# Patient Record
Sex: FEMALE | Race: BLACK OR AFRICAN AMERICAN | Employment: OTHER | RURAL
[De-identification: names, ages, dates, MRNs, and addresses within clinical notes are randomized per-mention and may not be internally consistent; named-entity substitution may affect disease eponyms.]

---

## 2017-01-23 ENCOUNTER — TELEPHONE (OUTPATIENT)
Dept: FAMILY MEDICINE CLINIC | Age: 37
End: 2017-01-23

## 2017-01-23 NOTE — TELEPHONE ENCOUNTER
----- Message from Javier Beltre sent at 1/23/2017 11:10 AM EST -----  Regarding: Dr. Weir Heads Telephone  Patient would like to see when she had her HPV shot.  Contact is [phone]

## 2017-02-02 ENCOUNTER — TELEPHONE (OUTPATIENT)
Dept: FAMILY MEDICINE CLINIC | Age: 37
End: 2017-02-02

## 2017-02-02 ENCOUNTER — HOSPITAL ENCOUNTER (OUTPATIENT)
Dept: LAB | Age: 37
Discharge: HOME | End: 2017-02-02
Payer: COMMERCIAL

## 2017-02-02 ENCOUNTER — OFFICE VISIT (OUTPATIENT)
Dept: FAMILY MEDICINE CLINIC | Age: 37
End: 2017-02-02

## 2017-02-02 VITALS
TEMPERATURE: 97.5 F | HEART RATE: 82 BPM | SYSTOLIC BLOOD PRESSURE: 113 MMHG | OXYGEN SATURATION: 94 % | WEIGHT: 164 LBS | HEIGHT: 64 IN | BODY MASS INDEX: 28 KG/M2 | RESPIRATION RATE: 16 BRPM | DIASTOLIC BLOOD PRESSURE: 75 MMHG

## 2017-02-02 DIAGNOSIS — Z11.3 SCREEN FOR STD (SEXUALLY TRANSMITTED DISEASE): ICD-10-CM

## 2017-02-02 DIAGNOSIS — Z01.419 WELL WOMAN EXAM: ICD-10-CM

## 2017-02-02 DIAGNOSIS — Z30.018 ENCOUNTER FOR INITIAL PRESCRIPTION OF OTHER CONTRACEPTIVES: ICD-10-CM

## 2017-02-02 DIAGNOSIS — Z11.3 SCREEN FOR STD (SEXUALLY TRANSMITTED DISEASE): Primary | ICD-10-CM

## 2017-02-02 PROCEDURE — 87624 HPV HI-RISK TYP POOLED RSLT: CPT | Performed by: FAMILY MEDICINE

## 2017-02-02 PROCEDURE — 88175 CYTOPATH C/V AUTO FLUID REDO: CPT | Performed by: FAMILY MEDICINE

## 2017-02-02 RX ORDER — GABAPENTIN 300 MG/1
300 CAPSULE ORAL 2 TIMES DAILY
COMMUNITY
End: 2017-05-03 | Stop reason: SDUPTHER

## 2017-02-02 RX ORDER — LORAZEPAM 2 MG/1
2 TABLET ORAL 2 TIMES DAILY
COMMUNITY
Start: 2016-12-18 | End: 2017-08-23 | Stop reason: SDUPTHER

## 2017-02-02 RX ORDER — CITALOPRAM 20 MG/1
1 TABLET, FILM COATED ORAL DAILY
COMMUNITY
Start: 2017-01-15 | End: 2017-12-08

## 2017-02-02 NOTE — PROGRESS NOTES
Reviewed record in preparation for visit and have obtained necessary documentation. Patient did not bring medications to visit for review. Information provided on Advanced Directive, Living Will. Body mass index is 28.15 kg/(m^2).    Health Maintenance Due   Topic Date Due    INFLUENZA AGE 9 TO ADULT  08/01/2016

## 2017-02-02 NOTE — PATIENT INSTRUCTIONS
Use  Prior to intercourse, approximately one-quarter teaspoon of spermicide is placed in the cap and an additional one-quarter teaspoon is placed along the rim [5,21]. The cap is then turned over and an additional one-quarter teaspoon of spermicide is placed in the groove between the dome and the brim. The insertion of the cap is then similar to insertion of a diaphragm. The cervical cap is inserted with the dome/strap side down and with the long brim edge entering the vagina first. (See 'Correct use' above.)  The cap can be inserted up to six hours prior to sex. The cap must be left in the vagina for at least six hours after the last episode of intercourse, and the [place] package insert states is can be left in place for up to 48 hours, whereas the  package insert states it can be left in place for up to 72 hours ( approval) [17]. Prior to repeat episodes of intercourse, the woman confirms correct cap position but does not remove the cap. She then inserts an additional dose of spermicide into the vagina. The cervical cap is not recommended for use during menstruation     Maintenance    With simple maintenance, diaphragms typically last about two years [5]. Maintenance includes:  ?Wash with mild soap and warm water. ? Allow to air dry. ?Do not use powders or other products on diaphragm. ?Do not use with oil-based products such as petroleum jelly or cold cream as these can damage the diaphragm. If additional lubricant is needed, water-based lubricants are used. Diaphragms should be checked routinely for damage such as a holes, cracks, weak spots, or wrinkles. To check for damage, the diaphragm can either be held up to the light or filled with water to assess for holes or leaks.   Refitting and replacing -- Diaphragm fit is rechecked after a birth, miscarriage, or  or if the patient gains or loses 4.5 kg or more (10 lbs), has pelvic surgery, has frequent UTIs, or experiences discomfort during sex. Manufacturers of silicone diaphragms recommend replacement every two years. Cervical Cap for Birth Control: Care Instructions  Your Care Instructions    The cervical cap is used to prevent pregnancy. It is made of silicone. A cap is called a barrier method because it keeps the sperm and eggs apart. The cap fits inside your vagina and covers the cervix, which is the opening of the uterus. You use the cap each time you have intercourse. The cap will last for 1 year. You doctor will fit you for the cap and give you a prescription for it. Your doctor may ask you to come back to make sure you know how to insert the cap and use it correctly. Follow-up care is a key part of your treatment and safety. Be sure to make and go to all appointments, and call your doctor if you are having problems. It's also a good idea to know your test results and keep a list of the medicines you take. How can you care for yourself at home? How do you use the cervical cap? · Insert the cap each time you have intercourse. Read the instructions for how to insert the cap. It must be done correctly to protect against pregnancy. In general:  ¨ Apply spermicide (contraceptive jelly) to the cap according to the directions. ¨ Insert the cap all the way into your vagina. Make sure it is covering your cervix. ¨ Remove the cap by pulling it out with the removal strap. · You can insert the cap up to 6 hours before having intercourse. Leave the cap in place for at least 6 hours after intercourse. Do not leave it in for more than 48 hours. · If you have sex more than one time when the cap is in place, put a small amount of spermicide on the tip of your finger. Insert your finger into your vagina and check the position of the cap. Don't remove the cap. · Avoid using any petroleum-based vaginal creams, oils, or ointments, which can damage the silicone.  But water-based personal lubricants, such as Astroglide and K-Y Corin, are safe to use. What if you think the cap is not placed correctly or not protecting you from pregnancy? · Always read the instructions. · Call your doctor and use backup birth control, such as a condom, or don't have intercourse until you know the cap is working. · If you had intercourse, you can use emergency contraception, such as the morning-after pill (Plan B). You can use emergency contraception for up to 5 days after having had sex, but it works best if you take it right away. What else do you need to know? · Spermicide used with the cap may cause an allergic reaction. It can cause itching or sores in the vagina or on the penis. · Don't use the cap during your period. Use another method, such as a condom, or don't have intercourse. · Check the cap often. Get a new cap if you see holes, tears, or discoloration. · Wash the cap with warm water and hand soap after removing it. Make sure it's dry before you store it in its container. Do not use talcum or baby powder on the cap. These products may break down the silicone or irritate the vagina. · You should be refitted for the cap if you have a baby. · The cap doesn't protect against sexually transmitted diseases (STDs), such as herpes or HIV/AIDS. If you're not sure whether your sex partner might have an STD, use a condom to protect against disease. Using a condom with the cap also protects better against pregnancy. When should you call for help? Watch closely for changes in your health, and be sure to contact your doctor if:  · You have any problems with your birth control method. · You think you may have been exposed to or have a sexually transmitted disease. · You have vaginal discharge that smells bad. · You have signs of a urinary infection. For example:  ¨ You have blood or pus in your urine. ¨ You have pain in your back just below your rib cage. This is called flank pain. ¨ You have a fever, chills, or body aches.   ¨ It hurts to urinate. ¨ You have groin or belly pain. Where can you learn more? Go to http://daniel-carlton.info/. Enter Q340 in the search box to learn more about \"Cervical Cap for Birth Control: Care Instructions. \"  Current as of: May 30, 2016  Content Version: 11.1  © 3249-2757 [employer]. Care instructions adapted under license by [employer] (which disclaims liability or warranty for this information). If you have questions about a medical condition or this instruction, always ask your healthcare professional. [address] any warranty or liability for your use of this information.

## 2017-02-02 NOTE — PROGRESS NOTES
CC: [facility]    HPI: Pt is a 31 y.o. female who presents for [facility]. She is interested in getting a cervical cap. She had a condom break during intercourse 2 weeks ago and would like to be tested for STD's. Last pap: 5/2015, normal with negative HPV  History of abnormal paps?: Yes - LSIL in 3/2012 with no HPV tested. Normal colpo in 4/2012. Normal pap with no HPV in 1/2014. Abnormal vaginal bleeding or discharge?: No  Desire to be tested for STDs today?: Yes  LMP: 1/10/17  Last mammogram: Normal, in 4/2013 - done for lump in left breast, found to be lipoma on US  Self breast checks?: Occasionally  New lumps or bumps?: No  Family history of ovarian, uterine or breast cancer?: Mom in 42's and maternal aunt in 52's, cousin on mother' side in her 42's. Pt denies any physical, emotional or verbal abuse. No      Past Medical History   Diagnosis Date    Anemia NEC     Depression     GERD (gastroesophageal reflux disease)     Hypertension     Musculoskeletal disorder     SOB (shortness of breath)     Stool color black        Family History   Problem Relation Age of Onset    Hypertension Father     Elevated Lipids Father     Arthritis-rheumatoid Mother      ? Lupus vs RA    Lung Disease Mother     Heart Disease Mother     Cancer Mother      breast in 39y    COPD Mother     Hypertension Mother     Stroke Mother      3-4 strokes    Diabetes Maternal Grandmother        Social History   Substance Use Topics    Smoking status: Former Smoker     Packs/day: 0.50     Years: 8.00     Types: Cigarettes     Quit date: 12/9/2011    Smokeless tobacco: Never Used    Alcohol use 0.0 oz/week     0 Standard drinks or equivalent per week      Comment: occassionally         PE:  Visit Vitals    /75 (BP 1 Location: Right arm, BP Patient Position: Sitting)    Pulse 82    Temp 97.5 °F (36.4 °C) (Oral)    Resp 16    Ht 5' 4\" (1.626 m)    Wt 164 lb (74.4 kg)    LMP 01/10/2017    SpO2 94%    BMI 28.15 kg/m2     Gen: Pt sitting in chair, in NAD  Head: Normocephalic, atraumatic  Eyes: Sclera anicteric, EOM grossly intact, PERRL  Throat: MMM, normal lips, tongue, teeth and gums  Neck: Supple, no LAD, no thyromegaly or carotid bruits  CVS: Normal S1, S2, no m/r/g  Resp: CTAB, no wheezes or rales  Breasts: Symmetric, no lesions or nipple discharge. 3x1.5cm firm, painless mass at 6 o'clock in left breast, correlating to known lipoma. Abd: Soft, non-tender, non-distended  : Normal external female genitalia. Vaginal mucosa pink, moist. Small amount clear discharge. Cervix without visible lesions. No abnormalities palpated on bimanual exam.   Extrem: Atraumatic, no cyanosis or edema  Pulses: 2+   Skin: Warm, dry  Neuro: Alert, oriented, appropriate     A/P: Pt is a 31 y.o. female who presents for [facility]. - Pap with HPV. Pt advised that based on prior results she does not need a pap until 5/2020 but states she would really like to have it done today and her insurance will cover it.   - GC/Chlam by urine (per her insurance this is the test they will pay for under preventative services)  - Pt to RTC in 2 weeks for HIV and RPR, as this will give her a full 4 weeks from possible exposure. - Rx for cervical cap - based on pregnancy history she will be size 26mm  - RTC in 1 year for [facility] or sooner prn. Pt advised she can make an appt after picking up her cervical cap to come into clinic for instructions on correct placement and usage. Discussed diagnoses in detail with patient. Medication risks/benefits/side effects discussed with patient. All of the patient's questions were addressed. The patient understands and agrees with our plan of care. The patient knows to call back if they are unsure of or forget any changes we discussed today or if the symptoms change.   The patient received an After-Visit Summary which contains VS, orders, medication list and allergy list. This can be used as a \"mini-medical record\" should they have to seek medical care while out of town. Current Outpatient Prescriptions on File Prior to Visit   Medication Sig Dispense Refill    omeprazole (PRILOSEC) 40 mg capsule Take 1 Cap by mouth daily. Indications: GASTROESOPHAGEAL REFLUX 90 Cap 3    hydrocortisone-pramoxine (PROCTOFOAM HC) rectal foam Insert 1 Applicator into rectum two (2) times a day. 1 Can 0    LORazepam (ATIVAN) 1 mg tablet Take 1 Tab by mouth every twelve (12) hours. Max Daily Amount: 2 mg. Indications: ANXIETY (Patient taking differently: Take 2 mg by mouth every twelve (12) hours. Indications: ANXIETY) 30 Tab 0    clonazePAM (KLONOPIN) 0.5 mg tablet Take 1 mg by mouth nightly as needed.  diclofenac (VOLTAREN) 1 % gel Apply 4 g to affected area four (4) times daily. 100 g 0    sertraline (ZOLOFT) 50 mg tablet Take 100 mg by mouth daily.  traZODone (DESYREL) 50 mg tablet Take 1 Tab by mouth nightly. 30 Tab 6    hydroxychloroquine (PLAQUENIL) 200 mg tablet Take 200 mg by mouth two (2) times a day.  montelukast (SINGULAIR) 10 mg tablet Take 1 Tab by mouth daily for 90 days. 30 Tab 2    ketoconazole (NIZORAL) 2 % shampoo        No current facility-administered medications on file prior to visit.

## 2017-02-02 NOTE — TELEPHONE ENCOUNTER
----- Message from Evangelina Martinez sent at 2/2/2017  2:08 PM EST -----  Regarding: Dr. Johana Dowd with [employer] is requesting that the doctor sends a pre service appeal for the diaphragm Femcap because it's a drug included under Healthcare Reform, but it's showing as a benefit exclusion under her insurance plan.  [phone]

## 2017-02-02 NOTE — MR AVS SNAPSHOT
Visit Information Date & Time Provider Department Dept. Phone Encounter #  
 2/2/2017  8:40 AM Latanya Stevens MD [address] [place] [address] [phone] [id number] Follow-up Instructions Return in about 1 year (around 2/2/2018) for [facility]. Upcoming Health Maintenance Date Due INFLUENZA AGE 9 TO ADULT 8/1/2016 PAP AKA CERVICAL CYTOLOGY 5/4/2018 DTaP/Tdap/Td series (2 - Td) 1/25/2024 COLONOSCOPY 5/15/2025 Allergies as of 2/2/2017  Review Complete On: 2/2/2017 By: Manish Castillo LPN Severity Noted Reaction Type Reactions Sulfa (Sulfonamide Antibiotics)  07/07/2010    Hives Vicodin [Hydrocodone-acetaminophen]  01/03/2011    Nausea and Vomiting Current Immunizations  Reviewed on 11/18/2010 Name Date Tdap 1/25/2014  7:31 PM  
  
 Not reviewed this visit You Were Diagnosed With   
  
 Codes Comments Screen for STD (sexually transmitted disease)    -  Primary ICD-10-CM: Z11.3 ICD-9-CM: V74.5 Well woman exam     ICD-10-CM: N69.864 ICD-9-CM: V72.31 Encounter for initial prescription of other contraceptives     ICD-10-CM: D38.251 
ICD-9-CM: V25.02 Vitals BP Pulse Temp Resp Height(growth percentile) Weight(growth percentile) 113/75 (BP 1 Location: Right arm, BP Patient Position: Sitting) 82 97.5 °F (36.4 °C) (Oral) 16 5' 4\" (1.626 m) 164 lb (74.4 kg) LMP SpO2 BMI OB Status Smoking Status 01/10/2017 94% 28.15 kg/m2 Having regular periods Former Smoker Vitals History BMI and BSA Data Body Mass Index Body Surface Area  
 28.15 kg/m 2 1.83 m 2 Preferred Pharmacy Pharmacy Name Phone [facility] [address] [facility] Brian Carmen Your Updated Medication List  
  
   
This list is accurate as of: 2/2/17 10:54 AM.  Always use your most recent med list.  
  
  
  
  
 Cervical Caps 26 mm Samaria Ohm Commonly known as:  [facility]  
 Insert 1 Cap into vagina as needed. citalopram 20 mg tablet Commonly known as:  Audra Bongo Take 1 Tab by mouth daily. clonazePAM 0.5 mg tablet Commonly known as:  [place] Take 1 mg by mouth nightly as needed. diclofenac 1 % Gel Commonly known as:  VOLTAREN Apply 4 g to affected area four (4) times daily. gabapentin 300 mg capsule Commonly known as:  NEURONTIN Take 300 mg by mouth two (2) times a day. hydrocortisone-pramoxine rectal foam  
Commonly known as:  PROCTOFOAM HC Insert 1 Applicator into rectum two (2) times a day. * LORazepam 1 mg tablet Commonly known as:  ATIVAN Take 1 Tab by mouth every twelve (12) hours. Max Daily Amount: 2 mg. Indications: ANXIETY * LORazepam 2 mg tablet Commonly known as:  ATIVAN Take 2 mg by mouth two (2) times a day. omeprazole 40 mg capsule Commonly known as:  PRILOSEC Take 1 Cap by mouth daily. Indications: GASTROESOPHAGEAL REFLUX  
  
 traZODone 50 mg tablet Commonly known as:  Vancleve Askew Take 1 Tab by mouth nightly. ZOLOFT 50 mg tablet Generic drug:  sertraline Take 100 mg by mouth daily. * Notice: This list has 2 medication(s) that are the same as other medications prescribed for you. Read the directions carefully, and ask your doctor or other care provider to review them with you. Prescriptions Sent to Pharmacy Refills Cervical Caps (FEMCAP) 26 mm kole 0 Sig: Insert 1 Cap into vagina as needed. Class: Normal  
 [facility]acy: [facility] [address] [place] [address] Ph #: [phone] Route: Vaginal  
  
We Performed the Following C TRACHOMATIS AMPLIFICATION [id number] CPT(R)]   
 N GONORRHOEA AMPLIFICATION T881243 CPT(R)] PAP IG, APTIMA HPV AND RFX 16[birth year],45 ([id number]) [[id number] Custom] Follow-up Instructions Return in about 1 year (around 2/2/2018) for [facility]. To-Do List   
 02/02/2017 Lab:  HIV 1/2 AG/AB, 4TH GENERATION,W RFLX CONFIRM   
  
 02/02/2017 Lab:  RPR Patient Instructions Use 
Prior to intercourse, approximately one-quarter teaspoon of spermicide is placed in the cap and an additional one-quarter teaspoon is placed along the rim [5,21]. The cap is then turned over and an additional one-quarter teaspoon of spermicide is placed in the groove between the dome and the brim. The insertion of the cap is then similar to insertion of a diaphragm. The cervical cap is inserted with the dome/strap side down and with the long brim edge entering the vagina first. (See 'Correct use' above.) The cap can be inserted up to six hours prior to sex. The cap must be left in the vagina for at least six hours after the last episode of intercourse, and the [place] package insert states is can be left in place for up to 48 hours, whereas the  package insert states it can be left in place for up to 72 hours ( approval) [17]. Prior to repeat episodes of intercourse, the woman confirms correct cap position but does not remove the cap. She then inserts an additional dose of spermicide into the vagina. The cervical cap is not recommended for use during menstruation Maintenance With simple maintenance, diaphragms typically last about two years [5]. Maintenance includes: ?Wash with mild soap and warm water. ? Allow to air dry. ?Do not use powders or other products on diaphragm. ?Do not use with oil-based products such as petroleum jelly or cold cream as these can damage the diaphragm. If additional lubricant is needed, water-based lubricants are used. Diaphragms should be checked routinely for damage such as a holes, cracks, weak spots, or wrinkles. To check for damage, the diaphragm can either be held up to the light or filled with water to assess for holes or leaks.  
Refitting and replacing  Diaphragm fit is rechecked after a birth, miscarriage, or  or if the patient gains or loses 4.5 kg or more (10 lbs), has pelvic surgery, has frequent UTIs, or experiences discomfort during sex. Manufacturers of silicone diaphragms recommend replacement every two years. Cervical Cap for Birth Control: Care Instructions Your Care Instructions The cervical cap is used to prevent pregnancy. It is made of silicone. A cap is called a barrier method because it keeps the sperm and eggs apart. The cap fits inside your vagina and covers the cervix, which is the opening of the uterus. You use the cap each time you have intercourse. The cap will last for 1 year. You doctor will fit you for the cap and give you a prescription for it. Your doctor may ask you to come back to make sure you know how to insert the cap and use it correctly. Follow-up care is a key part of your treatment and safety. Be sure to make and go to all appointments, and call your doctor if you are having problems. It's also a good idea to know your test results and keep a list of the medicines you take. How can you care for yourself at home? How do you use the cervical cap? · Insert the cap each time you have intercourse. Read the instructions for how to insert the cap. It must be done correctly to protect against pregnancy. In general: 
¨ Apply spermicide (contraceptive jelly) to the cap according to the directions. ¨ Insert the cap all the way into your vagina. Make sure it is covering your cervix. ¨ Remove the cap by pulling it out with the removal strap. · You can insert the cap up to 6 hours before having intercourse. Leave the cap in place for at least 6 hours after intercourse. Do not leave it in for more than 48 hours. · If you have sex more than one time when the cap is in place, put a small amount of spermicide on the tip of your finger. Insert your finger into your vagina and check the position of the cap. Don't remove the cap. · Avoid using any petroleum-based vaginal creams, oils, or ointments, which can damage the silicone. But water-based personal lubricants, such as Astroglide and K-Y Jelly, are safe to use. What if you think the cap is not placed correctly or not protecting you from pregnancy? · Always read the instructions. · Call your doctor and use backup birth control, such as a condom, or don't have intercourse until you know the cap is working. · If you had intercourse, you can use emergency contraception, such as the morning-after pill (Plan B). You can use emergency contraception for up to 5 days after having had sex, but it works best if you take it right away. What else do you need to know? · Spermicide used with the cap may cause an allergic reaction. It can cause itching or sores in the vagina or on the penis. · Don't use the cap during your period. Use another method, such as a condom, or don't have intercourse. · Check the cap often. Get a new cap if you see holes, tears, or discoloration. · Wash the cap with warm water and hand soap after removing it. Make sure it's dry before you store it in its container. Do not use talcum or baby powder on the cap. These products may break down the silicone or irritate the vagina. · You should be refitted for the cap if you have a baby. · The cap doesn't protect against sexually transmitted diseases (STDs), such as herpes or HIV/AIDS. If you're not sure whether your sex partner might have an STD, use a condom to protect against disease. Using a condom with the cap also protects better against pregnancy. When should you call for help? Watch closely for changes in your health, and be sure to contact your doctor if: 
· You have any problems with your birth control method. · You think you may have been exposed to or have a sexually transmitted disease. · You have vaginal discharge that smells bad. · You have signs of a urinary infection. For example: ¨ You have blood or pus in your urine. ¨ You have pain in your back just below your rib cage. This is called flank pain. ¨ You have a fever, chills, or body aches. ¨ It hurts to urinate. ¨ You have groin or belly pain. Where can you learn more? Go to http://daniel-carlton.info/. Enter S341 in the search box to learn more about \"Cervical Cap for Birth Control: Care Instructions. \" Current as of: May 30, 2016 Content Version: 11.1 © 9799-6369 [employer]. Care instructions adapted under license by "[employer] (which disclaims liability or warranty for this information). If you have questions about a medical condition or this instruction, always ask your healthcare professional. [address] any warranty or liability for your use of this information. Introducing [facility]! Dear Rodrigo Magdaleno: Thank you for requesting a [employer] account. Our records indicate that you already have an active [employer] account. You can access your account anytime at [email or web address] News [email or web address] Did you know that you can access your hospital and ER discharge instructions at any time in [employer]? You can also review all of your test results from your hospital stay or ER visit. Additional Information If you have questions, please visit the Frequently Asked Questions section of the [employer] website at [email or web address] Remember, [employer] is NOT to be used for urgent needs. For medical emergencies, dial 911. Now available from your iPhone and Android! Please provide this summary of care documentation to your next provider. Your primary care clinician is listed as [address]. If you have any questions after today's visit, please call [phone].

## 2017-02-03 ENCOUNTER — TELEPHONE (OUTPATIENT)
Dept: FAMILY MEDICINE CLINIC | Age: 37
End: 2017-02-03

## 2017-02-03 NOTE — TELEPHONE ENCOUNTER
Returned call, however the number listed here is for the pt's mail order pharmacy, not her insurance. The pharmacist I spoke with reports that they don't have any cervical caps or diaphragms on formulary and are not able to tell if any of them are covered by her insurance. Called pt to inform of this. She will call her insurance company today to figure out if there is a different diaphragm or cervical cap they cover and if not, to have them send up the pre service appeal form so I can fill that out. All questions answered and pt feels comfortable with the plan of care.

## 2017-02-03 NOTE — TELEPHONE ENCOUNTER
----- Message from Smiley Fernandez sent at 2/3/2017  3:12 PM EST -----  Regarding: Santillan/anil Glynn with [facility] is requesting a preauthorization for the medication Femcap. She stated the doctor would have to appeal it. [facility] phone number is [phone] ref .

## 2017-02-07 LAB
C TRACH RRNA SPEC QL NAA+PROBE: NEGATIVE
N GONORRHOEA RRNA SPEC QL NAA+PROBE: NEGATIVE

## 2017-02-07 NOTE — TELEPHONE ENCOUNTER
Spent 2 hours on the phone with patient's insurance and pharmacy. It appears that the cervical cap or diaphragm will be covered 100% but only if she gets it through a provider's office. Her insurance will not authorize her to get this device through a pharmacy. She will not be able to get the device placed through this clinic as it has no indication other than prevention of contraception. Called multiple offices in the area, and KELLIW is able to do this in office. Discussed with pt, she is not interested in travelling that far to have this done.

## 2017-02-09 DIAGNOSIS — M19.90 ARTHRITIS: ICD-10-CM

## 2017-02-09 RX ORDER — DICLOFENAC SODIUM 10 MG/G
4 GEL TOPICAL 4 TIMES DAILY
Qty: 100 G | Refills: 4 | Status: SHIPPED | OUTPATIENT
Start: 2017-02-09 | End: 2018-05-10 | Stop reason: SDUPTHER

## 2017-02-09 NOTE — TELEPHONE ENCOUNTER
----- Message from Cynthia Rhodes sent at 2/9/2017  8:37 AM EST -----  Regarding: Dr Yasmin Garner  Pt needs a refill on her muscle relaxer, pt didn't know the name call into [facility] [phone], if you have any question call pt at [phone].

## 2017-02-10 RX ORDER — CYCLOBENZAPRINE HCL 10 MG
10 TABLET ORAL
Qty: 30 TAB | Refills: 3 | OUTPATIENT
Start: 2017-02-10

## 2017-02-10 NOTE — TELEPHONE ENCOUNTER
----- Message from Esme Omalley sent at 2/10/2017  8:53 AM EST -----  Regarding: Nolia Arch / Refill  The patient is requesting a call back to confirm the status of the Rx request for Cyclobenzaprine. (i)[phone]

## 2017-02-10 NOTE — TELEPHONE ENCOUNTER
----- Message from Karma Connell sent at 2/9/2017  4:04 PM EST -----  Regarding: Dr Santillan/rx refill  Pt (p) [phone], pt said the wrong rx was called into her pharmacy she had requested the Cyclobenzaprine, the generic for flexeril, the Voltaren gel was called in instead and she already has that one already. For [facility]  [phone]. She would like the medication  by the weekend  hopefully today she really needs it.

## 2017-02-10 NOTE — TELEPHONE ENCOUNTER
Notified Ms Baptiste Shown that medication refill is denied for appointment required, Ms Baptiste Shown acknowledged verbally

## 2017-02-10 NOTE — TELEPHONE ENCOUNTER
Rx request for Flexeril. I have never written this medication for this patient and not sure why she is taking it. It was not on her medication list at the last visit and chart review shows it was last filled in 2015 by a provider who is no longer in this office. This is not a chronic medication. If she is having a new problem that she thinks requires this medication, she will need an appt to be seen. Thanks!

## 2017-05-03 ENCOUNTER — OFFICE VISIT (OUTPATIENT)
Dept: FAMILY MEDICINE CLINIC | Age: 37
End: 2017-05-03

## 2017-05-03 VITALS
RESPIRATION RATE: 16 BRPM | OXYGEN SATURATION: 97 % | HEART RATE: 90 BPM | WEIGHT: 157 LBS | TEMPERATURE: 97.2 F | HEIGHT: 64 IN | DIASTOLIC BLOOD PRESSURE: 82 MMHG | SYSTOLIC BLOOD PRESSURE: 109 MMHG | BODY MASS INDEX: 26.8 KG/M2

## 2017-05-03 DIAGNOSIS — F31.0 BIPOLAR AFFECTIVE DISORDER, CURRENT EPISODE HYPOMANIC (HCC): ICD-10-CM

## 2017-05-03 DIAGNOSIS — L21.9 SEBORRHEA: ICD-10-CM

## 2017-05-03 DIAGNOSIS — F32.A DEPRESSION, UNSPECIFIED DEPRESSION TYPE: Chronic | ICD-10-CM

## 2017-05-03 DIAGNOSIS — I10 ESSENTIAL HYPERTENSION: Chronic | ICD-10-CM

## 2017-05-03 DIAGNOSIS — K21.00 GASTROESOPHAGEAL REFLUX DISEASE WITH ESOPHAGITIS: Chronic | ICD-10-CM

## 2017-05-03 DIAGNOSIS — K64.4 EXTERNAL HEMORRHOIDS: ICD-10-CM

## 2017-05-03 DIAGNOSIS — M79.7 FIBROMYALGIA: Primary | ICD-10-CM

## 2017-05-03 DIAGNOSIS — Z20.2 EXPOSURE TO STD: ICD-10-CM

## 2017-05-03 DIAGNOSIS — B00.9 HSV-2 INFECTION: ICD-10-CM

## 2017-05-03 DIAGNOSIS — F42.2 MIXED OBSESSIONAL THOUGHTS AND ACTS: ICD-10-CM

## 2017-05-03 DIAGNOSIS — E78.00 PURE HYPERCHOLESTEROLEMIA: ICD-10-CM

## 2017-05-03 RX ORDER — TRIAMCINOLONE ACETONIDE 1 MG/G
OINTMENT TOPICAL 2 TIMES DAILY
COMMUNITY
End: 2017-05-03 | Stop reason: SDUPTHER

## 2017-05-03 RX ORDER — TRIAMCINOLONE ACETONIDE 1 MG/G
OINTMENT TOPICAL 2 TIMES DAILY
Qty: 85 G | Refills: 11 | Status: SHIPPED | OUTPATIENT
Start: 2017-05-03 | End: 2019-06-10 | Stop reason: SDUPTHER

## 2017-05-03 RX ORDER — KETOCONAZOLE 20 MG/ML
SHAMPOO TOPICAL
Qty: 1 BOTTLE | Refills: 11 | Status: SHIPPED | OUTPATIENT
Start: 2017-05-03 | End: 2019-05-07 | Stop reason: SDUPTHER

## 2017-05-03 RX ORDER — GABAPENTIN 300 MG/1
300 CAPSULE ORAL 3 TIMES DAILY
Qty: 90 CAP | Refills: 4 | Status: SHIPPED | OUTPATIENT
Start: 2017-05-03 | End: 2017-08-23 | Stop reason: SDUPTHER

## 2017-05-03 RX ORDER — HYDROCORTISONE 25 MG/G
CREAM TOPICAL 4 TIMES DAILY
Qty: 30 G | Refills: 11 | Status: SHIPPED | OUTPATIENT
Start: 2017-05-03 | End: 2018-07-25 | Stop reason: SDUPTHER

## 2017-05-03 RX ORDER — KETOCONAZOLE 20 MG/ML
SHAMPOO TOPICAL DAILY PRN
COMMUNITY
End: 2017-05-03

## 2017-05-03 RX ORDER — ACYCLOVIR 400 MG/1
400 TABLET ORAL 2 TIMES DAILY
Qty: 60 TAB | Refills: 5 | Status: SHIPPED | OUTPATIENT
Start: 2017-05-03 | End: 2017-05-13

## 2017-05-03 NOTE — PROGRESS NOTES
Reviewed record in preparation for visit and have obtained necessary documentation. Patient did not bring medications to visit for review. Information provided on Advanced Directive, Living Will. Body mass index is 26.95 kg/(m^2). There are no preventive care reminders to display for this patient.

## 2017-05-03 NOTE — MR AVS SNAPSHOT
Visit Information Date & Time Provider Department Dept. Phone Encounter #  
 5/3/2017 10:50 AM Jessica Marshall MD  [place] [id number] Follow-up Instructions Return in about 6 months (around 11/3/2017), or if symptoms worsen or fail to improve. Upcoming Health Maintenance Date Due INFLUENZA AGE 9 TO ADULT 8/1/2017 PAP AKA CERVICAL CYTOLOGY 2/2/2020 DTaP/Tdap/Td series (2 - Td) 1/25/2024 COLONOSCOPY 5/15/2025 Allergies as of 5/3/2017  Review Complete On: 5/3/2017 By: Padmini Hopper LPN Severity Noted Reaction Type Reactions Sulfa (Sulfonamide Antibiotics)  07/07/2010    Hives Vicodin [Hydrocodone-acetaminophen]  01/03/2011    Nausea and Vomiting Current Immunizations  Reviewed on 11/18/2010 Name Date Tdap 1/25/2014  7:31 PM  
  
 Not reviewed this visit You Were Diagnosed With   
  
 Codes Comments Fibromyalgia    -  Primary ICD-10-CM: M79.7 ICD-9-CM: 729.1 Essential hypertension     ICD-10-CM: I10 
ICD-9-CM: 401.9 Gastroesophageal reflux disease with esophagitis     ICD-10-CM: K21.0 ICD-9-CM: 530.11 Depression, unspecified depression type     ICD-10-CM: F32.9 ICD-9-CM: 849 Mixed obsessional thoughts and acts     ICD-10-CM: F42.2 ICD-9-CM: 300.3 Bipolar affective disorder, current episode [facility]omanic ([facility]     ICD-10-CM: F31.0 ICD-9-CM: 296.40 Pure hypercholesterolemia     ICD-10-CM: E78.00 ICD-9-CM: 272.0 Exposure to STD     ICD-10-CM: Z20.2 ICD-9-CM: V01.6 Vitals BP Pulse Temp Resp Height(growth percentile) Weight(growth percentile) 109/82 (BP 1 Location: Left arm, BP Patient Position: Sitting) 90 97.2 °F (36.2 °C) (Oral) 16 5' 4\" (1.626 m) 157 lb (71.2 kg) SpO2 BMI OB Status Smoking Status 97% 26.95 kg/m2 Having regular periods Former Smoker Vitals History BMI and BSA Data Body Mass Index Body Surface Area 26.95 kg/m 2 1.79 m 2 Preferred Pharmacy Pharmacy Name [facility] [address] [facility] Brian Carmen Your Updated Medication List  
  
   
This list is accurate as of: 5/3/17 11:35 AM.  Always use your most recent med list.  
  
  
  
  
 acyclovir 400 mg tablet Commonly known as:  ZOVIRAX Take 1 Tab by mouth two (2) times a day for 10 days. To prevent infection Cervical Caps 26 mm Mena Arielle Commonly known as:  [facility] ANASTASIA [facility] Insert 1 Cap into vagina as needed. citalopram 20 mg tablet Commonly known as:  Metta Sensing Take 1 Tab by mouth daily. clonazePAM 0.5 mg tablet Commonly known as:  Cheryn Brooms Take 1 mg by mouth nightly as needed. diclofenac 1 % Gel Commonly known as:  VOLTAREN Apply 4 g to affected area four (4) times daily. gabapentin 300 mg capsule Commonly known as:  NEURONTIN Take 1 Cap by mouth three (3) times daily. Indications: fibromyalgia  
  
 hydrocortisone 2.5 % rectal cream  
Commonly known as:  ANUSOL-HC Insert  into rectum four (4) times daily. As needed  
  
 hydrocortisone-pramoxine rectal foam  
Commonly known as:  PROCTOFOAM HC Insert 1 Applicator into rectum two (2) times a day.  
  
 ketoconazole 2 % shampoo Commonly known as:  NIZORAL  
shampoo twice weekly * LORazepam 1 mg tablet Commonly known as:  ATIVAN Take 1 Tab by mouth every twelve (12) hours. Max Daily Amount: 2 mg. Indications: ANXIETY * LORazepam 2 mg tablet Commonly known as:  ATIVAN Take 2 mg by mouth two (2) times a day. omeprazole 40 mg capsule Commonly known as:  PRILOSEC Take 1 Cap by mouth daily. Indications: GASTROESOPHAGEAL REFLUX  
  
 traZODone 50 mg tablet Commonly known as:  Arneta Messenger Take 1 Tab by mouth nightly. triamcinolone acetonide 0.1 % ointment Commonly known as:  KENALOG Apply  to affected area two (2) times a day. use thin layer ZOLOFT 50 mg tablet Generic drug:  sertraline Take 100 mg by mouth daily. * Notice: This list has 2 medication(s) that are the same as other medications prescribed for you. Read the directions carefully, and ask your doctor or other care provider to review them with you. Prescriptions Printed Refills  
 ketoconazole (NIZORAL) 2 % shampoo 11 Sig: shampoo twice weekly Class: Print  
 triamcinolone acetonide (KENALOG) 0.1 % ointment 11 Sig: Apply  to affected area two (2) times a day. use thin layer Class: Print Route: Topical  
 hydrocortisone (ANUSOL-HC) 2.5 % rectal cream 11 Sig: Insert  into rectum four (4) times daily. As needed Class: Print Route: Rectal  
  
Prescriptions Sent to Pharmacy Refills  
 gabapentin (NEURONTIN) 300 mg capsule 4 Sig: Take 1 Cap by mouth three (3) times daily. Indications: fibromyalgia Class: Normal  
 [facility] Ph #: [phone] Route: Oral  
 acyclovir (ZOVIRAX) 400 mg tablet 5 Sig: Take 1 Tab by mouth two (2) times a day for 10 days. To prevent infection Class: Normal  
 Pharmacy: [address] Ph #: [phone] Route: Oral  
  
We Performed the Following HIV 1/2 AG/AB, 4TH GENERATION,W RFLX CONFIRM [[id number] Custom] LIPID PANEL [69888 CPT(R)] METABOLIC PANEL, COMPREHENSIVE [33298 CPT(R)] Follow-up Instructions Return in about 6 months (around 11/3/2017), or if symptoms worsen or fail to improve. Patient Instructions A Healthy Lifestyle: Care Instructions Your Care Instructions A healthy lifestyle can help you feel good, stay at a healthy weight, and have plenty of energy for both work and play. A healthy lifestyle is something you can share with your whole family.  
A healthy lifestyle also can lower your risk for serious health problems, such as high blood pressure, heart disease, and diabetes. You can follow a few steps listed below to improve your health and the health of your family. Follow-up care is a key part of your treatment and safety. Be sure to make and go to all appointments, and call your doctor if you are having problems. Its also a good idea to know your test results and keep a list of the medicines you take. How can you care for yourself at home? · Do not eat too much sugar, fat, or fast foods. You can still have dessert and treats now and then. The goal is moderation. · Start small to improve your eating habits. Pay attention to portion sizes, drink less juice and soda pop, and eat more fruits and vegetables. ¨ Eat a healthy amount of food. A 3-ounce serving of meat, for example, is about the size of a deck of cards. Fill the rest of your plate with vegetables and whole grains. ¨ Limit the amount of soda and sports drinks you have every day. Drink more water when you are thirsty. ¨ Eat at least 5 servings of fruits and vegetables every day. It may seem like a lot, but it is not hard to reach this goal. A serving or helping is 1 piece of fruit, 1 cup of vegetables, or 2 cups of leafy, raw vegetables. Have an apple or some carrot sticks as an afternoon snack instead of a candy bar. Try to have fruits and/or vegetables at every meal. 
· Make exercise part of your daily routine. You may want to start with simple activities, such as walking, bicycling, or slow swimming. Try to be active 30 to 60 minutes every day. You do not need to do all 30 to 60 minutes all at once. For example, you can exercise 3 times a day for 10 or 20 minutes. Moderate exercise is safe for most people, but it is always a good idea to talk to your doctor before starting an exercise program. 
· Keep moving. Ferdie Gold the lawn, work in the garden, or [employer]. Take the stairs instead of the elevator at work. · If you smoke, quit. People who smoke have an increased risk for heart attack, stroke, cancer, and other lung illnesses. Quitting is hard, but there are ways to boost your chance of quitting tobacco for good. ¨ Use nicotine gum, patches, or lozenges. ¨ Ask your doctor about stop-smoking programs and medicines. ¨ Keep trying. In addition to reducing your risk of diseases in the future, you will notice some benefits soon after you stop using tobacco. If you have shortness of breath or asthma symptoms, they will likely get better within a few weeks after you quit. · Limit how much alcohol you drink. Moderate amounts of alcohol (up to 2 drinks a day for men, 1 drink a day for women) are okay. But drinking too much can lead to liver problems, high blood pressure, and other health problems. Family health If you have a family, there are many things you can do together to improve your health. · Eat meals together as a family as often as possible. · Eat healthy foods. This includes fruits, vegetables, lean meats and dairy, and whole grains. · Include your family in your fitness plan. Most people think of activities such as jogging or tennis as the way to fitness, but there are many ways you and your family can be more active. Anything that makes you breathe hard and gets your heart pumping is exercise. Here are some tips: 
¨ Walk to do errands or to take your child to school or the bus. ¨ Go for a family bike ride after dinner instead of watching TV. Where can you learn more? Go to http://daniel-carlton.info/. Enter R653 in the search box to learn more about \"A Healthy Lifestyle: Care Instructions. \" Current as of: July 26, 2016 Content Version: 11.2 © 9300-7251 [employer]. Care instructions adapted under license by [employer] (which disclaims liability or warranty for this information).  If you have questions about a medical condition or this instruction, always ask your healthcare professional. [address] any warranty or liability for your use of this information. Fibromyalgia: Care Instructions Your Care Instructions Fibromyalgia is a painful condition that is not completely understood by medical experts. The cause of fibromyalgia is not known. It can make you feel tired and ache all over. It causes tender spots at specific points of the body that hurt only when you press on them. You may have trouble sleeping, as well as other symptoms. These problems can upset your work and home life. Symptoms tend to come and go, although they may never go away completely. Fibromyalgia does not harm your muscles, joints, or organs. Follow-up care is a key part of your treatment and safety. Be sure to make and go to all appointments, and call your doctor if you are having problems. It's also a good idea to know your test results and keep a list of the medicines you take. How can you care for yourself at home? · Exercise often. Walk, swim, or bike to help with pain and sleep problems and to make you feel better. · Try to get a good night's sleep. Go to bed and get up at the same time each day, whether you feel rested or not. Make sure you have a good mattress and pillow. · Reduce stress. Avoid things that cause you stress, if you can. If not, work at making them less stressful. Learn to use biofeedback, guided imagery, meditation, or other methods to relax. · Make healthy changes. Eat a balanced diet, quit smoking, and limit alcohol and caffeine. · Use a heating pad set on low or take warm baths or showers for pain. Using cold packs for up to 20 minutes at a time can also relieve pain. Put a thin cloth between the cold pack and your skin. A gentle massage might help too. · Be safe with medicines. Take your medicines exactly as prescribed. Call your doctor if you think you are having a problem with your medicine.  Your doctor may talk to you about taking antidepressant medicines. These medicines may improve sleep, relieve pain, and in some cases treat depression. · Learn about fibromyalgia. This makes coping easier. Then, take an active role in your treatment. · Think about joining a support group with others who have fibromyalgia to learn more and get support. When should you call for help? Watch closely for changes in your health, and be sure to contact your doctor if: 
· You feel sad, helpless, or hopeless; lose interest in things you used to enjoy; or have other symptoms of depression. · Your fibromyalgia symptoms get worse. Where can you learn more? Go to http://daniel-carlton.info/. Enter V003 in the search box to learn more about \"Fibromyalgia: Care Instructions. \" Current as of: October 14, 2016 Content Version: 11.2 © 5220-7672 [employer]. Care instructions adapted under license by [employer] (which disclaims liability or warranty for this information). If you have questions about a medical condition or this instruction, always ask your healthcare professional. [place] [address] any warranty or liability for your use of this information. Introducing [facility]! Dear Jaleesa Coleman: Thank you for requesting a [employer] account. Our records indicate that you already have an active [employer] account. You can access your account anytime at [email or web address] Did you know that you can access your hospital and ER discharge instructions at any time in [employer]? You can also review all of your test results from your hospital stay or ER visit. Additional Information If you have questions, please visit the Frequently Asked Questions section of the [employer] website at [email or web address] Remember, [employer] is NOT to be used for urgent needs. For medical emergencies, dial 911. Now available from your iPhone and Android! Please provide this summary of care documentation to your next provider. Your primary care clinician is listed as [address]. If you have any questions after today's visit, please call [phone].

## 2017-05-04 LAB
ALBUMIN SERPL-MCNC: 4.1 G/DL (ref 3.5–5.5)
ALBUMIN/GLOB SERPL: 1.5 {RATIO} (ref 1.2–2.2)
ALP SERPL-CCNC: 66 IU/L (ref 39–117)
ALT SERPL-CCNC: 18 IU/L (ref 0–32)
AST SERPL-CCNC: 15 IU/L (ref 0–40)
BILIRUB SERPL-MCNC: 0.4 MG/DL (ref 0–1.2)
BUN SERPL-MCNC: 11 MG/DL (ref 6–20)
BUN/CREAT SERPL: 14 (ref 9–23)
CALCIUM SERPL-MCNC: 9.2 MG/DL (ref 8.7–10.2)
CHLORIDE SERPL-SCNC: 103 MMOL/L (ref 96–106)
CHOLEST SERPL-MCNC: 244 MG/DL (ref 100–199)
CO2 SERPL-SCNC: 22 MMOL/L (ref 18–29)
CREAT SERPL-MCNC: 0.78 MG/DL (ref 0.57–1)
GLOBULIN SER CALC-MCNC: 2.7 G/DL (ref 1.5–4.5)
GLUCOSE SERPL-MCNC: 79 MG/DL (ref 65–99)
HDLC SERPL-MCNC: 67 MG/DL
HIV 1+2 AB+HIV1 P24 AG SERPL QL IA: NON REACTIVE
LDLC SERPL CALC-MCNC: 167 MG/DL (ref 0–99)
POTASSIUM SERPL-SCNC: 4.5 MMOL/L (ref 3.5–5.2)
PROT SERPL-MCNC: 6.8 G/DL (ref 6–8.5)
SODIUM SERPL-SCNC: 141 MMOL/L (ref 134–144)
TRIGL SERPL-MCNC: 52 MG/DL (ref 0–149)
VLDLC SERPL CALC-MCNC: 10 MG/DL (ref 5–40)

## 2017-05-04 NOTE — PROGRESS NOTES
Progress Note    Patient: Rachel Suárez MRN: [id number]  SSN: [id number]    YOB: 1980  Age: 31 y.o. Sex: female        Chief Complaint   Patient presents with    Medication Evaluation         Subjective:   Multiple complex medical problems necessitating extensive decision making, prolonged chart review and prolonged OV. Encounter Diagnoses   Name Primary?  Fibromyalgia:  Her primary diagnosis from the rheumatologist is fibromyalgia. For this she was given gabapentin but it is not helped. Will increase the dose. Yes    Essential hypertension:Well-controlled. BP Readings from Last 3 Encounters:   05/03/17 109/82   02/02/17 113/75   12/05/16 110/73     The patient reports:  taking medications as instructed, no medication side effects noted, no TIA's, no chest pain on exertion, no dyspnea on exertion, no swelling of ankles. Lab Results   Component Value Date/Time    Sodium 140 12/05/2016 10:13 AM    Potassium 4.5 12/05/2016 10:13 AM    Chloride 104 12/05/2016 10:13 AM    CO2 22 12/05/2016 10:13 AM    Anion gap 9 08/19/2010 10:36 AM    Glucose 93 12/05/2016 10:13 AM    BUN 12 12/05/2016 10:13 AM    Creatinine 0.71 12/05/2016 10:13 AM    BUN/Creatinine ratio 17 12/05/2016 10:13 AM    GFR est  12/05/2016 10:13 AM    GFR est non- 12/05/2016 10:13 AM    Calcium 9.6 12/05/2016 10:13 AM    Bilirubin, total 0.3 12/05/2016 10:13 AM    AST (SGOT) 16 12/05/2016 10:13 AM    Alk. phosphatase 50 12/05/2016 10:13 AM    Protein, total 7.0 12/05/2016 10:13 AM    Albumin 4.3 12/05/2016 10:13 AM    Globulin 3.6 08/19/2010 10:36 AM    A-G Ratio 1.6 12/05/2016 10:13 AM    ALT (SGPT) 14 12/05/2016 10:13 AM     Our goal is to normalize the blood pressure to decrease the risks of strokes and heart attacks. The patient is in agreement with the plan.          Gastroesophageal reflux disease with esophagitis:  Current control of Symptoms:good  Hiatal Hernia:no  Current Medications:PRN medications  The patient has no history melena or bright red blood in the stools. The patient avoids high dose aspirin and NSAID therapy. The patient is aware of diet changes needed, elevating the head of the bed and appropriate use of antacids.  Depression, unspecified depression type:See below. She is on complex regimen medicines for bipolar depression with OCD tendencies.  Mixed obsessional thoughts and actsThe sentence start about 10 years ago and she is under the care of a psychiatrist.         Bipolar affective disorder, current episode hypomanic [facility]): See current medications.  Pure hypercholesterolemia:  Cardiovascular risks for her are: LDL goal is under 100  hypertension  hyperlipidemia. Currently she takes No medication  Lab Results   Component Value Date/Time    Cholesterol, total 237 12/05/2016 10:13 AM    HDL Cholesterol 57 12/05/2016 10:13 AM    LDL, calculated 171 12/05/2016 10:13 AM    Triglyceride 44 12/05/2016 10:13 AM     Lab Results   Component Value Date/Time    ALT (SGPT) 14 12/05/2016 10:13 AM    AST (SGOT) 16 12/05/2016 10:13 AM    Alk. phosphatase 50 12/05/2016 10:13 AM    Bilirubin, total 0.3 12/05/2016 10:13 AM      Myalgias: No   Fatigue: No   Other side effects: no  Wt Readings from Last 3 Encounters:   05/03/17 157 lb (71.2 kg)   02/02/17 164 lb (74.4 kg)   12/05/16 165 lb (74.8 kg)     The patient is aware of our goal to reduce or eliminate the long term problems (such as strokes and heart attacks) related to poorly controlled hyperlipidemia.  Exposure to STD: The contact occurred in January so her HIV test should be reliable. Nonetheless I told her to recheck it in six months. 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  Seborrhea: She needs 2 prescriptions filled for severe Seborrhea.          External hemorrhoids: She needs a standing order for her Anusol cream.              Current and past medical information:    Current Medications after this visit[de-identified]     Current Outpatient Prescriptions   Medication Sig    gabapentin (NEURONTIN) 300 mg capsule Take 1 Cap by mouth three (3) times daily. Indications: fibromyalgia    acyclovir (ZOVIRAX) 400 mg tablet Take 1 Tab by mouth two (2) times a day for 10 days. To prevent infection    ketoconazole (NIZORAL) 2 % shampoo shampoo twice weekly    triamcinolone acetonide (KENALOG) 0.1 % ointment Apply  to affected area two (2) times a day. use thin layer    hydrocortisone (ANUSOL-HC) 2.5 % rectal cream Insert  into rectum four (4) times daily. As needed    diclofenac (VOLTAREN) 1 % gel Apply 4 g to affected area four (4) times daily.  citalopram (CELEXA) 20 mg tablet Take 1 Tab by mouth daily.  LORazepam (ATIVAN) 2 mg tablet Take 2 mg by mouth two (2) times a day.  hydrocortisone-pramoxine (PROCTOFOAM HC) rectal foam Insert 1 Applicator into rectum two (2) times a day.  clonazePAM (KLONOPIN) 0.5 mg tablet Take 1 mg by mouth nightly as needed.  sertraline (ZOLOFT) 50 mg tablet Take 100 mg by mouth daily.  traZODone (DESYREL) 50 mg tablet Take 1 Tab by mouth nightly.  Cervical Caps (FEMCAP) 26 mm kole Insert 1 Cap into vagina as needed.  omeprazole (PRILOSEC) 40 mg capsule Take 1 Cap by mouth daily. Indications: GASTROESOPHAGEAL REFLUX    LORazepam (ATIVAN) 1 mg tablet Take 1 Tab by mouth every twelve (12) hours. Max Daily Amount: 2 mg. Indications: ANXIETY (Patient taking differently: Take 2 mg by mouth every twelve (12) hours. Indications: ANXIETY)     No current facility-administered medications for this visit.         Patient Active Problem List    Diagnosis Date Noted    Inflammatory arthritis 08/10/2016    Vitamin D deficiency 03/21/2016    Thrombosed external hemorrhoid 02/05/2016    IFG (impaired fasting glucose) 09/10/2015    Hyperlipidemia 09/10/2015    Bipolar affective disorder, current episode hypomanic ([facility] [place] [address] 05/15/2015    Hoarseness 03/25/2013    OCD (obsessive compulsive disorder) 02/27/2012    Vaginal yeast infection 11/09/2011    HSV (herpes simplex virus) anogenital infection 09/12/2011    Costochondritis 05/17/2011    Anxiety 09/13/2010    Itch of skin 05/26/2010    Depression     GERD (gastroesophageal reflux disease)     Hypertension     Anemia NEC        Past Medical History:   Diagnosis Date    Anemia NEC     Depression     GERD (gastroesophageal reflux disease)     Hypertension     Musculoskeletal disorder     SOB (shortness of breath)     Stool color black        Allergies   Allergen Reactions    Sulfa (Sulfonamide Antibiotics) Hives    Vicodin [Hydrocodone-Acetaminophen] Nausea and Vomiting       History reviewed. No pertinent surgical history. Social History     Social History    Marital status: SINGLE     Spouse name: N/A    Number of children: N/A    Years of education: N/A     Social History Main Topics    Smoking status: Former Smoker     Packs/day: 0.50     Years: 8.00     Types: Cigarettes     Quit date: 12/9/2011    Smokeless tobacco: Never Used    Alcohol use 0.0 oz/week     0 Standard drinks or equivalent per week      Comment: occassionally    Drug use: No    Sexual activity: Yes     Partners: Male     Birth control/ protection: None     Other Topics Concern    None     Social History Narrative       Review of Systems     Constitutional: Negative. Negative for fever, chills, weight loss. Positive chronic fatigue and poor sleep. HENT: Her ears are sometimes stopped up. Eyes: Negative. Negative for blurred vision, double vision, photophobia, pain, discharge and redness. Respiratory: Negative. Negative for cough, hemoptysis, sputum production, shortness of breath, wheezing and stridor. Cardiovascular: Negative. Negative for chest pain, palpitations. .   Gastrointestinal: Negative.   Negative for heartburn, nausea, vomiting, abdominal pain, diarrhea, constipation, blood in stool and melena. Genitourinary: Negative. Negative for dysuria, urgency, frequency, hematuria and flank pain. Musculoskeletal: Chronic myalgias. She's also beginning to have symptoms of carpal tunnel syndrome because she type so much at work. Skin: Seborrhea   Neurological: Negative. Endo/Heme/Allergies: Negative. Negative for environmental allergies and polydipsia. Does not bruise/bleed easily. Psychiatric/Behavioral:Positive for bipolar depression and OCD. Objective:     Vitals:    05/03/17 1045   BP: 109/82   Pulse: 90   Resp: 16   Temp: 97.2 °F (36.2 °C)   TempSrc: Oral   SpO2: 97%   Weight: 157 lb (71.2 kg)   Height: 5' 4\" (1.626 m)      Body mass index is 26.95 kg/(m^2). Physical Exam   Nursing note reviewed. Constitutional: She is oriented to person, place, and time. She appears well-developed and well-nourished. No distress. HENT:   Head: Normocephalic and atraumatic. Eyes: Conjunctivae are normal. Pupils are equal, round. No scleral icterus. Neck: Normal range of motion. Neck supple. No JVD present. No tracheal deviation present. No thyromegaly present. No carotid bruit. Cardiovascular: Normal rate, regular rhythm and normal heart sounds. Exam reveals no gallop and no friction rub. No murmur heard. Pulmonary/Chest: Effort normal and breath sounds normal. Has no wheezes. Has no rales. Abdominal:No bloating  Musculoskeletal: Exhibits no edema. Neurological: The patient is alert and oriented to person, place, and time. Skin: Skin is warm and dry. No rash noted. Not diaphoretic. Psychiatric:  She is very anxious. Assessment and orders:       ICD-10-CM ICD-9-CM    1. Fibromyalgia-Increase gabapentin to 300 mg three times daily. We'll go even higher if this does not work. M79.7 729.1 gabapentin (NEURONTIN) 300 mg capsule   2.  Essential hypertension-Controlled I10 401.9 LIPID PANEL METABOLIC PANEL, COMPREHENSIVE   3. Gastroesophageal reflux disease with esophagitis-Controlled   K21.0 530.11    4. Depression, unspecified depression type-Complex problems. She is gainfully employed. F32.9 311    5. Mixed obsessional thoughts and acts-She is on to SSRI medication   F42.2 300.3    6. Bipolar affective disorder, current episode hypomanic (HCC)-She is on Klonopin   F31.0 296.40    7. Pure hypercholesterolemia  -Recheck labs E78.00 272.0 LIPID PANEL      METABOLIC PANEL, COMPREHENSIVE   8. Exposure to STD Z20.2 V01.6 HIV 1/2 AG/AB, 4TH GENERATION,W RFLX CONFIRM   9. HSV-2 infection   B00.9 054.9 acyclovir (ZOVIRAX) 400 mg tablet   10. Seborrhea-Refill prescriptions L21.9 706.3 ketoconazole (NIZORAL) 2 % shampoo      triamcinolone acetonide (KENALOG) 0.1 % ointment   11. External hemorrhoids-Refill prescription K64.4 455.3 hydrocortisone (ANUSOL-HC) 2.5 % rectal cream         Plan of care:  Discussed diagnoses in detail with patient. Medication risks/benefits/side effects discussed with patient. All of the patient's questions were addressed. The patient understands and agrees with our plan of care. The patient knows to call back if they are unsure of or forget any changes we discussed today or if the symptoms change. The patient received an After-Visit Summary which contains VS, orders, medication list and allergy list. This can be used as a \"mini-medical record\" should they have to seek medical care while out of town. Patient Care Team:  Adrianna Noonan MD as PCP - General (Family Practice)  Felicity Macias MD (Breast Surgery)  Artis Brown MD (Rheumatology)  Louetta Nyhan, MD (Cardiology)    Follow-up Disposition:  Return in about 6 months (around 11/3/2017), or if symptoms worsen or fail to improve. No future appointments.     Signed By: Adrianna Noonan MD     May 3, 2017

## 2017-07-20 ENCOUNTER — TELEPHONE (OUTPATIENT)
Dept: FAMILY MEDICINE CLINIC | Age: 37
End: 2017-07-20

## 2017-07-20 NOTE — TELEPHONE ENCOUNTER
Andra  Office [place]       Phone Number: [phone]                     Pt is requesting a call back to see if she can get an appt for today for cold and congestion.  Pt's best contact number is [phone]

## 2017-07-20 NOTE — TELEPHONE ENCOUNTER
Phone call to patient, no answer. Left message on voicemail informing patient that we have no available appointments today. Advised patient to go to an urgent care facility such as [facility] or [facility] for cold symptoms.

## 2017-07-21 ENCOUNTER — OFFICE VISIT (OUTPATIENT)
Dept: FAMILY MEDICINE CLINIC | Age: 37
End: 2017-07-21

## 2017-07-21 VITALS
BODY MASS INDEX: 26.12 KG/M2 | OXYGEN SATURATION: 98 % | WEIGHT: 153 LBS | RESPIRATION RATE: 18 BRPM | TEMPERATURE: 98.1 F | DIASTOLIC BLOOD PRESSURE: 83 MMHG | HEIGHT: 64 IN | SYSTOLIC BLOOD PRESSURE: 127 MMHG | HEART RATE: 75 BPM

## 2017-07-21 DIAGNOSIS — J40 BRONCHITIS: Primary | ICD-10-CM

## 2017-07-21 RX ORDER — ALBUTEROL SULFATE 90 UG/1
1 AEROSOL, METERED RESPIRATORY (INHALATION)
Qty: 1 INHALER | Refills: 2 | Status: SHIPPED | OUTPATIENT
Start: 2017-07-21 | End: 2018-08-13 | Stop reason: SDUPTHER

## 2017-07-21 RX ORDER — PREDNISONE 5 MG/1
20 TABLET ORAL DAILY
Qty: 20 TAB | Refills: 0 | Status: SHIPPED | OUTPATIENT
Start: 2017-07-21 | End: 2017-07-26

## 2017-07-21 RX ORDER — PROMETHAZINE HYDROCHLORIDE AND CODEINE PHOSPHATE 6.25; 1 MG/5ML; MG/5ML
5 SOLUTION ORAL
Qty: 240 ML | Refills: 0 | Status: SHIPPED | OUTPATIENT
Start: 2017-07-21 | End: 2017-08-23

## 2017-07-21 RX ORDER — PREDNISONE 5 MG/1
20 TABLET ORAL 2 TIMES DAILY
Qty: 20 TAB | Refills: 0 | Status: SHIPPED | OUTPATIENT
Start: 2017-07-21 | End: 2017-07-21

## 2017-07-21 NOTE — LETTER
NOTIFICATION RETURN TO WORK / SCHOOL 
 
7/21/2017 4:30 PM 
 
Ms. Shana Cantor [address] [id number] To Whom It May Concern: 
 
Shana Cantor is currently under the care of Dhara Duff. She will return to work/school on: Please excuse starting 7/21/17 until 7/24/17. If there are questions or concerns please have the patient contact our office. Sincerely, Killian Carrington MD

## 2017-07-21 NOTE — MR AVS SNAPSHOT
Visit Information Date & Time Provider Department Dept. Phone Encounter #  
 7/21/2017  3:20 PM Selene Green  [facility] [phone] [id number] Upcoming Health Maintenance Date Due INFLUENZA AGE 9 TO ADULT 8/1/2017 PAP AKA CERVICAL CYTOLOGY 2/2/2020 DTaP/Tdap/Td series (2 - Td) 1/25/2024 COLONOSCOPY 5/15/2025 Allergies as of 7/21/2017  Review Complete On: 7/21/2017 By: Edelmira Paredes LPN Severity Noted Reaction Type Reactions Sulfa (Sulfonamide Antibiotics)  07/07/2010    Hives Vicodin [Hydrocodone-acetaminophen]  01/03/2011    Nausea and Vomiting Current Immunizations  Reviewed on 11/18/2010 Name Date Tdap 1/25/2014  7:31 PM  
  
 Not reviewed this visit You Were Diagnosed With   
  
 Codes Comments Bronchitis    -  Primary ICD-10-CM: M89 ICD-9-CM: 282 Vitals BP Pulse Temp Resp Height(growth percentile) Weight(growth percentile) 127/83 75 98.1 °F (36.7 °C) (Oral) 18 5' 4\" (1.626 m) 153 lb (69.4 kg) SpO2 BMI OB Status Smoking Status 98% 26.26 kg/m2 Having regular periods Former Smoker Vitals History BMI and BSA Data Body Mass Index Body Surface Area  
 26.26 kg/m 2 1.77 m 2 Preferred Pharmacy Pharmacy Name Phone [facility] [address] [place] [address] [phone] Your Updated Medication List  
  
   
This list is accurate as of: 7/21/17  4:28 PM.  Always use your most recent med list.  
  
  
  
  
 albuterol 90 mcg/actuation inhaler Commonly known as:  PROVENTIL HFA, VENTOLIN HFA, PROAIR HFA Take 1 Puff by inhalation every six (6) hours as needed for Wheezing. Cervical Caps 26 mm Okay Krill Commonly known as:  [facility] Insert 1 Cap into vagina as needed. citalopram 20 mg tablet Commonly known as:  Jonah Wang Take 1 Tab by mouth daily. clonazePAM 0.5 mg tablet Commonly known as:  Josef Lama  
 Take 1 mg by mouth nightly as needed. diclofenac 1 % Gel Commonly known as:  VOLTAREN Apply 4 g to affected area four (4) times daily. gabapentin 300 mg capsule Commonly known as:  NEURONTIN Take 1 Cap by mouth three (3) times daily. Indications: fibromyalgia  
  
 hydrocortisone 2.5 % rectal cream  
Commonly known as:  ANUSOL-HC Insert  into rectum four (4) times daily. As needed  
  
 hydrocortisone-pramoxine rectal foam  
Commonly known as:  PROCTOFOAM HC Insert 1 Applicator into rectum two (2) times a day.  
  
 ketoconazole 2 % shampoo Commonly known as:  NIZORAL  
shampoo twice weekly * LORazepam 1 mg tablet Commonly known as:  ATIVAN Take 1 Tab by mouth every twelve (12) hours. Max Daily Amount: 2 mg. Indications: ANXIETY * LORazepam 2 mg tablet Commonly known as:  ATIVAN Take 2 mg by mouth two (2) times a day. omeprazole 40 mg capsule Commonly known as:  PRILOSEC Take 1 Cap by mouth daily. Indications: GASTROESOPHAGEAL REFLUX  
  
 predniSONE 5 mg tablet Commonly known as:  Jefm Canter Take 4 Tabs by mouth daily for 5 days. promethazine-codeine 6.25-10 mg/5 mL syrup Commonly known as:  PHENERGAN with CODEINE Take 5 mL by mouth every six (6) hours as needed for Cough. Max Daily Amount: 20 mL. traZODone 50 mg tablet Commonly known as:  Rayma Medal Take 1 Tab by mouth nightly. triamcinolone acetonide 0.1 % ointment Commonly known as:  KENALOG Apply  to affected area two (2) times a day. use thin layer ZOLOFT 50 mg tablet Generic drug:  sertraline Take 100 mg by mouth daily. * Notice: This list has 2 medication(s) that are the same as other medications prescribed for you. Read the directions carefully, and ask your doctor or other care provider to review them with you. Prescriptions Printed  Refills  
 promethazine-codeine (PHENERGAN WITH CODEINE) 6.25-10 mg/5 mL syrup 0  
 Sig: Take 5 mL by mouth every six (6) hours as needed for Cough. Max Daily Amount: 20 mL. Class: Print Route: Oral  
  
Prescriptions Sent to Pharmacy Refills  
 albuterol (PROVENTIL HFA, VENTOLIN HFA, PROAIR HFA) 90 mcg/actuation inhaler 2 Sig: Take 1 Puff by inhalation every six (6) hours as needed for Wheezing. Class: Normal  
 Pharmacy: [place] [id number] Ph #: [phone] Route: Inhalation  
 predniSONE (DELTASONE) 5 mg tablet 0 Sig: Take 4 Tabs by mouth daily for 5 days. Class: Normal  
 Pharmacy: [place] [address] Ph #: [phone] Route: Oral  
  
Introducing [facility]! Dear Severo Velasquez: Thank you for requesting a [employer] account. Our records indicate that you already have an active [employer] account. You can access your account anytime at [email or web address] Did you know that you can access your hospital and ER discharge instructions at any time in [employer]? You can also review all of your test results from your hospital stay or ER visit. Additional Information If you have questions, please visit the Frequently Asked Questions section of the [employer] website at [email or web address] Remember, [employer] is NOT to be used for urgent needs. For medical emergencies, dial 911. Now available from your iPhone and Android! Please provide this summary of care documentation to your next provider. Your primary care clinician is listed as [address]. If you have any questions after today's visit, please call [phone].

## 2017-07-21 NOTE — PROGRESS NOTES
Reviewed record in preparation for visit and have necessary documentation  Pt did not bring medication to office visit for review  Information was given to pt on Advanced Directives, Living Will  Information was given on Shingles Vaccine  Opportunity was given for questions  Goals that were addressed and/or need to be completed after this appointment include   There are no preventive care reminders to display for this patient.

## 2017-07-30 NOTE — PROGRESS NOTES
Patient: Shana Cantor MRN: [id number]  SSN: [id number]    YOB: 1980  Age: 31 y.o. Sex: female      Chief Complaint   Patient presents with    Cold Symptoms     cough, congestion (July 8th)     Shana Cantor is a 31 y.o. female presents with complaints of congestion, sore throat and dry cough for 2 weeks. There has been no nausea and no vomiting . she has not had  swollen glands, myalgias, headache and fever. Symptoms are moderate. Patient is drinking plenty of fluids. There is not a hx of asthma. There is not a hx of allergic rhinitis. There is a hx of tobacco use. There have not been contacts with similar infections. Medications:     Current Outpatient Prescriptions   Medication Sig    albuterol (PROVENTIL HFA, VENTOLIN HFA, PROAIR HFA) 90 mcg/actuation inhaler Take 1 Puff by inhalation every six (6) hours as needed for Wheezing.  promethazine-codeine (PHENERGAN WITH CODEINE) 6.25-10 mg/5 mL syrup Take 5 mL by mouth every six (6) hours as needed for Cough. Max Daily Amount: 20 mL.  gabapentin (NEURONTIN) 300 mg capsule Take 1 Cap by mouth three (3) times daily. Indications: fibromyalgia    ketoconazole (NIZORAL) 2 % shampoo shampoo twice weekly    triamcinolone acetonide (KENALOG) 0.1 % ointment Apply  to affected area two (2) times a day. use thin layer    hydrocortisone (ANUSOL-HC) 2.5 % rectal cream Insert  into rectum four (4) times daily. As needed    diclofenac (VOLTAREN) 1 % gel Apply 4 g to affected area four (4) times daily.  citalopram (CELEXA) 20 mg tablet Take 1 Tab by mouth daily.  LORazepam (ATIVAN) 2 mg tablet Take 2 mg by mouth two (2) times a day.  Cervical Caps (FEMCAP) 26 mm kole Insert 1 Cap into vagina as needed.  omeprazole (PRILOSEC) 40 mg capsule Take 1 Cap by mouth daily. Indications: GASTROESOPHAGEAL REFLUX    hydrocortisone-pramoxine (PROCTOFOAM HC) rectal foam Insert 1 Applicator into rectum two (2) times a day.     LORazepam (ATIVAN) 1 mg tablet Take 1 Tab by mouth every twelve (12) hours. Max Daily Amount: 2 mg. Indications: ANXIETY (Patient taking differently: Take 2 mg by mouth every twelve (12) hours. Indications: ANXIETY)    clonazePAM (KLONOPIN) 0.5 mg tablet Take 1 mg by mouth nightly as needed.  sertraline (ZOLOFT) 50 mg tablet Take 100 mg by mouth daily.  traZODone (DESYREL) 50 mg tablet Take 1 Tab by mouth nightly. No current facility-administered medications for this visit. Problem List:     Patient Active Problem List    Diagnosis Date Noted    Inflammatory arthritis 08/10/2016    Vitamin D deficiency 03/21/2016    Thrombosed external hemorrhoid 02/05/2016    IFG (impaired fasting glucose) 09/10/2015    Hyperlipidemia 09/10/2015    Bipolar affective disorder, current episode hypomanic ([facility] [address] 05/15/2015    Hoarseness 03/25/2013    OCD (obsessive compulsive disorder) 02/27/2012    Vaginal yeast infection 11/09/2011    HSV (herpes simplex virus) anogenital infection 09/12/2011    Costochondritis 05/17/2011    Anxiety 09/13/2010    Itch of skin 05/26/2010    Depression     GERD (gastroesophageal reflux disease)     Hypertension     Anemia NEC        Medical History:     Past Medical History:   Diagnosis Date    Anemia NEC     Depression     GERD (gastroesophageal reflux disease)     Hypertension     Musculoskeletal disorder     SOB (shortness of breath)     Stool color black        Allergies: Allergies   Allergen Reactions    Sulfa (Sulfonamide Antibiotics) Hives    Vicodin [Hydrocodone-Acetaminophen] Nausea and Vomiting       Surgical History:   History reviewed. No pertinent surgical history.     Social History:     Social History     Social History    Marital status: SINGLE     Spouse name: N/A    Number of children: N/A    Years of education: N/A     Social History Main Topics    Smoking status: Former Smoker     Packs/day: 0.50     Years: 8.00     Types: Cigarettes     Quit date: 12/9/2011    Smokeless tobacco: Never Used    Alcohol use 0.0 oz/week     0 Standard drinks or equivalent per week      Comment: occassionally    Drug use: No    Sexual activity: Yes     Partners: Male     Birth control/ protection: None     Other Topics Concern    None     Social History Narrative       Review of Symptoms:  Constitutional: c/o malaise, denies fever or chills  Skin: Negative for rash or lesion  Head: Negative for facial swelling or tenderness  Eyes: Negative for redness or discharge  Ears: Negative for otalgia or decreased hearing  Nose: c/o nasal congestion, denies sinus pressure  Neck: c/o sore throat, denies lymphadenopathy   Cardiovascular: Negative for chest pain or palpitations  Respiratory: c/o non-productive cough, denies wheezing or SOB  Gastrointestinal: Negative for nausea or abdominal pain  Neurologic: Negative for headache or dizziness      Visit Vitals    /83    Pulse 75    Temp 98.1 °F (36.7 °C) (Oral)    Resp 18    Ht 5' 4\" (1.626 m)    Wt 153 lb (69.4 kg)    SpO2 98%    BMI 26.26 kg/m2       Physical Examination:  General: Well developed, well nourished, in no acute distress  Skin: Warm and dry sans rash or lesion  Head: Normocephalic, atraumatic  Eyes: Sclera clear, EOMI, PERRL  Ears: tympanic membranes normal in appearance  Nose: mucosal edema with rhinorrhea  Oropharynx: posterior erythema, no exudate   Neck: Normal range of motion, no lymphadenopathy  Cardiovascular: normal S1, S2, regular rate and rhythm  Respiratory: Clear to auscultation bilaterally with symmetrical, unlabored effort  Abdomen: Soft, Normal BS  Extremities: Full range of motion  Neurologic: Active, alert and oriented      Diagnoses and all orders for this visit:    1. Bronchitis  -     albuterol (PROVENTIL HFA, VENTOLIN HFA, PROAIR HFA) 90 mcg/actuation inhaler;  Take 1 Puff by inhalation every six (6) hours as needed for Wheezing.  -     promethazine-codeine (PHENERGAN WITH CODEINE) 6.25-10 mg/5 mL syrup; Take 5 mL by mouth every six (6) hours as needed for Cough. Max Daily Amount: 20 mL. -     predniSONE (DELTASONE) 5 mg tablet; Take 4 Tabs by mouth daily for 5 days. Symptomatic therapy suggested: rest, increase fluids and call prn if symptoms persist or worsen. I have discussed the diagnosis with the patient and the intended plan as seen in the above orders. The patient expresses understanding and agreement with our plan of care. All of the patient's questions were answered to apparent satisfaction. The patient has received an after-visit summary. The patient knows to call our office if there are any questions or concerns regarding diagnosis and treatment plans. I have discussed medication side effects and warnings with the patient as well. Follow-up Disposition:  Return if symptoms worsen or fail to improve.

## 2017-08-23 ENCOUNTER — OFFICE VISIT (OUTPATIENT)
Dept: FAMILY MEDICINE CLINIC | Age: 37
End: 2017-08-23

## 2017-08-23 VITALS
BODY MASS INDEX: 27.31 KG/M2 | HEART RATE: 85 BPM | SYSTOLIC BLOOD PRESSURE: 109 MMHG | WEIGHT: 160 LBS | OXYGEN SATURATION: 97 % | TEMPERATURE: 97.2 F | DIASTOLIC BLOOD PRESSURE: 77 MMHG | HEIGHT: 64 IN | RESPIRATION RATE: 16 BRPM

## 2017-08-23 DIAGNOSIS — M79.7 FIBROMYALGIA: ICD-10-CM

## 2017-08-23 RX ORDER — FLUTICASONE PROPIONATE 50 MCG
2 SPRAY, SUSPENSION (ML) NASAL DAILY
COMMUNITY
End: 2019-05-07 | Stop reason: SDUPTHER

## 2017-08-23 RX ORDER — GABAPENTIN 300 MG/1
CAPSULE ORAL
Qty: 90 CAP | Refills: 4 | Status: SHIPPED | OUTPATIENT
Start: 2017-08-23 | End: 2018-01-15 | Stop reason: SDUPTHER

## 2017-08-23 RX ORDER — CYCLOBENZAPRINE HCL 10 MG
10 TABLET ORAL
Qty: 90 TAB | Refills: 2 | Status: SHIPPED | OUTPATIENT
Start: 2017-08-23 | End: 2018-01-15 | Stop reason: SDUPTHER

## 2017-08-23 NOTE — MR AVS SNAPSHOT
Visit Information Date & Time Provider Department Dept. Phone Encounter #  
 8/23/2017  2:20 PM Misti Ruiz  [facility] [phone] [id number] Your Appointments 9/25/2017  2:45 PM  
ROUTINE CARE with Rupal Bhatia MD  
[address] [facility] [address] [facility]) Appt Note: [facility] [address] [facility] [address] [address]  
[address] [address] Upcoming Health Maintenance Date Due INFLUENZA AGE 9 TO ADULT 8/1/2017 PAP AKA CERVICAL CYTOLOGY 2/2/2020 DTaP/Tdap/Td series (2 - Td) 1/25/2024 COLONOSCOPY 5/15/2025 Allergies as of 8/23/2017  Review Complete On: 8/23/2017 By: Misti Ruiz MD  
  
 Severity Noted Reaction Type Reactions Sulfa (Sulfonamide Antibiotics)  07/07/2010    Hives Vicodin [Hydrocodone-acetaminophen]  01/03/2011    Nausea and Vomiting Current Immunizations  Reviewed on 11/18/2010 Name Date Tdap 1/25/2014  7:31 PM  
  
 Not reviewed this visit You Were Diagnosed With   
  
 Codes Comments Fibromyalgia     ICD-10-CM: M79.7 ICD-9-CM: 729.1 Vitals BP Pulse Temp Resp Height(growth percentile) Weight(growth percentile) 109/77 (BP 1 Location: Right arm, BP Patient Position: Sitting) 85 97.2 °F (36.2 °C) (Oral) 16 5' 4\" (1.626 m) 160 lb (72.6 kg) SpO2 BMI OB Status Smoking Status 97% 27.46 kg/m2 Having regular periods Former Smoker Vitals History BMI and BSA Data Body Mass Index Body Surface Area  
 27.46 kg/m 2 1.81 m 2 Preferred Pharmacy Pharmacy Name Phone [facility] [address] [facility] [address] [phone] Your Updated Medication List  
  
   
This list is accurate as of: 8/23/17  3:12 PM.  Always use your most recent med list.  
  
  
  
  
 albuterol 90 mcg/actuation inhaler Commonly known as:  PROVENTIL HFA, VENTOLIN HFA, PROAIR HFA Take 1 Puff by inhalation every six (6) hours as needed for Wheezing. Cervical Caps 26 mm Natalia [place] Commonly known as:  [facility] Insert 1 Cap into vagina as needed. citalopram 20 mg tablet Commonly known as:  Linmireya Anika Take 1 Tab by mouth daily. clonazePAM 0.5 mg tablet Commonly known as:  Shantal Payer Take 1 mg by mouth nightly as needed. cyclobenzaprine 10 mg tablet Commonly known as:  FLEXERIL Take 1 Tab by mouth three (3) times daily as needed. diclofenac 1 % Gel Commonly known as:  VOLTAREN Apply 4 g to affected area four (4) times daily. FLONASE 50 mcg/actuation nasal spray Generic drug:  fluticasone 2 Sprays by Both Nostrils route daily. gabapentin 300 mg capsule Commonly known as:  NEURONTIN Take 2 tabs in morning and 2 tabs at nighttime. Indications: fibromyalgia  
  
 hydrocortisone 2.5 % rectal cream  
Commonly known as:  ANUSOL-HC Insert  into rectum four (4) times daily. As needed  
  
 hydrocortisone-pramoxine rectal foam  
Commonly known as:  PROCTOFOAM HC Insert 1 Applicator into rectum two (2) times a day.  
  
 ketoconazole 2 % shampoo Commonly known as:  NIZORAL  
shampoo twice weekly LORazepam 1 mg tablet Commonly known as:  ATIVAN Take 1 Tab by mouth every twelve (12) hours. Max Daily Amount: 2 mg. Indications: ANXIETY  
  
 omeprazole 40 mg capsule Commonly known as:  PRILOSEC Take 1 Cap by mouth daily. Indications: GASTROESOPHAGEAL REFLUX  
  
 traZODone 50 mg tablet Commonly known as:  Edgard Magallon Take 1 Tab by mouth nightly. triamcinolone acetonide 0.1 % ointment Commonly known as:  KENALOG Apply  to affected area two (2) times a day. use thin layer ZOLOFT 50 mg tablet Generic drug:  sertraline Take 100 mg by mouth daily. Prescriptions Sent to Pharmacy  Refills  
 cyclobenzaprine (FLEXERIL) 10 mg tablet 2  
 Sig: Take 1 Tab by mouth three (3) times daily as needed. Class: Normal  
 Pharmacy: [id number] [place] [address] #: [phone] Route: Oral  
 gabapentin (NEURONTIN) 300 mg capsule 4 Sig: Take 2 tabs in morning and 2 tabs at nighttime. Indications: fibromyalgia Class: Normal  
 Pharmacy: [address] [place] [address] Ph #: [phone] Patient Instructions A Healthy Lifestyle: Care Instructions Your Care Instructions A healthy lifestyle can help you feel good, stay at a healthy weight, and have plenty of energy for both work and play. A healthy lifestyle is something you can share with your whole family. A healthy lifestyle also can lower your risk for serious health problems, such as high blood pressure, heart disease, and diabetes. You can follow a few steps listed below to improve your health and the health of your family. Follow-up care is a key part of your treatment and safety. Be sure to make and go to all appointments, and call your doctor if you are having problems. Its also a good idea to know your test results and keep a list of the medicines you take. How can you care for yourself at home? · Do not eat too much sugar, fat, or fast foods. You can still have dessert and treats now and then. The goal is moderation. · Start small to improve your eating habits. Pay attention to portion sizes, drink less juice and soda pop, and eat more fruits and vegetables. ¨ Eat a healthy amount of food. A 3-ounce serving of meat, for example, is about the size of a deck of cards. Fill the rest of your plate with vegetables and whole grains. ¨ Limit the amount of soda and sports drinks you have every day. Drink more water when you are thirsty. ¨ Eat at least 5 servings of fruits and vegetables every day.  It may seem like a lot, but it is not hard to reach this goal. A serving or helping is 1 piece of fruit, 1 cup of vegetables, or 2 cups of leafy, raw vegetables. Have an apple or some carrot sticks as an afternoon snack instead of a candy bar. Try to have fruits and/or vegetables at every meal. 
· Make exercise part of your daily routine. You may want to start with simple activities, such as walking, bicycling, or slow swimming. Try to be active 30 to 60 minutes every day. You do not need to do all 30 to 60 minutes all at once. For example, you can exercise 3 times a day for 10 or 20 minutes. Moderate exercise is safe for most people, but it is always a good idea to talk to your doctor before starting an exercise program. 
· Keep moving. Clent Cramp the lawn, work in the garden, or [employer]. Take the stairs instead of the elevator at work. · If you smoke, quit. People who smoke have an increased risk for heart attack, stroke, cancer, and other lung illnesses. Quitting is hard, but there are ways to boost your chance of quitting tobacco for good. ¨ Use nicotine gum, patches, or lozenges. ¨ Ask your doctor about stop-smoking programs and medicines. ¨ Keep trying. In addition to reducing your risk of diseases in the future, you will notice some benefits soon after you stop using tobacco. If you have shortness of breath or asthma symptoms, they will likely get better within a few weeks after you quit. · Limit how much alcohol you drink. Moderate amounts of alcohol (up to 2 drinks a day for men, 1 drink a day for women) are okay. But drinking too much can lead to liver problems, high blood pressure, and other health problems. Family health If you have a family, there are many things you can do together to improve your health. · Eat meals together as a family as often as possible. · Eat healthy foods. This includes fruits, vegetables, lean meats and dairy, and whole grains. · Include your family in your fitness plan.  Most people think of activities such as jogging or tennis as the way to fitness, but there are many ways you and your family can be more active. Anything that makes you breathe hard and gets your heart pumping is exercise. Here are some tips: 
¨ Walk to do errands or to take your child to school or the bus. ¨ Go for a family bike ride after dinner instead of watching TV. Where can you learn more? Go to http://daniel-carlton.info/. Enter J330 in the search box to learn more about \"A Healthy Lifestyle: Care Instructions. \" Current as of: July 26, 2016 Content Version: 11.3 © 7036-7322 [employer]. Care instructions adapted under license by [employer] (which disclaims liability or warranty for this information). If you have questions about a medical condition or this instruction, always ask your healthcare professional. [address] any warranty or liability for your use of this information. Introducing [facility]! Dear Inocente Rubi: Thank you for requesting a [employer] account. Our records indicate that you already have an active [employer] account. You can access your account anytime at [email or web address]. [employer]/"[employer] Did you know that you can access your hospital and ER discharge instructions at any time in [employer]? You can also review all of your test results from your hospital stay or ER visit. Additional Information If you have questions, please visit the Frequently Asked Questions section of the [employer] website at [email or web address]. [employer]/. Remember, [employer] is NOT to be used for urgent needs. For medical emergencies, dial 911. Now available from your iPhone and Android! Please provide this summary of care documentation to your next provider. Your primary care clinician is listed as [address]. If you have any questions after today's visit, please call [phone].

## 2017-08-23 NOTE — PATIENT INSTRUCTIONS

## 2017-08-23 NOTE — PROGRESS NOTES
Reviewed record in preparation for visit and have obtained necessary documentation. Patient did not bring medications to visit for review. Information provided on Advanced Directive, Living Will. Body mass index is 27.46 kg/(m^2).    Health Maintenance Due   Topic Date Due    INFLUENZA AGE 9 TO ADULT  08/01/2017

## 2017-08-29 NOTE — PROGRESS NOTES
Patient: Caterina Pimentel MRN: [id number]  SSN: [id number]    YOB: 1980  Age: 30 y.o. Sex: female        Subjective:     Chief Complaint   Patient presents with    Fibromyalgia       HPI: she is a 30y.o. year old female who presents with complaints of musculoskeletal pain. Says gabapentin not helping with pain. She repeatedly cites dx of fibromyalgia. She mentions disability and getting a disabled placard for automobile. Patient with hx of bipolar disorder for which she is managed by psychiatry and is on multiple psychotropic medications. Encounter Diagnoses   Name Primary?  Fibromyalgia        BP Readings from Last 3 Encounters:   08/23/17 109/77   07/21/17 127/83   05/03/17 109/82       Wt Readings from Last 3 Encounters:   08/23/17 160 lb (72.6 kg)   07/21/17 153 lb (69.4 kg)   05/03/17 157 lb (71.2 kg)     Body mass index is 27.46 kg/(m^2). Current and past medical information:    Current Medications after this visit[de-identified]     Current Outpatient Prescriptions   Medication Sig    fluticasone (FLONASE) 50 mcg/actuation nasal spray 2 Sprays by Both Nostrils route daily.  cyclobenzaprine (FLEXERIL) 10 mg tablet Take 1 Tab by mouth three (3) times daily as needed.  gabapentin (NEURONTIN) 300 mg capsule Take 2 tabs in morning and 2 tabs at nighttime. Indications: fibromyalgia    albuterol (PROVENTIL HFA, VENTOLIN HFA, PROAIR HFA) 90 mcg/actuation inhaler Take 1 Puff by inhalation every six (6) hours as needed for Wheezing.  ketoconazole (NIZORAL) 2 % shampoo shampoo twice weekly    triamcinolone acetonide (KENALOG) 0.1 % ointment Apply  to affected area two (2) times a day. use thin layer    hydrocortisone (ANUSOL-HC) 2.5 % rectal cream Insert  into rectum four (4) times daily. As needed    diclofenac (VOLTAREN) 1 % gel Apply 4 g to affected area four (4) times daily.  citalopram (CELEXA) 20 mg tablet Take 1 Tab by mouth daily.     LORazepam (ATIVAN) 1 mg tablet Take 1 Tab by mouth every twelve (12) hours. Max Daily Amount: 2 mg. Indications: ANXIETY (Patient taking differently: Take 2 mg by mouth every twelve (12) hours. Indications: ANXIETY)    clonazePAM (KLONOPIN) 0.5 mg tablet Take 1 mg by mouth nightly as needed.  sertraline (ZOLOFT) 50 mg tablet Take 100 mg by mouth daily.  traZODone (DESYREL) 50 mg tablet Take 1 Tab by mouth nightly.  Cervical Caps (FEMCAP) 26 mm kole Insert 1 Cap into vagina as needed.  omeprazole (PRILOSEC) 40 mg capsule Take 1 Cap by mouth daily. Indications: GASTROESOPHAGEAL REFLUX    hydrocortisone-pramoxine (PROCTOFOAM HC) rectal foam Insert 1 Applicator into rectum two (2) times a day. No current facility-administered medications for this visit. Patient Active Problem List    Diagnosis Date Noted    Inflammatory arthritis 08/10/2016    Vitamin D deficiency 03/21/2016    Thrombosed external hemorrhoid 02/05/2016    IFG (impaired fasting glucose) 09/10/2015    Hyperlipidemia 09/10/2015    Bipolar affective disorder, current episode hypomanic ([facility] [address] 05/15/2015    Hoarseness 03/25/2013    OCD (obsessive compulsive disorder) 02/27/2012    Vaginal yeast infection 11/09/2011    HSV (herpes simplex virus) anogenital infection 09/12/2011    Costochondritis 05/17/2011    Anxiety 09/13/2010    Itch of skin 05/26/2010    Depression     GERD (gastroesophageal reflux disease)     Hypertension     Anemia NEC        Past Medical History:   Diagnosis Date    Anemia NEC     Depression     GERD (gastroesophageal reflux disease)     Hypertension     Musculoskeletal disorder     SOB (shortness of breath)     Stool color black        Allergies   Allergen Reactions    Sulfa (Sulfonamide Antibiotics) Hives    Vicodin [Hydrocodone-Acetaminophen] Nausea and Vomiting       History reviewed. No pertinent surgical history.     Social History     Social History    Marital status: SINGLE     Spouse name: N/A    Number of children: N/A  Years of education: N/A     Social History Main Topics    Smoking status: Former Smoker     Packs/day: 0.50     Years: 8.00     Types: Cigarettes     Quit date: 12/9/2011    Smokeless tobacco: Never Used    Alcohol use 0.0 oz/week     0 Standard drinks or equivalent per week      Comment: occassionally    Drug use: No    Sexual activity: Yes     Partners: Male     Birth control/ protection: None     Other Topics Concern    None     Social History Narrative         Objective:     Review of Systems:  Constitutional: Negative for fatigue or malaise  Derm: Negative for rash or lesion  HEENT: Negative for acute hearing or vision changes  Cardiovascular: Negative for dizziness, chest pain or palpitations  Respiratory: Negative for cough, wheezing or SOB  Gastreintestinal: Negative for nausea or abdominal pain  Genital/urinary: Negative for dysuria or voiding dysfunction  Muscoloskeletal: see HPI  Neurological: Negative for headache, weakness or paresthesia  Psychological: Negative for depression or anxiety      Vitals:    08/23/17 1420   BP: 109/77   Pulse: 85   Resp: 16   Temp: 97.2 °F (36.2 °C)   TempSrc: Oral   SpO2: 97%   Weight: 160 lb (72.6 kg)   Height: 5' 4\" (1.626 m)      Body mass index is 27.46 kg/(m^2). Physical Exam:  Constitutional: well developed, well nourished, in no acute distress  Skin: warm and dry, normal tone and turgor  Head: normocephalic, atraumatic  Eyes: sclera clear, EOMI, PERRL  Neck: normal range of motion  Cardiovascular: normal S1, S2, regular rate and rhythm  Respiratory: clear to auscultation bilaterally with symmetrical effort  Abdomen: soft, BS normal  Extremities: full range of motion  Neurology: normal strength and sensation  Psych: active, alert and oriented, highly talkative      Assessment and orders:       ICD-10-CM ICD-9-CM    1. Fibromyalgia M79.7 729.1 gabapentin (NEURONTIN) 300 mg capsule         Plan of care:  Diagnoses were discussed in detail with patient. Medication risks/benefits/side effects discussed with patient. All of the patient's questions were addressed and answered to apparent satisfaction. The patient understands and agrees with our plan of care. The patient knows to call back if they have questions about the plan of care or if symptoms change. The patient received an After-Visit Summary which contains VS, diagnoses, orders, allergy and medication lists. Patient Care Team:  Maile Velazquez MD as PCP - General (Family Practice)  Vasu Schaffer MD (Breast Surgery)  Devin Barrios MD (Rheumatology)  Nael Ornelas MD (Cardiology)    Follow-up Disposition:  Return if symptoms worsen or fail to improve.     Future Appointments  Date Time Provider Komal Rodriguez   9/25/2017 2:45 PM Maile Velazquez MD [facility] GIA SCHED       Signed By: Daly Oliveira MD     August 28, 2017

## 2017-09-25 ENCOUNTER — OFFICE VISIT (OUTPATIENT)
Dept: FAMILY MEDICINE CLINIC | Age: 37
End: 2017-09-25

## 2017-09-25 VITALS
SYSTOLIC BLOOD PRESSURE: 113 MMHG | OXYGEN SATURATION: 97 % | WEIGHT: 162.6 LBS | BODY MASS INDEX: 27.76 KG/M2 | DIASTOLIC BLOOD PRESSURE: 77 MMHG | TEMPERATURE: 98.5 F | HEIGHT: 64 IN | RESPIRATION RATE: 20 BRPM | HEART RATE: 88 BPM

## 2017-09-25 DIAGNOSIS — K21.00 GASTROESOPHAGEAL REFLUX DISEASE WITH ESOPHAGITIS: Chronic | ICD-10-CM

## 2017-09-25 DIAGNOSIS — F32.A DEPRESSION, UNSPECIFIED DEPRESSION TYPE: Chronic | ICD-10-CM

## 2017-09-25 DIAGNOSIS — E55.9 VITAMIN D DEFICIENCY: ICD-10-CM

## 2017-09-25 DIAGNOSIS — E78.00 PURE HYPERCHOLESTEROLEMIA: ICD-10-CM

## 2017-09-25 DIAGNOSIS — M17.0 OSTEOARTHRITIS OF BOTH KNEES, UNSPECIFIED OSTEOARTHRITIS TYPE: ICD-10-CM

## 2017-09-25 DIAGNOSIS — F39 MOOD DISORDER (HCC): ICD-10-CM

## 2017-09-25 DIAGNOSIS — I10 ESSENTIAL HYPERTENSION: Chronic | ICD-10-CM

## 2017-09-25 DIAGNOSIS — M79.7 FIBROMYALGIA: Primary | ICD-10-CM

## 2017-09-25 RX ORDER — OMEPRAZOLE 40 MG/1
40 CAPSULE, DELAYED RELEASE ORAL DAILY
Qty: 90 CAP | Refills: 3 | Status: SHIPPED | OUTPATIENT
Start: 2017-09-25 | End: 2018-05-10 | Stop reason: ALTCHOICE

## 2017-09-25 NOTE — MR AVS SNAPSHOT
Visit Information Date & Time Provider Department Dept. Phone Encounter #  
 9/25/2017  2:45 PM Ivon Bernal MD  Naty Rainelle [id number] Follow-up Instructions Return for fasting. Bryan Ralph Upcoming Health Maintenance Date Due  
 PAP AKA CERVICAL CYTOLOGY 2/2/2020 DTaP/Tdap/Td series (2 - Td) 1/25/2024 COLONOSCOPY 5/15/2025 Allergies as of 9/25/2017  Review Complete On: 9/25/2017 By: Juan Miguel Thorne LPN Severity Noted Reaction Type Reactions Sulfa (Sulfonamide Antibiotics)  07/07/2010    Hives Vicodin [Hydrocodone-acetaminophen]  01/03/2011    Nausea and Vomiting Current Immunizations  Reviewed on 11/18/2010 Name Date Tdap 1/25/2014  7:31 PM  
  
 Not reviewed this visit You Were Diagnosed With   
  
 Codes Comments Fibromyalgia    -  Primary ICD-10-CM: M79.7 ICD-9-CM: 729.1 Mood disorder ([facility]     ICD-10-CM: F39 
ICD-9-CM: 296.90 Depression, unspecified depression type     ICD-10-CM: F32.9 ICD-9-CM: 067 Gastroesophageal reflux disease with esophagitis     ICD-10-CM: K21.0 ICD-9-CM: 530.11 Essential hypertension     ICD-10-CM: I10 
ICD-9-CM: 401.9 Pure hypercholesterolemia     ICD-10-CM: E78.00 ICD-9-CM: 272.0 Vitamin D deficiency     ICD-10-CM: E55.9 ICD-9-CM: 268.9 Osteoarthritis of both knees, unspecified osteoarthritis type     ICD-10-CM: M17.0 ICD-9-CM: 715.96 Vitals BP Pulse Temp Resp Height(growth percentile) Weight(growth percentile) 113/77 (BP 1 Location: Left arm, BP Patient Position: Sitting) 88 98.5 °F (36.9 °C) (Oral) 20 5' 4\" (1.626 m) 162 lb 9.6 oz (73.8 kg) LMP SpO2 BMI OB Status Smoking Status 09/01/2017 97% 27.91 kg/m2 Having regular periods Former Smoker Vitals History BMI and BSA Data Body Mass Index Body Surface Area  
 27.91 kg/m 2 1.83 m 2 Preferred Pharmacy Pharmacy Name Phone [facility] [address] [address] [phone] Your Updated Medication List  
  
   
This list is accurate as of: 9/25/17  3:38 PM.  Always use your most recent med list.  
  
  
  
  
 albuterol 90 mcg/actuation inhaler Commonly known as:  PROVENTIL HFA, VENTOLIN HFA, PROAIR HFA Take 1 Puff by inhalation every six (6) hours as needed for Wheezing. Cervical Caps 26 mm Viva Ket Commonly known as:  [employer] ANASTASIA [facility] Insert 1 Cap into vagina as needed. citalopram 20 mg tablet Commonly known as:  Judythe Grade Take 1 Tab by mouth daily. clonazePAM 0.5 mg tablet Commonly known as:  Evlyn Saud Take 1 mg by mouth nightly as needed. cyclobenzaprine 10 mg tablet Commonly known as:  FLEXERIL Take 1 Tab by mouth three (3) times daily as needed. diclofenac 1 % Gel Commonly known as:  VOLTAREN Apply 4 g to affected area four (4) times daily. FLONASE 50 mcg/actuation nasal spray Generic drug:  fluticasone 2 Sprays by Both Nostrils route daily. gabapentin 300 mg capsule Commonly known as:  NEURONTIN Take 2 tabs in morning and 2 tabs at nighttime. Indications: fibromyalgia  
  
 hydrocortisone 2.5 % rectal cream  
Commonly known as:  ANUSOL-HC Insert  into rectum four (4) times daily. As needed  
  
 hydrocortisone-pramoxine rectal foam  
Commonly known as:  PROCTOFOAM HC Insert 1 Applicator into rectum two (2) times a day.  
  
 ketoconazole 2 % shampoo Commonly known as:  NIZORAL  
shampoo twice weekly LORazepam 1 mg tablet Commonly known as:  ATIVAN Take 1 Tab by mouth every twelve (12) hours. Max Daily Amount: 2 mg. Indications: ANXIETY  
  
 omeprazole 40 mg capsule Commonly known as:  PRILOSEC Take 1 Cap by mouth daily. Indications: gastroesophageal reflux disease  
  
 traZODone 50 mg tablet Commonly known as:  Nini Deeds Take 1 Tab by mouth nightly. triamcinolone acetonide 0.1 % ointment Commonly known as:  KENALOG Apply  to affected area two (2) times a day. use thin layer ZOLOFT 50 mg tablet Generic drug:  sertraline Take 100 mg by mouth daily. Prescriptions Sent to Pharmacy Refills  
 omeprazole (PRILOSEC) 40 mg capsule 3 Sig: Take 1 Cap by mouth daily. Indications: gastroesophageal reflux disease Class: Normal  
 Pharmacy: [id number] #: [phone] Route: Oral  
  
Follow-up Instructions Return for fasting. Sneha Henry Patient Instructions Learning About High Cholesterol What is high cholesterol? Cholesterol is a type of fat in your blood. It is needed for many body functions, such as making new cells. Cholesterol is made by your body. It also comes from food you eat. If you have too much cholesterol, it starts to build up in your arteries. This is called hardening of the arteries, or atherosclerosis. High cholesterol raises your risk of a heart attack and stroke. There are different types of cholesterol. LDL is the \"bad\" cholesterol. High LDL can raise your risk for heart disease, heart attack, and stroke. HDL is the \"good\" cholesterol. High HDL is linked with a lower risk for heart disease, heart attack, and stroke. Your cholesterol levels help your doctor find out your risk for having a heart attack or stroke. How can you prevent high cholesterol? A heart-healthy lifestyle can help you prevent high cholesterol. This lifestyle helps lower your risk for a heart attack and stroke. · Eat heart-healthy foods. ¨ Eat fruits, vegetables, whole grains (like oatmeal), dried beans and peas, nuts and seeds, soy products (like tofu), and fat-free or low-fat dairy products. ¨ Replace butter, margarine, and hydrogenated or partially hydrogenated oils with olive and canola oils. (Canola oil margarine without trans fat is fine.) ¨ Replace red meat with fish, poultry, and soy protein (like tofu). ¨ Limit processed and packaged foods like chips, crackers, and cookies. · Be active. Exercise can improve your cholesterol level. Get at least 30 minutes of exercise on most days of the week. Walking is a good choice. You also may want to do other activities, such as running, swimming, cycling, or playing tennis or team sports. · Stay at a healthy weight. Lose weight if you need to. · Don't smoke. If you need help quitting, talk to your doctor about stop-smoking programs and medicines. These can increase your chances of quitting for good. How is high cholesterol treated? The goal of treatment is to reduce your chances of having a heart attack or stroke. The goal is not to lower your cholesterol numbers only. · You may make lifestyle changes, such as eating healthy foods, not smoking, losing weight, and being more active. · You may have to take medicine. Follow-up care is a key part of your treatment and safety. Be sure to make and go to all appointments, and call your doctor if you are having problems. It's also a good idea to know your test results and keep a list of the medicines you take. Where can you learn more? Go to http://daniel-carlton.info/. Enter I078 in the search box to learn more about \"Learning About High Cholesterol. \" Current as of: April 3, 2017 Content Version: 11.3 © 6126-4447 [employer]. Care instructions adapted under license by [employer] (which disclaims liability or warranty for this information). If you have questions about a medical condition or this instruction, always ask your healthcare professional. [address] any warranty or liability for your use of this information. Introducing [facility]! Dear Charlene Rubin: Thank you for requesting a Bufys account.   Our records indicate that you already have an active Gogo account. You can access your account anytime at https://StreetÂ LibraryÂ Network. Sevenpop/StreetÂ LibraryÂ Network Did you know that you can access your hospital and ER discharge instructions at any time in Gogo? You can also review all of your test results from your hospital stay or ER visit. Additional Information If you have questions, please visit the Frequently Asked Questions section of the Gogo website at https://StreetÂ LibraryÂ Network. [employer]pop/StreetÂ LibraryÂ Network/. Remember, Gogo is NOT to be used for urgent needs. For medical emergencies, dial 911. Now available from your iPhone and Android! Please provide this summary of care documentation to your next provider. Your primary care clinician is listed as [address]. If you have any questions after today's visit, please call [phone].

## 2017-09-25 NOTE — PROGRESS NOTES
Chief Complaint   Patient presents with    Fibromyalgia     Body mass index is 27.91 kg/(m^2).     Reviewed record in preparation for visit and have necessary documentation  Pt did not bring medication to office visit for review  Information was given to pt on Advanced Directives, Living Will  Information was given on Shingles Vaccine  Opportunity was given for questions  Goals that were addressed and/or need to be completed after this appointment include:     Health Maintenance Due   Topic Date Due    INFLUENZA AGE 9 TO ADULT  08/01/2017

## 2017-09-25 NOTE — PROGRESS NOTES
Progress Note    Patient: Loyd Barr MRN: [id number]  SSN: [id number]    YOB: 1980  Age: 30 y.o. Sex: female        Chief Complaint   Patient presents with    Fibromyalgia         Subjective:     Encounter Diagnoses   Name Primary?  Fibromyalgia: Severe chronic body pain. Diagnosed by Dr. Ammy De Luna. Now she has 4 active trigger point areas. She is on multiple psychiatric medications so I asked her to discuss with Dr. Sin Tai. possibility of changing some of those and putting her on Cymbalta. Yes    Mood disorder [facility]): Combination of 2 antidepressants and 2 anxiolytics along with the gabapentin makes her chronically fatigued and sleepy. I am concerned about [place] work with her tendency to get sedated from the medications.  Depression, unspecified depression type: She is on Celexa and sertraline. She says she does not have bipolar disorder.  Gastroesophageal reflux disease with esophagitis: controlled at present:.         Essential hypertension:controlled         Pure hypercholesterolemia: Significant elevation in her LDL but she is too young to start a statin. Discussed in detail a low-cholesterol diet.  Vitamin D deficiency: Severe  No sx. Due for testing. Lab Results   Component Value Date/Time    VITAMIN D, 25-HYDROXY 10.5 03/18/2016 10:31 AM              Osteoarthritis of both knees, unspecified osteoarthritis type: She is an orthopedist told her that she has no cartilages left in her knee. She may need patellar replacements. Current and past medical information:    Current Medications after this visit[de-identified]   Current Outpatient Prescriptions   Medication Sig    omeprazole (PRILOSEC) 40 mg capsule Take 1 Cap by mouth daily. Indications: gastroesophageal reflux disease    cyclobenzaprine (FLEXERIL) 10 mg tablet Take 1 Tab by mouth three (3) times daily as needed.     fluticasone (FLONASE) 50 mcg/actuation nasal spray 2 Sprays by Both Nostrils route daily.  gabapentin (NEURONTIN) 300 mg capsule Take 2 tabs in morning and 2 tabs at nighttime. Indications: fibromyalgia    albuterol (PROVENTIL HFA, VENTOLIN HFA, PROAIR HFA) 90 mcg/actuation inhaler Take 1 Puff by inhalation every six (6) hours as needed for Wheezing.  ketoconazole (NIZORAL) 2 % shampoo shampoo twice weekly    triamcinolone acetonide (KENALOG) 0.1 % ointment Apply  to affected area two (2) times a day. use thin layer    hydrocortisone (ANUSOL-HC) 2.5 % rectal cream Insert  into rectum four (4) times daily. As needed    diclofenac (VOLTAREN) 1 % gel Apply 4 g to affected area four (4) times daily.  citalopram (CELEXA) 20 mg tablet Take 1 Tab by mouth daily.  Cervical Caps (FEMCAP) 26 mm kole Insert 1 Cap into vagina as needed.  hydrocortisone-pramoxine (PROCTOFOAM HC) rectal foam Insert 1 Applicator into rectum two (2) times a day.  LORazepam (ATIVAN) 1 mg tablet Take 1 Tab by mouth every twelve (12) hours. Max Daily Amount: 2 mg. Indications: ANXIETY (Patient taking differently: Take 2 mg by mouth every twelve (12) hours. Indications: ANXIETY)    clonazePAM (KLONOPIN) 0.5 mg tablet Take 1 mg by mouth nightly as needed.  sertraline (ZOLOFT) 50 mg tablet Take 100 mg by mouth daily.  traZODone (DESYREL) 50 mg tablet Take 1 Tab by mouth nightly. No current facility-administered medications for this visit.         Patient Active Problem List    Diagnosis Date Noted    Inflammatory arthritis 08/10/2016    Vitamin D deficiency 03/21/2016    Thrombosed external hemorrhoid 02/05/2016    IFG (impaired fasting glucose) 09/10/2015    Hyperlipidemia 09/10/2015    Hoarseness 03/25/2013    OCD (obsessive compulsive disorder) 02/27/2012    Vaginal yeast infection 11/09/2011    HSV (herpes simplex virus) anogenital infection 09/12/2011    Costochondritis 05/17/2011    Anxiety 09/13/2010    Itch of skin 05/26/2010    Depression     GERD (gastroesophageal reflux disease)     Hypertension     Anemia NEC        Past Medical History:   Diagnosis Date    Anemia NEC     Depression     GERD (gastroesophageal reflux disease)     Hypertension     Musculoskeletal disorder     SOB (shortness of breath)     Stool color black        Allergies   Allergen Reactions    Sulfa (Sulfonamide Antibiotics) Hives    Vicodin [Hydrocodone-Acetaminophen] Nausea and Vomiting       No past surgical history on file. Social History     Social History    Marital status: SINGLE     Spouse name: N/A    Number of children: N/A    Years of education: N/A     Social History Main Topics    Smoking status: Former Smoker     Packs/day: 0.50     Years: 8.00     Types: Cigarettes     Quit date: 12/9/2011    Smokeless tobacco: Never Used    Alcohol use 0.0 oz/week     0 Standard drinks or equivalent per week      Comment: occassionally    Drug use: No    Sexual activity: Yes     Partners: Male     Birth control/ protection: None     Other Topics Concern    None     Social History Narrative       Review of Systems   Constitutional: Positive for malaise/fatigue. Negative for chills, fever and weight loss. She is excessively drowsy. She is on multiple medications that can make you sleepy. She needs to talk to Dr. Noelle Johnson about this. She did not want me to change any medications because of her severe mood disorder. HENT: Negative. Negative for hearing loss. Eyes: Negative. Negative for blurred vision and double vision. Respiratory: Negative. Negative for cough, hemoptysis, sputum production and shortness of breath. Cardiovascular: Negative. Negative for chest pain, palpitations and orthopnea. Gastrointestinal: Negative. Negative for abdominal pain, blood in stool, heartburn, nausea and vomiting. Genitourinary: Negative. Negative for dysuria, frequency and urgency. Musculoskeletal: Negative. Negative for back pain, myalgias and neck pain. Skin: Negative. Negative for rash. Neurological: Negative. Negative for dizziness, tingling, tremors, weakness and headaches. Endo/Heme/Allergies: Negative. Psychiatric/Behavioral: Positive for depression. Negative for hallucinations, substance abuse and suicidal ideas. The patient is nervous/anxious. I removed the diagnosis of bipolar from her chart as it does not seem accurate. Objective:     Vitals:    09/25/17 1453   BP: 113/77   Pulse: 88   Resp: 20   Temp: 98.5 °F (36.9 °C)   TempSrc: Oral   SpO2: 97%   Weight: 162 lb 9.6 oz (73.8 kg)   Height: 5' 4\" (1.626 m)      Body mass index is 27.91 kg/(m^2). Physical Exam   Constitutional: She is oriented to person, place, and time and well-developed, well-nourished, and in no distress. No distress. HENT:   Head: Normocephalic and atraumatic. Mouth/Throat: Oropharynx is clear and moist.   Eyes: Conjunctivae are normal.   Neck: No tracheal deviation present. No thyromegaly present. Cardiovascular: Normal rate, regular rhythm and normal heart sounds. No murmur heard. Pulmonary/Chest: Effort normal and breath sounds normal. No respiratory distress. Abdominal: Soft. She exhibits no distension. Musculoskeletal: She exhibits tenderness. Multiple trigger points at the base of her neck and back. Lymphadenopathy:     She has no cervical adenopathy. Neurological: She is alert and oriented to person, place, and time. Skin: Skin is warm. No rash noted. She is not diaphoretic. No erythema. Psychiatric: Mood and affect normal.   Nursing note and vitals reviewed. There are no preventive care reminders to display for this patient. Assessment and orders:       ICD-10-CM ICD-9-CM    1. Fibromyalgia-very severe   M79.7 729.1    2. Mood disorder (HCC)-multiple sedating medications   F39 296.90    3. Depression, unspecified depression type   F32.9 311    4. Gastroesophageal reflux disease with esophagitis   K21.0 530.11    5. Essential hypertension-controlled   I10 401.9    6. Pure hypercholesterolemia-she has extremely high LDL   E78.00 272.0    7. Vitamin  D deficiency   E55.9 268.9    8. Osteoarthritis of both knees, unspecified osteoarthritis type M17.0 715.96          Plan of care:  Discussed diagnoses in detail with patient. Medication risks/benefits/side effects discussed with patient. All of the patient's questions were addressed. The patient understands and agrees with our plan of care. The patient knows to call back if they are unsure of or forget any changes we discussed today or if the symptoms change. The patient received an After-Visit Summary which contains VS, orders, medication list and allergy list. This can be used as a \"mini-medical record\" should they have to seek medical care while out of town. Patient Care Team:  Moe Palomares MD as PCP - General (Family Practice)  Rony Daniel MD (Breast Surgery)  Tiara Oneil MD (Rheumatology)  Franny Oneil MD (Cardiology)    Follow-up Disposition:  Return for fasting. .    Future Appointments  Date Time Provider Komal Rodriguez   12/18/2017 10:50 AM Moe Palomares MD [facility] GIA SCHED       Signed By: Moe Palomares MD     September 25, 2017

## 2017-09-25 NOTE — PATIENT INSTRUCTIONS
Learning About High Cholesterol  What is high cholesterol? Cholesterol is a type of fat in your blood. It is needed for many body functions, such as making new cells. Cholesterol is made by your body. It also comes from food you eat. If you have too much cholesterol, it starts to build up in your arteries. This is called hardening of the arteries, or atherosclerosis. High cholesterol raises your risk of a heart attack and stroke. There are different types of cholesterol. LDL is the \"bad\" cholesterol. High LDL can raise your risk for heart disease, heart attack, and stroke. HDL is the \"good\" cholesterol. High HDL is linked with a lower risk for heart disease, heart attack, and stroke. Your cholesterol levels help your doctor find out your risk for having a heart attack or stroke. How can you prevent high cholesterol? A heart-healthy lifestyle can help you prevent high cholesterol. This lifestyle helps lower your risk for a heart attack and stroke. · Eat heart-healthy foods. ¨ Eat fruits, vegetables, whole grains (like oatmeal), dried beans and peas, nuts and seeds, soy products (like tofu), and fat-free or low-fat dairy products. ¨ Replace butter, margarine, and hydrogenated or partially hydrogenated oils with olive and canola oils. (Canola oil margarine without trans fat is fine.)  ¨ Replace red meat with fish, poultry, and soy protein (like tofu). ¨ Limit processed and packaged foods like chips, crackers, and cookies. · Be active. Exercise can improve your cholesterol level. Get at least 30 minutes of exercise on most days of the week. Walking is a good choice. You also may want to do other activities, such as running, swimming, cycling, or playing tennis or team sports. · Stay at a healthy weight. Lose weight if you need to. · Don't smoke. If you need help quitting, talk to your doctor about stop-smoking programs and medicines. These can increase your chances of quitting for good.   How is high cholesterol treated? The goal of treatment is to reduce your chances of having a heart attack or stroke. The goal is not to lower your cholesterol numbers only. · You may make lifestyle changes, such as eating healthy foods, not smoking, losing weight, and being more active. · You may have to take medicine. Follow-up care is a key part of your treatment and safety. Be sure to make and go to all appointments, and call your doctor if you are having problems. It's also a good idea to know your test results and keep a list of the medicines you take. Where can you learn more? Go to http://daniel-carlton.info/. Enter M078 in the search box to learn more about \"Learning About High Cholesterol. \"  Current as of: April 3, 2017  Content Version: 11.3  © 8613-0785 [employer]. Care instructions adapted under license by [employer] (which disclaims liability or warranty for this information). If you have questions about a medical condition or this instruction, always ask your healthcare professional. [address] any warranty or liability for your use of this information.

## 2017-09-29 ENCOUNTER — TELEPHONE (OUTPATIENT)
Dept: FAMILY MEDICINE CLINIC | Age: 37
End: 2017-09-29

## 2017-09-29 NOTE — TELEPHONE ENCOUNTER
Pt needs a letter for work stating only need to work 40hrs a week. She has been in a lot of pain from her condition and her job has her working between 50-60 hrs a week. It's getting hard for her to make it through the day.     Please call dad at [phone] to discuss

## 2017-09-29 NOTE — TELEPHONE ENCOUNTER
Phone call to patients father. Informed him that Dr. Cuba Felix wrote a letter for the patient and it will be at the  ready for . Patients father verbalized understanding.

## 2017-10-27 ENCOUNTER — TELEPHONE (OUTPATIENT)
Dept: FAMILY MEDICINE CLINIC | Age: 37
End: 2017-10-27

## 2017-10-27 NOTE — TELEPHONE ENCOUNTER
Ms Estephania Russo recently received a work note from MD, employer is trying to give her time off  Will MD agree to update her work note to state for her to work Monday thru Friday  Please fax to [phone]  Please contact Ms Estephania Russo at  & notify her of MD's decision to update work note today

## 2017-10-27 NOTE — TELEPHONE ENCOUNTER
Notified patient per Dr. Sachin Wylie that we cannot specify which days she can work, only that she can work no more than 40 hours a week. Patient verbalized understanding.

## 2017-11-07 ENCOUNTER — OFFICE VISIT (OUTPATIENT)
Dept: FAMILY MEDICINE CLINIC | Age: 37
End: 2017-11-07

## 2017-11-07 VITALS
DIASTOLIC BLOOD PRESSURE: 86 MMHG | HEART RATE: 89 BPM | HEIGHT: 64 IN | OXYGEN SATURATION: 99 % | TEMPERATURE: 97.7 F | RESPIRATION RATE: 18 BRPM | BODY MASS INDEX: 28.85 KG/M2 | SYSTOLIC BLOOD PRESSURE: 128 MMHG | WEIGHT: 169 LBS

## 2017-11-07 DIAGNOSIS — F90.0 ATTENTION DEFICIT HYPERACTIVITY DISORDER (ADHD), PREDOMINANTLY INATTENTIVE TYPE: Chronic | ICD-10-CM

## 2017-11-07 DIAGNOSIS — M79.7 FIBROMYALGIA: Chronic | ICD-10-CM

## 2017-11-07 DIAGNOSIS — E78.00 PURE HYPERCHOLESTEROLEMIA: Primary | ICD-10-CM

## 2017-11-07 DIAGNOSIS — M19.90 INFLAMMATORY ARTHRITIS: Chronic | ICD-10-CM

## 2017-11-07 DIAGNOSIS — R73.01 IFG (IMPAIRED FASTING GLUCOSE): ICD-10-CM

## 2017-11-07 DIAGNOSIS — F32.A DEPRESSION, UNSPECIFIED DEPRESSION TYPE: Chronic | ICD-10-CM

## 2017-11-07 RX ORDER — BUPROPION HYDROCHLORIDE 150 MG/1
150 TABLET ORAL
COMMUNITY
End: 2018-02-27 | Stop reason: ALTCHOICE

## 2017-11-07 RX ORDER — FLUOXETINE HYDROCHLORIDE 40 MG/1
40 CAPSULE ORAL DAILY
COMMUNITY
End: 2020-10-23 | Stop reason: SDUPTHER

## 2017-11-07 NOTE — MR AVS SNAPSHOT
Visit Information Date & Time Provider Department Dept. Phone Encounter #  
 11/7/2017 11:30 AM Aneesh Gordon  [facility] [phone] [id number] Your Appointments 12/18/2017 10:50 AM  
ROUTINE CARE with Aneesh Gordon MD  
[facility]) Appt Note: 3 mnth fu fasting [address] [address]  
[place] [address] [place] [address] Upcoming Health Maintenance Date Due  
 PAP AKA CERVICAL CYTOLOGY 2/2/2020 DTaP/Tdap/Td series (2 - Td) 1/25/2024 COLONOSCOPY 5/15/2025 Allergies as of 11/7/2017  Review Complete On: 11/7/2017 By: Shaun Rodney Severity Noted Reaction Type Reactions Sulfa (Sulfonamide Antibiotics)  07/07/2010    Hives Vicodin [Hydrocodone-acetaminophen]  01/03/2011    Nausea and Vomiting Current Immunizations  Reviewed on 11/18/2010 Name Date Tdap 1/25/2014  7:31 PM  
  
 Not reviewed this visit You Were Diagnosed With   
  
 Codes Comments Pure hypercholesterolemia    -  Primary ICD-10-CM: E78.00 ICD-9-CM: 272.0 Depression, unspecified depression type     ICD-10-CM: F32.9 ICD-9-CM: 010 Attention deficit hyperactivity disorder (ADHD), predominantly inattentive type     ICD-10-CM: F90.0 ICD-9-CM: 314.00 Fibromyalgia     ICD-10-CM: M79.7 ICD-9-CM: 729.1 Inflammatory arthritis     ICD-10-CM: M19.90 ICD-9-CM: 714.9 IFG (impaired fasting glucose)     ICD-10-CM: R73.01 
ICD-9-CM: 790.21 Vitals BP Pulse Temp Resp Height(growth percentile) Weight(growth percentile) 128/86 (BP 1 Location: Right arm, BP Patient Position: Sitting) 89 97.7 °F (36.5 °C) (Oral) 18 5' 4\" (1.626 m) 169 lb (76.7 kg) SpO2 BMI OB Status Smoking Status 99% 29.01 kg/m2 Having regular periods Former Smoker Vitals History BMI and BSA Data Body Mass Index Body Surface Area  
 29.01 kg/m 2 1.86 m 2 Preferred Pharmacy Pharmacy Name Phone [facility] [address] [address] [phone] Your Updated Medication List  
  
   
This list is accurate as of: 11/7/17 11:59 AM.  Always use your most recent med list.  
  
  
  
  
 albuterol 90 mcg/actuation inhaler Commonly known as:  PROVENTIL HFA, VENTOLIN HFA, PROAIR HFA Take 1 Puff by inhalation every six (6) hours as needed for Wheezing. Cervical Caps 26 mm Beau Sleet Commonly known as:  Engagio Kidbox [facility] Insert 1 Cap into vagina as needed. citalopram 20 mg tablet Commonly known as:  Odalys Cabello Take 1 Tab by mouth daily. clonazePAM 0.5 mg tablet Commonly known as:  Santos García Take 1 mg by mouth nightly as needed. cyclobenzaprine 10 mg tablet Commonly known as:  FLEXERIL Take 1 Tab by mouth three (3) times daily as needed. diclofenac 1 % Gel Commonly known as:  VOLTAREN Apply 4 g to affected area four (4) times daily. FLONASE 50 mcg/actuation nasal spray Generic drug:  fluticasone 2 Sprays by Both Nostrils route daily. FLUoxetine 40 mg capsule Commonly known as:  PROzac Take  by mouth daily. gabapentin 300 mg capsule Commonly known as:  NEURONTIN Take 2 tabs in morning and 2 tabs at nighttime. Indications: fibromyalgia  
  
 hydrocortisone 2.5 % rectal cream  
Commonly known as:  ANUSOL-HC Insert  into rectum four (4) times daily. As needed  
  
 hydrocortisone-pramoxine rectal foam  
Commonly known as:  PROCTOFOAM HC Insert 1 Applicator into rectum two (2) times a day.  
  
 ketoconazole 2 % shampoo Commonly known as:  NIZORAL  
shampoo twice weekly LORazepam 1 mg tablet Commonly known as:  ATIVAN Take 1 Tab by mouth every twelve (12) hours. Max Daily Amount: 2 mg. Indications: ANXIETY  
  
 omeprazole 40 mg capsule Commonly known as:  PRILOSEC  
 Take 1 Cap by mouth daily. Indications: gastroesophageal reflux disease  
  
 traZODone 50 mg tablet Commonly known as:  Sara Au Take 1 Tab by mouth nightly. triamcinolone acetonide 0.1 % ointment Commonly known as:  KENALOG Apply  to affected area two (2) times a day. use thin layer WELLBUTRIN  mg tablet Generic drug:  buPROPion XL Take 150 mg by mouth every morning. ZOLOFT 50 mg tablet Generic drug:  sertraline Take 100 mg by mouth daily. We Performed the Following CRP, HIGH SENSITIVITY [53364 CPT(R)] LIPID PANEL [11368 CPT(R)] METABOLIC PANEL, COMPREHENSIVE [56350 CPT(R)] Introducing [facility]! Dear Zev Pozo: Thank you for requesting a [employer] account. Our records indicate that you already have an active [employer] account. You can access your account anytime at [email or web address] [facility]/[employer] Did you know that you can access your hospital and ER discharge instructions at any time in [employer]? You can also review all of your test results from your hospital stay or ER visit. Additional Information If you have questions, please visit the Frequently Asked Questions section of the [employer] website at [email or web address] [facility]/[employer] Remember, [employer] is NOT to be used for urgent needs. For medical emergencies, dial 911. Now available from your iPhone and Android! Please provide this summary of care documentation to your next provider. Your primary care clinician is listed as [address]. If you have any questions after today's visit, please call [phone].

## 2017-11-07 NOTE — LETTER
11/7/2017 12:02 PM 
 
Ms. Ha Kidney [address] [address] To whom it may concern:  
 
I agree with Dr. Luis Ennis assessment that she can only work from 8:30AM to 5:30 PM Monday through Friday because of her medical problems and medications. This is a chronic condition and will not improve so there is no \"end date\".   
 
 
 
 
Sincerely, 
 
 
Hillary De Luna MD

## 2017-11-07 NOTE — PROGRESS NOTES
Progress Note    Patient: Shawna Field MRN: [id number]  SSN: [id number]    YOB: 1980  Age: 30 y.o. Sex: female        No chief complaint on file. Subjective:     Encounter Diagnoses   Name Primary?  Pure hypercholesterolemia: Unfortunately she has severe fibromyalgia and will probably not be able to take a statin  Cardiovascular risks for her are: LDL goal is under 100  hyperlipidemia. Currently she takes no statin  Lab Results   Component Value Date/Time    Cholesterol, total 244 05/03/2017 02:31 PM    HDL Cholesterol 67 05/03/2017 02:31 PM    LDL, calculated 167 05/03/2017 02:31 PM    Triglyceride 52 05/03/2017 02:31 PM     Lab Results   Component Value Date/Time    ALT (SGPT) 18 05/03/2017 02:31 PM    AST (SGOT) 15 05/03/2017 02:31 PM    Alk. phosphatase 66 05/03/2017 02:31 PM    Bilirubin, total 0.4 05/03/2017 02:31 PM      Myalgias: No   Fatigue: No   Other side effects: no  Wt Readings from Last 3 Encounters:   11/07/17 169 lb (76.7 kg)   09/25/17 162 lb 9.6 oz (73.8 kg)   08/23/17 160 lb (72.6 kg)     The patient is aware of our goal to reduce or eliminate the long term problems (such as strokes and heart attacks) related to poorly controlled hyperlipidemia. Yes    Depression, unspecified depression type: She has multiple psychiatric diagnoses and sees Dr. Flavia Sevilla. OCD, ADHD and depression are mentioned in her chart. She is on multiple medications that caused her to be drowsy. Dr. Flavia Sevilla has limited her work to 40 hours a week 8:30 to 5:30 PM.  These hours must be Monday to Friday to give her the weekend to recuperate. I have written a letter in support of this and also told her employer that as this is a chronic condition and has no end date. She may end up seeking disability because it is just so hard for her to function with her chronic pain and drowsiness.  Attention deficit hyperactivity disorder (ADHD), predominantly inattentive type:  This is a new diagnosis. She cannot afford the Adderall that was prescribed. She will discuss this with Dr. Jennifer Castillo.  Fibromyalgia: Severe with multiple tender spots simultaneously.  Inflammatory arthritis: joints ache and pop as the day goes on.  IFG (impaired fasting glucose): Recheck blood sugar. Current and past medical information:    Current Medications after this visit[de-identified]   Current Outpatient Prescriptions   Medication Sig    buPROPion XL (WELLBUTRIN XL) 150 mg tablet Take 150 mg by mouth every morning.  FLUoxetine (PROZAC) 40 mg capsule Take  by mouth daily.  omeprazole (PRILOSEC) 40 mg capsule Take 1 Cap by mouth daily. Indications: gastroesophageal reflux disease    fluticasone (FLONASE) 50 mcg/actuation nasal spray 2 Sprays by Both Nostrils route daily.  cyclobenzaprine (FLEXERIL) 10 mg tablet Take 1 Tab by mouth three (3) times daily as needed.  gabapentin (NEURONTIN) 300 mg capsule Take 2 tabs in morning and 2 tabs at nighttime. Indications: fibromyalgia    albuterol (PROVENTIL HFA, VENTOLIN HFA, PROAIR HFA) 90 mcg/actuation inhaler Take 1 Puff by inhalation every six (6) hours as needed for Wheezing.  ketoconazole (NIZORAL) 2 % shampoo shampoo twice weekly    diclofenac (VOLTAREN) 1 % gel Apply 4 g to affected area four (4) times daily.  citalopram (CELEXA) 20 mg tablet Take 1 Tab by mouth daily.  Cervical Caps (FEMCAP) 26 mm kole Insert 1 Cap into vagina as needed.  LORazepam (ATIVAN) 1 mg tablet Take 1 Tab by mouth every twelve (12) hours. Max Daily Amount: 2 mg. Indications: ANXIETY (Patient taking differently: Take 2 mg by mouth every twelve (12) hours. Indications: ANXIETY)    clonazePAM (KLONOPIN) 0.5 mg tablet Take 1 mg by mouth nightly as needed.  traZODone (DESYREL) 50 mg tablet Take 1 Tab by mouth nightly.  triamcinolone acetonide (KENALOG) 0.1 % ointment Apply  to affected area two (2) times a day.  use thin layer    hydrocortisone (ANUSOL-HC) 2.5 % rectal cream Insert  into rectum four (4) times daily. As needed    hydrocortisone-pramoxine (PROCTOFOAM HC) rectal foam Insert 1 Applicator into rectum two (2) times a day.  sertraline (ZOLOFT) 50 mg tablet Take 100 mg by mouth daily. No current facility-administered medications for this visit. Patient Active Problem List    Diagnosis Date Noted    Inflammatory arthritis 08/10/2016    Vitamin D deficiency 03/21/2016    Thrombosed external hemorrhoid 02/05/2016    IFG (impaired fasting glucose) 09/10/2015    Hyperlipidemia 09/10/2015    Hoarseness 03/25/2013    OCD (obsessive compulsive disorder) 02/27/2012    Vaginal yeast infection 11/09/2011    HSV (herpes simplex virus) anogenital infection 09/12/2011    Costochondritis 05/17/2011    Anxiety 09/13/2010    Itch of skin 05/26/2010    Depression     GERD (gastroesophageal reflux disease)     Hypertension     Anemia NEC        Past Medical History:   Diagnosis Date    Anemia NEC     Depression     GERD (gastroesophageal reflux disease)     Hypertension     Musculoskeletal disorder     SOB (shortness of breath)     Stool color black        Allergies   Allergen Reactions    Sulfa (Sulfonamide Antibiotics) Hives    Vicodin [Hydrocodone-Acetaminophen] Nausea and Vomiting       History reviewed. No pertinent surgical history.     Social History     Social History    Marital status: SINGLE     Spouse name: N/A    Number of children: N/A    Years of education: N/A     Social History Main Topics    Smoking status: Former Smoker     Packs/day: 0.50     Years: 8.00     Types: Cigarettes     Quit date: 12/9/2011    Smokeless tobacco: Never Used    Alcohol use 0.0 oz/week     0 Standard drinks or equivalent per week      Comment: occassionally    Drug use: No    Sexual activity: Yes     Partners: Male     Birth control/ protection: None     Other Topics Concern    None     Social History Narrative       Review of Systems   Constitutional: Positive for malaise/fatigue. Negative for chills, fever and weight loss. HENT: Negative. Negative for hearing loss. Eyes: Negative. Negative for blurred vision and double vision. Respiratory: Negative. Negative for cough, hemoptysis, sputum production and shortness of breath. Cardiovascular: Negative. Negative for chest pain, palpitations and orthopnea. Gastrointestinal: Negative. Negative for abdominal pain, blood in stool, heartburn, nausea and vomiting. Genitourinary: Negative. Negative for dysuria, frequency and urgency. Musculoskeletal: Positive for back pain, joint pain and myalgias. Negative for falls and neck pain. Skin: Negative. Negative for rash. Neurological: Positive for weakness. Negative for dizziness, tingling, tremors and headaches. Endo/Heme/Allergies: Negative. Psychiatric/Behavioral: Positive for depression. Excessive sleepiness        Objective:     Vitals:    11/07/17 1130   BP: 128/86   Pulse: 89   Resp: 18   Temp: 97.7 °F (36.5 °C)   TempSrc: Oral   SpO2: 99%   Weight: 169 lb (76.7 kg)   Height: 5' 4\" (1.626 m)      Body mass index is 29.01 kg/(m^2). Physical Exam   Constitutional: She is oriented to person, place, and time and well-developed, well-nourished, and in no distress. No distress. HENT:   Head: Normocephalic and atraumatic. Mouth/Throat: Oropharynx is clear and moist.   Eyes: Conjunctivae are normal.   Cardiovascular: Normal rate and regular rhythm. No murmur heard. Pulmonary/Chest: Effort normal.   Abdominal: Soft. Neurological: She is alert and oriented to person, place, and time. No cranial nerve deficit. Skin: Skin is warm. She is not diaphoretic. Psychiatric: Mood and affect normal.   Nursing note and vitals reviewed. There are no preventive care reminders to display for this patient. Assessment and orders:       ICD-10-CM ICD-9-CM    1.  Pure hypercholesterolemia E78.00 272.0 LIPID PANEL      METABOLIC PANEL, COMPREHENSIVE      CRP, HIGH SENSITIVITY   2. Depression, unspecified depression type   F32.9 311  see note   3. Attention deficit hyperactivity disorder (ADHD), predominantly inattentive type   F90.0 314.00  see note   4. Fibromyalgia   M79.7 729.1  severe   5. Inflammatory arthritis   M19.90 714.9 CRP. 6. IFG (impaired fasting glucose) R73.01 790.21  recheck         Plan of care:  Discussed diagnoses in detail with patient. Medication risks/benefits/side effects discussed with patient. All of the patient's questions were addressed. The patient understands and agrees with our plan of care. The patient knows to call back if they are unsure of or forget any changes we discussed today or if the symptoms change. The patient received an After-Visit Summary which contains VS, orders, medication list and allergy list. This can be used as a \"mini-medical record\" should they have to seek medical care while out of town.     Patient Care Team:  Jasmin Mitchell MD as PCP - General (Family Practice)  Kalie Hernandez MD (Breast Surgery)  Echo Alexandre MD (Rheumatology)  Nam Corona MD (Cardiology)    Follow-up Disposition: Not on File    Future Appointments  Date Time Provider Komal Silvestrei   12/18/2017 10:50 AM Jasmin Mitchell MD [facility] [address]       Signed By: Jasmin Mitchell MD     November 7, 2017

## 2017-11-08 ENCOUNTER — TELEPHONE (OUTPATIENT)
Dept: FAMILY MEDICINE CLINIC | Age: 37
End: 2017-11-08

## 2017-11-08 LAB
ALBUMIN SERPL-MCNC: 4.1 G/DL (ref 3.5–5.5)
ALBUMIN/GLOB SERPL: 1.5 {RATIO} (ref 1.2–2.2)
ALP SERPL-CCNC: 69 IU/L (ref 39–117)
ALT SERPL-CCNC: 106 IU/L (ref 0–32)
AST SERPL-CCNC: 43 IU/L (ref 0–40)
BILIRUB SERPL-MCNC: 0.4 MG/DL (ref 0–1.2)
BUN SERPL-MCNC: 10 MG/DL (ref 6–20)
BUN/CREAT SERPL: 14 (ref 9–23)
CALCIUM SERPL-MCNC: 9.3 MG/DL (ref 8.7–10.2)
CHLORIDE SERPL-SCNC: 101 MMOL/L (ref 96–106)
CHOLEST SERPL-MCNC: 260 MG/DL (ref 100–199)
CO2 SERPL-SCNC: 25 MMOL/L (ref 18–29)
CREAT SERPL-MCNC: 0.73 MG/DL (ref 0.57–1)
CRP SERPL HS-MCNC: 1.16 MG/L (ref 0–3)
GFR SERPLBLD CREATININE-BSD FMLA CKD-EPI: 107 ML/MIN/1.73
GFR SERPLBLD CREATININE-BSD FMLA CKD-EPI: 123 ML/MIN/1.73
GLOBULIN SER CALC-MCNC: 2.7 G/DL (ref 1.5–4.5)
GLUCOSE SERPL-MCNC: 78 MG/DL (ref 65–99)
HDLC SERPL-MCNC: 91 MG/DL
LDLC SERPL CALC-MCNC: 160 MG/DL (ref 0–99)
POTASSIUM SERPL-SCNC: 4.3 MMOL/L (ref 3.5–5.2)
PROT SERPL-MCNC: 6.8 G/DL (ref 6–8.5)
SODIUM SERPL-SCNC: 141 MMOL/L (ref 134–144)
TRIGL SERPL-MCNC: 43 MG/DL (ref 0–149)
VLDLC SERPL CALC-MCNC: 9 MG/DL (ref 5–40)

## 2017-11-08 NOTE — TELEPHONE ENCOUNTER
----- Message from Jose Sandhu MD sent at 11/8/2017  8:15 AM EST -----  Her liver functions are elevated. This may be due to 1 of her medications but we will need to have her get an ultrasound of her liver to check and out. Find out where she wants to have the ultrasound done. LDL-bad cholesterol is still very high she needs to work on her diet to control this without her medication. Make sure she is not drinking any alcohol.

## 2017-11-08 NOTE — TELEPHONE ENCOUNTER
Returned phone call to patient. Patient expressed concern about dying from elevated liver enzymes. Educated patient on avoiding Tylenol, fried & greasy foods and increasing fiber, fruits and vegetables. Advised patient that we would contact her with the information regarding her ultrasound appointment. Patient verbalized understanding.

## 2017-11-08 NOTE — TELEPHONE ENCOUNTER
Spoke with patient and informed her per Dr. Haroon Dowd:     Her liver functions are elevated.  This may be due to 1 of her medications but we will need to have her get an ultrasound of her liver to check and out.  Find out where she wants to have the ultrasound done.  LDL-bad cholesterol is still very high she needs to work on her diet to control this without her medication.  Make sure she is not drinking any alcohol. Patient verbalized understanding and would like to go to [facility] for [facility]. She states that she does not drink alcohol.

## 2017-11-08 NOTE — PROGRESS NOTES
Her liver functions are elevated. This may be due to 1 of her medications but we will need to have her get an ultrasound of her liver to check and out. Find out where she wants to have the ultrasound done. LDL-bad cholesterol is still very high she needs to work on her diet to control this without her medication. Make sure she is not drinking any alcohol.

## 2017-11-09 DIAGNOSIS — R79.89 ELEVATED LFTS: Primary | ICD-10-CM

## 2017-11-18 ENCOUNTER — HOSPITAL ENCOUNTER (OUTPATIENT)
Dept: ULTRASOUND IMAGING | Age: 37
Discharge: HOME | End: 2017-11-18
Attending: FAMILY MEDICINE
Payer: COMMERCIAL

## 2017-11-18 DIAGNOSIS — R79.89 ELEVATED LFTS: ICD-10-CM

## 2017-11-18 PROCEDURE — 76705 ECHO EXAM OF ABDOMEN: CPT

## 2017-11-20 ENCOUNTER — TELEPHONE (OUTPATIENT)
Dept: FAMILY MEDICINE CLINIC | Age: 37
End: 2017-11-20

## 2017-11-20 NOTE — TELEPHONE ENCOUNTER
Informed patient:    Nivia Favre has mild evidence of fatty liver but liver test is considered normal.\"      Patient verbalized understanding and asked what she needed to do. Advised patient to avoid alcohol and tylenol, focus on losing weight, and watching her cholesterol. Patient verbalized understanding and asked when she should have repeat her liver test. Advised patient per Dr. Debbie Scales that we would repeat her labs in 3 months.

## 2017-11-20 NOTE — TELEPHONE ENCOUNTER
----- Message from Jose Sandhu MD sent at 11/19/2017 10:26 PM EST -----  She has mild evidence of fatty liver but liver test is considered normal.

## 2017-11-21 ENCOUNTER — TELEPHONE (OUTPATIENT)
Dept: FAMILY MEDICINE CLINIC | Age: 37
End: 2017-11-21

## 2017-11-21 NOTE — TELEPHONE ENCOUNTER
Returned phone call to patient. Offered patient an appointment for this afternoon to discuss any concerns she may have with the doctor. Patient declined appointment. Patient requested more information on which foods she should avoid. Educated patient on foods high in cholesterol to avoid. Patient verbalized understanding.

## 2017-12-01 ENCOUNTER — TELEPHONE (OUTPATIENT)
Dept: FAMILY MEDICINE CLINIC | Age: 37
End: 2017-12-01

## 2017-12-01 DIAGNOSIS — Z12.31 ENCOUNTER FOR SCREENING MAMMOGRAM FOR HIGH-RISK PATIENT: Primary | ICD-10-CM

## 2017-12-01 NOTE — TELEPHONE ENCOUNTER
Returned phone call to patient, no answer.      Does she have a preference on where she would like to have her mammogram at?

## 2017-12-01 NOTE — TELEPHONE ENCOUNTER
Patient called stating she previously discussed getting a mammogram done once she turned 37 but I do not see any orders in her chart. She would like a call back to discuss this.  Please advise [phone] or [phone]

## 2017-12-08 ENCOUNTER — OFFICE VISIT (OUTPATIENT)
Dept: FAMILY MEDICINE CLINIC | Age: 37
End: 2017-12-08

## 2017-12-08 VITALS
TEMPERATURE: 97.4 F | DIASTOLIC BLOOD PRESSURE: 93 MMHG | OXYGEN SATURATION: 98 % | RESPIRATION RATE: 16 BRPM | SYSTOLIC BLOOD PRESSURE: 123 MMHG | BODY MASS INDEX: 29.47 KG/M2 | HEIGHT: 64 IN | HEART RATE: 92 BPM | WEIGHT: 172.6 LBS

## 2017-12-08 DIAGNOSIS — F42.2 MIXED OBSESSIONAL THOUGHTS AND ACTS: Chronic | ICD-10-CM

## 2017-12-08 DIAGNOSIS — M19.90 INFLAMMATORY ARTHRITIS: Chronic | ICD-10-CM

## 2017-12-08 DIAGNOSIS — F32.A DEPRESSION, UNSPECIFIED DEPRESSION TYPE: Chronic | ICD-10-CM

## 2017-12-08 DIAGNOSIS — R79.89 ELEVATED LFTS: Chronic | ICD-10-CM

## 2017-12-08 DIAGNOSIS — K21.00 GASTROESOPHAGEAL REFLUX DISEASE WITH ESOPHAGITIS: Chronic | ICD-10-CM

## 2017-12-08 DIAGNOSIS — G89.4 CHRONIC PAIN SYNDROME: Chronic | ICD-10-CM

## 2017-12-08 DIAGNOSIS — I10 ESSENTIAL HYPERTENSION: Primary | Chronic | ICD-10-CM

## 2017-12-08 DIAGNOSIS — F90.0 ATTENTION DEFICIT HYPERACTIVITY DISORDER (ADHD), PREDOMINANTLY INATTENTIVE TYPE: Chronic | ICD-10-CM

## 2017-12-08 DIAGNOSIS — M79.7 FIBROMYALGIA: Chronic | ICD-10-CM

## 2017-12-08 RX ORDER — METHYLPHENIDATE HYDROCHLORIDE 10 MG/1
20 TABLET ORAL 2 TIMES DAILY
COMMUNITY
End: 2018-05-10

## 2017-12-08 NOTE — MR AVS SNAPSHOT
Visit Information Date & Time Provider Department Dept. Phone Encounter #  
 12/8/2017  2:25 PM Bakari Toro, [place] [phone] [id number] Follow-up Instructions Return in about 1 month (around 1/8/2018) for 40 min. Upcoming Health Maintenance Date Due  
 PAP AKA CERVICAL CYTOLOGY 2/2/2020 DTaP/Tdap/Td series (2 - Td) 1/25/2024 COLONOSCOPY 5/15/2025 Allergies as of 12/8/2017  Review Complete On: 11/7/2017 By: Bakari Toro MD  
  
 Severity Noted Reaction Type Reactions Sulfa (Sulfonamide Antibiotics)  07/07/2010    Hives Vicodin [Hydrocodone-acetaminophen]  01/03/2011    Nausea and Vomiting Current Immunizations  Reviewed on 11/18/2010 Name Date Tdap 1/25/2014  7:31 PM  
  
 Not reviewed this visit You Were Diagnosed With   
  
 Codes Comments Essential hypertension    -  Primary ICD-10-CM: I10 
ICD-9-CM: 401.9 Gastroesophageal reflux disease with esophagitis     ICD-10-CM: K21.0 ICD-9-CM: 530.11 Depression, unspecified depression type     ICD-10-CM: F32.9 ICD-9-CM: 458 Attention deficit hyperactivity disorder (ADHD), predominantly inattentive type     ICD-10-CM: F90.0 ICD-9-CM: 314.00 Mixed obsessional thoughts and acts     ICD-10-CM: F42.2 ICD-9-CM: 300.3 Inflammatory arthritis     ICD-10-CM: M19.90 ICD-9-CM: 714.9 Chronic pain syndrome     ICD-10-CM: G89.4 ICD-9-CM: 338.4 Fibromyalgia     ICD-10-CM: M79.7 ICD-9-CM: 729.1 Elevated LFTs     ICD-10-CM: R79.89 ICD-9-CM: 790.6 Vitals BP Pulse Temp Resp Height(growth percentile) Weight(growth percentile) (!) 123/93 (BP 1 Location: Left arm, BP Patient Position: Sitting) 92 97.4 °F (36.3 °C) (Oral) 16 5' 4\" (1.626 m) 172 lb 9.6 oz (78.3 kg) LMP SpO2 BMI OB Status Smoking Status 11/30/2017 98% 29.63 kg/m2 Having regular periods Former Smoker BMI and BSA Data Body Mass Index Body Surface Area  
 29.63 kg/m 2 1.88 m 2 Preferred Pharmacy Pharmacy Name Phone [facility] [address] [address] [phone] Your Updated Medication List  
  
   
This list is accurate as of: 12/8/17  2:55 PM.  Always use your most recent med list.  
  
  
  
  
 albuterol 90 mcg/actuation inhaler Commonly known as:  PROVENTIL HFA, VENTOLIN HFA, PROAIR HFA Take 1 Puff by inhalation every six (6) hours as needed for Wheezing. clonazePAM 0.5 mg tablet Commonly known as:  Linda Slay Take 1 mg by mouth nightly as needed. cyclobenzaprine 10 mg tablet Commonly known as:  FLEXERIL Take 1 Tab by mouth three (3) times daily as needed. diclofenac 1 % Gel Commonly known as:  VOLTAREN Apply 4 g to affected area four (4) times daily. FLONASE 50 mcg/actuation nasal spray Generic drug:  fluticasone 2 Sprays by Both Nostrils route daily. FLUoxetine 40 mg capsule Commonly known as:  PROzac Take  by mouth daily. gabapentin 300 mg capsule Commonly known as:  NEURONTIN Take 2 tabs in morning and 2 tabs at nighttime. Indications: fibromyalgia  
  
 hydrocortisone 2.5 % rectal cream  
Commonly known as:  ANUSOL-HC Insert  into rectum four (4) times daily. As needed  
  
 hydrocortisone-pramoxine rectal foam  
Commonly known as:  PROCTOFOAM HC Insert 1 Applicator into rectum two (2) times a day.  
  
 ketoconazole 2 % shampoo Commonly known as:  NIZORAL  
shampoo twice weekly LORazepam 1 mg tablet Commonly known as:  ATIVAN Take 1 Tab by mouth every twelve (12) hours. Max Daily Amount: 2 mg. Indications: ANXIETY  
  
 methylphenidate HCl 10 mg tablet Commonly known as:  RITALIN Take  by mouth. omeprazole 40 mg capsule Commonly known as:  PRILOSEC Take 1 Cap by mouth daily. Indications: gastroesophageal reflux disease  
  
 traZODone 50 mg tablet Commonly known as:  Kolby Handy Take 1 Tab by mouth nightly. triamcinolone acetonide 0.1 % ointment Commonly known as:  KENALOG Apply  to affected area two (2) times a day. use thin layer WELLBUTRIN  mg tablet Generic drug:  buPROPion XL Take 150 mg by mouth every morning. We Performed the Following HEPATIC FUNCTION PANEL [98209 CPT(R)] Follow-up Instructions Return in about 1 month (around 1/8/2018) for 40 min. Patient Instructions Fibromyalgia: Care Instructions Your Care Instructions Fibromyalgia is a painful condition that is not completely understood by medical experts. The cause of fibromyalgia is not known. It can make you feel tired and ache all over. It causes tender spots at specific points of the body that hurt only when you press on them. You may have trouble sleeping, as well as other symptoms. These problems can upset your work and home life. Symptoms tend to come and go, although they may never go away completely. Fibromyalgia does not harm your muscles, joints, or organs. Follow-up care is a key part of your treatment and safety. Be sure to make and go to all appointments, and call your doctor if you are having problems. It's also a good idea to know your test results and keep a list of the medicines you take. How can you care for yourself at home? · Exercise often. Walk, swim, or bike to help with pain and sleep problems and to make you feel better. · Try to get a good night's sleep. Go to bed and get up at the same time each day, whether you feel rested or not. Make sure you have a good mattress and pillow. · Reduce stress. Avoid things that cause you stress, if you can. If not, work at making them less stressful. Learn to use biofeedback, guided imagery, meditation, or other methods to relax. · Make healthy changes. Eat a balanced diet, quit smoking, and limit alcohol and caffeine. · Use a heating pad set on low or take warm baths or showers for pain. Using cold packs for up to 20 minutes at a time can also relieve pain. Put a thin cloth between the cold pack and your skin. A gentle massage might help too. · Be safe with medicines. Take your medicines exactly as prescribed. Call your doctor if you think you are having a problem with your medicine. Your doctor may talk to you about taking antidepressant medicines. These medicines may improve sleep, relieve pain, and in some cases treat depression. · Learn about fibromyalgia. This makes coping easier. Then, take an active role in your treatment. · Think about joining a support group with others who have fibromyalgia to learn more and get support. When should you call for help? Watch closely for changes in your health, and be sure to contact your doctor if: 
? · You feel sad, helpless, or hopeless; lose interest in things you used to enjoy; or have other symptoms of depression. ? · Your fibromyalgia symptoms get worse. Where can you learn more? Go to http://daniel-carlton.info/. Enter V003 in the search box to learn more about \"Fibromyalgia: Care Instructions. \" Current as of: October 14, 2016 Content Version: 11.4 © 7687-2714 [employer]. Care instructions adapted under license by [employer] (which disclaims liability or warranty for this information). If you have questions about a medical condition or this instruction, always ask your healthcare professional. [place] [address] any warranty or liability for your use of this information. Fibromyalgia: Care Instructions Your Care Instructions Fibromyalgia is a painful condition that is not completely understood by medical experts. The cause of fibromyalgia is not known. It can make you feel tired and ache all over.  It causes tender spots at specific points of the body that hurt only when you press on them. You may have trouble sleeping, as well as other symptoms. These problems can upset your work and home life. Symptoms tend to come and go, although they may never go away completely. Fibromyalgia does not harm your muscles, joints, or organs. Follow-up care is a key part of your treatment and safety. Be sure to make and go to all appointments, and call your doctor if you are having problems. It's also a good idea to know your test results and keep a list of the medicines you take. How can you care for yourself at home? · Exercise often. Walk, swim, or bike to help with pain and sleep problems and to make you feel better. · Try to get a good night's sleep. Go to bed and get up at the same time each day, whether you feel rested or not. Make sure you have a good mattress and pillow. · Reduce stress. Avoid things that cause you stress, if you can. If not, work at making them less stressful. Learn to use biofeedback, guided imagery, meditation, or other methods to relax. · Make healthy changes. Eat a balanced diet, quit smoking, and limit alcohol and caffeine. · Use a heating pad set on low or take warm baths or showers for pain. Using cold packs for up to 20 minutes at a time can also relieve pain. Put a thin cloth between the cold pack and your skin. A gentle massage might help too. · Be safe with medicines. Take your medicines exactly as prescribed. Call your doctor if you think you are having a problem with your medicine. Your doctor may talk to you about taking antidepressant medicines. These medicines may improve sleep, relieve pain, and in some cases treat depression. · Learn about fibromyalgia. This makes coping easier. Then, take an active role in your treatment. · Think about joining a support group with others who have fibromyalgia to learn more and get support. When should you call for help? Watch closely for changes in your health, and be sure to contact your doctor if: 
? · You feel sad, helpless, or hopeless; lose interest in things you used to enjoy; or have other symptoms of depression. ? · Your fibromyalgia symptoms get worse. Where can you learn more? Go to http://daniel-carlton.info/. Enter V003 in the search box to learn more about \"Fibromyalgia: Care Instructions. \" Current as of: October 14, 2016 Content Version: 11.4 © 7932-0100 Hall. Care instructions adapted under license by [employer] (which disclaims liability or warranty for this information). If you have questions about a medical condition or this instruction, always ask your healthcare professional. [address] any warranty or liability for your use of this information. Introducing [facility]! Dear German Grant: Thank you for requesting a [facility] account. Our records indicate that you already have an active [facility] account. You can access your account anytime at [email or web address] Did you know that you can access your hospital and ER discharge instructions at any time in [facility]? You can also review all of your test results from your hospital stay or ER visit. Additional Information If you have questions, please visit the Frequently Asked Questions section of the [facility] website at [email or web address] Remember, [facility] is NOT to be used for urgent needs. For medical emergencies, dial 911. Now available from your iPhone and Android! Please provide this summary of care documentation to your next provider. Your primary care clinician is listed as [address]. If you have any questions after today's visit, please call [phone].

## 2017-12-08 NOTE — PATIENT INSTRUCTIONS
Fibromyalgia: Care Instructions  Your Care Instructions    Fibromyalgia is a painful condition that is not completely understood by medical experts. The cause of fibromyalgia is not known. It can make you feel tired and ache all over. It causes tender spots at specific points of the body that hurt only when you press on them. You may have trouble sleeping, as well as other symptoms. These problems can upset your work and home life. Symptoms tend to come and go, although they may never go away completely. Fibromyalgia does not harm your muscles, joints, or organs. Follow-up care is a key part of your treatment and safety. Be sure to make and go to all appointments, and call your doctor if you are having problems. It's also a good idea to know your test results and keep a list of the medicines you take. How can you care for yourself at home? · Exercise often. Walk, swim, or bike to help with pain and sleep problems and to make you feel better. · Try to get a good night's sleep. Go to bed and get up at the same time each day, whether you feel rested or not. Make sure you have a good mattress and pillow. · Reduce stress. Avoid things that cause you stress, if you can. If not, work at making them less stressful. Learn to use biofeedback, guided imagery, meditation, or other methods to relax. · Make healthy changes. Eat a balanced diet, quit smoking, and limit alcohol and caffeine. · Use a heating pad set on low or take warm baths or showers for pain. Using cold packs for up to 20 minutes at a time can also relieve pain. Put a thin cloth between the cold pack and your skin. A gentle massage might help too. · Be safe with medicines. Take your medicines exactly as prescribed. Call your doctor if you think you are having a problem with your medicine. Your doctor may talk to you about taking antidepressant medicines. These medicines may improve sleep, relieve pain, and in some cases treat depression.   · Learn about fibromyalgia. This makes coping easier. Then, take an active role in your treatment. · Think about joining a support group with others who have fibromyalgia to learn more and get support. When should you call for help? Watch closely for changes in your health, and be sure to contact your doctor if:  ? · You feel sad, helpless, or hopeless; lose interest in things you used to enjoy; or have other symptoms of depression. ? · Your fibromyalgia symptoms get worse. Where can you learn more? Go to http://daniel-carlton.info/. Enter V003 in the search box to learn more about \"Fibromyalgia: Care Instructions. \"  Current as of: October 14, 2016  Content Version: 11.4  © 7501-1950 [employer]. Care instructions adapted under license by [employer] (which disclaims liability or warranty for this information). If you have questions about a medical condition or this instruction, always ask your healthcare professional. [address] any warranty or liability for your use of this information. Fibromyalgia: Care Instructions  Your Care Instructions    Fibromyalgia is a painful condition that is not completely understood by medical experts. The cause of fibromyalgia is not known. It can make you feel tired and ache all over. It causes tender spots at specific points of the body that hurt only when you press on them. You may have trouble sleeping, as well as other symptoms. These problems can upset your work and home life. Symptoms tend to come and go, although they may never go away completely. Fibromyalgia does not harm your muscles, joints, or organs. Follow-up care is a key part of your treatment and safety. Be sure to make and go to all appointments, and call your doctor if you are having problems. It's also a good idea to know your test results and keep a list of the medicines you take. How can you care for yourself at home? · Exercise often. Walk, swim, or bike to help with pain and sleep problems and to make you feel better. · Try to get a good night's sleep. Go to bed and get up at the same time each day, whether you feel rested or not. Make sure you have a good mattress and pillow. · Reduce stress. Avoid things that cause you stress, if you can. If not, work at making them less stressful. Learn to use biofeedback, guided imagery, meditation, or other methods to relax. · Make healthy changes. Eat a balanced diet, quit smoking, and limit alcohol and caffeine. · Use a heating pad set on low or take warm baths or showers for pain. Using cold packs for up to 20 minutes at a time can also relieve pain. Put a thin cloth between the cold pack and your skin. A gentle massage might help too. · Be safe with medicines. Take your medicines exactly as prescribed. Call your doctor if you think you are having a problem with your medicine. Your doctor may talk to you about taking antidepressant medicines. These medicines may improve sleep, relieve pain, and in some cases treat depression. · Learn about fibromyalgia. This makes coping easier. Then, take an active role in your treatment. · Think about joining a support group with others who have fibromyalgia to learn more and get support. When should you call for help? Watch closely for changes in your health, and be sure to contact your doctor if:  ? · You feel sad, helpless, or hopeless; lose interest in things you used to enjoy; or have other symptoms of depression. ? · Your fibromyalgia symptoms get worse. Where can you learn more? Go to http://daniel-carlton.info/. Enter V003 in the search box to learn more about \"Fibromyalgia: Care Instructions. \"  Current as of: October 14, 2016  Content Version: 11.4  © 7798-5096 [employer]. Care instructions adapted under license by "[employer] (which disclaims liability or warranty for this information).  If you have questions about a medical condition or this instruction, always ask your healthcare professional. [place] [address] any warranty or liability for your use of this information.

## 2017-12-09 LAB
ALBUMIN SERPL-MCNC: 4.3 G/DL (ref 3.5–5.5)
ALP SERPL-CCNC: 61 IU/L (ref 39–117)
ALT SERPL-CCNC: 16 IU/L (ref 0–32)
AST SERPL-CCNC: 14 IU/L (ref 0–40)
BILIRUB DIRECT SERPL-MCNC: 0.12 MG/DL (ref 0–0.4)
BILIRUB SERPL-MCNC: 0.5 MG/DL (ref 0–1.2)
PROT SERPL-MCNC: 6.7 G/DL (ref 6–8.5)

## 2017-12-10 NOTE — PROGRESS NOTES
Progress Note    Patient: Leland Paredes MRN: [id number]  SSN: [id number]    YOB: 1980  Age: 30 y.o. Sex: female        Chief Complaint   Patient presents with    Fibromyalgia    Headache    Other     Disability         Subjective:     Encounter Diagnoses   Name Primary?  Essential hypertension:Slightly elevated today. BP Readings from Last 3 Encounters:   12/08/17 (!) 123/93   11/07/17 128/86   09/25/17 113/77     The patient reports:  taking medications as instructed, no medication side effects noted, no TIA's, no chest pain on exertion, no dyspnea on exertion, no swelling of ankles. Lab Results   Component Value Date/Time    Sodium 141 11/07/2017 02:52 PM    Potassium 4.3 11/07/2017 02:52 PM    Chloride 101 11/07/2017 02:52 PM    CO2 25 11/07/2017 02:52 PM    Anion gap 9 08/19/2010 10:36 AM    Glucose 78 11/07/2017 02:52 PM    BUN 10 11/07/2017 02:52 PM    Creatinine 0.73 11/07/2017 02:52 PM    BUN/Creatinine ratio 14 11/07/2017 02:52 PM    GFR est  11/07/2017 02:52 PM    GFR est non- 11/07/2017 02:52 PM    Calcium 9.3 11/07/2017 02:52 PM    Bilirubin, total 0.5 12/08/2017 04:08 PM    AST (SGOT) 14 12/08/2017 04:08 PM    Alk. phosphatase 61 12/08/2017 04:08 PM    Protein, total 6.7 12/08/2017 04:08 PM    Albumin 4.3 12/08/2017 04:08 PM    Globulin 3.6 08/19/2010 10:36 AM    A-G Ratio 1.5 11/07/2017 02:52 PM    ALT (SGPT) 16 12/08/2017 04:08 PM     Our goal is to normalize the blood pressure to decrease the risks of strokes and heart attacks. The patient is in agreement with the plan. Yes    Gastroesophageal reflux disease with esophagitis:   Current control of Symptoms:good  Hiatal Hernia:no  Current Medications:omeprazole  The patient has no history melena or bright red blood in the stools.   The patient avoids high dose aspirin but has used NSAID therapy in the past.  The patient is aware of diet changes needed, elevating the head of the bed and appropriate use of antacids.  Depression, unspecified depression type: she has a severe broad spectrum of mental health issues. Dr. Carie Thorpe is managing her medications. She has lost her job due to the Severely restricted hours she was able to work.  Attention deficit hyperactivity disorder (ADHD), predominantly inattentive type: She says this is getting worse weekly. She has started on Ritalin for this problem.  Mixed obsessional thoughts and acts: She is now on high dose Prozac for this component of her mental illness.  Inflammatory arthritis: The rheumatologist felt she had fibromyalgia only. She did not think that the patient had inflammatory arthritis.  Chronic pain syndrome: Due to severe fibromyalgia         Fibromyalgia:Most of the documented triggerpoints are painful for her.  Elevated LFTs: This is resolved by testing. She may have had some mild steatosis on her ultrasound. Current and past medical information:    Current Medications after this visit[de-identified]     Current Outpatient Prescriptions   Medication Sig    methylphenidate HCl (RITALIN) 10 mg tablet Take  by mouth.  buPROPion XL (WELLBUTRIN XL) 150 mg tablet Take 150 mg by mouth every morning.  FLUoxetine (PROZAC) 40 mg capsule Take  by mouth daily.  omeprazole (PRILOSEC) 40 mg capsule Take 1 Cap by mouth daily. Indications: gastroesophageal reflux disease    fluticasone (FLONASE) 50 mcg/actuation nasal spray 2 Sprays by Both Nostrils route daily.  cyclobenzaprine (FLEXERIL) 10 mg tablet Take 1 Tab by mouth three (3) times daily as needed.  gabapentin (NEURONTIN) 300 mg capsule Take 2 tabs in morning and 2 tabs at nighttime. Indications: fibromyalgia    albuterol (PROVENTIL HFA, VENTOLIN HFA, PROAIR HFA) 90 mcg/actuation inhaler Take 1 Puff by inhalation every six (6) hours as needed for Wheezing.     ketoconazole (NIZORAL) 2 % shampoo shampoo twice weekly    triamcinolone acetonide (KENALOG) 0.1 % ointment Apply  to affected area two (2) times a day. use thin layer    hydrocortisone (ANUSOL-HC) 2.5 % rectal cream Insert  into rectum four (4) times daily. As needed    diclofenac (VOLTAREN) 1 % gel Apply 4 g to affected area four (4) times daily.  hydrocortisone-pramoxine (PROCTOFOAM HC) rectal foam Insert 1 Applicator into rectum two (2) times a day.  LORazepam (ATIVAN) 1 mg tablet Take 1 Tab by mouth every twelve (12) hours. Max Daily Amount: 2 mg. Indications: ANXIETY (Patient taking differently: Take 2 mg by mouth every twelve (12) hours. Indications: ANXIETY)    clonazePAM (KLONOPIN) 0.5 mg tablet Take 1 mg by mouth nightly as needed.  traZODone (DESYREL) 50 mg tablet Take 1 Tab by mouth nightly. No current facility-administered medications for this visit. Patient Active Problem List    Diagnosis Date Noted    Inflammatory arthritis 08/10/2016    Vitamin D deficiency 03/21/2016    Thrombosed external hemorrhoid 02/05/2016    IFG (impaired fasting glucose) 09/10/2015    Hyperlipidemia 09/10/2015    Hoarseness 03/25/2013    OCD (obsessive compulsive disorder) 02/27/2012    Vaginal yeast infection 11/09/2011    HSV (herpes simplex virus) anogenital infection 09/12/2011    Costochondritis 05/17/2011    Anxiety 09/13/2010    Itch of skin 05/26/2010    Depression     GERD (gastroesophageal reflux disease)     Hypertension     Anemia NEC        Past Medical History:   Diagnosis Date    Anemia NEC     Depression     GERD (gastroesophageal reflux disease)     Hypertension     Musculoskeletal disorder     SOB (shortness of breath)     Stool color black        Allergies   Allergen Reactions    Sulfa (Sulfonamide Antibiotics) Hives    Vicodin [Hydrocodone-Acetaminophen] Nausea and Vomiting       No past surgical history on file.     Social History     Social History    Marital status: SINGLE     Spouse name: N/A    Number of children: N/A    Years of education: N/A Social History Main Topics    Smoking status: Former Smoker     Packs/day: 0.50     Years: 8.00     Types: Cigarettes     Quit date: 12/9/2011    Smokeless tobacco: Never Used    Alcohol use 0.0 oz/week     0 Standard drinks or equivalent per week      Comment: occassionally    Drug use: No    Sexual activity: Yes     Partners: Male     Birth control/ protection: None     Other Topics Concern    Not on file     Social History Narrative       Review of Systems   Constitutional: Negative. Negative for chills, fever, malaise/fatigue and weight loss. HENT: Negative. Negative for hearing loss. Eyes: Negative. Negative for blurred vision and double vision. Respiratory: Negative. Negative for cough, hemoptysis, sputum production and shortness of breath. Cardiovascular: Negative. Negative for chest pain, palpitations and orthopnea. Gastrointestinal: Negative. Negative for abdominal pain, blood in stool, heartburn, nausea and vomiting. Genitourinary: Negative. Negative for dysuria, frequency and urgency. Musculoskeletal: Positive for back pain, joint pain, myalgias and neck pain. Negative for falls. Skin: Negative. Negative for rash. Neurological: Negative. Negative for dizziness, tingling, tremors, weakness and headaches. Endo/Heme/Allergies: Negative. Psychiatric/Behavioral: Positive for depression. Negative for substance abuse and suicidal ideas. The patient is nervous/anxious. Objective:     Vitals:    12/08/17 1430   BP: (!) 123/93   Pulse: 92   Resp: 16   Temp: 97.4 °F (36.3 °C)   TempSrc: Oral   SpO2: 98%   Weight: 172 lb 9.6 oz (78.3 kg)   Height: 5' 4\" (1.626 m)      Body mass index is 29.63 kg/(m^2). Physical Exam   Constitutional: She is oriented to person, place, and time and well-developed, well-nourished, and in no distress. No distress. HENT:   Head: Normocephalic and atraumatic.    Mouth/Throat: Oropharynx is clear and moist.   Eyes: Conjunctivae are normal.   Neck: No tracheal deviation present. No thyromegaly present. Cardiovascular: Normal rate, regular rhythm and normal heart sounds. No murmur heard. Pulmonary/Chest: Effort normal and breath sounds normal. No respiratory distress. Abdominal: Soft. She exhibits no distension. Musculoskeletal: She exhibits no tenderness. Virtually all fibromyalgia points are tender   Lymphadenopathy:     She has no cervical adenopathy. Neurological: She is alert and oriented to person, place, and time. Skin: Skin is warm. No rash noted. She is not diaphoretic. No erythema. Psychiatric: Mood and affect normal.   Nursing note and vitals reviewed. There are no preventive care reminders to display for this patient. Assessment and orders:       ICD-10-CM ICD-9-CM    1. Essential hypertension-Continue same medication   I10 401.9    2. Gastroesophageal reflux disease with esophagitis-Continue omeprazole   K21.0 530.11    3. Depression, unspecified depression type. When viewed in totality her mental illness, medication side effects and chronic pain are disabling and she is not able to do even the simplest of jobs. In my opinion she is totally disabled. F32.9 311 Continue follow-up with Dr. Maggy Crabtree for complex psychiatric problems   4. Attention deficit hyperactivity disorder (ADHD), predominantly inattentive type   F90.0 314.00    5. Mixed obsessional thoughts and acts F42.2 300.3    6. Inflammatory arthritis  -False-positive   M19.90 714.9    7. Chronic pain syndrome   G89.4 338. 4 She will need to discuss Cymbalta with Dr. Maggy Crabtree. Everything else she is trod has failed. 8. Fibromyalgia   M79.7 729.1    9. Elevated LFTs-Resolved my testing on today's visit R79.89 790.6 HEPATIC FUNCTION PANEL         Plan of care:  Discussed diagnoses in detail with patient. Medication risks/benefits/side effects discussed with patient. All of the patient's questions were addressed.  The patient understands and agrees with our plan of care. The patient knows to call back if they are unsure of or forget any changes we discussed today or if the symptoms change. The patient received an After-Visit Summary which contains VS, orders, medication list and allergy list. This can be used as a \"mini-medical record\" should they have to seek medical care while out of town. Patient Care Team:  Aneesh Gordon MD as PCP - General (Family Practice)  Gómez Smith MD (Breast Surgery)  Brandyn Carvalho MD (Rheumatology)  Maged Herrera MD (Cardiology)    Follow-up Disposition:  Return in about 1 month (around 1/8/2018) for 40 min. No future appointments.     Signed By: Aneesh Gordon MD     December 9, 2017

## 2017-12-12 ENCOUNTER — TELEPHONE (OUTPATIENT)
Dept: FAMILY MEDICINE CLINIC | Age: 37
End: 2017-12-12

## 2017-12-12 NOTE — TELEPHONE ENCOUNTER
Returned phone call to patient, no answer. Unfortunately we cannot fix anything on [employer] on our end. This is the number to the help desk that she will have to call.  #[phone]

## 2017-12-12 NOTE — TELEPHONE ENCOUNTER
----- Message from Dominique Castro sent at 12/12/2017 12:39 PM EST -----  Regarding: Dr. Grecia Thacker  Patient would like a call back at [phone]. Her mychart is not updating and she would like to find out how she can fix it because it has her disability information on there.

## 2017-12-12 NOTE — TELEPHONE ENCOUNTER
Patient called back. Gave her the number to the [address] [place] [address]. She will call them for further assistance.

## 2017-12-13 NOTE — TELEPHONE ENCOUNTER
Returned phone call to patient to find out specific question and to find clarification on any issues.

## 2018-01-15 ENCOUNTER — OFFICE VISIT (OUTPATIENT)
Dept: FAMILY MEDICINE CLINIC | Age: 38
End: 2018-01-15

## 2018-01-15 VITALS
HEIGHT: 64 IN | WEIGHT: 181 LBS | RESPIRATION RATE: 18 BRPM | SYSTOLIC BLOOD PRESSURE: 136 MMHG | OXYGEN SATURATION: 99 % | DIASTOLIC BLOOD PRESSURE: 92 MMHG | HEART RATE: 108 BPM | BODY MASS INDEX: 30.9 KG/M2 | TEMPERATURE: 98.7 F

## 2018-01-15 DIAGNOSIS — I10 ESSENTIAL HYPERTENSION: Chronic | ICD-10-CM

## 2018-01-15 DIAGNOSIS — M79.7 FIBROMYALGIA: ICD-10-CM

## 2018-01-15 DIAGNOSIS — R35.0 URINARY FREQUENCY: Primary | ICD-10-CM

## 2018-01-15 PROBLEM — F33.9 RECURRENT DEPRESSION (HCC): Status: ACTIVE | Noted: 2018-01-15

## 2018-01-15 LAB
BILIRUB UR QL STRIP: NEGATIVE
GLUCOSE UR-MCNC: NEGATIVE MG/DL
HCG URINE, QL. (POC): NEGATIVE
KETONES P FAST UR STRIP-MCNC: NEGATIVE MG/DL
PH UR STRIP: 7 [PH] (ref 4.6–8)
PROT UR QL STRIP: NORMAL
SP GR UR STRIP: 1.02 (ref 1–1.03)
UA UROBILINOGEN AMB POC: NORMAL (ref 0.2–1)
URINALYSIS CLARITY POC: CLEAR
URINALYSIS COLOR POC: YELLOW
URINE BLOOD POC: NEGATIVE
URINE LEUKOCYTES POC: NEGATIVE
URINE NITRITES POC: NEGATIVE
VALID INTERNAL CONTROL?: YES

## 2018-01-15 RX ORDER — CYCLOBENZAPRINE HCL 10 MG
10 TABLET ORAL
Qty: 90 TAB | Refills: 2 | Status: SHIPPED | OUTPATIENT
Start: 2018-01-15 | End: 2018-09-01 | Stop reason: SDUPTHER

## 2018-01-15 RX ORDER — GABAPENTIN 300 MG/1
CAPSULE ORAL
Qty: 90 CAP | Refills: 4 | Status: SHIPPED | OUTPATIENT
Start: 2018-01-15 | End: 2018-05-29 | Stop reason: SDUPTHER

## 2018-01-15 NOTE — MR AVS SNAPSHOT
[address] [address] 
[phone] Patient: Jaleesa Barnett MRN: [id number] :1980 Visit Information Date & Time Provider Department Dept. Phone Encounter #  
 1/15/2018  3:40 PM Trent Sheets MD  N[facility] Cortes [id number] Follow-up Instructions Return in about 4 weeks (around 2018) for HTN with Dr. Staci Fajardo. Levorn Morrison Upcoming Health Maintenance Date Due  
 PAP AKA CERVICAL CYTOLOGY 2020 DTaP/Tdap/Td series (2 - Td) 2024 COLONOSCOPY 5/15/2025 Allergies as of 1/15/2018  Review Complete On: 1/15/2018 By: Trent Sheets MD  
  
 Severity Noted Reaction Type Reactions Sulfa (Sulfonamide Antibiotics)  2010    Hives Vicodin [Hydrocodone-acetaminophen]  2011    Nausea and Vomiting Current Immunizations  Reviewed on 2010 Name Date Tdap 2014  7:31 PM  
  
 Not reviewed this visit You Were Diagnosed With   
  
 Codes Comments Urinary frequency    -  Primary ICD-10-CM: R35.0 ICD-9-CM: 788.41 Essential hypertension     ICD-10-CM: I10 
ICD-9-CM: 401.9 Vitals BP Pulse Temp Resp Height(growth percentile) Weight(growth percentile) (!) 136/92 (BP 1 Location: Right arm, BP Patient Position: Sitting) (!) 108 98.7 °F (37.1 °C) (Oral) 18 5' 4\" (1.626 m) 181 lb (82.1 kg) SpO2 BMI OB Status Smoking Status 99% 31.07 kg/m2 Having regular periods Former Smoker Vitals History BMI and BSA Data Body Mass Index Body Surface Area 31.07 kg/m 2 1.93 m 2 Preferred Pharmacy Pharmacy Name Phone [facility] [address] [place] [address] 79 [phone] Your Updated Medication List  
  
   
This list is accurate as of: 1/15/18  4:02 PM.  Always use your most recent med list.  
  
  
  
  
 albuterol 90 mcg/actuation inhaler Commonly known as:  PROVENTIL HFA, VENTOLIN HFA, PROAIR HFA Take 1 Puff by inhalation every six (6) hours as needed for Wheezing. clonazePAM 0.5 mg tablet Commonly known as:  Earnie Lites Take 1 mg by mouth nightly as needed. cyclobenzaprine 10 mg tablet Commonly known as:  FLEXERIL Take 1 Tab by mouth three (3) times daily as needed. diclofenac 1 % Gel Commonly known as:  VOLTAREN Apply 4 g to affected area four (4) times daily. FLONASE 50 mcg/actuation nasal spray Generic drug:  fluticasone 2 Sprays by Both Nostrils route daily. FLUoxetine 40 mg capsule Commonly known as:  PROzac Take  by mouth daily. gabapentin 300 mg capsule Commonly known as:  NEURONTIN Take 2 tabs in morning and 2 tabs at nighttime. Indications: fibromyalgia  
  
 hydrocortisone 2.5 % rectal cream  
Commonly known as:  ANUSOL-HC Insert  into rectum four (4) times daily. As needed  
  
 ketoconazole 2 % shampoo Commonly known as:  NIZORAL  
shampoo twice weekly LORazepam 1 mg tablet Commonly known as:  ATIVAN Take 1 Tab by mouth every twelve (12) hours. Max Daily Amount: 2 mg. Indications: ANXIETY  
  
 methylphenidate HCl 10 mg tablet Commonly known as:  RITALIN Take  by mouth. omeprazole 40 mg capsule Commonly known as:  PRILOSEC Take 1 Cap by mouth daily. Indications: gastroesophageal reflux disease  
  
 traZODone 50 mg tablet Commonly known as:  Yvon Bowl Take 1 Tab by mouth nightly. triamcinolone acetonide 0.1 % ointment Commonly known as:  KENALOG Apply  to affected area two (2) times a day. use thin layer WELLBUTRIN  mg tablet Generic drug:  buPROPion XL Take 150 mg by mouth every morning. We Performed the Following AMB POC URINALYSIS DIP STICK AUTO W/O MICRO [54127 CPT(R)] AMB POC URINE PREGNANCY TEST, VISUAL COLOR COMPARISON [06796 CPT(R)] HEMOGLOBIN A1C WITH EAG [17199 CPT(R)] METABOLIC PANEL, BASIC [99896 CPT(R)] TSH 3RD GENERATION [56264 CPT(R)] Follow-up Instructions Return in about 4 weeks (around 2/12/2018) for HTN with Dr. Jessica Waggoner. .  
  
  
Patient Instructions No fluids 1 hours prior to bed. Schedule bathroom breaks at least every 3 hours · Avoid eating spicy foods or high-acid foods, such as tomatoes and oranges, if these foods seem to make your pain worse. Also, limit caffeine and alcohol. · If a certain food seems to cause pain in your bladder, stop eating it to see if the pain goes away. · Do not smoke. Smoking can irritate the bladder and cause bladder cancer. If you need help quitting, talk to your doctor about stop-smoking programs and medicines. These can increase your chances of quitting for good. · Try bladder training. Set certain times to go to the bathroom and slowly increase the time between visits. This may help lengthen the time your bladder can hold urine. · Wash your pubic area with a mild soap. Avoid deodorant soaps or soaps with heavy perfumes. · Wear loose-fitting clothing that does not put pressure on your bladder. Frequent Urination: Care Instructions Your Care Instructions An urge to urinate frequently but usually passing only small amounts of urine is a common symptom of urinary problems, such as urinary tract infections. The bladder may become inflamed. This can cause the urge to urinate. You may try to urinate more often than usual to try to soothe that urge. Frequent urination also may be caused by sexually transmitted infections (STIs) or kidney stones. Or it may happen when something irritates the tube that carries urine from the bladder to the outside of the body (urethra). It may also be a sign of diabetes. The cause may be hard to find. You may need tests. Follow-up care is a key part of your treatment and safety.  Be sure to make and go to all appointments, and call your doctor if you are having problems. It's also a good idea to know your test results and keep a list of the medicines you take. How can you care for yourself at home? · Drink extra water for the next day or two. This will help make the urine less concentrated. (If you have kidney, heart, or liver disease and have to limit fluids, talk with your doctor before you increase the amount of fluids you drink.) · Avoid drinks that are carbonated or have caffeine. They can irritate the bladder. For women: · Urinate right after you have sex. · After you go to the bathroom, wipe from front to back. · Avoid douches, bubble baths, and feminine hygiene sprays. And avoid other feminine hygiene products that have deodorants. When should you call for help? Call your doctor now or seek immediate medical care if: 
? · You have new symptoms, such as fever, nausea, or vomiting. ? · You have new or worse symptoms of a urinary problem. For example: ¨ You have blood or pus in your urine. ¨ You have chills or body aches. ¨ It hurts to urinate. ¨ You have groin or belly pain. ¨ You have pain in your back just below your rib cage (the flank area). ? Watch closely for changes in your health, and be sure to contact your doctor if you feel thirstier than usual. 
Where can you learn more? Go to http://daniel-carlton.info/. Enter 021 [id number] in the search box to learn more about \"Frequent Urination: Care Instructions. \" Current as of: May 12, 2017 Content Version: 11.4 © 2993-1911 [employer]. Care instructions adapted under license by [employer] (which disclaims liability or warranty for this information). If you have questions about a medical condition or this instruction, always ask your healthcare professional. [address] any warranty or liability for your use of this information. Introducing [facility]! Dear Bhanu Stark: Thank you for requesting a [employer] account. Our records indicate that you already have an active [employer] account. You can access your account anytime at [email or web address] Did you know that you can access your hospital and ER discharge instructions at any time in [employer]? You can also review all of your test results from your hospital stay or ER visit. Additional Information If you have questions, please visit the Frequently Asked Questions section of the [employer] website at [email or web address] Remember, [employer] is NOT to be used for urgent needs. For medical emergencies, dial 911. Now available from your iPhone and Android! Please provide this summary of care documentation to your next provider. Your primary care clinician is listed as [address]. If you have any questions after today's visit, please call [phone].

## 2018-01-15 NOTE — PATIENT INSTRUCTIONS
No fluids 1 hours prior to bed. Schedule bathroom breaks at least every 3 hours       · Avoid eating spicy foods or high-acid foods, such as tomatoes and oranges, if these foods seem to make your pain worse. Also, limit caffeine and alcohol. · If a certain food seems to cause pain in your bladder, stop eating it to see if the pain goes away. · Do not smoke. Smoking can irritate the bladder and cause bladder cancer. If you need help quitting, talk to your doctor about stop-smoking programs and medicines. These can increase your chances of quitting for good. · Try bladder training. Set certain times to go to the bathroom and slowly increase the time between visits. This may help lengthen the time your bladder can hold urine. · Wash your pubic area with a mild soap. Avoid deodorant soaps or soaps with heavy perfumes. · Wear loose-fitting clothing that does not put pressure on your bladder. Frequent Urination: Care Instructions  Your Care Instructions  An urge to urinate frequently but usually passing only small amounts of urine is a common symptom of urinary problems, such as urinary tract infections. The bladder may become inflamed. This can cause the urge to urinate. You may try to urinate more often than usual to try to soothe that urge. Frequent urination also may be caused by sexually transmitted infections (STIs) or kidney stones. Or it may happen when something irritates the tube that carries urine from the bladder to the outside of the body (urethra). It may also be a sign of diabetes. The cause may be hard to find. You may need tests. Follow-up care is a key part of your treatment and safety. Be sure to make and go to all appointments, and call your doctor if you are having problems. It's also a good idea to know your test results and keep a list of the medicines you take. How can you care for yourself at home? · Drink extra water for the next day or two.  This will help make the urine less concentrated. (If you have kidney, heart, or liver disease and have to limit fluids, talk with your doctor before you increase the amount of fluids you drink.)  · Avoid drinks that are carbonated or have caffeine. They can irritate the bladder. For women:  · Urinate right after you have sex. · After you go to the bathroom, wipe from front to back. · Avoid douches, bubble baths, and feminine hygiene sprays. And avoid other feminine hygiene products that have deodorants. When should you call for help? Call your doctor now or seek immediate medical care if:  ? · You have new symptoms, such as fever, nausea, or vomiting. ? · You have new or worse symptoms of a urinary problem. For example:  ¨ You have blood or pus in your urine. ¨ You have chills or body aches. ¨ It hurts to urinate. ¨ You have groin or belly pain. ¨ You have pain in your back just below your rib cage (the flank area). ? Watch closely for changes in your health, and be sure to contact your doctor if you feel thirstier than usual.  Where can you learn more? Go to http://daniel-carlton.info/. Enter 214 [id number] in the search box to learn more about \"Frequent Urination: Care Instructions. \"  Current as of: May 12, 2017  Content Version: 11.4  © 8189-7482 [employer]. Care instructions adapted under license by [employer] (which disclaims liability or warranty for this information). If you have questions about a medical condition or this instruction, always ask your healthcare professional. [address] [address] any warranty or liability for your use of this information.

## 2018-01-15 NOTE — PROGRESS NOTES
Chris Lkue  30 y.o. female  1980  [address]  [id number]     [id number]: Progress Note       Encounter Date: 1/15/2018    Chief Complaint   Patient presents with    Bladder Infection     urinary frequency      History of Present Illness   Chris Luke is a 30 y.o. female who presents to clinic today for:    Urinary frequency  Patient presents with cc of urinary frequency x 2 months. Associated symptoms include urgency and nocturnal incontinence. She denies dysuria. Endorses strong odor in urine. Patient reports stopping caffeine, tea or soda. She has been increasing she intake of cranberry juice and water. Denies vaginal discharge, fever, chills, abdominal pain. Reports regular BM that are occasionally difficult to pass. - Patient reports normal monthly cycle lasting 5 days. Patient is currently sexually active and is using condoms. Hypertension: Uncontrolled   BP Readings from Last 3 Encounters:   01/15/18 (!) 136/92   12/08/17 (!) 123/93   11/07/17 128/86     The patient reports:  no TIA's, no chest pain on exertion, no dyspnea on exertion, no swelling of ankles, SHE IS NOT on MEDICATION at this time. Health Maintenance  There are no preventive care reminders to display for this patient. Review of Systems   Review of Systems   Constitutional: Negative for chills and fever. Gastrointestinal: Negative for abdominal pain, constipation, diarrhea, nausea and vomiting. Genitourinary: Positive for frequency and urgency. Negative for dysuria, flank pain and hematuria. Skin: Negative for itching and rash. Neurological: Negative for dizziness and headaches. Vitals/Objective:     Vitals:    01/15/18 1540 01/15/18 1559   BP: (!) 142/92 (!) 136/92   Pulse: (!) 108    Resp: 18    Temp: 98.7 °F (37.1 °C)    TempSrc: Oral    SpO2: 99%    Weight: 181 lb (82.1 kg)    Height: 5' 4\" (1.626 m)      Body mass index is 31.07 kg/(m^2).     Physical Exam Constitutional: She is oriented to person, place, and time. She appears well-developed and well-nourished. HENT:   Head: Normocephalic and atraumatic. Cardiovascular: Normal rate and regular rhythm. Pulmonary/Chest: Effort normal and breath sounds normal.   Abdominal: Soft. She exhibits no distension. There is no tenderness. There is no rebound and no guarding. Neurological: She is alert and oriented to person, place, and time. Recent Results (from the past 24 hour(s))   AMB POC URINALYSIS DIP STICK AUTO W/O MICRO    Collection Time: 01/15/18  3:50 PM   Result Value Ref Range    Color (UA POC) Yellow     Clarity (UA POC) Clear     Glucose (UA POC) Negative Negative    Bilirubin (UA POC) Negative Negative    Ketones (UA POC) Negative Negative    Specific gravity (UA POC) 1.025 1.001 - 1.035    Blood (UA POC) Negative Negative    pH (UA POC) 7 4.6 - 8.0    Protein (UA POC) Trace Negative    Urobilinogen (UA POC) 0.2 mg/dL 0.2 - 1    Nitrites (UA POC) Negative Negative    Leukocyte esterase (UA POC) Negative Negative   AMB POC URINE PREGNANCY TEST, VISUAL COLOR COMPARISON    Collection Time: 01/15/18  3:52 PM   Result Value Ref Range    VALID INTERNAL CONTROL POC Yes     HCG urine, Ql. (POC) Negative Negative     Assessment and Plan:     Encounter Diagnoses     ICD-10-CM ICD-9-CM   1. Urinary frequency R35.0 788.41   2. Essential hypertension I10 401.9       1. Urinary frequency  Will check patient for elevated sugar. Advised her to watch fluid intake and dietary food that may cause irritation that can cause frequency. Patient advised to also schedule bathroom breaks/bladder training.   - AMB POC URINALYSIS DIP STICK AUTO W/O MICRO  - AMB POC URINE PREGNANCY TEST, VISUAL COLOR COMPARISON  - METABOLIC PANEL, BASIC  - HEMOGLOBIN A1C WITH EAG    2. Essential hypertension  Patient attributes elevation to stress and states that she has not needed medication in years. Will follow up with PCP for recheck.    - METABOLIC PANEL, BASIC  - TSH 3RD GENERATION      I have discussed the diagnosis with the patient and the intended plan as seen in the above orders. she has expressed understanding. The patient has received an after-visit summary and questions were answered concerning future plans. I have discussed medication side effects and warnings with the patient as well. Electronically Signed: Ree Walker MD     History/Allergies   Patients past medical, surgical and family histories were reviewed and updated. Past Medical History:   Diagnosis Date    Anemia NEC     Depression     GERD (gastroesophageal reflux disease)     Hypertension     Musculoskeletal disorder     SOB (shortness of breath)     Stool color black     No past surgical history on file. Family History   Problem Relation Age of Onset    Hypertension Father     Elevated Lipids Father     Arthritis-rheumatoid Mother      ? Lupus vs RA    Lung Disease Mother     Heart Disease Mother     Cancer Mother      breast in 39y    COPD Mother     Hypertension Mother     Stroke Mother      3-4 strokes    Diabetes Maternal Grandmother      Social History     Social History    Marital status: SINGLE     Spouse name: N/A    Number of children: N/A    Years of education: N/A     Occupational History    Not on file.      Social History Main Topics    Smoking status: Former Smoker     Packs/day: 0.50     Years: 8.00     Types: Cigarettes     Quit date: 12/9/2011    Smokeless tobacco: Never Used    Alcohol use 0.0 oz/week     0 Standard drinks or equivalent per week      Comment: occassionally    Drug use: No    Sexual activity: Yes     Partners: Male     Birth control/ protection: None     Other Topics Concern    Not on file     Social History Narrative         Allergies   Allergen Reactions    Sulfa (Sulfonamide Antibiotics) Hives    Vicodin [Hydrocodone-Acetaminophen] Nausea and Vomiting       Disposition     Follow-up Disposition:  Return in about 4 weeks (around 2/12/2018) for HTN with Dr. Khai Shetty. .    No future appointments. Current Medications after this visit     Current Outpatient Prescriptions   Medication Sig    methylphenidate HCl (RITALIN) 10 mg tablet Take  by mouth.  buPROPion XL (WELLBUTRIN XL) 150 mg tablet Take 150 mg by mouth every morning.  FLUoxetine (PROZAC) 40 mg capsule Take  by mouth daily.  omeprazole (PRILOSEC) 40 mg capsule Take 1 Cap by mouth daily. Indications: gastroesophageal reflux disease    fluticasone (FLONASE) 50 mcg/actuation nasal spray 2 Sprays by Both Nostrils route daily.  cyclobenzaprine (FLEXERIL) 10 mg tablet Take 1 Tab by mouth three (3) times daily as needed.  gabapentin (NEURONTIN) 300 mg capsule Take 2 tabs in morning and 2 tabs at nighttime. Indications: fibromyalgia    albuterol (PROVENTIL HFA, VENTOLIN HFA, PROAIR HFA) 90 mcg/actuation inhaler Take 1 Puff by inhalation every six (6) hours as needed for Wheezing.  ketoconazole (NIZORAL) 2 % shampoo shampoo twice weekly    triamcinolone acetonide (KENALOG) 0.1 % ointment Apply  to affected area two (2) times a day. use thin layer    hydrocortisone (ANUSOL-HC) 2.5 % rectal cream Insert  into rectum four (4) times daily. As needed    diclofenac (VOLTAREN) 1 % gel Apply 4 g to affected area four (4) times daily.  LORazepam (ATIVAN) 1 mg tablet Take 1 Tab by mouth every twelve (12) hours. Max Daily Amount: 2 mg. Indications: ANXIETY (Patient taking differently: Take 2 mg by mouth every twelve (12) hours. Indications: ANXIETY)    clonazePAM (KLONOPIN) 0.5 mg tablet Take 1 mg by mouth nightly as needed.  traZODone (DESYREL) 50 mg tablet Take 1 Tab by mouth nightly. No current facility-administered medications for this visit.       Medications Discontinued During This Encounter   Medication Reason    hydrocortisone-pramoxine (PROCTOFOAM HC) rectal foam Duplicate Order

## 2018-02-01 ENCOUNTER — OFFICE VISIT (OUTPATIENT)
Dept: FAMILY MEDICINE CLINIC | Age: 38
End: 2018-02-01

## 2018-02-01 VITALS
OXYGEN SATURATION: 97 % | HEIGHT: 64 IN | WEIGHT: 183 LBS | RESPIRATION RATE: 16 BRPM | TEMPERATURE: 99.7 F | BODY MASS INDEX: 31.24 KG/M2 | DIASTOLIC BLOOD PRESSURE: 93 MMHG | SYSTOLIC BLOOD PRESSURE: 134 MMHG | HEART RATE: 110 BPM

## 2018-02-01 DIAGNOSIS — J02.9 SORE THROAT: ICD-10-CM

## 2018-02-01 DIAGNOSIS — J06.9 VIRAL UPPER RESPIRATORY TRACT INFECTION: Primary | ICD-10-CM

## 2018-02-01 LAB
S PYO AG THROAT QL: NEGATIVE
VALID INTERNAL CONTROL?: YES

## 2018-02-01 RX ORDER — LIDOCAINE HYDROCHLORIDE 20 MG/ML
15 SOLUTION OROPHARYNGEAL AS NEEDED
Qty: 1 BOTTLE | Refills: 0 | Status: SHIPPED | OUTPATIENT
Start: 2018-02-01 | End: 2018-02-27 | Stop reason: ALTCHOICE

## 2018-02-01 RX ORDER — FLUOXETINE HYDROCHLORIDE 20 MG/1
CAPSULE ORAL DAILY
COMMUNITY
End: 2018-05-10

## 2018-02-01 RX ORDER — OSELTAMIVIR PHOSPHATE 75 MG/1
75 CAPSULE ORAL 2 TIMES DAILY
Qty: 10 CAP | Refills: 0 | Status: SHIPPED | OUTPATIENT
Start: 2018-02-01 | End: 2018-02-06

## 2018-02-01 NOTE — PATIENT INSTRUCTIONS
Elevated Blood Pressure: Care Instructions  Your Care Instructions    Blood pressure is a measure of how hard the blood pushes against the walls of your arteries. It's normal for blood pressure to go up and down throughout the day. But if it stays up over time, you have high blood pressure. Two numbers tell you your blood pressure. The first number is the systolic pressure. It shows how hard the blood pushes when your heart is pumping. The second number is the diastolic pressure. It shows how hard the blood pushes between heartbeats, when your heart is relaxed and filling with blood. An ideal blood pressure in adults is less than 120/80 (say \"120 over 80\"). High blood pressure is 140/90 or higher. You have high blood pressure if your top number is 140 or higher or your bottom number is 90 or higher, or both. The main test for high blood pressure is simple, fast, and painless. To diagnose high blood pressure, your doctor will test your blood pressure at different times. After testing your blood pressure, your doctor may ask you to test it again when you are home. If you are diagnosed with high blood pressure, you can work with your doctor to make a long-term plan to manage it. Follow-up care is a key part of your treatment and safety. Be sure to make and go to all appointments, and call your doctor if you are having problems. It's also a good idea to know your test results and keep a list of the medicines you take. How can you care for yourself at home? · Do not smoke. Smoking increases your risk for heart attack and stroke. If you need help quitting, talk to your doctor about stop-smoking programs and medicines. These can increase your chances of quitting for good. · Stay at a healthy weight. · Try to limit how much sodium you eat to less than 2,300 milligrams (mg) a day. Your doctor may ask you to try to eat less than 1,500 mg a day. · Be physically active.  Get at least 30 minutes of exercise on most days of the week. Walking is a good choice. You also may want to do other activities, such as running, swimming, cycling, or playing tennis or team sports. · Avoid or limit alcohol. Talk to your doctor about whether you can drink any alcohol. · Eat plenty of fruits, vegetables, and low-fat dairy products. Eat less saturated and total fats. · Learn how to check your blood pressure at home. When should you call for help? Call your doctor now or seek immediate medical care if:  ? · Your blood pressure is much higher than normal (such as 180/110 or higher). ? · You think high blood pressure is causing symptoms such as:  ¨ Severe headache. ¨ Blurry vision. ? Watch closely for changes in your health, and be sure to contact your doctor if:  ? · You do not get better as expected. Where can you learn more? Go to http://danile-carlton.info/. Enter B755 in the search box to learn more about \"Elevated Blood Pressure: Care Instructions. \"  Current as of: September 21, 2016  Content Version: 11.4  © 0216-7048 Healthwise, Incorporated. Care instructions adapted under license by [employer] (which disclaims liability or warranty for this information). If you have questions about a medical condition or this instruction, always ask your healthcare professional. [address] any warranty or liability for your use of this information.

## 2018-02-01 NOTE — MR AVS SNAPSHOT
[address] [address] 
[phone] Patient: Shelby Mckeon MRN: [id number] :1980 Visit Information Date & Time Provider Department Dept. Phone Encounter #  
 2018 11:10 AM Brad Luna  [facility] [phone] [id number] Follow-up Instructions Return in about 2 weeks (around 2/15/2018) for BP check. Upcoming Health Maintenance Date Due  
 PAP AKA CERVICAL CYTOLOGY 2020 DTaP/Tdap/Td series (2 - Td) 2024 COLONOSCOPY 5/15/2025 Allergies as of 2018  Review Complete On: 2018 By: Juliann Galarza LPN Severity Noted Reaction Type Reactions Sulfa (Sulfonamide Antibiotics)  2010    Hives Vicodin [Hydrocodone-acetaminophen]  2011    Nausea and Vomiting Current Immunizations  Reviewed on 2010 Name Date Tdap 2014  7:31 PM  
  
 Not reviewed this visit You Were Diagnosed With   
  
 Codes Comments Viral upper respiratory tract infection    -  Primary ICD-10-CM: J06.9, B97.89 ICD-9-CM: 465.9 Sore throat     ICD-10-CM: J02.9 ICD-9-CM: 253 Vitals BP Pulse Temp Resp Height(growth percentile) Weight(growth percentile) (!) 134/93 (BP 1 Location: Left arm, BP Patient Position: Sitting) (!) 110 99.7 °F (37.6 °C) (Oral) 16 5' 4\" (1.626 m) 183 lb (83 kg) SpO2 BMI OB Status Smoking Status 97% 31.41 kg/m2 Having regular periods Former Smoker Vitals History BMI and BSA Data Body Mass Index Body Surface Area  
 31.41 kg/m 2 1.94 m 2 Preferred Pharmacy Pharmacy Name Phone [address] [address] [phone] Your Updated Medication List  
  
   
This list is accurate as of: 18 12:13 PM.  Always use your most recent med list.  
  
  
  
  
 albuterol 90 mcg/actuation inhaler Commonly known as:  PROVENTIL HFA, VENTOLIN HFA, PROAIR HFA Take 1 Puff by inhalation every six (6) hours as needed for Wheezing. clonazePAM 0.5 mg tablet Commonly known as:  Earnie Lites Take 1 mg by mouth nightly as needed. cyclobenzaprine 10 mg tablet Commonly known as:  FLEXERIL Take 1 Tab by mouth three (3) times daily as needed. diclofenac 1 % Gel Commonly known as:  VOLTAREN Apply 4 g to affected area four (4) times daily. FLONASE 50 mcg/actuation nasal spray Generic drug:  fluticasone 2 Sprays by Both Nostrils route daily. * FLUoxetine 40 mg capsule Commonly known as:  PROzac Take  by mouth daily. * PROzac 20 mg capsule Generic drug:  FLUoxetine Take  by mouth daily. gabapentin 300 mg capsule Commonly known as:  NEURONTIN Take 2 tabs in morning and 2 tabs at nighttime. Indications: fibromyalgia  
  
 hydrocortisone 2.5 % rectal cream  
Commonly known as:  ANUSOL-HC Insert  into rectum four (4) times daily. As needed  
  
 ketoconazole 2 % shampoo Commonly known as:  NIZORAL  
shampoo twice weekly  
  
 lidocaine 2 % solution Commonly known as:  XYLOCAINE Take 15 mL by mouth as needed for Pain. LORazepam 1 mg tablet Commonly known as:  ATIVAN Take 1 Tab by mouth every twelve (12) hours. Max Daily Amount: 2 mg. Indications: ANXIETY  
  
 methylphenidate HCl 10 mg tablet Commonly known as:  RITALIN Take  by mouth. omeprazole 40 mg capsule Commonly known as:  PRILOSEC Take 1 Cap by mouth daily. Indications: gastroesophageal reflux disease  
  
 oseltamivir 75 mg capsule Commonly known as:  TAMIFLU Take 1 Cap by mouth two (2) times a day for 5 days. traZODone 50 mg tablet Commonly known as:  Yvon Bowl Take 1 Tab by mouth nightly. triamcinolone acetonide 0.1 % ointment Commonly known as:  KENALOG Apply  to affected area two (2) times a day. use thin layer WELLBUTRIN  mg tablet Generic drug:  buPROPion XL Take 150 mg by mouth every morning. * Notice: This list has 2 medication(s) that are the same as other medications prescribed for you. Read the directions carefully, and ask your doctor or other care provider to review them with you. Prescriptions Sent to Pharmacy Refills  
 lidocaine (XYLOCAINE) 2 % solution 0 Sig: Take 15 mL by mouth as needed for Pain. Class: Normal  
 Pharmacy: [facility] DR MICHEAL HERNÁNDEZ [address] [place] [address] #: [phone] Route: Oral  
 oseltamivir (TAMIFLU) 75 mg capsule 0 Sig: Take 1 Cap by mouth two (2) times a day for 5 days. Class: Normal  
 Pharmacy: [facility] DR MICHEAL SANTIAGO[address] #: [phone] Route: Oral  
  
Follow-up Instructions Return in about 2 weeks (around 2/15/2018) for BP check. Patient Instructions Elevated Blood Pressure: Care Instructions Your Care Instructions Blood pressure is a measure of how hard the blood pushes against the walls of your arteries. It's normal for blood pressure to go up and down throughout the day. But if it stays up over time, you have high blood pressure. Two numbers tell you your blood pressure. The first number is the systolic pressure. It shows how hard the blood pushes when your heart is pumping. The second number is the diastolic pressure. It shows how hard the blood pushes between heartbeats, when your heart is relaxed and filling with blood. An ideal blood pressure in adults is less than 120/80 (say \"120 over 80\"). High blood pressure is 140/90 or higher. You have high blood pressure if your top number is 140 or higher or your bottom number is 90 or higher, or both. The main test for high blood pressure is simple, fast, and painless. To diagnose high blood pressure, your doctor will test your blood pressure at different times.  After testing your blood pressure, your doctor may ask you to test it again when you are home. If you are diagnosed with high blood pressure, you can work with your doctor to make a long-term plan to manage it. Follow-up care is a key part of your treatment and safety. Be sure to make and go to all appointments, and call your doctor if you are having problems. It's also a good idea to know your test results and keep a list of the medicines you take. How can you care for yourself at home? · Do not smoke. Smoking increases your risk for heart attack and stroke. If you need help quitting, talk to your doctor about stop-smoking programs and medicines. These can increase your chances of quitting for good. · Stay at a healthy weight. · Try to limit how much sodium you eat to less than 2,300 milligrams (mg) a day. Your doctor may ask you to try to eat less than 1,500 mg a day. · Be physically active. Get at least 30 minutes of exercise on most days of the week. Walking is a good choice. You also may want to do other activities, such as running, swimming, cycling, or playing tennis or team sports. · Avoid or limit alcohol. Talk to your doctor about whether you can drink any alcohol. · Eat plenty of fruits, vegetables, and low-fat dairy products. Eat less saturated and total fats. · Learn how to check your blood pressure at home. When should you call for help? Call your doctor now or seek immediate medical care if: 
? · Your blood pressure is much higher than normal (such as 180/110 or higher). ? · You think high blood pressure is causing symptoms such as: ¨ Severe headache. ¨ Blurry vision. ? Watch closely for changes in your health, and be sure to contact your doctor if: 
? · You do not get better as expected. Where can you learn more? Go to http://daniel-carlton.info/. Enter X207 in the search box to learn more about \"Elevated Blood Pressure: Care Instructions. \" Current as of: September 21, 2016 Content Version: 11.4 © 5813-6051 Healthwise, Incorporated. Care instructions adapted under license by [employer] (which disclaims liability or warranty for this information). If you have questions about a medical condition or this instruction, always ask your healthcare professional. [address] any warranty or liability for your use of this information. Introducing [facility]! Dear Betty aBcon: Thank you for requesting a [employer] account. Our records indicate that you already have an active [employer] account. You can access your account anytime at [email or web address] Did you know that you can access your hospital and ER discharge instructions at any time in [employer]? You can also review all of your test results from your hospital stay or ER visit. Additional Information If you have questions, please visit the Frequently Asked Questions section of the [employer] website at [email or web address] Remember, [employer] is NOT to be used for urgent needs. For medical emergencies, dial 911. Now available from your iPhone and Android! Please provide this summary of care documentation to your next provider. Your primary care clinician is listed as [address]. If you have any questions after today's visit, please call [phone].

## 2018-02-01 NOTE — PROGRESS NOTES
Reviewed record in preparation for visit and have obtained necessary documentation. Patient did not bring medications to visit for review. Information provided on Advanced Directive, Living Will. Body mass index is 31.41 kg/(m^2). There are no preventive care reminders to display for this patient. 1. Have you been to the ER, urgent care clinic since your last visit? Hospitalized since your last visit? no    2. Have you seen or consulted any other health care providers outside of the [address] [place] [address] since your last visit? Include any pap smears or colon screening.  no

## 2018-02-01 NOTE — PROGRESS NOTES
CC: Flu-like symptoms    HPI: Pt is a 30 y.o. female who presents for flu-like symptoms. Started yesterday with sore throat and cough, last night she also had chills and feels weak/sore all over and fatigued. She has tried Mucinex and phenergan with codeine which are not helping. She did not have a flu shot this year and is not aware of any sick contacts but was recently in the [facility] waiting room with her dad and is supposed to transport him to have surgery in the hospital on Monday. She does not have a diagnosis of asthma but has used an albuterol in the past when she had bronchitis and it was helpful. Of note, there is a nationwide shortage of flu tests and they are not available in clinic today. Past Medical History:   Diagnosis Date    Anemia NEC     Depression     GERD (gastroesophageal reflux disease)     Hypertension     Musculoskeletal disorder     SOB (shortness of breath)     Stool color black        Family History   Problem Relation Age of Onset    Hypertension Father     Elevated Lipids Father     Arthritis-rheumatoid Mother      ? Lupus vs RA    Lung Disease Mother     Heart Disease Mother     Cancer Mother      breast in 39y    COPD Mother     Hypertension Mother     Stroke Mother      3-4 strokes    Diabetes Maternal Grandmother        Social History   Substance Use Topics    Smoking status: Former Smoker     Packs/day: 0.50     Years: 8.00     Types: Cigarettes     Quit date: 12/9/2011    Smokeless tobacco: Never Used    Alcohol use 0.0 oz/week     0 Standard drinks or equivalent per week      Comment: occassionally       ROS:  Positive only when bolded  Constitutional: HA, fevers, chills  Ears, nose, mouth, throat, and face: Rhinorrea, congestion, sore throat, ear pain  Respiratory: SOB, wheezing, cough    PE:  Visit Vitals    BP (!) 134/93 (BP 1 Location: Left arm, BP Patient Position: Sitting)    Pulse (!) 110    Temp 99.7 °F (37.6 °C) (Oral)    Resp 16  Ht 5' 4\" (1.626 m)    Wt 183 lb (83 kg)    SpO2 97%    BMI 31.41 kg/m2     Gen: Pt sitting in chair, in NAD  Head: Normocephalic, atraumatic  Eyes: Sclera anicteric, EOM grossly intact, PERRL  Ears: TM's pearly with good light reflex b/l  Nose: Normal nasal mucosa  Throat: MMM, normal lips, tongue, teeth and gums. Mild pharyngeal erythema. No tonsillar hypertrophy or exudates. Neck: Supple, no LAD  CVS: Normal S1, S2, no m/r/g  Resp: CTAB, no wheezes or rales. Decreased air movement throughout but speaking easily in full sentences, with no respiratory distress. Extrem: Atraumatic, no cyanosis  Skin: Warm, dry  Neuro: Alert, oriented, appropriate    Results for orders placed or performed in visit on 02/01/18   AMB POC RAPID STREP A   Result Value Ref Range    VALID INTERNAL CONTROL POC Yes     Group A Strep Ag Negative Negative       A/P: Pt is a 30 y.o. female who presents for flu-like symptoms. Temp 99.7, tachycardic, and symptoms c/w cases of flu we have seen this year. Concern that pt is going to be in the hospital with her father next week. Discussed options, and side effects of tamiflu. Cost may be prohibitive. - Will send in rx for tamiflu  - Continue supportive treatment  - Lidocaine swish and swallow for sore throat  - RTC 1 week after feeling better for BP check - discussed with pt she has had multiple elevated BP readings in the recent past and most likely needs to get back on anti-hypertensive         Discussed diagnoses in detail with patient. Medication risks/benefits/side effects discussed with patient. All of the patient's questions were addressed. The patient understands and agrees with our plan of care. The patient knows to call back if they are unsure of or forget any changes we discussed today or if the symptoms change.   The patient received an After-Visit Summary which contains VS, orders, medication list and allergy list. This can be used as a \"mini-medical record\" should they have to seek medical care while out of town. Current Outpatient Prescriptions on File Prior to Visit   Medication Sig Dispense Refill    gabapentin (NEURONTIN) 300 mg capsule Take 2 tabs in morning and 2 tabs at nighttime. Indications: fibromyalgia 90 Cap 4    cyclobenzaprine (FLEXERIL) 10 mg tablet Take 1 Tab by mouth three (3) times daily as needed. 90 Tab 2    methylphenidate HCl (RITALIN) 10 mg tablet Take  by mouth.  FLUoxetine (PROZAC) 40 mg capsule Take  by mouth daily.  omeprazole (PRILOSEC) 40 mg capsule Take 1 Cap by mouth daily. Indications: gastroesophageal reflux disease 90 Cap 3    fluticasone (FLONASE) 50 mcg/actuation nasal spray 2 Sprays by Both Nostrils route daily.  albuterol (PROVENTIL HFA, VENTOLIN HFA, PROAIR HFA) 90 mcg/actuation inhaler Take 1 Puff by inhalation every six (6) hours as needed for Wheezing. 1 Inhaler 2    ketoconazole (NIZORAL) 2 % shampoo shampoo twice weekly 1 Bottle 11    triamcinolone acetonide (KENALOG) 0.1 % ointment Apply  to affected area two (2) times a day. use thin layer 85 g 11    hydrocortisone (ANUSOL-HC) 2.5 % rectal cream Insert  into rectum four (4) times daily. As needed 30 g 11    diclofenac (VOLTAREN) 1 % gel Apply 4 g to affected area four (4) times daily. 100 g 4    LORazepam (ATIVAN) 1 mg tablet Take 1 Tab by mouth every twelve (12) hours. Max Daily Amount: 2 mg. Indications: ANXIETY (Patient taking differently: Take 2 mg by mouth every twelve (12) hours. Indications: ANXIETY) 30 Tab 0    clonazePAM (KLONOPIN) 0.5 mg tablet Take 1 mg by mouth nightly as needed.  traZODone (DESYREL) 50 mg tablet Take 1 Tab by mouth nightly. 30 Tab 6    buPROPion XL (WELLBUTRIN XL) 150 mg tablet Take 150 mg by mouth every morning. No current facility-administered medications on file prior to visit.

## 2018-02-02 ENCOUNTER — TELEPHONE (OUTPATIENT)
Dept: FAMILY MEDICINE CLINIC | Age: 38
End: 2018-02-02

## 2018-02-02 NOTE — TELEPHONE ENCOUNTER
[address] [facility] and corrected prescription. Pharmacy tech will call pt once it is ready for pick-up.

## 2018-02-02 NOTE — TELEPHONE ENCOUNTER
Pharmacy is having trouble filling Lidocaine (Ms Micky Ho says written incorrectly), please contact Ms Micky [facility] today for really need this med  She can be reached at [phone]

## 2018-02-23 ENCOUNTER — OFFICE VISIT (OUTPATIENT)
Dept: FAMILY MEDICINE CLINIC | Age: 38
End: 2018-02-23

## 2018-02-23 VITALS
RESPIRATION RATE: 20 BRPM | BODY MASS INDEX: 31.34 KG/M2 | HEART RATE: 110 BPM | DIASTOLIC BLOOD PRESSURE: 98 MMHG | SYSTOLIC BLOOD PRESSURE: 125 MMHG | OXYGEN SATURATION: 96 % | TEMPERATURE: 97 F | HEIGHT: 64 IN | WEIGHT: 183.6 LBS

## 2018-02-23 DIAGNOSIS — Z30.09 CONTRACEPTIVE USE EDUCATION: ICD-10-CM

## 2018-02-23 DIAGNOSIS — R06.2 WHEEZING: Primary | ICD-10-CM

## 2018-02-23 RX ORDER — ALBUTEROL SULFATE 90 UG/1
1 AEROSOL, METERED RESPIRATORY (INHALATION)
Qty: 1 INHALER | Refills: 2 | Status: CANCELLED | OUTPATIENT
Start: 2018-02-23

## 2018-02-23 NOTE — MR AVS SNAPSHOT
[address] [address] 
[phone] Patient: Betty Bates MRN: [id number] :1980 Visit Information Date & Time Provider Department Dept. Phone Encounter #  
 2018  2:45 PM Elyssa Alejandro MD  [facility] [id number] Upcoming Health Maintenance Date Due  
 PAP AKA CERVICAL CYTOLOGY 2020 DTaP/Tdap/Td series (2 - Td) 2024 COLONOSCOPY 5/15/2025 Allergies as of 2018  Review Complete On: 2018 By: Mitzy Clarke LPN Severity Noted Reaction Type Reactions Sulfa (Sulfonamide Antibiotics)  2010    Hives Vicodin [Hydrocodone-acetaminophen]  2011    Nausea and Vomiting Current Immunizations  Reviewed on 2010 Name Date Tdap 2014  7:31 PM  
  
 Not reviewed this visit You Were Diagnosed With   
  
 Codes Comments Wheezing    -  Primary ICD-10-CM: R06.2 ICD-9-CM: 786.07 Contraceptive use education     ICD-10-CM: Z30.09 
ICD-9-CM: V25.09 Vitals BP Pulse Temp Resp Height(growth percentile) Weight(growth percentile) (!) 125/98 (BP 1 Location: Left arm, BP Patient Position: Sitting) (!) 110 97 °F (36.1 °C) (Oral) 20 5' 4\" (1.626 m) 183 lb 9.6 oz (83.3 kg) LMP SpO2 BMI OB Status Smoking Status 02/15/2018 96% 31.51 kg/m2 Having regular periods Former Smoker Vitals History BMI and BSA Data Body Mass Index Body Surface Area  
 31.51 kg/m 2 1.94 m 2 Preferred Pharmacy Pharmacy Name Phone [address] [phone] Your Updated Medication List  
  
   
This list is accurate as of 18  3:16 PM.  Always use your most recent med list.  
  
  
  
  
 albuterol 90 mcg/actuation inhaler Commonly known as:  PROVENTIL HFA, VENTOLIN HFA, PROAIR HFA  
 Take 1 Puff by inhalation every six (6) hours as needed for Wheezing. clonazePAM 0.5 mg tablet Commonly known as:  Thresea Gavel Take 1 mg by mouth nightly as needed. cyclobenzaprine 10 mg tablet Commonly known as:  FLEXERIL Take 1 Tab by mouth three (3) times daily as needed. diclofenac 1 % Gel Commonly known as:  VOLTAREN Apply 4 g to affected area four (4) times daily. FLONASE 50 mcg/actuation nasal spray Generic drug:  fluticasone 2 Sprays by Both Nostrils route daily. * FLUoxetine 40 mg capsule Commonly known as:  PROzac Take  by mouth daily. * PROzac 20 mg capsule Generic drug:  FLUoxetine Take  by mouth daily. gabapentin 300 mg capsule Commonly known as:  NEURONTIN Take 2 tabs in morning and 2 tabs at nighttime. Indications: fibromyalgia  
  
 hydrocortisone 2.5 % rectal cream  
Commonly known as:  ANUSOL-HC Insert  into rectum four (4) times daily. As needed  
  
 ketoconazole 2 % shampoo Commonly known as:  NIZORAL  
shampoo twice weekly  
  
 lidocaine 2 % solution Commonly known as:  XYLOCAINE Take 15 mL by mouth as needed for Pain. LORazepam 1 mg tablet Commonly known as:  ATIVAN Take 1 Tab by mouth every twelve (12) hours. Max Daily Amount: 2 mg. Indications: ANXIETY  
  
 methylphenidate HCl 10 mg tablet Commonly known as:  RITALIN Take  by mouth. omeprazole 40 mg capsule Commonly known as:  PRILOSEC Take 1 Cap by mouth daily. Indications: gastroesophageal reflux disease  
  
 traZODone 50 mg tablet Commonly known as:  Vallarie Della Take 1 Tab by mouth nightly. triamcinolone acetonide 0.1 % ointment Commonly known as:  KENALOG Apply  to affected area two (2) times a day. use thin layer WELLBUTRIN  mg tablet Generic drug:  buPROPion XL Take 150 mg by mouth every morning. * Notice:   This list has 2 medication(s) that are the same as other medications prescribed for you. Read the directions carefully, and ask your doctor or other care provider to review them with you. Patient Instructions Follow up with female physician for PAP. Introducing [facility]! Dear Jenifer Dailey: Thank you for requesting a [facility] account. Our records indicate that you already have an active [facility] account. You can access your account anytime at [email or web address] Did you know that you can access your hospital and ER discharge instructions at any time in [facility]? You can also review all of your test results from your hospital stay or ER visit. Additional Information If you have questions, please visit the Frequently Asked Questions section of the [facility] website at [email or web address] Remember, [facility] is NOT to be used for urgent needs. For medical emergencies, dial 911. Now available from your iPhone and Android! Please provide this summary of care documentation to your next provider. Your primary care clinician is listed as [address]. If you have any questions after today's visit, please call [phone].

## 2018-02-23 NOTE — PROGRESS NOTES
Chief Complaint   Patient presents with    Medication Evaluation     Discuss Birth Control     . Body mass index is 31.51 kg/(m^2). 1. Have you been to the ER, urgent care clinic since your last visit? Hospitalized since your last visit? No    2. Have you seen or consulted any other health care providers outside of the [address] [place] [address] since your last visit? Include any pap smears or colon screening. No    Reviewed record in preparation for visit and have necessary documentation  Pt did not bring medication to office visit for review  Information was given to pt on Advanced Directives, Living Will  Information was given on Shingles Vaccine  Opportunity was given for questions  Goals that were addressed and/or need to be completed after this appointment include: There are no preventive care reminders to display for this patient.

## 2018-02-23 NOTE — PROGRESS NOTES
Progress Note    Patient: Boby Tapia MRN: [id number]  SSN: [id number]    YOB: 1980  Age: 30 y.o. Sex: female        Chief Complaint   Patient presents with    Medication Evaluation     Discuss Birth Control         Subjective:     Encounter Diagnoses   Name Primary?  Wheezing: She is asking about a refill on her albuterol. She has one at home and wants to locate it before we send in a new prescription. She will has mild intermittent asthma which acts up mainly when she has an infection. Yes    Contraceptive use education: She tells me that she had unprotected intercourse yesterday  With exception of vaginal gel. As I understood the gel is anywhere from 30-80% effective. She is also asking about a morning after pill. I told her because of my belief system and working in a [facility] I really cannot advise her on the morning after pill. She could discuss her concerns with the pharmacist about directions and whether or not the cheaper brand is just as effective. She eventually wants to get on oral contraceptive pills. For that she will need a knee exam and she wants to be fully tested for STDs. She will reschedule that exam with 1 of our female providers. Current and past medical information:    Current Medications after this visit[de-identified]   Current Outpatient Prescriptions   Medication Sig    FLUoxetine (PROZAC) 20 mg capsule Take  by mouth daily.  gabapentin (NEURONTIN) 300 mg capsule Take 2 tabs in morning and 2 tabs at nighttime. Indications: fibromyalgia    cyclobenzaprine (FLEXERIL) 10 mg tablet Take 1 Tab by mouth three (3) times daily as needed.  methylphenidate HCl (RITALIN) 10 mg tablet Take  by mouth.  FLUoxetine (PROZAC) 40 mg capsule Take  by mouth daily.  omeprazole (PRILOSEC) 40 mg capsule Take 1 Cap by mouth daily.  Indications: gastroesophageal reflux disease    fluticasone (FLONASE) 50 mcg/actuation nasal spray 2 Sprays by Both Nostrils route daily.  albuterol (PROVENTIL HFA, VENTOLIN HFA, PROAIR HFA) 90 mcg/actuation inhaler Take 1 Puff by inhalation every six (6) hours as needed for Wheezing.  ketoconazole (NIZORAL) 2 % shampoo shampoo twice weekly    triamcinolone acetonide (KENALOG) 0.1 % ointment Apply  to affected area two (2) times a day. use thin layer    hydrocortisone (ANUSOL-HC) 2.5 % rectal cream Insert  into rectum four (4) times daily. As needed    diclofenac (VOLTAREN) 1 % gel Apply 4 g to affected area four (4) times daily.  LORazepam (ATIVAN) 1 mg tablet Take 1 Tab by mouth every twelve (12) hours. Max Daily Amount: 2 mg. Indications: ANXIETY (Patient taking differently: Take 2 mg by mouth every twelve (12) hours. Indications: ANXIETY)    clonazePAM (KLONOPIN) 0.5 mg tablet Take 1 mg by mouth nightly as needed.  traZODone (DESYREL) 50 mg tablet Take 1 Tab by mouth nightly.  lidocaine (XYLOCAINE) 2 % solution Take 15 mL by mouth as needed for Pain.  buPROPion XL (WELLBUTRIN XL) 150 mg tablet Take 150 mg by mouth every morning. No current facility-administered medications for this visit.         Patient Active Problem List    Diagnosis Date Noted    Recurrent depression ([facility] [address] [address] 01/15/2018    Inflammatory arthritis 08/10/2016    Vitamin D deficiency 03/21/2016    Thrombosed external hemorrhoid 02/05/2016    IFG (impaired fasting glucose) 09/10/2015    Hyperlipidemia 09/10/2015    Hoarseness 03/25/2013    OCD (obsessive compulsive disorder) 02/27/2012    Vaginal yeast infection 11/09/2011    HSV (herpes simplex virus) anogenital infection 09/12/2011    Costochondritis 05/17/2011    Anxiety 09/13/2010    Itch of skin 05/26/2010    Depression     GERD (gastroesophageal reflux disease)     Hypertension     Anemia NEC        Past Medical History:   Diagnosis Date    Anemia NEC     Depression     GERD (gastroesophageal reflux disease)     Hypertension     Musculoskeletal disorder     SOB (shortness of breath)     Stool color black        Allergies   Allergen Reactions    Sulfa (Sulfonamide Antibiotics) Hives    Vicodin [Hydrocodone-Acetaminophen] Nausea and Vomiting       No past surgical history on file. Social History     Social History    Marital status: SINGLE     Spouse name: N/A    Number of children: N/A    Years of education: N/A     Social History Main Topics    Smoking status: Former Smoker     Packs/day: 0.50     Years: 8.00     Types: Cigarettes     Quit date: 12/9/2011    Smokeless tobacco: Never Used    Alcohol use 0.0 oz/week     0 Standard drinks or equivalent per week      Comment: occassionally    Drug use: No    Sexual activity: Yes     Partners: Male     Birth control/ protection: None     Other Topics Concern    Not on file     Social History Narrative       Review of Systems   Constitutional: Negative. HENT: Negative. Eyes: Negative. Respiratory: Positive for wheezing. Cardiovascular: Negative. Gastrointestinal: Negative. Genitourinary: Negative. See HPI regarding contraceptive questions. Musculoskeletal: Negative. Skin: Negative. Endo/Heme/Allergies: Negative. Psychiatric/Behavioral: Negative. Objective:     Vitals:    02/23/18 1427   BP: (!) 125/98   Pulse: (!) 110   Resp: 20   Temp: 97 °F (36.1 °C)   TempSrc: Oral   SpO2: 96%   Weight: 183 lb 9.6 oz (83.3 kg)   Height: 5' 4\" (1.626 m)      Body mass index is 31.51 kg/(m^2). Physical Exam   Constitutional: She is well-developed, well-nourished, and in no distress. HENT:   Head: Normocephalic. Eyes: Conjunctivae are normal. No scleral icterus. Cardiovascular: Normal rate. Pulmonary/Chest: Effort normal.   Abdominal:   She has a tender point on her right lateral upper quadrant. This is made worse with motion and palpation.   It is further lateral than the usual gallbladder pain so it probably is fibromyalgia but I told her that if he gets worse or stays she should have a gallbladder ultrasound. Skin: No rash noted. Psychiatric: Mood and affect normal.         There are no preventive care reminders to display for this patient. Assessment and orders:     Encounter Diagnoses     ICD-10-CM ICD-9-CM   1. Wheezing R06.2 786.07   2. Contraceptive use education Z30.09 V25.09     Diagnoses and all orders for this visit:    1. Wheezing-call if she cannot find her existing inhaler and we will call in a new one. 2. Contraceptive use education-reschedule exam with a female provider. See discussion above. Plan of care:  Discussed diagnoses in detail with patient. Medication risks/benefits/side effects discussed with patient. All of the patient's questions were addressed. The patient understands and agrees with our plan of care. The patient knows to call back if they are unsure of or forget any changes we discussed today or if the symptoms change. The patient received an After-Visit Summary which contains VS, orders, medication list and allergy list. This can be used as a \"mini-medical record\" should they have to seek medical care while out of town.     Patient Care Team:  Loretta Carlin MD as PCP - General (Family Practice)  Prince Martines MD (Breast Surgery)  Leatha Bruce MD (Rheumatology)  Eavn Horta MD (Cardiology)    Follow-up Disposition: Not on File    Future Appointments  Date Time Provider Komal Rodriguez   2/27/2018 8:00 AM Valentino Le, NP [facility]       Signed By: Loretta Carlin MD     February 23, 2018

## 2018-02-27 ENCOUNTER — OFFICE VISIT (OUTPATIENT)
Dept: FAMILY MEDICINE CLINIC | Age: 38
End: 2018-02-27

## 2018-02-27 VITALS
HEIGHT: 64 IN | RESPIRATION RATE: 16 BRPM | SYSTOLIC BLOOD PRESSURE: 128 MMHG | WEIGHT: 189 LBS | OXYGEN SATURATION: 98 % | BODY MASS INDEX: 32.27 KG/M2 | DIASTOLIC BLOOD PRESSURE: 85 MMHG | HEART RATE: 92 BPM | TEMPERATURE: 97 F

## 2018-02-27 DIAGNOSIS — Z00.00 HEALTH CARE MAINTENANCE: ICD-10-CM

## 2018-02-27 DIAGNOSIS — A64 STI (SEXUALLY TRANSMITTED INFECTION): ICD-10-CM

## 2018-02-27 DIAGNOSIS — N94.6 DYSMENORRHEA: Primary | ICD-10-CM

## 2018-02-27 LAB
HCG URINE, QL. (POC): NEGATIVE
VALID INTERNAL CONTROL?: YES

## 2018-02-27 RX ORDER — NORGESTIMATE AND ETHINYL ESTRADIOL 0.25-0.035
1 KIT ORAL DAILY
Qty: 1 PACKAGE | Refills: 12 | Status: SHIPPED | OUTPATIENT
Start: 2018-02-27 | End: 2019-03-26 | Stop reason: SDUPTHER

## 2018-02-27 NOTE — MR AVS SNAPSHOT
[address] [address] 
[phone] Patient: Caryle Marines MRN: [id number] :1980 Visit Information Date & Time Provider Department Dept. Phone Encounter #  
 2018  8:00 AM Jacki Reza  [facility] [phone] [id number] Follow-up Instructions Return in about 3 months (around 2018), or if symptoms worsen or fail to improve. Upcoming Health Maintenance Date Due  
 PAP AKA CERVICAL CYTOLOGY 2020 DTaP/Tdap/Td series (2 - Td) 2024 COLONOSCOPY 5/15/2025 Allergies as of 2018  Review Complete On: 2018 By: Jacki Reza NP Severity Noted Reaction Type Reactions Sulfa (Sulfonamide Antibiotics)  2010    Hives Vicodin [Hydrocodone-acetaminophen]  2011    Nausea and Vomiting Current Immunizations  Reviewed on 2010 Name Date Tdap 2014  7:31 PM  
  
 Not reviewed this visit You Were Diagnosed With   
  
 Codes Comments Dysmenorrhea    -  Primary ICD-10-CM: N94.6 ICD-9-CM: 625.3 STI (sexually transmitted infection)     ICD-10-CM: A64 
ICD-9-CM: 099.9 Vitals BP Pulse Temp Resp Height(growth percentile) Weight(growth percentile) 128/85 (BP 1 Location: Right arm, BP Patient Position: Sitting) 92 97 °F (36.1 °C) (Oral) 16 5' 4\" (1.626 m) 189 lb (85.7 kg) LMP SpO2 BMI OB Status Smoking Status 02/15/2018 98% 32.44 kg/m2 Having regular periods Former Smoker Vitals History BMI and BSA Data Body Mass Index Body Surface Area  
 32.44 kg/m 2 1.97 m 2 Preferred Pharmacy Pharmacy Name Phone [address] [address] [phone] Your Updated Medication List  
  
   
This list is accurate as of 18  8:52 AM.  Always use your most recent med list.  
  
  
  
  
 albuterol 90 mcg/actuation inhaler Commonly known as:  PROVENTIL HFA, VENTOLIN HFA, PROAIR HFA Take 1 Puff by inhalation every six (6) hours as needed for Wheezing. clonazePAM 0.5 mg tablet Commonly known as:  Merilee Blind Take 1 mg by mouth nightly as needed. cyclobenzaprine 10 mg tablet Commonly known as:  FLEXERIL Take 1 Tab by mouth three (3) times daily as needed. diclofenac 1 % Gel Commonly known as:  VOLTAREN Apply 4 g to affected area four (4) times daily. FLONASE 50 mcg/actuation nasal spray Generic drug:  fluticasone 2 Sprays by Both Nostrils route daily. * FLUoxetine 40 mg capsule Commonly known as:  PROzac Take  by mouth daily. * PROzac 20 mg capsule Generic drug:  FLUoxetine Take  by mouth daily. gabapentin 300 mg capsule Commonly known as:  NEURONTIN Take 2 tabs in morning and 2 tabs at nighttime. Indications: fibromyalgia  
  
 hydrocortisone 2.5 % rectal cream  
Commonly known as:  ANUSOL-HC Insert  into rectum four (4) times daily. As needed  
  
 ketoconazole 2 % shampoo Commonly known as:  NIZORAL  
shampoo twice weekly LORazepam 1 mg tablet Commonly known as:  ATIVAN Take 1 Tab by mouth every twelve (12) hours. Max Daily Amount: 2 mg. Indications: ANXIETY  
  
 methylphenidate HCl 10 mg tablet Commonly known as:  RITALIN Take  by mouth. norgestimate-ethinyl estradiol 0.25-35 mg-mcg Tab Commonly known as:  Cloteal Shingles Take 1 Tab by mouth daily. Indications: DYSMENORRHEA  
  
 omeprazole 40 mg capsule Commonly known as:  PRILOSEC Take 1 Cap by mouth daily. Indications: gastroesophageal reflux disease  
  
 traZODone 50 mg tablet Commonly known as:  Marigor Melchor Take 1 Tab by mouth nightly. triamcinolone acetonide 0.1 % ointment Commonly known as:  KENALOG Apply  to affected area two (2) times a day. use thin layer * Notice: This list has 2 medication(s) that are the same as other medications prescribed for you. Read the directions carefully, and ask your doctor or other care provider to review them with you. Prescriptions Sent to Pharmacy Refills  
 norgestimate-ethinyl estradiol (ORTHO-CYCLEN, SPRINTEC) 0.25-35 mg-mcg tab 12 Sig: Take 1 Tab by mouth daily. Indications: DYSMENORRHEA Class: Normal  
 Pharmacy: [address] #: [phone] Route: Oral  
  
We Performed the Following Chyrl Raddle / GC-AMPLIFIED [BZM0132 Custom] HIV 1/2 AG/AB, 4TH GENERATION,W RFLX CONFIRM [id number] CPT(R)]   
 N GONORRHOEA AMPLIFICATION [id number] CPT(R)] T PALLIDUM AB [[id number] Custom] Follow-up Instructions Return in about 3 months (around 5/27/2018), or if symptoms worsen or fail to improve. Patient Instructions A Healthy Lifestyle: Care Instructions Your Care Instructions A healthy lifestyle can help you feel good, stay at a healthy weight, and have plenty of energy for both work and play. A healthy lifestyle is something you can share with your whole family. A healthy lifestyle also can lower your risk for serious health problems, such as high blood pressure, heart disease, and diabetes. You can follow a few steps listed below to improve your health and the health of your family. Follow-up care is a key part of your treatment and safety. Be sure to make and go to all appointments, and call your doctor if you are having problems. It's also a good idea to know your test results and keep a list of the medicines you take. How can you care for yourself at home? · Do not eat too much sugar, fat, or fast foods. You can still have dessert and treats now and then. The goal is moderation. · Start small to improve your eating habits. Pay attention to portion sizes, drink less juice and soda pop, and eat more fruits and vegetables. ¨ Eat a healthy amount of food. A 3-ounce serving of meat, for example, is about the size of a deck of cards. Fill the rest of your plate with vegetables and whole grains. ¨ Limit the amount of soda and sports drinks you have every day. Drink more water when you are thirsty. ¨ Eat at least 5 servings of fruits and vegetables every day. It may seem like a lot, but it is not hard to reach this goal. A serving or helping is 1 piece of fruit, 1 cup of vegetables, or 2 cups of leafy, raw vegetables. Have an apple or some carrot sticks as an afternoon snack instead of a candy bar. Try to have fruits and/or vegetables at every meal. 
· Make exercise part of your daily routine. You may want to start with simple activities, such as walking, bicycling, or slow swimming. Try to be active 30 to 60 minutes every day. You do not need to do all 30 to 60 minutes all at once. For example, you can exercise 3 times a day for 10 or 20 minutes. Moderate exercise is safe for most people, but it is always a good idea to talk to your doctor before starting an exercise program. 
· Keep moving. Aleaessa Bernheim the lawn, work in the garden, or [employer]. Take the stairs instead of the elevator at work. · If you smoke, quit. People who smoke have an increased risk for heart attack, stroke, cancer, and other lung illnesses. Quitting is hard, but there are ways to boost your chance of quitting tobacco for good. ¨ Use nicotine gum, patches, or lozenges. ¨ Ask your doctor about stop-smoking programs and medicines. ¨ Keep trying. In addition to reducing your risk of diseases in the future, you will notice some benefits soon after you stop using tobacco. If you have shortness of breath or asthma symptoms, they will likely get better within a few weeks after you quit. · Limit how much alcohol you drink. Moderate amounts of alcohol (up to 2 drinks a day for men, 1 drink a day for women) are okay.  But drinking too much can lead to liver problems, high blood pressure, and other health problems. Family health If you have a family, there are many things you can do together to improve your health. · Eat meals together as a family as often as possible. · Eat healthy foods. This includes fruits, vegetables, lean meats and dairy, and whole grains. · Include your family in your fitness plan. Most people think of activities such as jogging or tennis as the way to fitness, but there are many ways you and your family can be more active. Anything that makes you breathe hard and gets your heart pumping is exercise. Here are some tips: 
¨ Walk to do errands or to take your child to school or the bus. ¨ Go for a family bike ride after dinner instead of watching TV. Where can you learn more? Go to http://danielmechatronic systemtechnikcarlton.info/. Enter Z408 in the search box to learn more about \"A Healthy Lifestyle: Care Instructions. \" Current as of: May 12, 2017 Content Version: 11.4 © 8875-3141 Adyen. Care instructions adapted under license by SumZero (which disclaims liability or warranty for this information). If you have questions about a medical condition or this instruction, always ask your healthcare professional. [address] any warranty or liability for your use of this information. Painful Menstrual Cramps: Care Instructions Your Care Instructions Painful menstrual cramps are very common. Many women go to the doctor because of bad cramps when they get their period. You may have cramps in your back, thighs, and belly. You may also have diarrhea, constipation, or nausea. Some women also get dizzy. Pain medicine and home treatment can help you feel better. Follow-up care is a key part of your treatment and safety.  Be sure to make and go to all appointments, and call your doctor if you are having problems. It's also a good idea to know your test results and keep a list of the medicines you take. How can you care for yourself at home? · Take anti-inflammatory medicines for pain. Ibuprofen (Advil, Motrin) and naproxen (Aleve) usually work better than aspirin. ¨ Be safe with medicines. Talk to your doctor or pharmacist before you take any of these medicines. They may not be safe if you take other medicines or have other health problems. ¨ Start taking the recommended dose of pain medicine as soon as you start to feel pain. Or you can start on the day before your period. Keep taking the medicine for as many days as you have cramps. ¨ If anti-inflammatory medicines don't help, try acetaminophen (Tylenol). ¨ Do not take two or more pain medicines at the same time unless the doctor told you to. Many pain medicines have acetaminophen, which is Tylenol. Too much acetaminophen (Tylenol) can be harmful. ¨ Read and follow all instructions on the label. · Put a heating pad set on low or a hot water bottle on your belly. Or take a warm bath. Heat improves blood flow and may help with pain. · Lie down and put a pillow under your knees. Or lie on your side and bring your knees up to your chest. This will help with any back pressure. · Get at least 30 minutes of exercise on most days of the week. This improves blood flow and may decrease pain. Walking is a good choice. You also may want to do other activities, such as running, swimming, cycling, or playing tennis or team sports. When should you call for help? Call your doctor now or seek immediate medical care if: 
? · You have new or worse belly or pelvic pain. ? · You have severe vaginal bleeding. ? Watch closely for changes in your health, and be sure to contact your doctor if: 
? · You have unusual vaginal bleeding. ? · You do not get better as expected. Where can you learn more? Go to http://daniel-carlton.info/. Enter [id number] in the search box to learn more about \"Painful Menstrual Cramps: Care Instructions. \" Current as of: October 13, 2016 Content Version: 11.4 © 7947-0897 SegmentFault. Care instructions adapted under license by [employer]ader Sam (which disclaims liability or warranty for this information). If you have questions about a medical condition or this instruction, always ask your healthcare professional. [address] [address] any warranty or liability for your use of this information. Introducing [facility]! Dear Otilia Fox: Thank you for requesting a Sulmaq account. Our records indicate that you already have an active Sulmaq account. You can access your account anytime at [email or web address] Did you know that you can access your hospital and ER discharge instructions at any time in Sulmaq? You can also review all of your test results from your hospital stay or ER visit. Additional Information If you have questions, please visit the Frequently Asked Questions section of the Sulmaq website at [email or web address] Remember, Sulmaq is NOT to be used for urgent needs. For medical emergencies, dial 911. Now available from your iPhone and Android! Please provide this summary of care documentation to your next provider. Your primary care clinician is listed as [address]. If you have any questions after today's visit, please call [phone].

## 2018-02-27 NOTE — PROGRESS NOTES
Reviewed record in preparation for visit and have obtained necessary documentation. Patient did not bring medications to visit for review. Information provided on Advanced Directive, Living Will. Body mass index is 32.44 kg/(m^2). There are no preventive care reminders to display for this patient. 1. Have you been to the ER, urgent care clinic since your last visit? Hospitalized since your last visit? no    2. Have you seen or consulted any other health care providers outside of the [address] [place] [address] since your last visit? Include any pap smears or colon screening.  no

## 2018-02-27 NOTE — PROGRESS NOTES
Conrado Romero  30 y.o. female  1980  [address]  [id number]     [id number]: Progress Note       Encounter Date: 2/27/2018    Chief Complaint   Patient presents with    Well Woman     no pap     History of Present Illness   Conrado Romero is a 30 y.o. female who presents to clinic today for:  Prescription for BCP due to dysmenorrhea. LMP 2/16/18-2/20/18. She uses tampons with no complaints. She is not  and she does not have any children. She does not currently smoke cigarettes. Hx of HSV (~2008). No reported outbreaks. Of note she had sexual intercourse 2/23/18, she used a contraceptive gel however the condom broke. She states the male partner has not reported any STI however she would like to be tested. She denies-urinary symptoms, vaginal issues (odor, discharge, lesions), h/a, dizziness, SOB, chest pain, LE edema. Discussed abdominal pain in relation to fibromyalgia. Discussed ED precautions for imaging. Health Maintenance    Patient discussed referral for mammogram. Hx of lump left breast 4/2013. She states she was told to start getting yearly mammograms at 30years old. There are no preventive care reminders to display for this patient. Review of Systems   Review of Systems   Constitutional: Negative. HENT: Negative. Eyes: Negative. Respiratory: Negative. Cardiovascular: Negative. Gastrointestinal: Positive for abdominal pain. Genitourinary: Negative. Musculoskeletal: Negative. Skin: Negative. Neurological: Negative. Endo/Heme/Allergies: Negative. Psychiatric/Behavioral: Negative. Vitals/Objective:     Vitals:    02/27/18 0808   BP: 128/85   Pulse: 92   Resp: 16   Temp: 97 °F (36.1 °C)   TempSrc: Oral   SpO2: 98%   Weight: 189 lb (85.7 kg)   Height: 5' 4\" (1.626 m)     Body mass index is 32.44 kg/(m^2). Physical Exam   Constitutional: She is oriented to person, place, and time.  Vital signs are normal. She is cooperative. HENT:   Head: Normocephalic. Nose: Nose normal.   Mouth/Throat: Uvula is midline, oropharynx is clear and moist and mucous membranes are normal.   Eyes: Conjunctivae and lids are normal. Pupils are equal, round, and reactive to light. Neck: Normal range of motion and phonation normal.   Cardiovascular: Normal rate, regular rhythm and normal pulses. Pulses:       Carotid pulses are 2+ on the right side, and 2+ on the left side. Pulmonary/Chest: Effort normal and breath sounds normal.   Musculoskeletal: Normal range of motion. Neurological: She is alert and oriented to person, place, and time. Skin: Skin is warm, dry and intact. Psychiatric: She has a normal mood and affect. Her speech is normal and behavior is normal. Judgment and thought content normal. Cognition and memory are normal.       Recent Results (from the past 24 hour(s))   AMB POC URINE PREGNANCY TEST, VISUAL COLOR COMPARISON    Collection Time: 02/27/18  8:35 AM   Result Value Ref Range    VALID INTERNAL CONTROL POC Yes     HCG urine, Ql. (POC) Negative Negative     Assessment and Plan:   1. Dysmenorrhea    Sprintec ordered. Discussed start date, advised to not smoke, discussed BP monitoring and having protected sex the first 7 days of officially starting contraceptive and using protection in general with all encounters. - AMB POC URINE PREGNANCY TEST, VISUAL COLOR COMPARISON    2. STI (sexually transmitted infection)    - N. GONORRHOEA AMPLIFIED, [address] / GC-AMPLIFIED  - HIV 1/2 AG/AB, 4TH GENERATION,W RFLX CONFIRM  - T PALLIDUM AB    Will consider repeat HIV and syphilis in 6 months. 3. Health care maintenance    - [facility] MAMMO BI SCREENING INCL CAD; Future    I have discussed the diagnosis with the patient and the intended plan as seen in the above orders. she has expressed understanding.   The patient has received an after-visit summary and questions were answered concerning future plans. I have discussed medication side effects and warnings with the patient as well. Electronically Signed: Karol Green NP     History/Allergies   Patients past medical, surgical and family histories were reviewed and updated. Past Medical History:   Diagnosis Date    Anemia NEC     Depression     GERD (gastroesophageal reflux disease)     Hypertension     Musculoskeletal disorder     SOB (shortness of breath)     Stool color black     History reviewed. No pertinent surgical history. Family History   Problem Relation Age of Onset    Hypertension Father     Elevated Lipids Father     Arthritis-rheumatoid Mother      ? Lupus vs RA    Lung Disease Mother     Heart Disease Mother     Cancer Mother      breast in 39y    COPD Mother     Hypertension Mother     Stroke Mother      3-4 strokes    Diabetes Maternal Grandmother      Social History     Social History    Marital status: SINGLE     Spouse name: N/A    Number of children: N/A    Years of education: N/A     Occupational History    Not on file. Social History Main Topics    Smoking status: Former Smoker     Packs/day: 0.50     Years: 8.00     Types: Cigarettes     Quit date: 12/9/2011    Smokeless tobacco: Never Used    Alcohol use 0.0 oz/week     0 Standard drinks or equivalent per week      Comment: occassionally    Drug use: No    Sexual activity: Yes     Partners: Male     Birth control/ protection: None     Other Topics Concern    Not on file     Social History Narrative         Allergies   Allergen Reactions    Sulfa (Sulfonamide Antibiotics) Hives    Vicodin [Hydrocodone-Acetaminophen] Nausea and Vomiting       Disposition     Follow-up Disposition:  Return in about 3 months (around 5/27/2018), or if symptoms worsen or fail to improve. No future appointments.          Current Medications after this visit     Current Outpatient Prescriptions   Medication Sig    norgestimate-ethinyl estradiol (Reji Karen) 0.25-35 mg-mcg tab Take 1 Tab by mouth daily. Indications: DYSMENORRHEA    FLUoxetine (PROZAC) 20 mg capsule Take  by mouth daily.  gabapentin (NEURONTIN) 300 mg capsule Take 2 tabs in morning and 2 tabs at nighttime. Indications: fibromyalgia    cyclobenzaprine (FLEXERIL) 10 mg tablet Take 1 Tab by mouth three (3) times daily as needed.  methylphenidate HCl (RITALIN) 10 mg tablet Take  by mouth.  FLUoxetine (PROZAC) 40 mg capsule Take  by mouth daily.  omeprazole (PRILOSEC) 40 mg capsule Take 1 Cap by mouth daily. Indications: gastroesophageal reflux disease    fluticasone (FLONASE) 50 mcg/actuation nasal spray 2 Sprays by Both Nostrils route daily.  albuterol (PROVENTIL HFA, VENTOLIN HFA, PROAIR HFA) 90 mcg/actuation inhaler Take 1 Puff by inhalation every six (6) hours as needed for Wheezing.  ketoconazole (NIZORAL) 2 % shampoo shampoo twice weekly    triamcinolone acetonide (KENALOG) 0.1 % ointment Apply  to affected area two (2) times a day. use thin layer    hydrocortisone (ANUSOL-HC) 2.5 % rectal cream Insert  into rectum four (4) times daily. As needed    diclofenac (VOLTAREN) 1 % gel Apply 4 g to affected area four (4) times daily.  LORazepam (ATIVAN) 1 mg tablet Take 1 Tab by mouth every twelve (12) hours. Max Daily Amount: 2 mg. Indications: ANXIETY (Patient taking differently: Take 2 mg by mouth every twelve (12) hours. Indications: ANXIETY)    clonazePAM (KLONOPIN) 0.5 mg tablet Take 1 mg by mouth nightly as needed.  traZODone (DESYREL) 50 mg tablet Take 1 Tab by mouth nightly. No current facility-administered medications for this visit.       Medications Discontinued During This Encounter   Medication Reason    lidocaine (XYLOCAINE) 2 % solution Therapy Completed    buPROPion XL (WELLBUTRIN XL) 150 mg tablet Discontinued by Another Clinician

## 2018-02-27 NOTE — PATIENT INSTRUCTIONS
A Healthy Lifestyle: Care Instructions  Your Care Instructions    A healthy lifestyle can help you feel good, stay at a healthy weight, and have plenty of energy for both work and play. A healthy lifestyle is something you can share with your whole family. A healthy lifestyle also can lower your risk for serious health problems, such as high blood pressure, heart disease, and diabetes. You can follow a few steps listed below to improve your health and the health of your family. Follow-up care is a key part of your treatment and safety. Be sure to make and go to all appointments, and call your doctor if you are having problems. It's also a good idea to know your test results and keep a list of the medicines you take. How can you care for yourself at home? · Do not eat too much sugar, fat, or fast foods. You can still have dessert and treats now and then. The goal is moderation. · Start small to improve your eating habits. Pay attention to portion sizes, drink less juice and soda pop, and eat more fruits and vegetables. ¨ Eat a healthy amount of food. A 3-ounce serving of meat, for example, is about the size of a deck of cards. Fill the rest of your plate with vegetables and whole grains. ¨ Limit the amount of soda and sports drinks you have every day. Drink more water when you are thirsty. ¨ Eat at least 5 servings of fruits and vegetables every day. It may seem like a lot, but it is not hard to reach this goal. A serving or helping is 1 piece of fruit, 1 cup of vegetables, or 2 cups of leafy, raw vegetables. Have an apple or some carrot sticks as an afternoon snack instead of a candy bar. Try to have fruits and/or vegetables at every meal.  · Make exercise part of your daily routine. You may want to start with simple activities, such as walking, bicycling, or slow swimming. Try to be active 30 to 60 minutes every day. You do not need to do all 30 to 60 minutes all at once.  For example, you can exercise 3 times a day for 10 or 20 minutes. Moderate exercise is safe for most people, but it is always a good idea to talk to your doctor before starting an exercise program.  · Keep moving. Radha Silence the lawn, work in the garden, or Rescale. Take the stairs instead of the elevator at work. · If you smoke, quit. People who smoke have an increased risk for heart attack, stroke, cancer, and other lung illnesses. Quitting is hard, but there are ways to boost your chance of quitting tobacco for good. ¨ Use nicotine gum, patches, or lozenges. ¨ Ask your doctor about stop-smoking programs and medicines. ¨ Keep trying. In addition to reducing your risk of diseases in the future, you will notice some benefits soon after you stop using tobacco. If you have shortness of breath or asthma symptoms, they will likely get better within a few weeks after you quit. · Limit how much alcohol you drink. Moderate amounts of alcohol (up to 2 drinks a day for men, 1 drink a day for women) are okay. But drinking too much can lead to liver problems, high blood pressure, and other health problems. Family health  If you have a family, there are many things you can do together to improve your health. · Eat meals together as a family as often as possible. · Eat healthy foods. This includes fruits, vegetables, lean meats and dairy, and whole grains. · Include your family in your fitness plan. Most people think of activities such as jogging or tennis as the way to fitness, but there are many ways you and your family can be more active. Anything that makes you breathe hard and gets your heart pumping is exercise. Here are some tips:  ¨ Walk to do errands or to take your child to school or the bus. ¨ Go for a family bike ride after dinner instead of watching TV. Where can you learn more? Go to http://daniel-carlton.info/. Enter P179 in the search box to learn more about \"A Healthy Lifestyle: Care Instructions. \"  Current as of: May 12, 2017  Content Version: 11.4  © 0418-1310 [employer]. Care instructions adapted under license by [employer] (which disclaims liability or warranty for this information). If you have questions about a medical condition or this instruction, always ask your healthcare professional. [address] any warranty or liability for your use of this information. Painful Menstrual Cramps: Care Instructions  Your Care Instructions    Painful menstrual cramps are very common. Many women go to the doctor because of bad cramps when they get their period. You may have cramps in your back, thighs, and belly. You may also have diarrhea, constipation, or nausea. Some women also get dizzy. Pain medicine and home treatment can help you feel better. Follow-up care is a key part of your treatment and safety. Be sure to make and go to all appointments, and call your doctor if you are having problems. It's also a good idea to know your test results and keep a list of the medicines you take. How can you care for yourself at home? · Take anti-inflammatory medicines for pain. Ibuprofen (Advil, Motrin) and naproxen (Aleve) usually work better than aspirin. ¨ Be safe with medicines. Talk to your doctor or pharmacist before you take any of these medicines. They may not be safe if you take other medicines or have other health problems. ¨ Start taking the recommended dose of pain medicine as soon as you start to feel pain. Or you can start on the day before your period. Keep taking the medicine for as many days as you have cramps. ¨ If anti-inflammatory medicines don't help, try acetaminophen (Tylenol). ¨ Do not take two or more pain medicines at the same time unless the doctor told you to. Many pain medicines have acetaminophen, which is Tylenol. Too much acetaminophen (Tylenol) can be harmful. ¨ Read and follow all instructions on the label.   · Put a heating pad set on low or a hot water bottle on your belly. Or take a warm bath. Heat improves blood flow and may help with pain. · Lie down and put a pillow under your knees. Or lie on your side and bring your knees up to your chest. This will help with any back pressure. · Get at least 30 minutes of exercise on most days of the week. This improves blood flow and may decrease pain. Walking is a good choice. You also may want to do other activities, such as running, swimming, cycling, or playing tennis or team sports. When should you call for help? Call your doctor now or seek immediate medical care if:  ? · You have new or worse belly or pelvic pain. ? · You have severe vaginal bleeding. ? Watch closely for changes in your health, and be sure to contact your doctor if:  ? · You have unusual vaginal bleeding. ? · You do not get better as expected. Where can you learn more? Go to http://daniel-carlton.info/. Enter [id number] in the search box to learn more about \"Painful Menstrual Cramps: Care Instructions. \"  Current as of: October 13, 2016  Content Version: 11.4  © 7301-7610 [employer]. Care instructions adapted under license by [employer] (which disclaims liability or warranty for this information). If you have questions about a medical condition or this instruction, always ask your healthcare professional. [address] any warranty or liability for your use of this information.

## 2018-02-28 LAB
BUN SERPL-MCNC: 11 MG/DL (ref 6–20)
BUN/CREAT SERPL: 12 (ref 9–23)
CALCIUM SERPL-MCNC: 9.2 MG/DL (ref 8.7–10.2)
CHLORIDE SERPL-SCNC: 102 MMOL/L (ref 96–106)
CO2 SERPL-SCNC: 25 MMOL/L (ref 18–29)
CREAT SERPL-MCNC: 0.9 MG/DL (ref 0.57–1)
EST. AVERAGE GLUCOSE BLD GHB EST-MCNC: 100 MG/DL
GFR SERPLBLD CREATININE-BSD FMLA CKD-EPI: 83 ML/MIN/1.73
GFR SERPLBLD CREATININE-BSD FMLA CKD-EPI: 96 ML/MIN/1.73
GLUCOSE SERPL-MCNC: 94 MG/DL (ref 65–99)
HBA1C MFR BLD: 5.1 % (ref 4.8–5.6)
POTASSIUM SERPL-SCNC: 4.6 MMOL/L (ref 3.5–5.2)
SODIUM SERPL-SCNC: 140 MMOL/L (ref 134–144)
TSH SERPL DL<=0.005 MIU/L-ACNC: 1.62 UIU/ML (ref 0.45–4.5)

## 2018-03-01 LAB
C TRACH RRNA CVX QL NAA+PROBE: NORMAL
HIV 1+2 AB+HIV1 P24 AG SERPL QL IA: NON REACTIVE
N GONORRHOEA RRNA SPEC QL NAA+PROBE: NEGATIVE
T PALLIDUM AB SER QL IA: NEGATIVE

## 2018-03-03 LAB
C TRACH RRNA SPEC QL NAA+PROBE: NEGATIVE
N GONORRHOEA RRNA SPEC QL NAA+PROBE: NEGATIVE
SPECIMEN STATUS REPORT, ROLRST: NORMAL
T VAGINALIS RRNA SPEC QL NAA+PROBE: NEGATIVE

## 2018-03-05 ENCOUNTER — TELEPHONE (OUTPATIENT)
Dept: FAMILY MEDICINE CLINIC | Age: 38
End: 2018-03-05

## 2018-03-05 NOTE — TELEPHONE ENCOUNTER
Spoke with patient and identifiers verified. Informed patient about her results. Patient declined the need for a official letter. Patient aware and in agreement with plan.

## 2018-03-07 ENCOUNTER — HOSPITAL ENCOUNTER (OUTPATIENT)
Dept: MAMMOGRAPHY | Age: 38
Discharge: HOME | End: 2018-03-07
Attending: NURSE PRACTITIONER
Payer: SUBSIDIZED

## 2018-03-07 ENCOUNTER — TELEPHONE (OUTPATIENT)
Dept: FAMILY MEDICINE CLINIC | Age: 38
End: 2018-03-07

## 2018-03-07 DIAGNOSIS — Z00.00 HEALTH CARE MAINTENANCE: ICD-10-CM

## 2018-03-07 PROCEDURE — 77067 SCR MAMMO BI INCL CAD: CPT

## 2018-03-07 NOTE — TELEPHONE ENCOUNTER
Spoke with patient and identifiers verified:    IMPRESSION  IMPRESSION:  BI-RADS 1: Negative. No mammographic evidence of malignancy.     RECOMMENDATIONS:  Next screening mammogram is recommended in one year.      The patient will be notified of these results.     Patient aware in agreement with the above.

## 2018-03-14 ENCOUNTER — TELEPHONE (OUTPATIENT)
Dept: FAMILY MEDICINE CLINIC | Age: 38
End: 2018-03-14

## 2018-03-14 NOTE — TELEPHONE ENCOUNTER
Pt said Dr. Jabier Yeung does not need to see her to write up a Permanent Handicap Form for her. Please call her when it is ready and she will be in and pick it up from the .  Thanks

## 2018-03-20 ENCOUNTER — OFFICE VISIT (OUTPATIENT)
Dept: FAMILY MEDICINE CLINIC | Age: 38
End: 2018-03-20

## 2018-03-20 VITALS
OXYGEN SATURATION: 97 % | WEIGHT: 192 LBS | BODY MASS INDEX: 32.78 KG/M2 | RESPIRATION RATE: 16 BRPM | HEART RATE: 89 BPM | DIASTOLIC BLOOD PRESSURE: 83 MMHG | HEIGHT: 64 IN | TEMPERATURE: 97.9 F | SYSTOLIC BLOOD PRESSURE: 121 MMHG

## 2018-03-20 DIAGNOSIS — Z11.3 SCREENING EXAMINATION FOR STD (SEXUALLY TRANSMITTED DISEASE): ICD-10-CM

## 2018-03-20 DIAGNOSIS — R35.0 URINARY FREQUENCY: Primary | ICD-10-CM

## 2018-03-20 DIAGNOSIS — N30.00 ACUTE CYSTITIS WITHOUT HEMATURIA: ICD-10-CM

## 2018-03-20 LAB
BILIRUB UR QL STRIP: NEGATIVE
GLUCOSE UR-MCNC: NEGATIVE MG/DL
KETONES P FAST UR STRIP-MCNC: NEGATIVE MG/DL
PH UR STRIP: 5 [PH] (ref 4.6–8)
PROT UR QL STRIP: NEGATIVE
SP GR UR STRIP: 1.02 (ref 1–1.03)
UA UROBILINOGEN AMB POC: NORMAL (ref 0.2–1)
URINALYSIS CLARITY POC: NORMAL
URINALYSIS COLOR POC: YELLOW
URINE BLOOD POC: NEGATIVE
URINE LEUKOCYTES POC: NORMAL
URINE NITRITES POC: POSITIVE

## 2018-03-20 RX ORDER — NITROFURANTOIN 25; 75 MG/1; MG/1
100 CAPSULE ORAL 2 TIMES DAILY
Qty: 10 CAP | Refills: 0 | Status: SHIPPED | OUTPATIENT
Start: 2018-03-20 | End: 2018-03-25

## 2018-03-20 NOTE — PROGRESS NOTES
Reviewed record in preparation for visit and have obtained necessary documentation. Patient did not bring medications to visit for review. Information provided on Advanced Directive, Living Will. Body mass index is 32.96 kg/(m^2). There are no preventive care reminders to display for this patient. 1. Have you been to the ER, urgent care clinic since your last visit? Hospitalized since your last visit? no    2. Have you seen or consulted any other health care providers outside of the [address] [place] [address] since your last visit? Include any pap smears or colon screening.  no

## 2018-03-20 NOTE — MR AVS SNAPSHOT
[address] [address] 
[phone] Patient: Jaiden Marinelli MRN: [id number] :1980 Visit Information Date & Time Provider Department Dept. Phone Encounter #  
 3/20/2018 10:00 AM Bonnie Mills  [facility] [phone] [id number] Follow-up Instructions Return if symptoms worsen or fail to improve. Upcoming Health Maintenance Date Due  
 PAP AKA CERVICAL CYTOLOGY 2020 DTaP/Tdap/Td series (2 - Td) 2024 COLONOSCOPY 5/15/2025 Allergies as of 3/20/2018  Review Complete On: 3/20/2018 By: Bonnie Mills NP Severity Noted Reaction Type Reactions Sulfa (Sulfonamide Antibiotics)  2010    Hives Vicodin [Hydrocodone-acetaminophen]  2011    Nausea and Vomiting Current Immunizations  Reviewed on 2010 Name Date Tdap 2014  7:31 PM  
  
 Not reviewed this visit You Were Diagnosed With   
  
 Codes Comments Urinary frequency    -  Primary ICD-10-CM: R35.0 ICD-9-CM: 788.41 Acute cystitis without hematuria     ICD-10-CM: N30.00 ICD-9-CM: 595.0 Screening examination for STD (sexually transmitted disease)     ICD-10-CM: Z11.3 ICD-9-CM: V74.5 Vitals BP Pulse Temp Resp Height(growth percentile) Weight(growth percentile) 121/83 (BP 1 Location: Left arm, BP Patient Position: Sitting) 89 97.9 °F (36.6 °C) (Oral) 16 5' 4\" (1.626 m) 192 lb (87.1 kg) LMP SpO2 BMI OB Status Smoking Status 2018 97% 32.96 kg/m2 Having regular periods Former Smoker Vitals History BMI and BSA Data Body Mass Index Body Surface Area  
 32.96 kg/m 2 1.98 m 2 Preferred Pharmacy Pharmacy Name Phone [address] [facility] 79 [phone] Your Updated Medication List  
  
   
 This list is accurate as of 3/20/18 10:45 AM.  Always use your most recent med list.  
  
  
  
  
 albuterol 90 mcg/actuation inhaler Commonly known as:  PROVENTIL HFA, VENTOLIN HFA, PROAIR HFA Take 1 Puff by inhalation every six (6) hours as needed for Wheezing. clonazePAM 0.5 mg tablet Commonly known as:  Kerry Bimler Take 1 mg by mouth nightly as needed. cyclobenzaprine 10 mg tablet Commonly known as:  FLEXERIL Take 1 Tab by mouth three (3) times daily as needed. diclofenac 1 % Gel Commonly known as:  VOLTAREN Apply 4 g to affected area four (4) times daily. FLONASE 50 mcg/actuation nasal spray Generic drug:  fluticasone 2 Sprays by Both Nostrils route daily. * FLUoxetine 40 mg capsule Commonly known as:  PROzac Take  by mouth daily. * PROzac 20 mg capsule Generic drug:  FLUoxetine Take  by mouth daily. gabapentin 300 mg capsule Commonly known as:  NEURONTIN Take 2 tabs in morning and 2 tabs at nighttime. Indications: fibromyalgia  
  
 hydrocortisone 2.5 % rectal cream  
Commonly known as:  ANUSOL-HC Insert  into rectum four (4) times daily. As needed  
  
 ketoconazole 2 % shampoo Commonly known as:  NIZORAL  
shampoo twice weekly LORazepam 1 mg tablet Commonly known as:  ATIVAN Take 1 Tab by mouth every twelve (12) hours. Max Daily Amount: 2 mg. Indications: ANXIETY  
  
 methylphenidate HCl 10 mg tablet Commonly known as:  RITALIN Take 20 mg by mouth two (2) times a day. norgestimate-ethinyl estradiol 0.25-35 mg-mcg Tab Commonly known as:  Carren Calender Take 1 Tab by mouth daily. Indications: DYSMENORRHEA  
  
 omeprazole 40 mg capsule Commonly known as:  PRILOSEC Take 1 Cap by mouth daily. Indications: gastroesophageal reflux disease  
  
 traZODone 50 mg tablet Commonly known as:  Madelyn Roland Take 1 Tab by mouth nightly. triamcinolone acetonide 0.1 % ointment Commonly known as:  KENALOG Apply  to affected area two (2) times a day. use thin layer * Notice: This list has 2 medication(s) that are the same as other medications prescribed for you. Read the directions carefully, and ask your doctor or other care provider to review them with you. We Performed the Following AMB POC URINALYSIS DIP STICK AUTO W/O MICRO [36861 CPT(R)] CT/NG/T.VAGINALIS AMPLIFICATION [id number] CPT(R)] CULTURE, URINE [id number] CPT(R)] Follow-up Instructions Return if symptoms worsen or fail to improve. Patient Instructions A Healthy Lifestyle: Care Instructions Your Care Instructions A healthy lifestyle can help you feel good, stay at a healthy weight, and have plenty of energy for both work and play. A healthy lifestyle is something you can share with your whole family. A healthy lifestyle also can lower your risk for serious health problems, such as high blood pressure, heart disease, and diabetes. You can follow a few steps listed below to improve your health and the health of your family. Follow-up care is a key part of your treatment and safety. Be sure to make and go to all appointments, and call your doctor if you are having problems. It's also a good idea to know your test results and keep a list of the medicines you take. How can you care for yourself at home? · Do not eat too much sugar, fat, or fast foods. You can still have dessert and treats now and then. The goal is moderation. · Start small to improve your eating habits. Pay attention to portion sizes, drink less juice and soda pop, and eat more fruits and vegetables. ¨ Eat a healthy amount of food. A 3-ounce serving of meat, for example, is about the size of a deck of cards. Fill the rest of your plate with vegetables and whole grains. ¨ Limit the amount of soda and sports drinks you have every day. Drink more water when you are thirsty. ¨ Eat at least 5 servings of fruits and vegetables every day. It may seem like a lot, but it is not hard to reach this goal. A serving or helping is 1 piece of fruit, 1 cup of vegetables, or 2 cups of leafy, raw vegetables. Have an apple or some carrot sticks as an afternoon snack instead of a candy bar. Try to have fruits and/or vegetables at every meal. 
· Make exercise part of your daily routine. You may want to start with simple activities, such as walking, bicycling, or slow swimming. Try to be active 30 to 60 minutes every day. You do not need to do all 30 to 60 minutes all at once. For example, you can exercise 3 times a day for 10 or 20 minutes. Moderate exercise is safe for most people, but it is always a good idea to talk to your doctor before starting an exercise program. 
· Keep moving. Harlen Skeens the lawn, work in the garden, or [employer]. Take the stairs instead of the elevator at work. · If you smoke, quit. People who smoke have an increased risk for heart attack, stroke, cancer, and other lung illnesses. Quitting is hard, but there are ways to boost your chance of quitting tobacco for good. ¨ Use nicotine gum, patches, or lozenges. ¨ Ask your doctor about stop-smoking programs and medicines. ¨ Keep trying. In addition to reducing your risk of diseases in the future, you will notice some benefits soon after you stop using tobacco. If you have shortness of breath or asthma symptoms, they will likely get better within a few weeks after you quit. · Limit how much alcohol you drink. Moderate amounts of alcohol (up to 2 drinks a day for men, 1 drink a day for women) are okay. But drinking too much can lead to liver problems, high blood pressure, and other health problems. Family health If you have a family, there are many things you can do together to improve your health. · Eat meals together as a family as often as possible. · Eat healthy foods.  This includes fruits, vegetables, lean meats and dairy, and whole grains. · Include your family in your fitness plan. Most people think of activities such as jogging or tennis as the way to fitness, but there are many ways you and your family can be more active. Anything that makes you breathe hard and gets your heart pumping is exercise. Here are some tips: 
¨ Walk to do errands or to take your child to school or the bus. ¨ Go for a family bike ride after dinner instead of watching TV. Where can you learn more? Go to http://daniel-carlton.info/. Enter B091 in the search box to learn more about \"A Healthy Lifestyle: Care Instructions. \" Current as of: May 12, 2017 Content Version: 11.4 © 0875-0004 [employer]. Care instructions adapted under license by [employer]epme.[employer] (which disclaims liability or warranty for this information). If you have questions about a medical condition or this instruction, always ask your healthcare professional. [address] [address] any warranty or liability for your use of this information. Urinary Tract Infection in Women: Care Instructions Your Care Instructions A urinary tract infection, or UTI, is a general term for an infection anywhere between the kidneys and the urethra (where urine comes out). Most UTIs are bladder infections. They often cause pain or burning when you urinate. UTIs are caused by bacteria and can be cured with antibiotics. Be sure to complete your treatment so that the infection goes away. Follow-up care is a key part of your treatment and safety. Be sure to make and go to all appointments, and call your doctor if you are having problems. It's also a good idea to know your test results and keep a list of the medicines you take. How can you care for yourself at home? · Take your antibiotics as directed. Do not stop taking them just because you feel better. You need to take the full course of antibiotics. · Drink extra water and other fluids for the next day or two. This may help wash out the bacteria that are causing the infection. (If you have kidney, heart, or liver disease and have to limit fluids, talk with your doctor before you increase your fluid intake.) · Avoid drinks that are carbonated or have caffeine. They can irritate the bladder. · Urinate often. Try to empty your bladder each time. · To relieve pain, take a hot bath or lay a heating pad set on low over your lower belly or genital area. Never go to sleep with a heating pad in place. To prevent UTIs · Drink plenty of water each day. This helps you urinate often, which clears bacteria from your system. (If you have kidney, heart, or liver disease and have to limit fluids, talk with your doctor before you increase your fluid intake.) · Urinate when you need to. · Urinate right after you have sex. · Change sanitary pads often. · Avoid douches, bubble baths, feminine hygiene sprays, and other feminine hygiene products that have deodorants. · After going to the bathroom, wipe from front to back. When should you call for help? Call your doctor now or seek immediate medical care if: 
? · Symptoms such as fever, chills, nausea, or vomiting get worse or appear for the first time. ? · You have new pain in your back just below your rib cage. This is called flank pain. ? · There is new blood or pus in your urine. ? · You have any problems with your antibiotic medicine. ? Watch closely for changes in your health, and be sure to contact your doctor if: 
? · You are not getting better after taking an antibiotic for 2 days. ? · Your symptoms go away but then come back. Where can you learn more? Go to http://daniel-carlton.info/. Enter W536 in the search box to learn more about \"Urinary Tract Infection in Women: Care Instructions. \" Current as of: May 12, 2017 Content Version: 11.4 © 0398-1143 [employer]. Care instructions adapted under license by [employer] (which disclaims liability or warranty for this information). If you have questions about a medical condition or this instruction, always ask your healthcare professional. [address] any warranty or liability for your use of this information. Introducing [facility]! Dear Dewayne Waddell: Thank you for requesting a [employer] account. Our records indicate that you already have an active [employer] account. You can access your account anytime at [email or web address] Did you know that you can access your hospital and ER discharge instructions at any time in [employer]? You can also review all of your test results from your hospital stay or ER visit. Additional Information If you have questions, please visit the Frequently Asked Questions section of the [employer] website at [email or web address] Remember, [employer] is NOT to be used for urgent needs. For medical emergencies, dial 911. Now available from your iPhone and Android! Please provide this summary of care documentation to your next provider. Your primary care clinician is listed as [address]. If you have any questions after today's visit, please call [phone].

## 2018-03-20 NOTE — PROGRESS NOTES
John Pringle  30 y.o. female  1980  [address]  [id number]     [facility]: Progress Note       Encounter Date: 3/20/2018    Chief Complaint   Patient presents with    Urinary Frequency     History of Present Illness   John Pringle is a 30 y.o. female who presents to clinic today for:  >1 week of urine frequency, urgency, odor, & concentrated in color. She denies urinary dysuria, hematuria, fever, back pain & dyspareunia. OTC AZO pills with little relief. She states she still feels pressure and bladder fullness after voiding. She would like a test for cure for G/C and Trich (last intercourse 3 weeks ago). Complains of a vaginal d/c that has since gone away. LMP ~02/15/18-compliant with Sprintec. Of note she did take the morning after pill after sexual intercourse the beginning of March. 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 The outbreak happens at the same area and she did not take any medications and the sensation/pain subsided. Denies other lesions, bumps or rash. Health Maintenance    There are no preventive care reminders to display for this patient. Review of Systems   Review of Systems   Constitutional: Negative. HENT: Negative. Eyes: Negative. Respiratory: Negative. Cardiovascular: Negative. Gastrointestinal: Negative. Genitourinary: Positive for frequency and urgency. Musculoskeletal: Positive for back pain. Skin: Negative. Neurological: Negative. Endo/Heme/Allergies: Negative. Psychiatric/Behavioral: Negative. Vitals/Objective:     Vitals:    03/20/18 1009   BP: 121/83   Pulse: 89   Resp: 16   Temp: 97.9 °F (36.6 °C)   TempSrc: Oral   SpO2: 97%   Weight: 192 lb (87.1 kg)   Height: 5' 4\" (1.626 m)     Body mass index is 32.96 kg/(m^2). Physical Exam   Constitutional: She is oriented to person, place, and time. She appears well-developed and well-nourished.    Abdominal: There is no CVA tenderness. Genitourinary: Vagina normal.       Pelvic exam was performed with patient supine. There is no tenderness or lesion on the right labia. There is no tenderness or lesion on the left labia. Neurological: She is alert and oriented to person, place, and time. Skin: Skin is warm, dry and intact. Psychiatric: She has a normal mood and affect. Her speech is normal and behavior is normal. Judgment and thought content normal. Cognition and memory are normal.       Recent Results (from the past 24 hour(s))   AMB POC URINALYSIS DIP STICK AUTO W/O MICRO    Collection Time: 03/20/18 10:20 AM   Result Value Ref Range    Color (UA POC) Yellow     Clarity (UA POC) Slightly Cloudy     Glucose (UA POC) Negative Negative    Bilirubin (UA POC) Negative Negative    Ketones (UA POC) Negative Negative    Specific gravity (UA POC) 1.025 1.001 - 1.035    Blood (UA POC) Negative Negative    pH (UA POC) 5.0 4.6 - 8.0    Protein (UA POC) Negative Negative    Urobilinogen (UA POC) 0.2 mg/dL 0.2 - 1    Nitrites (UA POC) Positive Negative    Leukocyte esterase (UA POC) 2+ Negative     Assessment and Plan:   1. Urinary frequency  - AMB POC URINALYSIS DIP STICK AUTO W/O MICRO  - CULTURE, URINE  - nitrofurantoin, macrocrystal-monohydrate, (MACROBID) 100 mg capsule; Take 1 Cap by mouth two (2) times a day for 5 days. Dispense: 10 Cap; Refill: 0    2. Acute cystitis without hematuria    - CULTURE, URINE  - nitrofurantoin, macrocrystal-monohydrate, (MACROBID) 100 mg capsule; Take 1 Cap by mouth two (2) times a day for 5 days. Dispense: 10 Cap; Refill: 0    3. Screening examination for STD (sexually transmitted disease)    - CT/NG/T.VAGINALIS AMPLIFICATION    I have discussed the diagnosis with the patient and the intended plan as seen in the above orders. she has expressed understanding. The patient has received an after-visit summary and questions were answered concerning future plans.   I have discussed medication side effects and warnings with the patient as well. Electronically Signed: Susen Burkitt, NP     History/Allergies   Patients past medical, surgical and family histories were reviewed and updated. Past Medical History:   Diagnosis Date    Anemia NEC     Depression     GERD (gastroesophageal reflux disease)     Hypertension     Musculoskeletal disorder     SOB (shortness of breath)     Stool color black     History reviewed. No pertinent surgical history. Family History   Problem Relation Age of Onset    Hypertension Father     Elevated Lipids Father     Arthritis-rheumatoid Mother      ? Lupus vs RA    Lung Disease Mother     Heart Disease Mother     Cancer Mother      breast in 39y    COPD Mother     Hypertension Mother     Stroke Mother      3-4 strokes    Breast Cancer Mother     Diabetes Maternal Grandmother      Social History     Social History    Marital status: SINGLE     Spouse name: N/A    Number of children: N/A    Years of education: N/A     Occupational History    Not on file. Social History Main Topics    Smoking status: Former Smoker     Packs/day: 0.50     Years: 8.00     Types: Cigarettes     Quit date: 12/9/2011    Smokeless tobacco: Never Used    Alcohol use 0.0 oz/week     0 Standard drinks or equivalent per week      Comment: occassionally    Drug use: No    Sexual activity: Yes     Partners: Male     Birth control/ protection: None     Other Topics Concern    Not on file     Social History Narrative         Allergies   Allergen Reactions    Sulfa (Sulfonamide Antibiotics) Hives    Vicodin [Hydrocodone-Acetaminophen] Nausea and Vomiting       Disposition     Follow-up Disposition:  Return if symptoms worsen or fail to improve. No future appointments.          Current Medications after this visit     Current Outpatient Prescriptions   Medication Sig    nitrofurantoin, macrocrystal-monohydrate, (MACROBID) 100 mg capsule Take 1 Cap by mouth two (2) times a day for 5 days.  norgestimate-ethinyl estradiol (ORTHO-CYCLEN, SPRINTEC) 0.25-35 mg-mcg tab Take 1 Tab by mouth daily. Indications: DYSMENORRHEA    FLUoxetine (PROZAC) 20 mg capsule Take  by mouth daily.  gabapentin (NEURONTIN) 300 mg capsule Take 2 tabs in morning and 2 tabs at nighttime. Indications: fibromyalgia    cyclobenzaprine (FLEXERIL) 10 mg tablet Take 1 Tab by mouth three (3) times daily as needed.  methylphenidate HCl (RITALIN) 10 mg tablet Take 20 mg by mouth two (2) times a day.  FLUoxetine (PROZAC) 40 mg capsule Take  by mouth daily.  omeprazole (PRILOSEC) 40 mg capsule Take 1 Cap by mouth daily. Indications: gastroesophageal reflux disease    fluticasone (FLONASE) 50 mcg/actuation nasal spray 2 Sprays by Both Nostrils route daily.  albuterol (PROVENTIL HFA, VENTOLIN HFA, PROAIR HFA) 90 mcg/actuation inhaler Take 1 Puff by inhalation every six (6) hours as needed for Wheezing.  ketoconazole (NIZORAL) 2 % shampoo shampoo twice weekly    triamcinolone acetonide (KENALOG) 0.1 % ointment Apply  to affected area two (2) times a day. use thin layer    hydrocortisone (ANUSOL-HC) 2.5 % rectal cream Insert  into rectum four (4) times daily. As needed    diclofenac (VOLTAREN) 1 % gel Apply 4 g to affected area four (4) times daily.  LORazepam (ATIVAN) 1 mg tablet Take 1 Tab by mouth every twelve (12) hours. Max Daily Amount: 2 mg. Indications: ANXIETY (Patient taking differently: Take 2 mg by mouth every twelve (12) hours. Indications: ANXIETY)    clonazePAM (KLONOPIN) 0.5 mg tablet Take 1 mg by mouth nightly as needed.  traZODone (DESYREL) 50 mg tablet Take 1 Tab by mouth nightly. No current facility-administered medications for this visit. There are no discontinued medications.

## 2018-03-20 NOTE — PATIENT INSTRUCTIONS
A Healthy Lifestyle: Care Instructions  Your Care Instructions    A healthy lifestyle can help you feel good, stay at a healthy weight, and have plenty of energy for both work and play. A healthy lifestyle is something you can share with your whole family. A healthy lifestyle also can lower your risk for serious health problems, such as high blood pressure, heart disease, and diabetes. You can follow a few steps listed below to improve your health and the health of your family. Follow-up care is a key part of your treatment and safety. Be sure to make and go to all appointments, and call your doctor if you are having problems. It's also a good idea to know your test results and keep a list of the medicines you take. How can you care for yourself at home? · Do not eat too much sugar, fat, or fast foods. You can still have dessert and treats now and then. The goal is moderation. · Start small to improve your eating habits. Pay attention to portion sizes, drink less juice and soda pop, and eat more fruits and vegetables. ¨ Eat a healthy amount of food. A 3-ounce serving of meat, for example, is about the size of a deck of cards. Fill the rest of your plate with vegetables and whole grains. ¨ Limit the amount of soda and sports drinks you have every day. Drink more water when you are thirsty. ¨ Eat at least 5 servings of fruits and vegetables every day. It may seem like a lot, but it is not hard to reach this goal. A serving or helping is 1 piece of fruit, 1 cup of vegetables, or 2 cups of leafy, raw vegetables. Have an apple or some carrot sticks as an afternoon snack instead of a candy bar. Try to have fruits and/or vegetables at every meal.  · Make exercise part of your daily routine. You may want to start with simple activities, such as walking, bicycling, or slow swimming. Try to be active 30 to 60 minutes every day. You do not need to do all 30 to 60 minutes all at once.  For example, you can exercise 3 times a day for 10 or 20 minutes. Moderate exercise is safe for most people, but it is always a good idea to talk to your doctor before starting an exercise program.  · Keep moving. Adi Orellana the lawn, work in the garden, or [place]. Take the stairs instead of the elevator at work. · If you smoke, quit. People who smoke have an increased risk for heart attack, stroke, cancer, and other lung illnesses. Quitting is hard, but there are ways to boost your chance of quitting tobacco for good. ¨ Use nicotine gum, patches, or lozenges. ¨ Ask your doctor about stop-smoking programs and medicines. ¨ Keep trying. In addition to reducing your risk of diseases in the future, you will notice some benefits soon after you stop using tobacco. If you have shortness of breath or asthma symptoms, they will likely get better within a few weeks after you quit. · Limit how much alcohol you drink. Moderate amounts of alcohol (up to 2 drinks a day for men, 1 drink a day for women) are okay. But drinking too much can lead to liver problems, high blood pressure, and other health problems. Family health  If you have a family, there are many things you can do together to improve your health. · Eat meals together as a family as often as possible. · Eat healthy foods. This includes fruits, vegetables, lean meats and dairy, and whole grains. · Include your family in your fitness plan. Most people think of activities such as jogging or tennis as the way to fitness, but there are many ways you and your family can be more active. Anything that makes you breathe hard and gets your heart pumping is exercise. Here are some tips:  ¨ Walk to do errands or to take your child to school or the bus. ¨ Go for a family bike ride after dinner instead of watching TV. Where can you learn more? Go to http://daniel-carlton.info/. Enter K781 in the search box to learn more about \"A Healthy Lifestyle: Care Instructions. \"  Current as of: May 12, 2017  Content Version: 11.4  © 1458-6717 [employer]. Care instructions adapted under license by [employer] (which disclaims liability or warranty for this information). If you have questions about a medical condition or this instruction, always ask your healthcare professional. [address] any warranty or liability for your use of this information. Urinary Tract Infection in Women: Care Instructions  Your Care Instructions    A urinary tract infection, or UTI, is a general term for an infection anywhere between the kidneys and the urethra (where urine comes out). Most UTIs are bladder infections. They often cause pain or burning when you urinate. UTIs are caused by bacteria and can be cured with antibiotics. Be sure to complete your treatment so that the infection goes away. Follow-up care is a key part of your treatment and safety. Be sure to make and go to all appointments, and call your doctor if you are having problems. It's also a good idea to know your test results and keep a list of the medicines you take. How can you care for yourself at home? · Take your antibiotics as directed. Do not stop taking them just because you feel better. You need to take the full course of antibiotics. · Drink extra water and other fluids for the next day or two. This may help wash out the bacteria that are causing the infection. (If you have kidney, heart, or liver disease and have to limit fluids, talk with your doctor before you increase your fluid intake.)  · Avoid drinks that are carbonated or have caffeine. They can irritate the bladder. · Urinate often. Try to empty your bladder each time. · To relieve pain, take a hot bath or lay a heating pad set on low over your lower belly or genital area. Never go to sleep with a heating pad in place. To prevent UTIs  · Drink plenty of water each day.  This helps you urinate often, which clears bacteria from your system. (If you have kidney, heart, or liver disease and have to limit fluids, talk with your doctor before you increase your fluid intake.)  · Urinate when you need to. · Urinate right after you have sex. · Change sanitary pads often. · Avoid douches, bubble baths, feminine hygiene sprays, and other feminine hygiene products that have deodorants. · After going to the bathroom, wipe from front to back. When should you call for help? Call your doctor now or seek immediate medical care if:  ? · Symptoms such as fever, chills, nausea, or vomiting get worse or appear for the first time. ? · You have new pain in your back just below your rib cage. This is called flank pain. ? · There is new blood or pus in your urine. ? · You have any problems with your antibiotic medicine. ? Watch closely for changes in your health, and be sure to contact your doctor if:  ? · You are not getting better after taking an antibiotic for 2 days. ? · Your symptoms go away but then come back. Where can you learn more? Go to http://daniel-carlton.info/. Enter R039 in the search box to learn more about \"Urinary Tract Infection in Women: Care Instructions. \"  Current as of: May 12, 2017  Content Version: 11.4  © 9856-8664 [employer]. Care instructions adapted under license by [employer] (which disclaims liability or warranty for this information). If you have questions about a medical condition or this instruction, always ask your healthcare professional. [place] [address] any warranty or liability for your use of this information.

## 2018-03-22 ENCOUNTER — TELEPHONE (OUTPATIENT)
Dept: FAMILY MEDICINE CLINIC | Age: 38
End: 2018-03-22

## 2018-03-23 ENCOUNTER — TELEPHONE (OUTPATIENT)
Dept: FAMILY MEDICINE CLINIC | Age: 38
End: 2018-03-23

## 2018-03-23 LAB
BACTERIA UR CULT: ABNORMAL
C TRACH RRNA SPEC QL NAA+PROBE: NEGATIVE
N GONORRHOEA RRNA SPEC QL NAA+PROBE: NEGATIVE
T VAGINALIS RRNA SPEC QL NAA+PROBE: NEGATIVE

## 2018-03-23 NOTE — TELEPHONE ENCOUNTER
Per the urine culture and sensitivity, she is on the correct antibiotic.       Nitrofurantoin S ug/mL Susceptible

## 2018-04-04 ENCOUNTER — OFFICE VISIT (OUTPATIENT)
Dept: FAMILY MEDICINE CLINIC | Age: 38
End: 2018-04-04

## 2018-04-04 VITALS
HEART RATE: 97 BPM | SYSTOLIC BLOOD PRESSURE: 120 MMHG | BODY MASS INDEX: 32.54 KG/M2 | WEIGHT: 190.6 LBS | OXYGEN SATURATION: 98 % | DIASTOLIC BLOOD PRESSURE: 90 MMHG | HEIGHT: 64 IN | TEMPERATURE: 97 F | RESPIRATION RATE: 20 BRPM

## 2018-04-04 DIAGNOSIS — N39.46 MIXED STRESS AND URGE URINARY INCONTINENCE: ICD-10-CM

## 2018-04-04 DIAGNOSIS — R35.0 URINARY FREQUENCY: Primary | ICD-10-CM

## 2018-04-04 LAB
BILIRUB UR QL STRIP: NEGATIVE
GLUCOSE UR-MCNC: NEGATIVE MG/DL
HBA1C MFR BLD HPLC: 5 %
KETONES P FAST UR STRIP-MCNC: NEGATIVE MG/DL
PH UR STRIP: 7 [PH] (ref 4.6–8)
PROT UR QL STRIP: NEGATIVE
SP GR UR STRIP: 1.02 (ref 1–1.03)
UA UROBILINOGEN AMB POC: NORMAL (ref 0.2–1)
URINALYSIS CLARITY POC: CLEAR
URINALYSIS COLOR POC: YELLOW
URINE BLOOD POC: NEGATIVE
URINE LEUKOCYTES POC: NEGATIVE
URINE NITRITES POC: NEGATIVE

## 2018-04-04 RX ORDER — CETIRIZINE HCL 10 MG
TABLET ORAL
COMMUNITY
End: 2019-09-05 | Stop reason: ALTCHOICE

## 2018-04-04 NOTE — PROGRESS NOTES
Kimber Perez  30 y.o. female  1980  [address] [id number]  [id number]     [id number]: Progress Note       Encounter Date: 4/4/2018    Chief Complaint   Patient presents with    Urinary Frequency    Flank Pain     History of Present Illness   Kimber Perez is a 30 y.o. female who presents to clinic today for:  Patient reports increased appetite, thirst and sweating. Increased weight gain over the last 3 months, however poor eating habits (fast foods and pasta) and also increased sugary foods. She states she has bladder spasms, urgency and frequency however she feels like her bladder is emptying post void. She reports in February having 2 episodes of enuresis. She reports leakage during laughing and coughing. Reports wearing depend briefs in the past.    She denies dysuria, odor, hematuria, post void dribble, cloudy urine. Health Maintenance    There are no preventive care reminders to display for this patient. Review of Systems   Review of Systems   Constitutional: Negative. HENT: Negative. Eyes: Negative. Cardiovascular: Negative. Gastrointestinal: Negative. Genitourinary: Positive for frequency and urgency. Musculoskeletal: Positive for back pain. Skin: Negative. Neurological: Negative. Endo/Heme/Allergies: Negative. Psychiatric/Behavioral: Negative. Vitals/Objective:     Vitals:    04/04/18 1456   BP: 120/90   Pulse: 97   Resp: 20   Temp: 97 °F (36.1 °C)   TempSrc: Oral   SpO2: 98%   Weight: 190 lb 9.6 oz (86.5 kg)   Height: 5' 4\" (1.626 m)     Body mass index is 32.72 kg/(m^2). Physical Exam   Constitutional: She is oriented to person, place, and time. She appears well-developed and well-nourished. She is active and cooperative. Cardiovascular: Normal rate, regular rhythm, normal heart sounds and normal pulses. Pulmonary/Chest: Effort normal and breath sounds normal.   Abdominal: There is no CVA tenderness. Musculoskeletal: Normal range of motion. Neurological: She is alert and oriented to person, place, and time. Skin: Skin is warm, dry and intact. Psychiatric: She has a normal mood and affect. Her speech is normal and behavior is normal. Judgment and thought content normal. Cognition and memory are normal.       Recent Results (from the past 24 hour(s))   AMB POC URINALYSIS DIP STICK AUTO W/O MICRO    Collection Time: 04/04/18  3:05 PM   Result Value Ref Range    Color (UA POC) Yellow     Clarity (UA POC) Clear     Glucose (UA POC) Negative Negative    Bilirubin (UA POC) Negative Negative    Ketones (UA POC) Negative Negative    Specific gravity (UA POC) 1.020 1.001 - 1.035    Blood (UA POC) Negative Negative    pH (UA POC) 7.0 4.6 - 8.0    Protein (UA POC) Negative Negative    Urobilinogen (UA POC) 1 mg/dL 0.2 - 1    Nitrites (UA POC) Negative Negative    Leukocyte esterase (UA POC) Negative Negative   AMB POC HEMOGLOBIN A1C    Collection Time: 04/04/18  3:15 PM   Result Value Ref Range    Hemoglobin A1c (POC) 5.0 %     Assessment and Plan:   1. Urinary frequency    - AMB POC URINALYSIS DIP STICK AUTO W/O MICRO  - CULTURE, URINE  - AMB POC HEMOGLOBIN A1C  - COLLECTION CAPILLARY BLOOD SPECIMEN    2. Mixed stress and urge urinary incontinence    A1C WNL. Awaiting urine culture. Discussed ED precautions. Advised patient to:  AVOID:alcohol, sodas and other caffeine beverages, citrus juices. Weight loss will help with stress incontinence. Regular voiding. Pelvic floor exercises (Kegel exercises). Minimize fluids after dinner. Will discuss pharmacologic treatment with PCP. I have discussed the diagnosis with the patient and the intended plan as seen in the above orders. she has expressed understanding. The patient has received an after-visit summary and questions were answered concerning future plans. I have discussed medication side effects and warnings with the patient as well.     Electronically Signed: Kirill Avalos NP     History/Allergies   Patients past medical, surgical and family histories were reviewed and updated. Past Medical History:   Diagnosis Date    Anemia NEC     Depression     GERD (gastroesophageal reflux disease)     Hypertension     Musculoskeletal disorder     SOB (shortness of breath)     Stool color black     History reviewed. No pertinent surgical history. Family History   Problem Relation Age of Onset    Hypertension Father     Elevated Lipids Father     Arthritis-rheumatoid Mother      ? Lupus vs RA    Lung Disease Mother     Heart Disease Mother     Cancer Mother      breast in 39y    COPD Mother     Hypertension Mother     Stroke Mother      3-4 strokes    Breast Cancer Mother     Diabetes Maternal Grandmother      Social History     Social History    Marital status: SINGLE     Spouse name: N/A    Number of children: N/A    Years of education: N/A     Occupational History    Not on file. Social History Main Topics    Smoking status: Former Smoker     Packs/day: 0.50     Years: 8.00     Types: Cigarettes     Quit date: 12/9/2011    Smokeless tobacco: Never Used    Alcohol use 0.0 oz/week     0 Standard drinks or equivalent per week      Comment: occassionally    Drug use: No    Sexual activity: Yes     Partners: Male     Birth control/ protection: None     Other Topics Concern    Not on file     Social History Narrative         Allergies   Allergen Reactions    Sulfa (Sulfonamide Antibiotics) Hives    Vicodin [Hydrocodone-Acetaminophen] Nausea and Vomiting       Disposition     Follow-up Disposition:  Return if symptoms worsen or fail to improve. No future appointments. Current Medications after this visit     Current Outpatient Prescriptions   Medication Sig    cetirizine (ZYRTEC) 10 mg tablet Take  by mouth.  norgestimate-ethinyl estradiol (ORTHO-CYCLEN, SPRINTEC) 0.25-35 mg-mcg tab Take 1 Tab by mouth daily.  Indications: DYSMENORRHEA    FLUoxetine (PROZAC) 20 mg capsule Take  by mouth daily.  gabapentin (NEURONTIN) 300 mg capsule Take 2 tabs in morning and 2 tabs at nighttime. Indications: fibromyalgia    cyclobenzaprine (FLEXERIL) 10 mg tablet Take 1 Tab by mouth three (3) times daily as needed.  methylphenidate HCl (RITALIN) 10 mg tablet Take 20 mg by mouth two (2) times a day.  FLUoxetine (PROZAC) 40 mg capsule Take  by mouth daily.  omeprazole (PRILOSEC) 40 mg capsule Take 1 Cap by mouth daily. Indications: gastroesophageal reflux disease    fluticasone (FLONASE) 50 mcg/actuation nasal spray 2 Sprays by Both Nostrils route daily.  albuterol (PROVENTIL HFA, VENTOLIN HFA, PROAIR HFA) 90 mcg/actuation inhaler Take 1 Puff by inhalation every six (6) hours as needed for Wheezing.  ketoconazole (NIZORAL) 2 % shampoo shampoo twice weekly    triamcinolone acetonide (KENALOG) 0.1 % ointment Apply  to affected area two (2) times a day. use thin layer    hydrocortisone (ANUSOL-HC) 2.5 % rectal cream Insert  into rectum four (4) times daily. As needed    diclofenac (VOLTAREN) 1 % gel Apply 4 g to affected area four (4) times daily.  LORazepam (ATIVAN) 1 mg tablet Take 1 Tab by mouth every twelve (12) hours. Max Daily Amount: 2 mg. Indications: ANXIETY (Patient taking differently: Take 2 mg by mouth every twelve (12) hours. Indications: ANXIETY)    clonazePAM (KLONOPIN) 0.5 mg tablet Take 1 mg by mouth nightly as needed.  traZODone (DESYREL) 50 mg tablet Take 1 Tab by mouth nightly. No current facility-administered medications for this visit. There are no discontinued medications.

## 2018-04-04 NOTE — PROGRESS NOTES
There are no preventive care reminders to display for this patient. Body mass index is 32.72 kg/(m^2). 1. Have you been to the ER, urgent care clinic since your last visit? Hospitalized since your last visit? No    2. Have you seen or consulted any other health care providers outside of the [facility] since your last visit? Include any pap smears or colon screening.  No  Reviewed record in preparation for visit and have necessary documentation  Pt did not bring medication to office visit for review  Information was given to pt on Advanced Directives, Living Will  Information was given on Shingles Vaccine  opportunity was given for questions  Goals that were addressed and/or need to be completed during or after this appointment include

## 2018-04-04 NOTE — MR AVS SNAPSHOT
[address] [address] 
[phone] Patient: Dawit Tinoco MRN: [id number] :[id number] Visit Information Date & Time Provider Department Dept. Phone Encounter #  
 2018  2:40 PM Martha Tilley  [facility] [phone] [id number] Follow-up Instructions Return if symptoms worsen or fail to improve. Upcoming Health Maintenance Date Due  
 PAP AKA CERVICAL CYTOLOGY 2020 DTaP/Tdap/Td series (2 - Td) 2024 COLONOSCOPY 5/15/2025 Allergies as of 2018  Review Complete On: 2018 By: Adithya Luque Severity Noted Reaction Type Reactions Sulfa (Sulfonamide Antibiotics)  2010    Hives Vicodin [Hydrocodone-acetaminophen]  2011    Nausea and Vomiting Current Immunizations  Reviewed on 2010 Name Date Tdap 2014  7:31 PM  
  
 Not reviewed this visit You Were Diagnosed With   
  
 Codes Comments Urinary frequency    -  Primary ICD-10-CM: R35.0 ICD-9-CM: 788.41 Mixed stress and urge urinary incontinence     ICD-10-CM: N39.46 
ICD-9-CM: 788.33 Vitals BP Pulse Temp Resp Height(growth percentile) Weight(growth percentile) 120/90 97 97 °F (36.1 °C) (Oral) 20 5' 4\" (1.626 m) 190 lb 9.6 oz (86.5 kg) LMP SpO2 BMI OB Status Smoking Status 2018 98% 32.72 kg/m2 Having regular periods Former Smoker Vitals History BMI and BSA Data Body Mass Index Body Surface Area 32.72 kg/m 2 1.98 m 2 Preferred Pharmacy Pharmacy Name Phone [address] [phone] Your Updated Medication List  
  
   
This list is accurate as of 18  3:35 PM.  Always use your most recent med list.  
  
  
  
  
 albuterol 90 mcg/actuation inhaler Commonly known as:  PROVENTIL HFA, VENTOLIN HFA, PROAIR HFA  
 Take 1 Puff by inhalation every six (6) hours as needed for Wheezing. clonazePAM 0.5 mg tablet Commonly known as:  Swathi De Luna Take 1 mg by mouth nightly as needed. cyclobenzaprine 10 mg tablet Commonly known as:  FLEXERIL Take 1 Tab by mouth three (3) times daily as needed. diclofenac 1 % Gel Commonly known as:  VOLTAREN Apply 4 g to affected area four (4) times daily. FLONASE 50 mcg/actuation nasal spray Generic drug:  fluticasone 2 Sprays by Both Nostrils route daily. * FLUoxetine 40 mg capsule Commonly known as:  PROzac Take  by mouth daily. * PROzac 20 mg capsule Generic drug:  FLUoxetine Take  by mouth daily. gabapentin 300 mg capsule Commonly known as:  NEURONTIN Take 2 tabs in morning and 2 tabs at nighttime. Indications: fibromyalgia  
  
 hydrocortisone 2.5 % rectal cream  
Commonly known as:  ANUSOL-HC Insert  into rectum four (4) times daily. As needed  
  
 ketoconazole 2 % shampoo Commonly known as:  NIZORAL  
shampoo twice weekly LORazepam 1 mg tablet Commonly known as:  ATIVAN Take 1 Tab by mouth every twelve (12) hours. Max Daily Amount: 2 mg. Indications: ANXIETY  
  
 methylphenidate HCl 10 mg tablet Commonly known as:  RITALIN Take 20 mg by mouth two (2) times a day. norgestimate-ethinyl estradiol 0.25-35 mg-mcg Tab Commonly known as:  Ivory Blase Take 1 Tab by mouth daily. Indications: DYSMENORRHEA  
  
 omeprazole 40 mg capsule Commonly known as:  PRILOSEC Take 1 Cap by mouth daily. Indications: gastroesophageal reflux disease  
  
 traZODone 50 mg tablet Commonly known as:  Junito Plascenciaon Take 1 Tab by mouth nightly. triamcinolone acetonide 0.1 % ointment Commonly known as:  KENALOG Apply  to affected area two (2) times a day. use thin layer ZyrTEC 10 mg tablet Generic drug:  cetirizine Take  by mouth. * Notice: This list has 2 medication(s) that are the same as other medications prescribed for you. Read the directions carefully, and ask your doctor or other care provider to review them with you. We Performed the Following AMB POC HEMOGLOBIN A1C [42672 CPT(R)] AMB POC URINALYSIS DIP STICK AUTO W/O MICRO [38124 CPT(R)] COLLECTION CAPILLARY BLOOD SPECIMEN [69492 CPT(R)] CULTURE, URINE [id number] CPT(R)] Follow-up Instructions Return if symptoms worsen or fail to improve. Patient Instructions Bladder Training: Care Instructions Your Care Instructions Bladder training is used to treat urge incontinence and stress incontinence. Urge incontinence means that the need to urinate comes on so fast that you can't get to a toilet in time. Stress incontinence means that you leak urine because of pressure on your bladder. For example, it may happen when you laugh, cough, or lift something heavy. Bladder training can increase how long you can wait before you have to urinate. It can also help your bladder hold more urine. And it can give you better control over the urge to urinate. It is important to remember that bladder training takes a few weeks to a few months to make a difference. You may not see results right away, but don't give up. Follow-up care is a key part of your treatment and safety. Be sure to make and go to all appointments, and call your doctor if you are having problems. It's also a good idea to know your test results and keep a list of the medicines you take. How can you care for yourself at home? Work with your doctor to come up with a bladder training program that is right for you. You may use one or more of the following methods. Delayed urination · In the beginning, try to keep from urinating for 5 minutes after you first feel the need to go. · While you wait, take deep, slow breaths to relax.  Kegel exercises can also help you delay the need to go to the bathroom. · After some practice, when you can easily wait 5 minutes to urinate, try to wait 10 minutes before you urinate. · Slowly increase the waiting period until you are able to control when you have to urinate. Scheduled urination · Empty your bladder when you first wake up in the morning. · Schedule times throughout the day when you will urinate. · Start by going to the bathroom every hour, even if you don't need to go. · Slowly increase the time between trips to the bathroom. · When you have found a schedule that works well for you, keep doing it. · If you wake up during the night and have to urinate, do it. Apply your schedule to waking hours only. Kegel exercises These tighten and strengthen pelvic muscles, which can help you control the flow of urine. To do Kegel exercises: 
· Squeeze the same muscles you would use to stop your urine. Your belly and thighs should not move. · Hold the squeeze for 3 seconds, and then relax for 3 seconds. · Start with 3 seconds. Then add 1 second each week until you are able to squeeze for 10 seconds. · Repeat the exercise 10 to 15 times a session. Do three or more sessions a day. When should you call for help? Watch closely for changes in your health, and be sure to contact your doctor if: 
? · Your incontinence is getting worse. ? · You do not get better as expected. Where can you learn more? Go to http://daniel-carlton.info/. Enter J164 in the search box to learn more about \"Bladder Training: Care Instructions. \" Current as of: May 12, 2017 Content Version: 11.4 © 2709-1769 [employer]. Care instructions adapted under license by [employer] (which disclaims liability or warranty for this information).  If you have questions about a medical condition or this instruction, always ask your healthcare professional. [employer] disclaims any warranty or liability for your use of this information. AVOID:alcohol, sodas and other caffeine beverages, citrus juices. Weight loss will help with stress incontinence. Regular voiding. Pelvic floor exercises (Kegel exercises). Minimize fluids after dinner. Introducing [facility]! Dear Buddy Verma: Thank you for requesting a [facility] [employer] account. Our records indicate that you already have an active [employer] [employer] account. You can access your account anytime at [email or web address] Did you know that you can access your hospital and ER discharge instructions at any time in [facility] [employer]? You can also review all of your test results from your hospital stay or ER visit. Additional Information If you have questions, please visit the Frequently Asked Questions section of the [employer] website at [email or web address] Remember, [facility] [employer] is NOT to be used for urgent needs. For medical emergencies, dial 911. Now available from your iPhone and Android! Please provide this summary of care documentation to your next provider. Your primary care clinician is listed as [address]. If you have any questions after today's visit, please call [phone].

## 2018-04-04 NOTE — PATIENT INSTRUCTIONS
Bladder Training: Care Instructions  Your Care Instructions    Bladder training is used to treat urge incontinence and stress incontinence. Urge incontinence means that the need to urinate comes on so fast that you can't get to a toilet in time. Stress incontinence means that you leak urine because of pressure on your bladder. For example, it may happen when you laugh, cough, or lift something heavy. Bladder training can increase how long you can wait before you have to urinate. It can also help your bladder hold more urine. And it can give you better control over the urge to urinate. It is important to remember that bladder training takes a few weeks to a few months to make a difference. You may not see results right away, but don't give up. Follow-up care is a key part of your treatment and safety. Be sure to make and go to all appointments, and call your doctor if you are having problems. It's also a good idea to know your test results and keep a list of the medicines you take. How can you care for yourself at home? Work with your doctor to come up with a bladder training program that is right for you. You may use one or more of the following methods. Delayed urination  · In the beginning, try to keep from urinating for 5 minutes after you first feel the need to go. · While you wait, take deep, slow breaths to relax. Kegel exercises can also help you delay the need to go to the bathroom. · After some practice, when you can easily wait 5 minutes to urinate, try to wait 10 minutes before you urinate. · Slowly increase the waiting period until you are able to control when you have to urinate. Scheduled urination  · Empty your bladder when you first wake up in the morning. · Schedule times throughout the day when you will urinate. · Start by going to the bathroom every hour, even if you don't need to go. · Slowly increase the time between trips to the bathroom.   · When you have found a schedule that works well for you, keep doing it. · If you wake up during the night and have to urinate, do it. Apply your schedule to waking hours only. Kegel exercises  These tighten and strengthen pelvic muscles, which can help you control the flow of urine. To do Kegel exercises:  · Squeeze the same muscles you would use to stop your urine. Your belly and thighs should not move. · Hold the squeeze for 3 seconds, and then relax for 3 seconds. · Start with 3 seconds. Then add 1 second each week until you are able to squeeze for 10 seconds. · Repeat the exercise 10 to 15 times a session. Do three or more sessions a day. When should you call for help? Watch closely for changes in your health, and be sure to contact your doctor if:  ? · Your incontinence is getting worse. ? · You do not get better as expected. Where can you learn more? Go to http://daniel-carlton.info/. Enter I969 in the search box to learn more about \"Bladder Training: Care Instructions. \"  Current as of: May 12, 2017  Content Version: 11.4  © 7001-4761 [employer]. Care instructions adapted under license by [employer] (which disclaims liability or warranty for this information). If you have questions about a medical condition or this instruction, always ask your healthcare professional. [address] any warranty or liability for your use of this information. AVOID:alcohol, sodas and other caffeine beverages, citrus juices. Weight loss will help with stress incontinence. Regular voiding. Pelvic floor exercises (Kegel exercises). Minimize fluids after dinner.

## 2018-04-04 NOTE — Clinical Note
Dr. Munoz Single we discuss a plan of care for [place]? Urology vs trying Detrol. Unsure if the Anticholinergic Agent will make other symptoms worse. A1C WNL. Awaiting urine culture. Discussed ED precautions. Advised patient to: AVOID:alcohol, sodas and other caffeine beverages, citrus juices. Weight loss will help with stress incontinence. Regular voiding. Pelvic floor exercises (Kegel exercises). Minimize fluids after dinner.

## 2018-04-06 LAB — BACTERIA UR CULT: NORMAL

## 2018-04-10 ENCOUNTER — TELEPHONE (OUTPATIENT)
Dept: FAMILY MEDICINE CLINIC | Age: 38
End: 2018-04-10

## 2018-04-10 DIAGNOSIS — N39.46 MIXED STRESS AND URGE URINARY INCONTINENCE: ICD-10-CM

## 2018-04-10 DIAGNOSIS — R32 ENURESIS: Primary | ICD-10-CM

## 2018-04-10 NOTE — TELEPHONE ENCOUNTER
Spoke with patient and identifiers verified. Spoke with Dr. Stefany Romero and discussed episodes of enuresis and mixed stress/urge incontinence. Referral to Urology. Patient aware and verbalized agreement with the above plan.

## 2018-05-10 ENCOUNTER — OFFICE VISIT (OUTPATIENT)
Dept: FAMILY MEDICINE CLINIC | Age: 38
End: 2018-05-10

## 2018-05-10 ENCOUNTER — TELEPHONE (OUTPATIENT)
Dept: FAMILY MEDICINE CLINIC | Age: 38
End: 2018-05-10

## 2018-05-10 VITALS
DIASTOLIC BLOOD PRESSURE: 75 MMHG | SYSTOLIC BLOOD PRESSURE: 111 MMHG | OXYGEN SATURATION: 96 % | TEMPERATURE: 98.5 F | HEART RATE: 87 BPM | WEIGHT: 198.6 LBS | RESPIRATION RATE: 18 BRPM | BODY MASS INDEX: 33.9 KG/M2 | HEIGHT: 64 IN

## 2018-05-10 DIAGNOSIS — M25.561 POSTERIOR KNEE PAIN, RIGHT: Primary | ICD-10-CM

## 2018-05-10 DIAGNOSIS — R09.89 GLOBUS SENSATION: ICD-10-CM

## 2018-05-10 RX ORDER — DICLOFENAC SODIUM 10 MG/G
4 GEL TOPICAL 4 TIMES DAILY
Qty: 100 G | Refills: 4 | Status: SHIPPED | OUTPATIENT
Start: 2018-05-10 | End: 2018-09-24 | Stop reason: SDUPTHER

## 2018-05-10 RX ORDER — PANTOPRAZOLE SODIUM 40 MG/1
40 TABLET, DELAYED RELEASE ORAL DAILY
Qty: 30 TAB | Refills: 11 | Status: SHIPPED | OUTPATIENT
Start: 2018-05-10 | End: 2018-09-24 | Stop reason: SDUPTHER

## 2018-05-10 RX ORDER — GABAPENTIN 300 MG/1
CAPSULE ORAL
Qty: 90 CAP | Refills: 4 | Status: CANCELLED | OUTPATIENT
Start: 2018-05-10

## 2018-05-10 RX ORDER — DEXTROAMPHETAMINE SACCHARATE, AMPHETAMINE ASPARTATE, DEXTROAMPHETAMINE SULFATE AND AMPHETAMINE SULFATE 5; 5; 5; 5 MG/1; MG/1; MG/1; MG/1
20 TABLET ORAL
COMMUNITY
End: 2020-12-21

## 2018-05-10 RX ORDER — ALBUTEROL SULFATE 90 UG/1
1 AEROSOL, METERED RESPIRATORY (INHALATION)
Qty: 1 INHALER | Refills: 2 | Status: CANCELLED | OUTPATIENT
Start: 2018-05-10

## 2018-05-10 NOTE — PROGRESS NOTES
Subjective  CC: Johnathon Alejandro is an 30 y.o. female presents for right leg pain    Feels like she may have pulled a muscle on the back on her right leg. It starts behind the knee and moves down the leg. It started around Monday and is associated with numbness of her right foot. Denies any recent trauma to the leg. Currently unemployed with no need for prolonged time on her knee. Has difficulty completely extended at the knee. Patient also complains of persistent feeling of something being stuck in her throat. She had been seen by ENT and there was presence of esophageal inflammation and patient was placed on steroid dose pack. She has not seen improvement even with continued use of PPI and steroids. Per ENT note, they would refer to GI for endoscopy is symptoms persisted despite these interventions. Allergies - reviewed: Allergies   Allergen Reactions    Sulfa (Sulfonamide Antibiotics) Hives    Vicodin [Hydrocodone-Acetaminophen] Nausea and Vomiting         Medications - reviewed:   Current Outpatient Prescriptions   Medication Sig    dextroamphetamine-amphetamine (ADDERALL) 20 mg tablet Take 20 mg by mouth.  diclofenac (VOLTAREN) 1 % gel Apply 4 g to affected area four (4) times daily.  pantoprazole (PROTONIX) 40 mg tablet Take 1 Tab by mouth daily.  cetirizine (ZYRTEC) 10 mg tablet Take  by mouth.  norgestimate-ethinyl estradiol (ORTHO-CYCLEN, SPRINTEC) 0.25-35 mg-mcg tab Take 1 Tab by mouth daily. Indications: DYSMENORRHEA    gabapentin (NEURONTIN) 300 mg capsule Take 2 tabs in morning and 2 tabs at nighttime. Indications: fibromyalgia    cyclobenzaprine (FLEXERIL) 10 mg tablet Take 1 Tab by mouth three (3) times daily as needed.  FLUoxetine (PROZAC) 40 mg capsule Take 40 mg by mouth daily.  albuterol (PROVENTIL HFA, VENTOLIN HFA, PROAIR HFA) 90 mcg/actuation inhaler Take 1 Puff by inhalation every six (6) hours as needed for Wheezing.     ketoconazole (NIZORAL) 2 % shampoo shampoo twice weekly    triamcinolone acetonide (KENALOG) 0.1 % ointment Apply  to affected area two (2) times a day. use thin layer    hydrocortisone (ANUSOL-HC) 2.5 % rectal cream Insert  into rectum four (4) times daily. As needed    LORazepam (ATIVAN) 1 mg tablet Take 1 Tab by mouth every twelve (12) hours. Max Daily Amount: 2 mg. Indications: ANXIETY (Patient taking differently: Take 2 mg by mouth every twelve (12) hours. Indications: ANXIETY)    clonazePAM (KLONOPIN) 0.5 mg tablet Take 1 mg by mouth nightly as needed.  traZODone (DESYREL) 50 mg tablet Take 1 Tab by mouth nightly.  fluticasone (FLONASE) 50 mcg/actuation nasal spray 2 Sprays by Both Nostrils route daily. No current facility-administered medications for this visit. Past Medical History - reviewed:  Past Medical History:   Diagnosis Date    Anemia NEC     Depression     GERD (gastroesophageal reflux disease)     Hypertension     Musculoskeletal disorder     SOB (shortness of breath)     Stool color black          Immunizations - reviewed:   Immunization History   Administered Date(s) Administered    Tdap 01/25/2014         ROS  Review of Systems : A complete review of systems was performed and is negative except for those mentioned in the HPI. Physical Exam  Visit Vitals    /75 (BP 1 Location: Left arm, BP Patient Position: Sitting)    Pulse 87    Temp 98.5 °F (36.9 °C) (Oral)    Resp 18    Ht 5' 4\" (1.626 m)    Wt 198 lb 9.6 oz (90.1 kg)    SpO2 96%    BMI 34.09 kg/m2       General appearance - Alert, NAD. Head: Atraumatic. Normocephalic. No lymphadenopathy  Eyes: EOMI. Sclera white. Respiratory - LCTAB. No wheeze/rale/rhonchi  Heart - Normal rate, regular rhythm. No m/r/r  Neurological - No focal deficits. Speech normal.   Musculoskeletal - TTP of lateral aspect of   Extremities - No LE edema. Distal pulses intact. Sensation intact in LE bilaterally. Strength 5/5 bilaterally.  No calf tenderness. Skin - normal coloration and normal turgor. No cyanosis, no rash. Assessment/Plan  1. Posterior knee pain, right - Patient likely has some form of tendonitis as patient is tender to palpation. However, with patient being on OCP she is at an increased risk for VTE so will send for further evaluation.  - DUPLEX LOWER EXT VENOUS RIGHT; Future  - diclofenac (VOLTAREN) 1 % gel; Apply 4 g to affected area four (4) times daily. Dispense: 100 g; Refill: 4  - Lidocaine patches and heat     2. Globus sensation - Patient with globus despite unremarkable ENT evaluation. Will refer to GI and transition to Pantoprazole. - [facility]  - pantoprazole (PROTONIX) 40 mg tablet; Take 1 Tab by mouth daily. Dispense: 30 Tab; Refill: 11        Follow-up Disposition:  Return if symptoms worsen or fail to improve. I have discussed the aforementioned diagnoses and plan with the patient in detail. I have provided information in person and/or in AVS. All questions answered prior to discharge.     Chrystal Monzon MD  Family Medicine Resident  PGY 1

## 2018-05-10 NOTE — MR AVS SNAPSHOT
[address] [address] 
[phone] Patient: Elida Stevenson MRN: [id number] :1980 Visit Information Date & Time Provider Department Dept. Phone Encounter #  
 5/10/2018  8:00 AM Carter Griffin MD  [facility] [id number] Upcoming Health Maintenance Date Due Influenza Age 5 to Adult 2018 PAP AKA CERVICAL CYTOLOGY 2020 DTaP/Tdap/Td series (2 - Td) 2024 COLONOSCOPY 5/15/2025 Allergies as of 5/10/2018  Review Complete On: 5/10/2018 By: Paulie Pratt Severity Noted Reaction Type Reactions Sulfa (Sulfonamide Antibiotics)  2010    Hives Vicodin [Hydrocodone-acetaminophen]  2011    Nausea and Vomiting Current Immunizations  Reviewed on 2010 Name Date Tdap 2014  7:31 PM  
  
 Not reviewed this visit You Were Diagnosed With   
  
 Codes Comments Posterior knee pain, right    -  Primary ICD-10-CM: M25.561 ICD-9-CM: 719.46 Vitals BP Pulse Temp Resp Height(growth percentile) Weight(growth percentile) 111/75 (BP 1 Location: Left arm, BP Patient Position: Sitting) 87 98.5 °F (36.9 °C) (Oral) 18 5' 4\" (1.626 m) 198 lb 9.6 oz (90.1 kg) SpO2 BMI OB Status Smoking Status 96% 34.09 kg/m2 Having regular periods Former Smoker Vitals History BMI and BSA Data Body Mass Index Body Surface Area 34.09 kg/m 2 2.02 m 2 Preferred Pharmacy Pharmacy Name Phone [address] [phone] Your Updated Medication List  
  
   
This list is accurate as of 5/10/18  8:57 AM.  Always use your most recent med list.  
  
  
  
  
 albuterol 90 mcg/actuation inhaler Commonly known as:  PROVENTIL HFA, VENTOLIN HFA, PROAIR HFA Take 1 Puff by inhalation every six (6) hours as needed for Wheezing. clonazePAM 0.5 mg tablet Commonly known as:  Lucendia Demark Take 1 mg by mouth nightly as needed. cyclobenzaprine 10 mg tablet Commonly known as:  FLEXERIL Take 1 Tab by mouth three (3) times daily as needed. dextroamphetamine-amphetamine 20 mg tablet Commonly known as:  ADDERALL Take 20 mg by mouth. diclofenac 1 % Gel Commonly known as:  VOLTAREN Apply 4 g to affected area four (4) times daily. FLONASE 50 mcg/actuation nasal spray Generic drug:  fluticasone 2 Sprays by Both Nostrils route daily. * FLUoxetine 40 mg capsule Commonly known as:  PROzac Take 40 mg by mouth daily. * PROzac 20 mg capsule Generic drug:  FLUoxetine Take  by mouth daily. gabapentin 300 mg capsule Commonly known as:  NEURONTIN Take 2 tabs in morning and 2 tabs at nighttime. Indications: fibromyalgia  
  
 hydrocortisone 2.5 % rectal cream  
Commonly known as:  ANUSOL-HC Insert  into rectum four (4) times daily. As needed  
  
 ketoconazole 2 % shampoo Commonly known as:  NIZORAL  
shampoo twice weekly LORazepam 1 mg tablet Commonly known as:  ATIVAN Take 1 Tab by mouth every twelve (12) hours. Max Daily Amount: 2 mg. Indications: ANXIETY  
  
 methylphenidate HCl 10 mg tablet Commonly known as:  RITALIN Take 20 mg by mouth two (2) times a day. norgestimate-ethinyl estradiol 0.25-35 mg-mcg Tab Commonly known as:  Guerry Raisin Take 1 Tab by mouth daily. Indications: DYSMENORRHEA  
  
 omeprazole 40 mg capsule Commonly known as:  PRILOSEC Take 1 Cap by mouth daily. Indications: gastroesophageal reflux disease  
  
 traZODone 50 mg tablet Commonly known as:  Rico Harrier Take 1 Tab by mouth nightly. triamcinolone acetonide 0.1 % ointment Commonly known as:  KENALOG Apply  to affected area two (2) times a day. use thin layer ZyrTEC 10 mg tablet Generic drug:  cetirizine Take  by mouth. * Notice: This list has 2 medication(s) that are the same as other medications prescribed for you. Read the directions carefully, and ask your doctor or other care provider to review them with you. Prescriptions Sent to Pharmacy Refills  
 diclofenac (VOLTAREN) 1 % gel 4 Sig: Apply 4 g to affected area four (4) times daily. Class: Normal  
 Pharmacy: [facility] DR MICHEAL HERNÁNDEZ [address] [place] [address] #: [phone] Route: Topical  
  
To-Do List   
 05/10/2018 Imaging:  DUPLEX LOWER EXT VENOUS RIGHT Patient Instructions You may purchase Solonpas lidocaine patches at your local pharmacy to help with pain if the Volteran gel is not covered by your insurance. Also, apply a heating pad to the back of your knee to help calm any inflammation that is occurring. Do this for 30 minutes on and 30 minutes off as often as needed. Stretching: Exercises Your Care Instructions Here are some examples of exercises for stretching. Start each exercise slowly. Ease off the exercise if you start to have pain. Your doctor or physical therapist will tell you when you can start these exercises and which ones will work best for you. How to do the exercises Latissimus stretch 1. Stand with your back straight and your feet shoulder-width apart. You can do this stretch sitting down if you are not steady on your feet. 2. Hold your arms above your head, and hold one hand with the other. 3. Pull upward while leaning straight over toward your right side. Keep your lower body straight. You should feel the stretch along your left side. 4. Hold 15 to 30 seconds, and then switch sides. 5. Repeat 2 to 4 times for each side. Triceps stretch 1. Stand with your back straight and your feet shoulder-width apart. You can do this stretch sitting down if you are not steady on your feet. 2. Bring your left elbow straight up while bending your arm. 3. Grab your left elbow with your right hand, and pull your left elbow toward your head with light pressure. If you are more flexible, you may pull your arm slightly behind your head. You will feel the stretch along the back of your arm. 4. Hold 15 to 30 seconds, and then switch elbows. 5. Repeat 2 to 4 times for each arm. Calf stretch 1. Place your hands on a wall for balance. You can also do this with your hands on the back of a chair, a countertop, or a tree. 2. Step back with your left leg. Keep the leg straight, and press your left heel into the floor. 3. Press your hips forward, bending your right leg slightly. You will feel the stretch in your left calf. 4. Hold the stretch 15 to 30 seconds. 5. Repeat 2 to 4 times for each leg. Quadriceps stretch 1. Lie on your side with one hand supporting your head. 2. Bend your upper leg back and grab your ankle with your other hand. 3. Stretch your leg back by pulling your foot toward your buttocks. You will feel the stretch in the front of your thigh. If this causes stress on your knees, do not do this stretch. 4. Hold the stretch 15 to 30 seconds. 5. Repeat 2 to 4 times for each leg. Groin stretch 1. Sit on the floor and put the soles of your feet together. Do not slump your back. 2. Grab your ankles and gently pull your legs toward you. 3. Press your knees toward the floor. You will feel the stretch in your inner thighs. 4. Hold 15 to 30 seconds. 5. Repeat 2 to 4 times. Hamstring stretch in doorway 1. Lie on the floor near a doorway, with your buttocks close to the wall. 2. Let the leg you are not stretching extend through the doorway. 3. Put the leg you want to stretch up on the wall, and straighten your knee to feel a gentle stretch at the back of your leg. 4. Hold the stretch for at least 15 to 30 seconds. Repeat 2 to 4 times. Follow-up care is a key part of your treatment and safety.  Be sure to make and go to all appointments, and call your doctor if you are having problems. It's also a good idea to know your test results and keep a list of the medicines you take. Where can you learn more? Go to http://daniel-carlton.info/. Enter [phone] in the search box to learn more about \"Stretching: Exercises. \" Current as of: March 13, 2017 Content Version: 11.4 © 0216-1668 [employer]. Care instructions adapted under license by [employer] (which disclaims liability or warranty for this information). If you have questions about a medical condition or this instruction, always ask your healthcare professional. [address] any warranty or liability for your use of this information. Introducing [facility]! Dear Eve King: Thank you for requesting a [employer] account. Our records indicate that you already have an active [employer] account. You can access your account anytime at [email or web address] Did you know that you can access your hospital and ER discharge instructions at any time in [employer]? You can also review all of your test results from your hospital stay or ER visit. Additional Information If you have questions, please visit the Frequently Asked Questions section of the [employer] website at [email or web address] Remember, [employer] is NOT to be used for urgent needs. For medical emergencies, dial 911. Now available from your iPhone and Android! Please provide this summary of care documentation to your next provider. Your primary care clinician is listed as [address]. If you have any questions after today's visit, please call [phone].

## 2018-05-10 NOTE — TELEPHONE ENCOUNTER
Phone call to patient, no answer. Left a detailed message informing patient that per Dr. Bass Fail: \"I am changing her Prilosec to Protonix. The Protonix is stronger and should work better on treating acid. She should stop taking the Prilosec once she gets her new prescription of Protonix. \"

## 2018-05-10 NOTE — PROGRESS NOTES
I discussed the findings, assessment and plan in detail with the resident and agree with the resident's findings and plan as documented in the resident's note. Johnson Romero M.D.

## 2018-05-10 NOTE — PATIENT INSTRUCTIONS
You may purchase Solonpas lidocaine patches at your local pharmacy to help with pain if the Volteran gel is not covered by your insurance. Also, apply a heating pad to the back of your knee to help calm any inflammation that is occurring. Do this for 30 minutes on and 30 minutes off as often as needed. Stretching: Exercises  Your Care Instructions  Here are some examples of exercises for stretching. Start each exercise slowly. Ease off the exercise if you start to have pain. Your doctor or physical therapist will tell you when you can start these exercises and which ones will work best for you. How to do the exercises  Latissimus stretch    1. Stand with your back straight and your feet shoulder-width apart. You can do this stretch sitting down if you are not steady on your feet. 2. Hold your arms above your head, and hold one hand with the other. 3. Pull upward while leaning straight over toward your right side. Keep your lower body straight. You should feel the stretch along your left side. 4. Hold 15 to 30 seconds, and then switch sides. 5. Repeat 2 to 4 times for each side. Triceps stretch    1. Stand with your back straight and your feet shoulder-width apart. You can do this stretch sitting down if you are not steady on your feet. 2. Bring your left elbow straight up while bending your arm. 3. Grab your left elbow with your right hand, and pull your left elbow toward your head with light pressure. If you are more flexible, you may pull your arm slightly behind your head. You will feel the stretch along the back of your arm. 4. Hold 15 to 30 seconds, and then switch elbows. 5. Repeat 2 to 4 times for each arm. Calf stretch    1. Place your hands on a wall for balance. You can also do this with your hands on the back of a chair, a countertop, or a tree. 2. Step back with your left leg. Keep the leg straight, and press your left heel into the floor.   3. Press your hips forward, bending your right leg slightly. You will feel the stretch in your left calf. 4. Hold the stretch 15 to 30 seconds. 5. Repeat 2 to 4 times for each leg. Quadriceps stretch    1. Lie on your side with one hand supporting your head. 2. Bend your upper leg back and grab your ankle with your other hand. 3. Stretch your leg back by pulling your foot toward your buttocks. You will feel the stretch in the front of your thigh. If this causes stress on your knees, do not do this stretch. 4. Hold the stretch 15 to 30 seconds. 5. Repeat 2 to 4 times for each leg. Groin stretch    1. Sit on the floor and put the soles of your feet together. Do not slump your back. 2. Grab your ankles and gently pull your legs toward you. 3. Press your knees toward the floor. You will feel the stretch in your inner thighs. 4. Hold 15 to 30 seconds. 5. Repeat 2 to 4 times. Hamstring stretch in doorway    1. Lie on the floor near a doorway, with your buttocks close to the wall. 2. Let the leg you are not stretching extend through the doorway. 3. Put the leg you want to stretch up on the wall, and straighten your knee to feel a gentle stretch at the back of your leg. 4. Hold the stretch for at least 15 to 30 seconds. Repeat 2 to 4 times. Follow-up care is a key part of your treatment and safety. Be sure to make and go to all appointments, and call your doctor if you are having problems. It's also a good idea to know your test results and keep a list of the medicines you take. Where can you learn more? Go to http://daniel-carlton.info/. Enter [phone] in the search box to learn more about \"Stretching: Exercises. \"  Current as of: March 13, 2017  Content Version: 11.4  © 7169-0665 [employer]. Care instructions adapted under license by [employer] (which disclaims liability or warranty for this information).  If you have questions about a medical condition or this instruction, always ask your healthcare professional. [address] any warranty or liability for your use of this information.

## 2018-05-24 ENCOUNTER — HOSPITAL ENCOUNTER (OUTPATIENT)
Dept: VASCULAR SURGERY | Age: 38
Discharge: HOME | End: 2018-05-24
Attending: STUDENT IN AN ORGANIZED HEALTH CARE EDUCATION/TRAINING PROGRAM
Payer: SUBSIDIZED

## 2018-05-24 DIAGNOSIS — M25.561 POSTERIOR KNEE PAIN, RIGHT: ICD-10-CM

## 2018-05-24 PROCEDURE — 93971 EXTREMITY STUDY: CPT

## 2018-05-24 NOTE — PROCEDURES
[address]  *** FINAL REPORT ***    Name: Sera Llamas  MRN: [id number]    Outpatient  : 27 Aug 1980  HIS Order #: [id number]  [address] [facility] Visit #: [id number]  Date: 24 May 2018    TYPE OF TEST: Peripheral Venous Testing    REASON FOR TEST  Pain in limb    Right Leg:-  Deep venous thrombosis:           No  Superficial venous thrombosis:    No  Deep venous insufficiency:        No  Superficial venous insufficiency: No      INTERPRETATION/FINDINGS  PROCEDURE:  RIGHT LOWER EXTREMITY  VENOUS DUPLEX. Evaluation of lower  extremity veins with ultrasound (B-mode imaging, pulsed Doppler, color   Doppler). Includes the common femoral, deep femoral, femoral,  popliteal, posterior tibial, peroneal, and great saphenous veins. Other veins, for example the gastrocnemius and soleal veins, may also  be visualized. FINDINGS: Nevin Dakota scale and color flow duplex images of the veins in the  right lower extremity demonstrate normal compressibility, spontaneous  and augmented flow profiles, and absence of filling defects throughout   the deep and superficial veins in the right lower extremity. CONCLUSION:  Right lower extremity venous duplex negative for deep  venous thrombosis or thrombophlebitis. Left common femoral vein is  thrombus free. ADDITIONAL COMMENTS    I have personally reviewed the data relevant to the interpretation of  this  study.     TECHNOLOGIST: Yeison Figueroa  Signed: 2018 04:12 PM    PHYSICIAN: Terry Hussein MD  Signed: 2018 07:30 AM

## 2018-05-29 DIAGNOSIS — M79.7 FIBROMYALGIA: ICD-10-CM

## 2018-05-29 RX ORDER — GABAPENTIN 300 MG/1
CAPSULE ORAL
Qty: 90 CAP | Refills: 4 | Status: SHIPPED | OUTPATIENT
Start: 2018-05-29 | End: 2018-09-24 | Stop reason: SDUPTHER

## 2018-05-30 ENCOUNTER — TELEPHONE (OUTPATIENT)
Dept: FAMILY MEDICINE CLINIC | Age: 38
End: 2018-05-30

## 2018-05-30 NOTE — TELEPHONE ENCOUNTER
Spoke to pt and advised per Rector Head NP:     GI likely suggested Tylenol vs NSAIDs due to her reflux issues etc. Tylenol should be ok-just make sure she is aware of proper dosing.  Extra strength: 1000 mg every 6 hours; maximum daily dose: 3000 mg daily        Verbalized understanding

## 2018-05-30 NOTE — TELEPHONE ENCOUNTER
----- Message from Maria R Ratliff sent at 5/30/2018 11:36 AM EDT -----  Regarding: Dr. Thelma Luna  Patient was advised by her GI doctor that she can not take Aleeve anymore and suggested Tylenol. Would like a call back to see if that's ok.  Contact is [phone]

## 2018-06-05 NOTE — PROGRESS NOTES
[address]  Endoscopy Preprocedure Instructions      1. On the day of your surgery, please report to registration located on the 2nd floor of the  [facility]. yes    2. You must have a responsible adult to drive you to the hospital, stay at the hospital during your procedure and drive you home. If they leave your procedure will not be started (no exceptions). yes    3. Do not have anything to eat or drink (including water, gum, mints, coffee, and juice) after midnight. This does not apply to the medications you were instructed to take by your physician. yesIf you are currently taking Plavi, Coumadin, Aspirin, or other blood-thinning agents, contact your physician for special instructions. yes,    4. If you are having a procedure that requires bowel prep: We recommend that you have only clear liquids the day before your procedure and begin your bowel prep by 5:00 pm.  You may continue to drink clear liquids until midnight. If for any reason you are not able to complete your prep please notify your physician immediately. yes    5. Have a list of all current medications, including vitamins, herbal supplements and any other over the counter medications. yes    6. If you wear glasses, contacts, dentures and/or hearing aids, they may be removed prior to procedure, please bring a case to store them in. yes    7. You should understand that if you do not follow these instructions your procedure may be cancelled. If your physical condition changes (I.e. fever, cold or flu) please contact your doctor as soon as possible. 8. It is important that you be on time.   If for any reason you are unable to keep your appointment please call )- the day of or your physicians office prior to your scheduled procedure

## 2018-06-06 NOTE — H&P
[address]  [address], [address] [facility]  [phone]                     History and Physical     NAME: Landon Mariano   :  1980   MRN:  [id number]     HPI:     30year old female who presents with a complaint of Heartburn. The patient presents consultation at the request of Dr. Denver Harlem). The heartburn is characterized as burning, tightness and pressure. The onset of the heartburn has been chronic and has been occurring in an intermittent pattern for 2 months. The course has been recurrent. The heartburn is described as moderate. The patient has been using omeprazole (Prilosec) and pantoprazole (Protonix). Current medication use: considered effective by patient. Failed treatments include: lifestyle modifications of GERD and antacids. The heartburn is described as being located in the retrosternal area, upper epigastrium and epigastrium. The heartburn are characterized as unchanged. The heartburn does not radiate. The symptoms are aggravated by large meals, aspirin, citrus fruits, alcohol, tomato-based products and spicy foods. The symptoms are relieved by antacids (protonix helps but not resolve stx 100%). The symptoms have been associated with dysphagia and indigestion, while the symptoms have not been associated with abdominal pain, early satiety, excessive alcohol use, excessive smoking, flatulence, hematemesis, nausea, vomiting, asthma, melena, pulmonary symptoms, voice changes, waterbrash, Snell's esophagus or weight loss. Previous diagnostic tests have not included endoscopy or Barium swallow. Note for \"Heartburn\": Pt peterson any use of anticoagulants.  She takes ALeve on a daily basis for the fibromyalgia          Past Surgical History:   Procedure Laterality Date    HX GYN            Past Medical History:   Diagnosis Date    Anemia NEC     Arthritis     Chronic pain     Depression     anxiety, depression, OCD    GERD (gastroesophageal reflux disease)     Hypertension     Musculoskeletal disorder     SOB (shortness of breath)     Stool color black      Social History   Substance Use Topics    Smoking status: Current Every Day Smoker     Packs/day: 0.25     Years: 8.00     Types: Cigarettes     Last attempt to quit: 12/9/2011    Smokeless tobacco: Never Used    Alcohol use 0.0 oz/week     0 Standard drinks or equivalent per week      Comment: occassionally     Allergies   Allergen Reactions    Sulfa (Sulfonamide Antibiotics) Hives    Vicodin [Hydrocodone-Acetaminophen] Nausea and Vomiting     Family History   Problem Relation Age of Onset    Hypertension Father     Elevated Lipids Father     Arthritis-rheumatoid Mother      ? Lupus vs RA    Lung Disease Mother     Heart Disease Mother     Cancer Mother      breast in 39y    COPD Mother     Hypertension Mother     Stroke Mother      3-4 strokes    Breast Cancer Mother     Diabetes Maternal Grandmother      No current facility-administered medications for this encounter. Current Outpatient Prescriptions   Medication Sig    gabapentin (NEURONTIN) 300 mg capsule Take 2 tabs in morning and 2 tabs at nighttime. Indications: fibromyalgia    dextroamphetamine-amphetamine (ADDERALL) 20 mg tablet Take 20 mg by mouth.  diclofenac (VOLTAREN) 1 % gel Apply 4 g to affected area four (4) times daily.  pantoprazole (PROTONIX) 40 mg tablet Take 1 Tab by mouth daily.  cetirizine (ZYRTEC) 10 mg tablet Take  by mouth.  norgestimate-ethinyl estradiol (ORTHO-CYCLEN, SPRINTEC) 0.25-35 mg-mcg tab Take 1 Tab by mouth daily. Indications: DYSMENORRHEA    cyclobenzaprine (FLEXERIL) 10 mg tablet Take 1 Tab by mouth three (3) times daily as needed.  FLUoxetine (PROZAC) 40 mg capsule Take 40 mg by mouth daily.  fluticasone (FLONASE) 50 mcg/actuation nasal spray 2 Sprays by Both Nostrils route daily.     albuterol (PROVENTIL HFA, VENTOLIN HFA, PROAIR HFA) 90 mcg/actuation inhaler Take 1 Puff by inhalation every six (6) hours as needed for Wheezing.  ketoconazole (NIZORAL) 2 % shampoo shampoo twice weekly    triamcinolone acetonide (KENALOG) 0.1 % ointment Apply  to affected area two (2) times a day. use thin layer    hydrocortisone (ANUSOL-HC) 2.5 % rectal cream Insert  into rectum four (4) times daily. As needed    LORazepam (ATIVAN) 1 mg tablet Take 1 Tab by mouth every twelve (12) hours. Max Daily Amount: 2 mg. Indications: ANXIETY (Patient taking differently: Take 2 mg by mouth every twelve (12) hours. Indications: ANXIETY)    clonazePAM (KLONOPIN) 0.5 mg tablet Take 1 mg by mouth nightly as needed.  traZODone (DESYREL) 50 mg tablet Take 1 Tab by mouth nightly. PHYSICAL EXAM:  General: WD, WN. Alert, cooperative, no acute distress    HEENT: NC, Atraumatic. PERRLA, EOMI. Anicteric sclerae. Lungs:  CTA Bilaterally. No Wheezing/Rhonchi/Rales. Heart:  Regular  rhythm,  No murmur, No Rubs, No Gallops  Abdomen: Soft, Non distended, Non tender.  +Bowel sounds, no HSM  Extremities: No c/c/e  Neurologic:  CN 2-12 gi, Alert and oriented X 3. No acute neurological distress   Psych:   Good insight. Not anxious nor agitated. Assessment:   I have reviewed with the patient +/- family alternatives,benefits and risks for the procedure, as well as potential complications(with emphasis on, but not limited to, bleeding, perforation, cardiovascular/cerebrovascular/pulmonary events, reactions to the medications, infection, risk of missing a lesions/a cancer, and the imponderables including death), alternative options, and patient/family voices understanding.       Plan:   · Endoscopic procedure  · Conscious sedation or MAC

## 2018-06-07 ENCOUNTER — ANESTHESIA (OUTPATIENT)
Dept: ENDOSCOPY | Age: 38
End: 2018-06-07
Payer: SUBSIDIZED

## 2018-06-07 ENCOUNTER — HOSPITAL ENCOUNTER (OUTPATIENT)
Age: 38
Setting detail: OUTPATIENT SURGERY
Discharge: HOME | End: 2018-06-07
Attending: INTERNAL MEDICINE | Admitting: INTERNAL MEDICINE
Payer: SUBSIDIZED

## 2018-06-07 ENCOUNTER — ANESTHESIA EVENT (OUTPATIENT)
Dept: ENDOSCOPY | Age: 38
End: 2018-06-07
Payer: SUBSIDIZED

## 2018-06-07 VITALS
WEIGHT: 202 LBS | RESPIRATION RATE: 15 BRPM | TEMPERATURE: 98.7 F | SYSTOLIC BLOOD PRESSURE: 136 MMHG | BODY MASS INDEX: 34.49 KG/M2 | DIASTOLIC BLOOD PRESSURE: 90 MMHG | HEIGHT: 64 IN | HEART RATE: 93 BPM | OXYGEN SATURATION: 99 %

## 2018-06-07 LAB
H PYLORI FROM TISSUE: NEGATIVE
HCG UR QL: NEGATIVE
KIT LOT NO., HCLOLOT: NORMAL
NEGATIVE CONTROL: NEGATIVE
POSITIVE CONTROL: POSITIVE

## 2018-06-07 PROCEDURE — 76060000031 HC ANESTHESIA FIRST 0.5 HR: Performed by: INTERNAL MEDICINE

## 2018-06-07 PROCEDURE — 87077 CULTURE AEROBIC IDENTIFY: CPT | Performed by: INTERNAL MEDICINE

## 2018-06-07 PROCEDURE — 74011250636 HC RX REV CODE- 250/636: Performed by: INTERNAL MEDICINE

## 2018-06-07 PROCEDURE — 74011250636 HC RX REV CODE- 250/636

## 2018-06-07 PROCEDURE — 74011000250 HC RX REV CODE- 250

## 2018-06-07 PROCEDURE — 81025 URINE PREGNANCY TEST: CPT

## 2018-06-07 PROCEDURE — 76040000019: Performed by: INTERNAL MEDICINE

## 2018-06-07 PROCEDURE — 77030009426 HC FCPS BIOP ENDOSC BSC -B: Performed by: INTERNAL MEDICINE

## 2018-06-07 RX ORDER — DEXTROMETHORPHAN/PSEUDOEPHED 2.5-7.5/.8
1.2 DROPS ORAL
Status: DISCONTINUED | OUTPATIENT
Start: 2018-06-07 | End: 2018-06-07 | Stop reason: HOSPADM

## 2018-06-07 RX ORDER — SODIUM CHLORIDE 9 MG/ML
50 INJECTION, SOLUTION INTRAVENOUS CONTINUOUS
Status: DISCONTINUED | OUTPATIENT
Start: 2018-06-07 | End: 2018-06-07 | Stop reason: HOSPADM

## 2018-06-07 RX ORDER — FLUMAZENIL 0.1 MG/ML
0.2 INJECTION INTRAVENOUS
Status: DISCONTINUED | OUTPATIENT
Start: 2018-06-07 | End: 2018-06-07 | Stop reason: HOSPADM

## 2018-06-07 RX ORDER — EPINEPHRINE 0.1 MG/ML
1 INJECTION INTRACARDIAC; INTRAVENOUS
Status: DISCONTINUED | OUTPATIENT
Start: 2018-06-07 | End: 2018-06-07 | Stop reason: HOSPADM

## 2018-06-07 RX ORDER — MIDAZOLAM HYDROCHLORIDE 1 MG/ML
.25-5 INJECTION, SOLUTION INTRAMUSCULAR; INTRAVENOUS
Status: DISCONTINUED | OUTPATIENT
Start: 2018-06-07 | End: 2018-06-07 | Stop reason: HOSPADM

## 2018-06-07 RX ORDER — PROPOFOL 10 MG/ML
INJECTION, EMULSION INTRAVENOUS AS NEEDED
Status: DISCONTINUED | OUTPATIENT
Start: 2018-06-07 | End: 2018-06-07 | Stop reason: HOSPADM

## 2018-06-07 RX ORDER — GLYCOPYRROLATE 0.2 MG/ML
INJECTION INTRAMUSCULAR; INTRAVENOUS AS NEEDED
Status: DISCONTINUED | OUTPATIENT
Start: 2018-06-07 | End: 2018-06-07 | Stop reason: HOSPADM

## 2018-06-07 RX ORDER — NALOXONE HYDROCHLORIDE 0.4 MG/ML
0.4 INJECTION, SOLUTION INTRAMUSCULAR; INTRAVENOUS; SUBCUTANEOUS
Status: DISCONTINUED | OUTPATIENT
Start: 2018-06-07 | End: 2018-06-07 | Stop reason: HOSPADM

## 2018-06-07 RX ORDER — ATROPINE SULFATE 0.1 MG/ML
0.5 INJECTION INTRAVENOUS
Status: DISCONTINUED | OUTPATIENT
Start: 2018-06-07 | End: 2018-06-07 | Stop reason: HOSPADM

## 2018-06-07 RX ADMIN — PROPOFOL 100 MG: 10 INJECTION, EMULSION INTRAVENOUS at 07:57

## 2018-06-07 RX ADMIN — PROPOFOL 100 MG: 10 INJECTION, EMULSION INTRAVENOUS at 07:53

## 2018-06-07 RX ADMIN — GLYCOPYRROLATE 0.1 MG: 0.2 INJECTION INTRAMUSCULAR; INTRAVENOUS at 07:53

## 2018-06-07 RX ADMIN — SODIUM CHLORIDE 50 ML/HR: 9 INJECTION, SOLUTION INTRAVENOUS at 07:00

## 2018-06-07 RX ADMIN — PROPOFOL 50 MG: 10 INJECTION, EMULSION INTRAVENOUS at 07:55

## 2018-06-07 NOTE — IP AVS SNAPSHOT
[address] 
[phone] Patient: Risa Frey MRN: [id number] :1980 About your hospitalization You were admitted on:  2018 You last received care in the:  [facility] ENDOSCOPY You were discharged on:  2018 Why you were hospitalized Your primary diagnosis was:  Not on File Follow-up Information None Your Scheduled Appointments 2018  2:45 PM EDT ROUTINE CARE with Dane Arambula MD  
[address] [address]  
[phone] Discharge Orders None A check brady indicates which time of day the medication should be taken. My Medications ASK your doctor about these medications Instructions Each Dose to Equal  
 Morning Noon Evening Bedtime  
 albuterol 90 mcg/actuation inhaler Commonly known as:  PROVENTIL HFA, VENTOLIN HFA, PROAIR HFA Your last dose was: Your next dose is: Take 1 Puff by inhalation every six (6) hours as needed for Wheezing. 1 Puff  
    
   
   
   
  
 clonazePAM 0.5 mg tablet Commonly known as:  Lucendia Demark Your last dose was: Your next dose is: Take 1 mg by mouth nightly as needed. 1 mg  
    
   
   
   
  
 cyclobenzaprine 10 mg tablet Commonly known as:  FLEXERIL Your last dose was: Your next dose is: Take 1 Tab by mouth three (3) times daily as needed. 10 mg  
    
   
   
   
  
 dextroamphetamine-amphetamine 20 mg tablet Commonly known as:  ADDERALL Your last dose was: Your next dose is: Take 20 mg by mouth. 20 mg  
    
   
   
   
  
 diclofenac 1 % Gel Commonly known as:  VOLTAREN Your last dose was: Your next dose is: Apply 4 g to affected area four (4) times daily. 4 g FLONASE 50 mcg/actuation nasal spray Generic drug:  fluticasone Your last dose was: Your next dose is: 2 Sprays by Both Nostrils route daily. 2 Spray FLUoxetine 40 mg capsule Commonly known as:  PROzac Your last dose was: Your next dose is: Take 40 mg by mouth daily. 40 mg  
    
   
   
   
  
 gabapentin 300 mg capsule Commonly known as:  NEURONTIN Your last dose was: Your next dose is: Take 2 tabs in morning and 2 tabs at nighttime. Indications: fibromyalgia  
     
   
   
   
  
 hydrocortisone 2.5 % rectal cream  
Commonly known as:  ANUSOL-HC Your last dose was: Your next dose is: Insert  into rectum four (4) times daily. As needed  
     
   
   
   
  
 ketoconazole 2 % shampoo Commonly known as:  NIZORAL Your last dose was: Your next dose is:    
   
   
 shampoo twice weekly LORazepam 1 mg tablet Commonly known as:  ATIVAN Your last dose was: Your next dose is: Take 1 Tab by mouth every twelve (12) hours. Max Daily Amount: 2 mg. Indications: ANXIETY 1 mg  
    
   
   
   
  
 norgestimate-ethinyl estradiol 0.25-35 mg-mcg Tab Commonly known as:  Venus Beath Your last dose was: Your next dose is: Take 1 Tab by mouth daily. Indications: DYSMENORRHEA  
 1 Tab  
    
   
   
   
  
 pantoprazole 40 mg tablet Commonly known as:  PROTONIX Your last dose was: Your next dose is: Take 1 Tab by mouth daily. 40 mg  
    
   
   
   
  
 traZODone 50 mg tablet Commonly known as:  Pharr Ort Your last dose was: Your next dose is: Take 1 Tab by mouth nightly. 50 mg  
    
   
   
   
  
 triamcinolone acetonide 0.1 % ointment Commonly known as:  KENALOG Your last dose was: Your next dose is:    
   
   
 Apply  to affected area two (2) times a day. use thin layer ZyrTEC 10 mg tablet Generic drug:  cetirizine Your last dose was: Your next dose is: Take  by mouth. Discharge Instructions [address] [facility] Christiano Gould M.D. 
[phone] COLON and EGD DISCHARGE INSTRUCTIONS 
 
2018 Pavelstephaniafranklin [place] :  1980 Arya Medical Record Number:  [id number] Discomfort: 
Sore throat- throat lozenges or warm salt water gargle Redness at IV site- apply warm compress to area; if redness or soreness persist- contact your physician There may be a slight amount of blood passed from the rectum Gaseous discomfort- walking, belching will help relieve any discomfort You may not operate a vehicle for 12 hours You may not engage in an occupation involving machinery or appliances for rest of today You may not drink alcoholic beverages for at least 12 hours Avoid making any critical decisions for at least 24 hour DIET: 
 Low fat and residual diet. Frequent meals ( small portions)  however -  remember your colon is empty and a heavy meal will produce gas. Avoid these foods:  vegetables, fried / greasy foods, carbonated drinks for today ACTIVITY: 
You may  resume your normal daily activities it is recommended that you spend the remainder of the day resting -  avoid any strenuous activity and driving. CALL M.DJevon ANY SIGN OF: Increasing pain, nausea, vomiting Abdominal distension (swelling) New increased bleeding (oral or rectal) Fever (chills) Pain in chest area Bloody discharge from nose or mouth Shortness of breath Mohan [place] [id number] 
1980 Follow-up Instructions: 
 Call Dr. Hazle Ganser if any questions at [phone]. Results of procedure / biopsy in 7 to 10 days, we will call you with these results. Your endoscopy showed retained food in the stomach suggestive of slow gastric emptying DISCHARGE SUMMARY from Nurse The following personal items collected during your admission are returned to you:  
Dental Appliance: Dental Appliances: None Vision: Visual Aid: Glasses, At bedside Hearing Aid:   
Jewelry:   
Clothing:   
Other Valuables:   
Valuables sent to safe:   
 
PATIENT INSTRUCTIONS: 
 
Take Home Medications: 
same Introducing [facility]! Dear Silvia Gongora: Thank you for requesting a Tabula account. Our records indicate that you already have an active Tabula account. You can access your account anytime at [email or web address] Did you know that you can access your hospital and ER discharge instructions at any time in Tabula? You can also review all of your test results from your hospital stay or ER visit. Additional Information If you have questions, please visit the Frequently Asked Questions section of the Tabula website at [email or web address] Remember, Tabula is NOT to be used for urgent needs. For medical emergencies, dial 911. Now available from your iPhone and Android! Introducing Henry Valle As a [facility] patient, I wanted to make you aware of our electronic visit tool called Henry Valle. [facility] 24/7 allows you to connect within minutes with a medical provider 24 hours a day, seven days a week via a mobile device or tablet or logging into a secure website from your computer. You can access Henry Valle from anywhere in the [place].  
 
A virtual visit might be right for you when you have a simple condition and feel like you just dont want to get out of bed, or cant get away from work for an appointment, when your regular [facility] provider is not available (evenings, weekends or holidays), or when youre out of town and need minor care. Electronic visits cost only $49 and if the Norma FabZat 24/7 provider determines a prescription is needed to treat your condition, one can be electronically transmitted to a nearby pharmacy*. Please take a moment to enroll today if you have not already done so. The enrollment process is free and takes just a few minutes. To enroll, please download the [employer] 24/7 jana to your tablet or phone, or visit [email or web address] to enroll on your computer. And, as an [address] [place] [address] patient with a Zounds account, the results of your visits will be scanned into your electronic medical record and your primary care provider will be able to view the scanned results. We urge you to continue to see your regular Norma Parra provider for your ongoing medical care. And while your primary care provider may not be the one available when you seek a Haiermeaghanfin virtual visit, the peace of mind you get from getting a real diagnosis real time can be priceless. For more information on Quantified Skin, view our Frequently Asked Questions (FAQs) at [email or web address]. Sincerely, 
 
Rajani Evans MD 
Chief Medical Officer [facility] Wanda David *:  certain medications cannot be prescribed via [employer] Providers Seen During Your Hospitalization Provider Specialty Primary office phone Merlin Murdoch, MD Gastroenterology [phone] Your Primary Care Physician (PCP) Primary Care Physician Office Phone Office Fax [facility] [phone] You are allergic to the following Allergen Reactions Sulfa (Sulfonamide Antibiotics) Hives Vicodin (Hydrocodone-Acetaminophen) Nausea and Vomiting Recent Documentation Height Weight Breastfeeding? BMI OB Status Smoking Status 1.626 m 91.6 kg No 34.67 kg/m2 Having regular periods Current Every Day Smoker Emergency Contacts Name Discharge Info Relation Home Work Mobile Dustin Contreras DISCHARGE CAREGIVER [3] Parent [1] [phone] Patient Belongings The following personal items are in your possession at time of discharge: 
  Dental Appliances: None  Visual Aid: Glasses, At bedside Please provide this summary of care documentation to your next provider. Signatures-by signing, you are acknowledging that this After Visit Summary has been reviewed with you and you have received a copy. Patient Signature:  ____________________________________________________________ Date:  ____________________________________________________________  
  
[facility] Provider Signature:  ____________________________________________________________ Date:  ____________________________________________________________

## 2018-06-07 NOTE — ANESTHESIA PREPROCEDURE EVALUATION
Anesthetic History   No history of anesthetic complications            Review of Systems / Medical History  Patient summary reviewed and pertinent labs reviewed    Pulmonary  Within defined limits                 Neuro/Psych         Psychiatric history     Cardiovascular    Hypertension              Exercise tolerance: >4 METS     GI/Hepatic/Renal     GERD           Endo/Other        Obesity and arthritis     Other Findings   Comments: Fibromyalgia           Physical Exam    Airway  Mallampati: II  TM Distance: 4 - 6 cm  Neck ROM: normal range of motion   Mouth opening: Normal     Cardiovascular  Regular rate and rhythm,  S1 and S2 normal,  no murmur, click, rub, or gallop  Rhythm: regular  Rate: normal         Dental  No notable dental hx       Pulmonary  Breath sounds clear to auscultation               Abdominal  GI exam deferred       Other Findings            Anesthetic Plan    ASA: 2  Anesthesia type: MAC          Induction: Intravenous  Anesthetic plan and risks discussed with: Patient

## 2018-06-07 NOTE — PERIOP NOTES
For IV insertion, 2 failed attempts in right hand/wrist by Cas Hernandez RN. Third [facility] by Mary Alicea RN. Pt anxious but understanding of procedure. 7008  Anesthesia staff at patient's bedside administering anesthesia and monitoring patients vital signs throughout procedure. See anesthesia note. 3670  Endoscope was pre-cleaned at bedside immediately following procedure by Spenecr Rings, endo tech. 0801  Patient tolerated procedure without problems. Abdomen soft and patient arousable and voices no complaints Report received from CRNA, see anesthesia note. Patient transported to endoscopy recovery area. Report given to Mc Ralph RN.

## 2018-06-07 NOTE — PROCEDURES
[address]  [address] [facility], [address] [facility]       [phone]                   2018                EGD Operative Report  Johnathon Alejandro  :  1980  Rishabh Alexander Medical Record Number:  [id number]      Indication:  Abdominal pain, epigastric, Dysphagia/odynophagia     : Elizabeth Carreno MD    Referring Provider:  Diona Favre, MD      Anesthesia/Sedation:  MAC anesthesia Propofol    Airway assessment: No airway problems anticipated    Pre-Procedural Exam:      Airway: clear, no airway problems anticipated  Heart: RRR, without gallops or rubs  Lungs: clear bilaterally without wheezes, crackles, or rhonchi  Abdomen: soft, nontender, nondistended, bowel sounds present  Mental Status: awake, alert and oriented to person, place and time       Procedure Details     After infomed consent was obtained for the procedure, with all risks and benefits of procedure explained the patient was taken to the endoscopy suite and placed in the left lateral decubitus position. Following sequential administration of sedation as per above, the endoscope was inserted into the mouth and advanced under direct vision to second portion of the duodenum. A careful inspection was made as the gastroscope was withdrawn, including a retroflexed view of the proximal stomach; findings and interventions are described below. Findings:   Esophagus:normal mucosa without obstructing lesion  Stomach: limited exam due to presence of solid food in body. Duodenum/jejunum: normal    Therapies:  biopsy of stomach for  ALMA    Specimens: as above           Complications:   None; patient tolerated the procedure well. EBL:  None.            Impression:    Limited exam due to retained food in the stomach    Recommendations:    -Continue acid suppression.  -Await ALMA test result and treat for Helicobacter pylori if positive.  -low residual diet with small frequent meals x 6 daily  -Gastric empty study, please call office to schedule  -call for any questions/concerns    Hanh Lara MD

## 2018-06-07 NOTE — ROUTINE PROCESS
[facility]  1980  [id number]    Situation:  Verbal report received from: Hattie Case RN  Procedure: Procedure(s):  ESOPHAGOGASTRODUODENOSCOPY (EGD)  ESOPHAGOGASTRODUODENAL (EGD) BIOPSY    Background:    Preoperative diagnosis: GERD  Postoperative diagnosis: * No post-op diagnosis entered *    :  Dr. Luciana Davila  Assistant(s): Endoscopy Technician-1: Leslie Prater  Endoscopy RN-1: Hattie Case RN    Specimens: * No specimens in log *  H. Pylori  yes    Assessment:  Intra-procedure medications     Anesthesia gave intra-procedure sedation and medications, see anesthesia flow sheet yes    Intravenous fluids: NS@ KVO     Vital signs stable     Abdominal assessment: round and soft     Recommendation:  Discharge patient per MD order  Family or Friend   Permission to share finding with family or friend yes    Endoscopy discharge instructions have been reviewed and given to patient and parent. The patient and parent verbalized understanding and acceptance of instructions.

## 2018-06-07 NOTE — DISCHARGE INSTRUCTIONS
[facility]. Reese Bonilla M.D.  [phone]                 COLON and EGD DISCHARGE INSTRUCTIONS    2018    Sohamej HernandezGarcía  :  1980  Arya Medical Record Number:  [id number]      Discomfort:  Sore throat- throat lozenges or warm salt water gargle  Redness at IV site- apply warm compress to area; if redness or soreness persist- contact your physician  There may be a slight amount of blood passed from the rectum  Gaseous discomfort- walking, belching will help relieve any discomfort  You may not operate a vehicle for 12 hours  You may not engage in an occupation involving machinery or appliances for rest of today  You may not drink alcoholic beverages for at least 12 hours  Avoid making any critical decisions for at least 24 hour  DIET:   Low fat and residual diet. Frequent meals ( small portions)   - however -  remember your colon is empty and a heavy meal will produce gas. Avoid these foods:  vegetables, fried / greasy foods, carbonated drinks for today     ACTIVITY:  You may  resume your normal daily activities it is recommended that you spend the remainder of the day resting -  avoid any strenuous activity and driving. CALL M.D. ANY SIGN OF:   Increasing pain, nausea, vomiting  Abdominal distension (swelling)  New increased bleeding (oral or rectal)  Fever (chills)  Pain in chest area  Bloody discharge from nose or mouth  Shortness of breath        Capri García  [id number]  1980      Follow-up Instructions:   Call Dr. Krystal Clark if any questions at [phone]. Results of procedure / biopsy in 7 to 10 days, we will call you with these results.   Your endoscopy showed retained food in the stomach suggestive of slow gastric emptying      DISCHARGE SUMMARY from Nurse    The following personal items collected during your admission are returned to you:   Dental Appliance: Dental Appliances: None  Vision: Visual Aid: Glasses, At bedside  Hearing Aid:    Jewelry: Clothing:    Other Valuables:    Valuables sent to safe:      PATIENT INSTRUCTIONS:    Take Home Medications:  same

## 2018-06-07 NOTE — ANESTHESIA POSTPROCEDURE EVALUATION
Post-Anesthesia Evaluation and Assessment    Patient: Joseph Og MRN: [id number]  SSN: [id number]    YOB: 1980  Age: 30 y.o. Sex: female       Cardiovascular Function/Vital Signs  Visit Vitals    BP (!) 143/94    Pulse 99    Temp 37.1 °C (98.7 °F)    Resp 13    Ht 5' 4\" (1.626 m)    Wt 91.6 kg (202 lb)    SpO2 100%    Breastfeeding No    BMI 34.67 kg/m2       Patient is status post MAC anesthesia for Procedure(s):  ESOPHAGOGASTRODUODENOSCOPY (EGD)  ESOPHAGOGASTRODUODENAL (EGD) BIOPSY. Nausea/Vomiting: None    Postoperative hydration reviewed and adequate. Pain:  Pain Scale 1: Numeric (0 - 10) (06/07/18 0654)  Pain Intensity 1: 6 (06/07/18 0654)   Managed    Neurological Status: At baseline    Mental Status and Level of Consciousness: Arousable    Pulmonary Status:   O2 Device: Nasal cannula (06/07/18 0806)   Adequate oxygenation and airway patent    Complications related to anesthesia: None    Post-anesthesia assessment completed.  No concerns    Signed By: Jai Allan MD     June 7, 2018

## 2018-06-11 ENCOUNTER — OFFICE VISIT (OUTPATIENT)
Dept: FAMILY MEDICINE CLINIC | Age: 38
End: 2018-06-11

## 2018-06-11 VITALS
HEART RATE: 105 BPM | BODY MASS INDEX: 34.25 KG/M2 | TEMPERATURE: 98.4 F | RESPIRATION RATE: 20 BRPM | SYSTOLIC BLOOD PRESSURE: 127 MMHG | WEIGHT: 200.6 LBS | HEIGHT: 64 IN | DIASTOLIC BLOOD PRESSURE: 83 MMHG | OXYGEN SATURATION: 97 %

## 2018-06-11 DIAGNOSIS — I10 ESSENTIAL HYPERTENSION: Primary | Chronic | ICD-10-CM

## 2018-06-11 DIAGNOSIS — M79.641 PAIN IN BOTH HANDS: ICD-10-CM

## 2018-06-11 DIAGNOSIS — M79.642 PAIN IN BOTH HANDS: ICD-10-CM

## 2018-06-11 DIAGNOSIS — M79.7 FIBROMYALGIA: ICD-10-CM

## 2018-06-11 DIAGNOSIS — R25.1 TREMOR: ICD-10-CM

## 2018-06-11 DIAGNOSIS — K31.84 GASTROPARESIS: ICD-10-CM

## 2018-06-11 DIAGNOSIS — F32.A DEPRESSION, UNSPECIFIED DEPRESSION TYPE: Chronic | ICD-10-CM

## 2018-06-11 DIAGNOSIS — G89.4 CHRONIC PAIN SYNDROME: ICD-10-CM

## 2018-06-11 DIAGNOSIS — F90.0 ATTENTION DEFICIT HYPERACTIVITY DISORDER (ADHD), PREDOMINANTLY INATTENTIVE TYPE: ICD-10-CM

## 2018-06-11 DIAGNOSIS — M94.0 COSTOCHONDRITIS: ICD-10-CM

## 2018-06-11 NOTE — PROGRESS NOTES
1. Have you been to the ER, urgent care clinic since your last visit? Hospitalized since your last visit? Yes When: 6/7/18 Endoscopy- [facility]     2. Have you seen or consulted any other health care providers outside of the [address] [place] [address] since your last visit? Include any pap smears or colon screening.  No  Reviewed record in preparation for visit and have necessary documentation  Pt did not bring medication to office visit for review    Goals that were addressed and/or need to be completed during or after this appointment include     Health Maintenance Due   Topic Date Due    Pneumococcal 19-64 Medium Risk (1 of 1 - PPSV23) 08/27/2001

## 2018-06-11 NOTE — MR AVS SNAPSHOT
[address] [place] [id number] 
[phone] Patient: Mao Mak MRN: [id number] :[id number] Visit Information Date & Time Provider Department Dept. Phone Encounter #  
 2018  2:45 PM Marciano Basilio MD [address] [place] [address] [id number] Follow-up Instructions Return in about 1 month (around 2018). Upcoming Health Maintenance Date Due Pneumococcal 19-64 Medium Risk (1 of 1 - PPSV23) 2001 Influenza Age 5 to Adult 2018 PAP AKA CERVICAL CYTOLOGY 2020 DTaP/Tdap/Td series (2 - Td) 2024 COLONOSCOPY 5/15/2025 Allergies as of 2018  Review Complete On: 2018 By: Sherif Lugo LPN Severity Noted Reaction Type Reactions Sulfa (Sulfonamide Antibiotics)  2010    Hives Vicodin [Hydrocodone-acetaminophen]  2011    Nausea and Vomiting Current Immunizations  Reviewed on 2010 Name Date Tdap 2014  7:31 PM  
  
 Not reviewed this visit You Were Diagnosed With   
  
 Codes Comments Essential hypertension    -  Primary ICD-10-CM: I10 
ICD-9-CM: 401.9 Fibromyalgia     ICD-10-CM: M79.7 ICD-9-CM: 729.1 Gastroparesis     ICD-10-CM: K31.84 ICD-9-CM: 536.3 Costochondritis     ICD-10-CM: M94.0 ICD-9-CM: 733.6 Chronic pain syndrome     ICD-10-CM: G89.4 ICD-9-CM: 338.4 Tremor     ICD-10-CM: R25.1 ICD-9-CM: 781.0 Depression, unspecified depression type     ICD-10-CM: F32.9 ICD-9-CM: 568 Attention deficit hyperactivity disorder (ADHD), predominantly inattentive type     ICD-10-CM: F90.0 ICD-9-CM: 314.00 Pain in both hands     ICD-10-CM: M79.641, D49.780 ICD-9-CM: 729.5 Vitals BP Pulse Temp Resp Height(growth percentile) Weight(growth percentile)  127/83 (BP 1 Location: Right arm, BP Patient Position: Sitting) (!) 105 98.4 °F (36.9 °C) (Oral) 20 5' 4\" (1.626 m) 200 lb 9.6 oz (91 kg) LMP SpO2 BMI OB Status Smoking Status 05/30/2018 97% 34.43 kg/m2 Having regular periods Former Smoker Vitals History BMI and BSA Data Body Mass Index Body Surface Area 34.43 kg/m 2 2.03 m 2 Preferred Pharmacy Pharmacy Name Phone [address] [place] [address] [phone] Your Updated Medication List  
  
   
This list is accurate as of 6/11/18  3:20 PM.  Always use your most recent med list.  
  
  
  
  
 albuterol 90 mcg/actuation inhaler Commonly known as:  PROVENTIL HFA, VENTOLIN HFA, PROAIR HFA Take 1 Puff by inhalation every six (6) hours as needed for Wheezing. clonazePAM 0.5 mg tablet Commonly known as:  Elmer Gibes Take 1 mg by mouth nightly as needed. cyclobenzaprine 10 mg tablet Commonly known as:  FLEXERIL Take 1 Tab by mouth three (3) times daily as needed. dextroamphetamine-amphetamine 20 mg tablet Commonly known as:  ADDERALL Take 20 mg by mouth. diclofenac 1 % Gel Commonly known as:  VOLTAREN Apply 4 g to affected area four (4) times daily. FLONASE 50 mcg/actuation nasal spray Generic drug:  fluticasone 2 Sprays by Both Nostrils route daily. FLUoxetine 40 mg capsule Commonly known as:  PROzac Take 40 mg by mouth daily. gabapentin 300 mg capsule Commonly known as:  NEURONTIN Take 2 tabs in morning and 2 tabs at nighttime. Indications: fibromyalgia  
  
 hydrocortisone 2.5 % rectal cream  
Commonly known as:  ANUSOL-HC Insert  into rectum four (4) times daily. As needed  
  
 ketoconazole 2 % shampoo Commonly known as:  NIZORAL  
shampoo twice weekly LORazepam 1 mg tablet Commonly known as:  ATIVAN Take 1 Tab by mouth every twelve (12) hours. Max Daily Amount: 2 mg. Indications: ANXIETY  
  
 norgestimate-ethinyl estradiol 0.25-35 mg-mcg Tab Commonly known as:  Jovi Blanchard Take 1 Tab by mouth daily. Indications: DYSMENORRHEA  
  
 pantoprazole 40 mg tablet Commonly known as:  PROTONIX Take 1 Tab by mouth daily. traZODone 50 mg tablet Commonly known as:  Brittaneyann Mosese Take 1 Tab by mouth nightly. triamcinolone acetonide 0.1 % ointment Commonly known as:  KENALOG Apply  to affected area two (2) times a day. use thin layer ZyrTEC 10 mg tablet Generic drug:  cetirizine Take  by mouth. We Performed the Following KITTY BY MULTIPLEX FLOW IA, QL [95273 CPT(R)] RHEUMATOID FACTOR, QT [id number] CPT(R)] Follow-up Instructions Return in about 1 month (around 7/11/2018). To-Do List   
 06/11/2018 Lab:  CRP, HIGH SENSITIVITY   
  
 06/11/2018 Imaging:  XR HAND LT AP/LAT   
  
 06/18/2018 Imaging:  XR HAND RT AP/LAT Patient Instructions Learning About Benefits From Quitting Smoking How does quitting smoking make you healthier? If you're thinking about quitting smoking, you may have a few reasons to be smoke-free. Your health may be one of them. · When you quit smoking, you lower your risks for cancer, lung disease, heart attack, stroke, blood vessel disease, and blindness from macular degeneration. · When you're smoke-free, you get sick less often, and you heal faster. You are less likely to get colds, flu, bronchitis, and pneumonia. · As a nonsmoker, you may find that your mood is better and you are less stressed. When and how will you feel healthier? Quitting has real health benefits that start from day 1 of being smoke-free. And the longer you stay smoke-free, the healthier you get and the better you feel. The first hours · After just 20 minutes, your blood pressure and heart rate go down. That means there's less stress on your heart and blood vessels.  
· Within 12 hours, the level of carbon monoxide in your blood drops back to normal. That makes room for more oxygen. With more oxygen in your body, you may notice that you have more energy than when you smoked. After 2 weeks · Your lungs start to work better. · Your risk of heart attack starts to drop. After 1 month · When your lungs are clear, you cough less and breathe deeper, so it's easier to be active. · Your sense of taste and smell return. That means you can enjoy food more than you have since you started smoking. Over the years · After 1 year, your risk of heart disease is half what it would be if you kept smoking. · After 5 years, your risk of stroke starts to shrink. Within a few years after that, it's about the same as if you'd never smoked. · After 10 years, your risk of dying from lung cancer is cut by about half. And your risk for many other types of cancer is lower too. How would quitting help others in your life? When you quit smoking, you improve the health of everyone who now breathes in your smoke. · Their heart, lung, and cancer risks drop, much like yours. · They are sick less. For babies and small children, living smoke-free means they're less likely to have ear infections, pneumonia, and bronchitis. · If you're a woman who is or will be pregnant someday, quitting smoking means a healthier . · Children who are close to you are less likely to become adult smokers. Where can you learn more? Go to http://daniel-carlton.info/. Enter [id number] [id number] in the search box to learn more about \"Learning About Benefits From Quitting Smoking. \" Current as of: 2017 Content Version: 11.4 © 5483-8845 Healthwise, Incorporated. Care instructions adapted under license by [employer] (which disclaims liability or warranty for this information).  If you have questions about a medical condition or this instruction, always ask your healthcare professional. [address]  any warranty or liability for your use of this information. Introducing [facility]! Dear Katlin Mathews: Thank you for requesting a Pumodo account. Our records indicate that you already have an active Pumodo account. You can access your account anytime at [email or web address] Otaku Mode. Fantasy Buzzer/Tokyo Otaku Mode Did you know that you can access your hospital and ER discharge instructions at any time in Pumodo? You can also review all of your test results from your hospital stay or ER visit. Additional Information If you have questions, please visit the Frequently Asked Questions section of the Pumodo website at [email or web address] Remember, Pumodo is NOT to be used for urgent needs. For medical emergencies, dial 911. Now available from your iPhone and Android! Please provide this summary of care documentation to your next provider. Your primary care clinician is listed as [address]. If you have any questions after today's visit, please call [phone].

## 2018-06-11 NOTE — PATIENT INSTRUCTIONS

## 2018-06-11 NOTE — PROGRESS NOTES
Progress Note    Patient: Zuly Mills MRN: [id number]  SSN: [id number]    YOB: 1980  Age: 30 y.o. Sex: female        Chief Complaint   Patient presents with    Medication Refill         Subjective:     Encounter Diagnoses   Name Primary?  Essential hypertension: Controlled  BP Readings from Last 3 Encounters:   06/11/18 127/83   06/07/18 136/90   05/10/18 111/75     The patient reports:  taking medications as instructed, no medication side effects noted, no TIA's, no chest pain on exertion, no dyspnea on exertion. Key CAD CHF Meds     Patient is on no cardiovascular meds. Lab Results   Component Value Date/Time    Sodium 140 02/27/2018 08:59 AM    Potassium 4.6 02/27/2018 08:59 AM    Chloride 102 02/27/2018 08:59 AM    CO2 25 02/27/2018 08:59 AM    Anion gap 9 08/19/2010 10:36 AM    Glucose 94 02/27/2018 08:59 AM    BUN 11 02/27/2018 08:59 AM    Creatinine 0.90 02/27/2018 08:59 AM    BUN/Creatinine ratio 12 02/27/2018 08:59 AM    GFR est AA 96 02/27/2018 08:59 AM    GFR est non-AA 83 02/27/2018 08:59 AM    Calcium 9.2 02/27/2018 08:59 AM    Bilirubin, total 0.5 12/08/2017 04:08 PM    AST (SGOT) 14 12/08/2017 04:08 PM    Alk. phosphatase 61 12/08/2017 04:08 PM    Protein, total 6.7 12/08/2017 04:08 PM    Albumin 4.3 12/08/2017 04:08 PM    Globulin 3.6 08/19/2010 10:36 AM    A-G Ratio 1.5 11/07/2017 02:52 PM    ALT (SGPT) 16 12/08/2017 04:08 PM     Low salt diet? yes  Aerobic exercise? yes  Our goal is to normalize the blood pressure to decrease the risks of strokes and heart attacks. The patient is in agreement with the plan. Yes    Fibromyalgia: She has severe neck pain and trigger points. I think this is the etiology of her chronic headaches. She has also had some blurred vision but that should not be associated with the fibromyalgia.  Gastroparesis: Newly diagnosed. Please see GI note. The etiology of this is not currently known.   She is to continue her acid suppression and follow-up with a gastric emptying study. ALMA test is pending.  Costochondritis: No complaints of costochondritis today.  Chronic pain syndrome: Her chronic pain is getting worse by the day. She now has pain and swelling in her hands and wrist.  Recently she had significant swelling behind her right knee but no clot was found. I suspect this was also due to her fibromyalgia. I explained to her that 1 of the etiologies of fibromyalgia is that it causes hypersensitivity of the peripheral nerves to pain. So far nothing really helped control her pain.  Tremor: She has developed a new left hand tremor-she is left-handed and this presents a problem. She can barely hold a piece of paper while I observed her.  Depression, unspecified depression type: Her depression is severe and managed by psychiatrist.        [facility] Attention deficit hyperactivity disorder (ADHD), predominantly inattentive type: She is currently on Adderall for this. This is treated by her psychiatrist as well.  Pain in both hands: She had a comprehensive rheumatologic workup in 2016. This was negative. We need to repeat a basic screening to include rheumatoid factor and her mother has severe rheumatoid arthritis. CRP. Current and past medical information:    Current Medications after this visit[de-identified]     Current Outpatient Prescriptions   Medication Sig    gabapentin (NEURONTIN) 300 mg capsule Take 2 tabs in morning and 2 tabs at nighttime. Indications: fibromyalgia    dextroamphetamine-amphetamine (ADDERALL) 20 mg tablet Take 20 mg by mouth.  diclofenac (VOLTAREN) 1 % gel Apply 4 g to affected area four (4) times daily.  pantoprazole (PROTONIX) 40 mg tablet Take 1 Tab by mouth daily.  cetirizine (ZYRTEC) 10 mg tablet Take  by mouth.  norgestimate-ethinyl estradiol (ORTHO-CYCLEN, SPRINTEC) 0.25-35 mg-mcg tab Take 1 Tab by mouth daily.  Indications: DYSMENORRHEA    cyclobenzaprine (FLEXERIL) 10 mg tablet Take 1 Tab by mouth three (3) times daily as needed.  FLUoxetine (PROZAC) 40 mg capsule Take 40 mg by mouth daily.  fluticasone (FLONASE) 50 mcg/actuation nasal spray 2 Sprays by Both Nostrils route daily.  albuterol (PROVENTIL HFA, VENTOLIN HFA, PROAIR HFA) 90 mcg/actuation inhaler Take 1 Puff by inhalation every six (6) hours as needed for Wheezing.  ketoconazole (NIZORAL) 2 % shampoo shampoo twice weekly    triamcinolone acetonide (KENALOG) 0.1 % ointment Apply  to affected area two (2) times a day. use thin layer    hydrocortisone (ANUSOL-HC) 2.5 % rectal cream Insert  into rectum four (4) times daily. As needed    LORazepam (ATIVAN) 1 mg tablet Take 1 Tab by mouth every twelve (12) hours. Max Daily Amount: 2 mg. Indications: ANXIETY (Patient taking differently: Take 2 mg by mouth every twelve (12) hours. Indications: ANXIETY)    clonazePAM (KLONOPIN) 0.5 mg tablet Take 1 mg by mouth nightly as needed.  traZODone (DESYREL) 50 mg tablet Take 1 Tab by mouth nightly. No current facility-administered medications for this visit.         Patient Active Problem List    Diagnosis Date Noted    Recurrent depression ([facility] [address] 01/15/2018    Inflammatory arthritis 08/10/2016    Vitamin D deficiency 03/21/2016    Thrombosed external hemorrhoid 02/05/2016    IFG (impaired fasting glucose) 09/10/2015    Hyperlipidemia 09/10/2015    Hoarseness 03/25/2013    OCD (obsessive compulsive disorder) 02/27/2012    Vaginal yeast infection 11/09/2011    HSV (herpes simplex virus) anogenital infection 09/12/2011    Costochondritis 05/17/2011    Anxiety 09/13/2010    Itch of skin 05/26/2010    Depression     GERD (gastroesophageal reflux disease)     Hypertension     Anemia NEC        Past Medical History:   Diagnosis Date    Anemia NEC     Arthritis     Chronic pain     fybromyalsia    Depression     anxiety, depression, OCD    GERD (gastroesophageal reflux disease)     Hypertension     Musculoskeletal disorder     SOB (shortness of breath)     Stool color black        Allergies   Allergen Reactions    Sulfa (Sulfonamide Antibiotics) Hives    Vicodin [Hydrocodone-Acetaminophen] Nausea and Vomiting       Past Surgical History:   Procedure Laterality Date    HX GYN           HX HEENT  2002    wisdom teeth extraction    UPPER GI ENDOSCOPY,DIAGNOSIS  2018            Social History     Social History    Marital status: SINGLE     Spouse name: N/A    Number of children: N/A    Years of education: N/A     Social History Main Topics    Smoking status: Former Smoker     Packs/day: 0.25     Years: 8.00     Types: Cigarettes     Quit date: 2018    Smokeless tobacco: Never Used    Alcohol use 3.0 oz/week     5 Glasses of wine, 0 Standard drinks or equivalent per week      Comment: 1 bottle/week    Drug use: No    Sexual activity: Yes     Partners: Male     Birth control/ protection: None     Other Topics Concern    None     Social History Narrative       Review of Systems   Constitutional: Negative. Negative for chills, fever, malaise/fatigue and weight loss. HENT: Negative. Negative for hearing loss. Eyes: Positive for blurred vision. Negative for double vision. Respiratory: Negative. Negative for cough, hemoptysis, sputum production and shortness of breath. Cardiovascular: Positive for leg swelling. Negative for chest pain, palpitations and orthopnea. Reason history of right posterior knee pain and swelling. Venous Doppler was unremarkable. Gastrointestinal: Negative. Negative for abdominal pain, blood in stool, heartburn, nausea and vomiting. Genitourinary: Negative. Negative for dysuria, frequency and urgency. Musculoskeletal: Positive for back pain, myalgias and neck pain. Skin: Negative. Negative for rash. Neurological: Positive for dizziness and headaches. Negative for tingling, tremors and weakness. Endo/Heme/Allergies: Negative. Psychiatric/Behavioral: Negative. Negative for depression. Objective:     Vitals:    06/11/18 1440   BP: 127/83   Pulse: (!) 105   Resp: 20   Temp: 98.4 °F (36.9 °C)   TempSrc: Oral   SpO2: 97%   Weight: 200 lb 9.6 oz (91 kg)   Height: 5' 4\" (1.626 m)      Body mass index is 34.43 kg/(m^2). Physical Exam   Constitutional: She is oriented to person, place, and time and well-developed, well-nourished, and in no distress. No distress. HENT:   Head: Normocephalic and atraumatic. Mouth/Throat: Oropharynx is clear and moist.   Eyes: Conjunctivae are normal.   Neck: No tracheal deviation present. No thyromegaly present. Cardiovascular: Normal rate, regular rhythm and normal heart sounds. No murmur heard. Pulmonary/Chest: Effort normal and breath sounds normal. No respiratory distress. Abdominal: Soft. She exhibits no distension. Musculoskeletal: She exhibits edema and tenderness. All fibromyalgia points are positive for tenderness. The worst seem to be her neck trigger  Points. Lymphadenopathy:     She has no cervical adenopathy. Neurological: She is alert and oriented to person, place, and time. Skin: Skin is warm. No rash noted. She is not diaphoretic. No erythema. Psychiatric: Mood and affect normal.   Nursing note and vitals reviewed. There are no preventive care reminders to display for this patient. Assessment and orders:     Encounter Diagnoses     ICD-10-CM ICD-9-CM   1. Essential hypertension I10 401.9   2. Fibromyalgia M79.7 729.1   3. Gastroparesis K31.84 536.3   4. Costochondritis M94.0 733.6   5. Chronic pain syndrome G89.4 338.4   6. Tremor R25.1 781.0   7. Depression, unspecified depression type F32.9 311   8. Attention deficit hyperactivity disorder (ADHD), predominantly inattentive type F90.0 314.00   9. Pain in both hands M79.641 729.5    M79.642      Diagnoses and all orders for this visit:    1.  Essential hypertension-controlled    2. Fibromyalgia-severe with associated headaches and all fibromyalgia points positive. 3. Gastroparesis-this is a new problem. She was diagnosed with this on recent upper GI endoscopy. 8 hours after she ate she still had food in her stomach. She is supposed to have a gastric emptying test scheduled. She is to call GI to get this done. 4. Costochondritis-intermittent reason for chest pain. 5. Chronic pain syndrome- in addition to her chronic fibromyalgia pain she has swelling and tenderness in her hands and wrist.  She has been turned down from disability even though she qualifies in my opinion.  -     RHEUMATOID FACTOR, QL    6. Tremor-this is a new symptom and may be due to her Adderall. It primarily affects her left wrist.      7. Depression, unspecified depression type -managed by her psychiatrist      8. Attention deficit hyperactivity disorder (ADHD), predominantly inattentive type-psychiatrist      9. Pain in both hands  -     XR HAND RT AP/LAT; Future  -     XR HAND LT AP/LAT; Future  -     CRP, HIGH SENSITIVITY; Future  -     KITTY BY MULTIPLEX FLOW IA, QL  -     RHEUMATOID FACTOR, QT - [facility] Only  -     RHEUMATOID FACTOR, QL        Plan of care:  Discussed diagnoses in detail with patient. Medication risks/benefits/side effects discussed with patient. All of the patient's questions were addressed. The patient understands and agrees with our plan of care. The patient knows to call back if they are unsure of or forget any changes we discussed today or if the symptoms change. The patient received an After-Visit Summary which contains VS, orders, medication list and allergy list. This can be used as a \"mini-medical record\" should they have to seek medical care while out of town.     Patient Care Team:  Rupal Bhatia MD as PCP - General (Family Practice)  Yesenia García MD (Breast Surgery)  Alexis Saleh MD (Rheumatology)  Maribel Berrios MD (Cardiology)  Sherman Greenfield MD (Otolaryngology)    Follow-up Disposition:  Return in about 1 month (around 7/11/2018). No future appointments.     Signed By: Marciano Basilio MD     June 11, 2018

## 2018-06-12 ENCOUNTER — TELEPHONE (OUTPATIENT)
Dept: FAMILY MEDICINE CLINIC | Age: 38
End: 2018-06-12

## 2018-06-12 ENCOUNTER — DOCUMENTATION ONLY (OUTPATIENT)
Dept: FAMILY MEDICINE CLINIC | Age: 38
End: 2018-06-12

## 2018-06-12 DIAGNOSIS — Z02.9 ADMINISTRATIVE ENCOUNTER: Primary | ICD-10-CM

## 2018-06-12 PROBLEM — M79.7 FIBROMYALGIA: Status: ACTIVE | Noted: 2018-06-12

## 2018-06-12 PROBLEM — F39 MOOD DISORDER (HCC): Status: ACTIVE | Noted: 2018-06-12

## 2018-06-12 PROBLEM — R25.1 TREMOR: Status: ACTIVE | Noted: 2018-06-12

## 2018-06-12 PROBLEM — G89.4 CHRONIC PAIN SYNDROME: Status: ACTIVE | Noted: 2018-06-12

## 2018-06-12 PROBLEM — F41.0 PANIC ATTACK: Status: ACTIVE | Noted: 2018-06-12

## 2018-06-12 LAB
ANA SER QL: NEGATIVE
CRP SERPL HS-MCNC: 10.76 MG/L (ref 0–3)
RHEUMATOID FACT SERPL-ACNC: <10 IU/ML (ref 0–13.9)

## 2018-06-12 NOTE — TELEPHONE ENCOUNTER
Called and spoke to patient. ASked for diagnosis to add to her problem list for Dr. Carolee Nichols.  Pt. Reports:      Mood disorder  Chronic pain syndrome  Fibromyalgia  Anxiety  Panic Attack  Temor  ADHD  OCD  Depression  GERD

## 2018-06-12 NOTE — TELEPHONE ENCOUNTER
Called and spoke to pt. Advised pt her letter was at phone office ready for . Pt states she will  letter tomorrow from office.

## 2018-06-14 ENCOUNTER — TELEPHONE (OUTPATIENT)
Dept: FAMILY MEDICINE CLINIC | Age: 38
End: 2018-06-14

## 2018-06-14 NOTE — TELEPHONE ENCOUNTER
----- Message from Noelle Bolanos MD sent at 6/14/2018  3:37 PM EDT -----  Please see if they can add a Lyme test to this lab work.   Migratory arthritis as diagnosis code

## 2018-06-18 ENCOUNTER — TELEPHONE (OUTPATIENT)
Dept: FAMILY MEDICINE CLINIC | Age: 38
End: 2018-06-18

## 2018-06-18 NOTE — TELEPHONE ENCOUNTER
Called and spoke to patient. Advised per Dr. Bhaskar Wilkes:    Jordan Parker show normal.*    Pt verbalized understanding.

## 2018-06-18 NOTE — TELEPHONE ENCOUNTER
----- Message from Jhon Wolfe MD sent at 6/18/2018  2:50 PM EDT -----  Please call patient.   X-ray show normal.  Thank you,  Dr. Joie Castillo

## 2018-06-19 ENCOUNTER — TELEPHONE (OUTPATIENT)
Dept: FAMILY MEDICINE CLINIC | Age: 38
End: 2018-06-19

## 2018-06-19 ENCOUNTER — HOSPITAL ENCOUNTER (OUTPATIENT)
Dept: NUCLEAR MEDICINE | Age: 38
Discharge: NURSING, REHAB OR LONG-TERM CARE | End: 2018-06-19
Attending: INTERNAL MEDICINE
Payer: SUBSIDIZED

## 2018-06-19 DIAGNOSIS — K31.84 GASTROPARESIS: ICD-10-CM

## 2018-06-19 LAB

## 2018-06-19 PROCEDURE — 78264 GASTRIC EMPTYING IMG STUDY: CPT

## 2018-06-19 NOTE — TELEPHONE ENCOUNTER
Pt called stating that she received letter from Dr. Demetria Kapoor in reference to her seeing a rheumatologist. She states that she is in agreement with that. Pt has the care card and wants to know if she can be referred to a Rheumatologist that accepts the care card or accept payment plans. She is requesting a call back from nurse, she has some questions to ask nurse in regards to xray she had done. Pt will be in surgery between 11 and 2.

## 2018-06-19 NOTE — TELEPHONE ENCOUNTER
Pt called and states she is willing to see another rheumatologist due to her labs but wishes to see someone that accepts the care card. Pt states she called someone this morning but states she was told the doctor can not see patients who have dx of  fibromyalgia. Pt wishes for Dr. Stefany Romero to refer her to another specialist.     Please advise.

## 2018-06-19 NOTE — TELEPHONE ENCOUNTER
Called patient. Unable to reach. Voicemail left.   Need to advise patient per Dr. Erin Moon:    Unfortunately none of the rheumatologists will accept care card

## 2018-06-20 NOTE — PROGRESS NOTES
[address]  Endoscopy Preprocedure Instructions      1. On the day of your surgery, please report to registration located on the 2nd floor of the  [facility]. yes    2. You must have a responsible adult to drive you to the hospital, stay at the hospital during your procedure and drive you home. If they leave your procedure will not be started (no exceptions). yes    3. Do not have anything to eat or drink (including water, gum, mints, coffee, and juice) after midnight. This does not apply to the medications you were instructed to take by your physician. yesIf you are currently taking Plavix, Coumadin, Aspirin, or other blood-thinning agents, contact your physician for special instructions. No,Patient not taking blood thinners. 4. If you are having a procedure that requires bowel prep: We recommend that you have only clear liquids the day before your procedure and begin your bowel prep by 5:00 pm.  You may continue to drink clear liquids until midnight. If for any reason you are not able to complete your prep please notify your physician immediately. yes    5. Have a list of all current medications, including vitamins, herbal supplements and any other over the counter medications. yes    6. If you wear glasses, contacts, dentures and/or hearing aids, they may be removed prior to procedure, please bring a case to store them in. yes    7. You should understand that if you do not follow these instructions your procedure may be cancelled. If your physical condition changes (I.e. fever, cold or flu) please contact your doctor as soon as possible. 8. It is important that you be on time.   If for any reason you are unable to keep your appointment please call )- the day of or your physicians office prior to your scheduled procedure

## 2018-06-21 ENCOUNTER — ANESTHESIA (OUTPATIENT)
Dept: ENDOSCOPY | Age: 38
End: 2018-06-21
Payer: SUBSIDIZED

## 2018-06-21 ENCOUNTER — HOSPITAL ENCOUNTER (OUTPATIENT)
Age: 38
Setting detail: OUTPATIENT SURGERY
Discharge: HOME | End: 2018-06-21
Attending: INTERNAL MEDICINE | Admitting: INTERNAL MEDICINE
Payer: SUBSIDIZED

## 2018-06-21 ENCOUNTER — ANESTHESIA EVENT (OUTPATIENT)
Dept: ENDOSCOPY | Age: 38
End: 2018-06-21
Payer: SUBSIDIZED

## 2018-06-21 VITALS
SYSTOLIC BLOOD PRESSURE: 132 MMHG | RESPIRATION RATE: 16 BRPM | OXYGEN SATURATION: 100 % | HEART RATE: 86 BPM | TEMPERATURE: 98.1 F | DIASTOLIC BLOOD PRESSURE: 91 MMHG | WEIGHT: 200 LBS | BODY MASS INDEX: 34.15 KG/M2 | HEIGHT: 64 IN

## 2018-06-21 LAB — HCG UR QL: NEGATIVE

## 2018-06-21 PROCEDURE — 88305 TISSUE EXAM BY PATHOLOGIST: CPT | Performed by: INTERNAL MEDICINE

## 2018-06-21 PROCEDURE — 77030009426 HC FCPS BIOP ENDOSC BSC -B: Performed by: INTERNAL MEDICINE

## 2018-06-21 PROCEDURE — 76040000019: Performed by: INTERNAL MEDICINE

## 2018-06-21 PROCEDURE — 74011250636 HC RX REV CODE- 250/636

## 2018-06-21 PROCEDURE — 81025 URINE PREGNANCY TEST: CPT

## 2018-06-21 PROCEDURE — 74011000250 HC RX REV CODE- 250

## 2018-06-21 PROCEDURE — 76060000031 HC ANESTHESIA FIRST 0.5 HR: Performed by: INTERNAL MEDICINE

## 2018-06-21 RX ORDER — EPINEPHRINE 0.1 MG/ML
1 INJECTION INTRACARDIAC; INTRAVENOUS
Status: DISCONTINUED | OUTPATIENT
Start: 2018-06-21 | End: 2018-06-21 | Stop reason: HOSPADM

## 2018-06-21 RX ORDER — ATROPINE SULFATE 0.1 MG/ML
0.5 INJECTION INTRAVENOUS
Status: DISCONTINUED | OUTPATIENT
Start: 2018-06-21 | End: 2018-06-21 | Stop reason: HOSPADM

## 2018-06-21 RX ORDER — LIDOCAINE HYDROCHLORIDE 20 MG/ML
INJECTION, SOLUTION EPIDURAL; INFILTRATION; INTRACAUDAL; PERINEURAL AS NEEDED
Status: DISCONTINUED | OUTPATIENT
Start: 2018-06-21 | End: 2018-06-21 | Stop reason: HOSPADM

## 2018-06-21 RX ORDER — NALOXONE HYDROCHLORIDE 0.4 MG/ML
0.4 INJECTION, SOLUTION INTRAMUSCULAR; INTRAVENOUS; SUBCUTANEOUS
Status: DISCONTINUED | OUTPATIENT
Start: 2018-06-21 | End: 2018-06-21 | Stop reason: HOSPADM

## 2018-06-21 RX ORDER — PROPOFOL 10 MG/ML
INJECTION, EMULSION INTRAVENOUS AS NEEDED
Status: DISCONTINUED | OUTPATIENT
Start: 2018-06-21 | End: 2018-06-21 | Stop reason: HOSPADM

## 2018-06-21 RX ORDER — SODIUM CHLORIDE 9 MG/ML
INJECTION, SOLUTION INTRAVENOUS
Status: DISCONTINUED | OUTPATIENT
Start: 2018-06-21 | End: 2018-06-21 | Stop reason: HOSPADM

## 2018-06-21 RX ORDER — SODIUM CHLORIDE 9 MG/ML
50 INJECTION, SOLUTION INTRAVENOUS CONTINUOUS
Status: DISCONTINUED | OUTPATIENT
Start: 2018-06-21 | End: 2018-06-21 | Stop reason: HOSPADM

## 2018-06-21 RX ORDER — DEXTROMETHORPHAN/PSEUDOEPHED 2.5-7.5/.8
1.2 DROPS ORAL
Status: DISCONTINUED | OUTPATIENT
Start: 2018-06-21 | End: 2018-06-21 | Stop reason: HOSPADM

## 2018-06-21 RX ORDER — MIDAZOLAM HYDROCHLORIDE 1 MG/ML
.25-5 INJECTION, SOLUTION INTRAMUSCULAR; INTRAVENOUS
Status: DISCONTINUED | OUTPATIENT
Start: 2018-06-21 | End: 2018-06-21 | Stop reason: HOSPADM

## 2018-06-21 RX ORDER — FLUMAZENIL 0.1 MG/ML
0.2 INJECTION INTRAVENOUS
Status: DISCONTINUED | OUTPATIENT
Start: 2018-06-21 | End: 2018-06-21 | Stop reason: HOSPADM

## 2018-06-21 RX ORDER — GLYCOPYRROLATE 0.2 MG/ML
INJECTION INTRAMUSCULAR; INTRAVENOUS AS NEEDED
Status: DISCONTINUED | OUTPATIENT
Start: 2018-06-21 | End: 2018-06-21 | Stop reason: HOSPADM

## 2018-06-21 RX ADMIN — PROPOFOL 40 MG: 10 INJECTION, EMULSION INTRAVENOUS at 11:30

## 2018-06-21 RX ADMIN — PROPOFOL 140 MG: 10 INJECTION, EMULSION INTRAVENOUS at 11:23

## 2018-06-21 RX ADMIN — PROPOFOL 40 MG: 10 INJECTION, EMULSION INTRAVENOUS at 11:28

## 2018-06-21 RX ADMIN — SODIUM CHLORIDE: 9 INJECTION, SOLUTION INTRAVENOUS at 11:03

## 2018-06-21 RX ADMIN — LIDOCAINE HYDROCHLORIDE 40 MG: 20 INJECTION, SOLUTION EPIDURAL; INFILTRATION; INTRACAUDAL; PERINEURAL at 11:15

## 2018-06-21 RX ADMIN — PROPOFOL 40 MG: 10 INJECTION, EMULSION INTRAVENOUS at 11:25

## 2018-06-21 RX ADMIN — GLYCOPYRROLATE 0.1 MG: 0.2 INJECTION INTRAMUSCULAR; INTRAVENOUS at 11:15

## 2018-06-21 NOTE — PROGRESS NOTES
Melissa Falcon  1980  [id number]    Situation:  Verbal report received from: Yamini Richmond RN  Procedure: Procedure(s):  ESOPHAGOGASTRODUODENOSCOPY (EGD)  ESOPHAGOGASTRODUODENAL (EGD) BIOPSY    Background:    Preoperative diagnosis: REFLUX  Postoperative diagnosis: * No post-op diagnosis entered *    :  Dr. Sy Acevedo  Assistant(s): Endoscopy Technician-1: Cheikh Hussein  Endoscopy RN-1: Chay Damon RN    Specimens:   ID Type Source Tests Collected by Time Destination   1 : Biopsy Preservative Duodenum  Kate Gavin MD 6/21/2018 1129 Pathology   2 : Biopsy Random Esophageal Preservative   Kate Gavin MD 6/21/2018 1129 Pathology     H. Pylori  no    Assessment:  Intra-procedure medications      Intravenous fluids: NS@ KVO     Vital signs stable   yes    Abdominal assessment: round and soft   yes    Recommendation:  Discharge patient per MD order  yes.   Return to floor  outpatient  Family or Friend   dad  Permission to share finding with family or friend yes

## 2018-06-21 NOTE — DISCHARGE INSTRUCTIONS
[address]  [address], [address] [facility]  [phone]               EGD DISCHARGE INSTRUCTIONS    Mao Mak  [id number]  1980    DISCOMFORT:  Sore throat- throat lozenges or warm salt water gargle  redness at IV site- apply warm compress to area; if redness or soreness persist- contact your physician  Gaseous discomfort- walking, belching will help relieve any discomfort  You may not operate a vehicle for 12 hours  You may not engage in an occupation involving machinery or appliances for rest of today  You may not drink alcoholic beverages for at least 12 hours  Avoid making any critical decisions for at least 24 hour  DIET  You may eat and drink now and after you leave. You may resume your regular diet - however -  remember your colon is empty and a heavy meal will produce gas. Avoid these foods:  vegetables, fried / greasy foods, carbonated drinks    ACTIVITY  You may resume your normal daily activities   Spend the remainder of the day resting -  avoid any strenuous activity. CALL M.D. ANY SIGN OF   Increasing pain, nausea, vomiting  Abdominal distension (swelling)  New increased bleeding (oral or rectal)  Fever (chills)  Pain in chest area  Bloody discharge from nose or mouth  Shortness of breath    Follow-up Instructions:   Call Dr. Dai Foster for any questions or problems.               Telephone # [phone]    ENDOSCOPY FINDINGS:   Your endoscopy showed normal endoscopy          Signed By: Shabnam Talavera MD     6/21/2018  11:35 AM

## 2018-06-21 NOTE — PERIOP NOTES
Patient tolerated procedure without problems. Abdomen soft and patient arousable and voices no complaints Report received from CRNA, see anesthesia note. Patient transported to endoscopy recovery area.   Endoscope was pre-cleaned at bedside immediately following procedure by Brie Finley

## 2018-06-21 NOTE — ANESTHESIA PREPROCEDURE EVALUATION
Anesthetic History   No history of anesthetic complications            Review of Systems / Medical History  Patient summary reviewed and pertinent labs reviewed    Pulmonary  Within defined limits                 Neuro/Psych         Psychiatric history     Cardiovascular  Within defined limits                Exercise tolerance: >4 METS     GI/Hepatic/Renal     GERD: poorly controlled           Endo/Other        Obesity and arthritis     Other Findings   Comments: Fibromyalgia           Physical Exam    Airway  Mallampati: II  TM Distance: 4 - 6 cm  Neck ROM: normal range of motion   Mouth opening: Normal     Cardiovascular  Regular rate and rhythm,  S1 and S2 normal,  no murmur, click, rub, or gallop  Rhythm: regular  Rate: normal         Dental  No notable dental hx       Pulmonary  Breath sounds clear to auscultation               Abdominal  GI exam deferred       Other Findings            Anesthetic Plan    ASA: 2  Anesthesia type: MAC          Induction: Intravenous  Anesthetic plan and risks discussed with: Patient

## 2018-06-21 NOTE — PROCEDURES
[address]  [address] [facility], [address] [facility]       [phone]                   2018                EGD Operative Report  Yann Smith  :  1980  Rishabh Alexander Medical Record Number:  [id number]      Indication:  Dyspepsia-acid     : Rajeev Espana MD    Referring Provider:  James Mcguire MD      Anesthesia/Sedation:  MAC anesthesia Propofol    Airway assessment: No airway problems anticipated    Pre-Procedural Exam:      Airway: clear, no airway problems anticipated  Heart: RRR, without gallops or rubs  Lungs: clear bilaterally without wheezes, crackles, or rhonchi  Abdomen: soft, nontender, nondistended, bowel sounds present  Mental Status: awake, alert and oriented to person, place and time       Procedure Details     After infomed consent was obtained for the procedure, with all risks and benefits of procedure explained the patient was taken to the endoscopy suite and placed in the left lateral decubitus position. Following sequential administration of sedation as per above, the endoscope was inserted into the mouth and advanced under direct vision to second portion of the duodenum. A careful inspection was made as the gastroscope was withdrawn, including a retroflexed view of the proximal stomach; findings and interventions are described below. Findings:   Esophagus:normal  Stomach: normal   Duodenum/jejunum: normal    Therapies:  none    Specimens:  esophageal and duodenal bx           Complications:   None; patient tolerated the procedure well. EBL:  None. Impression:    Normal upper endoscopy    Recommendations:    -Await pathology. ,   -Await ALMA test result and treat for Helicobacter pylori if positive. ,   -low residue [employer],   -No NSAIDS  -Call office for pathology results    Rajeev Espana MD

## 2018-06-21 NOTE — H&P
[address] [facility] [address], [address] [facility]  [phone]                     History and Physical     NAME: Lizzy Cavazos   :  1980   MRN:  [id number]     HPI:    30year old female who presents with a complaint of Heartburn. The patient presents consultation at the request of Dr. Spencer Smiley). The heartburn is characterized as burning, tightness and pressure. The onset of the heartburn has been chronic and has been occurring in an intermittent pattern for 2 months. The course has been recurrent. The heartburn is described as moderate. The patient has been using omeprazole (Prilosec) and pantoprazole (Protonix). Current medication use: considered effective by patient. Failed treatments include: lifestyle modifications of GERD and antacids. The heartburn is described as being located in the retrosternal area, upper epigastrium and epigastrium. The heartburn are characterized as unchanged. The heartburn does not radiate. The symptoms are aggravated by large meals, aspirin, citrus fruits, alcohol, tomato-based products and spicy foods. The symptoms are relieved by antacids (protonix helps but not resolve stx 100%). The symptoms have been associated with dysphagia and indigestion, while the symptoms have not been associated with abdominal pain, early satiety, excessive alcohol use, excessive smoking, flatulence, hematemesis, nausea, vomiting, asthma, melena, pulmonary symptoms, voice changes, waterbrash, Snell's esophagus or weight loss. Previous diagnostic tests have not included endoscopy or Barium swallow. Note for \"Heartburn\": Pt peterson any use of anticoagulants.  She takes ALeve on a daily basis for the fibromyalgia         Past Surgical History:   Procedure Laterality Date    HX GYN           HX HEENT  2002    wisdom teeth extraction    UPPER GI ENDOSCOPY,DIAGNOSIS  2018          Past Medical History:   Diagnosis Date    Anemia NEC     Arthritis  Chronic pain     fybromyalsia    Depression     anxiety, depression, OCD    GERD (gastroesophageal reflux disease)     Hypertension     Musculoskeletal disorder     SOB (shortness of breath)     Stool color black      Social History   Substance Use Topics    Smoking status: Former Smoker     Packs/day: 0.25     Years: 8.00     Types: Cigarettes     Quit date: 6/11/2018    Smokeless tobacco: Never Used    Alcohol use 3.0 oz/week     5 Glasses of wine, 0 Standard drinks or equivalent per week      Comment: 1 bottle/week     Allergies   Allergen Reactions    Sulfa (Sulfonamide Antibiotics) Hives    Vicodin [Hydrocodone-Acetaminophen] Nausea and Vomiting     Family History   Problem Relation Age of Onset    Hypertension Father     Elevated Lipids Father     Arthritis-rheumatoid Mother      ? Lupus vs RA    Lung Disease Mother     Heart Disease Mother     Cancer Mother      breast in 39y    COPD Mother     Hypertension Mother     Stroke Mother      3-4 strokes    Breast Cancer Mother     Diabetes Maternal Grandmother      No current facility-administered medications for this encounter. Current Outpatient Prescriptions   Medication Sig    gabapentin (NEURONTIN) 300 mg capsule Take 2 tabs in morning and 2 tabs at nighttime. Indications: fibromyalgia    dextroamphetamine-amphetamine (ADDERALL) 20 mg tablet Take 20 mg by mouth.  diclofenac (VOLTAREN) 1 % gel Apply 4 g to affected area four (4) times daily.  pantoprazole (PROTONIX) 40 mg tablet Take 1 Tab by mouth daily.  cetirizine (ZYRTEC) 10 mg tablet Take  by mouth.  norgestimate-ethinyl estradiol (ORTHO-CYCLEN, SPRINTEC) 0.25-35 mg-mcg tab Take 1 Tab by mouth daily. Indications: DYSMENORRHEA    cyclobenzaprine (FLEXERIL) 10 mg tablet Take 1 Tab by mouth three (3) times daily as needed.  FLUoxetine (PROZAC) 40 mg capsule Take 40 mg by mouth daily.     fluticasone (FLONASE) 50 mcg/actuation nasal spray 2 Sprays by Both Nostrils route daily.  albuterol (PROVENTIL HFA, VENTOLIN HFA, PROAIR HFA) 90 mcg/actuation inhaler Take 1 Puff by inhalation every six (6) hours as needed for Wheezing.  ketoconazole (NIZORAL) 2 % shampoo shampoo twice weekly    triamcinolone acetonide (KENALOG) 0.1 % ointment Apply  to affected area two (2) times a day. use thin layer    hydrocortisone (ANUSOL-HC) 2.5 % rectal cream Insert  into rectum four (4) times daily. As needed    LORazepam (ATIVAN) 1 mg tablet Take 1 Tab by mouth every twelve (12) hours. Max Daily Amount: 2 mg. Indications: ANXIETY (Patient taking differently: Take 2 mg by mouth every twelve (12) hours. Indications: ANXIETY)    clonazePAM (KLONOPIN) 0.5 mg tablet Take 1 mg by mouth nightly as needed.  traZODone (DESYREL) 50 mg tablet Take 1 Tab by mouth nightly. PHYSICAL EXAM:  General: WD, WN. Alert, cooperative, no acute distress    HEENT: NC, Atraumatic. PERRLA, EOMI. Anicteric sclerae. Lungs:  CTA Bilaterally. No Wheezing/Rhonchi/Rales. Heart:  Regular  rhythm,  No murmur, No Rubs, No Gallops  Abdomen: Soft, Non distended, Non tender.  +Bowel sounds, no HSM  Extremities: No c/c/e  Neurologic:  CN 2-12 gi, Alert and oriented X 3. No acute neurological distress   Psych:   Good insight. Not anxious nor agitated. Assessment:   I have reviewed with the patient +/- family alternatives,benefits and risks for the procedure, as well as potential complications(with emphasis on, but not limited to, bleeding, perforation, cardiovascular/cerebrovascular/pulmonary events, reactions to the medications, infection, risk of missing a lesions/a cancer, and the imponderables including death), alternative options, and patient/family voices understanding.       Plan:   · Endoscopic procedure  · Conscious sedation or MAC

## 2018-06-21 NOTE — ANESTHESIA POSTPROCEDURE EVALUATION
Post-Anesthesia Evaluation and Assessment    Patient: Lizzy Cavazos MRN: [id number]  SSN: [id number]    YOB: 1980  Age: 30 y.o. Sex: female       Cardiovascular Function/Vital Signs  Visit Vitals    BP (!) 132/91    Pulse 86    Temp 36.7 °C (98.1 °F)    Resp 16    Ht 5' 4\" (1.626 m)    Wt 90.7 kg (200 lb)    SpO2 100%    BMI 34.33 kg/m2       Patient is status post MAC anesthesia for Procedure(s):  ESOPHAGOGASTRODUODENOSCOPY (EGD)  ESOPHAGOGASTRODUODENAL (EGD) BIOPSY. Nausea/Vomiting: None    Postoperative hydration reviewed and adequate. Pain:  Pain Scale 1: Numeric (0 - 10) (06/21/18 1150)  Pain Intensity 1: 0 (06/21/18 1150)   Managed    Neurological Status: At baseline    Mental Status and Level of Consciousness: Arousable    Pulmonary Status:   O2 Device: Room air (06/21/18 1155)   Adequate oxygenation and airway patent    Complications related to anesthesia: None    Post-anesthesia assessment completed.  No concerns    Signed By: Beth Zepeda MD     June 21, 2018

## 2018-06-21 NOTE — IP AVS SNAPSHOT
[address] 
[phone] Patient: Roberto Has MRN: [id number] :1980 About your hospitalization You were admitted on:  2018 You last received care in the:  [facility] ENDOSCOPY You were discharged on:  2018 Why you were hospitalized Your primary diagnosis was:  Not on File Follow-up Information None Discharge Orders None A check brady indicates which time of day the medication should be taken. My Medications CHANGE how you take these medications Instructions Each Dose to Equal  
 Morning Noon Evening Bedtime LORazepam 1 mg tablet Commonly known as:  ATIVAN What changed:  how much to take Your last dose was: Your next dose is: Take 1 Tab by mouth every twelve (12) hours. Max Daily Amount: 2 mg. Indications: ANXIETY 1 mg CONTINUE taking these medications Instructions Each Dose to Equal  
 Morning Noon Evening Bedtime  
 albuterol 90 mcg/actuation inhaler Commonly known as:  PROVENTIL HFA, VENTOLIN HFA, PROAIR HFA Your last dose was: Your next dose is: Take 1 Puff by inhalation every six (6) hours as needed for Wheezing. 1 Puff  
    
   
   
   
  
 clonazePAM 0.5 mg tablet Commonly known as:  Raydell Kody Your last dose was: Your next dose is: Take 1 mg by mouth nightly as needed. 1 mg  
    
   
   
   
  
 cyclobenzaprine 10 mg tablet Commonly known as:  FLEXERIL Your last dose was: Your next dose is: Take 1 Tab by mouth three (3) times daily as needed. 10 mg  
    
   
   
   
  
 dextroamphetamine-amphetamine 20 mg tablet Commonly known as:  ADDERALL Your last dose was: Your next dose is: Take 20 mg by mouth. 20 mg  
    
   
   
   
  
 diclofenac 1 % Gel Commonly known as:  VOLTAREN Your last dose was: Your next dose is:    
   
   
 Apply 4 g to affected area four (4) times daily. 4 g FLONASE 50 mcg/actuation nasal spray Generic drug:  fluticasone Your last dose was: Your next dose is: 2 Sprays by Both Nostrils route daily. 2 Spray FLUoxetine 40 mg capsule Commonly known as:  PROzac Your last dose was: Your next dose is: Take 40 mg by mouth daily. 40 mg  
    
   
   
   
  
 gabapentin 300 mg capsule Commonly known as:  NEURONTIN Your last dose was: Your next dose is: Take 2 tabs in morning and 2 tabs at nighttime. Indications: fibromyalgia  
     
   
   
   
  
 hydrocortisone 2.5 % rectal cream  
Commonly known as:  ANUSOL-HC Your last dose was: Your next dose is: Insert  into rectum four (4) times daily. As needed  
     
   
   
   
  
 ketoconazole 2 % shampoo Commonly known as:  NIZORAL Your last dose was: Your next dose is:    
   
   
 shampoo twice weekly  
     
   
   
   
  
 norgestimate-ethinyl estradiol 0.25-35 mg-mcg Tab Commonly known as:  Oralee Binning Your last dose was: Your next dose is: Take 1 Tab by mouth daily. Indications: DYSMENORRHEA  
 1 Tab  
    
   
   
   
  
 pantoprazole 40 mg tablet Commonly known as:  PROTONIX Your last dose was: Your next dose is: Take 1 Tab by mouth daily. 40 mg  
    
   
   
   
  
 traZODone 50 mg tablet Commonly known as:  Sadeganna Quiver Your last dose was: Your next dose is: Take 1 Tab by mouth nightly. 50 mg  
    
   
   
   
  
 triamcinolone acetonide 0.1 % ointment Commonly known as:  KENALOG Your last dose was: Your next dose is: Apply  to affected area two (2) times a day. use thin layer ZyrTEC 10 mg tablet Generic drug:  cetirizine Your last dose was: Your next dose is: Take  by mouth. Discharge Instructions [address] [facility] [address] [facility] [place], [address] [facility] 
[phone] EGD DISCHARGE INSTRUCTIONS Yann Smith [id number] 
1980 DISCOMFORT: 
Sore throat- throat lozenges or warm salt water gargle 
redness at IV site- apply warm compress to area; if redness or soreness persist- contact your physician Gaseous discomfort- walking, belching will help relieve any discomfort You may not operate a vehicle for 12 hours You may not engage in an occupation involving machinery or appliances for rest of today You may not drink alcoholic beverages for at least 12 hours Avoid making any critical decisions for at least 24 hour DIET You may eat and drink now and after you leave. You may resume your regular diet  however -  remember your colon is empty and a heavy meal will produce gas. Avoid these foods:  vegetables, fried / greasy foods, carbonated drinks ACTIVITY You may resume your normal daily activities Spend the remainder of the day resting -  avoid any strenuous activity. CALL M.D. ANY SIGN OF Increasing pain, nausea, vomiting Abdominal distension (swelling) New increased bleeding (oral or rectal) Fever (chills) Pain in chest area Bloody discharge from nose or mouth Shortness of breath Follow-up Instructions: 
 Call Dr. Luciana Davila for any questions or problems. Telephone # [phone] ENDOSCOPY FINDINGS: 
 Your endoscopy showed normal endoscopy Signed By: Rajeev Espana MD   
 6/21/2018  11:35 AM 
  
 
 
 
 
 
  
  
  
Introducing hospitals & HEALTH SERVICES! Dear Ziyad Pryor: Thank you for requesting a [employer] account.   Our records indicate that you already have an active [employer] account. You can access your account anytime at [email or web address] Did you know that you can access your hospital and ER discharge instructions at any time in [employer]? You can also review all of your test results from your hospital stay or ER visit. Additional Information If you have questions, please visit the Frequently Asked Questions section of the [employer] website at [email or web address] Remember, [employer] is NOT to be used for urgent needs. For medical emergencies, dial 911. Now available from your iPhone and Android! Introducing Henry Valle As a [facility] patient, I wanted to make you aware of our electronic visit tool called Henry Valle. [facility] allows you to connect within minutes with a medical provider 24 hours a day, seven days a week via a mobile device or tablet or logging into a secure website from your computer. You can access Henry Valle from anywhere in the [place]. A virtual visit might be right for you when you have a simple condition and feel like you just dont want to get out of bed, or cant get away from work for an appointment, when your regular [employer] provider is not available (evenings, weekends or holidays), or when youre out of town and need minor care. Electronic visits cost only $49 and if the [facility] provider determines a prescription is needed to treat your condition, one can be electronically transmitted to a nearby pharmacy*. Please take a moment to enroll today if you have not already done so. The enrollment process is free and takes just a few minutes. To enroll, please download the [facility] jana to your tablet or phone, or visit [email or web address] to enroll on your computer.    
And, as an [address] [place] [address] patient with a [employer] account, the results of your visits will be scanned into your electronic medical record and your primary care provider will be able to view the scanned results. We urge you to continue to see your regular [employer] provider for your ongoing medical care. And while your primary care provider may not be the one available when you seek a [employer] virtual visit, the peace of mind you get from getting a real diagnosis real time can be priceless. For more information on [employer], view our Frequently Asked Questions (FAQs) at [email or web address]. Sincerely, 
 
Tamika Franco MD 
Chief Medical Officer 50Nancy Hernandez *:  certain medications cannot be prescribed via [employer] Providers Seen During Your Hospitalization Provider Specialty Primary office phone Evan Fleischer, MD Gastroenterology [phone] Your Primary Care Physician (PCP) Primary Care Physician Office Phone Office Fax Renay Panda [phone] You are allergic to the following Allergen Reactions Sulfa (Sulfonamide Antibiotics) Hives Vicodin (Hydrocodone-Acetaminophen) Nausea and Vomiting Recent Documentation Height Weight BMI OB Status Smoking Status 1.626 m 90.7 kg 34.33 kg/m2 Having regular periods Former Smoker Emergency Contacts Name Discharge Info Relation Home Work Mobile Dustni Contreras DISCHARGE CAREGIVER [3] Parent [1] [phone] Patient Belongings The following personal items are in your possession at time of discharge: 
  Dental Appliances: None  Visual Aid: None, Glasses Please provide this summary of care documentation to your next provider. Signatures-by signing, you are acknowledging that this After Visit Summary has been reviewed with you and you have received a copy.   
  
 
  
    
    
 Patient Signature: ____________________________________________________________ Date:  ____________________________________________________________  
  
Sissy Lapine Provider Signature:  ____________________________________________________________ Date:  ____________________________________________________________

## 2018-07-25 ENCOUNTER — OFFICE VISIT (OUTPATIENT)
Dept: FAMILY MEDICINE CLINIC | Age: 38
End: 2018-07-25

## 2018-07-25 VITALS
BODY MASS INDEX: 35.61 KG/M2 | HEIGHT: 64 IN | WEIGHT: 208.6 LBS | TEMPERATURE: 98 F | HEART RATE: 93 BPM | OXYGEN SATURATION: 98 % | RESPIRATION RATE: 16 BRPM | DIASTOLIC BLOOD PRESSURE: 90 MMHG | SYSTOLIC BLOOD PRESSURE: 133 MMHG

## 2018-07-25 DIAGNOSIS — K21.00 GASTROESOPHAGEAL REFLUX DISEASE WITH ESOPHAGITIS: Chronic | ICD-10-CM

## 2018-07-25 DIAGNOSIS — Z20.2 EXPOSURE TO SEXUALLY TRANSMITTED DISEASE (STD): ICD-10-CM

## 2018-07-25 DIAGNOSIS — R25.1 TREMOR: ICD-10-CM

## 2018-07-25 DIAGNOSIS — R73.01 IFG (IMPAIRED FASTING GLUCOSE): ICD-10-CM

## 2018-07-25 DIAGNOSIS — F33.9 RECURRENT DEPRESSION (HCC): ICD-10-CM

## 2018-07-25 DIAGNOSIS — E78.00 PURE HYPERCHOLESTEROLEMIA: ICD-10-CM

## 2018-07-25 DIAGNOSIS — E55.9 VITAMIN D DEFICIENCY: ICD-10-CM

## 2018-07-25 DIAGNOSIS — R20.0 NUMBNESS AND TINGLING: ICD-10-CM

## 2018-07-25 DIAGNOSIS — E07.89 PAINFUL THYROID: ICD-10-CM

## 2018-07-25 DIAGNOSIS — I10 ESSENTIAL HYPERTENSION: Primary | Chronic | ICD-10-CM

## 2018-07-25 DIAGNOSIS — K64.4 EXTERNAL HEMORRHOIDS: ICD-10-CM

## 2018-07-25 DIAGNOSIS — F39 MOOD DISORDER (HCC): ICD-10-CM

## 2018-07-25 DIAGNOSIS — M79.7 FIBROMYALGIA: ICD-10-CM

## 2018-07-25 DIAGNOSIS — G89.4 CHRONIC PAIN SYNDROME: ICD-10-CM

## 2018-07-25 DIAGNOSIS — F32.A DEPRESSION, UNSPECIFIED DEPRESSION TYPE: Chronic | ICD-10-CM

## 2018-07-25 DIAGNOSIS — R20.2 NUMBNESS AND TINGLING: ICD-10-CM

## 2018-07-25 PROBLEM — E66.01 SEVERE OBESITY (BMI 35.0-39.9): Status: ACTIVE | Noted: 2018-07-25

## 2018-07-25 RX ORDER — HYDROCORTISONE 25 MG/G
CREAM TOPICAL 4 TIMES DAILY
Qty: 30 G | Refills: 11 | Status: SHIPPED | OUTPATIENT
Start: 2018-07-25 | End: 2019-09-05 | Stop reason: ALTCHOICE

## 2018-07-25 NOTE — PROGRESS NOTES
Chief Complaint   Patient presents with    Labs     Fasting -- Thyroid, A1C, RA factor    Exposure to STD    Tingling     Bilateral Feet    Headache       Body mass index is 35.81 kg/(m^2). 1. Have you been to the ER, urgent care clinic since your last visit? Hospitalized since your last visit? No    2. Have you seen or consulted any other health care providers outside of the [address] [place] [address] since your last visit? Include any pap smears or colon screening. No    Reviewed record in preparation for visit and have necessary documentation  Pt did not bring medication to office visit for review  Information was given to pt on Advanced Directives, Living Will  Opportunity was given for questions  Goals that were addressed and/or need to be completed after this appointment include: There are no preventive care reminders to display for this patient.

## 2018-07-25 NOTE — PATIENT INSTRUCTIONS
Gastroesophageal Reflux Disease (GERD): Care Instructions  Your Care Instructions    Gastroesophageal reflux disease (GERD) is the backward flow of stomach acid into the esophagus. The esophagus is the tube that leads from your throat to your stomach. A one-way valve prevents the stomach acid from moving up into this tube. When you have GERD, this valve does not close tightly enough. If you have mild GERD symptoms including heartburn, you may be able to control the problem with antacids or over-the-counter medicine. Changing your diet, losing weight, and making other lifestyle changes can also help reduce symptoms. Follow-up care is a key part of your treatment and safety. Be sure to make and go to all appointments, and call your doctor if you are having problems. It's also a good idea to know your test results and keep a list of the medicines you take. How can you care for yourself at home? · Take your medicines exactly as prescribed. Call your doctor if you think you are having a problem with your medicine. · Your doctor may recommend over-the-counter medicine. For mild or occasional indigestion, antacids, such as Tums, Gaviscon, Mylanta, or Maalox, may help. Your doctor also may recommend over-the-counter acid reducers, such as Pepcid AC, Tagamet HB, Zantac 75, or Prilosec. Read and follow all instructions on the label. If you use these medicines often, talk with your doctor. · Change your eating habits. ¨ It's best to eat several small meals instead of two or three large meals. ¨ After you eat, wait 2 to 3 hours before you lie down. ¨ Chocolate, mint, and alcohol can make GERD worse. ¨ Spicy foods, foods that have a lot of acid (like tomatoes and oranges), and coffee can make GERD symptoms worse in some people. If your symptoms are worse after you eat a certain food, you may want to stop eating that food to see if your symptoms get better.   · Do not smoke or chew tobacco. Smoking can make GERD worse. If you need help quitting, talk to your doctor about stop-smoking programs and medicines. These can increase your chances of quitting for good. · If you have GERD symptoms at night, raise the head of your bed 6 to 8 inches by putting the frame on blocks or placing a foam wedge under the head of your mattress. (Adding extra pillows does not work.)  · Do not wear tight clothing around your middle. · Lose weight if you need to. Losing just 5 to 10 pounds can help. When should you call for help? Call your doctor now or seek immediate medical care if:    · You have new or different belly pain.     · Your stools are black and tarlike or have streaks of blood.    Watch closely for changes in your health, and be sure to contact your doctor if:    · Your symptoms have not improved after 2 days.     · Food seems to catch in your throat or chest.   Where can you learn more? Go to http://daniel-carlton.info/. Enter O647 in the search box to learn more about \"Gastroesophageal Reflux Disease (GERD): Care Instructions. \"  Current as of: May 12, 2017  Content Version: 11.7  © 1748-7516 [employer]. Care instructions adapted under license by [employer] (which disclaims liability or warranty for this information). If you have questions about a medical condition or this instruction, always ask your healthcare professional. [address] any warranty or liability for your use of this information.

## 2018-07-25 NOTE — PROGRESS NOTES
Progress Note    Patient: Marycarmen Moreno MRN: [id number]  SSN: [id number]    YOB: 1980  Age: 30 y.o. Sex: female        Chief Complaint   Patient presents with    Labs     Fasting -- Thyroid, A1C, RA factor    Exposure to STD    Tingling     Bilateral Feet    Headache     Multiple diverse medical problems necessitating extensive decision making, prolonged chart review and prolonged OV. Subjective:     Encounter Diagnoses   Name Primary?  Essential hypertension:  BP Readings from Last 3 Encounters:   07/25/18 133/90   06/21/18 (!) 132/91   06/11/18 127/83     The patient reports:  taking medications as instructed, no medication side effects noted, no TIA's, no chest pain on exertion, no dyspnea on exertion, no swelling of ankles. Key CAD CHF Meds     Patient is on no cardiovascular meds. Lab Results   Component Value Date/Time    Sodium 140 02/27/2018 08:59 AM    Potassium 4.6 02/27/2018 08:59 AM    Chloride 102 02/27/2018 08:59 AM    CO2 25 02/27/2018 08:59 AM    Anion gap 9 08/19/2010 10:36 AM    Glucose 94 02/27/2018 08:59 AM    BUN 11 02/27/2018 08:59 AM    Creatinine 0.90 02/27/2018 08:59 AM    BUN/Creatinine ratio 12 02/27/2018 08:59 AM    GFR est AA 96 02/27/2018 08:59 AM    GFR est non-AA 83 02/27/2018 08:59 AM    Calcium 9.2 02/27/2018 08:59 AM    Bilirubin, total 0.5 12/08/2017 04:08 PM    AST (SGOT) 14 12/08/2017 04:08 PM    Alk. phosphatase 61 12/08/2017 04:08 PM    Protein, total 6.7 12/08/2017 04:08 PM    Albumin 4.3 12/08/2017 04:08 PM    Globulin 3.6 08/19/2010 10:36 AM    A-G Ratio 1.5 11/07/2017 02:52 PM    ALT (SGPT) 16 12/08/2017 04:08 PM     Low salt diet? yes  Aerobic exercise? No  Our goal is to normalize the blood pressure to decrease the risks of strokes and heart attacks. The patient is in agreement with the plan.        Yes    Gastroesophageal reflux disease with esophagitis:  Current control of Symptoms:good  Hiatal Hernia:no  Current Medications: Protonix  The patient has no history melena or bright red blood in the stools. The patient avoids high dose aspirin and NSAID therapy. The patient is aware of diet changes needed, elevating the head of the bed and appropriate use of antacids.  Fibromyalgia: Chronic diffuse body pain. This is associated with depression and chronic headaches.  Chronic pain syndrome: Due to fibromyalgia.  Pure hypercholesterolemia:  Cardiovascular risks for her are: LDL goal is under 100. Key Antihyperlipidemia Meds     The patient is on no antihyperlipidemia meds. Lab Results   Component Value Date/Time    Cholesterol, total 260 (H) 11/07/2017 02:52 PM    HDL Cholesterol 91 11/07/2017 02:52 PM    LDL, calculated 160 (H) 11/07/2017 02:52 PM    Triglyceride 43 11/07/2017 02:52 PM     Lab Results   Component Value Date/Time    ALT (SGPT) 16 12/08/2017 04:08 PM    AST (SGOT) 14 12/08/2017 04:08 PM    Alk. phosphatase 61 12/08/2017 04:08 PM    Bilirubin, direct 0.12 12/08/2017 04:08 PM    Bilirubin, total 0.5 12/08/2017 04:08 PM      Myalgias: No   Fatigue: No   Other side effects: no  Wt Readings from Last 3 Encounters:   07/25/18 208 lb 9.6 oz (94.6 kg)   06/21/18 200 lb (90.7 kg)   06/11/18 200 lb 9.6 oz (91 kg)     The patient is aware of our goal to reduce or eliminate the long term problems (such as strokes and heart attacks) related to poorly controlled hyperlipidemia.  IFG (impaired fasting glucose): She has gained 21 pounds in 4 months. She says she is hungry all the time and craves sweets. Because of her gastroparesis and weight gain she may very well be developing diabetes.  Depression, unspecified depression type: Complex with mood disorder and OCD. The psychiatrist on a regular basis.  Vitamin D deficiency:  No sx. Due for testing.   Lab Results   Component Value Date/Time    VITAMIN D, 25-HYDROXY 10.5 (L) 03/18/2016 10:31 AM              Mood disorder ([address]): Psychiatry manages this.  Recurrent depression [facility]): psychiatry manages this.  Exposure to sexually transmitted disease (STD): She asks for frequent STD testing.  Numbness and tingling: She has numbness and tingling in her toes. This warrants checking a B12 level.  Painful thyroid: She says she has chronic neck pain over her thyroid gland. With this symptom and her weight gain we need to rule out hypothyroidism again.  Tremor: Early in the right hand. This has gotten worse. She is left-handed. Decreasing her dose of Prozac did not help her tremor. It is felt to be medication related.  External hemorrhoids:  No symptoms today. Current and past medical information:    Current Medications after this visit[de-identified]   Current Outpatient Prescriptions   Medication Sig    gabapentin (NEURONTIN) 300 mg capsule Take 2 tabs in morning and 2 tabs at nighttime. Indications: fibromyalgia    dextroamphetamine-amphetamine (ADDERALL) 20 mg tablet Take 20 mg by mouth.  diclofenac (VOLTAREN) 1 % gel Apply 4 g to affected area four (4) times daily.  pantoprazole (PROTONIX) 40 mg tablet Take 1 Tab by mouth daily.  cetirizine (ZYRTEC) 10 mg tablet Take  by mouth.  norgestimate-ethinyl estradiol (ORTHO-CYCLEN, SPRINTEC) 0.25-35 mg-mcg tab Take 1 Tab by mouth daily. Indications: DYSMENORRHEA    cyclobenzaprine (FLEXERIL) 10 mg tablet Take 1 Tab by mouth three (3) times daily as needed.  FLUoxetine (PROZAC) 40 mg capsule Take 40 mg by mouth daily.  fluticasone (FLONASE) 50 mcg/actuation nasal spray 2 Sprays by Both Nostrils route daily.  albuterol (PROVENTIL HFA, VENTOLIN HFA, PROAIR HFA) 90 mcg/actuation inhaler Take 1 Puff by inhalation every six (6) hours as needed for Wheezing.  ketoconazole (NIZORAL) 2 % shampoo shampoo twice weekly    triamcinolone acetonide (KENALOG) 0.1 % ointment Apply  to affected area two (2) times a day.  use thin layer    hydrocortisone (ANUSOL-HC) 2.5 % rectal cream Insert  into rectum four (4) times daily. As needed    LORazepam (ATIVAN) 1 mg tablet Take 1 Tab by mouth every twelve (12) hours. Max Daily Amount: 2 mg. Indications: ANXIETY (Patient taking differently: Take 2 mg by mouth every twelve (12) hours. Indications: ANXIETY)    clonazePAM (KLONOPIN) 0.5 mg tablet Take 1 mg by mouth nightly as needed.  traZODone (DESYREL) 50 mg tablet Take 1 Tab by mouth nightly. No current facility-administered medications for this visit.         Patient Active Problem List    Diagnosis Date Noted    Severe obesity (BMI 35.0-39.9) ([facility] 2018    Mood disorder ([facility] 2018    Chronic pain syndrome 2018    Fibromyalgia 2018    Panic attack 2018    Tremor 2018    Recurrent depression ([facility] 01/15/2018    Inflammatory arthritis 08/10/2016    Vitamin D deficiency 2016    Thrombosed external hemorrhoid 2016    IFG (impaired fasting glucose) 09/10/2015    Hyperlipidemia 09/10/2015    Hoarseness 2013    OCD (obsessive compulsive disorder) 2012    HSV (herpes simplex virus) anogenital infection 2011    Costochondritis 2011    Anxiety 2010    Itch of skin 2010    Depression     GERD (gastroesophageal reflux disease)     Hypertension     Anemia, unspecified        Past Medical History:   Diagnosis Date    Anemia NEC     Arthritis     Chronic pain     fybromyalsia    Depression     anxiety, depression, OCD    GERD (gastroesophageal reflux disease)     Hypertension     Musculoskeletal disorder     SOB (shortness of breath)     Stool color black        Allergies   Allergen Reactions    Sulfa (Sulfonamide Antibiotics) Hives    Vicodin [Hydrocodone-Acetaminophen] Nausea and Vomiting       Past Surgical History:   Procedure Laterality Date    HX GYN           HX HEENT      wisdom teeth extraction    UPPER GI ENDOSCOPY,BIOPSY  6/21/2018         UPPER GI ENDOSCOPY,DIAGNOSIS  6/7/2018            Social History     Social History    Marital status: SINGLE     Spouse name: N/A    Number of children: N/A    Years of education: N/A     Social History Main Topics    Smoking status: Former Smoker     Packs/day: 0.25     Years: 8.00     Types: Cigarettes     Quit date: 6/11/2018    Smokeless tobacco: Never Used    Alcohol use 0.6 oz/week     0 Standard drinks or equivalent, 1 Glasses of wine per week      Comment: 1 cup of wine/week    Drug use: No    Sexual activity: Yes     Partners: Male     Birth control/ protection: None     Other Topics Concern    None     Social History Narrative       Review of Systems   Constitutional: Negative. Negative for chills, fever, malaise/fatigue and weight loss. Wt Readings from Last 3 Encounters:  07/25/18 : 208 lb 9.6 oz (94.6 kg)  06/21/18 : 200 lb (90.7 kg)  06/11/18 : 200 lb 9.6 oz (91 kg)     HENT: Negative. Negative for hearing loss. Eyes: Negative. Negative for blurred vision and double vision. Respiratory: Negative. Negative for cough, hemoptysis, sputum production and shortness of breath. Cardiovascular: Negative. Negative for chest pain, palpitations and orthopnea. Gastrointestinal: Negative. Negative for abdominal pain, blood in stool, heartburn, nausea and vomiting. Genitourinary: Negative. Negative for dysuria, frequency and urgency. Musculoskeletal: Positive for back pain, joint pain and myalgias. Negative for falls and neck pain. Skin: Negative. Negative for rash. Neurological: Negative. Negative for dizziness, tingling, tremors, weakness and headaches. Endo/Heme/Allergies: Negative. Psychiatric/Behavioral: Negative. Negative for depression.         Objective:     Vitals:    07/25/18 0824 07/25/18 0850   BP: (!) 138/94 133/90   Pulse: (!) 107 93   Resp: 16    Temp: 98 °F (36.7 °C)    TempSrc: Oral    SpO2: 98%    Weight: 208 lb 9.6 oz (94.6 kg)    Height: 5' 4\" (1.626 m)       Body mass index is 35.81 kg/(m^2). Physical Exam   Constitutional: She is oriented to person, place, and time and well-developed, well-nourished, and in no distress. No distress. HENT:   Head: Normocephalic and atraumatic. Mouth/Throat: Oropharynx is clear and moist.   Eyes: Conjunctivae are normal.   Neck: No tracheal deviation present. No thyromegaly present. She has a fullness in her neck. I cannot definitely say that she has thyromegaly so she will need a scan. Cardiovascular: Normal rate, regular rhythm and normal heart sounds. No murmur heard. Pulmonary/Chest: Effort normal and breath sounds normal. No respiratory distress. Abdominal: Soft. She exhibits no distension. Lymphadenopathy:     She has no cervical adenopathy. Neurological: She is alert and oriented to person, place, and time. Skin: Skin is warm. No rash noted. She is not diaphoretic. No erythema. Psychiatric:   Depressed affect. Multiple chronic complaints. Nursing note and vitals reviewed. There are no preventive care reminders to display for this patient. Assessment and orders:     Encounter Diagnoses     ICD-10-CM ICD-9-CM   1. Essential hypertension I10 401.9   2. Gastroesophageal reflux disease with esophagitis K21.0 530.11   3. Fibromyalgia M79.7 729.1   4. Chronic pain syndrome G89.4 338.4   5. Pure hypercholesterolemia E78.00 272.0   6. IFG (impaired fasting glucose) R73.01 790.21   7. Depression, unspecified depression type F32.9 311   8. Vitamin D deficiency E55.9 268.9   9. Mood disorder ([facility] F39 296.90   10. Recurrent depression (HCC) F33.9 296.30   11. Exposure to sexually transmitted disease (STD) Z20.2 V01.6   12. Numbness and tingling R20.0 782.0    R20.2    13. Painful thyroid E07.89 246.8   14. Tremor R25.1 781.0   15. External hemorrhoids K64.4 455.3     Diagnoses and all orders for this visit:    1.  Essential hypertension-borderline control  -     LIPID PANEL  -     METABOLIC PANEL, COMPREHENSIVE  -     CBC WITH AUTOMATED DIFF    2. Gastroesophageal reflux disease with esophagitis-this is also associated with gastroparesis. She is to eat multiple small soluble fiber meals.  -     CBC WITH AUTOMATED DIFF    3. Fibromyalgia-chronic pain    4. Chronic pain syndrome-no improvement    5. Pure hypercholesterolemia  -     LIPID PANEL  -     METABOLIC PANEL, COMPREHENSIVE  -     TSH 3RD GENERATION  -     T4, FREE    6. IFG (impaired fasting glucose)-with weight loss and gastroparesis diabetes needs to be ruled out  -     HEMOGLOBIN A1C WITH EAG  -     TSH 3RD GENERATION  -     T4, FREE    7. Depression, unspecified depression type-chronic--    8. Vitamin D deficiency-recheck  -     VITAMIN D, 25 HYDROXY    9. Mood disorder [facility])  Assessment & Plan: This condition is managed by Specialist.  Key Psychotherapeutic Meds             FLUoxetine (PROZAC) 40 mg capsule  (Taking) Take 40 mg by mouth daily. traZODone (DESYREL) 50 mg tablet  (Taking) Take 1 Tab by mouth nightly. Other [address]             gabapentin (NEURONTIN) 300 mg capsule  (Taking) Take 2 tabs in morning and 2 tabs at nighttime. Indications: fibromyalgia        Lab Results   Component Value Date/Time    Sodium 140 02/27/2018 08:59 AM    Creatinine 0.90 02/27/2018 08:59 AM    TSH 1.620 02/27/2018 08:59 AM    ALT (SGPT) 16 12/08/2017 04:08 PM    AST (SGOT) 14 12/08/2017 04:08 PM         10. Recurrent depression ([facility]  Assessment & Plan: This condition is managed by Specialist.  Key Psychotherapeutic Meds             FLUoxetine (PROZAC) 40 mg capsule  (Taking) Take 40 mg by mouth daily. traZODone (DESYREL) 50 mg tablet  (Taking) Take 1 Tab by mouth nightly. Other [address]             gabapentin (NEURONTIN) 300 mg capsule  (Taking) Take 2 tabs in morning and 2 tabs at nighttime.   Indications: fibromyalgia        Lab Results   Component Value Date/Time    Sodium 140 02/27/2018 08:59 AM    Creatinine 0.90 02/27/2018 08:59 AM    TSH 1.620 02/27/2018 08:59 AM    ALT (SGPT) 16 12/08/2017 04:08 PM    AST (SGOT) 14 12/08/2017 04:08 PM         11. Exposure to sexually transmitted disease (STD)  -     HIV 1/2 AG/AB, 4TH GENERATION,W RFLX CONFIRM  -     CHLAMYDIA/GC PCR    12. Numbness and tingling  -     VITAMIN B12    13. Painful thyroid  -     US THYROID/PARATHYROID/SOFT TISS; Future    14. Tremor-presumed medication induced    15. External hemorrhoids  -     hydrocortisone (ANUSOL-HC) 2.5 % rectal cream; Insert  into rectum four (4) times daily. As needed          Plan of care:  Discussed diagnoses in detail with patient. Medication risks/benefits/side effects discussed with patient. All of the patient's questions were addressed. The patient understands and agrees with our plan of care. The patient knows to call back if they are unsure of or forget any changes we discussed today or if the symptoms change. The patient received an After-Visit Summary which contains VS, orders, medication list and allergy list. This can be used as a \"mini-medical record\" should they have to seek medical care while out of town. Patient Care Team:  Adolph Anguiano MD as PCP - General (Family Practice)  Horace Liang MD (Breast Surgery)  Eduardo Hinkle MD (Rheumatology)  Ruben Jaeger MD (Cardiology)  Joshua Khan MD (Otolaryngology)    Follow-up Disposition:  Return in about 3 months (around 10/25/2018).     Future Appointments  Date Time Provider Komal Rodriguez   10/29/2018 9:00 AM Adolph Anguiano MD [facility]IA SCHED       Signed By: Adolph Anguiano MD     July 25, 2018

## 2018-07-25 NOTE — ASSESSMENT & PLAN NOTE
This condition is managed by Specialist.  Key Psychotherapeutic Meds             FLUoxetine (PROZAC) 40 mg capsule  (Taking) Take 40 mg by mouth daily. traZODone (DESYREL) 50 mg tablet  (Taking) Take 1 Tab by mouth nightly. Other [address]             gabapentin (NEURONTIN) 300 mg capsule  (Taking) Take 2 tabs in morning and 2 tabs at nighttime.   Indications: fibromyalgia        Lab Results   Component Value Date/Time    Sodium 140 02/27/2018 08:59 AM    Creatinine 0.90 02/27/2018 08:59 AM    TSH 1.620 02/27/2018 08:59 AM    ALT (SGPT) 16 12/08/2017 04:08 PM    AST (SGOT) 14 12/08/2017 04:08 PM

## 2018-07-25 NOTE — ASSESSMENT & PLAN NOTE
This condition is managed by Specialist.  Key Psychotherapeutic Meds             FLUoxetine (PROZAC) 40 mg capsule  (Taking) Take 40 mg by mouth daily. traZODone (DESYREL) 50 mg tablet  (Taking) Take 1 Tab by mouth nightly. Other [address]             gabapentin (NEURONTIN) 300 mg capsule  (Taking) Take 2 tabs in morning and 2 tabs at nighttime.   Indications: fibromyalgia        Lab Results   Component Value Date/Time    Sodium 140 02/27/2018 08:59 AM    Creatinine 0.90 02/27/2018 08:59 AM    TSH 1.620 02/27/2018 08:59 AM    ALT (SGPT) 16 12/08/2017 04:08 PM    AST (SGOT) 14 12/08/2017 04:08 PM

## 2018-07-25 NOTE — MR AVS SNAPSHOT
[address] [place] [address] 
[phone] Patient: Arline Jones MRN: [id number] :1980 Visit Information Date & Time Provider Department Dept. Phone Encounter #  
 2018  8:20 AM Adolph Anguiano MD [address] [place] [address] [id number] Follow-up Instructions Return in about 3 months (around 10/25/2018). Upcoming Health Maintenance Date Due Influenza Age 5 to Adult 2018 PAP AKA CERVICAL CYTOLOGY 2020 DTaP/Tdap/Td series (2 - Td) 2024 COLONOSCOPY 5/15/2025 Allergies as of 2018  Review Complete On: 2018 By: Cindi Rebolledo LPN Severity Noted Reaction Type Reactions Sulfa (Sulfonamide Antibiotics)  2010    Hives Vicodin [Hydrocodone-acetaminophen]  2011    Nausea and Vomiting Current Immunizations  Reviewed on 2010 Name Date Tdap 2014  7:31 PM  
  
 Not reviewed this visit You Were Diagnosed With   
  
 Codes Comments Essential hypertension    -  Primary ICD-10-CM: I10 
ICD-9-CM: 401.9 Gastroesophageal reflux disease with esophagitis     ICD-10-CM: K21.0 ICD-9-CM: 530.11 Fibromyalgia     ICD-10-CM: M79.7 ICD-9-CM: 729.1 Chronic pain syndrome     ICD-10-CM: G89.4 ICD-9-CM: 338.4 Pure hypercholesterolemia     ICD-10-CM: E78.00 ICD-9-CM: 272.0 IFG (impaired fasting glucose)     ICD-10-CM: R73.01 
ICD-9-CM: 790.21 Depression, unspecified depression type     ICD-10-CM: F32.9 ICD-9-CM: 092 Vitamin D deficiency     ICD-10-CM: E55.9 ICD-9-CM: 268.9 Mood disorder ([facility]     ICD-10-CM: F39 
ICD-9-CM: 296.90 Recurrent depression ([facility]     ICD-10-CM: F33.9 ICD-9-CM: 296.30 Exposure to sexually transmitted disease (STD)     ICD-10-CM: Z20.2 ICD-9-CM: V01.6 Numbness and tingling     ICD-10-CM: R20.0, R20.2 ICD-9-CM: 782.0 Painful thyroid     ICD-10-CM: E07.89 ICD-9-CM: 246.8 Tremor     ICD-10-CM: R25.1 ICD-9-CM: 198. 0 Vitals BP Pulse Temp Resp Height(growth percentile) Weight(growth percentile) (!) 138/94 (BP 1 Location: Left arm, BP Patient Position: Sitting) (!) 107 98 °F (36.7 °C) (Oral) 16 5' 4\" (1.626 m) 208 lb 9.6 oz (94.6 kg) LMP SpO2 BMI OB Status Smoking Status 07/12/2018 98% 35.81 kg/m2 Having regular periods Former Smoker Vitals History BMI and BSA Data Body Mass Index Body Surface Area  
 35.81 kg/m 2 2.07 m 2 Preferred Pharmacy Pharmacy Name Phone [address] [address] [phone] Your Updated Medication List  
  
   
This list is accurate as of 7/25/18  8:41 AM.  Always use your most recent med list.  
  
  
  
  
 albuterol 90 mcg/actuation inhaler Commonly known as:  PROVENTIL HFA, VENTOLIN HFA, PROAIR HFA Take 1 Puff by inhalation every six (6) hours as needed for Wheezing. clonazePAM 0.5 mg tablet Commonly known as:  Parley Sea Take 1 mg by mouth nightly as needed. cyclobenzaprine 10 mg tablet Commonly known as:  FLEXERIL Take 1 Tab by mouth three (3) times daily as needed. dextroamphetamine-amphetamine 20 mg tablet Commonly known as:  ADDERALL Take 20 mg by mouth. diclofenac 1 % Gel Commonly known as:  VOLTAREN Apply 4 g to affected area four (4) times daily. FLONASE 50 mcg/actuation nasal spray Generic drug:  fluticasone 2 Sprays by Both Nostrils route daily. FLUoxetine 40 mg capsule Commonly known as:  PROzac Take 40 mg by mouth daily. gabapentin 300 mg capsule Commonly known as:  NEURONTIN Take 2 tabs in morning and 2 tabs at nighttime. Indications: fibromyalgia  
  
 hydrocortisone 2.5 % rectal cream  
Commonly known as:  ANUSOL-HC Insert  into rectum four (4) times daily. As needed  
  
 ketoconazole 2 % shampoo Commonly known as:  NIZORAL  
shampoo twice weekly LORazepam 1 mg tablet Commonly known as:  ATIVAN Take 1 Tab by mouth every twelve (12) hours. Max Daily Amount: 2 mg. Indications: ANXIETY  
  
 norgestimate-ethinyl estradiol 0.25-35 mg-mcg Tab Commonly known as:  Arin Sick Take 1 Tab by mouth daily. Indications: DYSMENORRHEA  
  
 pantoprazole 40 mg tablet Commonly known as:  PROTONIX Take 1 Tab by mouth daily. traZODone 50 mg tablet Commonly known as:  Garfield Brenda Take 1 Tab by mouth nightly. triamcinolone acetonide 0.1 % ointment Commonly known as:  KENALOG Apply  to affected area two (2) times a day. use thin layer ZyrTEC 10 mg tablet Generic drug:  cetirizine Take  by mouth. We Performed the Following CBC WITH AUTOMATED DIFF [13316 CPT(R)] CHLAMYDIA/GC PCR [14130 CPT(R)] HEMOGLOBIN A1C WITH EAG [72453 CPT(R)] HIV 1/2 AG/AB, 4TH GENERATION,W RFLX CONFIRM [id number] CPT(R)] LIPID PANEL [32701 CPT(R)] METABOLIC PANEL, COMPREHENSIVE [26972 CPT(R)] T4, FREE [id number] CPT(R)] TSH 3RD GENERATION [00695 CPT(R)] VITAMIN B12 F9171646 CPT(R)] VITAMIN D, 25 HYDROXY [id number] CPT(R)] Follow-up Instructions Return in about 3 months (around 10/25/2018). To-Do List   
 07/25/2018 Imaging:  US THYROID/PARATHYROID/SOFT TISS Patient Instructions Gastroesophageal Reflux Disease (GERD): Care Instructions Your Care Instructions Gastroesophageal reflux disease (GERD) is the backward flow of stomach acid into the esophagus. The esophagus is the tube that leads from your throat to your stomach. A one-way valve prevents the stomach acid from moving up into this tube. When you have GERD, this valve does not close tightly enough. If you have mild GERD symptoms including heartburn, you may be able to control the problem with antacids or over-the-counter medicine.  Changing your diet, losing weight, and making other lifestyle changes can also help reduce symptoms. Follow-up care is a key part of your treatment and safety. Be sure to make and go to all appointments, and call your doctor if you are having problems. It's also a good idea to know your test results and keep a list of the medicines you take. How can you care for yourself at home? · Take your medicines exactly as prescribed. Call your doctor if you think you are having a problem with your medicine. · Your doctor may recommend over-the-counter medicine. For mild or occasional indigestion, antacids, such as Tums, Gaviscon, Mylanta, or Maalox, may help. Your doctor also may recommend over-the-counter acid reducers, such as Pepcid AC, Tagamet HB, Zantac 75, or Prilosec. Read and follow all instructions on the label. If you use these medicines often, talk with your doctor. · Change your eating habits. ¨ It's best to eat several small meals instead of two or three large meals. ¨ After you eat, wait 2 to 3 hours before you lie down. ¨ Chocolate, mint, and alcohol can make GERD worse. ¨ Spicy foods, foods that have a lot of acid (like tomatoes and oranges), and coffee can make GERD symptoms worse in some people. If your symptoms are worse after you eat a certain food, you may want to stop eating that food to see if your symptoms get better. · Do not smoke or chew tobacco. Smoking can make GERD worse. If you need help quitting, talk to your doctor about stop-smoking programs and medicines. These can increase your chances of quitting for good. · If you have GERD symptoms at night, raise the head of your bed 6 to 8 inches by putting the frame on blocks or placing a foam wedge under the head of your mattress. (Adding extra pillows does not work.) · Do not wear tight clothing around your middle. · Lose weight if you need to. Losing just 5 to 10 pounds can help. When should you call for help? Call your doctor now or seek immediate medical care if: 
  · You have new or different belly pain.  
  · Your stools are black and tarlike or have streaks of blood.  
 Watch closely for changes in your health, and be sure to contact your doctor if: 
  · Your symptoms have not improved after 2 days.  
  · Food seems to catch in your throat or chest.  
Where can you learn more? Go to http://daniel-carlton.info/. Enter K321 in the search box to learn more about \"Gastroesophageal Reflux Disease (GERD): Care Instructions. \" Current as of: May 12, 2017 Content Version: 11.7 © 4732-5193 [employer]. Care instructions adapted under license by [employer] (which disclaims liability or warranty for this information). If you have questions about a medical condition or this instruction, always ask your healthcare professional. [address] any warranty or liability for your use of this information. Introducing [facility]! Dear [employer]: Thank you for requesting a [email or web address] account. Our records indicate that you already have an active [email or web address] account. You can access your account anytime at [email or web address] Did you know that you can access your hospital and ER discharge instructions at any time in [email or web address]? You can also review all of your test results from your hospital stay or ER visit. Additional Information If you have questions, please visit the Frequently Asked Questions section of the [email or web address] website at [email or web address] Remember, [email or web address] is NOT to be used for urgent needs. For medical emergencies, dial 911. Now available from your iPhone and Android! Please provide this summary of care documentation to your next provider. Your primary care clinician is listed as [address]. If you have any questions after today's visit, please call [phone].

## 2018-07-26 LAB
25(OH)D3+25(OH)D2 SERPL-MCNC: 27.6 NG/ML (ref 30–100)
ALBUMIN SERPL-MCNC: 4.2 G/DL (ref 3.5–5.5)
ALBUMIN/GLOB SERPL: 1.4 {RATIO} (ref 1.2–2.2)
ALP SERPL-CCNC: 69 IU/L (ref 39–117)
ALT SERPL-CCNC: 26 IU/L (ref 0–32)
AST SERPL-CCNC: 20 IU/L (ref 0–40)
BASOPHILS # BLD AUTO: 0 X10E3/UL (ref 0–0.2)
BASOPHILS NFR BLD AUTO: 0 %
BILIRUB SERPL-MCNC: 0.5 MG/DL (ref 0–1.2)
BUN SERPL-MCNC: 16 MG/DL (ref 6–20)
BUN/CREAT SERPL: 20 (ref 9–23)
CALCIUM SERPL-MCNC: 9.1 MG/DL (ref 8.7–10.2)
CHLORIDE SERPL-SCNC: 102 MMOL/L (ref 96–106)
CHOLEST SERPL-MCNC: 264 MG/DL (ref 100–199)
CO2 SERPL-SCNC: 23 MMOL/L (ref 20–29)
CREAT SERPL-MCNC: 0.82 MG/DL (ref 0.57–1)
EOSINOPHIL # BLD AUTO: 0 X10E3/UL (ref 0–0.4)
EOSINOPHIL NFR BLD AUTO: 0 %
ERYTHROCYTE [DISTWIDTH] IN BLOOD BY AUTOMATED COUNT: 13.3 % (ref 12.3–15.4)
EST. AVERAGE GLUCOSE BLD GHB EST-MCNC: 108 MG/DL
GLOBULIN SER CALC-MCNC: 3.1 G/DL (ref 1.5–4.5)
GLUCOSE SERPL-MCNC: 97 MG/DL (ref 65–99)
HBA1C MFR BLD: 5.4 % (ref 4.8–5.6)
HCT VFR BLD AUTO: 35.3 % (ref 34–46.6)
HDLC SERPL-MCNC: 91 MG/DL
HGB BLD-MCNC: 11.6 G/DL (ref 11.1–15.9)
HIV 1+2 AB+HIV1 P24 AG SERPL QL IA: NON REACTIVE
IMM GRANULOCYTES # BLD: 0 X10E3/UL (ref 0–0.1)
IMM GRANULOCYTES NFR BLD: 0 %
LDLC SERPL CALC-MCNC: 160 MG/DL (ref 0–99)
LYMPHOCYTES # BLD AUTO: 1.8 X10E3/UL (ref 0.7–3.1)
LYMPHOCYTES NFR BLD AUTO: 33 %
MCH RBC QN AUTO: 29.4 PG (ref 26.6–33)
MCHC RBC AUTO-ENTMCNC: 32.9 G/DL (ref 31.5–35.7)
MCV RBC AUTO: 90 FL (ref 79–97)
MONOCYTES # BLD AUTO: 0.3 X10E3/UL (ref 0.1–0.9)
MONOCYTES NFR BLD AUTO: 6 %
NEUTROPHILS # BLD AUTO: 3.4 X10E3/UL (ref 1.4–7)
NEUTROPHILS NFR BLD AUTO: 61 %
PLATELET # BLD AUTO: 355 X10E3/UL (ref 150–379)
POTASSIUM SERPL-SCNC: 4.6 MMOL/L (ref 3.5–5.2)
PROT SERPL-MCNC: 7.3 G/DL (ref 6–8.5)
RBC # BLD AUTO: 3.94 X10E6/UL (ref 3.77–5.28)
SODIUM SERPL-SCNC: 139 MMOL/L (ref 134–144)
T4 FREE SERPL-MCNC: 1.13 NG/DL (ref 0.82–1.77)
TRIGL SERPL-MCNC: 67 MG/DL (ref 0–149)
TSH SERPL DL<=0.005 MIU/L-ACNC: 3.85 UIU/ML (ref 0.45–4.5)
VIT B12 SERPL-MCNC: 374 PG/ML (ref 232–1245)
VLDLC SERPL CALC-MCNC: 13 MG/DL (ref 5–40)
WBC # BLD AUTO: 5.5 X10E3/UL (ref 3.4–10.8)

## 2018-07-27 LAB
C TRACH RRNA SPEC QL NAA+PROBE: NEGATIVE
N GONORRHOEA RRNA SPEC QL NAA+PROBE: NEGATIVE

## 2018-07-30 ENCOUNTER — OFFICE VISIT (OUTPATIENT)
Dept: FAMILY MEDICINE CLINIC | Age: 38
End: 2018-07-30

## 2018-07-30 ENCOUNTER — TELEPHONE (OUTPATIENT)
Dept: FAMILY MEDICINE CLINIC | Age: 38
End: 2018-07-30

## 2018-07-30 VITALS
OXYGEN SATURATION: 99 % | RESPIRATION RATE: 20 BRPM | DIASTOLIC BLOOD PRESSURE: 85 MMHG | HEIGHT: 64 IN | SYSTOLIC BLOOD PRESSURE: 120 MMHG | WEIGHT: 208 LBS | BODY MASS INDEX: 35.51 KG/M2 | TEMPERATURE: 98 F | HEART RATE: 92 BPM

## 2018-07-30 DIAGNOSIS — E78.00 PURE HYPERCHOLESTEROLEMIA: Primary | ICD-10-CM

## 2018-07-30 DIAGNOSIS — E55.9 VITAMIN D DEFICIENCY: ICD-10-CM

## 2018-07-30 DIAGNOSIS — E53.8 B12 DEFICIENCY: ICD-10-CM

## 2018-07-30 NOTE — TELEPHONE ENCOUNTER
Called patient. Advised per Dr. Erica Win that labs had to be discussed in face to face appt. Pt verbalized understanding.   Appt scheduled for today at 10:50am.

## 2018-07-30 NOTE — PROGRESS NOTES
Progress Note    Patient: Clive Ortiz MRN: [id number]  SSN: [id number]    YOB: 1980  Age: 30 y.o. Sex: female        Chief Complaint   Patient presents with    Labs     Discuss Lab Results         Subjective:     Encounter Diagnoses   Name Primary?  Pure hypercholesterolemia: She is extremely high cholesterol. She is not on a low-cholesterol diet. She would not tolerate statins due to her diffuse musculoskeletal pain. Diet is the only treatment option at this point. Cardiovascular risks for her are: LDL goal is under 100. Key Antihyperlipidemia Meds     The patient is on no antihyperlipidemia meds. Lab Results   Component Value Date/Time    Cholesterol, total 264 (H) 07/25/2018 12:40 PM    HDL Cholesterol 91 07/25/2018 12:40 PM    LDL, calculated 160 (H) 07/25/2018 12:40 PM    Triglyceride 67 07/25/2018 12:40 PM     Lab Results   Component Value Date/Time    ALT (SGPT) 26 07/25/2018 12:40 PM    AST (SGOT) 20 07/25/2018 12:40 PM    Alk. phosphatase 69 07/25/2018 12:40 PM    Bilirubin, direct 0.12 12/08/2017 04:08 PM    Bilirubin, total 0.5 07/25/2018 12:40 PM      Myalgias: No   Fatigue: No   Other side effects: no  Wt Readings from Last 3 Encounters:   07/30/18 208 lb (94.3 kg)   07/25/18 208 lb 9.6 oz (94.6 kg)   06/21/18 200 lb (90.7 kg)     The patient is aware of our goal to reduce or eliminate the long term problems (such as strokes and heart attacks) related to poorly controlled hyperlipidemia. Yes    Vitamin D deficiency: This is a new diagnosis. With her musculoskeletal condition she needs to have a normal vitamin D. No sx. Due for testing. Lab Results   Component Value Date/Time    VITAMIN D, 25-HYDROXY 27.6 (L) 07/25/2018 12:40 PM            B12 deficiency: We will normal B12 level for patients with inflammatory disease is 400. She needs replacement therapy. The results of the breasts were labs were discussed while she was here.               Current and past medical information:    Current Medications after this visit[de-identified]   Current Outpatient Prescriptions   Medication Sig    hydrocortisone (ANUSOL-HC) 2.5 % rectal cream Insert  into rectum four (4) times daily. As needed    gabapentin (NEURONTIN) 300 mg capsule Take 2 tabs in morning and 2 tabs at nighttime. Indications: fibromyalgia    dextroamphetamine-amphetamine (ADDERALL) 20 mg tablet Take 20 mg by mouth.  diclofenac (VOLTAREN) 1 % gel Apply 4 g to affected area four (4) times daily.  pantoprazole (PROTONIX) 40 mg tablet Take 1 Tab by mouth daily.  cetirizine (ZYRTEC) 10 mg tablet Take  by mouth.  norgestimate-ethinyl estradiol (ORTHO-CYCLEN, SPRINTEC) 0.25-35 mg-mcg tab Take 1 Tab by mouth daily. Indications: DYSMENORRHEA    cyclobenzaprine (FLEXERIL) 10 mg tablet Take 1 Tab by mouth three (3) times daily as needed.  FLUoxetine (PROZAC) 40 mg capsule Take 40 mg by mouth daily.  fluticasone (FLONASE) 50 mcg/actuation nasal spray 2 Sprays by Both Nostrils route daily.  albuterol (PROVENTIL HFA, VENTOLIN HFA, PROAIR HFA) 90 mcg/actuation inhaler Take 1 Puff by inhalation every six (6) hours as needed for Wheezing.  ketoconazole (NIZORAL) 2 % shampoo shampoo twice weekly    triamcinolone acetonide (KENALOG) 0.1 % ointment Apply  to affected area two (2) times a day. use thin layer    LORazepam (ATIVAN) 1 mg tablet Take 1 Tab by mouth every twelve (12) hours. Max Daily Amount: 2 mg. Indications: ANXIETY (Patient taking differently: Take 2 mg by mouth every twelve (12) hours. Indications: ANXIETY)    clonazePAM (KLONOPIN) 0.5 mg tablet Take 1 mg by mouth nightly as needed.  traZODone (DESYREL) 50 mg tablet Take 1 Tab by mouth nightly. No current facility-administered medications for this visit.         Patient Active Problem List    Diagnosis Date Noted    Severe obesity (BMI 35.0-39.9) ([facility] 07/25/2018    Mood disorder ([facility] 06/12/2018    Chronic pain syndrome 06/12/2018    Fibromyalgia 2018    Panic attack 2018    Tremor 2018    Recurrent depression ([facility] 01/15/2018    Inflammatory arthritis 08/10/2016    Vitamin D deficiency 2016    Thrombosed external hemorrhoid 2016    IFG (impaired fasting glucose) 09/10/2015    Hyperlipidemia 09/10/2015    Hoarseness 2013    OCD (obsessive compulsive disorder) 2012    HSV (herpes simplex virus) anogenital infection 2011    Costochondritis 2011    Anxiety 2010    Itch of skin 2010    Depression     GERD (gastroesophageal reflux disease)     Hypertension     Anemia, unspecified        Past Medical History:   Diagnosis Date    Anemia NEC     Arthritis     Chronic pain     fybromyalsia    Depression     anxiety, depression, OCD    GERD (gastroesophageal reflux disease)     Hypertension     Musculoskeletal disorder     SOB (shortness of breath)     Stool color black        Allergies   Allergen Reactions    Sulfa (Sulfonamide Antibiotics) Hives    Vicodin [Hydrocodone-Acetaminophen] Nausea and Vomiting       Past Surgical History:   Procedure Laterality Date    HX GYN           HX HEENT  2002    wisdom teeth extraction    UPPER GI ENDOSCOPY,BIOPSY  2018         UPPER GI ENDOSCOPY,DIAGNOSIS  2018            Social History     Social History    Marital status: SINGLE     Spouse name: N/A    Number of children: N/A    Years of education: N/A     Social History Main Topics    Smoking status: Former Smoker     Packs/day: 0.25     Years: 8.00     Types: Cigarettes     Quit date: 2018    Smokeless tobacco: Never Used    Alcohol use 0.6 oz/week     0 Standard drinks or equivalent, 1 Glasses of wine per week      Comment: 1 cup of wine/week    Drug use: No    Sexual activity: Yes     Partners: Male     Birth control/ protection: None     Other Topics Concern    None     Social History Narrative       Review of Systems Constitutional: Negative. HENT: Negative. Eyes: Negative. Respiratory: Negative. Cardiovascular: Negative. Gastrointestinal: Negative. Genitourinary: Negative. She says she wants to get HIV tested again in 6 weeks because she has a new partner. Musculoskeletal: Negative. Skin: Negative. Endo/Heme/Allergies: Negative. Psychiatric/Behavioral: Negative. Objective:     Vitals:    07/30/18 1053   BP: 120/85   Pulse: 92   Resp: 20   Temp: 98 °F (36.7 °C)   TempSrc: Oral   SpO2: 99%   Weight: 208 lb (94.3 kg)   Height: 5' 4\" (1.626 m)      Body mass index is 35.7 kg/(m^2). Physical Exam   Constitutional: She is oriented to person, place, and time and well-developed, well-nourished, and in no distress. No distress. HENT:   Head: Normocephalic and atraumatic. Eyes: Conjunctivae are normal.   Cardiovascular: Normal rate. Pulmonary/Chest: Effort normal.   Abdominal: Soft. Neurological: She is alert and oriented to person, place, and time. Skin: Skin is warm. She is not diaphoretic. Psychiatric: Mood and affect normal.   Nursing note and vitals reviewed. All labs were reviewed with her during the visit. Assessment and orders:     Encounter Diagnoses     ICD-10-CM ICD-9-CM   1. Pure hypercholesterolemia E78.00 272.0   2. Vitamin D deficiency E55.9 268.9   3. B12 deficiency E53.8 266.2     Diagnoses and all orders for this visit:    1. Pure hypercholesterolemia-low-cholesterol diet    2. Vitamin D deficiency-start vitamin D 2000 international units daily from over-the-counter    3. B12 deficiency-start B12 1000 MCG daily from over-the-counter      Plan of care:  Discussed diagnoses in detail with patient. Medication risks/benefits/side effects discussed with patient. All of the patient's questions were addressed. The patient understands and agrees with our plan of care.     The patient knows to call back if they are unsure of or forget any changes we discussed today or if the symptoms change. The patient received an After-Visit Summary which contains VS, orders, medication list and allergy list. This can be used as a \"mini-medical record\" should they have to seek medical care while out of town. Patient Care Team:  Ghulam Barnes MD as PCP - General (Family Practice)  Eduardo Cuevas MD (Breast Surgery)  Joaquim Madera MD (Rheumatology)  Florencia Ferrera MD (Cardiology)  Meriel Riedel, MD (Otolaryngology)    Follow-up Disposition:  Return in about 3 months (around 10/30/2018).     Future Appointments  Date Time Provider Komal Rodriguez   8/3/2018 8:00 AM [facility] US [address]   10/29/2018 9:00 AM Ghulam Barnes MD [facility] GIA SCHED       Signed By: Ghulam Barnes MD     July 30, 2018

## 2018-07-30 NOTE — PROGRESS NOTES
She has to come in for HIV results, cannot be given over the phone. Discuss labs at that time.   Thank you,  Dr. Garcia Miss

## 2018-07-30 NOTE — PATIENT INSTRUCTIONS
Take B12  1000 mcg from over the counter daily. Vitamin D 2000 international units from over the counter Daily.

## 2018-07-30 NOTE — LETTER
7/30/2018 10:57 AM 
 
Ms. Jocelyne Colon [address] [address] Dear Jocelyne Colon: 
 
Please find your most recent results below. Resulted Orders LIPID PANEL Result Value Ref Range Cholesterol, total 264 (H) 100 - 199 mg/dL Triglyceride 67 0 - 149 mg/dL HDL Cholesterol 91 >39 mg/dL VLDL, calculated 13 5 - 40 mg/dL LDL, calculated 160 (H) 0 - 99 mg/dL Narrative Performed at:  [address]  [id number] : Rhea Cooper MD, Phone:  [phone] METABOLIC PANEL, COMPREHENSIVE Result Value Ref Range Glucose 97 65 - 99 mg/dL BUN 16 6 - 20 mg/dL Creatinine 0.82 0.57 - 1.00 mg/dL GFR est non-AA 93 >59 mL/min/1.73 GFR est  >59 mL/min/1.73  
 BUN/Creatinine ratio 20 9 - 23 Sodium 139 134 - 144 mmol/L Potassium 4.6 3.5 - 5.2 mmol/L Chloride 102 96 - 106 mmol/L  
 CO2 23 20 - 29 mmol/L Calcium 9.1 8.7 - 10.2 mg/dL Protein, total 7.3 6.0 - 8.5 g/dL Albumin 4.2 3.5 - 5.5 g/dL GLOBULIN, TOTAL 3.1 1.5 - 4.5 g/dL A-G Ratio 1.4 1.2 - 2.2 Bilirubin, total 0.5 0.0 - 1.2 mg/dL Alk. phosphatase 69 39 - 117 IU/L  
 AST (SGOT) 20 0 - 40 IU/L  
 ALT (SGPT) 26 0 - 32 IU/L Narrative Performed at:  [address]  [id number] : Rhea Cooper MD, Phone:  [phone] CBC WITH AUTOMATED DIFF Result Value Ref Range WBC 5.5 3.4 - 10.8 x10E3/uL  
 RBC 3.94 3.77 - 5.28 x10E6/uL HGB 11.6 11.1 - 15.9 g/dL HCT 35.3 34.0 - 46.6 % MCV 90 79 - 97 fL  
 MCH 29.4 26.6 - 33.0 pg  
 MCHC 32.9 31.5 - 35.7 g/dL  
 RDW 13.3 12.3 - 15.4 % PLATELET 199 705 - 950 x10E3/uL NEUTROPHILS 61 Not Estab. % Lymphocytes 33 Not Estab. % MONOCYTES 6 Not Estab. % EOSINOPHILS 0 Not Estab. % BASOPHILS 0 Not Estab. %  
 ABS. NEUTROPHILS 3.4 1.4 - 7.0 x10E3/uL Abs Lymphocytes 1.8 0.7 - 3.1 x10E3/uL ABS. MONOCYTES 0.3 0.1 - 0.9 x10E3/uL  
 ABS. EOSINOPHILS 0.0 0.0 - 0.4 x10E3/uL  
 ABS. BASOPHILS 0.0 0.0 - 0.2 x10E3/uL IMMATURE GRANULOCYTES 0 Not Estab. %  
 ABS. IMM. GRANS. 0.0 0.0 - 0.1 x10E3/uL Narrative Performed at:  [address]  [id number] : Geovanny Franco MD, Phone:  [phone] VITAMIN D, 25 HYDROXY Result Value Ref Range VITAMIN D, 25-HYDROXY 27.6 (L) 30.0 - 100.0 ng/mL Comment:  
   Vitamin D deficiency has been defined by the [address] [place] [address] practice guideline as a 
level of serum 25-OH vitamin D less than 20 ng/mL (1,2). The Endocrine Society went on to further define vitamin D 
insufficiency as a level between 21 and 29 ng/mL (2). 1. IOM ([place] [employer]). 2010. Dietary reference 
   intakes for calcium and D. [facility]: The 
   AFFiRiS. 2. Bowen MF, Vika NC, Candy MCKENZIE, et al. 
   Evaluation, treatment, and prevention of vitamin D 
   deficiency: an Endocrine Society clinical practice 
   guideline. JCEM. 2011 Jul; 96(7):1911-30. Narrative Performed at:  [address]  [id number] : Geovanny Franco MD, Phone:  [phone] HIV 1/2 AG/AB, 4TH GENERATION,W RFLX CONFIRM Result Value Ref Range HIV SCREEN 4TH GENERATION WRFX Non Reactive Non Reactive Narrative Performed at:  [address]  [id number] : Geovanny Franco MD, Phone:  [phone] HEMOGLOBIN A1C WITH EAG Result Value Ref Range Hemoglobin A1c 5.4 4.8 - 5.6 % Comment:  
            Pre-diabetes: 5.7 - 6.4 Diabetes: >6.4 Glycemic control for adults with diabetes: <7.0 Estimated average glucose 108 mg/dL Narrative Performed at:  [address]  [id number] : Bria Verma MD, Phone:  [phone] TSH 3RD GENERATION Result Value Ref Range TSH 3.850 0.450 - 4.500 uIU/mL Narrative Performed at:  [address]  [id number] : Bria Verma MD, Phone:  [phone] T4, FREE Result Value Ref Range T4, Free 1.13 0.82 - 1.77 ng/dL Narrative Performed at:  [address]  [id number] : Bria Verma MD, Phone:  [phone] VITAMIN B12 Result Value Ref Range Vitamin B12 374 232 - 1245 pg/mL Narrative Performed at:  [address]  [id number] : Bria Verma MD, Phone:  [phone] CHLAMYDIA/GC PCR Result Value Ref Range Chlamydia trachomatis, RANULFO Negative Negative Neisseria gonorrhoeae, RANULFO Negative Negative Narrative Performed at:  [address]  [id number] : Bria Verma MD, Phone:  [phone] Please call me if you have any questions: [phone] Sincerely, 
 
 
Dale Ang MD

## 2018-07-30 NOTE — PROGRESS NOTES
Chief Complaint   Patient presents with   [facility]     Discuss Lab Results     Body mass index is 35.7 kg/(m^2). 1. Have you been to the ER, urgent care clinic since your last visit? Hospitalized since your last visit? No    2. Have you seen or consulted any other health care providers outside of the [address] [place] [address] since your last visit? Include any pap smears or colon screening. No    Reviewed record in preparation for visit and have necessary documentation  Pt did not bring medication to office visit for review  Information was given to pt on Advanced Directives, Living Will  Opportunity was given for questions  Goals that were addressed and/or need to be completed after this appointment include: There are no preventive care reminders to display for this patient.

## 2018-07-30 NOTE — MR AVS SNAPSHOT
[address] [facility] 
 
 
 [address] [address] 
[phone] Patient: Moe Gabriel MRN: [id number] :1980 Visit Information Date & Time Provider Department Dept. Phone Encounter #  
 2018 10:50 AM Izola Rubinstein,  [facility] [phone] [id number] Follow-up Instructions Return in about 3 months (around 10/30/2018). Your Appointments 10/29/2018  9:00 AM  
ROUTINE CARE with Izola Rubinstein, MD  
[address] [facility] 9756 [facility]) Appt Note: 3 MONTHS F/U  
 [address] [address]  
[place] [address] [place] [id number] Upcoming Health Maintenance Date Due Influenza Age 5 to Adult 2018 PAP AKA CERVICAL CYTOLOGY 2020 DTaP/Tdap/Td series (2 - Td) 2024 COLONOSCOPY 5/15/2025 Allergies as of 2018  Review Complete On: 2018 By: Eliezer Allen LPN Severity Noted Reaction Type Reactions Sulfa (Sulfonamide Antibiotics)  2010    Hives Vicodin [Hydrocodone-acetaminophen]  2011    Nausea and Vomiting Current Immunizations  Reviewed on 2010 Name Date Tdap 2014  7:31 PM  
  
 Not reviewed this visit You Were Diagnosed With   
  
 Codes Comments Vitamin D deficiency    -  Primary ICD-10-CM: E55.9 ICD-9-CM: 268.9 B12 deficiency     ICD-10-CM: E53.8 ICD-9-CM: 266.2 Vitals BP Pulse Temp Resp Height(growth percentile) Weight(growth percentile) 120/85 (BP 1 Location: Right arm, BP Patient Position: Sitting) 92 98 °F (36.7 °C) (Oral) 20 5' 4\" (1.626 m) 208 lb (94.3 kg) LMP SpO2 BMI OB Status Smoking Status 2018 99% 35.7 kg/m2 Having regular periods Former Smoker Vitals History BMI and BSA Data Body Mass Index Body Surface Area 35.7 kg/m 2 2.06 m 2 Preferred Pharmacy Pharmacy Name Phone [facility] [address] [address] [phone] Your Updated Medication List  
  
   
This list is accurate as of 7/30/18 11:04 AM.  Always use your most recent med list.  
  
  
  
  
 albuterol 90 mcg/actuation inhaler Commonly known as:  PROVENTIL HFA, VENTOLIN HFA, PROAIR HFA Take 1 Puff by inhalation every six (6) hours as needed for Wheezing. clonazePAM 0.5 mg tablet Commonly known as:  Melburn End Take 1 mg by mouth nightly as needed. cyclobenzaprine 10 mg tablet Commonly known as:  FLEXERIL Take 1 Tab by mouth three (3) times daily as needed. dextroamphetamine-amphetamine 20 mg tablet Commonly known as:  ADDERALL Take 20 mg by mouth. diclofenac 1 % Gel Commonly known as:  VOLTAREN Apply 4 g to affected area four (4) times daily. FLONASE 50 mcg/actuation nasal spray Generic drug:  fluticasone 2 Sprays by Both Nostrils route daily. FLUoxetine 40 mg capsule Commonly known as:  PROzac Take 40 mg by mouth daily. gabapentin 300 mg capsule Commonly known as:  NEURONTIN Take 2 tabs in morning and 2 tabs at nighttime. Indications: fibromyalgia  
  
 hydrocortisone 2.5 % rectal cream  
Commonly known as:  ANUSOL-HC Insert  into rectum four (4) times daily. As needed  
  
 ketoconazole 2 % shampoo Commonly known as:  NIZORAL  
shampoo twice weekly LORazepam 1 mg tablet Commonly known as:  ATIVAN Take 1 Tab by mouth every twelve (12) hours. Max Daily Amount: 2 mg. Indications: ANXIETY  
  
 norgestimate-ethinyl estradiol 0.25-35 mg-mcg Tab Commonly known as:  Hattie Solian Take 1 Tab by mouth daily. Indications: DYSMENORRHEA  
  
 pantoprazole 40 mg tablet Commonly known as:  PROTONIX Take 1 Tab by mouth daily. traZODone 50 mg tablet Commonly known as:  Braden Luz Take 1 Tab by mouth nightly. triamcinolone acetonide 0.1 % ointment Commonly known as:  KENALOG Apply  to affected area two (2) times a day. use thin layer ZyrTEC 10 mg tablet Generic drug:  cetirizine Take  by mouth. Follow-up Instructions Return in about 3 months (around 10/30/2018). To-Do List   
 08/03/2018 8:00 AM  
  Appointment with Margy Wiley at [address] ([phone]) GENERAL INSTRUCTIONS  1. Bring outside films ([employer]) pertaining to the affected area with you on the day of appointment. 2. A written order with a valid diagnosis and Physicians signature is required for all scheduled tests. 3. Check in at registration 30 minutes before your appointment time unless you were instructed to do otherwise. Patient Instructions Take B12  1000 mcg from over the counter daily. Vitamin D 2000 international units from over the counter Daily. Introducing [facility]! Dear Jimbo Jean: Thank you for requesting a [employer] account. Our records indicate that you already have an active [employer] account. You can access your account anytime at [email or web address] Did you know that you can access your hospital and ER discharge instructions at any time in [employer]? You can also review all of your test results from your hospital stay or ER visit. Additional Information If you have questions, please visit the Frequently Asked Questions section of the [employer] website at [email or web address] Remember, [employer] is NOT to be used for urgent needs. For medical emergencies, dial 911. Now available from your iPhone and Android! Please provide this summary of care documentation to your next provider. Your primary care clinician is listed as [address]. If you have any questions after today's visit, please call [phone].

## 2018-08-08 ENCOUNTER — HOSPITAL ENCOUNTER (OUTPATIENT)
Dept: ULTRASOUND IMAGING | Age: 38
Discharge: HOME | End: 2018-08-08
Attending: FAMILY MEDICINE
Payer: SUBSIDIZED

## 2018-08-08 DIAGNOSIS — E07.89 PAINFUL THYROID: ICD-10-CM

## 2018-08-08 PROCEDURE — 76536 US EXAM OF HEAD AND NECK: CPT

## 2018-08-13 DIAGNOSIS — J40 BRONCHITIS: ICD-10-CM

## 2018-08-13 RX ORDER — ALBUTEROL SULFATE 90 UG/1
1 AEROSOL, METERED RESPIRATORY (INHALATION)
Qty: 1 INHALER | Refills: 5 | Status: SHIPPED | OUTPATIENT
Start: 2018-08-13 | End: 2019-07-23 | Stop reason: SDUPTHER

## 2018-08-13 NOTE — TELEPHONE ENCOUNTER
From: Audelia Cota  To: Liz Reed MD  Sent: 8/13/2018 9:56 AM EDT  Subject: Medication Renewal Request    Original authorizing provider: MD Michelle Way would like a refill of the following medications:  albuterol (PROVENTIL HFA, VENTOLIN HFA, PROAIR HFA) 90 mcg/actuation inhaler Liz Reed MD]    Preferred pharmacy: [address] [place] [address]    Comment:  I need to get a refill on my inhaler sent to the [facility].  Thank you

## 2018-08-24 ENCOUNTER — OFFICE VISIT (OUTPATIENT)
Dept: FAMILY MEDICINE CLINIC | Age: 38
End: 2018-08-24

## 2018-08-24 VITALS
OXYGEN SATURATION: 98 % | SYSTOLIC BLOOD PRESSURE: 124 MMHG | HEIGHT: 64 IN | HEART RATE: 107 BPM | TEMPERATURE: 97.7 F | DIASTOLIC BLOOD PRESSURE: 74 MMHG | RESPIRATION RATE: 20 BRPM | BODY MASS INDEX: 35.51 KG/M2 | WEIGHT: 208 LBS

## 2018-08-24 DIAGNOSIS — N30.01 ACUTE CYSTITIS WITH HEMATURIA: Primary | ICD-10-CM

## 2018-08-24 DIAGNOSIS — R35.0 URINE FREQUENCY: ICD-10-CM

## 2018-08-24 LAB
BILIRUB UR QL STRIP: NEGATIVE
GLUCOSE UR-MCNC: NEGATIVE MG/DL
HCG URINE, QL. (POC): NEGATIVE
KETONES P FAST UR STRIP-MCNC: NEGATIVE MG/DL
PH UR STRIP: 5.5 [PH] (ref 4.6–8)
PROT UR QL STRIP: NORMAL
SP GR UR STRIP: 1.02 (ref 1–1.03)
UA UROBILINOGEN AMB POC: NORMAL (ref 0.2–1)
URINALYSIS CLARITY POC: NORMAL
URINALYSIS COLOR POC: YELLOW
URINE BLOOD POC: NORMAL
URINE LEUKOCYTES POC: NORMAL
URINE NITRITES POC: POSITIVE
VALID INTERNAL CONTROL?: YES

## 2018-08-24 RX ORDER — NITROFURANTOIN 25; 75 MG/1; MG/1
100 CAPSULE ORAL 2 TIMES DAILY
Qty: 10 CAP | Refills: 0 | Status: SHIPPED | OUTPATIENT
Start: 2018-08-24 | End: 2018-11-29 | Stop reason: ALTCHOICE

## 2018-08-24 NOTE — PROGRESS NOTES
Thuy Benitez  30 y.o. female  1980  [address]  [id number]     [id number]: Progress Note       Encounter Date: 8/24/2018    Chief Complaint   Patient presents with    Urinary Frequency       History provided by patient  History of Present Illness   Thuy Benitez is a 30 y.o. female who presents to clinic today for:    Urinary frequency  Patient present with cc of urinary frequency x 1 week. Associated with pressure prior to urinating. Had felt it was getting better than it seemed to get worse in the past 2 days. Denies flank pain, fever, or hematuria.          right foot pain. patietn reports that she has right foot x several days. Pain is present on the lateral aspect of her foot; intermittently though worse with extend periods of physical activity. Patient denies any falls, or trauma to the foot. Review of Systems   Review of Systems   Constitutional: Negative for chills and fever. Gastrointestinal: Negative for abdominal pain, constipation, diarrhea, nausea and vomiting. Genitourinary: Positive for frequency and urgency. Negative for dysuria, flank pain and hematuria. Musculoskeletal: Positive for joint pain. Negative for back pain, falls and neck pain. Skin: Negative for itching and rash. Neurological: Negative for dizziness, tremors, sensory change, focal weakness and headaches. Vitals/Objective:     Vitals:    08/24/18 1416   BP: 124/74   Pulse: (!) 107   Resp: 20   Temp: 97.7 °F (36.5 °C)   TempSrc: Oral   SpO2: 98%   Weight: 208 lb (94.3 kg)   Height: 5' 4\" (1.626 m)     Body mass index is 35.7 kg/(m^2). Wt Readings from Last 3 Encounters:   08/24/18 208 lb (94.3 kg)   07/30/18 208 lb (94.3 kg)   07/25/18 208 lb 9.6 oz (94.6 kg)       Physical Exam   Constitutional: She appears well-developed and well-nourished. HENT:   Head: Normocephalic and atraumatic. Cardiovascular: Normal rate and regular rhythm.     No murmur heard.  Pulmonary/Chest: Effort normal and breath sounds normal. No respiratory distress. She has no wheezes. Abdominal: Soft. Bowel sounds are normal. She exhibits no distension and no mass. There is no tenderness. There is no guarding. Musculoskeletal: Normal range of motion. She exhibits tenderness. She exhibits no edema. Right foot: There is tenderness. There is normal range of motion, no bony tenderness, no swelling, no crepitus and no deformity. Feet:    Neurological: She is alert. She has normal reflexes. Skin: Skin is warm and dry. No rash noted. No erythema. Recent Results (from the past 24 hour(s))   AMB POC URINALYSIS DIP STICK AUTO W/O MICRO    Collection Time: 08/24/18  2:20 PM   Result Value Ref Range    Color (UA POC) Yellow     Clarity (UA POC) Cloudy     Glucose (UA POC) Negative Negative    Bilirubin (UA POC) Negative Negative    Ketones (UA POC) Negative Negative    Specific gravity (UA POC) 1.025 1.001 - 1.035    Blood (UA POC) 2+ Negative    pH (UA POC) 5.5 4.6 - 8.0    Protein (UA POC) 2+ Negative    Urobilinogen (UA POC) 0.2 mg/dL 0.2 - 1    Nitrites (UA POC) Positive Negative    Leukocyte esterase (UA POC) 1+ Negative   AMB POC URINE PREGNANCY TEST, VISUAL COLOR COMPARISON    Collection Time: 08/24/18  2:20 PM   Result Value Ref Range    VALID INTERNAL CONTROL POC Yes     HCG urine, Ql. (POC) Negative Negative     Assessment and Plan:     Encounter Diagnoses     ICD-10-CM ICD-9-CM   1. Acute cystitis with hematuria N30.01 595.0   2. Urine frequency R35.0 788.41       1. Acute cystitis with hematuria  - nitrofurantoin, macrocrystal-monohydrate, (MACROBID) 100 mg capsule; Take 1 Cap by mouth two (2) times a day. Indications: E. COLI URINARY TRACT INFECTION  Dispense: 10 Cap; Refill: 0  - CULTURE, URINE    2.  Urine frequency  - AMB POC URINALYSIS DIP STICK AUTO W/O MICRO  - AMB POC URINE PREGNANCY TEST, VISUAL COLOR COMPARISON      I have discussed the diagnosis with the patient and the intended plan as seen in the above orders. she has expressed understanding. The patient has received an after-visit summary and questions were answered concerning future plans. I have discussed medication side effects and warnings with the patient as well. Electronically Signed: Merissa Cornell MD     History/Allergies   Patients past medical, surgical and family histories were reviewed and updated. Past Medical History:   Diagnosis Date    Anemia NEC     Arthritis     Chronic pain     fybromyalsia    Depression     anxiety, depression, OCD    GERD (gastroesophageal reflux disease)     Hypertension     Musculoskeletal disorder     SOB (shortness of breath)     Stool color black       Past Surgical History:   Procedure Laterality Date    HX GYN           HX HEENT  2002    wisdom teeth extraction    UPPER GI ENDOSCOPY,BIOPSY  2018         UPPER GI ENDOSCOPY,DIAGNOSIS  2018          Family History   Problem Relation Age of Onset    Hypertension Father     Elevated Lipids Father     Arthritis-rheumatoid Mother      ? Lupus vs RA    Lung Disease Mother     Heart Disease Mother     Cancer Mother      breast in 39y    COPD Mother     Hypertension Mother     Stroke Mother      3-4 strokes    Breast Cancer Mother     Diabetes Maternal Grandmother      Social History     Social History    Marital status: SINGLE     Spouse name: N/A    Number of children: N/A    Years of education: N/A     Occupational History    Not on file.      Social History Main Topics    Smoking status: Former Smoker     Packs/day: 0.25     Years: 8.00     Types: Cigarettes     Quit date: 2018    Smokeless tobacco: Never Used    Alcohol use 0.6 oz/week     0 Standard drinks or equivalent, 1 Glasses of wine per week      Comment: 1 cup of wine/week    Drug use: No    Sexual activity: Yes     Partners: Male     Birth control/ protection: None     Other Topics Concern  Not on file     Social History Narrative         Allergies   Allergen Reactions    Sulfa (Sulfonamide Antibiotics) Hives    Vicodin [Hydrocodone-Acetaminophen] Nausea and Vomiting       Disposition     Follow-up Disposition:  Return if symptoms worsen or fail to improve. Future Appointments  Date Time Provider Komal Rodriguez   10/29/2018 9:00 AM Bakari Toro MD [facility] GIA SCHED            Current Medications after this visit     Current Outpatient Prescriptions   Medication Sig    nitrofurantoin, macrocrystal-monohydrate, (MACROBID) 100 mg capsule Take 1 Cap by mouth two (2) times a day. Indications: E. COLI URINARY TRACT INFECTION    albuterol (PROVENTIL HFA, VENTOLIN HFA, PROAIR HFA) 90 mcg/actuation inhaler Take 1 Puff by inhalation every six (6) hours as needed for Wheezing.  hydrocortisone (ANUSOL-HC) 2.5 % rectal cream Insert  into rectum four (4) times daily. As needed    gabapentin (NEURONTIN) 300 mg capsule Take 2 tabs in morning and 2 tabs at nighttime. Indications: fibromyalgia    dextroamphetamine-amphetamine (ADDERALL) 20 mg tablet Take 20 mg by mouth.  diclofenac (VOLTAREN) 1 % gel Apply 4 g to affected area four (4) times daily.  pantoprazole (PROTONIX) 40 mg tablet Take 1 Tab by mouth daily.  cetirizine (ZYRTEC) 10 mg tablet Take  by mouth.  norgestimate-ethinyl estradiol (ORTHO-CYCLEN, SPRINTEC) 0.25-35 mg-mcg tab Take 1 Tab by mouth daily. Indications: DYSMENORRHEA    cyclobenzaprine (FLEXERIL) 10 mg tablet Take 1 Tab by mouth three (3) times daily as needed.  FLUoxetine (PROZAC) 40 mg capsule Take 40 mg by mouth daily.  fluticasone (FLONASE) 50 mcg/actuation nasal spray 2 Sprays by Both Nostrils route daily.  LORazepam (ATIVAN) 1 mg tablet Take 1 Tab by mouth every twelve (12) hours. Max Daily Amount: 2 mg. Indications: ANXIETY (Patient taking differently: Take 2 mg by mouth every twelve (12) hours.  Indications: ANXIETY)    clonazePAM (KLONOPIN) 0.5 mg tablet Take 1 mg by mouth nightly as needed.  traZODone (DESYREL) 50 mg tablet Take 1 Tab by mouth nightly.  ketoconazole (NIZORAL) 2 % shampoo shampoo twice weekly    triamcinolone acetonide (KENALOG) 0.1 % ointment Apply  to affected area two (2) times a day. use thin layer     No current facility-administered medications for this visit. There are no discontinued medications.

## 2018-08-24 NOTE — MR AVS SNAPSHOT
[facility] 
 
 
 [address] [address] 
[phone] Patient: Maritza Gallardo MRN: [id number] :1980 Visit Information Date & Time Provider Department Dept. Phone Encounter #  
 2018  2:10 PM Steve Reed  [facility] [phone] [id number] Follow-up Instructions Return if symptoms worsen or fail to improve. Your Appointments 10/29/2018  9:00 AM  
ROUTINE CARE with Ruben Valerio MD  
[facility]) Appt Note: 3 MONTHS F/U  
  A [facility] [address] [address]  
[place] [address] [place] [id number] Upcoming Health Maintenance Date Due Influenza Age 5 to Adult 2018 PAP AKA CERVICAL CYTOLOGY 2020 DTaP/Tdap/Td series (2 - Td) 2024 COLONOSCOPY 5/15/2025 Allergies as of 2018  Review Complete On: 2018 By: Oneyda Matta Severity Noted Reaction Type Reactions Sulfa (Sulfonamide Antibiotics)  2010    Hives Vicodin [Hydrocodone-acetaminophen]  2011    Nausea and Vomiting Current Immunizations  Reviewed on 2010 Name Date Tdap 2014  7:31 PM  
  
 Not reviewed this visit You Were Diagnosed With   
  
 Codes Comments Urine frequency    -  Primary ICD-10-CM: R35.0 ICD-9-CM: 788.41 Acute cystitis with hematuria     ICD-10-CM: N30.01 
ICD-9-CM: 595.0 Vitals BP Pulse Temp Resp Height(growth percentile) Weight(growth percentile) 124/74 (BP 1 Location: Left arm, BP Patient Position: Sitting) (!) 107 97.7 °F (36.5 °C) (Oral) 20 5' 4\" (1.626 m) 208 lb (94.3 kg) SpO2 BMI OB Status Smoking Status 98% 35.7 kg/m2 Having regular periods Former Smoker BMI and BSA Data Body Mass Index Body Surface Area 35.7 kg/m 2 2.06 m 2 Preferred Pharmacy Pharmacy Name Phone [facility] [address] [address] [phone] Your Updated Medication List  
  
   
This list is accurate as of 8/24/18  2:35 PM.  Always use your most recent med list.  
  
  
  
  
 albuterol 90 mcg/actuation inhaler Commonly known as:  PROVENTIL HFA, VENTOLIN HFA, PROAIR HFA Take 1 Puff by inhalation every six (6) hours as needed for Wheezing. clonazePAM 0.5 mg tablet Commonly known as:  Sukhi Ashwin Take 1 mg by mouth nightly as needed. cyclobenzaprine 10 mg tablet Commonly known as:  FLEXERIL Take 1 Tab by mouth three (3) times daily as needed. dextroamphetamine-amphetamine 20 mg tablet Commonly known as:  ADDERALL Take 20 mg by mouth. diclofenac 1 % Gel Commonly known as:  VOLTAREN Apply 4 g to affected area four (4) times daily. FLONASE 50 mcg/actuation nasal spray Generic drug:  fluticasone 2 Sprays by Both Nostrils route daily. FLUoxetine 40 mg capsule Commonly known as:  PROzac Take 40 mg by mouth daily. gabapentin 300 mg capsule Commonly known as:  NEURONTIN Take 2 tabs in morning and 2 tabs at nighttime. Indications: fibromyalgia  
  
 hydrocortisone 2.5 % rectal cream  
Commonly known as:  ANUSOL-HC Insert  into rectum four (4) times daily. As needed  
  
 ketoconazole 2 % shampoo Commonly known as:  NIZORAL  
shampoo twice weekly LORazepam 1 mg tablet Commonly known as:  ATIVAN Take 1 Tab by mouth every twelve (12) hours. Max Daily Amount: 2 mg. Indications: ANXIETY  
  
 nitrofurantoin (macrocrystal-monohydrate) 100 mg capsule Commonly known as:  MACROBID Take 1 Cap by mouth two (2) times a day. Indications: E. COLI URINARY TRACT INFECTION  
  
 norgestimate-ethinyl estradiol 0.25-35 mg-mcg Tab Commonly known as:  Mac Pounds Take 1 Tab by mouth daily. Indications: DYSMENORRHEA  
  
 pantoprazole 40 mg tablet Commonly known as:  PROTONIX Take 1 Tab by mouth daily. traZODone 50 mg tablet Commonly known as:  Kyle [place] Take 1 Tab by mouth nightly. triamcinolone acetonide 0.1 % ointment Commonly known as:  KENALOG Apply  to affected area two (2) times a day. use thin layer ZyrTEC 10 mg tablet Generic drug:  cetirizine Take  by mouth. Prescriptions Sent to Pharmacy Refills  
 nitrofurantoin, macrocrystal-monohydrate, (MACROBID) 100 mg capsule 0 Sig: Take 1 Cap by mouth two (2) times a day. Indications: E. COLI URINARY TRACT INFECTION Class: Normal  
 Pharmacy: [facility] [address] [place] [address] #: [phone] Route: Oral  
  
We Performed the Following AMB POC URINALYSIS DIP STICK AUTO W/O MICRO [32286 CPT(R)] AMB POC URINE PREGNANCY TEST, VISUAL COLOR COMPARISON [29525 CPT(R)] CULTURE, URINE [id number] CPT(R)] Follow-up Instructions Return if symptoms worsen or fail to improve. Patient Instructions Urinary Tract Infection in Women: Care Instructions Your Care Instructions A urinary tract infection, or UTI, is a general term for an infection anywhere between the kidneys and the urethra (where urine comes out). Most UTIs are bladder infections. They often cause pain or burning when you urinate. UTIs are caused by bacteria and can be cured with antibiotics. Be sure to complete your treatment so that the infection goes away. Follow-up care is a key part of your treatment and safety. Be sure to make and go to all appointments, and call your doctor if you are having problems. It's also a good idea to know your test results and keep a list of the medicines you take. How can you care for yourself at home? · Take your antibiotics as directed. Do not stop taking them just because you feel better. You need to take the full course of antibiotics. · Drink extra water and other fluids for the next day or two. This may help wash out the bacteria that are causing the infection. (If you have kidney, heart, or liver disease and have to limit fluids, talk with your doctor before you increase your fluid intake.) · Avoid drinks that are carbonated or have caffeine. They can irritate the bladder. · Urinate often. Try to empty your bladder each time. · To relieve pain, take a hot bath or lay a heating pad set on low over your lower belly or genital area. Never go to sleep with a heating pad in place. To prevent UTIs · Drink plenty of water each day. This helps you urinate often, which clears bacteria from your system. (If you have kidney, heart, or liver disease and have to limit fluids, talk with your doctor before you increase your fluid intake.) · Urinate when you need to. · Urinate right after you have sex. · Change sanitary pads often. · Avoid douches, bubble baths, feminine hygiene sprays, and other feminine hygiene products that have deodorants. · After going to the bathroom, wipe from front to back. When should you call for help? Call your doctor now or seek immediate medical care if: 
  · Symptoms such as fever, chills, nausea, or vomiting get worse or appear for the first time.  
  · You have new pain in your back just below your rib cage. This is called flank pain.  
  · There is new blood or pus in your urine.  
  · You have any problems with your antibiotic medicine.  
 Watch closely for changes in your health, and be sure to contact your doctor if: 
  · You are not getting better after taking an antibiotic for 2 days.  
  · Your symptoms go away but then come back. Where can you learn more? Go to http://daniel-carlton.info/. Enter Z137 in the search box to learn more about \"Urinary Tract Infection in Women: Care Instructions. \" Current as of: May 12, 2017 Content Version: 11.7 © 7756-1070 [employer]. Care instructions adapted under license by DriftTo[employer] (which disclaims liability or warranty for this information). If you have questions about a medical condition or this instruction, always ask your healthcare professional. [address] any warranty or liability for your use of this information. Foot Pain: Care Instructions Your Care Instructions Foot injuries that cause pain and swelling are fairly common. Almost all sports or home repair projects can cause a misstep that ends up as foot pain. Normal wear and tear, especially as you get older, also can cause foot pain. Most minor foot injuries will heal on their own, and home treatment is usually all you need to do. If you have a severe injury, you may need tests and treatment. Follow-up care is a key part of your treatment and safety. Be sure to make and go to all appointments, and call your doctor if you are having problems. It's also a good idea to know your test results and keep a list of the medicines you take. How can you care for yourself at home? · Take pain medicines exactly as directed. ¨ If the doctor gave you a prescription medicine for pain, take it as prescribed. ¨ If you are not taking a prescription pain medicine, ask your doctor if you can take an over-the-counter medicine. · Rest and protect your foot. Take a break from any activity that may cause pain. · Put ice or a cold pack on your foot for 10 to 20 minutes at a time. Put a thin cloth between the ice and your skin. · Prop up the sore foot on a pillow when you ice it or anytime you sit or lie down during the next 3 days. Try to keep it above the level of your heart. This will help reduce swelling. · Your doctor may recommend that you wrap your foot with an elastic bandage. Keep your foot wrapped for as long as your doctor advises. · If your doctor recommends crutches, use them as directed. · Wear roomy footwear. · As soon as pain and swelling end, begin gentle exercises of your foot. Your doctor can tell you which exercises will help. When should you call for help? Call 911 anytime you think you may need emergency care. For example, call if: 
  · Your foot turns pale, white, blue, or cold.  
 Call your doctor now or seek immediate medical care if: 
  · You cannot move or stand on your foot.  
  · Your foot looks twisted or out of its normal position.  
  · Your foot is not stable when you step down.  
  · You have signs of infection, such as: 
¨ Increased pain, swelling, warmth, or redness. ¨ Red streaks leading from the sore area. ¨ Pus draining from a place on your foot. ¨ A fever.  
  · Your foot is numb or tingly.  
 Watch closely for changes in your health, and be sure to contact your doctor if: 
  · You do not get better as expected.  
  · You have bruises from an injury that last longer than 2 weeks. Where can you learn more? Go to http://daniel-carlton.info/. Enter C812 in the search box to learn more about \"Foot Pain: Care Instructions. \" Current as of: November 29, 2017 Content Version: 11.7 © 1826-8112 [employer]. Care instructions adapted under license by Streetline (which disclaims liability or warranty for this information). If you have questions about a medical condition or this instruction, always ask your healthcare professional. [place] [address] any warranty or liability for your use of this information. Introducing hospitals & HEALTH SERVICES! Dear Marlon Medina: Thank you for requesting a [employer] account. Our records indicate that you already have an active [employer] account. You can access your account anytime at [email or web address]. [email or web address] Systems Did you know that you can access your hospital and ER discharge instructions at any time in Technimark?   You can also review all of your test results from your hospital stay or ER visit. Additional Information If you have questions, please visit the Frequently Asked Questions section of the FireLayers website at [email or web address] Remember, FireLayers is NOT to be used for urgent needs. For medical emergencies, dial 911. Now available from your iPhone and Android! Please provide this summary of care documentation to your next provider. Your primary care clinician is listed as [address]. If you have any questions after today's visit, please call [phone].

## 2018-08-24 NOTE — PATIENT INSTRUCTIONS
Urinary Tract Infection in Women: Care Instructions  Your Care Instructions    A urinary tract infection, or UTI, is a general term for an infection anywhere between the kidneys and the urethra (where urine comes out). Most UTIs are bladder infections. They often cause pain or burning when you urinate. UTIs are caused by bacteria and can be cured with antibiotics. Be sure to complete your treatment so that the infection goes away. Follow-up care is a key part of your treatment and safety. Be sure to make and go to all appointments, and call your doctor if you are having problems. It's also a good idea to know your test results and keep a list of the medicines you take. How can you care for yourself at home? · Take your antibiotics as directed. Do not stop taking them just because you feel better. You need to take the full course of antibiotics. · Drink extra water and other fluids for the next day or two. This may help wash out the bacteria that are causing the infection. (If you have kidney, heart, or liver disease and have to limit fluids, talk with your doctor before you increase your fluid intake.)  · Avoid drinks that are carbonated or have caffeine. They can irritate the bladder. · Urinate often. Try to empty your bladder each time. · To relieve pain, take a hot bath or lay a heating pad set on low over your lower belly or genital area. Never go to sleep with a heating pad in place. To prevent UTIs  · Drink plenty of water each day. This helps you urinate often, which clears bacteria from your system. (If you have kidney, heart, or liver disease and have to limit fluids, talk with your doctor before you increase your fluid intake.)  · Urinate when you need to. · Urinate right after you have sex. · Change sanitary pads often. · Avoid douches, bubble baths, feminine hygiene sprays, and other feminine hygiene products that have deodorants.   · After going to the bathroom, wipe from front to back.  When should you call for help? Call your doctor now or seek immediate medical care if:    · Symptoms such as fever, chills, nausea, or vomiting get worse or appear for the first time.     · You have new pain in your back just below your rib cage. This is called flank pain.     · There is new blood or pus in your urine.     · You have any problems with your antibiotic medicine.    Watch closely for changes in your health, and be sure to contact your doctor if:    · You are not getting better after taking an antibiotic for 2 days.     · Your symptoms go away but then come back. Where can you learn more? Go to http://daniel-carlton.info/. Enter H882 in the search box to learn more about \"Urinary Tract Infection in Women: Care Instructions. \"  Current as of: May 12, 2017  Content Version: 11.7  © 1316-3214 Navagis. Care instructions adapted under license by [employer] (which disclaims liability or warranty for this information). If you have questions about a medical condition or this instruction, always ask your healthcare professional. [place] [address] any warranty or liability for your use of this information. Foot Pain: Care Instructions  Your Care Instructions  Foot injuries that cause pain and swelling are fairly common. Almost all sports or home repair projects can cause a misstep that ends up as foot pain. Normal wear and tear, especially as you get older, also can cause foot pain. Most minor foot injuries will heal on their own, and home treatment is usually all you need to do. If you have a severe injury, you may need tests and treatment. Follow-up care is a key part of your treatment and safety. Be sure to make and go to all appointments, and call your doctor if you are having problems. It's also a good idea to know your test results and keep a list of the medicines you take. How can you care for yourself at home?   · Take pain medicines exactly as directed. ¨ If the doctor gave you a prescription medicine for pain, take it as prescribed. ¨ If you are not taking a prescription pain medicine, ask your doctor if you can take an over-the-counter medicine. · Rest and protect your foot. Take a break from any activity that may cause pain. · Put ice or a cold pack on your foot for 10 to 20 minutes at a time. Put a thin cloth between the ice and your skin. · Prop up the sore foot on a pillow when you ice it or anytime you sit or lie down during the next 3 days. Try to keep it above the level of your heart. This will help reduce swelling. · Your doctor may recommend that you wrap your foot with an elastic bandage. Keep your foot wrapped for as long as your doctor advises. · If your doctor recommends crutches, use them as directed. · Wear roomy footwear. · As soon as pain and swelling end, begin gentle exercises of your foot. Your doctor can tell you which exercises will help. When should you call for help? Call 911 anytime you think you may need emergency care. For example, call if:    · Your foot turns pale, white, blue, or cold.    Call your doctor now or seek immediate medical care if:    · You cannot move or stand on your foot.     · Your foot looks twisted or out of its normal position.     · Your foot is not stable when you step down.     · You have signs of infection, such as:  ¨ Increased pain, swelling, warmth, or redness. ¨ Red streaks leading from the sore area. ¨ Pus draining from a place on your foot. ¨ A fever.     · Your foot is numb or tingly.    Watch closely for changes in your health, and be sure to contact your doctor if:    · You do not get better as expected.     · You have bruises from an injury that last longer than 2 weeks. Where can you learn more? Go to http://daniel-carlton.info/. Enter L143 in the search box to learn more about \"Foot Pain: Care Instructions. \"  Current as of: November 29, 2017  Content Version: 11.7  © 5927-0281 [employer]. Care instructions adapted under license by O[employer] (which disclaims liability or warranty for this information). If you have questions about a medical condition or this instruction, always ask your healthcare professional. [address] any warranty or liability for your use of this information.

## 2018-08-26 LAB
BACTERIA UR CULT: ABNORMAL
BACTERIA UR CULT: ABNORMAL

## 2018-08-29 ENCOUNTER — TELEPHONE (OUTPATIENT)
Dept: FAMILY MEDICINE CLINIC | Age: 38
End: 2018-08-29

## 2018-08-29 DIAGNOSIS — B37.31 VULVOVAGINAL CANDIDIASIS: Primary | ICD-10-CM

## 2018-08-29 RX ORDER — FLUCONAZOLE 150 MG/1
TABLET ORAL
Qty: 2 TAB | Refills: 0 | Status: SHIPPED | OUTPATIENT
Start: 2018-08-29 | End: 2018-11-29 | Stop reason: ALTCHOICE

## 2018-09-24 ENCOUNTER — OFFICE VISIT (OUTPATIENT)
Dept: FAMILY MEDICINE CLINIC | Age: 38
End: 2018-09-24

## 2018-09-24 VITALS
HEART RATE: 82 BPM | WEIGHT: 214.4 LBS | DIASTOLIC BLOOD PRESSURE: 85 MMHG | BODY MASS INDEX: 36.6 KG/M2 | SYSTOLIC BLOOD PRESSURE: 129 MMHG | TEMPERATURE: 98 F | HEIGHT: 64 IN | OXYGEN SATURATION: 97 % | RESPIRATION RATE: 82 BRPM

## 2018-09-24 DIAGNOSIS — E78.00 PURE HYPERCHOLESTEROLEMIA: Chronic | ICD-10-CM

## 2018-09-24 DIAGNOSIS — R73.01 IFG (IMPAIRED FASTING GLUCOSE): Primary | Chronic | ICD-10-CM

## 2018-09-24 DIAGNOSIS — R73.01 IFG (IMPAIRED FASTING GLUCOSE): Chronic | ICD-10-CM

## 2018-09-24 DIAGNOSIS — E53.8 LOW VITAMIN B12 LEVEL: ICD-10-CM

## 2018-09-24 DIAGNOSIS — I10 ESSENTIAL HYPERTENSION: Chronic | ICD-10-CM

## 2018-09-24 DIAGNOSIS — M25.561 POSTERIOR KNEE PAIN, RIGHT: ICD-10-CM

## 2018-09-24 DIAGNOSIS — J40 BRONCHITIS: ICD-10-CM

## 2018-09-24 DIAGNOSIS — M79.7 FIBROMYALGIA: ICD-10-CM

## 2018-09-24 DIAGNOSIS — E55.9 VITAMIN D DEFICIENCY: Chronic | ICD-10-CM

## 2018-09-24 DIAGNOSIS — R09.89 GLOBUS SENSATION: ICD-10-CM

## 2018-09-24 DIAGNOSIS — K21.00 GASTROESOPHAGEAL REFLUX DISEASE WITH ESOPHAGITIS: Chronic | ICD-10-CM

## 2018-09-24 DIAGNOSIS — D64.9 ANEMIA, UNSPECIFIED TYPE: ICD-10-CM

## 2018-09-24 RX ORDER — DICLOFENAC SODIUM 10 MG/G
4 GEL TOPICAL 4 TIMES DAILY
Qty: 100 G | Refills: 4 | Status: SHIPPED | OUTPATIENT
Start: 2018-09-24 | End: 2019-04-16 | Stop reason: SDUPTHER

## 2018-09-24 RX ORDER — PANTOPRAZOLE SODIUM 40 MG/1
40 TABLET, DELAYED RELEASE ORAL DAILY
Qty: 30 TAB | Refills: 11 | Status: SHIPPED | OUTPATIENT
Start: 2018-09-24 | End: 2018-10-10

## 2018-09-24 RX ORDER — LANOLIN ALCOHOL/MO/W.PET/CERES
1000 CREAM (GRAM) TOPICAL DAILY
COMMUNITY
End: 2019-09-05 | Stop reason: ALTCHOICE

## 2018-09-24 RX ORDER — CYCLOBENZAPRINE HCL 10 MG
TABLET ORAL
Qty: 90 TAB | Refills: 0 | Status: SHIPPED | OUTPATIENT
Start: 2018-09-24 | End: 2018-10-17 | Stop reason: SDUPTHER

## 2018-09-24 RX ORDER — GABAPENTIN 300 MG/1
CAPSULE ORAL
Qty: 90 CAP | Refills: 4 | Status: SHIPPED | OUTPATIENT
Start: 2018-09-24 | End: 2019-04-11 | Stop reason: SDUPTHER

## 2018-09-24 RX ORDER — ASCORBIC ACID 500 MG
500 TABLET ORAL DAILY
COMMUNITY
End: 2019-09-05 | Stop reason: ALTCHOICE

## 2018-09-24 RX ORDER — GABAPENTIN 300 MG/1
CAPSULE ORAL
Qty: 90 CAP | Refills: 4 | Status: CANCELLED | OUTPATIENT
Start: 2018-09-24

## 2018-09-24 NOTE — PROGRESS NOTES
Progress Note    Patient: Artis Roa MRN: [id number]  SSN: [id number]    YOB: 1980  Age: 41 y.o. Sex: female        Chief Complaint   Patient presents with    Hypertension         Subjective:     Encounter Diagnoses   Name Primary?  IFG (impaired fasting glucose):  No sx of fulminant diabetes. No significant weight loss or gain. The patient realizes that without weight loss and exercise they are high risk for overt diabetes. Lab Results   Component Value Date/Time    Hemoglobin A1c 5.4 07/25/2018 12:40 PM     Lab Results   Component Value Date/Time    Glucose 97 07/25/2018 12:40 PM     Wt Readings from Last 3 Encounters:   09/24/18 214 lb 6.4 oz (97.3 kg)   08/24/18 208 lb (94.3 kg)   07/30/18 208 lb (94.3 kg)     Body mass index is 36.8 kg/(m^2). Yes    Fibromyalgia: Chronic pain. Most fibromyalgia points are always active.  Globus sensation: She has acid reflux plus gastroparesis.  Bronchitis: Mild asthma this time of year.  Posterior knee pain, right: chronic Pain         Pure hypercholesterolemia:  Cardiovascular risks for her are: LDL goal is under 100. Key Antihyperlipidemia Meds     The patient is on no antihyperlipidemia meds. Lab Results   Component Value Date/Time    Cholesterol, total 264 (H) 07/25/2018 12:40 PM    HDL Cholesterol 91 07/25/2018 12:40 PM    LDL, calculated 160 (H) 07/25/2018 12:40 PM    Triglyceride 67 07/25/2018 12:40 PM     Lab Results   Component Value Date/Time    ALT (SGPT) 26 07/25/2018 12:40 PM    AST (SGOT) 20 07/25/2018 12:40 PM    Alk.  phosphatase 69 07/25/2018 12:40 PM    Bilirubin, direct 0.12 12/08/2017 04:08 PM    Bilirubin, total 0.5 07/25/2018 12:40 PM      Myalgias: No   Fatigue: No   Other side effects: no  Wt Readings from Last 3 Encounters:   09/24/18 214 lb 6.4 oz (97.3 kg)   08/24/18 208 lb (94.3 kg)   07/30/18 208 lb (94.3 kg)     The patient is aware of our goal to reduce or eliminate the long term problems (such as strokes and heart attacks) related to poorly controlled hyperlipidemia.  Gastroesophageal reflux disease with esophagitis;  Current control of Symptoms:good  Hiatal Hernia:no  Current Medications:omeprazole  The patient has no history melena or bright red blood in the stools. The patient avoids high dose aspirin and NSAID therapy. The patient is aware of diet changes needed, elevating the head of the bed and appropriate use of antacids.  Essential hypertension:  BP Readings from Last 3 Encounters:   09/24/18 129/85   08/24/18 124/74   07/30/18 120/85     The patient reports:  taking medications as instructed, no medication side effects noted, no TIA's, no chest pain on exertion, no dyspnea on exertion, no swelling of ankles. Key CAD CHF Meds     Patient is on no cardiovascular meds. Lab Results   Component Value Date/Time    Sodium 139 07/25/2018 12:40 PM    Potassium 4.6 07/25/2018 12:40 PM    Chloride 102 07/25/2018 12:40 PM    CO2 23 07/25/2018 12:40 PM    Anion gap 9 08/19/2010 10:36 AM    Glucose 97 07/25/2018 12:40 PM    BUN 16 07/25/2018 12:40 PM    Creatinine 0.82 07/25/2018 12:40 PM    BUN/Creatinine ratio 20 07/25/2018 12:40 PM    GFR est  07/25/2018 12:40 PM    GFR est non-AA 93 07/25/2018 12:40 PM    Calcium 9.1 07/25/2018 12:40 PM    Bilirubin, total 0.5 07/25/2018 12:40 PM    AST (SGOT) 20 07/25/2018 12:40 PM    Alk. phosphatase 69 07/25/2018 12:40 PM    Protein, total 7.3 07/25/2018 12:40 PM    Albumin 4.2 07/25/2018 12:40 PM    Globulin 3.6 08/19/2010 10:36 AM    A-G Ratio 1.4 07/25/2018 12:40 PM    ALT (SGPT) 26 07/25/2018 12:40 PM     Low salt diet? yes  Aerobic exercise? no  Our goal is to normalize the blood pressure to decrease the risks of strokes and heart attacks. The patient is in agreement with the plan.  Vitamin D deficiency:    No sx. Due for testing.   Lab Results   Component Value Date/Time    VITAMIN D, 25-HYDROXY 27.6 (L) 07/25/2018 12:40 PM            Low vitamin B12 level:  No symptoms. Lab Results   Component Value Date/Time    Vitamin B12 374 07/25/2018 12:40 PM            Anemia, unspecified type: Needs repeat lipid testing  No sx. Lab Results   Component Value Date/Time    HGB 11.6 07/25/2018 12:40 PM     Lab Results   Component Value Date/Time    Iron 29 (L) 09/23/2009 09:47 AM    TIBC 458 (H) 09/23/2009 09:47 AM    Iron % saturation 6 (L) 09/23/2009 09:47 AM    Ferritin 94 08/19/2010 10:36 AM                   Current and past medical information:    Current Medications after this visit[de-identified]     Current Outpatient Prescriptions   Medication Sig    cyanocobalamin (VITAMIN B-12) 1,000 mcg tablet Take 1,000 mcg by mouth daily.  ascorbic acid, vitamin C, (VITAMIN C) 500 mg tablet Take 500 mg by mouth daily.  gabapentin (NEURONTIN) 300 mg capsule Take 2 tabs in morning and 2 tabs at nighttime. Indications: fibromyalgia    pantoprazole (PROTONIX) 40 mg tablet Take 1 Tab by mouth daily.  cyclobenzaprine (FLEXERIL) 10 mg tablet TAKE ONE TABLET BY MOUTH THREE TIMES DAILY AS NEEDED    diclofenac (VOLTAREN) 1 % gel Apply 4 g to affected area four (4) times daily.  albuterol (PROVENTIL HFA, VENTOLIN HFA, PROAIR HFA) 90 mcg/actuation inhaler Take 1 Puff by inhalation every six (6) hours as needed for Wheezing.  hydrocortisone (ANUSOL-HC) 2.5 % rectal cream Insert  into rectum four (4) times daily. As needed    cetirizine (ZYRTEC) 10 mg tablet Take  by mouth.  norgestimate-ethinyl estradiol (ORTHO-CYCLEN, SPRINTEC) 0.25-35 mg-mcg tab Take 1 Tab by mouth daily. Indications: DYSMENORRHEA    FLUoxetine (PROZAC) 40 mg capsule Take 40 mg by mouth daily.  fluticasone (FLONASE) 50 mcg/actuation nasal spray 2 Sprays by Both Nostrils route daily.  ketoconazole (NIZORAL) 2 % shampoo shampoo twice weekly    triamcinolone acetonide (KENALOG) 0.1 % ointment Apply  to affected area two (2) times a day.  use thin layer    LORazepam (ATIVAN) 1 mg tablet Take 1 Tab by mouth every twelve (12) hours. Max Daily Amount: 2 mg. Indications: ANXIETY (Patient taking differently: Take 2 mg by mouth every twelve (12) hours. Indications: ANXIETY)    clonazePAM (KLONOPIN) 0.5 mg tablet Take 1 mg by mouth nightly as needed.  traZODone (DESYREL) 50 mg tablet Take 1 Tab by mouth nightly.  fluconazole (DIFLUCAN) 150 mg tablet 1 today and repeat in 3 days. FDA advises cautious prescribing of oral fluconazole in pregnancy. Indications: Vulvovaginal Candidiasis    nitrofurantoin, macrocrystal-monohydrate, (MACROBID) 100 mg capsule Take 1 Cap by mouth two (2) times a day. Indications: E. COLI URINARY TRACT INFECTION    dextroamphetamine-amphetamine (ADDERALL) 20 mg tablet Take 20 mg by mouth. No current facility-administered medications for this visit.         Patient Active Problem List    Diagnosis Date Noted    Severe obesity (BMI 35.0-39.9) ([facility] 07/25/2018    Mood disorder ([facility] 06/12/2018    Chronic pain syndrome 06/12/2018    Fibromyalgia 06/12/2018    Panic attack 06/12/2018    Tremor 06/12/2018    Recurrent depression ([facility] 01/15/2018    Inflammatory arthritis 08/10/2016    Vitamin D deficiency 03/21/2016    Thrombosed external hemorrhoid 02/05/2016    IFG (impaired fasting glucose) 09/10/2015    Hyperlipidemia 09/10/2015    Hoarseness 03/25/2013    OCD (obsessive compulsive disorder) 02/27/2012    HSV (herpes simplex virus) anogenital infection 09/12/2011    Costochondritis 05/17/2011    Anxiety 09/13/2010    Itch of skin 05/26/2010    Depression     GERD (gastroesophageal reflux disease)     Hypertension     Anemia, unspecified        Past Medical History:   Diagnosis Date    Anemia NEC     Arthritis     Chronic pain     fybromyalsia    Depression     anxiety, depression, OCD    GERD (gastroesophageal reflux disease)     Hypertension     Musculoskeletal disorder     SOB (shortness of breath)     Stool color black        Allergies   Allergen Reactions    Sulfa (Sulfonamide Antibiotics) Hives    Vicodin [Hydrocodone-Acetaminophen] Nausea and Vomiting       Past Surgical History:   Procedure Laterality Date    HX GYN       1998    HX HEENT  2002    wisdom teeth extraction    UPPER GI ENDOSCOPY,BIOPSY  2018         UPPER GI ENDOSCOPY,DIAGNOSIS  2018            Social History     Social History    Marital status: SINGLE     Spouse name: N/A    Number of children: N/A    Years of education: N/A     Social History Main Topics    Smoking status: Former Smoker     Packs/day: 0.25     Years: 8.00     Types: Cigarettes     Quit date: 2018    Smokeless tobacco: Never Used    Alcohol use 0.6 oz/week     0 Standard drinks or equivalent, 1 Glasses of wine per week      Comment: 1 cup of wine/week    Drug use: No    Sexual activity: Yes     Partners: Male     Birth control/ protection: None     Other Topics Concern    None     Social History Narrative       Review of Systems   Constitutional: Negative. Negative for chills, fever, malaise/fatigue and weight loss. HENT: Negative. Negative for hearing loss. Eyes: Negative. Negative for blurred vision and double vision. Respiratory: Negative. Negative for cough, hemoptysis, sputum production and shortness of breath. Cardiovascular: Negative. Negative for chest pain, palpitations and orthopnea. Gastrointestinal: Negative. Negative for abdominal pain, blood in stool, heartburn, nausea and vomiting. Genitourinary: Negative. Negative for dysuria, frequency and urgency. Musculoskeletal: Positive for back pain, joint pain, myalgias and neck pain. Negative for falls. Skin: Negative. Negative for rash. Neurological: Negative. Negative for dizziness, tingling, tremors, weakness and headaches. Endo/Heme/Allergies: Negative. Psychiatric/Behavioral: Positive for depression. She has trouble concentrating.   She has not been able to afford her Adderall. Objective:     Vitals:    09/24/18 1118   BP: 129/85   Pulse: 82   Resp: (!) 82   Temp: 98 °F (36.7 °C)   TempSrc: Oral   SpO2: 97%   Weight: 214 lb 6.4 oz (97.3 kg)   Height: 5' 4\" (1.626 m)      Body mass index is 36.8 kg/(m^2). Physical Exam   Constitutional: She is oriented to person, place, and time and well-developed, well-nourished, and in no distress. No distress. Wt Readings from Last 3 Encounters:  09/24/18 : 214 lb 6.4 oz (97.3 kg)  08/24/18 : 208 lb (94.3 kg)  07/30/18 : 208 lb (94.3 kg)  I have reviewed/discussed the above normal BMI with the patient. I have recommended the following interventions: dietary management education, guidance, and counseling and monitor weight . Regina KUMART:   Head: Normocephalic and atraumatic. Mouth/Throat: Oropharynx is clear and moist.   Eyes: Conjunctivae are normal.   Neck: No thyromegaly present. Cardiovascular: Normal rate, regular rhythm and normal heart sounds. No murmur heard. Pulmonary/Chest: Effort normal and breath sounds normal. No respiratory distress. Abdominal: Soft. She exhibits no distension. Neurological: She is alert and oriented to person, place, and time. Skin: Skin is warm. No rash noted. She is not diaphoretic. No erythema. Psychiatric: Mood and affect normal.   Nursing note and vitals reviewed. There are no preventive care reminders to display for this patient. Assessment and orders:     Encounter Diagnoses     ICD-10-CM ICD-9-CM   1. IFG (impaired fasting glucose) R73.01 790.21   2. Fibromyalgia M79.7 729.1   3. Globus sensation F45.8 306.4   4. Bronchitis J40 490   5. Posterior knee pain, right M25.561 719.46   6. Pure hypercholesterolemia E78.00 272.0   7. Gastroesophageal reflux disease with esophagitis K21.0 530.11   8. Essential hypertension I10 401.9   9. Vitamin D deficiency E55.9 268.9   10. Low vitamin B12 level E53.8 266.2   11.  Anemia, unspecified type D64.9 285.9       Diagnoses and all orders for this visit:    1. IFG (impaired fasting glucose)-recheck. High risk for diabetes. Gaining weight.  -     METABOLIC PANEL, COMPREHENSIVE; Future  -     HEMOGLOBIN A1C WITH EAG    2. Fibromyalgia-severe and debilitating. Turned down again for disability. She is gotten approved for care at [address] [place] [address].  -     gabapentin (NEURONTIN) 300 mg capsule; Take 2 tabs in morning and 2 tabs at nighttime. Indications: fibromyalgia    3. Globus sensation  -     pantoprazole (PROTONIX) 40 mg tablet; Take 1 Tab by mouth daily. 4. Bronchitis-intermittent allergy and asthma symptoms. 5. Posterior knee pain, right-no improvement  -     diclofenac (VOLTAREN) 1 % gel; Apply 4 g to affected area four (4) times daily. 6. Pure hypercholesterolemia-recheck  -     LIPID PANEL  -     METABOLIC PANEL, COMPREHENSIVE; Future    7. Gastroesophageal reflux disease with esophagitis-she also has gastroparesis and sees Dr. Elayne Hardin. Gastroparesis may go along with her impaired fasting glucose. 8. Essential hypertension-controlled  -     METABOLIC PANEL, COMPREHENSIVE; Future    9. Vitamin D deficiency-recheck  -     VITAMIN D, 25 HYDROXY    10. Low vitamin B12 level  -     VITAMIN B12    11. Anemia, unspecified type  -     CBC W/O DIFF    Other orders  -     cyclobenzaprine (FLEXERIL) 10 mg tablet; TAKE ONE TABLET BY MOUTH THREE TIMES DAILY AS NEEDED        Follow-up Disposition:  Return in about 3 months (around 12/24/2018). Plan of care:  Discussed diagnoses in detail with patient. Medication risks/benefits/side effects discussed with patient. All of the patient's questions were addressed. The patient understands and agrees with our plan of care. The patient knows to call back if they are unsure of or forget any changes we discussed today or if the symptoms change.      The patient received an After-Visit Summary which contains VS, orders, medication list and allergy list. This can be used as a \"mini-medical record\" should they have to seek medical care while out of town. Patient Care Team:  Jose Sandhu MD as PCP - General (Family Practice)  Igor Valentin MD (Breast Surgery)  Bhavin Chávez MD (Rheumatology)  Nina Kauffman MD (Cardiology)  Venita Donaldson MD (Otolaryngology)    Follow-up Disposition:  Return in about 3 months (around 12/24/2018). No future appointments.     Signed By: Jose Sandhu MD     September 24, 2018

## 2018-09-24 NOTE — MR AVS SNAPSHOT
[address] [address] 
[phone] Patient: Ethan  MRN: [id number] :1980 Visit Information Date & Time Provider Department Dept. Phone Encounter #  
 2018 11:10 AM Aroldo Gallardo MD [address] [place] [address] [id number] Follow-up Instructions Return in about 3 months (around 2018). Upcoming Health Maintenance Date Due Influenza Age 5 to Adult 10/4/2022* PAP AKA CERVICAL CYTOLOGY 2020 DTaP/Tdap/Td series (2 - Td) 2024 COLONOSCOPY 5/15/2025 *Topic was postponed. The date shown is not the original due date. Allergies as of 2018  Review Complete On: 2018 By: Lalitha Romero LPN Severity Noted Reaction Type Reactions Sulfa (Sulfonamide Antibiotics)  2010    Hives Vicodin [Hydrocodone-acetaminophen]  2011    Nausea and Vomiting Current Immunizations  Reviewed on 2010 Name Date Tdap 2014  7:31 PM  
  
 Not reviewed this visit You Were Diagnosed With   
  
 Codes Comments IFG (impaired fasting glucose)    -  Primary ICD-10-CM: R73.01 
ICD-9-CM: 790.21 Fibromyalgia     ICD-10-CM: M79.7 ICD-9-CM: 729.1 Globus sensation     ICD-10-CM: F45.8 ICD-9-CM: 306.4 Bronchitis     ICD-10-CM: J40 ICD-9-CM: 981 Posterior knee pain, right     ICD-10-CM: M25.561 ICD-9-CM: 719.46 Pure hypercholesterolemia     ICD-10-CM: E78.00 ICD-9-CM: 272.0 Gastroesophageal reflux disease with esophagitis     ICD-10-CM: K21.0 ICD-9-CM: 530.11 Essential hypertension     ICD-10-CM: I10 
ICD-9-CM: 401.9 Vitamin D deficiency     ICD-10-CM: E55.9 ICD-9-CM: 268.9 Low vitamin B12 level     ICD-10-CM: E53.8 ICD-9-CM: 266.2 Anemia, unspecified type     ICD-10-CM: D64.9 ICD-9-CM: 537. 9 Vitals BP Pulse Temp Resp Height(growth percentile) Weight(growth percentile) 129/85 (BP 1 Location: Left arm, BP Patient Position: Sitting) 82 98 °F (36.7 °C) (Oral) (!) 82 5' 4\" (1.626 m) 214 lb 6.4 oz (97.3 kg) SpO2 BMI OB Status Smoking Status 97% 36.8 kg/m2 Having regular periods Former Smoker Vitals History BMI and BSA Data Body Mass Index Body Surface Area  
 36.8 kg/m 2 2.1 m 2 Preferred Pharmacy Pharmacy Name Phone [address] [place] [address] [phone] Your Updated Medication List  
  
   
This list is accurate as of 9/24/18 11:41 AM.  Always use your most recent med list.  
  
  
  
  
 albuterol 90 mcg/actuation inhaler Commonly known as:  PROVENTIL HFA, VENTOLIN HFA, PROAIR HFA Take 1 Puff by inhalation every six (6) hours as needed for Wheezing. ascorbic acid (vitamin C) 500 mg tablet Commonly known as:  VITAMIN C Take 500 mg by mouth daily. clonazePAM 0.5 mg tablet Commonly known as:  [place]astings Christos Take 1 mg by mouth nightly as needed. cyclobenzaprine 10 mg tablet Commonly known as:  FLEXERIL  
TAKE ONE TABLET BY MOUTH THREE TIMES DAILY AS NEEDED  
  
 dextroamphetamine-amphetamine 20 mg tablet Commonly known as:  ADDERALL Take 20 mg by mouth. diclofenac 1 % Gel Commonly known as:  VOLTAREN Apply 4 g to affected area four (4) times daily. FLONASE 50 mcg/actuation nasal spray Generic drug:  fluticasone 2 Sprays by Both Nostrils route daily. fluconazole 150 mg tablet Commonly known as:  DIFLUCAN  
1 today and repeat in 3 days. FDA advises cautious prescribing of oral fluconazole in pregnancy. Indications: Vulvovaginal Candidiasis FLUoxetine 40 mg capsule Commonly known as:  PROzac Take 40 mg by mouth daily. gabapentin 300 mg capsule Commonly known as:  NEURONTIN Take 2 tabs in morning and 2 tabs at nighttime. Indications: fibromyalgia hydrocortisone 2.5 % rectal cream  
Commonly known as:  ANUSOL-HC Insert  into rectum four (4) times daily. As needed  
  
 ketoconazole 2 % shampoo Commonly known as:  NIZORAL  
shampoo twice weekly LORazepam 1 mg tablet Commonly known as:  ATIVAN Take 1 Tab by mouth every twelve (12) hours. Max Daily Amount: 2 mg. Indications: ANXIETY  
  
 nitrofurantoin (macrocrystal-monohydrate) 100 mg capsule Commonly known as:  MACROBID Take 1 Cap by mouth two (2) times a day. Indications: E. COLI URINARY TRACT INFECTION  
  
 norgestimate-ethinyl estradiol 0.25-35 mg-mcg Tab Commonly known as:  Natividad Canard Take 1 Tab by mouth daily. Indications: DYSMENORRHEA  
  
 pantoprazole 40 mg tablet Commonly known as:  PROTONIX Take 1 Tab by mouth daily. traZODone 50 mg tablet Commonly known as:  Taishamariah Narayanman Take 1 Tab by mouth nightly. triamcinolone acetonide 0.1 % ointment Commonly known as:  KENALOG Apply  to affected area two (2) times a day. use thin layer VITAMIN B-12 1,000 mcg tablet Generic drug:  cyanocobalamin Take 1,000 mcg by mouth daily. ZyrTEC 10 mg tablet Generic drug:  cetirizine Take  by mouth. Prescriptions Sent to Pharmacy Refills  
 gabapentin (NEURONTIN) 300 mg capsule 4 Sig: Take 2 tabs in morning and 2 tabs at nighttime. Indications: fibromyalgia Class: Normal  
 Pharmacy: [facility] [address] [address] Ph #: [phone]  
 pantoprazole (PROTONIX) 40 mg tablet 11 Sig: Take 1 Tab by mouth daily. Class: Normal  
 Pharmacy: [facility] [address] [address] Ph #: [phone] Route: Oral  
 cyclobenzaprine (FLEXERIL) 10 mg tablet 0 Sig: TAKE ONE TABLET BY MOUTH THREE TIMES DAILY AS NEEDED Class: Normal  
 Pharmacy: [facility] [address] HERN[address]NDEZ [phone] [facility] Ph #: [phone]  
 diclofenac (VOLTAREN) 1 % gel 4 Sig: Apply 4 g to affected area four (4) times daily. Class: Normal  
 Pharmacy: [address] #: [phone] Route: Topical  
  
We Performed the Following CBC W/O DIFF [87213 CPT(R)] HEMOGLOBIN A1C WITH EAG [84022 CPT(R)] LIPID PANEL [93315 CPT(R)] VITAMIN B12 U080673 CPT(R)] VITAMIN D, 25 HYDROXY [id number] CPT(R)] Follow-up Instructions Return in about 3 months (around 12/24/2018). To-Do List   
 09/24/2018 Lab:  METABOLIC PANEL, COMPREHENSIVE Patient Instructions Using a Metered-Dose Inhaler: Care Instructions Your Care Instructions A metered-dose inhaler lets you breathe medicine into your lungs quickly. Inhaled medicine works faster than the same medicine in a pill. An inhaler allows you to take less medicine than you would need if you took it as a pill. \"Metered-dose\" means that the inhaler gives a measured amount of medicine each time you use it. A metered-dose inhaler gives medicine in the form of a liquid mist. 
Your doctor may want you to use a spacer with your inhaler. A spacer is a chamber that you attach to the inhaler. The chamber holds the medicine before you inhale it. That way, you can inhale the medicine in as many breaths as you need. Doctors recommend using a spacer with most metered-dose inhalers, especially those with corticosteroid medicines. Follow-up care is a key part of your treatment and safety. Be sure to make and go to all appointments, and call your doctor if you are having problems. It's also a good idea to know your test results and keep a list of the medicines you take. How can you care for yourself at home? To get started using your inhaler · Talk with your health care provider to be sure you are using your inhaler the right way. It might help if you practice using it in front of a mirror. Use the inhaler exactly as prescribed. · Check that you have the correct medicine. If you use more than one inhaler, put a label on each one. This will let you know which one to use at the right time. · Keep track of how much medicine is in the inhaler. Check the label to see how many doses are in the container. If you know how many puffs you can take, you can replace the inhaler before you run out. Ask your health care provider how you can keep track of how much medicine is left. · Use a spacer if you have problems pressing the inhaler and breathing in at the same time. You also may need a spacer if you are using corticosteroid medicines. · If you are using a corticosteroid inhaler, gargle and rinse out your mouth with water after use. Do not swallow the water. Swallowing the water will increase the chance that the medicine will get into your bloodstream. This may make it more likely that you will have side effects. To use a spacer with an inhaler 1. Shake the inhaler. Remove the inhaler cap, and place the mouthpiece of the inhaler into the spacer. Check the inhaler instructions to see if you need to prime your inhaler before you use it. If it needs priming, follow the instructions on how to prime your inhaler. 2. Remove the cap from the spacer. 3. Hold the inhaler upright with the mouthpiece at the bottom. 4. Tilt your head back a little, and breathe out slowly and completely. 5. Place the spacer's mouthpiece in your mouth. 6. Press down on the inhaler to spray one puff of medicine into the spacer, and then start breathing in slowly. Wait to inhale until after you have pressed down on the inhaler. Some spacers have a whistle. If you hear it, you should breathe in more slowly. 7. Hold your breath for 10 seconds. This will let the medicine settle in your lungs. 8. If you need to take a second dose, wait 30 to 60 seconds to allow the inhaler valve to refill. To use an inhaler without a spacer 1. Shake the inhaler as directed. Remove the cap. Check the instructions to see if you need to prime your inhaler before you use it. If it needs priming, follow the instructions on how to prime your inhaler. 2. Hold the inhaler upright with the mouthpiece at the bottom. 3. Tilt your head back a little, and breathe out slowly and completely. 4. Position the inhaler in one of two ways: 
¨ You can place the inhaler in your mouth. This is easier for most people. And it lowers the risk that any of the medicine will get into your eyes. ¨ Or you can place the inhaler 1 to 2 inches in front of your open mouth, without closing your lips over it. Try to open your mouth as wide as you can. Placing the inhaler in front of your open mouth may be better for getting the medicine into your lungs. But some people may find this too hard to do. 5. Start taking slow, even breaths through your mouth. Press down on the inhaler once, then inhale fully. 6. Hold your breath for 10 seconds. This will let the medicine settle in your lungs. 7. If you need to take a second dose, wait 30 to 60 seconds to allow the inhaler valve to refill. Where can you learn more? Go to http://daniel-carlton.info/. Enter K111 in the search box to learn more about \"Using a Metered-Dose Inhaler: Care Instructions. \" Current as of: November 12, 2017 Content Version: 11.7 © 5508-1486 [employer]. Care instructions adapted under license by [employer] (which disclaims liability or warranty for this information). If you have questions about a medical condition or this instruction, always ask your healthcare professional. [address] [address] any warranty or liability for your use of this information. Introducing [facility]! Dear Nallely Thacker: Thank you for requesting a [employer] account. Our records indicate that you already have an active [employer] account.   You can access your account anytime at [email or web address] Did you know that you can access your hospital and ER discharge instructions at any time in web care [employer]? You can also review all of your test results from your hospital stay or ER visit. Additional Information If you have questions, please visit the Frequently Asked Questions section of the web care [employer] website at [email or web address] Remember, web care [employer] is NOT to be used for urgent needs. For medical emergencies, dial 911. Now available from your iPhone and Android! Please provide this summary of care documentation to your next provider. Your primary care clinician is listed as [address]. If you have any questions after today's visit, please call [phone].

## 2018-09-24 NOTE — PROGRESS NOTES
1. Have you been to the ER, urgent care clinic since your last visit? Hospitalized since your last visit? No    2. Have you seen or consulted any other health care providers outside of the [address] [place] [address] since your last visit? Include any pap smears or colon screening.  No  Reviewed record in preparation for visit and have necessary documentation  Pt did not bring medication to office visit for review  Goals that were addressed and/or need to be completed during or after this appointment include     Health Maintenance Due   Topic Date Due    Influenza Age 5 to Adult  08/01/2018

## 2018-09-24 NOTE — PATIENT INSTRUCTIONS
Using a Metered-Dose Inhaler: Care Instructions  Your Care Instructions    A metered-dose inhaler lets you breathe medicine into your lungs quickly. Inhaled medicine works faster than the same medicine in a pill. An inhaler allows you to take less medicine than you would need if you took it as a pill. \"Metered-dose\" means that the inhaler gives a measured amount of medicine each time you use it. A metered-dose inhaler gives medicine in the form of a liquid mist.  Your doctor may want you to use a spacer with your inhaler. A spacer is a chamber that you attach to the inhaler. The chamber holds the medicine before you inhale it. That way, you can inhale the medicine in as many breaths as you need. Doctors recommend using a spacer with most metered-dose inhalers, especially those with corticosteroid medicines. Follow-up care is a key part of your treatment and safety. Be sure to make and go to all appointments, and call your doctor if you are having problems. It's also a good idea to know your test results and keep a list of the medicines you take. How can you care for yourself at home? To get started using your inhaler  · Talk with your health care provider to be sure you are using your inhaler the right way. It might help if you practice using it in front of a mirror. Use the inhaler exactly as prescribed. · Check that you have the correct medicine. If you use more than one inhaler, put a label on each one. This will let you know which one to use at the right time. · Keep track of how much medicine is in the inhaler. Check the label to see how many doses are in the container. If you know how many puffs you can take, you can replace the inhaler before you run out. Ask your health care provider how you can keep track of how much medicine is left. · Use a spacer if you have problems pressing the inhaler and breathing in at the same time.  You also may need a spacer if you are using corticosteroid medicines. · If you are using a corticosteroid inhaler, gargle and rinse out your mouth with water after use. Do not swallow the water. Swallowing the water will increase the chance that the medicine will get into your bloodstream. This may make it more likely that you will have side effects. To use a spacer with an inhaler  1. Shake the inhaler. Remove the inhaler cap, and place the mouthpiece of the inhaler into the spacer. Check the inhaler instructions to see if you need to prime your inhaler before you use it. If it needs priming, follow the instructions on how to prime your inhaler. 2. Remove the cap from the spacer. 3. Hold the inhaler upright with the mouthpiece at the bottom. 4. Tilt your head back a little, and breathe out slowly and completely. 5. Place the spacer's mouthpiece in your mouth. 6. Press down on the inhaler to spray one puff of medicine into the spacer, and then start breathing in slowly. Wait to inhale until after you have pressed down on the inhaler. Some spacers have a whistle. If you hear it, you should breathe in more slowly. 7. Hold your breath for 10 seconds. This will let the medicine settle in your lungs. 8. If you need to take a second dose, wait 30 to 60 seconds to allow the inhaler valve to refill. To use an inhaler without a spacer  1. Shake the inhaler as directed. Remove the cap. Check the instructions to see if you need to prime your inhaler before you use it. If it needs priming, follow the instructions on how to prime your inhaler. 2. Hold the inhaler upright with the mouthpiece at the bottom. 3. Tilt your head back a little, and breathe out slowly and completely. 4. Position the inhaler in one of two ways:  ¨ You can place the inhaler in your mouth. This is easier for most people. And it lowers the risk that any of the medicine will get into your eyes. ¨ Or you can place the inhaler 1 to 2 inches in front of your open mouth, without closing your lips over it. Try to open your mouth as wide as you can. Placing the inhaler in front of your open mouth may be better for getting the medicine into your lungs. But some people may find this too hard to do. 5. Start taking slow, even breaths through your mouth. Press down on the inhaler once, then inhale fully. 6. Hold your breath for 10 seconds. This will let the medicine settle in your lungs. 7. If you need to take a second dose, wait 30 to 60 seconds to allow the inhaler valve to refill. Where can you learn more? Go to http://daniel-carlton.info/. Enter K111 in the search box to learn more about \"Using a Metered-Dose Inhaler: Care Instructions. \"  Current as of: November 12, 2017  Content Version: 11.7  © 8769-8304 [employer]. Care instructions adapted under license by [employer] (which disclaims liability or warranty for this information). If you have questions about a medical condition or this instruction, always ask your healthcare professional. [place] [address] any warranty or liability for your use of this information.

## 2018-10-10 ENCOUNTER — TELEPHONE (OUTPATIENT)
Dept: FAMILY MEDICINE CLINIC | Age: 38
End: 2018-10-10

## 2018-10-10 RX ORDER — PANTOPRAZOLE SODIUM 20 MG/1
TABLET, DELAYED RELEASE ORAL
Qty: 60 TAB | Refills: 5 | Status: SHIPPED | OUTPATIENT
Start: 2018-10-10 | End: 2019-04-11 | Stop reason: SDUPTHER

## 2018-10-10 NOTE — TELEPHONE ENCOUNTER
Pt states that medication Protonix is suppose to be twice a day but directions state to take once a day. Pt is requesting a call back from nurse to discuss this.

## 2018-10-10 NOTE — TELEPHONE ENCOUNTER
Returned phone call to patient and advised her per Dr. Amy Hardy: \"The 40 mg strength should only be taken once a day.  If she feels like twice a day works better will have to decrease it to 20 mg.\"     She verbalized understanding and would like a prescription for Protonix 20mg BID. Refill sent to your box.

## 2018-10-14 LAB
25(OH)D3+25(OH)D2 SERPL-MCNC: 46.4 NG/ML (ref 30–100)
ALBUMIN SERPL-MCNC: 3.9 G/DL (ref 3.5–5.5)
ALBUMIN/GLOB SERPL: 1.3 {RATIO} (ref 1.2–2.2)
ALP SERPL-CCNC: 60 IU/L (ref 39–117)
ALT SERPL-CCNC: 19 IU/L (ref 0–32)
AST SERPL-CCNC: 20 IU/L (ref 0–40)
BILIRUB SERPL-MCNC: <0.2 MG/DL (ref 0–1.2)
BUN SERPL-MCNC: 15 MG/DL (ref 6–20)
BUN/CREAT SERPL: 22 (ref 9–23)
CALCIUM SERPL-MCNC: 9 MG/DL (ref 8.7–10.2)
CHLORIDE SERPL-SCNC: 105 MMOL/L (ref 96–106)
CHOLEST SERPL-MCNC: 233 MG/DL (ref 100–199)
CO2 SERPL-SCNC: 19 MMOL/L (ref 20–29)
CREAT SERPL-MCNC: 0.68 MG/DL (ref 0.57–1)
ERYTHROCYTE [DISTWIDTH] IN BLOOD BY AUTOMATED COUNT: 14.3 % (ref 12.3–15.4)
EST. AVERAGE GLUCOSE BLD GHB EST-MCNC: 111 MG/DL
GLOBULIN SER CALC-MCNC: 3.1 G/DL (ref 1.5–4.5)
GLUCOSE SERPL-MCNC: 94 MG/DL (ref 65–99)
HBA1C MFR BLD: 5.5 % (ref 4.8–5.6)
HCT VFR BLD AUTO: 34 % (ref 34–46.6)
HDLC SERPL-MCNC: 77 MG/DL
HGB BLD-MCNC: 11.2 G/DL (ref 11.1–15.9)
LDLC SERPL CALC-MCNC: 144 MG/DL (ref 0–99)
MCH RBC QN AUTO: 29.3 PG (ref 26.6–33)
MCHC RBC AUTO-ENTMCNC: 32.9 G/DL (ref 31.5–35.7)
MCV RBC AUTO: 89 FL (ref 79–97)
PLATELET # BLD AUTO: 364 X10E3/UL (ref 150–379)
POTASSIUM SERPL-SCNC: 4.2 MMOL/L (ref 3.5–5.2)
PROT SERPL-MCNC: 7 G/DL (ref 6–8.5)
RBC # BLD AUTO: 3.82 X10E6/UL (ref 3.77–5.28)
SODIUM SERPL-SCNC: 138 MMOL/L (ref 134–144)
TRIGL SERPL-MCNC: 61 MG/DL (ref 0–149)
VIT B12 SERPL-MCNC: 638 PG/ML (ref 232–1245)
VLDLC SERPL CALC-MCNC: 12 MG/DL (ref 5–40)
WBC # BLD AUTO: 5.3 X10E3/UL (ref 3.4–10.8)

## 2018-10-17 ENCOUNTER — TELEPHONE (OUTPATIENT)
Dept: FAMILY MEDICINE CLINIC | Age: 38
End: 2018-10-17

## 2018-10-18 RX ORDER — CYCLOBENZAPRINE HCL 10 MG
TABLET ORAL
Qty: 90 TAB | Refills: 0 | Status: SHIPPED | OUTPATIENT
Start: 2018-10-18 | End: 2019-03-01 | Stop reason: DRUGHIGH

## 2018-10-18 NOTE — TELEPHONE ENCOUNTER
Advised patient that Dr. Carmela Bloom would like for her to continue taking her Vitamin D and Vitamin B. Also, he sent in a refill to [address] [place] [address]. Patient verbalized understanding.

## 2018-11-20 ENCOUNTER — TELEPHONE (OUTPATIENT)
Dept: FAMILY MEDICINE CLINIC | Age: 38
End: 2018-11-20

## 2018-11-20 NOTE — TELEPHONE ENCOUNTER
Pt called stating that she needs an updated form on the reasons why she is currently not working. It is the same form that was done in July but just needs to be updated. Pt would like a call back from nurse so that she can make sure all medical conditions are listed on form b/c the form that was done in July was missing one of her conditions. Pt states that she has to turn the form into  by December.

## 2018-11-29 ENCOUNTER — OFFICE VISIT (OUTPATIENT)
Dept: FAMILY MEDICINE CLINIC | Age: 38
End: 2018-11-29

## 2018-11-29 VITALS
HEIGHT: 64 IN | BODY MASS INDEX: 36.37 KG/M2 | TEMPERATURE: 97.9 F | RESPIRATION RATE: 16 BRPM | OXYGEN SATURATION: 96 % | SYSTOLIC BLOOD PRESSURE: 136 MMHG | DIASTOLIC BLOOD PRESSURE: 95 MMHG | WEIGHT: 213 LBS | HEART RATE: 103 BPM

## 2018-11-29 DIAGNOSIS — R76.8 POSITIVE ANA (ANTINUCLEAR ANTIBODY): Chronic | ICD-10-CM

## 2018-11-29 DIAGNOSIS — I10 ESSENTIAL HYPERTENSION: Primary | Chronic | ICD-10-CM

## 2018-11-29 DIAGNOSIS — K21.9 GASTROESOPHAGEAL REFLUX DISEASE WITHOUT ESOPHAGITIS: Chronic | ICD-10-CM

## 2018-11-29 DIAGNOSIS — E66.09 CLASS 2 OBESITY DUE TO EXCESS CALORIES WITHOUT SERIOUS COMORBIDITY WITH BODY MASS INDEX (BMI) OF 35.0 TO 35.9 IN ADULT: ICD-10-CM

## 2018-11-29 DIAGNOSIS — D64.9 ANEMIA, UNSPECIFIED TYPE: Chronic | ICD-10-CM

## 2018-11-29 DIAGNOSIS — R25.1 TREMOR: ICD-10-CM

## 2018-11-29 DIAGNOSIS — R73.01 IFG (IMPAIRED FASTING GLUCOSE): Chronic | ICD-10-CM

## 2018-11-29 DIAGNOSIS — M72.2 PLANTAR FASCIITIS OF LEFT FOOT: Chronic | ICD-10-CM

## 2018-11-29 DIAGNOSIS — E66.01 SEVERE OBESITY WITH BODY MASS INDEX (BMI) OF 35.0 TO 39.9 WITH SERIOUS COMORBIDITY (HCC): ICD-10-CM

## 2018-11-29 DIAGNOSIS — F32.A DEPRESSION, UNSPECIFIED DEPRESSION TYPE: Chronic | ICD-10-CM

## 2018-11-29 PROBLEM — K31.84 GASTROPARESIS: Chronic | Status: ACTIVE | Noted: 2018-11-29

## 2018-11-29 NOTE — PATIENT INSTRUCTIONS
Plantar Fasciitis: Care Instructions  Your Care Instructions    Plantar fasciitis is pain and inflammation of the plantar fascia, the tissue at the bottom of your foot that connects the heel bone to the toes. The plantar fascia also supports the arch. If you strain the plantar fascia, it can develop small tears and cause heel pain when you stand or walk. Plantar fasciitis can be caused by running or other sports. It also may occur in people who are overweight or who have high arches or flat feet. You may get plantar fasciitis if you walk or stand for long periods, or have a tight Achilles tendon or calf muscles. You can improve your foot pain with rest and other care at home. It might take a few weeks to a few months for your foot to heal completely. Follow-up care is a key part of your treatment and safety. Be sure to make and go to all appointments, and call your doctor if you are having problems. It's also a good idea to know your test results and keep a list of the medicines you take. How can you care for yourself at home? · Rest your feet often. Reduce your activity to a level that lets you avoid pain. If possible, do not run or walk on hard surfaces. · Take pain medicines exactly as directed. ? If the doctor gave you a prescription medicine for pain, take it as prescribed. ? If you are not taking a prescription pain medicine, take an over-the-counter anti-inflammatory medicine for pain and swelling, such as ibuprofen (Advil, Motrin) or naproxen (Aleve). Read and follow all instructions on the label. · Use ice massage to help with pain and swelling. You can use an ice cube or an ice cup several times a day. To make an ice cup, fill a paper cup with water and freeze it. Cut off the top of the cup until a half-inch of ice shows. Hold onto the remaining paper to use the cup. Rub the ice in small circles over the area for 5 to 7 minutes.   · Contrast baths, which alternate hot and cold water, can also help reduce swelling. But because heat alone may make pain and swelling worse, end a contrast bath with a soak in cold water. · Wear a night splint if your doctor suggests it. A night splint holds your foot with the toes pointed up and the foot and ankle at a 90-degree angle. This position gives the bottom of your foot a constant, gentle stretch. · Do simple exercises such as calf stretches and towel stretches 2 to 3 times each day, especially when you first get up in the morning. These can help the plantar fascia become more flexible. They also make the muscles that support your arch stronger. Hold these stretches for 15 to 30 seconds per stretch. Repeat 2 to 4 times. ? Stand about 1 foot from a wall. Place the palms of both hands against the wall at chest level. Lean forward against the wall, keeping one leg with the knee straight and heel on the ground while bending the knee of the other leg.  ? Sit down on the floor or a mat with your feet stretched in front of you. Roll up a towel lengthwise, and loop it over the ball of your foot. Holding the towel at both ends, gently pull the towel toward you to stretch your foot. · Wear shoes with good arch support. Athletic shoes or shoes with a well-cushioned sole are good choices. · Try heel cups or shoe inserts (orthotics) to help cushion your heel. You can buy these at many shoe stores. · Put on your shoes as soon as you get out of bed. Going barefoot or wearing slippers may make your pain worse. · Reach and stay at a good weight for your height. This puts less strain on your feet. When should you call for help? Call your doctor now or seek immediate medical care if:    · You have heel pain with fever, redness, or warmth in your heel.     · You cannot put weight on the sore foot.    Watch closely for changes in your health, and be sure to contact your doctor if:    · You have numbness or tingling in your heel.     · Your heel pain lasts more than 2 weeks. Where can you learn more? Go to http://daniel-carlton.info/. Rajeev Bell in the search box to learn more about \"Plantar Fasciitis: Care Instructions. \"  Current as of: November 29, 2017  Content Version: 11.8  © 8785-1313 [employer]. Care instructions adapted under license by [employer] (which disclaims liability or warranty for this information). If you have questions about a medical condition or this instruction, always ask your healthcare professional. [address] any warranty or liability for your use of this information.

## 2018-11-29 NOTE — LETTER
11/29/2018 11:22 AM 
 
Ms. Mei Arnett [address] [address] To whom it may concern:  
Listed below are the current chronic medical problems affecting Ms. [facility] Patient Active Problem List  
Diagnosis Code  Depression F32.9  GERD (gastroesophageal reflux disease) K21.9  Hypertension I10  
 Anemia, unspecified D64.9  
 Itch of skin L29.9  Anxiety F41.9  Costochondritis M94.0  
 HSV (herpes simplex virus) anogenital infection A60.9  OCD (obsessive compulsive disorder) F42.9  
 Hoarseness R49.0  
 IFG (impaired fasting glucose) R73.01  
 Hyperlipidemia E78.5  Thrombosed external hemorrhoid K64.5  Vitamin D deficiency E55.9  Inflammatory arthritis M19.90  
 Recurrent depression ([address] F33.9  Mood disorder (HCC) F39  
 Chronic pain syndrome G89.4  Fibromyalgia M79.7  Panic attack F41.0  Tremor R25.1  Severe obesity (BMI 35.0-39. 9) E66.01  
 Gastroparesis K31.84 Due to the totality and complexity of her medical problems it is my firm belief that she is fully disabled and unable to work anywhere in any capacity. She has filed for disability and is waiting for the decision.  
 
Sincerely, 
 
 
Marimar Dumont MD

## 2018-11-29 NOTE — PROGRESS NOTES
[address]  [phone]           Progress Note    Patient: Gibson Osorio MRN: [id number]  SSN: [id number]    YOB: 1980  Age: 41 y.o. Sex: female        Chief Complaint   Patient presents with    Documentation     Letter for disability          Subjective:     Encounter Diagnoses   Name Primary?  Essential hypertension:  BP Readings from Last 3 Encounters:   11/29/18 (!) 136/95   09/24/18 129/85   08/24/18 124/74     The patient reports:  taking medications as instructed, no medication side effects noted, no TIA's, no chest pain on exertion, no dyspnea on exertion. Key CAD CHF Meds     Patient is on no cardiovascular meds. Lab Results   Component Value Date/Time    Sodium 138 10/12/2018 11:57 AM    Potassium 4.2 10/12/2018 11:57 AM    Chloride 105 10/12/2018 11:57 AM    CO2 19 (L) 10/12/2018 11:57 AM    Anion gap 9 08/19/2010 10:36 AM    Glucose 94 10/12/2018 11:57 AM    BUN 15 10/12/2018 11:57 AM    Creatinine 0.68 10/12/2018 11:57 AM    BUN/Creatinine ratio 22 10/12/2018 11:57 AM    GFR est  10/12/2018 11:57 AM    GFR est non- 10/12/2018 11:57 AM    Calcium 9.0 10/12/2018 11:57 AM    Bilirubin, total <0.2 10/12/2018 11:57 AM    AST (SGOT) 20 10/12/2018 11:57 AM    Alk. phosphatase 60 10/12/2018 11:57 AM    Protein, total 7.0 10/12/2018 11:57 AM    Albumin 3.9 10/12/2018 11:57 AM    Globulin 3.6 08/19/2010 10:36 AM    A-G Ratio 1.3 10/12/2018 11:57 AM    ALT (SGPT) 19 10/12/2018 11:57 AM     Low salt diet? yes  Aerobic exercise? yes  Our goal is to normalize the blood pressure to decrease the risks of strokes and heart attacks. The patient is in agreement with the plan.    Yes    Severe obesity with body mass index (BMI) of 35.0 to 39.9 with serious comorbidity (HCC)     IFG (impaired fasting glucose)     Class 2 obesity due to excess calories without serious comorbidity with body mass index (BMI) of 35.0 to 35.9 in adult     Tremor     Plantar fasciitis of left foot     Positive KITTY (antinuclear antibody)     Gastroesophageal reflux disease without esophagitis     Depression, unspecified depression type     Anemia, unspecified type              Current and past medical information:    Current Medications after this visit[de-identified]     Current Outpatient Medications   Medication Sig    cyclobenzaprine (FLEXERIL) 10 mg tablet TAKE ONE TABLET BY MOUTH THREE TIMES DAILY AS NEEDED    pantoprazole (PROTONIX) 20 mg tablet Take one tablet by mouth two times a day.  cyanocobalamin (VITAMIN B-12) 1,000 mcg tablet Take 1,000 mcg by mouth daily.  ascorbic acid, vitamin C, (VITAMIN C) 500 mg tablet Take 500 mg by mouth daily.  gabapentin (NEURONTIN) 300 mg capsule Take 2 tabs in morning and 2 tabs at nighttime. Indications: fibromyalgia    diclofenac (VOLTAREN) 1 % gel Apply 4 g to affected area four (4) times daily.  albuterol (PROVENTIL HFA, VENTOLIN HFA, PROAIR HFA) 90 mcg/actuation inhaler Take 1 Puff by inhalation every six (6) hours as needed for Wheezing.  hydrocortisone (ANUSOL-HC) 2.5 % rectal cream Insert  into rectum four (4) times daily. As needed    dextroamphetamine-amphetamine (ADDERALL) 20 mg tablet Take 20 mg by mouth.  cetirizine (ZYRTEC) 10 mg tablet Take  by mouth.  norgestimate-ethinyl estradiol (ORTHO-CYCLEN, SPRINTEC) 0.25-35 mg-mcg tab Take 1 Tab by mouth daily. Indications: DYSMENORRHEA    FLUoxetine (PROZAC) 40 mg capsule Take 40 mg by mouth daily.  fluticasone (FLONASE) 50 mcg/actuation nasal spray 2 Sprays by Both Nostrils route daily.  ketoconazole (NIZORAL) 2 % shampoo shampoo twice weekly    triamcinolone acetonide (KENALOG) 0.1 % ointment Apply  to affected area two (2) times a day. use thin layer    LORazepam (ATIVAN) 1 mg tablet Take 1 Tab by mouth every twelve (12) hours. Max Daily Amount: 2 mg.  Indications: ANXIETY (Patient taking differently: Take 2 mg by mouth every twelve (12) hours. Indications: ANXIETY)    clonazePAM (KLONOPIN) 0.5 mg tablet Take 1 mg by mouth nightly as needed.  traZODone (DESYREL) 50 mg tablet Take 1 Tab by mouth nightly. No current facility-administered medications for this visit.         Patient Active Problem List    Diagnosis Date Noted    Gastroparesis 11/29/2018     Priority: 1 - One    Plantar fasciitis of left foot 11/29/2018     Priority: 1 - One    Positive KITTY (antinuclear antibody) 11/29/2018     Priority: 1 - One    Severe obesity ([address] 11/29/2018     Priority: 1 - One    Class 2 obesity due to excess calories without serious comorbidity in adult 07/25/2018     Priority: 1 - One    Mood disorder ([address] 06/12/2018     Priority: 1 - One    Chronic pain syndrome 06/12/2018     Priority: 1 - One    Fibromyalgia 06/12/2018     Priority: 1 - One    Recurrent depression ([facility] [facility] [address] 01/15/2018     Priority: 1 - One    Vitamin D deficiency 03/21/2016     Priority: 1 - One    IFG (impaired fasting glucose) 09/10/2015     Priority: 1 - One    Hyperlipidemia 09/10/2015     Priority: 1 - One    Depression      Priority: 1 - One    GERD (gastroesophageal reflux disease)      Priority: 1 - One    Hypertension      Priority: 1 - One    Anemia, unspecified      Priority: 1 - One    Panic attack 06/12/2018    Tremor 06/12/2018    Inflammatory arthritis 08/10/2016    Thrombosed external hemorrhoid 02/05/2016    Hoarseness 03/25/2013    OCD (obsessive compulsive disorder) 02/27/2012    HSV (herpes simplex virus) anogenital infection 09/12/2011    Costochondritis 05/17/2011    Anxiety 09/13/2010    Itch of skin 05/26/2010       Past Medical History:   Diagnosis Date    Anemia NEC     Arthritis     Chronic pain     fybromyalsia    Depression     anxiety, depression, OCD    GERD (gastroesophageal reflux disease)     Hypertension     Musculoskeletal disorder     SOB (shortness of breath)     Stool color black        Allergies   Allergen Reactions    Sulfa (Sulfonamide Antibiotics) Hives    Vicodin [Hydrocodone-Acetaminophen] Nausea and Vomiting       Past Surgical History:   Procedure Laterality Date    HX GYN       1998    HX HEENT  2002    wisdom teeth extraction    UPPER GI ENDOSCOPY,BIOPSY  2018         UPPER GI ENDOSCOPY,DIAGNOSIS  2018            Social History     Socioeconomic History    Marital status: SINGLE     Spouse name: Not on file    Number of children: Not on file    Years of education: Not on file    Highest education level: Not on file   Tobacco Use    Smoking status: Former Smoker     Packs/day: 0.25     Years: 8.00     Pack years: 2.00     Types: Cigarettes     Last attempt to quit: 2018     Years since quittin.4    Smokeless tobacco: Never Used   Substance and Sexual Activity    Alcohol use: Yes     Alcohol/week: 0.6 oz     Types: 1 Glasses of wine per week     Comment: 1 cup of wine/week    Drug use: No    Sexual activity: Yes     Partners: Male     Birth control/protection: None       Review of Systems   Constitutional: Positive for malaise/fatigue. Negative for chills, fever and weight loss. HENT: Negative. Negative for hearing loss. Eyes: Negative. Negative for blurred vision and double vision. Respiratory: Negative. Negative for cough, hemoptysis, sputum production and shortness of breath. Cardiovascular: Negative. Negative for chest pain, palpitations and orthopnea. Gastrointestinal: Positive for abdominal pain, heartburn and nausea. Negative for blood in stool and vomiting. Genitourinary: Negative. Negative for dysuria, frequency and urgency. Musculoskeletal: Positive for back pain, joint pain, myalgias and neck pain. Negative for falls. Skin: Negative. Negative for rash. Dry mouth and dry eyes. Neurological: Positive for tremors and weakness. Negative for dizziness, tingling and headaches. Endo/Heme/Allergies: Negative. Psychiatric/Behavioral: Negative for depression, hallucinations, memory loss, substance abuse and suicidal ideas. The patient is nervous/anxious. Objective:     Vitals:    11/29/18 1110 11/29/18 1146   BP: (!) 144/101 (!) 136/95   Pulse: (!) 103    Resp: 16    Temp: 97.9 °F (36.6 °C)    TempSrc: Oral    SpO2: 96%    Weight: 213 lb (96.6 kg)    Height: 5' 4\" (1.626 m)       Body mass index is 36.56 kg/m². Physical Exam   Constitutional: She is oriented to person, place, and time and well-developed, well-nourished, and in no distress. No distress. HENT:   Head: Normocephalic and atraumatic. Mouth/Throat: Oropharynx is clear and moist.   Eyes: Conjunctivae are normal.   Neck: No thyromegaly present. Cardiovascular: Normal rate, regular rhythm and normal heart sounds. No murmur heard. Pulmonary/Chest: Effort normal and breath sounds normal. No respiratory distress. Abdominal: Soft. She exhibits no distension. Pain in her stomach has no particular location. It seems to be on both sides or stomach just above her navel. Neurological: She is alert and oriented to person, place, and time. Skin: Skin is warm. No rash noted. She is not diaphoretic. No erythema. Nursing note and vitals reviewed. There are no preventive care reminders to display for this patient. Assessment and orders:     Encounter Diagnoses     ICD-10-CM ICD-9-CM   1. Essential hypertension I10 401.9   2. Severe obesity with body mass index (BMI) of 35.0 to 39.9 with serious comorbidity (HCC) E66.01 278.01   3. IFG (impaired fasting glucose) R73.01 790.21   4. Class 2 obesity due to excess calories without serious comorbidity with body mass index (BMI) of 35.0 to 35.9 in adult E66.09 278.00    Z68.35 V85.35   5. Tremor R25.1 781.0   6. Plantar fasciitis of left foot M72.2 728.71   7. Positive KITTY (antinuclear antibody) R76.8 795.79   8.  Gastroesophageal reflux disease without esophagitis K21.9 530.81   9. Depression, unspecified depression type F32.9 311   10. Anemia, unspecified type D64.9 285.9     Diagnoses and all orders for this visit:    1. Essential hypertension-she needs to record blood pressure readings at home and return them to me. She may very well need an increase in medication however I would prefer that she work on her diet and lose weight to control her blood pressure better. 2. Severe obesity with body mass index (BMI) of 35.0 to 39.9 with serious comorbidity ([address]  I have reviewed/discussed the above normal BMI with the patient. I have recommended the following interventions: dietary management education, guidance, and counseling . 3. IFG (impaired fasting glucose)-weight gain is not likely due to elevated sugars. Lab Results   Component Value Date/Time    Hemoglobin A1c 5.5 10/12/2018 11:57 AM    Hemoglobin A1c (POC) 5.0 2018 03:15 PM       4. Class 2 obesity due to excess calories without serious comorbidity with body mass index (BMI) of 35.0 to 35.9 in adult-see above    5. Tremor-persistent    6. Plantar fasciitis of left foot-severe left foot pain. Discussed arch support and sliding her foot into her shoe when she gets out of bed rather than standing barefooted. 7. Positive KITTY (antinuclear antibody)-this will need further evaluation. She is having symptoms of dry mouth and dry eyes consistent with sicca syndrome. Unfortunately her mother  with scleroderma. She has an appointment to see rheumatology at [facility] for further workup. Lab work here will be deferred. 8. Gastroesophageal reflux disease without esophagitis-severe acid reflux and poor digestion particularly when it is coupled with her gastroparesis. 9. Depression, unspecified depression type-chronic severe and associated with OCD. She is under psychiatry care. 10. Anemia, unspecified type-all of her lab work is being done at [facility] now.       Plan of care:  Discussed diagnoses in detail with patient. Medication risks/benefits/side effects discussed with patient. All of the patient's questions were addressed. The patient understands and agrees with our plan of care. The patient knows to call back if they are unsure of or forget any changes we discussed today or if the symptoms change. The patient received an After-Visit Summary which contains VS, orders, medication list and allergy list. This can be used as a \"mini-medical record\" should they have to seek medical care while out of town. Patient Care Team:  Millicent Tolentino MD as PCP - General (Family Practice)  Son Marin MD (Breast Surgery)  Gay Jones MD (Rheumatology)  Rommel Vega MD (Cardiology)  Angel Frederick MD (Otolaryngology)    Follow-up Disposition:  Return in about 3 months (around 2/28/2019). No future appointments. Signed By: Jaime Guillermo MD     November 29, 2018        This note was created using voice recognition software. Despite editing, there may be syntax errors.

## 2018-12-06 ENCOUNTER — TELEPHONE (OUTPATIENT)
Dept: FAMILY MEDICINE CLINIC | Age: 38
End: 2018-12-06

## 2018-12-06 NOTE — TELEPHONE ENCOUNTER
Returned phone call to patient. Patient requested that we add the diagnosis of ADHD and take off another diagnosis. Corrected letter printed and at the  ready for . Patient verbalized understanding.

## 2018-12-06 NOTE — TELEPHONE ENCOUNTER
Pt is requesting a call back from nurse. She states that it is in regards to the letter Dr. Carmela Bloom wrote. Pt noticed that it needs some corrections made to the letter and would like to speak w/ nurse about what corrections are needed to be made. Pt states that she will be dropping letter off today to the person it was intended for.

## 2018-12-24 DIAGNOSIS — F41.9 ANXIETY: ICD-10-CM

## 2018-12-24 RX ORDER — LORAZEPAM 1 MG/1
1 TABLET ORAL EVERY 12 HOURS
Qty: 30 TAB | Refills: 0 | Status: SHIPPED | OUTPATIENT
Start: 2018-12-24 | End: 2020-03-05

## 2018-12-24 NOTE — TELEPHONE ENCOUNTER
----- Message from Jeff Cunningham sent at 12/24/2018 12:35 PM EST -----  Regarding: Dr. Sravan Resendiz  Pt request refill on Lorazepam 2mg be called into   [address] on file. Pt is completely out of medication.        Best contact: [phone]

## 2019-02-05 ENCOUNTER — OFFICE VISIT (OUTPATIENT)
Dept: FAMILY MEDICINE CLINIC | Age: 39
End: 2019-02-05

## 2019-02-05 VITALS
HEART RATE: 95 BPM | HEIGHT: 64 IN | DIASTOLIC BLOOD PRESSURE: 87 MMHG | SYSTOLIC BLOOD PRESSURE: 119 MMHG | TEMPERATURE: 97.7 F | RESPIRATION RATE: 16 BRPM | WEIGHT: 195 LBS | OXYGEN SATURATION: 98 % | BODY MASS INDEX: 33.29 KG/M2

## 2019-02-05 DIAGNOSIS — F33.9 RECURRENT DEPRESSION (HCC): ICD-10-CM

## 2019-02-05 DIAGNOSIS — I10 ESSENTIAL HYPERTENSION: Chronic | ICD-10-CM

## 2019-02-05 DIAGNOSIS — K31.84 GASTROPARESIS: Chronic | ICD-10-CM

## 2019-02-05 DIAGNOSIS — K21.9 GASTROESOPHAGEAL REFLUX DISEASE WITHOUT ESOPHAGITIS: Chronic | ICD-10-CM

## 2019-02-05 DIAGNOSIS — E66.01 SEVERE OBESITY (HCC): ICD-10-CM

## 2019-02-05 DIAGNOSIS — M79.7 FIBROMYALGIA: Primary | Chronic | ICD-10-CM

## 2019-02-05 DIAGNOSIS — D64.9 ANEMIA, UNSPECIFIED TYPE: Chronic | ICD-10-CM

## 2019-02-05 DIAGNOSIS — R76.8 POSITIVE ANA (ANTINUCLEAR ANTIBODY): Chronic | ICD-10-CM

## 2019-02-05 DIAGNOSIS — G89.4 CHRONIC PAIN SYNDROME: Chronic | ICD-10-CM

## 2019-02-05 DIAGNOSIS — R73.01 IFG (IMPAIRED FASTING GLUCOSE): Chronic | ICD-10-CM

## 2019-02-05 DIAGNOSIS — E66.09 CLASS 2 OBESITY DUE TO EXCESS CALORIES WITHOUT SERIOUS COMORBIDITY WITH BODY MASS INDEX (BMI) OF 35.0 TO 35.9 IN ADULT: ICD-10-CM

## 2019-02-05 DIAGNOSIS — E78.00 PURE HYPERCHOLESTEROLEMIA: Chronic | ICD-10-CM

## 2019-02-05 RX ORDER — CLONAZEPAM 1 MG/1
1 TABLET ORAL DAILY
Status: ON HOLD | COMMUNITY

## 2019-02-05 RX ORDER — HYDROXYCHLOROQUINE SULFATE 200 MG/1
200 TABLET, FILM COATED ORAL DAILY
COMMUNITY
End: 2020-03-05

## 2019-02-05 RX ORDER — CYCLOBENZAPRINE HCL 5 MG
5 TABLET ORAL 2 TIMES DAILY
COMMUNITY
End: 2019-03-01 | Stop reason: SDUPTHER

## 2019-02-05 RX ORDER — CHOLECALCIFEROL (VITAMIN D3) 125 MCG
2000 CAPSULE ORAL
Status: ON HOLD | COMMUNITY

## 2019-02-05 NOTE — PATIENT INSTRUCTIONS
Learning About High Cholesterol  What is high cholesterol? Cholesterol is a type of fat in your blood. It is needed for many body functions, such as making new cells. Cholesterol is made by your body. It also comes from food you eat. If you have too much cholesterol, it starts to build up in your arteries. This is called hardening of the arteries, or atherosclerosis. High cholesterol raises your risk of a heart attack and stroke. There are different types of cholesterol. LDL is the \"bad\" cholesterol. High LDL can raise your risk for heart disease, heart attack, and stroke. HDL is the \"good\" cholesterol. High HDL is linked with a lower risk for heart disease, heart attack, and stroke. Your cholesterol levels help your doctor find out your risk for having a heart attack or stroke. How can you prevent high cholesterol? A heart-healthy lifestyle can help you prevent high cholesterol. This lifestyle helps lower your risk for a heart attack and stroke. · Eat heart-healthy foods. ? Eat fruits, vegetables, whole grains (like oatmeal), dried beans and peas, nuts and seeds, soy products (like tofu), and fat-free or low-fat dairy products. ? Replace butter, margarine, and hydrogenated or partially hydrogenated oils with olive and canola oils. (Canola oil margarine without trans fat is fine.)  ? Replace red meat with fish, poultry, and soy protein (like tofu). ? Limit processed and packaged foods like chips, crackers, and cookies. · Be active. Exercise can improve your cholesterol level. Get at least 30 minutes of exercise on most days of the week. Walking is a good choice. You also may want to do other activities, such as running, swimming, cycling, or playing tennis or team sports. · Stay at a healthy weight. Lose weight if you need to. · Don't smoke. If you need help quitting, talk to your doctor about stop-smoking programs and medicines. These can increase your chances of quitting for good.   How is high cholesterol treated? The goal of treatment is to reduce your chances of having a heart attack or stroke. The goal is not to lower your cholesterol numbers only. · You may make lifestyle changes, such as eating healthy foods, not smoking, losing weight, and being more active. · You may have to take medicine. Follow-up care is a key part of your treatment and safety. Be sure to make and go to all appointments, and call your doctor if you are having problems. It's also a good idea to know your test results and keep a list of the medicines you take. Where can you learn more? Go to http://daniel-carlton.info/. Enter Q954 in the search box to learn more about \"Learning About High Cholesterol. \"  Current as of: July 22, 2018  Content Version: 11.9  © 5375-8046 [employer]. Care instructions adapted under license by [employer] (which disclaims liability or warranty for this information). If you have questions about a medical condition or this instruction, always ask your healthcare professional. [address] any warranty or liability for your use of this information.

## 2019-02-05 NOTE — PROGRESS NOTES
1. Have you been to the ER, urgent care clinic since your last visit? Hospitalized since your last visit? No    2. Have you seen or consulted any other health care providers outside of the [address] [place] [address] since your last visit? Include any pap smears or colon screening. no  Reviewed record in preparation for visit and have necessary documentation      Goals that were addressed and/or need to be completed during or after this appointment include     There are no preventive care reminders to display for this patient.

## 2019-02-05 NOTE — PROGRESS NOTES
[address], [address] [facility]  [phone]           Progress Note    Patient: Hernandez Sharpe MRN: [id number]  SSN: [id number]    YOB: 1980  Age: 41 y.o. Sex: female        Chief Complaint   Patient presents with    Follow-up    Medication Evaluation         Subjective:     Encounter Diagnoses   Name Primary?  Fibromyalgia: Chronic pain. Wants to see another rheumatologist.  She was told by the [facility] rheumatologist that she has a high likelihood of inflammatory arthritis but the type was undetermined at this point. She wants a third opinion. Yes    Essential hypertension:  BP Readings from Last 3 Encounters:   02/05/19 119/87   11/29/18 (!) 136/95   09/24/18 129/85     The patient reports:  taking medications as instructed, no medication side effects noted, no TIA's, no chest pain on exertion, no dyspnea on exertion, no swelling of ankles. Key CAD CHF Meds     Patient is on no cardiovascular meds. Lab Results   Component Value Date/Time    Sodium 140 02/05/2019 03:13 PM    Potassium 4.4 02/05/2019 03:13 PM    Chloride 101 02/05/2019 03:13 PM    CO2 22 02/05/2019 03:13 PM    Anion gap 9 08/19/2010 10:36 AM    Glucose 82 02/05/2019 03:13 PM    BUN 9 02/05/2019 03:13 PM    Creatinine 0.86 02/05/2019 03:13 PM    BUN/Creatinine ratio 10 02/05/2019 03:13 PM    GFR est  02/05/2019 03:13 PM    GFR est non-AA 87 02/05/2019 03:13 PM    Calcium 9.4 02/05/2019 03:13 PM    Bilirubin, total 0.5 02/05/2019 03:13 PM    AST (SGOT) 28 02/05/2019 03:13 PM    Alk. phosphatase 71 02/05/2019 03:13 PM    Protein, total 7.0 02/05/2019 03:13 PM    Albumin 4.2 02/05/2019 03:13 PM    Globulin 3.6 08/19/2010 10:36 AM    A-G Ratio 1.5 02/05/2019 03:13 PM    ALT (SGPT) 39 (H) 02/05/2019 03:13 PM     Low salt diet? yes  Aerobic exercise? yes  Our goal is to normalize the blood pressure to decrease the risks of strokes and heart attacks. The patient is in agreement with the plan.  IFG (impaired fasting glucose):  No sx of fulminant diabetes. No significant weight loss or gain. The patient realizes that without weight loss and exercise they are high risk for overt diabetes. Lab Results   Component Value Date/Time    Hemoglobin A1c 5.5 10/12/2018 11:57 AM     Lab Results   Component Value Date/Time    Glucose 82 02/05/2019 03:13 PM     Wt Readings from Last 3 Encounters:   02/05/19 195 lb (88.5 kg)   11/29/18 213 lb (96.6 kg)   09/24/18 214 lb 6.4 oz (97.3 kg)     Body mass index is 33.47 kg/m².  Positive KITTY (antinuclear antibody): See above. She was placed on hydroxychloroquine. It does not seem to be helping.  Recurrent depression ([facility] [address]): She sees a psychiatrist but has not seen recently. .Stable on current medications. The patient has no suicidal ideation, psychotic features or addictions. Affect is normal.           Severe obesity ([address]): I have reviewed/discussed the above normal BMI with the patient. I have recommended the following interventions: dietary management education, guidance, and counseling . She has lost 18 pounds. She attributes this mainly to the fact that she has no appetite.  Gastroparesis: Mild chronic symptoms.  Gastroesophageal reflux disease without esophagitis:  Current control of Symptoms:good  Hiatal Hernia:no  Current Medications: Protonix  The patient has no history melena or bright red blood in the stools. The patient avoids high dose aspirin and NSAID therapy. The patient is aware of diet changes needed, elevating the head of the bed and appropriate use of antacids.  Pure hypercholesterolemia: LDL is still high despite weight loss. I am not sure that she can tolerate a statin. Encouraged continued weight loss. Cardiovascular risks for her are: LDL goal is under 100  hyperlipidemia.    Key Antihyperlipidemia Meds     The patient is on no antihyperlipidemia meds. Lab Results   Component Value Date/Time    Cholesterol, total 253 (H) 02/05/2019 03:13 PM    HDL Cholesterol 70 02/05/2019 03:13 PM    LDL, calculated 170 (H) 02/05/2019 03:13 PM    Triglyceride 67 02/05/2019 03:13 PM     Lab Results   Component Value Date/Time    ALT (SGPT) 39 (H) 02/05/2019 03:13 PM    AST (SGOT) 28 02/05/2019 03:13 PM    Alk. phosphatase 71 02/05/2019 03:13 PM    Bilirubin, direct 0.12 12/08/2017 04:08 PM    Bilirubin, total 0.5 02/05/2019 03:13 PM      Myalgias: No   Fatigue: No   Other side effects: no  Wt Readings from Last 3 Encounters:   02/05/19 195 lb (88.5 kg)   11/29/18 213 lb (96.6 kg)   09/24/18 214 lb 6.4 oz (97.3 kg)     The patient is aware of our goal to reduce or eliminate the long term problems (such as strokes and heart attacks) related to poorly controlled hyperlipidemia.  Anemia, unspecified type:  No sx. Lab Results   Component Value Date/Time    HGB 11.8 02/05/2019 03:13 PM     Lab Results   Component Value Date/Time    Iron 29 (L) 09/23/2009 09:47 AM    TIBC 458 (H) 09/23/2009 09:47 AM    Iron % saturation 6 (L) 09/23/2009 09:47 AM    Ferritin 94 08/19/2010 10:36 AM              Chronic pain syndrome: Stable. Cannot work.  Class 2 obesity due to excess calories without serious comorbidity with body mass index (BMI) of 35.0 to 35.9 in adult: See above           Current and past medical information:    Current Medications after this visit[de-identified]     Current Outpatient Medications   Medication Sig    clonazePAM (KLONOPIN) 1 mg tablet Take 1 mg by mouth daily. At bedtime    cholecalciferol, vitamin D3, (VITAMIN D3) 2,000 unit tab Take  by mouth.  cyclobenzaprine (FLEXERIL) 5 mg tablet Take 5 mg by mouth two (2) times a day.  hydroxychloroquine (PLAQUENIL) 200 mg tablet Take 200 mg by mouth daily. Two tabs    LORazepam (ATIVAN) 1 mg tablet Take 1 Tab by mouth every twelve (12) hours. Max Daily Amount: 2 mg.     pantoprazole (PROTONIX) 20 mg tablet Take one tablet by mouth two times a day.  cyanocobalamin (VITAMIN B-12) 1,000 mcg tablet Take 1,000 mcg by mouth daily.  gabapentin (NEURONTIN) 300 mg capsule Take 2 tabs in morning and 2 tabs at nighttime. Indications: fibromyalgia    diclofenac (VOLTAREN) 1 % gel Apply 4 g to affected area four (4) times daily.  albuterol (PROVENTIL HFA, VENTOLIN HFA, PROAIR HFA) 90 mcg/actuation inhaler Take 1 Puff by inhalation every six (6) hours as needed for Wheezing.  hydrocortisone (ANUSOL-HC) 2.5 % rectal cream Insert  into rectum four (4) times daily. As needed    dextroamphetamine-amphetamine (ADDERALL) 20 mg tablet Take 20 mg by mouth. 1/2 tablet  Twice daily          norgestimate-ethinyl estradiol (ORTHO-CYCLEN, SPRINTEC) 0.25-35 mg-mcg tab Take 1 Tab by mouth daily. Indications: DYSMENORRHEA    FLUoxetine (PROZAC) 40 mg capsule Take 40 mg by mouth daily.  fluticasone (FLONASE) 50 mcg/actuation nasal spray 2 Sprays by Both Nostrils route daily.  ketoconazole (NIZORAL) 2 % shampoo shampoo twice weekly    triamcinolone acetonide (KENALOG) 0.1 % ointment Apply  to affected area two (2) times a day. use thin layer    traZODone (DESYREL) 50 mg tablet Take 1 Tab by mouth nightly.  cyclobenzaprine (FLEXERIL) 10 mg tablet TAKE ONE TABLET BY MOUTH THREE TIMES DAILY AS NEEDED    ascorbic acid, vitamin C, (VITAMIN C) 500 mg tablet Take 500 mg by mouth daily.  cetirizine (ZYRTEC) 10 mg tablet Take  by mouth.  clonazePAM (KLONOPIN) 0.5 mg tablet Take 1 mg by mouth nightly as needed. No current facility-administered medications for this visit.         Patient Active Problem List    Diagnosis Date Noted    Gastroparesis 11/29/2018     Priority: 1 - One    Plantar fasciitis of left foot 11/29/2018     Priority: 1 - One    Positive KITTY (antinuclear antibody) 11/29/2018     Priority: 1 - One    Severe obesity ([address] 11/29/2018     Priority: 1 - One    Class 2 obesity due to excess calories without serious comorbidity in adult 2018     Priority: 1 - One    Mood disorder ([facility] 2018     Priority: 1 - One    Chronic pain syndrome 2018     Priority: 1 - One    Fibromyalgia 2018     Priority: 1 - One    Recurrent depression ([facility] 01/15/2018     Priority: 1 - One    Vitamin D deficiency 2016     Priority: 1 - One    IFG (impaired fasting glucose) 09/10/2015     Priority: 1 - One    Hyperlipidemia 09/10/2015     Priority: 1 - One    Depression      Priority: 1 - One    GERD (gastroesophageal reflux disease)      Priority: 1 - One    Hypertension      Priority: 1 - One    Anemia, unspecified      Priority: 1 - One    Panic attack 2018    Tremor 2018    Inflammatory arthritis 08/10/2016    Thrombosed external hemorrhoid 2016    Hoarseness 2013    OCD (obsessive compulsive disorder) 2012    HSV (herpes simplex virus) anogenital infection 2011    Costochondritis 2011    Anxiety 2010    Itch of skin 2010       Past Medical History:   Diagnosis Date    Anemia NEC     Arthritis     Chronic pain     fybromyalsia    Depression     anxiety, depression, OCD    GERD (gastroesophageal reflux disease)     Hypertension     Musculoskeletal disorder     SOB (shortness of breath)     Stool color black        Allergies   Allergen Reactions    Sulfa (Sulfonamide Antibiotics) Hives    Vicodin [Hydrocodone-Acetaminophen] Nausea and Vomiting       Past Surgical History:   Procedure Laterality Date    HX GYN           HX HEENT  2002    wisdom teeth extraction    UPPER GI ENDOSCOPY,BIOPSY  2018         UPPER GI ENDOSCOPY,DIAGNOSIS  2018            Social History     Socioeconomic History    Marital status: SINGLE     Spouse name: Not on file    Number of children: Not on file    Years of education: Not on file    Highest education level: Not on file   Tobacco Use    Smoking status: Former Smoker     Packs/day: 0.25     Years: 8.00     Pack years: 2.00     Types: Cigarettes     Last attempt to quit: 2018     Years since quittin.6    Smokeless tobacco: Never Used   Substance and Sexual Activity    Alcohol use: Yes     Alcohol/week: 0.6 oz     Types: 1 Glasses of wine per week     Comment: 1 cup of wine/week    Drug use: No    Sexual activity: Yes     Partners: Male     Birth control/protection: None       Review of Systems   Constitutional: Positive for malaise/fatigue and weight loss. Negative for chills and fever. Intentional weight loss plus decreased appetite. Has lost 18 pounds. HENT: Negative. Negative for hearing loss. Eyes: Negative. Negative for blurred vision and double vision. Respiratory: Negative. Negative for cough, hemoptysis, sputum production and shortness of breath. Cardiovascular: Negative. Negative for chest pain, palpitations and orthopnea. Gastrointestinal: Negative. Negative for abdominal pain, blood in stool, heartburn, nausea and vomiting. Genitourinary: Negative. Negative for dysuria, frequency and urgency. Musculoskeletal: Positive for back pain, joint pain and myalgias. Negative for falls and neck pain. Skin: Negative. Negative for rash. Neurological: Positive for weakness. Negative for dizziness, tingling, tremors and headaches. Endo/Heme/Allergies: Negative. Psychiatric/Behavioral: Positive for depression. Objective:     Vitals:    19 1103   BP: 119/87   Pulse: 95   Resp: 16   Temp: 97.7 °F (36.5 °C)   TempSrc: Oral   SpO2: 98%   Weight: 195 lb (88.5 kg)   Height: 5' 4\" (1.626 m)      Body mass index is 33.47 kg/m². Physical Exam   Constitutional: She is oriented to person, place, and time and well-developed, well-nourished, and in no distress. No distress. HENT:   Head: Normocephalic and atraumatic.    Mouth/Throat: Oropharynx is clear and moist.   Eyes: Conjunctivae are normal.   Neck: No thyromegaly present. Cardiovascular: Normal rate, regular rhythm and normal heart sounds. No murmur heard. Pulmonary/Chest: Effort normal and breath sounds normal. No respiratory distress. Neurological: She is alert and oriented to person, place, and time. Skin: Skin is warm. No rash noted. She is not diaphoretic. No erythema. Psychiatric: Mood and affect normal.   Nursing note and vitals reviewed. There are no preventive care reminders to display for this patient. Assessment and orders:     Encounter Diagnoses     ICD-10-CM ICD-9-CM   1. Fibromyalgia M79.7 729.1   2. Essential hypertension I10 401.9   3. IFG (impaired fasting glucose) R73.01 790.21   4. Positive KITTY (antinuclear antibody) R76.8 795.79   5. Recurrent depression (HCC) F33.9 296.30   6. Severe obesity ([facility] [address] E66.01 278.01   7. Gastroparesis K31.84 536.3   8. Gastroesophageal reflux disease without esophagitis K21.9 530.81   9. Pure hypercholesterolemia E78.00 272.0   10. Anemia, unspecified type D64.9 285.9   11. Chronic pain syndrome G89.4 338.4   12. Class 2 obesity due to excess calories without serious comorbidity with body mass index (BMI) of 35.0 to 35.9 in adult E66.09 278.00    Z68.35 V85.35     Diagnoses and all orders for this visit:    1. Fibromyalgia  -     REFERRAL TO RHEUMATOLOGY    2. Essential hypertension-controlled  -     CBC WITH AUTOMATED DIFF  -     METABOLIC PANEL, COMPREHENSIVE  -     LIPID PANEL  -     T4, FREE    3. IFG (impaired fasting glucose)-controlled    4. Positive KITTY (antinuclear antibody)-continue hydroxychloroquine for minimum 3 months  -     SED RATE (ESR)  -     CBC WITH AUTOMATED DIFF  -     METABOLIC PANEL, COMPREHENSIVE  -     LIPID PANEL  -     REFERRAL TO RHEUMATOLOGY    5. Recurrent depression (HCC)-stable  -     T4, FREE    6. Severe obesity (HCC)-losing weight    7. Gastroparesis-symptoms stable  -     CBC WITH AUTOMATED DIFF  -     T4, FREE    8.  Gastroesophageal reflux disease without esophagitis  -     CBC WITH AUTOMATED DIFF    9. Pure hypercholesterolemia  -     METABOLIC PANEL, COMPREHENSIVE  -     LIPID PANEL    10. Anemia, unspecified type-previous iron deficiency. Hemoglobin is now normal.  -     CBC WITH AUTOMATED DIFF    11. Chronic pain syndrome-no improvement    12. Class 2 obesity due to excess calories without serious comorbidity with body mass index (BMI) of 35.0 to 35.9 in adult-continue weight loss            Plan of care:  Discussed diagnoses in detail with patient. Medication risks/benefits/side effects discussed with patient. All of the patient's questions were addressed. The patient understands and agrees with our plan of care. The patient knows to call back if they are unsure of or forget any changes we discussed today or if the symptoms change. The patient received an After-Visit Summary which contains VS, orders, medication list and allergy list. This can be used as a \"mini-medical record\" should they have to seek medical care while out of town. Patient Care Team:  Dorothea Ventura MD as PCP - General (Family Practice)  Carmella Lr MD (Breast Surgery)  Shaun Moe MD (Rheumatology)  Dean Mann MD (Cardiology)  Luis Wagoner MD (Otolaryngology)    Follow-up Disposition:  Return in about 3 months (around 5/5/2019). Future Appointments   Date Time Provider Komal Rodriguez   5/7/2019 10:10 AM Dorothea Ventura MD [facility] SCHED       Signed By: Iris Fernandez MD     February 5, 2019        This note was created using voice recognition software. Despite editing, there may be syntax errors.

## 2019-02-06 LAB
ALBUMIN SERPL-MCNC: 4.2 G/DL (ref 3.5–5.5)
ALBUMIN/GLOB SERPL: 1.5 {RATIO} (ref 1.2–2.2)
ALP SERPL-CCNC: 71 IU/L (ref 39–117)
ALT SERPL-CCNC: 39 IU/L (ref 0–32)
AST SERPL-CCNC: 28 IU/L (ref 0–40)
BASOPHILS # BLD AUTO: 0 X10E3/UL (ref 0–0.2)
BASOPHILS NFR BLD AUTO: 1 %
BILIRUB SERPL-MCNC: 0.5 MG/DL (ref 0–1.2)
BUN SERPL-MCNC: 9 MG/DL (ref 6–20)
BUN/CREAT SERPL: 10 (ref 9–23)
CALCIUM SERPL-MCNC: 9.4 MG/DL (ref 8.7–10.2)
CHLORIDE SERPL-SCNC: 101 MMOL/L (ref 96–106)
CHOLEST SERPL-MCNC: 253 MG/DL (ref 100–199)
CO2 SERPL-SCNC: 22 MMOL/L (ref 20–29)
CREAT SERPL-MCNC: 0.86 MG/DL (ref 0.57–1)
EOSINOPHIL # BLD AUTO: 0.2 X10E3/UL (ref 0–0.4)
EOSINOPHIL NFR BLD AUTO: 4 %
ERYTHROCYTE [DISTWIDTH] IN BLOOD BY AUTOMATED COUNT: 13.8 % (ref 12.3–15.4)
ERYTHROCYTE [SEDIMENTATION RATE] IN BLOOD BY WESTERGREN METHOD: 26 MM/HR (ref 0–32)
GLOBULIN SER CALC-MCNC: 2.8 G/DL (ref 1.5–4.5)
GLUCOSE SERPL-MCNC: 82 MG/DL (ref 65–99)
HCT VFR BLD AUTO: 36.4 % (ref 34–46.6)
HDLC SERPL-MCNC: 70 MG/DL
HGB BLD-MCNC: 11.8 G/DL (ref 11.1–15.9)
IMM GRANULOCYTES # BLD AUTO: 0 X10E3/UL (ref 0–0.1)
IMM GRANULOCYTES NFR BLD AUTO: 0 %
LDLC SERPL CALC-MCNC: 170 MG/DL (ref 0–99)
LYMPHOCYTES # BLD AUTO: 2 X10E3/UL (ref 0.7–3.1)
LYMPHOCYTES NFR BLD AUTO: 46 %
MCH RBC QN AUTO: 29 PG (ref 26.6–33)
MCHC RBC AUTO-ENTMCNC: 32.4 G/DL (ref 31.5–35.7)
MCV RBC AUTO: 89 FL (ref 79–97)
MONOCYTES # BLD AUTO: 0.2 X10E3/UL (ref 0.1–0.9)
MONOCYTES NFR BLD AUTO: 5 %
NEUTROPHILS # BLD AUTO: 1.9 X10E3/UL (ref 1.4–7)
NEUTROPHILS NFR BLD AUTO: 44 %
PLATELET # BLD AUTO: 342 X10E3/UL (ref 150–379)
POTASSIUM SERPL-SCNC: 4.4 MMOL/L (ref 3.5–5.2)
PROT SERPL-MCNC: 7 G/DL (ref 6–8.5)
RBC # BLD AUTO: 4.07 X10E6/UL (ref 3.77–5.28)
SODIUM SERPL-SCNC: 140 MMOL/L (ref 134–144)
T4 FREE SERPL-MCNC: 1.03 NG/DL (ref 0.82–1.77)
TRIGL SERPL-MCNC: 67 MG/DL (ref 0–149)
VLDLC SERPL CALC-MCNC: 13 MG/DL (ref 5–40)
WBC # BLD AUTO: 4.3 X10E3/UL (ref 3.4–10.8)

## 2019-03-01 ENCOUNTER — TELEPHONE (OUTPATIENT)
Dept: FAMILY MEDICINE CLINIC | Age: 39
End: 2019-03-01

## 2019-03-01 RX ORDER — CYCLOBENZAPRINE HCL 5 MG
5 TABLET ORAL 2 TIMES DAILY
Qty: 30 TAB | Refills: 0 | Status: SHIPPED | OUTPATIENT
Start: 2019-03-01 | End: 2019-03-18 | Stop reason: SDUPTHER

## 2019-03-07 ENCOUNTER — OFFICE VISIT (OUTPATIENT)
Dept: FAMILY MEDICINE CLINIC | Age: 39
End: 2019-03-07

## 2019-03-07 VITALS
HEIGHT: 64 IN | HEART RATE: 88 BPM | SYSTOLIC BLOOD PRESSURE: 136 MMHG | BODY MASS INDEX: 33.22 KG/M2 | OXYGEN SATURATION: 98 % | WEIGHT: 194.6 LBS | DIASTOLIC BLOOD PRESSURE: 87 MMHG | RESPIRATION RATE: 20 BRPM | TEMPERATURE: 97 F

## 2019-03-07 DIAGNOSIS — K64.4 EXTERNAL HEMORRHOID: ICD-10-CM

## 2019-03-07 DIAGNOSIS — Z86.19 HX OF HERPES GENITALIS: Primary | ICD-10-CM

## 2019-03-07 DIAGNOSIS — Z11.3 ROUTINE SCREENING FOR STI (SEXUALLY TRANSMITTED INFECTION): ICD-10-CM

## 2019-03-07 RX ORDER — VALACYCLOVIR HYDROCHLORIDE 500 MG/1
500 TABLET, FILM COATED ORAL DAILY
Qty: 30 TAB | Refills: 0 | Status: SHIPPED | OUTPATIENT
Start: 2019-03-07 | End: 2020-07-06 | Stop reason: SDUPTHER

## 2019-03-07 RX ORDER — LAMOTRIGINE 25 MG/1
TABLET ORAL
COMMUNITY
Start: 2019-02-19 | End: 2020-08-24 | Stop reason: DRUGHIGH

## 2019-03-07 NOTE — PROGRESS NOTES
Chief Complaint   Patient presents with    Personal Problem     suppressive therapy for genital herpes    Hemorrhoids     Visit Vitals  BP (!) 145/92 (BP 1 Location: Right arm, BP Patient Position: Sitting)   Pulse 88   Temp 97 °F (36.1 °C) (Oral)   Resp 20   Ht 5' 4\" (1.626 m)   Wt 194 lb 9.6 oz (88.3 kg)   LMP 02/28/2019 (Approximate)   SpO2 98%   BMI 33.40 kg/m²     1. Have you been to the ER, urgent care clinic since your last visit? Hospitalized since your last visit? No    2. Have you seen or consulted any other health care providers outside of the [address] [place] [address] since your last visit? Include any pap smears or colon screening. No    Reviewed record in preparation for visit and have necessary documentation  Pt did not bring medication to office visit for review  opportunity was given for questions  Goals that were addressed and/or need to be completed during or after this appointment include   There are no preventive care reminders to display for this patient.

## 2019-03-07 NOTE — PATIENT INSTRUCTIONS
Genital Herpes: Care Instructions  Your Care Instructions  Genital herpes is caused by a virus called herpes simplex. There are two types of this virus. Type 2 is the type that usually causes genital herpes. But type 1 can also cause it. Type 1 is the type that causes cold sores. Genital herpes is a sexually transmitted infection (STI). The most common way to get it is through sexual or other contact with someone who has herpes. For example, the virus can spread from a sore in the genital area to the lips. And it can spread from a cold sore on the lips to the genital area. Some people are surprised to find out that they have herpes or that they gave it to someone else. This is because a lot of people who have it don't know that they have it. They may not get sores or they may have sores that they cannot see. But the virus can still be spread by a person who does not have obvious sores or symptoms. There is no cure for herpes, but antiviral medicine can help you feel better and help prevent more outbreaks. This medicine may also lower the chance of spreading the virus. Follow-up care is a key part of your treatment and safety. Be sure to make and go to all appointments, and call your doctor if you are having problems. It's also a good idea to know your test results and keep a list of the medicines you take. How can you care for yourself at home? · Be safe with medicines. If your doctor prescribes medicine, take it exactly as prescribed. Call your doctor if you think you are having a problem with your medicine. You will get more details on the specific medicines your doctor prescribes. · To reduce the pain and itching from herpes sores:  ? Wash the area with water 3 or 4 times a day. ? Keep the sores clean and dry in between baths or showers. You may want to let the sores air dry. This may feel better than a towel. ? Wear cotton underwear. Cotton absorbs moisture well.   ? Take an over-the-counter pain medicine, such as acetaminophen (Tylenol), ibuprofen (Advil, Motrin), or naproxen (Aleve). Read and follow all instructions on the label. Do not take two or more pain medicines at the same time unless the doctor told you to. Many pain medicines have acetaminophen, which is Tylenol. Too much acetaminophen (Tylenol) can be harmful. To prevent the spread of genital herpes  · Latex condoms are a good way to reduce the risk of spreading the virus. But you can still get the virus even if you use a condom. For the best protection, use condoms every time you have sex, from the beginning to the end of sexual contact. Remember that you can infect someone even if you do not have obvious symptoms or sores. · Avoid sexual contact when you have symptoms. Symptoms include sores, tingling, or pain. · Wash your hands if you touch a sore. In some cases, especially if this is your first herpes outbreak, you can spread the virus to other parts of your body or to other people. · Having one sex partner (who does not have STIs and does not have sex with anyone else) is a good way to avoid STIs. · Talk to your sex partner or partners about genital herpes. · Encourage your sex partners to get a blood test to see if they have been infected. When should you call for help? Call your doctor now or seek immediate medical care if:    · You have a new fever.     · There is increasing redness or red streaks around herpes sores.    Watch closely for changes in your health, and be sure to contact your doctor if:    · You have herpes and you think you might be pregnant.     · You have an outbreak of herpes sores, and the sores are not healing.     · You have frequent outbreaks of genital herpes sores.     · You are unable to pass urine or are constipated.     · You want to start antiviral medicine.     · You do not get better as expected. Where can you learn more? Go to http://daniel-carlton.info/.   Enter C657 in the search box to learn more about \"Genital Herpes: Care Instructions. \"  Current as of: September 11, 2018  Content Version: 11.9  © 8231-5400 [employer]. Care instructions adapted under license by [employer] (which disclaims liability or warranty for this information). If you have questions about a medical condition or this instruction, always ask your healthcare professional. [place] [address] any warranty or liability for your use of this information.

## 2019-03-14 ENCOUNTER — OFFICE VISIT (OUTPATIENT)
Dept: FAMILY MEDICINE CLINIC | Age: 39
End: 2019-03-14

## 2019-03-14 VITALS
HEART RATE: 100 BPM | TEMPERATURE: 97.2 F | OXYGEN SATURATION: 98 % | DIASTOLIC BLOOD PRESSURE: 89 MMHG | WEIGHT: 191 LBS | RESPIRATION RATE: 20 BRPM | HEIGHT: 64 IN | SYSTOLIC BLOOD PRESSURE: 128 MMHG | BODY MASS INDEX: 32.61 KG/M2

## 2019-03-14 DIAGNOSIS — K64.4 EXTERNAL HEMORRHOID: Primary | ICD-10-CM

## 2019-03-14 DIAGNOSIS — B37.9 CANDIDA TROPICALIS INFECTION: ICD-10-CM

## 2019-03-14 LAB
A VAGINAE DNA VAG QL NAA+PROBE: ABNORMAL SCORE
BVAB2 DNA VAG QL NAA+PROBE: ABNORMAL SCORE
C ALBICANS DNA VAG QL NAA+PROBE: NEGATIVE
C GLABRATA DNA VAG QL NAA+PROBE: NEGATIVE
C KRUSEI DNA VAG QL NAA+PROBE: NEGATIVE
C LUSITANIAE DNA VAG QL NAA+PROBE: NEGATIVE
C PARAP DNA VAG QL NAA+PROBE: NEGATIVE
C TRACH RRNA SPEC QL NAA+PROBE: NEGATIVE
C TROPICLS DNA VAG QL NAA+PROBE: POSITIVE
MEGA1 DNA VAG QL NAA+PROBE: ABNORMAL SCORE
N GONORRHOEA RRNA SPEC QL NAA+PROBE: NEGATIVE
T VAGINALIS RRNA SPEC QL NAA+PROBE: NEGATIVE

## 2019-03-14 RX ORDER — FLUCONAZOLE 150 MG/1
150 TABLET ORAL DAILY
Qty: 1 TAB | Refills: 0 | Status: SHIPPED | OUTPATIENT
Start: 2019-03-14 | End: 2019-03-15

## 2019-03-14 RX ORDER — PRAMOXINE HYDROCHLORIDE 10 MG/G
AEROSOL, FOAM TOPICAL
Qty: 1 CAN | Refills: 1 | Status: SHIPPED | OUTPATIENT
Start: 2019-03-14 | End: 2020-03-05 | Stop reason: SDUPTHER

## 2019-03-14 NOTE — PROGRESS NOTES
Johnathon Alejandro  41 y.o. female  1980  [address]  [id number]     [id number]: Progress Note       Encounter Date: 3/14/2019    Chief Complaint   Patient presents with    Hemorrhoids     bleeding since sunday     History of Present Illness   Johnathon Alejandro is a 41 y.o. female who presents to clinic today for:    External hemorrhoid- states she has been using the stool softner. ~4 days ago noted blood in the toilet after bowel movement. She states she placed cotton ball on lesion and checked and the cotton ball was \"soaked\". Denies weakness or feeling faint from blood loss. She states about 2 years ago she had an external hemorrhoid that flared and eventually did not give her any more problems. She went to a surgery consultation and states the surgeon was willing to remove the lesion but stated that the hemorrhoid would likely come back. She also states she has not \"heard great things about the surgery\" and refuses to have it removed. Treatments-OTC hydrocortisone with aloe, preparation H pads and wipes. Has not tried the sitz baths. Of note discussed yeast noted on her nuswab and other STI screening was negative. Health Maintenance    Health Maintenance Due   Topic Date Due    Pneumococcal 19-64 Medium Risk (1 of 1 - PPSV23) 08/27/2001     Review of Systems   Review of Systems   Constitutional: Negative. HENT: Negative. Eyes: Negative. Respiratory: Negative. Cardiovascular: Negative. Gastrointestinal: Negative. Genitourinary: Negative. Musculoskeletal: Negative. Skin: Negative. Neurological: Negative. Endo/Heme/Allergies: Negative. Psychiatric/Behavioral: Negative. Vitals/Objective:     Vitals:    03/14/19 1008   BP: 128/89   Pulse: 100   Resp: 20   Temp: 97.2 °F (36.2 °C)   TempSrc: Oral   SpO2: 98%   Weight: 191 lb (86.6 kg)   Height: 5' 4\" (1.626 m)     Body mass index is 32.79 kg/m².     Physical Exam Constitutional: She is oriented to person, place, and time. She appears well-developed and well-nourished. Pulmonary/Chest: Effort normal.   Genitourinary:       Pelvic exam was performed with patient supine. Musculoskeletal: Normal range of motion. Neurological: She is alert and oriented to person, place, and time. Skin: Skin is warm, dry and intact. Psychiatric: She has a normal mood and affect. Her speech is normal and behavior is normal. Judgment and thought content normal. Cognition and memory are normal.       No results found for this or any previous visit (from the past 24 hour(s)). Assessment and Plan:   1. Candida tropicalis infection    - fluconazole (DIFLUCAN) 150 mg tablet; Take 1 Tab by mouth daily for 1 day. FDA advises cautious prescribing of oral fluconazole in pregnancy. Dispense: 1 Tab; Refill: 0    For external hemorrhoid will try proctofoam-discussed with the patient unsure of the cost. Patient to try sitz baths. [facility] in [place] has the sitz bath. Discussed continuing to use supportive therapy. I have discussed the diagnosis with the patient and the intended plan as seen in the above orders. she has expressed understanding. The patient has received an after-visit summary and questions were answered concerning future plans. I have discussed medication side effects and warnings with the patient as well. Electronically Signed: Sai Doan NP     History/Allergies   Patients past medical, surgical and family histories were reviewed and updated.     Past Medical History:   Diagnosis Date    Anemia NEC     Arthritis     Chronic pain     fybromyalsia    Depression     anxiety, depression, OCD    GERD (gastroesophageal reflux disease)     Hypertension     Musculoskeletal disorder     SOB (shortness of breath)     Stool color black       Past Surgical History:   Procedure Laterality Date    HX GYN       0    HX HEENT   wisdom teeth extraction    UPPER GI ENDOSCOPY,BIOPSY  2018         UPPER GI ENDOSCOPY,DIAGNOSIS  2018          Family History   Problem Relation Age of Onset    Hypertension Father     Elevated Lipids Father     Arthritis-rheumatoid Mother         ? Lupus vs RA    Lung Disease Mother     Heart Disease Mother     Cancer Mother         breast in 39y    COPD Mother     Hypertension Mother     Stroke Mother         3-4 strokes    Breast Cancer Mother     Diabetes Maternal Grandmother      Social History     Socioeconomic History    Marital status: SINGLE     Spouse name: Not on file    Number of children: Not on file    Years of education: Not on file    Highest education level: Not on file   Social Needs    Financial resource strain: Not on file    Food insecurity - worry: Not on file    Food insecurity - inability: Not on file   [employer] needs - medical: Not on file   [employer] needs - non-medical: Not on file   Occupational History    Not on file   Tobacco Use    Smoking status: Current Every Day Smoker     Packs/day: 0.25     Years: 8.00     Pack years: 2.00     Types: Cigarettes     Last attempt to quit: 2018     Years since quittin.7    Smokeless tobacco: Never Used   Substance and Sexual Activity    Alcohol use: Yes     Alcohol/week: 0.6 oz     Types: 1 Glasses of wine per week     Comment: 1 cup of wine/week    Drug use: No    Sexual activity: Yes     Partners: Male     Birth control/protection: None   Other Topics Concern    Not on file   Social History Narrative    Not on file         Allergies   Allergen Reactions    Sulfa (Sulfonamide Antibiotics) Hives    Vicodin [Hydrocodone-Acetaminophen] Nausea and Vomiting       Disposition     Follow-up Disposition:  Return if symptoms worsen or fail to improve.     Future Appointments   Date Time Provider Komal Rodriguez   2019 10:10 AM Merna Castrejon MD [facility]            Current Medications after this visit     Current Outpatient Medications   Medication Sig    pramoxine (PROCTOFOAM) 1 % topical foam Apply  to affected area three (3) times daily as needed for Hemorrhoids.  fluconazole (DIFLUCAN) 150 mg tablet Take 1 Tab by mouth daily for 1 day. FDA advises cautious prescribing of oral fluconazole in pregnancy.  lamoTRIgine (LAMICTAL) 25 mg tablet     valACYclovir (VALTREX) 500 mg tablet Take 1 Tab by mouth daily.  cyclobenzaprine (FLEXERIL) 5 mg tablet Take 1 Tab by mouth two (2) times a day.  clonazePAM (KLONOPIN) 1 mg tablet Take 1 mg by mouth daily. At bedtime    cholecalciferol, vitamin D3, (VITAMIN D3) 2,000 unit tab Take  by mouth.  hydroxychloroquine (PLAQUENIL) 200 mg tablet Take 200 mg by mouth daily. Two tabs    LORazepam (ATIVAN) 1 mg tablet Take 1 Tab by mouth every twelve (12) hours. Max Daily Amount: 2 mg.  pantoprazole (PROTONIX) 20 mg tablet Take one tablet by mouth two times a day.  cyanocobalamin (VITAMIN B-12) 1,000 mcg tablet Take 1,000 mcg by mouth daily.  ascorbic acid, vitamin C, (VITAMIN C) 500 mg tablet Take 500 mg by mouth daily.  gabapentin (NEURONTIN) 300 mg capsule Take 2 tabs in morning and 2 tabs at nighttime. Indications: fibromyalgia    diclofenac (VOLTAREN) 1 % gel Apply 4 g to affected area four (4) times daily.  albuterol (PROVENTIL HFA, VENTOLIN HFA, PROAIR HFA) 90 mcg/actuation inhaler Take 1 Puff by inhalation every six (6) hours as needed for Wheezing.  hydrocortisone (ANUSOL-HC) 2.5 % rectal cream Insert  into rectum four (4) times daily. As needed    dextroamphetamine-amphetamine (ADDERALL) 20 mg tablet Take 20 mg by mouth. 1/2 tablet  Twice daily          cetirizine (ZYRTEC) 10 mg tablet Take  by mouth.  norgestimate-ethinyl estradiol (ORTHO-CYCLEN, SPRINTEC) 0.25-35 mg-mcg tab Take 1 Tab by mouth daily. Indications: DYSMENORRHEA    FLUoxetine (PROZAC) 40 mg capsule Take 40 mg by mouth daily.     fluticasone (FLONASE) 50 mcg/actuation nasal spray 2 Sprays by Both Nostrils route daily.  ketoconazole (NIZORAL) 2 % shampoo shampoo twice weekly    triamcinolone acetonide (KENALOG) 0.1 % ointment Apply  to affected area two (2) times a day. use thin layer    clonazePAM (KLONOPIN) 0.5 mg tablet Take 1 mg by mouth nightly as needed.  traZODone (DESYREL) 50 mg tablet Take 1 Tab by mouth nightly. No current facility-administered medications for this visit. There are no discontinued medications.

## 2019-03-14 NOTE — PATIENT INSTRUCTIONS
Learning About Infrared Photocoagulation for Hemorrhoids  What is infrared photocoagulation? Infrared photocoagulation treats hemorrhoids. Hemorrhoids are swollen veins in the rectal area. In most cases, this procedure can be done in the doctor's office. Only one hemorrhoid can be treated at a time. This treatment is only for internal hemorrhoids. How is this procedure done? During the procedure, your doctor uses an intense beam of infrared light. The light creates heat that scars the hemorrhoid. This stops blood flow to the hemorrhoids. The hemorrhoids shrink and fall off 7 to 10 days after the procedure. You may feel heat and some pain during the procedure. The procedure takes about 30 minutes. You will be able to go home right after the procedure. What can you expect after this procedure? After the procedure, you may feel pain and have a feeling of fullness in your lower belly, or you may feel as if you need to have a bowel movement. This usually goes away after several days. You may need pain medicine during this time. You may have a small amount of bleeding from your anus about 7 to 10 days after surgery, when your hemorrhoid falls off. This is normal.  You will probably need to take a few days off from work. You will need to avoid heavy lifting and straining with bowel movements while you recover. Follow-up care is a key part of your treatment and safety. Be sure to make and go to all appointments, and call your doctor if you are having problems. It's also a good idea to know your test results and keep a list of the medicines you take. Where can you learn more? Go to http://daniel-carlton.info/. Enter N334 in the search box to learn more about \"Learning About Infrared Photocoagulation for Hemorrhoids. \"  Current as of: March 27, 2018  Content Version: 11.9  © 1030-1714 [employer].  Care instructions adapted under license by [employer] (which disclaims liability or warranty for this information). If you have questions about a medical condition or this instruction, always ask your healthcare professional. [address] any warranty or liability for your use of this information.

## 2019-03-14 NOTE — PROGRESS NOTES
Chief Complaint   Patient presents with    Hemorrhoids     bleeding since sunday     Visit Vitals  /89 (BP 1 Location: Right arm, BP Patient Position: Sitting)   Pulse 100   Temp 97.2 °F (36.2 °C) (Oral)   Resp 20   Ht 5' 4\" (1.626 m)   Wt 191 lb (86.6 kg)   LMP 02/28/2019 (Approximate)   SpO2 98%   BMI 32.79 kg/m²     1. Have you been to the ER, urgent care clinic since your last visit? Hospitalized since your last visit? No    2. Have you seen or consulted any other health care providers outside of the [address] [place] [address] since your last visit? Include any pap smears or colon screening.  No    Reviewed record in preparation for visit and have necessary documentation  Pt did not bring medication to office visit for review  opportunity was given for questions  Goals that were addressed and/or need to be completed during or after this appointment include   Health Maintenance Due   Topic Date Due    Pneumococcal 19-64 Medium Risk (1 of 1 - PPSV23) 08/27/2001

## 2019-03-18 ENCOUNTER — TELEPHONE (OUTPATIENT)
Dept: FAMILY MEDICINE CLINIC | Age: 39
End: 2019-03-18

## 2019-03-18 RX ORDER — CYCLOBENZAPRINE HCL 5 MG
5 TABLET ORAL 2 TIMES DAILY
Qty: 20 TAB | Refills: 0 | Status: SHIPPED | OUTPATIENT
Start: 2019-03-18 | End: 2019-03-20 | Stop reason: SDUPTHER

## 2019-03-20 ENCOUNTER — TELEPHONE (OUTPATIENT)
Dept: FAMILY MEDICINE CLINIC | Age: 39
End: 2019-03-20

## 2019-03-20 NOTE — TELEPHONE ENCOUNTER
Spoke with patient and advised her per Dr. Demetria Kapoor: \"If she takes his medication on a daily basis is going to lose its effectiveness.  Is much better to use it only for a 2 or 3-week course and then be off it for a week to maintain effectiveness. \"     She says that she has been taking this medication daily for months and says that her Rheumatologist had been filling it and recently changed it from 10 mg to 5 mg BID. Refill set up and sent to your box.

## 2019-03-20 NOTE — TELEPHONE ENCOUNTER
Pt called wanting to know why she was only prescribed 20 tabs for her flexeril when she takes one tab 2x daily.

## 2019-03-21 RX ORDER — CYCLOBENZAPRINE HCL 5 MG
5 TABLET ORAL 2 TIMES DAILY
Qty: 60 TAB | Refills: 2 | Status: SHIPPED | OUTPATIENT
Start: 2019-03-21 | End: 2019-09-05 | Stop reason: SDUPTHER

## 2019-03-26 ENCOUNTER — OFFICE VISIT (OUTPATIENT)
Dept: FAMILY MEDICINE CLINIC | Age: 39
End: 2019-03-26

## 2019-03-26 VITALS
BODY MASS INDEX: 32.3 KG/M2 | OXYGEN SATURATION: 98 % | HEART RATE: 82 BPM | SYSTOLIC BLOOD PRESSURE: 131 MMHG | DIASTOLIC BLOOD PRESSURE: 94 MMHG | TEMPERATURE: 97.2 F | WEIGHT: 189.2 LBS | HEIGHT: 64 IN | RESPIRATION RATE: 20 BRPM

## 2019-03-26 DIAGNOSIS — R10.32 LEFT INGUINAL PAIN: ICD-10-CM

## 2019-03-26 DIAGNOSIS — M79.10 MUSCULAR PAIN: Primary | ICD-10-CM

## 2019-03-26 DIAGNOSIS — M79.7 MUSCLE PAIN, FIBROMYALGIA: ICD-10-CM

## 2019-03-26 RX ORDER — NORGESTIMATE AND ETHINYL ESTRADIOL 0.25-0.035
1 KIT ORAL DAILY
Qty: 1 PACKAGE | Refills: 12 | Status: SHIPPED | OUTPATIENT
Start: 2019-03-26 | End: 2020-03-05 | Stop reason: SDUPTHER

## 2019-03-26 NOTE — PROGRESS NOTES
Chief Complaint   Patient presents with    Medication Refill    Pelvic Pain     Visit Vitals  BP (!) 131/94 (BP 1 Location: Right arm, BP Patient Position: Sitting)   Pulse 82   Temp 97.2 °F (36.2 °C) (Oral)   Resp 20   Ht 5' 4\" (1.626 m)   Wt 189 lb 3.2 oz (85.8 kg)   LMP 02/28/2019 (Approximate)   SpO2 98%   BMI 32.48 kg/m²     1. Have you been to the ER, urgent care clinic since your last visit? Hospitalized since your last visit? No    2. Have you seen or consulted any other health care providers outside of the [address] [place] [address] since your last visit? Include any pap smears or colon screening.  No    Reviewed record in preparation for visit and have necessary documentation  Pt did not bring medication to office visit for review  opportunity was given for questions  Goals that were addressed and/or need to be completed during or after this appointment include   Health Maintenance Due   Topic Date Due    Pneumococcal 0-64 years (1 of 1 - PPSV23) 08/27/1988

## 2019-03-26 NOTE — PATIENT INSTRUCTIONS
Groin Strain: Care Instructions  Your Care Instructions  A groin strain is an injury that happens when you tear or overstretch (pull) a groin muscle. The groin muscles are in the area on either side of the body in the folds where the belly joins the legs. You can strain a groin muscle during exercise, such as running, skating, kicking in soccer, or playing basketball. It can happen when you lift, push, or pull heavy objects. You might pull a groin muscle when you fall. The injury can range from a minor pull to a more serious tear of the muscle. You may feel pain and tenderness that's worse when you squeeze your legs together. You may also have pain when you raise the knee of the injured side. There may be swelling or bruising in the groin area or inner thigh. If you have a bad strain, you may walk with a limp while it heals. Rest and other home care can help the muscle heal. Healing can take up to 3 weeks or more. Your doctor may want to see you again in 2 to 3 weeks. Follow-up care is a key part of your treatment and safety. Be sure to make and go to all appointments, and call your doctor if you are having problems. It's also a good idea to know your test results and keep a list of the medicines you take. How can you care for yourself at home? · Be safe with medicines. Read and follow all instructions on the label. ? If the doctor gave you a prescription medicine for pain, take it as prescribed. ? If you are not taking a prescription pain medicine, ask your doctor if you can take an over-the-counter medicine. · Rest and protect your injured or sore groin area for 1 to 2 weeks. Stop, change, or take a break from any activity that may be causing your pain or soreness. Do not do intense activities while you still have pain. · Put ice or a cold pack on your groin area for 10 to 20 minutes at a time. Try to do this every 1 to 2 hours for the next 3 days (when you are awake) or until the swelling goes down. Put a thin cloth between the ice and your skin. · After 2 or 3 days, if your swelling is gone, apply heat. Put a warm water bottle, a heating pad set on low, or a warm cloth on your groin area. Do not go to sleep with a heating pad on your skin. · If your doctor gave you crutches, make sure you use them as directed. · Wear snug shorts or underwear that support the injured area. When should you call for help? Call your doctor now or seek immediate medical care if:    · You have new or severe pain or swelling in the groin area.     · Your groin or upper thigh is cool or pale or changes color.     · You have tingling, weakness, or numbness in your groin or leg.     · You cannot move your leg.     · You cannot put weight on your leg.    Watch closely for changes in your health, and be sure to contact your doctor if:    · You do not get better as expected. Where can you learn more? Go to http://daniel-carlton.info/. Enter O900 in the search box to learn more about \"Groin Strain: Care Instructions. \"  Current as of: September 20, 2018  Content Version: 11.9  © 6320-4974 [employer]. Care instructions adapted under license by [employer] (which disclaims liability or warranty for this information). If you have questions about a medical condition or this instruction, always ask your healthcare professional. [place] [address] any warranty or liability for your use of this information.

## 2019-04-11 DIAGNOSIS — M79.7 FIBROMYALGIA: ICD-10-CM

## 2019-04-11 RX ORDER — GABAPENTIN 300 MG/1
CAPSULE ORAL
Qty: 90 CAP | Refills: 4 | Status: SHIPPED | OUTPATIENT
Start: 2019-04-11 | End: 2019-07-23

## 2019-04-11 RX ORDER — PANTOPRAZOLE SODIUM 20 MG/1
TABLET, DELAYED RELEASE ORAL
Qty: 60 TAB | Refills: 5 | Status: SHIPPED | OUTPATIENT
Start: 2019-04-11 | End: 2020-03-05 | Stop reason: SDUPTHER

## 2019-04-16 DIAGNOSIS — M25.561 POSTERIOR KNEE PAIN, RIGHT: ICD-10-CM

## 2019-04-16 RX ORDER — DICLOFENAC SODIUM 10 MG/G
4 GEL TOPICAL 4 TIMES DAILY
Qty: 100 G | Refills: 4 | Status: SHIPPED | OUTPATIENT
Start: 2019-04-16 | End: 2020-03-05 | Stop reason: SDUPTHER

## 2019-04-16 NOTE — TELEPHONE ENCOUNTER
[address] Office Pool             Pt would like a refill on the Rx \" voltaren gel 1% \" and sent to the [address] .  Pharmacy Callback: [phone] Pt's callback:  [phone]

## 2019-04-18 ENCOUNTER — TELEPHONE (OUTPATIENT)
Dept: FAMILY MEDICINE CLINIC | Age: 39
End: 2019-04-18

## 2019-04-18 NOTE — TELEPHONE ENCOUNTER
----- Message from Shadi Beckham sent at 4/18/2019 10:47 AM EDT -----  Regarding: KURTIS Acevedo/telephone  Pt would like the nurse to contact her about getting a mammogram this year and if she would need a diagnostic test done for Lump in her L breast. [facility] told her she would need a order.  Callback: [phone]

## 2019-04-18 NOTE — TELEPHONE ENCOUNTER
Spoke with patient and advised her that her medication should be at the pharmacy -- the prior authorization was approved. Informed patient that she would need an appointment to discuss which diagnostic test should be order for the lump in her breast. She verbalized understanding and requested an appointment with a female provider.

## 2019-04-18 NOTE — TELEPHONE ENCOUNTER
----- Message from Shadi Beckham sent at 4/18/2019 10:50 AM EDT -----  Regarding: Dr. Thomas/telephone/ refill   Pt would like a refill on Rx \" Voltran gel 1%\" but the insurance would need a pre Authorization on to why she needs Medicine. Pt stated that it's  for pain on her knee.   [address] [facility] [phone] Pt's callback: [phone]

## 2019-04-22 ENCOUNTER — OFFICE VISIT (OUTPATIENT)
Dept: FAMILY MEDICINE CLINIC | Age: 39
End: 2019-04-22

## 2019-04-22 VITALS
RESPIRATION RATE: 20 BRPM | HEART RATE: 89 BPM | SYSTOLIC BLOOD PRESSURE: 125 MMHG | DIASTOLIC BLOOD PRESSURE: 87 MMHG | TEMPERATURE: 97.1 F | OXYGEN SATURATION: 98 % | WEIGHT: 185.8 LBS | HEIGHT: 64 IN | BODY MASS INDEX: 31.72 KG/M2

## 2019-04-22 DIAGNOSIS — N63.20 LEFT BREAST MASS: Primary | ICD-10-CM

## 2019-04-22 DIAGNOSIS — Z12.39 SCREENING FOR MALIGNANT NEOPLASM OF BREAST: ICD-10-CM

## 2019-04-22 RX ORDER — BACLOFEN 10 MG/1
TABLET ORAL
COMMUNITY
Start: 2019-04-04 | End: 2019-09-05 | Stop reason: ALTCHOICE

## 2019-04-22 NOTE — PATIENT INSTRUCTIONS
Mammogram: About This Test  What is it? A mammogram is an X-ray of the breast that is used to screen for breast cancer. This test can find tumors that are too small for you or your doctor to feel. Cancer is most easily treated and cured when it is found at an early stage. Why is this test done? A mammogram is done to:  · Look for breast cancer in women who don't have symptoms. · Find breast cancer in women who have symptoms. Symptoms of breast cancer may include a lump or thickening in the breast, nipple discharge, or dimpling of the skin on one area of the breast.  · Find an area of suspicious breast tissue to remove for an exam under a microscope (biopsy). How can you prepare for the test?  · Tell your doctor if you:  ? Are or might be pregnant. ? Are breastfeeding. ? Have breast implants. ? Have previously had a breast biopsy. · On the day of the test, don't use any deodorant, perfume, powders, or ointments. What happens before the test?  · You will need to take off any jewelry that might interfere with the X-ray pictures. · You will need to take off your clothes above the waist.  · You will be given a cloth or paper gown to use during the test.  What happens during the test?  · You usually stand during a mammogram.  · One at a time, your breasts will be placed on a flat plate that contains the X-ray film. · Another plate is then pressed firmly against your breast to help flatten out the breast tissue. You may be asked to lift your arm. · For a few seconds while the X-ray picture is being taken, you will need to hold your breath. · At least two pictures are taken of each breast. One is taken from the top and one from the side. What else should you know about the test?  · The X-ray plate will feel cold when you place your breast on it. Having your breasts flattened and squeezed isn't comfortable. But it is necessary to flatten out the breast tissue to get the best pictures.   · Mammograms do not prevent breast cancer or reduce a woman's risk of developing cancer. · Most things that are found during a mammogram are not breast cancer. How long does the test take? · The test will take about 10 to 15 minutes. You may be in the clinic for up to an hour. What happens after the test?  · You will probably be able to go home right away. · You can go back to your usual activities right away. Follow-up care is a key part of your treatment and safety. Be sure to make and go to all appointments, and call your doctor if you are having problems. It's also a good idea to keep a list of the medicines you take. Ask your doctor when you can expect to have your test results. Where can you learn more? Go to http://daniel-carlton.info/. Enter A673 in the search box to learn more about \"Mammogram: About This Test.\"  Current as of: March 27, 2018  Content Version: 11.9  © 5123-9783 [employer]. Care instructions adapted under license by [employer] (which disclaims liability or warranty for this information). If you have questions about a medical condition or this instruction, always ask your healthcare professional. [address] any warranty or liability for your use of this information.

## 2019-04-22 NOTE — PROGRESS NOTES
Chief Complaint   Patient presents with    Breast Problem     needs referral     Visit Vitals  /87 (BP 1 Location: Right arm, BP Patient Position: Sitting)   Pulse 89   Temp 97.1 °F (36.2 °C) (Oral)   Resp 20   Ht 5' 4\" (1.626 m)   Wt 185 lb 12.8 oz (84.3 kg)   LMP 03/18/2019 (Approximate)   SpO2 98%   BMI 31.89 kg/m²     1. Have you been to the ER, urgent care clinic since your last visit? Hospitalized since your last visit? No    2. Have you seen or consulted any other health care providers outside of the [address] [place] [address] since your last visit? Include any pap smears or colon screening.  No    Reviewed record in preparation for visit and have necessary documentation  Pt did not bring medication to office visit for review  opportunity was given for questions  Goals that were addressed and/or need to be completed during or after this appointment include   Health Maintenance Due   Topic Date Due    Pneumococcal 0-64 years (1 of 1 - PPSV23) 08/27/1988

## 2019-05-07 ENCOUNTER — OFFICE VISIT (OUTPATIENT)
Dept: FAMILY MEDICINE CLINIC | Age: 39
End: 2019-05-07

## 2019-05-07 VITALS
BODY MASS INDEX: 31.24 KG/M2 | SYSTOLIC BLOOD PRESSURE: 125 MMHG | RESPIRATION RATE: 16 BRPM | HEART RATE: 88 BPM | OXYGEN SATURATION: 98 % | DIASTOLIC BLOOD PRESSURE: 84 MMHG | HEIGHT: 64 IN | WEIGHT: 183 LBS | TEMPERATURE: 98.5 F

## 2019-05-07 DIAGNOSIS — D64.9 ANEMIA, UNSPECIFIED TYPE: Chronic | ICD-10-CM

## 2019-05-07 DIAGNOSIS — G89.4 CHRONIC PAIN SYNDROME: Chronic | ICD-10-CM

## 2019-05-07 DIAGNOSIS — R76.8 POSITIVE ANA (ANTINUCLEAR ANTIBODY): ICD-10-CM

## 2019-05-07 DIAGNOSIS — E55.9 VITAMIN D DEFICIENCY: Chronic | ICD-10-CM

## 2019-05-07 DIAGNOSIS — I10 ESSENTIAL HYPERTENSION: Primary | Chronic | ICD-10-CM

## 2019-05-07 DIAGNOSIS — K21.9 GASTROESOPHAGEAL REFLUX DISEASE WITHOUT ESOPHAGITIS: Chronic | ICD-10-CM

## 2019-05-07 DIAGNOSIS — E66.01 SEVERE OBESITY (HCC): ICD-10-CM

## 2019-05-07 DIAGNOSIS — R73.01 IFG (IMPAIRED FASTING GLUCOSE): Chronic | ICD-10-CM

## 2019-05-07 DIAGNOSIS — M79.7 FIBROMYALGIA: ICD-10-CM

## 2019-05-07 DIAGNOSIS — R35.0 FREQUENT URINATION: ICD-10-CM

## 2019-05-07 DIAGNOSIS — E78.00 PURE HYPERCHOLESTEROLEMIA: Chronic | ICD-10-CM

## 2019-05-07 DIAGNOSIS — L21.9 SEBORRHEA: ICD-10-CM

## 2019-05-07 DIAGNOSIS — F33.9 RECURRENT DEPRESSION (HCC): ICD-10-CM

## 2019-05-07 LAB
BILIRUB UR QL STRIP: NEGATIVE
GLUCOSE UR-MCNC: NEGATIVE MG/DL
KETONES P FAST UR STRIP-MCNC: NEGATIVE MG/DL
PH UR STRIP: 7 [PH] (ref 4.6–8)
PROT UR QL STRIP: NORMAL
SP GR UR STRIP: 1.03 (ref 1–1.03)
UA UROBILINOGEN AMB POC: NORMAL (ref 0.2–1)
URINALYSIS CLARITY POC: CLEAR
URINALYSIS COLOR POC: YELLOW
URINE BLOOD POC: NEGATIVE
URINE LEUKOCYTES POC: NEGATIVE
URINE NITRITES POC: NEGATIVE

## 2019-05-07 RX ORDER — GABAPENTIN 300 MG/1
CAPSULE ORAL
Qty: 90 CAP | Refills: 4 | Status: CANCELLED | OUTPATIENT
Start: 2019-05-07

## 2019-05-07 RX ORDER — KETOCONAZOLE 20 MG/ML
SHAMPOO TOPICAL
Qty: 1 BOTTLE | Refills: 11 | Status: SHIPPED | OUTPATIENT
Start: 2019-05-07 | End: 2020-11-16 | Stop reason: SDUPTHER

## 2019-05-07 RX ORDER — FLUTICASONE PROPIONATE 50 MCG
2 SPRAY, SUSPENSION (ML) NASAL DAILY
Qty: 1 BOTTLE | Refills: 6 | Status: SHIPPED | OUTPATIENT
Start: 2019-05-07 | End: 2019-10-15

## 2019-05-07 RX ORDER — DULOXETIN HYDROCHLORIDE 30 MG/1
30 CAPSULE, DELAYED RELEASE ORAL DAILY
COMMUNITY
End: 2019-12-10

## 2019-05-07 NOTE — PROGRESS NOTES
[address]  [phone]           Progress Note    Patient: Denisha Torres MRN: [id number]  SSN: [id number]    YOB: 1980  Age: 41 y.o. Sex: female        Chief Complaint   Patient presents with    Labs     Fasting     Documentation    Vesicourethral Fistula         Subjective:     Encounter Diagnoses   Name Primary?  Essential hypertension: Controlled  BP Readings from Last 3 Encounters:   05/07/19 125/84   04/22/19 125/87   03/26/19 (!) 131/94     The patient reports:  taking medications as instructed, no medication side effects noted, no TIA's, no chest pain on exertion, no dyspnea on exertion, no swelling of ankles. Key CAD CHF Meds     Patient is on no cardiovascular meds. Lab Results   Component Value Date/Time    Sodium 140 02/05/2019 03:13 PM    Potassium 4.4 02/05/2019 03:13 PM    Chloride 101 02/05/2019 03:13 PM    CO2 22 02/05/2019 03:13 PM    Anion gap 9 08/19/2010 10:36 AM    Glucose 82 02/05/2019 03:13 PM    BUN 9 02/05/2019 03:13 PM    Creatinine 0.86 02/05/2019 03:13 PM    BUN/Creatinine ratio 10 02/05/2019 03:13 PM    GFR est  02/05/2019 03:13 PM    GFR est non-AA 87 02/05/2019 03:13 PM    Calcium 9.4 02/05/2019 03:13 PM    Bilirubin, total 0.5 02/05/2019 03:13 PM    AST (SGOT) 28 02/05/2019 03:13 PM    Alk. phosphatase 71 02/05/2019 03:13 PM    Protein, total 7.0 02/05/2019 03:13 PM    Albumin 4.2 02/05/2019 03:13 PM    Globulin 3.6 08/19/2010 10:36 AM    A-G Ratio 1.5 02/05/2019 03:13 PM    ALT (SGPT) 39 (H) 02/05/2019 03:13 PM     Low salt diet? yes  Aerobic exercise?no  Our goal is to normalize the blood pressure to decrease the risks of strokes and heart attacks. The patient is in agreement with the plan. Yes    Fibromyalgia: This is severe and getting worse.  Seborrhea: Stable symptoms.  Anemia, unspecified type: Previously low iron related.   We will recheck today. No sx. Lab Results   Component Value Date/Time    HGB 11.8 02/05/2019 03:13 PM     Lab Results   Component Value Date/Time    Iron 29 (L) 09/23/2009 09:47 AM    TIBC 458 (H) 09/23/2009 09:47 AM    Iron % saturation 6 (L) 09/23/2009 09:47 AM    Ferritin 94 08/19/2010 10:36 AM          IFG (impaired fasting glucose):  Elevated blood sugar :No sx of fulminant diabetes. No significant weight loss or gain. The patient realizes that without weight loss and exercise they are high risk for overt diabetes. Lab Results   Component Value Date/Time    Hemoglobin A1c 5.5 10/12/2018 11:57 AM     Lab Results   Component Value Date/Time    Glucose 82 02/05/2019 03:13 PM     Wt Readings from Last 3 Encounters:   05/07/19 183 lb (83 kg)   04/22/19 185 lb 12.8 oz (84.3 kg)   03/26/19 189 lb 3.2 oz (85.8 kg)     Body mass index is 31.41 kg/m².  Pure hypercholesterolemia:  Cardiovascular risks for her are: hyperlipidemia. Key Antihyperlipidemia Meds     The patient is on no antihyperlipidemia meds. Lab Results   Component Value Date/Time    Cholesterol, total 253 (H) 02/05/2019 03:13 PM    HDL Cholesterol 70 02/05/2019 03:13 PM    LDL, calculated 170 (H) 02/05/2019 03:13 PM    Triglyceride 67 02/05/2019 03:13 PM     Lab Results   Component Value Date/Time    ALT (SGPT) 39 (H) 02/05/2019 03:13 PM    AST (SGOT) 28 02/05/2019 03:13 PM    Alk. phosphatase 71 02/05/2019 03:13 PM    Bilirubin, direct 0.12 12/08/2017 04:08 PM    Bilirubin, total 0.5 02/05/2019 03:13 PM      Myalgias: No   Fatigue: No   Other side effects: no  Wt Readings from Last 3 Encounters:   05/07/19 183 lb (83 kg)   04/22/19 185 lb 12.8 oz (84.3 kg)   03/26/19 189 lb 3.2 oz (85.8 kg)     The patient is aware of our goal to reduce or eliminate the long term problems (such as strokes and heart attacks) related to poorly controlled hyperlipidemia.  Vitamin D deficiency:  No sx. Due for testing.   Lab Results   Component Value Date/Time    VITAMIN D, 25-HYDROXY 46.4 10/12/2018 11:57 AM              Chronic pain syndrome: Chronic daily pain 7-8 out of 10. No better. Joint pains are getting worse. She is also having symptoms of sciatica.  Severe obesity ([address]): Wt Readings from Last 3 Encounters:   05/07/19 183 lb (83 kg)   04/22/19 185 lb 12.8 oz (84.3 kg)   03/26/19 189 lb 3.2 oz (85.8 kg)            Recurrent depression ([address]): She has severe depression and also OCD.  Gastroesophageal reflux disease without esophagitis:  Current control of Symptoms:good  Hiatal Hernia:no  Current Medications: Pantoprazole  The patient has no history melena or bright red blood in the stools. The patient avoids high dose aspirin and NSAID therapy. The patient is aware of diet changes needed, elevating the head of the bed and appropriate use of antacids.  Positive KITTY (antinuclear antibody): She says she is still on Plaquenil. It appears she has inflammatory arthritis but it is unclear what type specifically.  Frequent urination: She is also having urinary accidents during her sleep. He also has urge incontinence during the day. Current and past medical information:    Current Medications after this visit[de-identified]     Current Outpatient Medications   Medication Sig    DULoxetine (CYMBALTA) 30 mg capsule Take 30 mg by mouth daily.  ketoconazole (NIZORAL) 2 % shampoo shampoo twice weekly    acetaminophen (TYLENOL 8 HOUR PO) Take  by mouth.  baclofen (LIORESAL) 10 mg tablet     diclofenac (VOLTAREN) 1 % gel Apply 4 g to affected area four (4) times daily. Apply to right knee    gabapentin (NEURONTIN) 300 mg capsule Take 2 tabs in morning and 2 tabs at nighttime. Indications: fibromyalgia    pantoprazole (PROTONIX) 20 mg tablet Take one tablet by mouth two times a day.  norgestimate-ethinyl estradiol (ORTHO-CYCLEN, SPRINTEC) 0.25-35 mg-mcg tab Take 1 Tab by mouth daily.  Indications: Painful Periods    pramoxine (PROCTOFOAM) 1 % topical foam Apply  to affected area three (3) times daily as needed for Hemorrhoids.  lamoTRIgine (LAMICTAL) 25 mg tablet     valACYclovir (VALTREX) 500 mg tablet Take 1 Tab by mouth daily.  clonazePAM (KLONOPIN) 1 mg tablet Take 1 mg by mouth daily. At bedtime    cholecalciferol, vitamin D3, (VITAMIN D3) 2,000 unit tab Take  by mouth.  hydroxychloroquine (PLAQUENIL) 200 mg tablet Take 200 mg by mouth daily. Two tabs    LORazepam (ATIVAN) 1 mg tablet Take 1 Tab by mouth every twelve (12) hours. Max Daily Amount: 2 mg.  albuterol (PROVENTIL HFA, VENTOLIN HFA, PROAIR HFA) 90 mcg/actuation inhaler Take 1 Puff by inhalation every six (6) hours as needed for Wheezing.  dextroamphetamine-amphetamine (ADDERALL) 20 mg tablet Take 20 mg by mouth. 1/2 tablet  Twice daily          cetirizine (ZYRTEC) 10 mg tablet Take  by mouth.  FLUoxetine (PROZAC) 40 mg capsule Take 40 mg by mouth daily.  fluticasone (FLONASE) 50 mcg/actuation nasal spray 2 Sprays by Both Nostrils route daily.  triamcinolone acetonide (KENALOG) 0.1 % ointment Apply  to affected area two (2) times a day. use thin layer    clonazePAM (KLONOPIN) 0.5 mg tablet Take 1 mg by mouth nightly as needed.  traZODone (DESYREL) 50 mg tablet Take 1 Tab by mouth nightly.  cyclobenzaprine (FLEXERIL) 5 mg tablet Take 1 Tab by mouth two (2) times a day.  cyanocobalamin (VITAMIN B-12) 1,000 mcg tablet Take 1,000 mcg by mouth daily.  ascorbic acid, vitamin C, (VITAMIN C) 500 mg tablet Take 500 mg by mouth daily.  hydrocortisone (ANUSOL-HC) 2.5 % rectal cream Insert  into rectum four (4) times daily. As needed     No current facility-administered medications for this visit.         Patient Active Problem List    Diagnosis Date Noted    Gastroparesis 11/29/2018     Priority: 1 - One    Plantar fasciitis of left foot 11/29/2018     Priority: 1 - One    Positive KITTY (antinuclear antibody) 2018     Priority: 1 - One    Severe obesity ([facility] 2018     Priority: 1 - One    Class 2 obesity due to excess calories without serious comorbidity in adult 2018     Priority: 1 - One    Mood disorder ([facility] 2018     Priority: 1 - One    Chronic pain syndrome 2018     Priority: 1 - One    Fibromyalgia 2018     Priority: 1 - One    Recurrent depression ([facility] 01/15/2018     Priority: 1 - One    Vitamin D deficiency 2016     Priority: 1 - One    IFG (impaired fasting glucose) 09/10/2015     Priority: 1 - One    Hyperlipidemia 09/10/2015     Priority: 1 - One    Depression      Priority: 1 - One    GERD (gastroesophageal reflux disease)      Priority: 1 - One    Hypertension      Priority: 1 - One    Anemia, unspecified      Priority: 1 - One    Panic attack 2018    Tremor 2018    Inflammatory arthritis 08/10/2016    Thrombosed external hemorrhoid 2016    Hoarseness 2013    OCD (obsessive compulsive disorder) 2012    HSV (herpes simplex virus) anogenital infection 2011    Costochondritis 2011    Anxiety 2010    Itch of skin 2010       Past Medical History:   Diagnosis Date    Anemia NEC     Arthritis     Chronic pain     fybromyalsia    Depression     anxiety, depression, OCD    GERD (gastroesophageal reflux disease)     Hypertension     Musculoskeletal disorder     SOB (shortness of breath)     Stool color black        Allergies   Allergen Reactions    Sulfa (Sulfonamide Antibiotics) Hives    Vicodin [Hydrocodone-Acetaminophen] Nausea and Vomiting       Past Surgical History:   Procedure Laterality Date    HX GYN           HX HEENT  2002    wisdom teeth extraction    UPPER GI ENDOSCOPY,BIOPSY  2018         UPPER GI ENDOSCOPY,DIAGNOSIS  2018            Social History     Socioeconomic History    Marital status: SINGLE     Spouse name: Not on file    Number of children: Not on file    Years of education: Not on file    Highest education level: Not on file   Tobacco Use    Smoking status: Current Some Day Smoker     Packs/day: 0.25     Years: 8.00     Pack years: 2.00     Types: Cigarettes     Last attempt to quit: 2018     Years since quittin.9    Smokeless tobacco: Never Used   Substance and Sexual Activity    Alcohol use: Yes     Alcohol/week: 0.6 oz     Types: 1 Glasses of wine per week     Comment: 1 cup of wine/week    Drug use: No    Sexual activity: Yes     Partners: Male     Birth control/protection: None       Review of Systems   Constitutional: Negative. Negative for chills, fever, malaise/fatigue and weight loss. HENT: Negative. Negative for hearing loss. Eyes: Negative. Negative for blurred vision and double vision. Respiratory: Negative. Negative for cough, hemoptysis, sputum production and shortness of breath. Cardiovascular: Negative. Negative for chest pain, palpitations and orthopnea. Gastrointestinal: Negative. Negative for abdominal pain, heartburn, nausea and vomiting. Genitourinary: Positive for frequency and urgency. Negative for dysuria. Musculoskeletal: Positive for back pain, joint pain, myalgias and neck pain. Her fibromyalgia is getting worse. Skin: Negative. Negative for rash. Neurological: Negative. Negative for dizziness, tingling, tremors, weakness and headaches. Endo/Heme/Allergies: Negative. Psychiatric/Behavioral: Positive for depression. Negative for substance abuse and suicidal ideas. The patient is nervous/anxious. Objective:     Vitals:    19 1011   BP: 125/84   Pulse: 88   Resp: 16   Temp: 98.5 °F (36.9 °C)   TempSrc: Oral   SpO2: 98%   Weight: 183 lb (83 kg)   Height: 5' 4\" (1.626 m)      Body mass index is 31.41 kg/m². Physical Exam   Constitutional: She is oriented to person, place, and time and well-developed, well-nourished, and in no distress. No distress. HENT:   Head: Normocephalic and atraumatic. Mouth/Throat: Oropharynx is clear and moist.   Eyes: Conjunctivae are normal.   Neck: No thyromegaly present. Cardiovascular: Normal rate, regular rhythm and normal heart sounds. No murmur heard. Pulmonary/Chest: Effort normal and breath sounds normal. No respiratory distress. She has no wheezes. She has no rales. She exhibits no tenderness. Abdominal: Soft. She exhibits no distension. Neurological: She is alert and oriented to person, place, and time. Skin: Skin is warm. No rash noted. She is not diaphoretic. No erythema. Psychiatric: Mood and affect normal.   Nursing note and vitals reviewed. Health Maintenance Due   Topic Date Due    Pneumococcal 0-64 years (1 of 1 - PPSV23) 08/27/1988         Assessment and orders:     Encounter Diagnoses     ICD-10-CM ICD-9-CM   1. Essential hypertension I10 401.9   2. Fibromyalgia M79.7 729.1   3. Seborrhea L21.9 706.3   4. Anemia, unspecified type D64.9 285.9   5. IFG (impaired fasting glucose) R73.01 790.21   6. Pure hypercholesterolemia E78.00 272.0   7. Vitamin D deficiency E55.9 268.9   8. Chronic pain syndrome G89.4 338.4   9. Severe obesity (HCC) E66.01 278.01   10. Recurrent depression (HCC) F33.9 296.30   11. Gastroesophageal reflux disease without esophagitis K21.9 530.81   12. Positive KITTY (antinuclear antibody) R76.8 795.79   13. Frequent urination R35.0 788.41     Diagnoses and all orders for this visit:    1. Essential hypertension-controlled  -     MICROALBUMIN, UR, RAND W/ MICROALB/CREAT RATIO  -     LIPID PANEL  -     METABOLIC PANEL, COMPREHENSIVE  -     CBC WITH AUTOMATED DIFF    2. Fibromyalgia-getting worse. I updated her disability letter. If anything she is getting worse. 12 myalgia is more painful and ever. She is still unable to work in any capacity in my opinion. 3. Seborrhea-treated  -     ketoconazole (NIZORAL) 2 % shampoo; shampoo twice weekly    4.  Anemia, unspecified type-previous iron deficiency  -     CBC WITH AUTOMATED DIFF  -     FERRITIN    5. IFG (impaired fasting glucose)-A1c is normal  -     HEMOGLOBIN A1C WITH EAG  -     MICROALBUMIN, UR, RAND W/ MICROALB/CREAT RATIO  -     LIPID PANEL  -     METABOLIC PANEL, COMPREHENSIVE    6. Pure hypercholesterolemia-retest  -     LIPID PANEL  -     METABOLIC PANEL, COMPREHENSIVE    7. Vitamin D deficiency-treated    8. Chronic pain syndrome-getting worse. Now walking with a cane. 9. Severe obesity (HCC)-she cannot be active enough to lose weight. 10. Recurrent depression (HCC)-severe    11. Gastroesophageal reflux disease without esophagitis-not well controlled    12. Positive KITTY (antinuclear antibody)-she is still on hydroxychloroquine. 13. Frequent urination-no evidence of UTI. This may be related to problems and more medication.  -     AMB POC URINALYSIS DIP STICK AUTO W/O MICRO            Plan of care:  Discussed diagnoses in detail with patient. Medication risks/benefits/side effects discussed with patient. All of the patient's questions were addressed. The patient understands and agrees with our plan of care. The patient knows to call back if they are unsure of or forget any changes we discussed today or if the symptoms change. The patient received an After-Visit Summary which contains VS, orders, medication list and allergy list. This can be used as a \"mini-medical record\" should they have to seek medical care while out of town. Patient Care Team:  Kenn De La Cruz MD as PCP - General (Family Practice)  Tania Saenz MD (Breast Surgery)  Gustavo Meza MD (Cardiology)  Bereket Garcia MD (Otolaryngology)  Bashir Solorzano MD (Internal Medicine)    Follow-up and Dispositions    · Return in about 4 months (around 9/7/2019).          Future Appointments   Date Time Provider [address]   9/10/2019 10:10 AM Kenn De La Cruz MD [address]       Signed By: Clemencia Edmondson Jeffrey Hogue MD     May 7, 2019      ATTENTION:   This medical record was transcribed using an electronic medical records/speech recognition system. Although proofread, it may and can contain electronic, spelling and other errors. Corrections may be executed at a later time. Please feel free to contact me for any clarifications as needed.

## 2019-05-07 NOTE — LETTER
5/7/2019 10:34 AM 
 
Ms. Artis Roa [address] [address] To whom it may concern:  
Listed below are the current chronic medical problems affecting Ms. Yanci Torresing Patient Active Problem List  
Diagnosis Code  Depression F32.9  GERD (gastroesophageal reflux disease) K21.9  Hypertension I10  
 Anemia, unspecified D64.9  
 Itch of skin L29.9  Anxiety F41.9  Costochondritis M94.0  ADHD  OCD (obsessive compulsive disorder) F42.9  
 Hoarseness R49.0  
 IFG (impaired fasting glucose) R73.01  
 Hyperlipidemia E78.5  Vitamin D deficiency E55.9  Inflammatory arthritis M19.90  
 Recurrent depression ([address] F33.9  Mood disorder (HCC) F39  
 Chronic pain syndrome G89.4  Fibromyalgia M79.7  Panic attack F41.0  Tremor R25.1  Severe obesity (BMI 35.0-39. 9) E66.01  
 Gastroparesis K31.84 Due to the totality and complexity of her medical problems it is my firm belief that she is fully disabled and unable to work anywhere in any capacity. She has filed for disability and is waiting for the decision.  
 
 
 
 
 
Sincerely, 
 
 
Lori Thompson MD

## 2019-05-07 NOTE — PATIENT INSTRUCTIONS

## 2019-05-07 NOTE — PROGRESS NOTES
Chief Complaint   Patient presents with    Labs     Fasting     Documentation    Vesicourethral Fistula     Body mass index is 31.41 kg/m². 1. Have you been to the ER, urgent care clinic since your last visit? Hospitalized since your last visit? No    2. Have you seen or consulted any other health care providers outside of the [address] [place] [address] since your last visit? Include any pap smears or colon screening.  Yes    Reviewed record in preparation for visit and have necessary documentation  Pt did not bring medication to office visit for review  Information was given to pt on Advanced Directives, Living Will  Information was given on Shingles Vaccine  Opportunity was given for questions  Goals that were addressed and/or need to be completed after this appointment include:     Health Maintenance Due   Topic Date Due    Pneumococcal 0-64 years (1 of 1 - PPSV23) 08/27/1988

## 2019-05-07 NOTE — LETTER
5/7/2019 11:39 AM 
 
Ms. Jose Gons[address] [address] To whom it may concern:  
Listed below are the current chronic medical problems affecting Ms. Sachin Sheets Patient Active Problem List  
Diagnosis Code  Depression F32.9  GERD (gastroesophageal reflux disease) K21.9  Hypertension I10  
 Anemia, unspecified D64.9  
 Itch of skin L29.9  Anxiety F41.9  Costochondritis M94.0  ADHD  OCD (obsessive compulsive disorder) F42.9  
 Hoarseness R49.0  
 IFG (impaired fasting glucose) R73.01  
 Hyperlipidemia E78.5  Vitamin D deficiency E55.9  Inflammatory arthritis M19.90  
 Recurrent depression ([address] F33.9  Mood disorder (HCC) F39  
 Chronic pain syndrome G89.4  Fibromyalgia M79.7  Panic attack F41.0  Tremor R25.1  Severe obesity (BMI 35.0-39. 9) E66.01  
 Gastroparesis K31.84 Due to the totality and complexity of her medical problems it is my firm belief that she is fully disabled and unable to work anywhere in any capacity. Her Fibromyalgia is getting worse as of 5/7/19. She has filed for disability and is waiting for the decision.  
 
 
 
 
 
 
Sincerely, 
 
 
Leydi Guo MD

## 2019-05-08 LAB
ALBUMIN SERPL-MCNC: 4 G/DL (ref 3.5–5.5)
ALBUMIN/CREAT UR: 5.5 MG/G CREAT (ref 0–30)
ALBUMIN/GLOB SERPL: 1.5 {RATIO} (ref 1.2–2.2)
ALP SERPL-CCNC: 75 IU/L (ref 39–117)
ALT SERPL-CCNC: 29 IU/L (ref 0–32)
AST SERPL-CCNC: 15 IU/L (ref 0–40)
BASOPHILS # BLD AUTO: 0 X10E3/UL (ref 0–0.2)
BASOPHILS NFR BLD AUTO: 1 %
BILIRUB SERPL-MCNC: <0.2 MG/DL (ref 0–1.2)
BUN SERPL-MCNC: 12 MG/DL (ref 6–20)
BUN/CREAT SERPL: 16 (ref 9–23)
CALCIUM SERPL-MCNC: 9.3 MG/DL (ref 8.7–10.2)
CHLORIDE SERPL-SCNC: 106 MMOL/L (ref 96–106)
CHOLEST SERPL-MCNC: 210 MG/DL (ref 100–199)
CO2 SERPL-SCNC: 21 MMOL/L (ref 20–29)
CREAT SERPL-MCNC: 0.75 MG/DL (ref 0.57–1)
CREAT UR-MCNC: 181.2 MG/DL
EOSINOPHIL # BLD AUTO: 0.8 X10E3/UL (ref 0–0.4)
EOSINOPHIL NFR BLD AUTO: 14 %
ERYTHROCYTE [DISTWIDTH] IN BLOOD BY AUTOMATED COUNT: 13.6 % (ref 12.3–15.4)
EST. AVERAGE GLUCOSE BLD GHB EST-MCNC: 94 MG/DL
FERRITIN SERPL-MCNC: 38 NG/ML (ref 15–150)
GLOBULIN SER CALC-MCNC: 2.7 G/DL (ref 1.5–4.5)
GLUCOSE SERPL-MCNC: 88 MG/DL (ref 65–99)
HBA1C MFR BLD: 4.9 % (ref 4.8–5.6)
HCT VFR BLD AUTO: 33.1 % (ref 34–46.6)
HDLC SERPL-MCNC: 77 MG/DL
HGB BLD-MCNC: 11.1 G/DL (ref 11.1–15.9)
IMM GRANULOCYTES # BLD AUTO: 0 X10E3/UL (ref 0–0.1)
IMM GRANULOCYTES NFR BLD AUTO: 0 %
LDLC SERPL CALC-MCNC: 119 MG/DL (ref 0–99)
LYMPHOCYTES # BLD AUTO: 2.1 X10E3/UL (ref 0.7–3.1)
LYMPHOCYTES NFR BLD AUTO: 36 %
MCH RBC QN AUTO: 29.6 PG (ref 26.6–33)
MCHC RBC AUTO-ENTMCNC: 33.5 G/DL (ref 31.5–35.7)
MCV RBC AUTO: 88 FL (ref 79–97)
MICROALBUMIN UR-MCNC: 9.9 UG/ML
MONOCYTES # BLD AUTO: 0.2 X10E3/UL (ref 0.1–0.9)
MONOCYTES NFR BLD AUTO: 4 %
NEUTROPHILS # BLD AUTO: 2.6 X10E3/UL (ref 1.4–7)
NEUTROPHILS NFR BLD AUTO: 45 %
PLATELET # BLD AUTO: 247 X10E3/UL (ref 150–379)
POTASSIUM SERPL-SCNC: 4.7 MMOL/L (ref 3.5–5.2)
PROT SERPL-MCNC: 6.7 G/DL (ref 6–8.5)
RBC # BLD AUTO: 3.75 X10E6/UL (ref 3.77–5.28)
SODIUM SERPL-SCNC: 140 MMOL/L (ref 134–144)
TRIGL SERPL-MCNC: 72 MG/DL (ref 0–149)
VLDLC SERPL CALC-MCNC: 14 MG/DL (ref 5–40)
WBC # BLD AUTO: 5.8 X10E3/UL (ref 3.4–10.8)

## 2019-05-16 ENCOUNTER — TELEPHONE (OUTPATIENT)
Dept: MAMMOGRAPHY | Age: 39
End: 2019-05-16

## 2019-05-16 ENCOUNTER — HOSPITAL ENCOUNTER (OUTPATIENT)
Dept: MAMMOGRAPHY | Age: 39
Discharge: HOME | End: 2019-05-16
Attending: NURSE PRACTITIONER
Payer: MEDICAID

## 2019-05-16 DIAGNOSIS — Z12.39 SCREENING FOR MALIGNANT NEOPLASM OF BREAST: ICD-10-CM

## 2019-05-16 DIAGNOSIS — N63.20 LEFT BREAST MASS: ICD-10-CM

## 2019-05-16 PROCEDURE — 77067 SCR MAMMO BI INCL CAD: CPT

## 2019-05-17 NOTE — TELEPHONE ENCOUNTER
Patient called per NP:  Please inform patient to call   [facility] [phone] to make an appointment for additional imaging. Patient verbalized understanding and appreciative.

## 2019-06-03 ENCOUNTER — TELEPHONE (OUTPATIENT)
Dept: FAMILY MEDICINE CLINIC | Age: 39
End: 2019-06-03

## 2019-06-03 ENCOUNTER — HOSPITAL ENCOUNTER (OUTPATIENT)
Dept: MAMMOGRAPHY | Age: 39
Discharge: HOME | End: 2019-06-03
Attending: NURSE PRACTITIONER
Payer: MEDICAID

## 2019-06-03 DIAGNOSIS — Z12.39 SCREENING BREAST EXAMINATION: ICD-10-CM

## 2019-06-03 PROCEDURE — 77065 DX MAMMO INCL CAD UNI: CPT

## 2019-06-03 NOTE — TELEPHONE ENCOUNTER
Patient called per NP:  Please inform Ms. Mc Martinez per the radiologist follow-up, right-sided diagnostic mammogram in 6 months    Patient verbalized understanding and appreciative.

## 2019-06-10 DIAGNOSIS — L21.9 SEBORRHEA: ICD-10-CM

## 2019-06-10 NOTE — TELEPHONE ENCOUNTER
Patient states she uses this on her scalp in the summer time along with her medicated shampoo for seborrhea dermatitis.

## 2019-06-11 RX ORDER — TRIAMCINOLONE ACETONIDE 1 MG/G
OINTMENT TOPICAL 2 TIMES DAILY
Qty: 85 G | Refills: 11 | Status: SHIPPED | OUTPATIENT
Start: 2019-06-11 | End: 2020-11-16 | Stop reason: SDUPTHER

## 2019-06-11 NOTE — TELEPHONE ENCOUNTER
Pt called to check status of refill. She states that she thought it would be called in to pharmacy yesterday.

## 2019-07-23 ENCOUNTER — OFFICE VISIT (OUTPATIENT)
Dept: FAMILY MEDICINE CLINIC | Age: 39
End: 2019-07-23

## 2019-07-23 VITALS
TEMPERATURE: 98.3 F | RESPIRATION RATE: 16 BRPM | HEIGHT: 64 IN | BODY MASS INDEX: 29.06 KG/M2 | SYSTOLIC BLOOD PRESSURE: 123 MMHG | WEIGHT: 170.2 LBS | DIASTOLIC BLOOD PRESSURE: 85 MMHG | OXYGEN SATURATION: 99 % | HEART RATE: 91 BPM

## 2019-07-23 DIAGNOSIS — M79.7 FIBROMYALGIA: ICD-10-CM

## 2019-07-23 DIAGNOSIS — N39.41 URGE INCONTINENCE: Primary | ICD-10-CM

## 2019-07-23 DIAGNOSIS — J40 BRONCHITIS: ICD-10-CM

## 2019-07-23 DIAGNOSIS — R51.9 SINUS HEADACHE: ICD-10-CM

## 2019-07-23 RX ORDER — METOCLOPRAMIDE 5 MG/1
5 TABLET ORAL
COMMUNITY
End: 2020-12-21

## 2019-07-23 RX ORDER — OXYMETAZOLINE HCL 0.05 %
2 SPRAY, NON-AEROSOL (ML) NASAL 2 TIMES DAILY
Qty: 1 EACH | Refills: 0 | Status: SHIPPED | OUTPATIENT
Start: 2019-07-23 | End: 2019-07-26

## 2019-07-23 RX ORDER — GABAPENTIN 300 MG/1
CAPSULE ORAL
Qty: 90 CAP | Refills: 1 | Status: SHIPPED | OUTPATIENT
Start: 2019-07-23 | End: 2019-10-30 | Stop reason: SDUPTHER

## 2019-07-23 RX ORDER — ALBUTEROL SULFATE 90 UG/1
1 AEROSOL, METERED RESPIRATORY (INHALATION)
Qty: 1 INHALER | Refills: 5 | Status: SHIPPED | OUTPATIENT
Start: 2019-07-23 | End: 2019-11-26 | Stop reason: SDUPTHER

## 2019-07-23 NOTE — PROGRESS NOTES
I saw and evaluated the patient with the resident, performing the key elements of the exam and service. I discussed the findings, assessment and plan with the resident and agree with the resident's findings and plan as documented in the resident's note. Multiple diverse medical problems necessitating extensive decision making, prolonged chart review and prolonged OV. I worry about medication levels and interactions with her complex regimen to address her emotional and physical problems. Will taper gabapentin and ask her to see Psychiatry for medication review in light of her dizziness. Rica Pham M.D.

## 2019-07-23 NOTE — PROGRESS NOTES
Progress Note    Patient: Autumn Agee MRN: [id number]  SSN: [id number]    YOB: 1980  Age: 41 y.o. Sex: female        Chief Complaint   Patient presents with    Referral Request     Urology    Head Pain     Head Pressure     Dizziness         Subjective:     Problems addressed:  Encounter Diagnoses     ICD-10-CM ICD-9-CM   1. Urge incontinence N39.41 788.31   2. Bronchitis J40 490   3. Fibromyalgia M79.7 729.1   4. Sinus headache R51 784.0       42 y/o female with PMX depression, fibromyalgia, positive KITTY, hypertension. She woke up this morning feeling dizzy and off balance, and disoriented, with a headache, felt like room was spinning. She has only been eating yogurt due to esophageal issues- she saw GI this month and we are obtaining the records, they added Reglan. She takes 300 mg 4 times daily and would like a refill. She would also like a urology referral for urinary incontinence. She has been wetting the bed at night about once every two weeks and urinary urgency with incontinence worsening over the last few months. about twice per week. She wears panty liners. She has numbness in her feet. Her right leg feels weak at times. Denies fever, chills, change in vision, dysuria, hematuria, chest pain, cough, shortness of breath. Current and past medical information:    Current Medications after this visit[de-identified]     Current Outpatient Medications   Medication Sig    albuterol (PROVENTIL HFA, VENTOLIN HFA, PROAIR HFA) 90 mcg/actuation inhaler Take 1 Puff by inhalation every six (6) hours as needed for Wheezing.  metoclopramide HCl (REGLAN) 5 mg tablet Take 5 mg by mouth Before breakfast, lunch, and dinner.  gabapentin (NEURONTIN) 300 mg capsule Take 1 tabs in morning and 2 tabs at nighttime. Indications: fibromyalgia    oxymetazoline (AFRIN, OXYMETAZOLINE,) 0.05 % nasal spray 2 Sprays by Both Nostrils route two (2) times a day for 3 days.     triamcinolone acetonide (KENALOG) 0.1 % ointment Apply  to affected area two (2) times a day. use thin layer    DULoxetine (CYMBALTA) 30 mg capsule Take 30 mg by mouth daily.  ketoconazole (NIZORAL) 2 % shampoo shampoo twice weekly    acetaminophen (TYLENOL 8 HOUR PO) Take  by mouth.  fluticasone propionate (FLONASE) 50 mcg/actuation nasal spray 2 Sprays by Both Nostrils route daily.  baclofen (LIORESAL) 10 mg tablet     diclofenac (VOLTAREN) 1 % gel Apply 4 g to affected area four (4) times daily. Apply to right knee    pantoprazole (PROTONIX) 20 mg tablet Take one tablet by mouth two times a day.  norgestimate-ethinyl estradiol (ORTHO-CYCLEN, SPRINTEC) 0.25-35 mg-mcg tab Take 1 Tab by mouth daily. Indications: Painful Periods    lamoTRIgine (LAMICTAL) 25 mg tablet     clonazePAM (KLONOPIN) 1 mg tablet Take 1 mg by mouth daily. At bedtime    cholecalciferol, vitamin D3, (VITAMIN D3) 2,000 unit tab Take  by mouth.  hydroxychloroquine (PLAQUENIL) 200 mg tablet Take 200 mg by mouth daily. Two tabs    LORazepam (ATIVAN) 1 mg tablet Take 1 Tab by mouth every twelve (12) hours. Max Daily Amount: 2 mg.  dextroamphetamine-amphetamine (ADDERALL) 20 mg tablet Take 20 mg by mouth. 1/2 tablet  Twice daily          FLUoxetine (PROZAC) 40 mg capsule Take 40 mg by mouth daily.  clonazePAM (KLONOPIN) 0.5 mg tablet Take 1 mg by mouth nightly as needed.  traZODone (DESYREL) 50 mg tablet Take 1 Tab by mouth nightly.  cyclobenzaprine (FLEXERIL) 5 mg tablet Take 1 Tab by mouth two (2) times a day.  pramoxine (PROCTOFOAM) 1 % topical foam Apply  to affected area three (3) times daily as needed for Hemorrhoids.  valACYclovir (VALTREX) 500 mg tablet Take 1 Tab by mouth daily.  cyanocobalamin (VITAMIN B-12) 1,000 mcg tablet Take 1,000 mcg by mouth daily.  ascorbic acid, vitamin C, (VITAMIN C) 500 mg tablet Take 500 mg by mouth daily.  hydrocortisone (ANUSOL-HC) 2.5 % rectal cream Insert  into rectum four (4) times daily.  As needed    cetirizine (ZYRTEC) 10 mg tablet Take  by mouth. No current facility-administered medications for this visit.         Patient Active Problem List    Diagnosis Date Noted    Gastroparesis 2018    Plantar fasciitis of left foot 2018    Positive KITTY (antinuclear antibody) 2018    Severe obesity ([address] 2018    Class 2 obesity due to excess calories without serious comorbidity in adult 2018    Mood disorder ([address] 2018    Chronic pain syndrome 2018    Fibromyalgia 2018    Panic attack 2018    Tremor 2018    Recurrent depression ([address] 01/15/2018    Inflammatory arthritis 08/10/2016    Vitamin D deficiency 2016    Thrombosed external hemorrhoid 2016    IFG (impaired fasting glucose) 09/10/2015    Hyperlipidemia 09/10/2015    Hoarseness 2013    OCD (obsessive compulsive disorder) 2012    HSV (herpes simplex virus) anogenital infection 2011    Costochondritis 2011    Anxiety 2010    Itch of skin 2010    Depression     GERD (gastroesophageal reflux disease)     Hypertension     Anemia, unspecified        Past Medical History:   Diagnosis Date    Anemia NEC     Arthritis     Chronic pain     fybromyalsia    Depression     anxiety, depression, OCD    GERD (gastroesophageal reflux disease)     Hypertension     Musculoskeletal disorder     SOB (shortness of breath)     Stool color black        Allergies   Allergen Reactions    Sulfa (Sulfonamide Antibiotics) Hives    Vicodin [Hydrocodone-Acetaminophen] Nausea and Vomiting       Past Surgical History:   Procedure Laterality Date    HX GYN           HX HEENT  2002    wisdom teeth extraction    UPPER GI ENDOSCOPY,BIOPSY  2018         UPPER GI ENDOSCOPY,DIAGNOSIS  2018            Social History     Socioeconomic History    Marital status: SINGLE     Spouse name: Not on file    Number of children: Not on file    Years of education: Not on file    Highest education level: Not on file   Tobacco Use    Smoking status: Current Some Day Smoker     Packs/day: 0.25     Years: 8.00     Pack years: 2.00     Types: Cigarettes     Last attempt to quit: 2018     Years since quittin.1    Smokeless tobacco: Never Used   Substance and Sexual Activity    Alcohol use: Yes     Alcohol/week: 1.0 standard drinks     Types: 1 Glasses of wine per week     Comment: 1 cup of wine/week    Drug use: No    Sexual activity: Yes     Partners: Male     Birth control/protection: None         ROS   Complete ROS negative except pertinent in HPI    Objective:     Vitals:    19 1003 19 1106 19 1107   BP: 118/80 131/89 123/85   Pulse: 79 78 91   Resp: 16     Temp: 98.3 °F (36.8 °C)     TempSrc: Oral     SpO2: 99%     Weight: 170 lb 3.2 oz (77.2 kg)     Height: 5' 4\" (1.626 m)        Body mass index is 29.21 kg/m². Physical Exam   Constitutional: She is oriented to person, place, and time. She appears well-developed and well-nourished. HENT:   Head: Normocephalic and atraumatic. Eyes: Right eye exhibits no discharge. Left eye exhibits no discharge. No scleral icterus. Neck: Normal range of motion. Neck supple. No thyromegaly present. Cardiovascular: Normal rate and regular rhythm. Exam reveals no gallop and no friction rub. No murmur heard. Pulmonary/Chest: Effort normal. No respiratory distress. She has no wheezes. She has no rales. Abdominal: Soft. She exhibits no distension. There is no tenderness. Musculoskeletal: Normal range of motion. Lymphadenopathy:     She has no cervical adenopathy. Neurological: She is alert and oriented to person, place, and time. She has normal reflexes. CNII-XII grossly intact. Strength 5/5. Sensation intact. Skin: Skin is warm and dry. Psychiatric: She has a normal mood and affect.       Renetta-Halpike negative with unsteadiness    Health Maintenance Due Topic Date Due    Pneumococcal 0-64 years (1 of 1 - PPSV23) 08/27/1988       Assessment and orders:     Encounter Diagnoses     ICD-10-CM ICD-9-CM   1. Urge incontinence N39.41 788.31   2. Bronchitis J40 490   3. Fibromyalgia M79.7 729.1   4. Sinus headache R51 784.0     1. Sinus headache  -Likely causing headache and dizziness  -Due to patient's medical history we will use Afrin nasal spray as a decongestant  -Tylenol for pain  -Follow up with Psychiatry as polypharmacy may be contributing to dizziness   -If persists, consider Excedrin migraine or Neurology referral    1. Bronchitis  -stable  - albuterol (PROVENTIL HFA, VENTOLIN HFA, PROAIR HFA) 90 mcg/actuation inhaler; Take 1 Puff by inhalation every six (6) hours as needed for Wheezing. Dispense: 1 Inhaler; Refill: 5    2. Fibromyalgia  -Decreasing Gabapentin from 300 TID to 300 mg in morning and two 300 mg tabs at night  - gabapentin (NEURONTIN) 300 mg capsule; Take 1 tabs in morning and 2 tabs at nighttime. Indications: fibromyalgia  Dispense: 90 Cap; Refill: 1    3. Urge incontinence  - REFERRAL TO UROGYNECOLOGY with Dr. Adi Canales  -Psych medications could be contributing as well  -Consider MS workup- larissa MRI              Plan of care:  Discussed diagnoses in detail with patient. Medication risks/benefits/side effects discussed with patient. All of the patient's questions were addressed. The patient understands and agrees with our plan of care. The patient knows to call back if they are unsure of or forget any changes we discussed today or if the symptoms change. The patient received an After-Visit Summary which contains VS, orders, medication list and allergy list. This can be used as a \"mini-medical record\" should they have to seek medical care while out of town.         Future Appointments   Date Time Provider Komal Rodriguez   9/10/2019 10:10 AM Shaheen Schmidt MD [facility] GIA SCHED       Signed By: Jefferson Doe DO     July 23, 2019

## 2019-07-23 NOTE — PROGRESS NOTES
Chief Complaint   Patient presents with    Referral Request     Urology    Head Pain     Head Pressure     Dizziness     Body mass index is 29.21 kg/m². 1. Have you been to the ER, urgent care clinic since your last visit? Hospitalized since your last visit? No    2. Have you seen or consulted any other health care providers outside of the [address] [place] [address] since your last visit? Include any pap smears or colon screening.  Yes, Dr. Marzena Bob record in preparation for visit and have necessary documentation  Pt did not bring medication to office visit for review  Information was given to pt on Advanced Directives, Living Will  Information was given on Shingles Vaccine  Opportunity was given for questions  Goals that were addressed and/or need to be completed after this appointment include:     Health Maintenance Due   Topic Date Due    Pneumococcal 0-64 years (1 of 1 - PPSV23) 08/27/1988

## 2019-07-23 NOTE — LETTER
Ruth Alonso :1980 MR #:[id number] [address] [facility] Page 1 of 5 CONTROLLED SUBSTANCE AGREEMENT I may be prescribed medications that are controlled substances as part  of my treatment plan for management of my medical condition(s). The goal of my treatment plan is to maintain and/or improve my health and wellbeing. Because controlled substances have an increased risk of abuse or harm, continual re-evaluation is needed determine if the goals of my treatment plan are being met for my safety and the safety of others. Mendez Gilliland  am entering into this Controlled Substance Agreement with my provider, __________________________________ at [facility] . I understand that successful treatment requires mutual trust and honesty between me and my provider. I understand that there are state and federal laws and regulations which apply to the medications that my provider may prescribe that must be followed. I understand there are risks and benefits ts of taking the medicines that my provider may prescribe. I understand and agree that following this Agreement is necessary in continuing my provider-patient relationship and success of my treatment plan. As a part of my treatment plan, I agree to the following: COMMUNICATION: 
 
1. I will communicate fully with my provider about my medical condition(s), including the effect on my daily life and how well my medications are helping. I will tell my provider all of the medications that I take for any reason, including medications I receive from another health care provider, and will notify my provider about all issues, problems or concerns, including any side effects, which may be related to my medications. I understand that this information allows my provider to adjust my treatment plan to help manage my medical condition.  I understand that this information will become part of my permanent medical record. 2. I will notify my provider if I have a history of alcohol/drug misuse/addiction or if I have had treatment for alcohol/drug addiction in the past, or if I have a new problem with or concern about alcohol/drug use/addiction, because this increases the likelihood of high risk behaviors and may lead to serious medical conditions. 3. Females Only: I will notify my provider if I am or become pregnant, or if I intend to become pregnant, or if I intend to breastfeed. I understand that communication of these issues with my provider is important, due to possible effects my medication could have on an unborn fetus or breastfeeding child. Initials_____ Edd [place] :1980 MR #:[id number] [address] [place] [address] Page 2 of 5 MISUSE OF MEDICATIONS / DRUGS: 
 
1. I agree to take all controlled substances as prescribed, and will not misuse or abuse any controlled substances prescribed by my provider. For my safety, I will not increase the amount of medicine I take without first talking with and getting permission from my provider. 2. If I have a medical emergency, another health care provider may prescribe me medication. If I seek emergency treatment, I will notify my provider within seventy-two (72) hours. 3. I understand that my provider may discuss my use and/or possible misuse/abuse of controlled substances and alcohol, as appropriate, with any health care provider involved in my care, pharmacist or legal authority. ILLEGAL DRUGS: 
 
1. I will not use illegal drugs of any kind, including but not limited to marijuana, heroin, cocaine, or any prescription drug which is not prescribed to me. DRUG DIVERSION / PRESCRIPTION FRAUD: 
 
1. I will not share, sell, trade, give away, or otherwise misuse my prescriptions or medications. 2. I will not alter any prescriptions provided to me by my provider. SINGLE PROVIDER: 
 
1. I agree that all controlled substances that I take will be prescribed only by my provider (or his/her covering provider) under this Agreement. This agreement does not prevent me from seeking emergency medical treatment or receiving pain management related to a surgery. PROTECTING MEDICATIONS: 
 
1. I am responsible for keeping my prescriptions and medications in a safe and secure place including safeguarding them from loss or theft. I understand that lost, stolen or damaged/destroyed prescriptions or medications will not be replaced. Initials____ Sherry Magallanes :1980 MR #:[id number] [address] [facility] Page 3 of 5 PRESCRIPTION RENEWALS/REFILLS: 
 
1. I will follow my controlled substance medication schedule as prescribed by my provider. 2. I understand and agree that I will make any requests for renewals or refills of my prescriptions only at the time of an office visit or during my providers regular office hours subject to the prescription refill requirements of the individual practice. 3. I understand that my provider may not call in prescriptions for controlled substances to my pharmacy. 4. I understand that my provider may adjust or discontinue these medications as deemed appropriate for my medical treatment plan. This Agreement does not guarantee the prescription of controlled medications. 5. I agree that if my medications are adjusted or discontinued, I will properly dispose of any remaining medications. I understand that I will be required to dispose of any remaining controlled medications prior to being provided with any prescriptions for other controlled medications.  
 
6. I understand that the renewal of my prescription depends on my medical condition, my consistent participation, and my adherence with my treatment plan and this Agreement. 7. I understand that if I do not keep an appointment with my provider, I may not receive a renewal or refill for my controlled substance medication. PRESCRIPTION MONITORING / DRUG TESTIN. I understand that my provider may require me to provide urine, saliva or blood for testing at any time. I understand that this testing will be used to monitor for safety and adherence with my treatment plan and this Agreement. 2. I understand that my provider may ask me to provide an observed urine specimen, which means that a nurse or other health care provider may watch me provide urine, and I agree to cooperate if I am asked to provide an observed specimen. 3. I understand that if I do not provide urine, saliva or blood samples within two (2) hours of my providers request, or other timeframe decided by my provider, my treatment plan could be changed, or my prescriptions and medications may be changed or ended. 4. I understand that urine, saliva and blood test results will be a part of my permanent medical record. Initials_____ Ma Yordy :1980 MR #:[id number] [address] [place] [address] Page 4 of 5 
 
 
1. I authorize my provider and my pharmacy to cooperate fully with any local, state, or federal law enforcement agency in the investigation of any possible misuse, sale, or other diversion of my controlled substance prescriptions or medications. RISKS: 
 
1. I understand that my level of consciousness may be affected from the use of controlled substances, and I understand that there are risks, benefits, effects and potential alternatives (including no treatment) to the medications that my provider has prescribed. 2. I understand that I may become drowsy, tired, dizzy, constipated, and sick to my stomach, or have changes in my mood or in my sleep while taking my medications. I have talked with my provider about these possible side effects, risks, benefits, and alternative treatments, and my provider has answered all of my questions. 3. I understand that I should not suddenly stop taking my medications without first speaking with my provider. I understand that if I suddenly stop taking my medications, I may experience nausea, vomiting, sweating,anxiety, sleeplessness, itching or other uncomfortable feelings. 4. I will not take my medications with alcohol of any kind, including beer, wine or liquor. I understand that drinking alcohol with my medications increases the chances of side effects, including breathing problems or even death. 5. I understand that if I have a history of alcoholism or other drug addiction I may be at increased risk of addiction to my medications. Signs of addiction might include craving, compulsive use, and continued use despite harm. Since addiction is a disease, I understand my provider may decide to change my medications and refer me to appropriate treatment services. I understand that this information would become part of my permanent medical record. Initials_____ Blessing Mattson :1980 MR #:[id number]  [facility] Page 5 of 5  
 
 
6. Females only: Children born to women who regularly take controlled substances are likely to have physical problems and suffer withdrawal symptoms at birth. If I am of child-bearing age, I understand that I should use safe and effective birth control while taking any controlled substances to avoid the impact of medications on an unborn fetus or  child. I agree to notify my provider immediately if I should become pregnant so that my treatment plan can be adjusted. 7. Males only: I understand that chronic use of controlled substances has been associated with low testosterone levels in males which may affect my mood, stamina, sexual desire, and general health. I understand that my provider may order the appropriate laboratory test to determine my testosterone level,and I agree to this testing. ADHERENCE: 
 
1. I understand that if I do not adhere to this Agreement in any way, my provider may change my prescriptions, stop prescribing controlled substances or end our provider-patient relationship. 2. If my provider decides to stop prescribing medication, or decides to end our provider-patient relationship,my provider may require that I taper my medications slowly. If necessary, my provider may also provide a prescription for other medications to treat my withdrawal symptoms. UNDERSTANDING THIS AGREEMENT: 
 
I understand that my provider may adjust or stop my prescriptions for controlled substances based on my medical condition and my treatment plan. I understand that this Agreement does not guarantee that I will be prescribed medications or controlled substances. I understand that controlled substances may be just one part 
of my treatment plan.  
 
My initial on each page and my signature below shows that I have read each page of this Agreement, I have had an opportunity to ask questions, and all of my questions have been answered to my satisfaction by my provider. By signing below, I agree to comply with this Agreement, and I understand that if I do not follow the Agreements listed above, my provider may stop 
 
_________________________________________  Date/Time 7/23/2019 11:19 AM   
             (Patient Signature) 
 
________________________________________    Date/Time 7/23/2019 11:19 AM 
 (Parent or Guardian Signature if <18 yrs) 
 
_________________________________________ Date/Time 7/23/2019 11:19 AM 
 (Provider Signature)

## 2019-07-23 NOTE — PATIENT INSTRUCTIONS
Urge Incontinence in Women: Care Instructions  Your Care Instructions    Urge incontinence occurs when the need to urinate is so strong that you cannot reach the toilet in time, even when your bladder contains only a small amount of urine. This is also called overactive bladder or unstable bladder. Some women may have no warning before they leak urine. This condition does not cause major health problems. But it can be embarrassing and can affect a woman's self-esteem and confidence. Treatment can cure or improve your symptoms. Follow-up care is a key part of your treatment and safety. Be sure to make and go to all appointments, and call your doctor if you are having problems. It's also a good idea to know your test results and keep a list of the medicines you take. How can you care for yourself at home? · Be safe with medicines. Take your medicines exactly as prescribed. Call your doctor if you think you are having a problem with your medicine. You will get more details on the specific medicines your doctor prescribes. · Limit caffeine and alcohol. They stimulate urine production. · Urinate every 2 to 4 hours during waking hours, even if you feel that you do not have to go. · Do pelvic floor (Kegel) exercises, which tighten and strengthen pelvic muscles. To do Kegel exercises:  ? Squeeze the same muscles you would use to stop your urine. Your belly and thighs should not move. ? Hold the squeeze for 3 seconds, then relax for 3 seconds. ? Start with 3 seconds. Then add 1 second each week until you are able to squeeze for 10 seconds. ? Repeat the exercise 10 to 15 times for each session. Do three or more sessions each day. · Try wearing pads that absorb the leaks. · Keep skin in the genital area dry. When should you call for help? Call your doctor now or seek immediate medical care if:    · You have new urinary symptoms.  These may include leaking urine, having pain when urinating, or feeling like you need to urinate often.    Watch closely for changes in your health, and be sure to contact your doctor if:    · You do not get better as expected. Where can you learn more? Go to http://daniel-carlton.info/. Enter T526 in the search box to learn more about \"Urge Incontinence in Women: Care Instructions. \"  Current as of: February 19, 2019  Content Version: 12.1  © 8047-7824 [employer]. Care instructions adapted under license by [employer] (which disclaims liability or warranty for this information). If you have questions about a medical condition or this instruction, always ask your healthcare professional. [address] any warranty or liability for your use of this information. Dizziness: Care Instructions  Your Care Instructions  Dizziness is the feeling of unsteadiness or fuzziness in your head. It is different than having vertigo, which is a feeling that the room is spinning or that you are moving or falling. It is also different from lightheadedness, which is the feeling that you are about to faint. It can be hard to know what causes dizziness. Some people feel dizzy when they have migraine headaches. Sometimes bouts of flu can make you feel dizzy. Some medical conditions, such as heart problems or high blood pressure, can make you feel dizzy. Many medicines can cause dizziness, including medicines for high blood pressure, pain, or anxiety. If a medicine causes your symptoms, your doctor may recommend that you stop or change the medicine. If it is a problem with your heart, you may need medicine to help your heart work better. If there is no clear reason for your symptoms, your doctor may suggest watching and waiting for a while to see if the dizziness goes away on its own. Follow-up care is a key part of your treatment and safety. Be sure to make and go to all appointments, and call your doctor if you are having problems.  It's also a good idea to know your test results and keep a list of the medicines you take. How can you care for yourself at home? · If your doctor recommends or prescribes medicine, take it exactly as directed. Call your doctor if you think you are having a problem with your medicine. · Do not drive while you feel dizzy. · Try to prevent falls. Steps you can take include:  ? Using nonskid mats, adding grab bars near the tub, and using night-lights. ? Clearing your home so that walkways are free of anything you might trip on.  ? Letting family and friends know that you have been feeling dizzy. This will help them know how to help you. When should you call for help? Call 911 anytime you think you may need emergency care. For example, call if:    · You passed out (lost consciousness).     · You have dizziness along with symptoms of a heart attack. These may include:  ? Chest pain or pressure, or a strange feeling in the chest.  ? Sweating. ? Shortness of breath. ? Nausea or vomiting. ? Pain, pressure, or a strange feeling in the back, neck, jaw, or upper belly or in one or both shoulders or arms. ? Lightheadedness or sudden weakness. ? A fast or irregular heartbeat.     · You have symptoms of a stroke. These may include:  ? Sudden numbness, tingling, weakness, or loss of movement in your face, arm, or leg, especially on only one side of your body. ? Sudden vision changes. ? Sudden trouble speaking. ? Sudden confusion or trouble understanding simple statements. ? Sudden problems with walking or balance.   ? A sudden, severe headache that is different from past headaches.    Call your doctor now or seek immediate medical care if:    · You feel dizzy and have a fever, headache, or ringing in your ears.     · You have new or increased nausea and vomiting.     · Your dizziness does not go away or comes back.    Watch closely for changes in your health, and be sure to contact your doctor if:    · You do not get better as expected. Where can you learn more? Go to http://daniel-carlton.info/. Enter R651 in the search box to learn more about \"Dizziness: Care Instructions. \"  Current as of: September 23, 2018  Content Version: 12.1  © 1165-0824 [employer], [employer]. Care instructions adapted under license by [employer] (which disclaims liability or warranty for this information). If you have questions about a medical condition or this instruction, always ask your healthcare professional. [address] any warranty or liability for your use of this information.

## 2019-08-07 ENCOUNTER — TELEPHONE (OUTPATIENT)
Dept: FAMILY MEDICINE CLINIC | Age: 39
End: 2019-08-07

## 2019-08-07 NOTE — TELEPHONE ENCOUNTER
Pt states that she needs a prior auth on pantoprazole (PROTONIX) 20 mg tablet. The insurance do not want to pay for it now. Pt states she needs it for her GERD.  Thanks

## 2019-08-09 NOTE — TELEPHONE ENCOUNTER
Spoke with patient and advised her that the Pantoprazole was approved. Patient verbalized understanding.

## 2019-09-05 ENCOUNTER — OFFICE VISIT (OUTPATIENT)
Dept: FAMILY MEDICINE CLINIC | Age: 39
End: 2019-09-05

## 2019-09-05 VITALS
SYSTOLIC BLOOD PRESSURE: 113 MMHG | DIASTOLIC BLOOD PRESSURE: 82 MMHG | RESPIRATION RATE: 18 BRPM | HEART RATE: 80 BPM | HEIGHT: 64 IN | WEIGHT: 166 LBS | TEMPERATURE: 97.1 F | BODY MASS INDEX: 28.34 KG/M2 | OXYGEN SATURATION: 98 %

## 2019-09-05 DIAGNOSIS — Z87.39 H/O FIBROMYALGIA: ICD-10-CM

## 2019-09-05 DIAGNOSIS — R20.0 NUMBNESS AND TINGLING OF BOTH LOWER EXTREMITIES: Primary | ICD-10-CM

## 2019-09-05 DIAGNOSIS — R52 CHRONIC GENERALIZED PAIN: ICD-10-CM

## 2019-09-05 DIAGNOSIS — R20.2 NUMBNESS AND TINGLING OF BOTH LOWER EXTREMITIES: Primary | ICD-10-CM

## 2019-09-05 DIAGNOSIS — G89.29 CHRONIC GENERALIZED PAIN: ICD-10-CM

## 2019-09-05 DIAGNOSIS — Z79.899 MEDICATION MANAGEMENT: ICD-10-CM

## 2019-09-05 RX ORDER — CYCLOBENZAPRINE HCL 5 MG
5 TABLET ORAL 2 TIMES DAILY
Qty: 24 TAB | Refills: 0 | Status: SHIPPED | OUTPATIENT
Start: 2019-09-05 | End: 2019-09-17

## 2019-09-05 RX ORDER — PETROLATUM 42 G/100G
OINTMENT TOPICAL 2 TIMES DAILY
Qty: 396 G | Refills: 2 | Status: SHIPPED | OUTPATIENT
Start: 2019-09-05 | End: 2020-12-21

## 2019-09-05 NOTE — PATIENT INSTRUCTIONS
Numbness and Tingling: Care Instructions  Your Care Instructions    Many things can cause numbness or tingling. Swelling may put pressure on a nerve. This could cause you to lose feeling or have a pins-and-needles sensation on part of your body. Nerves may be damaged from trauma, toxins, or diseases, such as diabetes or multiple sclerosis (MS). Sometimes, though, the cause is not clear. If there is no clear reason for your symptoms, and you are not having any other symptoms, your doctor may suggest watching and waiting for a while to see if the numbness or tingling goes away on its own. Your doctor may want you to have blood or nerve tests to find the cause of your symptoms. Follow-up care is a key part of your treatment and safety. Be sure to make and go to all appointments, and call your doctor if you are having problems. It's also a good idea to know your test results and keep a list of the medicines you take. How can you care for yourself at home? · If your doctor prescribes medicine, take it exactly as directed. Call your doctor if you think you are having a problem with your medicine. · If you have any swelling, put ice or a cold pack on the area for 10 to 20 minutes at a time. Put a thin cloth between the ice and your skin. When should you call for help? Call 911 anytime you think you may need emergency care. For example, call if:    · You have weakness, numbness, or tingling in both legs.     · You lose bowel or bladder control.     · You have symptoms of a stroke. These may include:  ? Sudden numbness, tingling, weakness, or loss of movement in your face, arm, or leg, especially on only one side of your body. ? Sudden vision changes. ? Sudden trouble speaking. ? Sudden confusion or trouble understanding simple statements. ? Sudden problems with walking or balance.   ? A sudden, severe headache that is different from past headaches.    Watch closely for changes in your health, and be sure to contact your doctor if you have any problems, or if:    · You do not get better as expected. Where can you learn more? Go to http://daniel-carlton.info/. Enter K831 in the search box to learn more about \"Numbness and Tingling: Care Instructions. \"  Current as of: March 28, 2019  Content Version: 12.1  © 6292-4423 [employer] Care instructions adapted under license by [employer] (which disclaims liability or warranty for this information). If you have questions about a medical condition or this instruction, always ask your healthcare professional. [place] [address] any warranty or liability for your use of this information. Epsom salt soaks in warm water twice a day. Cotton socks and tennis shoes versus flip flops and flat shoes. Compression hose during the day. Hydrate with water. Elevate your legs.

## 2019-09-05 NOTE — PROGRESS NOTES
James Lerner  42 y.o. female  1980  [address] [address]  [id number]     [facility]: Progress Note       Encounter Date: 9/5/2019    Chief Complaint   Patient presents with    Foot Swelling     feet burning when walking, dry     History of Present Illness   James Lerner is a 42 y.o. female who presents to clinic today for:    Bilateral feet burning- hx of chronic pain/fibromyalgia, depression, arthritis. Reviewed medication list and her pill bottles are present at this visit. States she is putting on several different lotions and vaseline on her feet and her feet are still dry, burning, numbness and tingling. Tattoo noted on right foot >10years. Does not go to a nail shop for pedicures; denies s/s of infection. States when she gets out of bed and put her feet on the floor she notes pain and burning. States this problem has been going on for \"years\". Reports not hydrating as much due to having a over active bladder for which she is supposed to f/u with urology for further evaluation. Reports not wearing her supportive shoes as recommended and continues to wear flat shoes and flip flops. Health Maintenance    Health Maintenance Due   Topic Date Due    Pneumococcal 0-64 years (1 of 1 - PPSV23) 08/27/1988     Review of Systems   Review of Systems   Constitutional: Negative. HENT: Negative. Eyes: Negative. Respiratory: Negative. Cardiovascular: Negative. Gastrointestinal: Negative. Genitourinary: Negative. Musculoskeletal: Positive for myalgias. Skin: Negative. Neurological: Positive for tingling and sensory change. Endo/Heme/Allergies: Negative. Psychiatric/Behavioral: Negative. Vitals/Objective:     Vitals:    09/05/19 0917   BP: 113/82   Pulse: 80   Resp: 18   Temp: 97.1 °F (36.2 °C)   TempSrc: Oral   SpO2: 98%   Weight: 166 lb (75.3 kg)   Height: 5' 4\" (1.626 m)     Body mass index is 28.49 kg/m².     Physical Exam Musculoskeletal:        Right foot: There is normal range of motion, no tenderness, no bony tenderness, no swelling, normal capillary refill, no crepitus, no deformity and no laceration. Left foot: There is normal range of motion, no tenderness, no bony tenderness, no swelling, normal capillary refill, no crepitus, no deformity and no laceration. Monofilament exam-normal sensation. Feet and toes warm. Able to wiggle her toes. Negative for edema (feet and calves). Hair noted on legs and toes. Well manicured feet. Skin warm, dry and intact. Strong bilateral palpable pulse. Strength 5/5. No results found for this or any previous visit (from the past 24 hour(s)). Assessment and Plan:   1. Numbness and tingling of both lower extremities    - Comp Stocking,Knee,Regular,Med misc; 1 Each by Does Not Apply route daily. Dispense: 1 Each; Refill: 1    Discussed the following:  Epsom salt soaks in warm water twice a day. Cotton socks and tennis shoes versus flip flops and flat shoes. Compression hose during the day. Hydrate with water. Elevate your legs. Use Aquaphor healing ointment in a jar BID. Patient has a f/u with PCP in a couple of weeks in which she is likely to get routine labs. States she is compliant with the gabapentin as prescribed. Also noted that she states she stopped taking her oral B12 and vitamin C. Advised patient to resume taking the supplements. With her plethora of dx and complaints not sure if the sensation issues are related to her fibro vs low B12 levels. Short term refill on the flexeril 5mg until she f/u with her PCP. I have discussed the diagnosis with the patient and the intended plan as seen in the above orders. she has expressed understanding. The patient has received an after-visit summary and questions were answered concerning future plans. I have discussed medication side effects and warnings with the patient as well.     Electronically Signed: Paxton Leonard Joanne Corporal, NP     History/Allergies   Patients past medical, surgical and family histories were reviewed and updated. Past Medical History:   Diagnosis Date    Anemia NEC     Arthritis     Chronic pain     fybromyalsia    Depression     anxiety, depression, OCD    GERD (gastroesophageal reflux disease)     Hypertension     Musculoskeletal disorder     SOB (shortness of breath)     Stool color black       Past Surgical History:   Procedure Laterality Date    HX GYN           HX HEENT  2002    wisdom teeth extraction    UPPER GI ENDOSCOPY,BIOPSY  2018         UPPER GI ENDOSCOPY,DIAGNOSIS  2018          Family History   Problem Relation Age of Onset    Hypertension Father     Elevated Lipids Father     Arthritis-rheumatoid Mother         ? Lupus vs RA    Lung Disease Mother     Heart Disease Mother     Cancer Mother         breast in 39y    COPD Mother     Hypertension Mother     Stroke Mother         3-4 strokes    Breast Cancer Mother 50    Diabetes Maternal Grandmother      Social History     Socioeconomic History    Marital status: SINGLE     Spouse name: Not on file    Number of children: Not on file    Years of education: Not on file    Highest education level: Not on file   Occupational History    Not on file   Social Needs    Financial resource strain: Not on file    Food insecurity:     Worry: Not on file     Inability: Not on file    Transportation needs:     Medical: Not on file     Non-medical: Not on file   Tobacco Use    Smoking status: Former Smoker     Packs/day: 0.25     Years: 8.00     Pack years: 2.00     Types: Cigarettes     Last attempt to quit: 9/3/2018     Years since quittin.0    Smokeless tobacco: Never Used   Substance and Sexual Activity    Alcohol use:  Yes     Alcohol/week: 1.0 standard drinks     Types: 1 Glasses of wine per week     Comment: 1 cup of wine/week    Drug use: No    Sexual activity: Yes     Partners: Male Birth control/protection: None   Lifestyle    Physical activity:     Days per week: Not on file     Minutes per session: Not on file    Stress: Not on file   Relationships    Social connections:     Talks on phone: Not on file     Gets together: Not on file     Attends Scientology service: Not on file     Active member of club or organization: Not on file     Attends meetings of clubs or organizations: Not on file     Relationship status: Not on file    Intimate partner violence:     Fear of current or ex partner: Not on file     Emotionally abused: Not on file     Physically abused: Not on file     Forced sexual activity: Not on file   Other Topics Concern    Not on file   Social History Narrative    Not on file         Allergies   Allergen Reactions    Sulfa (Sulfonamide Antibiotics) Hives    Vicodin [Hydrocodone-Acetaminophen] Nausea and Vomiting       Disposition     Follow-up and Dispositions  ·   Return if symptoms worsen or fail to improve. Future Appointments   Date Time Provider Komal Silvestrei   9/16/2019 10:10 AM [facility]            Current Medications after this visit     Current Outpatient Medications   Medication Sig    mineral oil-hydrophil petrolat (AQUAPHOR) ointment Apply  to affected area two (2) times a day. Apply to bilateral legs and feet.  Comp Stocking,Knee,Regular,Med misc 1 Each by Does Not Apply route daily.  cyclobenzaprine (FLEXERIL) 5 mg tablet Take 1 Tab by mouth two (2) times a day for 12 days. Indications: disorder characterized by stiff, tender & painful muscles    albuterol (PROVENTIL HFA, VENTOLIN HFA, PROAIR HFA) 90 mcg/actuation inhaler Take 1 Puff by inhalation every six (6) hours as needed for Wheezing.  metoclopramide HCl (REGLAN) 5 mg tablet Take 5 mg by mouth Before breakfast, lunch, and dinner.  gabapentin (NEURONTIN) 300 mg capsule Take 1 tabs in morning and 2 tabs at nighttime.   Indications: fibromyalgia    triamcinolone acetonide (KENALOG) 0.1 % ointment Apply  to affected area two (2) times a day. use thin layer    DULoxetine (CYMBALTA) 30 mg capsule Take 30 mg by mouth daily.  ketoconazole (NIZORAL) 2 % shampoo shampoo twice weekly    acetaminophen (TYLENOL 8 HOUR PO) Take  by mouth.  fluticasone propionate (FLONASE) 50 mcg/actuation nasal spray 2 Sprays by Both Nostrils route daily.  diclofenac (VOLTAREN) 1 % gel Apply 4 g to affected area four (4) times daily. Apply to right knee    pantoprazole (PROTONIX) 20 mg tablet Take one tablet by mouth two times a day.  norgestimate-ethinyl estradiol (ORTHO-CYCLEN, SPRINTEC) 0.25-35 mg-mcg tab Take 1 Tab by mouth daily. Indications: Painful Periods    pramoxine (PROCTOFOAM) 1 % topical foam Apply  to affected area three (3) times daily as needed for Hemorrhoids.  lamoTRIgine (LAMICTAL) 25 mg tablet     valACYclovir (VALTREX) 500 mg tablet Take 1 Tab by mouth daily.  clonazePAM (KLONOPIN) 1 mg tablet Take 1 mg by mouth daily. At bedtime    cholecalciferol, vitamin D3, (VITAMIN D3) 2,000 unit tab Take  by mouth.  hydroxychloroquine (PLAQUENIL) 200 mg tablet Take 200 mg by mouth daily. Two tabs    LORazepam (ATIVAN) 1 mg tablet Take 1 Tab by mouth every twelve (12) hours. Max Daily Amount: 2 mg.  dextroamphetamine-amphetamine (ADDERALL) 20 mg tablet Take 20 mg by mouth. 1/2 tablet  Twice daily          FLUoxetine (PROZAC) 40 mg capsule Take 40 mg by mouth daily.  traZODone (DESYREL) 50 mg tablet Take 1 Tab by mouth nightly. No current facility-administered medications for this visit.       Medications Discontinued During This Encounter   Medication Reason    baclofen (LIORESAL) 10 mg tablet Therapy Completed    cyanocobalamin (VITAMIN B-12) 1,000 mcg tablet Therapy Completed    ascorbic acid, vitamin C, (VITAMIN C) 500 mg tablet Therapy Completed    hydrocortisone (ANUSOL-HC) 2.5 % rectal cream Therapy Completed    cetirizine (ZYRTEC) 10 mg tablet Therapy Completed    clonazePAM (KLONOPIN) 0.5 mg tablet Therapy Completed    cyclobenzaprine (FLEXERIL) 5 mg tablet Reorder

## 2019-09-05 NOTE — PROGRESS NOTES
1. Have you been to the ER, urgent care clinic since your last visit? Hospitalized since your last visit? no    2. Have you seen or consulted any other health care providers outside of the [address] [place] [address] since your last visit? Including any pap smears or colon screening. [facility] for [facility]    Reviewed record in preparation for visit and have necessary documentation    Pt did not bring medication to office visit for review    Opportunity was given for questions    Goals that were addressed and/or need to be completed during or after this appointment include   Health Maintenance Due   Topic Date Due    Pneumococcal 0-64 years (1 of 1 - PPSV23) 08/27/1988       Body mass index is 28.49 kg/m².

## 2019-09-06 ENCOUNTER — TELEPHONE (OUTPATIENT)
Dept: FAMILY MEDICINE CLINIC | Age: 39
End: 2019-09-06

## 2019-09-06 NOTE — TELEPHONE ENCOUNTER
Pt states that for the compression stockings. Need a prior-auth - fax number . Phone  [phone]. CPT Code also. Please call Pt on the status once get settled because her insurance co takes a while sometimes.

## 2019-09-10 NOTE — TELEPHONE ENCOUNTER
Spoke with patient to inform of issue being worked on and will call with updates. Patient verbalized understanding and appreciative.

## 2019-09-11 NOTE — TELEPHONE ENCOUNTER
Returned patient call,  Informed of: Ins denied coverage of Aquaphor,  [facility] and [facility] both said they do not bill the ins for compression stockings, they can order them but would have to pay out of pocket, and  Spoke with ins company about compression stockings and they said that compression stockings do not need a prior authorization. Patient verbalized understanding and appreciative.

## 2019-09-11 NOTE — TELEPHONE ENCOUNTER
Telephone Encounter        Caller's first/last name:  Camila Deal      Relationship to patient and are they on HIPAA:  Self      Reason for call:  Prior Auth      Further clarification of call:  Pt called wanting to know if a Prior Auth could be done for the compression stockings & Aquaphor ointment. She purchased the generic for the Aquaphor OTC but states that if this is something she needs to use long term, she will need a prior auth so her ins would cover. Pt already spoke w/ ins [employer] and they advised her that the ointment is on the formulary list. Pt also unsure if NP would rather her wait to see Dr. Hoang Dias before doing a Prior Auth for the ointment. Pt requesting a call back letting her know if NP is going to do prior auth for the ointment or recommends she wait until she see Dr. Hoang Dias. Does caller want a return call? Yes        Best contact number:  [phone]      Did you check for a duplicate Encounter?   Yes

## 2019-09-16 ENCOUNTER — OFFICE VISIT (OUTPATIENT)
Dept: FAMILY MEDICINE CLINIC | Age: 39
End: 2019-09-16

## 2019-09-16 VITALS
HEART RATE: 92 BPM | RESPIRATION RATE: 20 BRPM | DIASTOLIC BLOOD PRESSURE: 89 MMHG | TEMPERATURE: 98.5 F | OXYGEN SATURATION: 99 % | WEIGHT: 167 LBS | HEIGHT: 64 IN | BODY MASS INDEX: 28.51 KG/M2 | SYSTOLIC BLOOD PRESSURE: 131 MMHG

## 2019-09-16 DIAGNOSIS — F32.A DEPRESSION, UNSPECIFIED DEPRESSION TYPE: ICD-10-CM

## 2019-09-16 DIAGNOSIS — E78.00 PURE HYPERCHOLESTEROLEMIA: ICD-10-CM

## 2019-09-16 DIAGNOSIS — G89.4 CHRONIC PAIN SYNDROME: ICD-10-CM

## 2019-09-16 DIAGNOSIS — M79.7 FIBROMYALGIA: ICD-10-CM

## 2019-09-16 DIAGNOSIS — G62.9 NEUROPATHY: Primary | ICD-10-CM

## 2019-09-16 DIAGNOSIS — I10 ESSENTIAL HYPERTENSION: ICD-10-CM

## 2019-09-16 RX ORDER — TROSPIUM CHLORIDE ER 60 MG/1
60 CAPSULE ORAL
COMMUNITY
End: 2020-03-05

## 2019-09-16 NOTE — PROGRESS NOTES
Chief Complaint   Patient presents with    Other     Burning in bilateral feet     Fibromyalgia     Body mass index is 28.67 kg/m². 1. Have you been to the ER, urgent care clinic since your last visit? Hospitalized since your last visit? No    2. Have you seen or consulted any other health care providers outside of the [address] [place] [address] since your last visit? Include any pap smears or colon screening. No    Reviewed record in preparation for visit and have necessary documentation  Pt did not bring medication to office visit for review  Information was given to pt on Advanced Directives, Living Will  Information was given on Shingles Vaccine  Opportunity was given for questions  Goals that were addressed and/or need to be completed after this appointment include: There are no preventive care reminders to display for this patient.

## 2019-09-16 NOTE — PROGRESS NOTES
[address], [address] [facility]  [phone]           Progress Note    Patient: Shade Flores MRN: [id number]  SSN: [id number]    YOB: 1980  Age: 42 y.o. Sex: female        Chief Complaint   Patient presents with    Other     Burning in bilateral feet     Fibromyalgia    New Order     Printed prescription for left knee brace          Subjective:     Encounter Diagnoses   Name Primary?  Neuropathy: The burning neuropathy pain on bottom her feet has gotten worse. It was confirmed today with foot exam.  She has decreased vibration as well as decreased sensation to soft touch. She has not been taking her B12. She said it did not help. Yes    Fibromyalgia: Diffuse chronic and very severe. She has had a disability hearing in front of a . Hopefully she will get her disability claim approved soon.  Chronic pain syndrome: She has rheumatoid arthritis, degenerative disc disease in her back as well as severe fibromyalgia. On top of that she now has neuropathy.  Pure hypercholesterolemia:  Cardiovascular risks for her are: LDL goal is under 100  hypertension  hyperlipidemia. Key Antihyperlipidemia Meds     The patient is on no antihyperlipidemia meds. Lab Results   Component Value Date/Time    Cholesterol, total 210 (H) 05/07/2019 03:51 PM    HDL Cholesterol 77 05/07/2019 03:51 PM    LDL, calculated 119 (H) 05/07/2019 03:51 PM    Triglyceride 72 05/07/2019 03:51 PM     Lab Results   Component Value Date/Time    ALT (SGPT) 29 05/07/2019 03:51 PM    AST (SGOT) 15 05/07/2019 03:51 PM    Alk.  phosphatase 75 05/07/2019 03:51 PM    Bilirubin, direct 0.12 12/08/2017 04:08 PM    Bilirubin, total <0.2 05/07/2019 03:51 PM      Myalgias: No   Fatigue: No   Other side effects: no  Wt Readings from Last 3 Encounters:   09/16/19 167 lb (75.8 kg)   09/05/19 166 lb (75.3 kg)   07/23/19 170 lb 3.2 oz (77.2 kg) The patient is aware of our goal to reduce or eliminate the long term problems (such as strokes and heart attacks) related to poorly controlled hyperlipidemia.  Depression, unspecified depression type: most recently her Psychiatrist has told her she is bipolar. If that is the case she is stuck in a prolonged spell of work depression. Within my knowledge she has never been manic.  Essential hypertension: Barely controlled  BP Readings from Last 3 Encounters:   09/16/19 131/89   09/05/19 113/82   07/23/19 123/85     The patient reports:  taking medications as instructed, no medication side effects noted, no TIA's, no chest pain on exertion, no dyspnea on exertion, no swelling of ankles. Key CAD CHF Meds     Patient is on no cardiovascular meds. Lab Results   Component Value Date/Time    Sodium 140 05/07/2019 03:51 PM    Potassium 4.7 05/07/2019 03:51 PM    Chloride 106 05/07/2019 03:51 PM    CO2 21 05/07/2019 03:51 PM    Anion gap 9 08/19/2010 10:36 AM    Glucose 88 05/07/2019 03:51 PM    BUN 12 05/07/2019 03:51 PM    Creatinine 0.75 05/07/2019 03:51 PM    BUN/Creatinine ratio 16 05/07/2019 03:51 PM    GFR est  05/07/2019 03:51 PM    GFR est non- 05/07/2019 03:51 PM    Calcium 9.3 05/07/2019 03:51 PM    Bilirubin, total <0.2 05/07/2019 03:51 PM    AST (SGOT) 15 05/07/2019 03:51 PM    Alk. phosphatase 75 05/07/2019 03:51 PM    Protein, total 6.7 05/07/2019 03:51 PM    Albumin 4.0 05/07/2019 03:51 PM    Globulin 3.6 08/19/2010 10:36 AM    A-G Ratio 1.5 05/07/2019 03:51 PM    ALT (SGPT) 29 05/07/2019 03:51 PM     Low salt diet? yes  Aerobic exercise? no  Our goal is to normalize the blood pressure to decrease the risks of strokes and heart attacks. The patient is in agreement with the plan.                 Current and past medical information:    Current Medications after this visit[de-identified]     Current Outpatient Medications   Medication Sig    trospium (SANCTURA XL) 60 mg capsule Take 60 mg by mouth Daily (before breakfast).  OTHER Please provide patient with a left knee brace. DX: Z87.39    OTHER Please provide patient with a walking stick to assist with ambulation. DX:M79.7    mineral oil-hydrophil petrolat (AQUAPHOR) ointment Apply  to affected area two (2) times a day. Apply to bilateral legs and feet.  Comp Stocking,Knee,Regular,Med misc 1 Each by Does Not Apply route daily.  cyclobenzaprine (FLEXERIL) 5 mg tablet Take 1 Tab by mouth two (2) times a day for 12 days. Indications: disorder characterized by stiff, tender & painful muscles    albuterol (PROVENTIL HFA, VENTOLIN HFA, PROAIR HFA) 90 mcg/actuation inhaler Take 1 Puff by inhalation every six (6) hours as needed for Wheezing.  metoclopramide HCl (REGLAN) 5 mg tablet Take 5 mg by mouth Before breakfast, lunch, and dinner.  gabapentin (NEURONTIN) 300 mg capsule Take 1 tabs in morning and 2 tabs at nighttime. Indications: fibromyalgia    triamcinolone acetonide (KENALOG) 0.1 % ointment Apply  to affected area two (2) times a day. use thin layer    DULoxetine (CYMBALTA) 30 mg capsule Take 30 mg by mouth daily.  ketoconazole (NIZORAL) 2 % shampoo shampoo twice weekly    acetaminophen (TYLENOL 8 HOUR PO) Take  by mouth.  fluticasone propionate (FLONASE) 50 mcg/actuation nasal spray 2 Sprays by Both Nostrils route daily.  diclofenac (VOLTAREN) 1 % gel Apply 4 g to affected area four (4) times daily. Apply to right knee    pantoprazole (PROTONIX) 20 mg tablet Take one tablet by mouth two times a day.  norgestimate-ethinyl estradiol (ORTHO-CYCLEN, SPRINTEC) 0.25-35 mg-mcg tab Take 1 Tab by mouth daily. Indications: Painful Periods    pramoxine (PROCTOFOAM) 1 % topical foam Apply  to affected area three (3) times daily as needed for Hemorrhoids.  lamoTRIgine (LAMICTAL) 25 mg tablet     valACYclovir (VALTREX) 500 mg tablet Take 1 Tab by mouth daily.     clonazePAM (KLONOPIN) 1 mg tablet Take 1 mg by mouth daily. At bedtime    cholecalciferol, vitamin D3, (VITAMIN D3) 2,000 unit tab Take  by mouth.  hydroxychloroquine (PLAQUENIL) 200 mg tablet Take 200 mg by mouth daily. Two tabs    LORazepam (ATIVAN) 1 mg tablet Take 1 Tab by mouth every twelve (12) hours. Max Daily Amount: 2 mg.  dextroamphetamine-amphetamine (ADDERALL) 20 mg tablet Take 20 mg by mouth. 1/2 tablet  Twice daily          FLUoxetine (PROZAC) 40 mg capsule Take 40 mg by mouth daily.  traZODone (DESYREL) 50 mg tablet Take 1 Tab by mouth nightly. No current facility-administered medications for this visit.         Patient Active Problem List    Diagnosis Date Noted    Gastroparesis 11/29/2018     Priority: 1 - One    Plantar fasciitis of left foot 11/29/2018     Priority: 1 - One    Positive KITTY (antinuclear antibody) 11/29/2018     Priority: 1 - One    [facility] 11/29/2018     Priority: 1 - One    Class 2 obesity due to excess calories without serious comorbidity in adult 07/25/2018     Priority: 1 - One    Mood disorder ([facility] 06/12/2018     Priority: 1 - One    Chronic pain syndrome 06/12/2018     Priority: 1 - One    Fibromyalgia 06/12/2018     Priority: 1 - One    Recurrent depression ([facility] 01/15/2018     Priority: 1 - One    Vitamin D deficiency 03/21/2016     Priority: 1 - One    IFG (impaired fasting glucose) 09/10/2015     Priority: 1 - One    Hyperlipidemia 09/10/2015     Priority: 1 - One    Depression      Priority: 1 - One    GERD (gastroesophageal reflux disease)      Priority: 1 - One    Hypertension      Priority: 1 - One    Anemia, unspecified      Priority: 1 - One    Panic attack 06/12/2018    Tremor 06/12/2018    Inflammatory arthritis 08/10/2016    Thrombosed external hemorrhoid 02/05/2016    Hoarseness 03/25/2013    OCD (obsessive compulsive disorder) 02/27/2012    HSV (herpes simplex virus) anogenital infection 09/12/2011    Costochondritis 05/17/2011    Anxiety 2010    Itch of skin 2010       Past Medical History:   Diagnosis Date    Anemia NEC     Arthritis     Chronic pain     fybromyalsia    Depression     anxiety, depression, OCD    GERD (gastroesophageal reflux disease)     Hypertension     Musculoskeletal disorder     SOB (shortness of breath)     Stool color black        Allergies   Allergen Reactions    Sulfa (Sulfonamide Antibiotics) Hives    Vicodin [Hydrocodone-Acetaminophen] Nausea and Vomiting       Past Surgical History:   Procedure Laterality Date    HX GYN           HX HEENT  2002    wisdom teeth extraction    UPPER GI ENDOSCOPY,BIOPSY  2018         UPPER GI ENDOSCOPY,DIAGNOSIS  2018            Social History     Socioeconomic History    Marital status: SINGLE     Spouse name: Not on file    Number of children: Not on file    Years of education: Not on file    Highest education level: Not on file   Tobacco Use    Smoking status: Former Smoker     Packs/day: 0.25     Years: 8.00     Pack years: 2.00     Types: Cigarettes     Last attempt to quit: 9/3/2018     Years since quittin.0    Smokeless tobacco: Never Used   Substance and Sexual Activity    Alcohol use: Yes     Alcohol/week: 1.0 standard drinks     Types: 1 Glasses of wine per week     Comment: 1 cup of wine/week    Drug use: No    Sexual activity: Yes     Partners: Male     Birth control/protection: None       Review of Systems   Constitutional: Negative. Negative for chills, fever, malaise/fatigue and weight loss. HENT: Negative. Negative for hearing loss. Eyes: Negative. Negative for blurred vision and double vision. Respiratory: Negative. Negative for cough, sputum production and shortness of breath. Cardiovascular: Negative. Negative for chest pain and palpitations. Gastrointestinal: Negative. Negative for abdominal pain, blood in stool, heartburn, nausea and vomiting. Genitourinary: Negative.   Negative for dysuria, frequency and urgency. Musculoskeletal: Positive for back pain, joint pain and myalgias. Negative for falls and neck pain. She is wearing a left knee brace. He says her left knee is giving away with her. She wants a prescription for a knee brace. Skin: Negative. Negative for rash. Neurological: Negative. Negative for dizziness, tingling, tremors, weakness and headaches. Endo/Heme/Allergies: Negative. Psychiatric/Behavioral: Negative. Negative for depression. Objective:     Vitals:    09/16/19 1016   BP: 131/89   Pulse: 92   Resp: 20   Temp: 98.5 °F (36.9 °C)   TempSrc: Oral   SpO2: 99%   Weight: 167 lb (75.8 kg)   Height: 5' 4\" (1.626 m)      Body mass index is 28.67 kg/m². Physical Exam   Constitutional: She is oriented to person, place, and time and well-developed, well-nourished, and in no distress. No distress. HENT:   Head: Normocephalic and atraumatic. Mouth/Throat: Oropharynx is clear and moist.   Eyes: Conjunctivae are normal.   Neck: No thyromegaly present. No carotid bruit. Cardiovascular: Normal rate, regular rhythm and normal heart sounds. Exam reveals no gallop and no friction rub. No murmur heard. Pulmonary/Chest: Effort normal and breath sounds normal. No respiratory distress. She has no wheezes. She has no rales. Musculoskeletal: She exhibits tenderness. She exhibits no edema. Neurological: She is alert and oriented to person, place, and time. Diabetic foot exam:     Left Foot:   Visual Exam: normal    Pulse DP: 2+ (normal)   Filament test: reduced sensation    Vibratory sensation: diminished      Right Foot:   Visual Exam: normal    Pulse DP: 2+ (normal)   Filament test: reduced sensation    Vibratory sensation: diminished     Skin: Skin is warm. No rash noted. She is not diaphoretic. No erythema. Psychiatric: Mood and affect normal.   Nursing note and vitals reviewed.         There are no preventive care reminders to display for this patient. Assessment and orders:     Encounter Diagnoses     ICD-10-CM ICD-9-CM   1. Neuropathy G62.9 355.9   2. Fibromyalgia M79.7 729.1   3. Chronic pain syndrome G89.4 338.4   4. Pure hypercholesterolemia E78.00 272.0   5. Depression, unspecified depression type F32.9 311   6. Essential hypertension I10 401.9     Diagnoses and all orders for this visit:    1. Neuropathy-she stopped the B12 on her own. -     VITAMIN B12  -     OTHER; Please provide patient with a walking stick to assist with ambulation. DX:M79.7    2. Fibromyalgia  -     OTHER; Please provide patient with a walking stick to assist with ambulation. DX:M79.7    3. Chronic pain syndrome  -     OTHER; Please provide patient with a walking stick to assist with ambulation. DX:M79.7    4. Pure hypercholesterolemia-continue to watch diet    5. Depression, unspecified depression type-very difficult therapeutic situation. Currently on severe depression. No suicidal ideation to my knowledge. 6. Essential hypertension-controlled    Other orders  -     OTHER; Please provide patient with a left knee brace. DX: Z87.39            Plan of care:  Discussed diagnoses in detail with patient. Medication risks/benefits/side effects discussed with patient. All of the patient's questions were addressed. The patient understands and agrees with our plan of care. The patient knows to call back if they are unsure of or forget any changes we discussed today or if the symptoms change. The patient received an After-Visit Summary which contains VS, orders, medication list and allergy list. This can be used as a \"mini-medical record\" should they have to seek medical care while out of town.     Patient Care Team:  Dolores Nelson MD as PCP - General (Family Practice)  Margo Olivarez MD (Breast Surgery)  Talat Pitt MD (Cardiology)  Alexander Hassan MD (Otolaryngology)  Sabrina Wilburn MD (Internal Medicine)  Stanislaw Balckwell MD (Female Pelvic Medicine and Reconstructive Surgery)    Follow-up and Dispositions    · Return in about 3 months (around 12/16/2019). No future appointments. Signed By: Ana Laura Carbajal MD     September 16, 2019      ATTENTION:   This medical record was transcribed using an electronic medical records/speech recognition system. Although proofread, it may and can contain electronic, spelling and other errors. Corrections may be executed at a later time. Please feel free to contact me for any clarifications as needed.

## 2019-09-17 LAB — VIT B12 SERPL-MCNC: 688 PG/ML (ref 232–1245)

## 2019-09-17 NOTE — PROGRESS NOTES
Her B12 level is still in the normal range however I would still like for her to take B12 rapid dissolving 5000 international units 3 times weekly to see if it helps her neuropathy. At some point in the future she may want to see Dr. Queen Brown the neurologist again if her neuropathy persist or gets worse.     Thank you,  Dr. Claudia Adler

## 2019-09-18 ENCOUNTER — TELEPHONE (OUTPATIENT)
Dept: FAMILY MEDICINE CLINIC | Age: 39
End: 2019-09-18

## 2019-09-18 NOTE — TELEPHONE ENCOUNTER
Telephone Encounter        Caller's first/last name:  Beryle Hickman      Relationship to patient and are they on HIPAA:  Self      Reason for call:  Returning call      Further clarification of call:  Pt returned call and was advised of message. Pt verbalized understanding and has no questions or concerns. Does caller want a return call? No      Best contact number:        Did you check for a duplicate Encounter?   Yes

## 2019-09-18 NOTE — TELEPHONE ENCOUNTER
----- Message from Eloisa Zaldivar LPN sent at 1/99/9193  8:02 AM EDT -----      ----- Message -----  From: Rishabh Garsia MD  Sent: 9/17/2019   4:59 PM EDT  To: Anai Ruiz LPN    Her Q34 level is still in the normal range however I would still like for her to take B12 rapid dissolving 5000 international units 3 times weekly to see if it helps her neuropathy. At some point in the future she may want to see Dr. Maritza Washington the neurologist again if her neuropathy persist or gets worse.     Thank you,  Dr. Luis Miguel Araujo

## 2019-09-18 NOTE — TELEPHONE ENCOUNTER
Attempted to call. No answer. Message left. Need  To advise per Dr. Crow Ornelas:  *Her B12 level is still in the normal range however I would still like for her to take B12 rapid dissolving 5000 international units 3 times weekly to see if it helps her neuropathy.  At some point in the future she may want to see Dr. Peter Ontiveros the neurologist again if her neuropathy persist or gets worse. *

## 2019-09-19 ENCOUNTER — TELEPHONE (OUTPATIENT)
Dept: FAMILY MEDICINE CLINIC | Age: 39
End: 2019-09-19

## 2019-09-19 NOTE — TELEPHONE ENCOUNTER
Called and spoke to patient. Advised her we received forms from [facility] for knee brace. She agreed this is where she would be getting knee brace. She request we send Rx for \"walking cane\" to [facility].  Fax # [phone]

## 2019-09-19 NOTE — TELEPHONE ENCOUNTER
Caller's first/last name:  Carlos Wan    Reason for call:  Prior Auth for cane      Does caller want a return call?  yes      Best contact number:  [phone]      Further clarification of call:      Patient states that insurance is requiring a PA for the cane that Dr. Pacheco Avalos recommended that she get. Please call to discuss at [phone].

## 2019-09-20 DIAGNOSIS — G89.4 CHRONIC PAIN SYNDROME: ICD-10-CM

## 2019-09-20 DIAGNOSIS — G62.9 NEUROPATHY: ICD-10-CM

## 2019-09-20 DIAGNOSIS — M79.7 FIBROMYALGIA: ICD-10-CM

## 2019-09-20 NOTE — TELEPHONE ENCOUNTER
Attempted to call. No answer. Message left. Fax number not working. Need to confirm the number that she needs Rx sent to. Thanks.

## 2019-09-23 ENCOUNTER — TELEPHONE (OUTPATIENT)
Dept: FAMILY MEDICINE CLINIC | Age: 39
End: 2019-09-23

## 2019-09-23 NOTE — TELEPHONE ENCOUNTER
Telephone Encounter        Caller's first/last name: Tom Vegas      Relationship to patient and are they on HIPAA: self      Reason for call:spoke with Emanuel-need to give info concerning walking cane & knee brace      Further clarification of call:[phone]      Does caller want a return call?yes      Best contact number: [phone]      Did you check for a duplicate Encounter? yes

## 2019-09-23 NOTE — TELEPHONE ENCOUNTER
Received a call from [facility] [phone]/Danielito/ They do not carry walking sticks. Only canes, quad canes and walkers.

## 2019-09-23 NOTE — TELEPHONE ENCOUNTER
Called to [facility] to see if they carry a walking stick. Awaiting a return call.

## 2019-09-23 NOTE — TELEPHONE ENCOUNTER
Returned call to patient. Advised her that fax would not send to the number that she gave me. She request that I sent order to DME of choice. I  Will attempt [facility]. She verbalized understanding.

## 2019-09-25 NOTE — TELEPHONE ENCOUNTER
Called and spoke to patient. Advised her that I spoke to [facility] and walking sticks is not something they carry or can order. Dr. Bajwa Officer okay with patient getting cane. Patient states she already has cane and will try to use it more.

## 2019-10-15 ENCOUNTER — OFFICE VISIT (OUTPATIENT)
Dept: FAMILY MEDICINE CLINIC | Age: 39
End: 2019-10-15

## 2019-10-15 ENCOUNTER — TELEPHONE (OUTPATIENT)
Dept: FAMILY MEDICINE CLINIC | Age: 39
End: 2019-10-15

## 2019-10-15 VITALS
DIASTOLIC BLOOD PRESSURE: 90 MMHG | RESPIRATION RATE: 20 BRPM | OXYGEN SATURATION: 96 % | SYSTOLIC BLOOD PRESSURE: 131 MMHG | HEIGHT: 64 IN | TEMPERATURE: 97.9 F | BODY MASS INDEX: 30.63 KG/M2 | WEIGHT: 179.4 LBS | HEART RATE: 89 BPM

## 2019-10-15 DIAGNOSIS — G62.9 NEUROPATHY: Primary | Chronic | ICD-10-CM

## 2019-10-15 DIAGNOSIS — R73.01 IFG (IMPAIRED FASTING GLUCOSE): Chronic | ICD-10-CM

## 2019-10-15 DIAGNOSIS — J32.1 CHRONIC FRONTAL SINUSITIS: Primary | ICD-10-CM

## 2019-10-15 DIAGNOSIS — M79.7 FIBROMYALGIA: Chronic | ICD-10-CM

## 2019-10-15 DIAGNOSIS — K31.84 GASTROPARESIS: Chronic | ICD-10-CM

## 2019-10-15 DIAGNOSIS — G62.9 NEUROPATHY: Chronic | ICD-10-CM

## 2019-10-15 DIAGNOSIS — E78.00 PURE HYPERCHOLESTEROLEMIA: Chronic | ICD-10-CM

## 2019-10-15 DIAGNOSIS — I10 ESSENTIAL HYPERTENSION: Chronic | ICD-10-CM

## 2019-10-15 RX ORDER — TRIAMCINOLONE ACETONIDE 55 UG/1
2 SPRAY, METERED NASAL DAILY
Qty: 1 BOTTLE | Refills: 3 | Status: SHIPPED | OUTPATIENT
Start: 2019-10-15 | End: 2020-12-21

## 2019-10-15 NOTE — TELEPHONE ENCOUNTER
Called and spoke to patient. Advised her per Dr. Milton Frey:  *Please call patient. Deniz Smith has mild chronic allergic sinusitis. Deniz Smith has been switched over to Nasacort which should help better than the Flonase.  She needs to run a coolmist vaporizer in her bedroom every night.  She also needs to be on an antihistamine such as Claritin, Zyrtec or Allegra.  When therefore it hurts have her apply a warm wet compress for 30 minutes.  When I reviewed her chart it appears that she has seen a neurologist in the past-Dr Eduardo Rich tell me who she would like to see for her neuropathy. *    She verbalized understanding.   She does not have preference in neurologist.

## 2019-10-15 NOTE — PROGRESS NOTES
1. Have you been to the ER, urgent care clinic since your last visit? Hospitalized since your last visit? No    2. Have you seen or consulted any other health care providers outside of the [address] [place] [address] since your last visit? Include any pap smears or colon screening. No    Reviewed record in preparation for visit and have necessary documentation  Goals that were addressed and/or need to be completed during or after this appointment include     Health Maintenance Due   Topic Date Due    PAP AKA CERVICAL CYTOLOGY  02/02/2020       Patient is accompanied by self I have received verbal consent from Elizabeth Jarquin to discuss any/all medical information while they are present in the room.

## 2019-10-16 ENCOUNTER — TELEPHONE (OUTPATIENT)
Dept: FAMILY MEDICINE CLINIC | Age: 39
End: 2019-10-16

## 2019-10-16 NOTE — TELEPHONE ENCOUNTER
[facility] called to check and see if the request for clinical notes was received. The request was faxed over on 10/11. Just in case it was not received, they are faxing over another request today.

## 2019-10-30 DIAGNOSIS — M79.7 FIBROMYALGIA: ICD-10-CM

## 2019-10-31 DIAGNOSIS — Z51.81 MEDICATION MONITORING ENCOUNTER: Primary | ICD-10-CM

## 2019-10-31 RX ORDER — GABAPENTIN 300 MG/1
CAPSULE ORAL
Qty: 90 CAP | Refills: 1 | Status: SHIPPED | OUTPATIENT
Start: 2019-10-31 | End: 2019-11-04 | Stop reason: SDUPTHER

## 2019-10-31 NOTE — TELEPHONE ENCOUNTER
I will send in her refill but she needs to come in for a  drug screen, [facility] [id number]  Thank you,  Dr. Vianca Deleon

## 2019-11-04 DIAGNOSIS — M79.7 FIBROMYALGIA: ICD-10-CM

## 2019-11-04 RX ORDER — GABAPENTIN 300 MG/1
CAPSULE ORAL
Qty: 90 CAP | Refills: 1 | Status: SHIPPED | OUTPATIENT
Start: 2019-11-04 | End: 2019-12-30 | Stop reason: SDUPTHER

## 2019-11-04 NOTE — TELEPHONE ENCOUNTER
Pain Contract on file?: Yes: Comment: 07/24/19  Provider: [address] [place] [address] Avenue: Roseanne Tanner   reviewed and appropriate?: YES  Date of last UDS: None    Chart and  reviewed. Reorder of gabapentin. Patient will need UDS on follow up.     MD [facility]  11/04/19

## 2019-11-06 LAB
7AMINOCLONAZEPAM SERPL CFM-MCNC: NEGATIVE NG/ML
ALPRAZ SPEC-MCNC: NEGATIVE NG/ML
AMPHETAMINES SERPL QL SCN: NEGATIVE NG/ML
BARBITURATES SERPL QL SCN: NEGATIVE UG/ML
BENZODIAZ SERPL QL SCN: ABNORMAL NG/ML
BENZODIAZ SPEC QL: POSITIVE
CANNABINOIDS SERPL QL SCN: NEGATIVE NG/ML
CHLORDIAZEP SPEC-MCNC: NEGATIVE NG/ML
CLONAZEPAM SERPL CFM-MCNC: 9 NG/ML
COCAINE+BZE SERPL QL SCN: NEGATIVE NG/ML
DESALKYLFLURAZ SERPL CFM-MCNC: NEGATIVE NG/ML
DIAZEPAM SPEC-MCNC: NEGATIVE NG/ML
FLURAZEPAM SPEC-MCNC: NEGATIVE NG/ML
LORAZEPAM SPEC-MCNC: 10.7 NG/ML
METHADONE SERPL QL SCN: NEGATIVE NG/ML
MIDAZOLAM SPEC-MCNC: NEGATIVE NG/ML
NORCHLORDIAZEP SERPL-MCNC: NEGATIVE NG/ML
NORDIAZEPAM SPEC-MCNC: NEGATIVE NG/ML
OPIATES SERPL QL SCN: NEGATIVE NG/ML
OXAZEPAM SPEC-MCNC: NEGATIVE NG/ML
OXYCODONES, 738315: NEGATIVE NG/ML
PCP SERPL QL SCN: NEGATIVE NG/ML
PROPOXYPH SERPL QL SCN: NEGATIVE NG/ML
TEMAZEPAM SPEC-MCNC: NEGATIVE NG/ML
TRIAZOLAM SPEC-MCNC: NEGATIVE NG/ML

## 2019-11-26 ENCOUNTER — OFFICE VISIT (OUTPATIENT)
Dept: FAMILY MEDICINE CLINIC | Age: 39
End: 2019-11-26

## 2019-11-26 VITALS
BODY MASS INDEX: 30.93 KG/M2 | OXYGEN SATURATION: 97 % | RESPIRATION RATE: 18 BRPM | WEIGHT: 181.2 LBS | HEART RATE: 104 BPM | TEMPERATURE: 97.3 F | SYSTOLIC BLOOD PRESSURE: 117 MMHG | DIASTOLIC BLOOD PRESSURE: 83 MMHG | HEIGHT: 64 IN

## 2019-11-26 DIAGNOSIS — J02.0 STREP PHARYNGITIS: Primary | ICD-10-CM

## 2019-11-26 DIAGNOSIS — J02.9 SORE THROAT: ICD-10-CM

## 2019-11-26 DIAGNOSIS — J00 ACUTE NASOPHARYNGITIS: ICD-10-CM

## 2019-11-26 DIAGNOSIS — J40 BRONCHITIS: ICD-10-CM

## 2019-11-26 DIAGNOSIS — R05.9 COUGH: ICD-10-CM

## 2019-11-26 LAB
QUICKVUE INFLUENZA TEST: NEGATIVE
S PYO AG THROAT QL: POSITIVE
VALID INTERNAL CONTROL?: YES
VALID INTERNAL CONTROL?: YES

## 2019-11-26 RX ORDER — PREDNISONE 20 MG/1
40 TABLET ORAL
Qty: 10 TAB | Refills: 0 | Status: SHIPPED | OUTPATIENT
Start: 2019-11-26 | End: 2019-12-01

## 2019-11-26 RX ORDER — ALBUTEROL SULFATE 90 UG/1
1 AEROSOL, METERED RESPIRATORY (INHALATION)
Qty: 1 INHALER | Refills: 5 | Status: SHIPPED | OUTPATIENT
Start: 2019-11-26 | End: 2020-12-23 | Stop reason: SDUPTHER

## 2019-11-26 RX ORDER — AMOXICILLIN AND CLAVULANATE POTASSIUM 875; 125 MG/1; MG/1
1 TABLET, FILM COATED ORAL 2 TIMES DAILY
Qty: 20 TAB | Refills: 0 | Status: SHIPPED | OUTPATIENT
Start: 2019-11-26 | End: 2019-12-06

## 2019-11-26 NOTE — PROGRESS NOTES
Chief Complaint   Patient presents with    Cold Symptoms     cough, congestion     Visit Vitals  /83 (BP 1 Location: Right arm, BP Patient Position: Sitting)   Pulse (!) 104   Temp 97.3 °F (36.3 °C) (Oral)   Resp 18   Ht 5' 4\" (1.626 m)   Wt 181 lb 3.2 oz (82.2 kg)   SpO2 97%   BMI 31.10 kg/m²     1. Have you been to the ER, urgent care clinic since your last visit? Hospitalized since your last visit? No    2. Have you seen or consulted any other health care providers outside of the [address] [place] [address] since your last visit? Include any pap smears or colon screening.  No    Reviewed record in preparation for visit and have necessary documentation  Pt did not bring medication to office visit for review  opportunity was given for questions  Goals that were addressed and/or need to be completed during or after this appointment include   Health Maintenance Due   Topic Date Due    PAP AKA CERVICAL CYTOLOGY  02/02/2020

## 2019-11-26 NOTE — PATIENT INSTRUCTIONS
Upper Respiratory Infection (Cold): Care Instructions  Your Care Instructions    An upper respiratory infection, or URI, is an infection of the nose, sinuses, or throat. URIs are spread by coughs, sneezes, and direct contact. The common cold is the most frequent kind of URI. The flu and sinus infections are other kinds of URIs. Almost all URIs are caused by viruses. Antibiotics won't cure them. But you can treat most infections with home care. This may include drinking lots of fluids and taking over-the-counter pain medicine. You will probably feel better in 4 to 10 days. The doctor has checked you carefully, but problems can develop later. If you notice any problems or new symptoms, get medical treatment right away. Follow-up care is a key part of your treatment and safety. Be sure to make and go to all appointments, and call your doctor if you are having problems. It's also a good idea to know your test results and keep a list of the medicines you take. How can you care for yourself at home? · To prevent dehydration, drink plenty of fluids, enough so that your urine is light yellow or clear like water. Choose water and other caffeine-free clear liquids until you feel better. If you have kidney, heart, or liver disease and have to limit fluids, talk with your doctor before you increase the amount of fluids you drink. · Take an over-the-counter pain medicine, such as acetaminophen (Tylenol), ibuprofen (Advil, Motrin), or naproxen (Aleve). Read and follow all instructions on the label. · Before you use cough and cold medicines, check the label. These medicines may not be safe for young children or for people with certain health problems. · Be careful when taking over-the-counter cold or flu medicines and Tylenol at the same time. Many of these medicines have acetaminophen, which is Tylenol. Read the labels to make sure that you are not taking more than the recommended dose.  Too much acetaminophen (Tylenol) can be harmful. · Get plenty of rest.  · Do not smoke or allow others to smoke around you. If you need help quitting, talk to your doctor about stop-smoking programs and medicines. These can increase your chances of quitting for good. When should you call for help? Call 911 anytime you think you may need emergency care. For example, call if:    · You have severe trouble breathing.    Call your doctor now or seek immediate medical care if:    · You seem to be getting much sicker.     · You have new or worse trouble breathing.     · You have a new or higher fever.     · You have a new rash.    Watch closely for changes in your health, and be sure to contact your doctor if:    · You have a new symptom, such as a sore throat, an earache, or sinus pain.     · You cough more deeply or more often, especially if you notice more mucus or a change in the color of your mucus.     · You do not get better as expected. Where can you learn more? Go to http://daniel-carlton.info/. Enter M169 in the search box to learn more about \"Upper Respiratory Infection (Cold): Care Instructions. \"  Current as of: June 9, 2019  Content Version: 12.2  © 3695-0913 [employer]. Care instructions adapted under license by [employer] (which disclaims liability or warranty for this information). If you have questions about a medical condition or this instruction, always ask your healthcare professional. [place] [address] any warranty or liability for your use of this information. Mucinex DM twice a day for 7 days. Strep Throat: Care Instructions  Your Care Instructions    Strep throat is a bacterial infection that causes sudden, severe sore throat and fever. Strep throat, which is caused by bacteria called streptococcus, is treated with antibiotics. Sometimes a strep test is necessary to tell if the sore throat is caused by strep bacteria.  Treatment can help ease symptoms and may prevent future problems. Follow-up care is a key part of your treatment and safety. Be sure to make and go to all appointments, and call your doctor if you are having problems. It's also a good idea to know your test results and keep a list of the medicines you take. How can you care for yourself at home? · Take your antibiotics as directed. Do not stop taking them just because you feel better. You need to take the full course of antibiotics. · Strep throat can spread to others until 24 hours after you begin taking antibiotics. During this time, you should avoid contact with other people at work or home, especially infants and children. Do not sneeze or cough on others, and wash your hands often. Keep your drinking glass and eating utensils separate from those of others, and wash these items well in hot, soapy water. · Gargle with warm salt water at least once each hour to help reduce swelling and make your throat feel better. Use 1 teaspoon of salt mixed in 8 fluid ounces of warm water. · Take an over-the-counter pain medication, such as acetaminophen (Tylenol), ibuprofen (Advil, Motrin), or naproxen (Aleve). Read and follow all instructions on the label. · Try an over-the-counter anesthetic throat spray or throat lozenges, which may help relieve throat pain. · Drink plenty of fluids. Fluids may help soothe an irritated throat. Hot fluids, such as tea or soup, may help your throat feel better. · Eat soft solids and drink plenty of clear liquids. Flavored ice pops, ice cream, scrambled eggs, sherbet, and gelatin dessert (such as Jell-O) may also soothe the throat. · Get lots of rest.  · Do not smoke, and avoid secondhand smoke. If you need help quitting, talk to your doctor about stop-smoking programs and medicines. These can increase your chances of quitting for good. · Use a vaporizer or humidifier to add moisture to the air in your bedroom.  Follow the directions for cleaning the machine. When should you call for help? Call your doctor now or seek immediate medical care if:    · You have a new or higher fever.     · You have a fever with a stiff neck or severe headache.     · You have new or worse trouble swallowing.     · Your sore throat gets much worse on one side.     · Your pain becomes much worse on one side of your throat.    Watch closely for changes in your health, and be sure to contact your doctor if:    · You are not getting better after 2 days (48 hours).     · You do not get better as expected. Where can you learn more? Go to http://daniel-carlton.info/. Enter K625 in the search box to learn more about \"Strep Throat: Care Instructions. \"  Current as of: October 21, 2018  Content Version: 12.2  © 1378-5231 [employer]. Care instructions adapted under license by [employer] (which disclaims liability or warranty for this information). If you have questions about a medical condition or this instruction, always ask your healthcare professional. [place] [address] any warranty or liability for your use of this information. 1. Gargle with warm salt water 4 times a day. 2. Change your toothbrush in 2 days and at the end of the antibiotic treatment. 3. Drink plenty of fluids, rest, tylenol/ibuprofen as needed.

## 2019-11-26 NOTE — PROGRESS NOTES
Elenita Arana  42 y.o. female  1980  [address]  [id number]     [facility]: Progress Note       Encounter Date: 11/26/2019    Chief Complaint   Patient presents with    Cold Symptoms     cough, congestion     History of Present Illness   Elenita Arana is a 42 y.o. female who presents to clinic today for:    Cold symptoms- ~2 nights ago noted productive cough that is \"trying to come up\", blowing her nose with yellow drainage, h/a pain and pressure, congestion, wheeze, sore throat. Denies-fever, rash, abdominal pain. Tolerating food and fluids. Treatment-nyquil and robitussin. Using inhaler; used prior to coming to the clinic. Health Maintenance    Health Maintenance Due   Topic Date Due    PAP AKA CERVICAL CYTOLOGY  02/02/2020     Review of Systems   Review of Systems   Constitutional: Negative. HENT: Positive for congestion, sinus pain and sore throat. Eyes: Negative. Respiratory: Positive for cough and wheezing. Cardiovascular: Negative. Gastrointestinal: Negative. Genitourinary: Negative. Musculoskeletal: Negative. Skin: Negative. Neurological: Positive for headaches. Endo/Heme/Allergies: Negative. Psychiatric/Behavioral: Negative. Vitals/Objective:     Vitals:    11/26/19 1000   BP: 117/83   Pulse: (!) 104   Resp: 18   Temp: 97.3 °F (36.3 °C)   TempSrc: Oral   SpO2: 97%   Weight: 181 lb 3.2 oz (82.2 kg)   Height: 5' 4\" (1.626 m)     Body mass index is 31.1 kg/m². Physical Exam  Cardiovascular:      Rate and Rhythm: Normal rate and regular rhythm. Pulses: Normal pulses. Heart sounds: Normal heart sounds. Pulmonary:      Effort: Pulmonary effort is normal.      Breath sounds: Decreased air movement present. Examination of the right-upper field reveals wheezing and rhonchi. Examination of the left-upper field reveals wheezing and rhonchi. Examination of the right-middle field reveals wheezing. Examination of the left-middle field reveals wheezing and rhonchi. Examination of the right-lower field reveals wheezing and rhonchi. Examination of the left-lower field reveals wheezing and rhonchi. Wheezing and rhonchi present. Lymphadenopathy:      Cervical: No cervical adenopathy. Skin:     General: Skin is warm. Capillary Refill: Capillary refill takes less than 2 seconds. Neurological:      Mental Status: She is alert and oriented to person, place, and time. Psychiatric:         Attention and Perception: Attention normal.         Mood and Affect: Mood and affect normal.         Speech: Speech normal.         Behavior: Behavior is cooperative. Thought Content: Thought content normal.         Cognition and Memory: Cognition normal.         Judgment: Judgment normal.         Recent Results (from the past 24 hour(s))   AMB POC RAPID STREP A    Collection Time: 11/26/19 10:19 AM   Result Value Ref Range    VALID INTERNAL CONTROL POC Yes     Group A Strep Ag Positive Negative   AMB POC RAPID INFLUENZA TEST    Collection Time: 11/26/19 10:20 AM   Result Value Ref Range    VALID INTERNAL CONTROL POC Yes     QuickVue Influenza test Negative Negative     Assessment and Plan:   1. Bronchitis    - albuterol (PROVENTIL HFA, VENTOLIN HFA, PROAIR HFA) 90 mcg/actuation inhaler; Take 1 Puff by inhalation every six (6) hours as needed for Wheezing. Dispense: 1 Inhaler; Refill: 5    2. Sore throat    - AMB POC RAPID STREP A    3. Cough    - AMB POC RAPID INFLUENZA TEST    4. Strep pharyngitis    - amoxicillin-clavulanate (AUGMENTIN) 875-125 mg per tablet; Take 1 Tab by mouth two (2) times a day for 10 days. Dispense: 20 Tab; Refill: 0    5. Acute nasopharyngitis    Discussed ?bronchitis along with a common cold and URI. Given her wheeze on auscultation, will cover with steroid burst. Will cover positive rapid strep with augmentin.  Discussed treating the symptoms-rest, fluids, tylenol/ibuprofen, warm salt water gargles and changing her toothbrush in 48 hours and again at the end of treatment. Strict ED parameters for worsening symptoms. I have discussed the diagnosis with the patient and the intended plan as seen in the above orders. she has expressed understanding. The patient has received an after-visit summary and questions were answered concerning future plans. I have discussed medication side effects and warnings with the patient as well. Electronically Signed: Fatmata Chan NP     History/Allergies   Patients past medical, surgical and family histories were reviewed and updated. Past Medical History:   Diagnosis Date    Anemia NEC     Arthritis     Chronic pain     fybromyalsia    Depression     anxiety, depression, OCD    GERD (gastroesophageal reflux disease)     Hypertension     Musculoskeletal disorder     SOB (shortness of breath)     Stool color black       Past Surgical History:   Procedure Laterality Date    HX GYN           HX HEENT  2002    wisdom teeth extraction    UPPER GI ENDOSCOPY,BIOPSY  2018         UPPER GI ENDOSCOPY,DIAGNOSIS  2018          Family History   Problem Relation Age of Onset    Hypertension Father     Elevated Lipids Father     Arthritis-rheumatoid Mother         ? Lupus vs RA    Lung Disease Mother     Heart Disease Mother     Cancer Mother         breast in 39y    COPD Mother     Hypertension Mother     Stroke Mother         3-4 strokes    Breast Cancer Mother 50    Diabetes Maternal Grandmother      Social History     Socioeconomic History    Marital status: SINGLE     Spouse name: Not on file    Number of children: Not on file    Years of education: Not on file    Highest education level: Not on file   Occupational History    Not on file   Social Needs    Financial resource strain: Not on file    Food insecurity:     Worry: Not on file     Inability: Not on file    Transportation needs:     Medical: Not on file     Non-medical: Not on file   Tobacco Use    Smoking status: Former Smoker     Packs/day: 0.25     Years: 8.00     Pack years: 2.00     Types: Cigarettes     Last attempt to quit: 9/3/2018     Years since quittin.2    Smokeless tobacco: Never Used   Substance and Sexual Activity    Alcohol use: Yes     Alcohol/week: 1.0 standard drinks     Types: 1 Glasses of wine per week     Comment: 1 cup of wine/week    Drug use: No    Sexual activity: Yes     Partners: Male     Birth control/protection: None   Lifestyle    Physical activity:     Days per week: Not on file     Minutes per session: Not on file    Stress: Not on file   Relationships    Social connections:     Talks on phone: Not on file     Gets together: Not on file     Attends Mu-ism service: Not on file     Active member of club or organization: Not on file     Attends meetings of clubs or organizations: Not on file     Relationship status: Not on file    Intimate partner violence:     Fear of current or ex partner: Not on file     Emotionally abused: Not on file     Physically abused: Not on file     Forced sexual activity: Not on file   Other Topics Concern    Not on file   Social History Narrative    Not on file         Allergies   Allergen Reactions    Sulfa (Sulfonamide Antibiotics) Hives    Vicodin [Hydrocodone-Acetaminophen] Nausea and Vomiting       Disposition     Follow-up and Dispositions  ·   Return if symptoms worsen or fail to improve. Future Appointments   Date Time Provider Komal Jennifer   12/10/2019 12:30 PM [facility]            Current Medications after this visit     Current Outpatient Medications   Medication Sig    predniSONE (DELTASONE) 20 mg tablet Take 40 mg by mouth daily (with breakfast) for 5 days.  albuterol (PROVENTIL HFA, VENTOLIN HFA, PROAIR HFA) 90 mcg/actuation inhaler Take 1 Puff by inhalation every six (6) hours as needed for Wheezing.     amoxicillin-clavulanate (AUGMENTIN) 875-125 mg per tablet Take 1 Tab by mouth two (2) times a day for 10 days.  gabapentin (NEURONTIN) 300 mg capsule TAKE 1 CAPSULE BY MOUTH IN THE MORNING AND 2 AT NIGHT    triamcinolone (NASACORT) 55 mcg nasal inhaler 2 Sprays by Both Nostrils route daily.  OTHER Please provide patient with a walking stick to assist with ambulation. DX:M79.7    trospium (SANCTURA XL) 60 mg capsule Take 60 mg by mouth Daily (before breakfast).  OTHER Please provide patient with a left knee brace. DX: Z87.39    mineral oil-hydrophil petrolat (AQUAPHOR) ointment Apply  to affected area two (2) times a day. Apply to bilateral legs and feet.  Comp Stocking,Knee,Regular,Med misc 1 Each by Does Not Apply route daily.  metoclopramide HCl (REGLAN) 5 mg tablet Take 5 mg by mouth Before breakfast, lunch, and dinner.  triamcinolone acetonide (KENALOG) 0.1 % ointment Apply  to affected area two (2) times a day. use thin layer    DULoxetine (CYMBALTA) 30 mg capsule Take 30 mg by mouth daily.  ketoconazole (NIZORAL) 2 % shampoo shampoo twice weekly    acetaminophen (TYLENOL 8 HOUR PO) Take  by mouth.  diclofenac (VOLTAREN) 1 % gel Apply 4 g to affected area four (4) times daily. Apply to right knee    pantoprazole (PROTONIX) 20 mg tablet Take one tablet by mouth two times a day.  norgestimate-ethinyl estradiol (ORTHO-CYCLEN, SPRINTEC) 0.25-35 mg-mcg tab Take 1 Tab by mouth daily. Indications: Painful Periods    pramoxine (PROCTOFOAM) 1 % topical foam Apply  to affected area three (3) times daily as needed for Hemorrhoids.  lamoTRIgine (LAMICTAL) 25 mg tablet     valACYclovir (VALTREX) 500 mg tablet Take 1 Tab by mouth daily.  clonazePAM (KLONOPIN) 1 mg tablet Take 1 mg by mouth daily. At bedtime    cholecalciferol, vitamin D3, (VITAMIN D3) 2,000 unit tab Take  by mouth.  hydroxychloroquine (PLAQUENIL) 200 mg tablet Take 200 mg by mouth daily.  Two tabs    LORazepam (ATIVAN) 1 mg tablet Take 1 Tab by mouth every twelve (12) hours. Max Daily Amount: 2 mg.  dextroamphetamine-amphetamine (ADDERALL) 20 mg tablet Take 20 mg by mouth. 1/2 tablet  Twice daily          FLUoxetine (PROZAC) 40 mg capsule Take 40 mg by mouth daily.  traZODone (DESYREL) 50 mg tablet Take 1 Tab by mouth nightly. No current facility-administered medications for this visit.       Medications Discontinued During This Encounter   Medication Reason    albuterol (PROVENTIL HFA, VENTOLIN HFA, PROAIR HFA) 90 mcg/actuation inhaler Reorder

## 2019-11-27 ENCOUNTER — TELEPHONE (OUTPATIENT)
Dept: FAMILY MEDICINE CLINIC | Age: 39
End: 2019-11-27

## 2019-11-27 NOTE — TELEPHONE ENCOUNTER
Caller's first/last name:  Sylvia Dumont    Reason for call:  Possible side effects    Does caller want a return call?  yes    Best contact number:  [phone]    Further clarification of call:      Patient states she feels worse today. She is not sleeping either. She wonders if it is the prednisone. Please call to advise [phone].

## 2019-11-27 NOTE — TELEPHONE ENCOUNTER
Patient called per NP:  Patient can take 1 tablet (20mg) over the next 10 days in the morning with breakfast.     Patient verbalized understanding and appreciative.

## 2019-11-27 NOTE — TELEPHONE ENCOUNTER
Patient states she could not sleep last night at all and she thinks it is because of the prednisone. She has had trouble in the past with high doses of prednisone and thinks she may need a lower dose or dose pack. She states she is feeling worse and knows she needs the prednisone.

## 2019-12-02 ENCOUNTER — OFFICE VISIT (OUTPATIENT)
Dept: FAMILY MEDICINE CLINIC | Age: 39
End: 2019-12-02

## 2019-12-02 VITALS
HEIGHT: 64 IN | SYSTOLIC BLOOD PRESSURE: 129 MMHG | TEMPERATURE: 96.9 F | DIASTOLIC BLOOD PRESSURE: 93 MMHG | BODY MASS INDEX: 31.21 KG/M2 | WEIGHT: 182.8 LBS | RESPIRATION RATE: 18 BRPM | HEART RATE: 81 BPM | OXYGEN SATURATION: 98 %

## 2019-12-02 DIAGNOSIS — J40 BRONCHITIS: ICD-10-CM

## 2019-12-02 DIAGNOSIS — R05.9 COUGH: Primary | ICD-10-CM

## 2019-12-02 DIAGNOSIS — J45.901 REACTIVE AIRWAY DISEASE WITH ACUTE EXACERBATION, UNSPECIFIED ASTHMA SEVERITY, UNSPECIFIED WHETHER PERSISTENT: ICD-10-CM

## 2019-12-02 DIAGNOSIS — J45.21 MILD INTERMITTENT ASTHMATIC BRONCHITIS WITH ACUTE EXACERBATION: ICD-10-CM

## 2019-12-02 DIAGNOSIS — J06.9 UPPER RESPIRATORY TRACT INFECTION, UNSPECIFIED TYPE: ICD-10-CM

## 2019-12-02 RX ORDER — IPRATROPIUM BROMIDE AND ALBUTEROL SULFATE 2.5; .5 MG/3ML; MG/3ML
3 SOLUTION RESPIRATORY (INHALATION)
Qty: 30 NEBULE | Refills: 3 | Status: SHIPPED | OUTPATIENT
Start: 2019-12-02 | End: 2020-12-23 | Stop reason: SDUPTHER

## 2019-12-02 NOTE — PATIENT INSTRUCTIONS

## 2019-12-02 NOTE — PROGRESS NOTES
Chief Complaint   Patient presents with    Cough     Visit Vitals  BP (!) 129/93 (BP 1 Location: Right arm, BP Patient Position: Sitting)   Pulse 81   Temp 96.9 °F (36.1 °C) (Oral)   Resp 18   Ht 5' 4\" (1.626 m)   Wt 182 lb 12.8 oz (82.9 kg)   SpO2 98%   BMI 31.38 kg/m²     1. Have you been to the ER, urgent care clinic since your last visit? Hospitalized since your last visit? No    2. Have you seen or consulted any other health care providers outside of the [address] [place] [address] since your last visit? Include any pap smears or colon screening.  No    Reviewed record in preparation for visit and have necessary documentation  Pt did not bring medication to office visit for review  opportunity was given for questions  Goals that were addressed and/or need to be completed during or after this appointment include   Health Maintenance Due   Topic Date Due    PAP AKA CERVICAL CYTOLOGY  02/02/2020

## 2019-12-02 NOTE — LETTER
12/2/2019 2:29 PM 
 
Ms. Kimberley Black [address] [address] Dear Kimberley Black: 
 
Please find your most recent results below. Resulted Orders XR CHEST PA LAT (Exam End: 12/2/2019  2:15 PM) Narrative Clinical indication: Cough. Frontal and lateral views of the chest obtained, comparison to 2010. The  heart 
size is normal. There is no acute infiltrate or shift. Impression  
 impression: No acute changes. Please call me if you have any questions: [phone] Sincerely, Gabriella Carrasco NP

## 2019-12-02 NOTE — PROGRESS NOTES
Meagan Raygoza  42 y.o. female  1980  [address] [id number]  [id number]     [facility] Practice: Progress Note       Encounter Date: 12/2/2019    Chief Complaint   Patient presents with    Cough     History of Present Illness   Meagan Raygoza is a 42 y.o. female who presents to clinic today for:    Cough- reviewed 11/26/2019 encounter-dx with strep, bronchitis, and URI. Continues to have a productive cough but states she cant clear the secretions. Noted emesis with \"cold\" in her output yesterday. Treatment-albuterol inhaler, augmentin (day 7 of 10) and prednisone burst.     Health Maintenance    Health Maintenance Due   Topic Date Due    PAP AKA CERVICAL CYTOLOGY  02/02/2020     Review of Systems   Review of Systems   Constitutional: Negative. HENT: Negative. Eyes: Negative. Respiratory: Positive for cough. Cardiovascular: Negative. Gastrointestinal: Negative. Genitourinary: Negative. Musculoskeletal: Negative. Skin: Negative. Neurological: Negative. Endo/Heme/Allergies: Negative. Psychiatric/Behavioral: Negative. Vitals/Objective:     Vitals:    12/02/19 1358   BP: (!) 129/93   Pulse: 81   Resp: 18   Temp: 96.9 °F (36.1 °C)   TempSrc: Oral   SpO2: 98%   Weight: 182 lb 12.8 oz (82.9 kg)   Height: 5' 4\" (1.626 m)     Body mass index is 31.38 kg/m². Physical Exam  Constitutional:       General: She is awake. Cardiovascular:      Rate and Rhythm: Normal rate and regular rhythm. Pulses: Normal pulses. Heart sounds: Normal heart sounds. Pulmonary:      Effort: Pulmonary effort is normal.      Breath sounds: Wheezing and rhonchi present. Skin:     General: Skin is warm and dry. Capillary Refill: Capillary refill takes less than 2 seconds. Neurological:      Mental Status: She is alert and oriented to person, place, and time.    Psychiatric:         Attention and Perception: Attention normal.         Mood and Affect: Mood and affect normal.         Speech: Speech normal.         Behavior: Behavior is cooperative. Thought Content: Thought content normal.         Cognition and Memory: Cognition normal.         Judgment: Judgment normal.         No results found for this or any previous visit (from the past 24 hour(s)). Assessment and Plan:   1. Cough    - XR CHEST PA LAT; Future    2. Mild intermittent asthmatic bronchitis with acute exacerbation    - albuterol-ipratropium (DUO-NEB) 2.5 mg-0.5 mg/3 ml nebu; 3 mL by Nebulization route every six (6) hours as needed (wheeze). Dispense: 30 Nebule; Refill: 3    3. Bronchitis      4. Upper respiratory tract infection, unspecified type      5. Reactive airway disease with acute exacerbation, unspecified asthma severity, unspecified whether persistent    - albuterol-ipratropium (DUO-NEB) 2.5 mg-0.5 mg/3 ml nebu; 3 mL by Nebulization route every six (6) hours as needed (wheeze). Dispense: 30 Nebule; Refill: 3    Tolerated duo neb in the clinic with great results. Patient is requesting a nebulizer compressor and supplies. Sent duo neb medication to [facility]. Discussed pushing fluids to thin secretions, finish steroid and antibiotic as prescribed and take mucinex dm BID for the next 7 days. Normal chest xray. I have discussed the diagnosis with the patient and the intended plan as seen in the above orders. she has expressed understanding. The patient has received an after-visit summary and questions were answered concerning future plans. I have discussed medication side effects and warnings with the patient as well. Electronically Signed: David Schwab NP     History/Allergies   Patients past medical, surgical and family histories were reviewed and updated.     Past Medical History:   Diagnosis Date    Anemia NEC     Arthritis     Chronic pain     fybromyalsia    Depression     anxiety, depression, OCD    GERD (gastroesophageal reflux disease)     Hypertension     Musculoskeletal disorder     SOB (shortness of breath)     Stool color black       Past Surgical History:   Procedure Laterality Date    HX GYN       1998    HX HEENT  2002    wisdom teeth extraction    UPPER GI ENDOSCOPY,BIOPSY  2018         UPPER GI ENDOSCOPY,DIAGNOSIS  2018          Family History   Problem Relation Age of Onset    Hypertension Father     Elevated Lipids Father     Arthritis-rheumatoid Mother         ? Lupus vs RA    Lung Disease Mother     Heart Disease Mother     Cancer Mother         breast in 39y    COPD Mother     Hypertension Mother     Stroke Mother         3-4 strokes    Breast Cancer Mother 50    Diabetes Maternal Grandmother      Social History     Socioeconomic History    Marital status: SINGLE     Spouse name: Not on file    Number of children: Not on file    Years of education: Not on file    Highest education level: Not on file   Occupational History    Not on file   Social Needs    Financial resource strain: Not on file    Food insecurity:     Worry: Not on file     Inability: Not on file    Transportation needs:     Medical: Not on file     Non-medical: Not on file   Tobacco Use    Smoking status: Former Smoker     Packs/day: 0.25     Years: 8.00     Pack years: 2.00     Types: Cigarettes     Last attempt to quit: 9/3/2018     Years since quittin.2    Smokeless tobacco: Never Used   Substance and Sexual Activity    Alcohol use:  Yes     Alcohol/week: 1.0 standard drinks     Types: 1 Glasses of wine per week     Comment: 1 cup of wine/week    Drug use: No    Sexual activity: Yes     Partners: Male     Birth control/protection: None   Lifestyle    Physical activity:     Days per week: Not on file     Minutes per session: Not on file    Stress: Not on file   Relationships    Social connections:     Talks on phone: Not on file     Gets together: Not on file     Attends Hinduism service: Not on file     Active member of club or organization: Not on file     Attends meetings of clubs or organizations: Not on file     Relationship status: Not on file    Intimate partner violence:     Fear of current or ex partner: Not on file     Emotionally abused: Not on file     Physically abused: Not on file     Forced sexual activity: Not on file   Other Topics Concern    Not on file   Social History Narrative    Not on file         Allergies   Allergen Reactions    Sulfa (Sulfonamide Antibiotics) Hives    Vicodin [Hydrocodone-Acetaminophen] Nausea and Vomiting       Disposition     Follow-up and Dispositions  ·   Return if symptoms worsen or fail to improve. Future Appointments   Date Time Provider Komal Rodriguez   12/10/2019 12:30 PM Pro Lauren MD NCP GIA SCHED            Current Medications after this visit     Current Outpatient Medications   Medication Sig    albuterol-ipratropium (DUO-NEB) 2.5 mg-0.5 mg/3 ml nebu 3 mL by Nebulization route every six (6) hours as needed (wheeze).  albuterol (PROVENTIL HFA, VENTOLIN HFA, PROAIR HFA) 90 mcg/actuation inhaler Take 1 Puff by inhalation every six (6) hours as needed for Wheezing.  amoxicillin-clavulanate (AUGMENTIN) 875-125 mg per tablet Take 1 Tab by mouth two (2) times a day for 10 days.  gabapentin (NEURONTIN) 300 mg capsule TAKE 1 CAPSULE BY MOUTH IN THE MORNING AND 2 AT NIGHT    triamcinolone (NASACORT) 55 mcg nasal inhaler 2 Sprays by Both Nostrils route daily.  OTHER Please provide patient with a walking stick to assist with ambulation. DX:M79.7    trospium (SANCTURA XL) 60 mg capsule Take 60 mg by mouth Daily (before breakfast).  OTHER Please provide patient with a left knee brace. DX: Z87.39    mineral oil-hydrophil petrolat (AQUAPHOR) ointment Apply  to affected area two (2) times a day. Apply to bilateral legs and feet.  Comp Stocking,Knee,Regular,Med misc 1 Each by Does Not Apply route daily.     metoclopramide HCl (REGLAN) 5 mg tablet Take 5 mg by mouth Before breakfast, lunch, and dinner.  triamcinolone acetonide (KENALOG) 0.1 % ointment Apply  to affected area two (2) times a day. use thin layer    DULoxetine (CYMBALTA) 30 mg capsule Take 30 mg by mouth daily.  ketoconazole (NIZORAL) 2 % shampoo shampoo twice weekly    acetaminophen (TYLENOL 8 HOUR PO) Take  by mouth.  diclofenac (VOLTAREN) 1 % gel Apply 4 g to affected area four (4) times daily. Apply to right knee    pantoprazole (PROTONIX) 20 mg tablet Take one tablet by mouth two times a day.  norgestimate-ethinyl estradiol (ORTHO-CYCLEN, SPRINTEC) 0.25-35 mg-mcg tab Take 1 Tab by mouth daily. Indications: Painful Periods    pramoxine (PROCTOFOAM) 1 % topical foam Apply  to affected area three (3) times daily as needed for Hemorrhoids.  lamoTRIgine (LAMICTAL) 25 mg tablet     valACYclovir (VALTREX) 500 mg tablet Take 1 Tab by mouth daily.  clonazePAM (KLONOPIN) 1 mg tablet Take 1 mg by mouth daily. At bedtime    cholecalciferol, vitamin D3, (VITAMIN D3) 2,000 unit tab Take  by mouth.  hydroxychloroquine (PLAQUENIL) 200 mg tablet Take 200 mg by mouth daily. Two tabs    LORazepam (ATIVAN) 1 mg tablet Take 1 Tab by mouth every twelve (12) hours. Max Daily Amount: 2 mg.  dextroamphetamine-amphetamine (ADDERALL) 20 mg tablet Take 20 mg by mouth. 1/2 tablet  Twice daily          FLUoxetine (PROZAC) 40 mg capsule Take 40 mg by mouth daily.  traZODone (DESYREL) 50 mg tablet Take 1 Tab by mouth nightly. No current facility-administered medications for this visit. There are no discontinued medications.

## 2019-12-09 ENCOUNTER — TELEPHONE (OUTPATIENT)
Dept: FAMILY MEDICINE CLINIC | Age: 39
End: 2019-12-09

## 2019-12-09 NOTE — TELEPHONE ENCOUNTER
Spoke with patient and informed of information has been faxed to [facility], Samy and they will contact her when everything is processed. Patient verbalized understanding and appreciative.

## 2019-12-09 NOTE — TELEPHONE ENCOUNTER
Pt states that NP ordered her a neb machine but she has not received it yet. She would like to know what company it was sent to.

## 2019-12-10 ENCOUNTER — OFFICE VISIT (OUTPATIENT)
Dept: NEUROLOGY | Age: 39
End: 2019-12-10

## 2019-12-10 VITALS
BODY MASS INDEX: 31.07 KG/M2 | SYSTOLIC BLOOD PRESSURE: 133 MMHG | HEIGHT: 64 IN | HEART RATE: 97 BPM | DIASTOLIC BLOOD PRESSURE: 90 MMHG | WEIGHT: 182 LBS

## 2019-12-10 DIAGNOSIS — R20.8 BURNING SENSATION OF FEET: Primary | ICD-10-CM

## 2019-12-10 DIAGNOSIS — M51.36 LUMBAR DEGENERATIVE DISC DISEASE: ICD-10-CM

## 2019-12-10 DIAGNOSIS — R20.2 NUMBNESS AND TINGLING OF BOTH LEGS: ICD-10-CM

## 2019-12-10 DIAGNOSIS — R20.0 NUMBNESS AND TINGLING OF BOTH LEGS: ICD-10-CM

## 2019-12-10 NOTE — PROGRESS NOTES
Referred by PCP. Tingling has been going on for over 2 years. Pain started this year in both feet. Numbness and tingling in both legs.

## 2019-12-10 NOTE — PROGRESS NOTES
Name:  Maria R Cota      :  1980    PCP:   Ernst De Oliveira MD      Referring:  As above  MRN:   [id number]    Chief Complaint:   Chief Complaint   Patient presents with    New Patient     burning and tingling in both feet       HISTORY OF PRESENT ILLNESS:     This is a 42 y.o. female who presents for evaluation of burning in feet. She describes her Fibromyalgia as multi-focal body pains, pains in the thighs, possibly feet feeling numb but she's not sure. Pt describes that around 2019, started to have burning sensation on bottom of both feet. No burning or numbness on top of feet. Feet don't burn when she's lying in bed in the morning. When she gets out of bed and walks, then experiences numbness/ pain on bottoms of both feet. That pain improves after a while. Does not have constant burning on bottom of feet throughout the day. If she lays down in bed, she gets numbness in feet and both legs (calves). Has intermittent numbness in calves (not necessarily at same time), that was happening before the burning in feet. She describes that her Rheumatologist has told she has arthritis in back and neck and degenerative discs in lower back. She has been putting lotion / aqua for on feet due to having a dry-burning sensation, and that reduces the sensation. Bottom of feet feel thick. No personal Hx DM or Thyroid disorder. B12 checked in 2019 was 688/ normal.  Labs in May 2019: normal A1c/ 4.9, normal CMP, normal Ferritin level (38).   Note from 19 indicates she had an impaired fasting glucose in the setting of normal A1c     Complete Review of Systems: reviewed on intake H&P; refer to media section; otherwise as noted in HPI     Allergies   Allergen Reactions    Sulfa (Sulfonamide Antibiotics) Hives    Vicodin [Hydrocodone-Acetaminophen] Nausea and Vomiting     Past Medical History:   Diagnosis Date    Anemia NEC     Arthritis     Chronic pain     fybromyalsia    Depression anxiety, depression, OCD    GERD (gastroesophageal reflux disease)     Hypertension     Musculoskeletal disorder     SOB (shortness of breath)     Stool color black      Current Outpatient Medications   Medication Sig Dispense Refill    albuterol-ipratropium (DUO-NEB) 2.5 mg-0.5 mg/3 ml nebu 3 mL by Nebulization route every six (6) hours as needed (wheeze). 30 Nebule 3    albuterol (PROVENTIL HFA, VENTOLIN HFA, PROAIR HFA) 90 mcg/actuation inhaler Take 1 Puff by inhalation every six (6) hours as needed for Wheezing. 1 Inhaler 5    gabapentin (NEURONTIN) 300 mg capsule TAKE 1 CAPSULE BY MOUTH IN THE MORNING AND 2 AT NIGHT 90 Cap 1    triamcinolone (NASACORT) 55 mcg nasal inhaler 2 Sprays by Both Nostrils route daily. 1 Bottle 3    OTHER Please provide patient with a walking stick to assist with ambulation. DX:M79.7 1 Each 0    trospium (SANCTURA XL) 60 mg capsule Take 60 mg by mouth Daily (before breakfast).  OTHER Please provide patient with a left knee brace. DX: Z87.39 1 Each 0    mineral oil-hydrophil petrolat (AQUAPHOR) ointment Apply  to affected area two (2) times a day. Apply to bilateral legs and feet. 396 g 2    Comp Stocking,Knee,Regular,Med misc 1 Each by Does Not Apply route daily. 1 Each 1    metoclopramide HCl (REGLAN) 5 mg tablet Take 5 mg by mouth Before breakfast, lunch, and dinner.  triamcinolone acetonide (KENALOG) 0.1 % ointment Apply  to affected area two (2) times a day. use thin layer 85 g 11    ketoconazole (NIZORAL) 2 % shampoo shampoo twice weekly 1 Bottle 11    acetaminophen (TYLENOL 8 HOUR PO) Take  by mouth.  diclofenac (VOLTAREN) 1 % gel Apply 4 g to affected area four (4) times daily. Apply to right knee 100 g 4    pantoprazole (PROTONIX) 20 mg tablet Take one tablet by mouth two times a day. 60 Tab 5    norgestimate-ethinyl estradiol (ORTHO-CYCLEN, SPRINTEC) 0.25-35 mg-mcg tab Take 1 Tab by mouth daily.  Indications: Painful Periods 1 Package 12    pramoxine (PROCTOFOAM) 1 % topical foam Apply  to affected area three (3) times daily as needed for Hemorrhoids. 1 Can 1    lamoTRIgine (LAMICTAL) 25 mg tablet       valACYclovir (VALTREX) 500 mg tablet Take 1 Tab by mouth daily. 30 Tab 0    clonazePAM (KLONOPIN) 1 mg tablet Take 1 mg by mouth daily. At bedtime      hydroxychloroquine (PLAQUENIL) 200 mg tablet Take 200 mg by mouth daily. Two tabs      LORazepam (ATIVAN) 1 mg tablet Take 1 Tab by mouth every twelve (12) hours. Max Daily Amount: 2 mg. 30 Tab 0    dextroamphetamine-amphetamine (ADDERALL) 20 mg tablet Take 20 mg by mouth. 1/2 tablet  Twice daily            FLUoxetine (PROZAC) 40 mg capsule Take 40 mg by mouth daily.  traZODone (DESYREL) 50 mg tablet Take 1 Tab by mouth nightly. 30 Tab 6    cholecalciferol, vitamin D3, (VITAMIN D3) 2,000 unit tab Take  by mouth. Past Surgical History:   Procedure Laterality Date    HX GYN           HX HEENT  2002    wisdom teeth extraction    UPPER GI ENDOSCOPY,BIOPSY  2018         UPPER GI ENDOSCOPY,DIAGNOSIS  2018          Family History   Problem Relation Age of Onset    Hypertension Father     Elevated Lipids Father     Arthritis-rheumatoid Mother         ? Lupus vs RA    Lung Disease Mother     Heart Disease Mother     Cancer Mother         breast in 39y    COPD Mother     Hypertension Mother     Stroke Mother         3-4 strokes    Breast Cancer Mother 50    Diabetes Maternal Grandmother      Social History     Socioeconomic History    Marital status: SINGLE     Spouse name: Not on file    Number of children: Not on file    Years of education: Not on file    Highest education level: Not on file   Occupational History    Not on file   Social Needs    Financial resource strain: Not on file    Food insecurity:     Worry: Not on file     Inability: Not on file    Transportation needs:     Medical: Not on file     Non-medical: Not on file   Tobacco Use    Smoking status: Former Smoker     Packs/day: 0.25     Years: 8.00     Pack years: 2.00     Types: Cigarettes     Last attempt to quit: 9/3/2018     Years since quittin.2    Smokeless tobacco: Never Used   Substance and Sexual Activity    Alcohol use: Yes     Alcohol/week: 1.0 standard drinks     Types: 1 Glasses of wine per week     Comment: 1 cup of wine/week    Drug use: No    Sexual activity: Yes     Partners: Male     Birth control/protection: None   Lifestyle    Physical activity:     Days per week: Not on file     Minutes per session: Not on file    Stress: Not on file   Relationships    Social connections:     Talks on phone: Not on file     Gets together: Not on file     Attends Jain service: Not on file     Active member of club or organization: Not on file     Attends meetings of clubs or organizations: Not on file     Relationship status: Not on file    Intimate partner violence:     Fear of current or ex partner: Not on file     Emotionally abused: Not on file     Physically abused: Not on file     Forced sexual activity: Not on file   Other Topics Concern    Not on file   Social History Narrative    Not on file       PHYSICAL EXAM  Vitals:    12/10/19 1251   BP: 133/90   Pulse: 97   Weight: 182 lb (82.6 kg)   Height: 5' 4\" (1.626 m)       General:  Alert, cooperative, NAD   Head:  Normocephalic, atraumatic. Eyes:  Conjunctivae/corneas clear   Lungs:  Heart:  Non labored breathing  Not examined   Extremities: No edema.    Skin: No rashes    Neurologic Exam       Language: normal  Memory:  Alert, oriented to person, place, situation    Cranial Nerves:  I: smell Not tested   II: visual fields Full to confrontation   II: pupils Equal, round, reactive to light   II: optic disc Not examined   III,VII: ptosis none   III,IV,VI: extraocular muscles  normal   V: facial light touch sensation  normal   VII: facial muscle function  symmetric   VIII: hearing symmetric   IX: soft palate elevation Not examined   XI: sternocleidomastoid strength 5/5   XII: tongue  midline      Motor: normal bulk, tone, strength in all exts  Sensory: intact to LT, PP, temp, vibration x 4 exts   Cerebellar: no rest, postural, or intention tremor  Normal FNF and H-Shin bilaterally  Reflexes: 1+ throughout  Plantar response: neutral bilaterally    Gait: normal    Romberg negative     ASSESSMENT AND PLAN    ICD-10-CM ICD-9-CM    1. Burning sensation of feet R20.8 782.0 PROTEIN ELECTROPHORESIS      TSH 3RD GENERATION      EMG LIMITED      GLUCOSE (2 SPEC) TOLERANCE, S      MRI LUMB SPINE WO CONT   2. Numbness and tingling of both legs R20.0 782.0 MRI LUMB SPINE WO CONT    R20.2     3. Lumbar degenerative disc disease M51.36 722. 46 MRI LUMB SPINE WO CONT     Description sounds typical of neuropathy but neurologic exam is normal today, specifically normal sensation and strength in all extremities. ? Small fiber neuropathy in setting of hx of impaired fasting glucose. Check TSH, SPEP, and 2 hour glucose tolerance test.  Check EMG both legs (? radiculopathy vs neuropathy) and MRI L-spine due to history of lumbar ddd (spinal stenosis or radiculopathy as cause of feet pain). Discussed increasing her Gabapentin (currently on 300 mg one in AM and 2 in PM). She says it was increased in the past but Dr Mario Andrew recently reduced it but she's not sure why. She recalls trying cymbalta in the past (maybe for her fibromyalgia) but didn't help so it was stopped. Another consideration is Lyrica but she is on many psychoactive medications I'm leery of starting it due to potential interactions or side effects. Will defer to Dr Elvie Costa about whether pt can stop Gabapentin and try Lyrica to cover both her Fibromyalgia and the possible neuropathy feet pains. F/u here in 3 months      Thank you for allowing me to be a part of your patient's care. Please call me at [phone] if you have any questions.      Sincerely,  Dulce David MD

## 2019-12-11 ENCOUNTER — HOSPITAL ENCOUNTER (OUTPATIENT)
Dept: MAMMOGRAPHY | Age: 39
Discharge: HOME | End: 2019-12-11
Attending: NURSE PRACTITIONER
Payer: MEDICAID

## 2019-12-11 DIAGNOSIS — R92.8 ABNORMAL MAMMOGRAM: ICD-10-CM

## 2019-12-11 PROCEDURE — 77065 DX MAMMO INCL CAD UNI: CPT

## 2019-12-26 ENCOUNTER — OFFICE VISIT (OUTPATIENT)
Dept: FAMILY MEDICINE CLINIC | Age: 39
End: 2019-12-26

## 2019-12-26 VITALS
DIASTOLIC BLOOD PRESSURE: 96 MMHG | OXYGEN SATURATION: 98 % | RESPIRATION RATE: 18 BRPM | WEIGHT: 183.2 LBS | BODY MASS INDEX: 31.28 KG/M2 | SYSTOLIC BLOOD PRESSURE: 136 MMHG | TEMPERATURE: 98.1 F | HEIGHT: 64 IN | HEART RATE: 96 BPM

## 2019-12-26 DIAGNOSIS — Z11.3 ROUTINE SCREENING FOR STI (SEXUALLY TRANSMITTED INFECTION): Primary | ICD-10-CM

## 2019-12-26 DIAGNOSIS — Z11.3 ROUTINE SCREENING FOR STI (SEXUALLY TRANSMITTED INFECTION): ICD-10-CM

## 2019-12-26 DIAGNOSIS — H01.001 BLEPHARITIS OF UPPER EYELIDS OF BOTH EYES, UNSPECIFIED TYPE: ICD-10-CM

## 2019-12-26 DIAGNOSIS — S60.011A CONTUSION OF RIGHT THUMB WITHOUT DAMAGE TO NAIL, INITIAL ENCOUNTER: ICD-10-CM

## 2019-12-26 DIAGNOSIS — H01.004 BLEPHARITIS OF UPPER EYELIDS OF BOTH EYES, UNSPECIFIED TYPE: ICD-10-CM

## 2019-12-26 NOTE — PROGRESS NOTES
Unknown Gobble  42 y.o. female  1980  [address] [id number]  [id number]     [facility] Practice: Progress Note       Encounter Date: 12/26/2019    Chief Complaint   Patient presents with    Skin Problem     above right eye itching/scaling    Finger Swelling     right thumb     History of Present Illness   Unknown Goflorencia is a 42 y.o. female who presents to clinic today for:    Requesting HIV screening. Last screening 07/2018-non reactive. Skin problem- right thumb soreness. Reports hx of biting her nails. Left hand dominant. Reports some numbness. Was sewing last week-?needle stick but up to date with her tetanus. Eye problem-reports dry eye lid skin and a scab noted on her lid. Reported right eye itching. Denies eye drainage, blurry vision, itching. does not wear eye contacts. Health Maintenance    Health Maintenance Due   Topic Date Due    Pneumococcal 0-64 years (1 of 1 - PPSV23) 08/27/1988    PAP AKA CERVICAL CYTOLOGY  02/02/2020     Review of Systems   Review of Systems   Constitutional: Negative. HENT: Negative. Eyes: Negative. Respiratory: Negative. Cardiovascular: Negative. Gastrointestinal: Negative. Genitourinary: Negative. Musculoskeletal: Positive for joint pain. Skin: Negative. Neurological: Negative. Endo/Heme/Allergies: Negative. Psychiatric/Behavioral: Negative. Vitals/Objective:     Vitals:    12/26/19 1447   BP: (!) 136/96   Pulse: 96   Resp: 18   Temp: 98.1 °F (36.7 °C)   TempSrc: Oral   SpO2: 98%   Weight: 183 lb 3.2 oz (83.1 kg)   Height: 5' 4\" (1.626 m)     Body mass index is 31.45 kg/m². Physical Exam  Eyes:      General:         Right eye: No foreign body, discharge or hordeolum. Left eye: No foreign body, discharge or hordeolum. Extraocular Movements: Extraocular movements intact. Conjunctiva/sclera:      Right eye: Right conjunctiva is not injected. No chemosis, exudate or hemorrhage. Left eye: Left conjunctiva is not injected. No chemosis, exudate or hemorrhage. Musculoskeletal:        Hands:          No results found for this or any previous visit (from the past 24 hour(s)). Assessment and Plan:   1. Routine screening for STI (sexually transmitted infection)    - HIV 1/2 AG/AB, 4TH GENERATION,W RFLX CONFIRM; Future    2. Blepharitis of upper eyelids of both eyes, unspecified type      3. Contusion of right thumb without damage to nail, initial encounter    Discussed refraining from biting her nails. Discussed cold compress on thumb. Discussed throwing her make up and brushes etc away since the products are greater than 6 months old. Discussed washing her face with gentle baby wash and applying vaseline dry skin areas. I have discussed the diagnosis with the patient and the intended plan as seen in the above orders. she has expressed understanding. The patient has received an after-visit summary and questions were answered concerning future plans. I have discussed medication side effects and warnings with the patient as well. Electronically Signed: David Schwab NP     History/Allergies   Patients past medical, surgical and family histories were reviewed and updated. Past Medical History:   Diagnosis Date    Anemia NEC     Arthritis     Chronic pain     fybromyalsia    Depression     anxiety, depression, OCD    GERD (gastroesophageal reflux disease)     Hypertension     Musculoskeletal disorder     SOB (shortness of breath)     Stool color black       Past Surgical History:   Procedure Laterality Date    HX GYN           HX HEENT  2002    wisdom teeth extraction    UPPER GI ENDOSCOPY,BIOPSY  2018         UPPER GI ENDOSCOPY,DIAGNOSIS  2018          Family History   Problem Relation Age of Onset    Hypertension Father     Elevated Lipids Father     Arthritis-rheumatoid Mother         ? Lupus vs RA    Lung Disease Mother    [place] Heart Disease Mother     Cancer Mother         breast in 39y    COPD Mother     Hypertension Mother     Stroke Mother         3-4 strokes    Breast Cancer Mother 50    Diabetes Maternal Grandmother      Social History     Socioeconomic History    Marital status: SINGLE     Spouse name: Not on file    Number of children: Not on file    Years of education: Not on file    Highest education level: Not on file   Occupational History    Not on file   Social Needs    Financial resource strain: Not on file    Food insecurity:     Worry: Not on file     Inability: Not on file    Transportation needs:     Medical: Not on file     Non-medical: Not on file   Tobacco Use    Smoking status: Former Smoker     Packs/day: 0.25     Years: 8.00     Pack years: 2.00     Types: Cigarettes     Last attempt to quit: 9/3/2018     Years since quittin.3    Smokeless tobacco: Never Used   Substance and Sexual Activity    Alcohol use:  Yes     Alcohol/week: 1.0 standard drinks     Types: 1 Glasses of wine per week     Comment: 1 cup of wine/week    Drug use: No    Sexual activity: Yes     Partners: Male     Birth control/protection: None   Lifestyle    Physical activity:     Days per week: Not on file     Minutes per session: Not on file    Stress: Not on file   Relationships    Social connections:     Talks on phone: Not on file     Gets together: Not on file     Attends Yazidism service: Not on file     Active member of club or organization: Not on file     Attends meetings of clubs or organizations: Not on file     Relationship status: Not on file    Intimate partner violence:     Fear of current or ex partner: Not on file     Emotionally abused: Not on file     Physically abused: Not on file     Forced sexual activity: Not on file   Other Topics Concern    Not on file   Social History Narrative    Not on file         Allergies   Allergen Reactions    Sulfa (Sulfonamide Antibiotics) Hives    Vicodin [Hydrocodone-Acetaminophen] Nausea and Vomiting       Disposition     Follow-up and Dispositions  ·   Return if symptoms worsen or fail to improve. Future Appointments   Date Time Provider Komal Rodriguez   12/27/2019  9:30 AM [facility] MRI 1 [facility]   1/6/2020 10:00 AM [facility] SCHEDULE Maricruz Swanson   2/11/2020 11:30 AM Ramon Turner MD [facility] GIA SCHED            Current Medications after this visit     Current Outpatient Medications   Medication Sig    albuterol-ipratropium (DUO-NEB) 2.5 mg-0.5 mg/3 ml nebu 3 mL by Nebulization route every six (6) hours as needed (wheeze).  albuterol (PROVENTIL HFA, VENTOLIN HFA, PROAIR HFA) 90 mcg/actuation inhaler Take 1 Puff by inhalation every six (6) hours as needed for Wheezing.  gabapentin (NEURONTIN) 300 mg capsule TAKE 1 CAPSULE BY MOUTH IN THE MORNING AND 2 AT NIGHT    triamcinolone (NASACORT) 55 mcg nasal inhaler 2 Sprays by Both Nostrils route daily.  OTHER Please provide patient with a walking stick to assist with ambulation. DX:M79.7    trospium (SANCTURA XL) 60 mg capsule Take 60 mg by mouth Daily (before breakfast).  OTHER Please provide patient with a left knee brace. DX: Z87.39    mineral oil-hydrophil petrolat (AQUAPHOR) ointment Apply  to affected area two (2) times a day. Apply to bilateral legs and feet.  Comp Stocking,Knee,Regular,Med misc 1 Each by Does Not Apply route daily.  metoclopramide HCl (REGLAN) 5 mg tablet Take 5 mg by mouth Before breakfast, lunch, and dinner.  triamcinolone acetonide (KENALOG) 0.1 % ointment Apply  to affected area two (2) times a day. use thin layer    ketoconazole (NIZORAL) 2 % shampoo shampoo twice weekly    acetaminophen (TYLENOL 8 HOUR PO) Take  by mouth.  diclofenac (VOLTAREN) 1 % gel Apply 4 g to affected area four (4) times daily. Apply to right knee    pantoprazole (PROTONIX) 20 mg tablet Take one tablet by mouth two times a day.     norgestimate-ethinyl estradiol (ORTHO-CYCLEN, SPRINTEC) 0.25-35 mg-mcg tab Take 1 Tab by mouth daily. Indications: Painful Periods    pramoxine (PROCTOFOAM) 1 % topical foam Apply  to affected area three (3) times daily as needed for Hemorrhoids.  lamoTRIgine (LAMICTAL) 25 mg tablet     valACYclovir (VALTREX) 500 mg tablet Take 1 Tab by mouth daily.  clonazePAM (KLONOPIN) 1 mg tablet Take 1 mg by mouth daily. At bedtime    cholecalciferol, vitamin D3, (VITAMIN D3) 2,000 unit tab Take  by mouth.  hydroxychloroquine (PLAQUENIL) 200 mg tablet Take 200 mg by mouth daily. Two tabs    LORazepam (ATIVAN) 1 mg tablet Take 1 Tab by mouth every twelve (12) hours. Max Daily Amount: 2 mg.  dextroamphetamine-amphetamine (ADDERALL) 20 mg tablet Take 20 mg by mouth. 1/2 tablet  Twice daily          FLUoxetine (PROZAC) 40 mg capsule Take 40 mg by mouth daily.  traZODone (DESYREL) 50 mg tablet Take 1 Tab by mouth nightly. No current facility-administered medications for this visit. There are no discontinued medications.

## 2019-12-26 NOTE — PATIENT INSTRUCTIONS
Blepharitis: Care Instructions  Your Care Instructions    Blepharitis is an inflammation or infection of the eyelids. It causes dry, scaly crusts on the eyelids. It can also cause your eyes to itch, burn, and look red. This problem is more common in people who have rosacea, dandruff, skin allergies, or eczema. Home treatment can help you keep your eyes comfortable. Your doctor may also prescribe an ointment to put on your eyelids. Follow-up care is a key part of your treatment and safety. Be sure to make and go to all appointments, and call your doctor if you are having problems. It's also a good idea to know your test results and keep a list of the medicines you take. How can you care for yourself at home? · Wash your eyelids and eyebrows daily with baby shampoo. To wash your eyelids:  ? Place a very warm washcloth over your eyes for about a minute. This will help soften and loosen the crusts on your eyelashes. ? Put a few drops of baby shampoo on a warm washcloth. ? Gently wipe your eyelids. This helps remove any crust. It also cleans your eyelids. ? Rinse well with water. · Be safe with medicines. If your doctor prescribed medicine for you, use it exactly as directed. Call your doctor if you think you are having a problem with your medicine. When should you call for help? Call your doctor now or seek immediate medical care if:    · You have signs of an eye infection, such as:  ? Pus or thick discharge coming from the eye.  ? Redness or swelling around the eye.  ? A fever.    Watch closely for changes in your health, and be sure to contact your doctor if:    · You have vision changes.     · You do not get better as expected. Where can you learn more? Go to http://daniel-carlton.info/. Enter Z304 in the search box to learn more about \"Blepharitis: Care Instructions. \"  Current as of: May 5, 2019  Content Version: 12.2  © 9608-2141 [employer].  Care instructions adapted under license by [employer] (which disclaims liability or warranty for this information). If you have questions about a medical condition or this instruction, always ask your healthcare professional. [address] any warranty or liability for your use of this information.

## 2019-12-27 ENCOUNTER — HOSPITAL ENCOUNTER (OUTPATIENT)
Dept: MRI IMAGING | Age: 39
Discharge: HOME | End: 2019-12-27
Attending: PSYCHIATRY & NEUROLOGY
Payer: MEDICAID

## 2019-12-27 DIAGNOSIS — R20.0 NUMBNESS AND TINGLING OF BOTH LEGS: ICD-10-CM

## 2019-12-27 DIAGNOSIS — M51.36 LUMBAR DEGENERATIVE DISC DISEASE: ICD-10-CM

## 2019-12-27 DIAGNOSIS — R20.8 BURNING SENSATION OF FEET: ICD-10-CM

## 2019-12-27 DIAGNOSIS — R20.2 NUMBNESS AND TINGLING OF BOTH LEGS: ICD-10-CM

## 2019-12-27 PROCEDURE — 72148 MRI LUMBAR SPINE W/O DYE: CPT

## 2019-12-28 LAB — HIV 1+2 AB+HIV1 P24 AG SERPL QL IA: NON REACTIVE

## 2019-12-30 ENCOUNTER — HOSPITAL ENCOUNTER (OUTPATIENT)
Dept: LAB | Age: 39
Discharge: HOME | End: 2019-12-30

## 2019-12-30 DIAGNOSIS — M79.7 FIBROMYALGIA: ICD-10-CM

## 2019-12-30 LAB — SPECIMEN SENT TO LAB,INSX: NORMAL

## 2019-12-30 RX ORDER — GABAPENTIN 300 MG/1
CAPSULE ORAL
Qty: 90 CAP | Refills: 1 | Status: SHIPPED | OUTPATIENT
Start: 2019-12-30 | End: 2020-03-05 | Stop reason: SDUPTHER

## 2019-12-30 NOTE — TELEPHONE ENCOUNTER
Triage for Controlled Substance Refill Request    Pain Diagnosis: Chronic Pain    Last Outpatient Visit: 12/26/19 with Tim Yen    Next Outpatient Visit:None scheduled    Reason for refill needed outside of office visit?prescribed only on a monthly basis    Pain escalation requiring increased medication- no    Pharmacy: See pain contract    Medication: See Prescription requested       reviewed:today by Dr. Vianca Deleon and filed in  folder, separate from chart    Date of Urine Drug Screen:11/1/19      Opioid Safety Handout given: Always prints with the Patient AVS    Appropriate for refill: yes    Action:  Rx sent to Dr. Vianca Deleon

## 2019-12-30 NOTE — TELEPHONE ENCOUNTER
Caller's first/last name:  [facility]     Name and dosage of medication:  Gabapentin 300 mg    Name of Pharmacy:  [facility]     Best contact number:  [phone]    Further clarification of call:      Refill request received from [facility] for Gabapentin

## 2019-12-31 ENCOUNTER — TELEPHONE (OUTPATIENT)
Dept: FAMILY MEDICINE CLINIC | Age: 39
End: 2019-12-31

## 2019-12-31 ENCOUNTER — TELEPHONE (OUTPATIENT)
Dept: NEUROLOGY | Age: 39
End: 2019-12-31

## 2019-12-31 NOTE — TELEPHONE ENCOUNTER
Patient called per NP:  Please inform Ms. Brizuela Cea of negative HIV screening. Patient unavailable, left message to return call.

## 2019-12-31 NOTE — TELEPHONE ENCOUNTER
----- Message from Zehra Araya sent at 12/31/2019  2:51 PM EST -----  Regarding: Kristina/ telephone  Caller's first and last name and relationship (if not the patient): Pt  Best contact number(s): [phone]  Whose call is being returned: Nurse  Details to clarify the request: Pt is returning call that was missed.

## 2019-12-31 NOTE — TELEPHONE ENCOUNTER
Called and spoke with patient. I rescheduled her EMG appointment with Dr. Ramandeep Perla. Patient states she will not have insurance until March 2020. Appt made for Thurs 3/12/20 at 10am.  Patient is aware this is at the [address]. Patient also asked me to reschedule her follow up for results that was scheduled for February. I mailed patient an EMG pamphlet and the appointment print out.

## 2019-12-31 NOTE — TELEPHONE ENCOUNTER
----- Message from Golden Chirinos sent at 12/30/2019  3:36 PM EST -----  Regarding: Dr. Mayo Clements Message/Vendor Calls    Caller's first and last name:      Reason for call: Reschedule EMG appt in March      Callback required yes/no and why: yes      Best contact number(s): [phone]      Details to clarify the request:      Golden Chirinos

## 2020-01-02 NOTE — TELEPHONE ENCOUNTER
Patient called per NP:  Please inform Ms. Cite Ettataouer of negative HIV screening.      Patient verbalized understanding and appreciative.

## 2020-01-06 LAB
ALBUMIN SERPL ELPH-MCNC: 3.7 G/DL (ref 2.9–4.4)
ALBUMIN/GLOB SERPL: 1.3 {RATIO} (ref 0.7–1.7)
ALPHA1 GLOB SERPL ELPH-MCNC: 0.2 G/DL (ref 0–0.4)
ALPHA2 GLOB SERPL ELPH-MCNC: 0.7 G/DL (ref 0.4–1)
B-GLOBULIN SERPL ELPH-MCNC: 1 G/DL (ref 0.7–1.3)
GAMMA GLOB SERPL ELPH-MCNC: 0.9 G/DL (ref 0.4–1.8)
GLOBULIN SER CALC-MCNC: 2.8 G/DL (ref 2.2–3.9)
GLUCOSE 1.5H P 75 G GLC PO SERPL-MCNC: ABNORMAL MG/DL
GLUCOSE 1H P 75 G GLC PO SERPL-MCNC: 69 MG/DL (ref 65–199)
GLUCOSE 2H P 75 G GLC PO SERPL-MCNC: 73 MG/DL (ref 65–139)
GLUCOSE 30M P 75 G GLC PO SERPL-MCNC: 98 MG/DL (ref 65–199)
GLUCOSE 3H P 75 G GLC PO SERPL-MCNC: ABNORMAL MG/DL
GLUCOSE 4H P 75 G GLC PO SERPL-MCNC: ABNORMAL MG/DL
GLUCOSE 5H P 75 G GLC PO SERPL-MCNC: ABNORMAL MG/DL
GLUCOSE 6H P 75 G GLC PO SERPL-MCNC: ABNORMAL MG/DL
GLUCOSE P FAST SERPL-MCNC: 59 MG/DL (ref 65–99)
M PROTEIN SERPL ELPH-MCNC: NORMAL G/DL
PLEASE NOTE, 011150: NORMAL
PROT SERPL-MCNC: 6.5 G/DL (ref 6–8.5)
TSH SERPL DL<=0.005 MIU/L-ACNC: 1.33 UIU/ML (ref 0.45–4.5)

## 2020-02-03 ENCOUNTER — TELEPHONE (OUTPATIENT)
Dept: NEUROLOGY | Age: 40
End: 2020-02-03

## 2020-02-03 NOTE — TELEPHONE ENCOUNTER
----- Message from Ulises Espinoza MD sent at 1/30/2020  5:39 PM EST -----  Regarding: Reschedule with me  Please see April's progress note 12-31-19, and reschedule patient's EMG to be done with me, in March.  Thx

## 2020-03-05 ENCOUNTER — OFFICE VISIT (OUTPATIENT)
Dept: FAMILY MEDICINE CLINIC | Age: 40
End: 2020-03-05

## 2020-03-05 VITALS
RESPIRATION RATE: 18 BRPM | HEIGHT: 64 IN | SYSTOLIC BLOOD PRESSURE: 136 MMHG | DIASTOLIC BLOOD PRESSURE: 96 MMHG | WEIGHT: 190.4 LBS | OXYGEN SATURATION: 97 % | BODY MASS INDEX: 32.5 KG/M2 | HEART RATE: 86 BPM | TEMPERATURE: 96.7 F

## 2020-03-05 DIAGNOSIS — B37.0 ORAL THRUSH: Primary | ICD-10-CM

## 2020-03-05 DIAGNOSIS — N94.6 PAINFUL MENSTRUAL PERIODS: ICD-10-CM

## 2020-03-05 DIAGNOSIS — K64.9 HEMORRHOIDS, UNSPECIFIED HEMORRHOID TYPE: ICD-10-CM

## 2020-03-05 DIAGNOSIS — M25.561 POSTERIOR KNEE PAIN, RIGHT: ICD-10-CM

## 2020-03-05 DIAGNOSIS — K21.9 GASTROESOPHAGEAL REFLUX DISEASE WITHOUT ESOPHAGITIS: Chronic | ICD-10-CM

## 2020-03-05 DIAGNOSIS — M79.7 FIBROMYALGIA: ICD-10-CM

## 2020-03-05 RX ORDER — PRAMOXINE HYDROCHLORIDE 10 MG/G
AEROSOL, FOAM TOPICAL
Qty: 1 CAN | Refills: 1 | Status: SHIPPED | OUTPATIENT
Start: 2020-03-05 | End: 2020-12-21

## 2020-03-05 RX ORDER — LAMOTRIGINE 100 MG/1
100 TABLET ORAL 2 TIMES DAILY
COMMUNITY
Start: 2020-01-28 | End: 2020-08-24 | Stop reason: DRUGHIGH

## 2020-03-05 RX ORDER — NORGESTIMATE AND ETHINYL ESTRADIOL 0.25-0.035
1 KIT ORAL DAILY
Qty: 1 PACKAGE | Refills: 12 | Status: SHIPPED | OUTPATIENT
Start: 2020-03-05 | End: 2020-12-23 | Stop reason: SDUPTHER

## 2020-03-05 RX ORDER — DICLOFENAC SODIUM 10 MG/G
1 GEL TOPICAL 4 TIMES DAILY
Qty: 100 G | Refills: 4 | Status: SHIPPED | OUTPATIENT
Start: 2020-03-05 | End: 2020-12-21

## 2020-03-05 RX ORDER — LORAZEPAM 2 MG/1
2 TABLET ORAL
COMMUNITY
Start: 2020-01-30 | End: 2022-05-31

## 2020-03-05 RX ORDER — GABAPENTIN 300 MG/1
CAPSULE ORAL
Qty: 90 CAP | Refills: 2 | Status: SHIPPED | OUTPATIENT
Start: 2020-03-05 | End: 2020-06-18 | Stop reason: SDUPTHER

## 2020-03-05 RX ORDER — PANTOPRAZOLE SODIUM 20 MG/1
TABLET, DELAYED RELEASE ORAL
Qty: 60 TAB | Refills: 5 | Status: SHIPPED | OUTPATIENT
Start: 2020-03-05 | End: 2020-09-09

## 2020-03-05 RX ORDER — NYSTATIN 100000 [USP'U]/ML
500000 SUSPENSION ORAL 4 TIMES DAILY
Qty: 473 ML | Refills: 0 | Status: SHIPPED | OUTPATIENT
Start: 2020-03-05 | End: 2020-03-15

## 2020-03-05 RX ORDER — CYCLOBENZAPRINE HCL 10 MG
10 TABLET ORAL
Refills: 1 | COMMUNITY
Start: 2019-12-03 | End: 2020-06-19 | Stop reason: SDUPTHER

## 2020-03-05 RX ORDER — LANOLIN ALCOHOL/MO/W.PET/CERES
1000 CREAM (GRAM) TOPICAL DAILY
COMMUNITY
End: 2020-12-21

## 2020-03-05 NOTE — PROGRESS NOTES
Chief Complaint   Patient presents with    Sore Throat     burning with tongue burning     Visit Vitals  BP (!) 136/96 (BP 1 Location: Right arm, BP Patient Position: Sitting)   Pulse 86   Temp 96.7 °F (35.9 °C) (Oral)   Resp 18   Ht 5' 4\" (1.626 m)   Wt 190 lb 6.4 oz (86.4 kg)   SpO2 97%   BMI 32.68 kg/m²     1. Have you been to the ER, urgent care clinic since your last visit? Hospitalized since your last visit? No    2. Have you seen or consulted any other health care providers outside of the [address] [place] [address] since your last visit? Include any pap smears or colon screening.  No    Reviewed record in preparation for visit and have necessary documentation  Pt did not bring medication to office visit for review  opportunity was given for questions  Goals that were addressed and/or need to be completed during or after this appointment include   Health Maintenance Due   Topic Date Due    Pneumococcal 0-64 years (1 of 1 - PPSV23) 08/27/1988    PAP AKA CERVICAL CYTOLOGY  02/02/2020

## 2020-03-05 NOTE — PATIENT INSTRUCTIONS
Candidiasis: Care Instructions  Your Care Instructions  Candidiasis (say \"whr-efv-UC-uh-harlan\") is a yeast infection. Yeast normally lives in your body. But it can cause problems if your body's defenses don't work as they should. Some medicines can increase your chance of getting a yeast infection. These include antibiotics, steroids, and cancer drugs. And some diseases like AIDS and diabetes can make you more likely to get yeast infections. There are different types of yeast infections. Valaria Padilla is a yeast infection in the mouth. It usually occurs in people with weak immune systems. It causes white patches inside the mouth and throat. Yeast infections of the skin usually occur in skin folds where the skin stays moist. They cause red, oozing patches on your skin. Babies can get these infections under the diaper. People who often wear gloves can get them on their hands. Many women get vaginal yeast infections. They are most common when women take antibiotics. These infections can cause the vagina to itch and burn. They also cause white discharge that looks like cottage cheese. In rare cases, yeast infects the blood. This can cause serious disease. This kind of infection is treated with medicine given through a needle into a vein (IV). After you start treatment, a yeast infection usually goes away quickly. But if your immune system is weak, the infection may come back. Tell your doctor if you get yeast infections often. Follow-up care is a key part of your treatment and safety. Be sure to make and go to all appointments, and call your doctor if you are having problems. It's also a good idea to know your test results and keep a list of the medicines you take. How can you care for yourself at home? · Take your medicines exactly as prescribed. Call your doctor if you think you are having a problem with your medicine. · Use antibiotics only as directed by your doctor. · Eat yogurt with live cultures.  It has bacteria called lactobacillus. It may help prevent some types of yeast infections. · Keep your skin clean and dry. Put powder on moist places. · If you are using a cream or suppository to treat a vaginal yeast infection, don't use condoms or a diaphragm. Use a different type of birth control. · Eat a healthy diet and get regular exercise. This will help keep your immune system strong. When should you call for help? Watch closely for changes in your health, and be sure to contact your doctor if:    · You do not get better as expected. Where can you learn more? Go to http://daniel-carlton.info/. Enter V736 in the search box to learn more about \"Candidiasis: Care Instructions. \"  Current as of: February 19, 2019  Content Version: 12.2  © 2396-7131 [employer]. Care instructions adapted under license by [employer] (which disclaims liability or warranty for this information). If you have questions about a medical condition or this instruction, always ask your healthcare professional. [address] any warranty or liability for your use of this information.

## 2020-03-06 NOTE — PROGRESS NOTES
[facility]    History of Present Illness:   Jesus Brink is a 42 y.o. female with history of GERD, Neuropathy, costochondritis, Fibromyalgia  CC: Burning on Tongue   History provided by patient and Records    HPI:  Patient reports for the last 3-4 days having painful/burning of the Tongue with development of white material/plaque on the tongue. Denies changes to medication at this time. Denies recent illness. Denies having similar symptoms. Health Maintenance  Health Maintenance Due   Topic Date Due    Pneumococcal 0-64 years (1 of 1 - PPSV23) 08/27/1988    PAP AKA CERVICAL CYTOLOGY  02/02/2020    Medicare Yearly Exam  03/05/2020       Past Medical, Family, and Social History:     Current Outpatient Medications on File Prior to Visit   Medication Sig Dispense Refill    lamoTRIgine (LAMICTAL) 100 mg tablet Take 100 mg by mouth two (2) times a day.  cyclobenzaprine (FLEXERIL) 10 mg tablet Take 10 mg by mouth two (2) times daily as needed. 1    LORazepam (ATIVAN) 2 mg tablet Take 2 mg by mouth as needed.  cyanocobalamin (VITAMIN B-12) 1,000 mcg tablet Take 1,000 mcg by mouth daily.  albuterol-ipratropium (DUO-NEB) 2.5 mg-0.5 mg/3 ml nebu 3 mL by Nebulization route every six (6) hours as needed (wheeze). 30 Nebule 3    albuterol (PROVENTIL HFA, VENTOLIN HFA, PROAIR HFA) 90 mcg/actuation inhaler Take 1 Puff by inhalation every six (6) hours as needed for Wheezing. 1 Inhaler 5    triamcinolone (NASACORT) 55 mcg nasal inhaler 2 Sprays by Both Nostrils route daily. 1 Bottle 3    OTHER Please provide patient with a left knee brace. DX: Z87.39 1 Each 0    mineral oil-hydrophil petrolat (AQUAPHOR) ointment Apply  to affected area two (2) times a day. Apply to bilateral legs and feet. 396 g 2    Comp Stocking,Knee,Regular,Med misc 1 Each by Does Not Apply route daily.  1 Each 1    metoclopramide HCl (REGLAN) 5 mg tablet Take 5 mg by mouth Before breakfast, lunch, and dinner.  triamcinolone acetonide (KENALOG) 0.1 % ointment Apply  to affected area two (2) times a day. use thin layer 85 g 11    ketoconazole (NIZORAL) 2 % shampoo shampoo twice weekly 1 Bottle 11    acetaminophen (TYLENOL 8 HOUR PO) Take  by mouth.  lamoTRIgine (LAMICTAL) 25 mg tablet       valACYclovir (VALTREX) 500 mg tablet Take 1 Tab by mouth daily. 30 Tab 0    clonazePAM (KLONOPIN) 1 mg tablet Take 1 mg by mouth daily. At bedtime      cholecalciferol, vitamin D3, (VITAMIN D3) 2,000 unit tab Take  by mouth.  dextroamphetamine-amphetamine (ADDERALL) 20 mg tablet Take 20 mg by mouth. 1/2 tablet  Twice daily            FLUoxetine (PROZAC) 40 mg capsule Take 40 mg by mouth daily.  traZODone (DESYREL) 50 mg tablet Take 1 Tab by mouth nightly. 30 Tab 6    [DISCONTINUED] gabapentin (NEURONTIN) 300 mg capsule TAKE 1 CAPSULE BY MOUTH IN THE MORNING AND 2 AT NIGHT 90 Cap 1    [DISCONTINUED] OTHER Please provide patient with a walking stick to assist with ambulation. DX:M79.7 1 Each 0    [DISCONTINUED] trospium (SANCTURA XL) 60 mg capsule Take 60 mg by mouth Daily (before breakfast).  [DISCONTINUED] diclofenac (VOLTAREN) 1 % gel Apply 4 g to affected area four (4) times daily. Apply to right knee 100 g 4    [DISCONTINUED] pantoprazole (PROTONIX) 20 mg tablet Take one tablet by mouth two times a day. 60 Tab 5    [DISCONTINUED] norgestimate-ethinyl estradiol (ORTHO-CYCLEN, SPRINTEC) 0.25-35 mg-mcg tab Take 1 Tab by mouth daily. Indications: Painful Periods 1 Package 12    [DISCONTINUED] pramoxine (PROCTOFOAM) 1 % topical foam Apply  to affected area three (3) times daily as needed for Hemorrhoids. 1 Can 1    [DISCONTINUED] hydroxychloroquine (PLAQUENIL) 200 mg tablet Take 200 mg by mouth daily. Two tabs      [DISCONTINUED] LORazepam (ATIVAN) 1 mg tablet Take 1 Tab by mouth every twelve (12) hours.  Max Daily Amount: 2 mg. 30 Tab 0     No current facility-administered medications on file prior to visit. Patient Active Problem List   Diagnosis Code    Depression F32.9    GERD (gastroesophageal reflux disease) K21.9    Hypertension I10    Anemia, unspecified D64.9    Itch of skin L29.9    Anxiety F41.9    Costochondritis M94.0    HSV (herpes simplex virus) anogenital infection A60.9    OCD (obsessive compulsive disorder) F42.9    Hoarseness R49.0    IFG (impaired fasting glucose) R73.01    Hyperlipidemia E78.5    Thrombosed external hemorrhoid K64.5    Vitamin D deficiency E55.9    Inflammatory arthritis M19.90    Recurrent depression (HCC) F33.9    Mood disorder (HCC) F39    Chronic pain syndrome G89.4    Fibromyalgia M79.7    Panic attack F41.0    Tremor R25.1    Class 2 obesity due to excess calories without serious comorbidity in adult E66.09    Gastroparesis K31.84    Plantar fasciitis of left foot M72.2    Positive KITTY (antinuclear antibody) R76.8    Severe obesity (HCC) E66.01    Neuropathy G62.9       Social History     Socioeconomic History    Marital status: SINGLE     Spouse name: Not on file    Number of children: Not on file    Years of education: Not on file    Highest education level: Not on file   Occupational History    Not on file   Social Needs    Financial resource strain: Not on file    Food insecurity:     Worry: Not on file     Inability: Not on file    Transportation needs:     Medical: Not on file     Non-medical: Not on file   Tobacco Use    Smoking status: Former Smoker     Packs/day: 0.25     Years: 8.00     Pack years: 2.00     Types: Cigarettes     Last attempt to quit: 9/3/2018     Years since quittin.5    Smokeless tobacco: Never Used   Substance and Sexual Activity    Alcohol use:  Yes     Alcohol/week: 1.0 standard drinks     Types: 1 Glasses of wine per week     Comment: 1 cup of wine/week    Drug use: No    Sexual activity: Yes     Partners: Male     Birth control/protection: None   Lifestyle    Physical activity:     Days per week: Not on file     Minutes per session: Not on file    Stress: Not on file   Relationships    Social connections:     Talks on phone: Not on file     Gets together: Not on file     Attends Holiness service: Not on file     Active member of club or organization: Not on file     Attends meetings of clubs or organizations: Not on file     Relationship status: Not on file    Intimate partner violence:     Fear of current or ex partner: Not on file     Emotionally abused: Not on file     Physically abused: Not on file     Forced sexual activity: Not on file   Other Topics Concern    Not on file   Social History Narrative    Not on file       Review of Systems   Review of Systems   Constitutional: Negative for chills and fever. HENT:        Tongue burning   Gastrointestinal: Negative for abdominal pain, nausea and vomiting. Objective:     Visit Vitals  BP (!) 136/96 (BP 1 Location: Right arm, BP Patient Position: Sitting)   Pulse 86   Temp 96.7 °F (35.9 °C) (Oral)   Resp 18   Ht 5' 4\" (1.626 m)   Wt 190 lb 6.4 oz (86.4 kg)   SpO2 97%   BMI 32.68 kg/m²        Physical Exam  Vitals signs and nursing note reviewed. Constitutional:       Appearance: Normal appearance. HENT:      Head: Normocephalic and atraumatic. Mouth/Throat:      Comments: White plaque on tongue, No pharyngeal erythema/Exudate  Neck:      Musculoskeletal: Normal range of motion and neck supple. Cardiovascular:      Rate and Rhythm: Normal rate and regular rhythm. Pulses: Normal pulses. Heart sounds: Normal heart sounds. Pulmonary:      Effort: Pulmonary effort is normal.      Breath sounds: Normal breath sounds. Abdominal:      General: Abdomen is flat. Bowel sounds are normal.      Palpations: Abdomen is soft. Lymphadenopathy:      Cervical: No cervical adenopathy. Neurological:      Mental Status: She is alert.          Pertinent Labs/Studies:      Assessment and orders:       ICD-10-CM ICD-9-CM    1. Oral thrush B37.0 112.0 nystatin (MYCOSTATIN) 100,000 unit/mL suspension   2. Fibromyalgia M79.7 729.1 gabapentin (NEURONTIN) 300 mg capsule   3. Painful menstrual periods N94.6 625.3 norgestimate-ethinyl estradioL (ORTHO-CYCLEN, SPRINTEC) 0.25-35 mg-mcg tab   4. Hemorrhoids, unspecified hemorrhoid type K64.9 455.6 pramoxine (PROCTOFOAM) 1 % topical foam   5. Posterior knee pain, right M25.561 719.46 diclofenac (VOLTAREN) 1 % gel   6. Gastroesophageal reflux disease without esophagitis K21.9 530.81 pantoprazole (PROTONIX) 20 mg tablet     Diagnoses and all orders for this visit:    1. Oral thrush: Treatment with Oral Swish and spit. Does not appear to involve esophagus at this time. Likely related to Chronic conditions. -     nystatin (MYCOSTATIN) 100,000 unit/mL suspension; Take 5 mL by mouth four (4) times daily for 10 days. swish and spit    2. Fibromyalgia: Refill  -     gabapentin (NEURONTIN) 300 mg capsule; TAKE 1 CAPSULE BY MOUTH IN THE MORNING AND 2 AT NIGHT    3. Painful menstrual periods: Refill  -     norgestimate-ethinyl estradioL (Carolanne Ganong) 0.25-35 mg-mcg tab; Take 1 Tab by mouth daily. Indications: pain with menstruation    4. Hemorrhoids, unspecified hemorrhoid type: Refill  -     pramoxine (PROCTOFOAM) 1 % topical foam; Apply  to affected area three (3) times daily as needed for Hemorrhoids. 5. Posterior knee pain, right: Refill  -     diclofenac (VOLTAREN) 1 % gel; Apply 1 g to affected area four (4) times daily. Apply to right knee    6. Gastroesophageal reflux disease without esophagitis: Refill  -     pantoprazole (PROTONIX) 20 mg tablet; Take one tablet by mouth two times a day. Follow-up and Dispositions    · Return in about 1 month (around 4/5/2020). I have discussed the diagnosis with the patient and the intended plan as seen in the above orders. Social history, medical history, and labs were reviewed.   The patient has received an after-visit summary and questions were answered concerning future plans. I have discussed medication side effects and warnings with the patient as well.     MD URBAN Villarreal & MARSHAL CARMEN [facility]  03/05/20

## 2020-03-12 ENCOUNTER — OFFICE VISIT (OUTPATIENT)
Dept: NEUROLOGY | Age: 40
End: 2020-03-12

## 2020-03-12 DIAGNOSIS — R20.2 PARESTHESIA: Primary | ICD-10-CM

## 2020-03-12 NOTE — PROCEDURES
EMG/ NCS Report  DRUG REHABILITATION  - [facility]  [facility]  [facility], [address] [place] [address] [address], [address]   Ph: [phone]   FAX: [phone]  Test Date:  3/12/2020    Patient: Debo Stern : 1980 Physician: Summer Stratton MD   Sex: Female Height: ' \" Ref Phys: Carmella Jasmine M.D.   ID#: [id number] Weight:  lbs. Technician: Hayley Santiago     Patient History / Exam:  Patient is coming for neuropathy evaluation. Patient has 6 months of burning numbness of bottom of both feet (+) fibromyalgia      EMG & NCV Findings:  Evaluation of the left Fibular motor and the right Fibular motor nerves showed normal distal onset latency (L4.4, R3.4 ms), normal amplitude (L8.5, R8.3 mV), normal conduction velocity (B Fib-Ankle, L55, R46 m/s), and normal conduction velocity (Poplt-B Fib, L48, R67 m/s). The left tibial motor and the right tibial motor nerves showed normal distal onset latency (L3.8, R3.7 ms), normal amplitude (L13.5, R16.2 mV), and normal conduction velocity (Knee-Ankle, L44, R45 m/s). The left Sup Fibular sensory and the right Sup Fibular sensory nerves showed normal distal peak latency (L2.5, R2.5 ms), normal amplitude (L15.4, R20.6 µV), and normal conduction velocity (Lower leg-Lat ankle, L59, R56 m/s). The left sural sensory and the right sural sensory nerves showed normal distal peak latency (L3.5, R3.6 ms) and normal amplitude (L16.6, R13.5 µV). All F Wave latencies were within normal limits. All F Wave left vs. right side latency differences were within normal limits. All H Reflex left vs. right side latency differences were within normal limits. All examined muscles (as indicated in the following table) showed no evidence of electrical instability.         Impression:    Extensive electrodiagnostic examination of the right and left lower extremities is normal.    Specifically, there is no evidence of a generalized polyneuropathy or lumbosacral motor radiculopathy.         Lorenza Cintron MD  Diplomate, American Board of Psychiatry and Neurology  Diplomate, Neuromuscular Medicine  Diplomate, American Board of Electrodiagnostic Medicine  Director, [address] [place] [address] Accredited Laboratory with Exemplary Status          Nerve Conduction Studies  Anti Sensory Summary Table     Stim Site NR Peak (ms) Norm Peak (ms) P-T Amp (µV) Norm P-T Amp Site1 Site2 Dist (cm)   Left Sup Fibular Anti Sensory (Lat ankle)  22.8°C   Lower leg    2.5 <4.5 15.4 >5 Lower leg Lat ankle 10.0   Right Sup Fibular Anti Sensory (Lat ankle)  28.5°C   Lower leg    2.5 <4.5 20.6 >5 Lower leg Lat ankle 10.0   Left Sural Anti Sensory (Lat Mall)  23.5°C   Calf    3.5 <4.5 16.6 >4.0 Calf Lat Mall 14.0   Right Sural Anti Sensory (Lat Mall)  29.6°C   Calf    3.6 <4.5 13.5 >4.0 Calf Lat Mall 14.0     Motor Summary Table     Stim Site NR Onset (ms) Norm Onset (ms) O-P Amp (mV) Norm O-P Amp Amp (Prev) (%) Site1 Site2 Dist (cm) Gael (m/s) Norm Gael (m/s)   Left Fibular Motor (Ext Dig Brev)  22.4°C   Ankle    4.4 <6.5 8.5 >2.6 100.0 Ankle Ext Dig Brev 8.0     B Fib    9.9  7.8  91.8 B Fib Ankle 30.0 55 >38   Poplt    12.0  7.7  98.7 Poplt B Fib 10.0 48 >42   Right Fibular Motor (Ext Dig Brev)  27.9°C   Ankle    3.4 <6.5 8.3 >2.6 100.0 Ankle Ext Dig Brev 8.0     B Fib    9.9  8.5  102.4 B Fib Ankle 30.0 46 >38   Poplt    11.4  8.3  97.6 Poplt B Fib 10.0 67 >42   Left Tibial Motor (Abd Peoples Brev)  22.5°C   Ankle    3.8 <6.1 13.5 >5.3 100.0 Ankle Abd Peoples Brev 8.0     Knee    11.6  9.0  66.7 Knee Ankle 34.0 44 >39   Right Tibial Motor (Abd Peoples Brev)  27.7°C   Ankle    3.7 <6.1 16.2 >5.3 100.0 Ankle Abd Peoples Brev 8.0     Knee    11.3  10.2  63.0 Knee Ankle 34.0 45 >39     F Wave Studies     NR F-Lat (ms) Lat Norm (ms) L-R F-Lat (ms) L-R Lat Norm   Left Tibial (Mrkrs) (Abd Hallucis)  22.7°C      45.86 <56 0.82 <5.7   Right Tibial (Mrkrs) (Abd Hallucis)  27.7°C      45.04 <56 0.82 <5.7     H Reflex Studies NR H-Lat (ms) L-R H-Lat (ms) L-R Lat Norm   Left Tibial (Gastroc)  22.9°C      27.94 0.00 <2.0   Right Tibial (Gastroc)  27.7°C      27.94 0.00 <2.0     EMG     Side Muscle Nerve Root Ins Act Fibs Psw Recrt Duration Amp Poly Comment   Left Ext Dig Brev Dp Br Peron L5, S1 Nml Nml Nml Nml Nml Nml Nml    Left AbdHallucis MedPlantar S1-2 Nml Nml Nml Nml Nml Nml Nml    Left AntTibialis Dp Br Peron L4-5 Nml Nml Nml Nml Nml Nml Nml    Left MedGastroc Tibial S1-2 Nml Nml Nml Nml Nml Nml Nml    Left VastusLat Femoral L2-4 Nml Nml Nml Nml Nml Nml Nml    Right Ext Dig Brev Dp Br Peron L5, S1 Nml Nml Nml Nml Nml Nml Nml    Right AbdHallucis MedPlantar S1-2 Nml Nml Nml Nml Nml Nml Nml    Right AntTibialis Dp Br Peron L4-5 Nml Nml Nml Nml Nml Nml Nml    Right MedGastroc Tibial S1-2 Nml Nml Nml Nml Nml Nml Nml    Right VastusLat Femoral L2-4 Nml Nml Nml Nml Nml Nml Nml    Right GluteusMed SupGluteal L4-S1 Nml Nml Nml Nml Nml Nml Nml    Right Lower Lumb Parasp Rami L5,S1 Nml Nml Nml Nml Nml Nml Nml                Nerve Conduction Studies  Anti Sensory Left/Right Comparison     Stim Site L Lat (ms) R Lat (ms) L-R Lat (ms) L Amp (µV) R Amp (µV) L-R Amp (%) Site1 Site2 L Gael (m/s) R Gael (m/s) L-R Gael (m/s)   Sup Fibular Anti Sensory (Lat ankle)  22.8°C   Lower leg 1.7 1.8 0.1 15.4 20.6 25.2 Lower leg Lat ankle 59 56 3   Sural Anti Sensory (Lat Mall)  23.5°C   Calf 2.8 3.1 0.3 16.6 13.5 18.7 Calf Lat Mall 50 45 5     Motor Left/Right Comparison     Stim Site L Lat (ms) R Lat (ms) L-R Lat (ms) L Amp (mV) R Amp (mV) L-R Amp (%) Site1 Site2 L Gael (m/s) R Gael (m/s) L-R Gael (m/s)   Fibular Motor (Ext Dig Brev)  22.4°C   Ankle 4.4 3.4 1.0 8.5 8.3 2.4 Ankle Ext Dig Brev      B Fib 9.9 9.9 0.0 7.8 8.5 8.2 B Fib Ankle 55 46 9   Poplt 12.0 11.4 0.6 7.7 8.3 7.2 Poplt B Fib 48 67 19   Tibial Motor (Abd Peoples Brev)  22.5°C   Ankle 3.8 3.7 0.1 13.5 16.2 16.7 Ankle Abd Peoples Brev      Knee 11.6 11.3 0.3 9.0 10.2 11.8 Knee Ankle 44 45 1 Waveforms:

## 2020-05-06 ENCOUNTER — TELEPHONE (OUTPATIENT)
Dept: FAMILY MEDICINE CLINIC | Age: 40
End: 2020-05-06

## 2020-05-06 NOTE — TELEPHONE ENCOUNTER
Phone call made to patient to set up a virtual visit with Dr. Stephanie Cranker for her labs and for thrush. Appointment made for 5/11/2020. Patient requesting results from a test that was done at her neurologists office at the beginning of March. Advised patient to contact her neurologist, phone number provided to patient.

## 2020-05-11 ENCOUNTER — VIRTUAL VISIT (OUTPATIENT)
Dept: FAMILY MEDICINE CLINIC | Age: 40
End: 2020-05-11

## 2020-05-11 DIAGNOSIS — M79.7 FIBROMYALGIA: Primary | ICD-10-CM

## 2020-05-11 DIAGNOSIS — G89.4 CHRONIC PAIN SYNDROME: ICD-10-CM

## 2020-05-11 DIAGNOSIS — Z83.3 FAMILY HISTORY OF DIABETES MELLITUS: ICD-10-CM

## 2020-05-11 DIAGNOSIS — K31.84 GASTROPARESIS: ICD-10-CM

## 2020-05-11 DIAGNOSIS — R53.82 CHRONIC FATIGUE: ICD-10-CM

## 2020-05-11 DIAGNOSIS — G62.9 NEUROPATHY: ICD-10-CM

## 2020-05-11 DIAGNOSIS — R82.90 MALODOROUS URINE: ICD-10-CM

## 2020-05-11 DIAGNOSIS — R79.9 ABNORMAL FINDING OF BLOOD CHEMISTRY, UNSPECIFIED: ICD-10-CM

## 2020-05-11 DIAGNOSIS — F33.9 RECURRENT DEPRESSION (HCC): ICD-10-CM

## 2020-05-11 DIAGNOSIS — F32.A DEPRESSION, UNSPECIFIED DEPRESSION TYPE: ICD-10-CM

## 2020-05-11 DIAGNOSIS — E78.01 FAMILIAL HYPERCHOLESTEROLEMIA: ICD-10-CM

## 2020-05-11 DIAGNOSIS — I10 ESSENTIAL HYPERTENSION: ICD-10-CM

## 2020-05-11 DIAGNOSIS — D64.9 ANEMIA, UNSPECIFIED TYPE: ICD-10-CM

## 2020-05-11 NOTE — PROGRESS NOTES
[address]  [phone]           Progress Note    Patient: Yasir Short MRN: [id number]  SSN: [id number]    YOB: 1980  Age: 42 y.o. Sex: female      Yasir Short is a 42 y.o. female evaluated via synchronous telemedicine on 5/11/2020. Nurse involved in care:Nurse Axel  Location of patient:Patient Home  Location of physician:My Office    Consent:  She and/or health care decision maker is aware that that she may receive a bill for this telemedicine service, depending on her insurance coverage, and has provided verbal consent to proceed: Yes      Documentation:  I communicated with the patient and/or health care decision maker about multiple complex medical problems. Details of this discussion including any medical advice provided: See below      I affirm this is a Patient Initiated Episode with an Established Patient who has not had a related appointment within my department in the past 7 days or scheduled within the next 24 hours. Note: not billable if this call serves to triage the patient into an appointment for the relevant concern      Jomar Chadwick MD     Chief Complaint   Patient presents with   David Carlota    Labs    Urinary Odor         Subjective:     Encounter Diagnoses   Name Primary?  Fibromyalgia:  Chronic muscular pain. No improvement. Yes    Chronic fatigue: She says this is worse. This symptom necessitates her work-up. He also has chronic current thrush. Without vaginal candidiasis.  Chronic pain syndrome:she is on gabapentin.  Depression, unspecified depression type: Is hard to separate out if her depression on her symptoms.  Essential hypertension: The only blood pressure she is doing recently was today cardiology visual consult and it was 150/90.   Advised to start checking pressures at least every 2 to 3 days it during times of the day.         Anemia, unspecified type: Due for 10. No sx. Lab Results   Component Value Date/Time    HGB 11.1 05/07/2019 03:51 PM     Lab Results   Component Value Date/Time    Iron 29 (L) 09/23/2009 09:47 AM    TIBC 458 (H) 09/23/2009 09:47 AM    Iron % saturation 6 (L) 09/23/2009 09:47 AM    Ferritin 38 05/07/2019 03:51 PM          Recurrent depression ([address]):  See above.  Gastroparesis:  Symptoms stable.  Family history of diabetes mellitus: High risk for development diabetes.  Malodorous urine: Urine is also dark. Will urinalysis and make sure she does not have another urine for infection.  Familial hypercholesterolemia: Father has high cholesterol. Cardiovascular risks for her are: LDL goal is under 100  hypertension  hyperlipidemia. Key Antihyperlipidemia Meds     The patient is on no antihyperlipidemia meds. Lab Results   Component Value Date/Time    Cholesterol, total 210 (H) 05/07/2019 03:51 PM    HDL Cholesterol 77 05/07/2019 03:51 PM    LDL, calculated 119 (H) 05/07/2019 03:51 PM    Triglyceride 72 05/07/2019 03:51 PM     Lab Results   Component Value Date/Time    ALT (SGPT) 29 05/07/2019 03:51 PM    AST (SGOT) 15 05/07/2019 03:51 PM    Alk. phosphatase 75 05/07/2019 03:51 PM    Bilirubin, direct 0.12 12/08/2017 04:08 PM    Bilirubin, total <0.2 05/07/2019 03:51 PM      Myalgias: No   Fatigue: No   Other side effects: no  Wt Readings from Last 3 Encounters:   03/05/20 190 lb 6.4 oz (86.4 kg)   12/26/19 183 lb 3.2 oz (83.1 kg)   12/10/19 182 lb (82.6 kg)     The patient is aware of our goal to reduce or eliminate the long term problems (such as strokes and heart attacks) related to poorly controlled hyperlipidemia.  Neuropathy:  Even though she has had 6 months of numbness and tingling on her feet EMG was negative.  Abnormal finding of blood chemistry, unspecified : Needs sugar rechecked.               Current and past medical information:    Current Medications after this visit[de-identified]     Current Outpatient Medications   Medication Sig    lamoTRIgine (LAMICTAL) 100 mg tablet Take 100 mg by mouth two (2) times a day.  cyclobenzaprine (FLEXERIL) 10 mg tablet Take 10 mg by mouth two (2) times daily as needed.  LORazepam (ATIVAN) 2 mg tablet Take 2 mg by mouth as needed.  cyanocobalamin (VITAMIN B-12) 1,000 mcg tablet Take 1,000 mcg by mouth daily.  gabapentin (NEURONTIN) 300 mg capsule TAKE 1 CAPSULE BY MOUTH IN THE MORNING AND 2 AT NIGHT    pramoxine (PROCTOFOAM) 1 % topical foam Apply  to affected area three (3) times daily as needed for Hemorrhoids.  norgestimate-ethinyl estradioL (ORTHO-CYCLEN, SPRINTEC) 0.25-35 mg-mcg tab Take 1 Tab by mouth daily. Indications: pain with menstruation    pantoprazole (PROTONIX) 20 mg tablet Take one tablet by mouth two times a day.  diclofenac (VOLTAREN) 1 % gel Apply 1 g to affected area four (4) times daily. Apply to right knee    albuterol-ipratropium (DUO-NEB) 2.5 mg-0.5 mg/3 ml nebu 3 mL by Nebulization route every six (6) hours as needed (wheeze).  albuterol (PROVENTIL HFA, VENTOLIN HFA, PROAIR HFA) 90 mcg/actuation inhaler Take 1 Puff by inhalation every six (6) hours as needed for Wheezing.  triamcinolone (NASACORT) 55 mcg nasal inhaler 2 Sprays by Both Nostrils route daily.  OTHER Please provide patient with a left knee brace. DX: Z87.39    mineral oil-hydrophil petrolat (AQUAPHOR) ointment Apply  to affected area two (2) times a day. Apply to bilateral legs and feet.  Comp Stocking,Knee,Regular,Med misc 1 Each by Does Not Apply route daily.  metoclopramide HCl (REGLAN) 5 mg tablet Take 5 mg by mouth Before breakfast, lunch, and dinner.  triamcinolone acetonide (KENALOG) 0.1 % ointment Apply  to affected area two (2) times a day. use thin layer    ketoconazole (NIZORAL) 2 % shampoo shampoo twice weekly    acetaminophen (TYLENOL 8 HOUR PO) Take  by mouth.  lamoTRIgine (LAMICTAL) 25 mg tablet     valACYclovir (VALTREX) 500 mg tablet Take 1 Tab by mouth daily.  clonazePAM (KLONOPIN) 1 mg tablet Take 1 mg by mouth daily. At bedtime    cholecalciferol, vitamin D3, (VITAMIN D3) 2,000 unit tab Take  by mouth.  dextroamphetamine-amphetamine (ADDERALL) 20 mg tablet Take 20 mg by mouth. 1/2 tablet  Twice daily          FLUoxetine (PROZAC) 40 mg capsule Take 40 mg by mouth daily.  traZODone (DESYREL) 50 mg tablet Take 1 Tab by mouth nightly. No current facility-administered medications for this visit.         Patient Active Problem List    Diagnosis Date Noted    Gastroparesis 11/29/2018     Priority: 1 - One    Plantar fasciitis of left foot 11/29/2018     Priority: 1 - One    Positive KITTY (antinuclear antibody) 11/29/2018     Priority: 1 - One    Severe obesity ([facility] 11/29/2018     Priority: 1 - One    Class 2 obesity due to excess calories without serious comorbidity in adult 07/25/2018     Priority: 1 - One    Mood disorder ([facility] 06/12/2018     Priority: 1 - One    Chronic pain syndrome 06/12/2018     Priority: 1 - One    Fibromyalgia 06/12/2018     Priority: 1 - One    Recurrent depression ([facility] 01/15/2018     Priority: 1 - One    Vitamin D deficiency 03/21/2016     Priority: 1 - One    IFG (impaired fasting glucose) 09/10/2015     Priority: 1 - One    Hyperlipidemia 09/10/2015     Priority: 1 - One    Depression      Priority: 1 - One    GERD (gastroesophageal reflux disease)      Priority: 1 - One    Hypertension      Priority: 1 - One    Anemia, unspecified      Priority: 1 - One    Neuropathy 10/15/2019    Panic attack 06/12/2018    Tremor 06/12/2018    Inflammatory arthritis 08/10/2016    Thrombosed external hemorrhoid 02/05/2016    Hoarseness 03/25/2013    OCD (obsessive compulsive disorder) 02/27/2012    HSV (herpes simplex virus) anogenital infection 09/12/2011    Costochondritis 05/17/2011    Anxiety 09/13/2010    Itch of skin 2010       Past Medical History:   Diagnosis Date    Anemia NEC     Arthritis     Chronic pain     fybromyalsia    Depression     anxiety, depression, OCD    GERD (gastroesophageal reflux disease)     Hypertension     Musculoskeletal disorder     SOB (shortness of breath)     Stool color black        Allergies   Allergen Reactions    Sulfa (Sulfonamide Antibiotics) Hives    Vicodin [Hydrocodone-Acetaminophen] Nausea and Vomiting       Past Surgical History:   Procedure Laterality Date    HX GYN           HX HEENT  2002    wisdom teeth extraction    UPPER GI ENDOSCOPY,BIOPSY  2018         UPPER GI ENDOSCOPY,DIAGNOSIS  2018            Social History     Socioeconomic History    Marital status: SINGLE     Spouse name: Not on file    Number of children: Not on file    Years of education: Not on file    Highest education level: Not on file   Tobacco Use    Smoking status: Former Smoker     Packs/day: 0.25     Years: 8.00     Pack years: 2.00     Types: Cigarettes     Last attempt to quit: 9/3/2018     Years since quittin.6    Smokeless tobacco: Never Used   Substance and Sexual Activity    Alcohol use: Yes     Alcohol/week: 1.0 standard drinks     Types: 1 Glasses of wine per week     Comment: 1 cup of wine/week    Drug use: No    Sexual activity: Yes     Partners: Male     Birth control/protection: None       Review of Systems   Constitutional: Positive for malaise/fatigue. Negative for chills, fever and weight loss. HENT: Negative. Negative for hearing loss. Eyes: Negative. Negative for blurred vision and double vision. Respiratory: Negative. Negative for cough, sputum production and shortness of breath. Cardiovascular: Negative. Negative for chest pain and palpitations. Gastrointestinal: Negative. Negative for abdominal pain, blood in stool, heartburn, nausea and vomiting. Genitourinary: Negative.   Negative for dysuria, frequency and urgency. Discolored urine that is malodorous. Musculoskeletal: Positive for back pain, joint pain, myalgias and neck pain. Negative for falls. Skin: Negative. Negative for rash. Neurological: Positive for tingling. Negative for dizziness, tremors, weakness and headaches. Endo/Heme/Allergies: Negative. Psychiatric/Behavioral: Negative. Negative for depression, substance abuse and suicidal ideas. Objective: There were no vitals filed for this visit. There is no height or weight on file to calculate BMI. Physical Exam  HENT:      Head: Normocephalic. Eyes:      General: No scleral icterus. Conjunctiva/sclera: Conjunctivae normal.   Pulmonary:      Effort: Pulmonary effort is normal.   Skin:     Findings: No rash. Psychiatric:         Mood and Affect: Affect normal.      Comments: Depressed affect. Health Maintenance Due   Topic Date Due    Pneumococcal 0-64 years (1 of 1 - PPSV23) 08/27/1988    PAP AKA CERVICAL CYTOLOGY  02/02/2020    Medicare Yearly Exam  03/05/2020    A1C test (Diabetic or Prediabetic)  05/07/2020         Assessment and orders:     Encounter Diagnoses     ICD-10-CM ICD-9-CM   1. Fibromyalgia M79.7 729.1   2. Chronic fatigue R53.82 780.79   3. Chronic pain syndrome G89.4 338.4   4. Depression, unspecified depression type F32.9 311   5. Essential hypertension I10 401.9   6. Anemia, unspecified type D64.9 285.9   7. Recurrent depression (HCC) F33.9 296.30   8. Gastroparesis K31.84 536.3   9. Family history of diabetes mellitus Z83.3 V18.0   10. Malodorous urine R82.90 791.9   11. Familial hypercholesterolemia E78.01 272.0   12. Neuropathy G62.9 355.9   13. Abnormal finding of blood chemistry, unspecified  R79.9 790.6     Diagnoses and all orders for this visit:    1. Fibromyalgia nerve muscle activity as well as tingling of the lower legs. -     METABOLIC PANEL, COMPREHENSIVE; Future    2.  Chronic fatigue-doing a indicating autoimmune disease of unknown etiology  -     METABOLIC PANEL, COMPREHENSIVE; Future  -     CBC WITH AUTOMATED DIFF; Future  -     LUPUS PROFILE; Future  -     T4, FREE; Future    3. Chronic pain syndrome-on gabapentin.  -     METABOLIC PANEL, COMPREHENSIVE; Future    4. Depression, unspecified depression type    5. Essential hypertension-elevated needs to monitor this at home. -     METABOLIC PANEL, COMPREHENSIVE; Future  -     CBC WITH AUTOMATED DIFF; Future    6. Anemia, unspecified type-to repeat  -     CBC WITH AUTOMATED DIFF; Future    7. Recurrent depression (HCC)  -     METABOLIC PANEL, COMPREHENSIVE; Future    8. Gastroparesis-stable  -     METABOLIC PANEL, COMPREHENSIVE; Future  -     HEMOGLOBIN A1C WITH EAG; Future  -     CBC WITH AUTOMATED DIFF; Future    9. Family history of diabetes mellitus  -     HEMOGLOBIN A1C WITH EAG; Future    10. Malodorous urine-work-up  -     URINALYSIS W/MICROSCOPIC; Future  -     CULTURE, URINE; Future    11. Familial hypercholesterolemia  -     LIPID PANEL; Future    12. Neuropathy-can suggest neuropathy but EMG was normal  -     VITAMIN B12; Future    13. Abnormal finding of blood chemistry, unspecified   -     HEMOGLOBIN A1C WITH EAG; Future            Plan of care:  Discussed diagnoses in detail with patient. Medication risks/benefits/side effects discussed with patient. All of the patient's questions were addressed. The patient understands and agrees with our plan of care. The patient knows to call back if they are unsure of or forget any changes we discussed today or if the symptoms change. The patient received an After-Visit Summary which contains VS, orders, medication list and allergy list. This can be used as a \"mini-medical record\" should they have to seek medical care while out of town.     Patient Care Team:  Adam Aguilar MD as PCP - General (Family Practice)  Adam Aguilar MD as PCP - [facility] Empaneled Provider  Devon Noyola MD (Breast Surgery)  Kim Cornell MD (Cardiology)  Maryse Quintero MD (Otolaryngology)  Taylor Salgado MD (Internal Medicine)  Eliza Bhatti MD (Female Pelvic Medicine and Reconstructive Surgery)  Arturo Park MD (Neurology)          Signed By: Richard Gutierrez MD     May 11, 2020      ATTENTION:   This medical record was transcribed using an electronic medical records/speech recognition system. Although proofread, it may and can contain electronic, spelling and other errors. Corrections may be executed at a later time.   Please feel free to contact me for any clarif

## 2020-05-12 ENCOUNTER — HOSPITAL ENCOUNTER (OUTPATIENT)
Dept: LAB | Age: 40
Discharge: HOME | End: 2020-05-12

## 2020-05-12 ENCOUNTER — TELEPHONE (OUTPATIENT)
Dept: FAMILY MEDICINE CLINIC | Age: 40
End: 2020-05-12

## 2020-05-12 DIAGNOSIS — R82.90 MALODOROUS URINE: ICD-10-CM

## 2020-05-12 DIAGNOSIS — M79.7 FIBROMYALGIA: ICD-10-CM

## 2020-05-12 DIAGNOSIS — E78.01 FAMILIAL HYPERCHOLESTEROLEMIA: ICD-10-CM

## 2020-05-12 DIAGNOSIS — I10 ESSENTIAL HYPERTENSION: ICD-10-CM

## 2020-05-12 DIAGNOSIS — D64.9 ANEMIA, UNSPECIFIED TYPE: ICD-10-CM

## 2020-05-12 DIAGNOSIS — G62.9 NEUROPATHY: ICD-10-CM

## 2020-05-12 DIAGNOSIS — R53.82 CHRONIC FATIGUE: ICD-10-CM

## 2020-05-12 DIAGNOSIS — Z83.3 FAMILY HISTORY OF DIABETES MELLITUS: ICD-10-CM

## 2020-05-12 DIAGNOSIS — R79.9 ABNORMAL FINDING OF BLOOD CHEMISTRY, UNSPECIFIED: ICD-10-CM

## 2020-05-12 DIAGNOSIS — F33.9 RECURRENT DEPRESSION (HCC): ICD-10-CM

## 2020-05-12 DIAGNOSIS — G89.4 CHRONIC PAIN SYNDROME: ICD-10-CM

## 2020-05-12 DIAGNOSIS — K31.84 GASTROPARESIS: ICD-10-CM

## 2020-05-12 LAB
ALBUMIN SERPL-MCNC: 3.4 G/DL (ref 3.5–5)
ALBUMIN/GLOB SERPL: 0.9 {RATIO} (ref 1.1–2.2)
ALP SERPL-CCNC: 107 U/L (ref 45–117)
ALT SERPL-CCNC: 22 U/L (ref 12–78)
ANION GAP SERPL CALC-SCNC: 6 MMOL/L (ref 5–15)
APPEARANCE UR: ABNORMAL
AST SERPL-CCNC: 10 U/L (ref 15–37)
BACTERIA URNS QL MICRO: ABNORMAL /HPF
BASOPHILS # BLD: 0 K/UL (ref 0–0.1)
BASOPHILS NFR BLD: 1 % (ref 0–1)
BILIRUB SERPL-MCNC: 0.3 MG/DL (ref 0.2–1)
BILIRUB UR QL: NEGATIVE
BUN SERPL-MCNC: 11 MG/DL (ref 6–20)
BUN/CREAT SERPL: 13 (ref 12–20)
CALCIUM SERPL-MCNC: 8.9 MG/DL (ref 8.5–10.1)
CHLORIDE SERPL-SCNC: 108 MMOL/L (ref 97–108)
CHOLEST SERPL-MCNC: 239 MG/DL
CO2 SERPL-SCNC: 25 MMOL/L (ref 21–32)
COLOR UR: ABNORMAL
COMMENT, HOLDF: NORMAL
CREAT SERPL-MCNC: 0.88 MG/DL (ref 0.55–1.02)
DIFFERENTIAL METHOD BLD: ABNORMAL
EOSINOPHIL # BLD: 0.6 K/UL (ref 0–0.4)
EOSINOPHIL NFR BLD: 11 % (ref 0–7)
EPITH CASTS URNS QL MICRO: ABNORMAL /LPF
ERYTHROCYTE [DISTWIDTH] IN BLOOD BY AUTOMATED COUNT: 16.1 % (ref 11.5–14.5)
EST. AVERAGE GLUCOSE BLD GHB EST-MCNC: 117 MG/DL
GLOBULIN SER CALC-MCNC: 3.6 G/DL (ref 2–4)
GLUCOSE SERPL-MCNC: 100 MG/DL (ref 65–100)
GLUCOSE UR STRIP.AUTO-MCNC: NEGATIVE MG/DL
HBA1C MFR BLD: 5.7 % (ref 4–5.6)
HCT VFR BLD AUTO: 32.5 % (ref 35–47)
HDLC SERPL-MCNC: 95 MG/DL
HDLC SERPL: 2.5 {RATIO} (ref 0–5)
HGB BLD-MCNC: 10 G/DL (ref 11.5–16)
HGB UR QL STRIP: ABNORMAL
IMM GRANULOCYTES # BLD AUTO: 0 K/UL (ref 0–0.04)
IMM GRANULOCYTES NFR BLD AUTO: 0 % (ref 0–0.5)
KETONES UR QL STRIP.AUTO: NEGATIVE MG/DL
LDLC SERPL CALC-MCNC: 126.2 MG/DL (ref 0–100)
LEUKOCYTE ESTERASE UR QL STRIP.AUTO: ABNORMAL
LIPID PROFILE,FLP: ABNORMAL
LYMPHOCYTES # BLD: 2.1 K/UL (ref 0.8–3.5)
LYMPHOCYTES NFR BLD: 35 % (ref 12–49)
MCH RBC QN AUTO: 27.7 PG (ref 26–34)
MCHC RBC AUTO-ENTMCNC: 30.8 G/DL (ref 30–36.5)
MCV RBC AUTO: 90 FL (ref 80–99)
MONOCYTES # BLD: 0.4 K/UL (ref 0–1)
MONOCYTES NFR BLD: 7 % (ref 5–13)
NEUTS SEG # BLD: 2.7 K/UL (ref 1.8–8)
NEUTS SEG NFR BLD: 46 % (ref 32–75)
NITRITE UR QL STRIP.AUTO: POSITIVE
NRBC # BLD: 0 K/UL (ref 0–0.01)
NRBC BLD-RTO: 0 PER 100 WBC
PH UR STRIP: 5 [PH] (ref 5–8)
PLATELET # BLD AUTO: 269 K/UL (ref 150–400)
PMV BLD AUTO: 10.1 FL (ref 8.9–12.9)
POTASSIUM SERPL-SCNC: 4.3 MMOL/L (ref 3.5–5.1)
PROT SERPL-MCNC: 7 G/DL (ref 6.4–8.2)
PROT UR STRIP-MCNC: NEGATIVE MG/DL
RBC # BLD AUTO: 3.61 M/UL (ref 3.8–5.2)
RBC #/AREA URNS HPF: ABNORMAL /HPF (ref 0–5)
SAMPLES BEING HELD,HOLD: NORMAL
SODIUM SERPL-SCNC: 139 MMOL/L (ref 136–145)
SP GR UR REFRACTOMETRY: 1.02 (ref 1–1.03)
T4 FREE SERPL-MCNC: 0.9 NG/DL (ref 0.8–1.5)
TRIGL SERPL-MCNC: 89 MG/DL (ref ?–150)
UROBILINOGEN UR QL STRIP.AUTO: 0.2 EU/DL (ref 0.2–1)
VIT B12 SERPL-MCNC: 573 PG/ML (ref 193–986)
VLDLC SERPL CALC-MCNC: 17.8 MG/DL
WBC # BLD AUTO: 5.9 K/UL (ref 3.6–11)
WBC URNS QL MICRO: ABNORMAL /HPF (ref 0–4)

## 2020-05-14 LAB
ANA SER QL: NEGATIVE
DSDNA AB SER-ACNC: <1 IU/ML (ref 0–9)
ENA RNP AB SER-ACNC: 0.2 AI (ref 0–0.9)
ENA SM AB SER-ACNC: <0.2 AI (ref 0–0.9)

## 2020-05-15 LAB
BACTERIA SPEC CULT: ABNORMAL
CC UR VC: ABNORMAL
SERVICE CMNT-IMP: ABNORMAL

## 2020-05-15 RX ORDER — CEPHALEXIN 500 MG/1
500 CAPSULE ORAL 3 TIMES DAILY
Qty: 30 CAP | Refills: 0 | Status: SHIPPED | OUTPATIENT
Start: 2020-05-15 | End: 2020-05-25

## 2020-05-15 NOTE — PROGRESS NOTES
Urine culture grew E. coli other sensitive to Keflex. Prescription for Keflex sent in. Repeat her urine specimen in 2 weeks if she has persistent symptoms.

## 2020-05-18 ENCOUNTER — TELEPHONE (OUTPATIENT)
Dept: FAMILY MEDICINE CLINIC | Age: 40
End: 2020-05-18

## 2020-05-18 NOTE — TELEPHONE ENCOUNTER
Unable to reach patient by telephone. Message left on answering machine informing patient that she has a UTI and that Dr. Kitty Emanuel has sent in an abx to her pharmacy.

## 2020-05-18 NOTE — TELEPHONE ENCOUNTER
----- Message from Michel Runner, MD sent at 5/15/2020 12:45 PM EDT -----  See my note.   Keflex sent to her pharmacy.  ----- Message -----  From: Shira Lunsford In [place]  Sent: 5/12/2020   9:47 PM EDT  To: Michel Runner, MD

## 2020-06-10 ENCOUNTER — OFFICE VISIT (OUTPATIENT)
Dept: FAMILY MEDICINE CLINIC | Age: 40
End: 2020-06-10

## 2020-06-10 VITALS
HEIGHT: 64 IN | OXYGEN SATURATION: 96 % | TEMPERATURE: 98 F | WEIGHT: 215 LBS | BODY MASS INDEX: 36.7 KG/M2 | RESPIRATION RATE: 18 BRPM | SYSTOLIC BLOOD PRESSURE: 123 MMHG | DIASTOLIC BLOOD PRESSURE: 82 MMHG | HEART RATE: 87 BPM

## 2020-06-10 VITALS — WEIGHT: 215 LBS | HEIGHT: 64 IN | BODY MASS INDEX: 36.7 KG/M2

## 2020-06-10 DIAGNOSIS — M79.2 NEUROPATHIC PAIN: ICD-10-CM

## 2020-06-10 DIAGNOSIS — M79.641 RIGHT HAND PAIN: Primary | ICD-10-CM

## 2020-06-10 DIAGNOSIS — D64.9 ANEMIA, UNSPECIFIED TYPE: ICD-10-CM

## 2020-06-10 RX ORDER — PREDNISONE 10 MG/1
10 TABLET ORAL
Qty: 5 TAB | Refills: 0 | Status: SHIPPED | OUTPATIENT
Start: 2020-06-10 | End: 2020-06-15

## 2020-06-10 NOTE — PROGRESS NOTES
[facility]    History of Present Illness:   Janeth Lama is a 42 y.o. female with history of Chostocondritis, Neuropathy, Depression  CC: Right hand pain  History provided by patient and Records    HPI:  Right Hand: Patient with a fall onto a wood floor from Ground level onto outstretched right hand. Noting swelling on the dorsum of the hand predominant at the PIP of the middle finger. Pain with Hand/finger extension and with \"Ripping/twidting\" Motions in particular. Pain is Aching in characteristic and at baseline is 5/10 intensity at baseline, exacerbation to a 10/10 pain. Pain with snapping fingers. Denies weakness or loss of sensation in the fingers. Has tried Tylenol, Voltaren Gel with no significant help. Has not used ice. Heat does not help the pain. Family history of Gout. Neuropathic pain: Numbness in the toes with burning Sensation. Chronic issue, but reports recent testing has been negative. Noting has been worse over the last 2-3 weeks in particular. Noting after walking gets burning and redness in the feet. Worse at night. Health Maintenance  Health Maintenance Due   Topic Date Due    Pneumococcal 0-64 years (1 of 1 - PPSV23) 08/27/1988    PAP AKA CERVICAL CYTOLOGY  02/02/2020    Medicare Yearly Exam  03/05/2020       Past Medical, Family, and Social History:     Current Outpatient Medications on File Prior to Visit   Medication Sig Dispense Refill    lamoTRIgine (LAMICTAL) 100 mg tablet Take 100 mg by mouth two (2) times a day.  cyclobenzaprine (FLEXERIL) 10 mg tablet Take 10 mg by mouth two (2) times daily as needed. 1    LORazepam (ATIVAN) 2 mg tablet Take 2 mg by mouth as needed.  cyanocobalamin (VITAMIN B-12) 1,000 mcg tablet Take 1,000 mcg by mouth daily.       gabapentin (NEURONTIN) 300 mg capsule TAKE 1 CAPSULE BY MOUTH IN THE MORNING AND 2 AT NIGHT 90 Cap 2    pramoxine (PROCTOFOAM) 1 % topical foam Apply  to affected area three (3) times daily as needed for Hemorrhoids. 1 Can 1    norgestimate-ethinyl estradioL (ORTHO-CYCLEN, SPRINTEC) 0.25-35 mg-mcg tab Take 1 Tab by mouth daily. Indications: pain with menstruation 1 Package 12    pantoprazole (PROTONIX) 20 mg tablet Take one tablet by mouth two times a day. 60 Tab 5    diclofenac (VOLTAREN) 1 % gel Apply 1 g to affected area four (4) times daily. Apply to right knee 100 g 4    albuterol-ipratropium (DUO-NEB) 2.5 mg-0.5 mg/3 ml nebu 3 mL by Nebulization route every six (6) hours as needed (wheeze). 30 Nebule 3    albuterol (PROVENTIL HFA, VENTOLIN HFA, PROAIR HFA) 90 mcg/actuation inhaler Take 1 Puff by inhalation every six (6) hours as needed for Wheezing. 1 Inhaler 5    triamcinolone (NASACORT) 55 mcg nasal inhaler 2 Sprays by Both Nostrils route daily. 1 Bottle 3    OTHER Please provide patient with a left knee brace. DX: Z87.39 1 Each 0    mineral oil-hydrophil petrolat (AQUAPHOR) ointment Apply  to affected area two (2) times a day. Apply to bilateral legs and feet. 396 g 2    Comp Stocking,Knee,Regular,Med misc 1 Each by Does Not Apply route daily. 1 Each 1    metoclopramide HCl (REGLAN) 5 mg tablet Take 5 mg by mouth Before breakfast, lunch, and dinner.  triamcinolone acetonide (KENALOG) 0.1 % ointment Apply  to affected area two (2) times a day. use thin layer 85 g 11    ketoconazole (NIZORAL) 2 % shampoo shampoo twice weekly 1 Bottle 11    acetaminophen (TYLENOL 8 HOUR PO) Take  by mouth.  lamoTRIgine (LAMICTAL) 25 mg tablet       valACYclovir (VALTREX) 500 mg tablet Take 1 Tab by mouth daily. 30 Tab 0    clonazePAM (KLONOPIN) 1 mg tablet Take 1 mg by mouth daily. At bedtime      cholecalciferol, vitamin D3, (VITAMIN D3) 2,000 unit tab Take  by mouth.  dextroamphetamine-amphetamine (ADDERALL) 20 mg tablet Take 20 mg by mouth. 1/2 tablet  Twice daily            FLUoxetine (PROZAC) 40 mg capsule Take 40 mg by mouth daily.       traZODone (DESYREL) 50 mg tablet Take 1 Tab by mouth nightly. 30 Tab 6     No current facility-administered medications on file prior to visit. Patient Active Problem List   Diagnosis Code    Depression F32.9    GERD (gastroesophageal reflux disease) K21.9    Hypertension I10    Anemia, unspecified D64.9    Itch of skin L29.9    Anxiety F41.9    Costochondritis M94.0    HSV (herpes simplex virus) anogenital infection A60.9    OCD (obsessive compulsive disorder) F42.9    Hoarseness R49.0    IFG (impaired fasting glucose) R73.01    Hyperlipidemia E78.5    Thrombosed external hemorrhoid K64.5    Vitamin D deficiency E55.9    Inflammatory arthritis M19.90    Recurrent depression (HCC) F33.9    Mood disorder (HCC) F39    Chronic pain syndrome G89.4    Fibromyalgia M79.7    Panic attack F41.0    Tremor R25.1    Class 2 obesity due to excess calories without serious comorbidity in adult E66.09    Gastroparesis K31.84    Plantar fasciitis of left foot M72.2    Positive KITTY (antinuclear antibody) R76.8    Severe obesity (HCC) E66.01    Neuropathy G62.9       Social History     Socioeconomic History    Marital status: SINGLE     Spouse name: Not on file    Number of children: Not on file    Years of education: Not on file    Highest education level: Not on file   Occupational History    Not on file   Social Needs    Financial resource strain: Not on file    Food insecurity     Worry: Not on file     Inability: Not on file    Transportation needs     Medical: Not on file     Non-medical: Not on file   Tobacco Use    Smoking status: Former Smoker     Packs/day: 0.25     Years: 8.00     Pack years: 2.00     Types: Cigarettes     Last attempt to quit: 9/3/2018     Years since quittin.7    Smokeless tobacco: Never Used   Substance and Sexual Activity    Alcohol use:  Yes     Alcohol/week: 1.0 standard drinks     Types: 1 Glasses of wine per week     Comment: 1 cup of wine/week    Drug use: No    Sexual activity: Yes     Partners: Male     Birth control/protection: None   Lifestyle    Physical activity     Days per week: Not on file     Minutes per session: Not on file    Stress: Not on file   Relationships    Social connections     Talks on phone: Not on file     Gets together: Not on file     Attends Church service: Not on file     Active member of club or organization: Not on file     Attends meetings of clubs or organizations: Not on file     Relationship status: Not on file    Intimate partner violence     Fear of current or ex partner: Not on file     Emotionally abused: Not on file     Physically abused: Not on file     Forced sexual activity: Not on file   Other Topics Concern    Not on file   Social History Narrative    Not on file       Review of Systems   Review of Systems   Constitutional: Negative for chills, fever and malaise/fatigue. Gastrointestinal: Negative for nausea and vomiting. Musculoskeletal:        Hand pain   Neurological: Positive for tingling and sensory change. Endo/Heme/Allergies: Positive for environmental allergies. Objective:     Visit Vitals  /82 (BP 1 Location: Left arm, BP Patient Position: Sitting)   Pulse 87   Temp 98 °F (36.7 °C) (Oral)   Resp 18   Ht 5' 4\" (1.626 m)   Wt 215 lb (97.5 kg)   LMP 05/27/2020   SpO2 96%   BMI 36.90 kg/m²        Physical Exam  Vitals signs and nursing note reviewed. Constitutional:       Appearance: Normal appearance. HENT:      Head: Normocephalic and atraumatic. Right Ear: Tympanic membrane and ear canal normal.      Left Ear: Tympanic membrane and ear canal normal.   Neck:      Musculoskeletal: Normal range of motion and neck supple. Cardiovascular:      Rate and Rhythm: Normal rate and regular rhythm. Pulses: Normal pulses. Heart sounds: Normal heart sounds. No murmur. No friction rub. No gallop. Pulmonary:      Effort: Pulmonary effort is normal.      Breath sounds: Normal breath sounds. Abdominal:      General: Abdomen is flat. Bowel sounds are normal.      Palpations: Abdomen is soft. Musculoskeletal:      Right hand: She exhibits tenderness. She exhibits normal range of motion and no deformity. Normal sensation noted. Left hand: Normal.      Comments: Swelling on the dorsum of the right hand at the PIP of the middle finger. Tender to Palpation, pain with hand extension and with resisted rotation, no pain in the wrist.   Skin:     General: Skin is warm and dry. Findings: No rash. Neurological:      Mental Status: She is alert. Pertinent Labs/Studies:  XR Results (most recent):  Results from Office Visit encounter on 06/10/20   XR HAND RT MIN 3 V    Narrative EXAM: XR HAND RT MIN 3 V    INDICATION: Right hand pain. .    COMPARISON: Right hand radiograph 6/18/2018. FINDINGS: Three views of the right hand demonstrate no fracture or other acute  osseous or articular abnormality. The soft tissues are within normal limits. No  significant joint space narrowing. Impression IMPRESSION: No acute or significant abnormality. Assessment and orders:       ICD-10-CM ICD-9-CM    1. Right hand pain M79.641 729.5 XR HAND RT MIN 3 V      predniSONE (DELTASONE) 10 mg tablet   2. Neuropathic pain M79.2 729.2 FOLATE      IRON PROFILE      FERRITIN   3. Anemia, unspecified type D64.9 285.9 FOLATE      IRON PROFILE      FERRITIN     Diagnoses and all orders for this visit:    1. Right hand pain: Significant bruising not improving over the last 2 weeks. Pain improved with Ace wrapping. Given prolonged period though, will treat with low dose steroid now as well for short course. -     XR HAND RT MIN 3 V  -     predniSONE (DELTASONE) 10 mg tablet; Take 10 mg by mouth daily (with breakfast) for 5 days. 2. Neuropathic pain: Given newer anemia, iron profile and Folate. Chronic Neuropathy with acute exacerbation.     -     FOLATE; Future  -     IRON PROFILE;  Future  - FERRITIN; Future    3. Anemia, unspecified type  -     FOLATE; Future  -     IRON PROFILE; Future  -     FERRITIN; Future      Follow-up and Dispositions    · Return if symptoms worsen or fail to improve. I have discussed the diagnosis with the patient and the intended plan as seen in the above orders. Social history, medical history, and labs were reviewed. The patient has received an after-visit summary and questions were answered concerning future plans. I have discussed medication side effects and warnings with the patient as well.     MD URBAN Alfaro & MARSHAL CARMEN [facility]  06/10/20

## 2020-06-10 NOTE — PROGRESS NOTES
Chief Complaint   Patient presents with    Hand Pain     right    Numbness     in feet     Visit Vitals  /82 (BP 1 Location: Left arm, BP Patient Position: Sitting)   Pulse 87   Temp 98 °F (36.7 °C) (Oral)   Resp 18   Ht 5' 4\" (1.626 m)   Wt 215 lb (97.5 kg)   LMP 05/27/2020   SpO2 96%   BMI 36.90 kg/m²     1. Have you been to the ER, urgent care clinic since your last visit? Hospitalized since your last visit? No    2. Have you seen or consulted any other health care providers outside of the [address] [place] [address] since your last visit? Include any pap smears or colon screening.  No    Reviewed record in preparation for visit and have necessary documentation  Pt did not bring medication to office visit for review  opportunity was given for questions  Goals that were addressed and/or need to be completed during or after this appointment include   Health Maintenance Due   Topic Date Due    Pneumococcal 0-64 years (1 of 1 - PPSV23) 08/27/1988    PAP AKA CERVICAL CYTOLOGY  02/02/2020    Medicare Yearly Exam  03/05/2020

## 2020-06-10 NOTE — PROGRESS NOTES
Chief Complaint   Patient presents with    Fall    Hand Pain     Body mass index is 36.9 kg/m². 1. Have you been to the ER, urgent care clinic since your last visit? Hospitalized since your last visit? No    2. Have you seen or consulted any other health care providers outside of the [address] [place] [address] since your last visit? Include any pap smears or colon screening.  No    Reviewed record in preparation for visit and have necessary documentation  Pt did not bring medication to office visit for review  Information was given to pt on Advanced Directives, Living Will  Information was given on Shingles Vaccine  Opportunity was given for questions  Goals that were addressed and/or need to be completed after this appointment include:     Health Maintenance Due   Topic Date Due    Pneumococcal 0-64 years (1 of 1 - PPSV23) 08/27/1988    PAP AKA CERVICAL CYTOLOGY  02/02/2020    Medicare Yearly Exam  03/05/2020

## 2020-06-12 ENCOUNTER — HOSPITAL ENCOUNTER (OUTPATIENT)
Dept: LAB | Age: 40
Discharge: HOME | End: 2020-06-12

## 2020-06-12 DIAGNOSIS — M79.2 NEUROPATHIC PAIN: ICD-10-CM

## 2020-06-12 DIAGNOSIS — D64.9 ANEMIA, UNSPECIFIED TYPE: ICD-10-CM

## 2020-06-12 LAB
COMMENT, HOLDF: NORMAL
IRON SATN MFR SERPL: 4 % (ref 20–50)
IRON SERPL-MCNC: 20 UG/DL (ref 35–150)
SAMPLES BEING HELD,HOLD: NORMAL
TIBC SERPL-MCNC: 487 UG/DL (ref 250–450)

## 2020-06-12 NOTE — PROGRESS NOTES
Not seen due to severe cough, covid [facility] guidelines.   Thank you,  Dr. Zehra Hdz

## 2020-06-13 LAB
FERRITIN SERPL-MCNC: 12 NG/ML (ref 26–388)
FOLATE SERPL-MCNC: 4.7 NG/ML (ref 5–21)

## 2020-06-15 ENCOUNTER — TELEPHONE (OUTPATIENT)
Dept: FAMILY MEDICINE CLINIC | Age: 40
End: 2020-06-15

## 2020-06-15 DIAGNOSIS — D50.9 IRON DEFICIENCY ANEMIA, UNSPECIFIED IRON DEFICIENCY ANEMIA TYPE: ICD-10-CM

## 2020-06-15 DIAGNOSIS — E53.8 LOW FOLATE: Primary | ICD-10-CM

## 2020-06-15 RX ORDER — FERROUS SULFATE 325(65) MG
325 TABLET, DELAYED RELEASE (ENTERIC COATED) ORAL
Qty: 90 TAB | Refills: 1 | Status: SHIPPED | OUTPATIENT
Start: 2020-06-15 | End: 2020-10-05

## 2020-06-15 RX ORDER — FOLIC ACID 1 MG/1
1 TABLET ORAL DAILY
Qty: 90 TAB | Refills: 1 | Status: SHIPPED | OUTPATIENT
Start: 2020-06-15 | End: 2020-09-24

## 2020-06-15 NOTE — TELEPHONE ENCOUNTER
Patient called per Dr. Tu Abraham:  tell her that her labs show she has Iron Deficiency and her folate is low, both of which can cause Anemia, and can be involved with worsening Neuropathic symptoms.  If she wants I can order supplements for her, or she can  a daily Iron and Folate supplement over the counter.  Which works best for her? Patient verbalized understanding and appreciative. Patient would like for MD to prescribe something, pharmacy [facility].

## 2020-06-16 ENCOUNTER — PATIENT MESSAGE (OUTPATIENT)
Dept: FAMILY MEDICINE CLINIC | Age: 40
End: 2020-06-16

## 2020-06-16 DIAGNOSIS — M79.7 FIBROMYALGIA: Primary | ICD-10-CM

## 2020-06-16 DIAGNOSIS — R68.2 DRY MOUTH: ICD-10-CM

## 2020-06-16 DIAGNOSIS — H04.123 DRY EYES: ICD-10-CM

## 2020-06-16 DIAGNOSIS — M79.10 MYALGIA: ICD-10-CM

## 2020-06-18 DIAGNOSIS — M79.7 FIBROMYALGIA: ICD-10-CM

## 2020-06-18 RX ORDER — GABAPENTIN 300 MG/1
CAPSULE ORAL
Qty: 90 CAP | Refills: 2 | Status: CANCELLED | OUTPATIENT
Start: 2020-06-18

## 2020-06-18 RX ORDER — CYCLOBENZAPRINE HCL 10 MG
10 TABLET ORAL
Refills: 1 | Status: CANCELLED | OUTPATIENT
Start: 2020-06-18

## 2020-06-18 RX ORDER — GABAPENTIN 300 MG/1
CAPSULE ORAL
Qty: 90 CAP | Refills: 2 | Status: SHIPPED | OUTPATIENT
Start: 2020-06-18 | End: 2020-08-28

## 2020-06-18 NOTE — TELEPHONE ENCOUNTER
----- Message from Cira Overton sent at 6/18/2020 10:34 AM EDT -----  Regarding: DR Sherrie Garay / REFILL  General Message/Vendor Calls    \"GABAPENTIN 300 MG \"    To be called into the [facility] listed in chart.      Callback required   Best contact number(s): [phone]            Cira Overton

## 2020-06-18 NOTE — TELEPHONE ENCOUNTER
From: Rubén Duarte  To: Geoff Mclain MD  Sent: 6/16/2020 2:52 PM EDT  Subject: Visit Follow-Up Question    Hello I just want to see what I need to do or go to for me to test for sjogerns syndrome. I just saw you the other day and we talked about it. I have every last symptom if it and I wanted to know is there a test I can take.

## 2020-06-19 DIAGNOSIS — R68.2 DRY MOUTH: ICD-10-CM

## 2020-06-19 DIAGNOSIS — H04.123 DRY EYES: ICD-10-CM

## 2020-06-19 DIAGNOSIS — M79.10 MYALGIA: ICD-10-CM

## 2020-06-19 RX ORDER — CYCLOBENZAPRINE HCL 10 MG
10 TABLET ORAL
Qty: 180 TAB | Refills: 1 | Status: SHIPPED | OUTPATIENT
Start: 2020-06-19 | End: 2020-08-06

## 2020-06-23 ENCOUNTER — HOSPITAL ENCOUNTER (OUTPATIENT)
Dept: LAB | Age: 40
Discharge: HOME | End: 2020-06-23

## 2020-06-23 DIAGNOSIS — R68.2 DRY MOUTH: ICD-10-CM

## 2020-06-23 DIAGNOSIS — M79.10 MYALGIA: ICD-10-CM

## 2020-06-23 DIAGNOSIS — H04.123 DRY EYES: ICD-10-CM

## 2020-06-23 LAB
COMMENT, HOLDF: NORMAL
CRP SERPL-MCNC: 1.9 MG/DL (ref 0–0.6)
RHEUMATOID FACT SERPL-ACNC: <10 IU/ML
SAMPLES BEING HELD,HOLD: NORMAL

## 2020-06-24 LAB — ERYTHROCYTE [SEDIMENTATION RATE] IN BLOOD: 48 MM/HR (ref 0–20)

## 2020-06-25 LAB
ANA TITR SER IF: NEGATIVE {TITER}
ENA SS-A AB SER-ACNC: <0.2 AI (ref 0–0.9)
ENA SS-B AB SER-ACNC: <0.2 AI (ref 0–0.9)

## 2020-06-26 NOTE — TELEPHONE ENCOUNTER
Please advise. [Shortness Of Breath] : shortness of breath [Palpitations] : palpitations [Chest Pain] : chest pain [Negative] : Heme/Lymph

## 2020-07-06 DIAGNOSIS — Z86.19 HX OF HERPES GENITALIS: ICD-10-CM

## 2020-07-06 RX ORDER — VALACYCLOVIR HYDROCHLORIDE 500 MG/1
TABLET, FILM COATED ORAL
Qty: 90 TAB | Refills: 0 | Status: SHIPPED | OUTPATIENT
Start: 2020-07-06 | End: 2021-08-03

## 2020-08-06 DIAGNOSIS — M79.7 FIBROMYALGIA: ICD-10-CM

## 2020-08-06 RX ORDER — CYCLOBENZAPRINE HCL 10 MG
TABLET ORAL
Qty: 60 TAB | Refills: 0 | Status: SHIPPED | OUTPATIENT
Start: 2020-08-06 | End: 2020-10-12

## 2020-08-19 ENCOUNTER — TELEPHONE (OUTPATIENT)
Dept: FAMILY MEDICINE CLINIC | Age: 40
End: 2020-08-19

## 2020-08-19 DIAGNOSIS — M19.90 INFLAMMATORY ARTHRITIS: ICD-10-CM

## 2020-08-19 DIAGNOSIS — R76.8 POSITIVE ANA (ANTINUCLEAR ANTIBODY): Primary | Chronic | ICD-10-CM

## 2020-08-19 NOTE — TELEPHONE ENCOUNTER
STATES THAT THEY NEED A REFERRAL FOR BOTH KNEE INJECTIONS.  [address] [facility] [phone]

## 2020-08-21 NOTE — TELEPHONE ENCOUNTER
[facility] located in [place]. Dr. Jude Rivera will be administering her injections. The fax number is [phone].

## 2020-08-24 ENCOUNTER — VIRTUAL VISIT (OUTPATIENT)
Dept: FAMILY MEDICINE CLINIC | Age: 40
End: 2020-08-24
Payer: MEDICARE

## 2020-08-24 DIAGNOSIS — K21.9 GASTROESOPHAGEAL REFLUX DISEASE WITHOUT ESOPHAGITIS: Chronic | ICD-10-CM

## 2020-08-24 DIAGNOSIS — E66.01 SEVERE OBESITY (HCC): ICD-10-CM

## 2020-08-24 DIAGNOSIS — M79.7 FIBROMYALGIA: Chronic | ICD-10-CM

## 2020-08-24 DIAGNOSIS — F42.2 MIXED OBSESSIONAL THOUGHTS AND ACTS: ICD-10-CM

## 2020-08-24 DIAGNOSIS — D64.9 ANEMIA, UNSPECIFIED TYPE: Chronic | ICD-10-CM

## 2020-08-24 DIAGNOSIS — F32.A DEPRESSION, UNSPECIFIED DEPRESSION TYPE: Chronic | ICD-10-CM

## 2020-08-24 DIAGNOSIS — M19.90 INFLAMMATORY ARTHRITIS: ICD-10-CM

## 2020-08-24 DIAGNOSIS — R73.01 IFG (IMPAIRED FASTING GLUCOSE): Primary | Chronic | ICD-10-CM

## 2020-08-24 DIAGNOSIS — E78.00 PURE HYPERCHOLESTEROLEMIA: Chronic | ICD-10-CM

## 2020-08-24 PROCEDURE — 99442 PR PHYS/QHP TELEPHONE EVALUATION 11-20 MIN: CPT | Performed by: FAMILY MEDICINE

## 2020-08-24 RX ORDER — LAMOTRIGINE 100 MG/1
150 TABLET ORAL 2 TIMES DAILY
Qty: 90 TAB | Refills: 0
Start: 2020-08-24 | End: 2021-08-03

## 2020-08-24 NOTE — PROGRESS NOTES
[address]  [phone]           Progress Note    Patient: Beverley Thornton MRN: [id number]  SSN: [id number]    YOB: 1980  Age: 42 y.o. Sex: female        Chief Complaint   Patient presents with    Follow Up Chronic Condition         Beverley Thornton is a 42 y.o. female evaluated via telephone on 8/24/2020. Nurse involved in care:Nurse Axel  Location of patient:Home  Location of physician:My office    Consent:  She and/or health care decision maker is aware that that she may receive a bill for this telephone service, depending on her insurance coverage, and has provided verbal consent to proceed: Yes      Documentation:  I communicated with the patient and/or health care decision maker about multiple problems. Details of this discussion including any medical advice provided: See Below. I affirm this is a Patient Initiated Episode with an Established Patient who has not had a related appointment within my department in the past 7 days or scheduled within the next 24 hours. Total Time: minutes: 11-20 minutes    Note: not billable if this call serves to triage the patient into an appointment for the relevant concern      Vena Halsted, MD   __________________________________________________________________________________________    Subjective:     Encounter Diagnoses   Name Primary?  IFG (impaired fasting glucose):  Elevated blood sugar / prediabetes:No sx of fulminant diabetes. No significant weight loss or gain. The patient realizes that without weight loss and exercise they are high risk for overt diabetes.   Lab Results   Component Value Date/Time    Hemoglobin A1c 5.7 (H) 05/12/2020 09:12 AM     Lab Results   Component Value Date/Time    Glucose 100 05/12/2020 09:12 AM    Glucose 59 (L) 12/30/2019 12:00 AM     Wt Readings from Last 3 Encounters:   06/10/20 215 lb (97.5 kg)   06/10/20 215 lb (97.5 kg)   03/05/20 190 lb 6.4 oz (86.4 kg)     There is no height or weight on file to calculate BMI. Yes    Severe obesity ([address]): Last BMI 37. Complex combination of medications may make it difficult for her to lose weight. Wt Readings from Last 3 Encounters:   06/10/20 215 lb (97.5 kg)   06/10/20 215 lb (97.5 kg)   03/05/20 190 lb 6.4 oz (86.4 kg)          Anemia, unspecified type: Iron deficiency  No sx. Lab Results   Component Value Date/Time    HGB 10.0 (L) 05/12/2020 09:12 AM     Lab Results   Component Value Date/Time    Iron 20 (L) 06/12/2020 10:41 AM    TIBC 487 (H) 06/12/2020 10:41 AM    Iron % saturation 4 (L) 06/12/2020 10:41 AM    Ferritin 12 (L) 06/12/2020 10:41 AM          Gastroesophageal reflux disease without esophagitis:  Current control of Symptoms:good  Hiatal Hernia:no  Current Medications: Pantoprazole  The patient has no history melena or bright red blood in the stools. The patient avoids high dose aspirin and NSAID therapy. The patient is aware of diet changes needed, elevating the head of the bed and appropriate use of antacids.  Depression, unspecified depression type: She has a complex psychiatric history with bipolar disorder with rapid mood changes and OCD tendencies. She sees a psychiatrist on a regular basis. Since we saw her last her Lamictal has been increased to 150 mg twice daily.  Pure hypercholesterolemia:  Cardiovascular risks for her are: LDL goal is under 100  hypertension  hyperlipidemia. Key Antihyperlipidemia Meds     The patient is on no antihyperlipidemia meds. Lab Results   Component Value Date/Time    Cholesterol, total 239 (H) 05/12/2020 09:12 AM    HDL Cholesterol 95 05/12/2020 09:12 AM    LDL, calculated 126.2 (H) 05/12/2020 09:12 AM    Triglyceride 89 05/12/2020 09:12 AM    CHOL/HDL Ratio 2.5 05/12/2020 09:12 AM     Lab Results   Component Value Date/Time    ALT (SGPT) 22 05/12/2020 09:12 AM    Alk.  phosphatase 107 05/12/2020 09:12 AM    Bilirubin, direct 0.12 12/08/2017 04:08 PM    Bilirubin, total 0.3 05/12/2020 09:12 AM      Myalgias: No   Fatigue: No   Other side effects: no  Wt Readings from Last 3 Encounters:   06/10/20 215 lb (97.5 kg)   06/10/20 215 lb (97.5 kg)   03/05/20 190 lb 6.4 oz (86.4 kg)     The patient is aware of our goal to reduce or eliminate the long term problems (such as strokes and heart attacks) related to poorly controlled hyperlipidemia.  Fibromyalgia: Generalized pain only basis.  Inflammatory arthritis: Testing for rheumatoid arthritis is been negative however she has a sed rate of 48 and CRP of 1.90. I suspect she has inflammatory arthritis but the type is yet to be determined. She certainly has extensive back pain with some loss of sensation on her soles and feet. She says her soles and feet are dry and this makes them very painful at night.  Mixed obsessional thoughts and acts: See above. Current and past medical information:    Current Medications after this visit[de-identified]     Current Outpatient Medications   Medication Sig    lamoTRIgine (LaMICtal) 100 mg tablet Take 1.5 Tabs by mouth two (2) times a day. Indications: bipolar depression    cyclobenzaprine (FLEXERIL) 10 mg tablet TAKE ONE TABLET BY MOUTH TWICE DAILY AS NEEDED FOR MUSCLE SPASM(S)    valACYclovir (VALTREX) 500 mg tablet Take 1 tablet two times a day for 3 days then take 1 tablet daily.  gabapentin (NEURONTIN) 300 mg capsule TAKE 1 CAPSULE BY MOUTH IN THE MORNING AND 2 AT NIGHT    folic acid (FOLVITE) 1 mg tablet Take 1 Tab by mouth daily.  ferrous sulfate (IRON) 325 mg (65 mg iron) EC tablet Take 1 Tab by mouth Daily (before breakfast).  LORazepam (ATIVAN) 2 mg tablet Take 2 mg by mouth as needed.  cyanocobalamin (VITAMIN B-12) 1,000 mcg tablet Take 1,000 mcg by mouth daily.     pramoxine (PROCTOFOAM) 1 % topical foam Apply  to affected area three (3) times daily as needed for Hemorrhoids.  norgestimate-ethinyl estradioL (ORTHO-CYCLEN, SPRINTEC) 0.25-35 mg-mcg tab Take 1 Tab by mouth daily. Indications: pain with menstruation    pantoprazole (PROTONIX) 20 mg tablet Take one tablet by mouth two times a day.  diclofenac (VOLTAREN) 1 % gel Apply 1 g to affected area four (4) times daily. Apply to right knee    albuterol-ipratropium (DUO-NEB) 2.5 mg-0.5 mg/3 ml nebu 3 mL by Nebulization route every six (6) hours as needed (wheeze).  albuterol (PROVENTIL HFA, VENTOLIN HFA, PROAIR HFA) 90 mcg/actuation inhaler Take 1 Puff by inhalation every six (6) hours as needed for Wheezing.  triamcinolone (NASACORT) 55 mcg nasal inhaler 2 Sprays by Both Nostrils route daily.  OTHER Please provide patient with a left knee brace. DX: Z87.39    mineral oil-hydrophil petrolat (AQUAPHOR) ointment Apply  to affected area two (2) times a day. Apply to bilateral legs and feet.  Comp Stocking,Knee,Regular,Med misc 1 Each by Does Not Apply route daily.  metoclopramide HCl (REGLAN) 5 mg tablet Take 5 mg by mouth Before breakfast, lunch, and dinner.  triamcinolone acetonide (KENALOG) 0.1 % ointment Apply  to affected area two (2) times a day. use thin layer    ketoconazole (NIZORAL) 2 % shampoo shampoo twice weekly    acetaminophen (TYLENOL 8 HOUR PO) Take  by mouth.  clonazePAM (KLONOPIN) 1 mg tablet Take 1 mg by mouth daily. At bedtime    cholecalciferol, vitamin D3, (VITAMIN D3) 2,000 unit tab Take  by mouth.  dextroamphetamine-amphetamine (ADDERALL) 20 mg tablet Take 20 mg by mouth. 1/2 tablet  Twice daily          FLUoxetine (PROZAC) 40 mg capsule Take 40 mg by mouth daily.  traZODone (DESYREL) 50 mg tablet Take 1 Tab by mouth nightly. No current facility-administered medications for this visit.         Patient Active Problem List    Diagnosis Date Noted    Gastroparesis 11/29/2018     Priority: 1 - One    Plantar fasciitis of left foot 11/29/2018     Priority: 1 - One    Positive KITTY (antinuclear antibody) 2018     Priority: 1 - One    [facility] 2018     Priority: 1 - One    Class 2 obesity due to excess calories without serious comorbidity in adult 2018     Priority: 1 - One    Mood disorder ([facility] 2018     Priority: 1 - One    Chronic pain syndrome 2018     Priority: 1 - One    Fibromyalgia 2018     Priority: 1 - One    Recurrent depression ([facility] 01/15/2018     Priority: 1 - One    Inflammatory arthritis 08/10/2016     Priority: 1 - One    Vitamin D deficiency 2016     Priority: 1 - One    IFG (impaired fasting glucose) 09/10/2015     Priority: 1 - One    Hyperlipidemia 09/10/2015     Priority: 1 - One    OCD (obsessive compulsive disorder) 2012     Priority: 1 - One    Depression      Priority: 1 - One    GERD (gastroesophageal reflux disease)      Priority: 1 - One    Hypertension      Priority: 1 - One    Anemia, unspecified      Priority: 1 - One    Neuropathy 10/15/2019    Panic attack 2018    Tremor 2018    Thrombosed external hemorrhoid 2016    Hoarseness 2013    HSV (herpes simplex virus) anogenital infection 2011    Costochondritis 2011    Anxiety 2010    Itch of skin 2010       Past Medical History:   Diagnosis Date    Anemia NEC     Arthritis     Chronic pain     fybromyalsia    Depression     anxiety, depression, OCD    GERD (gastroesophageal reflux disease)     Hypertension     Musculoskeletal disorder     SOB (shortness of breath)     Stool color black        Allergies   Allergen Reactions    Sulfa (Sulfonamide Antibiotics) Hives    Vicodin [Hydrocodone-Acetaminophen] Nausea and Vomiting       Past Surgical History:   Procedure Laterality Date    HX GYN           HX HEENT  2002    wisdom teeth extraction    UPPER GI ENDOSCOPY,BIOPSY  2018         UPPER GI ENDOSCOPY,DIAGNOSIS  2018 Social History     Socioeconomic History    Marital status: SINGLE     Spouse name: Not on file    Number of children: Not on file    Years of education: Not on file    Highest education level: Not on file   Tobacco Use    Smoking status: Former Smoker     Packs/day: 0.25     Years: 8.00     Pack years: 2.00     Types: Cigarettes     Last attempt to quit: 9/3/2018     Years since quittin.9    Smokeless tobacco: Never Used   Substance and Sexual Activity    Alcohol use: Yes     Alcohol/week: 1.0 standard drinks     Types: 1 Glasses of wine per week     Comment: 1 cup of wine/week    Drug use: No    Sexual activity: Yes     Partners: Male     Birth control/protection: None       Review of Systems   Constitutional: Positive for malaise/fatigue. Negative for chills, fever and weight loss. HENT: Negative. Negative for hearing loss. Eyes: Negative. Negative for blurred vision and double vision. Respiratory: Negative. Negative for cough, sputum production and shortness of breath. Cardiovascular: Negative. Negative for chest pain and palpitations. Gastrointestinal: Negative. Negative for abdominal pain, blood in stool, heartburn, nausea and vomiting. Genitourinary: Negative. Negative for dysuria, frequency and urgency. Musculoskeletal: Positive for back pain. Negative for falls, myalgias and neck pain. She has chronic low back pain. She is seeing a new rheumatologist for this. It is Dr. Rex Schmid. Apparently he has referred her to pain management. Skin: Negative. Negative for rash. Neurological: Negative. Negative for dizziness, tingling, tremors, weakness and headaches. Endo/Heme/Allergies: Negative. Psychiatric/Behavioral: Positive for depression. Rapid mood swings. She is currently being treated with Lamictal for this. Objective: There were no vitals filed for this visit.    There is no height or weight on file to calculate BMI.      Health Maintenance Due   Topic Date Due    Pneumococcal 0-64 years (1 of 1 - PPSV23) 08/27/1988    PAP AKA CERVICAL CYTOLOGY  02/02/2020    Medicare Yearly Exam  03/05/2020         Assessment and orders:     Encounter Diagnoses     ICD-10-CM ICD-9-CM   1. IFG (impaired fasting glucose)  R73.01 790.21   2. Severe obesity ([address]  E66.01 278.01   3. Anemia, unspecified type  D64.9 285.9   4. Gastroesophageal reflux disease without esophagitis  K21.9 530.81   5. Depression, unspecified depression type  F32.9 311   6. Pure hypercholesterolemia  E78.00 272.0   7. Fibromyalgia  M79.7 729.1   8. Inflammatory arthritis  M19.90 714.9   9. Mixed obsessional thoughts and acts  F42.2 300.3     Diagnoses and all orders for this visit:    1. IFG (impaired fasting glucose)-last A1c was 5.7  -     METABOLIC PANEL, COMPREHENSIVE; Future    2. Severe obesity (HCC)-BMI is 36.9.    3. Anemia, unspecified type-iron deficiency  -     CBC WITH AUTOMATED DIFF; Future  -     IRON PROFILE; Future    4. Gastroesophageal reflux disease without esophagitis-gastroparesis is the worst problem. Reflux is complication of that. 5. Depression, unspecified depression type-severe with bipolar and OCD tendencies    6. Pure hypercholesterolemia-very poor candidate for statin or other cholesterol medications due to side effects. Lab Results   Component Value Date/Time    Cholesterol, total 239 (H) 05/12/2020 09:12 AM    HDL Cholesterol 95 05/12/2020 09:12 AM    LDL, calculated 126.2 (H) 05/12/2020 09:12 AM    VLDL, calculated 17.8 05/12/2020 09:12 AM    Triglyceride 89 05/12/2020 09:12 AM    CHOL/HDL Ratio 2.5 05/12/2020 09:12 AM       7. Fibromyalgia-severe    8. Inflammatory arthritis-type not distinguishable yet but elevated inflammatory markers. 9. Mixed obsessional thoughts and acts-see above    Her psychiatrist now has her on the following dose of Lamictal:  -     lamoTRIgine (LaMICtal) 100 mg tablet;  Take 1.5 Tabs by mouth two (2) times a day. Indications: bipolar depression    She will follow-up with Dr. Jake Saba in 3 months and schedule her lab work as ordered. Plan of care:  Discussed diagnoses in detail with patient. Medication risks/benefits/side effects discussed with patient. All of the patient's questions were addressed. The patient understands and agrees with our plan of care. The patient knows to call back if they are unsure of or forget any changes we discussed today or if the symptoms change. The patient received an After-Visit Summary which contains VS, orders, medication list and allergy list. This can be used as a \"mini-medical record\" should they have to seek medical care while out of town. Patient Care Team:  Ernst De Oliveira MD as PCP - General (Family Medicine)  Ernst De Oliveira MD as PCP - [address] [place] [address]  [facility]  Abelardo Baird MD (Breast Surgery)  Ash Garcia MD (Cardiology)  Jane Liu MD (Otolaryngology)  Bebeto Jackson MD (Internal Medicine)  Vasile Cao MD (Female Pelvic Medicine and Reconstructive Surgery)  Eber De Leon MD (Neurology)          Signed By: Gregoria Zamudio MD     August 24, 2020      ATTENTION:   This medical record was transcribed using an electronic medical records/speech recognition system. Although proofread, it may and can contain electronic, spelling and other errors. Corrections may be executed at a later time. Please feel free to contact me for any clarifications as needed.

## 2020-08-28 DIAGNOSIS — M79.7 FIBROMYALGIA: ICD-10-CM

## 2020-08-28 RX ORDER — GABAPENTIN 300 MG/1
CAPSULE ORAL
Qty: 90 CAP | Refills: 0 | Status: SHIPPED | OUTPATIENT
Start: 2020-08-28 | End: 2020-11-16 | Stop reason: SDUPTHER

## 2020-09-24 DIAGNOSIS — E53.8 LOW FOLATE: ICD-10-CM

## 2020-09-24 RX ORDER — FOLIC ACID 1 MG/1
TABLET ORAL
Qty: 90 TAB | Refills: 0 | Status: SHIPPED | OUTPATIENT
Start: 2020-09-24 | End: 2020-12-23 | Stop reason: SDUPTHER

## 2020-09-30 ENCOUNTER — HOSPITAL ENCOUNTER (OUTPATIENT)
Dept: MAMMOGRAPHY | Age: 40
Discharge: HOME | End: 2020-09-30
Attending: FAMILY MEDICINE
Payer: MEDICARE

## 2020-09-30 DIAGNOSIS — R92.8 ABNORMAL MAMMOGRAM: ICD-10-CM

## 2020-09-30 PROCEDURE — 77066 DX MAMMO INCL CAD BI: CPT

## 2020-10-12 ENCOUNTER — VIRTUAL VISIT (OUTPATIENT)
Dept: FAMILY MEDICINE CLINIC | Age: 40
End: 2020-10-12
Payer: MEDICARE

## 2020-10-12 DIAGNOSIS — I10 ESSENTIAL HYPERTENSION: Chronic | ICD-10-CM

## 2020-10-12 DIAGNOSIS — D50.9 IRON DEFICIENCY ANEMIA, UNSPECIFIED IRON DEFICIENCY ANEMIA TYPE: ICD-10-CM

## 2020-10-12 DIAGNOSIS — R35.0 URINARY FREQUENCY: ICD-10-CM

## 2020-10-12 DIAGNOSIS — R73.01 IFG (IMPAIRED FASTING GLUCOSE): Chronic | ICD-10-CM

## 2020-10-12 DIAGNOSIS — E78.00 PURE HYPERCHOLESTEROLEMIA: Primary | Chronic | ICD-10-CM

## 2020-10-12 PROCEDURE — G8756 NO BP MEASURE DOC: HCPCS | Performed by: FAMILY MEDICINE

## 2020-10-12 PROCEDURE — G8427 DOCREV CUR MEDS BY ELIG CLIN: HCPCS | Performed by: FAMILY MEDICINE

## 2020-10-12 PROCEDURE — G9717 DOC PT DX DEP/BP F/U NT REQ: HCPCS | Performed by: FAMILY MEDICINE

## 2020-10-12 PROCEDURE — 99213 OFFICE O/P EST LOW 20 MIN: CPT | Performed by: FAMILY MEDICINE

## 2020-10-12 RX ORDER — METHOCARBAMOL 500 MG/1
1000 TABLET, FILM COATED ORAL AS NEEDED
COMMUNITY
End: 2020-12-21

## 2020-10-12 RX ORDER — IBUPROFEN AND FAMOTIDINE 800; 26.6 MG/1; MG/1
TABLET, COATED ORAL
COMMUNITY
End: 2020-12-18 | Stop reason: SDUPTHER

## 2020-10-12 NOTE — PROGRESS NOTES
1. Have you been to the ER, urgent care clinic since your last visit? Hospitalized since your last visit? No    2. Have you seen or consulted any other health care providers outside of the [address] [place] [address] since your last visit? Include any pap smears or colon screening.  No        Health Maintenance Due   Topic Date Due    Pneumococcal 0-64 years (1 of 1 - PPSV23) 08/27/1988    PAP AKA CERVICAL CYTOLOGY  02/02/2020    Medicare Yearly Exam  03/05/2020    Flu Vaccine (1) 09/01/2020

## 2020-10-12 NOTE — PROGRESS NOTES
Bautista Abreu is a 47 y.o. female evaluated via Doximetry on 10/12/20. Patient Identity confirmed by . Consent:  He and/or health care decision maker is aware that that he may receive a bill for this telephone service, depending on his insurance coverage, and has provided verbal consent to proceed: Yes    Physician Location: Office  Patient Location: Home  Nurse Assisting with Encounter: Leno Hernandez LPN    Chief Complaint   Patient presents with    Neck Pain     with headache     Ear Pain      Information gathered from patient and/or health care decision maker. HPI:   Neck Pain: Patient has left Neck Pain that radiates from the shoulder to the posterior neck. Tender to even mild palpation. Pain is moderate to severe, and is \"Nagging\" pain that is worst on the left side of the neck. Neck is still flexible. Certain positions worsen the pain. Also noting headaches as well that start in the back pf the neck and move forward. Patient has tried [facility] powder, Duexis, Voltaren gel. Has not used Lidocaine patch. Urinary Pain: Patient reporting Urinary Frequency for the last 3-4 days. Review of Systems   Constitutional: Negative for chills and fever. HENT: Negative for congestion. Respiratory: Negative for cough. Cardiovascular: Negative for chest pain and palpitations. Genitourinary: Positive for frequency. Negative for dysuria and urgency. Musculoskeletal: Positive for neck pain. Limited Exam:  Due to this being a TeleHealth evaluation, many elements of the physical examination are unable to be assessed.       Constitutional: Appears well-developed and well-nourished in no apparent distress   Mental status: Alert and awake, Oriented to person/place/time, Able to follow commands  Eyes: EOM normal, Sclera normal, No visible ocular discharge  HENT: Normocephalic, atraumatic; Mouth/Throat: Moist mucous membranes, External Ears normal  Neck: No visualized mass  Pulmonary/Chest: Respiratory effort normal, No visualized signs of difficulty breathing or respiratory distress   Musculoskeletal: Normal gait with no signs of ataxia, Normal range of motion of neck  Neurological: No facial asymmetry (Cranial nerve 7 motor function), No gaze palsy  Skin: No significant exanthematous lesions or discoloration noted on facial skin  Psychiatric: Normal affect, normal judgment/insight. No hallucinations     Current Outpatient Medications on File Prior to Visit   Medication Sig Dispense Refill    methocarbamoL (ROBAXIN) 500 mg tablet Take 1,000 mg by mouth as needed.  ibuprofen-famotidine (Duexis) 800-26.6 mg tab Take  by mouth.  ferrous sulfate 325 mg (65 mg iron) tablet TAKE ONE TABLET BY MOUTH EVERY DAY BEFORE BREAKFAST 90 Tab 1    folic acid (FOLVITE) 1 mg tablet TAKE ONE TABLET BY MOUTH EVERY DAY 90 Tab 0    pantoprazole (PROTONIX) 20 mg tablet TAKE ONE TABLET BY MOUTH TWICE DAILY 180 Tab 1    gabapentin (NEURONTIN) 300 mg capsule TAKE 1 CAPSULE BY MOUTH IN THE MORNING AND 2 AT NIGHT 90 Cap 0    lamoTRIgine (LaMICtal) 100 mg tablet Take 1.5 Tabs by mouth two (2) times a day. Indications: bipolar depression (Patient taking differently: Take 100 mg by mouth two (2) times a day. Indications: bipolar depression) 90 Tab 0    valACYclovir (VALTREX) 500 mg tablet Take 1 tablet two times a day for 3 days then take 1 tablet daily. 90 Tab 0    LORazepam (ATIVAN) 2 mg tablet Take 2 mg by mouth as needed.  cyanocobalamin (VITAMIN B-12) 1,000 mcg tablet Take 1,000 mcg by mouth daily.  pramoxine (PROCTOFOAM) 1 % topical foam Apply  to affected area three (3) times daily as needed for Hemorrhoids. 1 Can 1    norgestimate-ethinyl estradioL (ORTHO-CYCLEN, SPRINTEC) 0.25-35 mg-mcg tab Take 1 Tab by mouth daily. Indications: pain with menstruation 1 Package 12    diclofenac (VOLTAREN) 1 % gel Apply 1 g to affected area four (4) times daily.  Apply to right knee 100 g 4    albuterol-ipratropium (DUO-NEB) 2.5 mg-0.5 mg/3 ml nebu 3 mL by Nebulization route every six (6) hours as needed (wheeze). 30 Nebule 3    albuterol (PROVENTIL HFA, VENTOLIN HFA, PROAIR HFA) 90 mcg/actuation inhaler Take 1 Puff by inhalation every six (6) hours as needed for Wheezing. 1 Inhaler 5    triamcinolone (NASACORT) 55 mcg nasal inhaler 2 Sprays by Both Nostrils route daily. 1 Bottle 3    OTHER Please provide patient with a left knee brace. DX: Z87.39 1 Each 0    mineral oil-hydrophil petrolat (AQUAPHOR) ointment Apply  to affected area two (2) times a day. Apply to bilateral legs and feet. 396 g 2    Comp Stocking,Knee,Regular,Med misc 1 Each by Does Not Apply route daily. 1 Each 1    metoclopramide HCl (REGLAN) 5 mg tablet Take 5 mg by mouth Before breakfast, lunch, and dinner.  triamcinolone acetonide (KENALOG) 0.1 % ointment Apply  to affected area two (2) times a day. use thin layer 85 g 11    ketoconazole (NIZORAL) 2 % shampoo shampoo twice weekly 1 Bottle 11    acetaminophen (TYLENOL 8 HOUR PO) Take  by mouth.  clonazePAM (KLONOPIN) 1 mg tablet Take 1 mg by mouth daily. At bedtime      cholecalciferol, vitamin D3, (VITAMIN D3) 2,000 unit tab Take  by mouth.  dextroamphetamine-amphetamine (ADDERALL) 20 mg tablet Take 20 mg by mouth. 1/2 tablet  Twice daily            FLUoxetine (PROZAC) 40 mg capsule Take 40 mg by mouth daily.  traZODone (DESYREL) 50 mg tablet Take 1 Tab by mouth nightly. 30 Tab 6    [DISCONTINUED] cyclobenzaprine (FLEXERIL) 10 mg tablet TAKE ONE TABLET BY MOUTH TWICE DAILY AS NEEDED FOR MUSCLE SPASM(S) 60 Tab 0     No current facility-administered medications on file prior to visit.          Allergies   Allergen Reactions    Sulfa (Sulfonamide Antibiotics) Hives    Vicodin [Hydrocodone-Acetaminophen] Nausea and Vomiting        Patient Active Problem List    Diagnosis Date Noted    Neuropathy 10/15/2019    Gastroparesis 11/29/2018    Plantar fasciitis of left foot 11/29/2018    Positive KITTY (antinuclear antibody) 11/29/2018    Severe obesity ([facility] [address] 11/29/2018    Class 2 obesity due to excess calories without serious comorbidity in adult 07/25/2018    Mood disorder ([facility] [address] 06/12/2018    Chronic pain syndrome 06/12/2018    Fibromyalgia 06/12/2018    Panic attack 06/12/2018    Tremor 06/12/2018    Recurrent depression ([facility] [address] 01/15/2018    Inflammatory arthritis 08/10/2016    Vitamin D deficiency 03/21/2016    Thrombosed external hemorrhoid 02/05/2016    IFG (impaired fasting glucose) 09/10/2015    Hyperlipidemia 09/10/2015    Hoarseness 03/25/2013    OCD (obsessive compulsive disorder) 02/27/2012    HSV (herpes simplex virus) anogenital infection 09/12/2011    Costochondritis 05/17/2011    Anxiety 09/13/2010    Itch of skin 05/26/2010    Depression     GERD (gastroesophageal reflux disease)     Hypertension     Anemia, unspecified         Health Maintenance Due   Topic Date Due    Pneumococcal 0-64 years (1 of 1 - PPSV23) 08/27/1988    PAP AKA CERVICAL CYTOLOGY  02/02/2020    Medicare Yearly Exam  03/05/2020    Flu Vaccine (1) 09/01/2020        Assessment/Plan:  Details of this discussion including any medical advice provided: Labs ordered. No other available therapies for neck pain other than Lidocaine patches. Evaluate for UTI. ICD-10-CM ICD-9-CM    1. Pure hypercholesterolemia  E78.00 272.0 LIPID PANEL   2. IFG (impaired fasting glucose)  R73.01 790.21 HEMOGLOBIN A1C WITH EAG   3. Iron deficiency anemia, unspecified iron deficiency anemia type  D50.9 280.9 IRON PROFILE      FERRITIN   4. Urinary frequency  R35.0 788.41 URINALYSIS W/MICROSCOPIC      CULTURE, URINE   5. Essential hypertension  I10 401.9 CBC W/O DIFF      METABOLIC PANEL, COMPREHENSIVE     Follow-up and Dispositions    · Return if symptoms worsen or fail to improve.            Total Time: minutes: 11-20 minutes was spent on telemedicine encounter discussing above problems and plans. Patient Problem list, medications, and Allergies were reviewed during this encounter. Pursuant to the emergency declaration under the [facility], [facility] waiver authority and the [employer] Act, this Telephone Visit was conducted, with patient's consent, to reduce the patient's risk of exposure to COVID-19 and provide continuity of care for an established patient. I affirm this is a Patient Initiated Episode with an Established Patient who has not had a related appointment within my department in the past 7 days or scheduled within the next 24 hours. Discussed diagnoses in detail with patient. Medication risks/benefits/side effects discussed with patient. All of the patient's questions were addressed. The patient understands and agrees with our plan of care. The patient knows to call back if they are unsure of or forget any changes we discussed today or if the symptoms change.     Note: not billable if this call serves to triage the patient into an appointment for the relevant concern    MD URBAN Pearl & MARSHAL CARMEN [facility]  10/12/20

## 2020-10-22 ENCOUNTER — PATIENT MESSAGE (OUTPATIENT)
Dept: FAMILY MEDICINE CLINIC | Age: 40
End: 2020-10-22

## 2020-10-23 ENCOUNTER — LAB ONLY (OUTPATIENT)
Dept: FAMILY MEDICINE CLINIC | Age: 40
End: 2020-10-23

## 2020-10-23 DIAGNOSIS — D50.9 IRON DEFICIENCY ANEMIA, UNSPECIFIED IRON DEFICIENCY ANEMIA TYPE: ICD-10-CM

## 2020-10-23 DIAGNOSIS — E78.00 PURE HYPERCHOLESTEROLEMIA: Chronic | ICD-10-CM

## 2020-10-23 DIAGNOSIS — R35.0 URINARY FREQUENCY: ICD-10-CM

## 2020-10-23 DIAGNOSIS — I10 ESSENTIAL HYPERTENSION: Chronic | ICD-10-CM

## 2020-10-23 DIAGNOSIS — R73.01 IFG (IMPAIRED FASTING GLUCOSE): Chronic | ICD-10-CM

## 2020-10-23 LAB
ALBUMIN SERPL-MCNC: 3.6 G/DL (ref 3.5–5)
ALBUMIN/GLOB SERPL: 1.1 {RATIO} (ref 1.1–2.2)
ALP SERPL-CCNC: 126 U/L (ref 45–117)
ALT SERPL-CCNC: 29 U/L (ref 12–78)
ANION GAP SERPL CALC-SCNC: 11 MMOL/L (ref 5–15)
APPEARANCE UR: ABNORMAL
AST SERPL-CCNC: 15 U/L (ref 15–37)
BACTERIA URNS QL MICRO: ABNORMAL /HPF
BILIRUB SERPL-MCNC: 0.3 MG/DL (ref 0.2–1)
BILIRUB UR QL: NEGATIVE
BUN SERPL-MCNC: 9 MG/DL (ref 6–20)
BUN/CREAT SERPL: 11 (ref 12–20)
CALCIUM SERPL-MCNC: 9.6 MG/DL (ref 8.5–10.1)
CAOX CRY URNS QL MICRO: ABNORMAL
CHLORIDE SERPL-SCNC: 107 MMOL/L (ref 97–108)
CHOLEST SERPL-MCNC: 215 MG/DL
CO2 SERPL-SCNC: 22 MMOL/L (ref 21–32)
COLOR UR: ABNORMAL
CREAT SERPL-MCNC: 0.81 MG/DL (ref 0.55–1.02)
EPITH CASTS URNS QL MICRO: ABNORMAL /LPF
ERYTHROCYTE [DISTWIDTH] IN BLOOD BY AUTOMATED COUNT: 14.4 % (ref 11.5–14.5)
EST. AVERAGE GLUCOSE BLD GHB EST-MCNC: 114 MG/DL
FERRITIN SERPL-MCNC: 93 NG/ML (ref 8–252)
GLOBULIN SER CALC-MCNC: 3.3 G/DL (ref 2–4)
GLUCOSE SERPL-MCNC: 96 MG/DL (ref 65–100)
GLUCOSE UR STRIP.AUTO-MCNC: NEGATIVE MG/DL
HBA1C MFR BLD: 5.6 % (ref 4–5.6)
HCT VFR BLD AUTO: 37.1 % (ref 35–47)
HDLC SERPL-MCNC: 86 MG/DL
HDLC SERPL: 2.5 {RATIO}
HGB BLD-MCNC: 11.6 G/DL (ref 11.5–16)
HGB UR QL STRIP: NEGATIVE
KETONES UR QL STRIP.AUTO: NEGATIVE MG/DL
LDLC SERPL CALC-MCNC: 109 MG/DL
LEUKOCYTE ESTERASE UR QL STRIP.AUTO: ABNORMAL
LIPID PROFILE,FLP: NORMAL
MCH RBC QN AUTO: 29.9 PG (ref 26–34)
MCHC RBC AUTO-ENTMCNC: 31.3 G/DL (ref 30–36.5)
MCV RBC AUTO: 95.6 FL (ref 80–99)
NITRITE UR QL STRIP.AUTO: POSITIVE
NRBC # BLD: 0 K/UL (ref 0–0.01)
NRBC BLD-RTO: 0 PER 100 WBC
PH UR STRIP: 5 [PH] (ref 5–8)
PLATELET # BLD AUTO: 285 K/UL (ref 150–400)
PMV BLD AUTO: 10.9 FL (ref 8.9–12.9)
POTASSIUM SERPL-SCNC: 4.3 MMOL/L (ref 3.5–5.1)
PROT SERPL-MCNC: 6.9 G/DL (ref 6.4–8.2)
PROT UR STRIP-MCNC: NEGATIVE MG/DL
RBC # BLD AUTO: 3.88 M/UL (ref 3.8–5.2)
RBC #/AREA URNS HPF: ABNORMAL /HPF (ref 0–5)
SODIUM SERPL-SCNC: 140 MMOL/L (ref 136–145)
SP GR UR REFRACTOMETRY: 1.02 (ref 1–1.03)
TRIGL SERPL-MCNC: 100 MG/DL (ref ?–150)
UROBILINOGEN UR QL STRIP.AUTO: 0.2 EU/DL (ref 0.2–1)
VLDLC SERPL CALC-MCNC: 20 MG/DL
WBC # BLD AUTO: 4.8 K/UL (ref 3.6–11)
WBC URNS QL MICRO: ABNORMAL /HPF (ref 0–4)

## 2020-10-23 RX ORDER — FLUCONAZOLE 150 MG/1
150 TABLET ORAL
Qty: 3 TAB | Refills: 0 | Status: SHIPPED | OUTPATIENT
Start: 2020-10-23 | End: 2020-10-30

## 2020-10-23 RX ORDER — FLUOXETINE HYDROCHLORIDE 40 MG/1
40 CAPSULE ORAL DAILY
Qty: 30 CAP | Refills: 1 | Status: SHIPPED | OUTPATIENT
Start: 2020-10-23 | End: 2021-05-12

## 2020-10-23 NOTE — TELEPHONE ENCOUNTER
From: Siva Leigh  To: Riaz Almodovar MD  Sent: 10/22/2020 10:08 AM EDT  Subject: Prescription Question    Good morning , I did go to the eye doctor last week he told me that I eyes look healthy did not look extremely dry but gave me some information about dry eyes and what to do. Vision has changed I'm not having to wear bifocals instead of my single vision glasses that I'm use to. Still trying to get use to them. The lab work that you prescribe for me to get I will be getting it tomorrow. I was also informing you that I have a yeast infection. I have had these a couple of time and I have been prescribed fluconazole for I thing 10 days. I need to see if that can be called in. I know they have the one pill i been on that before to but it does not work as well. If you are able to fill this i need it sent to [facility]. Thank you very much.

## 2020-10-24 LAB
IRON SATN MFR SERPL: 17 % (ref 20–50)
IRON SERPL-MCNC: 63 UG/DL (ref 35–150)
TIBC SERPL-MCNC: 370 UG/DL (ref 250–450)

## 2020-10-26 LAB
BACTERIA SPEC CULT: ABNORMAL
CC UR VC: ABNORMAL
SERVICE CMNT-IMP: ABNORMAL

## 2020-10-26 NOTE — PROGRESS NOTES
Low Iron saturation, Aic is normal, mildly elevated Alk Phos, Lipid panel with mildly elevated total and LDL Chol, CBC wnl

## 2020-10-27 ENCOUNTER — TELEPHONE (OUTPATIENT)
Dept: FAMILY MEDICINE CLINIC | Age: 40
End: 2020-10-27

## 2020-10-27 RX ORDER — NITROFURANTOIN 25; 75 MG/1; MG/1
100 CAPSULE ORAL 2 TIMES DAILY
Qty: 10 CAP | Refills: 0 | Status: SHIPPED | OUTPATIENT
Start: 2020-10-27 | End: 2020-11-01

## 2020-10-27 NOTE — TELEPHONE ENCOUNTER
Pt missed last call but read the [facility]. Pt states she still needs something for the bacteria. There is a strong odor still. Still having to go more than [place] to urinate. No burning though.

## 2020-10-27 NOTE — TELEPHONE ENCOUNTER
Called patient and left VM. Overall you labs look fairly good. Your iron levels were mildly low, and at this time we need to continue your Iron supplement. It appears there is bacteria in your urine, but given we discussed this a month ago I am not sure you are still having symptoms, so we may not need to treat.     MD URBAN Evans & MARSHAL CARMEN [facility]  10/27/20

## 2020-11-16 DIAGNOSIS — L21.9 SEBORRHEA: ICD-10-CM

## 2020-11-16 DIAGNOSIS — M79.7 FIBROMYALGIA: ICD-10-CM

## 2020-11-16 RX ORDER — TRIAMCINOLONE ACETONIDE 1 MG/G
OINTMENT TOPICAL 2 TIMES DAILY
Qty: 85 G | Refills: 11 | Status: SHIPPED | OUTPATIENT
Start: 2020-11-16 | End: 2020-12-23 | Stop reason: SDUPTHER

## 2020-11-16 RX ORDER — GABAPENTIN 300 MG/1
CAPSULE ORAL
Qty: 90 CAP | Refills: 2 | Status: SHIPPED | OUTPATIENT
Start: 2020-11-16 | End: 2020-12-21 | Stop reason: SDUPTHER

## 2020-11-16 RX ORDER — KETOCONAZOLE 20 MG/ML
SHAMPOO TOPICAL
Qty: 1 BOTTLE | Refills: 11 | Status: SHIPPED | OUTPATIENT
Start: 2020-11-16 | End: 2020-12-21

## 2020-12-18 DIAGNOSIS — K21.9 GASTROESOPHAGEAL REFLUX DISEASE WITHOUT ESOPHAGITIS: Chronic | ICD-10-CM

## 2020-12-18 DIAGNOSIS — L21.9 SEBORRHEA: ICD-10-CM

## 2020-12-18 RX ORDER — KETOCONAZOLE 20 MG/ML
SHAMPOO TOPICAL
Qty: 1 BOTTLE | Refills: 11 | Status: CANCELLED | OUTPATIENT
Start: 2020-12-18

## 2020-12-18 RX ORDER — IBUPROFEN AND FAMOTIDINE 800; 26.6 MG/1; MG/1
1 TABLET, COATED ORAL
Qty: 180 TAB | Refills: 1 | Status: SHIPPED | OUTPATIENT
Start: 2020-12-18 | End: 2020-12-21

## 2020-12-18 RX ORDER — TRIAMCINOLONE ACETONIDE 1 MG/G
OINTMENT TOPICAL 2 TIMES DAILY
Qty: 85 G | Refills: 11 | Status: CANCELLED | OUTPATIENT
Start: 2020-12-18

## 2020-12-18 RX ORDER — PANTOPRAZOLE SODIUM 20 MG/1
TABLET, DELAYED RELEASE ORAL
Qty: 180 TAB | Refills: 1 | Status: CANCELLED | OUTPATIENT
Start: 2020-12-18

## 2020-12-18 NOTE — TELEPHONE ENCOUNTER
Shampoo ( Ketoconazole ) 11/16/2020 with 11 refills  and Protonix refilled 21/11/2020 to [facility]

## 2020-12-18 NOTE — TELEPHONE ENCOUNTER
PCP: Caesar Young MD    Last appt: Visit date not found  Future Appointments   Date Time Provider Komal Rodriguez   12/21/2020  1:00 PM Caesar Young MD [facility] BS AMB       Requested Prescriptions     Pending Prescriptions Disp Refills    ibuprofen-famotidine (Duexis) 800-26.6 mg tab        Sig: Take  by mouth.     ketoconazole (NIZORAL) 2 % shampoo 1 Bottle 11     Sig: shampoo twice weekly    pantoprazole (PROTONIX) 20 mg tablet 180 Tab 1       Prior labs and Blood pressures:  BP Readings from Last 3 Encounters:   06/10/20 123/82   03/05/20 (!) 136/96   12/26/19 (!) 136/96     Lab Results   Component Value Date/Time    Sodium 140 10/23/2020 10:23 AM    Potassium 4.3 10/23/2020 10:23 AM    Chloride 107 10/23/2020 10:23 AM    CO2 22 10/23/2020 10:23 AM    Anion gap 11 10/23/2020 10:23 AM    Glucose 96 10/23/2020 10:23 AM    Glucose 59 (L) 12/30/2019 12:00 AM    BUN 9 10/23/2020 10:23 AM    Creatinine 0.81 10/23/2020 10:23 AM    BUN/Creatinine ratio 11 (L) 10/23/2020 10:23 AM    GFR est AA >60 10/23/2020 10:23 AM    GFR est non-AA >60 10/23/2020 10:23 AM    Calcium 9.6 10/23/2020 10:23 AM     Lab Results   Component Value Date/Time    Hemoglobin A1c 5.6 10/23/2020 10:23 AM    Hemoglobin A1c (POC) 5.0 04/04/2018 03:15 PM     Lab Results   Component Value Date/Time    Cholesterol, total 215 10/23/2020 10:23 AM    HDL Cholesterol 86 10/23/2020 10:23 AM    LDL, calculated 109 10/23/2020 10:23 AM    VLDL, calculated 20 10/23/2020 10:23 AM    Triglyceride 100 10/23/2020 10:23 AM    CHOL/HDL Ratio 2.5 10/23/2020 10:23 AM     Lab Results   Component Value Date/Time    VITAMIN D, 25-HYDROXY 46.4 10/12/2018 11:57 AM       Lab Results   Component Value Date/Time    TSH 1.330 12/30/2019 12:00 AM

## 2020-12-18 NOTE — TELEPHONE ENCOUNTER
PCP: Michelle Wallace MD    Last appt: Visit date not found  Future Appointments   Date Time Provider Komal Rodriguez   12/21/2020  1:00 PM Michelle Wallace MD [facility] AMB       Requested Prescriptions     Pending Prescriptions Disp Refills    triamcinolone acetonide (KENALOG) 0.1 % ointment 85 g 11     Sig: Apply  to affected area two (2) times a day.  use thin layer       Prior labs and Blood pressures:  BP Readings from Last 3 Encounters:   06/10/20 123/82   03/05/20 (!) 136/96   12/26/19 (!) 136/96     Lab Results   Component Value Date/Time    Sodium 140 10/23/2020 10:23 AM    Potassium 4.3 10/23/2020 10:23 AM    Chloride 107 10/23/2020 10:23 AM    CO2 22 10/23/2020 10:23 AM    Anion gap 11 10/23/2020 10:23 AM    Glucose 96 10/23/2020 10:23 AM    Glucose 59 (L) 12/30/2019 12:00 AM    BUN 9 10/23/2020 10:23 AM    Creatinine 0.81 10/23/2020 10:23 AM    BUN/Creatinine ratio 11 (L) 10/23/2020 10:23 AM    GFR est AA >60 10/23/2020 10:23 AM    GFR est non-AA >60 10/23/2020 10:23 AM    Calcium 9.6 10/23/2020 10:23 AM     Lab Results   Component Value Date/Time    Hemoglobin A1c 5.6 10/23/2020 10:23 AM    Hemoglobin A1c (POC) 5.0 04/04/2018 03:15 PM     Lab Results   Component Value Date/Time    Cholesterol, total 215 10/23/2020 10:23 AM    HDL Cholesterol 86 10/23/2020 10:23 AM    LDL, calculated 109 10/23/2020 10:23 AM    VLDL, calculated 20 10/23/2020 10:23 AM    Triglyceride 100 10/23/2020 10:23 AM    CHOL/HDL Ratio 2.5 10/23/2020 10:23 AM     Lab Results   Component Value Date/Time    VITAMIN D, 25-HYDROXY 46.4 10/12/2018 11:57 AM       Lab Results   Component Value Date/Time    TSH 1.330 12/30/2019 12:00 AM     **Patient also requested Sprintec (28) 0.25

## 2020-12-21 ENCOUNTER — VIRTUAL VISIT (OUTPATIENT)
Dept: FAMILY MEDICINE CLINIC | Age: 40
End: 2020-12-21
Payer: MEDICARE

## 2020-12-21 DIAGNOSIS — M72.2 PLANTAR FASCIITIS: ICD-10-CM

## 2020-12-21 DIAGNOSIS — M79.671 PAIN IN BOTH FEET: ICD-10-CM

## 2020-12-21 DIAGNOSIS — M79.7 FIBROMYALGIA: ICD-10-CM

## 2020-12-21 DIAGNOSIS — M79.672 PAIN IN BOTH FEET: ICD-10-CM

## 2020-12-21 DIAGNOSIS — K59.03 DRUG-INDUCED CONSTIPATION: Primary | ICD-10-CM

## 2020-12-21 PROCEDURE — G9717 DOC PT DX DEP/BP F/U NT REQ: HCPCS | Performed by: FAMILY MEDICINE

## 2020-12-21 PROCEDURE — G8756 NO BP MEASURE DOC: HCPCS | Performed by: FAMILY MEDICINE

## 2020-12-21 PROCEDURE — 99214 OFFICE O/P EST MOD 30 MIN: CPT | Performed by: FAMILY MEDICINE

## 2020-12-21 PROCEDURE — G8428 CUR MEDS NOT DOCUMENT: HCPCS | Performed by: FAMILY MEDICINE

## 2020-12-21 RX ORDER — GABAPENTIN 300 MG/1
CAPSULE ORAL
Qty: 90 CAP | Refills: 2 | Status: SHIPPED | OUTPATIENT
Start: 2020-12-21 | End: 2020-12-23 | Stop reason: SDUPTHER

## 2020-12-21 RX ORDER — BUPROPION HYDROCHLORIDE 150 MG/1
TABLET ORAL
COMMUNITY
Start: 2020-12-10 | End: 2021-09-14

## 2020-12-21 NOTE — PROGRESS NOTES
Sheldon Cuevas is a 47 y.o. female evaluated via Doximetry on 20. Patient Identity confirmed by . Consent:  He and/or health care decision maker is aware that that he may receive a bill for this telephone service, depending on his insurance coverage, and has provided verbal consent to proceed: Yes    Physician Location: Office  Patient Location: Home  Nurse Assisting with Encounter: Bronwyn Grewal LPN    Chief Complaint   Patient presents with    Constipation    Medication Evaluation      Information gathered from patient and/or health care decision maker. HPI:   Constipation:  Duration: a few weeks  Quality: hard stool with straining, lack of BM for 2-3 days  Episode duration: day(s)  Severity:  moderate  Exacerbating Factors: pressure  Associated Symptoms: Endorses constipation, Denies watery diarrhea, nausea, vomiting,   Known Risk Factors: Multiple medications  Food Factors:  Patient does admit to no specific issues  Recent Sick Contacts: No   Current Medication: Miralax    Foot Pain: Noting dry skin, burning sesation in the feet that is worst at night. Seems to be worse with dry skin. Noting also some right sided plantar Fasciitis. Review of Systems   Constitutional: Negative for chills and fever. HENT: Negative for congestion. Respiratory: Negative for cough. Cardiovascular: Negative for chest pain and palpitations. Gastrointestinal: Negative for abdominal pain, nausea and vomiting. Limited Exam:  Due to this being a TeleHealth evaluation, many elements of the physical examination are unable to be assessed.       Constitutional: Appears well-developed and well-nourished in no apparent distress   Mental status: Alert and awake, Oriented to person/place/time, Able to follow commands  Eyes: EOM normal, Sclera normal, No visible ocular discharge  HENT: Normocephalic, atraumatic; Mouth/Throat: Moist mucous membranes, External Ears normal  Neck: No visualized mass  Pulmonary/Chest: Respiratory effort normal, No visualized signs of difficulty breathing or respiratory distress   Musculoskeletal: Normal gait with no signs of ataxia, Normal range of motion of neck  Neurological: No facial asymmetry (Cranial nerve 7 motor function), No gaze palsy  Skin: No significant exanthematous lesions or discoloration noted on facial skin  Psychiatric: Normal affect, normal judgment/insight. No hallucinations     Current Outpatient Medications on File Prior to Visit   Medication Sig Dispense Refill    buPROPion XL (WELLBUTRIN XL) 150 mg tablet       pantoprazole (PROTONIX) 20 mg tablet TAKE ONE TABLET BY MOUTH TWICE DAILY 180 Tab 1    gabapentin (NEURONTIN) 300 mg capsule Take 1 capsule in the morning and 2 in the evening 90 Cap 2    triamcinolone acetonide (KENALOG) 0.1 % ointment Apply  to affected area two (2) times a day. use thin layer 85 g 11    FLUoxetine (PROzac) 40 mg capsule Take 1 Cap by mouth daily. 30 Cap 1    ferrous sulfate 325 mg (65 mg iron) tablet TAKE ONE TABLET BY MOUTH EVERY DAY BEFORE BREAKFAST 90 Tab 1    folic acid (FOLVITE) 1 mg tablet TAKE ONE TABLET BY MOUTH EVERY DAY 90 Tab 0    lamoTRIgine (LaMICtal) 100 mg tablet Take 1.5 Tabs by mouth two (2) times a day. Indications: bipolar depression (Patient taking differently: Take 100 mg by mouth daily. Indications: bipolar depression) 90 Tab 0    valACYclovir (VALTREX) 500 mg tablet Take 1 tablet two times a day for 3 days then take 1 tablet daily. 90 Tab 0    LORazepam (ATIVAN) 2 mg tablet Take 2 mg by mouth every eight (8) hours as needed.  norgestimate-ethinyl estradioL (ORTHO-CYCLEN, SPRINTEC) 0.25-35 mg-mcg tab Take 1 Tab by mouth daily. Indications: pain with menstruation 1 Package 12    albuterol-ipratropium (DUO-NEB) 2.5 mg-0.5 mg/3 ml nebu 3 mL by Nebulization route every six (6) hours as needed (wheeze).  30 Nebule 3    albuterol (PROVENTIL HFA, VENTOLIN HFA, PROAIR HFA) 90 mcg/actuation inhaler Take 1 Puff by inhalation every six (6) hours as needed for Wheezing. 1 Inhaler 5    OTHER Please provide patient with a left knee brace. DX: Z87.39 1 Each 0    acetaminophen (TYLENOL 8 HOUR PO) Take  by mouth.  clonazePAM (KLONOPIN) 1 mg tablet Take 1 mg by mouth daily. At bedtime      cholecalciferol, vitamin D3, (VITAMIN D3) 2,000 unit tab Take  by mouth.  traZODone (DESYREL) 50 mg tablet Take 1 Tab by mouth nightly. (Patient taking differently: Take 100 mg by mouth nightly.) 30 Tab 6    [DISCONTINUED] ibuprofen-famotidine (Duexis) 800-26.6 mg tab Take 1 Tab by mouth two (2) times daily as needed for Pain. 180 Tab 1    [DISCONTINUED] ketoconazole (NIZORAL) 2 % shampoo shampoo twice weekly 1 Bottle 11    [DISCONTINUED] methocarbamoL (ROBAXIN) 500 mg tablet Take 1,000 mg by mouth as needed.  [DISCONTINUED] cyanocobalamin (VITAMIN B-12) 1,000 mcg tablet Take 1,000 mcg by mouth daily.  [DISCONTINUED] pramoxine (PROCTOFOAM) 1 % topical foam Apply  to affected area three (3) times daily as needed for Hemorrhoids. 1 Can 1    [DISCONTINUED] diclofenac (VOLTAREN) 1 % gel Apply 1 g to affected area four (4) times daily. Apply to right knee 100 g 4    [DISCONTINUED] triamcinolone (NASACORT) 55 mcg nasal inhaler 2 Sprays by Both Nostrils route daily. 1 Bottle 3    Comp Stocking,Knee,Regular,Med misc 1 Each by Does Not Apply route daily. 1 Each 1    [DISCONTINUED] mineral oil-hydrophil petrolat (AQUAPHOR) ointment Apply  to affected area two (2) times a day. Apply to bilateral legs and feet. 396 g 2    [DISCONTINUED] metoclopramide HCl (REGLAN) 5 mg tablet Take 5 mg by mouth Before breakfast, lunch, and dinner.  [DISCONTINUED] dextroamphetamine-amphetamine (ADDERALL) 20 mg tablet Take 20 mg by mouth. 1/2 tablet  Twice daily             No current facility-administered medications on file prior to visit.          Allergies   Allergen Reactions    Sulfa (Sulfonamide Antibiotics) Hives    Vicodin [Hydrocodone-Acetaminophen] Nausea and Vomiting        Patient Active Problem List    Diagnosis Date Noted    Neuropathy 10/15/2019    Gastroparesis 11/29/2018    Plantar fasciitis of left foot 11/29/2018    Positive KITTY (antinuclear antibody) 11/29/2018    Severe obesity ([facility] [address] 11/29/2018    Class 2 obesity due to excess calories without serious comorbidity in adult 07/25/2018    Mood disorder ([facility] [address] 06/12/2018    Chronic pain syndrome 06/12/2018    Fibromyalgia 06/12/2018    Panic attack 06/12/2018    Tremor 06/12/2018    Recurrent depression ([facility] [address] 01/15/2018    Inflammatory arthritis 08/10/2016    Vitamin D deficiency 03/21/2016    Thrombosed external hemorrhoid 02/05/2016    IFG (impaired fasting glucose) 09/10/2015    Hyperlipidemia 09/10/2015    Hoarseness 03/25/2013    OCD (obsessive compulsive disorder) 02/27/2012    HSV (herpes simplex virus) anogenital infection 09/12/2011    Costochondritis 05/17/2011    Anxiety 09/13/2010    Itch of skin 05/26/2010    Depression     GERD (gastroesophageal reflux disease)     Hypertension     Anemia, unspecified         Health Maintenance Due   Topic Date Due    PAP AKA CERVICAL CYTOLOGY  02/02/2020    Medicare Yearly Exam  03/05/2020    Flu Vaccine (1) 09/01/2020        Assessment/Plan:  Details of this discussion including any medical advice provided: Multiple constipating medications, discussed OTC treatments. Reviewed previous records, negative EMG studies for Neuropathy in the feet. ICD-10-CM ICD-9-CM    1. Drug-induced constipation  K59.03 564.09      E980.5    2. Pain in both feet  M79.671 729.5 REFERRAL TO PODIATRY    M79.672     3. Plantar fasciitis  M72.2 728.71            Total Time: minutes: 21-30 minutes was spent on telemedicine encounter discussing above problems and plans. Patient Problem list, medications, and Allergies were reviewed during this encounter.     Pursuant to the emergency declaration under the [facility], [facility] waiver authority and the [employer] and [employer] Act, this Telephone Visit was conducted, with patient's consent, to reduce the patient's risk of exposure to COVID-19 and provide continuity of care for an established patient. I affirm this is a Patient Initiated Episode with an Established Patient who has not had a related appointment within my department in the past 7 days or scheduled within the next 24 hours. Discussed diagnoses in detail with patient. Medication risks/benefits/side effects discussed with patient. All of the patient's questions were addressed. The patient understands and agrees with our plan of care. The patient knows to call back if they are unsure of or forget any changes we discussed today or if the symptoms change.     Note: not billable if this call serves to triage the patient into an appointment for the relevant concern    MD URBAN Keys & MARSHAL CARMEN [facility]  12/21/20

## 2020-12-23 DIAGNOSIS — M79.7 FIBROMYALGIA: ICD-10-CM

## 2020-12-23 DIAGNOSIS — J45.901 REACTIVE AIRWAY DISEASE WITH ACUTE EXACERBATION, UNSPECIFIED ASTHMA SEVERITY, UNSPECIFIED WHETHER PERSISTENT: ICD-10-CM

## 2020-12-23 DIAGNOSIS — E53.8 LOW FOLATE: ICD-10-CM

## 2020-12-23 DIAGNOSIS — J40 BRONCHITIS: ICD-10-CM

## 2020-12-23 DIAGNOSIS — J45.21 MILD INTERMITTENT ASTHMATIC BRONCHITIS WITH ACUTE EXACERBATION: ICD-10-CM

## 2020-12-23 DIAGNOSIS — L21.9 SEBORRHEA: ICD-10-CM

## 2020-12-23 DIAGNOSIS — N94.6 PAINFUL MENSTRUAL PERIODS: ICD-10-CM

## 2020-12-23 DIAGNOSIS — K21.9 GASTROESOPHAGEAL REFLUX DISEASE WITHOUT ESOPHAGITIS: Chronic | ICD-10-CM

## 2020-12-23 RX ORDER — ALBUTEROL SULFATE 90 UG/1
1 AEROSOL, METERED RESPIRATORY (INHALATION)
Qty: 1 INHALER | Refills: 5 | Status: SHIPPED | OUTPATIENT
Start: 2020-12-23 | End: 2022-01-24

## 2020-12-23 RX ORDER — GABAPENTIN 300 MG/1
CAPSULE ORAL
Qty: 90 CAP | Refills: 2 | Status: SHIPPED | OUTPATIENT
Start: 2020-12-23 | End: 2021-05-12

## 2020-12-23 RX ORDER — NORGESTIMATE AND ETHINYL ESTRADIOL 0.25-0.035
1 KIT ORAL DAILY
Qty: 3 PACKAGE | Refills: 3 | Status: SHIPPED | OUTPATIENT
Start: 2020-12-23 | End: 2021-01-13

## 2020-12-23 RX ORDER — PANTOPRAZOLE SODIUM 20 MG/1
TABLET, DELAYED RELEASE ORAL
Qty: 180 TAB | Refills: 1 | Status: SHIPPED | OUTPATIENT
Start: 2020-12-23 | End: 2021-05-17 | Stop reason: SDUPTHER

## 2020-12-23 RX ORDER — FOLIC ACID 1 MG/1
TABLET ORAL
Qty: 90 TAB | Refills: 1 | Status: SHIPPED | OUTPATIENT
Start: 2020-12-23 | End: 2021-09-23

## 2020-12-23 RX ORDER — IPRATROPIUM BROMIDE AND ALBUTEROL SULFATE 2.5; .5 MG/3ML; MG/3ML
3 SOLUTION RESPIRATORY (INHALATION)
Qty: 30 NEBULE | Refills: 3 | Status: SHIPPED | OUTPATIENT
Start: 2020-12-23 | End: 2022-05-31 | Stop reason: SDUPTHER

## 2020-12-23 RX ORDER — KETOCONAZOLE 20 MG/ML
SHAMPOO TOPICAL
Qty: 1 BOTTLE | Refills: 11 | Status: SHIPPED | OUTPATIENT
Start: 2020-12-23 | End: 2022-01-03

## 2020-12-23 RX ORDER — TRIAMCINOLONE ACETONIDE 1 MG/G
OINTMENT TOPICAL 2 TIMES DAILY
Qty: 85 G | Refills: 11 | Status: SHIPPED | OUTPATIENT
Start: 2020-12-23 | End: 2022-01-03

## 2021-01-11 ENCOUNTER — PATIENT MESSAGE (OUTPATIENT)
Dept: FAMILY MEDICINE CLINIC | Age: 41
End: 2021-01-11

## 2021-01-11 DIAGNOSIS — N94.6 PAINFUL MENSTRUAL PERIODS: Primary | ICD-10-CM

## 2021-01-13 RX ORDER — NORGESTIMATE AND ETHINYL ESTRADIOL 0.25-0.035
1 KIT ORAL DAILY
Qty: 3 DOSE PACK | Refills: 3 | Status: SHIPPED | OUTPATIENT
Start: 2021-01-13 | End: 2021-12-29 | Stop reason: SDUPTHER

## 2021-01-13 NOTE — TELEPHONE ENCOUNTER
From: Giovanny Aguirre  To: Stephane Reddy MD  Sent: 1/11/2021 8:36 AM EST  Subject: Prescription Question    Good morning, I'm sending this message because [facility] sent out a paper on Thursday about an alternative for my birth control pills because the ones I use are not on there formulary. If you can look at it and and she if there is one I can use. Also Dr. Joselin Smith I have not seen you in office I know because the corna is going on bad but I need to know if there is a [facility] location where they can see me I'm having some issues and I really need to be seen. It's one problem after the next and with my health and all these medications i feel i have to be seen I have not had like a physical or anything and I need some x-rays done. Can you help me please.      Thank you

## 2021-02-04 ENCOUNTER — TRANSCRIBE ORDER (OUTPATIENT)
Dept: SCHEDULING | Age: 41
End: 2021-02-04

## 2021-02-04 DIAGNOSIS — M47.812 CERVICAL SPONDYLOSIS WITHOUT MYELOPATHY: Primary | ICD-10-CM

## 2021-02-08 ENCOUNTER — VIRTUAL VISIT (OUTPATIENT)
Dept: FAMILY MEDICINE CLINIC | Age: 41
End: 2021-02-08
Payer: MEDICARE

## 2021-02-08 DIAGNOSIS — R73.01 IFG (IMPAIRED FASTING GLUCOSE): Chronic | ICD-10-CM

## 2021-02-08 DIAGNOSIS — G47.00 INSOMNIA, UNSPECIFIED TYPE: ICD-10-CM

## 2021-02-08 DIAGNOSIS — H04.123 DRY EYES: Primary | ICD-10-CM

## 2021-02-08 PROCEDURE — 99443 PR PHYS/QHP TELEPHONE EVALUATION 21-30 MIN: CPT | Performed by: FAMILY MEDICINE

## 2021-02-08 NOTE — PROGRESS NOTES
Tillman Cogan is a 47 y.o. female evaluated via Telephone on 21. Patient Identity confirmed by . Consent:  He and/or health care decision maker is aware that that he may receive a bill for this telephone service, depending on his insurance coverage, and has provided verbal consent to proceed: Yes    Physician Location: Office  Patient Location: Home  Nurse Assisting with Encounter: Lety Lara LPN    Chief Complaint   Patient presents with    Dry Eye     Bilateral      Information gathered from patient and/or health care decision maker. HPI:   Dry Eyes: Patient reports that she has had persistent issues with dry eyes, and particularly over the last 2-3 weeks. Has been using Artificial tears, and has not been using antihistamines. Patient also reporting that she recently has had sugar cravings, thirsty, dry mouth, and having frequent urination over the last month. Insomnia: Patient reports over the last 1-2 weeks noting difficulty getting to sleep and staying asleep. Does note she sleeps with the TV on, Does talk in sleep, is taking Melatonin 10 mg nightly about 1 hour before bed. Is eating before bed as well. Review of Systems   Constitutional: Negative for chills and fever. HENT: Negative for congestion. Eyes: Positive for redness. Dry Eyes   Respiratory: Negative for cough. Cardiovascular: Negative for chest pain and palpitations. Gastrointestinal: Negative for abdominal pain, nausea and vomiting. Psychiatric/Behavioral: The patient has insomnia. Limited Exam:  Due to this being a TeleHealth evaluation, many elements of the physical examination are unable to be assessed. Constitutional: Appears well-developed and well-nourished in no apparent distress   Mental status: Alert and awake, Oriented to person/place/time, Able to follow commands  Psychiatric: Normal affect, normal judgment/insight.  No hallucinations     Current Outpatient Medications on File Prior to Visit   Medication Sig Dispense Refill    norgestimate-ethinyl estradioL (ORTHO-CYCLEN, SPRINTEC) 0.25-35 mg-mcg tab Take 1 Tab by mouth daily. Generic 3 Dose Pack 3    gabapentin (NEURONTIN) 300 mg capsule Take 1 capsule in the morning and 2 in the evening 90 Cap 2    folic acid (FOLVITE) 1 mg tablet TAKE ONE TABLET BY MOUTH EVERY DAY 90 Tab 1    pantoprazole (PROTONIX) 20 mg tablet TAKE ONE TABLET BY MOUTH TWICE DAILY 180 Tab 1    albuterol-ipratropium (DUO-NEB) 2.5 mg-0.5 mg/3 ml nebu 3 mL by Nebulization route every six (6) hours as needed (wheeze). 30 Nebule 3    triamcinolone acetonide (KENALOG) 0.1 % ointment Apply  to affected area two (2) times a day. use thin layer 85 g 11    ketoconazole (NIZORAL) 2 % shampoo shampoo twice weekly 1 Bottle 11    albuterol (PROVENTIL HFA, VENTOLIN HFA, PROAIR HFA) 90 mcg/actuation inhaler Take 1 Puff by inhalation every six (6) hours as needed for Wheezing. 1 Inhaler 5    buPROPion XL (WELLBUTRIN XL) 150 mg tablet       FLUoxetine (PROzac) 40 mg capsule Take 1 Cap by mouth daily. 30 Cap 1    ferrous sulfate 325 mg (65 mg iron) tablet TAKE ONE TABLET BY MOUTH EVERY DAY BEFORE BREAKFAST 90 Tab 1    lamoTRIgine (LaMICtal) 100 mg tablet Take 1.5 Tabs by mouth two (2) times a day. Indications: bipolar depression (Patient taking differently: Take 100 mg by mouth daily. Indications: bipolar depression) 90 Tab 0    valACYclovir (VALTREX) 500 mg tablet Take 1 tablet two times a day for 3 days then take 1 tablet daily. 90 Tab 0    LORazepam (ATIVAN) 2 mg tablet Take 2 mg by mouth every eight (8) hours as needed.  OTHER Please provide patient with a left knee brace. DX: Z87.39 1 Each 0    Comp Stocking,Knee,Regular,Med misc 1 Each by Does Not Apply route daily. 1 Each 1    acetaminophen (TYLENOL 8 HOUR PO) Take  by mouth.  clonazePAM (KLONOPIN) 1 mg tablet Take 1 mg by mouth daily.  At bedtime      cholecalciferol, vitamin D3, (VITAMIN D3) 2,000 unit tab Take  by mouth.  traZODone (DESYREL) 50 mg tablet Take 1 Tab by mouth nightly. (Patient taking differently: Take 100 mg by mouth nightly.) 30 Tab 6     No current facility-administered medications on file prior to visit. Allergies   Allergen Reactions    Sulfa (Sulfonamide Antibiotics) Hives    Vicodin [Hydrocodone-Acetaminophen] Nausea and Vomiting        Patient Active Problem List    Diagnosis Date Noted    Neuropathy 10/15/2019    Gastroparesis 11/29/2018    Plantar fasciitis of left foot 11/29/2018    Positive KITTY (antinuclear antibody) 11/29/2018    Severe obesity ([address] 11/29/2018    Class 2 obesity due to excess calories without serious comorbidity in adult 07/25/2018    Mood disorder ([address] 06/12/2018    Chronic pain syndrome 06/12/2018    Fibromyalgia 06/12/2018    Panic attack 06/12/2018    Tremor 06/12/2018    Recurrent depression ([facility] [address] 01/15/2018    Inflammatory arthritis 08/10/2016    Vitamin D deficiency 03/21/2016    Thrombosed external hemorrhoid 02/05/2016    IFG (impaired fasting glucose) 09/10/2015    Hyperlipidemia 09/10/2015    Hoarseness 03/25/2013    OCD (obsessive compulsive disorder) 02/27/2012    HSV (herpes simplex virus) anogenital infection 09/12/2011    Costochondritis 05/17/2011    Anxiety 09/13/2010    Itch of skin 05/26/2010    Depression     GERD (gastroesophageal reflux disease)     Hypertension     Anemia, unspecified         Health Maintenance Due   Topic Date Due    COVID-19 Vaccine (1 of 2) 08/27/1998    PAP AKA CERVICAL CYTOLOGY  02/02/2020    Medicare Yearly Exam  03/05/2020    Flu Vaccine (1) 09/01/2020        Assessment/Plan:  Details of this discussion including any medical advice provided: Advised on Dry eye symptomatic therapies. Patient also noting symptoms of concern for diabetes, history impaired fasting Glucose. Discussed Sleep hygiene as well. ICD-10-CM ICD-9-CM    1. Dry eyes  H04. 123 375.15    2.  IFG (impaired fasting glucose)  R73.01 790.21 HEMOGLOBIN A1C WITH EAG      CBC W/O DIFF      METABOLIC PANEL, COMPREHENSIVE   3. Insomnia, unspecified type  G47.00 780.52      Follow-up and Dispositions    · Return for After getting Labs TBD. Total Time: minutes: 21-30 minutes was spent on telemedicine encounter discussing above problems and plans. Patient Problem list, medications, and Allergies were reviewed during this encounter. Pursuant to the emergency declaration under the [address] [place] [address], [facility] waiver authority and the [employer] Act, this Telephone Visit was conducted, with patient's consent, to reduce the patient's risk of exposure to COVID-19 and provide continuity of care for an established patient. I affirm this is a Patient Initiated Episode with an Established Patient who has not had a related appointment within my department in the past 7 days or scheduled within the next 24 hours. Discussed diagnoses in detail with patient. Medication risks/benefits/side effects discussed with patient. All of the patient's questions were addressed. The patient understands and agrees with our plan of care. The patient knows to call back if they are unsure of or forget any changes we discussed today or if the symptoms change.     Note: not billable if this call serves to triage the patient into an appointment for the relevant concern    MD URBAN Vázquez & MARSHAL CARMEN [facility]  02/08/21

## 2021-03-29 ENCOUNTER — HOSPITAL ENCOUNTER (OUTPATIENT)
Dept: MRI IMAGING | Age: 41
Discharge: HOME | End: 2021-03-29
Payer: MEDICARE

## 2021-03-29 DIAGNOSIS — M47.812 CERVICAL SPONDYLOSIS WITHOUT MYELOPATHY: ICD-10-CM

## 2021-03-29 PROCEDURE — 72141 MRI NECK SPINE W/O DYE: CPT

## 2021-04-16 ENCOUNTER — HOSPITAL ENCOUNTER (OUTPATIENT)
Dept: LAB | Age: 41
Discharge: HOME | End: 2021-04-16
Payer: MEDICARE

## 2021-04-16 ENCOUNTER — OFFICE VISIT (OUTPATIENT)
Dept: FAMILY MEDICINE CLINIC | Age: 41
End: 2021-04-16
Payer: MEDICARE

## 2021-04-16 VITALS
SYSTOLIC BLOOD PRESSURE: 117 MMHG | BODY MASS INDEX: 39.44 KG/M2 | DIASTOLIC BLOOD PRESSURE: 82 MMHG | RESPIRATION RATE: 15 BRPM | OXYGEN SATURATION: 96 % | HEART RATE: 79 BPM | TEMPERATURE: 98 F | HEIGHT: 64 IN | WEIGHT: 231 LBS

## 2021-04-16 DIAGNOSIS — Z12.4 PAP SMEAR FOR CERVICAL CANCER SCREENING: ICD-10-CM

## 2021-04-16 DIAGNOSIS — N39.46 MIXED INCONTINENCE: ICD-10-CM

## 2021-04-16 DIAGNOSIS — D64.9 ANEMIA, UNSPECIFIED TYPE: Chronic | ICD-10-CM

## 2021-04-16 DIAGNOSIS — R73.01 IFG (IMPAIRED FASTING GLUCOSE): Chronic | ICD-10-CM

## 2021-04-16 DIAGNOSIS — Z01.419 WELL WOMAN EXAM: Primary | ICD-10-CM

## 2021-04-16 DIAGNOSIS — T78.40XD ALLERGY, SUBSEQUENT ENCOUNTER: ICD-10-CM

## 2021-04-16 PROCEDURE — G8427 DOCREV CUR MEDS BY ELIG CLIN: HCPCS | Performed by: STUDENT IN AN ORGANIZED HEALTH CARE EDUCATION/TRAINING PROGRAM

## 2021-04-16 PROCEDURE — 87661 TRICHOMONAS VAGINALIS AMPLIF: CPT

## 2021-04-16 PROCEDURE — 99214 OFFICE O/P EST MOD 30 MIN: CPT | Performed by: STUDENT IN AN ORGANIZED HEALTH CARE EDUCATION/TRAINING PROGRAM

## 2021-04-16 PROCEDURE — G8417 CALC BMI ABV UP PARAM F/U: HCPCS | Performed by: STUDENT IN AN ORGANIZED HEALTH CARE EDUCATION/TRAINING PROGRAM

## 2021-04-16 PROCEDURE — G8754 DIAS BP LESS 90: HCPCS | Performed by: STUDENT IN AN ORGANIZED HEALTH CARE EDUCATION/TRAINING PROGRAM

## 2021-04-16 PROCEDURE — G8752 SYS BP LESS 140: HCPCS | Performed by: STUDENT IN AN ORGANIZED HEALTH CARE EDUCATION/TRAINING PROGRAM

## 2021-04-16 PROCEDURE — G9717 DOC PT DX DEP/BP F/U NT REQ: HCPCS | Performed by: STUDENT IN AN ORGANIZED HEALTH CARE EDUCATION/TRAINING PROGRAM

## 2021-04-16 RX ORDER — MONTELUKAST SODIUM 10 MG/1
10 TABLET ORAL DAILY
Qty: 30 TAB | Refills: 0 | Status: SHIPPED | OUTPATIENT
Start: 2021-04-16 | End: 2021-05-12 | Stop reason: SDUPTHER

## 2021-04-16 NOTE — PROGRESS NOTES
HPI:  Kim Cantu is a 47 y.o. female presenting for well woman exam.     LMP : 2 weeks ago  Length of periods: 7 days  Menstrual flow: heavy in the beginning and then lighten up    Last Herpes outbreak, months ago; takes Valtrex during outbreaks  Sexually active?: one partner, no condoms   Method of family planning: sprintec  Diet: \"not good\"  Exercise: no     Immunizations - reviewed:   Immunization History   Administered Date(s) Administered    Tdap 2014       Health Maintenance reviewed -  Pap smear: last 2 normal  HIV testing : discussed today  Hepatitis C testing : discussed today  Mammogram: 2021, last 1 year ago BIRADS 2    Allergies- reviewed: Allergies   Allergen Reactions    Sulfa (Sulfonamide Antibiotics) Hives    Vicodin [Hydrocodone-Acetaminophen] Nausea and Vomiting         Medications- reviewed:   Current Outpatient Medications   Medication Sig    montelukast (SINGULAIR) 10 mg tablet Take 1 Tab by mouth daily for 30 days.  norgestimate-ethinyl estradioL (ORTHO-CYCLEN, SPRINTEC) 0.25-35 mg-mcg tab Take 1 Tab by mouth daily. Generic    gabapentin (NEURONTIN) 300 mg capsule Take 1 capsule in the morning and 2 in the evening (Patient taking differently: 400 mg three (3) times daily. Take 1 capsule in the morning and 2 in the evening)    folic acid (FOLVITE) 1 mg tablet TAKE ONE TABLET BY MOUTH EVERY DAY    pantoprazole (PROTONIX) 20 mg tablet TAKE ONE TABLET BY MOUTH TWICE DAILY    albuterol-ipratropium (DUO-NEB) 2.5 mg-0.5 mg/3 ml nebu 3 mL by Nebulization route every six (6) hours as needed (wheeze).  triamcinolone acetonide (KENALOG) 0.1 % ointment Apply  to affected area two (2) times a day. use thin layer    ketoconazole (NIZORAL) 2 % shampoo shampoo twice weekly    albuterol (PROVENTIL HFA, VENTOLIN HFA, PROAIR HFA) 90 mcg/actuation inhaler Take 1 Puff by inhalation every six (6) hours as needed for Wheezing.     FLUoxetine (PROzac) 40 mg capsule Take 1 Cap by mouth daily. (Patient taking differently: Take 20 mg by mouth daily.)    lamoTRIgine (LaMICtal) 100 mg tablet Take 1.5 Tabs by mouth two (2) times a day. Indications: bipolar depression (Patient taking differently: Take 100 mg by mouth daily. Indications: bipolar depression)    valACYclovir (VALTREX) 500 mg tablet Take 1 tablet two times a day for 3 days then take 1 tablet daily.  LORazepam (ATIVAN) 2 mg tablet Take 2 mg by mouth every eight (8) hours as needed.  acetaminophen (TYLENOL 8 HOUR PO) Take  by mouth.  clonazePAM (KLONOPIN) 1 mg tablet Take 1 mg by mouth daily. At bedtime    cholecalciferol, vitamin D3, (VITAMIN D3) 2,000 unit tab Take  by mouth.  traZODone (DESYREL) 50 mg tablet Take 1 Tab by mouth nightly. (Patient taking differently: Take 100 mg by mouth nightly.)    buPROPion XL (WELLBUTRIN XL) 150 mg tablet     ferrous sulfate 325 mg (65 mg iron) tablet TAKE ONE TABLET BY MOUTH EVERY DAY BEFORE BREAKFAST    OTHER Please provide patient with a left knee brace. DX: Z87.39    Comp Stocking,Knee,Regular,Med misc 1 Each by Does Not Apply route daily. No current facility-administered medications for this visit.           Past Medical History- reviewed:  Past Medical History:   Diagnosis Date    Anemia NEC     Arthritis     Chronic pain     fybromyalsia    Depression     anxiety, depression, OCD    GERD (gastroesophageal reflux disease)     Hypertension     Musculoskeletal disorder     SOB (shortness of breath)     Stool color black          Past Surgical History- reviewed:   Past Surgical History:   Procedure Laterality Date    HX GYN           HX HEENT  2002    wisdom teeth extraction    UPPER GI ENDOSCOPY,BIOPSY  2018         UPPER GI ENDOSCOPY,DIAGNOSIS  2018              Family History - reviewed:  Family History   Problem Relation Age of Onset    Hypertension Father     Elevated Lipids Father     Arthritis-rheumatoid Mother ?Lupus vs RA    Lung Disease Mother     Heart Disease Mother     Cancer Mother         breast in 37y    COPD Mother     Hypertension Mother     Stroke Mother         3-4 strokes    Breast Cancer Mother 50    Diabetes Maternal Grandmother          Social History - reviewed:  Social History     Socioeconomic History    Marital status: SINGLE     Spouse name: Not on file    Number of children: Not on file    Years of education: Not on file    Highest education level: Not on file   Occupational History    Not on file   Social Needs    Financial resource strain: Not on file    Food insecurity     Worry: Not on file     Inability: Not on file    Transportation needs     Medical: Not on file     Non-medical: Not on file   Tobacco Use    Smoking status: Former Smoker     Packs/day: 0.25     Years: 8.00     Pack years: 2.00     Types: Cigarettes     Quit date: 9/3/2018     Years since quittin.6    Smokeless tobacco: Never Used   Substance and Sexual Activity    Alcohol use:  Yes     Alcohol/week: 1.0 standard drinks     Types: 1 Glasses of wine per week     Comment: 1 cup of wine/week    Drug use: No    Sexual activity: Yes     Partners: Male     Birth control/protection: None   Lifestyle    Physical activity     Days per week: Not on file     Minutes per session: Not on file    Stress: Not on file   Relationships    Social connections     Talks on phone: Not on file     Gets together: Not on file     Attends Latter day service: Not on file     Active member of club or organization: Not on file     Attends meetings of clubs or organizations: Not on file     Relationship status: Not on file    Intimate partner violence     Fear of current or ex partner: Not on file     Emotionally abused: Not on file     Physically abused: Not on file     Forced sexual activity: Not on file   Other Topics Concern    Not on file   Social History Narrative    Not on file           Review of Systems   CONSTITUTIONAL: Denies: fever, chills  BREASTS: No masses or nipple discharge      Physical Exam  Visit Vitals  /82   Pulse 79   Temp 98 °F (36.7 °C) (Oral)   Resp 15   Ht 5' 4\" (1.626 m)   Wt 231 lb (104.8 kg)   LMP 04/02/2021   SpO2 96%   BMI 39.65 kg/m²       General appearance - alert, well appearing, and in no distress  Ears - bilateral TM's and external ear canals normal  Nose - normal and patent, no erythema, discharge or polyps  Mouth - mucous membranes moist, pharynx normal without lesions  Neck - supple, no significant adenopathy, thyroid exam: thyroid is normal in size without nodules or tenderness  Chest - clear to auscultation, no wheezes, rales or rhonchi, symmetric air entry; 3x3cm mass felt in LLQ of left breast (known), and fibrocystic changes in lower quadrants of right breast, no skin changes or nipple retraction  Heart - normal rate, regular rhythm, normal S1, S2, no murmurs, rubs, clicks or gallops  Abdomen - soft, nontender, nondistended, no masses or organomegaly  Neurological - alert, oriented, normal speech, no focal findings or movement disorder noted  Musculoskeletal - no joint tenderness, deformity or swelling  Extremities - no pedal edema noted  Skin - normal coloration and turgor, no rashes, no suspicious skin lesions noted  Pelvic - Exam chaperoned by Tj Herman LPN. External genitalia normal without rashes or lesions. Pink and moist vaginal mucosa. Scant white discharge. Cervix without lesions or abnormal discharge. Uterus non tender and normal size. No adnexal masses or tenderness. Breast - deferred      Assessment/Plan:    ICD-10-CM ICD-9-CM    1. Well woman exam  Z01.419 V72.31 HEPATITIS C AB      HIV 1/2 AG/AB, 4TH GENERATION,W RFLX CONFIRM      RPR   2. Allergy, subsequent encounter  T78.40XD V58.89 montelukast (SINGULAIR) 10 mg tablet   3. Pap smear for cervical cancer screening  Z12.4 V76.2 PAP IG, CT-NG-TV, APTIMA HPV AND RFX 04/36,74([id number])   4.  Mixed incontinence  N39.46 788.33 REFERRAL TO UROGYNECOLOGY         · Health maintenance: Pap and STI screening today, next Mammogram September 2021 (known lump in left breast), Hep C today  · Allergies: with atopic presentation. Stop Claritin, start Singulair and Flonase  · Mixed incontinence. Seen Uro and put on Myrbetric before, wants second opinion. Referral for Dr. Galen Foster. I have discussed the diagnosis with the patient and the intended plan as seen in the above orders. The patient has received an after-visit summary and questions were answered concerning future plans. I have discussed medication side effects and warnings with the patient as well. Informed pt to return to the office if new symptoms arise.        Manuela Lorenzo, DO

## 2021-04-16 NOTE — PATIENT INSTRUCTIONS
Stress Incontinence in Women: Care Instructions  Your Care Instructions  Stress incontinence is the accidental release of urine caused by activities that put pressure on your bladder. It may happen most often when you sneeze, cough, laugh, jog, or lift something heavy. This condition does not cause major health problems, but it can be embarrassing and interfere with your life. Treatment can cure or improve your symptoms. Follow-up care is a key part of your treatment and safety. Be sure to make and go to all appointments, and call your doctor if you are having problems. It's also a good idea to know your test results and keep a list of the medicines you take. How can you care for yourself at home? · Take your medicines exactly as prescribed. Call your doctor if you think you are having a problem with your medicine. · Limit caffeine and alcohol. They make you urinate more. · Do pelvic floor (Kegel) exercises, which tighten and strengthen pelvic muscles. To do Kegel exercises:  ? Squeeze the same muscles you would use to stop your urine. Your belly and thighs should not move. ? Hold the squeeze for 3 seconds, and then relax for 3 seconds. ? Start with 3 seconds. Then add 1 second each week until you are able to squeeze for 10 seconds. ? Repeat the exercise 10 to 15 times a session. Do three or more sessions a day. · Try wearing pads that absorb leaks. Or you may want to try to prevent leaks with a product like Poise Impressa, which you insert like a tampon. · Keep skin in the genital area dry. Petroleum jelly (like Vaseline) spread on the area may help protect your skin. When should you call for help? Call your doctor now or seek immediate medical care if:    · You have new urinary symptoms. These may include leaking urine, having pain when urinating, or feeling like you need to urinate often.    Watch closely for changes in your health, and be sure to contact your doctor if:    · You do not get better as expected. Where can you learn more? Go to http://www.gray.com/  Enter B394 in the search box to learn more about \"Stress Incontinence in Women: Care Instructions. \"  Current as of: July 17, 2020               Content Version: 12.8  © 2933-6396 [employer], [employer]. Care instructions adapted under license by [employer] (which disclaims liability or warranty for this information). If you have questions about a medical condition or this instruction, always ask your healthcare professional. [address] any warranty or liability for your use of this information.

## 2021-04-18 LAB
ALBUMIN SERPL-MCNC: 3.3 G/DL (ref 3.5–5)
ALBUMIN/GLOB SERPL: 0.9 {RATIO} (ref 1.1–2.2)
ALP SERPL-CCNC: 136 U/L (ref 45–117)
ALT SERPL-CCNC: 25 U/L (ref 12–78)
ANION GAP SERPL CALC-SCNC: 7 MMOL/L (ref 5–15)
AST SERPL-CCNC: 10 U/L (ref 15–37)
BASOPHILS # BLD: 0 K/UL (ref 0–0.1)
BASOPHILS NFR BLD: 1 % (ref 0–1)
BILIRUB SERPL-MCNC: 0.4 MG/DL (ref 0.2–1)
BUN SERPL-MCNC: 11 MG/DL (ref 6–20)
BUN/CREAT SERPL: 14 (ref 12–20)
CALCIUM SERPL-MCNC: 9.4 MG/DL (ref 8.5–10.1)
CHLORIDE SERPL-SCNC: 106 MMOL/L (ref 97–108)
CO2 SERPL-SCNC: 25 MMOL/L (ref 21–32)
CREAT SERPL-MCNC: 0.77 MG/DL (ref 0.55–1.02)
DIFFERENTIAL METHOD BLD: ABNORMAL
EOSINOPHIL # BLD: 0.3 K/UL (ref 0–0.4)
EOSINOPHIL NFR BLD: 5 % (ref 0–7)
ERYTHROCYTE [DISTWIDTH] IN BLOOD BY AUTOMATED COUNT: 13.8 % (ref 11.5–14.5)
GLOBULIN SER CALC-MCNC: 3.5 G/DL (ref 2–4)
GLUCOSE SERPL-MCNC: 101 MG/DL (ref 65–100)
HCT VFR BLD AUTO: 36.3 % (ref 35–47)
HCV AB SERPL QL IA: NONREACTIVE
HCV COMMENT,HCGAC: NORMAL
HGB BLD-MCNC: 11.2 G/DL (ref 11.5–16)
HIV 1+2 AB+HIV1 P24 AG SERPL QL IA: NONREACTIVE
HIV12 RESULT COMMENT, HHIVC: NORMAL
IMM GRANULOCYTES # BLD AUTO: 0 K/UL (ref 0–0.04)
IMM GRANULOCYTES NFR BLD AUTO: 0 % (ref 0–0.5)
IRON SATN MFR SERPL: 13 % (ref 20–50)
IRON SERPL-MCNC: 53 UG/DL (ref 35–150)
LYMPHOCYTES # BLD: 2.3 K/UL (ref 0.8–3.5)
LYMPHOCYTES NFR BLD: 34 % (ref 12–49)
MCH RBC QN AUTO: 29.9 PG (ref 26–34)
MCHC RBC AUTO-ENTMCNC: 30.9 G/DL (ref 30–36.5)
MCV RBC AUTO: 96.8 FL (ref 80–99)
MONOCYTES # BLD: 0.4 K/UL (ref 0–1)
MONOCYTES NFR BLD: 6 % (ref 5–13)
NEUTS SEG # BLD: 3.7 K/UL (ref 1.8–8)
NEUTS SEG NFR BLD: 54 % (ref 32–75)
NRBC # BLD: 0 K/UL (ref 0–0.01)
NRBC BLD-RTO: 0 PER 100 WBC
PLATELET # BLD AUTO: 295 K/UL (ref 150–400)
PMV BLD AUTO: 11.2 FL (ref 8.9–12.9)
POTASSIUM SERPL-SCNC: 4.5 MMOL/L (ref 3.5–5.1)
PROT SERPL-MCNC: 6.8 G/DL (ref 6.4–8.2)
RBC # BLD AUTO: 3.75 M/UL (ref 3.8–5.2)
RPR SER QL: NONREACTIVE
SODIUM SERPL-SCNC: 138 MMOL/L (ref 136–145)
TIBC SERPL-MCNC: 422 UG/DL (ref 250–450)
WBC # BLD AUTO: 6.7 K/UL (ref 3.6–11)

## 2021-04-21 ENCOUNTER — PATIENT MESSAGE (OUTPATIENT)
Dept: FAMILY MEDICINE CLINIC | Age: 41
End: 2021-04-21

## 2021-04-21 DIAGNOSIS — D50.9 IRON DEFICIENCY ANEMIA, UNSPECIFIED IRON DEFICIENCY ANEMIA TYPE: ICD-10-CM

## 2021-04-22 RX ORDER — LANOLIN ALCOHOL/MO/W.PET/CERES
CREAM (GRAM) TOPICAL
Qty: 90 TAB | Refills: 1 | Status: SHIPPED | OUTPATIENT
Start: 2021-04-22 | End: 2021-10-21 | Stop reason: SDUPTHER

## 2021-04-22 NOTE — TELEPHONE ENCOUNTER
From: Alphonse Seip  To: Hunter Black MD  Sent: 4/21/2021 7:31 PM EDT  Subject: Test Results Question    U have no more iron pills I'm all out . Also you said dont take everyday because I told you I was constipated.  If you can call in the iron please I'm still taking the folic and what is alkaline phosphate

## 2021-04-23 ENCOUNTER — TELEPHONE (OUTPATIENT)
Dept: FAMILY MEDICINE CLINIC | Age: 41
End: 2021-04-23

## 2021-04-23 NOTE — TELEPHONE ENCOUNTER
[facility] needs to finish a pre-auth of pt medication albuterol inhaler.  Please call them back

## 2021-04-26 ENCOUNTER — TELEPHONE (OUTPATIENT)
Dept: FAMILY MEDICINE CLINIC | Age: 41
End: 2021-04-26

## 2021-04-26 LAB
C TRACH RRNA CVX QL NAA+PROBE: NEGATIVE
CYTOLOGIST CVX/VAG CYTO: NORMAL
CYTOLOGY CVX/VAG DOC THIN PREP: NORMAL
HPV APTIMA: NEGATIVE
Lab: NORMAL
N GONORRHOEA RRNA CVX QL NAA+PROBE: NEGATIVE
PATH REPORT.FINAL DX SPEC: NORMAL
STAT OF ADQ CVX/VAG CYTO-IMP: NORMAL
T VAGINALIS RRNA SPEC QL NAA+PROBE: NEGATIVE

## 2021-04-26 NOTE — TELEPHONE ENCOUNTER
Pt states that she took the COVID injection yesterday. States that she having fever 100.5,leg numbness,body aches,headaches,dizziness,weakness,tired.  Pt is taking Tylenol

## 2021-04-27 NOTE — TELEPHONE ENCOUNTER
Patient called per MD:  Most of these symptoms are common after the vaccine. The numbness is not something I have heard frequently. If the numbness is gone she does not need to do anything but if it is still going on she should go to the ER.      Patient verbalized understanding and appreciative

## 2021-04-27 NOTE — TELEPHONE ENCOUNTER
Most of these symptoms are common after the vaccine. The numbness is not something I have heard frequently. Can we call to check on her? If the numbness is gone she does not need to do anything but if it is still going on she should go to the ER.

## 2021-05-12 ENCOUNTER — OFFICE VISIT (OUTPATIENT)
Dept: FAMILY MEDICINE CLINIC | Age: 41
End: 2021-05-12
Payer: MEDICARE

## 2021-05-12 VITALS
HEART RATE: 94 BPM | SYSTOLIC BLOOD PRESSURE: 119 MMHG | OXYGEN SATURATION: 98 % | HEIGHT: 64 IN | RESPIRATION RATE: 18 BRPM | DIASTOLIC BLOOD PRESSURE: 82 MMHG | BODY MASS INDEX: 41.18 KG/M2 | WEIGHT: 241.2 LBS | TEMPERATURE: 98.3 F

## 2021-05-12 DIAGNOSIS — R53.83 FATIGUE, UNSPECIFIED TYPE: ICD-10-CM

## 2021-05-12 DIAGNOSIS — I10 ESSENTIAL HYPERTENSION: Primary | Chronic | ICD-10-CM

## 2021-05-12 DIAGNOSIS — F33.9 RECURRENT DEPRESSION (HCC): ICD-10-CM

## 2021-05-12 DIAGNOSIS — K21.9 GASTROESOPHAGEAL REFLUX DISEASE WITHOUT ESOPHAGITIS: Chronic | ICD-10-CM

## 2021-05-12 DIAGNOSIS — E66.01 SEVERE OBESITY (HCC): ICD-10-CM

## 2021-05-12 DIAGNOSIS — G62.9 NEUROPATHY: Chronic | ICD-10-CM

## 2021-05-12 DIAGNOSIS — R79.9 ABNORMAL FINDING OF BLOOD CHEMISTRY, UNSPECIFIED: ICD-10-CM

## 2021-05-12 DIAGNOSIS — Z91.09 ENVIRONMENTAL ALLERGIES: ICD-10-CM

## 2021-05-12 DIAGNOSIS — K31.84 GASTROPARESIS: Chronic | ICD-10-CM

## 2021-05-12 LAB
ALBUMIN SERPL-MCNC: 3.2 G/DL (ref 3.5–5)
ALBUMIN/GLOB SERPL: 0.9 {RATIO} (ref 1.1–2.2)
ALP SERPL-CCNC: 135 U/L (ref 45–117)
ALT SERPL-CCNC: 38 U/L (ref 12–78)
ANION GAP SERPL CALC-SCNC: 6 MMOL/L (ref 5–15)
AST SERPL-CCNC: 16 U/L (ref 15–37)
BILIRUB SERPL-MCNC: 0.3 MG/DL (ref 0.2–1)
BUN SERPL-MCNC: 12 MG/DL (ref 6–20)
BUN/CREAT SERPL: 18 (ref 12–20)
CALCIUM SERPL-MCNC: 8.8 MG/DL (ref 8.5–10.1)
CHLORIDE SERPL-SCNC: 108 MMOL/L (ref 97–108)
CO2 SERPL-SCNC: 25 MMOL/L (ref 21–32)
CREAT SERPL-MCNC: 0.68 MG/DL (ref 0.55–1.02)
ERYTHROCYTE [DISTWIDTH] IN BLOOD BY AUTOMATED COUNT: 13.9 % (ref 11.5–14.5)
EST. AVERAGE GLUCOSE BLD GHB EST-MCNC: 120 MG/DL
GLOBULIN SER CALC-MCNC: 3.6 G/DL (ref 2–4)
GLUCOSE SERPL-MCNC: 81 MG/DL (ref 65–100)
HBA1C MFR BLD: 5.8 % (ref 4–5.6)
HCT VFR BLD AUTO: 35.4 % (ref 35–47)
HGB BLD-MCNC: 11.3 G/DL (ref 11.5–16)
MCH RBC QN AUTO: 30.5 PG (ref 26–34)
MCHC RBC AUTO-ENTMCNC: 31.9 G/DL (ref 30–36.5)
MCV RBC AUTO: 95.4 FL (ref 80–99)
NRBC # BLD: 0 K/UL (ref 0–0.01)
NRBC BLD-RTO: 0 PER 100 WBC
PLATELET # BLD AUTO: 290 K/UL (ref 150–400)
PMV BLD AUTO: 11.1 FL (ref 8.9–12.9)
POTASSIUM SERPL-SCNC: 4.4 MMOL/L (ref 3.5–5.1)
PROT SERPL-MCNC: 6.8 G/DL (ref 6.4–8.2)
RBC # BLD AUTO: 3.71 M/UL (ref 3.8–5.2)
SODIUM SERPL-SCNC: 139 MMOL/L (ref 136–145)
TSH SERPL DL<=0.05 MIU/L-ACNC: 2.12 UIU/ML (ref 0.36–3.74)
WBC # BLD AUTO: 6.5 K/UL (ref 3.6–11)

## 2021-05-12 PROCEDURE — 99214 OFFICE O/P EST MOD 30 MIN: CPT | Performed by: FAMILY MEDICINE

## 2021-05-12 PROCEDURE — G8417 CALC BMI ABV UP PARAM F/U: HCPCS | Performed by: FAMILY MEDICINE

## 2021-05-12 PROCEDURE — G8752 SYS BP LESS 140: HCPCS | Performed by: FAMILY MEDICINE

## 2021-05-12 PROCEDURE — G8427 DOCREV CUR MEDS BY ELIG CLIN: HCPCS | Performed by: FAMILY MEDICINE

## 2021-05-12 PROCEDURE — G8754 DIAS BP LESS 90: HCPCS | Performed by: FAMILY MEDICINE

## 2021-05-12 PROCEDURE — G9717 DOC PT DX DEP/BP F/U NT REQ: HCPCS | Performed by: FAMILY MEDICINE

## 2021-05-12 RX ORDER — MONTELUKAST SODIUM 10 MG/1
10 TABLET ORAL DAILY
Qty: 90 TAB | Refills: 1 | Status: SHIPPED | OUTPATIENT
Start: 2021-05-12 | End: 2021-05-12 | Stop reason: SDUPTHER

## 2021-05-12 RX ORDER — ESCITALOPRAM OXALATE 20 MG/1
30 TABLET ORAL DAILY
COMMUNITY
Start: 2021-04-20 | End: 2021-09-14

## 2021-05-12 RX ORDER — GABAPENTIN 400 MG/1
600 CAPSULE ORAL 3 TIMES DAILY
COMMUNITY
Start: 2021-02-22 | End: 2021-10-21

## 2021-05-12 RX ORDER — HYDROXYZINE PAMOATE 25 MG/1
25 CAPSULE ORAL
Qty: 60 CAP | Refills: 1 | Status: SHIPPED | OUTPATIENT
Start: 2021-05-12 | End: 2021-11-25 | Stop reason: ALTCHOICE

## 2021-05-12 RX ORDER — MONTELUKAST SODIUM 10 MG/1
10 TABLET ORAL DAILY
Qty: 30 TAB | Refills: 0 | Status: SHIPPED | OUTPATIENT
Start: 2021-05-12 | End: 2021-10-26

## 2021-05-12 NOTE — PROGRESS NOTES
[facility]    History of Present Illness:   Koreen Siemens is a 47 y.o. female with history of HTN, GERD, IFG, Depression,   CC: Follow up. History provided by patient and Records    HPI:  Allergy symptoms: Reports issue with allergies for more than 10 years. Worse over the last  3 months. Symptoms include: Itching and congestion. There is no fever or rash. Seasonal or environmental triggers: YES. Severity: fairly severe. Modifiers: tried OTCs with relief of pain. Trend of symptoms: has worsened. Is currently on Singulair, was taking Zyrtec. Previous Allergy testing: NO    Does patient have Asthma/COPD: NO     Mood Disorder: Is seeing Psych. Currently on Gabapentin, Lexapro, Wellbutrin, Lorazepam.     Gastroparesis: Has been acting up more recently and noting weight gain. Reports eating only 1 meal daily. Typically eggs, alexander, baked chicken, balogne sandwiches, Pizza, seafood salad. Health Maintenance  Health Maintenance Due   Topic Date Due    COVID-19 Vaccine (1) Never done    Medicare Yearly Exam  Never done       Past Medical, Family, and Social History:     Current Outpatient Medications on File Prior to Visit   Medication Sig Dispense Refill    escitalopram oxalate (LEXAPRO) 20 mg tablet Take 20 mg by mouth daily.  gabapentin (NEURONTIN) 400 mg capsule Take 400 mg by mouth three (3) times daily.  ferrous sulfate 325 mg (65 mg iron) tablet TAKE ONE TABLET BY MOUTH EVERY DAY BEFORE BREAKFAST 90 Tab 1    norgestimate-ethinyl estradioL (ORTHO-CYCLEN, SPRINTEC) 0.25-35 mg-mcg tab Take 1 Tab by mouth daily. Generic 3 Dose Pack 3    folic acid (FOLVITE) 1 mg tablet TAKE ONE TABLET BY MOUTH EVERY DAY 90 Tab 1    pantoprazole (PROTONIX) 20 mg tablet TAKE ONE TABLET BY MOUTH TWICE DAILY 180 Tab 1    albuterol-ipratropium (DUO-NEB) 2.5 mg-0.5 mg/3 ml nebu 3 mL by Nebulization route every six (6) hours as needed (wheeze).  30 Nebule 3    triamcinolone acetonide (KENALOG) 0.1 % ointment Apply  to affected area two (2) times a day. use thin layer 85 g 11    ketoconazole (NIZORAL) 2 % shampoo shampoo twice weekly 1 Bottle 11    albuterol (PROVENTIL HFA, VENTOLIN HFA, PROAIR HFA) 90 mcg/actuation inhaler Take 1 Puff by inhalation every six (6) hours as needed for Wheezing. 1 Inhaler 5    buPROPion XL (WELLBUTRIN XL) 150 mg tablet       lamoTRIgine (LaMICtal) 100 mg tablet Take 1.5 Tabs by mouth two (2) times a day. Indications: bipolar depression (Patient taking differently: Take 100 mg by mouth daily. Indications: bipolar depression) 90 Tab 0    valACYclovir (VALTREX) 500 mg tablet Take 1 tablet two times a day for 3 days then take 1 tablet daily. 90 Tab 0    LORazepam (ATIVAN) 2 mg tablet Take 2 mg by mouth every eight (8) hours as needed.  OTHER Please provide patient with a left knee brace. DX: Z87.39 1 Each 0    Comp Stocking,Knee,Regular,Med misc 1 Each by Does Not Apply route daily. 1 Each 1    acetaminophen (TYLENOL 8 HOUR PO) Take  by mouth.  clonazePAM (KLONOPIN) 1 mg tablet Take 1 mg by mouth daily. At bedtime      cholecalciferol, vitamin D3, (VITAMIN D3) 2,000 unit tab Take  by mouth.  traZODone (DESYREL) 50 mg tablet Take 1 Tab by mouth nightly. (Patient taking differently: Take 100 mg by mouth nightly.) 30 Tab 6    [DISCONTINUED] montelukast (SINGULAIR) 10 mg tablet Take 1 Tab by mouth daily for 30 days. 30 Tab 0    [DISCONTINUED] gabapentin (NEURONTIN) 300 mg capsule Take 1 capsule in the morning and 2 in the evening (Patient taking differently: 400 mg three (3) times daily. Take 1 capsule in the morning and 2 in the evening) 90 Cap 2    [DISCONTINUED] FLUoxetine (PROzac) 40 mg capsule Take 1 Cap by mouth daily. (Patient taking differently: Take 20 mg by mouth daily.) 30 Cap 1     No current facility-administered medications on file prior to visit.         Patient Active Problem List   Diagnosis Code    Depression F32.9    GERD (gastroesophageal reflux disease) K21.9    Anemia, unspecified D64.9    Itch of skin L29.9    Anxiety F41.9    Costochondritis M94.0    HSV (herpes simplex virus) anogenital infection A60.9    OCD (obsessive compulsive disorder) F42.9    Hoarseness R49.0    IFG (impaired fasting glucose) R73.01    Hyperlipidemia E78.5    Thrombosed external hemorrhoid K64.5    Vitamin D deficiency E55.9    Inflammatory arthritis M19.90    Recurrent depression (HCC) F33.9    Mood disorder (HCC) F39    Chronic pain syndrome G89.4    Fibromyalgia M79.7    Panic attack F41.0    Tremor R25.1    Class 2 obesity due to excess calories without serious comorbidity in adult E66.09    Gastroparesis K31.84    Plantar fasciitis of left foot M72.2    Positive KITTY (antinuclear antibody) R76.8    Severe obesity (HCC) E66.01    Neuropathy G62.9       Social History     Socioeconomic History    Marital status: SINGLE     Spouse name: Not on file    Number of children: Not on file    Years of education: Not on file    Highest education level: Not on file   Occupational History    Not on file   Social Needs    Financial resource strain: Not on file    Food insecurity     Worry: Not on file     Inability: Not on file    Transportation needs     Medical: Not on file     Non-medical: Not on file   Tobacco Use    Smoking status: Former Smoker     Packs/day: 0.25     Years: 8.00     Pack years: 2.00     Types: Cigarettes     Quit date: 9/3/2018     Years since quittin.6    Smokeless tobacco: Never Used   Substance and Sexual Activity    Alcohol use:  Yes     Alcohol/week: 1.0 standard drinks     Types: 1 Glasses of wine per week     Comment: 1 cup of wine/week    Drug use: No    Sexual activity: Yes     Partners: Male     Birth control/protection: None   Lifestyle    Physical activity     Days per week: Not on file     Minutes per session: Not on file    Stress: Not on file   Relationships    Social connections Talks on phone: Not on file     Gets together: Not on file     Attends Holiness service: Not on file     Active member of club or organization: Not on file     Attends meetings of clubs or organizations: Not on file     Relationship status: Not on file    Intimate partner violence     Fear of current or ex partner: Not on file     Emotionally abused: Not on file     Physically abused: Not on file     Forced sexual activity: Not on file   Other Topics Concern    Not on file   Social History Narrative    Not on file       Review of Systems   Review of Systems   Constitutional: Negative for chills and fever. Cardiovascular: Negative for chest pain and palpitations. Skin: Positive for itching. Endo/Heme/Allergies: Positive for environmental allergies. Objective:     Visit Vitals  /82 (BP 1 Location: Right arm, BP Patient Position: Sitting, BP Cuff Size: Adult)   Pulse 94   Temp 98.3 °F (36.8 °C) (Oral)   Resp 18   Ht 5' 4\" (1.626 m)   Wt 241 lb 3.2 oz (109.4 kg)   SpO2 98%   BMI 41.40 kg/m²        Physical Exam  Vitals signs and nursing note reviewed. Constitutional:       Appearance: Normal appearance. HENT:      Head: Normocephalic and atraumatic. Neck:      Musculoskeletal: Normal range of motion and neck supple. Cardiovascular:      Rate and Rhythm: Normal rate and regular rhythm. Pulses: Normal pulses. Heart sounds: Normal heart sounds. No murmur. No friction rub. No gallop. Pulmonary:      Effort: Pulmonary effort is normal.      Breath sounds: Normal breath sounds. Abdominal:      General: Abdomen is flat. Bowel sounds are normal.      Palpations: Abdomen is soft. Musculoskeletal: Normal range of motion. Skin:     General: Skin is warm and dry. Neurological:      Mental Status: She is alert. Pertinent Labs/Studies:      Assessment and orders:       ICD-10-CM ICD-9-CM    1. Essential hypertension  I10 401.9    2.  Gastroesophageal reflux disease without esophagitis  K21.9 530.81    3. Gastroparesis  K31.84 536.3 HEMOGLOBIN A1C WITH EAG   4. Neuropathy  K95.9 983.0 METABOLIC PANEL, COMPREHENSIVE      CBC W/O DIFF   5. Fatigue, unspecified type  R53.83 780.79 TSH 3RD GENERATION   6. Recurrent depression (HCC)  F33.9 296.30    7. Severe obesity ([address]  E66.01 278.01    8. Abnormal finding of blood chemistry, unspecified   R79.9 790.6 HEMOGLOBIN A1C WITH EAG   9. Environmental allergies  Z91.09 V15.09 montelukast (SINGULAIR) 10 mg tablet      hydrOXYzine pamoate (VISTARIL) 25 mg capsule      DISCONTINUED: montelukast (SINGULAIR) 10 mg tablet     Diagnoses and all orders for this visit:    1. Essential hypertension: No longer an issue    2. Gastroesophageal reflux disease without esophagitis/Gastroparesis: Labs, see if Prediabetic, may be related to weight gain.  -     HEMOGLOBIN A1C WITH EAG; Future    4. Neuropathy  -     METABOLIC PANEL, COMPREHENSIVE; Future  -     CBC W/O DIFF; Future    5. Fatigue, unspecified type  -     TSH 3RD GENERATION; Future    6. Recurrent depression (HCC):Overall stable, but medications no very effective    7. Severe obesity ([facility] [address] [facility]    8. Abnormal finding of blood chemistry, unspecified   -     HEMOGLOBIN A1C WITH EAG; Future    9. Environmental allergies: Trial of Hydroxyzine. -     montelukast (SINGULAIR) 10 mg tablet; Take 1 Tab by mouth daily. -     hydrOXYzine pamoate (VISTARIL) 25 mg capsule; Take 1 Cap by mouth three (3) times daily as needed for Itching. Follow-up and Dispositions    · Return in about 4 weeks (around 6/9/2021) for MWE. I have discussed the diagnosis with the patient and the intended plan as seen in the above orders. Social history, medical history, and labs were reviewed. The patient has received an after-visit summary and questions were answered concerning future plans. I have discussed medication side effects and warnings with the patient as well.     Wandy Cowan MD  [facility]  05/12/21

## 2021-05-12 NOTE — PROGRESS NOTES
Chief Complaint   Patient presents with    Follow Up Chronic Condition     Visit Vitals  /82 (BP 1 Location: Right arm, BP Patient Position: Sitting, BP Cuff Size: Adult)   Pulse 94   Temp 98.3 °F (36.8 °C) (Oral)   Resp 18   Ht 5' 4\" (1.626 m)   Wt 241 lb 3.2 oz (109.4 kg)   SpO2 98%   BMI 41.40 kg/m²     1. Have you been to the ER, urgent care clinic since your last visit? Hospitalized since your last visit? No    2. Have you seen or consulted any other health care providers outside of the [address] [place] [address] since your last visit? Include any pap smears or colon screening.  No    Reviewed record in preparation for visit and have necessary documentation  Pt did not bring medication to office visit for review  opportunity was given for questions  Goals that were addressed and/or need to be completed during or after this appointment include   Health Maintenance Due   Topic Date Due    COVID-19 Vaccine (1) Never done    Medicare Yearly Exam  Never done

## 2021-05-17 ENCOUNTER — PATIENT MESSAGE (OUTPATIENT)
Dept: FAMILY MEDICINE CLINIC | Age: 41
End: 2021-05-17

## 2021-05-17 DIAGNOSIS — K21.9 GASTROESOPHAGEAL REFLUX DISEASE WITHOUT ESOPHAGITIS: Chronic | ICD-10-CM

## 2021-05-17 RX ORDER — METFORMIN HYDROCHLORIDE 500 MG/1
500 TABLET, EXTENDED RELEASE ORAL
Qty: 30 TAB | Refills: 1 | Status: SHIPPED | OUTPATIENT
Start: 2021-05-17 | End: 2021-07-20 | Stop reason: SDUPTHER

## 2021-05-17 RX ORDER — PANTOPRAZOLE SODIUM 20 MG/1
TABLET, DELAYED RELEASE ORAL
Qty: 180 TAB | Refills: 1 | Status: SHIPPED | OUTPATIENT
Start: 2021-05-17 | End: 2021-05-18

## 2021-05-18 DIAGNOSIS — K21.9 GASTROESOPHAGEAL REFLUX DISEASE WITHOUT ESOPHAGITIS: Chronic | ICD-10-CM

## 2021-05-18 RX ORDER — PANTOPRAZOLE SODIUM 20 MG/1
TABLET, DELAYED RELEASE ORAL
Qty: 180 TAB | Refills: 1 | Status: SHIPPED | OUTPATIENT
Start: 2021-05-18 | End: 2021-08-05

## 2021-05-18 NOTE — PROGRESS NOTES
Starting Metformin 500 mg Daily for prediabetes.     MD URBAN James & MARSHAL CARMEN [facility]  05/17/21

## 2021-05-19 NOTE — TELEPHONE ENCOUNTER
----- Message from Ryder Shin sent at 5/18/2021  9:57 AM EDT -----  Regarding: FW: Test Results Question  Contact: [phone]    ----- Message -----  From: Yu Stratton  Sent: 5/18/2021   9:56 AM EDT  To: [facility] Nurse [facility]  Subject: RE: Test Results Question                        Good morning , if you get a chance can you call me I have questions I would like to ask over the phone [phone]    Thank you    ----- Message -----  From: Neto Thapa MD  Sent: 5/17/21 8:08 PM  To: Yu Chayito  Subject: RE: Test Results Question    Dear Mrs. Cornell Cue off, you will not need to check your blood sugar! You are not Diabetic, but Metformin can help prevent you from becoming Diabetic, so we can use it in Prediabetes. I will call in some Metformin for you to start and that should hep. I think I had a referral to Podiatry in December for your with Dr. Srikanth Serrano, did anyone contact you to schedule an appointment with them? They are at the [facility]. I am happy to hear the Hydroxyzine is helping, though I hope the Metformin will help the feet as a side benefit. Dr. Machado Search      ----- Message -----       From:Jacque Delarosa       Sent:5/17/2021  2:02 PM EDT         Mehul Pollock MD    Subject:Test Results Question    Good evening , I was not happy to hear about the pre diabetes. My dad takes metformin  am I going to have to get a machine is prick my finger or how does that work. I dont have diabetes right I just want to understand. The liver thing if you think it's not a concern than I will leave that alone. The new meds are helping with itching that you prescribed. The itching at the feet is worse . The fatigue we talked about me being so tired. I just want you to tell me what to do. If there something I must be on I will have to be on it . The bloating is so bad  . I still need a referral to the foot doctor and if you need to call anything on I will do [facility].  I just hope I don't have to check the sugar    Thank you

## 2021-05-19 NOTE — TELEPHONE ENCOUNTER
Called patient and left message. Calling to discuss questions she had. What specific questions or concerns does she have at this time? Is it a concern about medications?     MD URBAN Orozco & MARSHAL CARMEN [facility]  05/19/21

## 2021-05-20 ENCOUNTER — TELEPHONE (OUTPATIENT)
Dept: FAMILY MEDICINE CLINIC | Age: 41
End: 2021-05-20

## 2021-05-20 NOTE — TELEPHONE ENCOUNTER
Called and spoke to patient. Discussed Metformin and answered her questions.     MD URBAN Guillory & MARSHAL CARMEN [facility]  05/20/21

## 2021-06-11 ENCOUNTER — OFFICE VISIT (OUTPATIENT)
Dept: FAMILY MEDICINE CLINIC | Age: 41
End: 2021-06-11
Payer: MEDICARE

## 2021-06-11 VITALS
OXYGEN SATURATION: 99 % | SYSTOLIC BLOOD PRESSURE: 129 MMHG | WEIGHT: 244.6 LBS | TEMPERATURE: 99.1 F | RESPIRATION RATE: 18 BRPM | DIASTOLIC BLOOD PRESSURE: 87 MMHG | HEART RATE: 76 BPM | HEIGHT: 64 IN | BODY MASS INDEX: 41.76 KG/M2

## 2021-06-11 DIAGNOSIS — E53.8 FOLATE DEFICIENCY: ICD-10-CM

## 2021-06-11 DIAGNOSIS — E61.1 IRON DEFICIENCY: ICD-10-CM

## 2021-06-11 DIAGNOSIS — G89.4 CHRONIC PAIN SYNDROME: Primary | ICD-10-CM

## 2021-06-11 DIAGNOSIS — R82.998 DARK URINE: ICD-10-CM

## 2021-06-11 DIAGNOSIS — M79.89 LEG SWELLING: ICD-10-CM

## 2021-06-11 DIAGNOSIS — R20.8 BURNING SENSATION: ICD-10-CM

## 2021-06-11 LAB
BILIRUB UR QL STRIP: NEGATIVE
GLUCOSE UR-MCNC: NEGATIVE MG/DL
KETONES P FAST UR STRIP-MCNC: NEGATIVE MG/DL
PH UR STRIP: 5.5 [PH] (ref 4.6–8)
PROT UR QL STRIP: NEGATIVE
SP GR UR STRIP: 1.02 (ref 1–1.03)
UA UROBILINOGEN AMB POC: NORMAL (ref 0.2–1)
URINALYSIS CLARITY POC: CLEAR
URINALYSIS COLOR POC: YELLOW
URINE BLOOD POC: NORMAL
URINE LEUKOCYTES POC: NEGATIVE
URINE NITRITES POC: NEGATIVE

## 2021-06-11 PROCEDURE — 81003 URINALYSIS AUTO W/O SCOPE: CPT | Performed by: FAMILY MEDICINE

## 2021-06-11 PROCEDURE — G8752 SYS BP LESS 140: HCPCS | Performed by: FAMILY MEDICINE

## 2021-06-11 PROCEDURE — G8427 DOCREV CUR MEDS BY ELIG CLIN: HCPCS | Performed by: FAMILY MEDICINE

## 2021-06-11 PROCEDURE — G9717 DOC PT DX DEP/BP F/U NT REQ: HCPCS | Performed by: FAMILY MEDICINE

## 2021-06-11 PROCEDURE — 99214 OFFICE O/P EST MOD 30 MIN: CPT | Performed by: FAMILY MEDICINE

## 2021-06-11 PROCEDURE — G8417 CALC BMI ABV UP PARAM F/U: HCPCS | Performed by: FAMILY MEDICINE

## 2021-06-11 PROCEDURE — G8754 DIAS BP LESS 90: HCPCS | Performed by: FAMILY MEDICINE

## 2021-06-11 RX ORDER — FUROSEMIDE 20 MG/1
10 TABLET ORAL DAILY
Qty: 30 TABLET | Refills: 1 | Status: SHIPPED | OUTPATIENT
Start: 2021-06-11 | End: 2021-09-28 | Stop reason: SDUPTHER

## 2021-06-11 RX ORDER — PREDNISONE 10 MG/1
TABLET ORAL
Qty: 21 TABLET | Refills: 0 | Status: SHIPPED | OUTPATIENT
Start: 2021-06-11 | End: 2021-07-16 | Stop reason: ALTCHOICE

## 2021-06-11 NOTE — PROGRESS NOTES
[facility]    History of Present Illness:   Sugar Malcolm is a 47 y.o. female with history of HTN, GERD, IFG, Depression, Neuropathy, Gastroparesis, OCD, Anxiety/Depression, Obesity  CC: Follow up, multiple complaints  History provided by patient and Records    HPI:  Patient presents with multiple complaints including primarily leg pains, noting a burning sensation in the legs primarily over the last 2-3 weeks. Noting burring and itching on the feet and lower legs. Also noting some swelling in the ankles in the last 2-3 weeks as well. Also noting headaches for the last 2-3 weeks as well, start in the morning and improves as the days go. Is taking Gabapentin 400 mg TID from Rheumatology. Also noting hand and feet numbness at time, but that is chronic. Allergies: Noting allergies acting up with nightly Post nasal drip nad sounding nasally in the morning and sore throat. Snoring/Fatigue: History of snoring, fatigued in the morning. Health Maintenance  Health Maintenance Due   Topic Date Due    Medicare Yearly Exam  Never done       Past Medical, Family, and Social History:     Current Outpatient Medications on File Prior to Visit   Medication Sig Dispense Refill    pantoprazole (PROTONIX) 20 mg tablet TAKE 1 TABLET TWICE DAILY 180 Tab 1    metFORMIN ER (GLUCOPHAGE XR) 500 mg tablet Take 1 Tab by mouth daily (with dinner). 30 Tab 1    montelukast (SINGULAIR) 10 mg tablet Take 1 Tab by mouth daily. 30 Tab 0    hydrOXYzine pamoate (VISTARIL) 25 mg capsule Take 1 Cap by mouth three (3) times daily as needed for Itching. 60 Cap 1    escitalopram oxalate (LEXAPRO) 20 mg tablet Take 20 mg by mouth daily.  gabapentin (NEURONTIN) 400 mg capsule Take 400 mg by mouth three (3) times daily.       ferrous sulfate 325 mg (65 mg iron) tablet TAKE ONE TABLET BY MOUTH EVERY DAY BEFORE BREAKFAST 90 Tab 1    norgestimate-ethinyl estradioL (ORTHO-CYCLEN, SPRINTEC) 0.25-35 mg-mcg tab Take 1 Tab by mouth daily. Generic 3 Dose Pack 3    folic acid (FOLVITE) 1 mg tablet TAKE ONE TABLET BY MOUTH EVERY DAY 90 Tab 1    albuterol-ipratropium (DUO-NEB) 2.5 mg-0.5 mg/3 ml nebu 3 mL by Nebulization route every six (6) hours as needed (wheeze). 30 Nebule 3    triamcinolone acetonide (KENALOG) 0.1 % ointment Apply  to affected area two (2) times a day. use thin layer 85 g 11    ketoconazole (NIZORAL) 2 % shampoo shampoo twice weekly 1 Bottle 11    albuterol (PROVENTIL HFA, VENTOLIN HFA, PROAIR HFA) 90 mcg/actuation inhaler Take 1 Puff by inhalation every six (6) hours as needed for Wheezing. 1 Inhaler 5    buPROPion XL (WELLBUTRIN XL) 150 mg tablet       lamoTRIgine (LaMICtal) 100 mg tablet Take 1.5 Tabs by mouth two (2) times a day. Indications: bipolar depression (Patient taking differently: Take 100 mg by mouth daily. Indications: bipolar depression) 90 Tab 0    valACYclovir (VALTREX) 500 mg tablet Take 1 tablet two times a day for 3 days then take 1 tablet daily. 90 Tab 0    LORazepam (ATIVAN) 2 mg tablet Take 2 mg by mouth every eight (8) hours as needed.  OTHER Please provide patient with a left knee brace. DX: Z87.39 1 Each 0    Comp Stocking,Knee,Regular,Med misc 1 Each by Does Not Apply route daily. 1 Each 1    acetaminophen (TYLENOL 8 HOUR PO) Take  by mouth.  clonazePAM (KLONOPIN) 1 mg tablet Take 1 mg by mouth daily. At bedtime      cholecalciferol, vitamin D3, (VITAMIN D3) 2,000 unit tab Take  by mouth.  traZODone (DESYREL) 50 mg tablet Take 1 Tab by mouth nightly. (Patient taking differently: Take 100 mg by mouth nightly.) 30 Tab 6     No current facility-administered medications on file prior to visit.        Patient Active Problem List   Diagnosis Code    Depression F32.9    GERD (gastroesophageal reflux disease) K21.9    Anemia, unspecified D64.9    Itch of skin L29.9    Anxiety F41.9    Costochondritis M94.0    HSV (herpes simplex virus) anogenital infection A60.9    OCD (obsessive compulsive disorder) F42.9    Hoarseness R49.0    IFG (impaired fasting glucose) R73.01    Hyperlipidemia E78.5    Thrombosed external hemorrhoid K64.5    Vitamin D deficiency E55.9    Inflammatory arthritis M19.90    Recurrent depression (HCC) F33.9    Mood disorder (HCC) F39    Chronic pain syndrome G89.4    Fibromyalgia M79.7    Panic attack F41.0    Tremor R25.1    Class 2 obesity due to excess calories without serious comorbidity in adult E66.09    Gastroparesis K31.84    Plantar fasciitis of left foot M72.2    Positive KITTY (antinuclear antibody) R76.8    Severe obesity (HCC) E66.01    Neuropathy G62.9       Social History     Socioeconomic History    Marital status: SINGLE     Spouse name: Not on file    Number of children: Not on file    Years of education: Not on file    Highest education level: Not on file   Occupational History    Not on file   Tobacco Use    Smoking status: Former Smoker     Packs/day: 0.25     Years: 8.00     Pack years: 2.00     Types: Cigarettes     Quit date: 9/3/2018     Years since quittin.7    Smokeless tobacco: Never Used   Substance and Sexual Activity    Alcohol use: Yes     Alcohol/week: 1.0 standard drinks     Types: 1 Glasses of wine per week     Comment: 1 cup of wine/week    Drug use: No    Sexual activity: Yes     Partners: Male     Birth control/protection: None   Other Topics Concern    Not on file   Social History Narrative    Not on file     Social Determinants of Health     Financial Resource Strain:     Difficulty of Paying Living Expenses:    Food Insecurity:     Worried About Running Out of Food in the Last Year:     Ran Out of Food in the Last Year:    Transportation Needs:     Lack of Transportation (Medical):      Lack of Transportation (Non-Medical):    Physical Activity:     Days of Exercise per Week:     Minutes of Exercise per Session:    Stress:     Feeling of Stress :    Social Connections:  Frequency of Communication with Friends and Family:     Frequency of Social Gatherings with Friends and Family:     Attends Baptism Services:     Active Member of Clubs or Organizations:     Attends Club or Organization Meetings:     Marital Status:    Intimate Partner Violence:     Fear of Current or Ex-Partner:     Emotionally Abused:     Physically Abused:     Sexually Abused:        Review of Systems   Review of Systems   Constitutional: Negative for chills and fever. Cardiovascular: Positive for leg swelling. Gastrointestinal: Negative for abdominal pain, nausea and vomiting. Skin: Negative for rash. Neurological: Negative for dizziness and headaches. Psychiatric/Behavioral: Negative for depression. Objective:     Visit Vitals  /87 (BP 1 Location: Right arm, BP Patient Position: Sitting, BP Cuff Size: Adult)   Pulse 76   Temp 99.1 °F (37.3 °C) (Temporal)   Resp 18   Ht 5' 4\" (1.626 m)   Wt 244 lb 9.6 oz (110.9 kg)   SpO2 99%   BMI 41.99 kg/m²        Physical Exam  Vitals and nursing note reviewed. Constitutional:       Appearance: Normal appearance. HENT:      Head: Normocephalic and atraumatic. Cardiovascular:      Rate and Rhythm: Normal rate and regular rhythm. Pulses: Normal pulses. Heart sounds: Normal heart sounds. Pulmonary:      Effort: Pulmonary effort is normal.      Breath sounds: Normal breath sounds. Abdominal:      General: Abdomen is flat. Bowel sounds are normal.      Palpations: Abdomen is soft. Musculoskeletal:      Cervical back: Normal range of motion and neck supple. Comments: Mild bilateral ankle edema. Neurological:      Mental Status: She is alert. Pertinent Labs/Studies:      Assessment and orders:       ICD-10-CM ICD-9-CM    1. Chronic pain syndrome  G89.4 338.4 VITAMIN B12 & FOLATE   2. Burning sensation  R20.8 782.0 predniSONE (STERAPRED DS) 10 mg dose pack   3.  Iron deficiency  E61.1 280.9 IRON PROFILE FERRITIN   4. Folate deficiency  E53.8 266.2 VITAMIN B12 & FOLATE   5. Leg swelling  M79.89 729.81 predniSONE (STERAPRED DS) 10 mg dose pack      furosemide (LASIX) 20 mg tablet   6. Dark urine  R82.998 791.9 AMB POC URINALYSIS DIP STICK AUTO W/O MICRO     Diagnoses and all orders for this visit:    1. Chronic pain syndrome: Evaluate for vitamin deficiency, but multiple somatic complaints noted. Failure of Gabapentin. -     VITAMIN B12 & FOLATE; Future    2. Burning sensation  -     predniSONE (STERAPRED DS) 10 mg dose pack; See administration instruction per 10mg dose pack    3. Iron deficiency  -     IRON PROFILE; Future  -     FERRITIN; Future    4. Folate deficiency  -     VITAMIN B12 & FOLATE; Future    5. Leg swelling: May be related to fluid retention, trial of Lasix, but also prednsione, may help with somatic pain as well. -     predniSONE (STERAPRED DS) 10 mg dose pack; See administration instruction per 10mg dose pack  -     furosemide (LASIX) 20 mg tablet; Take 0.5 Tablets by mouth daily. 6. Dark urine: Blood noted, getting culture, if persistent will need to investigate. Patient does smoke. -     AMB POC URINALYSIS DIP STICK AUTO W/O MICRO      Follow-up and Dispositions    · Return in about 4 weeks (around 7/9/2021). I have discussed the diagnosis with the patient and the intended plan as seen in the above orders. Social history, medical history, and labs were reviewed. The patient has received an after-visit summary and questions were answered concerning future plans. I have discussed medication side effects and warnings with the patient as well.     MD URBAN Gurrola & MARSHAL CARMEN [facility]  06/11/21

## 2021-06-11 NOTE — PATIENT INSTRUCTIONS
- Do you snore at night loudly  - Do you ever seem to stop breathing while sleeping  - Do you seem to gasp for air while sleeping

## 2021-06-11 NOTE — PROGRESS NOTES
Chief Complaint   Patient presents with    Follow Up Chronic Condition     Visit Vitals  /87 (BP 1 Location: Right arm, BP Patient Position: Sitting, BP Cuff Size: Adult)   Pulse 76   Temp 99.1 °F (37.3 °C) (Temporal)   Resp 18   Ht 5' 4\" (1.626 m)   Wt 244 lb 9.6 oz (110.9 kg)   SpO2 99%   BMI 41.99 kg/m²     1. Have you been to the ER, urgent care clinic since your last visit? Hospitalized since your last visit? No    2. Have you seen or consulted any other health care providers outside of the [address] [place] [address] since your last visit? Include any pap smears or colon screening.  No    Reviewed record in preparation for visit and have necessary documentation  Pt did not bring medication to office visit for review  opportunity was given for questions  Goals that were addressed and/or need to be completed during or after this appointment include   Health Maintenance Due   Topic Date Due    COVID-19 Vaccine (1) Never done    Medicare Yearly Exam  Never done

## 2021-06-12 LAB
BACTERIA SPEC CULT: NORMAL
FERRITIN SERPL-MCNC: 59 NG/ML (ref 8–252)
FOLATE SERPL-MCNC: 14.2 NG/ML (ref 5–21)
IRON SATN MFR SERPL: 16 % (ref 20–50)
IRON SERPL-MCNC: 56 UG/DL (ref 35–150)
SERVICE CMNT-IMP: NORMAL
TIBC SERPL-MCNC: 356 UG/DL (ref 250–450)
VIT B12 SERPL-MCNC: 486 PG/ML (ref 193–986)

## 2021-06-15 ENCOUNTER — PATIENT MESSAGE (OUTPATIENT)
Dept: FAMILY MEDICINE CLINIC | Age: 41
End: 2021-06-15

## 2021-07-13 ENCOUNTER — TELEPHONE (OUTPATIENT)
Dept: FAMILY MEDICINE CLINIC | Age: 41
End: 2021-07-13

## 2021-07-13 DIAGNOSIS — R05.9 COUGH: Primary | ICD-10-CM

## 2021-07-13 RX ORDER — BENZONATATE 200 MG/1
200 CAPSULE ORAL
Qty: 30 CAPSULE | Refills: 0 | Status: SHIPPED | OUTPATIENT
Start: 2021-07-13 | End: 2021-09-14

## 2021-07-13 NOTE — TELEPHONE ENCOUNTER
Pt is still coughing and having sinus issues. No appts until Friday. Pt would like to know what she can take OTC.  Pt states she may have strep throat

## 2021-07-13 NOTE — TELEPHONE ENCOUNTER
Trial of Tessalon.   Patient still needs to come in for exam.    Devin Saldaña, MD [facility]  07/13/21

## 2021-07-13 NOTE — TELEPHONE ENCOUNTER
Pt states that she has a sinus infection and possible bronchitis. Dr Wendi Garland has appt at 10:30 on Friday would she be willing to see pt?

## 2021-07-16 ENCOUNTER — OFFICE VISIT (OUTPATIENT)
Dept: FAMILY MEDICINE CLINIC | Age: 41
End: 2021-07-16
Payer: MEDICARE

## 2021-07-16 VITALS
RESPIRATION RATE: 20 BRPM | HEIGHT: 64 IN | DIASTOLIC BLOOD PRESSURE: 83 MMHG | HEART RATE: 84 BPM | TEMPERATURE: 98.2 F | SYSTOLIC BLOOD PRESSURE: 124 MMHG | BODY MASS INDEX: 41.15 KG/M2 | OXYGEN SATURATION: 98 % | WEIGHT: 241 LBS

## 2021-07-16 DIAGNOSIS — J06.9 UPPER RESPIRATORY TRACT INFECTION, UNSPECIFIED TYPE: Primary | ICD-10-CM

## 2021-07-16 PROCEDURE — G8752 SYS BP LESS 140: HCPCS | Performed by: STUDENT IN AN ORGANIZED HEALTH CARE EDUCATION/TRAINING PROGRAM

## 2021-07-16 PROCEDURE — G8428 CUR MEDS NOT DOCUMENT: HCPCS | Performed by: STUDENT IN AN ORGANIZED HEALTH CARE EDUCATION/TRAINING PROGRAM

## 2021-07-16 PROCEDURE — 99213 OFFICE O/P EST LOW 20 MIN: CPT | Performed by: STUDENT IN AN ORGANIZED HEALTH CARE EDUCATION/TRAINING PROGRAM

## 2021-07-16 PROCEDURE — G8417 CALC BMI ABV UP PARAM F/U: HCPCS | Performed by: STUDENT IN AN ORGANIZED HEALTH CARE EDUCATION/TRAINING PROGRAM

## 2021-07-16 PROCEDURE — G9717 DOC PT DX DEP/BP F/U NT REQ: HCPCS | Performed by: STUDENT IN AN ORGANIZED HEALTH CARE EDUCATION/TRAINING PROGRAM

## 2021-07-16 PROCEDURE — G8754 DIAS BP LESS 90: HCPCS | Performed by: STUDENT IN AN ORGANIZED HEALTH CARE EDUCATION/TRAINING PROGRAM

## 2021-07-16 RX ORDER — CETIRIZINE HCL 10 MG
10 TABLET ORAL DAILY
COMMUNITY
End: 2021-09-14

## 2021-07-16 NOTE — PROGRESS NOTES
1. Have you been to the ER, urgent care clinic since your last visit? Hospitalized since your last visit? No    2. Have you seen or consulted any other health care providers outside of the [facility] [place] [facility] since your last visit? Include any pap smears or colon screening. No    Reviewed record in preparation for visit and have necessary documentation  Goals that were addressed and/or need to be completed during or after this appointment include     Health Maintenance Due   Topic Date Due    Medicare Yearly Exam  Never done       Patient is accompanied by self I have received verbal consent from Brandan Parnell to discuss any/all medical information while they are present in the room.

## 2021-07-16 NOTE — PROGRESS NOTES
Jed Conway (: 1980) is a 47 y.o. female, established patient, here for evaluation of the following chief complaint(s):  Cough (non productive)       ASSESSMENT/PLAN:  1. Upper respiratory tract infection, unspecified type  Comments:  Continue Tessalon. Restart your albuterol. Start chloraseptic spray prn before meals. Encourage PO fluids. No follow-ups on file. SUBJECTIVE/OBJECTIVE:  HPI  Coughing- Started about 1.5 wks ago, worse since Monday. Comes in coughing attacks and gets SoB during this. Worse when lying down. Feels phlegm but rarely able to get it out. On the chance something comes up it has been clear. Associated symptoms: rhinorrhea, post-tussive emesis x1, back pain with cough. Denies: fevers  Meds attempted: Robitussin (stopped when started Tessalon), Mucinex, honey cough drops. Visit Vitals  /83 (BP 1 Location: Left upper arm, BP Patient Position: Sitting, BP Cuff Size: Adult)   Pulse 84   Temp 98.2 °F (36.8 °C) (Oral)   Resp 20   Ht 5' 4\" (1.626 m)   Wt 241 lb (109.3 kg)   SpO2 98%   BMI 41.37 kg/m²     Physical Exam  General appearance - Alert, NAD. Occasional cough during visit. Head: Atraumatic. Normocephalic. No lymphadenopathy  Eyes: EOMI. Sclera white. Ears: Hearing grossly normal.    Nose: Nares patent, no polyps  Throat: pharynx clear, no exudates. Respiratory - LCTAB. No wheeze/rale/rhonchi  Heart - Normal rate, regular rhythm. No m/r/r  Abdomen - Soft, non tender. Non distended. Neurological - No focal deficits. Speech normal.   Musculoskeletal - Normal ROM, Gait normal.    Extremities - No LE edema. Distal pulses intact  Skin - normal coloration and normal turgor. No cyanosis, no rash. Current Outpatient Medications   Medication Sig    cetirizine (ZYRTEC) 10 mg tablet Take 10 mg by mouth daily.  benzonatate (TESSALON) 200 mg capsule Take 1 Capsule by mouth three (3) times daily as needed for Cough.     furosemide (LASIX) 20 mg tablet Take 0.5 Tablets by mouth daily.  pantoprazole (PROTONIX) 20 mg tablet TAKE 1 TABLET TWICE DAILY    metFORMIN ER (GLUCOPHAGE XR) 500 mg tablet Take 1 Tab by mouth daily (with dinner).  montelukast (SINGULAIR) 10 mg tablet Take 1 Tab by mouth daily.  hydrOXYzine pamoate (VISTARIL) 25 mg capsule Take 1 Cap by mouth three (3) times daily as needed for Itching.  escitalopram oxalate (LEXAPRO) 20 mg tablet Take 30 mg by mouth daily.  gabapentin (NEURONTIN) 400 mg capsule Take 600 mg by mouth three (3) times daily.  ferrous sulfate 325 mg (65 mg iron) tablet TAKE ONE TABLET BY MOUTH EVERY DAY BEFORE BREAKFAST    norgestimate-ethinyl estradioL (ORTHO-CYCLEN, SPRINTEC) 0.25-35 mg-mcg tab Take 1 Tab by mouth daily. Generic    folic acid (FOLVITE) 1 mg tablet TAKE ONE TABLET BY MOUTH EVERY DAY    albuterol-ipratropium (DUO-NEB) 2.5 mg-0.5 mg/3 ml nebu 3 mL by Nebulization route every six (6) hours as needed (wheeze).  triamcinolone acetonide (KENALOG) 0.1 % ointment Apply  to affected area two (2) times a day. use thin layer    ketoconazole (NIZORAL) 2 % shampoo shampoo twice weekly    albuterol (PROVENTIL HFA, VENTOLIN HFA, PROAIR HFA) 90 mcg/actuation inhaler Take 1 Puff by inhalation every six (6) hours as needed for Wheezing.  valACYclovir (VALTREX) 500 mg tablet Take 1 tablet two times a day for 3 days then take 1 tablet daily. (Patient taking differently: Take 1 tablet two times a day for 3 days then take 1 tablet daily. As needed)    LORazepam (ATIVAN) 2 mg tablet Take 2 mg by mouth every eight (8) hours as needed.  acetaminophen (TYLENOL 8 HOUR PO) Take  by mouth.  clonazePAM (KLONOPIN) 1 mg tablet Take 1 mg by mouth daily. At bedtime    cholecalciferol, vitamin D3, (VITAMIN D3) 2,000 unit tab Take  by mouth.  traZODone (DESYREL) 50 mg tablet Take 1 Tab by mouth nightly.  (Patient taking differently: Take 100 mg by mouth nightly.)    buPROPion XL (WELLBUTRIN XL) 150 mg tablet  (Patient not taking: Reported on 7/16/2021)    lamoTRIgine (LaMICtal) 100 mg tablet Take 1.5 Tabs by mouth two (2) times a day. Indications: bipolar depression (Patient not taking: Reported on 7/16/2021)    OTHER Please provide patient with a left knee brace. DX: Z87.39    Comp Stocking,Knee,Regular,Med misc 1 Each by Does Not Apply route daily. No current facility-administered medications for this visit. An electronic signature was used to authenticate this note.   -- Fredo Bains MD

## 2021-07-20 ENCOUNTER — TELEPHONE (OUTPATIENT)
Dept: FAMILY MEDICINE CLINIC | Age: 41
End: 2021-07-20

## 2021-07-20 DIAGNOSIS — J40 BRONCHITIS: Primary | ICD-10-CM

## 2021-07-20 RX ORDER — PREDNISONE 20 MG/1
40 TABLET ORAL
Qty: 10 TABLET | Refills: 0 | Status: SHIPPED | OUTPATIENT
Start: 2021-07-20 | End: 2021-07-25

## 2021-07-20 RX ORDER — AZITHROMYCIN 250 MG/1
TABLET, FILM COATED ORAL
Qty: 6 TABLET | Refills: 0 | Status: SHIPPED | OUTPATIENT
Start: 2021-07-20 | End: 2021-07-25

## 2021-07-20 RX ORDER — METFORMIN HYDROCHLORIDE 500 MG/1
500 TABLET, EXTENDED RELEASE ORAL
Qty: 90 TABLET | Refills: 1 | Status: SHIPPED | OUTPATIENT
Start: 2021-07-20 | End: 2021-11-09

## 2021-07-20 NOTE — TELEPHONE ENCOUNTER
Pt states cough has gotten so bad you can hear it a little in the chest.  I have cough all night lastnight my ears are stopped up and the congestion is deep . When she coughs, right behind her ears hurts so really bad. Dr. Analisa Holman says if it did not get better to call him. It is worse.

## 2021-07-20 NOTE — TELEPHONE ENCOUNTER
Patient reports having a cough for 2 weeks and has been on Tessalon pearls and on allergy medications. Patient reports worsening symptoms at this time. Reporting Cough with congestion in the chest and nasal. Patient denies fevers, chills. Noting some clear phlegm. Patient is taking Albuterol Q4H and Tessalon 3 times day. Trial Prednisone and Azithromycin.     MD [facility]  07/20/21

## 2021-08-03 ENCOUNTER — OFFICE VISIT (OUTPATIENT)
Dept: FAMILY MEDICINE CLINIC | Age: 41
End: 2021-08-03
Payer: MEDICARE

## 2021-08-03 ENCOUNTER — TELEPHONE (OUTPATIENT)
Dept: FAMILY MEDICINE CLINIC | Age: 41
End: 2021-08-03

## 2021-08-03 VITALS
TEMPERATURE: 98.1 F | OXYGEN SATURATION: 94 % | WEIGHT: 247 LBS | HEIGHT: 64 IN | DIASTOLIC BLOOD PRESSURE: 84 MMHG | RESPIRATION RATE: 18 BRPM | HEART RATE: 81 BPM | SYSTOLIC BLOOD PRESSURE: 126 MMHG | BODY MASS INDEX: 42.17 KG/M2

## 2021-08-03 DIAGNOSIS — E66.01 SEVERE OBESITY (HCC): ICD-10-CM

## 2021-08-03 DIAGNOSIS — J40 BRONCHITIS: ICD-10-CM

## 2021-08-03 DIAGNOSIS — T63.461A WASP STING, ACCIDENTAL OR UNINTENTIONAL, INITIAL ENCOUNTER: Primary | ICD-10-CM

## 2021-08-03 DIAGNOSIS — R05.9 COUGH: ICD-10-CM

## 2021-08-03 DIAGNOSIS — I10 ESSENTIAL HYPERTENSION: ICD-10-CM

## 2021-08-03 PROCEDURE — G8754 DIAS BP LESS 90: HCPCS | Performed by: FAMILY MEDICINE

## 2021-08-03 PROCEDURE — G8417 CALC BMI ABV UP PARAM F/U: HCPCS | Performed by: FAMILY MEDICINE

## 2021-08-03 PROCEDURE — G8427 DOCREV CUR MEDS BY ELIG CLIN: HCPCS | Performed by: FAMILY MEDICINE

## 2021-08-03 PROCEDURE — G8752 SYS BP LESS 140: HCPCS | Performed by: FAMILY MEDICINE

## 2021-08-03 PROCEDURE — 99214 OFFICE O/P EST MOD 30 MIN: CPT | Performed by: FAMILY MEDICINE

## 2021-08-03 PROCEDURE — G9717 DOC PT DX DEP/BP F/U NT REQ: HCPCS | Performed by: FAMILY MEDICINE

## 2021-08-03 PROCEDURE — 96372 THER/PROPH/DIAG INJ SC/IM: CPT | Performed by: FAMILY MEDICINE

## 2021-08-03 RX ORDER — PREDNISONE 20 MG/1
60 TABLET ORAL DAILY
Qty: 15 TABLET | Refills: 0 | Status: SHIPPED | OUTPATIENT
Start: 2021-08-03 | End: 2021-08-11 | Stop reason: DRUGHIGH

## 2021-08-03 RX ORDER — TRIAMCINOLONE ACETONIDE 40 MG/ML
40 INJECTION, SUSPENSION INTRA-ARTICULAR; INTRAMUSCULAR ONCE
Qty: 1 ML | Refills: 0
Start: 2021-08-03 | End: 2021-08-03

## 2021-08-03 NOTE — PROGRESS NOTES
1. Have you been to the ER, urgent care clinic since your last visit? Hospitalized since your last visit? No    2. Have you seen or consulted any other health care providers outside of the [address] [place] [address] since your last visit? Include any pap smears or colon screening. No    Reviewed record in preparation for visit and have necessary documentation  Goals that were addressed and/or need to be completed during or after this appointment include     Health Maintenance Due   Topic Date Due    Medicare Yearly Exam  Never done       Patient is accompanied by self I have received verbal consent from Miguel Frey to discuss any/all medical information while they are present in the room.

## 2021-08-05 ENCOUNTER — OFFICE VISIT (OUTPATIENT)
Dept: FAMILY MEDICINE CLINIC | Age: 41
End: 2021-08-05
Payer: MEDICARE

## 2021-08-05 VITALS
RESPIRATION RATE: 18 BRPM | TEMPERATURE: 97.4 F | SYSTOLIC BLOOD PRESSURE: 124 MMHG | BODY MASS INDEX: 41.73 KG/M2 | DIASTOLIC BLOOD PRESSURE: 79 MMHG | HEART RATE: 98 BPM | OXYGEN SATURATION: 98 % | HEIGHT: 64 IN | WEIGHT: 244.4 LBS

## 2021-08-05 DIAGNOSIS — R05.9 COUGH: ICD-10-CM

## 2021-08-05 DIAGNOSIS — R51.9 NONINTRACTABLE HEADACHE, UNSPECIFIED CHRONICITY PATTERN, UNSPECIFIED HEADACHE TYPE: ICD-10-CM

## 2021-08-05 DIAGNOSIS — Z91.09 ENVIRONMENTAL ALLERGIES: ICD-10-CM

## 2021-08-05 DIAGNOSIS — K31.84 GASTROPARESIS: Chronic | ICD-10-CM

## 2021-08-05 DIAGNOSIS — K21.9 GASTROESOPHAGEAL REFLUX DISEASE WITHOUT ESOPHAGITIS: Primary | ICD-10-CM

## 2021-08-05 PROCEDURE — G8427 DOCREV CUR MEDS BY ELIG CLIN: HCPCS | Performed by: FAMILY MEDICINE

## 2021-08-05 PROCEDURE — G8417 CALC BMI ABV UP PARAM F/U: HCPCS | Performed by: FAMILY MEDICINE

## 2021-08-05 PROCEDURE — 99214 OFFICE O/P EST MOD 30 MIN: CPT | Performed by: FAMILY MEDICINE

## 2021-08-05 PROCEDURE — G8754 DIAS BP LESS 90: HCPCS | Performed by: FAMILY MEDICINE

## 2021-08-05 PROCEDURE — G9717 DOC PT DX DEP/BP F/U NT REQ: HCPCS | Performed by: FAMILY MEDICINE

## 2021-08-05 PROCEDURE — G8752 SYS BP LESS 140: HCPCS | Performed by: FAMILY MEDICINE

## 2021-08-05 RX ORDER — PANTOPRAZOLE SODIUM 40 MG/1
40 TABLET, DELAYED RELEASE ORAL 2 TIMES DAILY
Qty: 180 TABLET | Refills: 1 | Status: SHIPPED | OUTPATIENT
Start: 2021-08-05 | End: 2022-01-05

## 2021-08-05 RX ORDER — BUTALBITAL, ACETAMINOPHEN AND CAFFEINE 50; 325; 40 MG/1; MG/1; MG/1
1 TABLET ORAL
Qty: 30 TABLET | Refills: 1 | OUTPATIENT
Start: 2021-08-05 | End: 2021-09-09

## 2021-08-05 NOTE — PATIENT INSTRUCTIONS
- Restart Flonase for 1-2 weeks  - Increase Protonix to 40 mg BID  - Fioricet for headaches  - Raise head of bed

## 2021-08-05 NOTE — PROGRESS NOTES
Chief Complaint   Patient presents with    Follow Up Chronic Condition     Visit Vitals  /79 (BP 1 Location: Left arm, BP Patient Position: Sitting, BP Cuff Size: Adult)   Pulse 98   Temp 97.4 °F (36.3 °C) (Temporal)   Resp 18   Ht 5' 4\" (1.626 m)   Wt 244 lb 6.4 oz (110.9 kg)   SpO2 98%   BMI 41.95 kg/m²     1. Have you been to the ER, urgent care clinic since your last visit? Hospitalized since your last visit? No    2. Have you seen or consulted any other health care providers outside of the [address] [place] [address] since your last visit? Include any pap smears or colon screening.  No    Reviewed record in preparation for visit and have necessary documentation  Pt did not bring medication to office visit for review  opportunity was given for questions  Goals that were addressed and/or need to be completed during or after this appointment include   Health Maintenance Due   Topic Date Due    Medicare Yearly Exam  Never done

## 2021-08-05 NOTE — PROGRESS NOTES
[employer]    History of Present Illness:   Driss Perry is a 47 y.o. female with history of HTN, GERD, IFG, Depression,   CC: Cough/Sore throat  History provided by patient and Records    HPI:  Cough Evaluation:  Patient complaining of Acute cough. - Duration: 2 months  - Frequency: Continuous during the day. Cough is worst at night. - Quality: harsh  - Severity: moderate  - Associated Symptoms: heartburn, Headaches  - Known Triggers: pollens  - Alleviating factors: antihistamines  - Current Medications: antitussives or antihistamines    - Pulmonary History: None  - Is patient on ACE-I? No   - History of CHF? No   - History of GERD? Yes  - History of Post Nasal Drip? Yes  - Current Tobacco use: never  - History of toxin exposures? No   - Previous imaging studies: None    Gastroesophageal Reflux:  Current control of Symptoms: Uncontrolled  Primary symptoms: heartburn  Hiatal Hernia: No  Current Medications: Protonix  The patient has no history melena or bright red blood in the stools. The patient avoids high dose aspirin and NSAID therapy. The patient is aware of diet changes needed, elevating the head of the bed and appropriate use of antacids. Health Maintenance  Health Maintenance Due   Topic Date Due    Medicare Yearly Exam  Never done       Past Medical, Family, and Social History:     Current Outpatient Medications on File Prior to Visit   Medication Sig Dispense Refill    predniSONE (DELTASONE) 20 mg tablet Take 60 mg by mouth daily. 15 Tablet 0    metFORMIN ER (GLUCOPHAGE XR) 500 mg tablet Take 1 Tablet by mouth daily (with dinner). 90 Tablet 1    cetirizine (ZYRTEC) 10 mg tablet Take 10 mg by mouth daily.  benzonatate (TESSALON) 200 mg capsule Take 1 Capsule by mouth three (3) times daily as needed for Cough. 30 Capsule 0    furosemide (LASIX) 20 mg tablet Take 0.5 Tablets by mouth daily.  30 Tablet 1    montelukast (SINGULAIR) 10 mg tablet Take 1 Tab by mouth daily. 30 Tab 0    hydrOXYzine pamoate (VISTARIL) 25 mg capsule Take 1 Cap by mouth three (3) times daily as needed for Itching. 60 Cap 1    escitalopram oxalate (LEXAPRO) 20 mg tablet Take 30 mg by mouth daily.  gabapentin (NEURONTIN) 400 mg capsule Take 600 mg by mouth three (3) times daily.  ferrous sulfate 325 mg (65 mg iron) tablet TAKE ONE TABLET BY MOUTH EVERY DAY BEFORE BREAKFAST 90 Tab 1    norgestimate-ethinyl estradioL (ORTHO-CYCLEN, SPRINTEC) 0.25-35 mg-mcg tab Take 1 Tab by mouth daily. Generic 3 Dose Pack 3    folic acid (FOLVITE) 1 mg tablet TAKE ONE TABLET BY MOUTH EVERY DAY 90 Tab 1    albuterol-ipratropium (DUO-NEB) 2.5 mg-0.5 mg/3 ml nebu 3 mL by Nebulization route every six (6) hours as needed (wheeze). 30 Nebule 3    triamcinolone acetonide (KENALOG) 0.1 % ointment Apply  to affected area two (2) times a day. use thin layer 85 g 11    ketoconazole (NIZORAL) 2 % shampoo shampoo twice weekly 1 Bottle 11    albuterol (PROVENTIL HFA, VENTOLIN HFA, PROAIR HFA) 90 mcg/actuation inhaler Take 1 Puff by inhalation every six (6) hours as needed for Wheezing. 1 Inhaler 5    buPROPion XL (WELLBUTRIN XL) 150 mg tablet       LORazepam (ATIVAN) 2 mg tablet Take 2 mg by mouth every eight (8) hours as needed.  OTHER Please provide patient with a left knee brace. DX: Z87.39 1 Each 0    Comp Stocking,Knee,Regular,Med misc 1 Each by Does Not Apply route daily. 1 Each 1    acetaminophen (TYLENOL 8 HOUR PO) Take  by mouth.  clonazePAM (KLONOPIN) 1 mg tablet Take 1 mg by mouth daily. At bedtime      cholecalciferol, vitamin D3, (VITAMIN D3) 2,000 unit tab Take  by mouth.  traZODone (DESYREL) 50 mg tablet Take 1 Tab by mouth nightly. (Patient taking differently: Take 100 mg by mouth nightly.) 30 Tab 6    [DISCONTINUED] pantoprazole (PROTONIX) 20 mg tablet TAKE 1 TABLET TWICE DAILY 180 Tab 1     No current facility-administered medications on file prior to visit.        Patient Active Problem List   Diagnosis Code    Depression F32.9    GERD (gastroesophageal reflux disease) K21.9    Anemia, unspecified D64.9    Itch of skin L29.9    Anxiety F41.9    Costochondritis M94.0    HSV (herpes simplex virus) anogenital infection A60.9    OCD (obsessive compulsive disorder) F42.9    Hoarseness R49.0    IFG (impaired fasting glucose) R73.01    Hyperlipidemia E78.5    Thrombosed external hemorrhoid K64.5    Vitamin D deficiency E55.9    Inflammatory arthritis M19.90    Recurrent depression (HCC) F33.9    Mood disorder (HCC) F39    Chronic pain syndrome G89.4    Fibromyalgia M79.7    Panic attack F41.0    Tremor R25.1    Class 2 obesity due to excess calories without serious comorbidity in adult E66.09    Gastroparesis K31.84    Plantar fasciitis of left foot M72.2    Positive KITTY (antinuclear antibody) R76.8    Severe obesity (HCC) E66.01    Neuropathy G62.9       Social History     Socioeconomic History    Marital status: SINGLE     Spouse name: Not on file    Number of children: Not on file    Years of education: Not on file    Highest education level: Not on file   Occupational History    Not on file   Tobacco Use    Smoking status: Former Smoker     Packs/day: 0.25     Years: 8.00     Pack years: 2.00     Types: Cigarettes     Quit date: 9/3/2018     Years since quittin.9    Smokeless tobacco: Never Used   Substance and Sexual Activity    Alcohol use:  Yes     Alcohol/week: 1.0 standard drinks     Types: 1 Glasses of wine per week     Comment: 1 cup of wine/week    Drug use: No    Sexual activity: Yes     Partners: Male     Birth control/protection: None   Other Topics Concern    Not on file   Social History Narrative    Not on file     Social Determinants of Health     Financial Resource Strain:     Difficulty of Paying Living Expenses:    Food Insecurity:     Worried About Running Out of Food in the Last Year:     [address] in the Last Year: Transportation Needs:     Lack of Transportation (Medical):  Lack of Transportation (Non-Medical):    Physical Activity:     Days of Exercise per Week:     Minutes of Exercise per Session:    Stress:     Feeling of Stress :    Social Connections:     Frequency of Communication with Friends and Family:     Frequency of Social Gatherings with Friends and Family:     Attends [employer] Services:     Active Member of Clubs or Organizations:     Attends Club or Organization Meetings:     Marital Status:    Intimate Partner Violence:     Fear of Current or Ex-Partner:     Emotionally Abused:     Physically Abused:     Sexually Abused:        Review of Systems   Review of Systems   Constitutional: Negative for chills and fever. HENT: Negative for congestion. Respiratory: Positive for cough. Cardiovascular: Negative for chest pain and palpitations. Gastrointestinal: Negative for nausea and vomiting. Genitourinary: Negative for dysuria. Neurological: Negative for dizziness and headaches. Objective:     Visit Vitals  /79 (BP 1 Location: Left arm, BP Patient Position: Sitting, BP Cuff Size: Adult)   Pulse 98   Temp 97.4 °F (36.3 °C) (Temporal)   Resp 18   Ht 5' 4\" (1.626 m)   Wt 244 lb 6.4 oz (110.9 kg)   SpO2 98%   BMI 41.95 kg/m²        Physical Exam  Vitals and nursing note reviewed. Constitutional:       Appearance: Normal appearance. HENT:      Head: Normocephalic and atraumatic. Cardiovascular:      Rate and Rhythm: Normal rate and regular rhythm. Pulses: Normal pulses. Heart sounds: Normal heart sounds. Pulmonary:      Effort: Pulmonary effort is normal.      Breath sounds: Normal breath sounds. Abdominal:      General: Abdomen is flat. Bowel sounds are normal.      Palpations: Abdomen is soft. Musculoskeletal:         General: Normal range of motion. Cervical back: Normal range of motion and neck supple. Skin:     General: Skin is warm and dry. Neurological:      Mental Status: She is alert. Pertinent Labs/Studies:      Assessment and orders:       ICD-10-CM ICD-9-CM    1. Gastroesophageal reflux disease without esophagitis  K21.9 530.81 pantoprazole (PROTONIX) 40 mg tablet   2. Cough  R05 786.2    3. Environmental allergies  Z91.09 V15.09    4. Nonintractable headache, unspecified chronicity pattern, unspecified headache type  R51.9 784.0 butalbital-acetaminophen-caffeine (FIORICET, ESGIC) -40 mg per tablet   5. Gastroparesis  K31.84 536.3      Diagnoses and all orders for this visit:    1. Gastroesophageal reflux disease without esophagitis: Increasing to Protonix 40 mg BID. -     pantoprazole (PROTONIX) 40 mg tablet; Take 1 Tablet by mouth two (2) times a day. 2. Cough/Environmental allergies: Start Flonase     4. Nonintractable headache, unspecified chronicity pattern, unspecified headache type:   -     butalbital-acetaminophen-caffeine (FIORICET, ESGIC) -40 mg per tablet; Take 1 Tablet by mouth every eight (8) hours as needed for Headache. Follow-up and Dispositions    · Return in about 4 weeks (around 9/2/2021). I have discussed the diagnosis with the patient and the intended plan as seen in the above orders. Social history, medical history, and labs were reviewed. The patient has received an after-visit summary and questions were answered concerning future plans. I have discussed medication side effects and warnings with the patient as well.     MD [facility]  08/05/21

## 2021-08-08 NOTE — PROGRESS NOTES
Patient: Louie Risk MRN: [id number]  SSN: [id number]    YOB: 1980  Age: 47 y.o. Sex: female        Subjective:     Chief Complaint   Patient presents with    Bee sting     yellow jackets       HPI: she is a 47y.o. year old female who presents with complaints of multiple wasp stings. Patient says there is a wasp nest on her porch. She also complains of cough and chest congestion that was present prior to stings. Patient denies HA, dizziness, SOB, CP, abdominal pain, dysuria, acute myalgias or arthralgias. Encounter Diagnoses   Name Primary?  Wasp sting, accidental or unintentional, initial encounter Yes    Cough     Bronchitis     Essential hypertension     Severe obesity (HCC)        BP Readings from Last 3 Encounters:   08/05/21 124/79   08/03/21 126/84   07/16/21 124/83       Wt Readings from Last 3 Encounters:   08/05/21 244 lb 6.4 oz (110.9 kg)   08/03/21 247 lb (112 kg)   07/16/21 241 lb (109.3 kg)     Body mass index is 42.4 kg/m². Current and past medical information:    Current Medications after this visit[de-identified]     Current Outpatient Medications   Medication Sig    predniSONE (DELTASONE) 20 mg tablet Take 60 mg by mouth daily.  metFORMIN ER (GLUCOPHAGE XR) 500 mg tablet Take 1 Tablet by mouth daily (with dinner).  cetirizine (ZYRTEC) 10 mg tablet Take 10 mg by mouth daily.  benzonatate (TESSALON) 200 mg capsule Take 1 Capsule by mouth three (3) times daily as needed for Cough.  furosemide (LASIX) 20 mg tablet Take 0.5 Tablets by mouth daily.  montelukast (SINGULAIR) 10 mg tablet Take 1 Tab by mouth daily.  hydrOXYzine pamoate (VISTARIL) 25 mg capsule Take 1 Cap by mouth three (3) times daily as needed for Itching.  escitalopram oxalate (LEXAPRO) 20 mg tablet Take 30 mg by mouth daily.  gabapentin (NEURONTIN) 400 mg capsule Take 600 mg by mouth three (3) times daily.     ferrous sulfate 325 mg (65 mg iron) tablet TAKE ONE TABLET BY MOUTH EVERY DAY BEFORE BREAKFAST    norgestimate-ethinyl estradioL (ORTHO-CYCLEN, SPRINTEC) 0.25-35 mg-mcg tab Take 1 Tab by mouth daily. Generic    folic acid (FOLVITE) 1 mg tablet TAKE ONE TABLET BY MOUTH EVERY DAY    albuterol-ipratropium (DUO-NEB) 2.5 mg-0.5 mg/3 ml nebu 3 mL by Nebulization route every six (6) hours as needed (wheeze).  triamcinolone acetonide (KENALOG) 0.1 % ointment Apply  to affected area two (2) times a day. use thin layer    ketoconazole (NIZORAL) 2 % shampoo shampoo twice weekly    albuterol (PROVENTIL HFA, VENTOLIN HFA, PROAIR HFA) 90 mcg/actuation inhaler Take 1 Puff by inhalation every six (6) hours as needed for Wheezing.  buPROPion XL (WELLBUTRIN XL) 150 mg tablet     LORazepam (ATIVAN) 2 mg tablet Take 2 mg by mouth every eight (8) hours as needed.  OTHER Please provide patient with a left knee brace. DX: Z87.39    Comp Stocking,Knee,Regular,Med misc 1 Each by Does Not Apply route daily.  acetaminophen (TYLENOL 8 HOUR PO) Take  by mouth.  clonazePAM (KLONOPIN) 1 mg tablet Take 1 mg by mouth daily. At bedtime    cholecalciferol, vitamin D3, (VITAMIN D3) 2,000 unit tab Take  by mouth.  traZODone (DESYREL) 50 mg tablet Take 1 Tab by mouth nightly. (Patient taking differently: Take 100 mg by mouth nightly.)    pantoprazole (PROTONIX) 40 mg tablet Take 1 Tablet by mouth two (2) times a day.  butalbital-acetaminophen-caffeine (FIORICET, ESGIC) -40 mg per tablet Take 1 Tablet by mouth every eight (8) hours as needed for Headache. No current facility-administered medications for this visit.        Patient Active Problem List    Diagnosis Date Noted    Neuropathy 10/15/2019    Gastroparesis 11/29/2018    Plantar fasciitis of left foot 11/29/2018    Positive KITTY (antinuclear antibody) 11/29/2018    Severe obesity ([facility] [address] 11/29/2018    Class 2 obesity due to excess calories without serious comorbidity in adult 07/25/2018    [facility] 06/12/2018    Chronic pain syndrome 2018    Fibromyalgia 2018    Panic attack 2018    Tremor 2018    [facility] 01/15/2018    Inflammatory arthritis 08/10/2016    Vitamin D deficiency 2016    Thrombosed external hemorrhoid 2016    IFG (impaired fasting glucose) 09/10/2015    Hyperlipidemia 09/10/2015    Hoarseness 2013    OCD (obsessive compulsive disorder) 2012    HSV (herpes simplex virus) anogenital infection 2011    Costochondritis 2011    Anxiety 2010    Itch of skin 2010    Depression     GERD (gastroesophageal reflux disease)     Anemia, unspecified        Past Medical History:   Diagnosis Date    Anemia NEC     Arthritis     Chronic pain     fybromyalsia    Depression     anxiety, depression, OCD    GERD (gastroesophageal reflux disease)     Hypertension     Hypertension     Musculoskeletal disorder     SOB (shortness of breath)     Stool color black        Allergies   Allergen Reactions    Sulfa (Sulfonamide Antibiotics) Hives    Vicodin [Hydrocodone-Acetaminophen] Nausea and Vomiting       Past Surgical History:   Procedure Laterality Date    HX GYN           HX HEENT  2002    wisdom teeth extraction    UPPER GI ENDOSCOPY,BIOPSY  2018         UPPER GI ENDOSCOPY,DIAGNOSIS  2018            Social History     Socioeconomic History    Marital status: SINGLE     Spouse name: Not on file    Number of children: Not on file    Years of education: Not on file    Highest education level: Not on file   Tobacco Use    Smoking status: Former Smoker     Packs/day: 0.25     Years: 8.00     Pack years: 2.00     Types: Cigarettes     Quit date: 9/3/2018     Years since quittin.9    Smokeless tobacco: Never Used   Substance and Sexual Activity    Alcohol use:  Yes     Alcohol/week: 1.0 standard drinks     Types: 1 Glasses of wine per week     Comment: 1 cup of wine/week    Drug use: No    Sexual activity: Yes     Partners: Male     Birth control/protection: None     Social Determinants of Health     Financial Resource Strain:     Difficulty of Paying Living Expenses:    Food Insecurity:     Worried About Running Out of Food in the Last Year:     [address] in the Last Year:    Transportation Needs:     Lack of Transportation (Medical):  Lack of Transportation (Non-Medical):    Physical Activity:     Days of Exercise per Week:     Minutes of Exercise per Session:    Stress:     Feeling of Stress :    Social Connections:     Frequency of Communication with Friends and Family:     Frequency of Social Gatherings with Friends and Family:     Attends Caodaism Services:     Active Member of Clubs or Organizations:     Attends Club or Organization Meetings:     Marital Status:          Objective:     Review of Systems:  Constitutional: Negative for fatigue or malaise  Derm: see HPI  HEENT: Negative for acute hearing or vision changes  Cardiovascular: Negative for dizziness, chest pain or palpitations  Respiratory: Negative for cough, wheezing or SOB  Musculoskeletal: Negative for acute myalgia or arthralgia  Neurological: Negative for headache, weakness or paresthesia  Psychological: Negative for depression or anxiety      Vitals:    08/03/21 0947   BP: 126/84   Pulse: 81   Resp: 18   Temp: 98.1 °F (36.7 °C)   TempSrc: Oral   SpO2: 94%   Weight: 247 lb (112 kg)   Height: 5' 4\" (1.626 m)      Body mass index is 42.4 kg/m².     Physical Exam:  Constitutional: well developed, well nourished, in no acute distress  Skin: multiple erythematous papules  Head: normocephalic, atraumatic  Eyes: sclera clear, EOMI, PERRL  Neck: normal range of motion  Cardiovascular: regular rate and rhythm  Respiratory: clear with symmetrical effort  Extremities: full range of motion  Neurology: normal strength and sensation  Psych: active, alert and oriented, highly talkative      Assessment and orders: ICD-10-CM ICD-9-CM    1. Wasp sting, accidental or unintentional, initial encounter  T63.461A 989.5 predniSONE (DELTASONE) 20 mg tablet     E905.3 TRIAMCINOLONE ACETONIDE INJ      triamcinolone acetonide (Kenalog) 40 mg/mL injection   2. Cough  R05 786.2 predniSONE (DELTASONE) 20 mg tablet   3. Bronchitis  J40 490 predniSONE (DELTASONE) 20 mg tablet   4. Essential hypertension  I10 401.9    5. Severe obesity ([address]  E66.01 278.01          Plan of care:  Diagnoses were discussed in detail with patient. Medication risks/benefits/side effects discussed with patient. All of the patient's questions were addressed and answered to apparent satisfaction. The patient understands and agrees with our plan of care. The patient knows to call back if they have questions about the plan of care or if symptoms change. The patient received an After-Visit Summary which contains VS, diagnoses, orders, allergy and medication lists. Patient Care Team:  Aliya Solis MD as PCP - General (Family Medicine)  Aliya Solis MD as PCP - [facility] Empaneled Provider  Teodora Bernstein MD (Breast Surgery)  Shira Lewis MD (Cardiology)  Jane Powers MD (Otolaryngology)  Joceline Beavers MD (Internal Medicine)  Kerry Rain MD (Female Pelvic Medicine and Reconstructive Surgery)  Chelsea Jett MD (Neurology)        No future appointments.     Signed By: Ijeoma Menendez MD     August 8, 2021

## 2021-08-11 ENCOUNTER — PATIENT MESSAGE (OUTPATIENT)
Dept: FAMILY MEDICINE CLINIC | Age: 41
End: 2021-08-11

## 2021-08-11 RX ORDER — PREDNISONE 20 MG/1
20 TABLET ORAL
Qty: 5 TABLET | Refills: 0 | Status: SHIPPED | OUTPATIENT
Start: 2021-08-11 | End: 2021-08-16

## 2021-08-12 NOTE — TELEPHONE ENCOUNTER
From: Alexsandra Knight  To: Audry Primrose, MD  Sent: 8/11/2021 6:52 AM EDT  Subject: Visit Follow-Up Question    Good morning, you told me to call you around Tuesday to let you know how I am doing. I am doing better the acid reflux is doing a bit better. Still a full like I have some inflammation going and still a little coughing the predisone is working but you told me to let you know I think I will need just a few more days worth because I am getting better just not all the way there yet. I just dont want to go back to it getting worse. Headache medication is doing good. Please advise me on what to do if you will be able to prescribe more prednisone or what I need to do.      Thank you so much

## 2021-09-09 ENCOUNTER — HOSPITAL ENCOUNTER (EMERGENCY)
Age: 41
Discharge: HOME | End: 2021-09-09
Attending: EMERGENCY MEDICINE
Payer: MEDICARE

## 2021-09-09 ENCOUNTER — APPOINTMENT (OUTPATIENT)
Dept: CT IMAGING | Age: 41
End: 2021-09-09
Attending: EMERGENCY MEDICINE
Payer: MEDICARE

## 2021-09-09 VITALS
WEIGHT: 244.49 LBS | BODY MASS INDEX: 41.74 KG/M2 | OXYGEN SATURATION: 99 % | HEIGHT: 64 IN | SYSTOLIC BLOOD PRESSURE: 140 MMHG | TEMPERATURE: 97.5 F | RESPIRATION RATE: 18 BRPM | DIASTOLIC BLOOD PRESSURE: 81 MMHG | HEART RATE: 81 BPM

## 2021-09-09 DIAGNOSIS — R51.9 ACUTE INTRACTABLE HEADACHE, UNSPECIFIED HEADACHE TYPE: ICD-10-CM

## 2021-09-09 DIAGNOSIS — R07.9 CHEST PAIN, UNSPECIFIED TYPE: Primary | ICD-10-CM

## 2021-09-09 LAB
ANION GAP SERPL CALC-SCNC: 5 MMOL/L (ref 5–15)
BASOPHILS # BLD: 0 K/UL (ref 0–0.1)
BASOPHILS NFR BLD: 1 % (ref 0–1)
BUN SERPL-MCNC: 8 MG/DL (ref 6–20)
BUN/CREAT SERPL: 9 (ref 12–20)
CALCIUM SERPL-MCNC: 9 MG/DL (ref 8.5–10.1)
CHLORIDE SERPL-SCNC: 108 MMOL/L (ref 97–108)
CO2 SERPL-SCNC: 24 MMOL/L (ref 21–32)
CREAT SERPL-MCNC: 0.86 MG/DL (ref 0.55–1.02)
DEPRECATED S PYO AG THROAT QL EIA: NEGATIVE
DIFFERENTIAL METHOD BLD: NORMAL
EOSINOPHIL # BLD: 0.2 K/UL (ref 0–0.4)
EOSINOPHIL NFR BLD: 2 % (ref 0–7)
ERYTHROCYTE [DISTWIDTH] IN BLOOD BY AUTOMATED COUNT: 14.2 % (ref 11.5–14.5)
GLUCOSE SERPL-MCNC: 81 MG/DL (ref 65–100)
HCT VFR BLD AUTO: 36.6 % (ref 35–47)
HGB BLD-MCNC: 11.8 G/DL (ref 11.5–16)
IMM GRANULOCYTES # BLD AUTO: 0 K/UL (ref 0–0.04)
IMM GRANULOCYTES NFR BLD AUTO: 0 % (ref 0–0.5)
LYMPHOCYTES # BLD: 3.2 K/UL (ref 0.8–3.5)
LYMPHOCYTES NFR BLD: 42 % (ref 12–49)
MCH RBC QN AUTO: 29.6 PG (ref 26–34)
MCHC RBC AUTO-ENTMCNC: 32.2 G/DL (ref 30–36.5)
MCV RBC AUTO: 92 FL (ref 80–99)
MONOCYTES # BLD: 0.4 K/UL (ref 0–1)
MONOCYTES NFR BLD: 5 % (ref 5–13)
NEUTS SEG # BLD: 3.8 K/UL (ref 1.8–8)
NEUTS SEG NFR BLD: 50 % (ref 32–75)
NRBC # BLD: 0 K/UL (ref 0–0.01)
NRBC BLD-RTO: 0 PER 100 WBC
PLATELET # BLD AUTO: 327 K/UL (ref 150–400)
PMV BLD AUTO: 9.8 FL (ref 8.9–12.9)
POTASSIUM SERPL-SCNC: 4.2 MMOL/L (ref 3.5–5.1)
RBC # BLD AUTO: 3.98 M/UL (ref 3.8–5.2)
SARS-COV-2, COV2: NORMAL
SODIUM SERPL-SCNC: 137 MMOL/L (ref 136–145)
TROPONIN I SERPL-MCNC: <0.05 NG/ML
WBC # BLD AUTO: 7.6 K/UL (ref 3.6–11)

## 2021-09-09 PROCEDURE — 93005 ELECTROCARDIOGRAM TRACING: CPT

## 2021-09-09 PROCEDURE — 85025 COMPLETE CBC W/AUTO DIFF WBC: CPT

## 2021-09-09 PROCEDURE — 87880 STREP A ASSAY W/OPTIC: CPT

## 2021-09-09 PROCEDURE — 96375 TX/PRO/DX INJ NEW DRUG ADDON: CPT

## 2021-09-09 PROCEDURE — U0005 INFEC AGEN DETEC AMPLI PROBE: HCPCS

## 2021-09-09 PROCEDURE — 36415 COLL VENOUS BLD VENIPUNCTURE: CPT

## 2021-09-09 PROCEDURE — 84484 ASSAY OF TROPONIN QUANT: CPT

## 2021-09-09 PROCEDURE — 80048 BASIC METABOLIC PNL TOTAL CA: CPT

## 2021-09-09 PROCEDURE — 74011250636 HC RX REV CODE- 250/636: Performed by: EMERGENCY MEDICINE

## 2021-09-09 PROCEDURE — 99284 EMERGENCY DEPT VISIT MOD MDM: CPT

## 2021-09-09 PROCEDURE — 96374 THER/PROPH/DIAG INJ IV PUSH: CPT

## 2021-09-09 PROCEDURE — 70450 CT HEAD/BRAIN W/O DYE: CPT

## 2021-09-09 PROCEDURE — 87070 CULTURE OTHR SPECIMN AEROBIC: CPT

## 2021-09-09 RX ORDER — METOCLOPRAMIDE HYDROCHLORIDE 5 MG/ML
10 INJECTION INTRAMUSCULAR; INTRAVENOUS
Status: COMPLETED | OUTPATIENT
Start: 2021-09-09 | End: 2021-09-09

## 2021-09-09 RX ORDER — BUTALBITAL, ACETAMINOPHEN AND CAFFEINE 300; 40; 50 MG/1; MG/1; MG/1
1 CAPSULE ORAL
Qty: 12 CAPSULE | Refills: 0 | Status: SHIPPED | OUTPATIENT
Start: 2021-09-09 | End: 2021-09-12

## 2021-09-09 RX ORDER — DEXAMETHASONE SODIUM PHOSPHATE 10 MG/ML
10 INJECTION INTRAMUSCULAR; INTRAVENOUS
Status: COMPLETED | OUTPATIENT
Start: 2021-09-09 | End: 2021-09-09

## 2021-09-09 RX ORDER — DIPHENHYDRAMINE HYDROCHLORIDE 50 MG/ML
25 INJECTION, SOLUTION INTRAMUSCULAR; INTRAVENOUS
Status: COMPLETED | OUTPATIENT
Start: 2021-09-09 | End: 2021-09-09

## 2021-09-09 RX ORDER — KETOROLAC TROMETHAMINE 30 MG/ML
30 INJECTION, SOLUTION INTRAMUSCULAR; INTRAVENOUS
Status: COMPLETED | OUTPATIENT
Start: 2021-09-09 | End: 2021-09-09

## 2021-09-09 RX ADMIN — KETOROLAC TROMETHAMINE 30 MG: 30 INJECTION, SOLUTION INTRAMUSCULAR at 17:02

## 2021-09-09 RX ADMIN — METOCLOPRAMIDE 10 MG: 5 INJECTION, SOLUTION INTRAMUSCULAR; INTRAVENOUS at 17:01

## 2021-09-09 RX ADMIN — DEXAMETHASONE SODIUM PHOSPHATE 10 MG: 10 INJECTION INTRAMUSCULAR; INTRAVENOUS at 17:04

## 2021-09-09 RX ADMIN — SODIUM CHLORIDE 1000 ML: 9 INJECTION, SOLUTION INTRAVENOUS at 17:03

## 2021-09-09 RX ADMIN — DIPHENHYDRAMINE HYDROCHLORIDE 25 MG: 50 INJECTION, SOLUTION INTRAMUSCULAR; INTRAVENOUS at 17:00

## 2021-09-09 NOTE — ED PROVIDER NOTES
26-year-old female presents with headache that is been intermittent and shooting for the past 2-1/2 weeks. She states it is been worsening. She states it is generalized. She was recently diagnosed with sinusitis 2 weeks ago. She has never had a similar headache before. She complains of bilateral arm and leg numbness and tingling that is intermittent it as well. She has had nausea but no vomiting. She also complains of chest pain that is currently resolved. It is been intermittent. She also complains of a sore throat. She was diagnosed on Saturday with strep throat. Headache     Chest Pain (Angina)   Associated symptoms include headaches. Past Medical History:   Diagnosis Date    Anemia NEC     Arthritis     Chronic pain     fybromyalsia    Depression     anxiety, depression, OCD    GERD (gastroesophageal reflux disease)     Hypertension     Hypertension     Musculoskeletal disorder     SOB (shortness of breath)     Stool color black        Past Surgical History:   Procedure Laterality Date    HX GYN           HX HEENT  2002    wisdom teeth extraction    UPPER GI ENDOSCOPY,BIOPSY  2018         UPPER GI ENDOSCOPY,DIAGNOSIS  2018              Family History:   Problem Relation Age of Onset    Hypertension Father     Elevated Lipids Father     Arthritis-rheumatoid Mother         ? Lupus vs RA    Lung Disease Mother     Heart Disease Mother     Cancer Mother         breast in 39y    COPD Mother     Hypertension Mother     Stroke Mother         3-4 strokes    Breast Cancer Mother 50    Diabetes Maternal Grandmother        Social History     Socioeconomic History    Marital status: SINGLE     Spouse name: Not on file    Number of children: Not on file    Years of education: Not on file    Highest education level: Not on file   Occupational History    Not on file   Tobacco Use    Smoking status: Former Smoker     Packs/day: 0.25     Years: 8.00 Pack years: 2.00     Types: Cigarettes     Quit date: 9/3/2018     Years since quitting: 3.0    Smokeless tobacco: Never Used   Substance and Sexual Activity    Alcohol use: Yes     Alcohol/week: 1.0 standard drinks     Types: 1 Glasses of wine per week     Comment: 1 cup of wine/week    Drug use: No    Sexual activity: Yes     Partners: Male     Birth control/protection: None   Other Topics Concern    Not on file   Social History Narrative    Not on file     Social Determinants of Health     Financial Resource Strain:     Difficulty of Paying Living Expenses:    Food Insecurity:     Worried About Running Out of Food in the Last Year:     Ran Out of Food in the Last Year:    Transportation Needs:     Lack of Transportation (Medical):  Lack of Transportation (Non-Medical):    Physical Activity:     Days of Exercise per Week:     Minutes of Exercise per Session:    Stress:     Feeling of Stress :    Social Connections:     Frequency of Communication with Friends and Family:     Frequency of Social Gatherings with Friends and Family:     Attends Jainism Services:     Active Member of Clubs or Organizations:     Attends Club or Organization Meetings:     Marital Status:    Intimate Partner Violence:     Fear of Current or Ex-Partner:     Emotionally Abused:     Physically Abused:     Sexually Abused: ALLERGIES: Sulfa (sulfonamide antibiotics) and Vicodin [hydrocodone-acetaminophen]    Review of Systems   Cardiovascular: Positive for chest pain. Neurological: Positive for headaches. All other systems reviewed and are negative. Vitals:    09/09/21 1619   BP: (!) 140/81   Pulse: 81   Resp: 18   Temp: 97.5 °F (36.4 °C)   SpO2: 99%   Weight: 110.9 kg (244 lb 7.8 oz)   Height: 5' 4\" (1.626 m)            Physical Exam  Vitals and nursing note reviewed. Constitutional:       General: She is not in acute distress. Appearance: She is obese.    HENT:      Head: Normocephalic and atraumatic. Mouth/Throat:      Mouth: Mucous membranes are moist.   Eyes:      General: No scleral icterus. Conjunctiva/sclera: Conjunctivae normal.      Pupils: Pupils are equal, round, and reactive to light. Neck:      Trachea: No tracheal deviation. Cardiovascular:      Rate and Rhythm: Normal rate and regular rhythm. Pulmonary:      Effort: Pulmonary effort is normal. No respiratory distress. Breath sounds: No wheezing or rales. Abdominal:      General: There is no distension. Palpations: Abdomen is soft. Tenderness: There is no abdominal tenderness. Genitourinary:     Comments: deferred  Musculoskeletal:         General: No deformity. Cervical back: Neck supple. Skin:     General: Skin is warm and dry. Neurological:      General: No focal deficit present. Mental Status: She is alert. Comments: Alert and Oriented x3, Cranial nerves 2-12 intact, normal motor and sensation, negative Romberg, no pronator drift, normal finger to nose   Psychiatric:         Mood and Affect: Mood normal.          MDM  Number of Diagnoses or Management Options  Diagnosis management comments: 79-year-old female presents with a headache that is been going on for the past 2-1/2 weeks. I doubt aneurysm or dissection. Her neurologic exam is normal.  However she has never had imaging of her brain and she has never had a headache like this before so we will obtain a noncontrast CT scan to rule out mass lesion or any other rare abnormality. Her chest pain is also been ongoing. Her EKG shows no ischemia or infarction. Will obtain troponin and basic labs. We will treat her with a migraine cocktail. EKG performed at 1359 shows normal sinus rhythm at rate of 71, normal intervals, normal axis, normal ST segments and T waves. Procedures        8:01 PM  Patient re-evaluated. All questions answered. Patient appropriate for discharge.   Given return precautions and follow up instructions. LABORATORY TESTS:  Labs Reviewed   METABOLIC PANEL, BASIC - Abnormal; Notable for the following components:       Result Value    BUN/Creatinine ratio 9 (*)     All other components within normal limits   STREP AG SCREEN, GROUP A   CBC WITH AUTOMATED DIFF   TROPONIN I   SARS-COV-2   POC TROPONIN-I       IMAGING RESULTS:  CT HEAD WO CONT   Final Result   No acute abnormality. MEDICATIONS GIVEN:  Medications   sodium chloride 0.9 % bolus infusion 1,000 mL (1,000 mL IntraVENous New Bag 21 1703)   dexamethasone (PF) (DECADRON) 10 mg/mL injection 10 mg (10 mg IntraVENous Given 21 1704)   diphenhydrAMINE (BENADRYL) injection 25 mg (25 mg IntraVENous Given 21 1700)   ketorolac (TORADOL) injection 30 mg (30 mg IntraVENous Given 21 170)   metoclopramide HCl (REGLAN) injection 10 mg (10 mg IntraVENous Given 21 170)       IMPRESSION:  1. Chest pain, unspecified type    2. Acute intractable headache, unspecified headache type        PLAN:  1. Current Discharge Medication List      START taking these medications    Details   butalbital-acetaminophen-caff (Fioricet) -40 mg per capsule Take 1 Capsule by mouth every four (4) hours as needed for Headache for up to 3 days.   Qty: 12 Capsule, Refills: 0  Start date: 2021, End date: 2021         STOP taking these medications       butalbital-acetaminophen-caffeine (FIORICET, ESGIC) -40 mg per tablet Comments:   Reason for Stoppin.   Follow-up Information     Follow up With Specialties Details Why Contact Info    Bran Ventura MD Family Medicine Schedule an appointment as soon as possible for a visit   [address] Neurology Clinic  Schedule an appointment as soon as possible for a visit   [address] [address]    [facility] EMERGENCY DEPT Emergency Medicine  If symptoms worsen or new concerns [address] [facility]  [phone]        3. Return to ED for new or worsening symptoms       Azar Hansen. Yanni Jarrett MD        Please note that this dictation was completed with [employer], the computer voice recognition software. Quite often unanticipated grammatical, syntax, homophones, and other interpretive errors are inadvertently transcribed by the computer software. Please disregard these errors. Please excuse any errors that have escaped final proofreading.

## 2021-09-09 NOTE — ED TRIAGE NOTES
Pt reports intermittent shooting headaches x 2 weeks. Saw PCP and dx with sinus infection and started on amoxicillin. Seen by another doctor later and dx with strep throat but continues to have headaches. Also reports intermittent chest pains for the past few days accompanied by numbness in her bilateral arms and left arm pain.

## 2021-09-10 ENCOUNTER — PATIENT OUTREACH (OUTPATIENT)
Dept: FAMILY MEDICINE CLINIC | Age: 41
End: 2021-09-10

## 2021-09-10 LAB
ATRIAL RATE: 79 BPM
CALCULATED P AXIS, ECG09: 54 DEGREES
CALCULATED R AXIS, ECG10: 35 DEGREES
CALCULATED T AXIS, ECG11: 24 DEGREES
DIAGNOSIS, 93000: NORMAL
P-R INTERVAL, ECG05: 142 MS
Q-T INTERVAL, ECG07: 384 MS
QRS DURATION, ECG06: 80 MS
QTC CALCULATION (BEZET), ECG08: 440 MS
SARS-COV-2, XPLCVT: NOT DETECTED
SOURCE, COVRS: NORMAL
VENTRICULAR RATE, ECG03: 79 BPM

## 2021-09-11 LAB
BACTERIA SPEC CULT: NORMAL
SERVICE CMNT-IMP: NORMAL

## 2021-09-13 ENCOUNTER — PATIENT OUTREACH (OUTPATIENT)
Dept: CASE MANAGEMENT | Age: 41
End: 2021-09-13

## 2021-09-13 NOTE — PROGRESS NOTES
Patient resolved from Transition of Care episode on 9/13/21. ACM/CTN was unsuccessful at contacting this patient today. Patient/family was provided the following resources and education related to COVID-19 during the initial call:                         Signs, symptoms and red flags related to COVID-19            CDC exposure and quarantine guidelines            [facility] exposure contact - [phone]            Contact for their local Department of Health                 Patient has not had any additional ED or hospital visits. No further outreach scheduled with this CTN/ACM. Episode of Care resolved. Patient has this CTN/ACM contact information if future needs arise.

## 2021-09-14 ENCOUNTER — OFFICE VISIT (OUTPATIENT)
Dept: FAMILY MEDICINE CLINIC | Age: 41
End: 2021-09-14
Payer: MEDICARE

## 2021-09-14 VITALS
HEART RATE: 77 BPM | TEMPERATURE: 99.5 F | RESPIRATION RATE: 20 BRPM | OXYGEN SATURATION: 95 % | SYSTOLIC BLOOD PRESSURE: 130 MMHG | DIASTOLIC BLOOD PRESSURE: 88 MMHG

## 2021-09-14 DIAGNOSIS — I10 ESSENTIAL HYPERTENSION: ICD-10-CM

## 2021-09-14 DIAGNOSIS — R05.9 COUGH: ICD-10-CM

## 2021-09-14 DIAGNOSIS — Z91.09 ENVIRONMENTAL ALLERGIES: ICD-10-CM

## 2021-09-14 DIAGNOSIS — E66.01 SEVERE OBESITY (HCC): ICD-10-CM

## 2021-09-14 DIAGNOSIS — J34.89 FRONTAL SINUS PAIN: Primary | ICD-10-CM

## 2021-09-14 PROCEDURE — G8427 DOCREV CUR MEDS BY ELIG CLIN: HCPCS | Performed by: FAMILY MEDICINE

## 2021-09-14 PROCEDURE — G8752 SYS BP LESS 140: HCPCS | Performed by: FAMILY MEDICINE

## 2021-09-14 PROCEDURE — G9717 DOC PT DX DEP/BP F/U NT REQ: HCPCS | Performed by: FAMILY MEDICINE

## 2021-09-14 PROCEDURE — 99214 OFFICE O/P EST MOD 30 MIN: CPT | Performed by: FAMILY MEDICINE

## 2021-09-14 PROCEDURE — G8417 CALC BMI ABV UP PARAM F/U: HCPCS | Performed by: FAMILY MEDICINE

## 2021-09-14 PROCEDURE — G8754 DIAS BP LESS 90: HCPCS | Performed by: FAMILY MEDICINE

## 2021-09-14 RX ORDER — FLUTICASONE PROPIONATE 50 MCG
1 SPRAY, SUSPENSION (ML) NASAL DAILY
COMMUNITY
End: 2021-11-29

## 2021-09-14 RX ORDER — FLUCONAZOLE 150 MG/1
150 TABLET ORAL DAILY
Qty: 1 TABLET | Refills: 0 | Status: SHIPPED | OUTPATIENT
Start: 2021-09-14 | End: 2021-09-15

## 2021-09-14 RX ORDER — LEVOCETIRIZINE DIHYDROCHLORIDE 5 MG/1
5 TABLET, FILM COATED ORAL DAILY
Qty: 30 TABLET | Refills: 0 | Status: SHIPPED | OUTPATIENT
Start: 2021-09-14 | End: 2021-09-28 | Stop reason: SDUPTHER

## 2021-09-14 RX ORDER — SUMATRIPTAN 100 MG/1
100 TABLET, FILM COATED ORAL
Qty: 12 TABLET | Refills: 0 | Status: SHIPPED | OUTPATIENT
Start: 2021-09-14 | End: 2021-10-20 | Stop reason: SINTOL

## 2021-09-14 RX ORDER — METHYLPREDNISOLONE 4 MG/1
TABLET ORAL
Qty: 1 DOSE PACK | Refills: 0 | Status: SHIPPED | OUTPATIENT
Start: 2021-09-14 | End: 2021-10-21

## 2021-09-14 NOTE — PROGRESS NOTES
1. Have you been to the ER, urgent care clinic since your last visit? Hospitalized since your last visit? Yes, [facility] ER 9-9-21 headaches    2. Have you seen or consulted any other health care providers outside of the [address] [place] [address] since your last visit? Include any pap smears or colon screening. No    Reviewed record in preparation for visit and have necessary documentation  Goals that were addressed and/or need to be completed during or after this appointment include     Health Maintenance Due   Topic Date Due    Medicare Yearly Exam  Never done    Flu Vaccine (1) Never done       Patient is accompanied by self I have received verbal consent from Hubert Portillo to discuss any/all medical information while they are present in the room. Neurology appt 10- [facility].

## 2021-09-15 ENCOUNTER — TELEPHONE (OUTPATIENT)
Dept: FAMILY MEDICINE CLINIC | Age: 41
End: 2021-09-15

## 2021-09-15 NOTE — TELEPHONE ENCOUNTER
Pt calling on lab results. Want to know when is she have to stop the Zyrtec. Had a terrible migraine after taking the [address]. (9). The headache started after about an hour and she had some vomiting.

## 2021-09-20 NOTE — PROGRESS NOTES
Patient: Varghese Stanley MRN: [id number]  SSN: [id number]    YOB: 1980  Age: 46 y.o. Sex: female        Subjective:     Chief Complaint   Patient presents with    Head Pain     approx 3 weeks    Yeast Infection       HPI: she is a 46y.o. year old female who presents with complaints of frontal head pain, cough and chest congestion. She has been to urgent care for symptoms. She has a hx of allergic rhinitis. Patient denies dizziness, SOB, CP, abdominal pain, dysuria, acute myalgias or arthralgias. Encounter Diagnoses   Name Primary?  Frontal sinus pain Yes    Cough     Environmental allergies     Essential hypertension     Severe obesity (HCC)        BP Readings from Last 3 Encounters:   09/14/21 130/88   09/09/21 (!) 140/81   08/05/21 124/79       Wt Readings from Last 3 Encounters:   09/14/21 (P) 245 lb 6.4 oz (111.3 kg)   09/09/21 244 lb 7.8 oz (110.9 kg)   08/05/21 244 lb 6.4 oz (110.9 kg)     Body mass index is 42.12 kg/m² (pended). Current and past medical information:    Current Medications after this visit[de-identified]     Current Outpatient Medications   Medication Sig    fluticasone propionate (Flonase Allergy Relief) 50 mcg/actuation nasal spray 1 [place] by Both Nostrils route daily.  SUMAtriptan (IMITREX) 100 mg tablet Take 1 Tablet by mouth daily as needed for Headache.  levocetirizine (XYZAL) 5 mg tablet Take 1 Tablet by mouth daily.  methylPREDNISolone (MEDROL DOSEPACK) 4 mg tablet Take as directed.  pantoprazole (PROTONIX) 40 mg tablet Take 1 Tablet by mouth two (2) times a day.  metFORMIN ER (GLUCOPHAGE XR) 500 mg tablet Take 1 Tablet by mouth daily (with dinner).  furosemide (LASIX) 20 mg tablet Take 0.5 Tablets by mouth daily.  montelukast (SINGULAIR) 10 mg tablet Take 1 Tab by mouth daily.  hydrOXYzine pamoate (VISTARIL) 25 mg capsule Take 1 Cap by mouth three (3) times daily as needed for Itching.     gabapentin (NEURONTIN) 400 mg capsule Take 600 mg by mouth three (3) times daily.  ferrous sulfate 325 mg (65 mg iron) tablet TAKE ONE TABLET BY MOUTH EVERY DAY BEFORE BREAKFAST    norgestimate-ethinyl estradioL (ORTHO-CYCLEN, SPRINTEC) 0.25-35 mg-mcg tab Take 1 Tab by mouth daily. Generic    folic acid (FOLVITE) 1 mg tablet TAKE ONE TABLET BY MOUTH EVERY DAY    albuterol-ipratropium (DUO-NEB) 2.5 mg-0.5 mg/3 ml nebu 3 mL by Nebulization route every six (6) hours as needed (wheeze).  triamcinolone acetonide (KENALOG) 0.1 % ointment Apply  to affected area two (2) times a day. use thin layer    ketoconazole (NIZORAL) 2 % shampoo shampoo twice weekly    albuterol (PROVENTIL HFA, VENTOLIN HFA, PROAIR HFA) 90 mcg/actuation inhaler Take 1 Puff by inhalation every six (6) hours as needed for Wheezing.  LORazepam (ATIVAN) 2 mg tablet Take 2 mg by mouth every eight (8) hours as needed.  OTHER Please provide patient with a left knee brace. DX: Z87.39    Comp Stocking,Knee,Regular,Med misc 1 Each by Does Not Apply route daily.  acetaminophen (TYLENOL 8 HOUR PO) Take  by mouth.  clonazePAM (KLONOPIN) 1 mg tablet Take 1 mg by mouth daily. At bedtime    cholecalciferol, vitamin D3, (VITAMIN D3) 2,000 unit tab Take  by mouth.  traZODone (DESYREL) 50 mg tablet Take 1 Tab by mouth nightly. (Patient taking differently: Take 100 mg by mouth nightly.)     No current facility-administered medications for this visit.        Patient Active Problem List    Diagnosis Date Noted    Neuropathy 10/15/2019    Gastroparesis 11/29/2018    Plantar fasciitis of left foot 11/29/2018    Positive KITTY (antinuclear antibody) 11/29/2018    Severe obesity ([address] 11/29/2018    Class 2 obesity due to excess calories without serious comorbidity in adult 07/25/2018    Mood disorder ([address] 06/12/2018    Chronic pain syndrome 06/12/2018    Fibromyalgia 06/12/2018    Panic attack 06/12/2018    Tremor 06/12/2018    Recurrent depression ([address] 01/15/2018    Inflammatory arthritis 08/10/2016    Vitamin D deficiency 2016    Thrombosed external hemorrhoid 2016    IFG (impaired fasting glucose) 09/10/2015    Hyperlipidemia 09/10/2015    Hoarseness 2013    OCD (obsessive compulsive disorder) 2012    HSV (herpes simplex virus) anogenital infection 2011    Costochondritis 2011    Anxiety 2010    Itch of skin 2010    Depression     GERD (gastroesophageal reflux disease)     Anemia, unspecified        Past Medical History:   Diagnosis Date    Anemia NEC     Arthritis     Chronic pain     fybromyalsia    Depression     anxiety, depression, OCD    GERD (gastroesophageal reflux disease)     Hypertension     Hypertension     Musculoskeletal disorder     SOB (shortness of breath)     Stool color black        Allergies   Allergen Reactions    Sulfa (Sulfonamide Antibiotics) Hives    Vicodin [Hydrocodone-Acetaminophen] Nausea and Vomiting       Past Surgical History:   Procedure Laterality Date    HX GYN           HX HEENT  2002    wisdom teeth extraction    UPPER GI ENDOSCOPY,BIOPSY  2018         UPPER GI ENDOSCOPY,DIAGNOSIS  2018            Social History     Socioeconomic History    Marital status: SINGLE     Spouse name: Not on file    Number of children: Not on file    Years of education: Not on file    Highest education level: Not on file   Tobacco Use    Smoking status: Former Smoker     Packs/day: 0.25     Years: 8.00     Pack years: 2.00     Types: Cigarettes     Quit date: 9/3/2018     Years since quitting: 3.0    Smokeless tobacco: Never Used   Substance and Sexual Activity    Alcohol use:  Yes     Alcohol/week: 1.0 standard drinks     Types: 1 Glasses of wine per week     Comment: 1 cup of wine/week    Drug use: No    Sexual activity: Yes     Partners: Male     Birth control/protection: None     Social Determinants of Health     Financial Resource Strain:     Difficulty of Paying Living Expenses:    Food Insecurity:     Worried About [facility] in the Last Year:     [address] [facility] [address] in the Last Year:    Transportation Needs:     Lack of Transportation (Medical):  Lack of Transportation (Non-Medical):    Physical Activity:     Days of Exercise per Week:     Minutes of Exercise per Session:    Stress:     Feeling of Stress :    Social Connections:     Frequency of Communication with Friends and Family:     Frequency of Social Gatherings with Friends and Family:     Attends [employer] Services:     Active Member of Clubs or Organizations:     Attends Club or Organization Meetings:     Marital Status:          Objective:     Review of Systems:  Constitutional: Negative for fatigue or malaise  Derm: see HPI  HEENT: Negative for acute hearing or vision changes  Cardiovascular: Negative for dizziness, chest pain or palpitations  Respiratory: Negative for cough, wheezing or SOB  Musculoskeletal: Negative for acute myalgia or arthralgia  Neurological: Negative for headache, weakness or paresthesia  Psychological: Negative for depression or anxiety      Vitals:    09/14/21 1408   BP: 130/88   Pulse: 77   Resp: 20   Temp: 99.5 °F (37.5 °C)   TempSrc: Oral   SpO2: 95%   Weight: (P) 245 lb 6.4 oz (111.3 kg)   Height: (P) 5' 4\" (1.626 m)      Body mass index is 42.12 kg/m² (pended). Physical Exam:  Constitutional: well developed, well nourished, in no acute distress  Skin: multiple erythematous papules  Head: normocephalic, atraumatic  Eyes: sclera clear, EOMI  Neck: normal range of motion  Cardiovascular: regular rate and rhythm  Respiratory: clear with symmetrical effort  Extremities: full range of motion  Neurology: normal strength and sensation  Psych: active, alert and oriented      Assessment and orders:       ICD-10-CM ICD-9-CM    1. Frontal sinus pain  J34.89 478.19 XR SINUSES PARANASAL MIN 3 V   2. Cough  R05 786.2    3. Environmental allergies  Z91.09 V15.09    4. Essential hypertension  I10 401.9    5. Severe obesity ([facility]  E66.01 278.01          Plan of care:  Diagnoses were discussed in detail with patient. Medication risks/benefits/side effects discussed with patient. All of the patient's questions were addressed and answered to apparent satisfaction. The patient understands and agrees with our plan of care. The patient knows to call back if they have questions about the plan of care or if symptoms change. The patient received an After-Visit Summary which contains VS, diagnoses, orders, allergy and medication lists.       Patient Care Team:  Vitaly Rodrigues MD as PCP - General (Family Medicine)  Vitaly Rodrigues MD as PCP - [facility]  Bailey Grace MD (Breast Surgery)  Babar Brooks MD (Cardiology)  Daniel Khan MD (Otolaryngology)  Ayesha Redmond MD (Internal Medicine)  Myranda Almanzar MD (Female Pelvic Medicine and Reconstructive Surgery)  Yin Conley MD (Neurology)  Fatmata Becerra RN as Ambulatory Care Manager        Future Appointments   Date Time Provider Komal Rodriguez   10/20/2021  1:00 PM Susan Mandujano  S [facility] BS AMB       Signed By: Jaime Angeles MD     September 20, 2021

## 2021-09-20 NOTE — TELEPHONE ENCOUNTER
Attempted to call. No answer. Message left. Need to advise her Per Dr. Raissa Oden:  Lizabeth Aponte get her scheduled for a follow up.  I am not sure what labs she is talking about, and we will need to follow up about there headaches.  If the Zyrtec is helping her symptoms she should continue it for now.

## 2021-09-22 DIAGNOSIS — E53.8 LOW FOLATE: ICD-10-CM

## 2021-09-23 RX ORDER — FOLIC ACID 1 MG/1
TABLET ORAL
Qty: 90 TABLET | Refills: 1 | Status: SHIPPED | OUTPATIENT
Start: 2021-09-23 | End: 2022-04-06

## 2021-10-05 RX ORDER — LEVOCETIRIZINE DIHYDROCHLORIDE 5 MG/1
5 TABLET, FILM COATED ORAL DAILY
Qty: 90 TABLET | Refills: 1 | Status: SHIPPED | OUTPATIENT
Start: 2021-10-05 | End: 2022-04-14

## 2021-10-20 ENCOUNTER — OFFICE VISIT (OUTPATIENT)
Dept: NEUROLOGY | Age: 41
End: 2021-10-20
Payer: MEDICARE

## 2021-10-20 VITALS
DIASTOLIC BLOOD PRESSURE: 76 MMHG | BODY MASS INDEX: 41.48 KG/M2 | HEART RATE: 78 BPM | OXYGEN SATURATION: 98 % | SYSTOLIC BLOOD PRESSURE: 126 MMHG | RESPIRATION RATE: 16 BRPM | WEIGHT: 243 LBS | HEIGHT: 64 IN

## 2021-10-20 DIAGNOSIS — M54.81 BILATERAL OCCIPITAL NEURALGIA: ICD-10-CM

## 2021-10-20 DIAGNOSIS — G44.52 NEW DAILY PERSISTENT HEADACHE: Primary | ICD-10-CM

## 2021-10-20 DIAGNOSIS — R20.8 BURNING SENSATION OF FEET: ICD-10-CM

## 2021-10-20 DIAGNOSIS — G43.009 MIGRAINE WITHOUT AURA AND WITHOUT STATUS MIGRAINOSUS, NOT INTRACTABLE: ICD-10-CM

## 2021-10-20 PROCEDURE — G8427 DOCREV CUR MEDS BY ELIG CLIN: HCPCS | Performed by: PSYCHIATRY & NEUROLOGY

## 2021-10-20 PROCEDURE — G8754 DIAS BP LESS 90: HCPCS | Performed by: PSYCHIATRY & NEUROLOGY

## 2021-10-20 PROCEDURE — G8752 SYS BP LESS 140: HCPCS | Performed by: PSYCHIATRY & NEUROLOGY

## 2021-10-20 PROCEDURE — G8417 CALC BMI ABV UP PARAM F/U: HCPCS | Performed by: PSYCHIATRY & NEUROLOGY

## 2021-10-20 PROCEDURE — G9717 DOC PT DX DEP/BP F/U NT REQ: HCPCS | Performed by: PSYCHIATRY & NEUROLOGY

## 2021-10-20 PROCEDURE — 99215 OFFICE O/P EST HI 40 MIN: CPT | Performed by: PSYCHIATRY & NEUROLOGY

## 2021-10-20 RX ORDER — ERENUMAB-AOOE 70 MG/ML
70 INJECTION SUBCUTANEOUS
Qty: 1 EACH | Refills: 2 | Status: SHIPPED | OUTPATIENT
Start: 2021-10-20 | End: 2021-10-20 | Stop reason: SDUPTHER

## 2021-10-20 RX ORDER — ESCITALOPRAM OXALATE 20 MG/1
20 TABLET ORAL DAILY
Status: ON HOLD | COMMUNITY

## 2021-10-20 RX ORDER — METFORMIN HYDROCHLORIDE 500 MG/1
TABLET ORAL
COMMUNITY
End: 2021-12-22 | Stop reason: ALTCHOICE

## 2021-10-20 RX ORDER — ERENUMAB-AOOE 70 MG/ML
70 INJECTION SUBCUTANEOUS
Qty: 1 EACH | Refills: 2 | Status: SHIPPED | OUTPATIENT
Start: 2021-10-20 | End: 2021-11-09

## 2021-10-20 RX ORDER — GABAPENTIN 600 MG/1
600 TABLET ORAL 3 TIMES DAILY
COMMUNITY
Start: 2021-10-16 | End: 2022-08-16

## 2021-10-20 NOTE — PROGRESS NOTES
Jennifer Alejandro (1980) is a 46 y.o. female, new patient, here for evaluation of the following     Chief complaint(s):   Chief Complaint   Patient presents with    Headache     new issue and neck pain        HPI: 46 y.o. female impaired fasting glucose, positive KITTY (11/29/2018), chronic pain syndrome/fibromyalgia, panic attack, tremors, recurrent depression, vitamin D deficiency, on management, OCD, anxiety disorder, anemia, arthritis, fibromyalgia, depression, GERD, hypertension, shortness of breath, hematochezia. Reviewed EMR for relevent/ recent records. Reviewed ER note dated 9-9-2021. Pt went to ER that day for intermittent, worsening shooting type headache x 2 and a half weeks. Reported she had recently had Sinusitis 2 weeks prior to the ER visit. She underwent CT head imaging as no record of that being done in the past.  The CT head did not show any acute abnormalities. She was treated with a migraine cocktail and ER Doctor/ Dr Amrita Hwang advised to schedule a Neurology outpatient visit. Labs dated 9/9/2021: Normal CBC, BMP is normal, 29 was normal, strep screen was negative, throat culture no abnormalities, SARS-CoV-2 nasopharyngeal swab was negative    Pt reports that starting in August 2021, started to have severe headaches. Prior to that, she was having occasional mild to moderate headaches. She thinks she Metformin in May 2021 and started escitalopram in June 2021, had gotten taken off some other psychiatry meds. Since August having daily headache. Describes headaches as: describes it as bifrontal or wrapping around head to upper neck, throbbing, pressure, + nausea, no vomiting, +sound and light sensitivity. No vision changes when having headache. Duration: 1 to a couple days.       # of headache days a week: 7  # of severe headache days a week: 3    Tried/ failed: sumatriptan (nausea, vomiting, felt hot all over), medrol dose pack (slightly reduced headache), fioricet, can't take NSAIDs due to hx of Gastroparesis and was advised not to, but says she has tried [facility] powder every now and the if headache is severe and it helps a little. Never been on a daily headache preventive. Any personal history of uncontrolled blood pressure?: no  Any personal history of stroke, TIA, or MI, heart disease? no  Any personal history of aura with migraine? no  Any personal or family history of brain aneurysm? no  Currently on a hormonal contraceptive medication? Yes. Has been on same med x 2-3 yrs  # of days a week taking ANY headache pain reliever (Rx or OTC): 3-4 days a week (tylenol, doesn't really help per pt)    Reports gaining 40 - 50 lbs since January 2021    MRI C-spine done on 3- for spondylosis with or without myelopathy or radiculopathy. Findings: normal spinal cord course, caliber, and signal intensity. No vertebral compression fractures. C3/4:  Posterior disc osteophyte complex with uncovertebral spurring on the left. Mild spinal canal and left foraminal stenosis. Right foramen is patent. C4/5:  Posterior disc osteophyte complex with uncovertebral spurring, causing mild spinal canal, moderate left, and mild right foraminal stenosis. C5/6:  Minimal disc bulge. No significant spinal canal or foraminal stenosis. The remaining levels were normal.      ==================================================  Brief Hx/ Prior Data:    Initial visit was on 12- for evaluation of burning in feet. See that note for details. Impression/ Plan from that visit:  Description sounds typical of neuropathy but neurologic exam is normal today, specifically normal sensation and strength in all extremities. ? Small fiber neuropathy in setting of hx of impaired fasting glucose. Check TSH, SPEP, and 2 hour glucose tolerance test.  Check EMG both legs (? radiculopathy vs neuropathy) and MRI L-spine due to history of lumbar ddd (spinal stenosis or radiculopathy as cause of feet pain).   Discussed increasing her Gabapentin (currently on 300 mg one in AM and 2 in PM). She says it was increased in the past but Dr Sophie Villanueva recently reduced it but she's not sure why. She recalls trying cymbalta in the past (maybe for her fibromyalgia) but didn't help so it was stopped. Another consideration is Lyrica but she is on many psychoactive medications I'm leery of starting it due to potential interactions or side effects. Will defer to Dr Beryle Haley about whether pt can stop Gabapentin and try Lyrica to cover both her Fibromyalgia and the possible neuropathy feet pains. Follow up to go over EMG, lab results. EMG bilateral lower extremities (3/12/2020, Dr Gavin Gill): Extensive electrodiagnostic examination of the right and left lower extremities is normal.  Specifically, there is no evidence of a generalized polyneuropathy or lumbosacral motor radiculopathy. Reviewed labs dated 12/30/2019: Normal SPEP, normal TSH 1.330, 2-hour glucose tolerance test showed low fasting glucose 89, normal half hour/ 1 hour/ 2-hour postprandial glucose. Allergies   Allergen Reactions    Sulfa (Sulfonamide Antibiotics) Hives    Vicodin [Hydrocodone-Acetaminophen] Nausea and Vomiting       Current Outpatient Medications   Medication Sig Dispense Refill    gabapentin (NEURONTIN) 600 mg tablet 600 mg three (3) times daily.  erenumab-aooe (Aimovig Autoinjector) 70 mg/mL injection 1 mL by SubCUTAneous route every thirty (30) days. Indications: migraine prevention 1 Each 2    ubrogepant (Ubrelvy) 100 mg tablet Take one tablet at onset of migraine. Limit use to 2 days per week. Indications: a migraine headache 10 Tablet 2    ubrogepant (UBRELVY) 50 mg tablet Take 1 Tablet by mouth once as needed for Migraine for up to 1 dose. 1 Tablet 0    levocetirizine (XYZAL) 5 mg tablet Take 1 Tablet by mouth daily. 90 Tablet 1    furosemide (LASIX) 20 mg tablet Take 0.5 Tablets by mouth daily.  45 Tablet 1    folic acid (FOLVITE) 1 mg tablet TAKE 1 TABLET EVERY DAY 90 Tablet 1    pantoprazole (PROTONIX) 40 mg tablet Take 1 Tablet by mouth two (2) times a day. 180 Tablet 1    metFORMIN ER (GLUCOPHAGE XR) 500 mg tablet Take 1 Tablet by mouth daily (with dinner). 90 Tablet 1    montelukast (SINGULAIR) 10 mg tablet Take 1 Tab by mouth daily. 30 Tab 0    hydrOXYzine pamoate (VISTARIL) 25 mg capsule Take 1 Cap by mouth three (3) times daily as needed for Itching. 60 Cap 1    ferrous sulfate 325 mg (65 mg iron) tablet TAKE ONE TABLET BY MOUTH EVERY DAY BEFORE BREAKFAST 90 Tab 1    norgestimate-ethinyl estradioL (ORTHO-CYCLEN, SPRINTEC) 0.25-35 mg-mcg tab Take 1 Tab by mouth daily. Generic 3 Dose Pack 3    albuterol-ipratropium (DUO-NEB) 2.5 mg-0.5 mg/3 ml nebu 3 mL by Nebulization route every six (6) hours as needed (wheeze). 30 Nebule 3    triamcinolone acetonide (KENALOG) 0.1 % ointment Apply  to affected area two (2) times a day. use thin layer 85 g 11    ketoconazole (NIZORAL) 2 % shampoo shampoo twice weekly 1 Bottle 11    albuterol (PROVENTIL HFA, VENTOLIN HFA, PROAIR HFA) 90 mcg/actuation inhaler Take 1 Puff by inhalation every six (6) hours as needed for Wheezing. 1 Inhaler 5    LORazepam (ATIVAN) 2 mg tablet Take 2 mg by mouth every eight (8) hours as needed.  Comp Stocking,Knee,Regular,Med misc 1 Each by Does Not Apply route daily. 1 Each 1    acetaminophen (TYLENOL 8 HOUR PO) Take  by mouth.  clonazePAM (KLONOPIN) 1 mg tablet Take 1 mg by mouth daily. At bedtime      cholecalciferol, vitamin D3, (VITAMIN D3) 2,000 unit tab Take  by mouth.  traZODone (DESYREL) 50 mg tablet Take 1 Tab by mouth nightly. (Patient taking differently: Take 100 mg by mouth nightly.) 30 Tab 6    metFORMIN (GLUCOPHAGE) 500 mg tablet Once a day (Patient not taking: Reported on 10/20/2021)      escitalopram oxalate (LEXAPRO) 20 mg tablet escitalopram 20 mg tablet   Take 1.5 tablets every day by oral route.       fluticasone propionate (Flonase Allergy Relief) 50 mcg/actuation nasal spray 1 [place] by Both Nostrils route daily. (Patient not taking: Reported on 10/20/2021)      methylPREDNISolone (MEDROL DOSEPACK) 4 mg tablet Take as directed. (Patient not taking: Reported on 10/20/2021) 1 Dose Pack 0    gabapentin (NEURONTIN) 400 mg capsule Take 600 mg by mouth three (3) times daily.  OTHER Please provide patient with a left knee brace. DX: Z87.39 (Patient not taking: Reported on 10/20/2021) 1 Each 0       Past Medical History:   Diagnosis Date    Anemia NEC     Arthritis     Chronic pain     fybromyalsia    Depression     anxiety, depression, OCD    GERD (gastroesophageal reflux disease)     Hypertension     Hypertension     Musculoskeletal disorder     SOB (shortness of breath)     Stool color black       reports that she quit smoking about 3 years ago. Her smoking use included cigarettes. She has a 2.00 pack-year smoking history. She has never used smokeless tobacco. She reports current alcohol use of about 1.0 standard drinks of alcohol per week. She reports that she does not use drugs. PHYSICAL EXAM    Vitals:    10/20/21 1304   BP: 126/76   BP 1 Location: Left upper arm   BP Patient Position: Sitting   Pulse: 78   Resp: 16   Height: 5' 4\" (1.626 m)   Weight: 110.2 kg (243 lb)   SpO2: 98%     Awake alert resting. EOMI. Visual fields normal bilateral.  Pupils equal and reactive. No papilledema on either side. Intact light touch on both sides of face. 5 out of 5 strength in both arms and legs. Antalgic gait (patient words due to bilateral knee osteoarthritis). Positive tenderness in the bilateral occipital/suboccipital areas (consistent with bilateral occipital neuralgia).     ==================================================    ASSESSMENT/ PLAN:       ICD-10-CM ICD-9-CM    1. New daily persistent headache  G44.52 339.42 MRI BRAIN W WO CONT   2.  Migraine without aura and without status migrainosus, not intractable  G43.009 346.10 erenumab-aooe (Aimovig Autoinjector) 70 mg/mL injection      ubrogepant (Ubrelvy) 100 mg tablet      ubrogepant (UBRELVY) 50 mg tablet      DISCONTINUED: erenumab-aooe (Aimovig Autoinjector) 70 mg/mL injection      DISCONTINUED: ubrogepant (Ubrelvy) 100 mg tablet   3. Bilateral occipital neuralgia  M54.81 723.8    4. Burning sensation of feet  R20.8 782.0         46 y.o. female with PMHx of new-onset, persistent, daily headache since August 2021    Has pattern of both bilateral occipital neuralgia and migraine    DDx: due to med changes (addition / removal) +/- possible pseudotumor cerebri (young female, significant weight gain) +/- due to cervical spondylosis (cervico-occipital neuralgia) +/- migraine    Check MRI Brain +/- contrast to eval for any underlying structural abnormalities. EMR indicated insurance would not allow MRV Brain to be ordered at same time as MRI, so I cancelled MRV Brain order. If MRI Brain does not show a cause of headaches, and headaches not improved with Aimovig and/or an occipital nerve block, discussed with patient next step would be checking lumbar puncture to evaluate for spinal fluid pressure (i.e. checking to see if she has pseudotumor cerebri). Rx'd Aimovig 70 mg SQ once every 30 days as migraine preventive  Rx'd Ubrelvy 100mg one tab up to 2 days a week for acute migraine treatment  Gave (1) sample of each (Ubrelvy sample was 50 mg not 100), so she can get started  Will need PA for both; sent a message to our PA specialist.   Will also ask PA specialist to get PA for bilateral occipital nerve block     2) Burning sensation in feet  D/w patient EMG results (no evidence of large fiber neuropathy)  D/w her that other possibility is small fiber neuropathy versus symptoms from her fibromyalgia  D/w her that to eval for small fiber neuropathy we can do skin biopsy  She wanted to hold off on that for now    3) follow up in 6 weeks.         An electronic signature was used to authenticate this note.   -- Faby Orlando MD

## 2021-10-20 NOTE — PROGRESS NOTES
Chief Complaint   Patient presents with    Headache     new issue and neck pain      Visit Vitals  /76 (BP 1 Location: Left upper arm, BP Patient Position: Sitting)   Pulse 78   Resp 16   Ht 5' 4\" (1.626 m)   Wt 110.2 kg (243 lb)   SpO2 98%   BMI 41.71 kg/m²

## 2021-10-20 NOTE — LETTER
10/20/2021 Patient: Theresa Knight YOB: 1980 Date of Visit: 10/20/2021 Abi Gale MD 
[address] [address] [address] [place] Dear Abi Gale MD, Thank you for referring Ms. Claude Spearing to [facility] for evaluation. My notes for this consultation are attached. If you have questions, please do not hesitate to call me. I look forward to following your patient along with you. Sincerely, Mojgan Harrington MD

## 2021-10-21 ENCOUNTER — OFFICE VISIT (OUTPATIENT)
Dept: FAMILY MEDICINE CLINIC | Age: 41
End: 2021-10-21
Payer: MEDICARE

## 2021-10-21 VITALS
WEIGHT: 237 LBS | HEIGHT: 64 IN | OXYGEN SATURATION: 97 % | DIASTOLIC BLOOD PRESSURE: 89 MMHG | TEMPERATURE: 97 F | HEART RATE: 87 BPM | SYSTOLIC BLOOD PRESSURE: 123 MMHG | BODY MASS INDEX: 40.46 KG/M2 | RESPIRATION RATE: 20 BRPM

## 2021-10-21 DIAGNOSIS — G43.809 OTHER MIGRAINE WITHOUT STATUS MIGRAINOSUS, NOT INTRACTABLE: Primary | ICD-10-CM

## 2021-10-21 DIAGNOSIS — D50.9 IRON DEFICIENCY ANEMIA, UNSPECIFIED IRON DEFICIENCY ANEMIA TYPE: ICD-10-CM

## 2021-10-21 DIAGNOSIS — F33.9 RECURRENT DEPRESSION (HCC): ICD-10-CM

## 2021-10-21 DIAGNOSIS — E78.00 PURE HYPERCHOLESTEROLEMIA: ICD-10-CM

## 2021-10-21 DIAGNOSIS — M79.7 FIBROMYALGIA: ICD-10-CM

## 2021-10-21 PROCEDURE — G8427 DOCREV CUR MEDS BY ELIG CLIN: HCPCS | Performed by: FAMILY MEDICINE

## 2021-10-21 PROCEDURE — G8417 CALC BMI ABV UP PARAM F/U: HCPCS | Performed by: FAMILY MEDICINE

## 2021-10-21 PROCEDURE — 96372 THER/PROPH/DIAG INJ SC/IM: CPT | Performed by: FAMILY MEDICINE

## 2021-10-21 PROCEDURE — 99214 OFFICE O/P EST MOD 30 MIN: CPT | Performed by: FAMILY MEDICINE

## 2021-10-21 PROCEDURE — G8752 SYS BP LESS 140: HCPCS | Performed by: FAMILY MEDICINE

## 2021-10-21 PROCEDURE — G8754 DIAS BP LESS 90: HCPCS | Performed by: FAMILY MEDICINE

## 2021-10-21 PROCEDURE — G9717 DOC PT DX DEP/BP F/U NT REQ: HCPCS | Performed by: FAMILY MEDICINE

## 2021-10-21 RX ORDER — CYCLOBENZAPRINE HCL 10 MG
TABLET ORAL
Status: ON HOLD | COMMUNITY
Start: 2021-10-06

## 2021-10-21 RX ORDER — LANOLIN ALCOHOL/MO/W.PET/CERES
CREAM (GRAM) TOPICAL
Qty: 90 TABLET | Refills: 1 | Status: SHIPPED | OUTPATIENT
Start: 2021-10-21 | End: 2022-02-01

## 2021-10-21 RX ORDER — KETOROLAC TROMETHAMINE 10 MG/1
10 TABLET, FILM COATED ORAL
Qty: 20 TABLET | Refills: 0 | Status: SHIPPED | OUTPATIENT
Start: 2021-10-21 | End: 2021-10-26

## 2021-10-21 RX ORDER — KETOROLAC TROMETHAMINE 30 MG/ML
60 INJECTION, SOLUTION INTRAMUSCULAR; INTRAVENOUS ONCE
Qty: 1 ML | Refills: 0
Start: 2021-10-21 | End: 2021-10-21

## 2021-10-21 NOTE — PROGRESS NOTES
[facility]    History of Present Illness:   Regan Parks is a 46 y.o. female with history of  HTN, GERD, IFG, Depression, Migraines  CC: Follow up  History provided by patient and Records    HPI:  Migraines: Patient is planning to start Aimovig shots to see if it can help with daily migraine headache. Patient interested in getting Toradol shot. MRI brain upcoming. Knee Pain: Left knee pain 2/2 OA, just had approval for Sym-visc injections. Has been getting worse. Depression/Anxiety: Reports that she is taking Lexapro and Lorazepam.  Feeling overwhelmed at this time. Notes sensation of guilt and anhedonia, and avoiding people as well. Denies SI/HI, but notes thoughts of not mattering if she was alive or not.       3 most recent PHQ Screens 10/21/2021   PHQ Not Done -   Little interest or pleasure in doing things Several days   Feeling down, depressed, irritable, or hopeless Several days   Total Score PHQ 2 2   Trouble falling or staying asleep, or sleeping too much -   Feeling tired or having little energy -   Poor appetite, weight loss, or overeating -   Feeling bad about yourself - or that you are a failure or have let yourself or your family down -   Trouble concentrating on things such as school, work, reading, or watching TV -   Moving or speaking so slowly that other people could have noticed; or the opposite being so fidgety that others notice -   Thoughts of being better off dead, or hurting yourself in some way -   PHQ 9 Score -   How difficult have these problems made it for you to do your work, take care of your home and get along with others -         Health Maintenance  Health Maintenance Due   Topic Date Due    Medicare Yearly Exam  Never done    Flu Vaccine (1) Never done       Past Medical, Family, and Social History:     Current Outpatient Medications on File Prior to Visit   Medication Sig Dispense Refill    cyclobenzaprine (FLEXERIL) 10 mg tablet       gabapentin (NEURONTIN) 600 mg tablet 600 mg three (3) times daily.  escitalopram oxalate (LEXAPRO) 20 mg tablet escitalopram 20 mg tablet   Take 1.5 tablets every day by oral route.  erenumab-aooe (Aimovig Autoinjector) 70 mg/mL injection 1 mL by SubCUTAneous route every thirty (30) days. Indications: migraine prevention 1 Each 2    ubrogepant (Ubrelvy) 100 mg tablet Take one tablet at onset of migraine. Limit use to 2 days per week. Indications: a migraine headache 10 Tablet 2    levocetirizine (XYZAL) 5 mg tablet Take 1 Tablet by mouth daily. 90 Tablet 1    furosemide (LASIX) 20 mg tablet Take 0.5 Tablets by mouth daily. 45 Tablet 1    folic acid (FOLVITE) 1 mg tablet TAKE 1 TABLET EVERY DAY 90 Tablet 1    fluticasone propionate (Flonase Allergy Relief) 50 mcg/actuation nasal spray 1 [place] by Both Nostrils route daily.  pantoprazole (PROTONIX) 40 mg tablet Take 1 Tablet by mouth two (2) times a day. 180 Tablet 1    metFORMIN ER (GLUCOPHAGE XR) 500 mg tablet Take 1 Tablet by mouth daily (with dinner). 90 Tablet 1    montelukast (SINGULAIR) 10 mg tablet Take 1 Tab by mouth daily. 30 Tab 0    hydrOXYzine pamoate (VISTARIL) 25 mg capsule Take 1 Cap by mouth three (3) times daily as needed for Itching. 60 Cap 1    norgestimate-ethinyl estradioL (ORTHO-CYCLEN, SPRINTEC) 0.25-35 mg-mcg tab Take 1 Tab by mouth daily. Generic 3 Dose Pack 3    albuterol-ipratropium (DUO-NEB) 2.5 mg-0.5 mg/3 ml nebu 3 mL by Nebulization route every six (6) hours as needed (wheeze). 30 Nebule 3    triamcinolone acetonide (KENALOG) 0.1 % ointment Apply  to affected area two (2) times a day. use thin layer 85 g 11    ketoconazole (NIZORAL) 2 % shampoo shampoo twice weekly 1 Bottle 11    albuterol (PROVENTIL HFA, VENTOLIN HFA, PROAIR HFA) 90 mcg/actuation inhaler Take 1 Puff by inhalation every six (6) hours as needed for Wheezing.  1 Inhaler 5    LORazepam (ATIVAN) 2 mg tablet Take 2 mg by mouth every eight (8) hours as needed.  OTHER Please provide patient with a left knee brace. DX: Z87.39 1 Each 0    Comp Stocking,Knee,Regular,Med misc 1 Each by Does Not Apply route daily. 1 Each 1    acetaminophen (TYLENOL 8 HOUR PO) Take  by mouth.  clonazePAM (KLONOPIN) 1 mg tablet Take 1 mg by mouth daily. At bedtime      cholecalciferol, vitamin D3, (VITAMIN D3) 2,000 unit tab Take  by mouth.  traZODone (DESYREL) 50 mg tablet Take 1 Tab by mouth nightly. (Patient taking differently: Take 100 mg by mouth nightly.) 30 Tab 6    metFORMIN (GLUCOPHAGE) 500 mg tablet Once a day (Patient not taking: Reported on 10/20/2021)      [] ubrogepant (UBRELVY) 50 mg tablet Take 1 Tablet by mouth once as needed for Migraine for up to 1 dose. 1 Tablet 0    [DISCONTINUED] methylPREDNISolone (MEDROL DOSEPACK) 4 mg tablet Take as directed. (Patient not taking: Reported on 10/20/2021) 1 Dose Pack 0    [DISCONTINUED] gabapentin (NEURONTIN) 400 mg capsule Take 600 mg by mouth three (3) times daily.  [DISCONTINUED] ferrous sulfate 325 mg (65 mg iron) tablet TAKE ONE TABLET BY MOUTH EVERY DAY BEFORE BREAKFAST 90 Tab 1     No current facility-administered medications on file prior to visit. Patient Active Problem List   Diagnosis Code    Depression F32. A    GERD (gastroesophageal reflux disease) K21.9    Anemia, unspecified D64.9    Itch of skin L29.9    Anxiety F41.9    Costochondritis M94.0    HSV (herpes simplex virus) anogenital infection A60.9    OCD (obsessive compulsive disorder) F42.9    Hoarseness R49.0    IFG (impaired fasting glucose) R73.01    Hyperlipidemia E78.5    Thrombosed external hemorrhoid K64.5    Vitamin D deficiency E55.9    Inflammatory arthritis M19.90    Recurrent depression (HCC) F33.9    Mood disorder (HCC) F39    Chronic pain syndrome G89.4    Fibromyalgia M79.7    Panic attack F41.0    Tremor R25.1    Class 2 obesity due to excess calories without serious comorbidity in adult E66.09    Gastroparesis K31.84    Plantar fasciitis of left foot M72.2    Positive KITTY (antinuclear antibody) R76.8    Severe obesity (HCC) E66.01    Neuropathy G62.9       Social History     Socioeconomic History    Marital status: SINGLE     Spouse name: Not on file    Number of children: Not on file    Years of education: Not on file    Highest education level: Not on file   Occupational History    Not on file   Tobacco Use    Smoking status: Former Smoker     Packs/day: 0.25     Years: 8.00     Pack years: 2.00     Types: Cigarettes     Quit date: 9/3/2018     Years since quitting: 3.1    Smokeless tobacco: Never Used   Substance and Sexual Activity    Alcohol use: Yes     Alcohol/week: 1.0 standard drinks     Types: 1 Glasses of wine per week     Comment: 1 cup of wine/week    Drug use: No    Sexual activity: Yes     Partners: Male     Birth control/protection: None   Other Topics Concern    Not on file   Social History Narrative    Not on file     Social Determinants of Health     Financial Resource Strain:     Difficulty of Paying Living Expenses:    Food Insecurity:     Worried About Running Out of Food in the Last Year:     Ran Out of Food in the Last Year:    Transportation Needs:     Lack of Transportation (Medical):      Lack of Transportation (Non-Medical):    Physical Activity:     Days of Exercise per Week:     Minutes of Exercise per Session:    Stress:     Feeling of Stress :    Social Connections:     Frequency of Communication with Friends and Family:     Frequency of Social Gatherings with Friends and Family:     Attends Spiritism Services:     Active Member of Clubs or Organizations:     Attends Club or Organization Meetings:     Marital Status:    Intimate Partner Violence:     Fear of Current or Ex-Partner:     Emotionally Abused:     Physically Abused:     Sexually Abused:        Review of Systems   Review of Systems   Constitutional: Positive for malaise/fatigue. Negative for chills and fever. Cardiovascular: Negative for chest pain and palpitations. Gastrointestinal: Negative for nausea and vomiting. Neurological: Positive for headaches. Psychiatric/Behavioral: Positive for depression. The patient is nervous/anxious. Objective:     Visit Vitals  /89 (BP 1 Location: Left upper arm, BP Patient Position: Sitting, BP Cuff Size: Adult)   Pulse 87   Temp 97 °F (36.1 °C) (Oral)   Resp 20   Ht 5' 4\" (1.626 m)   Wt 237 lb (107.5 kg)   SpO2 97%   BMI 40.68 kg/m²        Physical Exam  Vitals and nursing note reviewed. Constitutional:       Appearance: Normal appearance. HENT:      Head: Normocephalic and atraumatic. Cardiovascular:      Rate and Rhythm: Normal rate and regular rhythm. Pulses: Normal pulses. Heart sounds: Normal heart sounds. Pulmonary:      Effort: Pulmonary effort is normal.      Breath sounds: Normal breath sounds. Abdominal:      General: Abdomen is flat. Bowel sounds are normal.      Palpations: Abdomen is soft. Musculoskeletal:      Cervical back: Normal range of motion and neck supple. Skin:     General: Skin is warm and dry. Neurological:      Mental Status: She is alert. Pertinent Labs/Studies:      Assessment and orders:       ICD-10-CM ICD-9-CM    1. Other migraine without status migrainosus, not intractable  G43.809 346.80 ketorolac tromethamine (TORADOL) 60 mg/2 mL soln      KETOROLAC TROMETHAMINE INJ      UT THER/PROPH/DIAG INJECTION, SUBCUT/IM      ketorolac (TORADOL) 10 mg tablet   2. Iron deficiency anemia, unspecified iron deficiency anemia type  D50.9 280.9 ferrous sulfate 325 mg (65 mg iron) tablet   3. Pure hypercholesterolemia  E78.00 272.0    4. Fibromyalgia  M79.7 729.1    5. Recurrent depression (HCC)  F33.9 296.30      Diagnoses and all orders for this visit:    1. Other migraine without status migrainosus, not intractable:  Will help with Aimovig, and giving Toradol for judicious use now. Injection now  -     ketorolac tromethamine (TORADOL) 60 mg/2 mL soln; 2 mL by IntraMUSCular route once for 1 dose. -     KETOROLAC TROMETHAMINE INJ  -     [place] THER/PROPH/DIAG INJECTION, SUBCUT/IM  -     ketorolac (TORADOL) 10 mg tablet; Take 1 Tablet by mouth every six (6) hours as needed for Pain for up to 5 days. 2. Iron deficiency anemia, unspecified iron deficiency anemia type: refill  -     ferrous sulfate 325 mg (65 mg iron) tablet; TAKE ONE TABLET BY MOUTH EVERY DAY BEFORE BREAKFAST    3. Pure hypercholesterolemia: The patient is aware of our goal to reduce or eliminate the long term problems (such as strokes and heart attacks) related to poorly controlled Triglycerides, LDL, Cholesterol. 4. Fibromyalgia: Working with psych    5. Recurrent depression ([facility] [address]      Follow-up and Dispositions    · Return in about 2 months (around 12/21/2021). I have discussed the diagnosis with the patient and the intended plan as seen in the above orders. Social history, medical history, and labs were reviewed. The patient has received an after-visit summary and questions were answered concerning future plans. I have discussed medication side effects and warnings with the patient as well.     MD URBAN [facility]  10/21/21

## 2021-10-22 NOTE — PROGRESS NOTES
Patient was given sample of aimovig 70mg/ml injection at neurologist office. Requested that I assist her with first injection. Patient assisted without any issues.

## 2021-10-25 ENCOUNTER — TELEPHONE (OUTPATIENT)
Dept: NEUROLOGY | Age: 41
End: 2021-10-25

## 2021-10-25 ENCOUNTER — PATIENT MESSAGE (OUTPATIENT)
Dept: NEUROLOGY | Age: 41
End: 2021-10-25

## 2021-10-25 NOTE — TELEPHONE ENCOUNTER
Spoke with patient and she states the [place] Fi[place]lla is too expensive and she said her insurance is not going to cover the [place] and they told her there are other meds that compare to these but they would not tell her what they are?? Can you find out for me?  Thanks

## 2021-10-25 NOTE — TELEPHONE ENCOUNTER
[place] below is message from patient for a PA. Thanks    Hello, I was seen by you yesterday and you gave me a sample of the aimovig injection and 2 samples of the ubrelvy the Malathi Garcia is not covered [address] said they have something similar but you have to call them I think they have a formulary list.  The Ryann Degroot is covered but  it 92 dollars for 3 they have a similar one for that 2 but it they don't I would need that written out for 1 month each so I can break payments down. I'm on disability so money is really tight if you can see what you can do I would  appreciate it. I did get the sample injection put in today. I may be reached at [phone]. Thank you I just have to get the headache together because I'm really suffering today ready bad.

## 2021-10-25 NOTE — TELEPHONE ENCOUNTER
----- Message from Audrey Mar sent at 10/25/2021  9:08 AM EDT -----  Regarding: \telephone  Contact: [phone]  General Message/Vendor Calls    Caller's first and last name:self      Reason for call:having some problems with the medication she on .having a lot of side effects       Callback required yes/no and why:y      Best contact number(s):[phone]      Details to clarify the request:last a message last Thursday about it in my chart       Audrey Mar

## 2021-10-26 ENCOUNTER — PATIENT MESSAGE (OUTPATIENT)
Dept: NEUROLOGY | Age: 41
End: 2021-10-26

## 2021-10-26 DIAGNOSIS — G44.52 NEW DAILY PERSISTENT HEADACHE: Primary | ICD-10-CM

## 2021-10-26 RX ORDER — PREDNISONE 10 MG/1
TABLET ORAL
Qty: 21 TABLET | Refills: 0 | Status: SHIPPED | OUTPATIENT
Start: 2021-10-26 | End: 2021-11-09

## 2021-10-26 NOTE — TELEPHONE ENCOUNTER
From: Ilan Booth  To: Andie Arellano MD  Sent: 10/26/2021 2:36 PM EDT  Subject: Visit Follow-Up Question    I talked to the nurse yesterday and she said take the ubrelvy on sight of a headache took it today and I'm feeling worse. I got the appointment for the mri Tuesday at [address] [place] [address]. I really need some help with these headaches it eveyday now it worse since I took the shot and the pill. Please help me I need help .

## 2021-10-28 ENCOUNTER — PATIENT MESSAGE (OUTPATIENT)
Dept: NEUROLOGY | Age: 41
End: 2021-10-28

## 2021-11-04 ENCOUNTER — DOCUMENTATION ONLY (OUTPATIENT)
Dept: NEUROLOGY | Age: 41
End: 2021-11-04

## 2021-11-04 NOTE — PROGRESS NOTES
Insurance denied Aimovig SQ once monthly injector as migraine preventive. Said pt has to first try either Topamax or Depakote x 2 months (as a headache preventive) see if that works; if not then they might cover [address]. Reviewed pt's chart. I'm not comfortable starting her on EITHER of those medications as she is already on multiple psycho-active medications. Will ask nurse to talk with her and let her know that if I prescribe her Topamax, she cannot start it until she talks with whomever is prescribing her psych meds (Psychiatry or PCP) as Topamax can cause mood changes (or may not).

## 2021-11-05 ENCOUNTER — TELEPHONE (OUTPATIENT)
Dept: NEUROLOGY | Age: 41
End: 2021-11-05

## 2021-11-05 NOTE — TELEPHONE ENCOUNTER
Called patient and spoke with her to advise of ONB appt. Also explained message about aimovig and the topamax and she will ask her psych provider and let me know.

## 2021-11-08 ENCOUNTER — OFFICE VISIT (OUTPATIENT)
Dept: FAMILY MEDICINE CLINIC | Age: 41
End: 2021-11-08

## 2021-11-08 ENCOUNTER — PATIENT MESSAGE (OUTPATIENT)
Dept: NEUROLOGY | Age: 41
End: 2021-11-08

## 2021-11-08 ENCOUNTER — NURSE TRIAGE (OUTPATIENT)
Dept: OTHER | Facility: CLINIC | Age: 41
End: 2021-11-08

## 2021-11-08 VITALS
BODY MASS INDEX: 40.29 KG/M2 | TEMPERATURE: 98.4 F | OXYGEN SATURATION: 97 % | DIASTOLIC BLOOD PRESSURE: 86 MMHG | WEIGHT: 236 LBS | HEIGHT: 64 IN | SYSTOLIC BLOOD PRESSURE: 129 MMHG | HEART RATE: 94 BPM | RESPIRATION RATE: 20 BRPM

## 2021-11-08 DIAGNOSIS — M25.521 RIGHT ELBOW PAIN: ICD-10-CM
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
## 2021-11-08 DIAGNOSIS — M25.561 ACUTE PAIN OF RIGHT KNEE: ICD-10-CM

## 2021-11-08 PROCEDURE — 99214 OFFICE O/P EST MOD 30 MIN: CPT | Performed by: FAMILY MEDICINE

## 2021-11-08 PROCEDURE — G8427 DOCREV CUR MEDS BY ELIG CLIN: HCPCS | Performed by: FAMILY MEDICINE

## 2021-11-08 PROCEDURE — G8417 CALC BMI ABV UP PARAM F/U: HCPCS | Performed by: FAMILY MEDICINE

## 2021-11-08 PROCEDURE — G8754 DIAS BP LESS 90: HCPCS | Performed by: FAMILY MEDICINE

## 2021-11-08 PROCEDURE — G9717 DOC PT DX DEP/BP F/U NT REQ: HCPCS | Performed by: FAMILY MEDICINE

## 2021-11-08 PROCEDURE — G8752 SYS BP LESS 140: HCPCS | Performed by: FAMILY MEDICINE

## 2021-11-08 RX ORDER — DICLOFENAC SODIUM 50 MG/1
50 TABLET, DELAYED RELEASE ORAL
Qty: 30 TABLET | Refills: 0 | Status: SHIPPED | OUTPATIENT
Start: 2021-11-08 | End: 2021-11-08 | Stop reason: SDUPTHER

## 2021-11-08 RX ORDER — DICLOFENAC SODIUM 50 MG/1
50 TABLET, DELAYED RELEASE ORAL
Qty: 30 TABLET | Refills: 0 | Status: SHIPPED | OUTPATIENT
Start: 2021-11-08 | End: 2022-05-31

## 2021-11-08 RX ORDER — ARIPIPRAZOLE 5 MG/1
TABLET ORAL
COMMUNITY
Start: 2021-11-06 | End: 2021-11-29

## 2021-11-08 NOTE — TELEPHONE ENCOUNTER
Received call from Judi at [facility] with Red Flag Complaint. Brief description of triage: Pt calls to report symptoms of knee and right elbow injury. States she fell into a vent hole on Tuesday. Rates the pain as a severe burning. Reports able to use the arm normally but hurts when touched and feels different than left elbow. Denies numbness/tingling, bruising or swelling. Triage indicates for patient to: See HCP within 4 hours    Care advice provided, patient verbalizes understanding; denies any other questions or concerns; instructed to call back for any new or worsening symptoms. Writer provided warm transfer to Irma at [facility] for appointment scheduling. Attention Provider: Thank you for allowing me to participate in the care of your patient. The patient was connected to triage in response to information provided to the ECC. Please do not respond through this encounter as the response is not directed to a shared pool. Reason for Disposition   [1] SEVERE pain AND [2] not improved 2 hours after pain medicine/ice packs    Answer Assessment - Initial Assessment Questions  1. MECHANISM: \"How did the injury happen? \"      Valentino Huh into a vent hole    2. ONSET: \"When did the injury happen? \" (Minutes or hours ago)       10/2/21    3. LOCATION: \"What part of the elbow is the injured? \"       Right    4. APPEARANCE of INJURY: \"What does the injury look like? \"       No change in appearance    5. SEVERITY: \"Can you use the elbow normally? \"  \"Can you bend it and straighten it fully? \"      Yes    6. SIZE: For cuts, bruises, or swelling, ask: \"How large is it? \" (e.g., inches or centimeters; entire joint)       Scratches     7. PAIN: \"Is there pain? \" If so, ask: \"How bad is the pain? \"    (Scale 1-10; or mild, moderate, severe)      Severe burning     8. TETANUS: For any breaks in the skin, ask: \"When was the last tetanus booster? \"      NA    9. OTHER SYMPTOMS: \"Do you have any other symptoms? \" (e.g., numbness in hand)      No    10. PREGNANCY: \"Is there any chance you are pregnant? \" \"When was your last menstrual period? \"        No    Protocols used: ELBOW INJURY-ADULT-AH

## 2021-11-08 NOTE — PATIENT INSTRUCTIONS

## 2021-11-08 NOTE — PROGRESS NOTES
1. Have you been to the ER, urgent care clinic since your last visit? Hospitalized since your last visit? No    2. Have you seen or consulted any other health care providers outside of the [address] [place] [address] since your last visit? Include any pap smears or colon screening.  No  Reviewed record in preparation for visit and have necessary documentation  Pt did not bring medication to office visit for review    Goals that were addressed and/or need to be completed during or after this appointment include   Health Maintenance Due   Topic Date Due    Medicare Yearly Exam  Never done    Flu Vaccine (1) Never done

## 2021-11-08 NOTE — PROGRESS NOTES
CC: Fall and R elbow pain    HPI: Pt is a 46 y.o. female who presents for fall and right elbow and right knee pain. Pt reports almost a week ago she tripped and partially fell through a grate in her house. She hit her right knee and right elbow and since then has been having pain in these areas which is worsening. She has been able to bear weight but has been limping. She has a large bruise on her knee. The pain in her knee feels throbbing and in the elbow is stinging. The pain in her elbow is localized to one specific spot that is very tender. She is able to move it well but has pain with terminal flexion and extension. She has tried voltaren gel, bengay and rubbing alcohol without much improvement. She had been on prednisone for headaches and felt like that helped some, but she has since finished the course. Past Medical History:   Diagnosis Date    Anemia NEC     Arthritis     Chronic pain     fybromyalsia    Depression     anxiety, depression, OCD    GERD (gastroesophageal reflux disease)     Hypertension     Hypertension     Musculoskeletal disorder     SOB (shortness of breath)     Stool color black        Family History   Problem Relation Age of Onset    Hypertension Father     Elevated Lipids Father     Arthritis-rheumatoid Mother         ? Lupus vs RA    Lung Disease Mother     Heart Disease Mother     Cancer Mother         breast in 39y    COPD Mother     Hypertension Mother     Stroke Mother         3-4 strokes    Breast Cancer Mother 50    Diabetes Maternal Grandmother        Social History     Tobacco Use    Smoking status: Former Smoker     Packs/day: 0.25     Years: 8.00     Pack years: 2.00     Types: Cigarettes     Quit date: 9/3/2018     Years since quitting: 3.1    Smokeless tobacco: Never Used   Substance Use Topics    Alcohol use: Yes     Alcohol/week: 1.0 standard drink     Types: 1 Glasses of wine per week     Comment: 1 cup of wine/week    Drug use:  No ROS:  Per HPI    PE:  Visit Vitals  /86   Pulse 94   Temp 98.4 °F (36.9 °C)   Resp 20   Ht 5' 4\" (1.626 m)   Wt 236 lb (107 kg)   SpO2 97%   BMI 40.51 kg/m²     Gen: Pt sitting in chair, in NAD  Head: Normocephalic, atraumatic  Eyes: Sclera anicteric, EOM grossly intact, PERRL  Throat: MMM, normal lips, tongue, teeth and gums  Neck: Supple, no LAD, no thyromegaly or carotid bruits  CVS: Normal S1, S2, no m/r/g  Resp: CTAB, no wheezes or rales  MSK:  R knee: Full AROM, normal strength and sensation. Pain with hyperflexion of knee. 3x4cm bruise just distal and lateral to the patella. TTP over the patella, no TTP along joint lines. Mild effusion. R elbow: Point tenderness over olecranon. No swelling. Full AROM but pain with hyperflexion and hyperextension of the elbow. Extrem: Atraumatic, no cyanosis or edema  Pulses: 2+   Skin: Warm, dry  Neuro: Alert, oriented, appropriate      A/P:   Encounter Diagnoses     ICD-10-CM ICD-9-CM   1. Fall, initial encounter  [address] XXXA E888.9   2. Right elbow pain  M25.521 719.42   3. Acute pain of right knee  M25.561 719.46     1. Right elbow pain: Point tenderness over olecranon after fall, but good ROM and no swelling. Will await XR read and go from there. Pt asking about using a stabilizer to help with pain, advised that that would be ok for the next week and will readdress after XR read back. Will also do trial of diclofenac for pain and inflammation. - XR ELBOW RT AP/LAT; Future  - diclofenac EC (VOLTAREN) 50 mg EC tablet; Take 1 Tablet by mouth two (2) times daily as needed for Pain. Dispense: 30 Tablet; Refill: 0    2. Acute pain of right knee: Pt bearing weight but does have some swelling and TTP over patella after injury. Will await XR read. - XR KNEE RT MAX 2 VWS; Future  - diclofenac EC (VOLTAREN) 50 mg EC tablet; Take 1 Tablet by mouth two (2) times daily as needed for Pain. Dispense: 30 Tablet; Refill: 0    3.  Fall, initial encounter       RTC prn if symptoms worsen or fail to improve    Discussed diagnoses in detail with patient. Medication risks/benefits/side effects discussed with patient. All of the patient's questions were addressed. The patient understands and agrees with our plan of care. The patient knows to call back if they are unsure of or forget any changes we discussed today or if the symptoms change. The patient received an After-Visit Summary which contains VS, orders, medication list and allergy list. This can be used as a \"mini-medical record\" should they have to seek medical care while out of town. Current Outpatient Medications on File Prior to Visit   Medication Sig Dispense Refill    cyclobenzaprine (FLEXERIL) 10 mg tablet       ferrous sulfate 325 mg (65 mg iron) tablet TAKE ONE TABLET BY MOUTH EVERY DAY BEFORE BREAKFAST 90 Tablet 1    gabapentin (NEURONTIN) 600 mg tablet 600 mg three (3) times daily.  metFORMIN (GLUCOPHAGE) 500 mg tablet Once a day      escitalopram oxalate (LEXAPRO) 20 mg tablet escitalopram 20 mg tablet   Take 1.5 tablets every day by oral route.  levocetirizine (XYZAL) 5 mg tablet Take 1 Tablet by mouth daily. 90 Tablet 1    furosemide (LASIX) 20 mg tablet Take 0.5 Tablets by mouth daily. 45 Tablet 1    folic acid (FOLVITE) 1 mg tablet TAKE 1 TABLET EVERY DAY 90 Tablet 1    fluticasone propionate (Flonase Allergy Relief) 50 mcg/actuation nasal spray 1 Gepp by Both Nostrils route daily.  pantoprazole (PROTONIX) 40 mg tablet Take 1 Tablet by mouth two (2) times a day. 180 Tablet 1    hydrOXYzine pamoate (VISTARIL) 25 mg capsule Take 1 Cap by mouth three (3) times daily as needed for Itching. 60 Cap 1    norgestimate-ethinyl estradioL (ORTHO-CYCLEN, SPRINTEC) 0.25-35 mg-mcg tab Take 1 Tab by mouth daily. Generic 3 Dose Pack 3    albuterol-ipratropium (DUO-NEB) 2.5 mg-0.5 mg/3 ml nebu 3 mL by Nebulization route every six (6) hours as needed (wheeze).  30 Nebule 3    ketoconazole (NIZORAL) 2 % shampoo shampoo twice weekly 1 Bottle 11    albuterol (PROVENTIL HFA, VENTOLIN HFA, PROAIR HFA) 90 mcg/actuation inhaler Take 1 Puff by inhalation every six (6) hours as needed for Wheezing. 1 Inhaler 5    LORazepam (ATIVAN) 2 mg tablet Take 2 mg by mouth every eight (8) hours as needed.  OTHER Please provide patient with a left knee brace. DX: Z87.39 1 Each 0    Comp Stocking,Knee,Regular,Med misc 1 Each by Does Not Apply route daily. 1 Each 1    acetaminophen (TYLENOL 8 HOUR PO) Take  by mouth.  clonazePAM (KLONOPIN) 1 mg tablet Take 1 mg by mouth daily. At bedtime      cholecalciferol, vitamin D3, (VITAMIN D3) 2,000 unit tab Take  by mouth.  traZODone (DESYREL) 50 mg tablet Take 1 Tab by mouth nightly. (Patient taking differently: Take 100 mg by mouth nightly.) 30 Tab 6    ARIPiprazole (ABILIFY) 5 mg tablet       montelukast (SINGULAIR) 10 mg tablet TAKE 1 TABLET EVERY DAY 90 Tablet 1    predniSONE (DELTASONE) 10 mg tablet Take 6 tabs on Day 1, then reduce by 1 tablet a day over the next 5 days till done. Take in AM with food. (Patient not taking: Reported on 11/8/2021) 21 Tablet 0    erenumab-aooe (Aimovig Autoinjector) 70 mg/mL injection 1 mL by SubCUTAneous route every thirty (30) days. Indications: migraine prevention (Patient not taking: Reported on 11/8/2021) 1 Each 2    ubrogepant (Ubrelvy) 100 mg tablet Take one tablet at onset of migraine. Limit use to 2 days per week. Indications: a migraine headache (Patient not taking: Reported on 11/8/2021) 10 Tablet 2    metFORMIN ER (GLUCOPHAGE XR) 500 mg tablet Take 1 Tablet by mouth daily (with dinner). (Patient not taking: Reported on 11/8/2021) 90 Tablet 1    triamcinolone acetonide (KENALOG) 0.1 % ointment Apply  to affected area two (2) times a day. use thin layer 85 g 11     No current facility-administered medications on file prior to visit.

## 2021-11-09 ENCOUNTER — TELEPHONE (OUTPATIENT)
Dept: FAMILY MEDICINE CLINIC | Age: 41
End: 2021-11-09

## 2021-11-09 NOTE — TELEPHONE ENCOUNTER
Called to advise pt of recent results. Left message to call back. If patient returns call, please let her know:    Danii Corona did not show any fractures. Continue with plan as discussed yesterday.

## 2021-11-12 ENCOUNTER — HOSPITAL ENCOUNTER (OUTPATIENT)
Dept: MRI IMAGING | Age: 41
Discharge: HOME | End: 2021-11-12
Attending: PSYCHIATRY & NEUROLOGY
Payer: MEDICARE

## 2021-11-12 VITALS — BODY MASS INDEX: 40.51 KG/M2 | WEIGHT: 236 LBS

## 2021-11-12 DIAGNOSIS — G44.52 NEW DAILY PERSISTENT HEADACHE: ICD-10-CM

## 2021-11-12 PROCEDURE — 70553 MRI BRAIN STEM W/O & W/DYE: CPT

## 2021-11-12 PROCEDURE — A9575 INJ GADOTERATE MEGLUMI 0.1ML: HCPCS | Performed by: RADIOLOGY

## 2021-11-12 PROCEDURE — 74011250636 HC RX REV CODE- 250/636: Performed by: RADIOLOGY

## 2021-11-12 RX ORDER — GADOTERATE MEGLUMINE 376.9 MG/ML
20 INJECTION INTRAVENOUS
Status: COMPLETED | OUTPATIENT
Start: 2021-11-12 | End: 2021-11-12

## 2021-11-12 RX ADMIN — GADOTERATE MEGLUMINE 20 ML: 376.9 INJECTION INTRAVENOUS at 08:03

## 2021-11-23 ENCOUNTER — OFFICE VISIT (OUTPATIENT)
Dept: FAMILY MEDICINE CLINIC | Age: 41
End: 2021-11-23
Payer: MEDICARE

## 2021-11-23 VITALS
DIASTOLIC BLOOD PRESSURE: 95 MMHG | HEIGHT: 64 IN | HEART RATE: 98 BPM | RESPIRATION RATE: 16 BRPM | WEIGHT: 234 LBS | TEMPERATURE: 96.9 F | OXYGEN SATURATION: 97 % | SYSTOLIC BLOOD PRESSURE: 139 MMHG | BODY MASS INDEX: 39.95 KG/M2

## 2021-11-23 DIAGNOSIS — J06.9 UPPER RESPIRATORY TRACT INFECTION, UNSPECIFIED TYPE: Primary | ICD-10-CM

## 2021-11-23 PROCEDURE — G8428 CUR MEDS NOT DOCUMENT: HCPCS | Performed by: STUDENT IN AN ORGANIZED HEALTH CARE EDUCATION/TRAINING PROGRAM

## 2021-11-23 PROCEDURE — G8752 SYS BP LESS 140: HCPCS | Performed by: STUDENT IN AN ORGANIZED HEALTH CARE EDUCATION/TRAINING PROGRAM

## 2021-11-23 PROCEDURE — G9717 DOC PT DX DEP/BP F/U NT REQ: HCPCS | Performed by: STUDENT IN AN ORGANIZED HEALTH CARE EDUCATION/TRAINING PROGRAM

## 2021-11-23 PROCEDURE — G8755 DIAS BP > OR = 90: HCPCS | Performed by: STUDENT IN AN ORGANIZED HEALTH CARE EDUCATION/TRAINING PROGRAM

## 2021-11-23 PROCEDURE — 99213 OFFICE O/P EST LOW 20 MIN: CPT | Performed by: STUDENT IN AN ORGANIZED HEALTH CARE EDUCATION/TRAINING PROGRAM

## 2021-11-23 PROCEDURE — G8417 CALC BMI ABV UP PARAM F/U: HCPCS | Performed by: STUDENT IN AN ORGANIZED HEALTH CARE EDUCATION/TRAINING PROGRAM

## 2021-11-23 RX ORDER — FAMOTIDINE 20 MG/1
20 TABLET, FILM COATED ORAL
COMMUNITY
End: 2022-04-22 | Stop reason: SDUPTHER

## 2021-11-23 NOTE — PROGRESS NOTES
1. Have you been to the ER, urgent care clinic since your last visit? Hospitalized since your last visit? Yes When: Pt first - anxiety     2. Have you seen or consulted any other health care providers outside of the [address] [place] [address] since your last visit? Include any pap smears or colon screening. No    Reviewed record in preparation for visit and have necessary documentation  Goals that were addressed and/or need to be completed during or after this appointment include     Health Maintenance Due   Topic Date Due    Medicare Yearly Exam  Never done    Flu Vaccine (1) Never done       Patient is accompanied by self I have received verbal consent from Theresa Knight to discuss any/all medical information while they are present in the room.

## 2021-11-23 NOTE — PROGRESS NOTES
Alex Gordillo (: 1980) is a 46 y.o. female, established patient, here for evaluation of the following chief complaint(s):  Medication Evaluation and Hoarse       Assessment/Plan:  1. Upper respiratory tract infection, unspecified type- Covid neg 2 days ago. Out of window for benefit from Tamiflu. Discussed symptomatic management, namely needing to increase fluid intake. This combined with Mucinex should help clear her sputum. Given length of symptoms, if she is still feeling worse this Friday, she will call and I will send in an abx. Return in about 3 days (around 2021). Subjective/Objective:  HPI  URI symptoms- Symptoms started Friday- productive white cough, hoarseness, and general myalgias. Went to urgent care and was Covid negative. Out of window for benefit from Tamiflu if influenza. Denies cp, sob, nausea, emesis, diarrhea or known covid exposures. Pt's symptom of most concern is her thick white sputum. Physical Exam  Blood pressure (!) 139/95, pulse 98, temperature 96.9 °F (36.1 °C), temperature source Oral, resp. rate 16, height 5' 4\" (1.626 m), weight 234 lb (106.1 kg), SpO2 97 %. Body mass index is 40.17 kg/m². General appearance - Alert, NAD, speaking in hoarse voice. Head - Atraumatic. Normocephalic. No lymphadenopathy  Eyes - EOMI. Sclera white. Ears - Hearing grossly normal.    Nose - Nares patent, no polyps  Throat - Clear pharynx, no tonsillar exudates   Respiratory - LCTAB. No wheeze/rale/rhonchi  Heart - Normal rate, regular rhythm. No m/r/r  Abdomen - Soft, non tender. Non distended. Neurological - No focal deficits. Speech normal.   Musculoskeletal - Normal ROM, Gait normal.    Extremities - No LE edema. Distal pulses intact  Skin - normal coloration and normal turgor. No cyanosis, no rash.        Medical History- Reviewed Social History- Reviewed Surgical History- Reviewed   Jeramie Walden has a past medical history of Anemia NEC, Arthritis, Chronic pain, Depression, GERD (gastroesophageal reflux disease), Hypertension, Hypertension, Musculoskeletal disorder, SOB (shortness of breath), and Stool color black. Kristen Jimenes reports that she quit smoking about 3 years ago. Her smoking use included cigarettes. She has a 2.00 pack-year smoking history. She has never used smokeless tobacco. She reports current alcohol use of about 1.0 standard drink of alcohol per week. She reports that she does not use drugs. Kristen Jimenes has a past surgical history that includes hx gyn; hx heent (2002); upper gi endoscopy,diagnosis (6/7/2018); and upper gi endoscopy,biopsy (6/21/2018).        Problem List- Reviewed   Kristen Jimenes has Depression, GERD (gastroesophageal reflux disease), Anemia, unspecified, Itch of skin, Anxiety, Costochondritis, HSV (herpes simplex virus) anogenital infection, OCD (obsessive compulsive disorder), Hoarseness, IFG (impaired fasting glucose), Hyperlipidemia, Thrombosed external hemorrhoid, Vitamin D deficiency, Inflammatory arthritis, Recurrent depression ([address] Mood disorder ([address] Chronic pain syndrome, Fibromyalgia, Panic attack, Tremor, Class 2 obesity due to excess calories without serious comorbidity in adult, Gastroparesis, Plantar fasciitis of left foot, Positive KITTY (antinuclear antibody), Severe obesity ([address] and Neuropathy on their problem list.     Current Outpatient Medications   Medication Instructions    acetaminophen (TYLENOL 8 HOUR PO) Oral    albuterol (PROVENTIL HFA, VENTOLIN HFA, PROAIR HFA) 90 mcg/actuation inhaler 1 Puff, Inhalation, EVERY 6 HOURS AS NEEDED    albuterol-ipratropium (DUO-NEB) 2.5 mg-0.5 mg/3 ml nebu 3 mL, Nebulization, EVERY 6 HOURS AS NEEDED    cholecalciferol, vitamin D3, (VITAMIN D3) 2,000 unit tab Oral    clonazePAM (KLONOPIN) 1.5 mg, Oral, DAILY, At bedtime    Comp Stocking,Knee,Regular,Med misc 1 Each, Does Not Apply, DAILY    cyclobenzaprine (FLEXERIL) 10 mg tablet 3 TIMES DAILY AS NEEDED, Has not needed    diclofenac EC (VOLTAREN) 50 mg, Oral, 2 TIMES DAILY AS NEEDED    diphenhydrAMINE (BENADRYL) 50 mg, Oral, EVERY 6 HOURS AS NEEDED    escitalopram oxalate (LEXAPRO) 20 mg, Oral, DAILY    famotidine (PEPCID) 20 mg, Oral, 3 TIMES DAILY WITH MEALS    ferrous sulfate 325 mg (65 mg iron) tablet TAKE ONE TABLET BY MOUTH EVERY DAY BEFORE BREAKFAST    folic acid (FOLVITE) 1 mg tablet TAKE 1 TABLET EVERY DAY    furosemide (LASIX) 10 mg, Oral, DAILY    gabapentin (NEURONTIN) 600 mg, 3 TIMES DAILY    ketoconazole (NIZORAL) 2 % shampoo shampoo twice weekly    levocetirizine (XYZAL) 5 mg, Oral, DAILY    LORazepam (ATIVAN) 2 mg, Oral, EVERY 8 HOURS AS NEEDED    metFORMIN (GLUCOPHAGE) 500 mg tablet Once a day    montelukast (SINGULAIR) 10 mg tablet TAKE 1 TABLET EVERY DAY    norgestimate-ethinyl estradioL (ORTHO-CYCLEN, SPRINTEC) 0.25-35 mg-mcg tab 1 Tablet, Oral, DAILY, Generic    pantoprazole (PROTONIX) 40 mg, Oral, 2 TIMES DAILY    traZODone (DESYREL) 50 mg, Oral, EVERY BEDTIME    triamcinolone acetonide (KENALOG) 0.1 % ointment Topical, 2 TIMES DAILY, use thin layer            An electronic signature was used to authenticate this note.   -- Aparna Ivey MD

## 2021-11-25 ENCOUNTER — HOSPITAL ENCOUNTER (EMERGENCY)
Age: 41
Discharge: HOME | End: 2021-11-25
Attending: STUDENT IN AN ORGANIZED HEALTH CARE EDUCATION/TRAINING PROGRAM
Payer: MEDICARE

## 2021-11-25 VITALS
HEIGHT: 64 IN | WEIGHT: 231 LBS | BODY MASS INDEX: 39.44 KG/M2 | RESPIRATION RATE: 17 BRPM | TEMPERATURE: 97.7 F | HEART RATE: 104 BPM | OXYGEN SATURATION: 98 % | SYSTOLIC BLOOD PRESSURE: 124 MMHG | DIASTOLIC BLOOD PRESSURE: 80 MMHG

## 2021-11-25 DIAGNOSIS — R68.89 SYMPTOM OF DRUG WITHDRAWAL: ICD-10-CM

## 2021-11-25 DIAGNOSIS — F41.1 ANXIETY STATE: Primary | ICD-10-CM

## 2021-11-25 LAB
ALBUMIN SERPL-MCNC: 3.2 G/DL (ref 3.5–5)
ALBUMIN/GLOB SERPL: 0.8 {RATIO} (ref 1.1–2.2)
ALP SERPL-CCNC: 92 U/L (ref 45–117)
ALT SERPL-CCNC: 67 U/L (ref 12–78)
ANION GAP SERPL CALC-SCNC: 6 MMOL/L (ref 5–15)
APPEARANCE UR: ABNORMAL
AST SERPL-CCNC: 29 U/L (ref 15–37)
BACTERIA URNS QL MICRO: NEGATIVE /HPF
BASOPHILS # BLD: 0 K/UL (ref 0–0.1)
BASOPHILS NFR BLD: 1 % (ref 0–1)
BILIRUB SERPL-MCNC: 0.5 MG/DL (ref 0.2–1)
BILIRUB UR QL: NEGATIVE
BUN SERPL-MCNC: 9 MG/DL (ref 6–20)
BUN/CREAT SERPL: 13 (ref 12–20)
CALCIUM SERPL-MCNC: 9.4 MG/DL (ref 8.5–10.1)
CHLORIDE SERPL-SCNC: 108 MMOL/L (ref 97–108)
CO2 SERPL-SCNC: 25 MMOL/L (ref 21–32)
COLOR UR: ABNORMAL
CREAT SERPL-MCNC: 0.72 MG/DL (ref 0.55–1.02)
DIFFERENTIAL METHOD BLD: NORMAL
EOSINOPHIL # BLD: 0.3 K/UL (ref 0–0.4)
EOSINOPHIL NFR BLD: 4 % (ref 0–7)
EPITH CASTS URNS QL MICRO: ABNORMAL /LPF
ERYTHROCYTE [DISTWIDTH] IN BLOOD BY AUTOMATED COUNT: 13.1 % (ref 11.5–14.5)
GLOBULIN SER CALC-MCNC: 4 G/DL (ref 2–4)
GLUCOSE SERPL-MCNC: 95 MG/DL (ref 65–100)
GLUCOSE UR STRIP.AUTO-MCNC: NEGATIVE MG/DL
HCG UR QL: NEGATIVE
HCT VFR BLD AUTO: 35.6 % (ref 35–47)
HGB BLD-MCNC: 11.8 G/DL (ref 11.5–16)
HGB UR QL STRIP: NEGATIVE
IMM GRANULOCYTES # BLD AUTO: 0 K/UL (ref 0–0.04)
IMM GRANULOCYTES NFR BLD AUTO: 0 % (ref 0–0.5)
KETONES UR QL STRIP.AUTO: ABNORMAL MG/DL
LEUKOCYTE ESTERASE UR QL STRIP.AUTO: ABNORMAL
LYMPHOCYTES # BLD: 2.5 K/UL (ref 0.8–3.5)
LYMPHOCYTES NFR BLD: 35 % (ref 12–49)
MCH RBC QN AUTO: 31 PG (ref 26–34)
MCHC RBC AUTO-ENTMCNC: 33.1 G/DL (ref 30–36.5)
MCV RBC AUTO: 93.4 FL (ref 80–99)
MONOCYTES # BLD: 0.5 K/UL (ref 0–1)
MONOCYTES NFR BLD: 7 % (ref 5–13)
NEUTS SEG # BLD: 3.9 K/UL (ref 1.8–8)
NEUTS SEG NFR BLD: 53 % (ref 32–75)
NITRITE UR QL STRIP.AUTO: NEGATIVE
NRBC # BLD: 0 K/UL (ref 0–0.01)
NRBC BLD-RTO: 0 PER 100 WBC
PH UR STRIP: 5.5 [PH] (ref 5–8)
PLATELET # BLD AUTO: 285 K/UL (ref 150–400)
PMV BLD AUTO: 9.8 FL (ref 8.9–12.9)
POTASSIUM SERPL-SCNC: 3.8 MMOL/L (ref 3.5–5.1)
PROT SERPL-MCNC: 7.2 G/DL (ref 6.4–8.2)
PROT UR STRIP-MCNC: NEGATIVE MG/DL
RBC # BLD AUTO: 3.81 M/UL (ref 3.8–5.2)
RBC #/AREA URNS HPF: ABNORMAL /HPF (ref 0–5)
SODIUM SERPL-SCNC: 139 MMOL/L (ref 136–145)
SP GR UR REFRACTOMETRY: 1.02 (ref 1–1.03)
TROPONIN-HIGH SENSITIVITY: 4 NG/L (ref 0–51)
UA: UC IF INDICATED,UAUC: ABNORMAL
UROBILINOGEN UR QL STRIP.AUTO: 1 EU/DL (ref 0.2–1)
WBC # BLD AUTO: 7.1 K/UL (ref 3.6–11)
WBC URNS QL MICRO: ABNORMAL /HPF (ref 0–4)
YEAST URNS QL MICRO: PRESENT

## 2021-11-25 PROCEDURE — 81025 URINE PREGNANCY TEST: CPT

## 2021-11-25 PROCEDURE — 93005 ELECTROCARDIOGRAM TRACING: CPT

## 2021-11-25 PROCEDURE — 99284 EMERGENCY DEPT VISIT MOD MDM: CPT

## 2021-11-25 PROCEDURE — 81001 URINALYSIS AUTO W/SCOPE: CPT

## 2021-11-25 PROCEDURE — 36415 COLL VENOUS BLD VENIPUNCTURE: CPT

## 2021-11-25 PROCEDURE — 96375 TX/PRO/DX INJ NEW DRUG ADDON: CPT

## 2021-11-25 PROCEDURE — 85025 COMPLETE CBC W/AUTO DIFF WBC: CPT

## 2021-11-25 PROCEDURE — 84484 ASSAY OF TROPONIN QUANT: CPT

## 2021-11-25 PROCEDURE — 80053 COMPREHEN METABOLIC PANEL: CPT

## 2021-11-25 PROCEDURE — 74011250636 HC RX REV CODE- 250/636: Performed by: NURSE PRACTITIONER

## 2021-11-25 PROCEDURE — 96374 THER/PROPH/DIAG INJ IV PUSH: CPT

## 2021-11-25 RX ORDER — DIPHENHYDRAMINE HYDROCHLORIDE 50 MG/ML
50 INJECTION, SOLUTION INTRAMUSCULAR; INTRAVENOUS
Status: COMPLETED | OUTPATIENT
Start: 2021-11-25 | End: 2021-11-25

## 2021-11-25 RX ORDER — LORAZEPAM 2 MG/ML
1 INJECTION INTRAMUSCULAR ONCE
Status: COMPLETED | OUTPATIENT
Start: 2021-11-25 | End: 2021-11-25

## 2021-11-25 RX ORDER — DIPHENHYDRAMINE HCL 50 MG
50 CAPSULE ORAL
Qty: 15 CAPSULE | Refills: 0 | Status: SHIPPED | OUTPATIENT
Start: 2021-11-25 | End: 2021-12-05

## 2021-11-25 RX ADMIN — LORAZEPAM 1 MG: 2 INJECTION INTRAMUSCULAR; INTRAVENOUS at 10:25

## 2021-11-25 RX ADMIN — DIPHENHYDRAMINE HYDROCHLORIDE 50 MG: 50 INJECTION, SOLUTION INTRAMUSCULAR; INTRAVENOUS at 11:15

## 2021-11-25 RX ADMIN — SODIUM CHLORIDE 1000 ML: 9 INJECTION, SOLUTION INTRAVENOUS at 10:23

## 2021-11-25 NOTE — ED TRIAGE NOTES
Patient arrives to ED with complaints of anxiety ( denies SI/HI)     Patient reports stopping abilify    Patient taking clonopin, trazodone, and lorazepam     Patient also diagnosed with upper respiratory infection and feeling like shes going to pass out

## 2021-11-25 NOTE — ED NOTES
Patient given discharge instructions and verbalized understanding. Patient reports she has friend driving her home.

## 2021-11-25 NOTE — ED PROVIDER NOTES
This is a 37-year-old female who presents ambulatory to the emergency room with complaints of increased anxiety. Patient states she started Abilify 2-1/2 weeks ago her psychiatrist abruptly stopped it 2 days ago for increased irritability and \"shakiness. \"  Patient is taking Klonopin, trazodone and lorazepam as well as Lexapro. Adds that her Lexapro was also decreased secondary to her \"shakiness. \"  Patient is currently denying any suicidal ideations, homicidal ideations but states that she is feeling like she can \"not stop moving. \"  Also adds [facility] with [facility] upper respiratory infection. States that she thought she was going to pass out multiple times over the past 24-48 hrs coinciding with when she stopped her Abilify. Comes to the emergency room for further evaluation because she \"just cannot stop. \"  Denies any chest pain but does state that she is having palpitations. Denies any shortness of breath, dizziness, nausea or vomiting, fever or chills. Noted to have a heart rate of 104 in triage. There are no further complaints at this time.     Jarad Freitas MD  Past Medical History:  No date: Anemia NEC  No date: Arthritis  No date: Chronic pain      Comment:  fybromyalsia  No date: Depression      Comment:  anxiety, depression, OCD  No date: GERD (gastroesophageal reflux disease)  No date: Hypertension  No date: Hypertension  No date: Musculoskeletal disorder  No date: SOB (shortness of breath)  No date: Stool color black  Past Surgical History:  No date: HX GYN      Comment:     2002: HX HEENT      Comment:  wisdom teeth extraction  2018: UPPER GI ENDOSCOPY,BIOPSY      Comment:     2018: UPPER GI ENDOSCOPY,DIAGNOSIS      Comment:                Past Medical History:   Diagnosis Date    Anemia NEC     Arthritis     Chronic pain     fybromyalsia    Depression     anxiety, depression, OCD    GERD (gastroesophageal reflux disease)     Hypertension     Hypertension     Musculoskeletal disorder     SOB (shortness of breath)     Stool color black        Past Surgical History:   Procedure Laterality Date    HX GYN           HX HEENT  2002    wisdom teeth extraction    UPPER GI ENDOSCOPY,BIOPSY  2018         UPPER GI ENDOSCOPY,DIAGNOSIS  2018              Family History:   Problem Relation Age of Onset    Hypertension Father     Elevated Lipids Father     Arthritis-rheumatoid Mother         ? Lupus vs RA    Lung Disease Mother     Heart Disease Mother     Cancer Mother         breast in 39y    COPD Mother     Hypertension Mother     Stroke Mother         3-4 strokes    Breast Cancer Mother 50    Diabetes Maternal Grandmother        Social History     Socioeconomic History    Marital status: SINGLE     Spouse name: Not on file    Number of children: Not on file    Years of education: Not on file    Highest education level: Not on file   Occupational History    Not on file   Tobacco Use    Smoking status: Former Smoker     Packs/day: 0.25     Years: 8.00     Pack years: 2.00     Types: Cigarettes     Quit date: 9/3/2018     Years since quitting: 3.2    Smokeless tobacco: Never Used   Substance and Sexual Activity    Alcohol use: Yes     Alcohol/week: 1.0 standard drink     Types: 1 Glasses of wine per week     Comment: 1 cup of wine/week    Drug use: No    Sexual activity: Yes     Partners: Male     Birth control/protection: None   Other Topics Concern    Not on file   Social History Narrative    Not on file     Social Determinants of Health     Financial Resource Strain:     Difficulty of Paying Living Expenses: Not on file   Food Insecurity:     Worried About Running Out of Food in the Last Year: Not on file    Omi of Food in the Last Year: Not on file   Transportation Needs:     Lack of Transportation (Medical): Not on file    Lack of Transportation (Non-Medical):  Not on file   Physical Activity:     Days of Exercise per Week: Not on file    Minutes of Exercise per Session: Not on file   Stress:     Feeling of Stress : Not on file   Social Connections:     Frequency of Communication with Friends and Family: Not on file    Frequency of Social Gatherings with Friends and Family: Not on file    Attends Druze Services: Not on file    Active Member of [address] [place] [address] or Organizations: Not on file    Attends Club or Organization Meetings: Not on file    Marital Status: Not on file   Intimate Partner Violence:     Fear of Current or Ex-Partner: Not on file    Emotionally Abused: Not on file    Physically Abused: Not on file    Sexually Abused: Not on file   Housing Stability:     Unable to Pay for Housing in the Last Year: Not on file    Number of [place] in the Last Year: Not on file    Unstable Housing in the Last Year: Not on file         ALLERGIES: Sulfa (sulfonamide antibiotics) and Vicodin [hydrocodone-acetaminophen]    Review of Systems   Constitutional: Negative for appetite change, chills, diaphoresis, fatigue and fever. HENT: Negative for congestion, ear discharge, ear pain, sinus pressure, sinus pain, sore throat and trouble swallowing. Eyes: Negative for photophobia, pain, redness and visual disturbance. Respiratory: Negative for chest tightness, shortness of breath and wheezing. Cardiovascular: Positive for palpitations. Negative for chest pain. Gastrointestinal: Negative for abdominal distention, abdominal pain, nausea and vomiting. Endocrine: Negative. Genitourinary: Negative for difficulty urinating, flank pain, frequency and urgency. Musculoskeletal: Negative for back pain, neck pain and neck stiffness. Skin: Negative for color change, pallor, rash and wound. Allergic/Immunologic: Negative. Neurological: Positive for weakness (feels generally weak). Negative for dizziness, speech difficulty and headaches.    Hematological: Does not bruise/bleed easily. Psychiatric/Behavioral: Negative for behavioral problems. The patient is nervous/anxious and is hyperactive. Vitals:    11/25/21 0943   BP: (!) 142/96   Pulse: (!) 104   Resp: 17   Temp: 97.7 °F (36.5 °C)   SpO2: 98%   Weight: 104.8 kg (231 lb)   Height: 5' 4\" (1.626 m)            Physical Exam  Vitals and nursing note reviewed. Constitutional:       General: She is not in acute distress. Appearance: She is well-developed. She is not ill-appearing. Comments: Anxious appearing female in no apparent distress. HENT:      Head: Normocephalic and atraumatic. Right Ear: External ear normal.      Left Ear: External ear normal.      Nose: Nose normal. No congestion. Mouth/Throat:      Mouth: Mucous membranes are moist.   Eyes:      General:         Right eye: No discharge. Left eye: No discharge. Conjunctiva/sclera: Conjunctivae normal.      Pupils: Pupils are equal, round, and reactive to light. Neck:      Vascular: No JVD. Trachea: No tracheal deviation. Cardiovascular:      Rate and Rhythm: Regular rhythm. Tachycardia present. Pulses: Normal pulses. Heart sounds: Normal heart sounds. No murmur heard. No gallop. Pulmonary:      Effort: Pulmonary effort is normal. No respiratory distress. Breath sounds: Normal breath sounds. No wheezing or rales. Chest:      Chest wall: No tenderness. Abdominal:      General: Bowel sounds are normal. There is no distension. Palpations: Abdomen is soft. Tenderness: There is no abdominal tenderness. There is no guarding or rebound. Genitourinary:     Comments: Negative    Musculoskeletal:         General: No tenderness. Normal range of motion. Cervical back: Normal range of motion and neck supple. Skin:     General: Skin is warm and dry. Capillary Refill: Capillary refill takes less than 2 seconds. Coloration: Skin is not pale. Findings: No erythema or rash. Neurological:      General: No focal deficit present. Mental Status: She is alert and oriented to person, place, and time. Motor: No weakness. Coordination: Coordination normal.   Psychiatric:         Behavior: Behavior normal.         Thought Content: Thought content normal.         Judgment: Judgment normal.      Comments: Anxious appearing female who is constantly shaking her feet and legs          MDM  Number of Diagnoses or Management Options  Anxiety state: new and requires workup  Symptom of drug withdrawal: new and requires workup  Diagnosis management comments: Differential diagnosis includes anxiety, panic attack, withdrawal symptoms. and others. After physical examination and review of laboratory data as well as discussion with Dr. Paco Caban, patient was reevaluated and has decreased symptoms. Discharged home and follow-up with PCP. Return to the emergency room with worsening symptoms. Patient in agreement with plan of care. Amount and/or Complexity of Data Reviewed  Clinical lab tests: ordered and reviewed         ED EKG interpretation:0943 AM  Rhythm: normal sinus rhythm; and regular . Rate (approx.): 98; Axis: normal; P wave: normal; QRS interval: normal ; ST/T wave: normal; Other findings: normal. This EKG was interpreted by Kendall Carrera MD,ED Provider. Labs Reviewed   METABOLIC PANEL, COMPREHENSIVE - Abnormal; Notable for the following components:       Result Value    Albumin 3.2 (*)     A-G Ratio 0.8 (*)     All other components within normal limits   URINALYSIS W/ REFLEX CULTURE - Abnormal; Notable for the following components:    Appearance CLOUDY (*)     Ketone TRACE (*)     Leukocyte Esterase MODERATE (*)     Yeast PRESENT (*)     All other components within normal limits   CBC WITH AUTOMATED DIFF   TROPONIN-HIGH SENSITIVITY   HCG URINE, QL. - POC     No results found. 12:15 PM  Pt has been reexamined. Pt has no new complaints, changes or physical findings. Care plan outlined and precautions discussed. All available results were reviewed with pt. All medications were reviewed with pt. All of pt's questions and concerns were addressed. Pt agrees to F/U as instructed and agrees to return to ED upon further deterioration. Pt is ready to go home. Jl Duncan NP    Please note that this dictation was completed with [employer], the computer voice recognition software. Quite often unanticipated grammatical, syntax, homophones, and other interpretive errors are inadvertently transcribed by the computer software. Please disregard these errors. Please excuse any errors that have escaped final proofreading. Thank you.     Procedures

## 2021-11-25 NOTE — Clinical Note
[address]  [facility] EMERGENCY DEPT  [address] [id number] [phone]    Work/School Note    Date: 11/25/2021    To Whom It May concern:    Alex Gordillo was seen and treated today in the emergency room by the following provider(s):  Attending Provider: Jhon Grady MD  Nurse Practitioner: Dedra Doshi NP. Alex Gordillo is excused from work/school on 11/25/21 and 11/26/21. She is medically clear to return to work/school on 11/27/2021.        Sincerely,          Marlys Orona NP

## 2021-11-25 NOTE — Clinical Note
[address]  [facility] EMERGENCY DEPT  [address] [id number]  [phone]    Work/School Note    Date: 11/25/2021    To Whom It May concern:    Heather Jacobo was seen and treated today in the emergency room by the following provider(s):  Attending Provider: Winston Rizo MD  Nurse Practitioner: Yvrose Chandra NP. Heather Jacobo is excused from work/school on 11/25/21 and 11/26/21. She is medically clear to return to work/school on 11/27/2021.        Sincerely,          Romina Zhang NP

## 2021-11-28 LAB
ATRIAL RATE: 98 BPM
CALCULATED P AXIS, ECG09: 49 DEGREES
CALCULATED R AXIS, ECG10: 36 DEGREES
CALCULATED T AXIS, ECG11: 18 DEGREES
DIAGNOSIS, 93000: NORMAL
P-R INTERVAL, ECG05: 148 MS
Q-T INTERVAL, ECG07: 364 MS
QRS DURATION, ECG06: 90 MS
QTC CALCULATION (BEZET), ECG08: 464 MS
VENTRICULAR RATE, ECG03: 98 BPM

## 2021-11-29 RX ORDER — KETOROLAC TROMETHAMINE 30 MG/ML
30 INJECTION, SOLUTION INTRAMUSCULAR; INTRAVENOUS ONCE
Status: SHIPPED | OUTPATIENT
Start: 2021-11-29 | End: 2021-11-29

## 2021-12-02 ENCOUNTER — OFFICE VISIT (OUTPATIENT)
Dept: FAMILY MEDICINE CLINIC | Age: 41
End: 2021-12-02
Payer: MEDICARE

## 2021-12-02 VITALS
RESPIRATION RATE: 20 BRPM | SYSTOLIC BLOOD PRESSURE: 140 MMHG | TEMPERATURE: 97.1 F | HEIGHT: 64 IN | HEART RATE: 100 BPM | WEIGHT: 236 LBS | OXYGEN SATURATION: 96 % | BODY MASS INDEX: 40.29 KG/M2 | DIASTOLIC BLOOD PRESSURE: 100 MMHG

## 2021-12-02 DIAGNOSIS — Z00.00 MEDICARE ANNUAL WELLNESS VISIT, SUBSEQUENT: Primary | ICD-10-CM

## 2021-12-02 DIAGNOSIS — F32.A DEPRESSION, UNSPECIFIED DEPRESSION TYPE: Chronic | ICD-10-CM

## 2021-12-02 DIAGNOSIS — G25.81 RLS (RESTLESS LEGS SYNDROME): ICD-10-CM

## 2021-12-02 PROCEDURE — 99214 OFFICE O/P EST MOD 30 MIN: CPT | Performed by: FAMILY MEDICINE

## 2021-12-02 PROCEDURE — G8427 DOCREV CUR MEDS BY ELIG CLIN: HCPCS | Performed by: FAMILY MEDICINE

## 2021-12-02 PROCEDURE — G8753 SYS BP > OR = 140: HCPCS | Performed by: FAMILY MEDICINE

## 2021-12-02 PROCEDURE — G8755 DIAS BP > OR = 90: HCPCS | Performed by: FAMILY MEDICINE

## 2021-12-02 PROCEDURE — G8417 CALC BMI ABV UP PARAM F/U: HCPCS | Performed by: FAMILY MEDICINE

## 2021-12-02 PROCEDURE — G9717 DOC PT DX DEP/BP F/U NT REQ: HCPCS | Performed by: FAMILY MEDICINE

## 2021-12-02 PROCEDURE — G0439 PPPS, SUBSEQ VISIT: HCPCS | Performed by: FAMILY MEDICINE

## 2021-12-02 RX ORDER — BENZTROPINE MESYLATE 1 MG/1
1 TABLET ORAL 2 TIMES DAILY
Status: ON HOLD | COMMUNITY
Start: 2021-12-01 | End: 2022-03-03

## 2021-12-02 RX ORDER — ROPINIROLE 0.25 MG/1
0.25 TABLET, FILM COATED ORAL
Qty: 30 TABLET | Refills: 0 | Status: SHIPPED | OUTPATIENT
Start: 2021-12-02 | End: 2021-12-15 | Stop reason: SDUPTHER

## 2021-12-02 NOTE — PROGRESS NOTES
1. Have you been to the ER, urgent care clinic since your last visit? Hospitalized since your last visit? No    2. Have you seen or consulted any other health care providers outside of the [address] [place] [address] since your last visit? Include any pap smears or colon screening. No    Reviewed record in preparation for visit and have necessary documentation  Goals that were addressed and/or need to be completed during or after this appointment include     Health Maintenance Due   Topic Date Due    Medicare Yearly Exam  Never done    Flu Vaccine (1) Never done       Patient is accompanied by self I have received verbal consent from Razia Almanzar to discuss any/all medical information while they are present in the room.

## 2021-12-02 NOTE — PROGRESS NOTES
[facility]    History of Present Illness:   Viry Daniel is a 46 y.o. female with history of  HTN, GERD, IFG, Depression, Migraines  CC: Restless legs  History provided by patient and Records    HPI:  Restless Legs: Patient with 2 weeks of RLS during day and night but worst with laying flat. Has tried Diclofenac. Patient was on Gabapentin and stopped 1 month ago, just restarted, but feels was not helping with pain either. Bipolar/Psych: Patient is seeing Psychiatry and has be changing medications. Stopped Abilify due to side effects, still on Klonopin/Lorazepam, and Lexapro. States not very effective. Trazodone also not very effective. Health Maintenance  Health Maintenance Due   Topic Date Due    Medicare Yearly Exam  Never done    Flu Vaccine (1) Never done       Past Medical, Family, and Social History:     Current Outpatient Medications on File Prior to Visit   Medication Sig Dispense Refill    benztropine (COGENTIN) 1 mg tablet Take 1 mg by mouth two (2) times a day.  diphenhydrAMINE (BENADRYL) 50 mg capsule Take 1 Capsule by mouth every six (6) hours as needed (anxiety) for up to 10 days. 15 Capsule 0    famotidine (Pepcid) 20 mg tablet Take 20 mg by mouth three (3) times daily (with meals).  diclofenac EC (VOLTAREN) 50 mg EC tablet Take 1 Tablet by mouth two (2) times daily as needed for Pain. 30 Tablet 0    montelukast (SINGULAIR) 10 mg tablet TAKE 1 TABLET EVERY DAY 90 Tablet 1    cyclobenzaprine (FLEXERIL) 10 mg tablet three (3) times daily as needed. Has not needed      ferrous sulfate 325 mg (65 mg iron) tablet TAKE ONE TABLET BY MOUTH EVERY DAY BEFORE BREAKFAST 90 Tablet 1    gabapentin (NEURONTIN) 600 mg tablet 600 mg three (3) times daily.  metFORMIN (GLUCOPHAGE) 500 mg tablet Once a day      escitalopram oxalate (LEXAPRO) 20 mg tablet Take 20 mg by mouth daily.  levocetirizine (XYZAL) 5 mg tablet Take 1 Tablet by mouth daily.  90 Tablet 1    folic acid (FOLVITE) 1 mg tablet TAKE 1 TABLET EVERY DAY 90 Tablet 1    pantoprazole (PROTONIX) 40 mg tablet Take 1 Tablet by mouth two (2) times a day. 180 Tablet 1    norgestimate-ethinyl estradioL (ORTHO-CYCLEN, SPRINTEC) 0.25-35 mg-mcg tab Take 1 Tab by mouth daily. Generic 3 Dose Pack 3    albuterol-ipratropium (DUO-NEB) 2.5 mg-0.5 mg/3 ml nebu 3 mL by Nebulization route every six (6) hours as needed (wheeze). 30 Nebule 3    ketoconazole (NIZORAL) 2 % shampoo shampoo twice weekly 1 Bottle 11    albuterol (PROVENTIL HFA, VENTOLIN HFA, PROAIR HFA) 90 mcg/actuation inhaler Take 1 Puff by inhalation every six (6) hours as needed for Wheezing. 1 Inhaler 5    LORazepam (ATIVAN) 2 mg tablet Take 2 mg by mouth every eight (8) hours as needed.  Comp Stocking,Knee,Regular,Med misc 1 Each by Does Not Apply route daily. 1 Each 1    acetaminophen (TYLENOL 8 HOUR PO) Take  by mouth.  clonazePAM (KLONOPIN) 1 mg tablet Take 1.5 mg by mouth daily. At bedtime       cholecalciferol, vitamin D3, (VITAMIN D3) 2,000 unit tab Take  by mouth.  traZODone (DESYREL) 50 mg tablet Take 1 Tab by mouth nightly. (Patient taking differently: Take 150 mg by mouth nightly.) 30 Tab 6    furosemide (LASIX) 20 mg tablet Take 0.5 Tablets by mouth daily. (Patient taking differently: Take 10 mg by mouth daily. As needed) 45 Tablet 1    triamcinolone acetonide (KENALOG) 0.1 % ointment Apply  to affected area two (2) times a day. use thin layer 85 g 11     No current facility-administered medications on file prior to visit. Patient Active Problem List   Diagnosis Code    Depression F32. A    GERD (gastroesophageal reflux disease) K21.9    Anemia, unspecified D64.9    Itch of skin L29.9    Anxiety F41.9    Costochondritis M94.0    HSV (herpes simplex virus) anogenital infection A60.9    OCD (obsessive compulsive disorder) F42.9    Hoarseness R49.0    IFG (impaired fasting glucose) R73.01    Hyperlipidemia E78.5    Thrombosed external hemorrhoid K64.5    Vitamin D deficiency E55.9    Inflammatory arthritis M19.90    Recurrent depression (HCC) F33.9    Mood disorder (HCC) F39    Chronic pain syndrome G89.4    Fibromyalgia M79.7    Panic attack F41.0    Tremor R25.1    Class 2 obesity due to excess calories without serious comorbidity in adult E66.09    Gastroparesis K31.84    Plantar fasciitis of left foot M72.2    Positive KITTY (antinuclear antibody) R76.8    Severe obesity (HCC) E66.01    Neuropathy G62.9       Social History     Socioeconomic History    Marital status: SINGLE     Spouse name: Not on file    Number of children: Not on file    Years of education: Not on file    Highest education level: Not on file   Occupational History    Not on file   Tobacco Use    Smoking status: Former Smoker     Packs/day: 0.25     Years: 8.00     Pack years: 2.00     Types: Cigarettes     Quit date: 9/3/2018     Years since quitting: 3.2    Smokeless tobacco: Never Used   Substance and Sexual Activity    Alcohol use: Yes     Alcohol/week: 1.0 standard drink     Types: 1 Glasses of wine per week     Comment: 1 cup of wine/week    Drug use: No    Sexual activity: Yes     Partners: Male     Birth control/protection: None   Other Topics Concern    Not on file   Social History Narrative    Not on file     Social Determinants of Health     Financial Resource Strain:     Difficulty of Paying Living Expenses: Not on file   Food Insecurity:     Worried About Running Out of Food in the Last Year: Not on file    Omi of Food in the Last Year: Not on file   Transportation Needs:     Lack of Transportation (Medical): Not on file    Lack of Transportation (Non-Medical):  Not on file   Physical Activity:     Days of Exercise per Week: Not on file    Minutes of Exercise per Session: Not on file   Stress:     Feeling of Stress : Not on file   Social Connections:     Frequency of Communication with Friends and Family: Not on file    Frequency of Social Gatherings with Friends and Family: Not on file    Attends Mu-ism Services: Not on file    Active Member of Clubs or Organizations: Not on file    Attends Club or Organization Meetings: Not on file    Marital Status: Not on file   Intimate Partner Violence:     Fear of Current or Ex-Partner: Not on file    Emotionally Abused: Not on file    Physically Abused: Not on file    Sexually Abused: Not on file   Housing Stability:     Unable to Pay for Housing in the Last Year: Not on file    Number of [place] in the Last Year: Not on file    Unstable Housing in the Last Year: Not on file       Review of Systems   Review of Systems   Constitutional: Negative for chills and fever. HENT: Negative for congestion. Cardiovascular: Negative for chest pain and palpitations. Gastrointestinal: Negative for nausea and vomiting. Musculoskeletal: Negative for myalgias. Neurological: Negative for dizziness and headaches. Objective:     Visit Vitals  BP (!) 140/100 (BP 1 Location: Right arm, BP Patient Position: Sitting, BP Cuff Size: Adult)   Pulse 100   Temp 97.1 °F (36.2 °C) (Oral)   Resp 20   Ht 5' 4\" (1.626 m)   Wt 236 lb (107 kg)   SpO2 96%   BMI 40.51 kg/m²        Physical Exam  Vitals and nursing note reviewed. Constitutional:       Appearance: Normal appearance. HENT:      Head: Normocephalic and atraumatic. Cardiovascular:      Rate and Rhythm: Normal rate and regular rhythm. Pulses: Normal pulses. Heart sounds: Normal heart sounds. No murmur heard. No friction rub. No gallop. Pulmonary:      Effort: Pulmonary effort is normal.      Breath sounds: Normal breath sounds. Abdominal:      General: Abdomen is flat. Bowel sounds are normal.      Palpations: Abdomen is soft. Musculoskeletal:      Cervical back: Normal range of motion and neck supple. Neurological:      Mental Status: She is alert.       Comments: Noting legs appear restless   Psychiatric:         Attention and Perception: Attention and perception normal.         Speech: Speech normal.         Pertinent Labs/Studies:      Assessment and orders:       ICD-10-CM ICD-9-CM    1. RLS (restless legs syndrome)  G25.81 333.94 rOPINIRole (REQUIP) 0.25 mg tablet     Diagnoses and all orders for this visit:    1. RLS (restless legs syndrome)  -     rOPINIRole (REQUIP) 0.25 mg tablet; Take 1 Tablet by mouth nightly. I have discussed the diagnosis with the patient and the intended plan as seen in the above orders. Social history, medical history, and labs were reviewed. The patient has received an after-visit summary and questions were answered concerning future plans. I have discussed medication side effects and warnings with the patient as well.     MD URBAN Montalvo & MARSHAL CARMEN [facility]  12/02/21

## 2021-12-02 NOTE — PROGRESS NOTES
This is the Subsequent Medicare Annual Wellness Exam, performed 12 months or more after the Initial AWV or the last Subsequent AWV    I have reviewed the patient's medical history in detail and updated the computerized patient record. Assessment/Plan   Education and counseling provided:  Are appropriate based on today's review and evaluation  Discussed RLS    1. Medicare annual wellness visit, subsequent  2. RLS (restless legs syndrome)  -     rOPINIRole (REQUIP) 0.25 mg tablet; Take 1 Tablet by mouth nightly., Normal, Disp-30 Tablet, R-0  3. Depression, unspecified depression type     Well Woman exam:  Noelle Mchugh is a 46 y.o. female presenting for well woman exam.     How would you rate your health in generally over the last year? Poor  Has your physical and emotional health limited your social activities with family or friends? yes    Current Complaints? RLS (See attached Problem visit note if applicable)        Healthcare maintenance:   Health Maintenance Due   Topic Date Due    Flu Vaccine (1) Never done     Mammogram indicated? No   Colonoscopy indicated? No   DEXA scan indicated? No   HIV/STI testing indicated? No   Hepatitis C testing indicated? No   Lung cancer screening indicated? No   AAA screening indicated? No     Immunization History   Administered Date(s) Administered    COVID-19, Mary Bill, Primary or Immunocompromised Series, MRNA, PF, 100mcg/0.5mL 03/21/2021, 04/25/2021    Tdap 01/25/2014     Flu indicated? No   Tdap indicated? No   Pneumovax indicated? No   Zostervax indicated? No   Meningococcal indicated?  No      Depression Risk Factor Screening     3 most recent PHQ Screens 12/2/2021   PHQ Not Done -   Little interest or pleasure in doing things Several days   Feeling down, depressed, irritable, or hopeless Several days   Total Score PHQ 2 2   Trouble falling or staying asleep, or sleeping too much -   Feeling tired or having little energy -   Poor appetite, weight loss, or overeating -   Feeling bad about yourself - or that you are a failure or have let yourself or your family down -   Trouble concentrating on things such as school, work, reading, or watching TV -   Moving or speaking so slowly that other people could have noticed; or the opposite being so fidgety that others notice -   Thoughts of being better off dead, or hurting yourself in some way -   PHQ 9 Score -   How difficult have these problems made it for you to do your work, take care of your home and get along with others -       Alcohol Risk Screen    Do you average more than 1 drink per night or more than 7 drinks a week:  No    On any one occasion in the past three months have you have had more than 3 drinks containing alcohol:  No        Functional Ability and Level of Safety    Hearing: Hearing is good. Activities of Daily Living: The home contains: no safety equipment. Patient does total self care      Ambulation: with no difficulty     Fall Risk:  Fall Risk Assessment, last 12 mths 12/21/2020   Able to walk? Yes   Fall in past 12 months? No   Number of falls in past 12 months -   Fall with injury? -      Abuse Screen:  Patient is not abused       Cognitive Screening    Has your family/caregiver stated any concerns about your memory: no         Health Maintenance Due     Health Maintenance Due   Topic Date Due    Flu Vaccine (1) Never done       Patient Care Team   Patient Care Team:  Ciarra Lopez MD as PCP - General (Family Medicine)  Ciarra Lopez MD as PCP - REHABILITATION [facility] Empaneled Provider  Florencio Mcmanus MD (Breast Surgery)  Julien Levy MD (Cardiology)  Kanika Torres MD (Otolaryngology)  Irving Otero MD (Internal Medicine)  Jalen Leger MD (Female Pelvic Medicine and Reconstructive Surgery)  Russell Lao MD (Neurology)    History     Patient Active Problem List   Diagnosis Code    Depression F32. A    GERD (gastroesophageal reflux disease) K21.9    Anemia, unspecified D64.9    Itch of skin L29.9    Anxiety F41.9    Costochondritis M94.0    HSV (herpes simplex virus) anogenital infection A60.9    OCD (obsessive compulsive disorder) F42.9    Hoarseness R49.0    IFG (impaired fasting glucose) R73.01    Hyperlipidemia E78.5    Thrombosed external hemorrhoid K64.5    Vitamin D deficiency E55.9    Inflammatory arthritis M19.90    Recurrent depression (HCC) F33.9    Mood disorder (HCC) F39    Chronic pain syndrome G89.4    Fibromyalgia M79.7    Panic attack F41.0    Tremor R25.1    Class 2 obesity due to excess calories without serious comorbidity in adult E66.09    Gastroparesis K31.84    Plantar fasciitis of left foot M72.2    Positive KITTY (antinuclear antibody) R76.8    Severe obesity (HCC) E66.01    Neuropathy G62.9     Past Medical History:   Diagnosis Date    Anemia NEC     Arthritis     Chronic pain     fybromyalsia    Depression     anxiety, depression, OCD    GERD (gastroesophageal reflux disease)     Hypertension     Hypertension     Musculoskeletal disorder     SOB (shortness of breath)     Stool color black       Past Surgical History:   Procedure Laterality Date    HX GYN           HX HEENT  2002    wisdom teeth extraction    UPPER GI ENDOSCOPY,BIOPSY  2018         UPPER GI ENDOSCOPY,DIAGNOSIS  2018          Current Outpatient Medications   Medication Sig Dispense Refill    benztropine (COGENTIN) 1 mg tablet Take 1 mg by mouth two (2) times a day.  rOPINIRole (REQUIP) 0.25 mg tablet Take 1 Tablet by mouth nightly. 30 Tablet 0    diphenhydrAMINE (BENADRYL) 50 mg capsule Take 1 Capsule by mouth every six (6) hours as needed (anxiety) for up to 10 days. 15 Capsule 0    famotidine (Pepcid) 20 mg tablet Take 20 mg by mouth three (3) times daily (with meals).  diclofenac EC (VOLTAREN) 50 mg EC tablet Take 1 Tablet by mouth two (2) times daily as needed for Pain.  30 Tablet 0    montelukast (SINGULAIR) 10 mg tablet TAKE 1 TABLET EVERY DAY 90 Tablet 1    cyclobenzaprine (FLEXERIL) 10 mg tablet three (3) times daily as needed. Has not needed      ferrous sulfate 325 mg (65 mg iron) tablet TAKE ONE TABLET BY MOUTH EVERY DAY BEFORE BREAKFAST 90 Tablet 1    gabapentin (NEURONTIN) 600 mg tablet 600 mg three (3) times daily.  metFORMIN (GLUCOPHAGE) 500 mg tablet Once a day      escitalopram oxalate (LEXAPRO) 20 mg tablet Take 20 mg by mouth daily.  levocetirizine (XYZAL) 5 mg tablet Take 1 Tablet by mouth daily. 90 Tablet 1    folic acid (FOLVITE) 1 mg tablet TAKE 1 TABLET EVERY DAY 90 Tablet 1    pantoprazole (PROTONIX) 40 mg tablet Take 1 Tablet by mouth two (2) times a day. 180 Tablet 1    norgestimate-ethinyl estradioL (ORTHO-CYCLEN, SPRINTEC) 0.25-35 mg-mcg tab Take 1 Tab by mouth daily. Generic 3 Dose Pack 3    albuterol-ipratropium (DUO-NEB) 2.5 mg-0.5 mg/3 ml nebu 3 mL by Nebulization route every six (6) hours as needed (wheeze). 30 Nebule 3    ketoconazole (NIZORAL) 2 % shampoo shampoo twice weekly 1 Bottle 11    albuterol (PROVENTIL HFA, VENTOLIN HFA, PROAIR HFA) 90 mcg/actuation inhaler Take 1 Puff by inhalation every six (6) hours as needed for Wheezing. 1 Inhaler 5    LORazepam (ATIVAN) 2 mg tablet Take 2 mg by mouth every eight (8) hours as needed.  Comp Stocking,Knee,Regular,Med misc 1 Each by Does Not Apply route daily. 1 Each 1    acetaminophen (TYLENOL 8 HOUR PO) Take  by mouth.  clonazePAM (KLONOPIN) 1 mg tablet Take 1.5 mg by mouth daily. At bedtime       cholecalciferol, vitamin D3, (VITAMIN D3) 2,000 unit tab Take  by mouth.  traZODone (DESYREL) 50 mg tablet Take 1 Tab by mouth nightly. (Patient taking differently: Take 150 mg by mouth nightly.) 30 Tab 6    furosemide (LASIX) 20 mg tablet Take 0.5 Tablets by mouth daily. (Patient taking differently: Take 10 mg by mouth daily.  As needed) 45 Tablet 1    triamcinolone acetonide (KENALOG) 0.1 % ointment Apply  to affected area two (2) times a day. use thin layer 85 g 11     Allergies   Allergen Reactions    Abilify [Aripiprazole] Anxiety     Can not tolerate     Sulfa (Sulfonamide Antibiotics) Hives    Vicodin [Hydrocodone-Acetaminophen] Nausea and Vomiting       Family History   Problem Relation Age of Onset    Hypertension Father     Elevated Lipids Father     Arthritis-rheumatoid Mother         ? Lupus vs RA    Lung Disease Mother     Heart Disease Mother     Cancer Mother         breast in 39y    COPD Mother     Hypertension Mother     Stroke Mother         3-4 strokes    Breast Cancer Mother 50    Diabetes Maternal Grandmother      Social History     Tobacco Use    Smoking status: Former Smoker     Packs/day: 0.25     Years: 8.00     Pack years: 2.00     Types: Cigarettes     Quit date: 9/3/2018     Years since quitting: 3.2    Smokeless tobacco: Never Used   Substance Use Topics    Alcohol use:  Yes     Alcohol/week: 1.0 standard drink     Types: 1 Glasses of wine per week     Comment: 1 cup of wine/week         Corutney Chua MD

## 2021-12-02 NOTE — PATIENT INSTRUCTIONS

## 2021-12-08 ENCOUNTER — PATIENT MESSAGE (OUTPATIENT)
Dept: FAMILY MEDICINE CLINIC | Age: 41
End: 2021-12-08

## 2021-12-08 DIAGNOSIS — G25.81 RLS (RESTLESS LEGS SYNDROME): ICD-10-CM

## 2021-12-12 NOTE — PROGRESS NOTES
75-yr old male walked into the ED c/o Rt Arm Weakness, off balance and light headedness since 10 AM this morning.   Linell Fillers  41 y.o. female  1980  [address]  [id number]     [id number]: Progress Note       Encounter Date: 2019    Chief Complaint   Patient presents with    Breast Problem     needs referral     History of Present Illness   Tamie Yuen is a 41 y.o. female who presents to clinic today for:    Left Breast- States Left breast lump noted in ~. Last mammogram 2018-negative and no evidence of malignancy; next screening in 1 year. States she does not check her breasts regularly. Denies skin changes, pain and nipple discharge. Age of menarche 13yo. Nulliparous. Denies currently smoking cigarettes. Compliant with Sprintec birth control pills. Mom had a mastecomy in her 45s due to breast cancer. She did not have chemo or radiation. Her mother is  but states she  from complications with COPD. Health Maintenance    Health Maintenance Due   Topic Date Due    Pneumococcal 0-64 years (1 of 1 - PPSV23) 1988     Review of Systems   Review of Systems   Constitutional: Negative. HENT: Negative. Eyes: Negative. Respiratory: Negative. Cardiovascular: Negative. Gastrointestinal: Negative. Genitourinary: Negative. Musculoskeletal: Negative. Skin: Negative. Neurological: Negative. Endo/Heme/Allergies: Negative. Psychiatric/Behavioral: Negative. Vitals/Objective:     Vitals:    19 0828   BP: 125/87   Pulse: 89   Resp: 20   Temp: 97.1 °F (36.2 °C)   TempSrc: Oral   SpO2: 98%   Weight: 185 lb 12.8 oz (84.3 kg)   Height: 5' 4\" (1.626 m)     Body mass index is 31.89 kg/m². Physical Exam   Constitutional: She is oriented to person, place, and time. Pulmonary/Chest: Right breast exhibits no inverted nipple, no mass, no nipple discharge, no skin change and no tenderness. Left breast exhibits mass. Left breast exhibits no inverted nipple, no nipple discharge, no skin change and no tenderness. Breasts are symmetrical.       Accompanied by MIGUEL Chamberlain LPN. Right normal breast exam.    Neurological: She is alert and oriented to person, place, and time. Skin: Skin is warm, dry and intact. Psychiatric: She has a normal mood and affect. Her speech is normal and behavior is normal. Judgment and thought content normal. Cognition and memory are normal.       No results found for this or any previous visit (from the past 24 hour(s)). Assessment and Plan:   1. Left breast mass    - [facility] MAMMO LT DX INCL CAD; Future  - [facility] MAMMO BI SCREENING INCL CAD; Future    2. Screening for malignant neoplasm of breast    - [facility] MAMMO LT DX INCL CAD; Future  - [facility] MAMMO BI SCREENING INCL CAD; Future    Ordered imaging for mammogram and diagnostic mammogram if needed for left breast.    I have discussed the diagnosis with the patient and the intended plan as seen in the above orders. she has expressed understanding. The patient has received an after-visit summary and questions were answered concerning future plans. I have discussed medication side effects and warnings with the patient as well. Electronically Signed: Jackie Hester NP     History/Allergies   Patients past medical, surgical and family histories were reviewed and updated. Past Medical History:   Diagnosis Date    Anemia NEC     Arthritis     Chronic pain     fybromyalsia    Depression     anxiety, depression, OCD    GERD (gastroesophageal reflux disease)     Hypertension     Musculoskeletal disorder     SOB (shortness of breath)     Stool color black       Past Surgical History:   Procedure Laterality Date    HX GYN           HX HEENT  2002    wisdom teeth extraction    UPPER GI ENDOSCOPY,BIOPSY  2018         UPPER GI ENDOSCOPY,DIAGNOSIS  2018          Family History   Problem Relation Age of Onset    Hypertension Father     Elevated Lipids Father     Arthritis-rheumatoid Mother         ? Lupus vs RA    Lung Disease Mother     Heart Disease Mother     Cancer Mother         breast in 39y    COPD Mother     Hypertension Mother     Stroke Mother         3-4 strokes    Breast Cancer Mother     Diabetes Maternal Grandmother      Social History     Socioeconomic History    Marital status: SINGLE     Spouse name: Not on file    Number of children: Not on file    Years of education: Not on file    Highest education level: Not on file   Occupational History    Not on file   Social Needs    Financial resource strain: Not on file    Food insecurity:     Worry: Not on file     Inability: Not on file    Transportation needs:     Medical: Not on file     Non-medical: Not on file   Tobacco Use    Smoking status: Current Every Day Smoker     Packs/day: 0.25     Years: 8.00     Pack years: 2.00     Types: Cigarettes     Last attempt to quit: 2018     Years since quittin.8    Smokeless tobacco: Never Used   Substance and Sexual Activity    Alcohol use:  Yes     Alcohol/week: 0.6 oz     Types: 1 Glasses of wine per week     Comment: 1 cup of wine/week    Drug use: No    Sexual activity: Yes     Partners: Male     Birth control/protection: None   Lifestyle    Physical activity:     Days per week: Not on file     Minutes per session: Not on file    Stress: Not on file   Relationships    Social connections:     Talks on phone: Not on file     Gets together: Not on file     Attends Christianity service: Not on file     Active member of club or organization: Not on file     Attends meetings of clubs or organizations: Not on file     Relationship status: Not on file    Intimate partner violence:     Fear of current or ex partner: Not on file     Emotionally abused: Not on file     Physically abused: Not on file     Forced sexual activity: Not on file   Other Topics Concern    Not on file   Social History Narrative    Not on file         Allergies   Allergen Reactions    Sulfa (Sulfonamide Antibiotics) Hives    Vicodin [Hydrocodone-Acetaminophen] Nausea and Vomiting       Disposition     Follow-up and Dispositions  ·   Return if symptoms worsen or fail to improve. Future Appointments   Date Time Provider Komal Rodriguez   5/7/2019 10:10 AM Ping Maynard MD [facility] GIA SCHED            Current Medications after this visit     Current Outpatient Medications   Medication Sig    baclofen (LIORESAL) 10 mg tablet     diclofenac (VOLTAREN) 1 % gel Apply 4 g to affected area four (4) times daily. Apply to right knee    gabapentin (NEURONTIN) 300 mg capsule Take 2 tabs in morning and 2 tabs at nighttime. Indications: fibromyalgia    pantoprazole (PROTONIX) 20 mg tablet Take one tablet by mouth two times a day.  norgestimate-ethinyl estradiol (ORTHO-CYCLEN, SPRINTEC) 0.25-35 mg-mcg tab Take 1 Tab by mouth daily. Indications: Painful Periods    cyclobenzaprine (FLEXERIL) 5 mg tablet Take 1 Tab by mouth two (2) times a day.  pramoxine (PROCTOFOAM) 1 % topical foam Apply  to affected area three (3) times daily as needed for Hemorrhoids.  lamoTRIgine (LAMICTAL) 25 mg tablet     valACYclovir (VALTREX) 500 mg tablet Take 1 Tab by mouth daily.  clonazePAM (KLONOPIN) 1 mg tablet Take 1 mg by mouth daily. At bedtime    cholecalciferol, vitamin D3, (VITAMIN D3) 2,000 unit tab Take  by mouth.  hydroxychloroquine (PLAQUENIL) 200 mg tablet Take 200 mg by mouth daily. Two tabs    LORazepam (ATIVAN) 1 mg tablet Take 1 Tab by mouth every twelve (12) hours. Max Daily Amount: 2 mg.  cyanocobalamin (VITAMIN B-12) 1,000 mcg tablet Take 1,000 mcg by mouth daily.  ascorbic acid, vitamin C, (VITAMIN C) 500 mg tablet Take 500 mg by mouth daily.  albuterol (PROVENTIL HFA, VENTOLIN HFA, PROAIR HFA) 90 mcg/actuation inhaler Take 1 Puff by inhalation every six (6) hours as needed for Wheezing.  hydrocortisone (ANUSOL-HC) 2.5 % rectal cream Insert  into rectum four (4) times daily.  As needed    dextroamphetamine-amphetamine (ADDERALL) 20 mg tablet Take 20 mg by mouth. 1/2 tablet  Twice daily          cetirizine (ZYRTEC) 10 mg tablet Take  by mouth.  FLUoxetine (PROZAC) 40 mg capsule Take 40 mg by mouth daily.  fluticasone (FLONASE) 50 mcg/actuation nasal spray 2 Sprays by Both Nostrils route daily.  ketoconazole (NIZORAL) 2 % shampoo shampoo twice weekly    triamcinolone acetonide (KENALOG) 0.1 % ointment Apply  to affected area two (2) times a day. use thin layer    clonazePAM (KLONOPIN) 0.5 mg tablet Take 1 mg by mouth nightly as needed.  traZODone (DESYREL) 50 mg tablet Take 1 Tab by mouth nightly. No current facility-administered medications for this visit. There are no discontinued medications.

## 2021-12-15 RX ORDER — ROPINIROLE 0.25 MG/1
0.75 TABLET, FILM COATED ORAL
Qty: 90 TABLET | Refills: 0 | Status: ON HOLD | OUTPATIENT
Start: 2021-12-15 | End: 2022-03-03

## 2021-12-15 NOTE — TELEPHONE ENCOUNTER
Pt is calling to let Dr. Clarita Bryan that the medication is not working. She is asking for a call back.

## 2021-12-15 NOTE — TELEPHONE ENCOUNTER
From: Roula Han  To: Je Lozada MD  Sent: 12/8/2021 1:25 PM EST  Subject: Restless leg    I still having the restless leg the medication should be in the system now . I dont feel much change.

## 2021-12-22 ENCOUNTER — PATIENT MESSAGE (OUTPATIENT)
Dept: FAMILY MEDICINE CLINIC | Age: 41
End: 2021-12-22

## 2021-12-22 RX ORDER — METFORMIN HYDROCHLORIDE 500 MG/1
500 TABLET, EXTENDED RELEASE ORAL
Qty: 90 TABLET | Refills: 1 | Status: SHIPPED | OUTPATIENT
Start: 2021-12-22 | End: 2022-09-08 | Stop reason: SDUPTHER

## 2021-12-29 DIAGNOSIS — N94.6 PAINFUL MENSTRUAL PERIODS: ICD-10-CM

## 2021-12-29 RX ORDER — NORGESTIMATE AND ETHINYL ESTRADIOL 0.25-0.035
1 KIT ORAL DAILY
Qty: 3 DOSE PACK | Refills: 3 | Status: SHIPPED | OUTPATIENT
Start: 2021-12-29 | End: 2022-03-30 | Stop reason: SDUPTHER

## 2022-01-05 DIAGNOSIS — K21.9 GASTROESOPHAGEAL REFLUX DISEASE WITHOUT ESOPHAGITIS: ICD-10-CM

## 2022-01-05 RX ORDER — PANTOPRAZOLE SODIUM 40 MG/1
TABLET, DELAYED RELEASE ORAL
Qty: 180 TABLET | Refills: 1 | Status: SHIPPED | OUTPATIENT
Start: 2022-01-05 | End: 2022-06-01

## 2022-02-18 DIAGNOSIS — L21.9 SEBORRHEA: ICD-10-CM

## 2022-02-18 RX ORDER — TRIAMCINOLONE ACETONIDE 1 MG/G
OINTMENT TOPICAL
Qty: 80 G | Refills: 1 | Status: SHIPPED | OUTPATIENT
Start: 2022-02-18 | End: 2022-04-04

## 2022-02-18 RX ORDER — KETOCONAZOLE 20 MG/ML
SHAMPOO TOPICAL
Qty: 120 ML | Refills: 1 | Status: SHIPPED | OUTPATIENT
Start: 2022-02-18 | End: 2022-04-06

## 2022-02-24 NOTE — PERIOP NOTES
Informed patient of COVID requirements, patient to complete COVID curbside testing at [address] [place] [address] Monday, February 28th between [id number]. Patient verbalized understanding that COVID test is required to proceed with procedure.

## 2022-02-28 ENCOUNTER — TRANSCRIBE ORDER (OUTPATIENT)
Dept: REGISTRATION | Age: 42
End: 2022-02-28

## 2022-02-28 ENCOUNTER — HOSPITAL ENCOUNTER (OUTPATIENT)
Dept: LAB | Age: 42
Discharge: HOME | End: 2022-02-28
Payer: MEDICARE

## 2022-02-28 DIAGNOSIS — Z01.812 PRE-PROCEDURAL LABORATORY EXAMINATIONS: ICD-10-CM

## 2022-02-28 DIAGNOSIS — Z01.812 PRE-PROCEDURAL LABORATORY EXAMINATIONS: Primary | ICD-10-CM

## 2022-02-28 PROCEDURE — U0005 INFEC AGEN DETEC AMPLI PROBE: HCPCS

## 2022-03-01 LAB
SARS-COV-2, XPLCVT: NOT DETECTED
SOURCE, COVRS: NORMAL

## 2022-03-03 ENCOUNTER — ANESTHESIA (OUTPATIENT)
Dept: ENDOSCOPY | Age: 42
End: 2022-03-03
Payer: MEDICARE

## 2022-03-03 ENCOUNTER — HOSPITAL ENCOUNTER (OUTPATIENT)
Age: 42
Setting detail: OUTPATIENT SURGERY
Discharge: HOME | End: 2022-03-03
Attending: INTERNAL MEDICINE | Admitting: INTERNAL MEDICINE
Payer: MEDICARE

## 2022-03-03 ENCOUNTER — ANESTHESIA EVENT (OUTPATIENT)
Dept: ENDOSCOPY | Age: 42
End: 2022-03-03
Payer: MEDICARE

## 2022-03-03 VITALS
SYSTOLIC BLOOD PRESSURE: 124 MMHG | HEART RATE: 74 BPM | DIASTOLIC BLOOD PRESSURE: 96 MMHG | RESPIRATION RATE: 10 BRPM | OXYGEN SATURATION: 97 % | WEIGHT: 230.6 LBS | BODY MASS INDEX: 39.58 KG/M2 | TEMPERATURE: 97.9 F

## 2022-03-03 LAB
GLUCOSE BLD STRIP.AUTO-MCNC: 88 MG/DL (ref 65–117)
HCG UR QL: NEGATIVE
SERVICE CMNT-IMP: NORMAL

## 2022-03-03 PROCEDURE — 74011000250 HC RX REV CODE- 250: Performed by: NURSE ANESTHETIST, CERTIFIED REGISTERED

## 2022-03-03 PROCEDURE — 81025 URINE PREGNANCY TEST: CPT

## 2022-03-03 PROCEDURE — 74011000258 HC RX REV CODE- 258: Performed by: NURSE ANESTHETIST, CERTIFIED REGISTERED

## 2022-03-03 PROCEDURE — 88305 TISSUE EXAM BY PATHOLOGIST: CPT

## 2022-03-03 PROCEDURE — 74011250636 HC RX REV CODE- 250/636: Performed by: NURSE ANESTHETIST, CERTIFIED REGISTERED

## 2022-03-03 PROCEDURE — 2709999900 HC NON-CHARGEABLE SUPPLY: Performed by: INTERNAL MEDICINE

## 2022-03-03 PROCEDURE — 76040000019: Performed by: INTERNAL MEDICINE

## 2022-03-03 PROCEDURE — 77030021593 HC FCPS BIOP ENDOSC BSC -A: Performed by: INTERNAL MEDICINE

## 2022-03-03 PROCEDURE — 82962 GLUCOSE BLOOD TEST: CPT

## 2022-03-03 PROCEDURE — 76060000031 HC ANESTHESIA FIRST 0.5 HR: Performed by: INTERNAL MEDICINE

## 2022-03-03 PROCEDURE — 74011250636 HC RX REV CODE- 250/636: Performed by: INTERNAL MEDICINE

## 2022-03-03 RX ORDER — FLUMAZENIL 0.1 MG/ML
0.2 INJECTION INTRAVENOUS
Status: DISCONTINUED | OUTPATIENT
Start: 2022-03-03 | End: 2022-03-03 | Stop reason: HOSPADM

## 2022-03-03 RX ORDER — LIDOCAINE HYDROCHLORIDE 20 MG/ML
INJECTION, SOLUTION EPIDURAL; INFILTRATION; INTRACAUDAL; PERINEURAL AS NEEDED
Status: DISCONTINUED | OUTPATIENT
Start: 2022-03-03 | End: 2022-03-03 | Stop reason: HOSPADM

## 2022-03-03 RX ORDER — MIDAZOLAM HYDROCHLORIDE 1 MG/ML
.25-5 INJECTION, SOLUTION INTRAMUSCULAR; INTRAVENOUS
Status: DISCONTINUED | OUTPATIENT
Start: 2022-03-03 | End: 2022-03-03 | Stop reason: HOSPADM

## 2022-03-03 RX ORDER — FENTANYL CITRATE 50 UG/ML
INJECTION, SOLUTION INTRAMUSCULAR; INTRAVENOUS AS NEEDED
Status: DISCONTINUED | OUTPATIENT
Start: 2022-03-03 | End: 2022-03-03 | Stop reason: HOSPADM

## 2022-03-03 RX ORDER — DEXTROMETHORPHAN/PSEUDOEPHED 2.5-7.5/.8
1.2 DROPS ORAL
Status: DISCONTINUED | OUTPATIENT
Start: 2022-03-03 | End: 2022-03-03 | Stop reason: HOSPADM

## 2022-03-03 RX ORDER — SODIUM CHLORIDE 9 MG/ML
INJECTION, SOLUTION INTRAVENOUS
Status: DISCONTINUED | OUTPATIENT
Start: 2022-03-03 | End: 2022-03-03 | Stop reason: HOSPADM

## 2022-03-03 RX ORDER — ATROPINE SULFATE 0.1 MG/ML
0.4 INJECTION INTRAVENOUS
Status: DISCONTINUED | OUTPATIENT
Start: 2022-03-03 | End: 2022-03-03 | Stop reason: HOSPADM

## 2022-03-03 RX ORDER — GLYCOPYRROLATE 0.2 MG/ML
INJECTION INTRAMUSCULAR; INTRAVENOUS AS NEEDED
Status: DISCONTINUED | OUTPATIENT
Start: 2022-03-03 | End: 2022-03-03 | Stop reason: HOSPADM

## 2022-03-03 RX ORDER — NALOXONE HYDROCHLORIDE 0.4 MG/ML
0.4 INJECTION, SOLUTION INTRAMUSCULAR; INTRAVENOUS; SUBCUTANEOUS
Status: DISCONTINUED | OUTPATIENT
Start: 2022-03-03 | End: 2022-03-03 | Stop reason: HOSPADM

## 2022-03-03 RX ORDER — PROPOFOL 10 MG/ML
INJECTION, EMULSION INTRAVENOUS AS NEEDED
Status: DISCONTINUED | OUTPATIENT
Start: 2022-03-03 | End: 2022-03-03 | Stop reason: HOSPADM

## 2022-03-03 RX ORDER — SODIUM CHLORIDE 9 MG/ML
50 INJECTION, SOLUTION INTRAVENOUS CONTINUOUS
Status: DISCONTINUED | OUTPATIENT
Start: 2022-03-03 | End: 2022-03-03 | Stop reason: HOSPADM

## 2022-03-03 RX ORDER — EPINEPHRINE 0.1 MG/ML
1 INJECTION INTRACARDIAC; INTRAVENOUS
Status: DISCONTINUED | OUTPATIENT
Start: 2022-03-03 | End: 2022-03-03 | Stop reason: HOSPADM

## 2022-03-03 RX ADMIN — SODIUM CHLORIDE 50 ML/HR: 9 INJECTION, SOLUTION INTRAVENOUS at 10:42

## 2022-03-03 RX ADMIN — FENTANYL CITRATE 50 MCG: 50 INJECTION, SOLUTION INTRAMUSCULAR; INTRAVENOUS at 11:34

## 2022-03-03 RX ADMIN — SODIUM CHLORIDE: 9 INJECTION, SOLUTION INTRAVENOUS at 11:32

## 2022-03-03 RX ADMIN — LIDOCAINE HYDROCHLORIDE 100 MG: 20 INJECTION, SOLUTION INTRAVENOUS at 11:36

## 2022-03-03 RX ADMIN — GLYCOPYRROLATE 0.1 MG: 0.2 INJECTION INTRAMUSCULAR; INTRAVENOUS at 11:34

## 2022-03-03 RX ADMIN — PROPOFOL INJECTABLE EMULSION 100 MG: 10 INJECTION, EMULSION INTRAVENOUS at 11:36

## 2022-03-03 NOTE — PROCEDURES
Scotty Martinez M.D.  [phone]           3/3/2022                EGD Operative Report  Cathy Huerta  :  1980  [facility]ish[facility] Alexander Medical Record Number:  [id number]      Indication:  Dysphagia/odynophagia     : Sophie Finch MD    Referring Provider:  Jewell Multani MD      Anesthesia/Sedation:  MAC anesthesia    Airway assessment: No airway problems anticipated    Pre-Procedural Exam:      Airway: clear, no airway problems anticipated  Heart: RRR, without gallops or rubs  Lungs: clear bilaterally without wheezes, crackles, or rhonchi  Abdomen: soft, nontender, nondistended, bowel sounds present  Mental Status: awake, alert and oriented to person, place and time       Procedure Details     After infomed consent was obtained for the procedure, with all risks and benefits of procedure explained the patient was taken to the endoscopy suite and placed in the left lateral decubitus position. Following sequential administration of sedation as per above, the endoscope was inserted into the mouth and advanced under direct vision to second portion of the duodenum. A careful inspection was made as the gastroscope was withdrawn, including a retroflexed view of the proximal stomach; findings and interventions are described below. Findings:   Esophagus:Mucosa within normal throughout the esophagus, no stricture, inflammation or lesions seen. Stomach: Mucosa within normal throughout the stomach. Duodenum/jejunum: normal    Therapies:  Empiric esophageal dilation with savary sizes 51 Fr    Specimens: Mid-esophagus           Complications:   None; patient tolerated the procedure well. EBL:  None. Impression:    Upper endoscopy within normal, no esophagitis or stricture seen. Empiric dilation performed. Biopsies were obtained to rule out eosinophilic esophagitis.      Recommendations:    -Await pathology.  -Continue with anti-reflux measures  -Continue with soft diet and eating slowly  -Follow-up office visit as needed    Gallito Cates MD

## 2022-03-03 NOTE — PROGRESS NOTES
Endoscopy discharge instructions have been reviewed and given to patient. The patient verbalized understanding and acceptance of instructions. Dr. Mazin Walsh discussed with patient procedure findings and next steps.

## 2022-03-03 NOTE — H&P
The patient is a 46year old female who presents with a complaint of Acid Reflux. Note for \"Acid Reflux\": Patient is a 45 yo female who presents today for follow up. Prior extensive workup including EGD in 2018 that demonstrated food in stomach, subsequent delayed GES, BS/UGI and manometry showing ineffective peristalsis and BRAVO study that was normal. We referred her to [facility] where she was evaluated and no further recommendations were given aside from following dysphagia/gastroparesis diet. Today, she states she has been noticing more issues with dysphagia over the last 3-4 months. She states she has been noticing dysphagia primarily with foods and pills. For the past two years, she has been doing fairly well from dysphagia standpoint. Has maintained weight. No nausea or vomiting. She is currently on 40mg of pantoprazole BID. Problem List/Past Medical Worton Indu ESPARZA ; 12/14/2021 10:52 AM)  Gastroparesis (K31.84)    Esophageal dysmotility (K22.4)    Dysphagia (R13.10)    GERD (gastroesophageal reflux disease) (K21.9)    Abnormal small bowel biopsy (R85.7)    Asthma    Mood disorder (F39)    ADHD (F90.9)    OCD (obsessive compulsive disorder) (F42.9)    Anemia NEC    Anxiety Disorder    GERD    Depression    Hemorrhoids   h/o  Fibromyalgia    Problems Reconciled      Past Surgical History (Radha ESPARZA ; 12/14/2021 10:54 AM)  Non-Contributory Past Surgical History      Allergies ([employer] ; 12/14/2021 10:57 AM)  Sulfa Drugs   Vicodin  Vicodin *ANALGESICS - OPIOID*    Allergies Reconciled    Abilify *ANTIPSYCHOTICS/ANTIMANIC AGENTS*   Vomiting, Diarrhea. Medication History (RVE[employer]SOL [employer] ; 12/14/2021 10:58 AM)  lamoTRIgine  (100MG Tablet Disint, 1 1/2 Oral daily) Active. Trospium Chloride ER  (60MG Capsule ER 24HR, Oral daily) Active. LORazepam  (1MG Tablet, Oral two times daily) Active. Cyclobenzaprine HCl  (10MG Tablet, Oral two times daily, as needed) Active.   Metoclopramide HCl  (5MG Tablet, Oral four times daily, as needed) Active. Sprintec 28  (0.25-35MG-MCG Tablet, Oral daily) Active. Baclofen  (10MG Tablet, 1 Oral two times daily) Active. Hydroxychloroquine Sulfate  (200MG Tablet, 1 Oral two times daily) Active. Vitamin D (Ergocalciferol)  (2000UNIT Capsule, 1 Oral daily) Active. Vitamin B12  (3000MCG Tab Sublingual, 1 Sublingual daily) Active. Amphetamine-Dextroamphetamine  (10MG Tablet, Oral two times daily) Active. Tylenol Extra Strength  (500MG Tablet, 2 Oral as needed) Active. Voltaren  (1% Gel, Transdermal) Active. ZyrTEC  (10MG Tablet, 1 Oral daily) Active. FLUoxetine HCl  (40MG Capsule, 1 Oral daily) Active. Albuterol Sulfate  (108 (90 Base)MCG/ACT Aero Pow Br Act, Inhalation as needed) Active. Ketoconazole  (2% Shampoo, External twice weekly) Active. Kenalog  (0.1% Ointment, External as needed) Active. ClonazePAM  (1MG Tablet, Oral qhs) Active. TraZODone HCl  (100MG Tablet, Oral at night) Active. Gabapentin  (300MG Capsule, 3 Oral daily) Active. Pantoprazole Sodium  (20MG Tablet DR, 1 Oral two times daily) Active. Ativan  (2MG Tablet, 2 Oral at bedtime) Active. Medications Reconciled     Family History Jeff ESPARZA ; 12/14/2021 10:59 AM)  Diabetes Mellitus   First Degree Relatives. Stroke    Breast Cancer   First Degree Relatives. Social History Jeff ESPARZA ; 12/14/2021 11:00 AM)  Drug Use   None  Marital status   Single. Alcohol Use   Has never drank. Tobacco Use   Former smoker. Blood Transfusion   No.  Employment status   N/a. Health Maintenance History Jeff ESPARZA ; 12/14/2021 11:01 AM)  Blood Pressure Monitoring    Annual Eye Exam    COVID-19 vaccine    Pneumovax   Refused. Flu Vaccine   none        Review of Systems (Radha VILMA ; 12/14/2021 10:52 AM)  General Not Present- Chronic Fatigue, Poor Appetite, Weight Gain and Weight Loss. Skin Not Present- Itching, Rash and Skin Color Changes.   HEENT Not Present- Hearing Loss and Vertigo. Respiratory Present- Difficulty Breathing. Not Present- TB exposure. Cardiovascular Not Present- Chest Pain, Use of Antibiotics before Dental Procedures and Use of Blood Thinners. Gastrointestinal Present- See HPI. Musculoskeletal Present- Arthritis. Not Present- Hip Replacement Surgery and Knee Replacement Surgery. Neurological Not Present- Weakness. Psychiatric Present- Depression. Endocrine Not Present- Diabetes and Thyroid Problems. Hematology Not Present- Anemia. Vitals Lyle WILLINGHAM. ; 12/14/2021 10:49 AM)  12/14/2021 10:47 AM  Weight: 233.6 lb   Height: 64 in   Body Surface Area: 2.09 m²   Body Mass Index: 40.1 kg/m²    Temp.: 98.1° F    Pulse: 89 (Regular)     BP: 103/65(Sitting, Left Arm, Standard)              Physical Exam (Kapil GOLDBERG; 12/14/2021 11:45 AM)  General  Mental Status - Alert. General Appearance - Cooperative, Pleasant and Consistent with stated age, Not Anxious, Not in acute distress. Orientation - Oriented X3. Build & Nutrition - Well nourished and Well developed. Chest and Lung Exam  Chest and lung exam reveals  - normal excursion with symmetric chest walls, quiet, even and easy respiratory effort with no use of accessory muscles and on auscultation, normal breath sounds, no adventitious sounds and normal vocal resonance. Cardiovascular  Cardiovascular examination reveals  - normal heart sounds, regular rate and rhythm with no murmurs. Abdomen  Inspection  Inspection of the abdomen reveals - Soft, Non-distended. Incisional scars - No incisional scars. Palpation/Percussion  Tenderness - Non-Tender. Rebound tenderness - No rebound. Liver - No hepatosplenomegaly. Abdominal Mass Palpable - No masses. Other Characteristics - No Ascites. Auscultation  Auscultation of the abdomen reveals - Bowel sounds normal.    Peripheral Vascular  Lower Extremity  Palpation - Edema - Bilateral - No edema - Bilateral.        Assessment & Plan Josue Delarosa PA; 12/14/2021 11:43 AM)  Dysphagia (R13.10)  Story: Prior extensive workup including EGD in 2018 that demonstrated food in stomach, subsequent delayed GES, BS/UGI and manometry showing ineffective peristalsis and BRAVO study that was normal. We referred her to [facility] where she was evaluated and no further recommendations were given aside from following dysphagia/gastroparesis diet. Impression: Has been doing fairly well over last two years but has had more noticeable dysphagia and reflux over last several months. Reviewed that repeating an endoscopy would be a good idea given more noticeable symptoms and to rule out reflux esophagitis, EOE, stricture, ring, etc. I still suspect her symptoms are related to her esophageal dysmotility and/or gastroparesis. Reviewed that empiric dilation on EGD maybe helpful for her. If so, can try periodic dilation in future. Do not feel any additional motility studies necessary given extensive workup previously. Consider switching to dexilant if reflux changes noted and/or if she continues to be symptomatic. Will have her on clears day prior to procedure given presence of food in stomach on past endoscopy. Current Plans  Endoscopy (81156) (Discussed risks and benefits with the patient to include: perforation, post polypectomy, or post biopsy bleeding, missed lesions, and sedation reactions.)  Esophageal dysmotility (K22.4)  Impression: Plan as above.         Signed electronically by ASHLYN Denis (12/14/2021 11:45 AM)        Co-signed electronically by Sophy Aguirre MD (12/19/2021 8:38 PM)

## 2022-03-03 NOTE — DISCHARGE INSTRUCTIONS
Kd Etienne M.D.  [phone]           3/3/2022  Galdino Rivers  :  1980  [facility] Medical Record Number:  [id number]        ENDOSCOPY FINDINGS:   Your endoscopy showed normal mucosa throughout the esophagus, stomach and duodenum. Empiric dilation was performed and biopsies were obtained. EGD DISCHARGE INSTRUCTIONS    DISCOMFORT:  Sore throat- throat lozenges or warm salt water gargle  redness at IV site- apply warm compress to area; if redness or soreness persist- contact your physician  Gaseous discomfort- walking, belching will help relieve any discomfort  You may not operate a vehicle for 12 hours  You may not engage in an occupation involving machinery or appliances for rest of today  You may not drink alcoholic beverages for at least 12 hours  Avoid making any critical decisions for at least 24 hour    DIET:   You may resume your soft diet. ACTIVITY  Spend the remainder of the day resting -  avoid any strenuous activity. Avoid driving or operating machinery. CALL M.D. ANY SIGN OF   Increasing pain, nausea, vomiting  Abdominal distension (swelling)  New increased bleeding (oral or rectal)  Fever (chills)  Pain in chest area  Bloody discharge from nose or mouth  Shortness of breath    Follow-up Instructions:   Call Dr. Brisa Grady for any questions or problems. Telephone # [phone]  Biopsies were obtained, the results will be available  in  5 to 7 days. We will call you to notify you of these results. Continue same medications. Chew the food very well, eat slowly and have sips of water with meals. Follow up in the office as needed.

## 2022-03-03 NOTE — ANESTHESIA PREPROCEDURE EVALUATION
Relevant Problems   NEUROLOGY   (+) Depression   (+) Mood disorder (HCC)   (+) OCD (obsessive compulsive disorder)   (+) Recurrent depression (HCC)      GASTROINTESTINAL   (+) GERD (gastroesophageal reflux disease)      ENDOCRINE   (+) Class 2 obesity due to excess calories without serious comorbidity in adult   (+) Inflammatory arthritis   (+) Severe obesity (HCC)      HEMATOLOGY   (+) Anemia, unspecified       Anesthetic History   No history of anesthetic complications            Review of Systems / Medical History  Patient summary reviewed, nursing notes reviewed and pertinent labs reviewed    Pulmonary  Within defined limits                 Neuro/Psych   Within defined limits           Cardiovascular    Hypertension              Exercise tolerance: >4 METS     GI/Hepatic/Renal     GERD           Endo/Other        Morbid obesity and arthritis     Other Findings   Comments: fibromyalgia           Physical Exam    Airway  Mallampati: II    Neck ROM: normal range of motion   Mouth opening: Normal     Cardiovascular  Regular rate and rhythm,  S1 and S2 normal,  no murmur, click, rub, or gallop  Rhythm: regular  Rate: normal         Dental  No notable dental hx       Pulmonary  Breath sounds clear to auscultation               Abdominal  GI exam deferred       Other Findings            Anesthetic Plan    ASA: 3  Anesthesia type: MAC          Induction: Intravenous  Anesthetic plan and risks discussed with: Patient

## 2022-03-03 NOTE — PROGRESS NOTES
Shayy Fields  1980  [id number]    Situation:  Verbal report received from: Juani STOVER   Procedure: Procedure(s):  ESOPHAGOGASTRODUODENOSCOPY (EGD) ASU04  ESOPHAGEAL DILATION  ESOPHAGOGASTRODUODENAL (EGD) BIOPSY    Background:    Preoperative diagnosis: DYSPHAGIA  Postoperative diagnosis: normal egd    :  Dr. Shyann Cardona  Assistant(s): Endoscopy RN-1: Pattie Li RN  Endoscopy RN-2: Marisol Ng RN    Specimens:   ID Type Source Tests Collected by Time Destination   1 : mid-esophageal bx Preservative   Raymond Ramsay MD 3/3/2022 1144 Pathology     H. Pylori  no    Assessment:    Anesthesia gave intra-procedure sedation and medications, see anesthesia flow sheet no    Intravenous fluids: NS@ KVO     Vital signs stable     Abdominal assessment: round and soft     Recommendation:  Discharge patient per MD order.   Return to floor  Family or Friend   Permission to share finding with family or friend yes

## 2022-03-03 NOTE — PERIOP NOTES
Report from [facility] 1. See anesthesia record. ABD remains soft and non-tender post procedure. Pt has no complaints at this time and tolerated the procedure well. Endoscope was pre-cleaned at bedside immediately following procedure by Arturo Grady.

## 2022-03-03 NOTE — ANESTHESIA POSTPROCEDURE EVALUATION
Procedure(s):  ESOPHAGOGASTRODUODENOSCOPY (EGD) ASU04  ESOPHAGEAL DILATION  ESOPHAGOGASTRODUODENAL (EGD) BIOPSY. MAC    Anesthesia Post Evaluation      Multimodal analgesia: multimodal analgesia not used between 6 hours prior to anesthesia start to PACU discharge  Patient location during evaluation: PACU  Patient participation: complete - patient participated  Level of consciousness: awake  Pain management: adequate  Airway patency: patent  Anesthetic complications: no  Cardiovascular status: acceptable, blood pressure returned to baseline and hemodynamically stable  Respiratory status: acceptable  Hydration status: acceptable  Post anesthesia nausea and vomiting:  controlled      INITIAL Post-op Vital signs:   Vitals Value Taken Time   /90 03/03/22 1201   Temp     Pulse 75 03/03/22 1205   Resp 12 03/03/22 1205   SpO2 99 % 03/03/22 1205   Vitals shown include unvalidated device data.

## 2022-03-18 PROBLEM — E66.01 SEVERE OBESITY (HCC): Status: ACTIVE | Noted: 2018-11-29

## 2022-03-18 PROBLEM — M79.7 FIBROMYALGIA: Status: ACTIVE | Noted: 2018-06-12

## 2022-03-19 PROBLEM — F39 MOOD DISORDER (HCC): Status: ACTIVE | Noted: 2018-06-12

## 2022-03-19 PROBLEM — F33.9 RECURRENT DEPRESSION (HCC): Status: ACTIVE | Noted: 2018-01-15

## 2022-03-19 PROBLEM — K31.84 GASTROPARESIS: Status: ACTIVE | Noted: 2018-11-29

## 2022-03-19 PROBLEM — R25.1 TREMOR: Status: ACTIVE | Noted: 2018-06-12

## 2022-03-19 PROBLEM — G62.9 NEUROPATHY: Status: ACTIVE | Noted: 2019-10-15

## 2022-03-19 PROBLEM — R76.8 POSITIVE ANA (ANTINUCLEAR ANTIBODY): Status: ACTIVE | Noted: 2018-11-29

## 2022-03-19 PROBLEM — M72.2 PLANTAR FASCIITIS OF LEFT FOOT: Status: ACTIVE | Noted: 2018-11-29

## 2022-03-19 PROBLEM — F41.0 PANIC ATTACK: Status: ACTIVE | Noted: 2018-06-12

## 2022-03-20 PROBLEM — E66.812 CLASS 2 OBESITY DUE TO EXCESS CALORIES WITHOUT SERIOUS COMORBIDITY IN ADULT: Status: ACTIVE | Noted: 2018-07-25

## 2022-03-20 PROBLEM — E66.09 CLASS 2 OBESITY DUE TO EXCESS CALORIES WITHOUT SERIOUS COMORBIDITY IN ADULT: Status: ACTIVE | Noted: 2018-07-25

## 2022-03-20 PROBLEM — G89.4 CHRONIC PAIN SYNDROME: Status: ACTIVE | Noted: 2018-06-12

## 2022-03-29 DIAGNOSIS — Z91.09 ENVIRONMENTAL ALLERGIES: ICD-10-CM

## 2022-03-29 RX ORDER — MONTELUKAST SODIUM 10 MG/1
TABLET ORAL
Qty: 90 TABLET | Refills: 1 | Status: SHIPPED | OUTPATIENT
Start: 2022-03-29 | End: 2022-08-24

## 2022-03-30 ENCOUNTER — OFFICE VISIT (OUTPATIENT)
Dept: FAMILY MEDICINE CLINIC | Age: 42
End: 2022-03-30
Payer: MEDICARE

## 2022-03-30 VITALS
HEART RATE: 82 BPM | DIASTOLIC BLOOD PRESSURE: 87 MMHG | TEMPERATURE: 97.2 F | BODY MASS INDEX: 39.44 KG/M2 | SYSTOLIC BLOOD PRESSURE: 122 MMHG | WEIGHT: 231 LBS | HEIGHT: 64 IN | OXYGEN SATURATION: 94 % | RESPIRATION RATE: 16 BRPM

## 2022-03-30 DIAGNOSIS — E66.01 SEVERE OBESITY (BMI 35.0-39.9) WITH COMORBIDITY (HCC): ICD-10-CM

## 2022-03-30 DIAGNOSIS — G89.4 CHRONIC PAIN SYNDROME: Chronic | ICD-10-CM

## 2022-03-30 DIAGNOSIS — R73.01 IFG (IMPAIRED FASTING GLUCOSE): ICD-10-CM

## 2022-03-30 DIAGNOSIS — K21.9 GASTROESOPHAGEAL REFLUX DISEASE WITHOUT ESOPHAGITIS: Chronic | ICD-10-CM

## 2022-03-30 DIAGNOSIS — E61.1 IRON DEFICIENCY: ICD-10-CM

## 2022-03-30 DIAGNOSIS — E78.00 PURE HYPERCHOLESTEROLEMIA: Chronic | ICD-10-CM

## 2022-03-30 DIAGNOSIS — K31.84 GASTROPARESIS: Chronic | ICD-10-CM

## 2022-03-30 DIAGNOSIS — L29.9 ITCH OF SKIN: ICD-10-CM

## 2022-03-30 DIAGNOSIS — N94.6 PAINFUL MENSTRUAL PERIODS: Primary | ICD-10-CM

## 2022-03-30 DIAGNOSIS — F33.9 RECURRENT DEPRESSION (HCC): ICD-10-CM

## 2022-03-30 DIAGNOSIS — F39 MOOD DISORDER (HCC): ICD-10-CM

## 2022-03-30 PROCEDURE — G8417 CALC BMI ABV UP PARAM F/U: HCPCS | Performed by: FAMILY MEDICINE

## 2022-03-30 PROCEDURE — G9717 DOC PT DX DEP/BP F/U NT REQ: HCPCS | Performed by: FAMILY MEDICINE

## 2022-03-30 PROCEDURE — G8427 DOCREV CUR MEDS BY ELIG CLIN: HCPCS | Performed by: FAMILY MEDICINE

## 2022-03-30 PROCEDURE — 99214 OFFICE O/P EST MOD 30 MIN: CPT | Performed by: FAMILY MEDICINE

## 2022-03-30 RX ORDER — KETOROLAC TROMETHAMINE 10 MG/1
10 TABLET, FILM COATED ORAL
Qty: 20 TABLET | Refills: 0 | Status: SHIPPED | OUTPATIENT
Start: 2022-03-30 | End: 2022-04-04

## 2022-03-30 RX ORDER — NORGESTIMATE AND ETHINYL ESTRADIOL 0.25-0.035
1 KIT ORAL DAILY
Qty: 3 DOSE PACK | Refills: 3 | Status: SHIPPED | OUTPATIENT
Start: 2022-03-30 | End: 2022-09-08 | Stop reason: ALTCHOICE

## 2022-03-30 RX ORDER — KETOROLAC TROMETHAMINE 10 MG/1
10 TABLET, FILM COATED ORAL
Qty: 20 TABLET | Refills: 0 | Status: SHIPPED | OUTPATIENT
Start: 2022-03-30 | End: 2022-03-30

## 2022-03-30 NOTE — PROGRESS NOTES
[facility]    History of Present Illness:   Tawanna Marrufo is a 46 y.o. female with history of  HTN, GERD, IFG, Depression, Migraines  CC: Follow up  History provided by patient and Records    HPI:  GERD/Gastroparesis: Recent had EGD and had a dilation done. On a softs diet now. Changing diet pattrn as well. Taking Protonix. Skin Itching: Noting issue with skin itching. Left knee pain: Patient has Persistent knee pain issues. Known OA of the knee. Hypertriglyceridemia Follow up:   Cardiovascular risks for her are: hypertension  hyperlipidemia. Current Medications:  Key Antihyperlipidemia Meds     The patient is on no antihyperlipidemia meds. Compliance: NO   Myalgias: NO   Fatigue: NO   Other side effects: NO     Wt Readings from Last 3 Encounters:   03/30/22 231 lb (104.8 kg)   03/03/22 230 lb 9.6 oz (104.6 kg)   12/02/21 236 lb (107 kg)       Lab Results   Component Value Date/Time    Cholesterol, total 215 10/23/2020 10:23 AM    HDL Cholesterol 86 10/23/2020 10:23 AM    LDL, calculated 109 10/23/2020 10:23 AM    VLDL, calculated 20 10/23/2020 10:23 AM    Triglyceride 100 10/23/2020 10:23 AM    CHOL/HDL Ratio 2.5 10/23/2020 10:23 AM      Lab Results   Component Value Date/Time    ALT (SGPT) 67 11/25/2021 10:23 AM    AST (SGOT) 29 11/25/2021 10:23 AM    Alk.  phosphatase 92 11/25/2021 10:23 AM    Bilirubin, direct 0.12 12/08/2017 04:08 PM    Bilirubin, total 0.5 11/25/2021 10:23 AM           Health Maintenance  Health Maintenance Due   Topic Date Due    Pneumococcal 0-64 years (1 of 2 - PPSV23) Never done    Flu Vaccine (1) Never done    COVID-19 Vaccine (3 - Booster for Moderna series) 09/25/2021       Past Medical, Family, and Social History:     Current Outpatient Medications on File Prior to Visit   Medication Sig Dispense Refill    montelukast (SINGULAIR) 10 mg tablet TAKE 1 TABLET EVERY DAY 90 Tablet 1    ketoconazole (NIZORAL) 2 % shampoo SHAMPOO TWICE WEEKLY 120 mL 1    triamcinolone acetonide (KENALOG) 0.1 % ointment APPLY  TO AFFECTED AREA TWO (2) TIMES A DAY. USE A THIN LAYER 80 g 1    ferrous sulfate 325 mg (65 mg iron) tablet TAKE 1 TABLET BY MOUTH ONCE DAILY BEFORE BREAKFAST 90 Tablet 1    albuterol (PROVENTIL HFA, VENTOLIN HFA, PROAIR HFA) 90 mcg/actuation inhaler INHALE 1 PUFF EVERY 6 HOURS AS NEEDED FOR WHEEZING 18 g 2    pantoprazole (PROTONIX) 40 mg tablet TAKE 1 TABLET TWICE DAILY 180 Tablet 1    metFORMIN ER (GLUCOPHAGE XR) 500 mg tablet Take 1 Tablet by mouth daily (with dinner). 90 Tablet 1    famotidine (Pepcid) 20 mg tablet Take 20 mg by mouth three (3) times daily (with meals).  diclofenac EC (VOLTAREN) 50 mg EC tablet Take 1 Tablet by mouth two (2) times daily as needed for Pain. 30 Tablet 0    cyclobenzaprine (FLEXERIL) 10 mg tablet three (3) times daily as needed. Has not needed      gabapentin (NEURONTIN) 600 mg tablet 600 mg three (3) times daily.  escitalopram oxalate (LEXAPRO) 20 mg tablet Take 20 mg by mouth daily.  levocetirizine (XYZAL) 5 mg tablet Take 1 Tablet by mouth daily. 90 Tablet 1    furosemide (LASIX) 20 mg tablet Take 0.5 Tablets by mouth daily. (Patient taking differently: Take 10 mg by mouth daily. As needed) 45 Tablet 1    folic acid (FOLVITE) 1 mg tablet TAKE 1 TABLET EVERY DAY 90 Tablet 1    albuterol-ipratropium (DUO-NEB) 2.5 mg-0.5 mg/3 ml nebu 3 mL by Nebulization route every six (6) hours as needed (wheeze). 30 Nebule 3    LORazepam (ATIVAN) 2 mg tablet Take 2 mg by mouth every eight (8) hours as needed.  acetaminophen (TYLENOL 8 HOUR PO) Take  by mouth.  clonazePAM (KLONOPIN) 1 mg tablet Take 1.5 mg by mouth daily. At bedtime       cholecalciferol, vitamin D3, (VITAMIN D3) 2,000 unit tab Take  by mouth.  traZODone (DESYREL) 50 mg tablet Take 1 Tab by mouth nightly.  (Patient taking differently: Take 150 mg by mouth nightly.) 30 Tab 6    [DISCONTINUED] norgestimate-ethinyl estradioL (ORTHO-CYCLEN, SPRINTEC) 0.25-35 mg-mcg tab Take 1 Tablet by mouth daily. Generic 3 Dose Pack 3    Comp Stocking,Knee,Regular,Med misc 1 Each by Does Not Apply route daily. 1 Each 1     No current facility-administered medications on file prior to visit. Patient Active Problem List   Diagnosis Code    Depression F32. A    GERD (gastroesophageal reflux disease) K21.9    Anemia, unspecified D64.9    Itch of skin L29.9    Anxiety F41.9    Costochondritis M94.0    HSV (herpes simplex virus) anogenital infection A60.9    OCD (obsessive compulsive disorder) F42.9    Hoarseness R49.0    IFG (impaired fasting glucose) R73.01    Hyperlipidemia E78.5    Thrombosed external hemorrhoid K64.5    Vitamin D deficiency E55.9    Inflammatory arthritis M19.90    Recurrent depression (HCC) F33.9    Mood disorder (HCC) F39    Chronic pain syndrome G89.4    Fibromyalgia M79.7    Panic attack F41.0    Tremor R25.1    Class 2 obesity due to excess calories without serious comorbidity in adult E66.09    Gastroparesis K31.84    Plantar fasciitis of left foot M72.2    Positive KITTY (antinuclear antibody) R76.8    Severe obesity (HCC) E66.01    Neuropathy G62.9       Social History     Socioeconomic History    Marital status: SINGLE   Tobacco Use    Smoking status: Light Tobacco Smoker     Packs/day: 0.25     Years: 8.00     Pack years: 2.00     Types: Cigarettes    Smokeless tobacco: Never Used   Substance and Sexual Activity    Alcohol use: Not Currently     Alcohol/week: 1.0 standard drink     Types: 1 Glasses of wine per week     Comment: 1 cup of wine/week    Drug use: No    Sexual activity: Yes     Partners: Male     Birth control/protection: None        Review of Systems   Review of Systems   Constitutional: Positive for malaise/fatigue. Negative for chills and fever. Gastrointestinal: Negative for nausea and vomiting. Musculoskeletal: Positive for joint pain.    Neurological: Negative for dizziness and headaches. Objective:     Visit Vitals  /87   Pulse 82   Temp 97.2 °F (36.2 °C)   Resp 16   Ht 5' 4\" (1.626 m)   Wt 231 lb (104.8 kg)   SpO2 94%   BMI 39.65 kg/m²        Physical Exam  Vitals and nursing note reviewed. Constitutional:       Appearance: Normal appearance. HENT:      Head: Normocephalic and atraumatic. Cardiovascular:      Rate and Rhythm: Normal rate and regular rhythm. Pulses: Normal pulses. Heart sounds: Normal heart sounds. No murmur heard. No friction rub. No gallop. Pulmonary:      Effort: Pulmonary effort is normal.      Breath sounds: Normal breath sounds. Abdominal:      General: Abdomen is flat. Bowel sounds are normal.      Palpations: Abdomen is soft. Musculoskeletal:      Cervical back: Normal range of motion and neck supple. Neurological:      Mental Status: She is alert. Pertinent Labs/Studies:      Assessment and orders:       ICD-10-CM ICD-9-CM    1. Painful menstrual periods  N94.6 625.3 norgestimate-ethinyl estradioL (ORTHO-CYCLEN, SPRINTEC) 0.25-35 mg-mcg tab   2. Itch of skin  L29.9 698.9    3. Severe obesity (BMI 35.0-39. 9) with comorbidity ([facility] [address]  E66.01 278.01 CBC W/O DIFF      METABOLIC PANEL, COMPREHENSIVE   4. Recurrent depression (HCC)  F33.9 296.30    5. Gastroesophageal reflux disease without esophagitis  K21.9 530.81    6. Gastroparesis  K31.84 536.3    7. Pure hypercholesterolemia  E78.00 272.0 LIPID PANEL   8. Chronic pain syndrome  G89.4 338.4    9. Mood disorder ([facility]  F39 296.90    10. IFG (impaired fasting glucose)  R73.01 790.21 HEMOGLOBIN A1C WITH EAG   11. Iron deficiency  E61.1 280.9 IRON PROFILE      FERRITIN     Diagnoses and all orders for this visit:    1. Painful menstrual periods  -     norgestimate-ethinyl estradioL (Laura Jagdish) 0.25-35 mg-mcg tab; Take 1 Tablet by mouth daily. Generic    2. Itch of skin    3. Severe obesity (BMI 35.0-39. 9) with comorbidity (HCC)  -     CBC W/O DIFF; Future  -     METABOLIC PANEL, COMPREHENSIVE; Future    4. Recurrent depression ([facility] [address]    5. Gastroesophageal reflux disease without esophagitis: Continue Current therapies. 6. Gastroparesis    7. Pure hypercholesterolemia: The patient is aware of our goal to reduce or eliminate the long term problems (such as strokes and heart attacks) related to poorly controlled Triglycerides, LDL, Cholesterol.   -     LIPID PANEL; Future    8. Chronic pain syndrome    9. Mood disorder ([facility] [address]    10. IFG (impaired fasting glucose)  -     HEMOGLOBIN A1C WITH EAG; Future    11. Iron deficiency  -     IRON PROFILE; Future  -     FERRITIN; Future      Follow-up and Dispositions    · Return in about 3 months (around 6/30/2022). I have discussed the diagnosis with the patient and the intended plan as seen in the above orders. Social history, medical history, and labs were reviewed. The patient has received an after-visit summary and questions were answered concerning future plans. I have discussed medication side effects and warnings with the patient as well.     MD URBAN [facility]  03/30/22

## 2022-03-30 NOTE — PATIENT INSTRUCTIONS
- Allegra, Zyrtec, Claritin         Knee Arthritis: Exercises  Introduction  Here are some examples of exercises for you to try. The exercises may be suggested for a condition or for rehabilitation. Start each exercise slowly. Ease off the exercises if you start to have pain. You will be told when to start these exercises and which ones will work best for you. How to do the exercises  Knee flexion with heel slide    1. Lie on your back with your knees bent. 2. Slide your heel back by bending your affected knee as far as you can. Then hook your other foot around your ankle to help pull your heel even farther back. 3. Hold for about 6 seconds, then rest for up to 10 seconds. 4. Repeat 8 to 12 times. 5. Switch legs and repeat steps 1 through 4, even if only one knee is sore. Quad sets    1. Sit with your affected leg straight and supported on the floor or a firm bed. Place a small, rolled-up towel under your knee. Your other leg should be bent, with that foot flat on the floor. 2. Tighten the thigh muscles of your affected leg by pressing the back of your knee down into the towel. 3. Hold for about 6 seconds, then rest for up to 10 seconds. 4. Repeat 8 to 12 times. 5. Switch legs and repeat steps 1 through 4, even if only one knee is sore. Straight-leg raises to the front    1. Lie on your back with your good knee bent so that your foot rests flat on the floor. Your affected leg should be straight. Make sure that your low back has a normal curve. You should be able to slip your hand in between the floor and the small of your back, with your palm touching the floor and your back touching the back of your hand. 2. Tighten the thigh muscles in your affected leg by pressing the back of your knee flat down to the floor. Hold your knee straight. 3. Keeping the thigh muscles tight and your leg straight, lift your affected leg up so that your heel is about 12 inches off the floor.  Hold for about 6 seconds, then lower slowly. 4. Relax for up to 10 seconds between repetitions. 5. Repeat 8 to 12 times. 6. Switch legs and repeat steps 1 through 5, even if only one knee is sore. Active knee flexion    1. Lie on your stomach with your knees straight. If your kneecap is uncomfortable, roll up a washcloth and put it under your leg just above your kneecap. 2. Lift the foot of your affected leg by bending the knee so that you bring the foot up toward your buttock. If this motion hurts, try it without bending your knee quite as far. This may help you avoid any painful motion. 3. Slowly move your leg up and down. 4. Repeat 8 to 12 times. 5. Switch legs and repeat steps 1 through 4, even if only one knee is sore. Quadriceps stretch (facedown)    1. Lie flat on your stomach, and rest your face on the floor. 2. Wrap a towel or belt strap around the lower part of your affected leg. Then use the towel or belt strap to slowly pull your heel toward your buttock until you feel a stretch. 3. Hold for about 15 to 30 seconds, then relax your leg against the towel or belt strap. 4. Repeat 2 to 4 times. 5. Switch legs and repeat steps 1 through 4, even if only one knee is sore. Stationary exercise bike    1. If you do not have a stationary exercise bike at home, you can find one to ride at your local health club or community center. 2. Adjust the height of the bike seat so that your knee is slightly bent when your leg is extended downward. If your knee hurts when the pedal reaches the top, you can raise the seat so that your knee does not bend as much. 3. Start slowly. At first, try to do 5 to 10 minutes of cycling with little to no resistance. Then increase your time and the resistance bit by bit until you can do 20 to 30 minutes without pain. 4. If you start to have pain, rest your knee until your pain gets back to the level that is normal for you. Or cycle for less time or with less effort.   Follow-up care is a key part of your treatment and safety. Be sure to make and go to all appointments, and call your doctor if you are having problems. It's also a good idea to know your test results and keep a list of the medicines you take. Where can you learn more? Go to http://www.fisher.com/  Enter C159 in the search box to learn more about \"Knee Arthritis: Exercises. \"  Current as of: July 1, 2021               Content Version: 13.2  © 2006-2022 [employer], [employer]. Care instructions adapted under license by [employer] (which disclaims liability or warranty for this information). If you have questions about a medical condition or this instruction, always ask your healthcare professional. [address] any warranty or liability for your use of this information.

## 2022-03-31 LAB
ALBUMIN SERPL-MCNC: 3.7 G/DL (ref 3.5–5)
ALBUMIN/GLOB SERPL: 1.1 {RATIO} (ref 1.1–2.2)
ALP SERPL-CCNC: 133 U/L (ref 45–117)
ALT SERPL-CCNC: 40 U/L (ref 12–78)
ANION GAP SERPL CALC-SCNC: 7 MMOL/L (ref 5–15)
AST SERPL-CCNC: 20 U/L (ref 15–37)
BILIRUB SERPL-MCNC: 0.7 MG/DL (ref 0.2–1)
BUN SERPL-MCNC: 10 MG/DL (ref 6–20)
BUN/CREAT SERPL: 13 (ref 12–20)
CALCIUM SERPL-MCNC: 9.4 MG/DL (ref 8.5–10.1)
CHLORIDE SERPL-SCNC: 107 MMOL/L (ref 97–108)
CHOLEST SERPL-MCNC: 238 MG/DL
CO2 SERPL-SCNC: 24 MMOL/L (ref 21–32)
CREAT SERPL-MCNC: 0.75 MG/DL (ref 0.55–1.02)
ERYTHROCYTE [DISTWIDTH] IN BLOOD BY AUTOMATED COUNT: 13.6 % (ref 11.5–14.5)
EST. AVERAGE GLUCOSE BLD GHB EST-MCNC: 120 MG/DL
FERRITIN SERPL-MCNC: 117 NG/ML (ref 8–252)
GLOBULIN SER CALC-MCNC: 3.3 G/DL (ref 2–4)
GLUCOSE SERPL-MCNC: 102 MG/DL (ref 65–100)
HBA1C MFR BLD: 5.8 % (ref 4–5.6)
HCT VFR BLD AUTO: 36.8 % (ref 35–47)
HDLC SERPL-MCNC: 78 MG/DL
HDLC SERPL: 3.1 {RATIO} (ref 0–5)
HGB BLD-MCNC: 11.7 G/DL (ref 11.5–16)
IRON SATN MFR SERPL: 17 % (ref 20–50)
IRON SERPL-MCNC: 64 UG/DL (ref 35–150)
LDLC SERPL CALC-MCNC: 144.4 MG/DL (ref 0–100)
MCH RBC QN AUTO: 30 PG (ref 26–34)
MCHC RBC AUTO-ENTMCNC: 31.8 G/DL (ref 30–36.5)
MCV RBC AUTO: 94.4 FL (ref 80–99)
NRBC # BLD: 0 K/UL (ref 0–0.01)
NRBC BLD-RTO: 0 PER 100 WBC
PLATELET # BLD AUTO: 309 K/UL (ref 150–400)
PMV BLD AUTO: 10.7 FL (ref 8.9–12.9)
POTASSIUM SERPL-SCNC: 4 MMOL/L (ref 3.5–5.1)
PROT SERPL-MCNC: 7 G/DL (ref 6.4–8.2)
RBC # BLD AUTO: 3.9 M/UL (ref 3.8–5.2)
SODIUM SERPL-SCNC: 138 MMOL/L (ref 136–145)
TIBC SERPL-MCNC: 369 UG/DL (ref 250–450)
TRIGL SERPL-MCNC: 78 MG/DL (ref ?–150)
VLDLC SERPL CALC-MCNC: 15.6 MG/DL
WBC # BLD AUTO: 6.2 K/UL (ref 3.6–11)

## 2022-04-04 ENCOUNTER — TELEPHONE (OUTPATIENT)
Dept: FAMILY MEDICINE CLINIC | Age: 42
End: 2022-04-04

## 2022-04-04 DIAGNOSIS — L21.9 SEBORRHEA: ICD-10-CM

## 2022-04-04 RX ORDER — TRIAMCINOLONE ACETONIDE 1 MG/G
OINTMENT TOPICAL
Qty: 80 G | Refills: 1 | Status: SHIPPED | OUTPATIENT
Start: 2022-04-04 | End: 2022-08-13

## 2022-04-05 ENCOUNTER — TRANSCRIBE ORDER (OUTPATIENT)
Dept: SCHEDULING | Age: 42
End: 2022-04-05

## 2022-04-05 DIAGNOSIS — Z12.31 SCREENING MAMMOGRAM FOR HIGH-RISK PATIENT: Primary | ICD-10-CM

## 2022-04-06 ENCOUNTER — OFFICE VISIT (OUTPATIENT)
Dept: FAMILY MEDICINE CLINIC | Age: 42
End: 2022-04-06
Payer: MEDICARE

## 2022-04-06 VITALS
TEMPERATURE: 97.4 F | HEART RATE: 87 BPM | OXYGEN SATURATION: 98 % | BODY MASS INDEX: 39.95 KG/M2 | WEIGHT: 234 LBS | DIASTOLIC BLOOD PRESSURE: 88 MMHG | SYSTOLIC BLOOD PRESSURE: 130 MMHG | RESPIRATION RATE: 20 BRPM | HEIGHT: 64 IN

## 2022-04-06 DIAGNOSIS — M79.7 FIBROMYALGIA: ICD-10-CM

## 2022-04-06 DIAGNOSIS — H65.112 NON-RECURRENT ACUTE ALLERGIC OTITIS MEDIA OF LEFT EAR: Primary | ICD-10-CM

## 2022-04-06 DIAGNOSIS — F41.9 ANXIETY: ICD-10-CM

## 2022-04-06 DIAGNOSIS — L21.9 SEBORRHEA: ICD-10-CM

## 2022-04-06 DIAGNOSIS — E53.8 LOW FOLATE: ICD-10-CM

## 2022-04-06 PROCEDURE — G8417 CALC BMI ABV UP PARAM F/U: HCPCS | Performed by: FAMILY MEDICINE

## 2022-04-06 PROCEDURE — 99214 OFFICE O/P EST MOD 30 MIN: CPT | Performed by: FAMILY MEDICINE

## 2022-04-06 PROCEDURE — G8427 DOCREV CUR MEDS BY ELIG CLIN: HCPCS | Performed by: FAMILY MEDICINE

## 2022-04-06 PROCEDURE — G9717 DOC PT DX DEP/BP F/U NT REQ: HCPCS | Performed by: FAMILY MEDICINE

## 2022-04-06 RX ORDER — METHYLPHENIDATE HYDROCHLORIDE 20 MG/1
TABLET ORAL
COMMUNITY
Start: 2022-04-05 | End: 2022-05-31

## 2022-04-06 RX ORDER — AMOXICILLIN AND CLAVULANATE POTASSIUM 875; 125 MG/1; MG/1
1 TABLET, FILM COATED ORAL EVERY 12 HOURS
Qty: 14 TABLET | Refills: 0 | Status: SHIPPED | OUTPATIENT
Start: 2022-04-06 | End: 2022-04-13

## 2022-04-06 RX ORDER — DIAZEPAM 10 MG/1
10 TABLET ORAL 3 TIMES DAILY
COMMUNITY
Start: 2022-04-05 | End: 2022-08-13

## 2022-04-06 RX ORDER — TRAZODONE HYDROCHLORIDE 100 MG/1
TABLET ORAL
Status: ON HOLD | COMMUNITY
Start: 2022-04-05

## 2022-04-06 RX ORDER — KETOCONAZOLE 20 MG/ML
SHAMPOO TOPICAL
Qty: 120 ML | Refills: 1 | Status: SHIPPED | OUTPATIENT
Start: 2022-04-06 | End: 2022-05-22

## 2022-04-06 RX ORDER — FOLIC ACID 1 MG/1
TABLET ORAL
Qty: 90 TABLET | Refills: 1 | Status: ON HOLD | OUTPATIENT
Start: 2022-04-06

## 2022-04-06 RX ORDER — PREDNISONE 20 MG/1
20 TABLET ORAL
Qty: 5 TABLET | Refills: 0 | Status: SHIPPED | OUTPATIENT
Start: 2022-04-06 | End: 2022-04-11

## 2022-04-06 NOTE — PROGRESS NOTES
1. Have you been to the ER, urgent care clinic since your last visit? Hospitalized since your last visit? No    2. Have you seen or consulted any other health care providers outside of the [address] [place] [address] since your last visit? Include any pap smears or colon screening. No  Reviewed record in preparation for visit and have necessary documentation  Pt did not bring medication to office visit for review  opportunity was given for questions      3. For patients aged 39-70: Has the patient had a colonoscopy / FIT/ Cologuard? No      If the patient is female:    4. For patients aged 41-77: Has the patient had a mammogram within the past 2 years? No      5. For patients aged 21-65: Has the patient had a pap smear?  No      Goals that were addressed and/or need to be completed during or after this appointment include   Health Maintenance Due   Topic Date Due    Pneumococcal 0-64 years (1 of 2 - PPSV23) Never done    COVID-19 Vaccine (3 - Booster for Classie Royer series) 09/25/2021

## 2022-04-06 NOTE — PROGRESS NOTES
[facility]    History of Present Illness:   Ofelia Vásquez is a 46 y.o. female with history of HTN, GERD, IFG, Depression, Migraines  CC: Pain behind the left ear. History provided by patient and Records    HPI:    Patient presents with complaint of pain behind the left ear(s). Symptoms ongoing over the last 5 days. she denies drainage from the effected ear. Patient denies current URI/Sinusitis symptoms. Current OTC medications include Muscle relaxer, Ketorolac did not help, and voltaren which have been ineffective. - Associated symptoms: Reports Allergies are acting up and not responding to OTC therapies. .  Denies fever, ear drainage.  - Otitis media History negative. - History of Tympanostomy tubes? No        Health Maintenance  Health Maintenance Due   Topic Date Due    Pneumococcal 0-64 years (1 of 2 - PPSV23) Never done    COVID-19 Vaccine (3 - Booster for Moderna series) 09/25/2021       Past Medical, Family, and Social History:     Current Outpatient Medications on File Prior to Visit   Medication Sig Dispense Refill    diazePAM (VALIUM) 10 mg tablet       methylphenidate HCl (RITALIN) 20 mg tablet       traZODone (DESYREL) 100 mg tablet       triamcinolone acetonide (KENALOG) 0.1 % ointment APPLY  TO AFFECTED AREA TWO (2) TIMES A DAY. USE A THIN LAYER 80 g 1    norgestimate-ethinyl estradioL (ORTHO-CYCLEN, SPRINTEC) 0.25-35 mg-mcg tab Take 1 Tablet by mouth daily. Generic 3 Dose Pack 3    furosemide (LASIX) 20 mg tablet Take 0.5 Tablets by mouth daily.  As needed 45 Tablet 1    montelukast (SINGULAIR) 10 mg tablet TAKE 1 TABLET EVERY DAY 90 Tablet 1    ketoconazole (NIZORAL) 2 % shampoo SHAMPOO TWICE WEEKLY 120 mL 1    ferrous sulfate 325 mg (65 mg iron) tablet TAKE 1 TABLET BY MOUTH ONCE DAILY BEFORE BREAKFAST 90 Tablet 1    albuterol (PROVENTIL HFA, VENTOLIN HFA, PROAIR HFA) 90 mcg/actuation inhaler INHALE 1 PUFF EVERY 6 HOURS AS NEEDED FOR WHEEZING 18 g 2    pantoprazole (PROTONIX) 40 mg tablet TAKE 1 TABLET TWICE DAILY 180 Tablet 1    metFORMIN ER (GLUCOPHAGE XR) 500 mg tablet Take 1 Tablet by mouth daily (with dinner). 90 Tablet 1    famotidine (Pepcid) 20 mg tablet Take 20 mg by mouth three (3) times daily (with meals).  diclofenac EC (VOLTAREN) 50 mg EC tablet Take 1 Tablet by mouth two (2) times daily as needed for Pain. 30 Tablet 0    cyclobenzaprine (FLEXERIL) 10 mg tablet three (3) times daily as needed. Has not needed      gabapentin (NEURONTIN) 600 mg tablet 600 mg three (3) times daily.  escitalopram oxalate (LEXAPRO) 20 mg tablet Take 20 mg by mouth daily.  levocetirizine (XYZAL) 5 mg tablet Take 1 Tablet by mouth daily. 90 Tablet 1    folic acid (FOLVITE) 1 mg tablet TAKE 1 TABLET EVERY DAY 90 Tablet 1    albuterol-ipratropium (DUO-NEB) 2.5 mg-0.5 mg/3 ml nebu 3 mL by Nebulization route every six (6) hours as needed (wheeze). 30 Nebule 3    Comp Stocking,Knee,Regular,Med misc 1 Each by Does Not Apply route daily. 1 Each 1    acetaminophen (TYLENOL 8 HOUR PO) Take  by mouth.  clonazePAM (KLONOPIN) 1 mg tablet Take 1.5 mg by mouth daily. At bedtime       cholecalciferol, vitamin D3, (VITAMIN D3) 2,000 unit tab Take  by mouth.  LORazepam (ATIVAN) 2 mg tablet Take 2 mg by mouth every eight (8) hours as needed. (Patient not taking: Reported on 4/6/2022)      traZODone (DESYREL) 50 mg tablet Take 1 Tab by mouth nightly. (Patient not taking: Reported on 4/6/2022) 30 Tab 6     No current facility-administered medications on file prior to visit. Patient Active Problem List   Diagnosis Code    Depression F32. A    GERD (gastroesophageal reflux disease) K21.9    Anemia, unspecified D64.9    Itch of skin L29.9    Anxiety F41.9    Costochondritis M94.0    HSV (herpes simplex virus) anogenital infection A60.9    OCD (obsessive compulsive disorder) F42.9    Hoarseness R49.0    IFG (impaired fasting glucose) R73.01    Hyperlipidemia E78.5    Thrombosed external hemorrhoid K64.5    Vitamin D deficiency E55.9    Inflammatory arthritis M19.90    Recurrent depression (HCC) F33.9    Mood disorder (HCC) F39    Chronic pain syndrome G89.4    Fibromyalgia M79.7    Panic attack F41.0    Tremor R25.1    Class 2 obesity due to excess calories without serious comorbidity in adult E66.09    Gastroparesis K31.84    Plantar fasciitis of left foot M72.2    Positive KITTY (antinuclear antibody) R76.8    Severe obesity (HCC) E66.01    Neuropathy G62.9       Social History     Socioeconomic History    Marital status: SINGLE   Tobacco Use    Smoking status: Light Tobacco Smoker     Packs/day: 0.25     Years: 8.00     Pack years: 2.00     Types: Cigarettes    Smokeless tobacco: Never Used   Substance and Sexual Activity    Alcohol use: Not Currently     Alcohol/week: 1.0 standard drink     Types: 1 Glasses of wine per week     Comment: 1 cup of wine/week    Drug use: No    Sexual activity: Yes     Partners: Male     Birth control/protection: None        Review of Systems   Review of Systems   Constitutional: Negative for chills and fever. HENT: Positive for congestion and ear pain. Cardiovascular: Negative for chest pain and palpitations. Neurological: Negative for dizziness and headaches. Objective:     Visit Vitals  /88   Pulse 87   Temp 97.4 °F (36.3 °C)   Resp 20   Ht 5' 4\" (1.626 m)   Wt 234 lb (106.1 kg)   SpO2 98%   BMI 40.17 kg/m²        Physical Exam  Vitals and nursing note reviewed. Constitutional:       Appearance: Normal appearance. HENT:      Head: Normocephalic and atraumatic. Cardiovascular:      Rate and Rhythm: Normal rate. Pulses: Normal pulses. Heart sounds: Normal heart sounds. No murmur heard. No friction rub. No gallop. Pulmonary:      Effort: Pulmonary effort is normal.      Breath sounds: Normal breath sounds. Abdominal:      General: Abdomen is flat.  Bowel sounds are normal.      Palpations: Abdomen is soft. Musculoskeletal:      Cervical back: Normal range of motion and neck supple. Neurological:      Mental Status: She is alert. Pertinent Labs/Studies:      Assessment and orders:       ICD-10-CM ICD-9-CM    1. Non-recurrent acute allergic otitis media of left ear  H65.112 381.04 amoxicillin-clavulanate (AUGMENTIN) 875-125 mg per tablet   2. Anxiety  F41.9 300.00    3. Fibromyalgia  M79.7 729.1 predniSONE (DELTASONE) 20 mg tablet     Diagnoses and all orders for this visit:    1. Non-recurrent acute allergic otitis media of left ear: Trial of Augmentin  -     amoxicillin-clavulanate (AUGMENTIN) 875-125 mg per tablet; Take 1 Tablet by mouth every twelve (12) hours for 7 days. 2. Anxiety: Recently started on Valium by Psych    3. Fibromyalgia: Trial of prednisone, also for allergies as other therapies ineffective. -     predniSONE (DELTASONE) 20 mg tablet; Take 20 mg by mouth daily (with breakfast) for 5 days. Follow-up and Dispositions    · Return if symptoms worsen or fail to improve. I have discussed the diagnosis with the patient and the intended plan as seen in the above orders. Social history, medical history, and labs were reviewed. The patient has received an after-visit summary and questions were answered concerning future plans. I have discussed medication side effects and warnings with the patient as well.     MD URBAN Fowler & MARSHAL CARMEN [facility]  04/06/22

## 2022-04-11 ENCOUNTER — TELEPHONE (OUTPATIENT)
Dept: FAMILY MEDICINE CLINIC | Age: 42
End: 2022-04-11

## 2022-04-12 NOTE — TELEPHONE ENCOUNTER
Appt scheduled for tomorrow.     MD URBAN Stevens & MARSHAL CARMEN [facility]  04/12/22

## 2022-04-13 ENCOUNTER — OFFICE VISIT (OUTPATIENT)
Dept: FAMILY MEDICINE CLINIC | Age: 42
End: 2022-04-13
Payer: MEDICARE

## 2022-04-13 VITALS
DIASTOLIC BLOOD PRESSURE: 99 MMHG | BODY MASS INDEX: 40.26 KG/M2 | RESPIRATION RATE: 18 BRPM | OXYGEN SATURATION: 95 % | TEMPERATURE: 98.1 F | HEIGHT: 64 IN | SYSTOLIC BLOOD PRESSURE: 132 MMHG | WEIGHT: 235.8 LBS | HEART RATE: 105 BPM

## 2022-04-13 DIAGNOSIS — T78.40XA ALLERGY, INITIAL ENCOUNTER: ICD-10-CM

## 2022-04-13 DIAGNOSIS — H92.02 LEFT EAR PAIN: Primary | ICD-10-CM

## 2022-04-13 PROCEDURE — 99214 OFFICE O/P EST MOD 30 MIN: CPT | Performed by: FAMILY MEDICINE

## 2022-04-13 PROCEDURE — G8417 CALC BMI ABV UP PARAM F/U: HCPCS | Performed by: FAMILY MEDICINE

## 2022-04-13 PROCEDURE — G8427 DOCREV CUR MEDS BY ELIG CLIN: HCPCS | Performed by: FAMILY MEDICINE

## 2022-04-13 PROCEDURE — G9717 DOC PT DX DEP/BP F/U NT REQ: HCPCS | Performed by: FAMILY MEDICINE

## 2022-04-13 RX ORDER — KETOROLAC TROMETHAMINE 10 MG/1
10 TABLET, FILM COATED ORAL
Qty: 20 TABLET | Refills: 0 | Status: SHIPPED | OUTPATIENT
Start: 2022-04-13 | End: 2022-04-18

## 2022-04-13 NOTE — PROGRESS NOTES
[facility]    History of Present Illness:   Margie Tyler is a 46 y.o. female with history of HTN, GERD, IFG, Depression, Migraines  CC: Persistent pain behind the Left er. History provided by patient and Records    HPI:  Patient with persistent left post-auricular pain. Patient notes that the antibiotic is causing diarrhea. Denies drainage, but notes hearing is still \"Muffled\". Overall no significant change in symptoms. No trauma noted. Prednisone was ineffective. Health Maintenance  Health Maintenance Due   Topic Date Due    Pneumococcal 0-64 years (1 - PCV) Never done    COVID-19 Vaccine (3 - Booster for Moderna series) 09/25/2021       Past Medical, Family, and Social History:     Current Outpatient Medications on File Prior to Visit   Medication Sig Dispense Refill    ketoconazole (NIZORAL) 2 % shampoo SHAMPOO TWICE WEEKLY 120 mL 1    diazePAM (VALIUM) 10 mg tablet       methylphenidate HCl (RITALIN) 20 mg tablet       traZODone (DESYREL) 100 mg tablet       amoxicillin-clavulanate (AUGMENTIN) 875-125 mg per tablet Take 1 Tablet by mouth every twelve (12) hours for 7 days. 14 Tablet 0    folic acid (FOLVITE) 1 mg tablet TAKE 1 TABLET EVERY DAY 90 Tablet 1    triamcinolone acetonide (KENALOG) 0.1 % ointment APPLY  TO AFFECTED AREA TWO (2) TIMES A DAY. USE A THIN LAYER 80 g 1    norgestimate-ethinyl estradioL (ORTHO-CYCLEN, SPRINTEC) 0.25-35 mg-mcg tab Take 1 Tablet by mouth daily. Generic 3 Dose Pack 3    furosemide (LASIX) 20 mg tablet Take 0.5 Tablets by mouth daily.  As needed 45 Tablet 1    montelukast (SINGULAIR) 10 mg tablet TAKE 1 TABLET EVERY DAY 90 Tablet 1    ferrous sulfate 325 mg (65 mg iron) tablet TAKE 1 TABLET BY MOUTH ONCE DAILY BEFORE BREAKFAST 90 Tablet 1    albuterol (PROVENTIL HFA, VENTOLIN HFA, PROAIR HFA) 90 mcg/actuation inhaler INHALE 1 PUFF EVERY 6 HOURS AS NEEDED FOR WHEEZING 18 g 2    pantoprazole (PROTONIX) 40 mg tablet TAKE 1 TABLET TWICE DAILY 180 Tablet 1    metFORMIN ER (GLUCOPHAGE XR) 500 mg tablet Take 1 Tablet by mouth daily (with dinner). 90 Tablet 1    famotidine (Pepcid) 20 mg tablet Take 20 mg by mouth three (3) times daily (with meals).  diclofenac EC (VOLTAREN) 50 mg EC tablet Take 1 Tablet by mouth two (2) times daily as needed for Pain. 30 Tablet 0    cyclobenzaprine (FLEXERIL) 10 mg tablet three (3) times daily as needed. Has not needed      gabapentin (NEURONTIN) 600 mg tablet 600 mg three (3) times daily.  escitalopram oxalate (LEXAPRO) 20 mg tablet Take 20 mg by mouth daily.  levocetirizine (XYZAL) 5 mg tablet Take 1 Tablet by mouth daily. 90 Tablet 1    albuterol-ipratropium (DUO-NEB) 2.5 mg-0.5 mg/3 ml nebu 3 mL by Nebulization route every six (6) hours as needed (wheeze). 30 Nebule 3    LORazepam (ATIVAN) 2 mg tablet Take 2 mg by mouth every eight (8) hours as needed.  Comp Stocking,Knee,Regular,Med misc 1 Each by Does Not Apply route daily. 1 Each 1    acetaminophen (TYLENOL 8 HOUR PO) Take  by mouth.  clonazePAM (KLONOPIN) 1 mg tablet Take 1.5 mg by mouth daily. At bedtime       cholecalciferol, vitamin D3, (VITAMIN D3) 2,000 unit tab Take  by mouth.  traZODone (DESYREL) 50 mg tablet Take 1 Tab by mouth nightly. (Patient not taking: Reported on 4/6/2022) 30 Tab 6     No current facility-administered medications on file prior to visit. Patient Active Problem List   Diagnosis Code    Depression F32. A    GERD (gastroesophageal reflux disease) K21.9    Anemia, unspecified D64.9    Itch of skin L29.9    Anxiety F41.9    Costochondritis M94.0    HSV (herpes simplex virus) anogenital infection A60.9    OCD (obsessive compulsive disorder) F42.9    Hoarseness R49.0    IFG (impaired fasting glucose) R73.01    Hyperlipidemia E78.5    Thrombosed external hemorrhoid K64.5    Vitamin D deficiency E55.9    Inflammatory arthritis M19.90    Recurrent depression (HCC) F33.9    Mood disorder ([facility]) F39    Chronic pain syndrome G89.4    Fibromyalgia M79.7    Panic attack F41.0    Tremor R25.1    Class 2 obesity due to excess calories without serious comorbidity in adult E66.09    Gastroparesis K31.84    Plantar fasciitis of left foot M72.2    Positive KITTY (antinuclear antibody) R76.8    Severe obesity (HCC) E66.01    Neuropathy G62.9       Social History     Socioeconomic History    Marital status: SINGLE   Tobacco Use    Smoking status: Light Tobacco Smoker     Packs/day: 0.25     Years: 8.00     Pack years: 2.00     Types: Cigarettes    Smokeless tobacco: Never Used   Substance and Sexual Activity    Alcohol use: Not Currently     Alcohol/week: 1.0 standard drink     Types: 1 Glasses of wine per week     Comment: 1 cup of wine/week    Drug use: No    Sexual activity: Yes     Partners: Male     Birth control/protection: None        Review of Systems   Review of Systems   Constitutional: Negative for chills and fever. Cardiovascular: Negative for chest pain and palpitations. Gastrointestinal: Negative for nausea and vomiting. Neurological: Negative for dizziness, tingling and headaches. Objective:     Visit Vitals  BP (!) 132/99 (BP 1 Location: Right upper arm, BP Patient Position: Sitting, BP Cuff Size: Large adult)   Pulse (!) 105   Temp 98.1 °F (36.7 °C) (Oral)   Resp 18   Ht 5' 4\" (1.626 m)   Wt 235 lb 12.8 oz (107 kg)   SpO2 95%   BMI 40.47 kg/m²        Physical Exam  Vitals and nursing note reviewed. Constitutional:       Appearance: Normal appearance. HENT:      Head: Normocephalic and atraumatic. Right Ear: Tympanic membrane and ear canal normal.      Left Ear: Tympanic membrane and ear canal normal.      Nose: Nose normal.   Cardiovascular:      Rate and Rhythm: Normal rate and regular rhythm. Pulses: Normal pulses. Heart sounds: Normal heart sounds.    Pulmonary:      Effort: Pulmonary effort is normal.      Breath sounds: Normal breath sounds. Abdominal:      General: Abdomen is flat. Bowel sounds are normal.      Palpations: Abdomen is soft. Musculoskeletal:      Cervical back: Normal range of motion and neck supple. Skin:     General: Skin is warm and dry. Neurological:      Mental Status: She is alert. Pertinent Labs/Studies:      Assessment and orders:       ICD-10-CM ICD-9-CM    1. Left ear pain  H92.02 388.70 ketorolac (TORADOL) 10 mg tablet   2. Allergy, initial encounter  T78.40XA 995.3      Diagnoses and all orders for this visit:    1. Left ear pain  -     ketorolac (TORADOL) 10 mg tablet; Take 1 Tablet by mouth every six (6) hours as needed for Pain for up to 5 days. 2. Allergy, initial encounter      Follow-up and Dispositions    · Return if symptoms worsen or fail to improve. I have discussed the diagnosis with the patient and the intended plan as seen in the above orders. Social history, medical history, and labs were reviewed. The patient has received an after-visit summary and questions were answered concerning future plans. I have discussed medication side effects and warnings with the patient as well.     Audry Primrose, MD PRISCILLA CHAN [facility] MARSHAL CARMEN [facility]  04/13/22

## 2022-04-13 NOTE — PATIENT INSTRUCTIONS
- Take you Xyzal at night  - Take Singulair in the morning  - Continue the Flonase  - Stop the Antibiotic

## 2022-04-14 ENCOUNTER — PATIENT MESSAGE (OUTPATIENT)
Dept: FAMILY MEDICINE CLINIC | Age: 42
End: 2022-04-14

## 2022-04-14 RX ORDER — LEVOCETIRIZINE DIHYDROCHLORIDE 5 MG/1
TABLET, FILM COATED ORAL
Qty: 90 TABLET | Refills: 0 | Status: SHIPPED | OUTPATIENT
Start: 2022-04-14 | End: 2022-04-22 | Stop reason: SDUPTHER

## 2022-04-14 RX ORDER — FLUCONAZOLE 150 MG/1
150 TABLET ORAL DAILY
Qty: 1 TABLET | Refills: 0 | Status: SHIPPED | OUTPATIENT
Start: 2022-04-14 | End: 2022-04-15

## 2022-04-14 NOTE — TELEPHONE ENCOUNTER
From: Candis Henley  To: Shoaib Kessler MD  Sent: 4/14/2022 9:17 AM EDT  Subject: Yeast infection     I forgot to tell you yesterday I do believe that the antibiotic I was taking gave me a yeast infection. If you can call fluconazole , I don't know if i spelled it right . I will let you know next week about the ear after using the lidocaine. If it does not work I really want to get a x ray , ct scan or whatever they have to do. It really a concern to me , I cried all evening about it. I just want the pain gone .

## 2022-04-25 RX ORDER — LEVOCETIRIZINE DIHYDROCHLORIDE 5 MG/1
5 TABLET, FILM COATED ORAL DAILY
Qty: 90 TABLET | Refills: 1 | Status: SHIPPED | OUTPATIENT
Start: 2022-04-25 | End: 2022-08-24

## 2022-04-25 RX ORDER — FAMOTIDINE 20 MG/1
20 TABLET, FILM COATED ORAL 2 TIMES DAILY
Qty: 180 TABLET | Refills: 1 | Status: SHIPPED | OUTPATIENT
Start: 2022-04-25 | End: 2022-09-14

## 2022-05-04 ENCOUNTER — PATIENT MESSAGE (OUTPATIENT)
Dept: FAMILY MEDICINE CLINIC | Age: 42
End: 2022-05-04

## 2022-05-04 DIAGNOSIS — D50.9 IRON DEFICIENCY ANEMIA, UNSPECIFIED IRON DEFICIENCY ANEMIA TYPE: ICD-10-CM

## 2022-05-04 DIAGNOSIS — M79.89 LEG SWELLING: ICD-10-CM

## 2022-05-04 RX ORDER — LANOLIN ALCOHOL/MO/W.PET/CERES
CREAM (GRAM) TOPICAL
Qty: 90 TABLET | Refills: 1 | Status: ON HOLD | OUTPATIENT
Start: 2022-05-04

## 2022-05-04 RX ORDER — FUROSEMIDE 20 MG/1
10 TABLET ORAL DAILY
Qty: 45 TABLET | Refills: 1 | Status: ON HOLD | OUTPATIENT
Start: 2022-05-04 | End: 2022-08-14 | Stop reason: SDUPTHER

## 2022-05-22 DIAGNOSIS — L21.9 SEBORRHEA: ICD-10-CM

## 2022-05-22 RX ORDER — KETOCONAZOLE 20 MG/ML
SHAMPOO TOPICAL
Qty: 120 ML | Refills: 1 | Status: SHIPPED | OUTPATIENT
Start: 2022-05-22 | End: 2022-08-02

## 2022-05-31 ENCOUNTER — OFFICE VISIT (OUTPATIENT)
Dept: FAMILY MEDICINE CLINIC | Age: 42
End: 2022-05-31
Payer: MEDICARE

## 2022-05-31 VITALS
TEMPERATURE: 98.5 F | HEIGHT: 64 IN | OXYGEN SATURATION: 98 % | SYSTOLIC BLOOD PRESSURE: 122 MMHG | DIASTOLIC BLOOD PRESSURE: 87 MMHG | RESPIRATION RATE: 18 BRPM | WEIGHT: 226.2 LBS | BODY MASS INDEX: 38.62 KG/M2 | HEART RATE: 79 BPM

## 2022-05-31 DIAGNOSIS — J45.21 MILD INTERMITTENT ASTHMATIC BRONCHITIS WITH ACUTE EXACERBATION: ICD-10-CM

## 2022-05-31 DIAGNOSIS — F41.9 ANXIETY AND DEPRESSION: ICD-10-CM

## 2022-05-31 DIAGNOSIS — F32.A ANXIETY AND DEPRESSION: ICD-10-CM

## 2022-05-31 DIAGNOSIS — K08.89 PAIN, DENTAL: Primary | ICD-10-CM

## 2022-05-31 DIAGNOSIS — J45.901 REACTIVE AIRWAY DISEASE WITH ACUTE EXACERBATION, UNSPECIFIED ASTHMA SEVERITY, UNSPECIFIED WHETHER PERSISTENT: ICD-10-CM

## 2022-05-31 PROCEDURE — 99214 OFFICE O/P EST MOD 30 MIN: CPT | Performed by: FAMILY MEDICINE

## 2022-05-31 PROCEDURE — G8427 DOCREV CUR MEDS BY ELIG CLIN: HCPCS | Performed by: FAMILY MEDICINE

## 2022-05-31 PROCEDURE — G8417 CALC BMI ABV UP PARAM F/U: HCPCS | Performed by: FAMILY MEDICINE

## 2022-05-31 PROCEDURE — G9717 DOC PT DX DEP/BP F/U NT REQ: HCPCS | Performed by: FAMILY MEDICINE

## 2022-05-31 RX ORDER — IPRATROPIUM BROMIDE AND ALBUTEROL SULFATE 2.5; .5 MG/3ML; MG/3ML
3 SOLUTION RESPIRATORY (INHALATION)
Qty: 90 NEBULE | Refills: 3 | Status: SHIPPED | OUTPATIENT
Start: 2022-05-31 | End: 2022-08-13

## 2022-05-31 RX ORDER — IPRATROPIUM BROMIDE AND ALBUTEROL SULFATE 2.5; .5 MG/3ML; MG/3ML
3 SOLUTION RESPIRATORY (INHALATION)
Qty: 30 NEBULE | Refills: 3 | Status: SHIPPED | OUTPATIENT
Start: 2022-05-31 | End: 2022-05-31

## 2022-05-31 RX ORDER — CLINDAMYCIN HYDROCHLORIDE 300 MG/1
300 CAPSULE ORAL 3 TIMES DAILY
Qty: 21 CAPSULE | Refills: 0 | Status: SHIPPED | OUTPATIENT
Start: 2022-05-31 | End: 2022-06-07

## 2022-05-31 NOTE — PROGRESS NOTES
1. Have you been to the ER, urgent care clinic since your last visit? Hospitalized since your last visit? No    2. Have you seen or consulted any other health care providers outside of the [address] [place] [address] since your last visit? Including any pap smears or colon screening.  No      Health Maintenance Due   Topic Date Due    Pneumococcal 0-64 years (1 - PCV) Never done    COVID-19 Vaccine (3 - Booster for Moderna series) 09/25/2021

## 2022-05-31 NOTE — PROGRESS NOTES
[facility]    History of Present Illness:   Red Anaya is a 46 y.o. female with history of HTN, GERD, IFG, Depression, Migraines  CC: Follow up Asthma and ear pain, Depression  History provided by patient and Records    HPI:  Patient Reports that she has been having issues with her asthma recently, her ear pain has been resolved but still has significant nasal congestion, and sensation of popping, and also with persistent hoarseness. Patient is taking Montelukast, Xyzal, and Flonase. Depression/Anxiety: Patient seeing Psychiatrist, Is currently on Valium TID now with Lexapro 20 mg daily. Reports her Dogs had  recently. Left Gum Pain: Patient notes since using Mouth wash has had left sided gum pain and tooth pain. Health Maintenance  Health Maintenance Due   Topic Date Due    Pneumococcal 0-64 years (1 - PCV) Never done    COVID-19 Vaccine (3 - Booster for Moderna series) 2021       Past Medical, Family, and Social History:     Current Outpatient Medications on File Prior to Visit   Medication Sig Dispense Refill    ketoconazole (NIZORAL) 2 % shampoo SHAMPOO TWICE WEEKLY 120 mL 1    ferrous sulfate 325 mg (65 mg iron) tablet TAKE 1 TABLET BY MOUTH ONCE DAILY BEFORE BREAKFAST 90 Tablet 1    furosemide (LASIX) 20 mg tablet Take 0.5 Tablets by mouth daily. As needed 45 Tablet 1    levocetirizine (XYZAL) 5 mg tablet Take 1 Tablet by mouth daily. 90 Tablet 1    famotidine (Pepcid) 20 mg tablet Take 1 Tablet by mouth two (2) times a day. 180 Tablet 1    diazePAM (VALIUM) 10 mg tablet       traZODone (DESYREL) 100 mg tablet       folic acid (FOLVITE) 1 mg tablet TAKE 1 TABLET EVERY DAY 90 Tablet 1    triamcinolone acetonide (KENALOG) 0.1 % ointment APPLY  TO AFFECTED AREA TWO (2) TIMES A DAY. USE A THIN LAYER 80 g 1    norgestimate-ethinyl estradioL (ORTHO-CYCLEN, SPRINTEC) 0.25-35 mg-mcg tab Take 1 Tablet by mouth daily.  Generic 3 Dose Pack 3    montelukast (SINGULAIR) 10 mg tablet TAKE 1 TABLET EVERY DAY 90 Tablet 1    albuterol (PROVENTIL HFA, VENTOLIN HFA, PROAIR HFA) 90 mcg/actuation inhaler INHALE 1 PUFF EVERY 6 HOURS AS NEEDED FOR WHEEZING 18 g 2    pantoprazole (PROTONIX) 40 mg tablet TAKE 1 TABLET TWICE DAILY 180 Tablet 1    metFORMIN ER (GLUCOPHAGE XR) 500 mg tablet Take 1 Tablet by mouth daily (with dinner). 90 Tablet 1    cyclobenzaprine (FLEXERIL) 10 mg tablet three (3) times daily as needed. Has not needed      gabapentin (NEURONTIN) 600 mg tablet 600 mg three (3) times daily.  escitalopram oxalate (LEXAPRO) 20 mg tablet Take 20 mg by mouth daily.  Comp Stocking,Knee,Regular,Med misc 1 Each by Does Not Apply route daily. 1 Each 1    acetaminophen (TYLENOL 8 HOUR PO) Take  by mouth.  clonazePAM (KLONOPIN) 1 mg tablet Take 1.5 mg by mouth daily. At bedtime       cholecalciferol, vitamin D3, (VITAMIN D3) 2,000 unit tab Take  by mouth.  [DISCONTINUED] methylphenidate HCl (RITALIN) 20 mg tablet  (Patient not taking: Reported on 5/31/2022)      [DISCONTINUED] diclofenac EC (VOLTAREN) 50 mg EC tablet Take 1 Tablet by mouth two (2) times daily as needed for Pain. (Patient not taking: Reported on 5/31/2022) 30 Tablet 0    [DISCONTINUED] albuterol-ipratropium (DUO-NEB) 2.5 mg-0.5 mg/3 ml nebu 3 mL by Nebulization route every six (6) hours as needed (wheeze). 30 Nebule 3    [DISCONTINUED] LORazepam (ATIVAN) 2 mg tablet Take 2 mg by mouth every eight (8) hours as needed. (Patient not taking: Reported on 5/31/2022)      [DISCONTINUED] traZODone (DESYREL) 50 mg tablet Take 1 Tab by mouth nightly. (Patient not taking: Reported on 4/6/2022) 30 Tab 6     No current facility-administered medications on file prior to visit. Patient Active Problem List   Diagnosis Code    Depression F32. A    GERD (gastroesophageal reflux disease) K21.9    Anemia, unspecified D64.9    Itch of skin L29.9    Anxiety F41.9    Costochondritis M94.0    HSV (herpes simplex virus) anogenital infection A60.9    OCD (obsessive compulsive disorder) F42.9    Hoarseness R49.0    IFG (impaired fasting glucose) R73.01    Hyperlipidemia E78.5    Thrombosed external hemorrhoid K64.5    Vitamin D deficiency E55.9    Inflammatory arthritis M19.90    Recurrent depression (HCC) F33.9    Mood disorder (HCC) F39    Chronic pain syndrome G89.4    Fibromyalgia M79.7    Panic attack F41.0    Tremor R25.1    Class 2 obesity due to excess calories without serious comorbidity in adult E66.09    Gastroparesis K31.84    Plantar fasciitis of left foot M72.2    Positive KITTY (antinuclear antibody) R76.8    Severe obesity (HCC) E66.01    Neuropathy G62.9       Social History     Socioeconomic History    Marital status: SINGLE   Tobacco Use    Smoking status: Light Tobacco Smoker     Packs/day: 0.25     Years: 8.00     Pack years: 2.00     Types: Cigarettes    Smokeless tobacco: Never Used   Substance and Sexual Activity    Alcohol use: Not Currently     Alcohol/week: 1.0 standard drink     Types: 1 Glasses of wine per week     Comment: 1 cup of wine/week    Drug use: No    Sexual activity: Yes     Partners: Male     Birth control/protection: None        Review of Systems   Review of Systems   Constitutional: Negative for fever. HENT: Positive for congestion. Negative for ear pain. Eyes: Negative for blurred vision and double vision. Respiratory: Positive for cough. Cardiovascular: Negative for chest pain and palpitations. Gastrointestinal: Negative for nausea and vomiting. Genitourinary: Negative for dysuria and urgency. Objective:     Visit Vitals  /87 (BP 1 Location: Left arm, BP Patient Position: Sitting, BP Cuff Size: Large adult)   Pulse 79   Temp 98.5 °F (36.9 °C) (Oral)   Resp 18   Ht 5' 4\" (1.626 m)   Wt 226 lb 3.2 oz (102.6 kg)   SpO2 98%   BMI 38.83 kg/m²        Physical Exam  Vitals and nursing note reviewed.    Constitutional: Appearance: Normal appearance. HENT:      Head: Normocephalic and atraumatic. Cardiovascular:      Rate and Rhythm: Normal rate and regular rhythm. Pulses: Normal pulses. Heart sounds: Normal heart sounds. No murmur heard. No friction rub. No gallop. Pulmonary:      Effort: Pulmonary effort is normal.      Breath sounds: Normal breath sounds. Abdominal:      General: Abdomen is flat. Bowel sounds are normal.      Palpations: Abdomen is soft. Musculoskeletal:      Cervical back: Normal range of motion and neck supple. Neurological:      Mental Status: She is alert. Pertinent Labs/Studies:      Assessment and orders:       ICD-10-CM ICD-9-CM    1. Pain, dental  K08.89 525.9 clindamycin (CLEOCIN) 300 mg capsule   2. Mild intermittent asthmatic bronchitis with acute exacerbation  J45.21 493.92 albuterol-ipratropium (DUO-NEB) 2.5 mg-0.5 mg/3 ml nebu      DISCONTINUED: albuterol-ipratropium (DUO-NEB) 2.5 mg-0.5 mg/3 ml nebu   3. Reactive airway disease with acute exacerbation, unspecified asthma severity, unspecified whether persistent  J45.901 493.92 albuterol-ipratropium (DUO-NEB) 2.5 mg-0.5 mg/3 ml nebu      DISCONTINUED: albuterol-ipratropium (DUO-NEB) 2.5 mg-0.5 mg/3 ml nebu   4. Anxiety and depression  F41.9 300.00     F32. A 311      Diagnoses and all orders for this visit:    1. Pain, dental: Trial of Clindamycin. -     clindamycin (CLEOCIN) 300 mg capsule; Take 1 Capsule by mouth three (3) times daily for 7 days. 2. Mild intermittent asthmatic bronchitis with acute exacerbation: Refill, advised on using air filter as well  -     albuterol-ipratropium (DUO-NEB) 2.5 mg-0.5 mg/3 ml nebu; 3 mL by Nebulization route every six (6) hours as needed (wheeze).     3. Reactive airway disease with acute exacerbation, unspecified asthma severity, unspecified whether persistent  -     albuterol-ipratropium (DUO-NEB) 2.5 mg-0.5 mg/3 ml nebu; 3 mL by Nebulization route every six (6) hours as needed (wheeze). 4. Anxiety and depression: Advised needs to find a productive activity to focus self and time. Follow-up and Dispositions    · Return in about 4 weeks (around 6/28/2022). I have discussed the diagnosis with the patient and the intended plan as seen in the above orders. Social history, medical history, and labs were reviewed. The patient has received an after-visit summary and questions were answered concerning future plans. I have discussed medication side effects and warnings with the patient as well.     MD URBAN Stevens & MARSHAL CARMEN [facility]  05/31/22

## 2022-06-01 DIAGNOSIS — K21.9 GASTROESOPHAGEAL REFLUX DISEASE WITHOUT ESOPHAGITIS: ICD-10-CM

## 2022-06-01 RX ORDER — PANTOPRAZOLE SODIUM 40 MG/1
TABLET, DELAYED RELEASE ORAL
Qty: 180 TABLET | Refills: 1 | Status: ON HOLD | OUTPATIENT
Start: 2022-06-01

## 2022-06-09 ENCOUNTER — PATIENT MESSAGE (OUTPATIENT)
Dept: FAMILY MEDICINE CLINIC | Age: 42
End: 2022-06-09

## 2022-06-09 RX ORDER — FLUCONAZOLE 150 MG/1
150 TABLET ORAL
Qty: 2 TABLET | Refills: 0 | Status: SHIPPED | OUTPATIENT
Start: 2022-06-09 | End: 2022-06-13

## 2022-06-09 NOTE — TELEPHONE ENCOUNTER
From: Yanick Ruby  To:  Cristina Miller MD  Sent: 6/9/2022 4:03 PM EDT  Subject: Lillian East when I was seen recently I was on a antibiotic for my tooth you put me on but it has caused a yeast infection so can you prescribe something please and send to [facility]    Thank you

## 2022-06-15 ENCOUNTER — TELEPHONE (OUTPATIENT)
Dept: FAMILY MEDICINE CLINIC | Age: 42
End: 2022-06-15

## 2022-06-15 NOTE — TELEPHONE ENCOUNTER
----- Message from Millarosa Amanda sent at 6/15/2022 12:22 PM EDT -----  Subject: Appointment Request    Reason for Call: Urgent Adult Urinary Problem    QUESTIONS  Type of Appointment? Established Patient  Reason for appointment request? No appointments available during search  Additional Information for Provider? Pt need an urgent jana for burning,   pressure and frequency urination for a couple days. Patient concern could   be a UTI. Please call as soon as possible.   ---------------------------------------------------------------------------  --------------  CALL BACK INFO  What is the best way for the office to contact you? OK to leave message on   voicemail  Preferred Call Back Phone Number? [phone]  ---------------------------------------------------------------------------  --------------  SCRIPT ANSWERS  Relationship to Patient? Self  Have you recently (14 days) seen a provider for this problem? No  Have you been diagnosed with COVID-19 in the past 10 days? No  (Service Expert  click yes below to proceed with [employer] As Usual   Scheduling)?  Yes

## 2022-06-15 NOTE — TELEPHONE ENCOUNTER
Attempted to call. unsuccessful .  Message left  Pt may call back to see if open appt   None at this time  May go to urgent care for symptoms

## 2022-06-20 ENCOUNTER — OFFICE VISIT (OUTPATIENT)
Dept: FAMILY MEDICINE CLINIC | Age: 42
End: 2022-06-20
Payer: MEDICARE

## 2022-06-20 VITALS
RESPIRATION RATE: 16 BRPM | TEMPERATURE: 97.7 F | SYSTOLIC BLOOD PRESSURE: 141 MMHG | WEIGHT: 227 LBS | HEART RATE: 84 BPM | DIASTOLIC BLOOD PRESSURE: 91 MMHG | BODY MASS INDEX: 38.76 KG/M2 | HEIGHT: 64 IN | OXYGEN SATURATION: 99 %

## 2022-06-20 DIAGNOSIS — R10.2 PELVIC PRESSURE IN FEMALE: Primary | ICD-10-CM

## 2022-06-20 DIAGNOSIS — Z20.2 STD EXPOSURE: ICD-10-CM

## 2022-06-20 LAB
BILIRUB UR QL STRIP: NORMAL
GLUCOSE UR-MCNC: NEGATIVE MG/DL
KETONES P FAST UR STRIP-MCNC: NEGATIVE MG/DL
PH UR STRIP: 5.5 [PH] (ref 4.6–8)
PROT UR QL STRIP: NEGATIVE
SP GR UR STRIP: 1.03 (ref 1–1.03)
UA UROBILINOGEN AMB POC: NORMAL (ref 0.2–1)
URINALYSIS CLARITY POC: CLEAR
URINALYSIS COLOR POC: NORMAL
URINE BLOOD POC: NEGATIVE
URINE LEUKOCYTES POC: NORMAL
URINE NITRITES POC: NEGATIVE

## 2022-06-20 PROCEDURE — 99214 OFFICE O/P EST MOD 30 MIN: CPT | Performed by: FAMILY MEDICINE

## 2022-06-20 PROCEDURE — G9717 DOC PT DX DEP/BP F/U NT REQ: HCPCS | Performed by: FAMILY MEDICINE

## 2022-06-20 PROCEDURE — 81003 URINALYSIS AUTO W/O SCOPE: CPT | Performed by: FAMILY MEDICINE

## 2022-06-20 PROCEDURE — G8417 CALC BMI ABV UP PARAM F/U: HCPCS | Performed by: FAMILY MEDICINE

## 2022-06-20 PROCEDURE — G8427 DOCREV CUR MEDS BY ELIG CLIN: HCPCS | Performed by: FAMILY MEDICINE

## 2022-06-20 NOTE — PROGRESS NOTES
[facility]    History of Present Illness:   Anabel Sicard is a 46 y.o. female with history of HTN, GERD, IFG, Depression, Migraines  CC: Concern for STDs  History provided by patient and Records    HPI:  STD Exposure/Symptoms:  Patient presents with concerns of STD exposure. Patient currently denies current symptoms. Exposure occurred 3 weeks ago. Patient is concerned about exposure due to Sexual intercourse. - Partner History: Recently  (Last 6 months) and then a new partner  - Exposure History: sexual contact with individual with uncertain background 3 weeks ago  - Are current partners aware of concern? No   - Patient Symptoms: None  - Partner Symptoms: Unknown (Has been acting weird)  - Previous STD History: HSV    - Last known STD: HSV. Treatment status: Not needed currently  - Last STD testing: Remote. Patient is noting some pelvic pressure and malodorous urine. Health Maintenance  Health Maintenance Due   Topic Date Due    Pneumococcal 0-64 years (1 - PCV) Never done    COVID-19 Vaccine (3 - Booster for Moderna series) 09/25/2021       Past Medical, Family, and Social History:     Current Outpatient Medications on File Prior to Visit   Medication Sig Dispense Refill    pantoprazole (PROTONIX) 40 mg tablet TAKE 1 TABLET TWICE DAILY 180 Tablet 1    albuterol-ipratropium (DUO-NEB) 2.5 mg-0.5 mg/3 ml nebu 3 mL by Nebulization route every six (6) hours as needed (wheeze). 90 Nebule 3    ketoconazole (NIZORAL) 2 % shampoo SHAMPOO TWICE WEEKLY 120 mL 1    ferrous sulfate 325 mg (65 mg iron) tablet TAKE 1 TABLET BY MOUTH ONCE DAILY BEFORE BREAKFAST 90 Tablet 1    furosemide (LASIX) 20 mg tablet Take 0.5 Tablets by mouth daily. As needed (Patient taking differently: Take 20 mg by mouth every other day.) 45 Tablet 1    levocetirizine (XYZAL) 5 mg tablet Take 1 Tablet by mouth daily.  90 Tablet 1    famotidine (Pepcid) 20 mg tablet Take 1 Tablet by mouth two (2) times a day. 180 Tablet 1    diazePAM (VALIUM) 10 mg tablet Take 10 mg by mouth three (3) times daily.  traZODone (DESYREL) 100 mg tablet       folic acid (FOLVITE) 1 mg tablet TAKE 1 TABLET EVERY DAY 90 Tablet 1    triamcinolone acetonide (KENALOG) 0.1 % ointment APPLY  TO AFFECTED AREA TWO (2) TIMES A DAY. USE A THIN LAYER 80 g 1    norgestimate-ethinyl estradioL (ORTHO-CYCLEN, SPRINTEC) 0.25-35 mg-mcg tab Take 1 Tablet by mouth daily. Generic 3 Dose Pack 3    montelukast (SINGULAIR) 10 mg tablet TAKE 1 TABLET EVERY DAY 90 Tablet 1    albuterol (PROVENTIL HFA, VENTOLIN HFA, PROAIR HFA) 90 mcg/actuation inhaler INHALE 1 PUFF EVERY 6 HOURS AS NEEDED FOR WHEEZING 18 g 2    metFORMIN ER (GLUCOPHAGE XR) 500 mg tablet Take 1 Tablet by mouth daily (with dinner). 90 Tablet 1    cyclobenzaprine (FLEXERIL) 10 mg tablet three (3) times daily as needed. Has not needed      gabapentin (NEURONTIN) 600 mg tablet 600 mg three (3) times daily.  escitalopram oxalate (LEXAPRO) 20 mg tablet Take 20 mg by mouth daily.  Comp Stocking,Knee,Regular,Med misc 1 Each by Does Not Apply route daily. 1 Each 1    acetaminophen (TYLENOL 8 HOUR PO) Take  by mouth.  clonazePAM (KLONOPIN) 1 mg tablet Take 1.5 mg by mouth daily. At bedtime       cholecalciferol, vitamin D3, (VITAMIN D3) 2,000 unit tab Take  by mouth. No current facility-administered medications on file prior to visit. Patient Active Problem List   Diagnosis Code    Depression F32. A    GERD (gastroesophageal reflux disease) K21.9    Anemia, unspecified D64.9    Itch of skin L29.9    Anxiety F41.9    Costochondritis M94.0    HSV (herpes simplex virus) anogenital infection A60.9    OCD (obsessive compulsive disorder) F42.9    Hoarseness R49.0    IFG (impaired fasting glucose) R73.01    Hyperlipidemia E78.5    Thrombosed external hemorrhoid K64.5    Vitamin D deficiency E55.9    Inflammatory arthritis M19.90    Recurrent depression ([facility] F33.9    Mood disorder ([facility] F39    Chronic pain syndrome G89.4    Fibromyalgia M79.7    Panic attack F41.0    Tremor R25.1    Class 2 obesity due to excess calories without serious comorbidity in adult E66.09    Gastroparesis K31.84    Plantar fasciitis of left foot M72.2    Positive KITTY (antinuclear antibody) R76.8    Severe obesity (HCC) E66.01    Neuropathy G62.9       Social History     Socioeconomic History    Marital status: SINGLE   Tobacco Use    Smoking status: Light Tobacco Smoker     Packs/day: 0.25     Years: 8.00     Pack years: 2.00     Types: Cigarettes    Smokeless tobacco: Never Used   Substance and Sexual Activity    Alcohol use: Not Currently     Alcohol/week: 1.0 standard drink     Types: 1 Glasses of wine per week     Comment: 1 cup of wine/week    Drug use: No    Sexual activity: Yes     Partners: Male     Birth control/protection: None        Review of Systems   Review of Systems   Constitutional: Negative for chills and fever. Cardiovascular: Negative for chest pain and palpitations. Neurological: Negative for dizziness and headaches. Objective:     Visit Vitals  BP (!) 141/91   Pulse 84   Temp 97.7 °F (36.5 °C) (Oral)   Resp 16   Ht 5' 4\" (1.626 m)   Wt 227 lb (103 kg)   SpO2 99%   BMI 38.96 kg/m²        Physical Exam  Vitals and nursing note reviewed. Constitutional:       Appearance: Normal appearance. HENT:      Head: Normocephalic and atraumatic. Cardiovascular:      Rate and Rhythm: Normal rate and regular rhythm. Pulses: Normal pulses. Heart sounds: Normal heart sounds. Pulmonary:      Effort: Pulmonary effort is normal.      Breath sounds: Normal breath sounds. Abdominal:      General: Abdomen is flat. Bowel sounds are normal.      Palpations: Abdomen is soft. Musculoskeletal:      Cervical back: Normal range of motion and neck supple. Skin:     General: Skin is warm and dry. Neurological:      Mental Status: She is alert. Pertinent Labs/Studies:      Assessment and orders:       ICD-10-CM ICD-9-CM    1. Pelvic pressure in female  R10.2 625.8 AMB POC URINALYSIS DIP STICK AUTO W/O MICRO      CULTURE, URINE   2. STD exposure  Z20.2 V01.6 RPR      HIV 1/2 AG/AB, 4TH GENERATION,W RFLX CONFIRM      CT/NG/T.VAGINALIS AMPLIFICATION     Diagnoses and all orders for this visit:    1. Pelvic pressure in female: Urine labs now. -     AMB POC URINALYSIS DIP STICK AUTO W/O MICRO  -     CULTURE, URINE; Future    2. STD exposure: Concern for exposure  -     RPR; Future  -     HIV 1/2 AG/AB, 4TH GENERATION,W RFLX CONFIRM; Future  -     CT/NG/T.VAGINALIS AMPLIFICATION; Future      Follow-up and Dispositions    · Return in about 3 months (around 9/20/2022). I have discussed the diagnosis with the patient and the intended plan as seen in the above orders. Social history, medical history, and labs were reviewed. The patient has received an after-visit summary and questions were answered concerning future plans. I have discussed medication side effects and warnings with the patient as well.     MD URBAN Niño & MARSHAL CARMEN [facility]  06/20/22

## 2022-06-20 NOTE — PROGRESS NOTES
1. \"Have you been to the ER, urgent care clinic since your last visit? Hospitalized since your last visit? \" No    2. \"Have you seen or consulted any other health care providers outside of the [address] [place] [address] since your last visit? \" No     3. For patients aged 39-70: Has the patient had a colonoscopy / FIT/ Cologuard? NA - based on age      If the patient is female:    4. For patients aged 41-77: Has the patient had a mammogram within the past 2 years? NA - based on age or sex      11. For patients aged 21-65: Has the patient had a pap smear?  Yes - no Care Gap present    Health Maintenance Due   Topic Date Due    Pneumococcal 0-64 years (1 - PCV) Never done    COVID-19 Vaccine (3 - Booster for Moderna series) 09/25/2021

## 2022-06-21 LAB
HIV 1+2 AB+HIV1 P24 AG SERPL QL IA: NONREACTIVE
HIV12 RESULT COMMENT, HHIVC: NORMAL
RPR SER QL: NONREACTIVE

## 2022-06-23 LAB
BACTERIA SPEC CULT: ABNORMAL
C TRACH RRNA SPEC QL NAA+PROBE: NEGATIVE
CC UR VC: ABNORMAL
N GONORRHOEA RRNA SPEC QL NAA+PROBE: NEGATIVE
SERVICE CMNT-IMP: ABNORMAL
SPECIMEN SOURCE: NORMAL
T VAGINALIS RRNA SPEC QL NAA+PROBE: NEGATIVE

## 2022-06-24 ENCOUNTER — TELEPHONE (OUTPATIENT)
Dept: FAMILY MEDICINE CLINIC | Age: 42
End: 2022-06-24

## 2022-06-24 RX ORDER — CEFDINIR 300 MG/1
300 CAPSULE ORAL 2 TIMES DAILY
Qty: 10 CAPSULE | Refills: 0 | Status: SHIPPED | OUTPATIENT
Start: 2022-06-24 | End: 2022-08-25 | Stop reason: SDUPTHER

## 2022-06-24 NOTE — TELEPHONE ENCOUNTER
Patient advised of Dr. Deb Rojo message regarding medication sent for uti. Patient verbalized understanding.

## 2022-06-24 NOTE — TELEPHONE ENCOUNTER
Calling in Bran for patient for UTI.     MD URBAN Cavazos & MARSHAL CARMEN [facility]  06/24/22

## 2022-06-27 ENCOUNTER — TELEPHONE (OUTPATIENT)
Dept: FAMILY MEDICINE CLINIC | Age: 42
End: 2022-06-27

## 2022-06-27 NOTE — TELEPHONE ENCOUNTER
Spoke  With patient. She did start a new  Medication guanfacine-Hcl 1 mg. She thinks that is what is causing her to have a dry mouth. She also performed oral sex. She is scared that she may have something going on with her mouth. No sores or anything. QUESTIONS  Information for Provider? Patient would like provider to know that her   tongue feels strange after eating spicy foods and would like provider to   know that she has acid reflux issues. ---------------------------------------------------------------------------  --------------  Lear How INFO  What is the best way for the office to contact you? OK to leave message on   voicemail  Preferred Call Back Phone Number?  [phone]  ---------------------------------------------------------------------------  --------------  SCRIPT ANSWERS  undefined

## 2022-06-28 ENCOUNTER — TELEPHONE (OUTPATIENT)
Dept: FAMILY MEDICINE CLINIC | Age: 42
End: 2022-06-28

## 2022-06-28 ENCOUNTER — OFFICE VISIT (OUTPATIENT)
Dept: FAMILY MEDICINE CLINIC | Age: 42
End: 2022-06-28
Payer: MEDICARE

## 2022-06-28 VITALS
OXYGEN SATURATION: 95 % | HEIGHT: 64 IN | WEIGHT: 227 LBS | BODY MASS INDEX: 38.76 KG/M2 | TEMPERATURE: 97.7 F | SYSTOLIC BLOOD PRESSURE: 122 MMHG | HEART RATE: 89 BPM | DIASTOLIC BLOOD PRESSURE: 92 MMHG | RESPIRATION RATE: 16 BRPM

## 2022-06-28 DIAGNOSIS — J02.9 SORE THROAT: Primary | ICD-10-CM

## 2022-06-28 DIAGNOSIS — N30.00 ACUTE CYSTITIS WITHOUT HEMATURIA: ICD-10-CM

## 2022-06-28 LAB
S PYO AG THROAT QL: NEGATIVE
VALID INTERNAL CONTROL?: YES

## 2022-06-28 PROCEDURE — 99214 OFFICE O/P EST MOD 30 MIN: CPT | Performed by: FAMILY MEDICINE

## 2022-06-28 PROCEDURE — 87880 STREP A ASSAY W/OPTIC: CPT | Performed by: FAMILY MEDICINE

## 2022-06-28 PROCEDURE — G9717 DOC PT DX DEP/BP F/U NT REQ: HCPCS | Performed by: FAMILY MEDICINE

## 2022-06-28 PROCEDURE — G8427 DOCREV CUR MEDS BY ELIG CLIN: HCPCS | Performed by: FAMILY MEDICINE

## 2022-06-28 PROCEDURE — G8417 CALC BMI ABV UP PARAM F/U: HCPCS | Performed by: FAMILY MEDICINE

## 2022-06-28 RX ORDER — FLUCONAZOLE 150 MG/1
150 TABLET ORAL DAILY
Qty: 2 TABLET | Refills: 0 | Status: SHIPPED | OUTPATIENT
Start: 2022-06-28 | End: 2022-06-30

## 2022-06-28 RX ORDER — GUANFACINE 1 MG/1
TABLET, EXTENDED RELEASE ORAL
COMMUNITY
Start: 2022-06-21 | End: 2022-08-13

## 2022-06-28 NOTE — PROGRESS NOTES
1. \"Have you been to the ER, urgent care clinic since your last visit? Hospitalized since your last visit? \" No    2. \"Have you seen or consulted any other health care providers outside of the [address] [place] [address] since your last visit? \" No     3. For patients aged 39-70: Has the patient had a colonoscopy / FIT/ Cologuard? NA - based on age      If the patient is female:    4. For patients aged 41-77: Has the patient had a mammogram within the past 2 years? NA - based on age or sex      11. For patients aged 21-65: Has the patient had a pap smear?  Yes - no Care Gap present    Health Maintenance Due   Topic Date Due    Pneumococcal 0-64 years (1 - PCV) Never done    COVID-19 Vaccine (3 - Booster for Moderna series) 09/25/2021

## 2022-06-28 NOTE — TELEPHONE ENCOUNTER
Faxed received from [facility] drug regarding interaction between fluconazole 150mg and guanfacine 1mg to cause increase in concentration of guafancine and slow heart beat / low BP. Dr. Rey Langley made aware and advised okay to take both medications. Called [facility] and spoke to Sally. Advised her okay per Dr. Rey Langley to take these mediations together.

## 2022-06-28 NOTE — PROGRESS NOTES
[facility]    History of Present Illness:   Elizabeth Gonsales is a 46 y.o. female with history of HTN, GERD, IFG, Depression, Migraines  CC: Sore throat  History provided by patient and Records    HPI:  Patient is noting having sore throat and some pain on the tongue as well with GERD since this past Friday 4 days ago, started Antibiotic for UTI 2 days ago. Patient notes that she does not have any fevers and vhills. COncern for Strep or other bacteria. Has a history of Herpes, but not around mouth. Has not tried cepacol so far. Warm tea seems to help. Health Maintenance  Health Maintenance Due   Topic Date Due    Pneumococcal 0-64 years (1 - PCV) Never done    COVID-19 Vaccine (3 - Booster for [place] series) 09/25/2021       Past Medical, Family, and Social History:     Current Outpatient Medications on File Prior to Visit   Medication Sig Dispense Refill    cefdinir (OMNICEF) 300 mg capsule Take 1 Capsule by mouth two (2) times a day for 5 days. 10 Capsule 0    pantoprazole (PROTONIX) 40 mg tablet TAKE 1 TABLET TWICE DAILY 180 Tablet 1    albuterol-ipratropium (DUO-NEB) 2.5 mg-0.5 mg/3 ml nebu 3 mL by Nebulization route every six (6) hours as needed (wheeze). 90 Nebule 3    ketoconazole (NIZORAL) 2 % shampoo SHAMPOO TWICE WEEKLY 120 mL 1    ferrous sulfate 325 mg (65 mg iron) tablet TAKE 1 TABLET BY MOUTH ONCE DAILY BEFORE BREAKFAST 90 Tablet 1    furosemide (LASIX) 20 mg tablet Take 0.5 Tablets by mouth daily. As needed (Patient taking differently: Take 20 mg by mouth every other day.) 45 Tablet 1    levocetirizine (XYZAL) 5 mg tablet Take 1 Tablet by mouth daily. 90 Tablet 1    famotidine (Pepcid) 20 mg tablet Take 1 Tablet by mouth two (2) times a day. 180 Tablet 1    diazePAM (VALIUM) 10 mg tablet Take 10 mg by mouth three (3) times daily.       traZODone (DESYREL) 100 mg tablet       folic acid (FOLVITE) 1 mg tablet TAKE 1 TABLET EVERY DAY 90 Tablet 1    triamcinolone acetonide (KENALOG) 0.1 % ointment APPLY  TO AFFECTED AREA TWO (2) TIMES A DAY. USE A THIN LAYER 80 g 1    norgestimate-ethinyl estradioL (ORTHO-CYCLEN, SPRINTEC) 0.25-35 mg-mcg tab Take 1 Tablet by mouth daily. Generic 3 Dose Pack 3    montelukast (SINGULAIR) 10 mg tablet TAKE 1 TABLET EVERY DAY 90 Tablet 1    albuterol (PROVENTIL HFA, VENTOLIN HFA, PROAIR HFA) 90 mcg/actuation inhaler INHALE 1 PUFF EVERY 6 HOURS AS NEEDED FOR WHEEZING 18 g 2    metFORMIN ER (GLUCOPHAGE XR) 500 mg tablet Take 1 Tablet by mouth daily (with dinner). 90 Tablet 1    cyclobenzaprine (FLEXERIL) 10 mg tablet three (3) times daily as needed. Has not needed      gabapentin (NEURONTIN) 600 mg tablet 600 mg three (3) times daily.  escitalopram oxalate (LEXAPRO) 20 mg tablet Take 20 mg by mouth daily.  Comp Stocking,Knee,Regular,Med misc 1 Each by Does Not Apply route daily. 1 Each 1    acetaminophen (TYLENOL 8 HOUR PO) Take  by mouth.  clonazePAM (KLONOPIN) 1 mg tablet Take 1.5 mg by mouth daily. At bedtime       cholecalciferol, vitamin D3, (VITAMIN D3) 2,000 unit tab Take  by mouth.  guanFACINE ER (INTUNIV) 1 mg ER tablet        No current facility-administered medications on file prior to visit. Patient Active Problem List   Diagnosis Code    Depression F32. A    GERD (gastroesophageal reflux disease) K21.9    Anemia, unspecified D64.9    Itch of skin L29.9    Anxiety F41.9    Costochondritis M94.0    HSV (herpes simplex virus) anogenital infection A60.9    OCD (obsessive compulsive disorder) F42.9    Hoarseness R49.0    IFG (impaired fasting glucose) R73.01    Hyperlipidemia E78.5    Thrombosed external hemorrhoid K64.5    Vitamin D deficiency E55.9    Inflammatory arthritis M19.90    Recurrent depression (HCC) F33.9    Mood disorder (HCC) F39    Chronic pain syndrome G89.4    Fibromyalgia M79.7    Panic attack F41.0    Tremor R25.1    Class 2 obesity due to excess calories without serious comorbidity in adult E66.09    Gastroparesis K31.84    Plantar fasciitis of left foot M72.2    Positive KITTY (antinuclear antibody) R76.8    Severe obesity (HCC) E66.01    Neuropathy G62.9       Social History     Socioeconomic History    Marital status: SINGLE   Tobacco Use    Smoking status: Light Tobacco Smoker     Packs/day: 0.25     Years: 8.00     Pack years: 2.00     Types: Cigarettes    Smokeless tobacco: Never Used   Substance and Sexual Activity    Alcohol use: Not Currently     Alcohol/week: 1.0 standard drink     Types: 1 Glasses of wine per week     Comment: 1 cup of wine/week    Drug use: No    Sexual activity: Yes     Partners: Male     Birth control/protection: None        Review of Systems   Review of Systems   Constitutional: Negative for chills and fever. HENT: Positive for sore throat. Negative for congestion. Eyes: Negative for blurred vision and double vision. Respiratory: Positive for cough. Objective:     Visit Vitals  BP (!) 122/92 (BP 1 Location: Right arm, BP Patient Position: Sitting, BP Cuff Size: Adult)   Pulse 89   Temp 97.7 °F (36.5 °C) (Oral)   Resp 16   Ht 5' 4\" (1.626 m)   Wt 227 lb (103 kg)   SpO2 95%   BMI 38.96 kg/m²        Physical Exam  Vitals and nursing note reviewed. Constitutional:       Appearance: Normal appearance. HENT:      Head: Normocephalic and atraumatic. Nose: Nose normal.      Mouth/Throat:      Mouth: Mucous membranes are moist.      Pharynx: No oropharyngeal exudate or posterior oropharyngeal erythema. Cardiovascular:      Rate and Rhythm: Normal rate and regular rhythm. Pulses: Normal pulses. Heart sounds: Normal heart sounds. No murmur heard. No friction rub. No gallop. Pulmonary:      Effort: Pulmonary effort is normal.      Breath sounds: Normal breath sounds. Abdominal:      General: Abdomen is flat. Bowel sounds are normal.      Palpations: Abdomen is soft.    Musculoskeletal:      Cervical back: Normal range of motion and neck supple. Neurological:      General: No focal deficit present. Mental Status: She is alert and oriented to person, place, and time. Pertinent Labs/Studies:      Assessment and orders:       ICD-10-CM ICD-9-CM    1. Sore throat  J02.9 462    2. Acute cystitis without hematuria  N30.00 595.0      Diagnoses and all orders for this visit:    1. Sore throat: Advised on symptomatic therapies. Of note though consider [place] Citizen of [place] as had in the past.  Patient will have Diflucan after completing antibiotics. 2. Acute cystitis without hematuria    Other orders  -     fluconazole (DIFLUCAN) 150 mg tablet; Take 1 Tablet by mouth daily for 2 doses. FDA advises cautious prescribing of oral fluconazole in pregnancy. Follow-up and Dispositions    · Return if symptoms worsen or fail to improve. I have discussed the diagnosis with the patient and the intended plan as seen in the above orders. Social history, medical history, and labs were reviewed. The patient has received an after-visit summary and questions were answered concerning future plans. I have discussed medication side effects and warnings with the patient as well.     Suzzane Duane, MD [facility]  06/28/22

## 2022-06-30 ENCOUNTER — PATIENT MESSAGE (OUTPATIENT)
Dept: FAMILY MEDICINE CLINIC | Age: 42
End: 2022-06-30

## 2022-06-30 DIAGNOSIS — B37.0 THRUSH: Primary | ICD-10-CM

## 2022-06-30 RX ORDER — NYSTATIN 100000 [USP'U]/ML
500000 SUSPENSION ORAL 4 TIMES DAILY
Qty: 473 ML | Refills: 0 | Status: SHIPPED | OUTPATIENT
Start: 2022-06-30 | End: 2022-08-13

## 2022-06-30 NOTE — TELEPHONE ENCOUNTER
From: Josephine Ramsey  To: Annabel Garner MD  Sent: 6/30/2022 8:17 AM EDT  Subject: My tougue    Good morning, I been going thru it with my tougue I tired to take a picture of it , I hope you can see it. It feel like that thrush has come on it feels really bad. I know I have had this a few times. Where is this coming from. I don't finish antibiotics until Friday. I know I was given some kind of rinse before that cleared it right up. I just want this gone. My mouth is dry I drinking so much water and it got worse it really thick on my tougue and it look awful. Please help me it so uncomfortable. The pictures are attached . What makes this happen.

## 2022-07-07 ENCOUNTER — HOSPITAL ENCOUNTER (OUTPATIENT)
Dept: MAMMOGRAPHY | Age: 42
Discharge: HOME | End: 2022-07-07
Attending: FAMILY MEDICINE
Payer: MEDICARE

## 2022-07-07 DIAGNOSIS — Z12.31 SCREENING MAMMOGRAM FOR HIGH-RISK PATIENT: ICD-10-CM

## 2022-07-07 PROCEDURE — 77063 BREAST TOMOSYNTHESIS BI: CPT

## 2022-07-12 ENCOUNTER — OFFICE VISIT (OUTPATIENT)
Dept: FAMILY MEDICINE CLINIC | Age: 42
End: 2022-07-12
Payer: MEDICARE

## 2022-07-12 ENCOUNTER — NURSE TRIAGE (OUTPATIENT)
Dept: OTHER | Facility: CLINIC | Age: 42
End: 2022-07-12

## 2022-07-12 VITALS
WEIGHT: 231 LBS | RESPIRATION RATE: 20 BRPM | SYSTOLIC BLOOD PRESSURE: 121 MMHG | BODY MASS INDEX: 39.44 KG/M2 | TEMPERATURE: 98.2 F | HEIGHT: 64 IN | HEART RATE: 80 BPM | DIASTOLIC BLOOD PRESSURE: 84 MMHG | OXYGEN SATURATION: 99 %

## 2022-07-12 DIAGNOSIS — Z71.6 ENCOUNTER FOR SMOKING CESSATION COUNSELING: ICD-10-CM

## 2022-07-12 DIAGNOSIS — R06.02 SHORTNESS OF BREATH: Primary | ICD-10-CM

## 2022-07-12 DIAGNOSIS — R49.0 HOARSENESS: ICD-10-CM

## 2022-07-12 PROCEDURE — G9717 DOC PT DX DEP/BP F/U NT REQ: HCPCS | Performed by: STUDENT IN AN ORGANIZED HEALTH CARE EDUCATION/TRAINING PROGRAM

## 2022-07-12 PROCEDURE — 99214 OFFICE O/P EST MOD 30 MIN: CPT | Performed by: STUDENT IN AN ORGANIZED HEALTH CARE EDUCATION/TRAINING PROGRAM

## 2022-07-12 PROCEDURE — G8427 DOCREV CUR MEDS BY ELIG CLIN: HCPCS | Performed by: STUDENT IN AN ORGANIZED HEALTH CARE EDUCATION/TRAINING PROGRAM

## 2022-07-12 PROCEDURE — G8417 CALC BMI ABV UP PARAM F/U: HCPCS | Performed by: STUDENT IN AN ORGANIZED HEALTH CARE EDUCATION/TRAINING PROGRAM

## 2022-07-12 NOTE — PROGRESS NOTES
1. \"Have you been to the ER, urgent care clinic since your last visit? Hospitalized since your last visit? \" No    2. \"Have you seen or consulted any other health care providers outside of the [address] [place] [address] since your last visit? \" No     3. For patients aged 39-70: Has the patient had a colonoscopy / FIT/ Cologuard? NA - based on age      If the patient is female:    4. For patients aged 41-77: Has the patient had a mammogram within the past 2 years? No      5. For patients aged 21-65: Has the patient had a pap smear?  Yes - no Care Gap present    Health Maintenance Due   Topic Date Due    Pneumococcal 0-64 years (1 - PCV) Never done    COVID-19 Vaccine (3 - Booster for Modern[employer] series) 09/25/2021

## 2022-07-12 NOTE — TELEPHONE ENCOUNTER
Received call from [address] at [facility] with Red Flag Complaint. Subjective: Caller states \"she started having trouble breathing, with SOB that is worsening. \"     Current Symptoms: SOB, hoarse, chest tightness, coughing spells with burning in chest, mild breathing difficulty. Onset: 2 days ago; sudden, rapid, worsening    Associated Symptoms: reduced activity    Pain Severity: 6/10; choking; constant, moderate    Temperature: no n/a    What has been tried: nebulizer 4 times a day    LMP: NA Pregnant: NA    Recommended disposition: Go to [facility]    Care advice provided, patient verbalizes understanding; denies any other questions or concerns; instructed to call back for any new or worsening symptoms. Patient/Caller agrees with recommended disposition; writer provided warm transfer to [facility] at [facility] for appointment scheduling    Attention Provider: Thank you for allowing me to participate in the care of your patient. The patient was connected to triage in response to information provided to the [facility]. Please do not respond through this encounter as the response is not directed to a shared pool.       Reason for Disposition   MILD difficulty breathing (e.g., minimal/no SOB at rest, SOB with walking, pulse < 100) of new-onset or worse than normal    Protocols used: BREATHING DIFFICULTY-ADULT-OH

## 2022-07-12 NOTE — PROGRESS NOTES
[address] [facility] [address] [facility], [facility] [address]  P ([phone])  Date of visit:  7/16/2022    Subjective   Marjorie Saba is a 46 y.o. female with a medical history of chronic bronchitis, gastroparesis, Xochitl New who presents because she believes she is having flare up of her bronchitis. Endorses hoarseness for months. Difficulty catching her breath, started yesterday. She feels like she is having to take a deep breath, only happens when she is talking. Cough is worse at night. Started using nebulizer yesterday, it helped a little. Smoking: once in a while. Not a whole pack. Review of Systems   Constitutional: Positive for malaise/fatigue. Negative for chills and fever. Respiratory: Negative for wheezing. Cardiovascular: Negative for chest pain. Gastrointestinal: Negative for nausea and vomiting. Allergies   Allergies   Allergen Reactions    Abilify [Aripiprazole] Anxiety     Can not tolerate     Sulfa (Sulfonamide Antibiotics) Hives    Vicodin [Hydrocodone-Acetaminophen] Nausea and Vomiting       Medications  Current Outpatient Medications   Medication Sig    nystatin (MYCOSTATIN) 100,000 unit/mL suspension Take 5 mL by mouth four (4) times daily. swish and spit    guanFACINE ER (INTUNIV) 1 mg ER tablet     pantoprazole (PROTONIX) 40 mg tablet TAKE 1 TABLET TWICE DAILY    albuterol-ipratropium (DUO-NEB) 2.5 mg-0.5 mg/3 ml nebu 3 mL by Nebulization route every six (6) hours as needed (wheeze).  ketoconazole (NIZORAL) 2 % shampoo SHAMPOO TWICE WEEKLY    ferrous sulfate 325 mg (65 mg iron) tablet TAKE 1 TABLET BY MOUTH ONCE DAILY BEFORE BREAKFAST    levocetirizine (XYZAL) 5 mg tablet Take 1 Tablet by mouth daily.  famotidine (Pepcid) 20 mg tablet Take 1 Tablet by mouth two (2) times a day.  diazePAM (VALIUM) 10 mg tablet Take 10 mg by mouth three (3) times daily.     traZODone (DESYREL) 100 mg tablet     folic acid (FOLVITE) 1 mg tablet TAKE 1 TABLET EVERY DAY  triamcinolone acetonide (KENALOG) 0.1 % ointment APPLY  TO AFFECTED AREA TWO (2) TIMES A DAY. USE A THIN LAYER    norgestimate-ethinyl estradioL (ORTHO-CYCLEN, SPRINTEC) 0.25-35 mg-mcg tab Take 1 Tablet by mouth daily. Generic    montelukast (SINGULAIR) 10 mg tablet TAKE 1 TABLET EVERY DAY    albuterol (PROVENTIL HFA, VENTOLIN HFA, PROAIR HFA) 90 mcg/actuation inhaler INHALE 1 PUFF EVERY 6 HOURS AS NEEDED FOR WHEEZING    metFORMIN ER (GLUCOPHAGE XR) 500 mg tablet Take 1 Tablet by mouth daily (with dinner).  cyclobenzaprine (FLEXERIL) 10 mg tablet three (3) times daily as needed. Has not needed    gabapentin (NEURONTIN) 600 mg tablet 600 mg three (3) times daily.  escitalopram oxalate (LEXAPRO) 20 mg tablet Take 20 mg by mouth daily.  Comp Stocking,Knee,Regular,Med misc 1 Each by Does Not Apply route daily.  acetaminophen (TYLENOL 8 HOUR PO) Take  by mouth.  clonazePAM (KLONOPIN) 1 mg tablet Take 1.5 mg by mouth daily. At bedtime     cholecalciferol, vitamin D3, (VITAMIN D3) 2,000 unit tab Take  by mouth.  furosemide (LASIX) 20 mg tablet Take 0.5 Tablets by mouth daily. As needed (Patient not taking: Reported on 7/12/2022)     No current facility-administered medications for this visit.        Medical History  Past Medical History:   Diagnosis Date    Anemia NEC     Arthritis     Chronic pain     fybromyalsia    Depression     anxiety, depression, OCD    GERD (gastroesophageal reflux disease)     Hypertension     Hypertension     Ill-defined condition     Fibromyalia gastricparesis    Musculoskeletal disorder     SOB (shortness of breath)     Stool color black        Immunizations   Immunization History   Administered Date(s) Administered    COVID-19, MODERNA BLUE border, Primary or Immunocompromised, (age 18y+), IM, 100 mcg/0.5mL 03/21/2021, 04/25/2021    Tdap 01/25/2014       Social History  Social History     Tobacco Use    Smoking status: Light Tobacco Smoker Packs/day: 0.25     Years: 8.00     Pack years: 2.00     Types: Cigarettes    Smokeless tobacco: Never Used   Substance Use Topics    Alcohol use: Not Currently     Alcohol/week: 1.0 standard drink     Types: 1 Glasses of wine per week     Comment: 1 cup of wine/week    Drug use: No       Objective     Visit Vitals  /84 (BP 1 Location: Left upper arm, BP Patient Position: Sitting, BP Cuff Size: Large adult)   Pulse 80   Temp 98.2 °F (36.8 °C)   Resp 20   Ht 5' 4\" (1.626 m)   Wt 231 lb (104.8 kg)   LMP 06/23/2022   SpO2 99%   BMI 39.65 kg/m²     Physical Exam  Constitutional:       Appearance: Normal appearance. Cardiovascular:      Rate and Rhythm: Normal rate. Pulmonary:      Effort: Pulmonary effort is normal. No respiratory distress. Breath sounds: Normal breath sounds. No stridor. No wheezing, rhonchi or rales. Musculoskeletal:         General: No swelling, tenderness, deformity or signs of injury. Normal range of motion. Right lower leg: No edema. Left lower leg: No edema. Neurological:      Mental Status: She is alert. Assessment   Ilan Booth is a 46 y.o. who presents for evaluation for shortness of breath, and hoarseness. Plan     1. Hoarseness: Likely related to uncontrolled GERD. Patient follows with GI outpatient. - Continue PPI  - Elevated head with pillows at night. - Avoid caffeine, alcohol, spicy foods etc    2. Encounter for smoking cessation counseling: See HPI  - Counseled on smoking cessation. 3. Shortness of breath: likely multifactorial. Uncontrolled GERD vs. Obesity hypoventilation vs. Anxiety. Will rule out cardiac etiology or respiratory infectious process. Bronchitis is less likely.   - NT-PRO BNP; Future  - NT-PRO BNP  - Follow up on result of Chest x-ray. - Continue to use nebulizer treatment prn    Follow-up and Dispositions    · Return if symptoms worsen or fail to improve. ICD-10-CM ICD-9-CM    1.  Shortness of breath R06.02 786.05 NT-PRO BNP      NT-PRO BNP   2. Hoarseness  R49.0 784.42 XR CHEST PA LAT   3. Encounter for smoking cessation counseling  Z71.6 V65.42      305.1        I have discussed the aforementioned diagnoses and plan with the patient in detail. I have provided information in person and/or in AVS. All questions answered prior to discharge.     I discussed this patient with Dr. Rupa Latham (Attending Physician)   Signed By:  David Clemente MD    Family Medicine Resident

## 2022-07-13 LAB — BNP SERPL-MCNC: 124 PG/ML

## 2022-07-27 ENCOUNTER — TELEPHONE (OUTPATIENT)
Dept: FAMILY MEDICINE CLINIC | Age: 42
End: 2022-07-27

## 2022-07-27 ENCOUNTER — OFFICE VISIT (OUTPATIENT)
Dept: FAMILY MEDICINE CLINIC | Age: 42
End: 2022-07-27
Payer: MEDICARE

## 2022-07-27 VITALS
BODY MASS INDEX: 37.9 KG/M2 | RESPIRATION RATE: 20 BRPM | TEMPERATURE: 98.6 F | HEART RATE: 107 BPM | DIASTOLIC BLOOD PRESSURE: 75 MMHG | HEIGHT: 64 IN | OXYGEN SATURATION: 98 % | SYSTOLIC BLOOD PRESSURE: 113 MMHG | WEIGHT: 222 LBS

## 2022-07-27 DIAGNOSIS — I21.02 ST ELEVATION MYOCARDIAL INFARCTION INVOLVING LEFT ANTERIOR DESCENDING (LAD) CORONARY ARTERY (HCC): Primary | ICD-10-CM

## 2022-07-27 DIAGNOSIS — I10 PRIMARY HYPERTENSION: ICD-10-CM

## 2022-07-27 DIAGNOSIS — Z09 HOSPITAL DISCHARGE FOLLOW-UP: ICD-10-CM

## 2022-07-27 PROCEDURE — 99496 TRANSJ CARE MGMT HIGH F2F 7D: CPT | Performed by: FAMILY MEDICINE

## 2022-07-27 PROCEDURE — G8427 DOCREV CUR MEDS BY ELIG CLIN: HCPCS | Performed by: FAMILY MEDICINE

## 2022-07-27 RX ORDER — ATORVASTATIN CALCIUM 80 MG/1
TABLET, FILM COATED ORAL
COMMUNITY
Start: 2022-07-26 | End: 2022-08-13

## 2022-07-27 RX ORDER — NITROGLYCERIN 0.4 MG/1
TABLET SUBLINGUAL
Status: ON HOLD | COMMUNITY
Start: 2022-07-26

## 2022-07-27 RX ORDER — IBUPROFEN 400 MG/1
400 TABLET ORAL
Status: CANCELLED | COMMUNITY
Start: 2022-07-27

## 2022-07-27 RX ORDER — PRASUGREL 10 MG/1
TABLET, FILM COATED ORAL
Status: ON HOLD | COMMUNITY
Start: 2022-07-26

## 2022-07-27 RX ORDER — IBUPROFEN 400 MG/1
400 TABLET ORAL
COMMUNITY
Start: 2022-07-26 | End: 2022-08-13

## 2022-07-27 RX ORDER — ASPIRIN 81 MG/1
81 TABLET ORAL
Status: ON HOLD | COMMUNITY
Start: 2022-07-26

## 2022-07-27 RX ORDER — SACUBITRIL AND VALSARTAN 24; 26 MG/1; MG/1
TABLET, FILM COATED ORAL
COMMUNITY
Start: 2022-07-26 | End: 2022-08-13

## 2022-07-27 NOTE — TELEPHONE ENCOUNTER
Called patient, confirmed identity with 2 verifiers. Advised patient to take Lasix daily now, if BP drop SBP<100 will either do every other day or cut in half.     MD [facility]  07/27/22

## 2022-07-27 NOTE — PROGRESS NOTES
[address]  [phone]        Transition of Care Visit    Patient: Krista Beltran MRN: [id number]  SSN: [id number]    YOB: 1980  Age: 46 y.o. Sex: female      Hospital: [facility]  Dates of admission: 7/24/22-7/26/22  Discharge diagnoses: STEMI  State of health at discharge: Stable  Surgical or invasive procedures done: Cardiac Cath    Amount and/or Complexity of Data Reviewed:   Clinical lab tests: Reviewed or ordered   Tests in the radiology section: reviewed or ordered  Discussion of test results with the patient: yes  Obtain previous medical records or obtain history from someone other than the patient: Yes  Obtain history from someone other than the patient: Yes  Review and address past medical records: Yes  Discuss the patient with another provider: no  Independant visualization of image, tracing, or specimen: no    Risk of Significant Complications, Morbidity, and/or Mortality:   Presenting problems: High   Diagnostic procedures: Moderate   Management options: Moderate     Transition of Care time spent:   Total time providing care and documentation: 40-60 minutes   Progress at discharge:   Stable on Discharge      Progress Note    Patient: Krista Beltran MRN: [id number]  SSN: [id number]    YOB: 1980  Age: 46 y.o. Sex: female        CC: Follow up STEMI        Subjective:     Encounter Diagnoses   Name Primary? ST elevation myocardial infarction involving left anterior descending (LAD) coronary artery [facility]) Yes    Hospital discharge follow-up     Primary hypertension      Patient noting Saturday 5 days ago developed nausea and then developed left sided chest pains that radiated to the left arm. Notes she saw a commercial about a heart attack and realized this may be what's happening, called 911, The EMT's then rushed to ED where confirmed a STEMI.   Was hemodynamically stable though and underwent Cardiac Cath with stent placement in the LAD that had an 8-% occlusion. Underwent Echo but results not returned yet. Patient noted improvement in symptoms with the Cath and noting that her symptoms have improved significantly. Since Discharge noting occasional sharp pains with activities, doing well at rest.  Denies radiation of the pain though and denies significant SOB. Denies Nausea at this time. Current and past medical information:    Current Medications after this visit[de-identified]     Current Outpatient Medications   Medication Sig    prasugreL (EFFIENT) 10 mg tablet     nitroglycerin (NITROSTAT) 0.4 mg SL tablet     colchicine (GLOPEBRA) 0.6 mg/5 mL oral solution Take 0.6 mg by mouth. atorvastatin (LIPITOR) 80 mg tablet     aspirin delayed-release 81 mg tablet 81 mg. Entresto 24-26 mg tablet     ibuprofen (MOTRIN) 400 mg tablet Take 400 mg by mouth.    pantoprazole (PROTONIX) 40 mg tablet TAKE 1 TABLET TWICE DAILY    ferrous sulfate 325 mg (65 mg iron) tablet TAKE 1 TABLET BY MOUTH ONCE DAILY BEFORE BREAKFAST    furosemide (LASIX) 20 mg tablet Take 0.5 Tablets by mouth daily. As needed (Patient taking differently: Take 20 mg by mouth in the morning.)    levocetirizine (XYZAL) 5 mg tablet Take 1 Tablet by mouth daily. famotidine (Pepcid) 20 mg tablet Take 1 Tablet by mouth two (2) times a day. traZODone (DESYREL) 100 mg tablet     folic acid (FOLVITE) 1 mg tablet TAKE 1 TABLET EVERY DAY    triamcinolone acetonide (KENALOG) 0.1 % ointment APPLY  TO AFFECTED AREA TWO (2) TIMES A DAY. USE A THIN LAYER    norgestimate-ethinyl estradioL (ORTHO-CYCLEN, SPRINTEC) 0.25-35 mg-mcg tab Take 1 Tablet by mouth daily. Generic    montelukast (SINGULAIR) 10 mg tablet TAKE 1 TABLET EVERY DAY    metFORMIN ER (GLUCOPHAGE XR) 500 mg tablet Take 1 Tablet by mouth daily (with dinner). cyclobenzaprine (FLEXERIL) 10 mg tablet three (3) times daily as needed.  Has not needed    gabapentin (NEURONTIN) 600 mg tablet 600 mg three (3) times daily. escitalopram oxalate (LEXAPRO) 20 mg tablet Take 20 mg by mouth daily. clonazePAM (KLONOPIN) 1 mg tablet Take 1.5 mg by mouth daily. At bedtime     cholecalciferol, vitamin D3, 50 mcg (2,000 unit) tab Take  by mouth. nystatin (MYCOSTATIN) 100,000 unit/mL suspension Take 5 mL by mouth four (4) times daily. swish and spit (Patient not taking: Reported on 7/27/2022)    guanFACINE ER (INTUNIV) 1 mg ER tablet  (Patient not taking: Reported on 7/27/2022)    albuterol-ipratropium (DUO-NEB) 2.5 mg-0.5 mg/3 ml nebu 3 mL by Nebulization route every six (6) hours as needed (wheeze). (Patient not taking: Reported on 7/27/2022)    ketoconazole (NIZORAL) 2 % shampoo SHAMPOO TWICE WEEKLY (Patient not taking: Reported on 7/27/2022)    diazePAM (VALIUM) 10 mg tablet Take 10 mg by mouth three (3) times daily. (Patient not taking: Reported on 7/27/2022)    albuterol (PROVENTIL HFA, VENTOLIN HFA, PROAIR HFA) 90 mcg/actuation inhaler INHALE 1 PUFF EVERY 6 HOURS AS NEEDED FOR WHEEZING (Patient not taking: Reported on 7/27/2022)    Comp Stocking,Knee,Regular,Med misc 1 Each by Does Not Apply route daily. (Patient not taking: Reported on 7/27/2022)    acetaminophen (TYLENOL 8 HOUR PO) Take  by mouth. (Patient not taking: Reported on 7/27/2022)     No current facility-administered medications for this visit.        Health Maintenance Due   Topic Date Due    COVID-19 Vaccine (3 - Booster for Moderna series) 09/25/2021       Patient Active Problem List    Diagnosis Date Noted    Neuropathy 10/15/2019    Gastroparesis 11/29/2018    Plantar fasciitis of left foot 11/29/2018    Positive KITTY (antinuclear antibody) 11/29/2018    Severe obesity ([address] 11/29/2018    Class 2 obesity due to excess calories without serious comorbidity in adult 07/25/2018    Mood disorder ([address] 06/12/2018    Chronic pain syndrome 06/12/2018    Fibromyalgia 06/12/2018    Panic attack 06/12/2018    Tremor 06/12/2018    Recurrent depression ([facility] 01/15/2018    Inflammatory arthritis 08/10/2016    Vitamin D deficiency 2016    Thrombosed external hemorrhoid 2016    IFG (impaired fasting glucose) 09/10/2015    Hyperlipidemia 09/10/2015    Hoarseness 2013    OCD (obsessive compulsive disorder) 2012    HSV (herpes simplex virus) anogenital infection 2011    Costochondritis 2011    Anxiety 2010    Itch of skin 2010    Depression     GERD (gastroesophageal reflux disease)     Anemia, unspecified        Past Medical History:   Diagnosis Date    Anemia NEC     Arthritis     Chronic pain     fybromyalsia    Depression     anxiety, depression, OCD    GERD (gastroesophageal reflux disease)     Hypertension     Hypertension     Ill-defined condition     Fibromyalia gastricparesis    Musculoskeletal disorder     SOB (shortness of breath)     Stool color black        Allergies   Allergen Reactions    Abilify [Aripiprazole] Anxiety     Can not tolerate     Sulfa (Sulfonamide Antibiotics) Hives    Vicodin [Hydrocodone-Acetaminophen] Nausea and Vomiting       Past Surgical History:   Procedure Laterality Date    HX GYN           HX HEENT  2002    wisdom teeth extraction    UPPER GI ENDOSCOPY,BIOPSY  2018         UPPER GI ENDOSCOPY,DIAGNOSIS  2018            Social History     Socioeconomic History    Marital status: SINGLE   Tobacco Use    Smoking status: Former     Packs/day: 0.25     Years: 8.00     Pack years: 2.00     Types: Cigarettes     Quit date: 2022    Smokeless tobacco: Never   Substance and Sexual Activity    Alcohol use: Not Currently     Alcohol/week: 1.0 standard drink     Types: 1 Glasses of wine per week     Comment: 1 cup of wine/week    Drug use: Yes     Types: Marijuana    Sexual activity: Yes     Partners: Male     Birth control/protection: None         Review of Systems   Review of Systems   Constitutional:  Negative for chills and fever.    Respiratory:  Negative for cough. Cardiovascular:  Positive for chest pain. Negative for palpitations. Gastrointestinal:  Negative for abdominal pain, nausea and vomiting. Neurological:  Negative for dizziness and headaches. Objective:     Vitals:    07/27/22 1338   BP: 113/75   Pulse: (!) 107   Resp: 20   Temp: 98.6 °F (37 °C)   SpO2: 98%   Weight: 222 lb (100.7 kg)   Height: 5' 4\" (1.626 m)      Body mass index is 38.11 kg/m². Physical Exam  Vitals and nursing note reviewed. Constitutional:       Appearance: Normal appearance. HENT:      Head: Normocephalic and atraumatic. Cardiovascular:      Rate and Rhythm: Normal rate and regular rhythm. Pulses: Normal pulses. Heart sounds: Normal heart sounds. No murmur heard. No friction rub. No gallop. Pulmonary:      Effort: Pulmonary effort is normal.      Breath sounds: Normal breath sounds. Abdominal:      General: Abdomen is flat. Bowel sounds are normal.      Palpations: Abdomen is soft. Musculoskeletal:         General: Normal range of motion. Cervical back: Normal range of motion and neck supple. Skin:     General: Skin is warm and dry. Neurological:      General: No focal deficit present. Mental Status: She is alert and oriented to person, place, and time. Psychiatric:         Mood and Affect: Mood normal.         Behavior: Behavior normal.         Assessment and orders:       ICD-10-CM ICD-9-CM    1. ST elevation myocardial infarction involving left anterior descending (LAD) coronary artery (HCC)  I21.02 410.10       2. Hospital discharge follow-up  Z09 V67.59       3. Primary hypertension  I10 401.9         Diagnoses and all orders for this visit:    1. ST elevation myocardial infarction involving left anterior descending (LAD) coronary artery (HCC)/HTN: Patient is taking medications appropriately. Advising to hold Guanfacine for now.   Was for ADHD, but given lower blood pressure do not want to cause hypotension so close to recent cardiac Cath. Also continue to hold Valium for similar reason. 2. Hospital discharge follow-up    Follow-up and Dispositions    Return in about 4 weeks (around 8/24/2022). Plan of care:  Discussed diagnoses in detail with patient. Medication risks/benefits/side effects discussed with patient. All of the patient's questions were addressed. The patient understands and agrees with our plan of care. The patient knows to call back if they are unsure of or forget any changes we discussed today or if the symptoms change. The patient received an After-Visit Summary which contains VS, orders, medication list and allergy list. This can be used as a \"mini-medical record\" should they have to seek medical care while out of town. Follow-up and Dispositions    Return in about 4 weeks (around 8/24/2022). No future appointments.     Signed By: Nancy Simmons MD     July 27, 2022

## 2022-07-27 NOTE — TELEPHONE ENCOUNTER
Appointment scheduled.     MD URBAN Blount & MARSHAL CARMEN [facility]  07/27/22

## 2022-07-27 NOTE — PROGRESS NOTES
1. Have you been to the ER, urgent care clinic since your last visit? Hospitalized since your last visit? No    2. Have you seen or consulted any other health care providers outside of the [address] [place] [address] since your last visit? Include any pap smears or colon screening. No  Reviewed record in preparation for visit and have necessary documentation  Pt did not bring medication to office visit for review  opportunity was given for questions      3. For patients aged 39-70: Has the patient had a colonoscopy / FIT/ Cologuard? NA - based on age      If the patient is female:    4. For patients aged 41-77: Has the patient had a mammogram within the past 2 years? NA - based on age or sex      11. For patients aged 21-65: Has the patient had a pap smear?  NA - based on age or sex      Goals that were addressed and/or need to be completed during or after this appointment include   Health Maintenance Due   Topic Date Due    Pneumococcal 0-64 years (1 - PCV) Never done    COVID-19 Vaccine (3 - Booster for Moderna series) 09/25/2021

## 2022-07-27 NOTE — TELEPHONE ENCOUNTER
Pt called to advise that she was discharged from [facility] for a heart attack. Pt states that she has to be seen ASAP to follow up on the medications that the hospital prescribed. Pt also would like to discuss everything in detail with Dr. Mark Gregory. He has no available appts this week so pt would like to know if provider would please fit her in somewhere between today and tomorrow.

## 2022-07-27 NOTE — TELEPHONE ENCOUNTER
----- Message from Jeremiah Camarena sent at 7/27/2022  4:05 PM EDT -----  Subject: Medication Problem    Medication: furosemide (LASIX) 20 mg tablet  Dosage: 1 tablet daily  Ordering Provider: yara    Question/Problem: Patient is needing clarification on the directions to   take her furosemide. The patient wasn't sure if she needed to take it   every other day or every day. She has two different directions on her   paperwork. Please contact the patient. Pharmacy: [address] [place] [address] [place], [address] [place] [address]    ---------------------------------------------------------------------------  --------------  Farrukh OSORIO  [phone]; OK to leave message on voicemail  ---------------------------------------------------------------------------  --------------    SCRIPT ANSWERS  Relationship to Patient: Self

## 2022-08-02 DIAGNOSIS — L21.9 SEBORRHEA: ICD-10-CM

## 2022-08-02 RX ORDER — KETOCONAZOLE 20 MG/ML
SHAMPOO TOPICAL
Qty: 120 ML | Refills: 1 | Status: SHIPPED | OUTPATIENT
Start: 2022-08-02 | End: 2022-08-13

## 2022-08-10 ENCOUNTER — PATIENT OUTREACH (OUTPATIENT)
Dept: CASE MANAGEMENT | Age: 42
End: 2022-08-10

## 2022-08-10 NOTE — PROGRESS NOTES
08/10/22  2:02 PM  Attempted ACM initial outreach, no answer- lvm and sent Get in Touch letter via [address], will attempt another outreach tomorrow if no callback. 08/10/22  Ambulatory Care Management Note    Date/Time:  8/10/2022 2:54 PM    This patient was received as a referral from Daily assignment. Ambulatory Care Manager outreached to patient today to offer care management services. Introduction to self and role of care manager provided. Patient accepted care management services at this time. Follow up call scheduled at this time. Patient has Ambulatory Care Manager's contact number for for any questions or concerns.

## 2022-08-13 ENCOUNTER — APPOINTMENT (OUTPATIENT)
Dept: GENERAL RADIOLOGY | Age: 42
DRG: 280 | End: 2022-08-13
Attending: EMERGENCY MEDICINE
Payer: MEDICARE

## 2022-08-13 ENCOUNTER — HOSPITAL ENCOUNTER (INPATIENT)
Age: 42
LOS: 1 days | Discharge: HOME | DRG: 280 | End: 2022-08-14
Attending: EMERGENCY MEDICINE | Admitting: FAMILY MEDICINE
Payer: MEDICARE

## 2022-08-13 DIAGNOSIS — G89.4 CHRONIC PAIN SYNDROME: Chronic | ICD-10-CM

## 2022-08-13 DIAGNOSIS — I50.9 ACUTE ON CHRONIC CONGESTIVE HEART FAILURE, UNSPECIFIED HEART FAILURE TYPE (HCC): Primary | ICD-10-CM

## 2022-08-13 DIAGNOSIS — F41.9 ANXIETY: ICD-10-CM

## 2022-08-13 DIAGNOSIS — M79.89 LEG SWELLING: ICD-10-CM

## 2022-08-13 LAB
AMPHET UR QL SCN: NEGATIVE
ANION GAP SERPL CALC-SCNC: 7 MMOL/L (ref 5–15)
B PERT DNA SPEC QL NAA+PROBE: NOT DETECTED
BARBITURATES UR QL SCN: NEGATIVE
BASOPHILS # BLD: 0 K/UL (ref 0–0.1)
BASOPHILS NFR BLD: 1 % (ref 0–1)
BENZODIAZ UR QL: POSITIVE
BNP SERPL-MCNC: 6328 PG/ML
BORDETELLA PARAPERTUSSIS PCR, BORPAR: NOT DETECTED
BUN SERPL-MCNC: 12 MG/DL (ref 6–20)
BUN/CREAT SERPL: 14 (ref 12–20)
C PNEUM DNA SPEC QL NAA+PROBE: NOT DETECTED
CALCIUM SERPL-MCNC: 9.2 MG/DL (ref 8.5–10.1)
CANNABINOIDS UR QL SCN: NEGATIVE
CHLORIDE SERPL-SCNC: 108 MMOL/L (ref 97–108)
CHOLEST SERPL-MCNC: 158 MG/DL
CO2 SERPL-SCNC: 24 MMOL/L (ref 21–32)
COCAINE UR QL SCN: NEGATIVE
COMMENT, HOLDF: NORMAL
CREAT SERPL-MCNC: 0.85 MG/DL (ref 0.55–1.02)
D DIMER PPP FEU-MCNC: 2.42 MG/L FEU (ref 0–0.65)
DIFFERENTIAL METHOD BLD: ABNORMAL
DRUG SCRN COMMENT,DRGCM: ABNORMAL
EOSINOPHIL # BLD: 0.2 K/UL (ref 0–0.4)
EOSINOPHIL NFR BLD: 4 % (ref 0–7)
ERYTHROCYTE [DISTWIDTH] IN BLOOD BY AUTOMATED COUNT: 13.4 % (ref 11.5–14.5)
FLUAV SUBTYP SPEC NAA+PROBE: NOT DETECTED
FLUBV RNA SPEC QL NAA+PROBE: NOT DETECTED
GLUCOSE BLD STRIP.AUTO-MCNC: 90 MG/DL (ref 65–117)
GLUCOSE BLD STRIP.AUTO-MCNC: 98 MG/DL (ref 65–117)
GLUCOSE SERPL-MCNC: 106 MG/DL (ref 65–100)
HADV DNA SPEC QL NAA+PROBE: NOT DETECTED
HCOV 229E RNA SPEC QL NAA+PROBE: NOT DETECTED
HCOV HKU1 RNA SPEC QL NAA+PROBE: NOT DETECTED
HCOV NL63 RNA SPEC QL NAA+PROBE: NOT DETECTED
HCOV OC43 RNA SPEC QL NAA+PROBE: NOT DETECTED
HCT VFR BLD AUTO: 35.3 % (ref 35–47)
HDLC SERPL-MCNC: 67 MG/DL
HDLC SERPL: 2.4 (ref 0–5)
HGB BLD-MCNC: 11.4 G/DL (ref 11.5–16)
HMPV RNA SPEC QL NAA+PROBE: NOT DETECTED
HPIV1 RNA SPEC QL NAA+PROBE: NOT DETECTED
HPIV2 RNA SPEC QL NAA+PROBE: NOT DETECTED
HPIV3 RNA SPEC QL NAA+PROBE: NOT DETECTED
HPIV4 RNA SPEC QL NAA+PROBE: NOT DETECTED
IMM GRANULOCYTES # BLD AUTO: 0 K/UL (ref 0–0.04)
IMM GRANULOCYTES NFR BLD AUTO: 0 % (ref 0–0.5)
LDLC SERPL CALC-MCNC: 66.2 MG/DL (ref 0–100)
LIPASE SERPL-CCNC: 84 U/L (ref 73–393)
LYMPHOCYTES # BLD: 2.4 K/UL (ref 0.8–3.5)
LYMPHOCYTES NFR BLD: 41 % (ref 12–49)
M PNEUMO DNA SPEC QL NAA+PROBE: NOT DETECTED
MAGNESIUM SERPL-MCNC: 1.9 MG/DL (ref 1.6–2.4)
MCH RBC QN AUTO: 29.5 PG (ref 26–34)
MCHC RBC AUTO-ENTMCNC: 32.3 G/DL (ref 30–36.5)
MCV RBC AUTO: 91.2 FL (ref 80–99)
METHADONE UR QL: NEGATIVE
MONOCYTES # BLD: 0.4 K/UL (ref 0–1)
MONOCYTES NFR BLD: 7 % (ref 5–13)
NEUTS SEG # BLD: 2.8 K/UL (ref 1.8–8)
NEUTS SEG NFR BLD: 47 % (ref 32–75)
NRBC # BLD: 0 K/UL (ref 0–0.01)
NRBC BLD-RTO: 0 PER 100 WBC
OPIATES UR QL: NEGATIVE
PCP UR QL: NEGATIVE
PLATELET # BLD AUTO: 248 K/UL (ref 150–400)
PMV BLD AUTO: 12.4 FL (ref 8.9–12.9)
POTASSIUM SERPL-SCNC: 3.9 MMOL/L (ref 3.5–5.1)
RBC # BLD AUTO: 3.87 M/UL (ref 3.8–5.2)
RSV RNA SPEC QL NAA+PROBE: NOT DETECTED
RV+EV RNA SPEC QL NAA+PROBE: NOT DETECTED
SAMPLES BEING HELD,HOLD: NORMAL
SARS-COV-2 PCR, COVPCR: NOT DETECTED
SERVICE CMNT-IMP: NORMAL
SERVICE CMNT-IMP: NORMAL
SODIUM SERPL-SCNC: 139 MMOL/L (ref 136–145)
TRIGL SERPL-MCNC: 124 MG/DL (ref ?–150)
TROPONIN-HIGH SENSITIVITY: 97 NG/L (ref 0–51)
TROPONIN-HIGH SENSITIVITY: 98 NG/L (ref 0–51)
VLDLC SERPL CALC-MCNC: 24.8 MG/DL
WBC # BLD AUTO: 5.9 K/UL (ref 3.6–11)

## 2022-08-13 PROCEDURE — 0202U NFCT DS 22 TRGT SARS-COV-2: CPT

## 2022-08-13 PROCEDURE — 84484 ASSAY OF TROPONIN QUANT: CPT

## 2022-08-13 PROCEDURE — 80061 LIPID PANEL: CPT

## 2022-08-13 PROCEDURE — 74011250637 HC RX REV CODE- 250/637: Performed by: STUDENT IN AN ORGANIZED HEALTH CARE EDUCATION/TRAINING PROGRAM

## 2022-08-13 PROCEDURE — 80307 DRUG TEST PRSMV CHEM ANLYZR: CPT

## 2022-08-13 PROCEDURE — 96375 TX/PRO/DX INJ NEW DRUG ADDON: CPT

## 2022-08-13 PROCEDURE — 71046 X-RAY EXAM CHEST 2 VIEWS: CPT

## 2022-08-13 PROCEDURE — C9113 INJ PANTOPRAZOLE SODIUM, VIA: HCPCS | Performed by: STUDENT IN AN ORGANIZED HEALTH CARE EDUCATION/TRAINING PROGRAM

## 2022-08-13 PROCEDURE — 83690 ASSAY OF LIPASE: CPT

## 2022-08-13 PROCEDURE — 80048 BASIC METABOLIC PNL TOTAL CA: CPT

## 2022-08-13 PROCEDURE — 65270000046 HC RM TELEMETRY

## 2022-08-13 PROCEDURE — G0378 HOSPITAL OBSERVATION PER HR: HCPCS

## 2022-08-13 PROCEDURE — 74011250636 HC RX REV CODE- 250/636: Performed by: STUDENT IN AN ORGANIZED HEALTH CARE EDUCATION/TRAINING PROGRAM

## 2022-08-13 PROCEDURE — 74011000250 HC RX REV CODE- 250: Performed by: STUDENT IN AN ORGANIZED HEALTH CARE EDUCATION/TRAINING PROGRAM

## 2022-08-13 PROCEDURE — 96376 TX/PRO/DX INJ SAME DRUG ADON: CPT

## 2022-08-13 PROCEDURE — 85025 COMPLETE CBC W/AUTO DIFF WBC: CPT

## 2022-08-13 PROCEDURE — 85379 FIBRIN DEGRADATION QUANT: CPT

## 2022-08-13 PROCEDURE — 93005 ELECTROCARDIOGRAM TRACING: CPT

## 2022-08-13 PROCEDURE — 96374 THER/PROPH/DIAG INJ IV PUSH: CPT

## 2022-08-13 PROCEDURE — 82962 GLUCOSE BLOOD TEST: CPT

## 2022-08-13 PROCEDURE — 83036 HEMOGLOBIN GLYCOSYLATED A1C: CPT

## 2022-08-13 PROCEDURE — 83880 ASSAY OF NATRIURETIC PEPTIDE: CPT

## 2022-08-13 PROCEDURE — 83735 ASSAY OF MAGNESIUM: CPT

## 2022-08-13 PROCEDURE — 99285 EMERGENCY DEPT VISIT HI MDM: CPT

## 2022-08-13 PROCEDURE — 74011250636 HC RX REV CODE- 250/636: Performed by: EMERGENCY MEDICINE

## 2022-08-13 PROCEDURE — 36415 COLL VENOUS BLD VENIPUNCTURE: CPT

## 2022-08-13 RX ORDER — ALBUTEROL SULFATE 0.83 MG/ML
2.5 SOLUTION RESPIRATORY (INHALATION)
Refills: 2 | Status: DISCONTINUED | OUTPATIENT
Start: 2022-08-13 | End: 2022-08-14 | Stop reason: HOSPADM

## 2022-08-13 RX ORDER — PRASUGREL 10 MG/1
10 TABLET, FILM COATED ORAL DAILY
Status: DISCONTINUED | OUTPATIENT
Start: 2022-08-14 | End: 2022-08-14 | Stop reason: HOSPADM

## 2022-08-13 RX ORDER — GUAIFENESIN 100 MG/5ML
324 LIQUID (ML) ORAL DAILY
Status: DISCONTINUED | OUTPATIENT
Start: 2022-08-14 | End: 2022-08-13

## 2022-08-13 RX ORDER — FUROSEMIDE 10 MG/ML
20 INJECTION INTRAMUSCULAR; INTRAVENOUS 2 TIMES DAILY
Status: DISCONTINUED | OUTPATIENT
Start: 2022-08-13 | End: 2022-08-14

## 2022-08-13 RX ORDER — ATORVASTATIN CALCIUM 20 MG/1
80 TABLET, FILM COATED ORAL DAILY
Status: DISCONTINUED | OUTPATIENT
Start: 2022-08-14 | End: 2022-08-14 | Stop reason: HOSPADM

## 2022-08-13 RX ORDER — FUROSEMIDE 10 MG/ML
40 INJECTION INTRAMUSCULAR; INTRAVENOUS ONCE
Status: COMPLETED | OUTPATIENT
Start: 2022-08-13 | End: 2022-08-13

## 2022-08-13 RX ORDER — FLUTICASONE PROPIONATE 50 MCG
2 SPRAY, SUSPENSION (ML) NASAL DAILY
Status: DISCONTINUED | OUTPATIENT
Start: 2022-08-14 | End: 2022-08-14 | Stop reason: HOSPADM

## 2022-08-13 RX ORDER — LANOLIN ALCOHOL/MO/W.PET/CERES
1 CREAM (GRAM) TOPICAL
Status: DISCONTINUED | OUTPATIENT
Start: 2022-08-14 | End: 2022-08-14 | Stop reason: HOSPADM

## 2022-08-13 RX ORDER — MONTELUKAST SODIUM 10 MG/1
10 TABLET ORAL
Status: DISCONTINUED | OUTPATIENT
Start: 2022-08-14 | End: 2022-08-14 | Stop reason: HOSPADM

## 2022-08-13 RX ORDER — ALBUTEROL SULFATE 90 UG/1
AEROSOL, METERED RESPIRATORY (INHALATION) AS NEEDED
COMMUNITY

## 2022-08-13 RX ORDER — SODIUM CHLORIDE 0.9 % (FLUSH) 0.9 %
5-40 SYRINGE (ML) INJECTION EVERY 8 HOURS
Status: DISCONTINUED | OUTPATIENT
Start: 2022-08-13 | End: 2022-08-14 | Stop reason: HOSPADM

## 2022-08-13 RX ORDER — ESCITALOPRAM OXALATE 10 MG/1
20 TABLET ORAL DAILY
Status: DISCONTINUED | OUTPATIENT
Start: 2022-08-14 | End: 2022-08-14 | Stop reason: HOSPADM

## 2022-08-13 RX ORDER — IBUPROFEN 200 MG
4 TABLET ORAL AS NEEDED
Status: DISCONTINUED | OUTPATIENT
Start: 2022-08-13 | End: 2022-08-14 | Stop reason: HOSPADM

## 2022-08-13 RX ORDER — ACETAMINOPHEN 325 MG/1
650 TABLET ORAL
Status: DISCONTINUED | OUTPATIENT
Start: 2022-08-13 | End: 2022-08-14 | Stop reason: HOSPADM

## 2022-08-13 RX ORDER — GABAPENTIN 300 MG/1
600 CAPSULE ORAL 3 TIMES DAILY
Status: DISCONTINUED | OUTPATIENT
Start: 2022-08-14 | End: 2022-08-14 | Stop reason: HOSPADM

## 2022-08-13 RX ORDER — ASPIRIN 81 MG/1
81 TABLET ORAL DAILY
Status: DISCONTINUED | OUTPATIENT
Start: 2022-08-14 | End: 2022-08-14 | Stop reason: HOSPADM

## 2022-08-13 RX ORDER — GUAIFENESIN 100 MG/5ML
324 LIQUID (ML) ORAL
Status: COMPLETED | OUTPATIENT
Start: 2022-08-13 | End: 2022-08-13

## 2022-08-13 RX ORDER — ACETAMINOPHEN 650 MG/1
650 SUPPOSITORY RECTAL
Status: DISCONTINUED | OUTPATIENT
Start: 2022-08-13 | End: 2022-08-14 | Stop reason: HOSPADM

## 2022-08-13 RX ORDER — TRAZODONE HYDROCHLORIDE 50 MG/1
100 TABLET ORAL
Status: DISCONTINUED | OUTPATIENT
Start: 2022-08-13 | End: 2022-08-14 | Stop reason: HOSPADM

## 2022-08-13 RX ORDER — POLYETHYLENE GLYCOL 3350 17 G/17G
17 POWDER, FOR SOLUTION ORAL DAILY PRN
Status: DISCONTINUED | OUTPATIENT
Start: 2022-08-13 | End: 2022-08-14 | Stop reason: HOSPADM

## 2022-08-13 RX ORDER — INSULIN LISPRO 100 [IU]/ML
INJECTION, SOLUTION INTRAVENOUS; SUBCUTANEOUS
Status: DISCONTINUED | OUTPATIENT
Start: 2022-08-13 | End: 2022-08-14 | Stop reason: HOSPADM

## 2022-08-13 RX ORDER — ENOXAPARIN SODIUM 100 MG/ML
30 INJECTION SUBCUTANEOUS EVERY 12 HOURS
Status: DISCONTINUED | OUTPATIENT
Start: 2022-08-14 | End: 2022-08-14 | Stop reason: HOSPADM

## 2022-08-13 RX ORDER — DEXTROSE MONOHYDRATE 100 MG/ML
0-250 INJECTION, SOLUTION INTRAVENOUS AS NEEDED
Status: DISCONTINUED | OUTPATIENT
Start: 2022-08-13 | End: 2022-08-14 | Stop reason: HOSPADM

## 2022-08-13 RX ORDER — SODIUM CHLORIDE 0.9 % (FLUSH) 0.9 %
5-40 SYRINGE (ML) INJECTION AS NEEDED
Status: DISCONTINUED | OUTPATIENT
Start: 2022-08-13 | End: 2022-08-14 | Stop reason: HOSPADM

## 2022-08-13 RX ADMIN — SODIUM CHLORIDE, PRESERVATIVE FREE 10 ML: 5 INJECTION INTRAVENOUS at 21:25

## 2022-08-13 RX ADMIN — SODIUM CHLORIDE, PRESERVATIVE FREE 10 ML: 5 INJECTION INTRAVENOUS at 14:37

## 2022-08-13 RX ADMIN — ASPIRIN 324 MG: 81 TABLET, CHEWABLE ORAL at 14:37

## 2022-08-13 RX ADMIN — CLONAZEPAM 1.5 MG: 1 TABLET ORAL at 21:25

## 2022-08-13 RX ADMIN — FUROSEMIDE 40 MG: 10 INJECTION, SOLUTION INTRAMUSCULAR; INTRAVENOUS at 10:15

## 2022-08-13 RX ADMIN — TRAZODONE HYDROCHLORIDE 100 MG: 50 TABLET ORAL at 21:25

## 2022-08-13 RX ADMIN — FUROSEMIDE 20 MG: 10 INJECTION, SOLUTION INTRAMUSCULAR; INTRAVENOUS at 17:42

## 2022-08-13 RX ADMIN — SODIUM CHLORIDE 40 MG: 9 INJECTION INTRAMUSCULAR; INTRAVENOUS; SUBCUTANEOUS at 14:36

## 2022-08-13 NOTE — ED NOTES
TRANSFER - OUT REPORT:    Verbal report given to [facility] RN (name) on Fernando Sams  being transferred to PCU (unit) for routine progression of care       Report consisted of patients Situation, Background, Assessment and   Recommendations(SBAR). Information from the following report(s) SBAR, Kardex, ED Summary, Intake/Output, MAR, and Recent Results was reviewed with the receiving nurse. Lines:   Peripheral IV 08/13/22 Right Antecubital (Active)   Site Assessment Clean, dry, & intact 08/13/22 0843   Phlebitis Assessment 0 08/13/22 0843   Infiltration Assessment 0 08/13/22 0843   Dressing Status Clean, dry, & intact 08/13/22 0843   Dressing Type Transparent;Tape 08/13/22 0843   Hub Color/Line Status Patent; Flushed;Pink 08/13/22 0843        Opportunity for questions and clarification was provided.       Patient transported with:   [employer] [place]

## 2022-08-13 NOTE — PROGRESS NOTES
1601: TRANSFER - IN REPORT:    Verbal report received from [address] (name) on Razia Yohan  being received from ED (unit) for routine progression of care      Report consisted of patients Situation, Background, Assessment and   Recommendations(SBAR). Information from the following report(s) SBAR, Kardex, Accordion, and Cardiac Rhythm NSR  was reviewed with the receiving nurse. Opportunity for questions and clarification was provided. Assessment completed upon patients arrival to unit and care assumed. This patient was assisted with Intentional Toileting every 2 hours during this shift as appropriate. Documentation of ambulation and output reflected on Flowsheet as appropriate. Purposeful hourly rounding was completed using AIDET and 5Ps. Outcomes of PHR documented as they occurred. Bed alarm in use as appropriate. Dual Suction and ambubag in place.

## 2022-08-13 NOTE — ED TRIAGE NOTES
Pt reports SOB worsening since yesterday. Also reports mid chest pain. Reports lower back pain starting Wednesday. Hx of MI with one stent July 24th. Seen at [facility] yesterday for this. Also reports chest congestion starting yesterday. Pt is tearful and hyperventilating in triage.

## 2022-08-13 NOTE — ED PROVIDER NOTES
The history is provided by the patient. Shortness of Breath  This is a new problem. The average episode lasts 4 days. The problem occurs continuously. The problem has been gradually worsening. Associated symptoms include sputum production and wheezing. Pertinent negatives include no fever, no headaches, no rhinorrhea, no sore throat, no chest pain, no vomiting, no abdominal pain and no rash. She has had Prior hospitalizations. She has had Prior ED visits. Associated medical issues include asthma, CAD, heart failure and past MI. Past Medical History:   Diagnosis Date    Anemia NEC     Arthritis     Chronic pain     fybromyalsia    Depression     anxiety, depression, OCD    GERD (gastroesophageal reflux disease)     Hypertension     Hypertension     Ill-defined condition     Fibromyalia gastricparesis    Musculoskeletal disorder     SOB (shortness of breath)     Stool color black        Past Surgical History:   Procedure Laterality Date    HX GYN           HX HEENT  2002    wisdom teeth extraction    UPPER GI ENDOSCOPY,BIOPSY  2018         UPPER GI ENDOSCOPY,DIAGNOSIS  2018              Family History:   Problem Relation Age of Onset    Hypertension Father     Elevated Lipids Father     Arthritis-rheumatoid Mother         ? Lupus vs RA    Lung Disease Mother     Heart Disease Mother     Cancer Mother         breast in 39y    COPD Mother     Hypertension Mother     Stroke Mother         3-4 strokes    Breast Cancer Mother 50    Diabetes Maternal Grandmother        Social History     Socioeconomic History    Marital status: SINGLE     Spouse name: Not on file    Number of children: Not on file    Years of education: Not on file    Highest education level: Not on file   Occupational History    Not on file   Tobacco Use    Smoking status: Former     Packs/day: 0.25     Years: 8.00     Pack years: 2.00     Types: Cigarettes     Quit date: 2022     Years since quittin.0    Smokeless tobacco: Never   Substance and Sexual Activity    Alcohol use: Not Currently     Alcohol/week: 1.0 standard drink     Types: 1 Glasses of wine per week     Comment: 1 cup of wine/week    Drug use: Yes     Types: Marijuana    Sexual activity: Yes     Partners: Male     Birth control/protection: None   Other Topics Concern    Not on file   Social History Narrative    Not on file     Social Determinants of Health     Financial Resource Strain: High Risk    Difficulty of Paying Living Expenses: Very hard   Food Insecurity: No Food Insecurity    Worried About Running Out of Food in the Last Year: Never true    Ran Out of Food in the Last Year: Never true   Transportation Needs: Not on file   Physical Activity: Not on file   Stress: Not on file   Social Connections: Not on file   Intimate Partner Violence: Not on file   Housing Stability: Not on file         ALLERGIES: Abilify [aripiprazole], Sulfa (sulfonamide antibiotics), and Vicodin [hydrocodone-acetaminophen]    Review of Systems   Constitutional:  Negative for chills and fever. HENT:  Negative for rhinorrhea and sore throat. Eyes:  Negative for visual disturbance. Respiratory:  Positive for sputum production, shortness of breath and wheezing. Negative for chest tightness. Cardiovascular:  Negative for chest pain. Gastrointestinal:  Negative for abdominal pain, diarrhea, nausea and vomiting. Genitourinary:  Negative for dysuria. Musculoskeletal:  Negative for back pain and joint swelling. Skin:  Negative for rash. Neurological:  Negative for weakness and headaches. Psychiatric/Behavioral:  Negative for behavioral problems. Vitals:    08/13/22 0823 08/13/22 0900   BP: 108/75    Pulse: (!) 108    Resp: 22    Temp: 98.1 °F (36.7 °C)    SpO2: 99% 99%   Weight: 101.2 kg (223 lb)    Height: 5' 4\" (1.626 m)             Physical Exam  Vitals and nursing note reviewed. Constitutional:       Appearance: She is not diaphoretic.    HENT:      Head: Normocephalic and atraumatic. Nose: Nose normal.   Eyes:      Extraocular Movements: Extraocular movements intact. Cardiovascular:      Rate and Rhythm: Normal rate. Pulses: Normal pulses. Heart sounds: No murmur heard. Pulmonary:      Effort: Pulmonary effort is normal. No respiratory distress. Breath sounds: Examination of the right-lower field reveals rales. Examination of the left-lower field reveals rales. Rales present. No wheezing or rhonchi. Abdominal:      General: There is no distension. Palpations: There is no mass. Tenderness: There is no abdominal tenderness. There is no guarding or rebound. Musculoskeletal:         General: No swelling or deformity. Skin:     General: Skin is warm and dry. Findings: No erythema or rash. Neurological:      General: No focal deficit present. Mental Status: She is alert. Psychiatric:         Mood and Affect: Mood normal.        MDM  Number of Diagnoses or Management Options  Acute on chronic congestive heart failure, unspecified heart failure type [facility]): new and requires workup  Diagnosis management comments: 53-year-old female with a past medical history of hypertension and obesity acute MI several months ago. Since that time the patient has been intermittently short of breath with worsening shortness of breath over the last several weeks. She tells me that her primary care physician started her on Lasix but is not sure why. She has been taking Lasix and weighing herself but did not know that she has a diagnosis of congestive heart failure. She tells me that she was seen at another ER yesterday but \"did not need admission\" and was sent back home with no interventions at home. She denies any chest pain but states that she has myalgias over the last several days and was told by her primary care to discontinue her statins to see if her symptoms improve with that.        Amount and/or Complexity of Data Reviewed  Clinical lab tests: ordered and reviewed  Tests in the radiology section of CPT®: ordered and reviewed    Risk of Complications, Morbidity, and/or Mortality  Presenting problems: high  Diagnostic procedures: high  Management options: high  General comments: Given lasix 40mg IV (double PO dose) and checked for renal failure. BNP elevated, consistent with suspected Dx of acute on chronic HF    Will admit as patient is still short of breath and likely needs education, possible new Cardiology referral.    Patient Progress  Patient progress: stable         EKG    Date/Time: 8/13/2022 10:02 AM  Performed by: Nirali Peterson MD  Authorized by: Nirali Peterson MD     ECG reviewed by ED Physician in the absence of a cardiologist: yes    Previous ECG:     Previous ECG:  Unavailable  Interpretation:     Interpretation: abnormal    Quality:     Tracing quality:  Limited by artifact  Rate:     ECG rate:  101    ECG rate assessment: tachycardic    Rhythm:     Rhythm: sinus rhythm    QRS:     QRS axis:  Left    QRS intervals:  Normal    QRS conduction: RBBB    ST segments:     ST segments:  Non-specific  T waves:     T waves: non-specific        Perfect Serve Consult for Admission  11:40 AM    ED Room Number: ER07/07  Patient Name and age:  Yasir Short 46 y.o.  female  Working Diagnosis:   1. Acute on chronic congestive heart failure, unspecified heart failure type ([address]        COVID-19 Suspicion:  no  Sepsis present:  no  Reassessment needed: yes  Code Status:  Full Code  Readmission: no  Isolation Requirements:  no  Recommended Level of Care:  med/surg  Department:[facility] ED - [phone]  Other:  47 yo F with recent MI, now with acute on chronic CHF exacerbation. Weight up over past week, not clear on diet education or diuresis management.

## 2022-08-13 NOTE — PROGRESS NOTES
[facility] Dosing Services    Enoxaparin was automatically dose-adjusted per [facility] P&T Committee Protocol, with respect to patient's actual body weight. Pt Weight:   Wt Readings from Last 1 Encounters:   08/13/22 101.2 kg (223 lb)      Assessment/Plan: Enoxaparin regimen adjusted to 30 mg SC every 12 hours per P&T approved protocol for patient with actual body weight greater than 100 kg.     Arturo Cummings, Pharmacist

## 2022-08-13 NOTE — H&P
[facility] [address] [place] [address]   Office [phone]  Fax [phone]       Admission H&P     Name: Emma Prabhakar MRN: [id number]  Sex: Female   YOB: 1980  Age: 46 y.o. PCP: Pascual Tucker MD     Date of admission: 8/13/2022    Source of Information: patient, medical records    Chief complaint: Shortness of breath    HPI:  Emma Prabhakar is a 46 y.o. female with PMHx of CAD (STEMI 7/24/22 with x1 RICKY to LAD), HFrEF (last ECHO 7/24/22 15-20% with LAD hypokinesis), HLD, HTN, chronic brochitis, anemia, fibromyalgia, gastroparesis who presents to the ED complaining of SOB onset x4 days ago. She reports baseline SOB since discharge s/p STEMI with acute worsening x4 days ago. Patient reports associated myalgias (chest, back, b/l upper and lower extremities), sore throat, and nasal congestion. She denies relief with home use of albuterol. The patient reports that she has been evaluated and discharged from [facility] ER multiple times over the last week for the same. She reports speaking to her cardiology on-call line since discharge as well with the following reported medication changes: stopped entresto and started metoprolol 12.5 for low blood pressures and halved and then stopped atorvastatin 80mg for myalgias. She denies chest pain, fever, chills, numbness, weakness, abdominal pain, nausea, vomiting. Her symptoms are not similar in quality to those experienced prior to her MI.       In the ED:  Vitals: T 98.1    /75  RR 22  O2Sats 99% on RA  Labs: Hgb 11.4 WBC 5.9 BNP 6328 trop 98 lipase 84  Imaging: CXR: minimal vascular prominence  Treatment: lasix 40mg IV    EKG: sinus tachycardia, new RBBB compared to prior; no acute T-wave changes or ST elevation    Patient Vitals for the past 12 hrs:   Temp Pulse Resp BP SpO2   08/13/22 1015 -- 95 -- 100/70 --   08/13/22 0900 -- -- -- -- 99 %   08/13/22 0823 98.1 °F (36.7 °C) (!) 108 22 108/75 99 % Review of Systems  Review of Systems   Constitutional:  Negative for appetite change, fatigue and fever. HENT:  Positive for congestion, rhinorrhea and sore throat. Respiratory:  Positive for shortness of breath. Negative for cough, chest tightness and wheezing. Cardiovascular:  Positive for leg swelling (baseline). Negative for chest pain. Gastrointestinal:  Negative for abdominal pain, nausea and vomiting. Musculoskeletal:  Positive for myalgias. Skin:  Negative for rash. Allergic/Immunologic: Negative for immunocompromised state. Neurological:  Negative for weakness, light-headedness and headaches. Home Medications   Prior to Admission medications    Medication Sig Start Date End Date Taking? Authorizing Provider   ketoconazole (NIZORAL) 2 % shampoo SHAMPOO TWICE WEEKLY 8/2/22   Elma Smith MD   prasugreL (EFFIENT) 10 mg tablet  7/26/22   Provider, Historical   nitroglycerin (NITROSTAT) 0.4 mg SL tablet  7/26/22   Provider, Historical   colchicine (GLOPEBRA) 0.6 mg/5 mL oral solution Take 0.6 mg by mouth. 7/26/22   Provider, Historical   atorvastatin (LIPITOR) 80 mg tablet  7/26/22   Provider, Historical   aspirin delayed-release 81 mg tablet 81 mg. 7/26/22   Provider, Historical   Ruel [place] 24-26 mg tablet  7/26/22   Provider, Historical   ibuprofen (MOTRIN) 400 mg tablet Take 400 mg by mouth. 7/26/22   Provider, Historical   nystatin (MYCOSTATIN) 100,000 unit/mL suspension Take 5 mL by mouth four (4) times daily. swish and spit  Patient not taking: Reported on 7/27/2022 6/30/22   Elma Smith MD   guanFACINE ER (INTUNIV) 1 mg ER tablet  6/21/22   Provider, Historical   pantoprazole (PROTONIX) 40 mg tablet TAKE 1 TABLET TWICE DAILY 6/1/22   Elma Smith MD   albuterol-ipratropium (DUO-NEB) 2.5 mg-0.5 mg/3 ml nebu 3 mL by Nebulization route every six (6) hours as needed (wheeze).   Patient not taking: Reported on 7/27/2022 5/31/22   Elma Smith MD ferrous sulfate 325 mg (65 mg iron) tablet TAKE 1 TABLET BY MOUTH ONCE DAILY BEFORE BREAKFAST 5/4/22   Billy Mota MD   furosemide (LASIX) 20 mg tablet Take 0.5 Tablets by mouth daily. As needed  Patient taking differently: Take 20 mg by mouth in the morning. 5/4/22   Billy Mota MD   levocetirizine (XYZAL) 5 mg tablet Take 1 Tablet by mouth daily. 4/25/22   Billy Mota MD   famotidine (Pepcid) 20 mg tablet Take 1 Tablet by mouth two (2) times a day. 4/25/22   Billy Mota MD   diazePAM (VALIUM) 10 mg tablet Take 10 mg by mouth three (3) times daily. Patient not taking: Reported on 7/27/2022 4/5/22   Provider, Historical   traZODone (DESYREL) 100 mg tablet  4/5/22   Provider, Historical   folic acid (FOLVITE) 1 mg tablet TAKE 1 TABLET EVERY DAY 4/6/22   Billy Mota MD   triamcinolone acetonide (KENALOG) 0.1 % ointment APPLY  TO AFFECTED AREA TWO (2) TIMES A DAY. USE A THIN LAYER 4/4/22   Billy Mota MD   norgestimate-ethinyl estradioL (ORTHO-CYCLEN, [facility]) 0.25-35 mg-mcg tab Take 1 Tablet by mouth daily. Generic 3/30/22   Billy Mota MD   montelukast (SINGULAIR) 10 mg tablet TAKE 1 TABLET EVERY DAY 3/29/22   Billy Mota MD   albuterol (PROVENTIL HFA, VENTOLIN HFA, PROAIR HFA) 90 mcg/actuation inhaler INHALE 1 PUFF EVERY 6 HOURS AS NEEDED FOR WHEEZING  Patient not taking: Reported on 7/27/2022 1/24/22   Billy Mota MD   metFORMIN ER (GLUCOPHAGE XR) 500 mg tablet Take 1 Tablet by mouth daily (with dinner). 12/22/21   Billy Mota MD   cyclobenzaprine (FLEXERIL) 10 mg tablet three (3) times daily as needed. Has not needed 10/6/21   Provider, Historical   gabapentin (NEURONTIN) 600 mg tablet 600 mg three (3) times daily. 10/16/21   Provider, Historical   escitalopram oxalate (LEXAPRO) 20 mg tablet Take 20 mg by mouth daily.     Provider, Historical   Comp Stocking,Knee,Regular,Med misc 1 Each by Does Not Apply route daily. Patient not taking: Reported on 2022   Jennifer Valdez NP   acetaminophen (TYLENOL 8 HOUR PO) Take  by mouth. Patient not taking: Reported on 2022    Provider, Historical   clonazePAM (KLONOPIN) 1 mg tablet Take 1.5 mg by mouth daily. At bedtime     Provider, Historical   cholecalciferol, vitamin D3, 50 mcg (2,000 unit) tab Take  by mouth. Provider, Historical       Allergies  Allergies   Allergen Reactions    Abilify [Aripiprazole] Anxiety     Can not tolerate     Sulfa (Sulfonamide Antibiotics) Hives    Vicodin [Hydrocodone-Acetaminophen] Nausea and Vomiting       Past Medical History  Past Medical History:   Diagnosis Date    Anemia NEC     Arthritis     Chronic pain     fybromyalsia    Depression     anxiety, depression, OCD    GERD (gastroesophageal reflux disease)     Hypertension     Hypertension     Ill-defined condition     Fibromyalia gastricparesis    Musculoskeletal disorder     SOB (shortness of breath)     Stool color black        Previous Hospitalization(s)  Past Surgical History:   Procedure Laterality Date    HX GYN           HX HEENT  2002    wisdom teeth extraction    UPPER GI ENDOSCOPY,BIOPSY  2018         UPPER GI ENDOSCOPY,DIAGNOSIS  2018            Family History  Family History   Problem Relation Age of Onset    Hypertension Father     Elevated Lipids Father     Arthritis-rheumatoid Mother         ? Lupus vs RA    Lung Disease Mother     Heart Disease Mother     Cancer Mother         breast in 39y    COPD Mother     Hypertension Mother     Stroke Mother         3-4 strokes    Breast Cancer Mother 50    Diabetes Maternal Grandmother        Social History  Alcohol history: Rarely  Smoking history: Smokes 0.5 ppd x 15 years  Illicit drug history:  marijuana use  Living arrangement: patient lives alone.   Ambulates: Independently     Vital Signs  Visit Vitals  /70   Pulse 95   Temp 98.1 °F (36.7 °C)   Resp 22   Ht 5' 4\" (1.626 m) Wt 223 lb (101.2 kg)   SpO2 99%   BMI 38.28 kg/m²       Physical Exam  General: no acute distress. Alert. Cooperative. Head: Normocephalic. Atraumatic. Eyes:              Conjunctiva pink. Sclera white. Ears:              Hearing grossly intact. Nose:             Septum midline. Mucosa pink. No drainage. Throat: Mucosa pink. Moist mucous membranes. No tonsillar exudates or erythema. Palate movement equal bilaterally. Neck: Supple. Normal ROM. No stiffness. Respiratory: Fine crackles to b/l bases   Cardiovascular: RRR. Normal S1,S2. No m/r/g. Pulses 2+ throughout. GI: + bowel sounds. Nontender. No rebound tenderness or guarding. Nondistended. Extremities: 1+ LE edema. Distal pulses intact. Musculoskeletal: Full ROM in all extremities. Skin: Warm, dry. No rashes. Neuro: AAOx3 moves all extremities       Laboratory Data  Recent Results (from the past 8 hour(s))   SAMPLES BEING HELD    Collection Time: 08/13/22  8:43 AM   Result Value Ref Range    SAMPLES BEING HELD SST. LV     COMMENT        Add-on orders for these samples will be processed based on acceptable specimen integrity and analyte stability, which may vary by analyte.    METABOLIC PANEL, BASIC    Collection Time: 08/13/22  8:43 AM   Result Value Ref Range    Sodium 139 136 - 145 mmol/L    Potassium 3.9 3.5 - 5.1 mmol/L    Chloride 108 97 - 108 mmol/L    CO2 24 21 - 32 mmol/L    Anion gap 7 5 - 15 mmol/L    Glucose 106 (H) 65 - 100 mg/dL    BUN 12 6 - 20 MG/DL    Creatinine 0.85 0.55 - 1.02 MG/DL    BUN/Creatinine ratio 14 12 - 20      GFR est AA >60 >60 ml/min/1.73m2    GFR est non-AA >60 >60 ml/min/1.73m2    Calcium 9.2 8.5 - 10.1 MG/DL   CBC WITH AUTOMATED DIFF    Collection Time: 08/13/22  8:43 AM   Result Value Ref Range    WBC 5.9 3.6 - 11.0 K/uL    RBC 3.87 3.80 - 5.20 M/uL    HGB 11.4 (L) 11.5 - 16.0 g/dL    HCT 35.3 35.0 - 47.0 %    MCV 91.2 80.0 - 99.0 FL    MCH 29.5 26.0 - 34.0 PG    MCHC 32.3 30.0 - 36.5 g/dL    RDW 13.4 11.5 - 14.5 %    PLATELET 894 798 - 893 K/uL    MPV 12.4 8.9 - 12.9 FL    NRBC 0.0 0  WBC    ABSOLUTE NRBC 0.00 0.00 - 0.01 K/uL    NEUTROPHILS 47 32 - 75 %    LYMPHOCYTES 41 12 - 49 %    MONOCYTES 7 5 - 13 %    EOSINOPHILS 4 0 - 7 %    BASOPHILS 1 0 - 1 %    IMMATURE GRANULOCYTES 0 0.0 - 0.5 %    ABS. NEUTROPHILS 2.8 1.8 - 8.0 K/UL    ABS. LYMPHOCYTES 2.4 0.8 - 3.5 K/UL    ABS. MONOCYTES 0.4 0.0 - 1.0 K/UL    ABS. EOSINOPHILS 0.2 0.0 - 0.4 K/UL    ABS. BASOPHILS 0.0 0.0 - 0.1 K/UL    ABS. IMM. GRANS. 0.0 0.00 - 0.04 K/UL    DF AUTOMATED     LIPASE    Collection Time: 08/13/22  8:43 AM   Result Value Ref Range    Lipase 84 73 - 393 U/L   NT-PRO BNP    Collection Time: 08/13/22  8:43 AM   Result Value Ref Range    NT pro-BNP 6,328 (H) <125 PG/ML   TROPONIN-HIGH SENSITIVITY    Collection Time: 08/13/22  8:43 AM   Result Value Ref Range    Troponin-High Sensitivity 98 (H) 0 - 51 ng/L       Imaging  CXR Results  (Last 48 hours)                 08/13/22 0935  XR CHEST PA LAT Final result    Impression:  Minimal vascular prominence. Narrative:  Clinical indication: Dyspnea. Frontal and lateral views of the chest obtained compared to July 12, 2022. The a   heart size is normal. Minimal interstitial edema is suspected. No focal   infiltrate. Next                 CT Results  (Last 48 hours)      None              Assessment and Plan     Krista Beltran is a 46 y.o. female with a PMHx of STEMI with stent (7/24/22), HFrEF, HTN, HLD, anemia, chronic bronchitis, anx/dep who is admitted for acute onset dyspnea. Dyspnea: Likely 2/2 acute on chronic heart failure vs MI vs viral rhinosinusitis. Lower suspicion for PE. PMHx STEMI s/p cath with RICKY placed 7/24/22. ECHO at that time showed hypokinesis in the LAD distribution with EF of 15-20%. Reports compliance with medication including diuretics. CXR showing minimal vascular prominence. BNP elevated to 6,328 (up from 124 on 7/12/22). EKG sinus tach, new RBBB.    - admit to tele  - VS per unit protocol, elevate head of bed, strict I&Os, daily weights  - s/p lasix 40mg. Will dose additional 20mg IV today. - IV 20mg BID starting tomorrow morning. Home dose of 20mg. - ECHO  - daily BMP with Mg2+  - POA troponin 98. Will repeat. - Well score 1.5: low risk. Will obtain D-dimer.  - Obtain RVP, UDS  - Will consult cardiology for HF medication management and new RBBB on EKG. Troponemia: in the setting of recent STEMI. POA 98.  - will repeat troponin  -  today. continue home prasugrel 10mg and 81mg ASA  - cardiology  - medically optimize - resume atorvastatin 80mg, will hold entresto and metoprolol, obtain repeat lipid panel, a1c    New right bundle branch block: EKG on admission with new RBBB. - admit to tele  - monitor electrolytes  - cardiology consult    URI: patient reporting nasal congestion, sore throat. - RVP pending  - droplet plus precautions  - Flonase    Anemia: BL ~11. POA 11.4  - continue home ferrous sulfate 325mg daily    HTN: Hypotensive blood pressures on arrival with similar low blood pressure recently  -hold home entresto 24-26mg, metoprolol 12.5mg. HLD: Chronic, stable  Lab Results   Component Value Date/Time    Cholesterol, total 238 (H) 03/30/2022 09:52 AM    HDL Cholesterol 78 03/30/2022 09:52 AM    LDL, calculated 144.4 (H) 03/30/2022 09:52 AM    VLDL, calculated 15.6 03/30/2022 09:52 AM    Triglyceride 78 03/30/2022 09:52 AM    CHOL/HDL Ratio 3.1 03/30/2022 09:52 AM   - stopped atorvastatin 80mg on 8/12/22 due to myalgias. Will resume during admission  - repeat lipid panel    Pre-diabetes: Chronic, stable  Lab Results   Component Value Date/Time    Hemoglobin A1c 5.8 (H) 03/30/2022 09:52 AM    Hemoglobin A1c (POC) 5.0 04/04/2018 03:15 PM   - hold home metformin 500mg  - SSI insulin with ACHS POC checks  - repeat a1c    Chronic bronchitis: No relief in SOB with at home use of inhalers. Low suspicion for contribution to current symptoms.   - Continue home duo-neb as needed  - Continue home [id number]    Anx/dep: Chronic, stable  - continue home clonazepam 1mg, lexapro 20mg, trazadone 100mg  - continue to hold ativan in the setting of low blood pressures    Fibromyalgia: chronic, stable  - Continue home gabapentin 600mg TID    GERD/gastroparesis: Chronic, stable  - Continue home pantoprazole 40mg, famotidine 20mg    Vit D deficiency:  - hold home vit D3 50mcg    Obesity:  - The pt's Body mass index is 38.28 kg/m². - Encouraging lifestyle modifications and further follow up outpatient. FEN/GI - Cardiac diet. Activity - Out of bed with assistance  DVT prophylaxis - Lovenox  GI prophylaxis - Protonix  Fall prophylaxis - Not indicated at this time. Disposition - Admit to Telemetry. Plan to d/c to Home. Code Status - Full. Discussed with patient / caregivers.   Point of Contact - April Albin, Father - [phone]  Rony Hoyos, significant other - [phone]    Patient Maulik Wrens will be discussed with Dr. Deondre Post.    11:59 AM, 08/13/22  Jose Maher MD  Family Medicine Resident       For Billing    Chief Complaint   Patient presents with    [facility] [address] Problems  Date Reviewed: 7/27/2022            Codes Class Noted POA    [facility] ICD-10-CM: I50.9  ICD-9-CM: 428.0  8/13/2022 Unknown

## 2022-08-14 ENCOUNTER — APPOINTMENT (OUTPATIENT)
Dept: CT IMAGING | Age: 42
DRG: 280 | End: 2022-08-14
Attending: STUDENT IN AN ORGANIZED HEALTH CARE EDUCATION/TRAINING PROGRAM
Payer: MEDICARE

## 2022-08-14 ENCOUNTER — APPOINTMENT (OUTPATIENT)
Dept: NON INVASIVE DIAGNOSTICS | Age: 42
DRG: 280 | End: 2022-08-14
Attending: STUDENT IN AN ORGANIZED HEALTH CARE EDUCATION/TRAINING PROGRAM
Payer: MEDICARE

## 2022-08-14 VITALS
OXYGEN SATURATION: 97 % | HEIGHT: 64 IN | SYSTOLIC BLOOD PRESSURE: 92 MMHG | BODY MASS INDEX: 38.07 KG/M2 | WEIGHT: 223 LBS | DIASTOLIC BLOOD PRESSURE: 69 MMHG | HEART RATE: 91 BPM | TEMPERATURE: 98 F | RESPIRATION RATE: 18 BRPM

## 2022-08-14 LAB
ANION GAP SERPL CALC-SCNC: 7 MMOL/L (ref 5–15)
ATRIAL RATE: 101 BPM
BASOPHILS # BLD: 0.1 K/UL (ref 0–0.1)
BASOPHILS NFR BLD: 1 % (ref 0–1)
BUN SERPL-MCNC: 14 MG/DL (ref 6–20)
BUN/CREAT SERPL: 17 (ref 12–20)
CALCIUM SERPL-MCNC: 9.3 MG/DL (ref 8.5–10.1)
CALCULATED P AXIS, ECG09: 41 DEGREES
CALCULATED R AXIS, ECG10: -50 DEGREES
CALCULATED T AXIS, ECG11: -2 DEGREES
CHLORIDE SERPL-SCNC: 105 MMOL/L (ref 97–108)
CO2 SERPL-SCNC: 26 MMOL/L (ref 21–32)
CREAT SERPL-MCNC: 0.82 MG/DL (ref 0.55–1.02)
DIAGNOSIS, 93000: NORMAL
DIFFERENTIAL METHOD BLD: ABNORMAL
ECHO AO ASC DIAM: 3.2 CM
ECHO AO ASCENDING AORTA INDEX: 1.56 CM/M2
ECHO AV AREA PEAK VELOCITY: 2.3 CM2
ECHO AV AREA VTI: 2.4 CM2
ECHO AV AREA/BSA PEAK VELOCITY: 1.1 CM2/M2
ECHO AV AREA/BSA VTI: 1.2 CM2/M2
ECHO AV MEAN GRADIENT: 2 MMHG
ECHO AV MEAN VELOCITY: 0.6 M/S
ECHO AV PEAK GRADIENT: 3 MMHG
ECHO AV PEAK VELOCITY: 0.9 M/S
ECHO AV VELOCITY RATIO: 0.78
ECHO AV VTI: 13.8 CM
ECHO LA DIAMETER INDEX: 1.85 CM/M2
ECHO LA DIAMETER: 3.8 CM
ECHO LA VOL 2C: 60 ML (ref 22–52)
ECHO LA VOL 4C: 54 ML (ref 22–52)
ECHO LA VOL BP: 66 ML (ref 22–52)
ECHO LA VOL/BSA BIPLANE: 32 ML/M2 (ref 16–34)
ECHO LA VOLUME AREA LENGTH: 73 ML
ECHO LA VOLUME INDEX A2C: 29 ML/M2 (ref 16–34)
ECHO LA VOLUME INDEX A4C: 26 ML/M2 (ref 16–34)
ECHO LA VOLUME INDEX AREA LENGTH: 36 ML/M2 (ref 16–34)
ECHO LV E' LATERAL VELOCITY: 10 CM/S
ECHO LV E' SEPTAL VELOCITY: 5 CM/S
ECHO LV EDV A2C: 121 ML
ECHO LV EDV A4C: 132 ML
ECHO LV EDV BP: 124 ML (ref 56–104)
ECHO LV EDV INDEX A4C: 64 ML/M2
ECHO LV EDV INDEX BP: 60 ML/M2
ECHO LV EDV NDEX A2C: 59 ML/M2
ECHO LV EJECTION FRACTION A2C: 21 %
ECHO LV EJECTION FRACTION A4C: 27 %
ECHO LV EJECTION FRACTION BIPLANE: 16 % (ref 55–100)
ECHO LV ESV A2C: 96 ML
ECHO LV ESV A4C: 97 ML
ECHO LV ESV BP: 104 ML (ref 19–49)
ECHO LV ESV INDEX A2C: 47 ML/M2
ECHO LV ESV INDEX A4C: 47 ML/M2
ECHO LV ESV INDEX BP: 51 ML/M2
ECHO LV FRACTIONAL SHORTENING: 12 % (ref 28–44)
ECHO LV INTERNAL DIMENSION DIASTOLE INDEX: 2.78 CM/M2
ECHO LV INTERNAL DIMENSION DIASTOLIC: 5.7 CM (ref 3.9–5.3)
ECHO LV INTERNAL DIMENSION SYSTOLIC INDEX: 2.44 CM/M2
ECHO LV INTERNAL DIMENSION SYSTOLIC: 5 CM
ECHO LV IVSD: 1.1 CM (ref 0.6–0.9)
ECHO LV MASS 2D: 256.7 G (ref 67–162)
ECHO LV MASS INDEX 2D: 125.2 G/M2 (ref 43–95)
ECHO LV POSTERIOR WALL DIASTOLIC: 1.1 CM (ref 0.6–0.9)
ECHO LV RELATIVE WALL THICKNESS RATIO: 0.39
ECHO LVOT AREA: 3.1 CM2
ECHO LVOT AV VTI INDEX: 0.76
ECHO LVOT DIAM: 2 CM
ECHO LVOT MEAN GRADIENT: 1 MMHG
ECHO LVOT PEAK GRADIENT: 2 MMHG
ECHO LVOT PEAK VELOCITY: 0.7 M/S
ECHO LVOT STROKE VOLUME INDEX: 16.1 ML/M2
ECHO LVOT SV: 33 ML
ECHO LVOT VTI: 10.5 CM
ECHO MV A VELOCITY: 0.57 M/S
ECHO MV E DECELERATION TIME (DT): 128.9 MS
ECHO MV E VELOCITY: 0.86 M/S
ECHO MV E/A RATIO: 1.51
ECHO MV E/E' LATERAL: 8.6
ECHO MV E/E' RATIO (AVERAGED): 12.9
ECHO MV E/E' SEPTAL: 17.2
ECHO PV MAX VELOCITY: 0.8 M/S
ECHO PV PEAK GRADIENT: 2 MMHG
ECHO RV INTERNAL DIMENSION: 4.5 CM
ECHO RV TAPSE: 1.7 CM (ref 1.7–?)
ECHO RVOT PEAK GRADIENT: 1 MMHG
ECHO RVOT PEAK VELOCITY: 0.6 M/S
ECHO TV REGURGITANT MAX VELOCITY: 2.81 M/S
ECHO TV REGURGITANT PEAK GRADIENT: 33 MMHG
EOSINOPHIL # BLD: 0.4 K/UL (ref 0–0.4)
EOSINOPHIL NFR BLD: 6 % (ref 0–7)
ERYTHROCYTE [DISTWIDTH] IN BLOOD BY AUTOMATED COUNT: 13.4 % (ref 11.5–14.5)
EST. AVERAGE GLUCOSE BLD GHB EST-MCNC: 120 MG/DL
GLUCOSE BLD STRIP.AUTO-MCNC: 104 MG/DL (ref 65–117)
GLUCOSE BLD STRIP.AUTO-MCNC: 116 MG/DL (ref 65–117)
GLUCOSE SERPL-MCNC: 109 MG/DL (ref 65–100)
HBA1C MFR BLD: 5.8 % (ref 4–5.6)
HCT VFR BLD AUTO: 34.4 % (ref 35–47)
HGB BLD-MCNC: 11.2 G/DL (ref 11.5–16)
IMM GRANULOCYTES # BLD AUTO: 0 K/UL (ref 0–0.04)
IMM GRANULOCYTES NFR BLD AUTO: 0 % (ref 0–0.5)
LYMPHOCYTES # BLD: 3.4 K/UL (ref 0.8–3.5)
LYMPHOCYTES NFR BLD: 48 % (ref 12–49)
MAGNESIUM SERPL-MCNC: 2.3 MG/DL (ref 1.6–2.4)
MCH RBC QN AUTO: 29.6 PG (ref 26–34)
MCHC RBC AUTO-ENTMCNC: 32.6 G/DL (ref 30–36.5)
MCV RBC AUTO: 90.8 FL (ref 80–99)
MONOCYTES # BLD: 0.4 K/UL (ref 0–1)
MONOCYTES NFR BLD: 7 % (ref 5–13)
NEUTS SEG # BLD: 2.6 K/UL (ref 1.8–8)
NEUTS SEG NFR BLD: 38 % (ref 32–75)
NRBC # BLD: 0 K/UL (ref 0–0.01)
NRBC BLD-RTO: 0 PER 100 WBC
P-R INTERVAL, ECG05: 144 MS
PLATELET # BLD AUTO: 238 K/UL (ref 150–400)
PMV BLD AUTO: 11.6 FL (ref 8.9–12.9)
POTASSIUM SERPL-SCNC: 3.5 MMOL/L (ref 3.5–5.1)
Q-T INTERVAL, ECG07: 362 MS
QRS DURATION, ECG06: 142 MS
QTC CALCULATION (BEZET), ECG08: 469 MS
RBC # BLD AUTO: 3.79 M/UL (ref 3.8–5.2)
SERVICE CMNT-IMP: NORMAL
SERVICE CMNT-IMP: NORMAL
SODIUM SERPL-SCNC: 138 MMOL/L (ref 136–145)
VENTRICULAR RATE, ECG03: 101 BPM
WBC # BLD AUTO: 6.8 K/UL (ref 3.6–11)

## 2022-08-14 PROCEDURE — 82962 GLUCOSE BLOOD TEST: CPT

## 2022-08-14 PROCEDURE — 96372 THER/PROPH/DIAG INJ SC/IM: CPT

## 2022-08-14 PROCEDURE — 85025 COMPLETE CBC W/AUTO DIFF WBC: CPT

## 2022-08-14 PROCEDURE — 99223 1ST HOSP IP/OBS HIGH 75: CPT | Performed by: STUDENT IN AN ORGANIZED HEALTH CARE EDUCATION/TRAINING PROGRAM

## 2022-08-14 PROCEDURE — 99239 HOSP IP/OBS DSCHRG MGMT >30: CPT | Performed by: FAMILY MEDICINE

## 2022-08-14 PROCEDURE — 74011250637 HC RX REV CODE- 250/637: Performed by: STUDENT IN AN ORGANIZED HEALTH CARE EDUCATION/TRAINING PROGRAM

## 2022-08-14 PROCEDURE — 93306 TTE W/DOPPLER COMPLETE: CPT | Performed by: STUDENT IN AN ORGANIZED HEALTH CARE EDUCATION/TRAINING PROGRAM

## 2022-08-14 PROCEDURE — 83735 ASSAY OF MAGNESIUM: CPT

## 2022-08-14 PROCEDURE — C9113 INJ PANTOPRAZOLE SODIUM, VIA: HCPCS | Performed by: STUDENT IN AN ORGANIZED HEALTH CARE EDUCATION/TRAINING PROGRAM

## 2022-08-14 PROCEDURE — 93306 TTE W/DOPPLER COMPLETE: CPT

## 2022-08-14 PROCEDURE — 74011250636 HC RX REV CODE- 250/636: Performed by: STUDENT IN AN ORGANIZED HEALTH CARE EDUCATION/TRAINING PROGRAM

## 2022-08-14 PROCEDURE — 71275 CT ANGIOGRAPHY CHEST: CPT

## 2022-08-14 PROCEDURE — 74011000250 HC RX REV CODE- 250: Performed by: STUDENT IN AN ORGANIZED HEALTH CARE EDUCATION/TRAINING PROGRAM

## 2022-08-14 PROCEDURE — 80048 BASIC METABOLIC PNL TOTAL CA: CPT

## 2022-08-14 PROCEDURE — G0378 HOSPITAL OBSERVATION PER HR: HCPCS

## 2022-08-14 PROCEDURE — 83695 ASSAY OF LIPOPROTEIN(A): CPT

## 2022-08-14 PROCEDURE — 36415 COLL VENOUS BLD VENIPUNCTURE: CPT

## 2022-08-14 PROCEDURE — 74011000636 HC RX REV CODE- 636: Performed by: FAMILY MEDICINE

## 2022-08-14 PROCEDURE — 96376 TX/PRO/DX INJ SAME DRUG ADON: CPT

## 2022-08-14 RX ORDER — FUROSEMIDE 10 MG/ML
20 INJECTION INTRAMUSCULAR; INTRAVENOUS DAILY
Status: DISCONTINUED | OUTPATIENT
Start: 2022-08-14 | End: 2022-08-14 | Stop reason: HOSPADM

## 2022-08-14 RX ORDER — ATORVASTATIN CALCIUM 80 MG/1
80 TABLET, FILM COATED ORAL DAILY
Qty: 30 TABLET | Refills: 0 | Status: SHIPPED | OUTPATIENT
Start: 2022-08-14 | End: 2022-08-31 | Stop reason: ALTCHOICE

## 2022-08-14 RX ORDER — FUROSEMIDE 20 MG/1
20 TABLET ORAL DAILY
Qty: 30 TABLET | Refills: 0 | Status: SHIPPED | OUTPATIENT
Start: 2022-08-14 | End: 2022-08-31 | Stop reason: SDUPTHER

## 2022-08-14 RX ORDER — CYCLOBENZAPRINE HCL 10 MG
10 TABLET ORAL
Status: DISCONTINUED | OUTPATIENT
Start: 2022-08-14 | End: 2022-08-14

## 2022-08-14 RX ADMIN — ACETAMINOPHEN 650 MG: 325 TABLET ORAL at 06:50

## 2022-08-14 RX ADMIN — ASPIRIN 81 MG: 81 TABLET, COATED ORAL at 09:43

## 2022-08-14 RX ADMIN — ENOXAPARIN SODIUM 30 MG: 100 INJECTION SUBCUTANEOUS at 09:43

## 2022-08-14 RX ADMIN — GABAPENTIN 600 MG: 300 CAPSULE ORAL at 09:42

## 2022-08-14 RX ADMIN — FUROSEMIDE 20 MG: 10 INJECTION, SOLUTION INTRAMUSCULAR; INTRAVENOUS at 09:43

## 2022-08-14 RX ADMIN — FLUTICASONE PROPIONATE 2 SPRAY: 50 SPRAY, METERED NASAL at 11:05

## 2022-08-14 RX ADMIN — SODIUM CHLORIDE 40 MG: 9 INJECTION INTRAMUSCULAR; INTRAVENOUS; SUBCUTANEOUS at 14:30

## 2022-08-14 RX ADMIN — PRASUGREL 10 MG: 10 TABLET, FILM COATED ORAL at 09:43

## 2022-08-14 RX ADMIN — ESCITALOPRAM OXALATE 20 MG: 10 TABLET ORAL at 09:43

## 2022-08-14 RX ADMIN — FERROUS SULFATE TAB 325 MG (65 MG ELEMENTAL FE) 325 MG: 325 (65 FE) TAB at 09:43

## 2022-08-14 RX ADMIN — IOPAMIDOL 80 ML: 755 INJECTION, SOLUTION INTRAVENOUS at 00:32

## 2022-08-14 NOTE — PROGRESS NOTES
Bedside and Verbal shift [facility] by Liya Dubon (offgoing nurse). Report included the following information SBAR, Kardex, ED Summary, Procedure Summary, Intake/Output, MAR, Recent Results, and Cardiac Rhythm NSR .

## 2022-08-14 NOTE — PROGRESS NOTES
[address] [facility] [address]   Office [phone]  Fax [phone]     DAILY PROGRESS NOTE    24 Hour Events: Persistently low BP overnight 82/52 w/ MAP 64. Patient asymptomatic. SUBJECTIVE: Denies chest pain, SOB. Reports low back pain and bilateral lower extremity pain, chronic in nature. States recently stopped statin for myalgias. States bed is uncomfortable and making back pain worse. OBJECTIVE:    Vitals: Visit Vitals  BP (!) 82/52 (BP 1 Location: Left upper arm, BP Patient Position: At rest)   Pulse 91   Temp 98.3 °F (36.8 °C)   Resp 18   Ht 5' 4\" (1.626 m)   Wt 223 lb (101.2 kg)   SpO2 100%   BMI 38.28 kg/m²     Physical Exam:  General: NAD. Appears euvolemic. Respiratory: CTAB. Cardiovascular: Regular rate. No wheezes or rales. GI: Nondistended. + bowel sounds. Nontender. Extremities: No LE edema. Paraspinal hypertonicity bilateral lumbar. No CVA tenderness. Skin: Warm, dry.   Neuro: Alert and oriented    I/O:  Date 08/13/22 0700 - 08/14/22 0659 08/14/22 0700 - 08/15/22 0659   Shift 8577-4140 3863-2272 24 Hour Total 0997-7866 1724-0923 24 Hour Total   INTAKE   Shift Total(mL/kg)         OUTPUT   Urine(mL/kg/hr)  1350 1350        Urine Voided  1350 1350      Shift Total(mL/kg)  1350(13.3) 1350(13.3)      NET  -1350 -1350      Weight (kg) 101.2 101.2 101.2 101.2 101.2 101.2       Inpatient Medications  Current Facility-Administered Medications   Medication Dose Route Frequency    sodium chloride (NS) flush 5-40 mL  5-40 mL IntraVENous Q8H    sodium chloride (NS) flush 5-40 mL  5-40 mL IntraVENous PRN    acetaminophen (TYLENOL) tablet 650 mg  650 mg Oral Q6H PRN    Or    acetaminophen (TYLENOL) suppository 650 mg  650 mg Rectal Q6H PRN    polyethylene glycol (MIRALAX) packet 17 g  17 g Oral DAILY PRN    enoxaparin (LOVENOX) injection 30 mg  30 mg SubCUTAneous Q12H    furosemide (LASIX) injection 20 mg  20 mg IntraVENous BID    glucose chewable tablet 16 g  4 Tablet Oral PRN    glucagon (GLUCAGEN) injection 1 mg  1 mg IntraMUSCular PRN    dextrose 10% infusion 0-250 mL  0-250 mL IntraVENous PRN    insulin lispro (HUMALOG) injection   SubCUTAneous AC&HS    fluticasone propionate (FLONASE) 50 mcg/actuation nasal spray 2 Spray  2 Spray Both Nostrils DAILY    pantoprazole (PROTONIX) 40 mg in 0.9% sodium chloride 10 mL injection  40 mg IntraVENous Q24H    albuterol (PROVENTIL VENTOLIN) nebulizer solution 2.5 mg  2.5 mg Nebulization Q6H PRN    aspirin delayed-release tablet 81 mg  81 mg Oral DAILY    atorvastatin (LIPITOR) tablet 80 mg  80 mg Oral DAILY    clonazePAM (KlonoPIN) tablet 1.5 mg  1.5 mg Oral QHS    traZODone (DESYREL) tablet 100 mg  100 mg Oral QHS    prasugreL (EFFIENT) tablet 10 mg  10 mg Oral DAILY    montelukast (SINGULAIR) tablet 10 mg  10 mg Oral QHS    gabapentin (NEURONTIN) capsule 600 mg  600 mg Oral TID    ferrous sulfate tablet 325 mg  1 Tablet Oral DAILY WITH BREAKFAST    escitalopram oxalate (LEXAPRO) tablet 20 mg  20 mg Oral DAILY       Allergies  Allergies   Allergen Reactions    Abilify [Aripiprazole] Anxiety     Can not tolerate     Sulfa (Sulfonamide Antibiotics) Hives    Vicodin [Hydrocodone-Acetaminophen] Nausea and Vomiting       CBC:  Recent Labs     08/14/22  0014 08/13/22  0843   WBC 6.8 5.9   HGB 11.2* 11.4*   HCT 34.4* 35.3    617       Metabolic Panel:  Recent Labs     08/14/22  0014 08/13/22  1412 08/13/22  0843     --  139   K 3.5  --  3.9     --  108   CO2 26  --  24   BUN 14  --  12   CREA 0.82  --  0.85   *  --  106*   CA 9.3  --  9.2   MG 2.3 1.9  --             Assessment and Plan     Janeth Lama is a 46 y.o. female with a PMHx of STEMI with stent (7/24/22), HFrEF, HTN, HLD, anemia, chronic bronchitis, anx/dep who was admitted for acute onset dyspnea. HFrEF exacerbation: Last EF 15-20% (07/22). BNP elevated to 6,328 (up from 124 on 7/12/22). EKG sinus tach, new RBBB.   - Decreased to Lasix 20mg IV daily from BID (home dose 20mg po daily)  - ECHO pending  - Cards consulted, appreciate recs    CAD s/p PCI w/ elevated troponins: STEMI s/p cath with RICKY placed 7/24/22. Patient reports she was taken off of Lipitor 80mg (stopped 8/12/22 due to muscle cramps) and Entresto (due to low BP) outpatient. States she is taking Toprol XL 12.5mg daily only. Troponins flat (98>97). EKG w/o ischemia.   - Continue to hold Lipitor and Entresto. Evaluate for alternative statin. - Continue ASA 81, Prasugrel 10    Hypotension: BP 80-90s/50s. Patient reports chronically low BP, which is why she was taken off [facility] outpatient and Toprol dose reduced to 12.5 daily.   - Monitor BP, caution with fluids given above    Bilateral low back pain: w/ bilateral lower extremity pain. Chronic. Statin stopped outpatient due to suspected myalgias. No lower extremity weakness.   - Heating pad and tylenol prn    New right bundle branch block: EKG on admission with new RBBB. - Cards consulted     URI: Patient reporting nasal congestion, sore throat. RVP negative  - Continue home Flonase and Singulair     Anemia: BL ~11. POA 11.4  - continue home ferrous sulfate 325mg daily     HLD: Chronic, stable. LDL 66 08/22. Stopped atorvastatin 80mg outpatient on 8/12/22 due to myalgias. - Continue hold Lipitor     Pre-diabetes: Chronic, stable. A1c 5.8 08/22.  - hold home metformin 500mg  - SSI insulin w/ FSBS ACHS      Anx/dep: Chronic, stable  - continue home clonazepam 1mg, lexapro 20mg, trazadone 100mg    Fibromyalgia: chronic, stable  - Continue home gabapentin 600mg TID     GERD/gastroparesis: Chronic, stable  - Continue home pantoprazole 40mg     Vit D deficiency:  - hold home vit D3 50mcg     Obesity:  - The pt's Body mass index is 38.28 kg/m². - Encouraging lifestyle modifications and further follow up outpatient. FEN/GI - Cardiac diet.   Activity - Out of bed with assistance  DVT prophylaxis - Lovenox  GI prophylaxis - Protonix  Fall prophylaxis - Not indicated at this time. Disposition - Admit to Telemetry. Plan to d/c to Home. Code Status - Full. Discussed with patient / caregivers.   Point of Contact - Vera Guy, Father - [phone]  Brijesh Molina, significant other - Lam Acevedo 15, DO  Family Medicine Resident       For Billing    Chief Complaint   Patient presents with    [facility] Problems  Date Reviewed: 7/27/2022            Codes Class Noted POA    Acute exacerbation of CHF (congestive heart failure) ([facility] [address] ICD-10-CM: I50.9  ICD-9-CM: 428.0  8/13/2022 Unknown

## 2022-08-14 NOTE — PROGRESS NOTES
1930 Bedside shift change report given to Nasim (oncoming nurse) by Scott Calhoun RN (offgoing nurse). Report included the following information SBAR, Intake/Output, MAR, Recent Results, and Cardiac Rhythm NSR .      0002 BP is 83/56 MD notified, no new orders. Per MD continue to monitor. 0446 BP is 82/52 MD notified. MD will come see patient, no new orders. 1964 Patient is tearful and complaining of 10/10 back pain that is radiating to her stomach and a headache. According to patient back pain is not new and was seen in ER for this last week however, it has not been hurting this badly. Notified MD. Will give tylenol and heating pad until MD sees the patient. 0700 Bedside shift change report given to [facility] RN (oncoming nurse) by Nasim (offgoing nurse). Report included the following information SBAR, Intake/Output, MAR, Recent Results, and Cardiac Rhythm NSR . This patient was assisted with Intentional Toileting every 2 hours during this shift as appropriate. Documentation of ambulation and output reflected on Flowsheet as appropriate. Purposeful hourly rounding was completed using AIDET and 5Ps. Outcomes of PHR documented as they occurred. Bed alarm in use as appropriate. Dual Suction and ambubag in place.

## 2022-08-14 NOTE — PROGRESS NOTES
Reason for Admission:  acute CHF exacerbation  New RBBB                   RUR Score:          7%           Plan for utilizing home health:          PCP: First and Last name:  Effie Opitz, MD     Name of Practice: yes   Are you a current patient: Yes/No: yes   Approximate date of last visit:    Can you participate in a virtual visit with your PCP: yes                    Current Advanced Directive/Advance Care Plan: Full Code      Healthcare Decision Maker:   Emergency contact -Yared ContrerasOaxbag-[phone]                             Transition of Care Plan:                      Pt was admitted with an acute CHF exacerbation   Pt has [address] Pharmacy of choice is [facility]  PCP is Dr Haleigh De León [facility]. Numbers are on AVS for pt to set up her outpatient appointments. Family will transport pt home. Of note:pt had a recent STEMI treated @ [address]     There are no identified home health,rehab or DME needs. Plan from cardiology is for pt to continue on aspirin and prasugrel. Pt will follow-up in the outpatient setting.       Eliezer Elizabeth Serve

## 2022-08-14 NOTE — DISCHARGE SUMMARY
[address] [facility] [address] [place] [address]   Office [phone]  Fax [phone]       Discharge / Transfer / Off-Service Note     Name: Razia Almanzar MRN: [id number]  Sex: Female   YOB: 1980  Age: 46 y.o. PCP: Clifford Judge MD     Date of admission: 8/13/2022  Date of discharge/transfer: 8/14/2022    Attending physician at admission: Timo Stern DO     Attending physician at discharge/transfer: Dr. Christy Sparrow DO    Resident physician at discharge/transfer: Maria Fernanda Wylie DO     Consultants during hospitalization  IP CONSULT TO CARDIOLOGY     Admission diagnoses   Acute exacerbation of CHF (congestive heart failure) ([facility] [I50.9]    Recommended follow-up after discharge  1. PCP: Clifford Judge MD    Things to follow up on with PCP:  - Back pain  - Prediabetes  - Anxiety and depression  - Incidentals imaging findings: right breast asymmetry for  which diagnostic mammogram and possible ultrasound is recommended. Things to follow up on with Cardiology:  - HFrEF  - CAD s/p PCI  - Hypotension, GDMT changes for above     History of Present Illness  Per admitting provider, Dr. Maldonado Loredo: Eva Manuel is a 46 y.o. female with PMHx of CAD (STEMI 7/24/22 with x1 RICKY to LAD), HFrEF (last ECHO 7/24/22 15-20% with LAD hypokinesis), HLD, HTN, chronic brochitis, anemia, fibromyalgia, gastroparesis who presents to the ED complaining of SOB onset x4 days ago. She reports baseline SOB since discharge s/p STEMI with acute worsening x4 days ago. Patient reports associated myalgias (chest, back, b/l upper and lower extremities), sore throat, and nasal congestion. She denies relief with home use of albuterol. The patient reports that she has been evaluated and discharged from [facility] multiple times over the last week for the same.  She reports speaking to her cardiology on-call line since discharge as well with the following reported medication changes: stopped entresto and started metoprolol 12.5 for low blood pressures and halved and then stopped atorvastatin 80mg for myalgias. She denies chest pain, fever, chills, numbness, weakness, abdominal pain, nausea, vomiting. Her symptoms are not similar in quality to those experienced prior to her MI.\"       Suzanne Cunningham is a 46 y.o. female with a PMHx of CAD s/p PCI (RICKY 7/24/22), HFrEF, HTN, HLD, anemia, anx/dep who was admitted for acute onset dyspnea. Found to be in HFrEF exacerbation with vascular congestion on CXR, BNP 6328 (dry 124), and rales/peripheral edema on exam. Patient was diuresed successfully and appears euvolemic on discharge. Patient has been hypotensive while admitted, holding some GDMT on discharge (Entresto and Toprol). Cardiology consulted and in agreement with plan. HFrEF exacerbation: Last EF 15-20% (07/22). BNP elevated to 6,328 (up from 124 on 7/12/22). EKG sinus tach, new RBBB. Improved, euvolemic on discharge. - Holding Entresto and Toprol on discharge due to hypotension  - Follow up cardiology outpatient     CAD s/p PCI w/ elevated troponins: STEMI s/p RICKY placed 7/24/22. Patient reports she was taken off of Lipitor 80mg (stopped 8/12/22 due to muscle cramps) and Entresto (due to low BP) outpatient. Troponins flat (98>97). EKG w/o ischemia.  - Continue Lipitor 80mg daily, ASA 81mg daily, Prasugrel 10mg daily.   - Follow up cardiology outpatient     Hypotension: BP 80-90s/50s. Patient reports chronically low BP since STEMI, which is why she was taken off Entresto outpatient and Toprol dose reduced to 12.5 daily.  - Holding Entresto and Toprol     Bilateral low back pain: w/ bilateral lower extremity pain. Chronic. Statin stopped outpatient prior to admission due to suspected myalgias. No lower extremity weakness.  - Follow up with PCP. Discussed with patient the importance of continuing statin. New right bundle branch block: EKG on admission with new RBBB.   - Follow up cardiology outpatient     Chronic bronchitis: Patient reporting nasal congestion, sore throat. RVP negative. - Continue home Flonase and Singulair     Anemia: BL ~11. POA 11.4  - Continue home ferrous sulfate 325mg daily     HLD: Chronic, stable. LDL 66 08/22. Stopped atorvastatin 80mg outpatient on 8/12/22 due to myalgias. - Continue Lipitor 80mg daily     Pre-diabetes: Chronic, stable. A1c 5.8 08/22.  - Continue home Metformin 500mg daily     Anx/dep: Chronic, stable  - Continue home clonazepam 1mg, lexapro 20mg, trazadone 100mg     Fibromyalgia: chronic, stable  - Continue home gabapentin 600mg TID     GERD/gastroparesis: Chronic, stable  - Continue home pantoprazole 40mg     Vit D deficiency:  - Hold home vit D3 50mcg     Obesity:  - The pt's Body mass index is 38.28 kg/m². - Encouraging lifestyle modifications and further follow up outpatient. Physical exam at discharge:    Vitals Reviewed. Visit Vitals  BP 92/69 (BP 1 Location: Left upper arm, BP Patient Position: At rest)   Pulse 91   Temp 98 °F (36.7 °C)   Resp 18   Ht 5' 4\" (1.626 m)   Wt 223 lb (101.2 kg)   SpO2 97%   BMI 38.28 kg/m²        General Oriented to person, place, and time and well-developed. Appears euvolemic. Cardio Normal rate, regular rhythm. Exam reveals no gallop and no friction rub. No murmur heard. Pulmonary Effort normal and breath sounds normal. No respiratory distress. No wheezes, no rales. Abdominal Soft. Bowel sounds normal. No distension. No tenderness. Extremities No edema of lower extremities. No tenderness. Distal pulses intact. Neurological Alert and oriented to person, place, and time. Sensation intact bilateral lower extremities. Dermatology Skin is warm and dry. No rash noted. No erythema or pallor. MSK Hypertonic paraspinal muscles bilateral lumbar spine, no midline bony abnormalities.         Condition at discharge: Stable    Labs  Recent Labs     08/14/22  0014 08/13/22  0843   WBC 6.8 5.9   HGB 11.2* 11.4*   HCT 34.4* 35.3    248     Recent Labs     08/14/22  0014 08/13/22  1412 08/13/22  0843     --  139   K 3.5  --  3.9     --  108   CO2 26  --  24   BUN 14  --  12   CREA 0.82  --  0.85   *  --  106*   CA 9.3  --  9.2   MG 2.3 1.9  --      Recent Labs     08/13/22  0843   LPSE 84     Recent Labs     08/14/22  1058 08/14/22  0802 08/13/22  2120 08/13/22  1607   GLUCPOC 104 116 90 98       Microbiology  Results       Procedure Component Value Units Date/Time    RESPIRATORY VIRUS PANEL W/COVID-19, PCR [[id number]] Collected: 08/13/22 1412    Order Status: Completed Specimen: Nasopharyngeal Updated: 08/13/22 2322     Adenovirus Not detected        Coronavirus 229E Not detected        Coronavirus HKU1 Not detected        Coronavirus CVNL63 Not detected        Coronavirus OC43 Not detected        SARS-CoV-2, PCR Not detected        Metapneumovirus Not detected        Rhinovirus and Enterovirus Not detected        Influenza A Not detected        Influenza B Not detected        Parainfluenza 1 Not detected        Parainfluenza 2 Not detected        Parainfluenza 3 Not detected        Parainfluenza virus 4 Not detected        RSV by PCR Not detected        B. parapertussis, PCR Not detected        Bordetella pertussis - PCR Not detected        Chlamydophila pneumoniae DNA, QL, PCR Not detected        Mycoplasma pneumoniae DNA, QL, PCR Not detected                Procedures / Diagnostic Studies  Echo Results  (Last 48 hours)      None           Imaging  XR CHEST PA LAT    Result Date: 8/13/2022  Minimal vascular prominence. CTA CHEST W OR W WO CONT    Result Date: 8/14/2022  No pulmonary embolus with small bilateral pleural effusions and overlying atelectasis. Incidentals as above including right breast asymmetry for which diagnostic mammogram and possible ultrasound is recommended.       Chronic diagnoses   Problem List as of 8/14/2022 Date Reviewed: 7/27/2022            Codes Class Noted - Resolved    Acute exacerbation of CHF (congestive heart failure) ([facility] ICD-10-CM: I50.9  ICD-9-CM: 428.0  8/13/2022 - Present        Neuropathy (Chronic) ICD-10-CM: G62.9  ICD-9-CM: 355.9  10/15/2019 - Present    Overview Signed 10/15/2019  1:58 PM by Bev Ivey MD     Feet. Gastroparesis (Chronic) ICD-10-CM: K31.84  ICD-9-CM: 536.3  11/29/2018 - Present        Plantar fasciitis of left foot (Chronic) ICD-10-CM: M72.2  ICD-9-CM: 728.71  11/29/2018 - Present        Positive KITTY (antinuclear antibody) (Chronic) ICD-10-CM: R76.8  ICD-9-CM: 795.79  11/29/2018 - Present        Severe obesity ([facility] ICD-10-CM: E66.01  ICD-9-CM: 278.01  11/29/2018 - Present        Class 2 obesity due to excess calories without serious comorbidity in adult ICD-10-CM: E66.09  ICD-9-CM: 278.00  7/25/2018 - Present        Mood disorder ([facility] ICD-10-CM: F39  ICD-9-CM: 296.90  6/12/2018 - Present        Chronic pain syndrome (Chronic) ICD-10-CM: G89.4  ICD-9-CM: 338.4  6/12/2018 - Present        Fibromyalgia (Chronic) ICD-10-CM: M79.7  ICD-9-CM: 729.1  6/12/2018 - Present        Panic attack ICD-10-CM: F41.0  ICD-9-CM: 300.01  6/12/2018 - Present        Tremor ICD-10-CM: R25.1  ICD-9-CM: 781.0  6/12/2018 - Present        Recurrent depression ([facility] ICD-10-CM: F33.9  ICD-9-CM: 296.30  1/15/2018 - Present        Inflammatory arthritis ICD-10-CM: M19.90  ICD-9-CM: 714.9  8/10/2016 - Present        Vitamin D deficiency (Chronic) ICD-10-CM: E55.9  ICD-9-CM: 268.9  3/21/2016 - Present        Thrombosed external hemorrhoid ICD-10-CM: K64.5  ICD-9-CM: 455.4  2/5/2016 - Present    Overview Signed 2/5/2016 12:05 PM by Sandy White MD     At 5 o'clock.              IFG (impaired fasting glucose) (Chronic) ICD-10-CM: R73.01  ICD-9-CM: 790.21  9/10/2015 - Present        Hyperlipidemia (Chronic) ICD-10-CM: E78.5  ICD-9-CM: 272.4  9/10/2015 - Present        Hoarseness ICD-10-CM: R49.0  ICD-9-CM: 784.42  3/25/2013 - Present        OCD (obsessive compulsive disorder) ICD-10-CM: F42.9  ICD-9-CM: 300.3  2/27/2012 - Present        HSV (herpes simplex virus) anogenital infection ICD-10-CM: A60.9  ICD-9-CM: 054.9  9/12/2011 - Present        Costochondritis ICD-10-CM: M94.0  ICD-9-CM: 733.6  5/17/2011 - Present        Anxiety ICD-10-CM: F41.9  ICD-9-CM: 300.00  9/13/2010 - Present        Itch of skin ICD-10-CM: L29.9  ICD-9-CM: 698.9  5/26/2010 - Present        Depression (Chronic) ICD-10-CM: F32. A  ICD-9-CM: 727  Unknown - Present        GERD (gastroesophageal reflux disease) (Chronic) ICD-10-CM: K21.9  ICD-9-CM: 530.81  Unknown - Present        Anemia, unspecified (Chronic) ICD-10-CM: D64.9  ICD-9-CM: 285. 9  Unknown - Present        RESOLVED: Hypertension (Chronic) ICD-10-CM: I10  ICD-9-CM: 401.9  Unknown - 5/12/2021           Discharge/Transfer Medications  Current Discharge Medication List        CONTINUE these medications which have CHANGED    Details   furosemide (LASIX) 20 mg tablet Take 1 Tablet by mouth in the morning. Qty: 30 Tablet, Refills: 0  Start date: 8/14/2022    Associated Diagnoses: Leg swelling      atorvastatin (LIPITOR) 80 mg tablet Take 1 Tablet by mouth in the morning. Qty: 30 Tablet, Refills: 0  Start date: 8/14/2022           CONTINUE these medications which have NOT CHANGED    Details   albuterol (PROVENTIL HFA, VENTOLIN HFA, PROAIR HFA) 90 mcg/actuation inhaler Take  by inhalation.       prasugreL (EFFIENT) 10 mg tablet       aspirin delayed-release 81 mg tablet 81 mg.      pantoprazole (PROTONIX) 40 mg tablet TAKE 1 TABLET TWICE DAILY  Qty: 180 Tablet, Refills: 1    Associated Diagnoses: Gastroesophageal reflux disease without esophagitis      ferrous sulfate 325 mg (65 mg iron) tablet TAKE 1 TABLET BY MOUTH ONCE DAILY BEFORE BREAKFAST  Qty: 90 Tablet, Refills: 1    Associated Diagnoses: Iron deficiency anemia, unspecified iron deficiency anemia type levocetirizine (XYZAL) 5 mg tablet Take 1 Tablet by mouth daily. Qty: 90 Tablet, Refills: 1      famotidine (Pepcid) 20 mg tablet Take 1 Tablet by mouth two (2) times a day. Qty: 180 Tablet, Refills: 1      traZODone (DESYREL) 100 mg tablet       norgestimate-ethinyl estradioL (ORTHO-CYCLEN, SPRINTEC) 0.25-35 mg-mcg tab Take 1 Tablet by mouth daily. Generic  Qty: 3 Dose Pack, Refills: 3    Comments: Generic Requested  Associated Diagnoses: Painful menstrual periods      montelukast (SINGULAIR) 10 mg tablet TAKE 1 TABLET EVERY DAY  Qty: 90 Tablet, Refills: 1    Associated Diagnoses: Environmental allergies      metFORMIN ER (GLUCOPHAGE XR) 500 mg tablet Take 1 Tablet by mouth daily (with dinner). Qty: 90 Tablet, Refills: 1      gabapentin (NEURONTIN) 600 mg tablet 600 mg three (3) times daily. escitalopram oxalate (LEXAPRO) 20 mg tablet Take 20 mg by mouth daily. clonazePAM (KLONOPIN) 1 mg tablet Take 1.5 mg by mouth daily. At bedtime       nitroglycerin (NITROSTAT) 0.4 mg SL tablet               Diet:  Cardiac diet.     Activity:  As tolerated    Disposition: Discharge to home/self-care    Discharge instructions to patient/family  Please seek medical attention for any new or worsening symptoms particularly fever, chest pain, shortness of breath, abdominal pain, nausea, vomiting    Follow up plans/appointments  Follow-up Information       Follow up With Specialties Details Why Contact Info    Tom Laing MD Family Medicine Schedule an appointment as soon as possible for a visit  [address] [facility] [address] [facility]      Saad Ramos DO Cardiovascular Disease Physician, Interventional Cardiology Physician Schedule an appointment as soon as possible for a visit  [facility]  [address] [facility],   Family Medicine Resident     For Billing    Chief Complaint   Patient presents with    Shortness of Breath Hospital Problems  Date Reviewed: 7/27/2022            Codes Class Noted POA    Acute exacerbation of CHF (congestive heart failure) ([facility] ICD-10-CM: I50.9  ICD-9-CM: 428.0  8/13/2022 Unknown

## 2022-08-14 NOTE — CONSULTS
Lidia Mariano DO  [facility] [address] [place] [address], [facility], [address] [place] [address] Nw                                       Office [phone],ODM [phone]  Office [phone],YRL [phone]      Date of  Admission: 8/13/2022  8:26 AM  PCP- Luis Delgado MD    Heather Jacobo is a 46 y.o. female, cardiology asked to manage schema cardiomyopathy    Requested by Rosmery Brothers DO    Assessment/Plan      Dyspnea    Recent STEMI with severe LV dysfunction treated at [facility] AND [facility]    Diffuse myalgias    Low normal blood pressure      Anxiety, depression, fibromyalgia    -Continue aspirin and prasugrel  -Repeat echocardiogram pending. Doubt patient is having low output congestive heart failure based on how well she looks on examination along with symptoms.  -Hold goal-directed medical therapy due to low normal blood pressure  -Continue low-dose diuretic therapy  -Advised patient despite having overall diffuse muscle aches, doubt statin as etiology and is very important for plaque stabilization after an acute coronary syndrome to continue statin therapy. -Recommend to obtain LP(a) given young age with acute coronary syndrome    Also need to consider potential anxiety after such a life altering event. She has had multiple hospital visits to [place] due to a variety of complaints. Term patient does not want to follow-up in [facility] is too far and she prefers to follow-up with [facility] cardiology. [x]    High complexity decision making was performed  [x]    Patient is at high-risk of decompensation with multiple organ involvement      I appreciate the opportunity to be involved in Ms. Luo. See below note for details. Please do not hesitate to contact us with questions or concerns.     Jeffery Sharma DO      Subjective:  Heather Jacobo is a 46 y.o. female with a history of anxiety, depression, fibromyalgia who recently had LAD STEMI resulting in severe LV dysfunction. Initially presented to [facility] and transferred emergently to [facility] for further care. Apparently she had severe LV dysfunction as a result of her LAD acute myocardial infarction. She was seen by cardiology follow-up in [facility]. She was reporting severe muscle aches and was discontinued from her statin therapy. Multiple evaluations in [place] due to shortness of breath since hospital discharge. She was seen 2 days ago in [place] and subsequently admitted through our ED yesterday morning. This morning she feels her breathing has improved. No ongoing chest pain or chest pressure symptoms. Labs show borderline elevation in her high-sensitivity troponin. Low normal blood pressures. Past Medical History:   Diagnosis Date    Anemia NEC     Arthritis     Chronic pain     fybromyalsia    Depression     anxiety, depression, OCD    GERD (gastroesophageal reflux disease)     Hypertension     Hypertension     Ill-defined condition     Fibromyalia gastricparesis    Musculoskeletal disorder     SOB (shortness of breath)     Stool color black       Past Surgical History:   Procedure Laterality Date    HX GYN           HX HEENT  2002    wisdom teeth extraction    UPPER GI ENDOSCOPY,BIOPSY  2018         UPPER GI ENDOSCOPY,DIAGNOSIS  2018          Allergies   Allergen Reactions    Abilify [Aripiprazole] Anxiety     Can not tolerate     Sulfa (Sulfonamide Antibiotics) Hives    Vicodin [Hydrocodone-Acetaminophen] Nausea and Vomiting     Family History   Problem Relation Age of Onset    Hypertension Father     Elevated Lipids Father     Arthritis-rheumatoid Mother         ? Lupus vs RA    Lung Disease Mother     Heart Disease Mother     Cancer Mother         breast in 39y    COPD Mother     Hypertension Mother     Stroke Mother         3-4 strokes    Breast Cancer Mother 50    Diabetes Maternal Grandmother       Social History     Tobacco Use    Smoking status: Former     Packs/day: 0.25     Years: 8.00     Pack years: 2.00     Types: Cigarettes     Quit date: 2022     Years since quittin.0    Smokeless tobacco: Never   Substance Use Topics    Alcohol use: Not Currently     Alcohol/week: 1.0 standard drink     Types: 1 Glasses of wine per week     Comment: 1 cup of wine/week    Drug use: Yes     Types: Marijuana          Medications:  Medications Prior to Admission   Medication Sig    albuterol (PROVENTIL HFA, VENTOLIN HFA, PROAIR HFA) 90 mcg/actuation inhaler Take  by inhalation. prasugreL (EFFIENT) 10 mg tablet     colchicine (GLOPEBRA) 0.6 mg/5 mL oral solution Take 0.6 mg by mouth. aspirin delayed-release 81 mg tablet 81 mg.    pantoprazole (PROTONIX) 40 mg tablet TAKE 1 TABLET TWICE DAILY    ferrous sulfate 325 mg (65 mg iron) tablet TAKE 1 TABLET BY MOUTH ONCE DAILY BEFORE BREAKFAST    furosemide (LASIX) 20 mg tablet Take 0.5 Tablets by mouth daily. As needed (Patient taking differently: Take 20 mg by mouth in the morning.)    levocetirizine (XYZAL) 5 mg tablet Take 1 Tablet by mouth daily. famotidine (Pepcid) 20 mg tablet Take 1 Tablet by mouth two (2) times a day. traZODone (DESYREL) 100 mg tablet     folic acid (FOLVITE) 1 mg tablet TAKE 1 TABLET EVERY DAY    norgestimate-ethinyl estradioL (ORTHO-CYCLEN, SPRINTEC) 0.25-35 mg-mcg tab Take 1 Tablet by mouth daily. Generic    montelukast (SINGULAIR) 10 mg tablet TAKE 1 TABLET EVERY DAY    metFORMIN ER (GLUCOPHAGE XR) 500 mg tablet Take 1 Tablet by mouth daily (with dinner). cyclobenzaprine (FLEXERIL) 10 mg tablet three (3) times daily as needed. Has not needed    gabapentin (NEURONTIN) 600 mg tablet 600 mg three (3) times daily. escitalopram oxalate (LEXAPRO) 20 mg tablet Take 20 mg by mouth daily. clonazePAM (KLONOPIN) 1 mg tablet Take 1.5 mg by mouth daily.  At bedtime     cholecalciferol, vitamin D3, 50 mcg (2,000 unit) tab Take  by mouth.     nitroglycerin (NITROSTAT) 0.4 mg SL tablet      Current Facility-Administered Medications   Medication Dose Route Frequency    furosemide (LASIX) injection 20 mg  20 mg IntraVENous DAILY    sodium chloride (NS) flush 5-40 mL  5-40 mL IntraVENous Q8H    sodium chloride (NS) flush 5-40 mL  5-40 mL IntraVENous PRN    acetaminophen (TYLENOL) tablet 650 mg  650 mg Oral Q6H PRN    Or    acetaminophen (TYLENOL) suppository 650 mg  650 mg Rectal Q6H PRN    polyethylene glycol (MIRALAX) packet 17 g  17 g Oral DAILY PRN    enoxaparin (LOVENOX) injection 30 mg  30 mg SubCUTAneous Q12H    glucose chewable tablet 16 g  4 Tablet Oral PRN    glucagon (GLUCAGEN) injection 1 mg  1 mg IntraMUSCular PRN    dextrose 10% infusion 0-250 mL  0-250 mL IntraVENous PRN    insulin lispro (HUMALOG) injection   SubCUTAneous AC&HS    fluticasone propionate (FLONASE) 50 mcg/actuation nasal spray 2 Spray  2 Spray Both Nostrils DAILY    pantoprazole (PROTONIX) 40 mg in 0.9% sodium chloride 10 mL injection  40 mg IntraVENous Q24H    albuterol (PROVENTIL VENTOLIN) nebulizer solution 2.5 mg  2.5 mg Nebulization Q6H PRN    aspirin delayed-release tablet 81 mg  81 mg Oral DAILY    atorvastatin (LIPITOR) tablet 80 mg  80 mg Oral DAILY    clonazePAM (KlonoPIN) tablet 1.5 mg  1.5 mg Oral QHS    traZODone (DESYREL) tablet 100 mg  100 mg Oral QHS    prasugreL (EFFIENT) tablet 10 mg  10 mg Oral DAILY    montelukast (SINGULAIR) tablet 10 mg  10 mg Oral QHS    gabapentin (NEURONTIN) capsule 600 mg  600 mg Oral TID    ferrous sulfate tablet 325 mg  1 Tablet Oral DAILY WITH BREAKFAST    escitalopram oxalate (LEXAPRO) tablet 20 mg  20 mg Oral DAILY         Review of Systems:  Pertinent Positive: Dyspnea  Pertinent Negative: No chest pain, orthopnea, PND  All Other systems reviewed and are negative for a Comprehensive ROS (10+)        Physical Exam:  Visit Vitals  /72 (BP 1 Location: Left upper arm, BP Patient Position: At rest) Pulse 96   Temp 98.3 °F (36.8 °C)   Resp 16   Ht 5' 4\" (1.626 m)   Wt 223 lb (101.2 kg)   SpO2 100%   BMI 38.28 kg/m²           Gen: Well-developed, well-nourished, in no acute distress  HEENT:  Pink conjunctivae, hearing intact to voice, moist mucous membranes  Neck: Supple,No JVD, No Carotid Bruit  Resp: No accessory muscle use, Clear breath sounds, No rales or rhonchi  Card: Normal Rate,Regular Rythm,Normal S1, S2, No murmurs, rubs or gallop. No thrills. Abd:  Soft, non-tender, non-distended, normoactive bowel sounds are present   MSK: No cyanosis or clubbing  Skin: No rashes or ulcers  Neuro:  Cranial nerves are grossly intact, moving all four extremities, no focal deficit, follows commands appropriately  Psych:  Good insight, oriented to person, place and time, alert, Nml Affect  LE: No edema  Vascular:Distal Pulses 2+ and symmetric            LABS:    Lab Results   Component Value Date/Time    WBC 6.8 08/14/2022 12:14 AM    HGB 11.2 (L) 08/14/2022 12:14 AM    HCT 34.4 (L) 08/14/2022 12:14 AM    PLATELET 303 19/64/6816 12:14 AM    MCV 90.8 08/14/2022 12:14 AM     Lab Results   Component Value Date/Time    Sodium 138 08/14/2022 12:14 AM    Potassium 3.5 08/14/2022 12:14 AM    Chloride 105 08/14/2022 12:14 AM    CO2 26 08/14/2022 12:14 AM    Anion gap 7 08/14/2022 12:14 AM    Glucose 109 (H) 08/14/2022 12:14 AM    Glucose 59 (L) 12/30/2019 12:00 AM    BUN 14 08/14/2022 12:14 AM    Creatinine 0.82 08/14/2022 12:14 AM    BUN/Creatinine ratio 17 08/14/2022 12:14 AM    GFR est AA >60 08/14/2022 12:14 AM    GFR est non-AA >60 08/14/2022 12:14 AM    Calcium 9.3 08/14/2022 12:14 AM     Lab Results   Component Value Date/Time    Troponin-I, Qt. <0.05 09/09/2021 04:42 PM     No results found for: APTT  No results found for: INR, PTMR, PTP, PT1, PT2, INREXT        Kartik Huang DO    ATTENTION:   This medical record was transcribed using an electronic medical records/speech recognition system.   Although proofread, it may and can contain electronic, spelling and other errors. Corrections may be executed at a later time. Please feel free to contact us for any clarifications as needed.

## 2022-08-14 NOTE — DISCHARGE INSTRUCTIONS
HOME DISCHARGE INSTRUCTIONS    Ra Gill / [id number] : 1980    Admission date: 2022 Discharge date: 2022     Please bring this form with you to show your care provider at your follow-up appointment. Primary care provider:  Jarad Freitas      Discharging provider:  Malini Rooney DO  - Family Medicine Resident  Stephan Ogden DO - Attending, Family Medicine     You have been admitted to the hospital with the following diagnoses:    ACUTE DIAGNOSES:  Acute exacerbation of CHF (congestive heart failure) ([facility] [address] [I50.9]      . . . . . . . . . . . . . . . . . . . . . . . . . . . . . . . . . . . . . . . . . . . . . . . . . . . . . . . . . . . . . . . . . . . . . . . Joaquin Kelsey FOLLOW-UP CARE RECOMMENDATIONS:  You are well enough to be discharged from the hospital. You were managed for and exacerbation of your heart failure. Medications:   RESUME:  - Lipitor 80mg daily  - Lasix 20mg daily    STOP:  - Entresto  - Metoprolol    Take all other home medications as prescribed. Appointments  Please call your primary doctor and cardiologist to schedule a follow up appointment. Contact information below for cardiology:  Una Beyer DO  Cardiovascular Disease Physician Interventional Cardiology Physician [phone]  [place] [phone] [address]        Please follow up with your PCP regarding:  - Back pain  - Prediabetes  - Anxiety and depression    Please follow up with Cardiology regarding:  - Heart failure  - History of heart attack with stent placement  - High cholesterol  - Blood pressure, when to restart Entresto and Metoprolol    Follow-up tests needed: None    Pending test results: At the time of your discharge the following test results are still pending: None. Please make sure you review these results with your outpatient follow-up provider(s).     Specific symptoms to watch for: chest pain, shortness of breath, fever, chills, nausea, vomiting, diarrhea, change in mentation, falling, weakness, bleeding. DIET/what to eat:  Cardiac Diet, low sodium    ACTIVITY:  Activity as tolerated    Wound care: None    Equipment needed:  None    What to do if new or unexpected symptoms occur? If you experience any of the above symptoms (or should other concerns or questions arise after discharge) please call your primary care physician. Return to the emergency room if you cannot get hold of your doctor. It is very important that you keep your follow-up appointment(s). Please bring discharge papers, medication list (and/or medication bottles) to your follow-up appointments for review by your outpatient provider(s). Please check the list of medications and be sure it includes every medication (even non-prescription medications) that your provider wants you to take. It is important that you take the medication exactly as they are prescribed. Keep your medication in the bottles provided by the pharmacist and keep a list of the medication names, dosages, and times to be taken in your wallet. Do not take other medications without consulting your doctor. If you have any questions about your medications or other instructions, please talk to your nurse or care provider before you leave the hospital.     Information obtained by:     I understand that if any problems occur once I am at home I am to contact my physician. These instructions were explained to me and I had the opportunity to ask questions. I understand and acknowledge receipt of the instructions indicated above.                                                                                                                                                Physician's or R.N.'s Signature                                                                  Date/Time                                                                                                                                              Patient or Representative Signature                                                          Date/Time

## 2022-08-15 ENCOUNTER — TELEPHONE (OUTPATIENT)
Dept: CARDIOLOGY CLINIC | Age: 42
End: 2022-08-15

## 2022-08-15 ENCOUNTER — PATIENT OUTREACH (OUTPATIENT)
Dept: CASE MANAGEMENT | Age: 42
End: 2022-08-15

## 2022-08-15 LAB — LPA SERPL-SCNC: 62.7 NMOL/L

## 2022-08-15 RX ORDER — ACETAMINOPHEN 325 MG/1
TABLET ORAL
Status: ON HOLD | COMMUNITY

## 2022-08-15 RX ORDER — CHOLECALCIFEROL (VITAMIN D3) 125 MCG
300 CAPSULE ORAL DAILY
COMMUNITY
End: 2022-09-09

## 2022-08-15 NOTE — Clinical Note
Good afternoon,  It looks like you have a CCM episode open for this patient. I don't see your name on care team or CC note so you may need to update that if you are working with her. I will be following her for the next 30 days for BRIAN. I will refer her back to you at the end of the time. Have a great day!   [facility] Transition Nurse Phone- [phone]

## 2022-08-15 NOTE — PROGRESS NOTES
Care Transitions Initial Call    Call within 2 business days of discharge: Yes     Patient: Yasir Short Patient : 1980 MRN: [id number]    Last Discharge [address]       Date Complaint Diagnosis Description Type Department Provider    22 Shortness of Breath Acute on chronic congestive heart failure, unspecified heart failure type ([facility] [address] . .. ED to Hosp-Admission (Discharged) (ADMIT) [id number] [address], DO; ERVIN Basurto. Was this an external facility discharge? No Discharge Facility: n/a    Challenges to be reviewed by the provider   Patient started on furosemide 20 mg daily. Labs on - K-3.5  Patient is not on potassium supplement       Method of communication with provider : none    Discussed COVID-19 related testing which was not done at this time. Test results were not done. Patient informed of results, if available? N/a     Advance Care Planning:   Does patient have an Advance Directive: not on file. Will plan to discuss at follow up outreach. Inpatient Readmission Risk score: Unplanned Readmit Risk Score: 7.4    Was this a readmission? no   Patient stated reason for the admission: SOB    Patients top risk factors for readmission: depression, lack of knowledge about disease, and medical condition-recetn STEMI-2022, new dx-CHF, CAD, anemia, gastroparesis, EF 15-20%    Interventions to address risk factors: Scheduled appointment with PCP-, Scheduled appointment with [email or web address], and Obtained and reviewed discharge summary and/or continuity of care documents    Care Transition Nurse (CTN) contacted the patient by telephone to perform post hospital discharge assessment. Verified name and  with patient as identifiers. Provided introduction to self, and explanation of the CTN role. CTN reviewed discharge instructions, medical action plan and red flags with patient who verbalized understanding.  Were discharge instructions available to patient? yes. Reviewed appropriate site of care based on symptoms and resources available to patient including: PCP and Specialist. Patient given an opportunity to ask questions and does not have any further questions or concerns at this time. The patient agrees to contact the PCP office for questions related to their healthcare. Medication reconciliation was performed with patient, who verbalizes understanding of administration of home medications. Referral to Pharm D needed: no     Home Health/Outpatient orders at discharge: none     Durable Medical Equipment ordered at discharge: None     Discussed follow-up appointments. If no appointment was previously scheduled, appointment scheduling offered:  n/a . Is follow up appointment scheduled within 7 days of discharge? yes. [facility] follow up appointment(s):   Future Appointments   Date Time Provider Komal Rodriguez   8/16/2022  2:00 PM Hussein Londono MD [facility]   8/29/2022  9:00 AM Tom Liang MD [facility]   8/31/2022  9:40 AM Ginny OCHOA DO [facility]     Non-[facility] follow up appointment(s): none listed    Plan for follow-up call in 5-7 days based on severity of symptoms and risk factors. Plan for next call: self management-monitor red flag/low sodium diet and follow up appointment-attend as directed  CTN provided contact information for future needs. Goals Addressed                   This Visit's Progress     Prevent complications post hospitalization. 08/15/22  Daily weights- patient has working scales and will weigh in the morning in similar bedtime attire before eating and after voiding. She will record weight in a log. Patient will follow low sodium diet/fluid restrictions as recommended by provider. HF Information sent via [employer] to patient for review. Heart failure zones- information sheet sent to patient via [employer], will plan to review at follow up outreach.   Medication compliance-patient will take diuretic daily as directed  Patient will monitor  and report following (red flags):  - Weight gain of 2-3 lbs. in one day or 5 lbs. in one week  -increased SOB, feeling more tired or no energy, dizziness  -decreased urine output  -chest pain or difficulty breathing  -increase swelling to feet, ankles, legs or stomach            Heart Failure Note    Do you have a Scale:    yes   How often do you weigh:  daily    Daily Weight (document daily weights in flowsheets):   Decrease   Amount:  218.2 lbs       Provider Notified:   No     Zone:(Pt Reported)  yellow     EF: 20-25 % (result) on 8/14/2022 (date)    Type of HF:   HFrEF     Cardiac Device present:  none       Heart Failure Medications: Diuretic   Metoprolol and Entresto held d/t hypotension.

## 2022-08-15 NOTE — TELEPHONE ENCOUNTER
Patient is calling because she would like to have a sooner apt with the doctor than 9/14/22 for CHF and a follow up from the hospital.    [phone]

## 2022-08-16 ENCOUNTER — OFFICE VISIT (OUTPATIENT)
Dept: FAMILY MEDICINE CLINIC | Age: 42
End: 2022-08-16
Payer: MEDICARE

## 2022-08-16 VITALS
SYSTOLIC BLOOD PRESSURE: 105 MMHG | HEART RATE: 95 BPM | TEMPERATURE: 98.4 F | HEIGHT: 64 IN | RESPIRATION RATE: 18 BRPM | DIASTOLIC BLOOD PRESSURE: 75 MMHG | WEIGHT: 221.8 LBS | OXYGEN SATURATION: 99 % | BODY MASS INDEX: 37.87 KG/M2

## 2022-08-16 DIAGNOSIS — K64.9 HEMORRHOIDS, UNSPECIFIED HEMORRHOID TYPE: ICD-10-CM

## 2022-08-16 DIAGNOSIS — Z71.89 ENCOUNTER FOR EDUCATION ABOUT HEART FAILURE: ICD-10-CM

## 2022-08-16 DIAGNOSIS — Z09 HOSPITAL DISCHARGE FOLLOW-UP: ICD-10-CM

## 2022-08-16 DIAGNOSIS — I50.20 HFREF (HEART FAILURE WITH REDUCED EJECTION FRACTION) (HCC): Primary | ICD-10-CM

## 2022-08-16 DIAGNOSIS — J02.9 SORE THROAT: ICD-10-CM

## 2022-08-16 DIAGNOSIS — R09.81 NASAL CONGESTION: ICD-10-CM

## 2022-08-16 DIAGNOSIS — I21.02 ST ELEVATION MYOCARDIAL INFARCTION INVOLVING LEFT ANTERIOR DESCENDING (LAD) CORONARY ARTERY (HCC): ICD-10-CM

## 2022-08-16 LAB
S PYO AG THROAT QL: NEGATIVE
VALID INTERNAL CONTROL?: YES

## 2022-08-16 PROCEDURE — 87880 STREP A ASSAY W/OPTIC: CPT | Performed by: FAMILY MEDICINE

## 2022-08-16 PROCEDURE — 99496 TRANSJ CARE MGMT HIGH F2F 7D: CPT | Performed by: FAMILY MEDICINE

## 2022-08-16 PROCEDURE — G8427 DOCREV CUR MEDS BY ELIG CLIN: HCPCS | Performed by: FAMILY MEDICINE

## 2022-08-16 RX ORDER — HYDROCORTISONE 10 MG/G
CREAM TOPICAL AS NEEDED
Qty: 30 G | Refills: 0 | Status: SHIPPED | OUTPATIENT
Start: 2022-08-16 | End: 2022-08-17 | Stop reason: RX

## 2022-08-16 RX ORDER — FLUTICASONE PROPIONATE 50 MCG
2 SPRAY, SUSPENSION (ML) NASAL DAILY
Status: ON HOLD | COMMUNITY

## 2022-08-16 RX ORDER — DIAZEPAM 5 MG/1
5 TABLET ORAL
Status: ON HOLD | COMMUNITY
Start: 2022-08-15

## 2022-08-16 RX ORDER — IPRATROPIUM BROMIDE 21 UG/1
1 SPRAY, METERED NASAL EVERY 12 HOURS
Qty: 30 ML | Refills: 0 | Status: ON HOLD | OUTPATIENT
Start: 2022-08-16

## 2022-08-16 RX ORDER — IPRATROPIUM BROMIDE AND ALBUTEROL SULFATE 2.5; .5 MG/3ML; MG/3ML
3 SOLUTION RESPIRATORY (INHALATION) 4 TIMES DAILY
Status: ON HOLD | COMMUNITY

## 2022-08-17 ENCOUNTER — TELEPHONE (OUTPATIENT)
Dept: FAMILY MEDICINE CLINIC | Age: 42
End: 2022-08-17

## 2022-08-17 RX ORDER — HYDROCORTISONE 25 MG/G
CREAM TOPICAL 4 TIMES DAILY
Qty: 30 G | Refills: 0 | Status: ON HOLD | OUTPATIENT
Start: 2022-08-17

## 2022-08-17 NOTE — TELEPHONE ENCOUNTER
[facility] does not have a 1% proctocort Rx in stock that has a rectal appliactor. They do have a 2.5% Rx with an internal applicator. Please verify if this is ok to change.

## 2022-08-22 NOTE — PROGRESS NOTES
Progress Note    Patient: Alex Gordillo MRN: [id number]  SSN: [id number]    YOB: 1980  Age: 46 y.o. Sex: female        Chief Complaint   Patient presents with    [address] [facility] follow up, also pt complaint of congestion/SOB     she is a 46y.o. year old female who presents for transition of care. Patient with recent MI and subsequent hospitalization for CHF exacerbation. She is anxious about dx of CHF. Education provided for patient. She complains of JAFFE. Also with nasal congestion, sore throat and external hemorrhoid. Patient denies F/C, HA, dizziness, CP, abdominal pain, dysuria, acute myalgias or arthralgias. Encounter Diagnoses   Name Primary? HFrEF (heart failure with reduced ejection fraction) ([facility] [address] Yes    Encounter for education about heart failure     ST elevation myocardial infarction involving left anterior descending (LAD) coronary artery (HCC)     Nasal congestion     Sore throat     Hemorrhoids, unspecified hemorrhoid type     Hospital discharge follow-up      BP Readings from Last 3 Encounters:   08/16/22 105/75   08/14/22 92/69   07/27/22 113/75     Wt Readings from Last 3 Encounters:   08/16/22 221 lb 12.8 oz (100.6 kg)   08/14/22 223 lb (101.2 kg)   07/27/22 222 lb (100.7 kg)     Body mass index is 38.07 kg/m². Patient Active Problem List   Diagnosis Code    Depression F32. A    GERD (gastroesophageal reflux disease) K21.9    Anemia, unspecified D64.9    Itch of skin L29.9    Anxiety F41.9    Costochondritis M94.0    HSV (herpes simplex virus) anogenital infection A60.9    OCD (obsessive compulsive disorder) F42.9    Hoarseness R49.0    IFG (impaired fasting glucose) R73.01    Hyperlipidemia E78.5    Thrombosed external hemorrhoid K64.5    Vitamin D deficiency E55.9    Inflammatory arthritis M19.90    Recurrent depression (HCC) F33.9    Mood disorder (HCC) F39    Chronic pain syndrome G89.4    Fibromyalgia M79.7    Panic attack F41.0    Tremor R25.1 Class 2 obesity due to excess calories without serious comorbidity in adult E66.09    Gastroparesis K31.84    Plantar fasciitis of left foot M72.2    Positive KITTY (antinuclear antibody) R76.8    Severe obesity (HCC) E66.01    Neuropathy G62.9    Acute exacerbation of CHF (congestive heart failure) ([facility]) I50.9     Past Surgical History:   Procedure Laterality Date    HX GYN           HX HEENT  2002    wisdom teeth extraction    UPPER GI ENDOSCOPY,BIOPSY  2018         UPPER GI ENDOSCOPY,DIAGNOSIS  2018          Social History     Socioeconomic History    Marital status: SINGLE     Spouse name: Not on file    Number of children: Not on file    Years of education: Not on file    Highest education level: Not on file   Occupational History    Not on file   Tobacco Use    Smoking status: Former     Packs/day: 0.25     Years: 8.00     Pack years: 2.00     Types: Cigarettes     Quit date: 2022     Years since quittin.0    Smokeless tobacco: Never   Substance and Sexual Activity    Alcohol use: Not Currently     Alcohol/week: 1.0 standard drink     Types: 1 Glasses of wine per week     Comment: 1 cup of wine/week    Drug use: Yes     Types: Marijuana    Sexual activity: Yes     Partners: Male     Birth control/protection: None   Other Topics Concern    Not on file   Social History Narrative    Not on file     Social Determinants of Health     Financial Resource Strain: High Risk    Difficulty of Paying Living Expenses: Very hard   Food Insecurity: No Food Insecurity    Worried About Running Out of Food in the Last Year: Never true    Ran Out of Food in the Last Year: Never true   Transportation Needs: Not on file   Physical Activity: Not on file   Stress: Not on file   Social Connections: Not on file   Intimate Partner Violence: Not on file   Housing Stability: Not on file     Family History   Problem Relation Age of Onset    Hypertension Father     Elevated Lipids Father     Arthritis-rheumatoid Mother         ? Lupus vs RA    Lung Disease Mother     Heart Disease Mother     Cancer Mother         breast in 39y    COPD Mother     Hypertension Mother     Stroke Mother         3-4 strokes    Breast Cancer Mother 50    Diabetes Maternal Grandmother      Current Outpatient Medications   Medication Sig    diazePAM (VALIUM) 5 mg tablet 5 mg. 5 mg, 3 times daily    albuterol-ipratropium (DUO-NEB) 2.5 mg-0.5 mg/3 ml nebu 3 mL by Nebulization route four (4) times daily. fluticasone propionate (FLONASE) 50 mcg/actuation nasal spray 2 Sprays by Both Nostrils route daily. ipratropium (ATROVENT) 21 mcg (0.03 %) nasal spray 1 Toutle by Both Nostrils route every twelve (12) hours. acetaminophen (TYLENOL) 325 mg tablet Take  by mouth every four (4) hours as needed for Pain. co-enzyme Q-10 (CO Q-10) 100 mg capsule Take 100 mg by mouth in the morning. 200 mg    furosemide (LASIX) 20 mg tablet Take 1 Tablet by mouth in the morning. atorvastatin (LIPITOR) 80 mg tablet Take 1 Tablet by mouth in the morning. albuterol (PROVENTIL HFA, VENTOLIN HFA, PROAIR HFA) 90 mcg/actuation inhaler Take  by inhalation. prasugreL (EFFIENT) 10 mg tablet     nitroglycerin (NITROSTAT) 0.4 mg SL tablet     colchicine (GLOPEBRA) 0.6 mg/5 mL oral solution Take 0.6 mg by mouth. aspirin delayed-release 81 mg tablet 81 mg.    pantoprazole (PROTONIX) 40 mg tablet TAKE 1 TABLET TWICE DAILY    ferrous sulfate 325 mg (65 mg iron) tablet TAKE 1 TABLET BY MOUTH ONCE DAILY BEFORE BREAKFAST    levocetirizine (XYZAL) 5 mg tablet Take 1 Tablet by mouth daily. famotidine (Pepcid) 20 mg tablet Take 1 Tablet by mouth two (2) times a day. traZODone (DESYREL) 100 mg tablet At bedtime    folic acid (FOLVITE) 1 mg tablet TAKE 1 TABLET EVERY DAY    norgestimate-ethinyl estradioL (ORTHO-CYCLEN, SPRINTEC) 0.25-35 mg-mcg tab Take 1 Tablet by mouth daily.  Generic    montelukast (SINGULAIR) 10 mg tablet TAKE 1 TABLET EVERY DAY    metFORMIN ER (GLUCOPHAGE XR) 500 mg tablet Take 1 Tablet by mouth daily (with dinner). cyclobenzaprine (FLEXERIL) 10 mg tablet three (3) times daily as needed. Has not needed    escitalopram oxalate (LEXAPRO) 20 mg tablet Take 20 mg by mouth daily. clonazePAM (KLONOPIN) 1 mg tablet Take 1 mg by mouth in the morning. At bedtime    cholecalciferol, vitamin D3, 50 mcg (2,000 unit) tab Take  by mouth. hydrocortisone (ANUSOL-HC) 2.5 % rectal cream Insert  into rectum four (4) times daily. No current facility-administered medications for this visit. Allergies   Allergen Reactions    Abilify [Aripiprazole] Anxiety     Can not tolerate     Sulfa (Sulfonamide Antibiotics) Hives    Vicodin [Hydrocodone-Acetaminophen] Nausea and Vomiting       Review of Systems:  Constitutional: Positive for fatigue  HEENT: see HPI  Resp: c/o JAFFE, Negative for cough or wheezing   CV: see HPI, Negative for chest pain, dizziness or palpitations  GI: see HPI, Negative for nausea or abdominal pain  MS: Negative for acute myalgias or arthralgias   Neuro: Negative for HA, weakness or paresthesia  Psych: Hx of depression and anxiety     Vitals:    08/16/22 1414   BP: 105/75   Pulse: 95   Resp: 18   Temp: 98.4 °F (36.9 °C)   TempSrc: Oral   SpO2: 99%   Weight: 221 lb 12.8 oz (100.6 kg)   Height: 5' 4\" (1.626 m)       Physical Examination:  General: Well developed, well nourished, in no acute distress  Head: Normocephalic, atraumatic  Eyes: Sclera clear, EOMI  Neck: Normal range of motion  Respiratory: CTAB with symmetrical, unlabored effort  Cardiovascular: Regular rate and rhythm  Extremities: Full range of motion, normal gait  Neurologic: No focal deficits  Psych: Active, alert and oriented. Anxious affect      ICD-10-CM ICD-9-CM    1. HFrEF (heart failure with reduced ejection fraction) ([facility])  I50.20 428.20       2. Encounter for education about heart failure  Z71.89 V65.49      428.9       3.  ST elevation myocardial infarction involving left anterior descending (LAD) coronary artery (HCC)  I21.02 410.10       4. Nasal congestion  R09.81 478.19 ipratropium (ATROVENT) 21 mcg (0.03 %) nasal spray      5. Sore throat  J02.9 462 AMB POC RAPID STREP A      6. Hemorrhoids, unspecified hemorrhoid type  K64.9 455.6 DISCONTINUED: hydrocortisone (PROCTOCORT) 1 % rectal cream      7. Hospital discharge follow-up  Z09 V67.59           Plan of care:  Diagnoses were discussed in detail with patient. Medications reviewed and appropriate. Patient to continue current prescribed medications as written. Medication risks/benefits/side effects discussed with patient. All of the patient's questions were addressed and answered to apparent satisfaction. The patient understands and agrees with our plan of care. The patient knows to call back if they have questions about the plan of care or if symptoms change. The patient received an After-Visit Summary which contains VS, diagnoses, orders, allergy and medication lists.       Future Appointments   Date Time Provider Komal Rodriguez   8/29/2022  9:00 [facility]   8/31/2022  9:40 AM DO MEL Solorzano BS AMB

## 2022-08-23 ENCOUNTER — TELEPHONE (OUTPATIENT)
Dept: FAMILY MEDICINE CLINIC | Age: 42
End: 2022-08-23

## 2022-08-23 DIAGNOSIS — R09.81 NASAL CONGESTION: Primary | ICD-10-CM

## 2022-08-23 RX ORDER — GUAIFENESIN 600 MG/1
600 TABLET, EXTENDED RELEASE ORAL 2 TIMES DAILY
Qty: 60 TABLET | Refills: 0 | Status: SHIPPED | OUTPATIENT
Start: 2022-08-23 | End: 2022-09-09

## 2022-08-23 NOTE — TELEPHONE ENCOUNTER
Pt is having a lot of congestion. She can cough but nothing comes up. She has a nasal drip going back in her throat and going back in her stomach. She wants to know if she can take anything for her congestion.

## 2022-08-23 NOTE — TELEPHONE ENCOUNTER
Called in Mucinex for Congestion.     MD URBAN Rose & MARSHAL CARMEN [facility]  08/23/22

## 2022-08-24 ENCOUNTER — PATIENT OUTREACH (OUTPATIENT)
Dept: CASE MANAGEMENT | Age: 42
End: 2022-08-24

## 2022-08-24 VITALS — WEIGHT: 219 LBS | BODY MASS INDEX: 37.59 KG/M2

## 2022-08-24 DIAGNOSIS — Z91.09 ENVIRONMENTAL ALLERGIES: ICD-10-CM

## 2022-08-24 RX ORDER — MONTELUKAST SODIUM 10 MG/1
TABLET ORAL
Qty: 90 TABLET | Refills: 1 | Status: ON HOLD | OUTPATIENT
Start: 2022-08-24

## 2022-08-24 NOTE — PROGRESS NOTES
Care Transitions Follow Up Call    Care Transition Nurse (CTN) contacted the patient by telephone to follow up after admission on 8/13-8/14. Addressed changes since last contact: medications- started mucinex last night for congestion. Follow up appointment completed? yes. Was follow up appointment scheduled within 7 days of discharge? yes. CTN reviewed discharge instructions, medical action plan and red flags with patient and discussed any barriers to care and/or understanding of plan of care after discharge. Discussed appropriate site of care based on symptoms and resources available to patient including: PCP. The patient agrees to contact the PCP office for questions related to their healthcare. [facility] follow up appointment(s):   Future Appointments   Date Time Provider Komal Rodriguez   8/25/2022  2:20 PM Renzo Beverly MD [facility]   8/29/2022  9:00 AM Pascual Tucker MD [facility]   8/31/2022  9:40 [facility]     [facility] follow up appointment(s): none    CTN provided contact information for future needs. Plan for follow-up call in 7-10 days based on severity of symptoms and risk factors. Plan for next call: self management-monitor symptoms and follow up appointment-will see PCP tomorrow. Goals Addressed                   This Visit's Progress     Prevent complications post hospitalization. 08/15/22  Daily weights- patient has working scales and will weigh in the morning in similar bedtime attire before eating and after voiding. She will record weight in a log. Patient will follow low sodium diet/fluid restrictions as recommended by provider. HF Information sent via [employer] to patient for review. Heart failure zones- information sheet sent to patient via [employer], will plan to review at follow up outreach.   Medication compliance-patient will take diuretic daily as directed  Patient will monitor  and report following (red flags):  - Weight gain of 2-3 lbs. in one day or 5 lbs. in one week  -increased SOB, feeling more tired or no energy, dizziness  -decreased urine output  -chest pain or difficulty breathing  -increase swelling to feet, ankles, legs or stomach    08/24/22  Weighing daily- today 219 lbs. Weight is stable +/- 1-2 lbs. Maintaining low sodium diet  Denies LE edema, CP, SOB  She has congestion and cough. Started taking mucinex last night prescribed by provider yesterday. Not coughing much up, secretions are thin. Has pulse ox with readings of ~96 %  Will test for COVID at home. Will see PCP tomorrow.

## 2022-08-25 ENCOUNTER — OFFICE VISIT (OUTPATIENT)
Dept: FAMILY MEDICINE CLINIC | Age: 42
End: 2022-08-25
Payer: MEDICARE

## 2022-08-25 VITALS
OXYGEN SATURATION: 100 % | SYSTOLIC BLOOD PRESSURE: 86 MMHG | HEIGHT: 64 IN | RESPIRATION RATE: 20 BRPM | BODY MASS INDEX: 37.59 KG/M2 | WEIGHT: 220.2 LBS | HEART RATE: 125 BPM | TEMPERATURE: 98.4 F | DIASTOLIC BLOOD PRESSURE: 57 MMHG

## 2022-08-25 DIAGNOSIS — I50.20 HFREF (HEART FAILURE WITH REDUCED EJECTION FRACTION) (HCC): ICD-10-CM

## 2022-08-25 DIAGNOSIS — I21.02 ST ELEVATION MYOCARDIAL INFARCTION INVOLVING LEFT ANTERIOR DESCENDING (LAD) CORONARY ARTERY (HCC): ICD-10-CM

## 2022-08-25 DIAGNOSIS — Z91.09 ENVIRONMENTAL ALLERGIES: ICD-10-CM

## 2022-08-25 DIAGNOSIS — J01.10 ACUTE NON-RECURRENT FRONTAL SINUSITIS: Primary | ICD-10-CM

## 2022-08-25 DIAGNOSIS — F41.8 ANXIETY ABOUT HEALTH: ICD-10-CM

## 2022-08-25 PROCEDURE — G9717 DOC PT DX DEP/BP F/U NT REQ: HCPCS | Performed by: FAMILY MEDICINE

## 2022-08-25 PROCEDURE — G8427 DOCREV CUR MEDS BY ELIG CLIN: HCPCS | Performed by: FAMILY MEDICINE

## 2022-08-25 PROCEDURE — G8417 CALC BMI ABV UP PARAM F/U: HCPCS | Performed by: FAMILY MEDICINE

## 2022-08-25 PROCEDURE — 99214 OFFICE O/P EST MOD 30 MIN: CPT | Performed by: FAMILY MEDICINE

## 2022-08-25 PROCEDURE — 1111F DSCHRG MED/CURRENT MED MERGE: CPT | Performed by: FAMILY MEDICINE

## 2022-08-25 RX ORDER — PREDNISONE 10 MG/1
10 TABLET ORAL 2 TIMES DAILY
Qty: 10 TABLET | Refills: 0 | Status: SHIPPED | OUTPATIENT
Start: 2022-08-25 | End: 2022-08-30

## 2022-08-25 RX ORDER — CEFDINIR 300 MG/1
300 CAPSULE ORAL 2 TIMES DAILY
Qty: 14 CAPSULE | Refills: 0 | Status: SHIPPED | OUTPATIENT
Start: 2022-08-25 | End: 2022-09-01

## 2022-08-25 NOTE — PROGRESS NOTES
1. \"Have you been to the ER, urgent care clinic since your last visit? Hospitalized since your last visit? \" No    2. \"Have you seen or consulted any other health care providers outside of the [address] [place] [address] since your last visit? \" No     3. For patients aged 39-70: Has the patient had a colonoscopy / FIT/ Cologuard? NA - based on age    If the patient is female:    4. For patients aged 41-77: Has the patient had a mammogram within the past 2 years? Yes    5. For patients aged 21-65: Has the patient had a pap smear? Yes    Patient is accompanied by self I have received verbal consent from Maulik Buitrago to discuss any/all medical information while they are present in the room.   Reviewed record in preparation for visit and have necessary documentation  Goals that were addressed and/or need to be completed during or after this appointment include     Health Maintenance Due   Topic Date Due    Pneumococcal 0-64 years (1 - PCV) Never done    COVID-19 Vaccine (3 - Booster for [employer] series) 09/25/2021

## 2022-08-26 ENCOUNTER — TELEPHONE (OUTPATIENT)
Dept: FAMILY MEDICINE CLINIC | Age: 42
End: 2022-08-26

## 2022-08-26 NOTE — TELEPHONE ENCOUNTER
Pt is taking asprin everyday. Her prednisone rx documents state not to take asprin. Please advise if pt should stop taking asprin while on predinisone. Administered By (Optional): Jerome WILEY

## 2022-08-26 NOTE — TELEPHONE ENCOUNTER
Called placed to pt and informed per  it is safe to take those medications together. COntinue the Aspirin.   Pt verbalized understanding

## 2022-08-29 ENCOUNTER — OFFICE VISIT (OUTPATIENT)
Dept: FAMILY MEDICINE CLINIC | Age: 42
End: 2022-08-29
Payer: MEDICARE

## 2022-08-29 VITALS
SYSTOLIC BLOOD PRESSURE: 115 MMHG | RESPIRATION RATE: 18 BRPM | HEIGHT: 64 IN | DIASTOLIC BLOOD PRESSURE: 82 MMHG | OXYGEN SATURATION: 99 % | WEIGHT: 219 LBS | TEMPERATURE: 97 F | HEART RATE: 106 BPM | BODY MASS INDEX: 37.39 KG/M2

## 2022-08-29 DIAGNOSIS — F33.9 RECURRENT DEPRESSION (HCC): ICD-10-CM

## 2022-08-29 DIAGNOSIS — I50.9 ACUTE ON CHRONIC CONGESTIVE HEART FAILURE, UNSPECIFIED HEART FAILURE TYPE (HCC): Primary | ICD-10-CM

## 2022-08-29 DIAGNOSIS — K21.9 GASTROESOPHAGEAL REFLUX DISEASE WITHOUT ESOPHAGITIS: Chronic | ICD-10-CM

## 2022-08-29 DIAGNOSIS — E78.00 PURE HYPERCHOLESTEROLEMIA: Chronic | ICD-10-CM

## 2022-08-29 DIAGNOSIS — E55.9 VITAMIN D DEFICIENCY: Chronic | ICD-10-CM

## 2022-08-29 DIAGNOSIS — M79.7 FIBROMYALGIA: Chronic | ICD-10-CM

## 2022-08-29 DIAGNOSIS — F41.9 ANXIETY: ICD-10-CM

## 2022-08-29 PROBLEM — I25.10 CORONARY ARTERY DISEASE INVOLVING NATIVE CORONARY ARTERY OF NATIVE HEART WITHOUT ANGINA PECTORIS: Chronic | Status: ACTIVE | Noted: 2022-08-29

## 2022-08-29 PROBLEM — I25.10 CORONARY ARTERY DISEASE INVOLVING NATIVE CORONARY ARTERY OF NATIVE HEART WITHOUT ANGINA PECTORIS: Status: ACTIVE | Noted: 2022-08-29

## 2022-08-29 PROCEDURE — 1111F DSCHRG MED/CURRENT MED MERGE: CPT | Performed by: FAMILY MEDICINE

## 2022-08-29 PROCEDURE — G8417 CALC BMI ABV UP PARAM F/U: HCPCS | Performed by: FAMILY MEDICINE

## 2022-08-29 PROCEDURE — G9717 DOC PT DX DEP/BP F/U NT REQ: HCPCS | Performed by: FAMILY MEDICINE

## 2022-08-29 PROCEDURE — 99214 OFFICE O/P EST MOD 30 MIN: CPT | Performed by: FAMILY MEDICINE

## 2022-08-29 PROCEDURE — G8427 DOCREV CUR MEDS BY ELIG CLIN: HCPCS | Performed by: FAMILY MEDICINE

## 2022-08-29 NOTE — PATIENT INSTRUCTIONS
- Can we change Lipitor to Crestor? Or can we reduce dosage from 80 mg to 40 mg now? - Not taking a Beta-blocker currently.

## 2022-08-29 NOTE — PROGRESS NOTES
Chief Complaint   Patient presents with    Follow Up Chronic Condition     1. \"Have you been to the ER, urgent care clinic since your last visit? Hospitalized since your last visit? \" No    2. \"Have you seen or consulted any other health care providers outside of the [address] [place] [address] since your last visit? \" No     3. For patients aged 39-70: Has the patient had a colonoscopy / FIT/ Cologuard? NA - based on age      If the patient is female:    4. For patients aged 41-77: Has the patient had a mammogram within the past 2 years? NA - based on age or sex      11. For patients aged 21-65: Has the patient had a pap smear?  NA - based on age or sex    Health Maintenance Due   Topic Date Due    Pneumococcal 0-64 years (1 - PCV) Never done    COVID-19 Vaccine (3 - Booster for [employer] series) 09/25/2021

## 2022-08-29 NOTE — PROGRESS NOTES
[facility]    History of Present Illness:   Tali Burton is a 38 y.o. female with history of CHF, CAD, GERD, Arthritis, Fibromyalgia, HLD, Anxiety, HSV, OCD, Depression, Obesity,  CC: Follow up CAD/CHF  History provided by patient and Records    HPI:  Congestive Heart Failure:   Patient is following up for monitoring of HF with CAD. At this time patient reports her CHF symptoms are not well controlled. Current symptoms include SOB, slight limitation of physical activity, marked limitation of physical activity, and comfortable at rest.  -Lifestyle/Dietary Compliance: compliant most of the time    Limits Salt Intake? Yes   Limits Fluid intake? Yes   Use of Compression stockings? No     Exercise? No     Tobacco Use? No     Weight Stable? Yes   - Current medications:       Key CAD CHF Meds               furosemide (LASIX) 20 mg tablet (Taking) Take 1 Tablet by mouth in the morning. atorvastatin (LIPITOR) 80 mg tablet (Taking) Take 1 Tablet by mouth in the morning. prasugreL (EFFIENT) 10 mg tablet (Taking)     nitroglycerin (NITROSTAT) 0.4 mg SL tablet (Taking)     aspirin delayed-release 81 mg tablet (Taking) 81 mg. Compliance: compliant most of the time  - Associated conditions: Coronary Artery Disease (CAD)  - Last CHF exacerbation: 8/13/22 Cause: exertion  - Last hospitalization for CHF: 8/13/22  - Cardiologist: Going to see Calista Medrano. Last Visit: Upcoming.  - Last Echo:08/13/22    ECHO ADULT COMPLETE 08/14/2022 8/14/2022    Interpretation Summary  Formatting of this result is different from the original.      Left Ventricle: Severely reduced left ventricular systolic function with a visually estimated EF of 20 - 25%. Left ventricle is mildly dilated. Increased wall thickness. See diagram for wall motion findings. Grade II diastolic dysfunction with increased LAP. Right Ventricle: Not well visualized. Tricuspid Valve: Moderately elevated RVSP.     Signed by: Denise Li Yahir Werner DO on 8/14/2022  2:53 PM     Fibromyalgia: Stopped Gabapentin    Hypertriglyceridemia Follow up:   Cardiovascular risks for her are: existing CAD  hypertension  hyperlipidemia. Current Medications:  Key Antihyperlipidemia Meds               atorvastatin (LIPITOR) 80 mg tablet (Taking) Take 1 Tablet by mouth in the morning. Compliance: YES   Myalgias: YES   Fatigue: YES   Other side effects: NO     Wt Readings from Last 3 Encounters:   08/29/22 219 lb (99.3 kg)   08/25/22 220 lb 3.2 oz (99.9 kg)   08/24/22 219 lb (99.3 kg)       Lab Results   Component Value Date/Time    Cholesterol, total 158 08/13/2022 08:43 AM    HDL Cholesterol 67 08/13/2022 08:43 AM    LDL, calculated 66.2 08/13/2022 08:43 AM    VLDL, calculated 24.8 08/13/2022 08:43 AM    Triglyceride 124 08/13/2022 08:43 AM    CHOL/HDL Ratio 2.4 08/13/2022 08:43 AM      Lab Results   Component Value Date/Time    ALT (SGPT) 40 03/30/2022 09:52 AM    AST (SGOT) 20 03/30/2022 09:52 AM    Alk. phosphatase 133 (H) 03/30/2022 09:52 AM    Bilirubin, direct 0.12 12/08/2017 04:08 PM    Bilirubin, total 0.7 03/30/2022 09:52 AM      GERD: Patient noting has been worse since Heart attack. Anxiety/Depression: Patient is taking Diazepam 5 g TID. Had cut back from 10 mg. Health Maintenance  Health Maintenance Due   Topic Date Due    Pneumococcal 0-64 years (1 - PCV) Never done    COVID-19 Vaccine (3 - Booster for Roxanne Kathryn series) 09/25/2021       Past Medical, Family, and Social History:     Current Outpatient Medications on File Prior to Visit   Medication Sig Dispense Refill    predniSONE (DELTASONE) 10 mg tablet Take 10 mg by mouth two (2) times a day for 5 days. 10 Tablet 0    cefdinir (OMNICEF) 300 mg capsule Take 1 Capsule by mouth two (2) times a day for 7 days.  14 Capsule 0    montelukast (SINGULAIR) 10 mg tablet TAKE 1 TABLET EVERY DAY 90 Tablet 1    levocetirizine (XYZAL) 5 mg tablet TAKE 1 TABLET BY MOUTH DAILY 90 Tablet 1 guaiFENesin ER (MUCINEX) 600 mg ER tablet Take 1 Tablet by mouth two (2) times a day. 60 Tablet 0    hydrocortisone (ANUSOL-HC) 2.5 % rectal cream Insert  into rectum four (4) times daily. 30 g 0    diazePAM (VALIUM) 5 mg tablet 5 mg. 5 mg, 3 times daily      albuterol-ipratropium (DUO-NEB) 2.5 mg-0.5 mg/3 ml nebu 3 mL by Nebulization route four (4) times daily. fluticasone propionate (FLONASE) 50 mcg/actuation nasal spray 2 Sprays by Both Nostrils route daily. ipratropium (ATROVENT) 21 mcg (0.03 %) nasal spray 1 [place] by Both Nostrils route every twelve (12) hours. 30 mL 0    acetaminophen (TYLENOL) 325 mg tablet Take  by mouth every four (4) hours as needed for Pain. co-enzyme Q-10 (CO Q-10) 100 mg capsule Take 100 mg by mouth in the morning. 200 mg      furosemide (LASIX) 20 mg tablet Take 1 Tablet by mouth in the morning. 30 Tablet 0    atorvastatin (LIPITOR) 80 mg tablet Take 1 Tablet by mouth in the morning. 30 Tablet 0    albuterol (PROVENTIL HFA, VENTOLIN HFA, PROAIR HFA) 90 mcg/actuation inhaler Take  by inhalation. prasugreL (EFFIENT) 10 mg tablet       nitroglycerin (NITROSTAT) 0.4 mg SL tablet       aspirin delayed-release 81 mg tablet 81 mg.      pantoprazole (PROTONIX) 40 mg tablet TAKE 1 TABLET TWICE DAILY 180 Tablet 1    ferrous sulfate 325 mg (65 mg iron) tablet TAKE 1 TABLET BY MOUTH ONCE DAILY BEFORE BREAKFAST 90 Tablet 1    famotidine (Pepcid) 20 mg tablet Take 1 Tablet by mouth two (2) times a day. 180 Tablet 1    traZODone (DESYREL) 100 mg tablet At bedtime      folic acid (FOLVITE) 1 mg tablet TAKE 1 TABLET EVERY DAY 90 Tablet 1    norgestimate-ethinyl estradioL (ORTHO-CYCLEN, SPRINTEC) 0.25-35 mg-mcg tab Take 1 Tablet by mouth daily. Generic 3 Dose Pack 3    metFORMIN ER (GLUCOPHAGE XR) 500 mg tablet Take 1 Tablet by mouth daily (with dinner). 90 Tablet 1    cyclobenzaprine (FLEXERIL) 10 mg tablet three (3) times daily as needed.  Has not needed      escitalopram oxalate (LEXAPRO) 20 mg tablet Take 20 mg by mouth daily. clonazePAM (KLONOPIN) 1 mg tablet Take 1 mg by mouth in the morning. At bedtime      cholecalciferol, vitamin D3, 50 mcg (2,000 unit) tab Take  by mouth.      colchicine (GLOPEBRA) 0.6 mg/5 mL oral solution Take 0.6 mg by mouth. (Patient not taking: No sig reported)       No current facility-administered medications on file prior to visit. Patient Active Problem List   Diagnosis Code    Depression F32. A    GERD (gastroesophageal reflux disease) K21.9    Anemia, unspecified D64.9    Itch of skin L29.9    Anxiety F41.9    Costochondritis M94.0    HSV (herpes simplex virus) anogenital infection A60.9    OCD (obsessive compulsive disorder) F42.9    Hoarseness R49.0    IFG (impaired fasting glucose) R73.01    Hyperlipidemia E78.5    Thrombosed external hemorrhoid K64.5    Vitamin D deficiency E55.9    Inflammatory arthritis M19.90    Recurrent depression ([facility]) F33.9    Mood disorder ([facility]) F39    Chronic pain syndrome G89.4    Fibromyalgia M79.7    Panic attack F41.0    Tremor R25.1    Class 2 obesity due to excess calories without serious comorbidity in adult E66.09    Gastroparesis K31.84    Plantar fasciitis of left foot M72.2    Positive KITTY (antinuclear antibody) R76.8    Severe obesity (HCC) E66.01    Neuropathy G62.9    Acute exacerbation of CHF (congestive heart failure) ([facility]) I50.9       Social History     Socioeconomic History    Marital status: SINGLE   Tobacco Use    Smoking status: Former     Packs/day: 0.25     Years: 8.00     Pack years: 2.00     Types: Cigarettes     Quit date: 2022     Years since quittin.0    Smokeless tobacco: Never   Substance and Sexual Activity    Alcohol use: Not Currently     Alcohol/week: 1.0 standard drink     Types: 1 Glasses of wine per week     Comment: 1 cup of wine/week    Drug use: Yes     Types: Marijuana    Sexual activity: Yes     Partners: Male     Birth control/protection: None     Social Determinants of Health     Financial Resource Strain: High Risk    Difficulty of Paying Living Expenses: Very hard   Food Insecurity: No Food Insecurity    Worried About Running Out of Food in the Last Year: Never true    Ran Out of Food in the Last Year: Never true        Review of Systems   Review of Systems   Constitutional:  Negative for chills and fever. HENT:  Negative for congestion. Respiratory:  Negative for cough and hemoptysis. Cardiovascular:  Negative for chest pain and palpitations. Musculoskeletal:  Negative for myalgias and neck pain. Neurological:  Negative for dizziness and headaches. Objective:   Visit Vitals  /82 (BP 1 Location: Left lower arm, BP Patient Position: Sitting, BP Cuff Size: Adult)   Pulse (!) 106   Temp 97 °F (36.1 °C) (Oral)   Resp 18   Ht 5' 4\" (1.626 m)   Wt 219 lb (99.3 kg)   LMP 08/18/2022 (Approximate)   SpO2 99%   BMI 37.59 kg/m²        Physical Exam  Vitals and nursing note reviewed. Constitutional:       Appearance: Normal appearance. Cardiovascular:      Rate and Rhythm: Normal rate and regular rhythm. Pulses: Normal pulses. Heart sounds: Normal heart sounds. No murmur heard. No friction rub. No gallop. Pulmonary:      Effort: Pulmonary effort is normal.      Breath sounds: Normal breath sounds. Abdominal:      General: Abdomen is flat. Bowel sounds are normal.      Palpations: Abdomen is soft. Musculoskeletal:      Cervical back: Normal range of motion and neck supple. Skin:     General: Skin is warm and dry. Neurological:      Mental Status: She is alert. Pertinent Labs/Studies:      Assessment and orders:       ICD-10-CM ICD-9-CM    1. Acute on chronic congestive heart failure, unspecified heart failure type (HCC)  I50.9 428.0       2. Gastroesophageal reflux disease without esophagitis  K21.9 530.81       3. Vitamin D deficiency  E55.9 268.9       4. Pure hypercholesterolemia  E78.00 272.0       5.  Anxiety  F41.9 300. 00       6. Fibromyalgia  M79.7 729.1       7. Mixed obsessional thoughts and acts  F42.2 300.3       8. Recurrent depression (HCC)  F33.9 296.30         Diagnoses and all orders for this visit:    1. Acute on chronic congestive heart failure, unspecified heart failure type [facility]): Patient will be seeing Dr. Calista Medrano in 3 days. 2. Gastroesophageal reflux disease without esophagitis: Monitoring for now    3. Vitamin D deficiency: Refil    4. Pure hypercholesterolemia: The patient is aware of our goal to reduce or eliminate the long term problems (such as strokes and heart attacks) related to poorly controlled Triglycerides, LDL, Cholesterol. 5. Anxiety    6. Fibromyalgia    8. Recurrent depression (HCC):Continue current medication regimen    Follow-up and Dispositions    Return in about 4 weeks (around 9/26/2022). I have discussed the diagnosis with the patient and the intended plan as seen in the above orders. Social history, medical history, and labs were reviewed. The patient has received an after-visit summary and questions were answered concerning future plans. I have discussed medication side effects and warnings with the patient as well.     MD URBAN Chu & MARSHAL CARMEN [facility]  08/29/22

## 2022-08-31 ENCOUNTER — OFFICE VISIT (OUTPATIENT)
Dept: CARDIOLOGY CLINIC | Age: 42
End: 2022-08-31
Payer: MEDICARE

## 2022-08-31 ENCOUNTER — PATIENT OUTREACH (OUTPATIENT)
Dept: CASE MANAGEMENT | Age: 42
End: 2022-08-31

## 2022-08-31 ENCOUNTER — TELEPHONE (OUTPATIENT)
Dept: CARDIOLOGY CLINIC | Age: 42
End: 2022-08-31

## 2022-08-31 VITALS
SYSTOLIC BLOOD PRESSURE: 118 MMHG | HEART RATE: 100 BPM | HEIGHT: 64 IN | OXYGEN SATURATION: 96 % | WEIGHT: 219 LBS | BODY MASS INDEX: 37.39 KG/M2 | DIASTOLIC BLOOD PRESSURE: 68 MMHG

## 2022-08-31 DIAGNOSIS — I25.2 HISTORY OF MYOCARDIAL INFARCTION: ICD-10-CM

## 2022-08-31 DIAGNOSIS — I25.5 ISCHEMIC CARDIOMYOPATHY: ICD-10-CM

## 2022-08-31 DIAGNOSIS — Z09 HOSPITAL DISCHARGE FOLLOW-UP: ICD-10-CM

## 2022-08-31 DIAGNOSIS — I50.22 CHRONIC SYSTOLIC (CONGESTIVE) HEART FAILURE (HCC): ICD-10-CM

## 2022-08-31 DIAGNOSIS — I25.10 CORONARY ARTERY DISEASE INVOLVING NATIVE CORONARY ARTERY OF NATIVE HEART WITHOUT ANGINA PECTORIS: Primary | ICD-10-CM

## 2022-08-31 PROCEDURE — 1111F DSCHRG MED/CURRENT MED MERGE: CPT | Performed by: STUDENT IN AN ORGANIZED HEALTH CARE EDUCATION/TRAINING PROGRAM

## 2022-08-31 PROCEDURE — G9717 DOC PT DX DEP/BP F/U NT REQ: HCPCS | Performed by: STUDENT IN AN ORGANIZED HEALTH CARE EDUCATION/TRAINING PROGRAM

## 2022-08-31 PROCEDURE — G8427 DOCREV CUR MEDS BY ELIG CLIN: HCPCS | Performed by: STUDENT IN AN ORGANIZED HEALTH CARE EDUCATION/TRAINING PROGRAM

## 2022-08-31 PROCEDURE — G8417 CALC BMI ABV UP PARAM F/U: HCPCS | Performed by: STUDENT IN AN ORGANIZED HEALTH CARE EDUCATION/TRAINING PROGRAM

## 2022-08-31 PROCEDURE — 99214 OFFICE O/P EST MOD 30 MIN: CPT | Performed by: STUDENT IN AN ORGANIZED HEALTH CARE EDUCATION/TRAINING PROGRAM

## 2022-08-31 RX ORDER — CARVEDILOL 3.12 MG/1
3.12 TABLET ORAL 2 TIMES DAILY
Qty: 60 TABLET | Refills: 1 | Status: SHIPPED | OUTPATIENT
Start: 2022-08-31 | End: 2022-09-27

## 2022-08-31 RX ORDER — FUROSEMIDE 20 MG/1
40 TABLET ORAL DAILY
Qty: 60 TABLET | Refills: 1 | Status: SHIPPED | OUTPATIENT
Start: 2022-08-31 | End: 2022-09-09 | Stop reason: SDUPTHER

## 2022-08-31 RX ORDER — ROSUVASTATIN CALCIUM 20 MG/1
20 TABLET, COATED ORAL
Qty: 30 TABLET | Refills: 1 | Status: SHIPPED | OUTPATIENT
Start: 2022-08-31 | End: 2022-09-09 | Stop reason: SDUPTHER

## 2022-08-31 RX ORDER — SACUBITRIL AND VALSARTAN 24; 26 MG/1; MG/1
0.5 TABLET, FILM COATED ORAL 2 TIMES DAILY
Qty: 30 TABLET | Refills: 1 | Status: SHIPPED | OUTPATIENT
Start: 2022-08-31 | End: 2022-09-09

## 2022-08-31 NOTE — PROGRESS NOTES
Progress Note    Patient: Rubén Duarte MRN: [id number]  SSN: [id number]    YOB: 1980  Age: 38 y.o. Sex: female        Chief Complaint   Patient presents with    Leg Pain    Cough     Clear phlegm     she is a 38y.o. year old female who presents with complains of cough and congestion. Patient with recent MI and subsequent hospitalization for CHF exacerbation. She is anxious about health. Patient denies F/C, HA, dizziness, CP, abdominal pain, dysuria, acute myalgias or arthralgias. Encounter Diagnoses   Name Primary? Acute non-recurrent frontal sinusitis Yes    Environmental allergies     HFrEF (heart failure with reduced ejection fraction) ([facility])     ST elevation myocardial infarction involving left anterior descending (LAD) coronary artery ([facility])     Anxiety about health      BP Readings from Last 3 Encounters:   08/29/22 115/82   08/25/22 (!) 86/57   08/16/22 105/75     Wt Readings from Last 3 Encounters:   08/29/22 219 lb (99.3 kg)   08/25/22 220 lb 3.2 oz (99.9 kg)   08/24/22 219 lb (99.3 kg)     Body mass index is 37.8 kg/m². Patient Active Problem List   Diagnosis Code    Depression F32. A    GERD (gastroesophageal reflux disease) K21.9    Anemia, unspecified D64.9    Itch of skin L29.9    Anxiety F41.9    Costochondritis M94.0    HSV (herpes simplex virus) anogenital infection A60.9    OCD (obsessive compulsive disorder) F42.9    Hoarseness R49.0    IFG (impaired fasting glucose) R73.01    Hyperlipidemia E78.5    Thrombosed external hemorrhoid K64.5    Vitamin D deficiency E55.9    Inflammatory arthritis M19.90    Recurrent depression (HCC) F33.9    Mood disorder (HCC) F39    Chronic pain syndrome G89.4    Fibromyalgia M79.7    Panic attack F41.0    Tremor R25.1    Class 2 obesity due to excess calories without serious comorbidity in adult E66.09    Gastroparesis K31.84    Plantar fasciitis of left foot M72.2    Positive KITTY (antinuclear antibody) R76.8    Severe obesity (HCC) E66.01 Neuropathy G62.9    Acute exacerbation of CHF (congestive heart failure) ([facility]) I50.9    Coronary artery disease involving native coronary artery of native heart without angina pectoris I25.10     Past Surgical History:   Procedure Laterality Date    HX GYN           HX HEENT  2002    wisdom teeth extraction    UPPER GI ENDOSCOPY,BIOPSY  2018         UPPER GI ENDOSCOPY,DIAGNOSIS  2018          Social History     Socioeconomic History    Marital status: SINGLE     Spouse name: Not on file    Number of children: Not on file    Years of education: Not on file    Highest education level: Not on file   Occupational History    Not on file   Tobacco Use    Smoking status: Former     Packs/day: 0.25     Years: 8.00     Pack years: 2.00     Types: Cigarettes     Quit date: 2022     Years since quittin.1    Smokeless tobacco: Never   Substance and Sexual Activity    Alcohol use: Not Currently     Alcohol/week: 1.0 standard drink     Types: 1 Glasses of wine per week     Comment: 1 cup of wine/week    Drug use: Yes     Types: Marijuana    Sexual activity: Yes     Partners: Male     Birth control/protection: None   Other Topics Concern    Not on file   Social History Narrative    Not on file     Social Determinants of Health     Financial Resource Strain: High Risk    Difficulty of Paying Living Expenses: Very hard   Food Insecurity: No Food Insecurity    Worried About Running Out of Food in the Last Year: Never true    Ran Out of Food in the Last Year: Never true   Transportation Needs: Not on file   Physical Activity: Not on file   Stress: Not on file   Social Connections: Not on file   Intimate Partner Violence: Not on file   Housing Stability: Not on file     Family History   Problem Relation Age of Onset    Hypertension Father     Elevated Lipids Father     Arthritis-rheumatoid Mother         ? Lupus vs RA    Lung Disease Mother     Heart Disease Mother     Cancer Mother         breast in 39y COPD Mother     Hypertension Mother     Stroke Mother         3-4 strokes    Breast Cancer Mother 50    Diabetes Maternal Grandmother      Current Outpatient Medications   Medication Sig    predniSONE (DELTASONE) 10 mg tablet Take 10 mg by mouth two (2) times a day for 5 days. cefdinir (OMNICEF) 300 mg capsule Take 1 Capsule by mouth two (2) times a day for 7 days. montelukast (SINGULAIR) 10 mg tablet TAKE 1 TABLET EVERY DAY    levocetirizine (XYZAL) 5 mg tablet TAKE 1 TABLET BY MOUTH DAILY    guaiFENesin ER (MUCINEX) 600 mg ER tablet Take 1 Tablet by mouth two (2) times a day. hydrocortisone (ANUSOL-HC) 2.5 % rectal cream Insert  into rectum four (4) times daily. diazePAM (VALIUM) 5 mg tablet 5 mg. 5 mg, 3 times daily    albuterol-ipratropium (DUO-NEB) 2.5 mg-0.5 mg/3 ml nebu 3 mL by Nebulization route four (4) times daily. fluticasone propionate (FLONASE) 50 mcg/actuation nasal spray 2 Sprays by Both Nostrils route daily. ipratropium (ATROVENT) 21 mcg (0.03 %) nasal spray 1 [place] by Both Nostrils route every twelve (12) hours. acetaminophen (TYLENOL) 325 mg tablet Take  by mouth every four (4) hours as needed for Pain. co-enzyme Q-10 (CO Q-10) 100 mg capsule Take 100 mg by mouth in the morning. 200 mg    furosemide (LASIX) 20 mg tablet Take 1 Tablet by mouth in the morning. atorvastatin (LIPITOR) 80 mg tablet Take 1 Tablet by mouth in the morning. albuterol (PROVENTIL HFA, VENTOLIN HFA, PROAIR HFA) 90 mcg/actuation inhaler Take  by inhalation. prasugreL (EFFIENT) 10 mg tablet     nitroglycerin (NITROSTAT) 0.4 mg SL tablet     aspirin delayed-release 81 mg tablet 81 mg.    pantoprazole (PROTONIX) 40 mg tablet TAKE 1 TABLET TWICE DAILY    ferrous sulfate 325 mg (65 mg iron) tablet TAKE 1 TABLET BY MOUTH ONCE DAILY BEFORE BREAKFAST    famotidine (Pepcid) 20 mg tablet Take 1 Tablet by mouth two (2) times a day.     traZODone (DESYREL) 100 mg tablet At bedtime    folic acid (Lauren Lloyd) 1 mg tablet TAKE 1 TABLET EVERY DAY    norgestimate-ethinyl estradioL (ORTHO-CYCLEN, SPRINTEC) 0.25-35 mg-mcg tab Take 1 Tablet by mouth daily. Generic    metFORMIN ER (GLUCOPHAGE XR) 500 mg tablet Take 1 Tablet by mouth daily (with dinner). cyclobenzaprine (FLEXERIL) 10 mg tablet three (3) times daily as needed. Has not needed    escitalopram oxalate (LEXAPRO) 20 mg tablet Take 20 mg by mouth daily. clonazePAM (KLONOPIN) 1 mg tablet Take 1 mg by mouth in the morning. At bedtime    cholecalciferol, vitamin D3, 50 mcg (2,000 unit) tab Take  by mouth.    colchicine (GLOPEBRA) 0.6 mg/5 mL oral solution Take 0.6 mg by mouth. (Patient not taking: No sig reported)     No current facility-administered medications for this visit. Allergies   Allergen Reactions    Abilify [Aripiprazole] Anxiety     Can not tolerate     Sulfa (Sulfonamide Antibiotics) Hives    Vicodin [Hydrocodone-Acetaminophen] Nausea and Vomiting       Review of Systems:  Constitutional: Positive for fatigue  HEENT: see HPI  Resp: c/o JAFFE, Negative for cough or wheezing   CV: see HPI, Negative for chest pain, dizziness or palpitations  GI: see HPI, Negative for nausea or abdominal pain  MS: Negative for acute myalgias or arthralgias   Neuro: Negative for HA, weakness or paresthesia  Psych: Hx of depression and anxiety     Vitals:    08/25/22 1445   BP: (!) 86/57   Pulse: (!) 125   Resp: 20   Temp: 98.4 °F (36.9 °C)   TempSrc: Oral   SpO2: 100%   Weight: 220 lb 3.2 oz (99.9 kg)   Height: 5' 4\" (1.626 m)       Physical Examination:  General: Well developed, well nourished, in no acute distress  Head: Normocephalic, atraumatic  Eyes: Sclera clear, EOMI  Neck: Normal range of motion  Respiratory: CTAB with symmetrical, unlabored effort  Cardiovascular: Regular rate and rhythm  Extremities: Full range of motion, normal gait  Neurologic: No focal deficits  Psych: Active, alert and oriented. Anxious affect      ICD-10-CM ICD-9-CM    1.  Acute non-recurrent frontal sinusitis  J01.10 461. 1 predniSONE (DELTASONE) 10 mg tablet      cefdinir (OMNICEF) 300 mg capsule      2. Environmental allergies  Z91.09 V15.09 predniSONE (DELTASONE) 10 mg tablet      3. HFrEF (heart failure with reduced ejection fraction) ([facility])  I50.20 428.20       4. ST elevation myocardial infarction involving left anterior descending (LAD) coronary artery ([facility])  I21.02 410.10       5. Anxiety about health  F41.8 300.09           Plan of care:  Diagnoses were discussed in detail with patient. Medications reviewed and appropriate. Patient to continue current prescribed medications as written. Medication risks/benefits/side effects discussed with patient. All of the patient's questions were addressed and answered to apparent satisfaction. The patient understands and agrees with our plan of care. The patient knows to call back if they have questions about the plan of care or if symptoms change. The patient received an After-Visit Summary which contains VS, diagnoses, orders, allergy and medication lists. Future Appointments   Date Time Provider Komal Rodriguez   8/31/2022  9:40 AM DO MEL Dowd BS AMB   9/26/2022  8:40 [facility]           Follow-up and Dispositions    Return in about 4 weeks (around 9/22/2022), or if symptoms worsen or fail to improve.

## 2022-08-31 NOTE — TELEPHONE ENCOUNTER
Patient is calling because she is not sure if her medications that the doctor prescribe her have been called into the pharmacy. She went by there and they had no medications ready for her. Patient says that she doesn't see the new medication on her sheet rosuvastatin that the doctor prescribed.     [phone]

## 2022-08-31 NOTE — PROGRESS NOTES
Ra Gill is a 38 y.o. female    Chief Complaint   Patient presents with    Hospital Follow Up    Cherelle for stent     Some pain in her arms and legs maybe due to statin and medication. Having some bruising     Chest pain some CP could be anxiety     SOB some SOB; has sinus infection    Dizziness No    Swelling No    Refills No    Visit Vitals  /68 (BP 1 Location: Left upper arm, BP Patient Position: Sitting)   Pulse 100   Ht 5' 4\" (1.626 m)   Wt 219 lb (99.3 kg)   LMP 08/18/2022 (Approximate)   SpO2 96%   BMI 37.59 kg/m²       1. Have you been to the ER, urgent care clinic since your last visit? Hospitalized since your last visit? ED 8/13-8/14    2. Have you seen or consulted any other health care providers outside of the [address] [place] [address] since your last visit? Include any pap smears or colon screening.  No

## 2022-08-31 NOTE — PROGRESS NOTES
Reviewed chart today. Goals        Prevent complications post hospitalization. 08/15/22  Daily weights- patient has working scales and will weigh in the morning in similar bedtime attire before eating and after voiding. She will record weight in a log. Patient will follow low sodium diet/fluid restrictions as recommended by provider. HF Information sent via [employer] to patient for review. Heart failure zones- information sheet sent to patient via [employer], will plan to review at follow up outreach. Medication compliance-patient will take diuretic daily as directed  Patient will monitor  and report following (red flags):  - Weight gain of 2-3 lbs. in one day or 5 lbs. in one week  -increased SOB, feeling more tired or no energy, dizziness  -decreased urine output  -chest pain or difficulty breathing  -increase swelling to feet, ankles, legs or stomach    08/24/22  Weighing daily- today 219 lbs. Weight is stable +/- 1-2 lbs. Maintaining low sodium diet  Denies LE edema, CP, SOB  She has congestion and cough. Started taking mucinex last night prescribed by provider yesterday. Not coughing much up, secretions are thin. Has pulse ox with readings of ~96 %  Will test for COVID at home. Will see PCP tomorrow. 08/31/22  Patient saw provider at [facility] office on 8/25 & 8/29  Attended cards follow up today.

## 2022-08-31 NOTE — TELEPHONE ENCOUNTER
Returned patient's call reviewed medications and changes that were done per Dr. Wale Moore at her office visit today. Patient verbalized understanding.

## 2022-09-02 ENCOUNTER — TELEPHONE (OUTPATIENT)
Dept: CARDIOLOGY CLINIC | Age: 42
End: 2022-09-02

## 2022-09-02 NOTE — TELEPHONE ENCOUNTER
Patient called to advise her blood pressure hs dropped to 69/54          PHONE:[phone]      Transferred to [facility]

## 2022-09-02 NOTE — TELEPHONE ENCOUNTER
Spoke with patient she is not sure if her BP is correct or not. She has not taken it recently. She currently is not experiencing any dizziness or lightheaded. No CP or SOB noted. Patient will continue to monitor and if it drops too low she will be seen at the urgent care center. She will call us Tuesday with an update.

## 2022-09-06 NOTE — TELEPHONE ENCOUNTER
Returned patient's call she was upset that she is not feeling better but worse. She is fatigue, SOB with any activity. Increased joint pain since starting the Rosuvastatin  and not sleeping well. Patient's BP today was 88/65 yesterday was 111/75 she feels it was elevated due to her being in pain and crying. Patient is taking all medications as prescribed. Please advise.

## 2022-09-06 NOTE — TELEPHONE ENCOUNTER
Patient calling back again about her meds because her meds had her in the bed all weekend and she not feeling any better    She can be reached at [phone]    [facility]

## 2022-09-06 NOTE — TELEPHONE ENCOUNTER
Called patient after speaking with   Dr. Abbi Telles  he would like her to HOLD Lasix 40 mg daily. Continue to take other medications as prescribed and keep a log of BP readings check it morning and afternoon. Patient verbalized understanding.

## 2022-09-06 NOTE — TELEPHONE ENCOUNTER
Patient called again stating her blood pressure is  70/60. Patient requests to discuss her issue with the doctor.           AWPIC:[phone]

## 2022-09-07 PROBLEM — I50.22 CHRONIC SYSTOLIC (CONGESTIVE) HEART FAILURE (HCC): Status: ACTIVE | Noted: 2022-09-07

## 2022-09-07 NOTE — PROGRESS NOTES
[facility] [address], [address] [facility], [address] [place] [address] [place]    Office [phone],FDX [phone]           Blu Johnson is a 38 y.o. female presents to the office for follow-up evaluation of coronary artery disease and ischemic cardiomyopathy      Assessment/Recommendations:      ICD-10-CM ICD-9-CM    1. Coronary artery disease involving native coronary artery of native heart without angina pectoris  I25.10 414.01 NT-PRO BNP      METABOLIC PANEL, BASIC      2. Ischemic cardiomyopathy  I25.5 414.8 furosemide (LASIX) 20 mg tablet      3. History of myocardial infarction  I25.2 412       4. Chronic systolic (congestive) heart failure  I50.22 428.22      428.0           Coronary artery disease, LAD STEMI 8/2022 treated at [address] [facility] mid LAD stenting with a 3.75X15 mm Xience skypoint RICKY  -Continue dual antiplatelet therapy with aspirin and prasugrel  -Reports having myopathies with atorvastatin therapy will trial Crestor 20 mg daily. Discussed with patient long-term importance of statin therapy given her premature coronary artery disease. Normal LP(a)  -Cardiac rehab. Patient does not want to partake in current cardiac rehab in [place], will refer to [facility] cardiac rehab    Ischemic cardiomyopathy. Recent LVEF 20 to 25%. Will trial half dose step 1. Carvedilol 3.125 mg twice daily. Add Jardiance 10 mg daily. Continue Lasix at 40 mg daily, twice daily as needed. Likely will wean Lasix with ongoing use of Entresto and Jardiance. Severe anxiety and depression. Followed by psychiatry    Primary Care Physician- Rusty Solorzano MD    Follow-up 1 month        Subjective:  38 y.o. into the office for follow-up evaluation. Recently seen in the hospital with increased shortness of breath. LVEF remains reduced at 20 to 25%. Continues to have class II-III dyspnea symptoms. Mild orthopnea.   Does report some ongoing swelling. Reports myopathies with atorvastatin therapy. Adherent to aspirin and prasugrel therapy. Past Medical History:   Diagnosis Date    Anemia NEC     Arthritis     Chronic pain     fybromyalsia    Depression     anxiety, depression, OCD    GERD (gastroesophageal reflux disease)     Hypertension     Hypertension     Ill-defined condition     Fibromyalia gastricparesis    Musculoskeletal disorder     SOB (shortness of breath)     Stool color black         Past Surgical History:   Procedure Laterality Date    HX GYN           HX HEENT  2002    wisdom teeth extraction    UPPER GI ENDOSCOPY,BIOPSY  2018         UPPER GI ENDOSCOPY,DIAGNOSIS  2018              Current Outpatient Medications:     empagliflozin (Jardiance) 10 mg tablet, Take 1 Tablet by mouth daily. , Disp: 14 Tablet, Rfl: 0    empagliflozin (Jardiance) 10 mg tablet, Take 1 Tablet by mouth daily. , Disp: 30 Tablet, Rfl: 1    sacubitriL-valsartan (Entresto) 24-26 mg tablet, Take 0.5 Tablets by mouth two (2) times a day., Disp: 30 Tablet, Rfl: 1    carvediloL (COREG) 3.125 mg tablet, Take 1 Tablet by mouth two (2) times a day., Disp: 60 Tablet, Rfl: 1    rosuvastatin (CRESTOR) 20 mg tablet, Take 1 Tablet by mouth nightly., Disp: 30 Tablet, Rfl: 1    furosemide (LASIX) 20 mg tablet, Take 2 Tablets by mouth daily. , Disp: 60 Tablet, Rfl: 1    montelukast (SINGULAIR) 10 mg tablet, TAKE 1 TABLET EVERY DAY, Disp: 90 Tablet, Rfl: 1    levocetirizine (XYZAL) 5 mg tablet, TAKE 1 TABLET BY MOUTH DAILY, Disp: 90 Tablet, Rfl: 1    guaiFENesin ER (MUCINEX) 600 mg ER tablet, Take 1 Tablet by mouth two (2) times a day., Disp: 60 Tablet, Rfl: 0    hydrocortisone (ANUSOL-HC) 2.5 % rectal cream, Insert  into rectum four (4) times daily. , Disp: 30 g, Rfl: 0    diazePAM (VALIUM) 5 mg tablet, 5 mg. 5 mg, 3 times daily, Disp: , Rfl:     albuterol-ipratropium (DUO-NEB) 2.5 mg-0.5 mg/3 ml nebu, 3 mL by Nebulization route four (4) times daily. , Disp: , Rfl:     fluticasone propionate (FLONASE) 50 mcg/actuation nasal spray, 2 Sprays by Both Nostrils route daily. , Disp: , Rfl:     ipratropium (ATROVENT) 21 mcg (0.03 %) nasal spray, 1 [place] by Both Nostrils route every twelve (12) hours. , Disp: 30 mL, Rfl: 0    acetaminophen (TYLENOL) 325 mg tablet, Take  by mouth every four (4) hours as needed for Pain., Disp: , Rfl:     co-enzyme Q-10 (CO Q-10) 100 mg capsule, Take 300 mg by mouth daily. 200 mg, Disp: , Rfl:     albuterol (PROVENTIL HFA, VENTOLIN HFA, PROAIR HFA) 90 mcg/actuation inhaler, Take  by inhalation. , Disp: , Rfl:     prasugreL (EFFIENT) 10 mg tablet, , Disp: , Rfl:     nitroglycerin (NITROSTAT) 0.4 mg SL tablet, , Disp: , Rfl:     aspirin delayed-release 81 mg tablet, 81 mg., Disp: , Rfl:     pantoprazole (PROTONIX) 40 mg tablet, TAKE 1 TABLET TWICE DAILY, Disp: 180 Tablet, Rfl: 1    ferrous sulfate 325 mg (65 mg iron) tablet, TAKE 1 TABLET BY MOUTH ONCE DAILY BEFORE BREAKFAST, Disp: 90 Tablet, Rfl: 1    famotidine (Pepcid) 20 mg tablet, Take 1 Tablet by mouth two (2) times a day., Disp: 180 Tablet, Rfl: 1    traZODone (DESYREL) 100 mg tablet, At bedtime, Disp: , Rfl:     folic acid (FOLVITE) 1 mg tablet, TAKE 1 TABLET EVERY DAY, Disp: 90 Tablet, Rfl: 1    norgestimate-ethinyl estradioL (ORTHO-CYCLEN, SPRINTEC) 0.25-35 mg-mcg tab, Take 1 Tablet by mouth daily. Generic, Disp: 3 Dose Pack, Rfl: 3    metFORMIN ER (GLUCOPHAGE XR) 500 mg tablet, Take 1 Tablet by mouth daily (with dinner). , Disp: 90 Tablet, Rfl: 1    cyclobenzaprine (FLEXERIL) 10 mg tablet, three (3) times daily as needed. Has not needed, Disp: , Rfl:     escitalopram oxalate (LEXAPRO) 20 mg tablet, Take 20 mg by mouth daily. , Disp: , Rfl:     clonazePAM (KLONOPIN) 1 mg tablet, Take 1 mg by mouth in the morning.  At bedtime, Disp: , Rfl:     cholecalciferol, vitamin D3, 50 mcg (2,000 unit) tab, Take  by mouth., Disp: , Rfl:     Allergies   Allergen Reactions    Abilify [Aripiprazole] Anxiety     Can not tolerate     Sulfa (Sulfonamide Antibiotics) Hives    Vicodin [Hydrocodone-Acetaminophen] Nausea and Vomiting        Family History   Problem Relation Age of Onset    Hypertension Father     Elevated Lipids Father     Arthritis-rheumatoid Mother         ? Lupus vs RA    Lung Disease Mother     Heart Disease Mother     Cancer Mother         breast in 39y    COPD Mother     Hypertension Mother     Stroke Mother         3-4 strokes    Breast Cancer Mother 50    Diabetes Maternal Grandmother        Social History     Tobacco Use    Smoking status: Former     Packs/day: 0.25     Years: 8.00     Pack years: 2.00     Types: Cigarettes     Quit date: 2022     Years since quittin.1    Smokeless tobacco: Never   Substance Use Topics    Alcohol use: Not Currently     Alcohol/week: 1.0 standard drink     Types: 1 Glasses of wine per week     Comment: 1 cup of wine/week    Drug use: Yes     Types: Marijuana       Review of Symptoms:  Pertinent Positive shortness of breath, orthopnea  Pertinent Negative:no chest pain  All Other systems reviewed and are negative for a Comprehensive ROS (10+)    Physical Exam    Blood pressure 118/68, pulse 100, height 5' 4\" (1.626 m), weight 219 lb (99.3 kg), last menstrual period 2022, SpO2 96 %. Constitutional:  well-developed and well-nourished. No distress. HENT: Normocephalic. Eyes: No scleral icterus. Neck:  Neck supple. No JVD present. Pulmonary/Chest: Effort normal and breath sounds normal. No respiratory distress, wheezes or rales. Cardiovascular: Normal rate, regular rhythm, S1 S2 . Exam reveals no gallop and no friction rub. No murmur heard. No edema. Extremities:  Normal muscle tone  Abdominal:   No abnormal distension. Neurological:  Moving all extremities, cranial nerves appear grossly intact. Skin: Skin is not cold. Not diaphoretic. No erythema. Psychiatric:  Grossly normal mood and affect.   Intact insight. Objective Data: Investigations personally reviewed and interpreted            Investigations reviewed     Records [place] [facility]  Catheterization 7/24/2022.  70-80% tubular stenosis in mid LAD with overlying thrombus. Treated with 3.25X 15 mm Xience jarvis point RICKY. Echocardiogram LVEF 15 to 20% with regional wall motion abnormalities. ECHO ADULT COMPLETE 08/14/2022 8/14/2022    Interpretation Summary  Formatting of this result is different from the original.      Left Ventricle: Severely reduced left ventricular systolic function with a visually estimated EF of 20 - 25%. Left ventricle is mildly dilated. Increased wall thickness. See diagram for wall motion findings. Grade II diastolic dysfunction with increased LAP. Right Ventricle: Not well visualized. Tricuspid Valve: Moderately elevated RVSP. Signed by: Marley Kolb DO on 8/14/2022  2:53 PM          Marky Fernández DO          ATTENTION:   This medical record was transcribed using an electronic medical records/speech recognition system. Although proofread, it may and can contain electronic, spelling and other errors. Corrections may be executed at a later time. Please feel free to contact us for any clarifications as needed.

## 2022-09-08 ENCOUNTER — TELEPHONE (OUTPATIENT)
Dept: CARDIOLOGY CLINIC | Age: 42
End: 2022-09-08

## 2022-09-08 ENCOUNTER — OFFICE VISIT (OUTPATIENT)
Dept: FAMILY MEDICINE CLINIC | Age: 42
End: 2022-09-08
Payer: MEDICARE

## 2022-09-08 VITALS
HEART RATE: 85 BPM | DIASTOLIC BLOOD PRESSURE: 74 MMHG | OXYGEN SATURATION: 99 % | RESPIRATION RATE: 20 BRPM | HEIGHT: 64 IN | BODY MASS INDEX: 37.22 KG/M2 | SYSTOLIC BLOOD PRESSURE: 97 MMHG | WEIGHT: 218 LBS | TEMPERATURE: 98.2 F

## 2022-09-08 DIAGNOSIS — N63.0 BREAST MASS IN FEMALE: Primary | ICD-10-CM

## 2022-09-08 DIAGNOSIS — N64.4 BREAST PAIN IN FEMALE: ICD-10-CM

## 2022-09-08 PROCEDURE — 99214 OFFICE O/P EST MOD 30 MIN: CPT | Performed by: FAMILY MEDICINE

## 2022-09-08 RX ORDER — TERCONAZOLE 4 MG/G
1 CREAM VAGINAL
Qty: 45 G | Refills: 0 | Status: SHIPPED | OUTPATIENT
Start: 2022-09-08 | End: 2022-09-26

## 2022-09-08 RX ORDER — METFORMIN HYDROCHLORIDE 500 MG/1
500 TABLET, EXTENDED RELEASE ORAL
Qty: 90 TABLET | Refills: 1 | Status: ON HOLD | OUTPATIENT
Start: 2022-09-08

## 2022-09-08 RX ORDER — NORGESTIMATE AND ETHINYL ESTRADIOL 0.25-0.035
1 KIT ORAL DAILY
Qty: 3 DOSE PACK | Refills: 3 | Status: CANCELLED | OUTPATIENT
Start: 2022-09-08

## 2022-09-08 NOTE — LETTER
9/8/2022 12:38 PM    Ms. Ishaan Le  [address] [facility] [address]    Allergies   Allergen Reactions    Abilify [Aripiprazole] Anxiety     Can not tolerate     Sulfa (Sulfonamide Antibiotics) Hives    Vicodin [Hydrocodone-Acetaminophen] Nausea and Vomiting     Current Outpatient Medications   Medication Sig    metFORMIN ER (GLUCOPHAGE XR) 500 mg tablet Take 1 Tablet by mouth daily (with dinner).  terconazole (TERAZOL 7) 0.4 % vaginal cream Insert 1 Applicator into vagina nightly.  empagliflozin (Jardiance) 10 mg tablet Take 1 Tablet by mouth daily.  sacubitriL-valsartan (Entresto) 24-26 mg tablet Take 0.5 Tablets by mouth two (2) times a day.  carvediloL (COREG) 3.125 mg tablet Take 1 Tablet by mouth two (2) times a day.  rosuvastatin (CRESTOR) 20 mg tablet Take 1 Tablet by mouth nightly.  montelukast (SINGULAIR) 10 mg tablet TAKE 1 TABLET EVERY DAY    levocetirizine (XYZAL) 5 mg tablet TAKE 1 TABLET BY MOUTH DAILY    guaiFENesin ER (MUCINEX) 600 mg ER tablet Take 1 Tablet by mouth two (2) times a day.  hydrocortisone (ANUSOL-HC) 2.5 % rectal cream Insert  into rectum four (4) times daily.  diazePAM (VALIUM) 5 mg tablet 5 mg. 5 mg, 3 times daily    albuterol-ipratropium (DUO-NEB) 2.5 mg-0.5 mg/3 ml nebu 3 mL by Nebulization route four (4) times daily.  fluticasone propionate (FLONASE) 50 mcg/actuation nasal spray 2 Sprays by Both Nostrils route daily.  ipratropium (ATROVENT) 21 mcg (0.03 %) nasal spray 1 [place] by Both Nostrils route every twelve (12) hours.  acetaminophen (TYLENOL) 325 mg tablet Take  by mouth every four (4) hours as needed for Pain.  co-enzyme Q-10 (CO Q-10) 100 mg capsule Take 300 mg by mouth daily. 200 mg    albuterol (PROVENTIL HFA, VENTOLIN HFA, PROAIR HFA) 90 mcg/actuation inhaler Take  by inhalation.  prasugreL (EFFIENT) 10 mg tablet     nitroglycerin (NITROSTAT) 0.4 mg SL tablet     aspirin delayed-release 81 mg tablet 81 mg.  pantoprazole (PROTONIX) 40 mg tablet TAKE 1 TABLET TWICE DAILY    ferrous sulfate 325 mg (65 mg iron) tablet TAKE 1 TABLET BY MOUTH ONCE DAILY BEFORE BREAKFAST    famotidine (Pepcid) 20 mg tablet Take 1 Tablet by mouth two (2) times a day.  traZODone (DESYREL) 100 mg tablet At bedtime    folic acid (FOLVITE) 1 mg tablet TAKE 1 TABLET EVERY DAY    cyclobenzaprine (FLEXERIL) 10 mg tablet three (3) times daily as needed. Has not needed    escitalopram oxalate (LEXAPRO) 20 mg tablet Take 20 mg by mouth daily.  clonazePAM (KLONOPIN) 1 mg tablet Take 1 mg by mouth in the morning. At bedtime    cholecalciferol, vitamin D3, 50 mcg (2,000 unit) tab Take  by mouth.  furosemide (LASIX) 20 mg tablet Take 2 Tablets by mouth daily. (Patient not taking: Reported on 9/8/2022)     No current facility-administered medications for this visit.

## 2022-09-08 NOTE — TELEPHONE ENCOUNTER
Patient called to advise of blood pressure readings        09/08/2022  87/72 92p 11:52am  97/74  9:00 am    09/07/2022  100/82 92 10:00am (prior to taking meds)        PHONE:[phone]

## 2022-09-08 NOTE — PROGRESS NOTES
[facility]    History of Present Illness:   Lilliana Yusuf is a 38 y.o. female here for   Chief Complaint   Patient presents with    Breast pain     Tenderness and itching          HPI:  Here for bilateral breast pain and nipple itching. Normal mammogram in April. She denies any nipple discharge. She is known to have a benign lesion on her left breast but says she had a recent CAT scan done in the hospital that showed something on her right. CTA CHEST W OR W WO CONT     Result Date: 2022  No pulmonary embolus with small bilateral pleural effusions and overlying atelectasis. Incidentals as above including right breast asymmetry for which diagnostic mammogram and possible ultrasound is recommended. She also request refill of metformin and her oral contraceptive pill. Of note she recently had a heart attack and is followed by the cardiology service. She is not presently sexually active. She also complains of recent vaginal irritation. Health Maintenance  Health Maintenance Due   Topic Date Due    Pneumococcal 0-64 years (1 - PCV) Never done    Flu Vaccine (1) Never done       Past Medical, Family, and Social History:     Past Medical History:   Diagnosis Date    Anemia NEC     Arthritis     Chronic pain     fybromyalsia    Depression     anxiety, depression, OCD    GERD (gastroesophageal reflux disease)     Hypertension     Hypertension     Ill-defined condition     Fibromyalia gastricparesis    Musculoskeletal disorder     SOB (shortness of breath)     Stool color black       Past Surgical History:   Procedure Laterality Date    HX GYN           HX HEENT  2002    wisdom teeth extraction    UPPER GI ENDOSCOPY,BIOPSY  2018         UPPER GI ENDOSCOPY,DIAGNOSIS  2018            Current Outpatient Medications on File Prior to Visit   Medication Sig Dispense Refill    empagliflozin (Jardiance) 10 mg tablet Take 1 Tablet by mouth daily.  30 Tablet 1 sacubitriL-valsartan (Entresto) 24-26 mg tablet Take 0.5 Tablets by mouth two (2) times a day. 30 Tablet 1    carvediloL (COREG) 3.125 mg tablet Take 1 Tablet by mouth two (2) times a day. 60 Tablet 1    rosuvastatin (CRESTOR) 20 mg tablet Take 1 Tablet by mouth nightly. 30 Tablet 1    montelukast (SINGULAIR) 10 mg tablet TAKE 1 TABLET EVERY DAY 90 Tablet 1    levocetirizine (XYZAL) 5 mg tablet TAKE 1 TABLET BY MOUTH DAILY 90 Tablet 1    guaiFENesin ER (MUCINEX) 600 mg ER tablet Take 1 Tablet by mouth two (2) times a day. 60 Tablet 0    hydrocortisone (ANUSOL-HC) 2.5 % rectal cream Insert  into rectum four (4) times daily. 30 g 0    diazePAM (VALIUM) 5 mg tablet 5 mg. 5 mg, 3 times daily      albuterol-ipratropium (DUO-NEB) 2.5 mg-0.5 mg/3 ml nebu 3 mL by Nebulization route four (4) times daily. fluticasone propionate (FLONASE) 50 mcg/actuation nasal spray 2 Sprays by Both Nostrils route daily. ipratropium (ATROVENT) 21 mcg (0.03 %) nasal spray 1 [place] by Both Nostrils route every twelve (12) hours. 30 mL 0    acetaminophen (TYLENOL) 325 mg tablet Take  by mouth every four (4) hours as needed for Pain. co-enzyme Q-10 (CO Q-10) 100 mg capsule Take 300 mg by mouth daily. 200 mg      albuterol (PROVENTIL HFA, VENTOLIN HFA, PROAIR HFA) 90 mcg/actuation inhaler Take  by inhalation. prasugreL (EFFIENT) 10 mg tablet       nitroglycerin (NITROSTAT) 0.4 mg SL tablet       aspirin delayed-release 81 mg tablet 81 mg.      pantoprazole (PROTONIX) 40 mg tablet TAKE 1 TABLET TWICE DAILY 180 Tablet 1    ferrous sulfate 325 mg (65 mg iron) tablet TAKE 1 TABLET BY MOUTH ONCE DAILY BEFORE BREAKFAST 90 Tablet 1    famotidine (Pepcid) 20 mg tablet Take 1 Tablet by mouth two (2) times a day. 180 Tablet 1    traZODone (DESYREL) 100 mg tablet At bedtime      folic acid (FOLVITE) 1 mg tablet TAKE 1 TABLET EVERY DAY 90 Tablet 1    cyclobenzaprine (FLEXERIL) 10 mg tablet three (3) times daily as needed.  Has not needed escitalopram oxalate (LEXAPRO) 20 mg tablet Take 20 mg by mouth daily. clonazePAM (KLONOPIN) 1 mg tablet Take 1 mg by mouth in the morning. At bedtime      cholecalciferol, vitamin D3, 50 mcg (2,000 unit) tab Take  by mouth. furosemide (LASIX) 20 mg tablet Take 2 Tablets by mouth daily. (Patient not taking: Reported on 9/8/2022) 60 Tablet 1    [DISCONTINUED] empagliflozin (Jardiance) 10 mg tablet Take 1 Tablet by mouth daily. (Patient not taking: Reported on 9/8/2022) 14 Tablet 0    [DISCONTINUED] norgestimate-ethinyl estradioL (ORTHO-CYCLEN, SPRINTEC) 0.25-35 mg-mcg tab Take 1 Tablet by mouth daily. Generic 3 Dose Pack 3    [DISCONTINUED] metFORMIN ER (GLUCOPHAGE XR) 500 mg tablet Take 1 Tablet by mouth daily (with dinner). 90 Tablet 1     No current facility-administered medications on file prior to visit. Patient Active Problem List   Diagnosis Code    Depression F32. A    GERD (gastroesophageal reflux disease) K21.9    Anemia, unspecified D64.9    Itch of skin L29.9    Anxiety F41.9    Costochondritis M94.0    HSV (herpes simplex virus) anogenital infection A60.9    OCD (obsessive compulsive disorder) F42.9    Hoarseness R49.0    IFG (impaired fasting glucose) R73.01    Hyperlipidemia E78.5    Thrombosed external hemorrhoid K64.5    Vitamin D deficiency E55.9    Inflammatory arthritis M19.90    Recurrent depression (HCC) F33.9    Mood disorder ([facility]) F39    Chronic pain syndrome G89.4    Fibromyalgia M79.7    Panic attack F41.0    Tremor R25.1    Class 2 obesity due to excess calories without serious comorbidity in adult E66.09    Gastroparesis K31.84    Plantar fasciitis of left foot M72.2    Positive KITTY (antinuclear antibody) R76.8    Severe obesity (HCC) E66.01    Neuropathy G62.9    Acute exacerbation of CHF (congestive heart failure) ([facility]) I50.9    Coronary artery disease involving native coronary artery of native heart without angina pectoris I25.10    Chronic systolic (congestive) heart failure I50.22       Social History     Socioeconomic History    Marital status: SINGLE   Tobacco Use    Smoking status: Former     Packs/day: 0.25     Years: 8.00     Pack years: 2.00     Types: Cigarettes     Quit date: 2022     Years since quittin.1    Smokeless tobacco: Never   Substance and Sexual Activity    Alcohol use: Not Currently     Alcohol/week: 1.0 standard drink     Types: 1 Glasses of wine per week     Comment: 1 cup of wine/week    Drug use: Yes     Types: Marijuana    Sexual activity: Yes     Partners: Male     Birth control/protection: None     Social Determinants of Health     Financial Resource Strain: High Risk    Difficulty of Paying Living Expenses: Very hard   Food Insecurity: No Food Insecurity    Worried About Running Out of Food in the Last Year: Never true    Ran Out of Food in the Last Year: Never true        Review of Systems   Review of Systems   Constitutional:  Negative for chills and fever. Respiratory:  Negative for cough and shortness of breath. Cardiovascular:  Negative for chest pain. Objective:   Visit Vitals  BP 97/74 (BP 1 Location: Left upper arm, BP Patient Position: Sitting, BP Cuff Size: Adult long)   Pulse 85   Temp 98.2 °F (36.8 °C) (Oral)   Resp 20   Ht 5' 4\" (1.626 m)   Wt 218 lb (98.9 kg)   LMP 2022 (Approximate)   SpO2 99%   BMI 37.42 kg/m²        Physical Exam  PHYSICAL EXAM:  Gen: Pt sitting in chair, in NAD  Head: Normocephalic, atraumatic  Eyes: Sclera anicteric, EOM grossly intact,  CVS: Normal S1, S2, no m/r/g  Resp: CTAB, no wheezes or rales  Breasts: right breast normal without mass, skin or nipple changes or axillary nodes, left breast  withou skin or nipple changes or axillary nodes, left abnormal mass palpable 6 oclock, ?lipoma.    Neuro: Alert, oriented, appropriate    Pertinent Labs/Studies:  3 most recent PHQ Screens 2022   PHQ Not Done Patient refuses   Little interest or pleasure in doing things -   Feeling down, depressed, irritable, or hopeless -   Total Score PHQ 2 -   Trouble falling or staying asleep, or sleeping too much -   Feeling tired or having little energy -   Poor appetite, weight loss, or overeating -   Feeling bad about yourself - or that you are a failure or have let yourself or your family down -   Trouble concentrating on things such as school, work, reading, or watching TV -   Moving or speaking so slowly that other people could have noticed; or the opposite being so fidgety that others notice -   Thoughts of being better off dead, or hurting yourself in some way -   PHQ 9 Score -   How difficult have these problems made it for you to do your work, take care of your home and get along with others -      No flowsheet data found. Assessment and orders:       ICD-10-CM ICD-9-CM    1. Breast mass in female  N63.0 611.72 US BREAST LT LIMITED=<3 QUAD      TERRENCE MAMMO RT DX INCL CAD      2. Breast pain in female  N64.4 611.71 TERRENCE MAMMO RT DX INCL CAD        Diagnoses and all orders for this visit:    1. Breast mass in female  -     US BREAST LT LIMITED=<3 QUAD; Future  -     TERRENCE MAMMO RT DX INCL CAD; Future    2. Breast pain in female  -     TERRENCE MAMMO RT DX INCL CAD; Future    Other orders  -     metFORMIN ER (GLUCOPHAGE XR) 500 mg tablet; Take 1 Tablet by mouth daily (with dinner). -     terconazole (TERAZOL 7) 0.4 % vaginal cream; Insert 1 Applicator into vagina nightly. the following changes in treatment are made: I discussed increased risks with estrogens with patient at length today and she would like to consider having a Nexplanon placed. We will refer her to resident clinic here to have that done. radiology results and schedule of future radiology studies reviewed with patient  Keep scheduled follow-up with PCP September 26  Spent 33 min with patient, reviewing chart and face to face exam, clinical documentation.   I have discussed the diagnosis with the patient and the intended plan as seen in the above orders. Social history, medical history, and labs were reviewed. The patient has received an after-visit summary and questions were answered concerning future plans. I have discussed medication side effects and warnings with the patient as well. Patient/guardian verbalized understanding and accepts plan & risks. Please note that this dictation was completed with [employer], the computer voice recognition software. Quite often unanticipated grammatical, syntax, homophones, and other interpretive errors are inadvertently transcribed by the computer software. Please disregard these errors. Please excuse any errors that have escaped final proofreading. Thank you.      MD URBAN Garza & MARSHAL CARMEN [facility]  09/08/22

## 2022-09-08 NOTE — PROGRESS NOTES
1. \"Have you been to the ER, urgent care clinic since your last visit? Hospitalized since your last visit? \" No    2. \"Have you seen or consulted any other health care providers outside of the [address] [place] [address] since your last visit? \" No     3. For patients aged 39-70: Has the patient had a colonoscopy / FIT/ Cologuard? NA - based on age      If the patient is female:    4. For patients aged 41-77: Has the patient had a mammogram within the past 2 years? Yes - no Care Gap present      5. For patients aged 21-65: Has the patient had a pap smear?  Yes - no Care Gap present    Health Maintenance Due   Topic Date Due    Pneumococcal 0-64 years (1 - PCV) Never done    Flu Vaccine (1) Never done

## 2022-09-09 ENCOUNTER — OFFICE VISIT (OUTPATIENT)
Dept: CARDIOLOGY CLINIC | Age: 42
End: 2022-09-09
Payer: MEDICARE

## 2022-09-09 VITALS
WEIGHT: 218 LBS | SYSTOLIC BLOOD PRESSURE: 98 MMHG | BODY MASS INDEX: 37.22 KG/M2 | DIASTOLIC BLOOD PRESSURE: 80 MMHG | HEIGHT: 64 IN | OXYGEN SATURATION: 97 % | HEART RATE: 97 BPM

## 2022-09-09 DIAGNOSIS — I25.10 CORONARY ARTERY DISEASE INVOLVING NATIVE CORONARY ARTERY OF NATIVE HEART WITHOUT ANGINA PECTORIS: ICD-10-CM

## 2022-09-09 DIAGNOSIS — I25.2 HISTORY OF ST ELEVATION MYOCARDIAL INFARCTION (STEMI): ICD-10-CM

## 2022-09-09 DIAGNOSIS — I25.5 ISCHEMIC CARDIOMYOPATHY: Primary | ICD-10-CM

## 2022-09-09 DIAGNOSIS — I50.22 CHRONIC SYSTOLIC (CONGESTIVE) HEART FAILURE (HCC): ICD-10-CM

## 2022-09-09 PROCEDURE — G8427 DOCREV CUR MEDS BY ELIG CLIN: HCPCS | Performed by: NURSE PRACTITIONER

## 2022-09-09 PROCEDURE — G9717 DOC PT DX DEP/BP F/U NT REQ: HCPCS | Performed by: NURSE PRACTITIONER

## 2022-09-09 PROCEDURE — 1111F DSCHRG MED/CURRENT MED MERGE: CPT | Performed by: NURSE PRACTITIONER

## 2022-09-09 PROCEDURE — G8417 CALC BMI ABV UP PARAM F/U: HCPCS | Performed by: NURSE PRACTITIONER

## 2022-09-09 PROCEDURE — 99214 OFFICE O/P EST MOD 30 MIN: CPT | Performed by: NURSE PRACTITIONER

## 2022-09-09 RX ORDER — EZETIMIBE 10 MG/1
10 TABLET ORAL DAILY
Qty: 90 TABLET | Refills: 0 | Status: ON HOLD | OUTPATIENT
Start: 2022-09-09

## 2022-09-09 RX ORDER — ROSUVASTATIN CALCIUM 20 MG/1
20 TABLET, COATED ORAL
Qty: 30 TABLET | Refills: 0
Start: 2022-09-09 | End: 2022-09-23 | Stop reason: SDUPTHER

## 2022-09-09 RX ORDER — FUROSEMIDE 40 MG/1
40 TABLET ORAL DAILY
Qty: 30 TABLET | Refills: 0 | Status: SHIPPED | OUTPATIENT
Start: 2022-09-09 | End: 2022-10-05

## 2022-09-09 NOTE — PROGRESS NOTES
Chief Complaint   Patient presents with    Hypertension    Follow-up     There were no vitals taken for this visit. Chest pain - sometimes. SOB -getting worse then when she was in the hospital. Exertion makes it worse. Swelling in hands/feet - NO   Dizziness- sometimes when she bend down. Recent hospital stays - 8/13 [facility] ER   Refills denied     When laying down can not breath well, also felt very dizzy, breathing heavy.    Vitals:    09/09/22 1307 09/09/22 1315 09/09/22 1317   BP: 100/78 90/60 98/80   BP 1 Location: Left upper arm Left upper arm Left upper arm   BP Patient Position: Sitting Sitting Sitting   Pulse: 68 65 97   Height: 5' 4\" (1.626 m)     Weight: 218 lb (98.9 kg)     SpO2: 97% 96% 97%

## 2022-09-09 NOTE — LETTER
9/9/2022    Patient: Eduardo Espana   YOB: 1980   Date of Visit: 9/9/2022     Farhat Riojas MD  [address] [address]  [place]ia [place] [address]  [address]  [place] [address]  Via In [place]    Dear MD Katherine Rose, ,      Thank you for referring Ms. Kojo Tian to [facility] for evaluation. My notes for this consultation are attached. If you have questions, please do not hesitate to call me. I look forward to following your patient along with you.       Sincerely,    Yvan Dupont NP

## 2022-09-09 NOTE — TELEPHONE ENCOUNTER
Returned patient's call she would like to come in and be seen today to discuss her medications and BP. She does not want to go through the weekend feeling this well. Patient seen her PCP yesterday and was advised to follow up with her Cardiologist.    Scheduled appt for 1:20 pm today. Patient verbalized understanding.

## 2022-09-09 NOTE — TELEPHONE ENCOUNTER
Pt called to follow up on previous message, pt stated she is was taken off her diuretic and is not urinating enough, also having sob, please advise      [phone]

## 2022-09-09 NOTE — PROGRESS NOTES
[facility]  [address]  [place], [address] [facility]    Office [phone]  Fax [phone]        Assessment/Recommendations:    ICD-10-CM ICD-9-CM    1. Ischemic cardiomyopathy  I25.5 414.8 furosemide (LASIX) 40 mg tablet      2. Coronary artery disease involving native coronary artery of native heart without angina pectoris  I25.10 414.01       3. History of ST elevation myocardial infarction (STEMI)  I25.2 412       4. Chronic systolic (congestive) heart failure  I50.22 428.22      428.0         Coronary artery disease, LAD STEMI 8/2022 treated at [facility] mid LAD stenting with a 3.75X15 mm Xience skypoint RICKY  -Continue dual antiplatelet therapy with aspirin and prasugrel  -Reports having myopathies with atorvastatin therapy as well as Crestor 20 mg daily. Normal LP(a)  - will change Crestor to 20mg tiw (M/W/F) - start zetia 10mg/d  - no CP c/o - SOB per below. Ischemic cardiomyopathy. Recent LVEF 20 to 25%. - on Entresto half dose step 1 and Carvedilol 3.125 mg twice daily, as well as Jardiance 10 mg daily  - hypotensive on therapy - hm pressures 51S-80X systolic with dizziness - will dc Entresto  - off lasix x1-2d - c/o worsening orthopnea/SOB - no obvious vol on physical exam but difficult to confirm given obesity - restart lasix 40mg/d  - check updated labs as ordered by Dr. Latricia Mckenzie - BMP and proBnP    Severe anxiety and depression. Followed by psychiatry    Fu in [facility] as previously scheduled. Subjective:  Blu Johnson is a 46 y.o. female with a PMHx of CAD s/p PCI (RICKY 7/24/22), STEMI, ICM with sev LV dysfunction treated at [facility] in July, HTN, HLD, anemia, anx/dep who was admitted 8/13/22 to 8/14/22 for acute onset dyspnea. Found to have CHF LVEF 20-25% with vascular congestion and peripheral edema on exam. Patient was diuresed successfully and euvolemic on discharge. ProBNP 6k (prev 124).      On HF therapy with Entresto step 1, coreg, and Jardiance. Hypotensive at home and recently told to hold lasix 40mg/d. Now with increased SOB and orthopnea. Followed OP by Dr. Wale Moore.  BP 80s/60s, 70s/50s since last visit. Checks 1x per day - having assoicated dizziness     Stopped lasix - held dose yesterday then couldn't breathe last night. Very worried about Vol. Breathing had sig improved after hospitalization mid Aug - last 10d worsening again. Family hx of CHF - Mother - CHF -  in 46s of dx. Paternal grandfather - CHF -  in 57s/66s  Joy Rolando Aunt  of CHF     Admits to increased fluid intake >64oz per day - very thirsty on Jardiance. No CP. Leg cramps worse on Crestor - hurts all the time. CoQ 10 not helping. Past Medical History:   Diagnosis Date    Anemia NEC     Arthritis     Chronic pain     fybromyalsia    Depression     anxiety, depression, OCD    GERD (gastroesophageal reflux disease)     Hypertension     Hypertension     Ill-defined condition     Fibromyalia gastricparesis    Musculoskeletal disorder     SOB (shortness of breath)     Stool color black         Past Surgical History:   Procedure Laterality Date    HX GYN           HX HEENT  2002    wisdom teeth extraction    UPPER GI ENDOSCOPY,BIOPSY  2018         UPPER GI ENDOSCOPY,DIAGNOSIS  2018              Current Outpatient Medications:     rosuvastatin (CRESTOR) 20 mg tablet, Take 1 Tablet by mouth every Monday, Wednesday, Friday., Disp: 30 Tablet, Rfl: 0    ezetimibe (ZETIA) 10 mg tablet, Take 1 Tablet by mouth daily. , Disp: 90 Tablet, Rfl: 0    furosemide (LASIX) 40 mg tablet, Take 1 Tablet by mouth daily. , Disp: 30 Tablet, Rfl: 0    metFORMIN ER (GLUCOPHAGE XR) 500 mg tablet, Take 1 Tablet by mouth daily (with dinner). , Disp: 90 Tablet, Rfl: 1    terconazole (TERAZOL 7) 0.4 % vaginal cream, Insert 1 Applicator into vagina nightly., Disp: 45 g, Rfl: 0    empagliflozin (Jardiance) 10 mg tablet, Take 1 Tablet by mouth daily. , Disp: 30 Tablet, Rfl: 1    carvediloL (COREG) 3.125 mg tablet, Take 1 Tablet by mouth two (2) times a day., Disp: 60 Tablet, Rfl: 1    montelukast (SINGULAIR) 10 mg tablet, TAKE 1 TABLET EVERY DAY, Disp: 90 Tablet, Rfl: 1    levocetirizine (XYZAL) 5 mg tablet, TAKE 1 TABLET BY MOUTH DAILY, Disp: 90 Tablet, Rfl: 1    hydrocortisone (ANUSOL-HC) 2.5 % rectal cream, Insert  into rectum four (4) times daily. , Disp: 30 g, Rfl: 0    diazePAM (VALIUM) 5 mg tablet, 5 mg. 5 mg, 3 times daily, Disp: , Rfl:     albuterol-ipratropium (DUO-NEB) 2.5 mg-0.5 mg/3 ml nebu, 3 mL by Nebulization route four (4) times daily. , Disp: , Rfl:     fluticasone propionate (FLONASE) 50 mcg/actuation nasal spray, 2 Sprays by Both Nostrils route daily. , Disp: , Rfl:     ipratropium (ATROVENT) 21 mcg (0.03 %) nasal spray, 1 [place] by Both Nostrils route every twelve (12) hours. , Disp: 30 mL, Rfl: 0    acetaminophen (TYLENOL) 325 mg tablet, Take  by mouth every four (4) hours as needed for Pain., Disp: , Rfl:     albuterol (PROVENTIL HFA, VENTOLIN HFA, PROAIR HFA) 90 mcg/actuation inhaler, Take  by inhalation. , Disp: , Rfl:     prasugreL (EFFIENT) 10 mg tablet, , Disp: , Rfl:     nitroglycerin (NITROSTAT) 0.4 mg SL tablet, , Disp: , Rfl:     aspirin delayed-release 81 mg tablet, 81 mg., Disp: , Rfl:     pantoprazole (PROTONIX) 40 mg tablet, TAKE 1 TABLET TWICE DAILY, Disp: 180 Tablet, Rfl: 1    ferrous sulfate 325 mg (65 mg iron) tablet, TAKE 1 TABLET BY MOUTH ONCE DAILY BEFORE BREAKFAST, Disp: 90 Tablet, Rfl: 1    famotidine (Pepcid) 20 mg tablet, Take 1 Tablet by mouth two (2) times a day., Disp: 180 Tablet, Rfl: 1    traZODone (DESYREL) 100 mg tablet, At bedtime, Disp: , Rfl:     folic acid (FOLVITE) 1 mg tablet, TAKE 1 TABLET EVERY DAY, Disp: 90 Tablet, Rfl: 1    cyclobenzaprine (FLEXERIL) 10 mg tablet, three (3) times daily as needed.  Has not needed, Disp: , Rfl:     escitalopram oxalate (LEXAPRO) 20 mg tablet, Take 20 mg by mouth daily. , Disp: , Rfl:     clonazePAM (KLONOPIN) 1 mg tablet, Take 1 mg by mouth in the morning. At bedtime, Disp: , Rfl:     cholecalciferol, vitamin D3, 50 mcg (2,000 unit) tab, Take  by mouth., Disp: , Rfl:     Allergies   Allergen Reactions    Abilify [Aripiprazole] Anxiety     Can not tolerate     Sulfa (Sulfonamide Antibiotics) Hives    Vicodin [Hydrocodone-Acetaminophen] Nausea and Vomiting        Family History   Problem Relation Age of Onset    Hypertension Father     Elevated Lipids Father     Arthritis-rheumatoid Mother         ? Lupus vs RA    Lung Disease Mother     Heart Disease Mother     Cancer Mother         breast in 39y    COPD Mother     Hypertension Mother     Stroke Mother         3-4 strokes    Breast Cancer Mother 50    Diabetes Maternal Grandmother        Social History     Tobacco Use    Smoking status: Former     Packs/day: 0.25     Years: 8.00     Pack years: 2.00     Types: Cigarettes     Quit date: 2022     Years since quittin.1    Smokeless tobacco: Never   Substance Use Topics    Alcohol use: Not Currently     Alcohol/week: 1.0 standard drink     Types: 1 Glasses of wine per week     Comment: 1 cup of wine/week    Drug use: Yes     Types: Marijuana       Review of Symptoms:  Pertinent Positive shortness of breath, orthopnea  Pertinent Negative:no chest pain  All Other systems reviewed and are negative for a Comprehensive ROS (10+)    Physical Exam    Blood pressure 98/80, pulse 97, height 5' 4\" (1.626 m), weight 218 lb (98.9 kg), last menstrual period 2022, SpO2 97 %.     Wt Readings from Last 3 Encounters:   22 218 lb (98.9 kg)   22 218 lb (98.9 kg)   22 219 lb (99.3 kg)     General - well developed well nourished, morbidly obese   Neck - neck supple, unable to appreciate JVP  Cardiac - normal S1, S2, RRR, no appreciable m/r/g  Vascular -  radials pulses equal bilateral  Lungs - clear to auscultation bilaterals, no rales, wheezing or rhonchi  Abd - soft nontender, non-distended, +BS  Extremities - no edema, warm  Neuro - nonfocal  Psych - normal mood and affect    Objective Data: Investigations reviewed     Records [place] general  Catheterization 7/24/2022.  70-80% tubular stenosis in mid LAD with overlying thrombus. Treated with 3.25X 15 mm Xience jarvis point RICKY. Echocardiogram LVEF 15 to 20% with regional wall motion abnormalities. ECHO ADULT COMPLETE 08/14/2022 8/14/2022    Interpretation Summary  Formatting of this result is different from the original.      Left Ventricle: Severely reduced left ventricular systolic function with a visually estimated EF of 20 - 25%. Left ventricle is mildly dilated. Increased wall thickness. See diagram for wall motion findings. Grade II diastolic dysfunction with increased LAP. Right Ventricle: Not well visualized. Tricuspid Valve: Moderately elevated RVSP.     Signed by: Renny Merlin, DO on 8/14/2022  2:53 PM    April Johnson, ANP

## 2022-09-09 NOTE — PATIENT INSTRUCTIONS
Ok to decrease Crestor to M / W / F  Will add zetia 10mg/d     Stop [facility] for now to allow more blood pressure room   Restart lasix 40mg/d     Get labs today   Keep fu as scheduled.

## 2022-09-13 ENCOUNTER — PATIENT OUTREACH (OUTPATIENT)
Dept: CASE MANAGEMENT | Age: 42
End: 2022-09-13

## 2022-09-13 NOTE — PROGRESS NOTES
Patient has graduated from the Transitions of Care Coordination  program on 9/13/2022. Patient/family has the ability to self-manage at this time Care management goals have been completed. Patient was referred back to the [facility] for further management. Goals Addressed                   This Visit's Progress     Prevent complications post hospitalization. 08/15/22  Daily weights- patient has working scales and will weigh in the morning in similar bedtime attire before eating and after voiding. She will record weight in a log. Patient will follow low sodium diet/fluid restrictions as recommended by provider. HF Information sent via [employer] to patient for review. Heart failure zones- information sheet sent to patient via [employer], will plan to review at follow up outreach. Medication compliance-patient will take diuretic daily as directed  Patient will monitor  and report following (red flags):  - Weight gain of 2-3 lbs. in one day or 5 lbs. in one week  -increased SOB, feeling more tired or no energy, dizziness  -decreased urine output  -chest pain or difficulty breathing  -increase swelling to feet, ankles, legs or stomach    08/24/22  Weighing daily- today 219 lbs. Weight is stable +/- 1-2 lbs. Maintaining low sodium diet  Denies LE edema, CP, SOB  She has congestion and cough. Started taking mucinex last night prescribed by provider yesterday. Not coughing much up, secretions are thin. Has pulse ox with readings of ~96 %  Will test for COVID at home. Will see PCP tomorrow. 08/31/22  Patient saw provider at [facility] office on 8/25 & 8/29  Attended cards follow up today. 09/13/22  Having trouble sleeping, she is concerned it is one of her new medications causing insomnia. Will send ref to pharmacy requesting review of medications with patient. Saw cards last week stopped Entresto d/t low BP. Better today BP-104/76  Wants to start cardiac rehab but not sure if she is ready.  She is not very active right now. She will consider setting up intake session. Patient has Care Transition Nurse's contact information for any further questions, concerns, or needs.   Patients upcoming visits:    Future Appointments   Date Time Provider Komal Jennifer   9/23/2022 12:30 PM [facility] MAM 2 [facility]   9/23/2022  1:00 PM [facility] MAM US 1 [facility]   9/26/2022  8:40 AM Elma Smith MD [facility] BS AMB   9/29/2022  2:00 PM DO MEL Rojo BS AMB

## 2022-09-14 ENCOUNTER — TELEPHONE (OUTPATIENT)
Dept: PHARMACY | Age: 42
End: 2022-09-14

## 2022-09-14 NOTE — TELEPHONE ENCOUNTER
Please contact patient to review home medications. She would like to discuss new medication and recent changes. She also has concerns about one of her medications causing insomnia.  Thank you, [facility] Transition Nurse Phone- [phone]

## 2022-09-14 NOTE — LETTER
[address]  [address], [address]        [address] [facility] [address]           09/22/22     Dear Ilan Booth,    We tried to reach you recently, after you requested to go over your home medication and recent changes. I was unable to reach you on the telephone. We understand that medications can be confusing, and it is important to discuss your medications after any new changes Please call us at [phone] Option #2 to discuss your medications.        Sincerely,   Myriam Mast, PharmD, [address] [facility], toll free: [phone] Option 2

## 2022-09-15 RX ORDER — LEVOCETIRIZINE DIHYDROCHLORIDE 5 MG/1
TABLET, FILM COATED ORAL
Qty: 90 TABLET | Refills: 1 | Status: ON HOLD | OUTPATIENT
Start: 2022-09-15

## 2022-09-15 NOTE — TELEPHONE ENCOUNTER
[facility] CLINICAL PHARMACY REVIEW: Post-Discharge Transitions of Care (BRIAN)    Attempted to reach patient again to review medications. Left second message asking for return call. Will send letter 9/22/2022 Mychart message and close encounter at this time. Randy Freitas, JodeeD, [address]    Department, toll free: [phone] Option 2  For Pharmacy Admin Tracking Only      Gap Closed?: No    Time Spent (min): 45    =======================================================     LM for Ira Ace x1 9/15/2022 sent mychart to patient 9/20/2022  Referral:  Please contact patient to review home medications. She would like to discuss new medication and recent changes. She also has concerns about one of her medications causing insomnia. Thank you, [facility] Transition Nurse Phone- [phone]  ================================================================================;  Current Outpatient Medications   Medication Sig    levocetirizine (XYZAL) 5 mg tablet TAKE 1 TABLET EVERY DAY    famotidine (PEPCID) 20 mg tablet TAKE 1 TABLET BY MOUTH TWICE A DAY    rosuvastatin (CRESTOR) 20 mg tablet Take 1 Tablet by mouth every Monday, Wednesday, Friday.    ezetimibe (ZETIA) 10 mg tablet Take 1 Tablet by mouth daily. furosemide (LASIX) 40 mg tablet Take 1 Tablet by mouth daily. metFORMIN ER (GLUCOPHAGE XR) 500 mg tablet Take 1 Tablet by mouth daily (with dinner). terconazole (TERAZOL 7) 0.4 % vaginal cream Insert 1 Applicator into vagina nightly. empagliflozin (Jardiance) 10 mg tablet Take 1 Tablet by mouth daily. carvediloL (COREG) 3.125 mg tablet Take 1 Tablet by mouth two (2) times a day. montelukast (SINGULAIR) 10 mg tablet TAKE 1 TABLET EVERY DAY    hydrocortisone (ANUSOL-HC) 2.5 % rectal cream Insert  into rectum four (4) times daily.     diazePAM (VALIUM) 5 mg tablet 5 mg. 5 mg, 3 times daily albuterol-ipratropium (DUO-NEB) 2.5 mg-0.5 mg/3 ml nebu 3 mL by Nebulization route four (4) times daily. fluticasone propionate (FLONASE) 50 mcg/actuation nasal spray 2 Sprays by Both Nostrils route daily. ipratropium (ATROVENT) 21 mcg (0.03 %) nasal spray 1 [place] by Both Nostrils route every twelve (12) hours. acetaminophen (TYLENOL) 325 mg tablet Take  by mouth every four (4) hours as needed for Pain. albuterol (PROVENTIL HFA, VENTOLIN HFA, PROAIR HFA) 90 mcg/actuation inhaler Take  by inhalation. prasugreL (EFFIENT) 10 mg tablet + ASA for DUAL Antiplatelet therapy     nitroglycerin (NITROSTAT) 0.4 mg SL tablet     aspirin delayed-release 81 mg tablet 81 mg.    pantoprazole (PROTONIX) 40 mg tablet TAKE 1 TABLET TWICE DAILY    ferrous sulfate 325 mg (65 mg iron) tablet TAKE 1 TABLET BY MOUTH ONCE DAILY BEFORE BREAKFAST    traZODone (DESYREL) 100 mg tablet At bedtime    folic acid (FOLVITE) 1 mg tablet TAKE 1 TABLET EVERY DAY    cyclobenzaprine (FLEXERIL) 10 mg tablet three (3) times daily as needed. Has not needed    escitalopram oxalate (LEXAPRO) 20 mg tablet Take 20 mg by mouth daily. clonazePAM (KLONOPIN) 1 mg tablet Take 1 mg by mouth in the morning. At bedtime    cholecalciferol, vitamin D3, 50 mcg (2,000 unit) tab Take  by mouth.      Change at [address] 9/9  - Rosuvastatin 20 mg MWF and start Zetia   -discontinue Entresto  -restarted Lasix 40mg/day  -messaged TOAN Vincent on 9/14 to inquire about lasix- said was ok to cont  Continue  -Dual antiplatelet therapy  Prasugrel and ASA  Ischemic Cardiomyopathy  -

## 2022-09-22 ENCOUNTER — PATIENT OUTREACH (OUTPATIENT)
Dept: CASE MANAGEMENT | Age: 42
End: 2022-09-22

## 2022-09-22 NOTE — PROGRESS NOTES
Ambulatory Care Management Note    Date/Time:  9/22/2022 4:13 PM    Patient just completed a BRIAN and will resume ACM care. Patient was in the car and unable to complete assessment today, will follow up next week. Patient has Ambulatory Care Manager's contact number for any questions or concerns. Patient was asked to consider health care goals that they would like to focus on with this ACM. ACM will follow up with patient to discuss goals and establish care plan in the next 7-14 days.        PCP/Specialist follow up:   Future Appointments   Date Time Provider Komal Rodriguez   9/23/2022 12:30 PM [facility] MAM 2 [facility]   9/23/2022  1:00 PM [facility] MAM US 1 [facility]   9/26/2022  8:40 AM Luis Delgado MD [facility] BS AMB   9/29/2022  2:00 PM DO MEL Acosta BS AMB

## 2022-09-23 ENCOUNTER — HOSPITAL ENCOUNTER (OUTPATIENT)
Dept: MAMMOGRAPHY | Age: 42
Discharge: HOME | End: 2022-09-23
Attending: FAMILY MEDICINE
Payer: MEDICARE

## 2022-09-23 DIAGNOSIS — N63.0 BREAST MASS IN FEMALE: ICD-10-CM

## 2022-09-23 DIAGNOSIS — N63.24 UNSPECIFIED LUMP IN THE LEFT BREAST, LOWER INNER QUADRANT: ICD-10-CM

## 2022-09-23 DIAGNOSIS — N63.0 BREAST MASS IN FEMALE: Primary | ICD-10-CM

## 2022-09-23 DIAGNOSIS — N64.4 BREAST PAIN IN FEMALE: ICD-10-CM

## 2022-09-23 PROCEDURE — 77062 BREAST TOMOSYNTHESIS BI: CPT

## 2022-09-23 PROCEDURE — 76642 ULTRASOUND BREAST LIMITED: CPT

## 2022-09-23 RX ORDER — ROSUVASTATIN CALCIUM 20 MG/1
20 TABLET, COATED ORAL
Qty: 90 TABLET | Refills: 1 | Status: ON HOLD | OUTPATIENT
Start: 2022-09-23

## 2022-09-23 NOTE — TELEPHONE ENCOUNTER
Medication refill per VO Dr. Suraj Leyva    Requested Prescriptions     Pending Prescriptions Disp Refills    rosuvastatin (CRESTOR) 20 mg tablet 90 Tablet 1     Sig: Take 1 Tablet by mouth every Monday, Wednesday, Friday.

## 2022-09-23 NOTE — PROGRESS NOTES
Calling in new Mammogram order.     MD URBAN Astudillo & MARSHAL CARMEN [facility]  09/23/22

## 2022-09-26 ENCOUNTER — OFFICE VISIT (OUTPATIENT)
Dept: FAMILY MEDICINE CLINIC | Age: 42
End: 2022-09-26
Payer: MEDICARE

## 2022-09-26 VITALS
HEART RATE: 82 BPM | BODY MASS INDEX: 35.85 KG/M2 | OXYGEN SATURATION: 95 % | TEMPERATURE: 97.3 F | WEIGHT: 210 LBS | RESPIRATION RATE: 22 BRPM | SYSTOLIC BLOOD PRESSURE: 101 MMHG | DIASTOLIC BLOOD PRESSURE: 76 MMHG | HEIGHT: 64 IN

## 2022-09-26 DIAGNOSIS — K21.9 GASTROESOPHAGEAL REFLUX DISEASE WITHOUT ESOPHAGITIS: ICD-10-CM

## 2022-09-26 DIAGNOSIS — I25.10 CORONARY ARTERY DISEASE INVOLVING NATIVE CORONARY ARTERY OF NATIVE HEART WITHOUT ANGINA PECTORIS: ICD-10-CM

## 2022-09-26 DIAGNOSIS — B37.31 YEAST VAGINITIS: ICD-10-CM

## 2022-09-26 DIAGNOSIS — E78.00 PURE HYPERCHOLESTEROLEMIA: ICD-10-CM

## 2022-09-26 DIAGNOSIS — I50.22 CHRONIC SYSTOLIC (CONGESTIVE) HEART FAILURE (HCC): Primary | ICD-10-CM

## 2022-09-26 DIAGNOSIS — F33.9 RECURRENT DEPRESSION (HCC): ICD-10-CM

## 2022-09-26 PROCEDURE — 99214 OFFICE O/P EST MOD 30 MIN: CPT | Performed by: FAMILY MEDICINE

## 2022-09-26 PROCEDURE — G8427 DOCREV CUR MEDS BY ELIG CLIN: HCPCS | Performed by: FAMILY MEDICINE

## 2022-09-26 PROCEDURE — G9717 DOC PT DX DEP/BP F/U NT REQ: HCPCS | Performed by: FAMILY MEDICINE

## 2022-09-26 PROCEDURE — G8417 CALC BMI ABV UP PARAM F/U: HCPCS | Performed by: FAMILY MEDICINE

## 2022-09-26 RX ORDER — FLUCONAZOLE 150 MG/1
150 TABLET ORAL
Qty: 2 TABLET | Refills: 0 | Status: SHIPPED | OUTPATIENT
Start: 2022-09-26 | End: 2022-09-30

## 2022-09-27 RX ORDER — CARVEDILOL 3.12 MG/1
TABLET ORAL
Qty: 60 TABLET | Refills: 0 | Status: ON HOLD | OUTPATIENT
Start: 2022-09-27

## 2022-09-29 ENCOUNTER — PATIENT MESSAGE (OUTPATIENT)
Dept: FAMILY MEDICINE CLINIC | Age: 42
End: 2022-09-29

## 2022-09-29 ENCOUNTER — PATIENT OUTREACH (OUTPATIENT)
Dept: CASE MANAGEMENT | Age: 42
End: 2022-09-29

## 2022-09-29 ENCOUNTER — OFFICE VISIT (OUTPATIENT)
Dept: CARDIOLOGY CLINIC | Age: 42
End: 2022-09-29
Payer: MEDICARE

## 2022-09-29 VITALS
WEIGHT: 210.4 LBS | HEART RATE: 96 BPM | HEIGHT: 64 IN | SYSTOLIC BLOOD PRESSURE: 110 MMHG | DIASTOLIC BLOOD PRESSURE: 76 MMHG | BODY MASS INDEX: 35.92 KG/M2 | OXYGEN SATURATION: 98 %

## 2022-09-29 DIAGNOSIS — I25.10 CORONARY ARTERY DISEASE INVOLVING NATIVE CORONARY ARTERY OF NATIVE HEART WITHOUT ANGINA PECTORIS: ICD-10-CM

## 2022-09-29 DIAGNOSIS — I25.2 HISTORY OF ST ELEVATION MYOCARDIAL INFARCTION (STEMI): ICD-10-CM

## 2022-09-29 DIAGNOSIS — G47.00 INSOMNIA, UNSPECIFIED TYPE: ICD-10-CM

## 2022-09-29 DIAGNOSIS — G25.81 RESTLESS LEGS: Primary | ICD-10-CM

## 2022-09-29 DIAGNOSIS — I25.5 ISCHEMIC CARDIOMYOPATHY: Primary | ICD-10-CM

## 2022-09-29 PROCEDURE — G9717 DOC PT DX DEP/BP F/U NT REQ: HCPCS | Performed by: STUDENT IN AN ORGANIZED HEALTH CARE EDUCATION/TRAINING PROGRAM

## 2022-09-29 PROCEDURE — G8417 CALC BMI ABV UP PARAM F/U: HCPCS | Performed by: STUDENT IN AN ORGANIZED HEALTH CARE EDUCATION/TRAINING PROGRAM

## 2022-09-29 PROCEDURE — G8427 DOCREV CUR MEDS BY ELIG CLIN: HCPCS | Performed by: STUDENT IN AN ORGANIZED HEALTH CARE EDUCATION/TRAINING PROGRAM

## 2022-09-29 PROCEDURE — 99214 OFFICE O/P EST MOD 30 MIN: CPT | Performed by: STUDENT IN AN ORGANIZED HEALTH CARE EDUCATION/TRAINING PROGRAM

## 2022-09-29 NOTE — LETTER
9/29/2022    Patient: Marjorie Saba   YOB: 1980   Date of Visit: 9/29/2022     Jones Etienne, [address] [facility] [address]  Via In [facility] [address]    Dear Jones Etienne MD,      Thank you for referring Ms. Rafi Zapata to [facility] for evaluation. My notes for this consultation are attached. If you have questions, please do not hesitate to call me. I look forward to following your patient along with you.       Sincerely,    Edson Cabrera, DO

## 2022-09-29 NOTE — PROGRESS NOTES
Grace Cooper was placed in room B8    Chief Complaint   Patient presents with    Follow-up    Coronary Artery Disease    CHF    Leg Pain     Pain in the back of legs starts at the buttock and radiates down the backside of legs/BL     Patient's PCP discontinued thee Jardiance and is no longer having headaches    Patient got approved for Entresto through Patient Assistance not taking due to d/c last OV 9/9/22 Pennie Buerger, NP      Visit Vitals  /76 (BP 1 Location: Left upper arm, BP Patient Position: Sitting)   Pulse 96   Ht 5' 4\" (1.626 m)   Wt 210 lb 6.4 oz (95.4 kg)   SpO2 98%   BMI 36.12 kg/m²          Chest pain NO     SOB - yes with & without activity     Dizziness NO     Swelling NO     Refills NO             1. Have you been to the ER, urgent care clinic since your last visit? Hospitalized since your last visit? NO     2. Have you seen or consulted any other health care providers outside of the [address] [place] [address] since your last visit? Include any pap smears or colon screening.   NO

## 2022-09-29 NOTE — PROGRESS NOTES
[facility] [address], [address] [facility], [id number] [place] [address]    Office [phone],CKA [phone]           Ayad Rivera is a 38 y.o. female presents to the office for follow-up evaluation of coronary artery disease and ischemic cardiomyopathy      Assessment/Recommendations:      ICD-10-CM ICD-9-CM    1. Ischemic cardiomyopathy  I25.5 414.8 CBC W/O DIFF      METABOLIC PANEL, COMPREHENSIVE      LIPID PANEL      ECHO ADULT FOLLOW-UP OR LIMITED      LOWER EXT ART PVR WITH EXERCISE      2. Coronary artery disease involving native coronary artery of native heart without angina pectoris  I25.10 414.01       3. History of ST elevation myocardial infarction (STEMI)  I25.2 412       4. Insomnia, unspecified type  G47.00 780.52             Coronary artery disease, LAD STEMI 8/2022 treated at [address] [facility] mid LAD stenting with a 3.75X15 mm Xience skypoint RICKY  -Continue dual antiplatelet therapy with aspirin and prasugrel  -Reports having myopathies with atorvastatin therapy. Normal LP(a). Appears to be tolerating Crestor 20 mg 3 tiw Zetia 10 mg daily. We will repeat lipid panel. If LDL remains above goal but may need PCSK9 inhibitor therapy  -Cardiac rehab. Patient does not want to partake in current cardiac rehab in [place], will refer to [place] [id number] cardiac rehab    Ischemic cardiomyopathy. Previous LVEF 20 to 25%. I-ntolerant of Entresto therapy due to symptomatic hypotension.  -Continue carvedilol 3.125 mg twice daily  -Consider spironolactone pending clinical course, however she likely will not be able to tolerate due to low blood pressure.  -Discontinued Jardiance due to yeast infection  -Repeat limited echocardiogram in near future  -Establish with advanced heart failure. -May need ICD referral pending repeat echocardiogram      Leg pain-appears to be restless legs. Will obtain PVR with exercise.     Severe anxiety and depression. Followed by psychiatry    Leg pain and insomnia-recommend she discuss with primary care physician. Primary Care Physician- Ramon Ni MD    Follow-up 3 month        Subjective:  38 y.o. into the office for follow-up evaluation. Ongoing issues are noncardiovascular in nature. She reports restless legs and constant pain in her legs. She also reports insomnia. No ongoing chest pain or chest pressure symptoms. No orthopnea. Class II dyspnea symptoms. Past Medical History:   Diagnosis Date    Anemia NEC     Arthritis     Chronic pain     fybromyalsia    Depression     anxiety, depression, OCD    GERD (gastroesophageal reflux disease)     Hypertension     Hypertension     Ill-defined condition     Fibromyalia gastricparesis    Musculoskeletal disorder     SOB (shortness of breath)     Stool color black         Past Surgical History:   Procedure Laterality Date    HX GYN           HX HEENT  2002    wisdom teeth extraction    UPPER GI ENDOSCOPY,BIOPSY  2018         UPPER GI ENDOSCOPY,DIAGNOSIS  2018              Current Outpatient Medications:     carvediloL (COREG) 3.125 mg tablet, TAKE 1 TABLET BY MOUTH TWICE A DAY, Disp: 60 Tablet, Rfl: 0    fluconazole (DIFLUCAN) 150 mg tablet, Take 1 Tablet by mouth every seventy-two (72) hours for 2 doses. FDA advises cautious prescribing of oral fluconazole in pregnancy. , Disp: 2 Tablet, Rfl: 0    rosuvastatin (CRESTOR) 20 mg tablet, Take 1 Tablet by mouth every Monday, Wednesday, Friday., Disp: 90 Tablet, Rfl: 1    levocetirizine (XYZAL) 5 mg tablet, TAKE 1 TABLET EVERY DAY, Disp: 90 Tablet, Rfl: 1    famotidine (PEPCID) 20 mg tablet, TAKE 1 TABLET BY MOUTH TWICE A DAY, Disp: 180 Tablet, Rfl: 1    ezetimibe (ZETIA) 10 mg tablet, Take 1 Tablet by mouth daily. , Disp: 90 Tablet, Rfl: 0    furosemide (LASIX) 40 mg tablet, Take 1 Tablet by mouth daily. , Disp: 30 Tablet, Rfl: 0    metFORMIN ER (GLUCOPHAGE XR) 500 mg tablet, Take 1 Tablet by mouth daily (with dinner). , Disp: 90 Tablet, Rfl: 1    montelukast (SINGULAIR) 10 mg tablet, TAKE 1 TABLET EVERY DAY, Disp: 90 Tablet, Rfl: 1    hydrocortisone (ANUSOL-HC) 2.5 % rectal cream, Insert  into rectum four (4) times daily. , Disp: 30 g, Rfl: 0    diazePAM (VALIUM) 5 mg tablet, 5 mg. 5 mg, 3 times daily, Disp: , Rfl:     albuterol-ipratropium (DUO-NEB) 2.5 mg-0.5 mg/3 ml nebu, 3 mL by Nebulization route four (4) times daily. , Disp: , Rfl:     fluticasone propionate (FLONASE) 50 mcg/actuation nasal spray, 2 Sprays by Both Nostrils route daily. , Disp: , Rfl:     ipratropium (ATROVENT) 21 mcg (0.03 %) nasal spray, 1 [place] by Both Nostrils route every twelve (12) hours. , Disp: 30 mL, Rfl: 0    acetaminophen (TYLENOL) 325 mg tablet, Take  by mouth every four (4) hours as needed for Pain., Disp: , Rfl:     albuterol (PROVENTIL HFA, VENTOLIN HFA, PROAIR HFA) 90 mcg/actuation inhaler, Take  by inhalation. , Disp: , Rfl:     prasugreL (EFFIENT) 10 mg tablet, , Disp: , Rfl:     nitroglycerin (NITROSTAT) 0.4 mg SL tablet, , Disp: , Rfl:     aspirin delayed-release 81 mg tablet, 81 mg., Disp: , Rfl:     pantoprazole (PROTONIX) 40 mg tablet, TAKE 1 TABLET TWICE DAILY, Disp: 180 Tablet, Rfl: 1    ferrous sulfate 325 mg (65 mg iron) tablet, TAKE 1 TABLET BY MOUTH ONCE DAILY BEFORE BREAKFAST, Disp: 90 Tablet, Rfl: 1    traZODone (DESYREL) 100 mg tablet, At bedtime, Disp: , Rfl:     folic acid (FOLVITE) 1 mg tablet, TAKE 1 TABLET EVERY DAY, Disp: 90 Tablet, Rfl: 1    cyclobenzaprine (FLEXERIL) 10 mg tablet, three (3) times daily as needed. Has not needed, Disp: , Rfl:     escitalopram oxalate (LEXAPRO) 20 mg tablet, Take 20 mg by mouth daily. , Disp: , Rfl:     clonazePAM (KLONOPIN) 1 mg tablet, Take 1 mg by mouth in the morning.  At bedtime, Disp: , Rfl:     cholecalciferol, vitamin D3, 50 mcg (2,000 unit) tab, Take  by mouth., Disp: , Rfl:     empagliflozin (Jardiance) 10 mg tablet, Take 1 Tablet by mouth daily. (Patient not taking: Reported on 2022), Disp: 30 Tablet, Rfl: 1    Allergies   Allergen Reactions    Abilify [Aripiprazole] Anxiety     Can not tolerate     Sulfa (Sulfonamide Antibiotics) Hives    Vicodin [Hydrocodone-Acetaminophen] Nausea and Vomiting        Family History   Problem Relation Age of Onset    Hypertension Father     Elevated Lipids Father     Arthritis-rheumatoid Mother         ? Lupus vs RA    Lung Disease Mother     Heart Disease Mother     Cancer Mother         breast in 39y    COPD Mother     Hypertension Mother     Stroke Mother         3-4 strokes    Breast Cancer Mother 50    Diabetes Maternal Grandmother        Social History     Tobacco Use    Smoking status: Former     Packs/day: 0.25     Years: 8.00     Pack years: 2.00     Types: Cigarettes     Quit date: 2022     Years since quittin.1    Smokeless tobacco: Never   Substance Use Topics    Alcohol use: Not Currently     Alcohol/week: 1.0 standard drink     Types: 1 Glasses of wine per week     Comment: 1 cup of wine/week    Drug use: Yes     Types: Marijuana       Review of Symptoms:  Pertinent leg pain  Pertinent Negative:no chest pain  All Other systems reviewed and are negative for a Comprehensive ROS (10+)    Physical Exam    Blood pressure 110/76, pulse 96, height 5' 4\" (1.626 m), weight 210 lb 6.4 oz (95.4 kg), last menstrual period 2022, SpO2 98 %. Constitutional:  well-developed and well-nourished. No distress. HENT: Normocephalic. Eyes: No scleral icterus. Neck:  Neck supple. No JVD present. Pulmonary/Chest: Effort normal and breath sounds normal. No respiratory distress, wheezes or rales. Cardiovascular: Normal rate, regular rhythm, S1 S2 . Exam reveals no gallop and no friction rub. No murmur heard. No edema. Extremities:  Normal muscle tone  Abdominal:   No abnormal distension. Neurological:  Moving all extremities, cranial nerves appear grossly intact. Skin: Skin is not cold. Not diaphoretic. No erythema. Psychiatric:  Grossly normal mood and affect. Intact insight. Objective Data: Investigations personally reviewed and interpreted            Investigations reviewed     Records [place] [facility]  Catheterization 7/24/2022.  70-80% tubular stenosis in mid LAD with overlying thrombus. Treated with 3.25X 15 mm Xience jarvis point RICKY. Echocardiogram LVEF 15 to 20% with regional wall motion abnormalities. ECHO ADULT COMPLETE 08/14/2022 8/14/2022    Interpretation Summary  Formatting of this result is different from the original.      Left Ventricle: Severely reduced left ventricular systolic function with a visually estimated EF of 20 - 25%. Left ventricle is mildly dilated. Increased wall thickness. See diagram for wall motion findings. Grade II diastolic dysfunction with increased LAP. Right Ventricle: Not well visualized. Tricuspid Valve: Moderately elevated RVSP. Signed by: Jeffery Sharma DO on 8/14/2022  2:53 PM          Lidia Mariano DO          ATTENTION:   This medical record was transcribed using an electronic medical records/speech recognition system. Although proofread, it may and can contain electronic, spelling and other errors. Corrections may be executed at a later time. Please feel free to contact us for any clarifications as needed.

## 2022-09-30 RX ORDER — ROPINIROLE 0.25 MG/1
0.25 TABLET, FILM COATED ORAL 2 TIMES DAILY
Qty: 90 TABLET | Refills: 0 | Status: SHIPPED | OUTPATIENT
Start: 2022-09-30 | End: 2022-10-20 | Stop reason: SDUPTHER

## 2022-09-30 NOTE — PROGRESS NOTES
09/30/22  12:10 PM  Attempted ACM outreach, no answer- lvm for return call. Will follow up next week if no callback.

## 2022-09-30 NOTE — TELEPHONE ENCOUNTER
Patient reports restless legs are terrible, and Gabapentin was not effective after prolonged use. Patient cannot stay asleep due to leg pains and movement. Is wearing compression stockings. Will trial Ropinorole.     MD URBAN Tripathi & MARSHAL CARMEN [facility]  09/30/22

## 2022-09-30 NOTE — ACP (ADVANCE CARE PLANNING)
Advance Care Planning:   Does patient have an Advance Directive:  health care decision makers updated

## 2022-10-01 LAB
ALBUMIN SERPL-MCNC: 3.5 G/DL (ref 3.5–5)
ALBUMIN/GLOB SERPL: 1.1 {RATIO} (ref 1.1–2.2)
ALP SERPL-CCNC: 117 U/L (ref 45–117)
ALT SERPL-CCNC: 36 U/L (ref 12–78)
ANION GAP SERPL CALC-SCNC: 7 MMOL/L (ref 5–15)
AST SERPL-CCNC: 27 U/L (ref 15–37)
BILIRUB SERPL-MCNC: 1.1 MG/DL (ref 0.2–1)
BUN SERPL-MCNC: 13 MG/DL (ref 6–20)
BUN/CREAT SERPL: 13 (ref 12–20)
CALCIUM SERPL-MCNC: 9.6 MG/DL (ref 8.5–10.1)
CHLORIDE SERPL-SCNC: 107 MMOL/L (ref 97–108)
CHOLEST SERPL-MCNC: 110 MG/DL
CO2 SERPL-SCNC: 24 MMOL/L (ref 21–32)
CREAT SERPL-MCNC: 0.99 MG/DL (ref 0.55–1.02)
ERYTHROCYTE [DISTWIDTH] IN BLOOD BY AUTOMATED COUNT: 14 % (ref 11.5–14.5)
GLOBULIN SER CALC-MCNC: 3.2 G/DL (ref 2–4)
GLUCOSE SERPL-MCNC: 111 MG/DL (ref 65–100)
HCT VFR BLD AUTO: 41.9 % (ref 35–47)
HDLC SERPL-MCNC: 46 MG/DL
HDLC SERPL: 2.4 {RATIO} (ref 0–5)
HGB BLD-MCNC: 12.8 G/DL (ref 11.5–16)
LDLC SERPL CALC-MCNC: 46 MG/DL (ref 0–100)
MCH RBC QN AUTO: 29.8 PG (ref 26–34)
MCHC RBC AUTO-ENTMCNC: 30.5 G/DL (ref 30–36.5)
MCV RBC AUTO: 97.4 FL (ref 80–99)
NRBC # BLD: 0 K/UL (ref 0–0.01)
NRBC BLD-RTO: 0 PER 100 WBC
PLATELET # BLD AUTO: 300 K/UL (ref 150–400)
PMV BLD AUTO: 11.9 FL (ref 8.9–12.9)
POTASSIUM SERPL-SCNC: 3.8 MMOL/L (ref 3.5–5.1)
PROT SERPL-MCNC: 6.7 G/DL (ref 6.4–8.2)
RBC # BLD AUTO: 4.3 M/UL (ref 3.8–5.2)
SODIUM SERPL-SCNC: 138 MMOL/L (ref 136–145)
TRIGL SERPL-MCNC: 90 MG/DL (ref ?–150)
VLDLC SERPL CALC-MCNC: 18 MG/DL
WBC # BLD AUTO: 6.1 K/UL (ref 3.6–11)

## 2022-10-03 ENCOUNTER — APPOINTMENT (OUTPATIENT)
Dept: GENERAL RADIOLOGY | Age: 42
End: 2022-10-03
Attending: STUDENT IN AN ORGANIZED HEALTH CARE EDUCATION/TRAINING PROGRAM
Payer: MEDICARE

## 2022-10-03 ENCOUNTER — HOSPITAL ENCOUNTER (EMERGENCY)
Age: 42
Discharge: HOME | End: 2022-10-03
Attending: EMERGENCY MEDICINE
Payer: MEDICARE

## 2022-10-03 VITALS
TEMPERATURE: 97.8 F | SYSTOLIC BLOOD PRESSURE: 103 MMHG | OXYGEN SATURATION: 100 % | HEART RATE: 93 BPM | RESPIRATION RATE: 18 BRPM | WEIGHT: 210 LBS | DIASTOLIC BLOOD PRESSURE: 85 MMHG | BODY MASS INDEX: 35.85 KG/M2 | HEIGHT: 64 IN

## 2022-10-03 DIAGNOSIS — R06.02 SOB (SHORTNESS OF BREATH): Primary | ICD-10-CM

## 2022-10-03 DIAGNOSIS — E87.6 HYPOKALEMIA: ICD-10-CM

## 2022-10-03 LAB
ALBUMIN SERPL-MCNC: 3.1 G/DL (ref 3.5–5)
ALBUMIN/GLOB SERPL: 0.8 {RATIO} (ref 1.1–2.2)
ALP SERPL-CCNC: 101 U/L (ref 45–117)
ALT SERPL-CCNC: 31 U/L (ref 12–78)
ANION GAP SERPL CALC-SCNC: 8 MMOL/L (ref 5–15)
AST SERPL-CCNC: 17 U/L (ref 15–37)
BASOPHILS # BLD: 0.1 K/UL (ref 0–0.1)
BASOPHILS NFR BLD: 1 % (ref 0–1)
BILIRUB SERPL-MCNC: 1.6 MG/DL (ref 0.2–1)
BNP SERPL-MCNC: 3717 PG/ML
BUN SERPL-MCNC: 12 MG/DL (ref 6–20)
BUN/CREAT SERPL: 13 (ref 12–20)
CALCIUM SERPL-MCNC: 9.4 MG/DL (ref 8.5–10.1)
CHLORIDE SERPL-SCNC: 105 MMOL/L (ref 97–108)
CO2 SERPL-SCNC: 25 MMOL/L (ref 21–32)
COMMENT, HOLDF: NORMAL
CREAT SERPL-MCNC: 0.9 MG/DL (ref 0.55–1.02)
DIFFERENTIAL METHOD BLD: NORMAL
EOSINOPHIL # BLD: 0.3 K/UL (ref 0–0.4)
EOSINOPHIL NFR BLD: 5 % (ref 0–7)
ERYTHROCYTE [DISTWIDTH] IN BLOOD BY AUTOMATED COUNT: 13.5 % (ref 11.5–14.5)
GLOBULIN SER CALC-MCNC: 3.8 G/DL (ref 2–4)
GLUCOSE SERPL-MCNC: 100 MG/DL (ref 65–100)
HCG UR QL: NEGATIVE
HCT VFR BLD AUTO: 37.2 % (ref 35–47)
HGB BLD-MCNC: 12 G/DL (ref 11.5–16)
IMM GRANULOCYTES # BLD AUTO: 0 K/UL (ref 0–0.04)
IMM GRANULOCYTES NFR BLD AUTO: 0 % (ref 0–0.5)
LYMPHOCYTES # BLD: 2.8 K/UL (ref 0.8–3.5)
LYMPHOCYTES NFR BLD: 49 % (ref 12–49)
MCH RBC QN AUTO: 29.6 PG (ref 26–34)
MCHC RBC AUTO-ENTMCNC: 32.3 G/DL (ref 30–36.5)
MCV RBC AUTO: 91.6 FL (ref 80–99)
MONOCYTES # BLD: 0.4 K/UL (ref 0–1)
MONOCYTES NFR BLD: 6 % (ref 5–13)
NEUTS SEG # BLD: 2.2 K/UL (ref 1.8–8)
NEUTS SEG NFR BLD: 39 % (ref 32–75)
NRBC # BLD: 0 K/UL (ref 0–0.01)
NRBC BLD-RTO: 0 PER 100 WBC
PLATELET # BLD AUTO: 263 K/UL (ref 150–400)
PMV BLD AUTO: 11.8 FL (ref 8.9–12.9)
POTASSIUM SERPL-SCNC: 3.2 MMOL/L (ref 3.5–5.1)
PROT SERPL-MCNC: 6.9 G/DL (ref 6.4–8.2)
RBC # BLD AUTO: 4.06 M/UL (ref 3.8–5.2)
SAMPLES BEING HELD,HOLD: NORMAL
SODIUM SERPL-SCNC: 138 MMOL/L (ref 136–145)
TROPONIN-HIGH SENSITIVITY: 15 NG/L (ref 0–51)
WBC # BLD AUTO: 5.7 K/UL (ref 3.6–11)

## 2022-10-03 PROCEDURE — 96374 THER/PROPH/DIAG INJ IV PUSH: CPT

## 2022-10-03 PROCEDURE — 74011250636 HC RX REV CODE- 250/636: Performed by: STUDENT IN AN ORGANIZED HEALTH CARE EDUCATION/TRAINING PROGRAM

## 2022-10-03 PROCEDURE — 99285 EMERGENCY DEPT VISIT HI MDM: CPT

## 2022-10-03 PROCEDURE — 36415 COLL VENOUS BLD VENIPUNCTURE: CPT

## 2022-10-03 PROCEDURE — 93005 ELECTROCARDIOGRAM TRACING: CPT

## 2022-10-03 PROCEDURE — 74011250637 HC RX REV CODE- 250/637: Performed by: STUDENT IN AN ORGANIZED HEALTH CARE EDUCATION/TRAINING PROGRAM

## 2022-10-03 PROCEDURE — 80053 COMPREHEN METABOLIC PANEL: CPT

## 2022-10-03 PROCEDURE — 71046 X-RAY EXAM CHEST 2 VIEWS: CPT

## 2022-10-03 PROCEDURE — 83880 ASSAY OF NATRIURETIC PEPTIDE: CPT

## 2022-10-03 PROCEDURE — 81025 URINE PREGNANCY TEST: CPT

## 2022-10-03 PROCEDURE — 84484 ASSAY OF TROPONIN QUANT: CPT

## 2022-10-03 PROCEDURE — 85025 COMPLETE CBC W/AUTO DIFF WBC: CPT

## 2022-10-03 RX ORDER — FUROSEMIDE 10 MG/ML
20 INJECTION INTRAMUSCULAR; INTRAVENOUS ONCE
Status: COMPLETED | OUTPATIENT
Start: 2022-10-03 | End: 2022-10-03

## 2022-10-03 RX ADMIN — FUROSEMIDE 20 MG: 10 INJECTION, SOLUTION INTRAMUSCULAR; INTRAVENOUS at 12:38

## 2022-10-03 RX ADMIN — POTASSIUM BICARBONATE 40 MEQ: 782 TABLET, EFFERVESCENT ORAL at 11:56

## 2022-10-03 NOTE — ED PROVIDER NOTES
Ms. Troy Ruelas is a 38year old female with a medical history of Depression, HFrEF, CAD, Chronic pain syndrome presents to the ED due to worsening shortness of breath. According to patient, she has shortness of breath at baseline, started months ago after she had the MI in 2022. Per patient, SOB is at rest, and on exertion. Follows with Cardiology. Saw Dr. Adilson Bunch a few days ago. According to patient, she did not take any of her medications today. Patient also endorses RLS that she has been working with her PCP to address. Past Medical History:   Diagnosis Date    Anemia NEC     Arthritis     Chronic pain     fybromyalsia    Depression     anxiety, depression, OCD    GERD (gastroesophageal reflux disease)     Hypertension     Hypertension     Ill-defined condition     Fibromyalia gastricparesis    Musculoskeletal disorder     SOB (shortness of breath)     Stool color black        Past Surgical History:   Procedure Laterality Date    HX GYN           HX HEENT  2002    wisdom teeth extraction    UPPER GI ENDOSCOPY,BIOPSY  2018         UPPER GI ENDOSCOPY,DIAGNOSIS  2018              Family History:   Problem Relation Age of Onset    Hypertension Father     Elevated Lipids Father     Arthritis-rheumatoid Mother         ? Lupus vs RA    Lung Disease Mother     Heart Disease Mother     Cancer Mother         breast in 39y    COPD Mother     Hypertension Mother     Stroke Mother         3-4 strokes    Breast Cancer Mother 50    Diabetes Maternal Grandmother        Social History     Socioeconomic History    Marital status: SINGLE     Spouse name: Not on file    Number of children: Not on file    Years of education: Not on file    Highest education level: Not on file   Occupational History    Not on file   Tobacco Use    Smoking status: Former     Packs/day: 0.25     Years: 8.00     Pack years: 2.00     Types: Cigarettes     Quit date: 2022     Years since quittin.1    Smokeless tobacco: Never   Substance and Sexual Activity    Alcohol use: Not Currently     Alcohol/week: 1.0 standard drink     Types: 1 Glasses of wine per week     Comment: 1 cup of wine/week    Drug use: Yes     Types: Marijuana    Sexual activity: Yes     Partners: Male     Birth control/protection: None   Other Topics Concern    Not on file   Social History Narrative    Not on file     Social Determinants of Health     Financial Resource Strain: High Risk    Difficulty of Paying Living Expenses: Very hard   Food Insecurity: No Food Insecurity    Worried About Running Out of Food in the Last Year: Never true    Ran Out of Food in the Last Year: Never true   Transportation Needs: No Transportation Needs    Lack of Transportation (Medical): No    Lack of Transportation (Non-Medical): No   Physical Activity: Inactive    Days of Exercise per Week: 0 days    Minutes of Exercise per Session: 0 min   Stress: Stress Concern Present    Feeling of Stress : To some extent   Social Connections: Socially Isolated    Frequency of Communication with Friends and Family: Twice a week    Frequency of Social Gatherings with Friends and Family: Never    Attends Rastafarian Services: Never    Active Member of Clubs or Organizations: No    Attends Club or Organization Meetings: Never    Marital Status: Never    Intimate Partner Violence: Not At Risk    Fear of Current or Ex-Partner: No    Emotionally Abused: No    Physically Abused: No    Sexually Abused: No   Housing Stability: Low Risk     Unable to Pay for Housing in the Last Year: No    Number of [place] in the Last Year: 1    Unstable Housing in the Last Year: No         ALLERGIES: Abilify [aripiprazole], Sulfa (sulfonamide antibiotics), and Vicodin [hydrocodone-acetaminophen]    Review of Systems   Constitutional:  Negative for chills and fatigue. HENT:  Negative for hearing loss and trouble swallowing. Eyes:  Negative for photophobia, redness and visual disturbance.    Respiratory: Positive for cough and shortness of breath. Negative for chest tightness and wheezing. Cardiovascular:  Negative for chest pain, palpitations and leg swelling. Gastrointestinal:  Negative for abdominal distention and abdominal pain. Genitourinary:  Negative for difficulty urinating. Musculoskeletal:  Negative for back pain. Skin:  Negative for color change. Neurological:  Negative for dizziness, speech difficulty and headaches. Psychiatric/Behavioral:  Negative for agitation and behavioral problems. Vitals:    10/03/22 1140 10/03/22 1155 10/03/22 1210 10/03/22 1238   BP: 107/81 100/75 103/85 103/85   Pulse: 96 96 93 93   Resp:  19 19 18   Temp:    97.8 °F (36.6 °C)   SpO2: 96% 99% 100% 100%   Weight:       Height:                Physical Exam  Constitutional:       General: She is not in acute distress. Appearance: She is not ill-appearing, toxic-appearing or diaphoretic. HENT:      Head: Normocephalic and atraumatic. Cardiovascular:      Rate and Rhythm: Normal rate and regular rhythm. No extrasystoles are present. Pulses: No decreased pulses. Pulmonary:      Effort: Pulmonary effort is normal. No tachypnea or accessory muscle usage. Breath sounds: Normal breath sounds. No decreased breath sounds, wheezing, rhonchi or rales. Chest:      Chest wall: No tenderness or edema. Abdominal:      Palpations: Abdomen is soft. There is no hepatomegaly. Musculoskeletal:      Cervical back: Normal range of motion. Right lower leg: No tenderness. No edema. Left lower leg: No tenderness. No edema. Skin:     General: Skin is warm. Neurological:      General: No focal deficit present. Mental Status: She is alert.    Psychiatric:         Mood and Affect: Mood normal.        MDM  Number of Diagnoses or Management Options  Hypokalemia  SOB (shortness of breath)  Diagnosis management comments: 38year old female with a medical history of HFrEF, CAD that presents to the ED due to worsening shortness of breath. Symptom may be due to progressive disease process, with anxiety in regards to her recent diagnoses of HF. Recent Echo with EF of 20-25%. On evaluation, patient is saturating adequately on room air, no supplemental oxygen needed. Vitals are stable. Negative for chest pain, numbness or tingling sensation in upper extremity. She is able to complete a full sentence without difficulty. CXR showed trace pleural effusion. Lasix 20 mg IV was given, equivalent to her home lasix dose. Patient remains stable at discharge and improvement with shortness of breath. Patient agrees with the plan to follow up outpatient with her Cardiologist and also her Primary Care Physician. Amount and/or Complexity of Data Reviewed  Decide to obtain previous medical records or to obtain history from someone other than the patient: yes      ED Course as of 10/03/22 1322   Mon Oct 03, 2022   1101 EKG 1041  Normal sinus rhythm at 97 bpm.  Right bundle branch block with nonspecific changes.   No STEMI   [GG]      ED Course User Index  [GG] Linh Cease, DO

## 2022-10-03 NOTE — ED TRIAGE NOTES
Pt arrives to ED with complaints of SOB x2 weeks that has gotten worse. Pt reports of CHF, takes fluid pills for this. Pt seen by cardiologist last week for check up. Pt reports cardiology did not make any medication changes. Pt also reports cardiologist wants her to see cardiology at [facility] to manage her CHF. Pt also notes hx of MI. Pt denies CP, NVD.

## 2022-10-04 ENCOUNTER — TELEPHONE (OUTPATIENT)
Dept: CARDIOLOGY CLINIC | Age: 42
End: 2022-10-04

## 2022-10-04 NOTE — TELEPHONE ENCOUNTER
Received Referral from Norma WHITLEY to get patient in for NP appt.  Reached out to patient to schedule, Scheduled patient for 10/21/22 @ 9:00am w/ Dr. Earlene Smalls    Also requested records from Dr. Boom Starks' office and [facility]

## 2022-10-05 DIAGNOSIS — I25.5 ISCHEMIC CARDIOMYOPATHY: ICD-10-CM

## 2022-10-05 RX ORDER — FUROSEMIDE 40 MG/1
TABLET ORAL
Qty: 30 TABLET | Refills: 2 | Status: ON HOLD | OUTPATIENT
Start: 2022-10-05

## 2022-10-05 NOTE — TELEPHONE ENCOUNTER
Refill per VO of  Dr Chad Farfan,   Last appt: 9/29/2022  Future Appointments   Date Time Provider Komal Rodriguez   10/21/2022  9:00 AM Lynn Magallanes MD [facility] BS AMB   10/24/2022 10:00 AM VASCULAR, [facility] BS AMB   10/24/2022 11:00 AM VASCULAR, [facility] BS AMB   10/26/2022  8:20 AM Freddy Angulo MD [facility] AMB   1/4/2023 10:20 AM Stan Mensah DO [facility] BS AMB     Requested Prescriptions     Pending Prescriptions Disp Refills    furosemide (LASIX) 40 mg tablet [Pharmacy Med Name: FUROSEMIDE 40 MG TABLET] 30 Tablet 0     Sig: TAKE ONE TABLET BY MOUTH EVERY DAY

## 2022-10-06 ENCOUNTER — HOSPITAL ENCOUNTER (EMERGENCY)
Age: 42
Discharge: HOME | End: 2022-10-06
Attending: EMERGENCY MEDICINE
Payer: MEDICARE

## 2022-10-06 ENCOUNTER — APPOINTMENT (OUTPATIENT)
Dept: GENERAL RADIOLOGY | Age: 42
End: 2022-10-06
Attending: EMERGENCY MEDICINE
Payer: MEDICARE

## 2022-10-06 VITALS
RESPIRATION RATE: 18 BRPM | HEART RATE: 100 BPM | BODY MASS INDEX: 35.85 KG/M2 | HEIGHT: 64 IN | TEMPERATURE: 97.6 F | SYSTOLIC BLOOD PRESSURE: 98 MMHG | DIASTOLIC BLOOD PRESSURE: 75 MMHG | OXYGEN SATURATION: 97 % | WEIGHT: 210 LBS

## 2022-10-06 DIAGNOSIS — R06.02 SOB (SHORTNESS OF BREATH): Primary | ICD-10-CM

## 2022-10-06 LAB
ALBUMIN SERPL-MCNC: 3.2 G/DL (ref 3.5–5)
ALBUMIN/GLOB SERPL: 0.8 {RATIO} (ref 1.1–2.2)
ALP SERPL-CCNC: 107 U/L (ref 45–117)
ALT SERPL-CCNC: 32 U/L (ref 12–78)
ANION GAP SERPL CALC-SCNC: 7 MMOL/L (ref 5–15)
APPEARANCE UR: ABNORMAL
AST SERPL-CCNC: 21 U/L (ref 15–37)
ATRIAL RATE: 97 BPM
ATRIAL RATE: 98 BPM
BACTERIA URNS QL MICRO: ABNORMAL /HPF
BASOPHILS # BLD: 0 K/UL (ref 0–0.1)
BASOPHILS NFR BLD: 1 % (ref 0–1)
BILIRUB SERPL-MCNC: 1.7 MG/DL (ref 0.2–1)
BILIRUB UR QL CFM: NEGATIVE
BNP SERPL-MCNC: 3149 PG/ML
BUN SERPL-MCNC: 10 MG/DL (ref 6–20)
BUN/CREAT SERPL: 10 (ref 12–20)
CALCIUM SERPL-MCNC: 9.4 MG/DL (ref 8.5–10.1)
CALCULATED P AXIS, ECG09: 39 DEGREES
CALCULATED P AXIS, ECG09: 44 DEGREES
CALCULATED R AXIS, ECG10: -102 DEGREES
CALCULATED R AXIS, ECG10: -96 DEGREES
CALCULATED T AXIS, ECG11: 23 DEGREES
CALCULATED T AXIS, ECG11: 23 DEGREES
CHLORIDE SERPL-SCNC: 104 MMOL/L (ref 97–108)
CO2 SERPL-SCNC: 27 MMOL/L (ref 21–32)
COLOR UR: ABNORMAL
COMMENT, HOLDF: NORMAL
CREAT SERPL-MCNC: 0.98 MG/DL (ref 0.55–1.02)
DIAGNOSIS, 93000: NORMAL
DIAGNOSIS, 93000: NORMAL
DIFFERENTIAL METHOD BLD: NORMAL
EOSINOPHIL # BLD: 0.3 K/UL (ref 0–0.4)
EOSINOPHIL NFR BLD: 5 % (ref 0–7)
EPITH CASTS URNS QL MICRO: ABNORMAL /LPF
ERYTHROCYTE [DISTWIDTH] IN BLOOD BY AUTOMATED COUNT: 13.5 % (ref 11.5–14.5)
GLOBULIN SER CALC-MCNC: 4 G/DL (ref 2–4)
GLUCOSE SERPL-MCNC: 107 MG/DL (ref 65–100)
GLUCOSE UR STRIP.AUTO-MCNC: NEGATIVE MG/DL
HCG UR QL: NEGATIVE
HCT VFR BLD AUTO: 38.2 % (ref 35–47)
HGB BLD-MCNC: 12.2 G/DL (ref 11.5–16)
HGB UR QL STRIP: NEGATIVE
IMM GRANULOCYTES # BLD AUTO: 0 K/UL (ref 0–0.04)
IMM GRANULOCYTES NFR BLD AUTO: 0 % (ref 0–0.5)
KETONES UR QL STRIP.AUTO: ABNORMAL MG/DL
LEUKOCYTE ESTERASE UR QL STRIP.AUTO: ABNORMAL
LIPASE SERPL-CCNC: 47 U/L (ref 73–393)
LYMPHOCYTES # BLD: 2.5 K/UL (ref 0.8–3.5)
LYMPHOCYTES NFR BLD: 43 % (ref 12–49)
MCH RBC QN AUTO: 29.5 PG (ref 26–34)
MCHC RBC AUTO-ENTMCNC: 31.9 G/DL (ref 30–36.5)
MCV RBC AUTO: 92.3 FL (ref 80–99)
MONOCYTES # BLD: 0.4 K/UL (ref 0–1)
MONOCYTES NFR BLD: 7 % (ref 5–13)
NEUTS SEG # BLD: 2.5 K/UL (ref 1.8–8)
NEUTS SEG NFR BLD: 44 % (ref 32–75)
NITRITE UR QL STRIP.AUTO: NEGATIVE
NRBC # BLD: 0 K/UL (ref 0–0.01)
NRBC BLD-RTO: 0 PER 100 WBC
P-R INTERVAL, ECG05: 152 MS
P-R INTERVAL, ECG05: 162 MS
PH UR STRIP: 5.5 [PH] (ref 5–8)
PLATELET # BLD AUTO: 273 K/UL (ref 150–400)
PMV BLD AUTO: 11.4 FL (ref 8.9–12.9)
POTASSIUM SERPL-SCNC: 3.4 MMOL/L (ref 3.5–5.1)
PROT SERPL-MCNC: 7.2 G/DL (ref 6.4–8.2)
PROT UR STRIP-MCNC: 30 MG/DL
Q-T INTERVAL, ECG07: 434 MS
Q-T INTERVAL, ECG07: 444 MS
QRS DURATION, ECG06: 156 MS
QRS DURATION, ECG06: 156 MS
QTC CALCULATION (BEZET), ECG08: 551 MS
QTC CALCULATION (BEZET), ECG08: 566 MS
RBC # BLD AUTO: 4.14 M/UL (ref 3.8–5.2)
RBC #/AREA URNS HPF: ABNORMAL /HPF (ref 0–5)
SAMPLES BEING HELD,HOLD: NORMAL
SODIUM SERPL-SCNC: 138 MMOL/L (ref 136–145)
SP GR UR REFRACTOMETRY: 1.02 (ref 1–1.03)
TROPONIN-HIGH SENSITIVITY: 16 NG/L (ref 0–51)
TROPONIN-HIGH SENSITIVITY: 18 NG/L (ref 0–51)
UR CULT HOLD, URHOLD: NORMAL
UROBILINOGEN UR QL STRIP.AUTO: 2 EU/DL (ref 0.2–1)
VENTRICULAR RATE, ECG03: 97 BPM
VENTRICULAR RATE, ECG03: 98 BPM
WBC # BLD AUTO: 5.7 K/UL (ref 3.6–11)
WBC URNS QL MICRO: ABNORMAL /HPF (ref 0–4)

## 2022-10-06 PROCEDURE — 99285 EMERGENCY DEPT VISIT HI MDM: CPT

## 2022-10-06 PROCEDURE — 80053 COMPREHEN METABOLIC PANEL: CPT

## 2022-10-06 PROCEDURE — 84484 ASSAY OF TROPONIN QUANT: CPT

## 2022-10-06 PROCEDURE — 83880 ASSAY OF NATRIURETIC PEPTIDE: CPT

## 2022-10-06 PROCEDURE — 81025 URINE PREGNANCY TEST: CPT

## 2022-10-06 PROCEDURE — 85025 COMPLETE CBC W/AUTO DIFF WBC: CPT

## 2022-10-06 PROCEDURE — 81001 URINALYSIS AUTO W/SCOPE: CPT

## 2022-10-06 PROCEDURE — 74011000250 HC RX REV CODE- 250: Performed by: EMERGENCY MEDICINE

## 2022-10-06 PROCEDURE — 87086 URINE CULTURE/COLONY COUNT: CPT

## 2022-10-06 PROCEDURE — 74011250637 HC RX REV CODE- 250/637: Performed by: EMERGENCY MEDICINE

## 2022-10-06 PROCEDURE — 71045 X-RAY EXAM CHEST 1 VIEW: CPT

## 2022-10-06 PROCEDURE — 83690 ASSAY OF LIPASE: CPT

## 2022-10-06 PROCEDURE — 94640 AIRWAY INHALATION TREATMENT: CPT

## 2022-10-06 PROCEDURE — 36415 COLL VENOUS BLD VENIPUNCTURE: CPT

## 2022-10-06 PROCEDURE — 93005 ELECTROCARDIOGRAM TRACING: CPT

## 2022-10-06 RX ORDER — POTASSIUM CHLORIDE 750 MG/1
40 TABLET, FILM COATED, EXTENDED RELEASE ORAL
Status: COMPLETED | OUTPATIENT
Start: 2022-10-06 | End: 2022-10-06

## 2022-10-06 RX ORDER — IPRATROPIUM BROMIDE AND ALBUTEROL SULFATE 2.5; .5 MG/3ML; MG/3ML
3 SOLUTION RESPIRATORY (INHALATION)
Status: COMPLETED | OUTPATIENT
Start: 2022-10-06 | End: 2022-10-06

## 2022-10-06 RX ORDER — ONDANSETRON 4 MG/1
4 TABLET, ORALLY DISINTEGRATING ORAL
Status: COMPLETED | OUTPATIENT
Start: 2022-10-06 | End: 2022-10-06

## 2022-10-06 RX ADMIN — POTASSIUM CHLORIDE 40 MEQ: 750 TABLET, FILM COATED, EXTENDED RELEASE ORAL at 09:52

## 2022-10-06 RX ADMIN — IPRATROPIUM BROMIDE AND ALBUTEROL SULFATE 3 ML: .5; 3 SOLUTION RESPIRATORY (INHALATION) at 09:06

## 2022-10-06 RX ADMIN — ONDANSETRON 4 MG: 4 TABLET, ORALLY DISINTEGRATING ORAL at 11:10

## 2022-10-06 NOTE — ED PROVIDER NOTES
HPI   79-year-old female with a past medical history of ischemic cardiomyopathy with an EF of about 25% who presents to the emergency department due to shortness of breath, as well as abdominal pain and vomiting. Patient has had chronic shortness of breath. She follows with Dr. Curtis Rodriguez from cardiology. She is being referred to the advanced heart failure clinic. She visited with Dr. Curtis Rodriguez just a few weeks ago and was seen in the emergency department about 3 days ago for shortness of breath. At that time she had a trace pleural effusion. She says her dyspnea is worse with exertion. Her weight has been stable. She has noticed no lower extremity swelling. Triage notes indicate that the patient had chest pain, but she says she has had chest numbness. She also endorses orthopnea. As stated previously, the symptoms have not changed recently. Then she says last night she had some epigastric abdominal pain and had 1 episode of vomiting. The pain and nausea have since resolved and she has not vomited today. She also feels like she has been urinating less than normal.  She has been compliant with her Lasix. She also feels like she has congestion in the morning but this improves throughout the day. No fevers or chills. No vaginal bleeding. No vaginal discharge. No diarrhea or blood in her stool.   Past Medical History:   Diagnosis Date    Anemia NEC     Arthritis     Chronic pain     fybromyalsia    Depression     anxiety, depression, OCD    GERD (gastroesophageal reflux disease)     Hypertension     Hypertension     Ill-defined condition     Fibromyalia gastricparesis    Musculoskeletal disorder     SOB (shortness of breath)     Stool color black        Past Surgical History:   Procedure Laterality Date    HX GYN           HX HEENT  2002    wisdom teeth extraction    UPPER GI ENDOSCOPY,BIOPSY  2018         UPPER GI ENDOSCOPY,DIAGNOSIS  2018              Family History:   Problem Relation Age of Onset    Hypertension Father     Elevated Lipids Father     Arthritis-rheumatoid Mother         ? Lupus vs RA    Lung Disease Mother     Heart Disease Mother     Cancer Mother         breast in 39y    COPD Mother     Hypertension Mother     Stroke Mother         3-4 strokes    Breast Cancer Mother 50    Diabetes Maternal Grandmother        Social History     Socioeconomic History    Marital status: SINGLE     Spouse name: Not on file    Number of children: Not on file    Years of education: Not on file    Highest education level: Not on file   Occupational History    Not on file   Tobacco Use    Smoking status: Former     Packs/day: 0.25     Years: 8.00     Pack years: 2.00     Types: Cigarettes     Quit date: 2022     Years since quittin.2    Smokeless tobacco: Never   Substance and Sexual Activity    Alcohol use: Not Currently     Alcohol/week: 1.0 standard drink     Types: 1 Glasses of wine per week     Comment: 1 cup of wine/week    Drug use: Yes     Types: Marijuana    Sexual activity: Yes     Partners: Male     Birth control/protection: None   Other Topics Concern    Not on file   Social History Narrative    Not on file     Social Determinants of Health     Financial Resource Strain: High Risk    Difficulty of Paying Living Expenses: Very hard   Food Insecurity: No Food Insecurity    Worried About Running Out of Food in the Last Year: Never true    Ran Out of Food in the Last Year: Never true   Transportation Needs: No Transportation Needs    Lack of Transportation (Medical): No    Lack of Transportation (Non-Medical): No   Physical Activity: Inactive    Days of Exercise per Week: 0 days    Minutes of Exercise per Session: 0 min   Stress: Stress Concern Present    Feeling of Stress :  To some extent   Social Connections: Socially Isolated    Frequency of Communication with Friends and Family: Twice a week    Frequency of Social Gatherings with Friends and Family: Never    Attends Confucianist Services: Never    Active Member of Clubs or Organizations: No    Attends Club or Organization Meetings: Never    Marital Status: Never    Intimate Partner Violence: Not At Risk    Fear of Current or Ex-Partner: No    Emotionally Abused: No    Physically Abused: No    Sexually Abused: No   Housing Stability: Low Risk     Unable to Pay for Housing in the Last Year: No    Number of [place] in the Last Year: 1    Unstable Housing in the Last Year: No         ALLERGIES: Abilify [aripiprazole], Sulfa (sulfonamide antibiotics), and Vicodin [hydrocodone-acetaminophen]    Review of Systems    Vitals:    10/06/22 0808   BP: 137/82   Pulse: 100   Resp: 18   Temp: 97.6 °F (36.4 °C)   SpO2: 98%   Weight: 95.3 kg (210 lb)   Height: 5' 4\" (1.626 m)            Physical Exam  Constitutional:       Comments: Resting comfortably no acute distress. Appears well   HENT:      Mouth/Throat:      Comments: Moist mucous membranes  Eyes:      General: No scleral icterus. Extraocular Movements: Extraocular movements intact. Cardiovascular:      Comments: Regular rate and rhythm without murmurs. 2+ radial pulses bilaterally  Pulmonary:      Comments: Lungs are clear bilaterally. No respiratory distress. No wheezing or rales  Abdominal:      Comments: Soft, nontender, nondistended   Musculoskeletal:         General: Normal range of motion. Right lower leg: No edema. Left lower leg: No edema. Skin:     General: Skin is warm and dry. Neurological:      Comments: Awake and alert. Speech is normal.  GCS 15        MDM    ED Course as of 10/06/22 1252   Thu Oct 06, 2022   0811 EKG shows normal sinus rhythm. Rate is 98. She has a prolonged QTC and QRS of 566 and 156 respectively. No concerning ST elevations or depressions. Overall EKG unchanged from prior. [MM]      ED Course User Index  [MM] Melania Martinez MD   45-year-old woman who presents with the above chief complaint. Vitals are stable.   She is breathing comfortably on room air. No conversational dyspnea. No wheezing or rales. No overt signs of volume overload. Abdominal exam is benign without any reproducible tenderness. Chest x-ray, cardiac work-up, work-up for abdominal pain will be ordered. She is requesting a DuoNeb as she feels this will help bring up some congestion in her lungs. She has no tenderness on exam to think she needs any cross-sectional imaging. I will asked nursing to [facility] the patient as well. Work appears reassuring. Her troponin is flat. BNP is downtrending. Trace pleural effusion seen on prior x-ray not identified. Urinalysis appears contaminated but will be sent for culture. She continues to appear well on reassessment is appropriate for discharge. She will be keeping her upcoming appointment in the advanced heart failure clinic. She understands reasons to return. Patient discharged in stable condition.   Procedures

## 2022-10-06 NOTE — ED TRIAGE NOTES
Patient was here earlier this week, complains of SOB. Patient vomited last night and now has abdominal pain.  No current chest pain, but chest pain on way to hospital.

## 2022-10-06 NOTE — ED NOTES
Patient ambulated to the bathroom with a steady gait to provide a urine sample [employer] Counseling:  I discussed with the patient the risks of mycophenolate mofetil including but not limited to infection/immunosuppression, GI upset, hypokalemia, hypercholesterolemia, bone marrow suppression, lymphoproliferative disorders, malignancy, GI ulceration/bleed/perforation, colitis, interstitial lung disease, kidney failure, progressive multifocal leukoencephalopathy, and birth defects.  The patient understands that monitoring is required including a baseline creatinine and regular CBC testing. In addition, patient must alert us immediately if symptoms of infection or other concerning signs are noted.

## 2022-10-06 NOTE — DISCHARGE INSTRUCTIONS
Return to the emergency department with any new or worsening symptoms. Please keep your appointment with the advanced heart failure clinic as scheduled.

## 2022-10-09 LAB
BACTERIA SPEC CULT: NORMAL
CC UR VC: NORMAL
SERVICE CMNT-IMP: NORMAL

## 2022-10-10 ENCOUNTER — PATIENT OUTREACH (OUTPATIENT)
Dept: CASE MANAGEMENT | Age: 42
End: 2022-10-10

## 2022-10-12 ENCOUNTER — PATIENT OUTREACH (OUTPATIENT)
Dept: CASE MANAGEMENT | Age: 42
End: 2022-10-12

## 2022-10-14 ENCOUNTER — OFFICE VISIT (OUTPATIENT)
Dept: FAMILY MEDICINE CLINIC | Age: 42
End: 2022-10-14
Payer: MEDICARE

## 2022-10-14 VITALS
HEART RATE: 96 BPM | WEIGHT: 209.4 LBS | OXYGEN SATURATION: 100 % | TEMPERATURE: 98.1 F | DIASTOLIC BLOOD PRESSURE: 77 MMHG | SYSTOLIC BLOOD PRESSURE: 101 MMHG | RESPIRATION RATE: 18 BRPM | BODY MASS INDEX: 35.75 KG/M2 | HEIGHT: 64 IN

## 2022-10-14 DIAGNOSIS — R09.81 HEAD CONGESTION: ICD-10-CM

## 2022-10-14 DIAGNOSIS — R10.13 EPIGASTRIC ABDOMINAL PAIN: Primary | ICD-10-CM

## 2022-10-14 DIAGNOSIS — R11.2 NAUSEA AND VOMITING, UNSPECIFIED VOMITING TYPE: ICD-10-CM

## 2022-10-14 PROCEDURE — G9717 DOC PT DX DEP/BP F/U NT REQ: HCPCS | Performed by: FAMILY MEDICINE

## 2022-10-14 PROCEDURE — 99212 OFFICE O/P EST SF 10 MIN: CPT | Performed by: FAMILY MEDICINE

## 2022-10-14 PROCEDURE — G8427 DOCREV CUR MEDS BY ELIG CLIN: HCPCS | Performed by: FAMILY MEDICINE

## 2022-10-14 PROCEDURE — G8417 CALC BMI ABV UP PARAM F/U: HCPCS | Performed by: FAMILY MEDICINE

## 2022-10-14 RX ORDER — ONDANSETRON 4 MG/1
4 TABLET, ORALLY DISINTEGRATING ORAL
Qty: 60 TABLET | Refills: 1 | Status: ON HOLD | OUTPATIENT
Start: 2022-10-14

## 2022-10-14 NOTE — PROGRESS NOTES
1. Have you been to the ER, urgent care clinic since your last visit? Hospitalized since your last visit? Yes [facility]     2. Have you seen or consulted any other health care providers outside of the [address] [place] [address] since your last visit? Include any pap smears or colon screening. No     3. For patients aged 39-70: Has the patient had a colonoscopy / FIT/ Cologuard? NA - based on age    If the patient is female:    4. For patients aged 41-77: Has the patient had a mammogram within the past 2 years? Yes - no Care Gap present      5. For patients aged 21-65: Has the patient had a pap smear? Yes - no Care Gap present     Reviewed record in preparation for visit and have necessary documentation  Pt did not bring medication to office visit for review  Patient is accompanied by partner I have received verbal consent from Derek Devlin to discuss any/all medical information while they are present in the room.     Goals that were addressed and/or need to be completed during or after this appointment include     Health Maintenance Due   Topic Date Due    Pneumococcal 0-64 years (1 - PCV) Never done    Flu Vaccine (1) Never done

## 2022-10-14 NOTE — PROGRESS NOTES
[facility]    History of Present Illness:   Galdino Rivers is a 38 y.o. female with history of CHF, CAD, GERD, Arthritis, Fibromyalgia, HLD, Anxiety, HSV, OCD, Depression, Obesity  CC: Cough/Congestion, Nausea  History provided by patient and Records    HPI:  Patient reported that she has been having issues with nausea and vomiting and noting having persistent vomiting almost daily for the last week now. Patient reports that she is having yellow vomit, and noting that with drinking anything she seems to vomit, and noting that she has difficulty with many foods as well. Patient notes ongoing for the last week in particular. Recent ER visit showed negative for cardiac or lung issues. Noting Cough for about 2 weeks now. Noting pain is present in particular in the middle epigastric region. No history of Gall bladder disease. Health Maintenance  Health Maintenance Due   Topic Date Due    Pneumococcal 0-64 years (1 - PCV) Never done    Flu Vaccine (1) Never done       Past Medical, Family, and Social History:     Current Outpatient Medications on File Prior to Visit   Medication Sig Dispense Refill    ketoconazole (NIZORAL) 2 % shampoo SHAMPOO TWICE WEEKLY 120 mL 1    furosemide (LASIX) 40 mg tablet TAKE ONE TABLET BY MOUTH EVERY DAY 30 Tablet 2    rOPINIRole (REQUIP) 0.25 mg tablet Take 1 Tablet by mouth two (2) times a day. 90 Tablet 0    carvediloL (COREG) 3.125 mg tablet TAKE 1 TABLET BY MOUTH TWICE A DAY 60 Tablet 0    rosuvastatin (CRESTOR) 20 mg tablet Take 1 Tablet by mouth every Monday, Wednesday, Friday. 90 Tablet 1    levocetirizine (XYZAL) 5 mg tablet TAKE 1 TABLET EVERY DAY 90 Tablet 1    famotidine (PEPCID) 20 mg tablet TAKE 1 TABLET BY MOUTH TWICE A  Tablet 1    ezetimibe (ZETIA) 10 mg tablet Take 1 Tablet by mouth daily. 90 Tablet 0    metFORMIN ER (GLUCOPHAGE XR) 500 mg tablet Take 1 Tablet by mouth daily (with dinner).  90 Tablet 1    montelukast (SINGULAIR) 10 mg tablet TAKE 1 TABLET EVERY DAY 90 Tablet 1    diazePAM (VALIUM) 5 mg tablet 5 mg. 5 mg, 3 times daily      albuterol-ipratropium (DUO-NEB) 2.5 mg-0.5 mg/3 ml nebu 3 mL by Nebulization route four (4) times daily. fluticasone propionate (FLONASE) 50 mcg/actuation nasal spray 2 Sprays by Both Nostrils route daily. ipratropium (ATROVENT) 21 mcg (0.03 %) nasal spray 1 [place] by Both Nostrils route every twelve (12) hours. 30 mL 0    acetaminophen (TYLENOL) 325 mg tablet Take  by mouth every four (4) hours as needed for Pain. albuterol (PROVENTIL HFA, VENTOLIN HFA, PROAIR HFA) 90 mcg/actuation inhaler Take  by inhalation. prasugreL (EFFIENT) 10 mg tablet       nitroglycerin (NITROSTAT) 0.4 mg SL tablet       aspirin delayed-release 81 mg tablet 81 mg.      pantoprazole (PROTONIX) 40 mg tablet TAKE 1 TABLET TWICE DAILY 180 Tablet 1    ferrous sulfate 325 mg (65 mg iron) tablet TAKE 1 TABLET BY MOUTH ONCE DAILY BEFORE BREAKFAST 90 Tablet 1    traZODone (DESYREL) 100 mg tablet 2 tablets at night      folic acid (FOLVITE) 1 mg tablet TAKE 1 TABLET EVERY DAY 90 Tablet 1    cyclobenzaprine (FLEXERIL) 10 mg tablet three (3) times daily as needed. Has not needed      escitalopram oxalate (LEXAPRO) 20 mg tablet Take 20 mg by mouth daily. clonazePAM (KLONOPIN) 1 mg tablet Take 1 mg by mouth in the morning. At bedtime      cholecalciferol, vitamin D3, 50 mcg (2,000 unit) tab Take  by mouth.      empagliflozin (Jardiance) 10 mg tablet Take 1 Tablet by mouth daily. (Patient not taking: Reported on 9/29/2022) 30 Tablet 1    hydrocortisone (ANUSOL-HC) 2.5 % rectal cream Insert  into rectum four (4) times daily. 30 g 0     No current facility-administered medications on file prior to visit. Patient Active Problem List   Diagnosis Code    Depression F32. A    GERD (gastroesophageal reflux disease) K21.9    Anemia, unspecified D64.9    Itch of skin L29.9    Anxiety F41.9    Costochondritis M94.0    HSV (herpes simplex virus) anogenital infection A60.9    OCD (obsessive compulsive disorder) F42.9    Hoarseness R49.0    IFG (impaired fasting glucose) R73.01    Hyperlipidemia E78.5    Thrombosed external hemorrhoid K64.5    Vitamin D deficiency E55.9    Inflammatory arthritis M19.90    Recurrent depression (HCC) F33.9    Mood disorder ([facility]) F39    Chronic pain syndrome G89.4    Fibromyalgia M79.7    Panic attack F41.0    Tremor R25.1    Class 2 obesity due to excess calories without serious comorbidity in adult E66.09    Gastroparesis K31.84    Plantar fasciitis of left foot M72.2    Positive KITTY (antinuclear antibody) R76.8    Severe obesity ([facility]) E66.01    Neuropathy G62.9    Acute exacerbation of CHF (congestive heart failure) ([facility]) I50.9    Coronary artery disease involving native coronary artery of native heart without angina pectoris I25.10    Chronic systolic (congestive) heart failure I50.22       Social History     Socioeconomic History    Marital status: SINGLE   Tobacco Use    Smoking status: Former     Packs/day: 0.25     Years: 8.00     Pack years: 2.00     Types: Cigarettes     Quit date: 2022     Years since quittin.2    Smokeless tobacco: Never   Substance and Sexual Activity    Alcohol use: Not Currently     Alcohol/week: 1.0 standard drink     Types: 1 Glasses of wine per week     Comment: 1 cup of wine/week    Drug use: Yes     Types: Marijuana    Sexual activity: Yes     Partners: Male     Birth control/protection: None     Social Determinants of Health     Financial Resource Strain: High Risk    Difficulty of Paying Living Expenses: Very hard   Food Insecurity: No Food Insecurity    Worried About Running Out of Food in the Last Year: Never true    Ran Out of Food in the Last Year: Never true   Transportation Needs: No Transportation Needs    Lack of Transportation (Medical): No    Lack of Transportation (Non-Medical):  No   Physical Activity: Inactive    Days of Exercise per Week: 0 days Minutes of Exercise per Session: 0 min   Stress: Stress Concern Present    Feeling of Stress : To some extent   Social Connections: Socially Isolated    Frequency of Communication with Friends and Family: Twice a week    Frequency of Social Gatherings with Friends and Family: Never    Attends Zoroastrian Services: Never    Active Member of Clubs or Organizations: No    Attends Club or Organization Meetings: Never    Marital Status: Never    Housing Stability: Low Risk     Unable to Pay for Housing in the Last Year: No    Number of [place] in the Last Year: 1    Unstable Housing in the Last Year: No        Review of Systems   Review of Systems   Constitutional:  Positive for malaise/fatigue. Negative for chills and fever. HENT:  Positive for sore throat. Respiratory:  Positive for cough. Cardiovascular:  Negative for chest pain and palpitations. Gastrointestinal:  Positive for abdominal pain. Objective:   Visit Vitals  /77 (BP 1 Location: Left upper arm, BP Patient Position: Sitting, BP Cuff Size: Large adult)   Pulse 96   Temp 98.1 °F (36.7 °C) (Oral)   Resp 18   Ht 5' 4\" (1.626 m)   Wt 209 lb 6.4 oz (95 kg)   LMP 10/09/2022   SpO2 100%   BMI 35.94 kg/m²        Physical Exam  Vitals and nursing note reviewed. Constitutional:       Appearance: Normal appearance. HENT:      Head: Normocephalic and atraumatic. Cardiovascular:      Rate and Rhythm: Normal rate and regular rhythm. Pulses: Normal pulses. Heart sounds: Normal heart sounds. No murmur heard. No friction rub. No gallop. Pulmonary:      Effort: Pulmonary effort is normal.      Breath sounds: Normal breath sounds. Abdominal:      General: Abdomen is flat. Bowel sounds are normal.      Palpations: Abdomen is soft. Musculoskeletal:      Cervical back: Normal range of motion and neck supple. Skin:     General: Skin is warm and dry. Neurological:      Mental Status: She is alert.        Pertinent Labs/Studies:      Assessment and orders:       ICD-10-CM ICD-9-CM    1. Epigastric abdominal pain  R10.13 789.06 US ABD LTD      2. Nausea and vomiting, unspecified vomiting type  R11.2 787.01 US ABD LTD      ondansetron (ZOFRAN ODT) 4 mg disintegrating tablet      3. Head congestion  R09.81 478.19         Diagnoses and all orders for this visit:    1. Epigastric abdominal pain: May be viral but given symptoms concern for Gall bladder disease.    -     US ABD LTD; Future    2. Nausea and vomiting, unspecified vomiting type  -     US ABD LTD; Future    3. Head congestion      Follow-up and Dispositions    Return in about 4 weeks (around 11/11/2022). I have discussed the diagnosis with the patient and the intended plan as seen in the above orders. Social history, medical history, and labs were reviewed. The patient has received an after-visit summary and questions were answered concerning future plans. I have discussed medication side effects and warnings with the patient as well.     MD URBAN Mckenzie & MARSHAL CARMEN [facility]  10/14/22

## 2022-10-14 NOTE — PROGRESS NOTES
Ambulatory Care Management Note      Date/Time:  10/14/2022 9:24 AM    This patient was received as a referral from [address]. Top Challenges reviewed with the provider   2 ER visits this month for shortness of breath, cough, and vomiting. PCP appt today 10/14/22 to address. Ambulatory  contacted patient for discussion and case management. Summary of patients top problems:   CHF with CAD- Patient is followed by Dr Micaela Tyler with [facility] [place] she was last seen 9/29/22. Had STEMI 8/2022 with mid LAD stenting. Has ischemic cardiomyopathy with an EF of 20-25%, her left ventricle is mildly dilated with increased wall thickness per Echo done 8/13/22. She was intolerant of Entresto, is currently taking Carvedilol twice daily, Spironolactone daily, Furosemide daily, Prasugrel daily, Crestor daily and Ezetimibe daily. She is to begin care at the [facility] heart failure clinic in [place]. Next appt with NP Purvi Rodriguez 10/21/22. Following up with PCP Talya Smith today 10/14/22. Anxiety/depression- Patient has anxiety/depression related to her medical condition. PCP Dr Talya Smith is monitoring, she is currently taking Valium 3 times daily, Trazodone at bedtime, Lexapro daily, Clonazepam in the morning and at bedtime. Restless leg syndrome- Patient is experiencing RLS daily and is being monitored by PCP Dr Talya Smith- was last seen 9/26/22. She is currently taking Ropinirole twice daily with minimal relief. Next appt today 10/14/22.   Patient's challenges to self management identified:   depression, lack of knowledge about disease, level of motivation, medical condition, medication management, multi health system providers, polypharmacy, and support system      Medication Management:  good adherence and good understanding    Advance Care Planning:   Does patient have an Advance Directive:  health care decision makers updated    [employer], Referrals, and Durable Medical Equipment: none    PCP/Specialist follow up:   Future Appointments   Date Time Provider Komal Rodriguez   [facility]14[facility]2022 11:20 [facility]   10/21/2022  9:00 AM Javier Barron MD [facility] BS AMB   10/24/2022 10:00 AM V[facility]ULAR, [facility] BS AMB   10/24/2022 11:00 AM VASCULAR, [facility] BS AMB   10/26/2022  8:20 AM Neal Valentin MD [facility] BS AMB   1/4/2023 10:20 AM Malcolm OCHOA DO [facility] BS AMB          Patient verbalized understanding of all information discussed. Patient has this Ambulatory Care Manager's contact information for any further questions, concerns, or needs.

## 2022-10-17 ENCOUNTER — HOSPITAL ENCOUNTER (EMERGENCY)
Age: 42
Discharge: HOME | End: 2022-10-17
Attending: EMERGENCY MEDICINE
Payer: MEDICARE

## 2022-10-17 ENCOUNTER — APPOINTMENT (OUTPATIENT)
Dept: CT IMAGING | Age: 42
End: 2022-10-17
Attending: NURSE PRACTITIONER
Payer: MEDICARE

## 2022-10-17 VITALS
RESPIRATION RATE: 15 BRPM | TEMPERATURE: 97.6 F | DIASTOLIC BLOOD PRESSURE: 90 MMHG | OXYGEN SATURATION: 95 % | WEIGHT: 207 LBS | BODY MASS INDEX: 35.34 KG/M2 | HEIGHT: 64 IN | SYSTOLIC BLOOD PRESSURE: 115 MMHG | HEART RATE: 97 BPM

## 2022-10-17 DIAGNOSIS — I50.9 ACUTE CONGESTIVE HEART FAILURE, UNSPECIFIED HEART FAILURE TYPE (HCC): ICD-10-CM

## 2022-10-17 DIAGNOSIS — N39.0 URINARY TRACT INFECTION WITH HEMATURIA, SITE UNSPECIFIED: Primary | ICD-10-CM

## 2022-10-17 DIAGNOSIS — R31.9 URINARY TRACT INFECTION WITH HEMATURIA, SITE UNSPECIFIED: Primary | ICD-10-CM

## 2022-10-17 DIAGNOSIS — R11.2 NAUSEA AND VOMITING, UNSPECIFIED VOMITING TYPE: ICD-10-CM

## 2022-10-17 LAB
ALBUMIN SERPL-MCNC: 3.4 G/DL (ref 3.5–5)
ALBUMIN/GLOB SERPL: 1 {RATIO} (ref 1.1–2.2)
ALP SERPL-CCNC: 101 U/L (ref 45–117)
ALT SERPL-CCNC: 38 U/L (ref 12–78)
AMORPH CRY URNS QL MICRO: ABNORMAL
ANION GAP SERPL CALC-SCNC: 7 MMOL/L (ref 5–15)
APPEARANCE UR: ABNORMAL
AST SERPL-CCNC: 19 U/L (ref 15–37)
BACTERIA URNS QL MICRO: ABNORMAL /HPF
BASOPHILS # BLD: 0.1 K/UL (ref 0–0.1)
BASOPHILS NFR BLD: 1 % (ref 0–1)
BILIRUB SERPL-MCNC: 1.8 MG/DL (ref 0.2–1)
BILIRUB UR QL CFM: NEGATIVE
BNP SERPL-MCNC: 3759 PG/ML
BUN SERPL-MCNC: 10 MG/DL (ref 6–20)
BUN/CREAT SERPL: 9 (ref 12–20)
CALCIUM SERPL-MCNC: 9.4 MG/DL (ref 8.5–10.1)
CHLORIDE SERPL-SCNC: 105 MMOL/L (ref 97–108)
CO2 SERPL-SCNC: 28 MMOL/L (ref 21–32)
COLOR UR: ABNORMAL
COMMENT, HOLDF: NORMAL
CREAT SERPL-MCNC: 1.09 MG/DL (ref 0.55–1.02)
DIFFERENTIAL METHOD BLD: NORMAL
EOSINOPHIL # BLD: 0.3 K/UL (ref 0–0.4)
EOSINOPHIL NFR BLD: 6 % (ref 0–7)
EPITH CASTS URNS QL MICRO: ABNORMAL /LPF
ERYTHROCYTE [DISTWIDTH] IN BLOOD BY AUTOMATED COUNT: 14.1 % (ref 11.5–14.5)
GLOBULIN SER CALC-MCNC: 3.5 G/DL (ref 2–4)
GLUCOSE SERPL-MCNC: 106 MG/DL (ref 65–100)
GLUCOSE UR STRIP.AUTO-MCNC: NEGATIVE MG/DL
HCG UR QL: NEGATIVE
HCT VFR BLD AUTO: 38.8 % (ref 35–47)
HGB BLD-MCNC: 12.3 G/DL (ref 11.5–16)
HGB UR QL STRIP: ABNORMAL
IMM GRANULOCYTES # BLD AUTO: 0 K/UL (ref 0–0.04)
IMM GRANULOCYTES NFR BLD AUTO: 0 % (ref 0–0.5)
KETONES UR QL STRIP.AUTO: NEGATIVE MG/DL
LEUKOCYTE ESTERASE UR QL STRIP.AUTO: ABNORMAL
LIPASE SERPL-CCNC: 31 U/L (ref 73–393)
LYMPHOCYTES # BLD: 2.7 K/UL (ref 0.8–3.5)
LYMPHOCYTES NFR BLD: 46 % (ref 12–49)
MCH RBC QN AUTO: 29.6 PG (ref 26–34)
MCHC RBC AUTO-ENTMCNC: 31.7 G/DL (ref 30–36.5)
MCV RBC AUTO: 93.5 FL (ref 80–99)
MONOCYTES # BLD: 0.4 K/UL (ref 0–1)
MONOCYTES NFR BLD: 8 % (ref 5–13)
NEUTS SEG # BLD: 2.3 K/UL (ref 1.8–8)
NEUTS SEG NFR BLD: 39 % (ref 32–75)
NITRITE UR QL STRIP.AUTO: POSITIVE
NRBC # BLD: 0 K/UL (ref 0–0.01)
NRBC BLD-RTO: 0 PER 100 WBC
PH UR STRIP: 5 [PH] (ref 5–8)
PLATELET # BLD AUTO: 292 K/UL (ref 150–400)
PMV BLD AUTO: 11 FL (ref 8.9–12.9)
POTASSIUM SERPL-SCNC: 3.4 MMOL/L (ref 3.5–5.1)
PROT SERPL-MCNC: 6.9 G/DL (ref 6.4–8.2)
PROT UR STRIP-MCNC: 30 MG/DL
RBC # BLD AUTO: 4.15 M/UL (ref 3.8–5.2)
RBC #/AREA URNS HPF: ABNORMAL /HPF (ref 0–5)
SAMPLES BEING HELD,HOLD: NORMAL
SODIUM SERPL-SCNC: 140 MMOL/L (ref 136–145)
SP GR UR REFRACTOMETRY: 1.02 (ref 1–1.03)
TROPONIN-HIGH SENSITIVITY: 14 NG/L (ref 0–51)
UA: UC IF INDICATED,UAUC: ABNORMAL
UROBILINOGEN UR QL STRIP.AUTO: 1 EU/DL (ref 0.2–1)
WBC # BLD AUTO: 5.8 K/UL (ref 3.6–11)
WBC URNS QL MICRO: ABNORMAL /HPF (ref 0–4)

## 2022-10-17 PROCEDURE — 74011000636 HC RX REV CODE- 636: Performed by: EMERGENCY MEDICINE

## 2022-10-17 PROCEDURE — 81001 URINALYSIS AUTO W/SCOPE: CPT

## 2022-10-17 PROCEDURE — 80053 COMPREHEN METABOLIC PANEL: CPT

## 2022-10-17 PROCEDURE — 85025 COMPLETE CBC W/AUTO DIFF WBC: CPT

## 2022-10-17 PROCEDURE — 96374 THER/PROPH/DIAG INJ IV PUSH: CPT

## 2022-10-17 PROCEDURE — 74177 CT ABD & PELVIS W/CONTRAST: CPT

## 2022-10-17 PROCEDURE — 74011250636 HC RX REV CODE- 250/636: Performed by: NURSE PRACTITIONER

## 2022-10-17 PROCEDURE — 36415 COLL VENOUS BLD VENIPUNCTURE: CPT

## 2022-10-17 PROCEDURE — 96375 TX/PRO/DX INJ NEW DRUG ADDON: CPT

## 2022-10-17 PROCEDURE — 87086 URINE CULTURE/COLONY COUNT: CPT

## 2022-10-17 PROCEDURE — 81025 URINE PREGNANCY TEST: CPT

## 2022-10-17 PROCEDURE — 83880 ASSAY OF NATRIURETIC PEPTIDE: CPT

## 2022-10-17 PROCEDURE — 99285 EMERGENCY DEPT VISIT HI MDM: CPT

## 2022-10-17 PROCEDURE — 93005 ELECTROCARDIOGRAM TRACING: CPT

## 2022-10-17 PROCEDURE — 84484 ASSAY OF TROPONIN QUANT: CPT

## 2022-10-17 PROCEDURE — 83690 ASSAY OF LIPASE: CPT

## 2022-10-17 PROCEDURE — 74011250637 HC RX REV CODE- 250/637: Performed by: NURSE PRACTITIONER

## 2022-10-17 RX ORDER — CEPHALEXIN 500 MG/1
500 CAPSULE ORAL 4 TIMES DAILY
Qty: 28 CAPSULE | Refills: 0 | Status: ON HOLD | OUTPATIENT
Start: 2022-10-17 | End: 2022-10-24

## 2022-10-17 RX ORDER — CEPHALEXIN 250 MG/1
500 CAPSULE ORAL
Status: COMPLETED | OUTPATIENT
Start: 2022-10-17 | End: 2022-10-17

## 2022-10-17 RX ORDER — PROCHLORPERAZINE EDISYLATE 5 MG/ML
10 INJECTION INTRAMUSCULAR; INTRAVENOUS ONCE
Status: COMPLETED | OUTPATIENT
Start: 2022-10-17 | End: 2022-10-17

## 2022-10-17 RX ORDER — PROMETHAZINE HYDROCHLORIDE 25 MG/1
25 TABLET ORAL
Qty: 10 TABLET | Refills: 0 | Status: ON HOLD | OUTPATIENT
Start: 2022-10-17

## 2022-10-17 RX ORDER — FUROSEMIDE 10 MG/ML
40 INJECTION INTRAMUSCULAR; INTRAVENOUS
Status: COMPLETED | OUTPATIENT
Start: 2022-10-17 | End: 2022-10-17

## 2022-10-17 RX ORDER — PROMETHAZINE HYDROCHLORIDE 25 MG/1
25 SUPPOSITORY RECTAL
Qty: 5 SUPPOSITORY | Refills: 0 | Status: ON HOLD | OUTPATIENT
Start: 2022-10-17

## 2022-10-17 RX ADMIN — CEPHALEXIN 500 MG: 250 CAPSULE ORAL at 12:21

## 2022-10-17 RX ADMIN — IOPAMIDOL 100 ML: 755 INJECTION, SOLUTION INTRAVENOUS at 09:58

## 2022-10-17 RX ADMIN — FUROSEMIDE 40 MG: 10 INJECTION, SOLUTION INTRAMUSCULAR; INTRAVENOUS at 12:20

## 2022-10-17 RX ADMIN — PROCHLORPERAZINE EDISYLATE 10 MG: 5 INJECTION INTRAMUSCULAR; INTRAVENOUS at 09:15

## 2022-10-17 NOTE — ED PROVIDER NOTES
27-year-old female with past medical history of anemia, arthritis, chronic pain fibromyalgia, anxiety, depression, OCD, GERD, esophageal stricture, hypertension, diabetes, gastroparesis, shortness of breath, MI with chronic heart failure and darkened stools presents to the ER for evaluation of severe epigastric pain with nausea and vomiting that started last Wednesday. Patient endorses decreased urine output with dark in color, was unsure if maybe this was related to her esophageal stricture but the vomiting occurs approximately 3 to 4 hours after eating. Patient denies seeing blood in urine or stool, states that the pain is predominantly in her epigastric region but feels increased bloating and discomfort in all quadrants. Patient denies any abdominal surgeries, fever, chills, congestion, headache, feeling dizzy or lightheaded, chest pain, difficulty urinating burning or frequency, denies vaginal bleeding or abnormal vaginal discharge. No change in swelling to BLE, but states that she has had difficulty keeping the pills down due to n/v, has not taken her prescribed medications today due to continued vomiting. Seen by PCP this past Friday. Denies tobacco, alcohol or illicit drug use. Cough-chronic in nature, recently diagnosed with heart failure, shortness of breath on exertion.        Past Medical History:   Diagnosis Date    Anemia NEC     Arthritis     Chronic pain     fybromyalsia    Depression     anxiety, depression, OCD    GERD (gastroesophageal reflux disease)     Hypertension     Hypertension     Ill-defined condition     Fibromyalia gastricparesis    Musculoskeletal disorder     SOB (shortness of breath)     Stool color black        Past Surgical History:   Procedure Laterality Date    HX GYN           HX HEENT  2002    wisdom teeth extraction    UPPER GI ENDOSCOPY,BIOPSY  2018         UPPER GI ENDOSCOPY,DIAGNOSIS  2018              Family History:   Problem Relation Age of Onset Hypertension Father     Elevated Lipids Father     Arthritis-rheumatoid Mother         ? Lupus vs RA    Lung Disease Mother     Heart Disease Mother     Cancer Mother         breast in 39y    COPD Mother     Hypertension Mother     Stroke Mother         3-4 strokes    Breast Cancer Mother 50    Diabetes Maternal Grandmother        Social History     Socioeconomic History    Marital status: SINGLE     Spouse name: Not on file    Number of children: Not on file    Years of education: Not on file    Highest education level: Not on file   Occupational History    Not on file   Tobacco Use    Smoking status: Former     Packs/day: 0.25     Years: 8.00     Pack years: 2.00     Types: Cigarettes     Quit date: 2022     Years since quittin.2    Smokeless tobacco: Never   Substance and Sexual Activity    Alcohol use: Not Currently     Alcohol/week: 1.0 standard drink     Types: 1 Glasses of wine per week     Comment: 1 cup of wine/week    Drug use: Yes     Types: Marijuana    Sexual activity: Yes     Partners: Male     Birth control/protection: None   Other Topics Concern    Not on file   Social History Narrative    Not on file     Social Determinants of Health     Financial Resource Strain: High Risk    Difficulty of Paying Living Expenses: Very hard   Food Insecurity: No Food Insecurity    Worried About Running Out of Food in the Last Year: Never true    Ran Out of Food in the Last Year: Never true   Transportation Needs: No Transportation Needs    Lack of Transportation (Medical): No    Lack of Transportation (Non-Medical): No   Physical Activity: Inactive    Days of Exercise per Week: 0 days    Minutes of Exercise per Session: 0 min   Stress: Stress Concern Present    Feeling of Stress :  To some extent   Social Connections: Socially Isolated    Frequency of Communication with Friends and Family: Twice a week    Frequency of Social Gatherings with Friends and Family: Never    Attends Anab[employer]ist Services: Never Active Member of Clubs or Organizations: No    Attends Club or Organization Meetings: Never    Marital Status: Never    Intimate Partner Violence: Not At Risk    Fear of Current or Ex-Partner: No    Emotionally Abused: No    Physically Abused: No    Sexually Abused: No   Housing Stability: Low Risk     Unable to Pay for Housing in the Last Year: No    Number of [place] in the Last Year: 1    Unstable Housing in the Last Year: No         ALLERGIES: Abilify [aripiprazole], Sulfa (sulfonamide antibiotics), and Vicodin [hydrocodone-acetaminophen]    Review of Systems   Constitutional:  Positive for appetite change. Negative for chills and fever. HENT:  Negative for congestion and sore throat. Respiratory:  Positive for cough. Negative for shortness of breath. Cardiovascular:  Negative for chest pain. Gastrointestinal:  Positive for abdominal pain, constipation, nausea and vomiting. Negative for blood in stool and diarrhea. Genitourinary:  Positive for decreased urine volume and flank pain. Negative for difficulty urinating, dysuria, frequency, hematuria and pelvic pain. Bilateral flank pain with decreased output. Skin:  Negative for wound. Neurological:  Negative for dizziness, weakness, light-headedness and headaches. Psychiatric/Behavioral: Negative. Vitals:    10/17/22 0920 10/17/22 0935 10/17/22 0950 10/17/22 1220   BP: 105/85 104/82 99/74 (!) 115/90   Pulse: 92 91  97   Resp: 15 20  15   Temp:       SpO2: 97% 97%  95%   Weight:       Height:                Physical Exam  Vitals and nursing note reviewed. Constitutional:       General: She is not in acute distress. Appearance: Normal appearance. She is well-developed. She is obese. She is not ill-appearing. HENT:      Head: Normocephalic. Nose: Nose normal.      Mouth/Throat:      Mouth: Mucous membranes are moist.   Cardiovascular:      Rate and Rhythm: Normal rate. Heart sounds: Normal heart sounds. Pulmonary:      Effort: Pulmonary effort is normal. No respiratory distress. Breath sounds: Normal breath sounds. No wheezing or rhonchi. Abdominal:      General: Abdomen is protuberant. Bowel sounds are normal. There is no distension. Palpations: Abdomen is soft. Tenderness: There is generalized abdominal tenderness and tenderness in the epigastric area. There is right CVA tenderness and left CVA tenderness. There is no guarding or rebound. Comments: Main tenderness to the abdomen on palpation is to the epigastric region. Musculoskeletal:         General: Normal range of motion. Cervical back: Normal range of motion. Skin:     General: Skin is warm and dry. Capillary Refill: Capillary refill takes less than 2 seconds. Neurological:      Mental Status: She is alert and oriented to person, place, and time. Psychiatric:         Mood and Affect: Mood normal.        MDM  Number of Diagnoses or Management Options  Acute congestive heart failure, unspecified heart failure type (HCC)  Nausea and vomiting, unspecified vomiting type  Urinary tract infection with hematuria, site unspecified  Diagnosis management comments: Differential DX: gastritis vs. Pyelonephritis vs. UTI vs cholecystitis vs. cholelithiases vs. dehydration vs.       Amount and/or Complexity of Data Reviewed  Clinical lab tests: ordered  Tests in the radiology section of CPT®: ordered  Discuss the patient with other providers: yes (Discussed with Dr. Allison Alejandra. )      ED Course as of 10/17/22 1811   Mon Oct 17, 2022   1001 EKG interpreted by me. Shows a normal sinus rhythm with a heart rate of 96. RBBB, widened QRS. No ST elevations or depressions concerning for ischemia. [CK]      ED Course User Index  [CK] Lizeth Wilkerson DO       Procedures  VITAL SIGNS:  No data found. LABS:  No results found for this or any previous visit (from the past 6 hour(s)).        IMAGING:  CT ABD PELV W CONT   Final Result Hepatic steatosis. Cardiomegaly and small bilateral pleural effusions with right middle lobe   atelectasis/pneumonia. Follow-up chest CT in 4-6 weeks to evaluate for   resolution recommended. Subtle hypodensities of the pancreatic tail and pancreatic uncinate,   nonspecific, may be artifactual nonemergent pancreatic mass protocol CT   recommended. Minimal intraperitoneal ascites and pericholecystic fluid may be related to   volume overload. Please see above for additional nonemergent incidental findings. Medications During Visit:  Medications   prochlorperazine (COMPAZINE) injection 10 mg (10 mg IntraVENous Given 10/17/22 0915)   iopamidoL (ISOVUE-370) 76 % injection 100 mL (100 mL IntraVENous Given 10/17/22 0958)   cephALEXin (KEFLEX) capsule 500 mg (500 mg Oral Given 10/17/22 1221)   furosemide (LASIX) injection 40 mg (40 mg IntraVENous Given 10/17/22 1220)         DECISION MAKING:  Kody Olivia is a 38 y.o. female who comes in as above. Patient with recent past medical history of MI with new diagnosis of heart failure, diabetes, hypertension, fibromyalgia with chronic pain, esophageal stricture with gastroparesis. Last bowel movement was yesterday per patient, small, denies seeing blood or hematochezia. Patient states that she was seen by her PCP this past Friday, sent home with Zofran with minimal relief of nausea vomiting, states that she has an appointment this coming Friday with [facility] for further evaluation along with chronic cough. Patient denies fever, chills, chest pain, shortness of breath is on exertion which is not new, difficulty urinating or dysuria, no vaginal discharge or discomfort. Denies possibility of STI. Increased nausea and vomiting started last Wednesday.   Discussed plan with patient check basic labs, will add an EKG and troponin given epigastric pain, lung fields are clear throughout and patient recently had a chest x-ray that was negative with no acute cardiopulmonary disease on 10/6/22. Will provide antinausea medication, send urine and obtain CT abdomen pelvis prior to giving any fluids given HX CHF. Patient reevaluated all labs and imaging and urine results discussed, pregnancy is negative, no leukocytosis, no anemia. Urine results are showing turbid urine with 30 protein, trace blood, positive nitrates, small leukocytes, WBC 5-10, bacteria 2+, urine culture is sent to lab we will give the first dose of Keflex while in the ER. Patient advised to follow-up with PCP. IMPRESSION:  1. Urinary tract infection with hematuria, site unspecified    2. Nausea and vomiting, unspecified vomiting type    3. Acute congestive heart failure, unspecified heart failure type [facility])        DISPOSITION:  Discharged      Discharge Medication List as of 10/17/2022  1:06 PM        START taking these medications    Details   cephALEXin (Keflex) 500 mg capsule Take 1 Capsule by mouth four (4) times daily for 7 days. , Normal, Disp-28 Capsule, R-0      promethazine (PHENERGAN) 25 mg suppository Insert 1 Suppository into rectum every six (6) hours as needed for Nausea for up to 5 doses. , Normal, Disp-5 Suppository, R-0      promethazine (PHENERGAN) 25 mg tablet Take 1 Tablet by mouth every six (6) hours as needed for Nausea for up to 10 doses. , Normal, Disp-10 Tablet, R-0           CONTINUE these medications which have NOT CHANGED    Details   ondansetron (ZOFRAN ODT) 4 mg disintegrating tablet Take 1 Tablet by mouth every eight (8) hours as needed for Nausea or Vomiting., Normal, Disp-60 Tablet, R-1      ketoconazole (NIZORAL) 2 % shampoo SHAMPOO TWICE WEEKLY, Normal, Disp-120 mL, R-1      furosemide (LASIX) 40 mg tablet TAKE ONE TABLET BY MOUTH EVERY DAY, Normal, Disp-30 Tablet, R-2      rOPINIRole (REQUIP) 0.25 mg tablet Take 1 Tablet by mouth two (2) times a day., Normal, Disp-90 Tablet, R-0      carvediloL (COREG) 3.125 mg tablet TAKE 1 TABLET BY MOUTH TWICE A DAY, Normal, Disp-60 Tablet, R-0      rosuvastatin (CRESTOR) 20 mg tablet Take 1 Tablet by mouth every Monday, Wednesday, Friday., Normal, Disp-90 Tablet, R-1      levocetirizine (XYZAL) 5 mg tablet TAKE 1 TABLET EVERY DAY, Normal, Disp-90 Tablet, R-1      famotidine (PEPCID) 20 mg tablet TAKE 1 TABLET BY MOUTH TWICE A DAY, Normal, Disp-180 Tablet, R-1      ezetimibe (ZETIA) 10 mg tablet Take 1 Tablet by mouth daily. , Normal, Disp-90 Tablet, R-0      metFORMIN ER (GLUCOPHAGE XR) 500 mg tablet Take 1 Tablet by mouth daily (with dinner). , Normal, Disp-90 Tablet, R-1      empagliflozin (Jardiance) 10 mg tablet Take 1 Tablet by mouth daily. , Normal, Disp-30 Tablet, R-1      montelukast (SINGULAIR) 10 mg tablet TAKE 1 TABLET EVERY DAY, Normal, Disp-90 Tablet, R-1      hydrocortisone (ANUSOL-HC) 2.5 % rectal cream Insert  into rectum four (4) times daily. , Normal, Disp-30 g, R-0      diazePAM (VALIUM) 5 mg tablet 5 mg. 5 mg, 3 times daily, Historical Med      albuterol-ipratropium (DUO-NEB) 2.5 mg-0.5 mg/3 ml nebu 3 mL by Nebulization route four (4) times daily. , Historical Med      fluticasone propionate (FLONASE) 50 mcg/actuation nasal spray 2 Sprays by Both Nostrils route daily. , Historical Med      ipratropium (ATROVENT) 21 mcg (0.03 %) nasal spray 1 [place] by Both Nostrils route every twelve (12) hours. , Normal, Disp-30 mL, R-0      acetaminophen (TYLENOL) 325 mg tablet Take  by mouth every four (4) hours as needed for Pain., Historical Med      albuterol (PROVENTIL HFA, VENTOLIN HFA, PROAIR HFA) 90 mcg/actuation inhaler Take  by inhalation. , Historical Med      prasugreL (EFFIENT) 10 mg tablet Historical Med      nitroglycerin (NITROSTAT) 0.4 mg SL tablet Historical Med      aspirin delayed-release 81 mg tablet 81 mg., Historical Med      pantoprazole (PROTONIX) 40 mg tablet TAKE 1 TABLET TWICE DAILY, Normal, Disp-180 Tablet, R-1      ferrous sulfate 325 mg (65 mg iron) tablet TAKE 1 TABLET BY MOUTH ONCE DAILY BEFORE BREAKFAST, Normal, Disp-90 Tablet, R-1      traZODone (DESYREL) 100 mg tablet 2 tablets at night, Historical Med      folic acid (FOLVITE) 1 mg tablet TAKE 1 TABLET EVERY DAY, Normal, Disp-90 Tablet, R-1      cyclobenzaprine (FLEXERIL) 10 mg tablet three (3) times daily as needed. Has not needed, Historical Med      escitalopram oxalate (LEXAPRO) 20 mg tablet Take 20 mg by mouth daily. , Historical Med      clonazePAM (KLONOPIN) 1 mg tablet Take 1 mg by mouth in the morning. At bedtime, Historical Med      cholecalciferol, vitamin D3, 50 mcg (2,000 unit) tab Take  by mouth., Historical Med              Follow-up Information       Follow up With Specialties Details Why Contact Info    Massiel Malave MD Family Medicine  Discuss follow up for pancreatic tail abnormality seen on CT, repeat in 4-6 weeks [address] [place] [address]  [phone]      Follow-up with HF clinic as already scheduled. The patient is asked to follow-up with their primary care provider in the next several days. They are to call tomorrow for an appointment. The patient is asked to return promptly for any increased concerns or worsening of symptoms. They can return to this emergency department or any other emergency department.     Jasbir Higuera NP  6:11 PM

## 2022-10-17 NOTE — DISCHARGE INSTRUCTIONS
Your potassium today was 3.4, normal is 3.5-5, I would like for you to try to take your already prescribed potassium when able, otherwise you may add bananas, cantaloupe, potatoes or leafy greens that will help raise this level naturally.   Your lab work today showed that you have a urinary tract infection, I would like for you to take all antibiotics as prescribed,

## 2022-10-17 NOTE — ED TRIAGE NOTES
Patient reports severe abdominal pain and vomiting since Wednesday. Unable to keep food or medications down. Patient also reports cough 2-3 weeks.

## 2022-10-18 ENCOUNTER — PATIENT OUTREACH (OUTPATIENT)
Dept: CASE MANAGEMENT | Age: 42
End: 2022-10-18

## 2022-10-18 NOTE — PROGRESS NOTES
10/18/22  10:16 AM   Goals Addressed                   This Visit's Progress     Understand CHF action plan. 10/18/22  Patient has been to the ER 3 times this month for CHF related symptoms, she's still trying to navigate this disease and needs more education and monitoring. She has first appt with the HF clinic 10/21/22. Until then we talked about ways to prevent exacerbation including doing her daily weights, complying to her 1.5L fluid restriction, and reporting increased SOB to MD- patient agreeable to this plan. I sent her some pamphlets through My Chart- HF education and Heart Failure Zones. Will review them with her at next outreach. ACM will follow up in a week.   TS

## 2022-10-19 ENCOUNTER — PATIENT MESSAGE (OUTPATIENT)
Dept: FAMILY MEDICINE CLINIC | Age: 42
End: 2022-10-19

## 2022-10-19 DIAGNOSIS — G25.81 RESTLESS LEGS: ICD-10-CM

## 2022-10-19 LAB
ATRIAL RATE: 96 BPM
CALCULATED P AXIS, ECG09: 45 DEGREES
CALCULATED R AXIS, ECG10: -92 DEGREES
CALCULATED T AXIS, ECG11: 19 DEGREES
DIAGNOSIS, 93000: NORMAL
P-R INTERVAL, ECG05: 158 MS
Q-T INTERVAL, ECG07: 438 MS
QRS DURATION, ECG06: 156 MS
QTC CALCULATION (BEZET), ECG08: 553 MS
VENTRICULAR RATE, ECG03: 96 BPM

## 2022-10-20 ENCOUNTER — PATIENT OUTREACH (OUTPATIENT)
Dept: CASE MANAGEMENT | Age: 42
End: 2022-10-20

## 2022-10-20 RX ORDER — ROPINIROLE 0.5 MG/1
0.5 TABLET, FILM COATED ORAL 2 TIMES DAILY
Qty: 180 TABLET | Refills: 0 | Status: ON HOLD | OUTPATIENT
Start: 2022-10-20

## 2022-10-20 NOTE — TELEPHONE ENCOUNTER
From: Srinivas Dunbar  To: Abiel Napier MD  Sent: 10/19/2022 9:13 AM EDT  Subject: Restless leg    I meant to ask you yesterday I just forgot the restless leg had gone away but now it back to the max am I am to go up or take a extra. It's happening more to because I don't leave my bed because I really depressed right now so I'm always laying down and I can feel it all the time. With everything going on I'm just stressed and worried a lot.  Thank you

## 2022-10-21 ENCOUNTER — HOSPITAL ENCOUNTER (INPATIENT)
Age: 42
LOS: 19 days | Discharge: HOME HEALTH | DRG: 286 | End: 2022-11-09
Attending: EMERGENCY MEDICINE | Admitting: FAMILY MEDICINE
Payer: MEDICARE

## 2022-10-21 ENCOUNTER — APPOINTMENT (OUTPATIENT)
Dept: GENERAL RADIOLOGY | Age: 42
DRG: 286 | End: 2022-10-21
Attending: EMERGENCY MEDICINE
Payer: MEDICARE

## 2022-10-21 DIAGNOSIS — I13.0 CARDIORENAL SYNDROME WITH RENAL FAILURE, STAGE 1-4 OR UNSPECIFIED CHRONIC KIDNEY DISEASE, WITH HEART FAILURE (HCC): ICD-10-CM

## 2022-10-21 DIAGNOSIS — I50.23 ACUTE ON CHRONIC SYSTOLIC CONGESTIVE HEART FAILURE (HCC): ICD-10-CM

## 2022-10-21 DIAGNOSIS — Z71.89 ACP (ADVANCE CARE PLANNING): ICD-10-CM

## 2022-10-21 DIAGNOSIS — Z79.899 RECEIVING INOTROPIC MEDICATION: ICD-10-CM

## 2022-10-21 DIAGNOSIS — Z51.5 PALLIATIVE CARE ENCOUNTER: ICD-10-CM

## 2022-10-21 DIAGNOSIS — R45.851 SUICIDAL THOUGHTS: ICD-10-CM

## 2022-10-21 DIAGNOSIS — I50.9 CONGESTIVE HEART FAILURE, UNSPECIFIED HF CHRONICITY, UNSPECIFIED HEART FAILURE TYPE (HCC): ICD-10-CM

## 2022-10-21 DIAGNOSIS — E87.79 CARDIAC VOLUME OVERLOAD: ICD-10-CM

## 2022-10-21 DIAGNOSIS — I50.84 END STAGE CONGESTIVE HEART FAILURE (HCC): Primary | ICD-10-CM

## 2022-10-21 DIAGNOSIS — R06.02 SHORTNESS OF BREATH: ICD-10-CM

## 2022-10-21 LAB
ALBUMIN SERPL-MCNC: 3.4 G/DL (ref 3.5–5)
ALBUMIN/GLOB SERPL: 0.9 {RATIO} (ref 1.1–2.2)
ALP SERPL-CCNC: 92 U/L (ref 45–117)
ALT SERPL-CCNC: 30 U/L (ref 12–78)
ANION GAP SERPL CALC-SCNC: 9 MMOL/L (ref 5–15)
APTT PPP: 25.9 SEC (ref 22.1–31)
AST SERPL-CCNC: 20 U/L (ref 15–37)
ATRIAL RATE: 92 BPM
BACTERIA SPEC CULT: NORMAL
BASOPHILS # BLD: 0.1 K/UL (ref 0–0.1)
BASOPHILS NFR BLD: 1 % (ref 0–1)
BILIRUB SERPL-MCNC: 2.2 MG/DL (ref 0.2–1)
BNP SERPL-MCNC: 3265 PG/ML
BUN SERPL-MCNC: 9 MG/DL (ref 6–20)
BUN/CREAT SERPL: 11 (ref 12–20)
CALCIUM SERPL-MCNC: 9.2 MG/DL (ref 8.5–10.1)
CALCULATED P AXIS, ECG09: 61 DEGREES
CALCULATED R AXIS, ECG10: -150 DEGREES
CALCULATED T AXIS, ECG11: 37 DEGREES
CC UR VC: NORMAL
CHLORIDE SERPL-SCNC: 103 MMOL/L (ref 97–108)
CO2 SERPL-SCNC: 25 MMOL/L (ref 21–32)
COMMENT, HOLDF: NORMAL
COVID-19 RAPID TEST, COVR: NOT DETECTED
CREAT SERPL-MCNC: 0.81 MG/DL (ref 0.55–1.02)
DIAGNOSIS, 93000: NORMAL
DIFFERENTIAL METHOD BLD: NORMAL
EOSINOPHIL # BLD: 0.4 K/UL (ref 0–0.4)
EOSINOPHIL NFR BLD: 7 % (ref 0–7)
ERYTHROCYTE [DISTWIDTH] IN BLOOD BY AUTOMATED COUNT: 13.8 % (ref 11.5–14.5)
EST. AVERAGE GLUCOSE BLD GHB EST-MCNC: 134 MG/DL
GLOBULIN SER CALC-MCNC: 3.6 G/DL (ref 2–4)
GLUCOSE BLD STRIP.AUTO-MCNC: 108 MG/DL (ref 65–117)
GLUCOSE BLD STRIP.AUTO-MCNC: 71 MG/DL (ref 65–117)
GLUCOSE SERPL-MCNC: 80 MG/DL (ref 65–100)
HBA1C MFR BLD: 6.3 % (ref 4–5.6)
HCT VFR BLD AUTO: 40.2 % (ref 35–47)
HGB BLD-MCNC: 13.1 G/DL (ref 11.5–16)
IMM GRANULOCYTES # BLD AUTO: 0 K/UL (ref 0–0.04)
IMM GRANULOCYTES NFR BLD AUTO: 0 % (ref 0–0.5)
INR PPP: 1.2 (ref 0.9–1.1)
LYMPHOCYTES # BLD: 2.5 K/UL (ref 0.8–3.5)
LYMPHOCYTES NFR BLD: 46 % (ref 12–49)
MCH RBC QN AUTO: 30 PG (ref 26–34)
MCHC RBC AUTO-ENTMCNC: 32.6 G/DL (ref 30–36.5)
MCV RBC AUTO: 92 FL (ref 80–99)
MONOCYTES # BLD: 0.4 K/UL (ref 0–1)
MONOCYTES NFR BLD: 7 % (ref 5–13)
NEUTS SEG # BLD: 2.1 K/UL (ref 1.8–8)
NEUTS SEG NFR BLD: 39 % (ref 32–75)
NRBC # BLD: 0 K/UL (ref 0–0.01)
NRBC BLD-RTO: 0 PER 100 WBC
P-R INTERVAL, ECG05: 160 MS
PLATELET # BLD AUTO: 271 K/UL (ref 150–400)
PMV BLD AUTO: 10.9 FL (ref 8.9–12.9)
POTASSIUM SERPL-SCNC: 3.4 MMOL/L (ref 3.5–5.1)
PROT SERPL-MCNC: 7 G/DL (ref 6.4–8.2)
PROTHROMBIN TIME: 12 SEC (ref 9–11.1)
Q-T INTERVAL, ECG07: 474 MS
QRS DURATION, ECG06: 162 MS
QTC CALCULATION (BEZET), ECG08: 586 MS
RBC # BLD AUTO: 4.37 M/UL (ref 3.8–5.2)
SAMPLES BEING HELD,HOLD: NORMAL
SERVICE CMNT-IMP: NORMAL
SODIUM SERPL-SCNC: 137 MMOL/L (ref 136–145)
SOURCE, COVRS: NORMAL
THERAPEUTIC RANGE,PTTT: NORMAL SECS (ref 58–77)
TROPONIN-HIGH SENSITIVITY: 18 NG/L (ref 0–51)
TROPONIN-HIGH SENSITIVITY: 23 NG/L (ref 0–51)
TSH SERPL DL<=0.05 MIU/L-ACNC: 4.44 UIU/ML (ref 0.36–3.74)
VENTRICULAR RATE, ECG03: 92 BPM
WBC # BLD AUTO: 5.4 K/UL (ref 3.6–11)

## 2022-10-21 PROCEDURE — 85025 COMPLETE CBC W/AUTO DIFF WBC: CPT

## 2022-10-21 PROCEDURE — 87635 SARS-COV-2 COVID-19 AMP PRB: CPT

## 2022-10-21 PROCEDURE — 85730 THROMBOPLASTIN TIME PARTIAL: CPT

## 2022-10-21 PROCEDURE — 65270000046 HC RM TELEMETRY

## 2022-10-21 PROCEDURE — 36415 COLL VENOUS BLD VENIPUNCTURE: CPT

## 2022-10-21 PROCEDURE — 99285 EMERGENCY DEPT VISIT HI MDM: CPT

## 2022-10-21 PROCEDURE — 74011250636 HC RX REV CODE- 250/636: Performed by: FAMILY MEDICINE

## 2022-10-21 PROCEDURE — 83880 ASSAY OF NATRIURETIC PEPTIDE: CPT

## 2022-10-21 PROCEDURE — 82962 GLUCOSE BLOOD TEST: CPT

## 2022-10-21 PROCEDURE — 83036 HEMOGLOBIN GLYCOSYLATED A1C: CPT

## 2022-10-21 PROCEDURE — 93005 ELECTROCARDIOGRAM TRACING: CPT

## 2022-10-21 PROCEDURE — 85610 PROTHROMBIN TIME: CPT

## 2022-10-21 PROCEDURE — 80053 COMPREHEN METABOLIC PANEL: CPT

## 2022-10-21 PROCEDURE — 84484 ASSAY OF TROPONIN QUANT: CPT

## 2022-10-21 PROCEDURE — 74011250637 HC RX REV CODE- 250/637: Performed by: FAMILY MEDICINE

## 2022-10-21 PROCEDURE — 71046 X-RAY EXAM CHEST 2 VIEWS: CPT

## 2022-10-21 PROCEDURE — 84443 ASSAY THYROID STIM HORMONE: CPT

## 2022-10-21 PROCEDURE — 74011000250 HC RX REV CODE- 250: Performed by: FAMILY MEDICINE

## 2022-10-21 RX ORDER — INSULIN LISPRO 100 [IU]/ML
INJECTION, SOLUTION INTRAVENOUS; SUBCUTANEOUS
Status: DISCONTINUED | OUTPATIENT
Start: 2022-10-21 | End: 2022-11-09 | Stop reason: HOSPADM

## 2022-10-21 RX ORDER — SODIUM CHLORIDE 0.9 % (FLUSH) 0.9 %
5-40 SYRINGE (ML) INJECTION AS NEEDED
Status: DISCONTINUED | OUTPATIENT
Start: 2022-10-21 | End: 2022-11-09 | Stop reason: HOSPADM

## 2022-10-21 RX ORDER — FUROSEMIDE 10 MG/ML
40 INJECTION INTRAMUSCULAR; INTRAVENOUS 2 TIMES DAILY
Status: DISCONTINUED | OUTPATIENT
Start: 2022-10-21 | End: 2022-10-27

## 2022-10-21 RX ORDER — EZETIMIBE 10 MG/1
10 TABLET ORAL DAILY
Status: DISCONTINUED | OUTPATIENT
Start: 2022-10-22 | End: 2022-11-09 | Stop reason: HOSPADM

## 2022-10-21 RX ORDER — MONTELUKAST SODIUM 10 MG/1
10 TABLET ORAL DAILY
Status: DISCONTINUED | OUTPATIENT
Start: 2022-10-22 | End: 2022-11-09 | Stop reason: HOSPADM

## 2022-10-21 RX ORDER — SODIUM CHLORIDE 0.9 % (FLUSH) 0.9 %
5-40 SYRINGE (ML) INJECTION EVERY 8 HOURS
Status: DISCONTINUED | OUTPATIENT
Start: 2022-10-21 | End: 2022-11-09 | Stop reason: HOSPADM

## 2022-10-21 RX ORDER — ASPIRIN 81 MG/1
81 TABLET ORAL
Status: ACTIVE | OUTPATIENT
Start: 2022-10-21 | End: 2022-10-22

## 2022-10-21 RX ORDER — CLONAZEPAM 0.5 MG/1
1 TABLET ORAL
Status: DISCONTINUED | OUTPATIENT
Start: 2022-10-21 | End: 2022-11-09 | Stop reason: HOSPADM

## 2022-10-21 RX ORDER — ROPINIROLE 0.25 MG/1
0.5 TABLET, FILM COATED ORAL 2 TIMES DAILY
Status: DISCONTINUED | OUTPATIENT
Start: 2022-10-21 | End: 2022-11-09 | Stop reason: HOSPADM

## 2022-10-21 RX ORDER — MAGNESIUM SULFATE 100 %
4 CRYSTALS MISCELLANEOUS AS NEEDED
Status: DISCONTINUED | OUTPATIENT
Start: 2022-10-21 | End: 2022-11-09 | Stop reason: HOSPADM

## 2022-10-21 RX ORDER — FAMOTIDINE 20 MG/1
20 TABLET, FILM COATED ORAL 2 TIMES DAILY
Status: DISCONTINUED | OUTPATIENT
Start: 2022-10-21 | End: 2022-10-28

## 2022-10-21 RX ORDER — NITROGLYCERIN 0.4 MG/1
0.4 TABLET SUBLINGUAL
Status: DISCONTINUED | OUTPATIENT
Start: 2022-10-21 | End: 2022-11-09 | Stop reason: HOSPADM

## 2022-10-21 RX ORDER — PRASUGREL 10 MG/1
10 TABLET, FILM COATED ORAL DAILY
Status: DISCONTINUED | OUTPATIENT
Start: 2022-10-21 | End: 2022-11-09 | Stop reason: HOSPADM

## 2022-10-21 RX ORDER — FOLIC ACID 1 MG/1
1 TABLET ORAL DAILY
Status: DISCONTINUED | OUTPATIENT
Start: 2022-10-22 | End: 2022-11-09 | Stop reason: HOSPADM

## 2022-10-21 RX ORDER — ESCITALOPRAM OXALATE 10 MG/1
20 TABLET ORAL DAILY
Status: DISCONTINUED | OUTPATIENT
Start: 2022-10-22 | End: 2022-11-09 | Stop reason: HOSPADM

## 2022-10-21 RX ORDER — NITROGLYCERIN 0.4 MG/1
0.4 TABLET SUBLINGUAL
Status: DISCONTINUED | OUTPATIENT
Start: 2022-10-21 | End: 2022-10-21 | Stop reason: SDUPTHER

## 2022-10-21 RX ORDER — ROSUVASTATIN CALCIUM 10 MG/1
20 TABLET, COATED ORAL
Status: DISCONTINUED | OUTPATIENT
Start: 2022-10-21 | End: 2022-11-09 | Stop reason: HOSPADM

## 2022-10-21 RX ORDER — CARVEDILOL 3.12 MG/1
3.12 TABLET ORAL 2 TIMES DAILY
Status: DISCONTINUED | OUTPATIENT
Start: 2022-10-21 | End: 2022-10-27

## 2022-10-21 RX ORDER — ACETAMINOPHEN 325 MG/1
650 TABLET ORAL
Status: DISCONTINUED | OUTPATIENT
Start: 2022-10-21 | End: 2022-11-09 | Stop reason: HOSPADM

## 2022-10-21 RX ADMIN — FUROSEMIDE 40 MG: 10 INJECTION, SOLUTION INTRAVENOUS at 18:09

## 2022-10-21 RX ADMIN — CLONAZEPAM 1 MG: 0.5 TABLET ORAL at 22:18

## 2022-10-21 RX ADMIN — FAMOTIDINE 20 MG: 20 TABLET ORAL at 18:09

## 2022-10-21 RX ADMIN — ROSUVASTATIN 20 MG: 10 TABLET, FILM COATED ORAL at 22:18

## 2022-10-21 RX ADMIN — SODIUM CHLORIDE, PRESERVATIVE FREE 1 G: 5 INJECTION INTRAVENOUS at 18:09

## 2022-10-21 RX ADMIN — SODIUM CHLORIDE, PRESERVATIVE FREE 10 ML: 5 INJECTION INTRAVENOUS at 23:51

## 2022-10-21 RX ADMIN — CARVEDILOL 3.12 MG: 3.12 TABLET, FILM COATED ORAL at 18:09

## 2022-10-21 RX ADMIN — NITROGLYCERIN 0.4 MG: 0.4 TABLET, ORALLY DISINTEGRATING SUBLINGUAL at 22:37

## 2022-10-21 NOTE — ED PROVIDER NOTES
HPI     Please note that this dictation was completed with [employer], the computer voice recognition software. Quite often unanticipated grammatical, syntax, homophones, and other interpretive errors are inadvertently transcribed by the computer software. Please disregard these errors. Please excuse any errors that have escaped final proofreading. 17-year-old female with a history of advanced heart failure and ischemic cardiomyopathy here with shortness of breath. Patient seen by advanced heart failure clinic and referred to the emergency department for admission. Patient states that she feels short of breath with any exertion as well as when she lays flat. She has 3 pillow orthopnea. Denies any leg pain or swelling. She is undergoing treatment currently for UTI. Patient states that she has been depressed lately. She has had thoughts of suicide but contracts for safety currently. She states that she would not do anything while in the hospital or emergency department. She had a specific plan to possibly hang herself in the attic. Denies any cough, fever, current chest pain or other complaints. Past Medical History:   Diagnosis Date    Anemia NEC     Arthritis     Chronic pain     fybromyalsia    Depression     anxiety, depression, OCD    GERD (gastroesophageal reflux disease)     Hypertension     Hypertension     Ill-defined condition     Fibromyalia gastricparesis    MI (myocardial infarction) ([address]     Musculoskeletal disorder     SOB (shortness of breath)     Stool color black        Past Surgical History:   Procedure Laterality Date    HX CORONARY STENT PLACEMENT      HX GYN           HX HEENT  2002    wisdom teeth extraction    UPPER GI ENDOSCOPY,BIOPSY  2018         UPPER GI ENDOSCOPY,DIAGNOSIS  2018              Family History:   Problem Relation Age of Onset    Hypertension Father     Elevated Lipids Father     Arthritis-rheumatoid Mother         ? Lupus vs RA    Lung Disease Mother     Heart Disease Mother     Cancer Mother         breast in 39y    COPD Mother     Hypertension Mother     Stroke Mother         3-4 strokes    Breast Cancer Mother 50    Diabetes Maternal Grandmother        Social History     Socioeconomic History    Marital status: SINGLE     Spouse name: Not on file    Number of children: Not on file    Years of education: Not on file    Highest education level: Not on file   Occupational History    Not on file   Tobacco Use    Smoking status: Former     Packs/day: 0.25     Years: 8.00     Pack years: 2.00     Types: Cigarettes     Quit date: 2022     Years since quittin.2    Smokeless tobacco: Never   Vaping Use    Vaping Use: Never used   Substance and Sexual Activity    Alcohol use: Not Currently     Alcohol/week: 1.0 standard drink     Types: 1 Glasses of wine per week     Comment: Wine with special occassions - not since July    Drug use: Not Currently     Types: Marijuana     Comment: Haven't had any since 2022    Sexual activity: Yes     Partners: Male     Birth control/protection: None   Other Topics Concern    Not on file   Social History Narrative    Not on file     Social Determinants of Health     Financial Resource Strain: High Risk    Difficulty of Paying Living Expenses: Very hard   Food Insecurity: No Food Insecurity    Worried About Running Out of Food in the Last Year: Never true    [address] in the Last Year: Never true   Transportation Needs: No Transportation Needs    Lack of Transportation (Medical): No    Lack of Transportation (Non-Medical): No   Physical Activity: Inactive    Days of Exercise per Week: 0 days    Minutes of Exercise per Session: 0 min   Stress: Stress Concern Present    Feeling of Stress :  To some extent   Social Connections: Socially Isolated    Frequency of Communication with Friends and Family: Twice a week    Frequency of Social Gatherings with Friends and Family: Never    Attends [employer] Services: Never    Active Member of Clubs or Organizations: No    Attends Club or Organization Meetings: Never    Marital Status: Never    Intimate Partner Violence: Not At Risk    Fear of Current or Ex-Partner: No    Emotionally Abused: No    Physically Abused: No    Sexually Abused: No   Housing Stability: Low Risk     Unable to Pay for Housing in the Last Year: No    Number of [place] in the Last Year: 1    Unstable Housing in the Last Year: No         ALLERGIES: Abilify [aripiprazole], Sulfa (sulfonamide antibiotics), and Vicodin [hydrocodone-acetaminophen]    Review of Systems   Constitutional:  Negative for chills and fever. Respiratory:  Positive for shortness of breath. Negative for cough. Cardiovascular:  Negative for chest pain and leg swelling. Gastrointestinal:  Negative for abdominal pain (none currently but had some earlier this week.), nausea and vomiting. All other systems reviewed and are negative. Vitals:    10/21/22 1053 10/21/22 1205   BP: 105/82 (!) 112/96   Pulse: 100 96   Resp: 18 11   Temp: 98.2 °F (36.8 °C) 97.7 °F (36.5 °C)   SpO2: 98%             Physical Exam  Vitals and nursing note reviewed. Constitutional:       General: She is not in acute distress. Appearance: Normal appearance. She is well-developed. She is not ill-appearing. HENT:      Head: Normocephalic and atraumatic. Nose: No congestion or rhinorrhea. Mouth/Throat:      Mouth: Mucous membranes are moist.   Eyes:      General: No scleral icterus. Right eye: No discharge. Left eye: No discharge. Conjunctiva/sclera: Conjunctivae normal.   Cardiovascular:      Rate and Rhythm: Normal rate and regular rhythm. Heart sounds: Normal heart sounds. No murmur heard. No friction rub. No gallop. Pulmonary:      Effort: Pulmonary effort is normal. No respiratory distress. Breath sounds: Normal breath sounds. No wheezing or rales.    Abdominal:      General: There is no distension. Palpations: Abdomen is soft. Tenderness: There is no abdominal tenderness. There is no guarding. Musculoskeletal:         General: No swelling or deformity. Normal range of motion. Cervical back: Normal range of motion and neck supple. No rigidity. Right lower leg: No edema. Left lower leg: No edema. Lymphadenopathy:      Cervical: No cervical adenopathy. Skin:     General: Skin is warm and dry. Coloration: Skin is not jaundiced or pale. Findings: No rash. Neurological:      Mental Status: She is alert and oriented to person, place, and time. Comments: KENNY        [facility]    ED Course as of 10/21/22 1234   Fri Oct 21, 2022   1118 [facility] EKG interpretation: There is a sinus rhythm at 92 beats a minute. Right bundle branch block is noted. Axis is leftward. Poor R wave progression is noted. No acute ischemic changes appreciated. [RP]      ED Course User Index  [RP] Kassi Jorgensen MD     44-year-old female here with ischemic cardiomyopathy and heart failure referred for admission by the advanced heart failure clinic. Check chest x-ray and labs. She is on Coumadin. Admit to the hospitalist.    Procedures    Perfect Serve Consult for Admission  12:34 PM    ED Room Number: ER22/22  Patient Name and age:  Eliane Grover 38 y.o.  female  Working Diagnosis:   1. End stage congestive heart failure ([address]    2. Suicidal thoughts        COVID-19 Suspicion:  no  Sepsis present:  no  Reassessment needed: no  Code Status:  Full Code  Readmission: no  Isolation Requirements:  no  Recommended Level of Care:  step down cardiology told me she needed inotropes and diuresis - please see advanced heart failure note  Department:[facility] Adult ED - ([phone]  Other: Referred by advanced heart failure clinic for further work-up of heart failure, inotropes and diuresis. See their note. BSMART to see about 1:1 but pt currently contracts for safety.                  Recent Results (from the past 24 hour(s))   EKG, 12 LEAD, INITIAL    Collection Time: 10/21/22 11:09 AM   Result Value Ref Range    Ventricular Rate 92 BPM    Atrial Rate 92 BPM    P-R Interval 160 ms    QRS Duration 162 ms    Q-T Interval 474 ms    QTC Calculation (Bezet) 586 ms    Calculated P Axis 61 degrees    Calculated R Axis -150 degrees    Calculated T Axis 37 degrees    Diagnosis       Normal sinus rhythm  Right bundle branch block  Anteroseptal infarct (cited on or before 13-AUG-2022)  Abnormal ECG  When compared with ECG of 17-OCT-2022 07:57,  Questionable change in QRS axis     CBC WITH AUTOMATED DIFF    Collection Time: 10/21/22 11:29 AM   Result Value Ref Range    WBC 5.4 3.6 - 11.0 K/uL    RBC 4.37 3.80 - 5.20 M/uL    HGB 13.1 11.5 - 16.0 g/dL    HCT 40.2 35.0 - 47.0 %    MCV 92.0 80.0 - 99.0 FL    MCH 30.0 26.0 - 34.0 PG    MCHC 32.6 30.0 - 36.5 g/dL    RDW 13.8 11.5 - 14.5 %    PLATELET 250 273 - 821 K/uL    MPV 10.9 8.9 - 12.9 FL    NRBC 0.0 0  WBC    ABSOLUTE NRBC 0.00 0.00 - 0.01 K/uL    NEUTROPHILS 39 32 - 75 %    LYMPHOCYTES 46 12 - 49 %    MONOCYTES 7 5 - 13 %    EOSINOPHILS 7 0 - 7 %    BASOPHILS 1 0 - 1 %    IMMATURE GRANULOCYTES 0 0.0 - 0.5 %    ABS. NEUTROPHILS 2.1 1.8 - 8.0 K/UL    ABS. LYMPHOCYTES 2.5 0.8 - 3.5 K/UL    ABS. MONOCYTES 0.4 0.0 - 1.0 K/UL    ABS. EOSINOPHILS 0.4 0.0 - 0.4 K/UL    ABS. BASOPHILS 0.1 0.0 - 0.1 K/UL    ABS. IMM.  GRANS. 0.0 0.00 - 0.04 K/UL    DF AUTOMATED     METABOLIC PANEL, COMPREHENSIVE    Collection Time: 10/21/22 11:29 AM   Result Value Ref Range    Sodium 137 136 - 145 mmol/L    Potassium 3.4 (L) 3.5 - 5.1 mmol/L    Chloride 103 97 - 108 mmol/L    CO2 25 21 - 32 mmol/L    Anion gap 9 5 - 15 mmol/L    Glucose 80 65 - 100 mg/dL    BUN 9 6 - 20 MG/DL    Creatinine 0.81 0.55 - 1.02 MG/DL    BUN/Creatinine ratio 11 (L) 12 - 20      eGFR >60 >60 ml/min/1.73m2    Calcium 9.2 8.5 - 10.1 MG/DL    Bilirubin, total 2.2 (H) 0.2 - 1.0 MG/DL    ALT (SGPT) 30 12 - 78 U/L AST (SGOT) 20 15 - 37 U/L    Alk. phosphatase 92 45 - 117 U/L    Protein, total 7.0 6.4 - 8.2 g/dL    Albumin 3.4 (L) 3.5 - 5.0 g/dL    Globulin 3.6 2.0 - 4.0 g/dL    A-G Ratio 0.9 (L) 1.1 - 2.2     SAMPLES BEING HELD    Collection Time: 10/21/22 11:29 AM   Result Value Ref Range    SAMPLES BEING HELD 1RED     COMMENT        Add-on orders for these samples will be processed based on acceptable specimen integrity and analyte stability, which may vary by analyte. XR CHEST PA LAT    Result Date: 10/21/2022  Indication:  sob Exam: PA and lateral views of the chest. Direct comparison is made to prior CXR dated October 6, 2022. Findings: Cardiomediastinal silhouette is stable and enlarged. Lungs are clear bilaterally. Pleural spaces are normal. Osseous structures are intact. Stable cardiomegaly. Clear lungs.

## 2022-10-21 NOTE — ED TRIAGE NOTES
Arrives in wheelchair from [address] for admission to initiate inotropic therapy for heart failure dx. Pt also admitted to depression and suicidal thoughts.  with pt in waiting room for safety. Also dx with UTI on Monday from [facility].

## 2022-10-21 NOTE — Clinical Note
Cardiac Cath Lab Procedure Area Arrival Note:    Anthony Clarke arrived to Cardiac Cath Lab, Procedure Area. Patient identifiers verified with NAME and DATE OF BIRTH. Procedure verified with patient. Consent forms verified. Allergies verified. Patient informed of procedure and plan of care. Questions answered with review. Patient voiced understanding of procedure and plan of care. Patient on cardiac monitor, non-invasive blood pressure, SPO2 monitor. On room air/. IV of NaCl on pump at 25 ml/hr. Patient status doing well without problems. Patient is A&Ox 4. Patient reports no chest pain or SOB. Patient medicated during procedure with orders obtained and verified by Dr. Fabio Crawford. Refer to patients Cardiac Cath Lab PROCEDURE REPORT for vital signs, assessment, status, and response during procedure, printed at end of case. Printed report on chart or scanned into chart.

## 2022-10-21 NOTE — BSMART NOTE
Patient to be medical admission. Not wanting to talk to a counselor. Noted by staff to anxious and tearful but has responded well to sitter with her. Spoke with Dr. Jr Pérez and will defer eval to Psych and asked him to put in consult now. Recommend 1:1 until seen by Psych.

## 2022-10-21 NOTE — ED NOTES
TRANSFER - OUT REPORT:    Verbal report given to Zoila Mesa RN(name) on Jailene Peña  being transferred to [facility] /01(unit) for routine progression of care       Report consisted of patients Situation, Background, Assessment and   Recommendations(SBAR). Information from the following report(s) SBAR, Kardex, ED Summary, Intake/Output, MAR, Recent Results, Med Rec Status, and Cardiac Rhythm NSR/ST c PVCs  was reviewed with the receiving nurse. Lines:   Peripheral IV 10/21/22 Right Antecubital (Active)   Site Assessment Clean, dry, & intact 10/21/22 1354   Phlebitis Assessment 0 10/21/22 1354   Infiltration Assessment 0 10/21/22 1354   Dressing Status Clean, dry, & intact 10/21/22 1354        Opportunity for questions and clarification was provided.       Patient transported with:   Monitor  Registered Nurse   Sitters

## 2022-10-21 NOTE — PROGRESS NOTES
10/20/22   Goals Addressed                   This Visit's Progress     Understand CHF action plan. 10/20/22  Patient has an appt at Heart Failure clinic tomorrow, she called to report that she had vomited after a big meal.  She says not in any distress at this time and doesn't feel nauseous anymore. Feels it was just the meal.  Breathing is WNL, patient agrees to keep appt tomorrow. ACM will follow up next week after appt. MANOJ    10/18/22  Patient has been to the ER 3 times this month for CHF related symptoms, she's still trying to navigate this disease and needs more education and monitoring. She has first appt with the HF clinic 10/21/22. Until then we talked about ways to prevent exacerbation including doing her daily weights, complying to her 1.5L fluid restriction, and reporting increased SOB to MD- patient agreeable to this plan. I sent her some pamphlets through My Chart- HF education and Heart Failure Zones. Will review them with her at next outreach. ACM will follow up in a week.   MANOJ

## 2022-10-21 NOTE — Clinical Note
Transfer to [facility] from Procedure Area    Verbal report given to RR RN on Tameka Altamirano being transferred to Cardiac Cath Lab RR for routine progression of care   Patient is post RHC procedure. Patient stable upon transfer to . Report consisted of patients Situation, Background, Assessment and   Recommendations(SBAR). Information from the following report(s) SBAR, Procedure Summary, Intake/Output and Recent Results was reviewed with the receiving nurse. Opportunity for questions and clarification was provided. Patient medicated during procedure with orders obtained and verified by Dr. Jeffrey Thomas. Refer to patient PROCEDURE REPORT for vital signs, assessment, status, and response during procedure.

## 2022-10-21 NOTE — H&P
[address] Adult  Hospitalist Group  History and Physical    Date of Service:  10/21/2022  Primary Care Provider: Jose Wiggins MD  Source of information: The patient and Chart review    Chief Complaint: Referral / Consult and Mental Health Problem      History of Presenting Illness:   Kody Olivia is a 38 y.o. female who presents with shortness of breath. Patient with history of CHF, presents with shortness of breath, also patient with suicidal ideation, reports that she has been depressed lately, reports that she has thought of suicide and has a plan to hang herself in the attic, was sent to the ER and was requested to be admitted to the hospitalist service, patient reports that she has been having shortness of breath, reports that she was 3 pillow Orthopnea, reports mild cough but denies any fever or chills    The patient denies any headache, blurry vision, sore throat, trouble swallowing, trouble with speech, , chest pain fever, chills, N/V/D, abd pain, urinary symptoms, constipation, recent travels, sick contacts, focal or generalized neurological symptoms, falls, injuries, rashes, contact with COVID-19 diagnosed patients, hematemesis, melena, hemoptysis, hematuria, rashes, denies starting any new medications and denies any other concerns or problems besides as mentioned above. REVIEW OF SYSTEMS:  A comprehensive review of systems was negative except for that written in the History of Present Illness.      Past Medical History:   Diagnosis Date    Anemia NEC     Arthritis     Chronic pain     fybromyalsia    Depression     anxiety, depression, OCD    GERD (gastroesophageal reflux disease)     Hypertension     Hypertension     Ill-defined condition     Fibromyalia gastricparesis    MI (myocardial infarction) ([facility] [address]     Musculoskeletal disorder     SOB (shortness of breath)     Stool color black       Past Surgical History:   Procedure Laterality Date    HX CORONARY STENT PLACEMENT      HX GYN           HX HEENT      wisdom teeth extraction    UPPER GI ENDOSCOPY,BIOPSY  2018         UPPER GI ENDOSCOPY,DIAGNOSIS  2018          Prior to Admission medications    Medication Sig Start Date End Date Taking? Authorizing Provider   rOPINIRole (REQUIP) 0.5 mg tablet Take 1 Tablet by mouth two (2) times a day. 10/20/22   Liang Guzman MD   cephALEXin (Keflex) 500 mg capsule Take 1 Capsule by mouth four (4) times daily for 7 days. 10/17/22 10/24/22  Sosa Mcnair NP   promethazine (PHENERGAN) 25 mg suppository Insert 1 Suppository into rectum every six (6) hours as needed for Nausea for up to 5 doses. Patient not taking: Reported on 10/21/2022 10/17/22   Franchesca Mcnair NP   promethazine (PHENERGAN) 25 mg tablet Take 1 Tablet by mouth every six (6) hours as needed for Nausea for up to 10 doses. 10/17/22   Sosa Mcnair NP   ondansetron (ZOFRAN ODT) 4 mg disintegrating tablet Take 1 Tablet by mouth every eight (8) hours as needed for Nausea or Vomiting. 10/14/22   Liang Guzman MD   ketoconazole (NIZORAL) 2 % shampoo SHAMPOO TWICE WEEKLY  Patient not taking: Reported on 10/21/2022 10/11/22   Liang Guzman MD   furosemide (LASIX) 40 mg tablet TAKE ONE TABLET BY MOUTH EVERY DAY 10/5/22   Matthew OCHOA, DO   carvediloL (COREG) 3.125 mg tablet TAKE 1 TABLET BY MOUTH TWICE A DAY 22   Matthew OCHOA, DO   rosuvastatin (CRESTOR) 20 mg tablet Take 1 Tablet by mouth every Monday, Wednesday, Friday. 22   Matthew OCHOA, DO   levocetirizine (XYZAL) 5 mg tablet TAKE 1 TABLET EVERY DAY 9/15/22   Liang Guzman MD   famotidine (PEPCID) 20 mg tablet TAKE 1 TABLET BY MOUTH TWICE A DAY 22   Liang Guzman MD   ezetimibe (ZETIA) 10 mg tablet Take 1 Tablet by mouth daily. 22   Bobo Campos NP   metFORMIN ER (GLUCOPHAGE XR) 500 mg tablet Take 1 Tablet by mouth daily (with dinner).  22   Prema Almanza MD empagliflozin (Jardiance) 10 mg tablet Take 1 Tablet by mouth daily. Patient not taking: Reported on 9/29/2022 8/31/22   Barbara OCHOA DO   montelukast (SINGULAIR) 10 mg tablet TAKE 1 TABLET EVERY DAY 8/24/22   Erma Saenz MD   hydrocortisone (ANUSOL-HC) 2.5 % rectal cream Insert  into rectum four (4) times daily. 8/17/22   Daniel Jackson MD   diazePAM (VALIUM) 5 mg tablet 5 mg every eight (8) hours as needed. 5 mg, 3 times daily 8/15/22   Provider, Historical   albuterol-ipratropium (DUO-NEB) 2.5 mg-0.5 mg/3 ml nebu 3 mL by Nebulization route four (4) times daily. Provider, Historical   fluticasone propionate (FLONASE) 50 mcg/actuation nasal spray 2 Sprays by Both Nostrils route daily. Patient not taking: Reported on 10/21/2022    Provider, Historical   ipratropium (ATROVENT) 21 mcg (0.03 %) nasal spray 1 [place] by Both Nostrils route every twelve (12) hours. 8/16/22   Daniel Jackson MD   acetaminophen (TYLENOL) 325 mg tablet Take  by mouth every four (4) hours as needed for Pain. Provider, Historical   albuterol (PROVENTIL HFA, VENTOLIN HFA, PROAIR HFA) 90 mcg/actuation inhaler Take  by inhalation. Other, MD Nestor   prasugreL (EFFIENT) 10 mg tablet  7/26/22   Provider, Historical   nitroglycerin (NITROSTAT) 0.4 mg SL tablet  7/26/22   Provider, Historical   aspirin delayed-release 81 mg tablet 81 mg. 7/26/22   Provider, Historical   pantoprazole (PROTONIX) 40 mg tablet TAKE 1 TABLET TWICE DAILY 6/1/22   Erma Saenz MD   ferrous sulfate 325 mg (65 mg iron) tablet TAKE 1 TABLET BY MOUTH ONCE DAILY BEFORE BREAKFAST 5/4/22   Erma Saenz MD   traZODone (DESYREL) 100 mg tablet 2 tablets at night 4/5/22   Provider, Historical   folic acid (FOLVITE) 1 mg tablet TAKE 1 TABLET EVERY DAY 4/6/22   Erma Saenz MD   cyclobenzaprine (FLEXERIL) 10 mg tablet three (3) times daily as needed.  Has not needed  Patient not taking: Reported on 10/21/2022 10/6/21   Provider, Historical   escitalopram oxalate (LEXAPRO) 20 mg tablet Take 20 mg by mouth daily. Provider, Historical   clonazePAM (KLONOPIN) 1 mg tablet Take 1 mg by mouth in the morning. At bedtime    Provider, Historical   cholecalciferol, vitamin D3, 50 mcg (2,000 unit) tab 2,000 Units. Provider, Historical     Allergies   Allergen Reactions    Abilify [Aripiprazole] Anxiety     Can not tolerate     Sulfa (Sulfonamide Antibiotics) Hives    Vicodin [Hydrocodone-Acetaminophen] Nausea and Vomiting      Family History   Problem Relation Age of Onset    Hypertension Father     Elevated Lipids Father     Arthritis-rheumatoid Mother         ? Lupus vs RA    Lung Disease Mother     Heart Disease Mother     Cancer Mother         breast in 39y    COPD Mother     Hypertension Mother     Stroke Mother         3-4 strokes    Breast Cancer Mother 50    Diabetes Maternal Grandmother       Social History:  reports that she quit smoking about 2 months ago. Her smoking use included cigarettes. She has a 2.00 pack-year smoking history. She has never used smokeless tobacco. She reports that she does not currently use alcohol after a past usage of about 1.0 standard drink per week. She reports that she does not currently use drugs after having used the following drugs: Marijuana. Family and social history were personally reviewed, all pertinent and relevant details are outlined as above.     Objective:   Visit Vitals  BP (!) 115/90   Pulse 91   Temp 97.7 °F (36.5 °C)   Resp 18   LMP 10/09/2022   SpO2 95%      O2 Device: None (Room air)    PHYSICAL EXAM:   General: Alert x oriented x 3,  HEENT: PEERL, EOMI, moist mucus membranes  Neck: Supple, no JVD, no meningeal signs  Chest: Decreased basal breath sounds  CVS: RRR, S1 S2 heard, no murmurs/rubs/gallops  Abd: Soft, non-tender, non-distended, +bowel sounds   Ext: No clubbing, no cyanosis, no edema  Neuro/Psych: Pleasant mood and affect, CN 2-12 grossly intact, sensory grossly within normal limit, Strength 5/5 in all extremities, DTR 1+ x 4  Cap refill: Brisk, less than 3 seconds  Pulses: 2+, symmetric in all extremities  Skin: Warm, dry, without rashes or lesions    Data Review: All diagnostic labs and studies have been reviewed. Abnormal Labs Reviewed   METABOLIC PANEL, COMPREHENSIVE - Abnormal; Notable for the following components:       Result Value    Potassium 3.4 (*)     BUN/Creatinine ratio 11 (*)     Bilirubin, total 2.2 (*)     Albumin 3.4 (*)     A-G Ratio 0.9 (*)     All other components within normal limits   NT-PRO BNP - Abnormal; Notable for the following components:    NT pro-BNP 3,265 (*)     All other components within normal limits   PROTHROMBIN TIME + INR - Abnormal; Notable for the following components:    INR 1.2 (*)     Prothrombin time 12.0 (*)     All other components within normal limits       All Micro Results       Procedure Component Value Units Date/Time    COVID-19 RAPID TEST [[id number]] Collected: 10/21/22 1351    Order Status: Completed Specimen: Nasopharyngeal Updated: 10/21/22 1435     Specimen source Nasopharyngeal        COVID-19 rapid test Not detected        Comment: Rapid Abbott ID Now       Rapid NAAT:  The specimen is NEGATIVE for SARS-CoV-2, the novel coronavirus associated with COVID-19. Negative results should be treated as presumptive and, if inconsistent with clinical signs and symptoms or necessary for patient management, should be tested with an alternative molecular assay. Negative results do not preclude SARS-CoV-2 infection and should not be used as the sole basis for patient management decisions. This test has been authorized by the FDA under an Emergency Use Authorization (EUA) for use by authorized laboratories.    Fact sheet for Healthcare Providers:  http://www.shy-cely.biz/  Fact sheet for Patients: http://www.begum-ta.biz/       Methodology: Isothermal Nucleic Acid Amplification IMAGING:   XR CHEST PA LAT   Final Result   Stable cardiomegaly. Clear lungs. ECG/ECHO:    Results for orders placed or performed during the hospital encounter of 10/21/22   EKG, 12 LEAD, INITIAL   Result Value Ref Range    Ventricular Rate 92 BPM    Atrial Rate 92 BPM    P-R Interval 160 ms    QRS Duration 162 ms    Q-T Interval 474 ms    QTC Calculation (Bezet) 586 ms    Calculated P Axis 61 degrees    Calculated R Axis -150 degrees    Calculated T Axis 37 degrees    Diagnosis       Normal sinus rhythm  Right bundle branch block  Anteroseptal infarct (cited on or before 13-AUG-2022)  Abnormal ECG  When compared with ECG of 17-OCT-2022 07:57,  Questionable change in QRS axis          Assessment:   Given the patient's current clinical presentation, there is a high level of concern for decompensation if discharged from the emergency department. Complex decision making was performed, which includes reviewing the patient's available past medical records, laboratory results, and imaging studies. Acute on chronic systolic congestive heart failure NYHA class IV  Hypertension  Hyperlipidemia  Diabetes mellitus type 2  Restless leg syndrome    Plan:   Patient will be admitted on a telemetry bed, IV diuresis, strict I's and O's, continue beta-blockers, current spironolactone, ACE/ARB/hydralazine/Isordil, , echocardiogram, cycle troponins, telemetry monitoring, aspirin, Effient, AHF team on board, close monitoring  Optimize blood pressure control, continue to monitor  Continue statin  Sliding-scale insulin, Accu-Cheks, diet control, close monitoring            DIET: No diet orders on file   ISOLATION PRECAUTIONS: There are currently no Active Isolations  CODE STATUS: Prior   DVT PROPHYLAXIS: Heparin  FUNCTIONAL STATUS PRIOR TO HOSPITALIZATION: Fully active and ambulatory; able to carry on all self-care without restriction.   Ambulatory status/function: By self   EARLY MOBILITY ASSESSMENT: Recommend a consult to the Mobility Team  ANTICIPATED DISCHARGE: 24-48 hours. ANTICIPATED DISPOSITION: Home with Home Healthcare      Signed By: Diane Arteaga MD     October 21, 2022         Please note that this dictation may have been completed with [employer]as[employer], the computer voice recognition software. Quite often unanticipated grammatical, syntax, homophones, and other interpretive errors are inadvertently transcribed by the computer software. Please disregard these errors. Please excuse any errors that have escaped final proofreading.

## 2022-10-22 ENCOUNTER — APPOINTMENT (OUTPATIENT)
Dept: NON INVASIVE DIAGNOSTICS | Age: 42
DRG: 286 | End: 2022-10-22
Attending: FAMILY MEDICINE
Payer: MEDICARE

## 2022-10-22 LAB
ANION GAP SERPL CALC-SCNC: 9 MMOL/L (ref 5–15)
ATRIAL RATE: 86 BPM
BASOPHILS # BLD: 0.1 K/UL (ref 0–0.1)
BASOPHILS NFR BLD: 1 % (ref 0–1)
BUN SERPL-MCNC: 8 MG/DL (ref 6–20)
BUN/CREAT SERPL: 9 (ref 12–20)
CALCIUM SERPL-MCNC: 9.6 MG/DL (ref 8.5–10.1)
CALCULATED P AXIS, ECG09: 43 DEGREES
CALCULATED R AXIS, ECG10: -90 DEGREES
CALCULATED T AXIS, ECG11: 13 DEGREES
CHLORIDE SERPL-SCNC: 103 MMOL/L (ref 97–108)
CHOLEST SERPL-MCNC: 95 MG/DL
CO2 SERPL-SCNC: 24 MMOL/L (ref 21–32)
CREAT SERPL-MCNC: 0.87 MG/DL (ref 0.55–1.02)
DIAGNOSIS, 93000: NORMAL
DIFFERENTIAL METHOD BLD: ABNORMAL
ECHO EST RA PRESSURE: 5 MMHG
ECHO LA DIAMETER INDEX: 2.02 CM/M2
ECHO LA DIAMETER: 4 CM
ECHO LV FRACTIONAL SHORTENING: 15 % (ref 28–44)
ECHO LV INTERNAL DIMENSION DIASTOLE INDEX: 2.98 CM/M2
ECHO LV INTERNAL DIMENSION DIASTOLIC: 5.9 CM (ref 3.9–5.3)
ECHO LV INTERNAL DIMENSION SYSTOLIC INDEX: 2.53 CM/M2
ECHO LV INTERNAL DIMENSION SYSTOLIC: 5 CM
ECHO LV IVSD: 0.5 CM (ref 0.6–0.9)
ECHO LV MASS 2D: 153.4 G (ref 67–162)
ECHO LV MASS INDEX 2D: 77.5 G/M2 (ref 43–95)
ECHO LV POSTERIOR WALL DIASTOLIC: 0.9 CM (ref 0.6–0.9)
ECHO LV RELATIVE WALL THICKNESS RATIO: 0.31
ECHO LVOT AREA: 3.8 CM2
ECHO LVOT DIAM: 2.2 CM
ECHO RIGHT VENTRICULAR SYSTOLIC PRESSURE (RVSP): 51 MMHG
ECHO TV REGURGITANT MAX VELOCITY: 3.38 M/S
ECHO TV REGURGITANT PEAK GRADIENT: 46 MMHG
EOSINOPHIL # BLD: 0.5 K/UL (ref 0–0.4)
EOSINOPHIL NFR BLD: 8 % (ref 0–7)
ERYTHROCYTE [DISTWIDTH] IN BLOOD BY AUTOMATED COUNT: 13.6 % (ref 11.5–14.5)
GLUCOSE BLD STRIP.AUTO-MCNC: 105 MG/DL (ref 65–117)
GLUCOSE BLD STRIP.AUTO-MCNC: 109 MG/DL (ref 65–117)
GLUCOSE BLD STRIP.AUTO-MCNC: 114 MG/DL (ref 65–117)
GLUCOSE BLD STRIP.AUTO-MCNC: 130 MG/DL (ref 65–117)
GLUCOSE SERPL-MCNC: 114 MG/DL (ref 65–100)
HCT VFR BLD AUTO: 40.2 % (ref 35–47)
HDLC SERPL-MCNC: 32 MG/DL
HDLC SERPL: 3 {RATIO} (ref 0–5)
HGB BLD-MCNC: 12.9 G/DL (ref 11.5–16)
IMM GRANULOCYTES # BLD AUTO: 0 K/UL (ref 0–0.04)
IMM GRANULOCYTES NFR BLD AUTO: 0 % (ref 0–0.5)
LDLC SERPL CALC-MCNC: 45 MG/DL (ref 0–100)
LYMPHOCYTES # BLD: 2.5 K/UL (ref 0.8–3.5)
LYMPHOCYTES NFR BLD: 42 % (ref 12–49)
MCH RBC QN AUTO: 29.2 PG (ref 26–34)
MCHC RBC AUTO-ENTMCNC: 32.1 G/DL (ref 30–36.5)
MCV RBC AUTO: 91 FL (ref 80–99)
MONOCYTES # BLD: 0.4 K/UL (ref 0–1)
MONOCYTES NFR BLD: 7 % (ref 5–13)
NEUTS SEG # BLD: 2.5 K/UL (ref 1.8–8)
NEUTS SEG NFR BLD: 42 % (ref 32–75)
NRBC # BLD: 0 K/UL (ref 0–0.01)
NRBC BLD-RTO: 0 PER 100 WBC
P-R INTERVAL, ECG05: 162 MS
PLATELET # BLD AUTO: 283 K/UL (ref 150–400)
PMV BLD AUTO: 11.2 FL (ref 8.9–12.9)
POTASSIUM SERPL-SCNC: 3.5 MMOL/L (ref 3.5–5.1)
Q-T INTERVAL, ECG07: 440 MS
QRS DURATION, ECG06: 164 MS
QTC CALCULATION (BEZET), ECG08: 526 MS
RBC # BLD AUTO: 4.42 M/UL (ref 3.8–5.2)
SERVICE CMNT-IMP: ABNORMAL
SERVICE CMNT-IMP: NORMAL
SODIUM SERPL-SCNC: 136 MMOL/L (ref 136–145)
TRIGL SERPL-MCNC: 90 MG/DL (ref ?–150)
VENTRICULAR RATE, ECG03: 86 BPM
VLDLC SERPL CALC-MCNC: 18 MG/DL
WBC # BLD AUTO: 6 K/UL (ref 3.6–11)

## 2022-10-22 PROCEDURE — 93306 TTE W/DOPPLER COMPLETE: CPT

## 2022-10-22 PROCEDURE — 74011000250 HC RX REV CODE- 250: Performed by: FAMILY MEDICINE

## 2022-10-22 PROCEDURE — 36415 COLL VENOUS BLD VENIPUNCTURE: CPT

## 2022-10-22 PROCEDURE — 74011250636 HC RX REV CODE- 250/636: Performed by: HOSPITALIST

## 2022-10-22 PROCEDURE — 74011250636 HC RX REV CODE- 250/636: Performed by: FAMILY MEDICINE

## 2022-10-22 PROCEDURE — 99222 1ST HOSP IP/OBS MODERATE 55: CPT | Performed by: NURSE PRACTITIONER

## 2022-10-22 PROCEDURE — 80061 LIPID PANEL: CPT

## 2022-10-22 PROCEDURE — 85025 COMPLETE CBC W/AUTO DIFF WBC: CPT

## 2022-10-22 PROCEDURE — 80048 BASIC METABOLIC PNL TOTAL CA: CPT

## 2022-10-22 PROCEDURE — 74011250637 HC RX REV CODE- 250/637: Performed by: HOSPITALIST

## 2022-10-22 PROCEDURE — 65270000046 HC RM TELEMETRY

## 2022-10-22 PROCEDURE — 74011000250 HC RX REV CODE- 250: Performed by: HOSPITALIST

## 2022-10-22 PROCEDURE — 74011250637 HC RX REV CODE- 250/637: Performed by: FAMILY MEDICINE

## 2022-10-22 PROCEDURE — 82962 GLUCOSE BLOOD TEST: CPT

## 2022-10-22 PROCEDURE — 74011250637 HC RX REV CODE- 250/637: Performed by: NURSE PRACTITIONER

## 2022-10-22 RX ORDER — CALCIUM CARBONATE 200(500)MG
200 TABLET,CHEWABLE ORAL
Status: DISCONTINUED | OUTPATIENT
Start: 2022-10-22 | End: 2022-11-09 | Stop reason: HOSPADM

## 2022-10-22 RX ORDER — POTASSIUM CHLORIDE 750 MG/1
20 TABLET, FILM COATED, EXTENDED RELEASE ORAL DAILY
Status: DISCONTINUED | OUTPATIENT
Start: 2022-10-23 | End: 2022-10-23

## 2022-10-22 RX ORDER — LANOLIN ALCOHOL/MO/W.PET/CERES
400 CREAM (GRAM) TOPICAL
Status: DISCONTINUED | OUTPATIENT
Start: 2022-10-22 | End: 2022-11-09 | Stop reason: HOSPADM

## 2022-10-22 RX ADMIN — FOLIC ACID 1 MG: 1 TABLET ORAL at 10:33

## 2022-10-22 RX ADMIN — MONTELUKAST 10 MG: 10 TABLET, FILM COATED ORAL at 10:33

## 2022-10-22 RX ADMIN — FAMOTIDINE 20 MG: 20 TABLET ORAL at 10:33

## 2022-10-22 RX ADMIN — CLONAZEPAM 1 MG: 0.5 TABLET ORAL at 22:20

## 2022-10-22 RX ADMIN — Medication 400 MG: at 22:20

## 2022-10-22 RX ADMIN — ROPINIROLE HYDROCHLORIDE 0.5 MG: 0.25 TABLET, FILM COATED ORAL at 19:39

## 2022-10-22 RX ADMIN — SODIUM CHLORIDE, PRESERVATIVE FREE 10 ML: 5 INJECTION INTRAVENOUS at 22:27

## 2022-10-22 RX ADMIN — SODIUM CHLORIDE, PRESERVATIVE FREE 1 G: 5 INJECTION INTRAVENOUS at 18:12

## 2022-10-22 RX ADMIN — SODIUM CHLORIDE, PRESERVATIVE FREE 10 ML: 5 INJECTION INTRAVENOUS at 14:45

## 2022-10-22 RX ADMIN — EZETIMIBE 10 MG: 10 TABLET ORAL at 10:33

## 2022-10-22 RX ADMIN — PRASUGREL 10 MG: 10 TABLET, FILM COATED ORAL at 10:33

## 2022-10-22 RX ADMIN — FAMOTIDINE 20 MG: 20 TABLET ORAL at 18:16

## 2022-10-22 RX ADMIN — CALCIUM CARBONATE (ANTACID) CHEW TAB 500 MG 200 MG: 500 CHEW TAB at 14:45

## 2022-10-22 RX ADMIN — CARVEDILOL 3.12 MG: 3.12 TABLET, FILM COATED ORAL at 13:01

## 2022-10-22 RX ADMIN — ROPINIROLE HYDROCHLORIDE 0.5 MG: 0.25 TABLET, FILM COATED ORAL at 10:33

## 2022-10-22 RX ADMIN — SODIUM CHLORIDE, PRESERVATIVE FREE 10 ML: 5 INJECTION INTRAVENOUS at 06:49

## 2022-10-22 RX ADMIN — ESCITALOPRAM OXALATE 20 MG: 10 TABLET ORAL at 10:33

## 2022-10-22 RX ADMIN — ACETAMINOPHEN 650 MG: 325 TABLET ORAL at 06:48

## 2022-10-22 RX ADMIN — FUROSEMIDE 40 MG: 10 INJECTION, SOLUTION INTRAVENOUS at 18:20

## 2022-10-22 RX ADMIN — FUROSEMIDE 40 MG: 10 INJECTION, SOLUTION INTRAVENOUS at 10:34

## 2022-10-22 RX ADMIN — PERFLUTREN 1.5 ML: 6.52 INJECTION, SUSPENSION INTRAVENOUS at 14:00

## 2022-10-22 NOTE — PROGRESS NOTES
[facility] Adult  Hospitalist Group                                                                                          Hospitalist Progress Note  Ventura Echeverria MD  Answering service: [phone] from in house phone        Date of Service:  10/22/2022  NAME:  Kody Olivia  :  1980  MRN:  [id number]      Admission Summary:   Kody Olivia is a 38 y.o. female who presents with shortness of breath.       Patient with history of CHF, presents with shortness of breath, also patient with suicidal ideation, reports that she has been depressed lately, reports that she has thought of suicide and has a plan to hang herself in the attic, was sent to the ER and was requested to be admitted to the hospitalist service, patient reports that she has been having shortness of breath, reports that she was 3 pillow Orthopnea, reports mild cough but denies any fever or chills    Interval history / Subjective:   Patient seen and examined seen by cardiology and psychiatry as well patient is deemed not suicidal discontinued sitter patient complains of left leg pain restless leg syndrome patient is started on Requip today     Assessment & Plan:     Acute on chronic systolic congestive heart failure NYHA class IV  -- Maintain daily weight I's and O's  --Continue goal-directed medical therapy if blood pressure permits  --Patient said that she was taken off Entresto by Dr. Val Stark due to low blood pressure  --Echo pending  --Continue diuresis 40 mg IV x2 daily    Hypertension   -- Currently blood pressure low normal  -- Patient is on Coreg  -- Not on ACE ARB due to low blood pressure    Hyperlipidemia  -- Continue statin    Coronary artery disease  -- Continue prasugrel and statin and Zetia    Diabetes mellitus type 2  -- Continue sliding scale insulin cannot tolerate Jardiance    Restless leg syndrome  --On Requip    Anxiety depression suicidal ideation  --Patient seen by psych  --Continue home medication one-to-one sitter may be discontinued as per psych  --To start trazodone for sleep at night    Continue antibiotic as per home medication given 7 days of Keflex now getting ceftriaxone     Code status: Full code  DVT prophylaxis: SCD    Care Plan discussed with: Patient/Family and Nurse  Anticipated Disposition: Home w/Family  Anticipated Discharge: 24 hours to 48 hours     Hospital Problems  Date Reviewed: 10/14/2022            Codes Class Noted POA    CHF (congestive heart failure) ([facility] [address] ICD-10-CM: I50.9  ICD-9-CM: 428.0  10/21/2022 Unknown             Review of Systems:   A comprehensive review of systems was negative. Vital Signs:    Last 24hrs VS reviewed since prior progress note.  Most recent are:  Visit Vitals  /80   Pulse 97   Temp 97.6 °F (36.4 °C)   Resp 20   Wt 93.4 kg (206 lb)   SpO2 99%   BMI 35.36 kg/m²         Intake/Output Summary (Last 24 hours) at 10/22/2022 1308  Last data filed at 10/22/2022 1230  Gross per 24 hour   Intake 220 ml   Output 2350 ml   Net -2130 ml        Physical Examination:     I had a face to face encounter with this patient and independently examined them on 10/22/2022 as outlined below:          General : alert x 3, awake, no acute distress, resting in bed, pleasant   HEENT: PEERL, EOMI, moist mucus membrane, TM clear  Neck: supple, no JVD, no meningeal signs  Chest: Clear to auscultation bilaterally   CVS: S1 S2 heard, Capillary refill less than 2 seconds  Abd: soft/ Non tender, non distended, BS physiological,   Ext: no clubbing, no cyanosis, no edema, brisk 2+ DP pulses  Neuro/Psych: pleasant mood and affect, CN 2-12 grossly intact  Skin: warm     Data Review:    I personally reviewed  Image and labs      Labs:     Recent Labs     10/22/22  0626 10/21/22  1129   WBC 6.0 5.4   HGB 12.9 13.1   HCT 40.2 40.2    271     Recent Labs     10/22/22  0626 10/21/22  1129    137   K 3.5 3.4*    103   CO2 24 25   BUN 8 9   CREA 0.87 0.81   * 80 CA 9.6 9.2     Recent Labs     10/21/22  1129   ALT 30   AP 92   TBILI 2.2*   TP 7.0   ALB 3.4*   GLOB 3.6     Recent Labs     10/21/22  1350   INR 1.2*   PTP 12.0*   APTT 25.9      No results for input(s): FE, TIBC, PSAT, FERR in the last 72 hours. Lab Results   Component Value Date/Time    Folate 14.2 06/11/2021 10:35 AM      No results for input(s): PH, PCO2, PO2 in the last 72 hours. No results for input(s): CPK, CKNDX, TROIQ in the last 72 hours.     No lab exists for component: CPKMB  Lab Results   Component Value Date/Time    Cholesterol, total 95 10/22/2022 06:26 AM    HDL Cholesterol 32 10/22/2022 06:26 AM    LDL, calculated 45 10/22/2022 06:26 AM    Triglyceride 90 10/22/2022 06:26 AM    CHOL/HDL Ratio 3.0 10/22/2022 06:26 AM     Lab Results   Component Value Date/Time    Glucose (POC) 109 10/22/2022 11:10 AM    Glucose (POC) 114 10/22/2022 06:28 AM    Glucose (POC) 108 10/21/2022 09:18 PM    Glucose (POC) 71 10/21/2022 06:08 PM    Glucose (POC) 104 08/14/2022 10:58 AM     Lab Results   Component Value Date/Time    Color ORANGE 10/17/2022 08:42 AM    Appearance TURBID (A) 10/17/2022 08:42 AM    Specific gravity 1.025 10/17/2022 08:42 AM    Specific gravity 1.024 10/06/2022 08:57 AM    pH (UA) 5.0 10/17/2022 08:42 AM    Protein 30 (A) 10/17/2022 08:42 AM    Glucose Negative 10/17/2022 08:42 AM    Ketone Negative 10/17/2022 08:42 AM    Bilirubin Negative 11/25/2021 09:47 AM    Urobilinogen 1.0 10/17/2022 08:42 AM    Nitrites Positive (A) 10/17/2022 08:42 AM    Leukocyte Esterase SMALL (A) 10/17/2022 08:42 AM    Epithelial cells FEW 10/17/2022 08:42 AM    Bacteria 2+ (A) 10/17/2022 08:42 AM    WBC 5-10 10/17/2022 08:42 AM    RBC 5-10 10/17/2022 08:42 AM         Medications Reviewed:     Current Facility-Administered Medications   Medication Dose Route Frequency    carvediloL (COREG) tablet 3.125 mg  3.125 mg Oral BID    clonazePAM (KlonoPIN) tablet 1 mg  1 mg Oral QHS    escitalopram oxalate (LEXAPRO) tablet 20 mg  20 mg Oral DAILY    ezetimibe (ZETIA) tablet 10 mg  10 mg Oral DAILY    famotidine (PEPCID) tablet 20 mg  20 mg Oral BID    folic acid (FOLVITE) tablet 1 mg  1 mg Oral DAILY    montelukast (SINGULAIR) tablet 10 mg  10 mg Oral DAILY    prasugreL (EFFIENT) tablet 10 mg  10 mg Oral DAILY    rOPINIRole (REQUIP) tablet 0.5 mg  0.5 mg Oral BID    rosuvastatin (CRESTOR) tablet 20 mg  20 mg Oral Q MON, WED & FRI    sodium chloride (NS) flush 5-40 mL  5-40 mL IntraVENous Q8H    sodium chloride (NS) flush 5-40 mL  5-40 mL IntraVENous PRN    acetaminophen (TYLENOL) tablet 650 mg  650 mg Oral Q4H PRN    furosemide (LASIX) injection 40 mg  40 mg IntraVENous BID    nitroglycerin (NITROSTAT) tablet 0.4 mg  0.4 mg SubLINGual Q5MIN PRN    cefTRIAXone (ROCEPHIN) 1 g in 0.9% sodium chloride 10 mL IV syringe  1 g IntraVENous Q24H    glucose chewable tablet 16 g  4 Tablet Oral PRN    glucagon (GLUCAGEN) injection 1 mg  1 mg IntraMUSCular PRN    dextrose 10 % infusion 0-250 mL  0-250 mL IntraVENous PRN    insulin lispro (HUMALOG) injection   SubCUTAneous AC&HS     ______________________________________________________________________  EXPECTED LENGTH OF STAY: - - -  ACTUAL LENGTH OF STAY:          1      Please note that this dictation was completed with [employer] the computer voice recognition software. Quite often unanticipated grammatical, syntax, homophones, and other interpretive errors are inadvertently transcribed by the computer software. Please disregard these errors. Please excuse any errors that have escaped final proofreading.                 Tressa Venegas MD

## 2022-10-22 NOTE — CONSULTS
[address] [facility] in [place], [place]  Inpatient Progress Note      Patient name: Devon Davidson  Patient : 1980  Patient MRN: [id number]  Consulting MD: Fanny Daly MD  Date of service: 10/22/22      REASON FOR REFERRAL:  Management of acute on chronic systolic heart failure      PLAN OF CARE   38 y.o. female with history of HTN, CAD s/p STEMI in 2022 (DESx1 to LAD; treated at [facility]) with ischemic cardiomyopathy (EF 20-25%), CHF, GERD, RLS, and fibromyalgia seen as a new patient in [facility] on 10/21/22 and found to be fluid overloaded, and with suicidal ideation. Pt was taken to the ER for further evaluation and admission for acute on chronic HF and mental health crisis. Pt contracted for safety, and is being seen by behavioral health/psych  Starting diuresis and will initiate remainder of heart failure work-up while in hospital; work on optimizing GDMT after diuresis complete- may need inotrope assistance. Plan for RHC once close to euvolemia.   GDMT limited in the past d/t hypotension            RECOMMENDATIONS:  Inotropic support/pressors: none needed at this time, may consider inotrope if diuresis inadequate  Beta-blocker: continue coreg 3.125mg BID  ACE/ARB/ARNi: hold while undergoing aggressive diuresis  MRA: start spironolactone 12.5mg daily  SGLT2 inhibitor: continue home Jardiance 10mg daily  Diuretic: continue IV lasix 40mg BID ; goal net -2L next 24 hrs  Urate lowering therapy: not on allopurinol or uloric, check uric acid level  Continue ASA and prasugrel per primary cards  Statin or PCSK9i: Continue current dose of statin  Treatment of STU: inpatient eval for STU  Likely will need referral for ICD given EF remains <35% 3 mos after MI, may need LifeVest at discharge if no ICD implant  Echocardiogram pending  EKG done on admission  Labs: will order HF workup labs for tomorrow  Nutritionist consult  Heart failure education  Needs Advanced care plan    Patient assessed. High suspicion of sleep apnea due to the following reasons. Will refer for sleep evaluation. [x] Heart Failure  [x] Hypertention  [] Atrial Fibrillation  [x] BMI > 30  [] History of stroke  [] Diabetes  [] Heavy snoring  [] Witnessed apnea  [] Hypoxemia       IMPRESSION:  Fatigue  Shortness of breath  Volume overload  Acute on Chronic systolic heart failure  Stage C, NYHA class IV symptoms  ischemic cardiomyopathy, LVEF 20-25%  CAD  STEMI July 2022 s/p RICKY to LAD  HTN  Obesity  LHD  Pre-diabetes  Esophageal stenosis s/p dilation  RLS  Fibromyalgia  Anxiety and Depression      LIFE GOALS:  Patient's personal goals include: get better  Important upcoming milestones or family events: the holidays coming up  The patient identifies the following as important for living well: being independent        [facility]:  Echo (8/14/22)    Left Ventricle: Severely reduced left ventricular systolic function with a visually estimated EF of 20 - 25%. Left ventricle is mildly dilated. Increased wall thickness. See diagram for wall motion findings. Grade II diastolic dysfunction with increased LAP. Right Ventricle: Not well visualized. Tricuspid Valve: Moderately elevated RVSP. BRIEF HISTORY OF PRESENT ILLNESS:  Boom Beltran is a 38 y.o. female with h/o HTN, CAD s/p STEMI in July 2022 (DESx1 to LAD; treated at [facility]) with ischemic cardiomyopathy (EF 20-25%), CHF, GERD, RLS, and fibromyalgia seen as a new patient in [facility] on 10/21/22 and found to be fluid overloaded, and with suicidal ideation. Pt was taken to the ER for further evaluation and admission for acute on chronic HF and mental health crisis. INTERVAL HISTORY:  Contracted for safety  Feeling better this morning  VSS      REVIEW OF SYSTEMS:  Review of Systems   Constitutional:  Negative for chills, fever, malaise/fatigue and weight loss. Respiratory:  Positive for shortness of breath.  Negative for cough. Cardiovascular:  Positive for orthopnea. Negative for chest pain, palpitations and leg swelling. Gastrointestinal:  Negative for abdominal pain, diarrhea, heartburn, nausea and vomiting. Musculoskeletal:  Positive for myalgias. Restless legs   Neurological:  Negative for dizziness and weakness. Psychiatric/Behavioral:  Positive for depression. Negative for suicidal ideas. The patient is nervous/anxious. PHYSICAL EXAM:  Visit Vitals  /85 (BP 1 Location: Left upper arm, BP Patient Position: At rest)   Pulse 96   Temp 97.4 °F (36.3 °C)   Resp 19   Wt 206 lb (93.4 kg)   LMP 10/09/2022   SpO2 99%   BMI 35.36 kg/m²         Physical Exam  Vitals reviewed. Constitutional:       Appearance: Normal appearance. Cardiovascular:      Rate and Rhythm: Normal rate and regular rhythm. Heart sounds: No murmur heard. No friction rub. Gallop present. S3 sounds present. Pulmonary:      Effort: No respiratory distress. Breath sounds: Normal breath sounds. Abdominal:      General: Bowel sounds are normal. There is no distension. Palpations: Abdomen is soft. Tenderness: There is no abdominal tenderness. Musculoskeletal:      Right lower leg: No edema. Left lower leg: No edema. Skin:     General: Skin is warm and dry. Capillary Refill: Capillary refill takes less than 2 seconds. Neurological:      General: No focal deficit present. Mental Status: She is alert and oriented to person, place, and time. Psychiatric:         Mood and Affect: Mood normal.         Behavior: Behavior normal.         Thought Content:  Thought content normal.         Judgment: Judgment normal.            PAST MEDICAL HISTORY:  Past Medical History:   Diagnosis Date    Anemia NEC     Arthritis     Chronic pain     fybromyalsia    Depression     anxiety, depression, OCD    GERD (gastroesophageal reflux disease)     Hypertension     Hypertension     Ill-defined condition Fibromyalia gastricparesis    MI (myocardial infarction) ([facility] [address]     Musculoskeletal disorder     SOB (shortness of breath)     Stool color black        PAST SURGICAL HISTORY:  Past Surgical History:   Procedure Laterality Date    HX CORONARY STENT PLACEMENT      HX GYN           HX HEENT  2002    wisdom teeth extraction    UPPER GI ENDOSCOPY,BIOPSY  2018         UPPER GI ENDOSCOPY,DIAGNOSIS  2018            FAMILY HISTORY:  Family History   Problem Relation Age of Onset    Hypertension Father     Elevated Lipids Father     Arthritis-rheumatoid Mother         ? Lupus vs RA    Lung Disease Mother     Heart Disease Mother     Cancer Mother         breast in 39y    COPD Mother     Hypertension Mother     Stroke Mother         3-4 strokes    Breast Cancer Mother 50    Diabetes Maternal Grandmother        SOCIAL HISTORY:  Social History     Socioeconomic History    Marital status: SINGLE   Tobacco Use    Smoking status: Former     Packs/day: 0.25     Years: 8.00     Pack years: 2.00     Types: Cigarettes     Quit date: 2022     Years since quittin.2    Smokeless tobacco: Never   Vaping Use    Vaping Use: Never used   Substance and Sexual Activity    Alcohol use: Not Currently     Alcohol/week: 1.0 standard drink     Types: 1 Glasses of wine per week     Comment: Wine with special occassions - not since July    Drug use: Not Currently     Types: Marijuana     Comment: Haven't had any since 2022    Sexual activity: Yes     Partners: Male     Birth control/protection: None     Social Determinants of Health     Financial Resource Strain: High Risk    Difficulty of Paying Living Expenses: Very hard   Food Insecurity: No Food Insecurity    Worried About Running Out of Food in the Last Year: Never true    Ran Out of Food in the Last Year: Never true   Transportation Needs: No Transportation Needs    Lack of Transportation (Medical): No    Lack of Transportation (Non-Medical):  No   Physical Activity: Inactive    Days of Exercise per Week: 0 days    Minutes of Exercise per Session: 0 min   Stress: Stress Concern Present    Feeling of Stress : To some extent   Social Connections: Socially Isolated    Frequency of Communication with Friends and Family: Twice a week    Frequency of Social Gatherings with Friends and Family: Never    Attends Baptism Services: Never    Active Member of Clubs or Organizations: No    Attends Club or Organization Meetings: Never    Marital Status: Never    Housing Stability: [address] to Pay for Housing in the Last Year: No    Number of Places Lived in the Last Year: 1    Unstable Housing in the Last Year: No       LABORATORY RESULTS:     Labs Latest Ref Rng & Units 10/22/2022 10/21/2022 10/17/2022 10/6/2022 10/3/2022 9/30/2022 9/9/2022   WBC 3.6 - 11.0 K/uL 6.0 5.4 5.8 5.7 5.7 6.1 -   RBC 3.80 - 5.20 M/uL 4.42 4.37 4.15 4.14 4.06 4.30 -   Hemoglobin 11.5 - 16.0 g/dL 12.9 13.1 12.3 12.2 12.0 12.8 -   Hematocrit 35.0 - 47.0 % 40.2 40.2 38.8 38.2 37.2 41.9 -   MCV 80.0 - 99.0 FL 91.0 92.0 93.5 92.3 91.6 97.4 -   Platelets 559 - 501 K/uL 283 271 292 273 263 300 -   Lymphocytes 12 - 49 % 42 46 46 43 49 - -   Monocytes 5 - 13 % 7 7 8 7 6 - -   Eosinophils 0 - 7 % 8(H) 7 6 5 5 - -   Basophils 0 - 1 % 1 1 1 1 1 - -   Albumin 3.5 - 5.0 g/dL - 3.4(L) 3.4(L) 3. 2(L) 3. 1(L) 3.5 -   Calcium 8.5 - 10.1 MG/DL 9.6 9.2 9.4 9.4 9.4 9.6 9.4   Glucose 65 - 100 mg/dL 114(H) 80 106(H) 107(H) 100 111(H) 90   BUN 6 - 20 MG/DL 8 9 10 10 12 13 9   Creatinine 0.55 - 1.02 MG/DL 0.87 0.81 1.09(H) 0.98 0.90 0.99 0.95   Sodium 136 - 145 mmol/L 136 137 140 138 138 138 140   Potassium 3.5 - 5.1 mmol/L 3.5 3. 4(L) 3.4(L) 3.4(L) 3. 2(L) 3.8 3.5   TSH 0.36 - 3.74 uIU/mL - 4.44(H) - - - - -   Some recent data might be hidden     Lab Results   Component Value Date/Time    TSH 4.44 (H) 10/21/2022 06:12 PM    TSH 2.12 05/12/2021 10:55 AM    TSH 1.330 12/30/2019 12:00 AM    TSH 3.850 07/25/2018 12:40 PM    TSH 1.620 02/27/2018 08:59 AM    TSH 1.240 12/05/2016 10:13 AM    TSH 1.530 03/18/2016 10:31 AM    TSH 1.400 11/09/2011 09:37 AM    TSH 1.26 08/19/2010 10:36 AM    TSH 1.18 07/28/2010 04:10 PM       ALLERGY:  Allergies   Allergen Reactions    Abilify [Aripiprazole] Anxiety     Can not tolerate     Sulfa (Sulfonamide Antibiotics) Hives    Vicodin [Hydrocodone-Acetaminophen] Nausea and Vomiting        CURRENT MEDICATIONS:    Current Facility-Administered Medications:     carvediloL (COREG) tablet 3.125 mg, 3.125 mg, Oral, BID, Geoff Valdivia MD, 3.125 mg at 10/21/22 1809    clonazePAM (KlonoPIN) tablet 1 mg, 1 mg, Oral, QHS, Geoff Rodriguez MD, 1 mg at 10/21/22 2218    escitalopram oxalate (LEXAPRO) tablet 20 mg, 20 mg, Oral, DAILY, Geoff Rodriguez MD    ezetimibe (ZETIA) tablet 10 mg, 10 mg, Oral, DAILY, Geoff Rodriguez MD    famotidine (PEPCID) tablet 20 mg, 20 mg, Oral, BID, Geoff Valdivia MD, 20 mg at 61/12/26 8802    folic acid (FOLVITE) tablet 1 mg, 1 mg, Oral, DAILY, Geoff Rodriguez MD    montelukast (SINGULAIR) tablet 10 mg, 10 mg, Oral, DAILY, Geoff Rodriguez MD    prasugreL (EFFIENT) tablet 10 mg, 10 mg, Oral, DAILY, Geoff Rodriguez MD    rOPINIRole (REQUIP) tablet 0.5 mg, 0.5 mg, Oral, BID, Geoff Rodriguez MD    rosuvastatin (CRESTOR) tablet 20 mg, 20 mg, Oral, Q MON, WED & FRI, Anthony Vyas MD, 20 mg at 10/21/22 2218    sodium chloride (NS) flush 5-40 mL, 5-40 mL, IntraVENous, Q8H, Geoff Rodriguez MD, 10 mL at 10/22/22 0649    sodium chloride (NS) flush 5-40 mL, 5-40 mL, IntraVENous, PRN, Anthony Vyas MD    acetaminophen (TYLENOL) tablet 650 mg, 650 mg, Oral, Q4H PRN, Anthony Vyas MD, 650 mg at 10/22/22 0648    furosemide (LASIX) injection 40 mg, 40 mg, IntraVENous, BID, Geoff Rodriguez MD, 40 mg at 10/21/22 1807    nitroglycerin (NITROSTAT) tablet 0.4 mg, 0.4 mg, SubLINGual, Q5MIN PRN, Anthony Vyas MD, 0.4 mg at 10/21/22 6357    cefTRIAXone (ROCEPHIN) 1 g in 0.9% sodium chloride 10 mL IV syringe, 1 g, IntraVENous, Q24H, Manuelito Casillas MD, 1 g at 10/21/22 1809    glucose chewable tablet 16 g, 4 Tablet, Oral, PRN, Geoff Avelar MD    glucagon (GLUCAGEN) injection 1 mg, 1 mg, IntraMUSCular, PRN, Geoff Avelar MD    dextrose 10 % infusion 0-250 mL, 0-250 mL, IntraVENous, PRN, Manuelito Casillas MD    insulin lispro (HUMALOG) injection, , SubCUTAneous, AC&HS, Manuelito Casillas MD    PATIENT CARE TEAM:  Patient Care Team:  Erma Saenz MD as PCP - General (Family Medicine)  Erma Saenz MD as PCP - [facility]  Gertrude Ellison MD (Surgery General)  Júnior Parrish MD (Cardiovascular Disease Physician)  Nayeli Dooley MD (Otolaryngology)  Cuate Cohen MD (Internal Medicine Physician)  Ad Alexis MD (Female Pelvic Medicine and Reconstructive Surgery)  Jeremias Mccartney MD (Neurology)  Enid Turpin MD (Psychiatry)  Keyon Lee as Ambulatory Care Manager     Thank you for allowing us to participate in this patient's care. Martin   Rashawn Ontiveros, MSN, AGABoston City Hospital-[address], [address]  Phone: [phone]  Fax: [phone]

## 2022-10-22 NOTE — BSMART NOTE
[facility] Liaison Team Note     LOS:  1     Patient goal(s) for today: communicate needs to staff, continue prescribed medications  [facility] Liaison team focus/goals: assess needs, provide support and encouragement    Progress note:   Pt presented to the ED for inotropic therapy for heart failure and endorsed SI with plan to hang self in the attic. HX: advanced heart failure and ischemic cardiomyopathy. Pt is received sitting up in bed, talking to her cousin, who is a , on the phone. Her affect is bright, she is smiling and laughing, and states she is blessed to have such a supportive family in place. Pt reports she was overwhelmed when she arrived to the ED because she was not prepared to hear she might need a heart transplant. She states as soon as she heard that, she thought, \"No, I can't take that - it's just too much, ya know. \"  She reports people were coming at her too fast and she could not process the information. She states she has now had time to process and is in agreement with her treatment plan, which includes Lasix, various medications, and \"lots of tests. \"  She reports she is willing to do whatever it takes to stay alive and healthy. She shares she is a [place] and knows God intervened at the right time and \"I am listening\". She vehemently denies SI/HI/AVH. Pt is future focused and in agreement with her medical treatment plan. Psychiatric NP discontinued 1:1.      Barriers to Discharge: medical clearance  Guns in the home: no     Outpatient provider(s):  Aaron Coley MD  Insurance info/prescription coverage:  [employer] MEDICARE/[employer] MEDICARE HMO    Diagnosis: MDD, recurrent, mild to moderate; unspecified anxiety disorder, adjustment disorder with mixed anxiety and depressed mood. Plan:  Pt agrees to disclose any further thoughts of suicide to staff and agrees to remain safe in the hospital.  [facility] Liaison will continue to follow weekly or as needed.   Follow up Psych Consult placed? no.   Psychiatrist updated?  yes       Participating treatment team members: Stan Jarrett, Álvaro Fleming, Sharron Gan, NP

## 2022-10-22 NOTE — CONSULTS
PSYCHIATRY CONSULT NOTE:    REASON FOR CONSULT: Suicidal ideation    HISTORY OF PRESENTING COMPLAINT:  Sandra Duncan is a 38 y.o. BLACK/ female who is currently admitted to the medical floor at [address] arrived to the ED for inotropic therapy for heart failure and expressed SI at that time. She has expressed to staff SI with plans to hang herself in her attic. During assessment, pt was calm and cooperative, in good spirits. She was upset about being on a 1:1 and states that the statements she made about suicide were an extreme reaction to learning she may need a heart transplant, and not a true expression of a desire to end her life. She expresses a strong desire to live, and identifies several reasons to live: her family, goals for the future, motivation to recover. She states the whole reason she was upset and made these extreme statements when she learned the severity of her condition is that she wants to live. Protective factors: her father, who is 70, who she cares for, vacations to go on, hope for the future, other family members she loves, her Alevism glenn. Currently denies SI/plan/intent, HI/plan/intent, and AVH. PAST PSYCHIATRIC HISTORY: Pt has a hx of depression and anxiety. Pt sees Dr. Wilson Da Silva at [address] [place] [address] for psychiatry. She is on Lexapro, trazodone, and Klonopin. She lost her mother in 2008, which is when she began to struggle with depression. No hx of suicide attempts, no hx inpatient psychiatric hospitalizations. No hx of perceptual disturbances. She feels her current regimen is effective. SUBSTANCE ABUSE HISTORY: No drug/alcohol abuse, hx of social drinking and occasional THC use. PSYCHOSOCIAL HISTORY: Pt is single and has no children. She is on disability. PAST MEDICAL HISTORY:  Please see H&P for details.    Past Medical History:   Diagnosis Date    Anemia NEC     Arthritis     Chronic pain     fybromyalsia    Depression anxiety, depression, OCD    GERD (gastroesophageal reflux disease)     Hypertension     Hypertension     Ill-defined condition     Fibromyalia gastricparesis    MI (myocardial infarction) ([facility] [address]     Musculoskeletal disorder     SOB (shortness of breath)     Stool color black      Prior to Admission medications    Medication Sig Start Date End Date Taking? Authorizing Provider   rOPINIRole (REQUIP) 0.5 mg tablet Take 1 Tablet by mouth two (2) times a day. 10/20/22  Yes Franc Oneil MD   cephALEXin (Keflex) 500 mg capsule Take 1 Capsule by mouth four (4) times daily for 7 days. 10/17/22 10/24/22 Yes Sosa Gage NP   promethazine (PHENERGAN) 25 mg tablet Take 1 Tablet by mouth every six (6) hours as needed for Nausea for up to 10 doses. 10/17/22  Yes Sosa Gage NP   ondansetron (ZOFRAN ODT) 4 mg disintegrating tablet Take 1 Tablet by mouth every eight (8) hours as needed for Nausea or Vomiting. 10/14/22  Yes Franc Oneil MD   furosemide (LASIX) 40 mg tablet TAKE ONE TABLET BY MOUTH EVERY DAY 10/5/22  Yes Rosanna Le DO   carvediloL (COREG) 3.125 mg tablet TAKE 1 TABLET BY MOUTH TWICE A DAY 9/27/22  Yes Rosanna Le DO   rosuvastatin (CRESTOR) 20 mg tablet Take 1 Tablet by mouth every Monday, Wednesday, Friday. 9/23/22  Yes Rosanna Le DO   levocetirizine (XYZAL) 5 mg tablet TAKE 1 TABLET EVERY DAY 9/15/22  Yes Franc Oneil MD   famotidine (PEPCID) 20 mg tablet TAKE 1 TABLET BY MOUTH TWICE A DAY 9/14/22  Yes Franc Oneil MD   ezetimibe (ZETIA) 10 mg tablet Take 1 Tablet by mouth daily. 9/9/22  Yes Ammy Mckenzie NP   metFORMIN ER (GLUCOPHAGE XR) 500 mg tablet Take 1 Tablet by mouth daily (with dinner). 9/8/22  Yes Meche Cameron MD   montelukast (SINGULAIR) 10 mg tablet TAKE 1 TABLET EVERY DAY 8/24/22  Yes Franc Oneil MD   diazePAM (VALIUM) 5 mg tablet 5 mg every eight (8) hours as needed.  5 mg, 3 times daily 8/15/22  Yes Provider, Historical albuterol-ipratropium (DUO-NEB) 2.5 mg-0.5 mg/3 ml nebu 3 mL by Nebulization route four (4) times daily. Yes Provider, Historical   ipratropium (ATROVENT) 21 mcg (0.03 %) nasal spray 1 [place] by Both Nostrils route every twelve (12) hours. 8/16/22  Yes Nikki Lou MD   acetaminophen (TYLENOL) 325 mg tablet Take  by mouth every four (4) hours as needed for Pain. Yes Provider, Historical   albuterol (PROVENTIL HFA, VENTOLIN HFA, PROAIR HFA) 90 mcg/actuation inhaler Take  by inhalation. Yes Other, MD Nestor   prasugreL (EFFIENT) 10 mg tablet  7/26/22  Yes Provider, Historical   aspirin delayed-release 81 mg tablet 81 mg. 7/26/22  Yes Provider, Historical   pantoprazole (PROTONIX) 40 mg tablet TAKE 1 TABLET TWICE DAILY 6/1/22  Yes Dahlia Peralta MD   ferrous sulfate 325 mg (65 mg iron) tablet TAKE 1 TABLET BY MOUTH ONCE DAILY BEFORE BREAKFAST 5/4/22  Yes Dahlia Peralta MD   traZODone (DESYREL) 100 mg tablet 2 tablets at night 4/5/22  Yes Provider, Historical   folic acid (FOLVITE) 1 mg tablet TAKE 1 TABLET EVERY DAY 4/6/22  Yes Dahlia Peralta MD   escitalopram oxalate (LEXAPRO) 20 mg tablet Take 20 mg by mouth daily. Yes Provider, Historical   clonazePAM (KLONOPIN) 1 mg tablet Take 1 mg by mouth in the morning. At bedtime   Yes Provider, Historical   cholecalciferol, vitamin D3, 50 mcg (2,000 unit) tab 2,000 Units. Yes Provider, Historical   promethazine (PHENERGAN) 25 mg suppository Insert 1 Suppository into rectum every six (6) hours as needed for Nausea for up to 5 doses. Patient not taking: No sig reported 10/17/22   Sosa Johansen, NP   ketoconazole (NIZORAL) 2 % shampoo SHAMPOO TWICE WEEKLY  Patient not taking: No sig reported 10/11/22   Dahlia Peralta MD   empagliflozin (Jardiance) 10 mg tablet Take 1 Tablet by mouth daily.   Patient not taking: No sig reported 8/31/22   Hanna Smiling, DO   hydrocortisone (ANUSOL-HC) 2.5 % rectal cream Insert  into rectum four (4) times daily. Patient not taking: Reported on 10/21/2022 8/17/22   Nikki Lou MD   fluticasone propionate [facility]) 50 mcg/actuation nasal spray 2 Sprays by Both Nostrils route daily. Patient not taking: No sig reported    Provider, Historical   nitroglycerin (NITROSTAT) 0.4 mg SL tablet  7/26/22   Provider, Historical   cyclobenzaprine (FLEXERIL) 10 mg tablet three (3) times daily as needed. Has not needed  Patient not taking: No sig reported 10/6/21   Provider, Historical     Vitals:    10/22/22 0400 10/22/22 0432 10/22/22 0554 10/22/22 0642   BP:  108/80  107/85   Pulse: 96 95 91 94   Resp:  17  19   Temp:  97.4 °F (36.3 °C)  97.4 °F (36.3 °C)   SpO2:  95%  99%   Weight:       LMP: 10/09/2022     Lab Results   Component Value Date/Time    WBC 6.0 10/22/2022 06:26 AM    HGB 12.9 10/22/2022 06:26 AM    HCT 40.2 10/22/2022 06:26 AM    PLATELET 667 82/55/1716 06:26 AM    MCV 91.0 10/22/2022 06:26 AM     Lab Results   Component Value Date/Time    Sodium 136 10/22/2022 06:26 AM    Potassium 3.5 10/22/2022 06:26 AM    Chloride 103 10/22/2022 06:26 AM    CO2 24 10/22/2022 06:26 AM    Anion gap 9 10/22/2022 06:26 AM    Glucose 114 (H) 10/22/2022 06:26 AM    Glucose 59 (L) 12/30/2019 12:00 AM    BUN 8 10/22/2022 06:26 AM    Creatinine 0.87 10/22/2022 06:26 AM    BUN/Creatinine ratio 9 (L) 10/22/2022 06:26 AM    GFR est AA >60 10/03/2022 10:18 AM    GFR est non-AA >60 10/03/2022 10:18 AM    Calcium 9.6 10/22/2022 06:26 AM    Bilirubin, total 2.2 (H) 10/21/2022 11:29 AM    Alk.  phosphatase 92 10/21/2022 11:29 AM    Protein, total 7.0 10/21/2022 11:29 AM    Albumin 3.4 (L) 10/21/2022 11:29 AM    Globulin 3.6 10/21/2022 11:29 AM    A-G Ratio 0.9 (L) 10/21/2022 11:29 AM    ALT (SGPT) 30 10/21/2022 11:29 AM    AST (SGOT) 20 10/21/2022 11:29 AM     No results found for: VALF2, VALAC, VALP, VALPR, DS6, CRBAM, CRBAMP, CARB2, XCRBAM  No results found for: LITHM    MENTAL STATUS EXAM:  General appearance: Maryanne Meeks is a 38 y.o. BLACK/ female who is lying in a hospital bed, moderately groomed, psychomotor activity is WNL  Eye contact: fair  Speech: Spontaneous, normal rate/volume/rhythm  Affect: mood-congruent, full range  Mood: \"OK\"  Thought Process: Logical, goal directed  Perception: Denies AH or VH, no evidence of psychotic processes observed  Thought Content: denies SI/plan/intent, denies HI/plan/intent  Insight: Partial  Judgment: Fair  Cognition: Intact grossly. ASSESSMENT AND PLAN:  Anthony Clarke meets criteria for a diagnosis of MDD, recurrent, mild to moderate; unspecified anxiety disorder, adjustment disorder with mixed anxiety and depressed mood. -1:1 sitter may be discontinued. Pt agrees to disclose any further thoughts of suicide to staff and agrees to remain safe in the hospital. Pt is not at high risk for suicide at this time; she has no hx of suicide attempts, she has a strong social support network, goals for the future, and is engaging in treatment and actively hoping to recover from her heart condition and participate in treatment and follow cardiology's recommendation. Pt is in agreement with this plan and is at a low risk for suicide at this time. She understands the severity of the statements she  made, and understands the reason the statements were taken so seriously. -Recommend re-starting pt's trazodone 200mg QHS unless there is a specific reason why this is not restarted. Thank your your consult. Please feel free to consult us again as needed.

## 2022-10-23 LAB
25(OH)D3 SERPL-MCNC: 64.7 NG/ML (ref 30–100)
ALBUMIN SERPL-MCNC: 3.4 G/DL (ref 3.5–5)
ALBUMIN/GLOB SERPL: 1 {RATIO} (ref 1.1–2.2)
ALP SERPL-CCNC: 98 U/L (ref 45–117)
ALT SERPL-CCNC: 31 U/L (ref 12–78)
ANION GAP SERPL CALC-SCNC: 12 MMOL/L (ref 5–15)
AST SERPL-CCNC: 19 U/L (ref 15–37)
BILIRUB SERPL-MCNC: 1.8 MG/DL (ref 0.2–1)
BNP SERPL-MCNC: 2623 PG/ML
BUN SERPL-MCNC: 9 MG/DL (ref 6–20)
BUN/CREAT SERPL: 10 (ref 12–20)
CALCIUM SERPL-MCNC: 9.6 MG/DL (ref 8.5–10.1)
CHLORIDE SERPL-SCNC: 105 MMOL/L (ref 97–108)
CK SERPL-CCNC: 44 U/L (ref 26–192)
CO2 SERPL-SCNC: 24 MMOL/L (ref 21–32)
CREAT SERPL-MCNC: 0.93 MG/DL (ref 0.55–1.02)
FERRITIN SERPL-MCNC: 89 NG/ML (ref 26–388)
FOLATE SERPL-MCNC: 20 NG/ML (ref 5–21)
GLOBULIN SER CALC-MCNC: 3.3 G/DL (ref 2–4)
GLUCOSE BLD STRIP.AUTO-MCNC: 106 MG/DL (ref 65–117)
GLUCOSE BLD STRIP.AUTO-MCNC: 120 MG/DL (ref 65–117)
GLUCOSE BLD STRIP.AUTO-MCNC: 96 MG/DL (ref 65–117)
GLUCOSE BLD STRIP.AUTO-MCNC: 97 MG/DL (ref 65–117)
GLUCOSE SERPL-MCNC: 113 MG/DL (ref 65–100)
HCYS SERPL-SCNC: 7.1 UMOL/L (ref 3.7–13.9)
IRON SATN MFR SERPL: 11 % (ref 20–50)
IRON SERPL-MCNC: 52 UG/DL (ref 35–150)
MAGNESIUM SERPL-MCNC: 2 MG/DL (ref 1.6–2.4)
POTASSIUM SERPL-SCNC: 3.4 MMOL/L (ref 3.5–5.1)
PROT SERPL-MCNC: 6.7 G/DL (ref 6.4–8.2)
SERVICE CMNT-IMP: ABNORMAL
SERVICE CMNT-IMP: NORMAL
SODIUM SERPL-SCNC: 141 MMOL/L (ref 136–145)
TIBC SERPL-MCNC: 461 UG/DL (ref 250–450)
URATE SERPL-MCNC: 8.1 MG/DL (ref 2.6–6)
VIT B12 SERPL-MCNC: 1344 PG/ML (ref 193–986)

## 2022-10-23 PROCEDURE — 74011000258 HC RX REV CODE- 258: Performed by: NURSE PRACTITIONER

## 2022-10-23 PROCEDURE — 83090 ASSAY OF HOMOCYSTEINE: CPT

## 2022-10-23 PROCEDURE — 82728 ASSAY OF FERRITIN: CPT

## 2022-10-23 PROCEDURE — 84550 ASSAY OF BLOOD/URIC ACID: CPT

## 2022-10-23 PROCEDURE — 74011250637 HC RX REV CODE- 250/637: Performed by: NURSE PRACTITIONER

## 2022-10-23 PROCEDURE — 83695 ASSAY OF LIPOPROTEIN(A): CPT

## 2022-10-23 PROCEDURE — 82607 VITAMIN B-12: CPT

## 2022-10-23 PROCEDURE — 74011000250 HC RX REV CODE- 250: Performed by: FAMILY MEDICINE

## 2022-10-23 PROCEDURE — 80053 COMPREHEN METABOLIC PANEL: CPT

## 2022-10-23 PROCEDURE — 82962 GLUCOSE BLOOD TEST: CPT

## 2022-10-23 PROCEDURE — 74011250636 HC RX REV CODE- 250/636: Performed by: NURSE PRACTITIONER

## 2022-10-23 PROCEDURE — 99233 SBSQ HOSP IP/OBS HIGH 50: CPT | Performed by: NURSE PRACTITIONER

## 2022-10-23 PROCEDURE — 82746 ASSAY OF FOLIC ACID SERUM: CPT

## 2022-10-23 PROCEDURE — 74011250637 HC RX REV CODE- 250/637: Performed by: FAMILY MEDICINE

## 2022-10-23 PROCEDURE — 83921 ORGANIC ACID SINGLE QUANT: CPT

## 2022-10-23 PROCEDURE — 82306 VITAMIN D 25 HYDROXY: CPT

## 2022-10-23 PROCEDURE — 83540 ASSAY OF IRON: CPT

## 2022-10-23 PROCEDURE — 87086 URINE CULTURE/COLONY COUNT: CPT

## 2022-10-23 PROCEDURE — 83880 ASSAY OF NATRIURETIC PEPTIDE: CPT

## 2022-10-23 PROCEDURE — 82784 ASSAY IGA/IGD/IGG/IGM EACH: CPT

## 2022-10-23 PROCEDURE — 36415 COLL VENOUS BLD VENIPUNCTURE: CPT

## 2022-10-23 PROCEDURE — 74011250636 HC RX REV CODE- 250/636: Performed by: FAMILY MEDICINE

## 2022-10-23 PROCEDURE — 82550 ASSAY OF CK (CPK): CPT

## 2022-10-23 PROCEDURE — 83735 ASSAY OF MAGNESIUM: CPT

## 2022-10-23 PROCEDURE — 74011250637 HC RX REV CODE- 250/637: Performed by: HOSPITALIST

## 2022-10-23 PROCEDURE — 65270000046 HC RM TELEMETRY

## 2022-10-23 RX ORDER — POTASSIUM CHLORIDE 750 MG/1
20 TABLET, FILM COATED, EXTENDED RELEASE ORAL 2 TIMES DAILY
Status: DISCONTINUED | OUTPATIENT
Start: 2022-10-23 | End: 2022-10-24

## 2022-10-23 RX ORDER — FACIAL-BODY WIPES
10 EACH TOPICAL DAILY PRN
Status: DISCONTINUED | OUTPATIENT
Start: 2022-10-23 | End: 2022-10-23

## 2022-10-23 RX ORDER — DOCUSATE SODIUM 100 MG/1
100 CAPSULE, LIQUID FILLED ORAL DAILY
Status: DISCONTINUED | OUTPATIENT
Start: 2022-10-23 | End: 2022-11-09 | Stop reason: HOSPADM

## 2022-10-23 RX ORDER — POTASSIUM CHLORIDE 750 MG/1
40 TABLET, FILM COATED, EXTENDED RELEASE ORAL ONCE
Status: COMPLETED | OUTPATIENT
Start: 2022-10-23 | End: 2022-10-23

## 2022-10-23 RX ORDER — TRAZODONE HYDROCHLORIDE 100 MG/1
200 TABLET ORAL
Status: DISCONTINUED | OUTPATIENT
Start: 2022-10-23 | End: 2022-11-09 | Stop reason: HOSPADM

## 2022-10-23 RX ORDER — FLUTICASONE PROPIONATE 50 MCG
2 SPRAY, SUSPENSION (ML) NASAL DAILY
Status: DISCONTINUED | OUTPATIENT
Start: 2022-10-24 | End: 2022-11-09 | Stop reason: HOSPADM

## 2022-10-23 RX ORDER — IPRATROPIUM BROMIDE AND ALBUTEROL SULFATE 2.5; .5 MG/3ML; MG/3ML
3 SOLUTION RESPIRATORY (INHALATION)
Status: DISCONTINUED | OUTPATIENT
Start: 2022-10-23 | End: 2022-11-09 | Stop reason: HOSPADM

## 2022-10-23 RX ORDER — FACIAL-BODY WIPES
10 EACH TOPICAL DAILY
Status: DISCONTINUED | OUTPATIENT
Start: 2022-10-23 | End: 2022-11-09 | Stop reason: HOSPADM

## 2022-10-23 RX ADMIN — FOLIC ACID 1 MG: 1 TABLET ORAL at 09:52

## 2022-10-23 RX ADMIN — POTASSIUM CHLORIDE 20 MEQ: 750 TABLET, FILM COATED, EXTENDED RELEASE ORAL at 09:51

## 2022-10-23 RX ADMIN — FUROSEMIDE 40 MG: 10 INJECTION, SOLUTION INTRAVENOUS at 09:52

## 2022-10-23 RX ADMIN — SODIUM CHLORIDE, PRESERVATIVE FREE 1 G: 5 INJECTION INTRAVENOUS at 18:13

## 2022-10-23 RX ADMIN — SODIUM CHLORIDE, PRESERVATIVE FREE 10 ML: 5 INJECTION INTRAVENOUS at 07:28

## 2022-10-23 RX ADMIN — IRON SUCROSE 200 MG: 20 INJECTION, SOLUTION INTRAVENOUS at 11:37

## 2022-10-23 RX ADMIN — PRASUGREL 10 MG: 10 TABLET, FILM COATED ORAL at 09:51

## 2022-10-23 RX ADMIN — SODIUM CHLORIDE, PRESERVATIVE FREE 10 ML: 5 INJECTION INTRAVENOUS at 21:50

## 2022-10-23 RX ADMIN — BISACODYL 10 MG: 10 SUPPOSITORY RECTAL at 10:10

## 2022-10-23 RX ADMIN — CALCIUM CARBONATE (ANTACID) CHEW TAB 500 MG 200 MG: 500 CHEW TAB at 12:52

## 2022-10-23 RX ADMIN — FAMOTIDINE 20 MG: 20 TABLET ORAL at 09:53

## 2022-10-23 RX ADMIN — ROPINIROLE HYDROCHLORIDE 0.5 MG: 0.25 TABLET, FILM COATED ORAL at 09:52

## 2022-10-23 RX ADMIN — SODIUM CHLORIDE, PRESERVATIVE FREE 10 ML: 5 INJECTION INTRAVENOUS at 15:54

## 2022-10-23 RX ADMIN — DOCUSATE SODIUM 100 MG: 100 CAPSULE, LIQUID FILLED ORAL at 09:54

## 2022-10-23 RX ADMIN — MONTELUKAST 10 MG: 10 TABLET, FILM COATED ORAL at 09:51

## 2022-10-23 RX ADMIN — FUROSEMIDE 40 MG: 10 INJECTION, SOLUTION INTRAVENOUS at 18:13

## 2022-10-23 RX ADMIN — CARVEDILOL 3.12 MG: 3.12 TABLET, FILM COATED ORAL at 09:51

## 2022-10-23 RX ADMIN — CARVEDILOL 3.12 MG: 3.12 TABLET, FILM COATED ORAL at 18:13

## 2022-10-23 RX ADMIN — FAMOTIDINE 20 MG: 20 TABLET ORAL at 18:13

## 2022-10-23 RX ADMIN — EZETIMIBE 10 MG: 10 TABLET ORAL at 09:51

## 2022-10-23 RX ADMIN — POTASSIUM CHLORIDE 40 MEQ: 750 TABLET, FILM COATED, EXTENDED RELEASE ORAL at 15:55

## 2022-10-23 RX ADMIN — CLONAZEPAM 1 MG: 0.5 TABLET ORAL at 21:49

## 2022-10-23 RX ADMIN — ESCITALOPRAM OXALATE 20 MG: 10 TABLET ORAL at 09:51

## 2022-10-23 RX ADMIN — Medication 400 MG: at 21:49

## 2022-10-23 RX ADMIN — ROPINIROLE HYDROCHLORIDE 0.5 MG: 0.25 TABLET, FILM COATED ORAL at 18:13

## 2022-10-23 NOTE — PROGRESS NOTES
Problem: Falls - Risk of  Goal: *Absence of Falls  Description: Document Brennan Lynn Fall Risk and appropriate interventions in the flowsheet.   Outcome: Progressing Towards Goal  Note: Fall Risk Interventions:  Mobility Interventions: Communicate number of staff needed for ambulation/transfer    Medication Interventions: Teach patient to arise slowly, Evaluate medications/consider consulting pharmacy    Elimination Interventions: Call light in reach, Patient to call for help with toileting needs, Toileting schedule/hourly rounds

## 2022-10-23 NOTE — PROGRESS NOTES
[facility] Adult  Hospitalist Group                                                                                          Hospitalist Progress Note  Robinson Livingston MD  Answering service: [phone] OR [phone] from in house phone        Date of Service:  10/23/2022  NAME:  Roberto Rodríguez  :  1980  MRN:  [id number]      Admission Summary:   Roberto Rodríguez is a 38 y.o. female who presents with shortness of breath.       Patient with history of CHF, presents with shortness of breath, also patient with suicidal ideation, reports that she has been depressed lately, reports that she has thought of suicide and has a plan to hang herself in the attic, was sent to the ER and was requested to be admitted to the hospitalist service, patient reports that she has been having shortness of breath, reports that she was 3 pillow Orthopnea, reports mild cough but denies any fever or chills    Interval history / Subjective:   Patient seen and examined no acute complaints  Her breathing is better seen by cardiology and advanced heart failure team     Assessment & Plan:     Acute on chronic systolic congestive heart failure NYHA class IV  -- Maintain daily weight I's and O's proBNP trended down  --Continue goal-directed medical therapy if blood pressure permits on beta-blocker not on Entresto due to low blood pressure  --Echo pending  --Continue diuresis 40 mg IV x2 daily repeat labs  --Plan for right heart cath once more euvolemic plan per cardiology     hypertension   -- Currently blood pressure low normal  -- Patient is on Coreg  -- Not on ACE ARB due to low blood pressure    Hyperlipidemia  -- Continue statin    Coronary artery disease  -- Continue prasugrel and statin and Zetia    Diabetes mellitus type 2  -- Continue sliding scale insulin cannot tolerate Jardiance  --Check A1c 6.3    Restless leg syndrome  --On Requip started to use yesterday    Anxiety depression suicidal ideation  --Patient seen by psych  --Continue home medication one-to-one sitter may be discontinued as per psych  --To start trazodone for sleep at night    Continue antibiotic as per home medication given 7 days of Keflex now getting ceftriaxone     Code status: Full code  DVT prophylaxis: SCD    Care Plan discussed with: Patient/Family and Nurse  Anticipated Disposition: Home w/Family  Anticipated Discharge: 24 hours to 48 hours     Hospital Problems  Date Reviewed: 10/14/2022            Codes Class Noted POA    CHF (congestive heart failure) ([facility] [address] ICD-10-CM: I50.9  ICD-9-CM: 428.0  10/21/2022 Unknown           Review of Systems:   A comprehensive review of systems was negative. Vital Signs:    Last 24hrs VS reviewed since prior progress note.  Most recent are:  Visit Vitals  /84 (BP 1 Location: Left upper arm, BP Patient Position: At rest)   Pulse 91   Temp 99 °F (37.2 °C)   Resp 20   Wt 90.7 kg (199 lb 15.3 oz)   SpO2 98%   BMI 34.32 kg/m²         Intake/Output Summary (Last 24 hours) at 10/23/2022 1014  Last data filed at 10/23/2022 0429  Gross per 24 hour   Intake 200 ml   Output 2500 ml   Net -2300 ml          Physical Examination:     I had a face to face encounter with this patient and independently examined them on 10/23/2022 as outlined below:          General : alert x 3, awake, no acute distress, resting in bed, pleasant   HEENT: PEERL, EOMI, moist mucus membrane, TM clear  Neck: supple, no JVD, no meningeal signs  Chest: Clear to auscultation bilaterally   CVS: S1 S2 heard, Capillary refill less than 2 seconds  Abd: soft/ Non tender, non distended, BS physiological,   Ext: no clubbing, no cyanosis, no edema, brisk 2+ DP pulses  Neuro/Psych: pleasant mood and affect, CN 2-12 grossly intact  Skin: warm     Data Review:    I personally reviewed  Image and labs      Labs:     Recent Labs     10/22/22  0626 10/21/22  1129   WBC 6.0 5.4   HGB 12.9 13.1   HCT 40.2 40.2    271       Recent Labs     10/23/22  8901 10/22/22  0626 10/21/22  1129    136 137   K 3.4* 3.5 3.4*    103 103   CO2 24 24 25   BUN 9 8 9   CREA 0.93 0.87 0.81   * 114* 80   CA 9.6 9.6 9.2   MG 2.0  --   --        Recent Labs     10/23/22  0424 10/21/22  1129   ALT 31 30   AP 98 92   TBILI 1.8* 2.2*   TP 6.7 7.0   ALB 3.4* 3.4*   GLOB 3.3 3.6       Recent Labs     10/21/22  1350   INR 1.2*   PTP 12.0*   APTT 25.9        Recent Labs     10/23/22  0424   TIBC 461*   PSAT 11*   FERR 89        Lab Results   Component Value Date/Time    Folate 20.0 10/23/2022 04:24 AM        No results for input(s): PH, PCO2, PO2 in the last 72 hours.   Recent Labs     10/23/22  0424   CPK 44     Lab Results   Component Value Date/Time    Cholesterol, total 95 10/22/2022 06:26 AM    HDL Cholesterol 32 10/22/2022 06:26 AM    LDL, calculated 45 10/22/2022 06:26 AM    Triglyceride 90 10/22/2022 06:26 AM    CHOL/HDL Ratio 3.0 10/22/2022 06:26 AM     Lab Results   Component Value Date/Time    Glucose (POC) 106 10/23/2022 06:49 AM    Glucose (POC) 130 (H) 10/22/2022 09:21 PM    Glucose (POC) 105 10/22/2022 04:08 PM    Glucose (POC) 109 10/22/2022 11:10 AM    Glucose (POC) 114 10/22/2022 06:28 AM     Lab Results   Component Value Date/Time    Color ORANGE 10/17/2022 08:42 AM    Appearance TURBID (A) 10/17/2022 08:42 AM    Specific gravity 1.025 10/17/2022 08:42 AM    Specific gravity 1.024 10/06/2022 08:57 AM    pH (UA) 5.0 10/17/2022 08:42 AM    Protein 30 (A) 10/17/2022 08:42 AM    Glucose Negative 10/17/2022 08:42 AM    Ketone Negative 10/17/2022 08:42 AM    Bilirubin Negative 11/25/2021 09:47 AM    Urobilinogen 1.0 10/17/2022 08:42 AM    Nitrites Positive (A) 10/17/2022 08:42 AM    Leukocyte Esterase SMALL (A) 10/17/2022 08:42 AM    Epithelial cells FEW 10/17/2022 08:42 AM    Bacteria 2+ (A) 10/17/2022 08:42 AM    WBC 5-10 10/17/2022 08:42 AM    RBC 5-10 10/17/2022 08:42 AM         Medications Reviewed:     Current Facility-Administered Medications   Medication Dose Route Frequency    docusate sodium (COLACE) capsule 100 mg  100 mg Oral DAILY    bisacodyL (DULCOLAX) suppository 10 mg  10 mg Rectal DAILY    potassium chloride SR (KLOR-CON 10) tablet 20 mEq  20 mEq Oral DAILY    magnesium oxide (MAG-OX) tablet 400 mg  400 mg Oral QHS    calcium carbonate (TUMS) chewable tablet 200 mg [elemental]  200 mg Oral TID PRN    carvediloL (COREG) tablet 3.125 mg  3.125 mg Oral BID    clonazePAM (KlonoPIN) tablet 1 mg  1 mg Oral QHS    escitalopram oxalate (LEXAPRO) tablet 20 mg  20 mg Oral DAILY    ezetimibe (ZETIA) tablet 10 mg  10 mg Oral DAILY    famotidine (PEPCID) tablet 20 mg  20 mg Oral BID    folic acid (FOLVITE) tablet 1 mg  1 mg Oral DAILY    montelukast (SINGULAIR) tablet 10 mg  10 mg Oral DAILY    prasugreL (EFFIENT) tablet 10 mg  10 mg Oral DAILY    rOPINIRole (REQUIP) tablet 0.5 mg  0.5 mg Oral BID    rosuvastatin (CRESTOR) tablet 20 mg  20 mg Oral Q MON, WED & FRI    sodium chloride (NS) flush 5-40 mL  5-40 mL IntraVENous Q8H    sodium chloride (NS) flush 5-40 mL  5-40 mL IntraVENous PRN    acetaminophen (TYLENOL) tablet 650 mg  650 mg Oral Q4H PRN    furosemide (LASIX) injection 40 mg  40 mg IntraVENous BID    nitroglycerin (NITROSTAT) tablet 0.4 mg  0.4 mg SubLINGual Q5MIN PRN    cefTRIAXone (ROCEPHIN) 1 g in 0.9% sodium chloride 10 mL IV syringe  1 g IntraVENous Q24H    glucose chewable tablet 16 g  4 Tablet Oral PRN    glucagon (GLUCAGEN) injection 1 mg  1 mg IntraMUSCular PRN    dextrose 10 % infusion 0-250 mL  0-250 mL IntraVENous PRN    insulin lispro (HUMALOG) injection   SubCUTAneous AC&HS     ______________________________________________________________________  EXPECTED LENGTH OF STAY: - - -  ACTUAL LENGTH OF STAY:          2      Please note that this dictation was completed with Star, the computer voice recognition software.   Quite often unanticipated grammatical, syntax, homophones, and other interpretive errors are inadvertently transcribed by the computer software. Please disregard these errors. Please excuse any errors that have escaped final proofreading.                 Leslie Johnson MD

## 2022-10-23 NOTE — PROGRESS NOTES
Bedside shift change report given to Sylvia Trujillo RN (oncoming nurse) by Champ Sim RN (offgoing nurse).  Report included the following information SBAR, Kardex, ED Summary, Intake/Output, MAR, Accordion, Recent Results, and Cardiac Rhythm NSR-ST .

## 2022-10-23 NOTE — PROGRESS NOTES
[facility] in [place], [place]  Inpatient Progress Note      Patient name: Devno Davidson  Patient : 1980  Patient MRN: [id number]  Consulting MD: Fanny Daly MD  Date of service: 10/23/22      REASON FOR REFERRAL:  Management of acute on chronic systolic heart failure      PLAN OF CARE   38 y.o. female with history of HTN, CAD s/p STEMI in 2022 (DESx1 to LAD; treated at [facility]) with ischemic cardiomyopathy (EF 20-25%), CHF, GERD, RLS, and fibromyalgia seen as a new patient in [facility] on 10/21/22 and found to be fluid overloaded, and with suicidal ideation. Pt was taken to the ER for further evaluation and admission for acute on chronic HF and mental health crisis. Pt contracted for safety, and is being seen by behavioral health/psych  Ongoing diuresis and remainder of heart failure work-up while in hospital; work on optimizing GDMT after diuresis complete- may need inotrope assistance. Plan for RHC once close to euvolemia.   GDMT limited in the past d/t hypotension            RECOMMENDATIONS:  Inotropic support/pressors: none needed at this time, may consider inotrope if diuresis inadequate  Beta-blocker: continue coreg 3.125mg BID  ACE/ARB/ARNi: hold while undergoing aggressive diuresis  MRA: start spironolactone 12.5mg daily  SGLT2 inhibitor: hold home Jardiance 10mg daily d/t UTI  Diuretic: continue IV lasix 40mg BID ; goal net -2L next 24 hrs  Increase KCl to 20mEQ BID, additional 40mEq at lunch today  Urate lowering therapy: not on allopurinol or uloric, check uric acid level  Continue ASA and prasugrel per primary cards  Statin or PCSK9i: Continue current dose of statin  Treatment of STU: inpatient eval for STU  Likely will need referral for ICD given EF remains <35% 3 mos after MI, may need LifeVest at discharge if no ICD implant  Echocardiogram done on admission, EF 15-20%  Labs: HF and Transplant eval labs ordered; in process  Nutritionist consult  Heart failure education  Needs Advanced care plan    Patient assessed. High suspicion of sleep apnea due to the following reasons. Will refer for sleep evaluation. [x] Heart Failure  [x] Hypertention  [] Atrial Fibrillation  [x] BMI > 30  [] History of stroke  [] Diabetes  [] Heavy snoring  [] Witnessed apnea  [] Hypoxemia       IMPRESSION:  Fatigue  Shortness of breath  Volume overload  Acute on Chronic systolic heart failure  Stage C, NYHA class IV symptoms  ischemic cardiomyopathy, LVEF 20-25%  CAD  STEMI July 2022 s/p RICKY to LAD  HTN  Obesity  LHD  Pre-diabetes  Esophageal stenosis s/p dilation  RLS  Fibromyalgia  Anxiety and Depression      LIFE GOALS:  Patient's personal goals include: get better  Important upcoming milestones or family events: the holidays coming up  The patient identifies the following as important for living well: being independent        [facility]:  Echo (8/14/22)    Left Ventricle: Severely reduced left ventricular systolic function with a visually estimated EF of 20 - 25%. Left ventricle is mildly dilated. Increased wall thickness. See diagram for wall motion findings. Grade II diastolic dysfunction with increased LAP. Right Ventricle: Not well visualized. Tricuspid Valve: Moderately elevated RVSP. BRIEF HISTORY OF PRESENT ILLNESS:  Cathy Huerta is a 38 y.o. female with h/o HTN, CAD s/p STEMI in July 2022 (DESx1 to LAD; treated at [place]) with ischemic cardiomyopathy (EF 20-25%), CHF, GERD, RLS, and fibromyalgia seen as a new patient in [facility] on 10/21/22 and found to be fluid overloaded, and with suicidal ideation. Pt was taken to the ER for further evaluation and admission for acute on chronic HF and mental health crisis.          INTERVAL HISTORY:  Contracted for safety  Emotional this morning- upset about lower EF on echo  VSS, labs stable      REVIEW OF SYSTEMS:  Review of Systems   Constitutional:  Negative for chills, fever, malaise/fatigue and weight loss. Respiratory:  Positive for shortness of breath. Negative for cough. Cardiovascular:  Positive for orthopnea. Negative for chest pain, palpitations and leg swelling. Gastrointestinal:  Negative for abdominal pain, diarrhea, heartburn, nausea and vomiting. Musculoskeletal:  Positive for myalgias. Restless legs   Neurological:  Negative for dizziness and weakness. Psychiatric/Behavioral:  Positive for depression. Negative for suicidal ideas. The patient is nervous/anxious. PHYSICAL EXAM:  Visit Vitals  /84 (BP 1 Location: Left upper arm, BP Patient Position: At rest)   Pulse 91   Temp 99 °F (37.2 °C)   Resp 20   Wt 199 lb 15.3 oz (90.7 kg)   LMP 10/09/2022   SpO2 98%   BMI 34.32 kg/m²         Physical Exam  Vitals reviewed. Constitutional:       Appearance: Normal appearance. Cardiovascular:      Rate and Rhythm: Normal rate and regular rhythm. Heart sounds: No murmur heard. No friction rub. Gallop present. S3 sounds present. Pulmonary:      Effort: No respiratory distress. Breath sounds: Normal breath sounds. Abdominal:      General: Bowel sounds are normal. There is no distension. Palpations: Abdomen is soft. Tenderness: There is no abdominal tenderness. Musculoskeletal:      Right lower leg: No edema. Left lower leg: No edema. Skin:     General: Skin is warm and dry. Capillary Refill: Capillary refill takes less than 2 seconds. Neurological:      General: No focal deficit present. Mental Status: She is alert and oriented to person, place, and time. Psychiatric:         Mood and Affect: Mood normal.         Behavior: Behavior normal.         Thought Content:  Thought content normal.         Judgment: Judgment normal.            PAST MEDICAL HISTORY:  Past Medical History:   Diagnosis Date    Anemia NEC     Arthritis     Chronic pain     fybromyalsia    Depression     anxiety, depression, OCD    GERD (gastroesophageal reflux disease)     Hypertension     Hypertension     Ill-defined condition     Fibromyalia gastricparesis    MI (myocardial infarction) ([facility] [address]     Musculoskeletal disorder     SOB (shortness of breath)     Stool color black        PAST SURGICAL HISTORY:  Past Surgical History:   Procedure Laterality Date    HX CORONARY STENT PLACEMENT      HX GYN       1998    HX HEENT  2002    wisdom teeth extraction    UPPER GI ENDOSCOPY,BIOPSY  2018         UPPER GI ENDOSCOPY,DIAGNOSIS  2018            FAMILY HISTORY:  Family History   Problem Relation Age of Onset    Hypertension Father     Elevated Lipids Father     Arthritis-rheumatoid Mother         ? Lupus vs RA    Lung Disease Mother     Heart Disease Mother     Cancer Mother         breast in 39y    COPD Mother     Hypertension Mother     Stroke Mother         3-4 strokes    Breast Cancer Mother 50    Diabetes Maternal Grandmother        SOCIAL HISTORY:  Social History     Socioeconomic History    Marital status: SINGLE   Tobacco Use    Smoking status: Former     Packs/day: 0.25     Years: 8.00     Pack years: 2.00     Types: Cigarettes     Quit date: 2022     Years since quittin.2    Smokeless tobacco: Never   Vaping Use    Vaping Use: Never used   Substance and Sexual Activity    Alcohol use: Not Currently     Alcohol/week: 1.0 standard drink     Types: 1 Glasses of wine per week     Comment: Wine with special occassions - not since July    Drug use: Not Currently     Types: Marijuana     Comment: Haven't had any since 2022    Sexual activity: Yes     Partners: Male     Birth control/protection: None     Social Determinants of Health     Financial Resource Strain: High Risk    Difficulty of Paying Living Expenses: Very hard   Food Insecurity: No Food Insecurity    Worried About Running Out of Food in the Last Year: Never true    Ran Out of Food in the Last Year: Never true   Transportation Needs: No Transportation Needs Lack of Transportation (Medical): No    Lack of Transportation (Non-Medical): No   Physical Activity: Inactive    Days of Exercise per Week: 0 days    Minutes of Exercise per Session: 0 min   Stress: Stress Concern Present    Feeling of Stress : To some extent   Social Connections: Socially Isolated    Frequency of Communication with Friends and Family: Twice a week    Frequency of Social Gatherings with Friends and Family: Never    Attends Anabaptist Services: Never    Active Member of Clubs or Organizations: No    Attends Club or Organization Meetings: Never    Marital Status: Never    Housing Stability: Low Risk     Unable to Pay for Housing in the Last Year: No    Number of Places Lived in the Last Year: 1    Unstable Housing in the Last Year: No       LABORATORY RESULTS:     Labs Latest Ref Rng & Units 10/23/2022 10/22/2022 10/21/2022 10/17/2022 10/6/2022 10/3/2022 9/30/2022   WBC 3.6 - 11.0 K/uL - 6.0 5.4 5.8 5.7 5.7 6.1   RBC 3.80 - 5.20 M/uL - 4.42 4.37 4.15 4.14 4.06 4.30   Hemoglobin 11.5 - 16.0 g/dL - 12.9 13.1 12.3 12.2 12.0 12.8   Hematocrit 35.0 - 47.0 % - 40.2 40.2 38.8 38.2 37.2 41.9   MCV 80.0 - 99.0 FL - 91.0 92.0 93.5 92.3 91.6 97.4   Platelets 860 - 143 K/uL - 283 271 292 273 263 300   Lymphocytes 12 - 49 % - 42 46 46 43 49 -   Monocytes 5 - 13 % - 7 7 8 7 6 -   Eosinophils 0 - 7 % - 8(H) 7 6 5 5 -   Basophils 0 - 1 % - 1 1 1 1 1 -   Albumin 3.5 - 5.0 g/dL 3.4(L) - 3. 4(L) 3.4(L) 3. 2(L) 3. 1(L) 3.5   Calcium 8.5 - 10.1 MG/DL 9.6 9.6 9.2 9.4 9.4 9.4 9.6   Glucose 65 - 100 mg/dL 113(H) 114(H) 80 106(H) 107(H) 100 111(H)   BUN 6 - 20 MG/DL 9 8 9 10 10 12 13   Creatinine 0.55 - 1.02 MG/DL 0.93 0.87 0.81 1.09(H) 0.98 0.90 0.99   Sodium 136 - 145 mmol/L 141 136 137 140 138 138 138   Potassium 3.5 - 5.1 mmol/L 3.4(L) 3.5 3. 4(L) 3.4(L) 3.4(L) 3. 2(L) 3.8   TSH 0.36 - 3.74 uIU/mL - - 4.44(H) - - - -   Some recent data might be hidden     Lab Results   Component Value Date/Time    TSH 4.44 (H) 10/21/2022 06:12 PM    TSH 2.12 05/12/2021 10:55 AM    TSH 1.330 12/30/2019 12:00 AM    TSH 3.850 07/25/2018 12:40 PM    TSH 1.620 02/27/2018 08:59 AM    TSH 1.240 12/05/2016 10:13 AM    TSH 1.530 03/18/2016 10:31 AM    TSH 1.400 11/09/2011 09:37 AM    TSH 1.26 08/19/2010 10:36 AM    TSH 1.18 07/28/2010 04:10 PM       ALLERGY:  Allergies   Allergen Reactions    Abilify [Aripiprazole] Anxiety     Can not tolerate     Sulfa (Sulfonamide Antibiotics) Hives    Vicodin [Hydrocodone-Acetaminophen] Nausea and Vomiting        CURRENT MEDICATIONS:    Current Facility-Administered Medications:     docusate sodium (COLACE) capsule 100 mg, 100 mg, Oral, DAILY, Breanne Lane MD, 100 mg at 10/23/22 0954    bisacodyL (DULCOLAX) suppository 10 mg, 10 mg, Rectal, DAILY, Breanne Lane MD, 10 mg at 10/23/22 1010    potassium chloride SR (KLOR-CON 10) tablet 20 mEq, 20 mEq, Oral, BID, Marvin Sa B, NP    potassium chloride SR (KLOR-CON 10) tablet 40 mEq, 40 mEq, Oral, ONCE, Marvin BAGLEY, NP    magnesium oxide (MAG-OX) tablet 400 mg, 400 mg, Oral, QHS, Marvin Chapman B, NP, 400 mg at 10/22/22 2220    calcium carbonate (TUMS) chewable tablet 200 mg [elemental], 200 mg, Oral, TID PRN, Breanne Lane MD, 200 mg at 10/22/22 1445    carvediloL (COREG) tablet 3.125 mg, 3.125 mg, Oral, BID, Oscar Bennett MD, 3.125 mg at 10/23/22 0951    clonazePAM (KlonoPIN) tablet 1 mg, 1 mg, Oral, QHS, Geoff Rodriguez MD, 1 mg at 10/22/22 2220    escitalopram oxalate (LEXAPRO) tablet 20 mg, 20 mg, Oral, DAILY, Geoff Rodriguez MD, 20 mg at 10/23/22 0951    ezetimibe (ZETIA) tablet 10 mg, 10 mg, Oral, DAILY, Geoff Rodriguez MD, 10 mg at 10/23/22 0951    famotidine (PEPCID) tablet 20 mg, 20 mg, Oral, BID, Oscar Bennett MD, 20 mg at 48/20/76 9904    folic acid (FOLVITE) tablet 1 mg, 1 mg, Oral, DAILY, Geoff Rodriguez MD, 1 mg at 10/23/22 0952    montelukast (SINGULAIR) tablet 10 mg, 10 mg, Oral, DAILY, Geoff Rodriguez MD, 10 mg at 10/23/22 0951    prasugreL (EFFIENT) tablet 10 mg, 10 mg, Oral, DAILY, Manuelito Casillas MD, 10 mg at 10/23/22 0951    rOPINIRole (REQUIP) tablet 0.5 mg, 0.5 mg, Oral, BID, Manuelito Casillas MD, 0.5 mg at 10/23/22 0952    rosuvastatin (CRESTOR) tablet 20 mg, 20 mg, Oral, Q MON, WED & FRI, Manuelito Casillas MD, 20 mg at 10/21/22 2218    sodium chloride (NS) flush 5-40 mL, 5-40 mL, IntraVENous, Q8H, Geoff Rodriguez MD, 10 mL at 10/23/22 0728    sodium chloride (NS) flush 5-40 mL, 5-40 mL, IntraVENous, PRN, Manuelito Casillas MD    acetaminophen (TYLENOL) tablet 650 mg, 650 mg, Oral, Q4H PRN, Manuelito Casillas MD, 650 mg at 10/22/22 0648    furosemide (LASIX) injection 40 mg, 40 mg, IntraVENous, BID, Manuelito Casillas MD, 40 mg at 10/23/22 7433    nitroglycerin (NITROSTAT) tablet 0.4 mg, 0.4 mg, SubLINGual, Q5MIN PRN, Manuelito Casillas MD, 0.4 mg at 10/21/22 2237    cefTRIAXone (ROCEPHIN) 1 g in 0.9% sodium chloride 10 mL IV syringe, 1 g, IntraVENous, Q24H, Geoff Avelar MD, 1 g at 10/22/22 1812    glucose chewable tablet 16 g, 4 Tablet, Oral, PRN, Geoff Avelar MD    glucagon (GLUCAGEN) injection 1 mg, 1 mg, IntraMUSCular, PRN, Geoff Avelar MD    dextrose 10 % infusion 0-250 mL, 0-250 mL, IntraVENous, PRN, Manuelito Casillas MD    insulin lispro (HUMALOG) injection, , SubCUTAneous, AC&HS, Manuelito Casillas MD    PATIENT CARE TEAM:  Patient Care Team:  Erma Saenz MD as PCP - General (Family Medicine)  Erma Saenz MD as PCP - [facility]  Gertrude Ellison MD (Surgery General)  Júnior Parrish MD (Cardiovascular Disease Physician)  Nayeli Dooley MD (Otolaryngology)  Cuate Cohen MD (Internal Medicine Physician)  Ad Alexis MD (Female Pelvic Medicine and Reconstructive Surgery)  Jeremias Mccartney MD (Neurology)  Enid Turpin MD (Psychiatry)  Keyon Lee as Ambulatory Care Manager     Thank you for allowing us to participate in this patient's care.     Virginia Phan OBDULIA Boswell, MSN, AGAP-[address], [address]  Phone: [phone]  Fax: [phone]

## 2022-10-24 ENCOUNTER — TRANSCRIBE ORDER (OUTPATIENT)
Dept: CARDIAC REHAB | Age: 42
End: 2022-10-24

## 2022-10-24 DIAGNOSIS — I50.22 SYSTOLIC CHF, CHRONIC (HCC): Primary | ICD-10-CM

## 2022-10-24 LAB
ABO + RH BLD: NORMAL
ALBUMIN SERPL-MCNC: 3.5 G/DL (ref 3.5–5)
ALBUMIN/GLOB SERPL: 1.1 {RATIO} (ref 1.1–2.2)
ALP SERPL-CCNC: 94 U/L (ref 45–117)
ALT SERPL-CCNC: 29 U/L (ref 12–78)
ANION GAP SERPL CALC-SCNC: 12 MMOL/L (ref 5–15)
AST SERPL-CCNC: 17 U/L (ref 15–37)
BACTERIA SPEC CULT: NORMAL
BILIRUB SERPL-MCNC: 1.9 MG/DL (ref 0.2–1)
BLOOD GROUP ANTIBODIES SERPL: NORMAL
BNP SERPL-MCNC: 2339 PG/ML
BUN SERPL-MCNC: 10 MG/DL (ref 6–20)
BUN/CREAT SERPL: 12 (ref 12–20)
CALCIUM SERPL-MCNC: 9.7 MG/DL (ref 8.5–10.1)
CHLORIDE SERPL-SCNC: 105 MMOL/L (ref 97–108)
CO2 SERPL-SCNC: 22 MMOL/L (ref 21–32)
CORTIS AM PEAK SERPL-MCNC: 16.9 UG/DL (ref 4.3–22.45)
CREAT SERPL-MCNC: 0.86 MG/DL (ref 0.55–1.02)
CRP SERPL-MCNC: 0.71 MG/DL (ref 0–0.6)
ERYTHROCYTE [SEDIMENTATION RATE] IN BLOOD: 10 MM/HR (ref 0–20)
GLOBULIN SER CALC-MCNC: 3.1 G/DL (ref 2–4)
GLUCOSE BLD STRIP.AUTO-MCNC: 112 MG/DL (ref 65–117)
GLUCOSE BLD STRIP.AUTO-MCNC: 115 MG/DL (ref 65–117)
GLUCOSE BLD STRIP.AUTO-MCNC: 116 MG/DL (ref 65–117)
GLUCOSE BLD STRIP.AUTO-MCNC: 90 MG/DL (ref 65–117)
GLUCOSE SERPL-MCNC: 99 MG/DL (ref 65–100)
HAV IGM SER QL: NONREACTIVE
HBV CORE IGM SER QL: NONREACTIVE
HBV SURFACE AG SER QL: <0.1 INDEX
HBV SURFACE AG SER QL: NEGATIVE
HCV AB SERPL QL IA: NONREACTIVE
LPA SERPL-SCNC: 76.5 NMOL/L
MAGNESIUM SERPL-MCNC: 2 MG/DL (ref 1.6–2.4)
POTASSIUM SERPL-SCNC: 3.5 MMOL/L (ref 3.5–5.1)
PROT SERPL-MCNC: 6.6 G/DL (ref 6.4–8.2)
RUBV IGG SER-IMP: REACTIVE
RUBV IGG SERPL IA-ACNC: 268.5 IU/ML
SERVICE CMNT-IMP: NORMAL
SODIUM SERPL-SCNC: 139 MMOL/L (ref 136–145)
SP1: NORMAL
SP2: NORMAL
SP3: NORMAL
SPECIMEN EXP DATE BLD: NORMAL
T3FREE SERPL-MCNC: 2.7 PG/ML (ref 2.2–4)
T4 FREE SERPL-MCNC: 1.3 NG/DL (ref 0.8–1.5)

## 2022-10-24 PROCEDURE — 87798 DETECT AGENT NOS DNA AMP: CPT

## 2022-10-24 PROCEDURE — 74011250637 HC RX REV CODE- 250/637: Performed by: FAMILY MEDICINE

## 2022-10-24 PROCEDURE — 74011000250 HC RX REV CODE- 250: Performed by: FAMILY MEDICINE

## 2022-10-24 PROCEDURE — 85652 RBC SED RATE AUTOMATED: CPT

## 2022-10-24 PROCEDURE — 86687 HTLV-I ANTIBODY: CPT

## 2022-10-24 PROCEDURE — 82533 TOTAL CORTISOL: CPT

## 2022-10-24 PROCEDURE — 86787 VARICELLA-ZOSTER ANTIBODY: CPT

## 2022-10-24 PROCEDURE — 86765 RUBEOLA ANTIBODY: CPT

## 2022-10-24 PROCEDURE — 86480 TB TEST CELL IMMUN MEASURE: CPT

## 2022-10-24 PROCEDURE — 36415 COLL VENOUS BLD VENIPUNCTURE: CPT

## 2022-10-24 PROCEDURE — 86140 C-REACTIVE PROTEIN: CPT

## 2022-10-24 PROCEDURE — 82955 ASSAY OF G6PD ENZYME: CPT

## 2022-10-24 PROCEDURE — 86735 MUMPS ANTIBODY: CPT

## 2022-10-24 PROCEDURE — 86038 ANTINUCLEAR ANTIBODIES: CPT

## 2022-10-24 PROCEDURE — 83880 ASSAY OF NATRIURETIC PEPTIDE: CPT

## 2022-10-24 PROCEDURE — 86777 TOXOPLASMA ANTIBODY: CPT

## 2022-10-24 PROCEDURE — 99233 SBSQ HOSP IP/OBS HIGH 50: CPT | Performed by: NURSE PRACTITIONER

## 2022-10-24 PROCEDURE — 85305 CLOT INHIBIT PROT S TOTAL: CPT

## 2022-10-24 PROCEDURE — 80053 COMPREHEN METABOLIC PANEL: CPT

## 2022-10-24 PROCEDURE — 86900 BLOOD TYPING SEROLOGIC ABO: CPT

## 2022-10-24 PROCEDURE — 86695 HERPES SIMPLEX TYPE 1 TEST: CPT

## 2022-10-24 PROCEDURE — 86677 HELICOBACTER PYLORI ANTIBODY: CPT

## 2022-10-24 PROCEDURE — 84481 FREE ASSAY (FT-3): CPT

## 2022-10-24 PROCEDURE — 80307 DRUG TEST PRSMV CHEM ANLYZR: CPT

## 2022-10-24 PROCEDURE — 84439 ASSAY OF FREE THYROXINE: CPT

## 2022-10-24 PROCEDURE — 65270000046 HC RM TELEMETRY

## 2022-10-24 PROCEDURE — 74011250637 HC RX REV CODE- 250/637: Performed by: NURSE PRACTITIONER

## 2022-10-24 PROCEDURE — 74011250636 HC RX REV CODE- 250/636: Performed by: FAMILY MEDICINE

## 2022-10-24 PROCEDURE — 74011250636 HC RX REV CODE- 250/636: Performed by: NURSE PRACTITIONER

## 2022-10-24 PROCEDURE — 85303 CLOT INHIBIT PROT C ACTIVITY: CPT

## 2022-10-24 PROCEDURE — 83735 ASSAY OF MAGNESIUM: CPT

## 2022-10-24 PROCEDURE — 74011000258 HC RX REV CODE- 258: Performed by: NURSE PRACTITIONER

## 2022-10-24 PROCEDURE — 82962 GLUCOSE BLOOD TEST: CPT

## 2022-10-24 PROCEDURE — 80074 ACUTE HEPATITIS PANEL: CPT

## 2022-10-24 PROCEDURE — 86803 HEPATITIS C AB TEST: CPT

## 2022-10-24 PROCEDURE — 86644 CMV ANTIBODY: CPT

## 2022-10-24 PROCEDURE — 86762 RUBELLA ANTIBODY: CPT

## 2022-10-24 PROCEDURE — 74011250637 HC RX REV CODE- 250/637: Performed by: HOSPITALIST

## 2022-10-24 RX ORDER — POTASSIUM CHLORIDE 750 MG/1
30 TABLET, FILM COATED, EXTENDED RELEASE ORAL
Status: DISCONTINUED | OUTPATIENT
Start: 2022-10-24 | End: 2022-10-24

## 2022-10-24 RX ORDER — ADHESIVE BANDAGE
30 BANDAGE TOPICAL DAILY
Status: DISCONTINUED | OUTPATIENT
Start: 2022-10-24 | End: 2022-10-30

## 2022-10-24 RX ORDER — ALUMINA, MAGNESIA, AND SIMETHICONE 2400; 2400; 240 MG/30ML; MG/30ML; MG/30ML
30 SUSPENSION ORAL
Status: DISCONTINUED | OUTPATIENT
Start: 2022-10-24 | End: 2022-11-09 | Stop reason: HOSPADM

## 2022-10-24 RX ORDER — DIAZEPAM 5 MG/1
5 TABLET ORAL
Status: DISCONTINUED | OUTPATIENT
Start: 2022-10-24 | End: 2022-11-09 | Stop reason: HOSPADM

## 2022-10-24 RX ORDER — POLYETHYLENE GLYCOL 3350 17 G/17G
17 POWDER, FOR SOLUTION ORAL DAILY
Status: DISCONTINUED | OUTPATIENT
Start: 2022-10-24 | End: 2022-10-28

## 2022-10-24 RX ADMIN — PRASUGREL 10 MG: 10 TABLET, FILM COATED ORAL at 08:52

## 2022-10-24 RX ADMIN — DIAZEPAM 5 MG: 5 TABLET ORAL at 18:52

## 2022-10-24 RX ADMIN — CALCIUM CARBONATE (ANTACID) CHEW TAB 500 MG 200 MG: 500 CHEW TAB at 12:23

## 2022-10-24 RX ADMIN — ESCITALOPRAM OXALATE 20 MG: 10 TABLET ORAL at 08:52

## 2022-10-24 RX ADMIN — Medication 400 MG: at 22:41

## 2022-10-24 RX ADMIN — SODIUM CHLORIDE, PRESERVATIVE FREE 10 ML: 5 INJECTION INTRAVENOUS at 18:54

## 2022-10-24 RX ADMIN — BISACODYL 10 MG: 10 SUPPOSITORY RECTAL at 08:53

## 2022-10-24 RX ADMIN — TRAZODONE HYDROCHLORIDE 200 MG: 100 TABLET ORAL at 00:15

## 2022-10-24 RX ADMIN — POTASSIUM BICARBONATE 40 MEQ: 782 TABLET, EFFERVESCENT ORAL at 18:52

## 2022-10-24 RX ADMIN — CARVEDILOL 3.12 MG: 3.12 TABLET, FILM COATED ORAL at 08:52

## 2022-10-24 RX ADMIN — ROPINIROLE HYDROCHLORIDE 0.5 MG: 0.25 TABLET, FILM COATED ORAL at 08:52

## 2022-10-24 RX ADMIN — FUROSEMIDE 40 MG: 10 INJECTION, SOLUTION INTRAVENOUS at 08:53

## 2022-10-24 RX ADMIN — POLYETHYLENE GLYCOL 3350 17 G: 17 POWDER, FOR SOLUTION ORAL at 15:23

## 2022-10-24 RX ADMIN — ROSUVASTATIN 20 MG: 10 TABLET, FILM COATED ORAL at 22:41

## 2022-10-24 RX ADMIN — IRON SUCROSE 200 MG: 20 INJECTION, SOLUTION INTRAVENOUS at 10:54

## 2022-10-24 RX ADMIN — FAMOTIDINE 20 MG: 20 TABLET ORAL at 08:52

## 2022-10-24 RX ADMIN — FAMOTIDINE 20 MG: 20 TABLET ORAL at 18:52

## 2022-10-24 RX ADMIN — CLONAZEPAM 1 MG: 0.5 TABLET ORAL at 22:41

## 2022-10-24 RX ADMIN — ALUMINUM HYDROXIDE, MAGNESIUM HYDROXIDE, AND DIMETHICONE 30 ML: 400; 400; 40 SUSPENSION ORAL at 20:04

## 2022-10-24 RX ADMIN — SODIUM CHLORIDE, PRESERVATIVE FREE 10 ML: 5 INJECTION INTRAVENOUS at 22:43

## 2022-10-24 RX ADMIN — MONTELUKAST 10 MG: 10 TABLET, FILM COATED ORAL at 08:52

## 2022-10-24 RX ADMIN — FLUTICASONE PROPIONATE 2 SPRAY: 50 SPRAY, METERED NASAL at 09:01

## 2022-10-24 RX ADMIN — SODIUM CHLORIDE, PRESERVATIVE FREE 10 ML: 5 INJECTION INTRAVENOUS at 06:35

## 2022-10-24 RX ADMIN — ROPINIROLE HYDROCHLORIDE 0.5 MG: 0.25 TABLET, FILM COATED ORAL at 18:52

## 2022-10-24 RX ADMIN — CARVEDILOL 3.12 MG: 3.12 TABLET, FILM COATED ORAL at 18:52

## 2022-10-24 RX ADMIN — MAGNESIUM HYDROXIDE 30 ML: 400 SUSPENSION ORAL at 15:23

## 2022-10-24 RX ADMIN — EZETIMIBE 10 MG: 10 TABLET ORAL at 08:52

## 2022-10-24 RX ADMIN — FUROSEMIDE 40 MG: 10 INJECTION, SOLUTION INTRAVENOUS at 18:52

## 2022-10-24 RX ADMIN — FOLIC ACID 1 MG: 1 TABLET ORAL at 08:52

## 2022-10-24 RX ADMIN — TRAZODONE HYDROCHLORIDE 200 MG: 100 TABLET ORAL at 22:41

## 2022-10-24 RX ADMIN — POTASSIUM CHLORIDE 30 MEQ: 750 TABLET, FILM COATED, EXTENDED RELEASE ORAL at 08:51

## 2022-10-24 RX ADMIN — DOCUSATE SODIUM 100 MG: 100 CAPSULE, LIQUID FILLED ORAL at 08:52

## 2022-10-24 NOTE — PROGRESS NOTES
Transitions of Care Plan  RUR: 17% - moderate  Admission Dx: Heart Failure workup  Consults: AHFC; Psychiatry  Baseline: independent without DME; resides w father; disability for psychiatry dx and comorbidites  Barriers to Discharge: medical  Disposition: home with family assistance  Estimated Discharge Date: 2+ days    Reason for Admission:   Heart Failure    RUR Score:     17% - moderate             PCP: First and Last name:   Gildardo Frye MD     Name of Practice: [facility]   Are you a current patient: Yes/No:  Yes   Approximate date of last visit:  Recently   Can you participate in a virtual visit if needed: Yes    Do you (patient/family) have any concerns for transition/discharge?      Patient concerned about her health and management of diagnosis              Plan for utilizing home health:   Possible - will have limited options for [place], [place] and [employer] Medicare    Current Advanced Directive/Advance Care Plan:  Full Code      Healthcare Decision Maker:   Click here to complete [address] including selection of the Healthcare Decision Maker Relationship (ie \"Primary\")            Primary Decision MakerFranlesley Gomez - Father - [phone]    Transition of Care Plan:          CM spoke with patient regarding patient's initial baseline and disposition plan:    Baseline Assessment:  ADLs: independent without DME  Self-Care: independent without DME  Social Background: resides at address on file with her father; pt on disability due to psychiatric diagnoses and comorbidities (OCD, ADHD, Depression, Anxiety, Fibromyalgia, Gastroparesis); patient relies on her father for long distance transportation to/from [place]  Psychiatrists: Salomón Shown at [facility] - pt utilizes phone counseling sessions  SSDI: Gross around $1500; net is $9701  Pharmacy: [facility] in [address] [place] [address] Place:  HF Patient for [facility]: N/A - out of service area    Disposition Plan:  Home with family assistance    [place] Bridget, [address]  Available via [address] [place] [address] or  [facility]    Care Management Interventions  PCP Verified by CM: Yes  Palliative Care Criteria Met (RRAT>21 & CHF Dx)?: No  Mode of Transport at Discharge:  Other (see comment)  Transition of Care Consult (CM Consult): Discharge Planning  MyChart Signup: Yes  Discharge Durable Medical Equipment: No  Health Maintenance Reviewed: Yes  Physical Therapy Consult: No  Occupational Therapy Consult: No  Speech Therapy Consult: No  Support Systems: Parent(s)  Confirm Follow Up Transport: Family  Discharge Location  Patient Expects to be Discharged to[de-identified] Home with family assistance

## 2022-10-24 NOTE — PROGRESS NOTES
[facility] in [place], [place]  Inpatient Progress Note      Patient name: Pita Crowley  Patient : 1980  Patient MRN: [id number]  Consulting MD: Karissa Mohamud MD  Date of service: 10/24/22      REASON FOR REFERRAL:  Management of acute on chronic systolic heart failure      PLAN OF CARE   38 y.o. female with history of HTN, CAD s/p STEMI in 2022 (DESx1 to LAD; treated at [facility]) with ischemic cardiomyopathy (EF 20-25%), CHF, GERD, RLS, and fibromyalgia seen as a new patient in [facility] on 10/21/22 and found to be fluid overloaded, and with suicidal ideation. Pt was taken to the ER for further evaluation and admission for acute on chronic HF and mental health crisis. Pt contracted for safety, and is being seen by behavioral health/psych  Ongoing diuresis and remainder of heart failure work-up while in hospital; work on optimizing GDMT after diuresis complete- may need inotrope assistance. Plan for RHC once close to euvolemia.   GDMT limited in the past d/t hypotension            RECOMMENDATIONS:  Inotropic support/pressors: none needed at this time, may consider inotrope if diuresis inadequate  Beta-blocker: continue coreg 3.125mg BID  ACE/ARB/ARNi: hold while undergoing aggressive diuresis  MRA: start spironolactone 12.5mg daily  SGLT2 inhibitor: hold home Jardiance 10mg daily d/t UTI  Diuretic: continue IV lasix 40mg BID ; goal net -2L next 24 hrs  Continue KCl replacement- will try change to Effer-K  Urate lowering therapy: not on allopurinol or uloric, check uric acid level  Continue ASA and prasugrel per primary cards  Statin or PCSK9i: Continue current dose of statin  Treatment of STU: inpatient eval for STU  Likely will need referral for ICD given EF remains <35% 3 mos after MI, may need LifeVest at discharge if no ICD implant  Echocardiogram done on admission, EF 15-20%  Labs: HF and Transplant eval labs ordered; in process  Will have Social Worker complete psychosocial evaluation this week  Nutritionist consult  Heart failure education  Needs Advanced care plan    Patient assessed. High suspicion of sleep apnea due to the following reasons. Will refer for sleep evaluation. [x] Heart Failure  [x] Hypertention  [] Atrial Fibrillation  [x] BMI > 30  [] History of stroke  [] Diabetes  [] Heavy snoring  [] Witnessed apnea  [] Hypoxemia       IMPRESSION:  Fatigue  Shortness of breath  Volume overload  Acute on Chronic systolic heart failure  Stage C, NYHA class IV symptoms  ischemic cardiomyopathy, LVEF 20-25%  CAD  STEMI July 2022 s/p RICKY to LAD  HTN  Obesity  LHD  Pre-diabetes  Esophageal stenosis s/p dilation  RLS  Fibromyalgia  Anxiety and Depression      LIFE GOALS:  Patient's personal goals include: get better  Important upcoming milestones or family events: the holidays coming up  The patient identifies the following as important for living well: being independent        [facility]:  Echo (8/14/22)    Left Ventricle: Severely reduced left ventricular systolic function with a visually estimated EF of 20 - 25%. Left ventricle is mildly dilated. Increased wall thickness. See diagram for wall motion findings. Grade II diastolic dysfunction with increased LAP. Right Ventricle: Not well visualized. Tricuspid Valve: Moderately elevated RVSP. BRIEF HISTORY OF PRESENT ILLNESS:  Sandra Duncan is a 38 y.o. female with h/o HTN, CAD s/p STEMI in July 2022 (DESx1 to LAD; treated at [facility]) with ischemic cardiomyopathy (EF 20-25%), CHF, GERD, RLS, and fibromyalgia seen as a new patient in [facility] on 10/21/22 and found to be fluid overloaded, and with suicidal ideation. Pt was taken to the ER for further evaluation and admission for acute on chronic HF and mental health crisis.          INTERVAL HISTORY:  Contracted for safety  Pt having gastric upset with potassium tablets  Anxious and tearful   VSS, labs stable      REVIEW OF SYSTEMS:  Review of Systems   Constitutional:  Negative for chills, fever, malaise/fatigue and weight loss. Respiratory:  Positive for shortness of breath. Negative for cough. Cardiovascular:  Positive for orthopnea. Negative for chest pain, palpitations and leg swelling. Gastrointestinal:  Negative for abdominal pain, diarrhea, heartburn, nausea and vomiting. Musculoskeletal:  Positive for myalgias. Restless legs   Neurological:  Negative for dizziness and weakness. Psychiatric/Behavioral:  Positive for depression. Negative for suicidal ideas. The patient is nervous/anxious. PHYSICAL EXAM:  Visit Vitals  /82 (BP 1 Location: Left upper arm, BP Patient Position: At rest)   Pulse 100   Temp 97.6 °F (36.4 °C)   Resp 16   Wt 199 lb 11.8 oz (90.6 kg)   LMP 10/09/2022   SpO2 97%   BMI 34.28 kg/m²         Physical Exam  Vitals reviewed. Constitutional:       Appearance: Normal appearance. Cardiovascular:      Rate and Rhythm: Normal rate and regular rhythm. Heart sounds: No murmur heard. No friction rub. Gallop present. S3 sounds present. Pulmonary:      Effort: No respiratory distress. Breath sounds: Normal breath sounds. Abdominal:      General: Bowel sounds are normal. There is no distension. Palpations: Abdomen is soft. Tenderness: There is no abdominal tenderness. Musculoskeletal:      Right lower leg: No edema. Left lower leg: No edema. Skin:     General: Skin is warm and dry. Capillary Refill: Capillary refill takes less than 2 seconds. Neurological:      General: No focal deficit present. Mental Status: She is alert and oriented to person, place, and time. Psychiatric:         Mood and Affect: Mood normal.         Behavior: Behavior normal.         Thought Content:  Thought content normal.         Judgment: Judgment normal.            PAST MEDICAL HISTORY:  Past Medical History:   Diagnosis Date    Anemia NEC Arthritis     Chronic pain     fybromyalsia    Depression     anxiety, depression, OCD    GERD (gastroesophageal reflux disease)     Hypertension     Hypertension     Ill-defined condition     Fibromyalia gastricparesis    MI (myocardial infarction) ([address]     Musculoskeletal disorder     SOB (shortness of breath)     Stool color black        PAST SURGICAL HISTORY:  Past Surgical History:   Procedure Laterality Date    HX CORONARY STENT PLACEMENT      HX GYN           HX HEENT      wisdom teeth extraction    UPPER GI ENDOSCOPY,BIOPSY  2018         UPPER GI ENDOSCOPY,DIAGNOSIS  2018            FAMILY HISTORY:  Family History   Problem Relation Age of Onset    Hypertension Father     Elevated Lipids Father     Arthritis-rheumatoid Mother         ? Lupus vs RA    Lung Disease Mother     Heart Disease Mother     Cancer Mother         breast in 39y    COPD Mother     Hypertension Mother     Stroke Mother         3-4 strokes    Breast Cancer Mother 50    Diabetes Maternal Grandmother        SOCIAL HISTORY:  Social History     Socioeconomic History    Marital status: SINGLE   Tobacco Use    Smoking status: Former     Packs/day: 0.25     Years: 8.00     Pack years: 2.00     Types: Cigarettes     Quit date: 2022     Years since quittin.2    Smokeless tobacco: Never   Vaping Use    Vaping Use: Never used   Substance and Sexual Activity    Alcohol use: Not Currently     Alcohol/week: 1.0 standard drink     Types: 1 Glasses of wine per week     Comment: Wine with special occassions - not since July    Drug use: Not Currently     Types: Marijuana     Comment: Haven't had any since 2022    Sexual activity: Yes     Partners: Male     Birth control/protection: None     Social Determinants of Health     Financial Resource Strain: High Risk    Difficulty of Paying Living Expenses: Very hard   Food Insecurity: No Food Insecurity    Worried About Running Out of Food in the Last Year: Never true    Ran Out of Food in the Last Year: Never true   Transportation Needs: No Transportation Needs    Lack of Transportation (Medical): No    Lack of Transportation (Non-Medical): No   Physical Activity: Inactive    Days of Exercise per Week: 0 days    Minutes of Exercise per Session: 0 min   Stress: Stress Concern Present    Feeling of Stress : To some extent   Social Connections: Socially Isolated    Frequency of Communication with Friends and Family: Twice a week    Frequency of Social Gatherings with Friends and Family: Never    Attends Temple Services: Never    Active Member of Clubs or Organizations: No    Attends Club or Organization Meetings: Never    Marital Status: Never    Housing Stability: Low Risk     Unable to Pay for Housing in the Last Year: No    Number of [place] in the Last Year: 1    Unstable Housing in the Last Year: No       LABORATORY RESULTS:     Labs Latest Ref Rng & Units 10/24/2022 10/23/2022 10/22/2022 10/21/2022 10/17/2022 10/6/2022 10/3/2022   WBC 3.6 - 11.0 K/uL - - 6.0 5.4 5.8 5.7 5.7   RBC 3.80 - 5.20 M/uL - - 4.42 4.37 4.15 4.14 4.06   Hemoglobin 11.5 - 16.0 g/dL - - 12.9 13.1 12.3 12.2 12.0   Hematocrit 35.0 - 47.0 % - - 40.2 40.2 38.8 38.2 37.2   MCV 80.0 - 99.0 FL - - 91.0 92.0 93.5 92.3 91.6   Platelets 993 - 626 K/uL - - 283 271 292 273 263   Lymphocytes 12 - 49 % - - 42 46 46 43 49   Monocytes 5 - 13 % - - 7 7 8 7 6   Eosinophils 0 - 7 % - - 8(H) 7 6 5 5   Basophils 0 - 1 % - - 1 1 1 1 1   Albumin 3.5 - 5.0 g/dL 3.5 3. 4(L) - 3. 4(L) 3.4(L) 3. 2(L) 3. 1(L)   Calcium 8.5 - 10.1 MG/DL 9.7 9.6 9.6 9.2 9.4 9.4 9.4   Glucose 65 - 100 mg/dL 99 113(H) 114(H) 80 106(H) 107(H) 100   BUN 6 - 20 MG/DL 10 9 8 9 10 10 12   Creatinine 0.55 - 1.02 MG/DL 0.86 0.93 0.87 0.81 1.09(H) 0.98 0.90   Sodium 136 - 145 mmol/L 139 141 136 137 140 138 138   Potassium 3.5 - 5.1 mmol/L 3.5 3. 4(L) 3.5 3. 4(L) 3.4(L) 3.4(L) 3. 2(L)   TSH 0.36 - 3.74 uIU/mL - - - 4.44(H) - - -   Some recent data might be hidden     Lab Results   Component Value Date/Time    TSH 4.44 (H) 10/21/2022 06:12 PM    TSH 2.12 05/12/2021 10:55 AM    TSH 1.330 12/30/2019 12:00 AM    TSH 3.850 07/25/2018 12:40 PM    TSH 1.620 02/27/2018 08:59 AM    TSH 1.240 12/05/2016 10:13 AM    TSH 1.530 03/18/2016 10:31 AM    TSH 1.400 11/09/2011 09:37 AM    TSH 1.26 08/19/2010 10:36 AM    TSH 1.18 07/28/2010 04:10 PM       ALLERGY:  Allergies   Allergen Reactions    Abilify [Aripiprazole] Anxiety     Can not tolerate     Sulfa (Sulfonamide Antibiotics) Hives    Vicodin [Hydrocodone-Acetaminophen] Nausea and Vomiting        CURRENT MEDICATIONS:    Current Facility-Administered Medications:     docusate sodium (COLACE) capsule 100 mg, 100 mg, Oral, DAILY, Blu Mandel MD, 100 mg at 10/23/22 0954    bisacodyL (DULCOLAX) suppository 10 mg, 10 mg, Rectal, DAILY, Blu Mandel MD, 10 mg at 10/23/22 1010    potassium chloride SR (KLOR-CON 10) tablet 20 mEq, 20 mEq, Oral, BID, Malgorzata BAGLEY NP    iron sucrose (VENOFER) 200 mg in 0.9% sodium chloride 100 mL IVPB, 200 mg, IntraVENous, Q24H, Malgorzata BAGLEY NP, Last Rate: 440 mL/hr at 10/23/22 1137, 200 mg at 10/23/22 1137    albuterol-ipratropium (DUO-NEB) 2.5 MG-0.5 MG/3 ML, 3 mL, Nebulization, Q6H PRN, Jorge A Peraza MD    fluticasone propionate (FLONASE) 50 mcg/actuation nasal spray 2 Spray, 2 Spray, Both Nostrils, DAILY, Jorge A Johnson MD    traZODone (DESYREL) tablet 200 mg, 200 mg, Oral, QHS, Irma Zaragoza NP, 200 mg at 10/24/22 0015    magnesium oxide (MAG-OX) tablet 400 mg, 400 mg, Oral, QHS, Malgorzata BAGLEY NP, 400 mg at 10/23/22 2149    calcium carbonate (TUMS) chewable tablet 200 mg [elemental], 200 mg, Oral, TID PRN, Blu Mandel MD, 200 mg at 10/23/22 1252    carvediloL (COREG) tablet 3.125 mg, 3.125 mg, Oral, BID, Alan Garza MD, 3.125 mg at 10/23/22 1813    clonazePAM (KlonoPIN) tablet 1 mg, 1 mg, Oral, QHS, Alan Garza MD, 1 mg at 10/23/22 2149 escitalopram oxalate (LEXAPRO) tablet 20 mg, 20 mg, Oral, DAILY, Geoff Rodriguez MD, 20 mg at 10/23/22 0951    ezetimibe (ZETIA) tablet 10 mg, 10 mg, Oral, DAILY, Bonifacio Bxo MD, 10 mg at 10/23/22 0951    famotidine (PEPCID) tablet 20 mg, 20 mg, Oral, BID, Bonifacio Box MD, 20 mg at 89/19/56 6244    folic acid (FOLVITE) tablet 1 mg, 1 mg, Oral, DAILY, Bonifacio Box MD, 1 mg at 10/23/22 0952    montelukast (SINGULAIR) tablet 10 mg, 10 mg, Oral, DAILY, Bonifacio Box MD, 10 mg at 10/23/22 0951    prasugreL (EFFIENT) tablet 10 mg, 10 mg, Oral, DAILY, Bonifacio Box MD, 10 mg at 10/23/22 0951    rOPINIRole (REQUIP) tablet 0.5 mg, 0.5 mg, Oral, BID, Bonifacio Box MD, 0.5 mg at 10/23/22 1813    rosuvastatin (CRESTOR) tablet 20 mg, 20 mg, Oral, Q MON, WED & Parker Yuen MD, 20 mg at 10/21/22 2218    sodium chloride (NS) flush 5-40 mL, 5-40 mL, IntraVENous, Q8H, Geoff Rodriguez MD, 10 mL at 10/24/22 0635    sodium chloride (NS) flush 5-40 mL, 5-40 mL, IntraVENous, PRN, Bonifacio Box MD    acetaminophen (TYLENOL) tablet 650 mg, 650 mg, Oral, Q4H PRN, Bonifacio Box MD, 650 mg at 10/22/22 0648    furosemide (LASIX) injection 40 mg, 40 mg, IntraVENous, BID, Geoff Rodriguez MD, 40 mg at 10/23/22 1813    nitroglycerin (NITROSTAT) tablet 0.4 mg, 0.4 mg, SubLINGual, Q5MIN PRN, Bonifacio Box MD, 0.4 mg at 10/21/22 2237    cefTRIAXone (ROCEPHIN) 1 g in 0.9% sodium chloride 10 mL IV syringe, 1 g, IntraVENous, Q24H, Geoff Rodriguez MD, 1 g at 10/23/22 1813    glucose chewable tablet 16 g, 4 Tablet, Oral, PRN, Geoff Fischer MD    glucagon (GLUCAGEN) injection 1 mg, 1 mg, IntraMUSCular, PRN, Geoff Fischer MD    dextrose 10 % infusion 0-250 mL, 0-250 mL, IntraVENous, PRN, Bonifacio Box MD    insulin lispro (HUMALOG) injection, , SubCUTAneous, AC&HS, Bonifacio Box MD    PATIENT CARE TEAM:  Patient Care Team:  Liang Guzman MD as PCP - General (Family Medicine)  Liang Guzamn MD as PCP - [facility]  Riley Alexander MD (Surgery General)  James Berg MD (Cardiovascular Disease Physician)  Jerardo Ledezma MD (Otolaryngology)  Nba Jenkins MD (Internal Medicine Physician)  Adrianna Louis MD (Female Pelvic Medicine and Reconstructive Surgery)  Jeanette Devi MD (Neurology)  Octavio Negron MD (Psychiatry)  Ember Coley as Ambulatory Care Manager     Thank you for allowing us to participate in this patient's care. Jonna Roa, MSN, [facility]-[address], [address]  Phone: [phone]  Fax: [phone]

## 2022-10-24 NOTE — BSMART NOTE
BSMART Liaison Team Note     LOS:  3 days      Patient goal(s) for today: communicate needs to staff, continue prescribed medications  BSMART Liaison team focus/goals: assess needs, provide support and encouragement    Progress note: Patient received sitting in bed on the phone with her . Pt stated, \"I am not doing good\" \"I am bad right now. \" Pt states feeling, \"hot, clammy, and face feels numb. \" She believes it is from the potassium pill that she has been taking. This writer asked patient about her mood and how she has been feeling and she said, \"I don't want to kill myself or nothing. I want to figure out what is wrong with me. \" Pt denied current/active SI/HI or plan. Denied [facility] AT [facility]. Self-reported her sleep has decreased since about a month ago. Pt shared she ordered a burger from the cafeteria and was looking forward to eat it, until she started to feel nauseated and not cannot eat. Pt became tearful and began to cry expressing concern and worry about her physical health. Pt validated and discussed coping skills with patient. Pt reported she has been working on crossword puzzles and [employer]. Encouraged patient to continue using coping skills. She was agreeable. Patient stated, \"I need to get this together\" and requested to stop the session so she can focus on her medical needs. Patient would like to see the doctor now as she does not feel well. This writer notified RN of conversation and pt's requested. Notified patient BSMART liaison will check back with her at a later time when she feels better. Liaison to continue to follow. Barriers to Discharge: medical clearance       Outpatient provider(s):   Kandice Riddle MD  Insurance info/prescription coverage:  [employer] MEDICARE/[employer] MEDICARE HMO    Diagnosis: MDD, recurrent, mild to moderate   Unspecified Anxiety Disorder   Adjustment Disorder with mixed anxiety and depressed mood     Plan:  please defer to psychiatric NP.    Follow up Psych Consult placed? yes.    Psychiatrist updated? no       Participating treatment team members: Coleen Ramos MSW, Supervisee in Social Work

## 2022-10-24 NOTE — NURSE NAVIGATOR
HEART FAILURE NURSE NAVIGATOR NOTE  [place] [address]    Patient chart was reviewed by Heart Failure (HF) Nurse Navigators for compliance of prescribed treatment with guidelines directed medical therapy (GDMT) and HF database completed. Please, review beneath recommendations for symptomatic patients with HF with Reduced Ejection Fraction ? 40% (HFrEF)* and patients whose LVEF improved > 40% (HFimpEF)* for your consideration when taking care of this patient. Current Medical Therapy:    Name Sara ELIZABETH 1980   LVEF 15/20%   NYHA Functional Class IV   ARNi/ACEi/ARB Held while undergoing diuresis   Beta-blocker Coreg 3.125 mg twice daily   Aldosterone Antagonist Spironolactone 12.5 mg daily   SGLT2 inhibitor Held currently d/t UTI   Hydralazine/Isosorbide Dinitrate    Consulting Cardiologist: [facility] Dr. Yani Gilbert     Recommendations:    Please, add the following GDMT for HFrEF ? 40% [Class 1] or document in discharge summary/progress note why patient cannot take the medication:  ARNi/ACEi or ARB  Beta-blockers (carvedilol, sustained-release metoprolol succinate or bisoprolol)  Aldosterone antagonists GFR > 30 and K< 5  SGLT2 inhibitor  Hydralazine/Isosorbide dinitrate for  Americans with Class III/IV symptoms  Adjust diuretic dose at discharge if hospitalized for volume overload    Consider adding the following GDMT for HFrEF ? 40%, if appropriate [Class 2b]:   Ivabradine for patients on maximally tolerated beta-blocker dose in order to achieve HR 70-80bpm  Digoxin, goal level 0.5-0.9  Polyunsaturated fatty acids  Vericuguat  For patient with hyperkalemia while on RAASi > 5.5, consider adding potassium binders (patiromer, sodium zirconium cycosilicate)    Note: the following medications may be potentially harmful in heart failure [Class 3]:  Calcium channel blockers (doxazosin, diltiazem, verapamil, nifedipine)  Antiarrhythmics (flecanide, disopyrimide, sotalol, dronedarone)  Diabetes medications (thiasolidinediones, saxagliptin, alogliptin)  NSAIDs and PIZARRO 2 inhibitors    Consider vaccinations for respiratory illnesses (flu, pneumonia, covid) [Class 2b]    For eligible patients with LVEF < 35% consider discharge with wearable defibrillation [Class 2b] and/or referral for ICD implantation [Class 1] for prevention of sudden cardiac death. Your patient is a woman in childbearing age (16-50 years). If appropriate, please,  patient to speak to provider when planning to become pregnant due to teratogenic effect of some HF medications and increased risk/potential contraindication of pregnancy [Class 1]    Due to severely reduced LVEF < 25% and/or recurrent hospitalizations this patient may benefit from referral to Advanced Heart Failure Program to assess suitability for advanced therapies, such as left-ventricular assist device, heart transplantation, palliative inotropes or palliation [Class 1]. To obtain in-patient consultation or refer to [address] [place] [address], call [phone]    Patient Education:     Teach back in heart failure education provided, including information about medical therapy, lifestyle modifications, diet and fluid restrictions, physical activity. Educational resources provided: Living with Heart Failure booklet; Signs/Symptoms magnet; Weight Calendar; [facility] flyer; Preparation for Successful Discharge Checklist.  Information provided about HF support group. Heart failure avoiding triggers on discharge instructions. Plan for Transitional Care:    Post discharge follow up phone call to be made within 48-72 hours of discharge. Patient will follow-up with PCP. Patient will follow-up with Primary Cardiologist.  Obstructive sleep apnea screening done and patient was referred to Sleep Medicine. Referral/follow-up with Cardiac Rehabilitation.   Referral/follow-up with Advanced Heart Failure Specialist.  Referral/follow-up with Palliative Care Specialist.      Heart Failure Nurse Navigator  Phone: [phone]  /  [phone]    *Recommendations listed above are based on 2022 AHA/ACC/HFSA Guideline for the Management of Heart Failure: A Report of the [address] on Clinical Practice Guidelines. Circulation 2022; [id number]. and 2017 AHA/ACC/HRS guideline for management of patients with ventricular arrhythmias and the prevention of sudden cardiac death: A Report of the [facility] of Cardiology/American Heart Association Task Force on Clinical Practice Guidelines and the Heart Rhythm Society.  Heart Rhythm, Vol 15, No 10, October 2018 *Class of Recommendation: Class 1 (strong), Class 2a (moderate), Class 2b (weak), Class 3 (not recommended, potentially harmful)

## 2022-10-24 NOTE — CARDIO/PULMONARY
Cardiac Rehab: Living with Heart Failure Booklet was given to Hunter Villafana by [facility] Workman staff. Visited to reinforce previous HF education and to discuss Cardiac Rehab. Hunter Villafana started to cry and stated \" this is not a good time the potassium is making me feel poorly\". Mentioned briefly the role of the CR staff member and she acknowledged the HF education was given and noted to be on windowsill. Will follow for plan and assist with enrollment in OP CR at discharge if applicable.   Stewart Candelario RN

## 2022-10-24 NOTE — PROGRESS NOTES
Bedside and Verbal shift change report given to Willa Brown (oncoming nurse) by Karla Sims RN (offgoing nurse). Report included the following information SBAR, Kardex, ED Summary, Intake/Output, MAR, Recent Results, and Cardiac Rhythm NSR .

## 2022-10-24 NOTE — PROGRESS NOTES
1520 - pt very upset complaining no one is checking on her. Pt states \"the doctor must be lost\" since he has not come and talk to her yet. Pt also states she us unhappy with care and would like another nurse. Report given to CK Nicholson who will taking over patient's care.

## 2022-10-24 NOTE — PROGRESS NOTES
[address] [facility]  Hospitalist Group                       Hospitalist Progress Note          Emani Jose MD  Please call  and page for questions. Call physician on-call through the  7pm-7am        Daily Progress Note: 10/24/2022    Primary care provider:Eggleston, Willaim Nageotte, MD    Date of admission: 10/21/2022 11:55 AM    Admission summery and hospital course:  38 y.o. female who presents with shortness of breath. Patient with history of CHF, presents with shortness of breath, also patient with suicidal ideation, reports that she has been depressed lately, reports that she has thought of suicide and has a plan to hang herself in the attic, was sent to the ER and was requested to be admitted to the hospitalist service, patient reports that she has been having shortness of breath, reports that she was 3 pillow Orthopnea, reports mild cough but denies any fever or chills. Subjective:   Patient said she is constipated for last 4 days. Patient denies nausea, vomiting, abdominal pain. Patient also complains of whitish, thick discharge during urination. Patient said she often get yeast infection while she is on antibiotic. She denies any painful or difficulty micturition. Assessment/Plan:     Acute on chronic systolic congestive heart failure NYHA class IV  Continue to maintain daily weight I's and O's.  proBNP is trending down. Continue goal-directed medical therapy if BP permits with beta-blocker  and lasix. Consider Entresto if BP permits. Echo with EF of 15-20%. Plan for right heart cath once more euvolemic plan per cardiology. Hypertension   Currently BP is low normal. Continue Coreg and lasix. Consider ACEi or ARB if BP allows. Hyperlipidemia  Continue statin. Coronary atherosclerosis   No chest pain or breathing problems now. Continue Coreg, rosuvastatin, ezetimibe, prasugrel. Diabetes mellitus type 2  Hemoglobin A1c was 6.3%. Blood sugar is reasonable. Continue sliding scale insulin, cannot tolerate Jardiance    Restless leg syndrome  Continue Requip. Anxiety depression suicidal ideation  Patient seen by psych. Continue home medications. Trazodone for sleep at night. One to one sitter can be discontinued as per psych. Possible urinary tract infection   Patient has been on Keflex since 10/17/2022 and now on ceftriaxone since admission. Culture showed anali of less than 50,000. Discontinue antibiotic. Monitor for symptomatic yeast infection. Constipation  Will do bowel regimen and monitor. DIET: ADULT DIET Regular; Safety Tray. ISOLATION PRECAUTIONS: There are currently no Active Isolations  CODE STATUS: Full code   DVT PROPHYLAXIS: SCDs  FUNCTIONAL STATUS PRIOR TO HOSPITALIZATION:   Fully active and ambulatory; able to carry on all self-care without restriction. Ambulatory status/function: By self   EARLY MOBILITY ASSESSMENT:   Recommend routine ambulation while hospitalized with the assistance of nursing staff. Communication: I communicated with patient/family and RN. ANTICIPATED DISCHARGE: After right heart catheterization as per cardiology. .  ANTICIPATED DISPOSITION: Home    1645 PM: Patient was reevaluated for continuous upper abdominal pain. CT scanning was reviewed. Ultrasound is ordered. I added the Mylanta. We will continue to monitor. 1800PM: Patient would like to have her valium as she takes at home.         Review of Systems:     Review of Systems:  Symptom  Y/N  Comments   Symptom  Y/N  Comments    Fever/Chills  n    Chest Pain  No    Poor Appetite  n    Edema  No    Cough  y   Abdominal Pain   n    Sputum  n   Joint Pain  n    SOB/JAFFE  y On exertion  Pruritis/Rash      Nausea/vomit  No   Tolerating PT/OT      Diarrhea     Tolerating Diet      Constipation  Yes   Other      Could not obtain due to:         Objective:   Physical Exam:     Visit Vitals  /82 (BP 1 Location: Left upper arm, BP Patient Position: At rest)   Pulse 91   Temp 97.6 °F (36.4 °C)   Resp 16   Wt 90.6 kg (199 lb 11.8 oz)   LMP 10/09/2022   SpO2 97%   BMI 34.28 kg/m²      O2 Device: None (Room air)    Temp (24hrs), Av.7 °F (36.5 °C), Min:97.4 °F (36.3 °C), Max:98 °F (36.7 °C)    No intake/output data recorded. 10/22 1901 - 10/24 0700  In: 1200 [P.O.:1200]  Out: 2450 [Urine:2450]      General:  Alert, cooperative, no distress, appears stated age. Lungs:   Clear to auscultation bilaterally. Chest wall:  No tenderness or deformity. Heart:  Regular rate and rhythm, S1, S2 normal, no murmur, click, rub or gallop. Abdomen:   Obese, soft, non-tender. Bowel sounds normal.    Extremities: Extremities normal, atraumatic, no cyanosis or edema. Pulses: 2+ and symmetric all extremities. Skin: Skin color, texture, turgor normal. No rashes or lesions   Neurologic: Patient has clear voice. Patient follows command. Data Review:       Recent Days:  Recent Labs     10/22/22  0626 10/21/22  1129   WBC 6.0 5.4   HGB 12.9 13.1   HCT 40.2 40.2    271     Recent Labs     10/24/22  0623 10/23/22  0424 10/22/22  0626 10/21/22  1350 10/21/22  1129    141 136  --  137   K 3.5 3.4* 3.5  --  3.4*    105 103  --  103   CO2 22 24 24  --  25   GLU 99 113* 114*  --  80   BUN 10 9 8  --  9   CREA 0.86 0.93 0.87  --  0.81   CA 9.7 9.6 9.6  --  9.2   MG 2.0 2.0  --   --   --    ALB 3.5 3.4*  --   --  3.4*   ALT 29 31  --   --  30   INR  --   --   --  1.2*  --      No results for input(s): PH, PCO2, PO2, HCO3, FIO2 in the last 72 hours.     24 Hour Results:  Recent Results (from the past 24 hour(s))   URIC ACID    Collection Time: 10/23/22 10:30 AM   Result Value Ref Range    Uric acid 8.1 (H) 2.6 - 6.0 MG/DL   GLUCOSE, POC    Collection Time: 10/23/22 11:46 AM   Result Value Ref Range    Glucose (POC) 120 (H) 65 - 117 mg/dL    Performed by 434Alina Hernandez, POC    Collection Time: 10/23/22  4:47 PM   Result Value Ref Range    Glucose (POC) 96 65 - 117 mg/dL    Performed by Juan M Serrato    GLUCOSE, POC    Collection Time: 10/23/22  9:48 PM   Result Value Ref Range    Glucose (POC) 97 65 - 117 mg/dL    Performed by [address] [place] [address], AM    Collection Time: 10/24/22  6:23 AM   Result Value Ref Range    Cortisol, a.m. 16.9 4.30 - 22.45 ug/dL   C REACTIVE PROTEIN, QT    Collection Time: 10/24/22  6:23 AM   Result Value Ref Range    C-Reactive protein 0.71 (H) 0.00 - 0.60 mg/dL   SED RATE (ESR)    Collection Time: 10/24/22  6:23 AM   Result Value Ref Range    Sed rate, automated 10 0 - 20 mm/hr   T4, FREE    Collection Time: 10/24/22  6:23 AM   Result Value Ref Range    T4, Free 1.3 0.8 - 1.5 NG/DL   T3, FREE    Collection Time: 10/24/22  6:23 AM   Result Value Ref Range    Free Triiodothyronine (T3) 2.7 2.2 - 4.0 pg/mL   HEPATITIS PANEL, ACUTE    Collection Time: 10/24/22  6:23 AM   Result Value Ref Range    Hepatitis A, IgM NONREACTIVE NR      __          Hepatitis B surface Ag <0.10 Index    Hep B surface Ag Interp. Negative NEG      __          Hepatitis B core, IgM NONREACTIVE NR      __          Hep C virus Ab Interp.  NONREACTIVE NR     RUBELLA AB, IGG    Collection Time: 10/24/22  6:23 AM   Result Value Ref Range    Rubella, IgG Qt. 268.50 IU/ML    Rubella IgG, QL REACTIVE     TYPE & SCREEN    Collection Time: 10/24/22  6:23 AM   Result Value Ref Range    Crossmatch Expiration 10/27/2022,2359     ABO/Rh(D) A POSITIVE     Antibody screen NEG    MAGNESIUM    Collection Time: 10/24/22  6:23 AM   Result Value Ref Range    Magnesium 2.0 1.6 - 2.4 mg/dL   METABOLIC PANEL, COMPREHENSIVE    Collection Time: 10/24/22  6:23 AM   Result Value Ref Range    Sodium 139 136 - 145 mmol/L    Potassium 3.5 3.5 - 5.1 mmol/L    Chloride 105 97 - 108 mmol/L    CO2 22 21 - 32 mmol/L    Anion gap 12 5 - 15 mmol/L    Glucose 99 65 - 100 mg/dL    BUN 10 6 - 20 MG/DL    Creatinine 0.86 0.55 - 1.02 MG/DL    BUN/Creatinine ratio 12 12 - 20      eGFR >60 >60 ml/min/1.73m2    Calcium 9.7 8.5 - 10.1 MG/DL    Bilirubin, total 1.9 (H) 0.2 - 1.0 MG/DL    ALT (SGPT) 29 12 - 78 U/L    AST (SGOT) 17 15 - 37 U/L    Alk. phosphatase 94 45 - 117 U/L    Protein, total 6.6 6.4 - 8.2 g/dL    Albumin 3.5 3.5 - 5.0 g/dL    Globulin 3.1 2.0 - 4.0 g/dL    A-G Ratio 1.1 1.1 - 2.2     NT-PRO BNP    Collection Time: 10/24/22  6:23 AM   Result Value Ref Range    NT pro-BNP 2,339 (H) <125 PG/ML   GLUCOSE, POC    Collection Time: 10/24/22  6:34 AM   Result Value Ref Range    Glucose (POC) 90 65 - 117 mg/dL    Performed by Nicholas Rodriguez        Problem List:  Problem List as of 10/24/2022 Date Reviewed: 10/14/2022            Codes Class Noted - Resolved    CHF (congestive heart failure) ([facility] ICD-10-CM: I50.9  ICD-9-CM: 428.0  10/21/2022 - Present        Chronic systolic (congestive) heart failure ICD-10-CM: I50.22  ICD-9-CM: 428.22, 428.0  9/7/2022 - Present        Coronary artery disease involving native coronary artery of native heart without angina pectoris (Chronic) ICD-10-CM: I25.10  ICD-9-CM: 414.01  8/29/2022 - Present        Acute exacerbation of CHF (congestive heart failure) ([facility] ICD-10-CM: I50.9  ICD-9-CM: 428.0  8/13/2022 - Present        Neuropathy (Chronic) ICD-10-CM: G62.9  ICD-9-CM: 355.9  10/15/2019 - Present    Overview Signed 10/15/2019  1:58 PM by Isha Marinelli MD     Feet.                Gastroparesis (Chronic) ICD-10-CM: K31.84  ICD-9-CM: 536.3  11/29/2018 - Present        Plantar fasciitis of left foot (Chronic) ICD-10-CM: M72.2  ICD-9-CM: 728.71  11/29/2018 - Present        Positive KITTY (antinuclear antibody) (Chronic) ICD-10-CM: R76.8  ICD-9-CM: 795.79  11/29/2018 - Present        Severe obesity ([facility] ICD-10-CM: E66.01  ICD-9-CM: 278.01  11/29/2018 - Present        Class 2 obesity due to excess calories without serious comorbidity in adult ICD-10-CM: E66.09  ICD-9-CM: 278.00  7/25/2018 - Present        Mood disorder ([facility] ICD-10-CM: F39  ICD-9-CM: 296.90  6/12/2018 - Present        Chronic pain syndrome (Chronic) ICD-10-CM: G89.4  ICD-9-CM: 338.4  6/12/2018 - Present        Fibromyalgia (Chronic) ICD-10-CM: M79.7  ICD-9-CM: 729.1  6/12/2018 - Present        Panic attack ICD-10-CM: F41.0  ICD-9-CM: 300.01  6/12/2018 - Present        Tremor ICD-10-CM: R25.1  ICD-9-CM: 781.0  6/12/2018 - Present        Recurrent depression ([address] ICD-10-CM: F33.9  ICD-9-CM: 296.30  1/15/2018 - Present        Inflammatory arthritis ICD-10-CM: M19.90  ICD-9-CM: 714.9  8/10/2016 - Present        Vitamin D deficiency (Chronic) ICD-10-CM: E55.9  ICD-9-CM: 268.9  3/21/2016 - Present        Thrombosed external hemorrhoid ICD-10-CM: K64.5  ICD-9-CM: 455.4  2/5/2016 - Present    Overview Signed 2/5/2016 12:05 PM by Kevin Ness MD     At 5 o'clock. IFG (impaired fasting glucose) (Chronic) ICD-10-CM: R73.01  ICD-9-CM: 790.21  9/10/2015 - Present        Hyperlipidemia (Chronic) ICD-10-CM: E78.5  ICD-9-CM: 272.4  9/10/2015 - Present        Hoarseness ICD-10-CM: R49.0  ICD-9-CM: 784.42  3/25/2013 - Present        OCD (obsessive compulsive disorder) ICD-10-CM: F42.9  ICD-9-CM: 300.3  2/27/2012 - Present        HSV (herpes simplex virus) anogenital infection ICD-10-CM: A60.9  ICD-9-CM: 054.9  9/12/2011 - Present        Costochondritis ICD-10-CM: M94.0  ICD-9-CM: 733.6  5/17/2011 - Present        Anxiety ICD-10-CM: F41.9  ICD-9-CM: 300.00  9/13/2010 - Present        Itch of skin ICD-10-CM: L29.9  ICD-9-CM: 698.9  5/26/2010 - Present        Depression (Chronic) ICD-10-CM: F32. A  ICD-9-CM: 730  Unknown - Present        GERD (gastroesophageal reflux disease) (Chronic) ICD-10-CM: K21.9  ICD-9-CM: 530.81  Unknown - Present        Anemia, unspecified (Chronic) ICD-10-CM: D64.9  ICD-9-CM: 285. 9  Unknown - Present        RESOLVED: Hypertension (Chronic) ICD-10-CM: I10  ICD-9-CM: 401.9  Unknown - 5/12/2021           Medications reviewed  Current Facility-Administered Medications   Medication Dose Route Frequency    potassium chloride SR (KLOR-CON 10) tablet 30 mEq  30 mEq Oral TID WITH MEALS    docusate sodium (COLACE) capsule 100 mg  100 mg Oral DAILY    bisacodyL (DULCOLAX) suppository 10 mg  10 mg Rectal DAILY    iron sucrose (VENOFER) 200 mg in 0.9% sodium chloride 100 mL IVPB  200 mg IntraVENous Q24H    albuterol-ipratropium (DUO-NEB) 2.5 MG-0.5 MG/3 ML  3 mL Nebulization Q6H PRN    fluticasone propionate (FLONASE) 50 mcg/actuation nasal spray 2 Spray  2 Spray Both Nostrils DAILY    traZODone (DESYREL) tablet 200 mg  200 mg Oral QHS    magnesium oxide (MAG-OX) tablet 400 mg  400 mg Oral QHS    calcium carbonate (TUMS) chewable tablet 200 mg [elemental]  200 mg Oral TID PRN    carvediloL (COREG) tablet 3.125 mg  3.125 mg Oral BID    clonazePAM (KlonoPIN) tablet 1 mg  1 mg Oral QHS    escitalopram oxalate (LEXAPRO) tablet 20 mg  20 mg Oral DAILY    ezetimibe (ZETIA) tablet 10 mg  10 mg Oral DAILY    famotidine (PEPCID) tablet 20 mg  20 mg Oral BID    folic acid (FOLVITE) tablet 1 mg  1 mg Oral DAILY    montelukast (SINGULAIR) tablet 10 mg  10 mg Oral DAILY    prasugreL (EFFIENT) tablet 10 mg  10 mg Oral DAILY    rOPINIRole (REQUIP) tablet 0.5 mg  0.5 mg Oral BID    rosuvastatin (CRESTOR) tablet 20 mg  20 mg Oral Q MON, WED & FRI    sodium chloride (NS) flush 5-40 mL  5-40 mL IntraVENous Q8H    sodium chloride (NS) flush 5-40 mL  5-40 mL IntraVENous PRN    acetaminophen (TYLENOL) tablet 650 mg  650 mg Oral Q4H PRN    furosemide (LASIX) injection 40 mg  40 mg IntraVENous BID    nitroglycerin (NITROSTAT) tablet 0.4 mg  0.4 mg SubLINGual Q5MIN PRN    cefTRIAXone (ROCEPHIN) 1 g in 0.9% sodium chloride 10 mL IV syringe  1 g IntraVENous Q24H    glucose chewable tablet 16 g  4 Tablet Oral PRN    glucagon (GLUCAGEN) injection 1 mg  1 mg IntraMUSCular PRN    dextrose 10 % infusion 0-250 mL  0-250 mL IntraVENous PRN    insulin lispro (HUMALOG) injection   SubCUTAneous AC&HS       Care Plan discussed with: Patient/Family and Nurse    Total time spent with patient: 30 minutes.     Cherelle Alexis MD

## 2022-10-24 NOTE — PROGRESS NOTES
Problem: Falls - Risk of  Goal: *Absence of Falls  Description: Document Sheila Nunez Fall Risk and appropriate interventions in the flowsheet.   Outcome: Progressing Towards Goal  Note: Fall Risk Interventions:  Mobility Interventions: Communicate number of staff needed for ambulation/transfer, OT consult for ADLs, Patient to call before getting OOB, PT Consult for mobility concerns, PT Consult for assist device competence         Medication Interventions: Evaluate medications/consider consulting pharmacy, Patient to call before getting OOB, Teach patient to arise slowly    Elimination Interventions: Call light in reach, Elevated toilet seat, Patient to call for help with toileting needs              Problem: Patient Education: Go to Patient Education Activity  Goal: Patient/Family Education  Outcome: Progressing Towards Goal

## 2022-10-24 NOTE — DISCHARGE INSTRUCTIONS
Download the Heart Failure [place] Carrie: Search in your Recommend (Android) or [employer] Carrie Store (StockLayoutsphone): Search for- HF [place] Carrie.    [employer]    HF [place] is a brand-new phone carrie that helps you track daily symptoms, vitals, mood, energy level and more. You can even add your heart failure care team members to view your data and monitor your condition at home.     HF [place] lets you:  Track symptoms, medications and more  Share health information with your health care team  Connect with others living with heart failure

## 2022-10-25 ENCOUNTER — TELEPHONE (OUTPATIENT)
Dept: FAMILY MEDICINE CLINIC | Age: 42
End: 2022-10-25

## 2022-10-25 ENCOUNTER — APPOINTMENT (OUTPATIENT)
Dept: ULTRASOUND IMAGING | Age: 42
DRG: 286 | End: 2022-10-25
Attending: FAMILY MEDICINE
Payer: MEDICARE

## 2022-10-25 DIAGNOSIS — G47.33 OSA (OBSTRUCTIVE SLEEP APNEA): Primary | ICD-10-CM

## 2022-10-25 LAB
ALBUMIN SERPL-MCNC: 3.4 G/DL (ref 3.5–5)
ALBUMIN/GLOB SERPL: 1.1 {RATIO} (ref 1.1–2.2)
ALP SERPL-CCNC: 24 U/L (ref 45–117)
ALT SERPL-CCNC: 31 U/L (ref 12–78)
ANION GAP SERPL CALC-SCNC: 9 MMOL/L (ref 5–15)
AST SERPL-CCNC: 20 U/L (ref 15–37)
BILIRUB SERPL-MCNC: 1.9 MG/DL (ref 0.2–1)
BNP SERPL-MCNC: 2737 PG/ML
BUN SERPL-MCNC: 12 MG/DL (ref 6–20)
BUN/CREAT SERPL: 16 (ref 12–20)
CALCIUM SERPL-MCNC: 9.5 MG/DL (ref 8.5–10.1)
CHLORIDE SERPL-SCNC: 104 MMOL/L (ref 97–108)
CMV IGG SERPL IA-ACNC: <0.6 U/ML (ref 0–0.59)
CMV IGM SERPL IA-ACNC: <30 AU/ML (ref 0–29.9)
CO2 SERPL-SCNC: 24 MMOL/L (ref 21–32)
COTININE UR QL SCN: NEGATIVE NG/ML
CREAT SERPL-MCNC: 0.75 MG/DL (ref 0.55–1.02)
DRUG SCREEN COMMENT:, 753798: NORMAL
GLOBULIN SER CALC-MCNC: 3.1 G/DL (ref 2–4)
GLUCOSE BLD STRIP.AUTO-MCNC: 102 MG/DL (ref 65–117)
GLUCOSE BLD STRIP.AUTO-MCNC: 107 MG/DL (ref 65–117)
GLUCOSE BLD STRIP.AUTO-MCNC: 109 MG/DL (ref 65–117)
GLUCOSE BLD STRIP.AUTO-MCNC: 93 MG/DL (ref 65–117)
GLUCOSE SERPL-MCNC: 103 MG/DL (ref 65–100)
H PYLORI IGA SER-ACNC: <9 UNITS (ref 0–8.9)
H PYLORI IGG SER IA-ACNC: 0.15 INDEX VALUE (ref 0–0.79)
HCV AB S/CO SERPL IA: <0.1 S/CO RATIO (ref 0–0.9)
HCV AB SERPL QL IA: NORMAL
HSV1 IGG SER IA-ACNC: <0.91 INDEX (ref 0–0.9)
HSV2 IGG SER IA-ACNC: 8.07 INDEX (ref 0–0.9)
HTLV I AB SER QL IB: NEGATIVE
HTLV II AB SER QL: NEGATIVE
MAGNESIUM SERPL-MCNC: 2.2 MG/DL (ref 1.6–2.4)
METHYLMALONATE SERPL-SCNC: 139 NMOL/L (ref 0–378)
MEV IGG SER IA-ACNC: 47 AU/ML
MUV IGG SER IA-ACNC: 124 AU/ML
POTASSIUM SERPL-SCNC: 4.4 MMOL/L (ref 3.5–5.1)
PROT C PPP-ACNC: 101 % (ref 73–180)
PROT S ACT/NOR PPP: 51 % (ref 63–140)
PROT S AG ACT/NOR PPP IA: 105 % (ref 60–150)
PROT S FREE AG ACT/NOR PPP IA: 79 % (ref 61–136)
PROT SERPL-MCNC: 6.5 G/DL (ref 6.4–8.2)
SERVICE CMNT-IMP: NORMAL
SODIUM SERPL-SCNC: 137 MMOL/L (ref 136–145)
T GONDII IGG SERPL IA-ACNC: <3 IU/ML (ref 0–7.1)
VZV IGG SER IA-ACNC: 1522 INDEX

## 2022-10-25 PROCEDURE — 74011000250 HC RX REV CODE- 250: Performed by: FAMILY MEDICINE

## 2022-10-25 PROCEDURE — 99233 SBSQ HOSP IP/OBS HIGH 50: CPT | Performed by: NURSE PRACTITIONER

## 2022-10-25 PROCEDURE — 74011250637 HC RX REV CODE- 250/637: Performed by: NURSE PRACTITIONER

## 2022-10-25 PROCEDURE — 74011250637 HC RX REV CODE- 250/637: Performed by: HOSPITALIST

## 2022-10-25 PROCEDURE — P9047 ALBUMIN (HUMAN), 25%, 50ML: HCPCS | Performed by: NURSE PRACTITIONER

## 2022-10-25 PROCEDURE — 65270000046 HC RM TELEMETRY

## 2022-10-25 PROCEDURE — 74011250636 HC RX REV CODE- 250/636: Performed by: NURSE PRACTITIONER

## 2022-10-25 PROCEDURE — 83735 ASSAY OF MAGNESIUM: CPT

## 2022-10-25 PROCEDURE — 76700 US EXAM ABDOM COMPLETE: CPT

## 2022-10-25 PROCEDURE — 74011250637 HC RX REV CODE- 250/637: Performed by: FAMILY MEDICINE

## 2022-10-25 PROCEDURE — 74011250636 HC RX REV CODE- 250/636: Performed by: FAMILY MEDICINE

## 2022-10-25 PROCEDURE — 83880 ASSAY OF NATRIURETIC PEPTIDE: CPT

## 2022-10-25 PROCEDURE — 82962 GLUCOSE BLOOD TEST: CPT

## 2022-10-25 PROCEDURE — 80053 COMPREHEN METABOLIC PANEL: CPT

## 2022-10-25 RX ORDER — ALBUMIN HUMAN 250 G/1000ML
12.5 SOLUTION INTRAVENOUS 2 TIMES DAILY
Status: COMPLETED | OUTPATIENT
Start: 2022-10-25 | End: 2022-10-25

## 2022-10-25 RX ORDER — SPIRONOLACTONE 25 MG/1
12.5 TABLET ORAL DAILY
Status: DISCONTINUED | OUTPATIENT
Start: 2022-10-25 | End: 2022-10-28

## 2022-10-25 RX ORDER — LANOLIN ALCOHOL/MO/W.PET/CERES
10 CREAM (GRAM) TOPICAL
Status: DISCONTINUED | OUTPATIENT
Start: 2022-10-25 | End: 2022-11-09 | Stop reason: HOSPADM

## 2022-10-25 RX ORDER — ONDANSETRON 2 MG/ML
4 INJECTION INTRAMUSCULAR; INTRAVENOUS
Status: DISCONTINUED | OUTPATIENT
Start: 2022-10-25 | End: 2022-11-09 | Stop reason: HOSPADM

## 2022-10-25 RX ORDER — HYDROCORTISONE 25 MG/G
CREAM TOPICAL 4 TIMES DAILY
Status: DISCONTINUED | OUTPATIENT
Start: 2022-10-25 | End: 2022-11-09 | Stop reason: HOSPADM

## 2022-10-25 RX ORDER — ALLOPURINOL 100 MG/1
50 TABLET ORAL DAILY
Status: DISCONTINUED | OUTPATIENT
Start: 2022-10-25 | End: 2022-11-09 | Stop reason: HOSPADM

## 2022-10-25 RX ADMIN — SODIUM CHLORIDE, PRESERVATIVE FREE 10 ML: 5 INJECTION INTRAVENOUS at 06:57

## 2022-10-25 RX ADMIN — SPIRONOLACTONE 12.5 MG: 25 TABLET ORAL at 11:46

## 2022-10-25 RX ADMIN — ONDANSETRON HYDROCHLORIDE 2 MG: 2 INJECTION, SOLUTION INTRAMUSCULAR; INTRAVENOUS at 11:45

## 2022-10-25 RX ADMIN — POTASSIUM BICARBONATE 40 MEQ: 782 TABLET, EFFERVESCENT ORAL at 09:58

## 2022-10-25 RX ADMIN — CLONAZEPAM 1 MG: 0.5 TABLET ORAL at 21:00

## 2022-10-25 RX ADMIN — SODIUM CHLORIDE, PRESERVATIVE FREE 10 ML: 5 INJECTION INTRAVENOUS at 13:49

## 2022-10-25 RX ADMIN — ALBUMIN (HUMAN) 12.5 G: 0.25 INJECTION, SOLUTION INTRAVENOUS at 20:44

## 2022-10-25 RX ADMIN — FAMOTIDINE 20 MG: 20 TABLET ORAL at 09:58

## 2022-10-25 RX ADMIN — EZETIMIBE 10 MG: 10 TABLET ORAL at 09:58

## 2022-10-25 RX ADMIN — ESCITALOPRAM OXALATE 20 MG: 10 TABLET ORAL at 09:58

## 2022-10-25 RX ADMIN — FAMOTIDINE 20 MG: 20 TABLET ORAL at 17:55

## 2022-10-25 RX ADMIN — HYDROCORTISONE: 25 CREAM TOPICAL at 21:00

## 2022-10-25 RX ADMIN — CARVEDILOL 3.12 MG: 3.12 TABLET, FILM COATED ORAL at 17:55

## 2022-10-25 RX ADMIN — ONDANSETRON HYDROCHLORIDE 4 MG: 2 INJECTION, SOLUTION INTRAMUSCULAR; INTRAVENOUS at 16:23

## 2022-10-25 RX ADMIN — CARVEDILOL 3.12 MG: 3.12 TABLET, FILM COATED ORAL at 09:00

## 2022-10-25 RX ADMIN — ALBUMIN (HUMAN) 12.5 G: 0.25 INJECTION, SOLUTION INTRAVENOUS at 14:45

## 2022-10-25 RX ADMIN — TRAZODONE HYDROCHLORIDE 200 MG: 100 TABLET ORAL at 21:00

## 2022-10-25 RX ADMIN — FUROSEMIDE 40 MG: 10 INJECTION, SOLUTION INTRAVENOUS at 17:55

## 2022-10-25 RX ADMIN — PRASUGREL 10 MG: 10 TABLET, FILM COATED ORAL at 09:59

## 2022-10-25 RX ADMIN — HYDROCORTISONE: 25 CREAM TOPICAL at 17:57

## 2022-10-25 RX ADMIN — ROPINIROLE HYDROCHLORIDE 0.5 MG: 0.25 TABLET, FILM COATED ORAL at 09:58

## 2022-10-25 RX ADMIN — BISACODYL 10 MG: 10 SUPPOSITORY RECTAL at 09:38

## 2022-10-25 RX ADMIN — POTASSIUM BICARBONATE 20 MEQ: 782 TABLET, EFFERVESCENT ORAL at 17:55

## 2022-10-25 RX ADMIN — DIAZEPAM 5 MG: 5 TABLET ORAL at 11:51

## 2022-10-25 RX ADMIN — SODIUM CHLORIDE, PRESERVATIVE FREE 10 ML: 5 INJECTION INTRAVENOUS at 21:00

## 2022-10-25 RX ADMIN — DOCUSATE SODIUM 100 MG: 100 CAPSULE, LIQUID FILLED ORAL at 09:59

## 2022-10-25 RX ADMIN — Medication 400 MG: at 21:00

## 2022-10-25 RX ADMIN — ALLOPURINOL 50 MG: 100 TABLET ORAL at 11:46

## 2022-10-25 RX ADMIN — MONTELUKAST 10 MG: 10 TABLET, FILM COATED ORAL at 09:59

## 2022-10-25 RX ADMIN — HYDROCORTISONE: 25 CREAM TOPICAL at 13:48

## 2022-10-25 RX ADMIN — FOLIC ACID 1 MG: 1 TABLET ORAL at 09:59

## 2022-10-25 RX ADMIN — FUROSEMIDE 40 MG: 10 INJECTION, SOLUTION INTRAVENOUS at 09:59

## 2022-10-25 RX ADMIN — FLUTICASONE PROPIONATE 2 SPRAY: 50 SPRAY, METERED NASAL at 13:47

## 2022-10-25 RX ADMIN — ROPINIROLE HYDROCHLORIDE 0.5 MG: 0.25 TABLET, FILM COATED ORAL at 17:54

## 2022-10-25 NOTE — PROGRESS NOTES
0900: Bedside shift change report given to Michele Gore (oncoming nurse) by Erick Flores RN. Report included the following information SBAR, Intake/Output, MAR, and Recent Results. 12:10: MD at bedside to discuss with patient about her care plans and clinical information. 1930: Bedside shift change report given to Susana Mai RN (oncoming nurse) by Erick Flores RN and Kenisha Solorzano RN (offgoing nurse). Report included the following information SBAR.

## 2022-10-25 NOTE — PROGRESS NOTES
[facility]. [place], [address]   Tel.  [phone]   Fax. [address] [facility]   [place], [address] [facility]   Tel.  [phone]   Fax. [phone] [address]   Tel.  [phone]   Fax. [phone]         Subjective:     Melodie Ferro is a 38y.o. year old female seen in-patient for sleep evaluation at the request of General Chu, CAM in collaboration with the Heart Failure Nurse Navigator. She reports she has experienced excessive daytime sleepiness for several years and it has worsened. The sleepiness is described as being severe, she has been taking naps during the day. The patient notes that she will experience frequent awakening from sleep. In general she is able to return to sleep after awakening, she tends to awaken with an alarm. The patient has not undergone diagnostic testing for the current problems. Active Problems List   NYHA Class IV with an EF of 20-25%  CAD  HTN  RLS  BMI 34.3    Assessment:     Mather Sleepiness Score: 19     Stop Bang 10/25/2022   Does the patient snore loudly (louder than talking or loud enough to be heard through closed doors)? 1   Does the patient often feel tired, fatigued, or sleepy during the daytime, even after a \"good\" night's sleep? 1   Has anyone ever observed the patient stop breathing during their sleep? 1   Does the patient have or are they being treated for high blood pressure? 1   Is the patient's BMI greater than 35? 1   Is your neck circumference greater than 17 inches (Male) or 16 inches (Female)? 1   Is the patient older than 48? 0   Is the patient male? 0   STU Score 6       Plan:     The patient currently has significant risk for having sleep apnea. Sleep testing will be scheduled for 11/22/22 at the [place] [facility]. She was provided information on the correlation between sleep apnea and heart failure.       Corresponding risk factors for sleep apnea and the importance of proper treatment were reviewed. Reviewed how treating sleep apnea can help improve heart function     The patient was counseled regarding proper sleep hygiene, with emphasis on ensuring sufficient total sleep time; safe driving and the benefits of exercise and weight loss. All of her questions were addressed. JEROME Palacios  Electronically signed.  10/25/22

## 2022-10-25 NOTE — PROGRESS NOTES
Patient seen and evaluated in patient by Sandra Pinto Sleep Navigator in collaboration with the Heart Failure Nurse Navigator due to high suspicion of sleep disordered breathing. In lab sleep study ordered for evaluation.      Orders Placed This Encounter    [phone] POLYSOMNOGRAPHY (1st NIGHT STUDY) SPLIT IF MEETS CRITERIA     Standing Status:   Future     Standing Expiration Date:   10/25/2023     Scheduling Instructions:      Route to Dr. Funmilayo Tristan:   Reason for Exam     Answer:   STU     EVON Galan, [facility]   Nurse Practitioner  [address] [place] [address]

## 2022-10-25 NOTE — TELEPHONE ENCOUNTER
----- Message from Rickey Palomo sent at 10/25/2022  4:25 PM EDT -----  Subject: Message to Provider    QUESTIONS  Information for Provider? patient would like provider eRfugio Drew to talk   about them being in the hospital with congested heart failure  ---------------------------------------------------------------------------  --------------  Pamella Irwin INFO  [phone]; OK to leave message on voicemail  ---------------------------------------------------------------------------  --------------  SCRIPT ANSWERS  Relationship to Patient?  Self

## 2022-10-25 NOTE — PROGRESS NOTES
0730: Bedside shift change report given to Tracie Bowman (oncoming nurse) by [facility], RN (offgoing nurse). Report included the following information SBAR, MAR, and Recent Results. 1930: Bedside shift change report given to [facility], RN (oncoming nurse) by Tracie Bowman (offgoing nurse). Report included the following information SBAR, MAR, and Recent Results. Charting and patient care of Hunter Villafana by Haley Jung RN from 0730 to 0365 was supervised and reviewed by this RN.

## 2022-10-25 NOTE — PROGRESS NOTES
[facility] in [place], [place]  Inpatient Progress Note      Patient name: Myranda Whiteside  Patient : 1980  Patient MRN: [id number]  Consulting MD: Kvng Abraham MD  Date of service: 10/25/22      REASON FOR REFERRAL:  Management of acute on chronic systolic heart failure      PLAN OF CARE   38 y.o. female with history of HTN, CAD s/p STEMI in 2022 (DESx1 to LAD; treated at [facility]) with ischemic cardiomyopathy (EF 20-25%), CHF, GERD, RLS, and fibromyalgia seen as a new patient in [facility] on 10/21/22 and found to be fluid overloaded, and with suicidal ideation. Pt was taken to the ER for further evaluation and admission for acute on chronic HF and mental health crisis. Pt contracted for safety, and is being seen by behavioral health/psych  Ongoing diuresis and remainder of heart failure work-up while in hospital; work on optimizing GDMT after diuresis complete- may need inotrope assistance. Plan for RHC once close to euvolemia.   GDMT limited in the past d/t hypotension            RECOMMENDATIONS:  Inotropic support/pressors: none needed at this time, may consider inotrope if diuresis inadequate  Beta-blocker: continue coreg 3.125mg BID  ACE/ARB/ARNi: hold while undergoing aggressive diuresis  MRA: Start spironolactone 12.5mg daily  SGLT2 inhibitor: resume Jardiance 10mg daily tomorrow, Urine cx negative   Diuretic: continue IV lasix 40mg BID ; goal net -2L next 24 hrs  Decrease Effer K  Start allopurinol 50mg daily   Continue ASA and prasugrel per primary cards  Statin or PCSK9i: Continue current dose of statin  Treatment of STU: inpatient eval for STU  Likely will need referral for ICD given EF remains <35% 3 mos after MI, may need LifeVest at discharge if no ICD implant  Echocardiogram done on admission, EF 15-20%  Consider RHC Thursday/Friday  Labs: HF and Transplant eval labs ordered; in process  Will have  complete psychosocial evaluation this week  Nutritionist consult  Heart failure education  Needs Advanced care plan    All other care per primary team     IMPRESSION:  Fatigue  Shortness of breath  Volume overload  Acute on Chronic systolic heart failure  Stage C, NYHA class IV symptoms  ischemic cardiomyopathy, LVEF 20-25%  CAD  STEMI July 2022 s/p RICKY to LAD  HTN  Obesity  LHD  Pre-diabetes  Esophageal stenosis s/p dilation  RLS  Fibromyalgia  Anxiety and Depression      INTERVAL HISTORY:  No acute overnight events  Still constipated  K 4.4  Creatinine stable  PBNP stable  Minimal UOP  Orthopnea is improved     LIFE GOALS:  Patient's personal goals include: get better  Important upcoming milestones or family events: the holidays coming up  The patient identifies the following as important for living well: being independent        [facility]:  Echo 10/22/22    Left Ventricle: Severely reduced left ventricular systolic function with a visually estimated EF of 15 - 20%. Left ventricle is mildly dilated. Normal wall thickness. Moderate hypokinesis of the following segments: basal anterior, basal anterolateral, basal anteroseptal, basal inferior, basal inferolateral, basal inferoseptal, mid anterior, mid anterolateral, mid anteroseptal, mid inferior, mid inferolateral and mid inferoseptal. Severe hypokinesis of the following segments: apical anterior, apical septal, apical inferior and apical lateral. Grade III diastolic dysfunction with increased LAP. Mitral Valve: Mild annular dilation. Moderate regurgitation. Tricuspid Valve: Moderately elevated RVSP. The estimated RVSP is 51 mmHg. Left Atrium: Left atrium is mildly dilated. Pericardium: Trivial pericardial effusion present. No indication of cardiac tamponade. Contrast used: Definity. Echo (8/14/22)    Left Ventricle: Severely reduced left ventricular systolic function with a visually estimated EF of 20 - 25%.  Left ventricle is mildly dilated. Increased wall thickness. See diagram for wall motion findings. Grade II diastolic dysfunction with increased LAP. Right Ventricle: Not well visualized. Tricuspid Valve: Moderately elevated RVSP. BRIEF HISTORY OF PRESENT ILLNESS:  Sandra Duncan is a 38 y.o. female with h/o HTN, CAD s/p STEMI in July 2022 (DESx1 to LAD; treated at [facility]) with ischemic cardiomyopathy (EF 20-25%), CHF, GERD, RLS, and fibromyalgia seen as a new patient in [facility] on 10/21/22 and found to be fluid overloaded, and with suicidal ideation. Pt was taken to the ER for further evaluation and admission for acute on chronic HF and mental health crisis. REVIEW OF SYSTEMS:  Review of Systems   Constitutional:  Negative for chills, fever, malaise/fatigue and weight loss. Respiratory:  Negative for cough and shortness of breath. Cardiovascular:  Negative for chest pain, palpitations, orthopnea and leg swelling. Gastrointestinal:  Positive for constipation and nausea. Negative for abdominal pain, diarrhea, heartburn and vomiting. Musculoskeletal:  Positive for myalgias. Restless legs   Neurological:  Negative for dizziness and weakness. Psychiatric/Behavioral:  Positive for depression. Negative for suicidal ideas. The patient is nervous/anxious. PHYSICAL EXAM:  Visit Vitals  BP 96/79 (BP 1 Location: Left upper arm, BP Patient Position: Sitting)   Pulse 88   Temp 97.5 °F (36.4 °C)   Resp 15   Wt 201 lb 1 oz (91.2 kg)   LMP 10/09/2022   SpO2 98%   BMI 34.51 kg/m²         Physical Exam  Vitals and nursing note reviewed. Constitutional:       Appearance: Normal appearance. She is obese. Cardiovascular:      Rate and Rhythm: Normal rate and regular rhythm. Heart sounds: No murmur heard. No friction rub. Gallop present. S3 sounds present. Pulmonary:      Effort: No respiratory distress. Breath sounds: Normal breath sounds.    Abdominal:      General: Bowel sounds are normal. There is no distension. Palpations: Abdomen is soft. Tenderness: There is no abdominal tenderness. Musculoskeletal:      Right lower leg: No edema. Left lower leg: No edema. Skin:     General: Skin is warm and dry. Capillary Refill: Capillary refill takes less than 2 seconds. Neurological:      General: No focal deficit present. Mental Status: She is alert and oriented to person, place, and time. Psychiatric:         Mood and Affect: Mood normal.         Behavior: Behavior normal.         Thought Content: Thought content normal.         Judgment: Judgment normal.            PAST MEDICAL HISTORY:  Past Medical History:   Diagnosis Date    Anemia NEC     Arthritis     Chronic pain     fybromyalsia    Depression     anxiety, depression, OCD    GERD (gastroesophageal reflux disease)     Hypertension     Hypertension     Ill-defined condition     Fibromyalia gastricparesis    MI (myocardial infarction) ([facility] [address]     Musculoskeletal disorder     SOB (shortness of breath)     Stool color black        PAST SURGICAL HISTORY:  Past Surgical History:   Procedure Laterality Date    HX CORONARY STENT PLACEMENT      HX GYN           HX HEENT  2002    wisdom teeth extraction    UPPER GI ENDOSCOPY,BIOPSY  2018         UPPER GI ENDOSCOPY,DIAGNOSIS  2018            FAMILY HISTORY:  Family History   Problem Relation Age of Onset    Hypertension Father     Elevated Lipids Father     Arthritis-rheumatoid Mother         ? Lupus vs RA    Lung Disease Mother     Heart Disease Mother     Cancer Mother         breast in 39y    COPD Mother     Hypertension Mother     Stroke Mother         3-4 strokes    Breast Cancer Mother 50    Diabetes Maternal Grandmother        SOCIAL HISTORY:  Social History     Socioeconomic History    Marital status: SINGLE   Tobacco Use    Smoking status: Former     Packs/day: 0.25     Years: 8.00     Pack years: 2.00     Types: Cigarettes     Quit date: 2022     Years since quittin.2    Smokeless tobacco: Never   Vaping Use    Vaping Use: Never used   Substance and Sexual Activity    Alcohol use: Not Currently     Alcohol/week: 1.0 standard drink     Types: 1 Glasses of wine per week     Comment: Wine with special occassions - not since July    Drug use: Not Currently     Types: Marijuana     Comment: Haven't had any since 2022    Sexual activity: Yes     Partners: Male     Birth control/protection: None     Social Determinants of Health     Financial Resource Strain: High Risk    Difficulty of Paying Living Expenses: Very hard   Food Insecurity: No Food Insecurity    Worried About Running Out of Food in the Last Year: Never true    Ran Out of Food in the Last Year: Never true   Transportation Needs: No Transportation Needs    Lack of Transportation (Medical): No    Lack of Transportation (Non-Medical): No   Physical Activity: Inactive    Days of Exercise per Week: 0 days    Minutes of Exercise per Session: 0 min   Stress: Stress Concern Present    Feeling of Stress :  To some extent   Social Connections: Socially Isolated    Frequency of Communication with Friends and Family: Twice a week    Frequency of Social Gatherings with Friends and Family: Never    Attends Church Services: Never    Active Member of Clubs or Organizations: No    Attends Club or Organization Meetings: Never    Marital Status: Never    Housing Stability: Low Risk     Unable to Pay for Housing in the Last Year: No    Number of Places Lived in the Last Year: 1    Unstable Housing in the Last Year: No       LABORATORY RESULTS:     Labs Latest Ref Rng & Units 10/25/2022 10/24/2022 10/23/2022 10/22/2022 10/21/2022 10/17/2022 10/6/2022   WBC 3.6 - 11.0 K/uL - - - 6.0 5.4 5.8 5.7   RBC 3.80 - 5.20 M/uL - - - 4.42 4.37 4.15 4.14   Hemoglobin 11.5 - 16.0 g/dL - - - 12.9 13.1 12.3 12.2   Hematocrit 35.0 - 47.0 % - - - 40.2 40.2 38.8 38.2   MCV 80.0 - 99.0 FL - - - 91.0 92.0 93.5 92.3   Platelets 353 - 400 K/uL - - - 283 271 292 273   Lymphocytes 12 - 49 % - - - 42 46 46 43   Monocytes 5 - 13 % - - - 7 7 8 7   Eosinophils 0 - 7 % - - - 8(H) 7 6 5   Basophils 0 - 1 % - - - 1 1 1 1   Albumin 3.5 - 5.0 g/dL 3.4(L) 3.5 3. 4(L) - 3. 4(L) 3.4(L) 3. 2(L)   Calcium 8.5 - 10.1 MG/DL 9.5 9.7 9.6 9.6 9.2 9.4 9.4   Glucose 65 - 100 mg/dL 103(H) 99 113(H) 114(H) 80 106(H) 107(H)   BUN 6 - 20 MG/DL 12 10 9 8 9 10 10   Creatinine 0.55 - 1.02 MG/DL 0.75 0.86 0.93 0.87 0.81 1.09(H) 0.98   Sodium 136 - 145 mmol/L 137 139 141 136 137 140 138   Potassium 3.5 - 5.1 mmol/L 4.4 3.5 3. 4(L) 3.5 3. 4(L) 3.4(L) 3.4(L)   TSH 0.36 - 3.74 uIU/mL - - - - 4.44(H) - -   Some recent data might be hidden     Lab Results   Component Value Date/Time    TSH 4.44 (H) 10/21/2022 06:12 PM    TSH 2.12 05/12/2021 10:55 AM    TSH 1.330 12/30/2019 12:00 AM    TSH 3.850 07/25/2018 12:40 PM    TSH 1.620 02/27/2018 08:59 AM    TSH 1.240 12/05/2016 10:13 AM    TSH 1.530 03/18/2016 10:31 AM    TSH 1.400 11/09/2011 09:37 AM    TSH 1.26 08/19/2010 10:36 AM    TSH 1.18 07/28/2010 04:10 PM       ALLERGY:  Allergies   Allergen Reactions    Abilify [Aripiprazole] Anxiety     Can not tolerate     Sulfa (Sulfonamide Antibiotics) Hives    Vicodin [Hydrocodone-Acetaminophen] Nausea and Vomiting        CURRENT MEDICATIONS:    Current Facility-Administered Medications:     ondansetron (ZOFRAN) injection 4 mg, 4 mg, IntraVENous, Q6H PRN, Royal Dominguez MD    polyethylene glycol (MIRALAX) packet 17 g, 17 g, Oral, DAILY, Noemí Dominguez MD, 17 g at 10/24/22 1523    magnesium hydroxide (MILK OF MAGNESIA) 400 mg/5 mL oral suspension 30 mL, 30 mL, Oral, DAILY, Royal Dominguez MD, 30 mL at 10/24/22 1523    potassium bicarb-citric acid (EFFER-K) tablet 40 mEq, 40 mEq, Oral, BID, Gus BAGLEY NP, 40 mEq at 10/25/22 0958    aluminum & magnesium hydroxide-simethicone (MYLANTA II) oral suspension 30 mL, 30 mL, Oral, Q4H PRN, Noemí Dominguez MD, 30 mL at 10/24/22 2004    diazePAM (VALIUM) tablet 5 mg, 5 mg, Oral, Q12H PRN, Tristan Dominguez MD, 5 mg at 10/24/22 1852    docusate sodium (COLACE) capsule 100 mg, 100 mg, Oral, DAILY, Igor Leiva MD, 100 mg at 10/25/22 0959    bisacodyL (DULCOLAX) suppository 10 mg, 10 mg, Rectal, DAILY, Tristan Dominguez MD, 10 mg at 10/25/22 7977    albuterol-ipratropium (DUO-NEB) 2.5 MG-0.5 MG/3 ML, 3 mL, Nebulization, Q6H PRN, Igor Leiva MD    fluticasone propionate (FLONASE) 50 mcg/actuation nasal spray 2 Spray, 2 Spray, Both Nostrils, DAILY, Igor Leiva MD, 2 [place] at 10/24/22 0901    traZODone (DESYREL) tablet 200 mg, 200 mg, Oral, QHS, Irma Zaragoza, NP, 200 mg at 10/24/22 2241    magnesium oxide (MAG-OX) tablet 400 mg, 400 mg, Oral, QHS, Nicholas BAGLEY, NP, 400 mg at 10/24/22 2241    calcium carbonate (TUMS) chewable tablet 200 mg [elemental], 200 mg, Oral, TID PRN, Igor Leiva MD, 200 mg at 10/24/22 1223    carvediloL (COREG) tablet 3.125 mg, 3.125 mg, Oral, BID, Marianna Garland MD, 3.125 mg at 10/25/22 0900    clonazePAM (KlonoPIN) tablet 1 mg, 1 mg, Oral, QHS, Marianna Garland MD, 1 mg at 10/24/22 2241    escitalopram oxalate (LEXAPRO) tablet 20 mg, 20 mg, Oral, DAILY, Geoff Rodriguez MD, 20 mg at 10/25/22 4070    ezetimibe (ZETIA) tablet 10 mg, 10 mg, Oral, DAILY, Marianna Garland MD, 10 mg at 10/25/22 0958    famotidine (PEPCID) tablet 20 mg, 20 mg, Oral, BID, Marianna Garland MD, 20 mg at 49/12/30 4781    folic acid (FOLVITE) tablet 1 mg, 1 mg, Oral, DAILY, Geoff Rodriguez MD, 1 mg at 10/25/22 0959    montelukast (SINGULAIR) tablet 10 mg, 10 mg, Oral, DAILY, Geoff Rodriguez MD, 10 mg at 10/25/22 0959    prasugreL (EFFIENT) tablet 10 mg, 10 mg, Oral, DAILY, Geoff Rodriguez MD, 10 mg at 10/25/22 0959    rOPINIRole (REQUIP) tablet 0.5 mg, 0.5 mg, Oral, BID, Marianna Garland MD, 0.5 mg at 10/25/22 0958    rosuvastatin (CRESTOR) tablet 20 mg, 20 mg, Oral, Q MON, WED & FRI, Marianna Garland MD, 20 mg at 10/24/22 2241    sodium chloride (NS) flush 5-40 mL, 5-40 mL, IntraVENous, Q8H, Geoff Rodriguez MD, 10 mL at 10/25/22 0657    sodium chloride (NS) flush 5-40 mL, 5-40 mL, IntraVENous, PRN, Amilcar Scott MD    acetaminophen (TYLENOL) tablet 650 mg, 650 mg, Oral, Q4H PRN, Amilcar Scott MD, 650 mg at 10/22/22 5824    furosemide (LASIX) injection 40 mg, 40 mg, IntraVENous, BID, Amilcar Scott MD, 40 mg at 10/25/22 0959    nitroglycerin (NITROSTAT) tablet 0.4 mg, 0.4 mg, SubLINGual, Q5MIN PRN, Amilcar Scott MD, 0.4 mg at 10/21/22 2237    glucose chewable tablet 16 g, 4 Tablet, Oral, PRN, Amilcar Scott MD    glucagon (GLUCAGEN) injection 1 mg, 1 mg, IntraMUSCular, PRN, Geoff Shane MD    dextrose 10 % infusion 0-250 mL, 0-250 mL, IntraVENous, PRN, Amilcar Scott MD    insulin lispro (HUMALOG) injection, , SubCUTAneous, AC&HS, Amilcar Scott MD    PATIENT CARE TEAM:  Patient Care Team:  Gena Silva MD as PCP - General (Family Medicine)  Gena Silva MD as PCP - REHABILITATION [facility] Provider  Bobbi Daly MD (Surgery General)  Shae Andersen MD (Cardiovascular Disease Physician)  Mery Jimenez MD (Otolaryngology)  Alem Lara MD (Internal Medicine Physician)  Lenny Somers MD (Female Pelvic Medicine and Reconstructive Surgery)  Javier Blum MD (Neurology)  George Churchill MD (Psychiatry)  Anca Anderson as Ambulatory Care Manager     Thank you for allowing us to participate in this patient's care.     Griffin Jean-Baptiste NP  [address] [place] [address], [address]  Phone: [phone]

## 2022-10-25 NOTE — CARDIO/PULMONARY
Cardiac Rehab: Living with Heart Failure Booklet given to Kwan Vail on previous visit. Visited today to attempt reinforcement of previous HF education and to discuss OP Cardiac Rehab. Educated using teach back method. Discussed diagnosis definition and assessed patient understanding. Reviewed importance of daily weight monitoring and Low Sodium diet (4035-1814 mg. daily) as well as medication compliance and open communication with her cardiologist regarding signs and symptoms of fluid overload. Encouraged activity and rest periods within symptom limitations and as ordered by physician. Discussed the Cardiac Rehab Program, benefits, format, and encouraged enrollment, when eligible. Kwan Vail  Asked for contact information for [employer] in [place] to be placed on her AVS. Advised she should enroll with MI/STENT diagnosis from July 2022 and can re enroll with HF at another time. Kwan aVil verbalized understanding with questions answered.     Lian Roland RN

## 2022-10-25 NOTE — PROGRESS NOTES
1530 Bedside shift change report given to Len Camp (oncoming nurse) by Camden Garner (offgoing nurse). Report included the following information SBAR, Kardex, ED Summary, Intake/Output, MAR, and Recent Results. 0000 Bedside shift change report given to Rose Mary (oncoming nurse) by Len Camp (offgoing nurse). Report included the following information SBAR, Kardex, ED Summary, Intake/Output, MAR, and Recent Results.

## 2022-10-25 NOTE — PROGRESS NOTES
0000: Bedside and Verbal shift change report given to Rose Mary (oncoming nurse) by Jody Wheeler (offgoing nurse). Report included the following information SBAR, Kardex, Intake/Output, MAR, Accordion, and Recent Results.

## 2022-10-25 NOTE — PROGRESS NOTES
[facility]  Hospitalist Group                       Hospitalist Progress Note          Ulyess Goldberg, MD  Please call  and page for questions. Call physician on-call through the  7pm-7am        Daily Progress Note: 10/25/2022    Primary care provider:Bria Sapp MD    Date of admission: 10/21/2022 11:55 AM    Admission summery and hospital course:  38 y.o. female who presents with shortness of breath. Patient with history of CHF, presents with shortness of breath, also patient with suicidal ideation, reports that she has been depressed lately, reports that she has thought of suicide and has a plan to hang herself in the attic, was sent to the ER and was requested to be admitted to the hospitalist service, patient reports that she has been having shortness of breath, reports that she was 3 pillow Orthopnea, reports mild cough but denies any fever or chills. Subjective:   Patient complains of hemorrhoids. Patient said she is feeling little better today. Patient said she could not sleep last night. She was taking trazodone and Valium at home for sleep. Patient denies any cough at night. She denies any problems sleeping flat at night. Assessment/Plan:     Acute on chronic systolic congestive heart failure NYHA class IV  Continue to maintain daily weight I's and O's.  proBNP is trending down. Continue goal-directed medical therapy if BP permits with beta-blocker  and lasix. Consider Entresto if BP permits. Echo with EF of 15-20%. I talked to cardiology team today, will start with albumin monitor. Plan for right heart cath once more euvolemic plan per cardiology. Hypertension   Currently BP is low normal. Continue Coreg and lasix. Consider ACEi or ARB if BP allows. Hyperlipidemia  Continue statin. Coronary atherosclerosis   No chest pain or breathing problems now. Continue Coreg, rosuvastatin, ezetimibe, prasugrel.     Diabetes mellitus type 2  Hemoglobin A1c was 6.3%. Blood sugar is reasonable. Continue sliding scale insulin, cannot tolerate Jardiance    Restless leg syndrome  Continue Requip. Anxiety depression suicidal ideation  Patient seen by psych. Continue home medications. Trazodone and Valium for sleep at night. One to one sitter can be discontinued as per psych. Possible urinary tract infection   Patient has been on Keflex since 10/17/2022 and now on ceftriaxone since admission. Culture showed anali of less than 50,000. Discontinued antibiotic on 10/24. Monitor for symptomatic yeast infection. Abdominal pain:    US with unremarkable pancreas head, recommended for pnacrease protocol CT when able. Constipation  Will do bowel regimen and monitor. Hemorrhoids: Monitor with Anusol cream.      DIET: ADULT DIET Regular; Safety Tray. ISOLATION PRECAUTIONS: There are currently no Active Isolations  CODE STATUS: Full code   DVT PROPHYLAXIS: SCDs  FUNCTIONAL STATUS PRIOR TO HOSPITALIZATION:   Fully active and ambulatory; able to carry on all self-care without restriction. Ambulatory status/function: By self   EARLY MOBILITY ASSESSMENT:   Recommend routine ambulation while hospitalized with the assistance of nursing staff. Communication: I communicated with patient/family and RN. Patient father is at the bedside. Patient cardiology team, nurse practitioner, patient RN at the bedside. ANTICIPATED DISCHARGE: After right heart catheterization as per cardiology. .  ANTICIPATED DISPOSITION: Home.         Review of Systems:     Review of Systems:  Symptom  Y/N  Comments   Symptom  Y/N  Comments    Fever/Chills   n   Chest Pain  n    Poor Appetite  y    Edema  n     Cough  n   Abdominal Pain  n     Sputum  n   Joint Pain  n    SOB/JAFFE  n   Pruritis/Rash      Nausea/vomit  n   Tolerating PT/OT      Diarrhea  n   Tolerating Diet      Constipation   improving  Other      Could not obtain due to:         Objective: Physical Exam:     Visit Vitals  BP 96/79 (BP 1 Location: Left upper arm, BP Patient Position: Sitting)   Pulse 88   Temp 97.5 °F (36.4 °C)   Resp 15   Wt 91.2 kg (201 lb 1 oz)   LMP 10/09/2022   SpO2 98%   BMI 34.51 kg/m²      O2 Device: None (Room air)    Temp (24hrs), Av.8 °F (36.6 °C), Min:97.5 °F (36.4 °C), Max:98 °F (36.7 °C)    No intake/output data recorded. 10/23 1901 - 10/25 0700  In: 1100 [P.O.:1100]  Out: 1300 [Urine:1300]    General:  Patient was standing at the bedside. Patient father was sitting at the chair. Patient is awake, alert, cooperative, no distress, appears stated age. Lungs:   Clear to auscultation bilaterally. Chest wall:  No tenderness or deformity. Heart:  Regular rate and rhythm, S1, S2 normal, no murmur, click, rub or gallop. Abdomen:   Obese, soft, non-tender. Bowel sounds normal.    Extremities: Extremities normal, atraumatic, no cyanosis or edema. Skin: Skin color, texture, turgor normal. No rashes or lesions   Neurologic: Patient has clear voice. She follows direction. Data Review:       Recent Days:  No results for input(s): WBC, HGB, HCT, PLT, HGBEXT, HCTEXT, PLTEXT in the last 72 hours. Recent Labs     10/25/22  0428 10/24/22  0623 10/23/22  0424    139 141   K 4.4 3.5 3.4*    105 105   CO2  24   * 99 113*   BUN 12 10 9   CREA 0.75 0.86 0.93   CA 9.5 9.7 9.6   MG 2.2 2.0 2.0   ALB 3.4* 3.5 3.4*   ALT 31 29 31     No results for input(s): PH, PCO2, PO2, HCO3, FIO2 in the last 72 hours.     24 Hour Results:  Recent Results (from the past 24 hour(s))   GLUCOSE, POC    Collection Time: 10/24/22 11:17 AM   Result Value Ref Range    Glucose (POC) 112 65 - 117 mg/dL    Performed by Echo Vega, POC    Collection Time: 10/24/22  5:04 PM   Result Value Ref Range    Glucose (POC) 116 65 - 117 mg/dL    Performed by MIC STOUT, POC    Collection Time: 10/24/22  9:45 PM   Result Value Ref Range    Glucose (POC) 115 65 - 117 mg/dL    Performed by MIC Cobosica    METABOLIC PANEL, COMPREHENSIVE    Collection Time: 10/25/22  4:28 AM   Result Value Ref Range    Sodium 137 136 - 145 mmol/L    Potassium 4.4 3.5 - 5.1 mmol/L    Chloride 104 97 - 108 mmol/L    CO2 24 21 - 32 mmol/L    Anion gap 9 5 - 15 mmol/L    Glucose 103 (H) 65 - 100 mg/dL    BUN 12 6 - 20 MG/DL    Creatinine 0.75 0.55 - 1.02 MG/DL    BUN/Creatinine ratio 16 12 - 20      eGFR >60 >60 ml/min/1.73m2    Calcium 9.5 8.5 - 10.1 MG/DL    Bilirubin, total 1.9 (H) 0.2 - 1.0 MG/DL    ALT (SGPT) 31 12 - 78 U/L    AST (SGOT) 20 15 - 37 U/L    Alk. phosphatase 24 (L) 45 - 117 U/L    Protein, total 6.5 6.4 - 8.2 g/dL    Albumin 3.4 (L) 3.5 - 5.0 g/dL    Globulin 3.1 2.0 - 4.0 g/dL    A-G Ratio 1.1 1.1 - 2.2     NT-PRO BNP    Collection Time: 10/25/22  4:28 AM   Result Value Ref Range    NT pro-BNP 2,737 (H) <125 PG/ML   MAGNESIUM    Collection Time: 10/25/22  4:28 AM   Result Value Ref Range    Magnesium 2.2 1.6 - 2.4 mg/dL   GLUCOSE, POC    Collection Time: 10/25/22  6:54 AM   Result Value Ref Range    Glucose (POC) 93 65 - 117 mg/dL    Performed by Tereso TIWARI        Problem List:  Problem List as of 10/25/2022 Date Reviewed: 10/14/2022            Codes Class Noted - Resolved    CHF (congestive heart failure) ([facility] ICD-10-CM: I50.9  ICD-9-CM: 428.0  10/21/2022 - Present        Chronic systolic (congestive) heart failure ICD-10-CM: I50.22  ICD-9-CM: 428.22, 428.0  9/7/2022 - Present        Coronary artery disease involving native coronary artery of native heart without angina pectoris (Chronic) ICD-10-CM: I25.10  ICD-9-CM: 414.01  8/29/2022 - Present        Acute exacerbation of CHF (congestive heart failure) ([facility] ICD-10-CM: I50.9  ICD-9-CM: 428.0  8/13/2022 - Present        Neuropathy (Chronic) ICD-10-CM: G62.9  ICD-9-CM: 355.9  10/15/2019 - Present    Overview Signed 10/15/2019  1:58 PM by Galina Alvarado MD     Feet.                Gastroparesis (Chronic) ICD-10-CM: K31.84  ICD-9-CM: 536.3  11/29/2018 - Present        Plantar fasciitis of left foot (Chronic) ICD-10-CM: M72.2  ICD-9-CM: 728.71  11/29/2018 - Present        Positive KITTY (antinuclear antibody) (Chronic) ICD-10-CM: R76.8  ICD-9-CM: 795.79  11/29/2018 - Present        Severe obesity ([facility] ICD-10-CM: E66.01  ICD-9-CM: 278.01  11/29/2018 - Present        Class 2 obesity due to excess calories without serious comorbidity in adult ICD-10-CM: E66.09  ICD-9-CM: 278.00  7/25/2018 - Present        Mood disorder ([facility] ICD-10-CM: F39  ICD-9-CM: 296.90  6/12/2018 - Present        Chronic pain syndrome (Chronic) ICD-10-CM: G89.4  ICD-9-CM: 338.4  6/12/2018 - Present        Fibromyalgia (Chronic) ICD-10-CM: M79.7  ICD-9-CM: 729.1  6/12/2018 - Present        Panic attack ICD-10-CM: F41.0  ICD-9-CM: 300.01  6/12/2018 - Present        Tremor ICD-10-CM: R25.1  ICD-9-CM: 781.0  6/12/2018 - Present        Recurrent depression ([facility] ICD-10-CM: F33.9  ICD-9-CM: 296.30  1/15/2018 - Present        Inflammatory arthritis ICD-10-CM: M19.90  ICD-9-CM: 714.9  8/10/2016 - Present        Vitamin D deficiency (Chronic) ICD-10-CM: E55.9  ICD-9-CM: 268.9  3/21/2016 - Present        Thrombosed external hemorrhoid ICD-10-CM: K64.5  ICD-9-CM: 455.4  2/5/2016 - Present    Overview Signed 2/5/2016 12:05 PM by Christiane Paez MD     At 5 o'clock.              IFG (impaired fasting glucose) (Chronic) ICD-10-CM: R73.01  ICD-9-CM: 790.21  9/10/2015 - Present        Hyperlipidemia (Chronic) ICD-10-CM: E78.5  ICD-9-CM: 272.4  9/10/2015 - Present        Hoarseness ICD-10-CM: R49.0  ICD-9-CM: 784.42  3/25/2013 - Present        OCD (obsessive compulsive disorder) ICD-10-CM: F42.9  ICD-9-CM: 300.3  2/27/2012 - Present        HSV (herpes simplex virus) anogenital infection ICD-10-CM: A60.9  ICD-9-CM: 054.9  9/12/2011 - Present        Costochondritis ICD-10-CM: M94.0  ICD-9-CM: 733.6  5/17/2011 - Present        Anxiety ICD-10-CM: F41.9  ICD-9-CM: 300.00  9/13/2010 - Present        Itch of skin ICD-10-CM: L29.9  ICD-9-CM: 698.9  5/26/2010 - Present        Depression (Chronic) ICD-10-CM: F32. A  ICD-9-CM: 919  Unknown - Present        GERD (gastroesophageal reflux disease) (Chronic) ICD-10-CM: K21.9  ICD-9-CM: 530.81  Unknown - Present        Anemia, unspecified (Chronic) ICD-10-CM: D64.9  ICD-9-CM: 285. 9  Unknown - Present        RESOLVED: Hypertension (Chronic) ICD-10-CM: I10  ICD-9-CM: 401.9  Unknown - 5/12/2021           Medications reviewed  Current Facility-Administered Medications   Medication Dose Route Frequency    ondansetron (ZOFRAN) injection 4 mg  4 mg IntraVENous Q6H PRN    polyethylene glycol (MIRALAX) packet 17 g  17 g Oral DAILY    magnesium hydroxide (MILK OF MAGNESIA) 400 mg/5 mL oral suspension 30 mL  30 mL Oral DAILY    potassium bicarb-citric acid (EFFER-K) tablet 40 mEq  40 mEq Oral BID    aluminum & magnesium hydroxide-simethicone (MYLANTA II) oral suspension 30 mL  30 mL Oral Q4H PRN    diazePAM (VALIUM) tablet 5 mg  5 mg Oral Q12H PRN    docusate sodium (COLACE) capsule 100 mg  100 mg Oral DAILY    bisacodyL (DULCOLAX) suppository 10 mg  10 mg Rectal DAILY    albuterol-ipratropium (DUO-NEB) 2.5 MG-0.5 MG/3 ML  3 mL Nebulization Q6H PRN    fluticasone propionate (FLONASE) 50 mcg/actuation nasal spray 2 Spray  2 Spray Both Nostrils DAILY    traZODone (DESYREL) tablet 200 mg  200 mg Oral QHS    magnesium oxide (MAG-OX) tablet 400 mg  400 mg Oral QHS    calcium carbonate (TUMS) chewable tablet 200 mg [elemental]  200 mg Oral TID PRN    carvediloL (COREG) tablet 3.125 mg  3.125 mg Oral BID    clonazePAM (KlonoPIN) tablet 1 mg  1 mg Oral QHS    escitalopram oxalate (LEXAPRO) tablet 20 mg  20 mg Oral DAILY    ezetimibe (ZETIA) tablet 10 mg  10 mg Oral DAILY    famotidine (PEPCID) tablet 20 mg  20 mg Oral BID    folic acid (FOLVITE) tablet 1 mg  1 mg Oral DAILY    montelukast (SINGULAIR) tablet 10 mg  10 mg Oral DAILY prasugreL (EFFIENT) tablet 10 mg  10 mg Oral DAILY    rOPINIRole (REQUIP) tablet 0.5 mg  0.5 mg Oral BID    rosuvastatin (CRESTOR) tablet 20 mg  20 mg Oral Q MON, WED & FRI    sodium chloride (NS) flush 5-40 mL  5-40 mL IntraVENous Q8H    sodium chloride (NS) flush 5-40 mL  5-40 mL IntraVENous PRN    acetaminophen (TYLENOL) tablet 650 mg  650 mg Oral Q4H PRN    furosemide (LASIX) injection 40 mg  40 mg IntraVENous BID    nitroglycerin (NITROSTAT) tablet 0.4 mg  0.4 mg SubLINGual Q5MIN PRN    glucose chewable tablet 16 g  4 Tablet Oral PRN    glucagon (GLUCAGEN) injection 1 mg  1 mg IntraMUSCular PRN    dextrose 10 % infusion 0-250 mL  0-250 mL IntraVENous PRN    insulin lispro (HUMALOG) injection   SubCUTAneous AC&HS       Care Plan discussed with: Patient/Family, Nurse, , and Consultant Cardiology    Total time spent with patient: 50 minutes.     Kay Mcguire MD

## 2022-10-26 ENCOUNTER — TELEPHONE (OUTPATIENT)
Dept: CARDIOLOGY CLINIC | Age: 42
End: 2022-10-26

## 2022-10-26 LAB
ALBUMIN SERPL ELPH-MCNC: 3.3 G/DL (ref 2.9–4.4)
ALBUMIN SERPL-MCNC: 3.5 G/DL (ref 3.5–5)
ALBUMIN/GLOB SERPL: 1 {RATIO} (ref 1.1–2.2)
ALBUMIN/GLOB SERPL: 1.2 {RATIO} (ref 0.7–1.7)
ALP SERPL-CCNC: 85 U/L (ref 45–117)
ALPHA1 GLOB SERPL ELPH-MCNC: 0.3 G/DL (ref 0–0.4)
ALPHA2 GLOB SERPL ELPH-MCNC: 0.8 G/DL (ref 0.4–1)
ALT SERPL-CCNC: 27 U/L (ref 12–78)
ANION GAP SERPL CALC-SCNC: 8 MMOL/L (ref 5–15)
AST SERPL-CCNC: 16 U/L (ref 15–37)
B-GLOBULIN SERPL ELPH-MCNC: 1 G/DL (ref 0.7–1.3)
BASOPHILS # BLD: 0.1 K/UL (ref 0–0.1)
BASOPHILS NFR BLD: 1 % (ref 0–1)
BILIRUB SERPL-MCNC: 2.2 MG/DL (ref 0.2–1)
BNP SERPL-MCNC: 2626 PG/ML
BUN SERPL-MCNC: 10 MG/DL (ref 6–20)
BUN/CREAT SERPL: 11 (ref 12–20)
CALCIUM SERPL-MCNC: 9.7 MG/DL (ref 8.5–10.1)
CHLORIDE SERPL-SCNC: 104 MMOL/L (ref 97–108)
CO2 SERPL-SCNC: 24 MMOL/L (ref 21–32)
CREAT SERPL-MCNC: 0.89 MG/DL (ref 0.55–1.02)
DIFFERENTIAL METHOD BLD: ABNORMAL
EOSINOPHIL # BLD: 0.5 K/UL (ref 0–0.4)
EOSINOPHIL NFR BLD: 8 % (ref 0–7)
ERYTHROCYTE [DISTWIDTH] IN BLOOD BY AUTOMATED COUNT: 13.6 % (ref 11.5–14.5)
G6PD BLD QN: 253 U/10E12 RBC (ref 127–427)
GAMMA GLOB SERPL ELPH-MCNC: 0.8 G/DL (ref 0.4–1.8)
GLIADIN PEPTIDE IGA SER-ACNC: 3 UNITS (ref 0–19)
GLIADIN PEPTIDE IGG SER-ACNC: 1 UNITS (ref 0–19)
GLOBULIN SER CALC-MCNC: 3.5 G/DL (ref 2–4)
GLOBULIN SER-MCNC: 3 G/DL (ref 2.2–3.9)
GLUCOSE BLD STRIP.AUTO-MCNC: 102 MG/DL (ref 65–117)
GLUCOSE BLD STRIP.AUTO-MCNC: 120 MG/DL (ref 65–117)
GLUCOSE BLD STRIP.AUTO-MCNC: 94 MG/DL (ref 65–117)
GLUCOSE BLD STRIP.AUTO-MCNC: 98 MG/DL (ref 65–117)
GLUCOSE SERPL-MCNC: 95 MG/DL (ref 65–100)
HCT VFR BLD AUTO: 39.5 % (ref 35–47)
HGB BLD-MCNC: 12.6 G/DL (ref 11.5–16)
IGA SERPL-MCNC: 142 MG/DL (ref 87–352)
IGA SERPL-MCNC: 149 MG/DL (ref 87–352)
IGG SERPL-MCNC: 827 MG/DL (ref 586–1602)
IGM SERPL-MCNC: 207 MG/DL (ref 26–217)
IMM GRANULOCYTES # BLD AUTO: 0 K/UL (ref 0–0.04)
IMM GRANULOCYTES NFR BLD AUTO: 0 % (ref 0–0.5)
INTERPRETATION SERPL IEP-IMP: ABNORMAL
KAPPA LC FREE SER-MCNC: 15.7 MG/L (ref 3.3–19.4)
KAPPA LC FREE/LAMBDA FREE SER: 1.74 {RATIO} (ref 0.26–1.65)
LAMBDA LC FREE SERPL-MCNC: 9 MG/L (ref 5.7–26.3)
LYMPHOCYTES # BLD: 3.4 K/UL (ref 0.8–3.5)
LYMPHOCYTES NFR BLD: 51 % (ref 12–49)
M PROTEIN SERPL ELPH-MCNC: ABNORMAL G/DL
MAGNESIUM SERPL-MCNC: 2.3 MG/DL (ref 1.6–2.4)
MCH RBC QN AUTO: 29.4 PG (ref 26–34)
MCHC RBC AUTO-ENTMCNC: 31.9 G/DL (ref 30–36.5)
MCV RBC AUTO: 92.3 FL (ref 80–99)
MONOCYTES # BLD: 0.4 K/UL (ref 0–1)
MONOCYTES NFR BLD: 7 % (ref 5–13)
NEUTS SEG # BLD: 2.2 K/UL (ref 1.8–8)
NEUTS SEG NFR BLD: 33 % (ref 32–75)
NRBC # BLD: 0 K/UL (ref 0–0.01)
NRBC BLD-RTO: 0 PER 100 WBC
PLATELET # BLD AUTO: 238 K/UL (ref 150–400)
PMV BLD AUTO: 11.4 FL (ref 8.9–12.9)
POTASSIUM SERPL-SCNC: 3.8 MMOL/L (ref 3.5–5.1)
PROT SERPL-MCNC: 6.3 G/DL (ref 6–8.5)
PROT SERPL-MCNC: 7 G/DL (ref 6.4–8.2)
RBC # BLD AUTO: 4.28 M/UL (ref 3.8–5.2)
RBC # BLD AUTO: 4.62 X10E6/UL (ref 3.77–5.28)
SERVICE CMNT-IMP: ABNORMAL
SERVICE CMNT-IMP: NORMAL
SODIUM SERPL-SCNC: 136 MMOL/L (ref 136–145)
TTG IGA SER-ACNC: <2 U/ML (ref 0–3)
TTG IGG SER-ACNC: <2 U/ML (ref 0–5)
WBC # BLD AUTO: 6.6 K/UL (ref 3.6–11)

## 2022-10-26 PROCEDURE — 83880 ASSAY OF NATRIURETIC PEPTIDE: CPT

## 2022-10-26 PROCEDURE — 74011250637 HC RX REV CODE- 250/637: Performed by: HOSPITALIST

## 2022-10-26 PROCEDURE — 74011250636 HC RX REV CODE- 250/636: Performed by: HOSPITALIST

## 2022-10-26 PROCEDURE — 74011000250 HC RX REV CODE- 250: Performed by: FAMILY MEDICINE

## 2022-10-26 PROCEDURE — P9047 ALBUMIN (HUMAN), 25%, 50ML: HCPCS | Performed by: HOSPITALIST

## 2022-10-26 PROCEDURE — 80053 COMPREHEN METABOLIC PANEL: CPT

## 2022-10-26 PROCEDURE — 74011250637 HC RX REV CODE- 250/637: Performed by: NURSE PRACTITIONER

## 2022-10-26 PROCEDURE — 74011250636 HC RX REV CODE- 250/636: Performed by: FAMILY MEDICINE

## 2022-10-26 PROCEDURE — 82962 GLUCOSE BLOOD TEST: CPT

## 2022-10-26 PROCEDURE — 99233 SBSQ HOSP IP/OBS HIGH 50: CPT | Performed by: NURSE PRACTITIONER

## 2022-10-26 PROCEDURE — 65270000046 HC RM TELEMETRY

## 2022-10-26 PROCEDURE — 36415 COLL VENOUS BLD VENIPUNCTURE: CPT

## 2022-10-26 PROCEDURE — 83735 ASSAY OF MAGNESIUM: CPT

## 2022-10-26 PROCEDURE — 74011250637 HC RX REV CODE- 250/637: Performed by: FAMILY MEDICINE

## 2022-10-26 PROCEDURE — 85025 COMPLETE CBC W/AUTO DIFF WBC: CPT

## 2022-10-26 RX ORDER — ALBUMIN HUMAN 250 G/1000ML
12.5 SOLUTION INTRAVENOUS ONCE
Status: COMPLETED | OUTPATIENT
Start: 2022-10-26 | End: 2022-10-26

## 2022-10-26 RX ORDER — MIDODRINE HYDROCHLORIDE 5 MG/1
5 TABLET ORAL
Status: COMPLETED | OUTPATIENT
Start: 2022-10-26 | End: 2022-10-26

## 2022-10-26 RX ADMIN — FUROSEMIDE 40 MG: 10 INJECTION, SOLUTION INTRAVENOUS at 17:37

## 2022-10-26 RX ADMIN — HYDROCORTISONE: 25 CREAM TOPICAL at 21:24

## 2022-10-26 RX ADMIN — FOLIC ACID 1 MG: 1 TABLET ORAL at 09:27

## 2022-10-26 RX ADMIN — PRASUGREL 10 MG: 10 TABLET, FILM COATED ORAL at 09:25

## 2022-10-26 RX ADMIN — ESCITALOPRAM OXALATE 20 MG: 10 TABLET ORAL at 09:26

## 2022-10-26 RX ADMIN — HYDROCORTISONE: 25 CREAM TOPICAL at 13:36

## 2022-10-26 RX ADMIN — FAMOTIDINE 20 MG: 20 TABLET ORAL at 17:37

## 2022-10-26 RX ADMIN — SODIUM CHLORIDE, PRESERVATIVE FREE 10 ML: 5 INJECTION INTRAVENOUS at 05:59

## 2022-10-26 RX ADMIN — DOCUSATE SODIUM 100 MG: 100 CAPSULE, LIQUID FILLED ORAL at 09:26

## 2022-10-26 RX ADMIN — SODIUM CHLORIDE, PRESERVATIVE FREE 10 ML: 5 INJECTION INTRAVENOUS at 21:23

## 2022-10-26 RX ADMIN — MONTELUKAST 10 MG: 10 TABLET, FILM COATED ORAL at 09:26

## 2022-10-26 RX ADMIN — EMPAGLIFLOZIN 10 MG: 10 TABLET, FILM COATED ORAL at 12:28

## 2022-10-26 RX ADMIN — POTASSIUM BICARBONATE 20 MEQ: 782 TABLET, EFFERVESCENT ORAL at 09:27

## 2022-10-26 RX ADMIN — MAGNESIUM HYDROXIDE 30 ML: 400 SUSPENSION ORAL at 09:22

## 2022-10-26 RX ADMIN — ALLOPURINOL 50 MG: 100 TABLET ORAL at 09:26

## 2022-10-26 RX ADMIN — CLONAZEPAM 1 MG: 0.5 TABLET ORAL at 21:22

## 2022-10-26 RX ADMIN — FAMOTIDINE 20 MG: 20 TABLET ORAL at 09:26

## 2022-10-26 RX ADMIN — TRAZODONE HYDROCHLORIDE 200 MG: 100 TABLET ORAL at 21:22

## 2022-10-26 RX ADMIN — ROPINIROLE HYDROCHLORIDE 0.5 MG: 0.25 TABLET, FILM COATED ORAL at 17:37

## 2022-10-26 RX ADMIN — SPIRONOLACTONE 12.5 MG: 25 TABLET ORAL at 09:25

## 2022-10-26 RX ADMIN — CARVEDILOL 3.12 MG: 3.12 TABLET, FILM COATED ORAL at 09:25

## 2022-10-26 RX ADMIN — CARVEDILOL 3.12 MG: 3.12 TABLET, FILM COATED ORAL at 17:37

## 2022-10-26 RX ADMIN — ONDANSETRON HYDROCHLORIDE 4 MG: 2 INJECTION, SOLUTION INTRAMUSCULAR; INTRAVENOUS at 08:11

## 2022-10-26 RX ADMIN — HYDROCORTISONE: 25 CREAM TOPICAL at 17:39

## 2022-10-26 RX ADMIN — ROPINIROLE HYDROCHLORIDE 0.5 MG: 0.25 TABLET, FILM COATED ORAL at 09:25

## 2022-10-26 RX ADMIN — Medication 400 MG: at 21:22

## 2022-10-26 RX ADMIN — HYDROCORTISONE: 25 CREAM TOPICAL at 08:12

## 2022-10-26 RX ADMIN — FUROSEMIDE 40 MG: 10 INJECTION, SOLUTION INTRAVENOUS at 09:34

## 2022-10-26 RX ADMIN — POLYETHYLENE GLYCOL 3350 17 G: 17 POWDER, FOR SOLUTION ORAL at 09:34

## 2022-10-26 RX ADMIN — ONDANSETRON HYDROCHLORIDE 4 MG: 2 INJECTION, SOLUTION INTRAMUSCULAR; INTRAVENOUS at 17:37

## 2022-10-26 RX ADMIN — MIDODRINE HYDROCHLORIDE 5 MG: 5 TABLET ORAL at 05:53

## 2022-10-26 RX ADMIN — EZETIMIBE 10 MG: 10 TABLET ORAL at 09:26

## 2022-10-26 RX ADMIN — SODIUM CHLORIDE, PRESERVATIVE FREE 10 ML: 5 INJECTION INTRAVENOUS at 13:36

## 2022-10-26 RX ADMIN — POTASSIUM BICARBONATE 20 MEQ: 782 TABLET, EFFERVESCENT ORAL at 17:37

## 2022-10-26 RX ADMIN — ALBUMIN (HUMAN) 12.5 G: 0.25 INJECTION, SOLUTION INTRAVENOUS at 05:54

## 2022-10-26 NOTE — PROGRESS NOTES
[address] [facility] Adult  Hospitalist Group                                                                                          Hospitalist Progress Note  Daisy Kothari MD  Answering service: [phone] from in house phone        Date of Service:  10/26/2022  NAME:  Jailene Peña  :  1980  MRN:  [id number]      Admission Summary:     Patient initially presents with suicidal ideation and found to have congestive heart failure. Interval history / Subjective:     Patient noted some nausea, also with constipation but started good bowel movement this a.m. after taking MiraLAX.      Assessment & Plan:     Congestive heart failure  -Acute on chronic systolic CHF NYHA class IV on admission  -Patient with ischemic cardiomyopathy with EF with 15 to 20%  -On Coreg, chart and and diuresis with Lasix, other GDMT on hold due to borderline low blood pressure, on albumin  -Advanced heart failure team following, plan for right heart cath once patient more euvolemic, likely Thursday or Friday    Urinary tract infection  -Completed antibiotic    Hypertension  -Blood pressure is borderline low normal  -Continue Coreg and Lasix    Type 2 diabetes  -Well-controlled with hemoglobin A1c of 6.3  -Continue sliding scale insulin coverage    Coronary artery disease  -Patient with history of STEMI with stent to LAD  -Continue Effient    Dyslipidemia  -On Zetia    Restless leg syndrome  -On Requip    Hemorrhoids  -Continue Anusol cream    Depression with suicidal ideation  -Patient previously evaluated by psychiatry  -On Lexapro, also on Klonopin for anxiety  -One-on-one sitter discontinued per psych recommendation  -Outpatient follow-up with psychiatry    Anxiety  -Patient on Klonopin for 12 years, continue Klonopin here  -Continue trazodone for sleep  -Outpatient follow-up with psychiatry    Constipation  -Improving on MiraLAX     Code status: Full  Prophylaxis: SCDs  Care Plan discussed with: Patient  Anticipated Disposition: Likely more than 48 hours, plan for discharge to home when cleared by AHF team.     Hospital Problems  Date Reviewed: 10/14/2022            Codes Class Noted POA    CHF (congestive heart failure) ([facility] ICD-10-CM: I50.9  ICD-9-CM: 428.0  10/21/2022 Unknown             Review of Systems:   A comprehensive review of systems was negative except for that written in the HPI. Vital Signs:    Last 24hrs VS reviewed since prior progress note. Most recent are:  Visit Vitals  BP (!) 113/90   Pulse (!) 105   Temp 97.6 °F (36.4 °C)   Resp 18   Wt 91.2 kg (201 lb 1 oz)   SpO2 97%   BMI 34.51 kg/m²         Intake/Output Summary (Last 24 hours) at 10/26/2022 1034  Last data filed at 10/26/2022 0930  Gross per 24 hour   Intake 1388 ml   Output 600 ml   Net 788 ml        Physical Examination:     I had a face to face encounter with this patient and independently examined them on 10/26/2022 as outlined below:          Constitutional:  No acute distress, cooperative, pleasant    ENT:  Oral mucosa moist, oropharynx benign. Resp:  CTA bilaterally. No wheezing/rhonchi/rales. No accessory muscle use. CV:  Regular rhythm, normal rate, no murmurs, gallops, rubs    GI:  Soft, non distended, non tender. normoactive bowel sounds, no hepatosplenomegaly     Musculoskeletal:  No edema, warm, 2+ pulses throughout    Neurologic:  Moves all extremities.   AAOx3, CN II-XII reviewed            Data Review:    Review and/or order of clinical lab test      Labs:     Recent Labs     10/26/22  0255   WBC 6.6   HGB 12.6   HCT 39.5        Recent Labs     10/26/22  0255 10/25/22  0428 10/24/22  0623    137 139   K 3.8 4.4 3.5    104 105   CO2 24 24 22   BUN 10 12 10   CREA 0.89 0.75 0.86   GLU 95 103* 99   CA 9.7 9.5 9.7   MG 2.3 2.2 2.0     Recent Labs     10/26/22  0255 10/25/22  0428 10/24/22  0623   ALT 27 31 29   AP 85 24* 94   TBILI 2.2* 1.9* 1.9*   TP 7.0 6.5 6.6   ALB 3.5 3.4* 3.5   GLOB 3.5 3.1 3.1     No results for input(s): INR, PTP, APTT, INREXT in the last 72 hours. No results for input(s): FE, TIBC, PSAT, FERR in the last 72 hours. Lab Results   Component Value Date/Time    Folate 20.0 10/23/2022 04:24 AM      No results for input(s): PH, PCO2, PO2 in the last 72 hours. No results for input(s): CPK, CKNDX, TROIQ in the last 72 hours.     No lab exists for component: CPKMB  Lab Results   Component Value Date/Time    Cholesterol, total 95 10/22/2022 06:26 AM    HDL Cholesterol 32 10/22/2022 06:26 AM    LDL, calculated 45 10/22/2022 06:26 AM    Triglyceride 90 10/22/2022 06:26 AM    CHOL/HDL Ratio 3.0 10/22/2022 06:26 AM     Lab Results   Component Value Date/Time    Glucose (POC) 102 10/26/2022 05:51 AM    Glucose (POC) 107 10/25/2022 08:47 PM    Glucose (POC) 109 10/25/2022 04:05 PM    Glucose (POC) 102 10/25/2022 11:12 AM    Glucose (POC) 93 10/25/2022 06:54 AM     Lab Results   Component Value Date/Time    Color ORANGE 10/17/2022 08:42 AM    Appearance TURBID (A) 10/17/2022 08:42 AM    Specific gravity 1.025 10/17/2022 08:42 AM    Specific gravity 1.024 10/06/2022 08:57 AM    pH (UA) 5.0 10/17/2022 08:42 AM    Protein 30 (A) 10/17/2022 08:42 AM    Glucose Negative 10/17/2022 08:42 AM    Ketone Negative 10/17/2022 08:42 AM    Bilirubin Negative 11/25/2021 09:47 AM    Urobilinogen 1.0 10/17/2022 08:42 AM    Nitrites Positive (A) 10/17/2022 08:42 AM    Leukocyte Esterase SMALL (A) 10/17/2022 08:42 AM    Epithelial cells FEW 10/17/2022 08:42 AM    Bacteria 2+ (A) 10/17/2022 08:42 AM    WBC 5-10 10/17/2022 08:42 AM    RBC 5-10 10/17/2022 08:42 AM         Medications Reviewed:     Current Facility-Administered Medications   Medication Dose Route Frequency    ondansetron (ZOFRAN) injection 4 mg  4 mg IntraVENous Q6H PRN    spironolactone (ALDACTONE) tablet 12.5 mg  12.5 mg Oral DAILY    allopurinoL (ZYLOPRIM) tablet 50 mg  50 mg Oral DAILY    hydrocortisone (ANUSOL-HC) 2.5 % rectal cream PeriANAL QID    melatonin tablet 10.5 mg  10.5 mg Oral QHS PRN    potassium bicarb-citric acid (EFFER-K) tablet 20 mEq  20 mEq Oral BID    empagliflozin (JARDIANCE) tablet 10 mg  10 mg Oral DAILY    polyethylene glycol (MIRALAX) packet 17 g  17 g Oral DAILY    magnesium hydroxide (MILK OF MAGNESIA) 400 mg/5 mL oral suspension 30 mL  30 mL Oral DAILY    aluminum & magnesium hydroxide-simethicone (MYLANTA II) oral suspension 30 mL  30 mL Oral Q4H PRN    diazePAM (VALIUM) tablet 5 mg  5 mg Oral Q12H PRN    docusate sodium (COLACE) capsule 100 mg  100 mg Oral DAILY    bisacodyL (DULCOLAX) suppository 10 mg  10 mg Rectal DAILY    albuterol-ipratropium (DUO-NEB) 2.5 MG-0.5 MG/3 ML  3 mL Nebulization Q6H PRN    fluticasone propionate (FLONASE) 50 mcg/actuation nasal spray 2 Spray  2 Spray Both Nostrils DAILY    traZODone (DESYREL) tablet 200 mg  200 mg Oral QHS    magnesium oxide (MAG-OX) tablet 400 mg  400 mg Oral QHS    calcium carbonate (TUMS) chewable tablet 200 mg [elemental]  200 mg Oral TID PRN    carvediloL (COREG) tablet 3.125 mg  3.125 mg Oral BID    clonazePAM (KlonoPIN) tablet 1 mg  1 mg Oral QHS    escitalopram oxalate (LEXAPRO) tablet 20 mg  20 mg Oral DAILY    ezetimibe (ZETIA) tablet 10 mg  10 mg Oral DAILY    famotidine (PEPCID) tablet 20 mg  20 mg Oral BID    folic acid (FOLVITE) tablet 1 mg  1 mg Oral DAILY    montelukast (SINGULAIR) tablet 10 mg  10 mg Oral DAILY    prasugreL (EFFIENT) tablet 10 mg  10 mg Oral DAILY    rOPINIRole (REQUIP) tablet 0.5 mg  0.5 mg Oral BID    [Held by provider] rosuvastatin (CRESTOR) tablet 20 mg  20 mg Oral Q MON, WED & FRI    sodium chloride (NS) flush 5-40 mL  5-40 mL IntraVENous Q8H    sodium chloride (NS) flush 5-40 mL  5-40 mL IntraVENous PRN    acetaminophen (TYLENOL) tablet 650 mg  650 mg Oral Q4H PRN    furosemide (LASIX) injection 40 mg  40 mg IntraVENous BID    nitroglycerin (NITROSTAT) tablet 0.4 mg  0.4 mg SubLINGual Q5MIN PRN    glucose chewable tablet 16 g  4 Tablet Oral PRN    glucagon (GLUCAGEN) injection 1 mg  1 mg IntraMUSCular PRN    dextrose 10 % infusion 0-250 mL  0-250 mL IntraVENous PRN    insulin lispro (HUMALOG) injection   SubCUTAneous AC&HS     ______________________________________________________________________  EXPECTED LENGTH OF STAY: 3d 19h  ACTUAL LENGTH OF STAY:          5                 Nicci Hopson MD

## 2022-10-26 NOTE — PROGRESS NOTES
0730: Bedside shift change report given to Bernabe Edwards RN (oncoming nurse) by Megan Payton RN (offgoing nurse). Report included the following information SBAR, MAR, and Recent Results. 1400: Patient completed 6 minute walk with RT. Ambulated 470 feet, -120s. Oxygen 95-96%  on room air. Patient tolerated well.     1930: Bedside shift change report given to Megan Payton RN (oncoming nurse) by Bernabe Edwards RN (offgoing nurse). Report included the following information SBAR, MAR, and Recent Results. Charting and patient care of Katherine Ocasio by Andrew Lemon RN from 1100 to 1900 was supervised and reviewed by this RN.

## 2022-10-26 NOTE — TELEPHONE ENCOUNTER
Called and spoke to patient. She is still currently in the hospital and called because she wanted to give an update to Dr. Laura Rodriguez. I advised her Dr. Laura Rodriguez can review her chart for updates.

## 2022-10-26 NOTE — PROGRESS NOTES
Patient became hypotensive over night with SBPs in 70s and DBPs in 40s MAPs in 50s. Patient initially asymptomatic and sleeping but when awake and up to use BR with RN assistance, patient becamenauseous and dizzy, assisted back to bed and MD Judi notified for orders (@ ~0400)    0500: orders received- 1x midodrine 5mg and 12.5 Albumin given ( see MAR)      0600: CBC labs have resulted, CMP, Mag, and BNP labs have not. Called lab (chemistry) and spoke with Acosta Coreas, states \"talk to processing, we haven't received anything from them. \"     2141: Spoke to [place] in lab, \"No green top was ever received. Fagn Kiser \" This RN placed on hold   While staff \"look for it. \"     3771: Lab states \"Yeah we haven't received anything, but I will look in the back and make sure. Fang Kiser \"    Despite the fact Labs were drawn at same time and placed in same biohazard bag, tubed to laboratory at same time. 0385: Lab has not called back regarding lost labs. RN called lab at this time. Lab states the way the order is placed doesn't allow them to run the lab as it \"comes across being an order for the day before\" Rn clarified even though the order is active and they received the blood as well as lab initially claiming they \"couldn't find it. \" RN modified lab order at this time per protocol.

## 2022-10-26 NOTE — PROGRESS NOTES
[facility] in [place], [place]  Inpatient Progress Note      Patient name: Navi Maurer  Patient : 1980  Patient MRN: [id number]  Consulting MD: Jade Matute MD  Date of service: 10/26/22      REASON FOR REFERRAL:  Management of acute on chronic systolic heart failure      PLAN OF CARE   38 y.o. female with history of HTN, CAD s/p STEMI in 2022 (DESx1 to LAD; treated at [place]) with ischemic cardiomyopathy (EF 20-25%), CHF, GERD, RLS, and fibromyalgia seen as a new patient in [facility] on 10/21/22 and found to be fluid overloaded, and with suicidal ideation. Pt was taken to the ER for further evaluation and admission for acute on chronic HF and mental health crisis. Pt contracted for safety, and is being seen by behavioral health/psych  Ongoing diuresis and remainder of heart failure work-up while in hospital; work on optimizing GDMT after diuresis complete- may need inotrope assistance. Plan for RHC once close to euvolemia.   GDMT limited in the past d/t hypotension            RECOMMENDATIONS:  Inotropic support/pressors: none needed at this time, may consider inotrope if diuresis inadequate  Beta-blocker: continue coreg 3.125mg BID  ACE/ARB/ARNi: hold while undergoing aggressive diuresis  MRA: Cont spironolactone 12.5mg daily  SGLT2 inhibitor: resume Jardiance 10mg daily tomorrow, Urine cx negative   Diuretic: continue IV lasix 40mg BID ; goal net -2L next 24 hrs  Decrease Effer K  Cont allopurinol 50mg daily   Continue ASA and prasugrel per primary cards  Statin or PCSK9i: Continue current dose of statin  Treatment of STU: inpatient eval for STU  Likely will need referral for ICD given EF remains <35% 3 mos after MI, may need LifeVest at discharge if no ICD implant  Echocardiogram done on admission, EF 15-20%  Consider RHC Thursday/Friday- consult placed   Labs: HF and Transplant eval labs ordered; in process  Will have  complete psychosocial evaluation this week  Nutritionist consult  Heart failure education  Needs Advanced care plan    All other care per primary team     IMPRESSION:  Fatigue  Shortness of breath  Volume overload  Acute on Chronic systolic heart failure  Stage C, NYHA class IV symptoms  ischemic cardiomyopathy, LVEF 20-25%  CAD  STEMI July 2022 s/p RICKY to LAD  HTN  Obesity  LHD  Pre-diabetes  Esophageal stenosis s/p dilation  RLS  Fibromyalgia  Anxiety and Depression      INTERVAL HISTORY:  No acute overnight events  K 3.8  Creatinine stable  PBNP stable  Continues to have nausea and stomach pain     LIFE GOALS:  Patient's personal goals include: get better  Important upcoming milestones or family events: the holidays coming up  The patient identifies the following as important for living well: being independent        [facility]:  Echo 10/22/22    Left Ventricle: Severely reduced left ventricular systolic function with a visually estimated EF of 15 - 20%. Left ventricle is mildly dilated. Normal wall thickness. Moderate hypokinesis of the following segments: basal anterior, basal anterolateral, basal anteroseptal, basal inferior, basal inferolateral, basal inferoseptal, mid anterior, mid anterolateral, mid anteroseptal, mid inferior, mid inferolateral and mid inferoseptal. Severe hypokinesis of the following segments: apical anterior, apical septal, apical inferior and apical lateral. Grade III diastolic dysfunction with increased LAP. Mitral Valve: Mild annular dilation. Moderate regurgitation. Tricuspid Valve: Moderately elevated RVSP. The estimated RVSP is 51 mmHg. Left Atrium: Left atrium is mildly dilated. Pericardium: Trivial pericardial effusion present. No indication of cardiac tamponade. Contrast used: Definity. Echo (8/14/22)    Left Ventricle: Severely reduced left ventricular systolic function with a visually estimated EF of 20 - 25%.  Left ventricle is mildly dilated. Increased wall thickness. See diagram for wall motion findings. Grade II diastolic dysfunction with increased LAP. Right Ventricle: Not well visualized. Tricuspid Valve: Moderately elevated RVSP. BRIEF HISTORY OF PRESENT ILLNESS:  Hunter Villafana is a 38 y.o. female with h/o HTN, CAD s/p STEMI in July 2022 (DESx1 to LAD; treated at [facility]) with ischemic cardiomyopathy (EF 20-25%), CHF, GERD, RLS, and fibromyalgia seen as a new patient in [facility] on 10/21/22 and found to be fluid overloaded, and with suicidal ideation. Pt was taken to the ER for further evaluation and admission for acute on chronic HF and mental health crisis. REVIEW OF SYSTEMS:  Review of Systems   Constitutional:  Negative for chills, fever, malaise/fatigue and weight loss. Respiratory:  Negative for cough and shortness of breath. Cardiovascular:  Negative for chest pain, palpitations, orthopnea and leg swelling. Gastrointestinal:  Positive for nausea. Negative for abdominal pain, constipation, diarrhea, heartburn and vomiting. Musculoskeletal:  Negative for myalgias. Restless legs   Neurological:  Negative for dizziness and weakness. Psychiatric/Behavioral:  Positive for depression. Negative for suicidal ideas. The patient is nervous/anxious and has insomnia. PHYSICAL EXAM:  Visit Vitals  BP 99/75 (BP 1 Location: Left upper arm, BP Patient Position: At rest)   Pulse 92   Temp 97.7 °F (36.5 °C)   Resp 18   Wt 199 lb 4.7 oz (90.4 kg)   LMP 10/09/2022   SpO2 97%   BMI 34.21 kg/m²         Physical Exam  Vitals and nursing note reviewed. Constitutional:       Appearance: Normal appearance. She is obese. Cardiovascular:      Rate and Rhythm: Normal rate and regular rhythm. Heart sounds: No murmur heard. No friction rub. Gallop present. S3 sounds present. Pulmonary:      Effort: No respiratory distress. Breath sounds: Normal breath sounds.    Abdominal:      General: Bowel sounds are normal. There is distension. Palpations: Abdomen is soft. Tenderness: There is no abdominal tenderness. Musculoskeletal:      Right lower leg: No edema. Left lower leg: No edema. Skin:     General: Skin is warm and dry. Capillary Refill: Capillary refill takes less than 2 seconds. Neurological:      General: No focal deficit present. Mental Status: She is alert and oriented to person, place, and time. Psychiatric:         Mood and Affect: Mood normal.         Behavior: Behavior normal.         Thought Content: Thought content normal.         Judgment: Judgment normal.            PAST MEDICAL HISTORY:  Past Medical History:   Diagnosis Date    Anemia NEC     Arthritis     Chronic pain     fybromyalsia    Depression     anxiety, depression, OCD    GERD (gastroesophageal reflux disease)     Hypertension     Hypertension     Ill-defined condition     Fibromyalia gastricparesis    MI (myocardial infarction) ([facility] [address]     Musculoskeletal disorder     SOB (shortness of breath)     Stool color black        PAST SURGICAL HISTORY:  Past Surgical History:   Procedure Laterality Date    HX CORONARY STENT PLACEMENT      HX GYN           HX HEENT  2002    wisdom teeth extraction    UPPER GI ENDOSCOPY,BIOPSY  2018         UPPER GI ENDOSCOPY,DIAGNOSIS  2018            FAMILY HISTORY:  Family History   Problem Relation Age of Onset    Hypertension Father     Elevated Lipids Father     Arthritis-rheumatoid Mother         ? Lupus vs RA    Lung Disease Mother     Heart Disease Mother     Cancer Mother         breast in 39y    COPD Mother     Hypertension Mother     Stroke Mother         3-4 strokes    Breast Cancer Mother 50    Diabetes Maternal Grandmother        SOCIAL HISTORY:  Social History     Socioeconomic History    Marital status: SINGLE   Tobacco Use    Smoking status: Former     Packs/day: 0.25     Years: 8.00     Pack years: 2.00     Types: Cigarettes     Quit date: 2022     Years since quittin.2    Smokeless tobacco: Never   Vaping Use    Vaping Use: Never used   Substance and Sexual Activity    Alcohol use: Not Currently     Alcohol/week: 1.0 standard drink     Types: 1 Glasses of wine per week     Comment: Wine with special occassions - not since July    Drug use: Not Currently     Types: Marijuana     Comment: Haven't had any since 2022    Sexual activity: Yes     Partners: Male     Birth control/protection: None     Social Determinants of Health     Financial Resource Strain: High Risk    Difficulty of Paying Living Expenses: Very hard   Food Insecurity: No Food Insecurity    Worried About Running Out of Food in the Last Year: Never true    Ran Out of Food in the Last Year: Never true   Transportation Needs: No Transportation Needs    Lack of Transportation (Medical): No    Lack of Transportation (Non-Medical): No   Physical Activity: Inactive    Days of Exercise per Week: 0 days    Minutes of Exercise per Session: 0 min   Stress: Stress Concern Present    Feeling of Stress :  To some extent   Social Connections: Socially Isolated    Frequency of Communication with Friends and Family: Twice a week    Frequency of Social Gatherings with Friends and Family: Never    Attends Yazidi Services: Never    Active Member of Clubs or Organizations: No    Attends Club or Organization Meetings: Never    Marital Status: Never    Housing Stability: Low Risk     Unable to Pay for Housing in the Last Year: No    Number of [place] in the Last Year: 1    Unstable Housing in the Last Year: No       LABORATORY RESULTS:     Labs Latest Ref Rng & Units 10/26/2022 10/25/2022 10/24/2022 10/23/2022 10/22/2022 10/21/2022 10/17/2022   WBC 3.6 - 11.0 K/uL 6.6 - - - 6.0 5.4 5.8   RBC 3.80 - 5.20 M/uL 4.28 - - - 4.42 4.37 4.15   Hemoglobin 11.5 - 16.0 g/dL 12.6 - - - 12.9 13.1 12.3   Hematocrit 35.0 - 47.0 % 39.5 - - - 40.2 40.2 38.8   MCV 80.0 - 99.0 FL 92.3 - - - 91.0 92.0 93.5   Platelets 736 - 371 K/uL 238 - - - 283 271 292   Lymphocytes 12 - 49 % 51(H) - - - 42 46 46   Monocytes 5 - 13 % 7 - - - 7 7 8   Eosinophils 0 - 7 % 8(H) - - - 8(H) 7 6   Basophils 0 - 1 % 1 - - - 1 1 1   Albumin 3.5 - 5.0 g/dL 3.5 3. 4(L) 3.5 3. 4(L) - 3. 4(L) 3.4(L)   Calcium 8.5 - 10.1 MG/DL 9.7 9.5 9.7 9.6 9.6 9.2 9.4   Glucose 65 - 100 mg/dL 95 103(H) 99 113(H) 114(H) 80 106(H)   BUN 6 - 20 MG/DL 10 12 10 9 8 9 10   Creatinine 0.55 - 1.02 MG/DL 0.89 0.75 0.86 0.93 0.87 0.81 1.09(H)   Sodium 136 - 145 mmol/L 136 137 139 141 136 137 140   Potassium 3.5 - 5.1 mmol/L 3.8 4.4 3.5 3. 4(L) 3.5 3. 4(L) 3.4(L)   TSH 0.36 - 3.74 uIU/mL - - - - - 4.44(H) -   Some recent data might be hidden     Lab Results   Component Value Date/Time    TSH 4.44 (H) 10/21/2022 06:12 PM    TSH 2.12 05/12/2021 10:55 AM    TSH 1.330 12/30/2019 12:00 AM    TSH 3.850 07/25/2018 12:40 PM    TSH 1.620 02/27/2018 08:59 AM    TSH 1.240 12/05/2016 10:13 AM    TSH 1.530 03/18/2016 10:31 AM    TSH 1.400 11/09/2011 09:37 AM    TSH 1.26 08/19/2010 10:36 AM    TSH 1.18 07/28/2010 04:10 PM       ALLERGY:  Allergies   Allergen Reactions    Abilify [Aripiprazole] Anxiety     Can not tolerate     Sulfa (Sulfonamide Antibiotics) Hives    Vicodin [Hydrocodone-Acetaminophen] Nausea and Vomiting        CURRENT MEDICATIONS:    Current Facility-Administered Medications:     ondansetron (ZOFRAN) injection 4 mg, 4 mg, IntraVENous, Q6H PRN, Mindy Dominguez MD, 4 mg at 10/26/22 5257    spironolactone (ALDACTONE) tablet 12.5 mg, 12.5 mg, Oral, DAILY, Hiro, Gena B, NP, 12.5 mg at 10/26/22 0925    allopurinoL (ZYLOPRIM) tablet 50 mg, 50 mg, Oral, DAILY, Hiro, Gena B, NP, 50 mg at 10/26/22 0926    hydrocortisone (ANUSOL-HC) 2.5 % rectal cream, , PeriANAL, QID, Mindy Dominguez MD, Given at 10/26/22 0812    melatonin tablet 10.5 mg, 10.5 mg, Oral, QHS PRN, Mindy Dominguez MD    potassium bicarb-citric acid (EFFER-K) tablet 20 mEq, 20 mEq, Oral, BID, Hiro Gena B, NP, 20 mEq at 10/26/22 9168    empagliflozin (JARDIANCE) tablet 10 mg, 10 mg, Oral, DAILY, Sanya Boschin B, NP, 10 mg at 10/26/22 1228    polyethylene glycol (MIRALAX) packet 17 g, 17 g, Oral, DAILY, Maycol Dominguez MD, 17 g at 10/26/22 0934    magnesium hydroxide (MILK OF MAGNESIA) 400 mg/5 mL oral suspension 30 mL, 30 mL, Oral, DAILY, Maycol Dominguez MD, 30 mL at 10/26/22 2663    aluminum & magnesium hydroxide-simethicone (MYLANTA II) oral suspension 30 mL, 30 mL, Oral, Q4H PRN, Maycol Dominguez MD, 30 mL at 10/24/22 2004    diazePAM (VALIUM) tablet 5 mg, 5 mg, Oral, Q12H PRN, Maycol Dominguez MD, 5 mg at 10/25/22 1151    docusate sodium (COLACE) capsule 100 mg, 100 mg, Oral, DAILY, Karsten Galvez MD, 100 mg at 10/26/22 3815    bisacodyL (DULCOLAX) suppository 10 mg, 10 mg, Rectal, DAILY, Maycol Dominguez MD, 10 mg at 10/25/22 2868    albuterol-ipratropium (DUO-NEB) 2.5 MG-0.5 MG/3 ML, 3 mL, Nebulization, Q6H PRN, [facility]    fluticasone propionate (FLONASE) 50 mcg/actuation nasal spray 2 Spray, 2 Spray, Both Nostrils, DAILY, Karsten Galvez MD, 2 [place] at 10/25/22 1347    traZODone (DESYREL) tablet 200 mg, 200 mg, Oral, QHS, Irma Zaragoza, NP, 200 mg at 10/25/22 2100    magnesium oxide (MAG-OX) tablet 400 mg, 400 mg, Oral, QHS, Louis Blank B, NP, 400 mg at 10/25/22 2100    calcium carbonate (TUMS) chewable tablet 200 mg [elemental], 200 mg, Oral, TID PRN, Karsten Galvez MD, 200 mg at 10/24/22 1223    carvediloL (COREG) tablet 3.125 mg, 3.125 mg, Oral, BID, Gena Bosch NP, 3.125 mg at 10/26/22 0925    clonazePAM (KlonoPIN) tablet 1 mg, 1 mg, Oral, QHS, Geoff Rodriguez MD, 1 mg at 10/25/22 2100    escitalopram oxalate (LEXAPRO) tablet 20 mg, 20 mg, Oral, DAILY, Geoff Rodriguez MD, 20 mg at 10/26/22 0926    ezetimibe (ZETIA) tablet 10 mg, 10 mg, Oral, DAILY, Geoff Rodriguez MD, 10 mg at 10/26/22 0926    famotidine (PEPCID) tablet 20 mg, 20 mg, Oral, BID, Watson Kent MD, 20 mg at 97/51/48 7936    folic acid (FOLVITE) tablet 1 mg, 1 mg, Oral, DAILY, Watson Kent MD, 1 mg at 10/26/22 0927    montelukast (SINGULAIR) tablet 10 mg, 10 mg, Oral, DAILY, Watson Kent MD, 10 mg at 10/26/22 3031    prasugreL (EFFIENT) tablet 10 mg, 10 mg, Oral, DAILY, Watson Kent MD, 10 mg at 10/26/22 0925    rOPINIRole (REQUIP) tablet 0.5 mg, 0.5 mg, Oral, BID, Watson Kent MD, 0.5 mg at 10/26/22 0925    [Held by provider] rosuvastatin (CRESTOR) tablet 20 mg, 20 mg, Oral, Q MON, WED & Mimi Gudino MD, 20 mg at 10/24/22 2241    sodium chloride (NS) flush 5-40 mL, 5-40 mL, IntraVENous, Q8H, Geoff Rodriguez MD, 10 mL at 10/26/22 0559    sodium chloride (NS) flush 5-40 mL, 5-40 mL, IntraVENous, PRN, Watson Kent MD    acetaminophen (TYLENOL) tablet 650 mg, 650 mg, Oral, Q4H PRN, Watson Kent MD, 650 mg at 10/22/22 0908    furosemide (LASIX) injection 40 mg, 40 mg, IntraVENous, BID, Watson Kent MD, 40 mg at 10/26/22 0934    nitroglycerin (NITROSTAT) tablet 0.4 mg, 0.4 mg, SubLINGual, Q5MIN PRN, Watson Kent MD, 0.4 mg at 10/21/22 2237    glucose chewable tablet 16 g, 4 Tablet, Oral, PRN, Watson Kent MD    glucagon (GLUCAGEN) injection 1 mg, 1 mg, IntraMUSCular, PRN, Geoff Valenzuela MD    dextrose 10 % infusion 0-250 mL, 0-250 mL, IntraVENous, PRN, Watson Kent MD    insulin lispro (HUMALOG) injection, , SubCUTAneous, AC&HS, Watson Kent MD    PATIENT CARE TEAM:  Patient Care Team:  Nba Clark MD as PCP - General (Family Medicine)  Nba Clark MD as PCP - [facility]  Carmela Wellington MD (Surgery General)  Salomon Wu MD (Cardiovascular Disease Physician)  Evelyn Mendoza MD (Otolaryngology)  Tom Acevedo MD (Internal Medicine Physician)  Eligio Torres MD (Female Pelvic Medicine and Reconstructive Surgery)  Zaria De La Fuente MD (Neurology)  Sherine Rodriguez MD (Psychiatry)  Shaan Garcia as Ambulatory Care Manager     Thank you for allowing us to participate in this patient's care.     Monica López NP  [address] [place] [address], [address]  Phone: [phone]

## 2022-10-27 LAB
ALBUMIN SERPL-MCNC: 3.6 G/DL (ref 3.5–5)
ALBUMIN/GLOB SERPL: 0.9 {RATIO} (ref 1.1–2.2)
ALP SERPL-CCNC: 87 U/L (ref 45–117)
ALT SERPL-CCNC: 29 U/L (ref 12–78)
AMYLASE SERPL-CCNC: 12 U/L (ref 25–115)
ANION GAP SERPL CALC-SCNC: 6 MMOL/L (ref 5–15)
AST SERPL-CCNC: 15 U/L (ref 15–37)
BILIRUB SERPL-MCNC: 2 MG/DL (ref 0.2–1)
BNP SERPL-MCNC: 3232 PG/ML
BUN SERPL-MCNC: 11 MG/DL (ref 6–20)
BUN/CREAT SERPL: 11 (ref 12–20)
CALCIUM SERPL-MCNC: 10.1 MG/DL (ref 8.5–10.1)
CHLORIDE SERPL-SCNC: 103 MMOL/L (ref 97–108)
CO2 SERPL-SCNC: 27 MMOL/L (ref 21–32)
CREAT SERPL-MCNC: 0.96 MG/DL (ref 0.55–1.02)
GLOBULIN SER CALC-MCNC: 3.8 G/DL (ref 2–4)
GLUCOSE BLD STRIP.AUTO-MCNC: 101 MG/DL (ref 65–117)
GLUCOSE BLD STRIP.AUTO-MCNC: 92 MG/DL (ref 65–117)
GLUCOSE BLD STRIP.AUTO-MCNC: 94 MG/DL (ref 65–117)
GLUCOSE BLD STRIP.AUTO-MCNC: 95 MG/DL (ref 65–117)
GLUCOSE SERPL-MCNC: 95 MG/DL (ref 65–100)
LIPASE SERPL-CCNC: 26 U/L (ref 73–393)
MAGNESIUM SERPL-MCNC: 2.5 MG/DL (ref 1.6–2.4)
POTASSIUM SERPL-SCNC: 3.8 MMOL/L (ref 3.5–5.1)
PROT SERPL-MCNC: 7.4 G/DL (ref 6.4–8.2)
SERVICE CMNT-IMP: NORMAL
SODIUM SERPL-SCNC: 136 MMOL/L (ref 136–145)

## 2022-10-27 PROCEDURE — 74011250637 HC RX REV CODE- 250/637: Performed by: FAMILY MEDICINE

## 2022-10-27 PROCEDURE — 77030013797 HC KT TRNSDUC PRSSR EDWD -A: Performed by: INTERNAL MEDICINE

## 2022-10-27 PROCEDURE — C1894 INTRO/SHEATH, NON-LASER: HCPCS | Performed by: INTERNAL MEDICINE

## 2022-10-27 PROCEDURE — 99233 SBSQ HOSP IP/OBS HIGH 50: CPT | Performed by: NURSE PRACTITIONER

## 2022-10-27 PROCEDURE — 83735 ASSAY OF MAGNESIUM: CPT

## 2022-10-27 PROCEDURE — 74011000250 HC RX REV CODE- 250: Performed by: INTERNAL MEDICINE

## 2022-10-27 PROCEDURE — C1769 GUIDE WIRE: HCPCS | Performed by: INTERNAL MEDICINE

## 2022-10-27 PROCEDURE — 74011000258 HC RX REV CODE- 258: Performed by: NURSE PRACTITIONER

## 2022-10-27 PROCEDURE — 74011250637 HC RX REV CODE- 250/637: Performed by: NURSE PRACTITIONER

## 2022-10-27 PROCEDURE — 65660000001 HC RM ICU INTERMED STEPDOWN

## 2022-10-27 PROCEDURE — 74011250636 HC RX REV CODE- 250/636: Performed by: NURSE PRACTITIONER

## 2022-10-27 PROCEDURE — 4A023N6 MEASUREMENT OF CARDIAC SAMPLING AND PRESSURE, RIGHT HEART, PERCUTANEOUS APPROACH: ICD-10-PCS | Performed by: FAMILY MEDICINE

## 2022-10-27 PROCEDURE — 02HV33Z INSERTION OF INFUSION DEVICE INTO SUPERIOR VENA CAVA, PERCUTANEOUS APPROACH: ICD-10-PCS | Performed by: PEDIATRICS

## 2022-10-27 PROCEDURE — 74011250636 HC RX REV CODE- 250/636: Performed by: FAMILY MEDICINE

## 2022-10-27 PROCEDURE — 82962 GLUCOSE BLOOD TEST: CPT

## 2022-10-27 PROCEDURE — 74011000250 HC RX REV CODE- 250: Performed by: FAMILY MEDICINE

## 2022-10-27 PROCEDURE — 83690 ASSAY OF LIPASE: CPT

## 2022-10-27 PROCEDURE — 93451 RIGHT HEART CATH: CPT | Performed by: INTERNAL MEDICINE

## 2022-10-27 PROCEDURE — 74011000250 HC RX REV CODE- 250: Performed by: NURSE PRACTITIONER

## 2022-10-27 PROCEDURE — 82150 ASSAY OF AMYLASE: CPT

## 2022-10-27 PROCEDURE — 80053 COMPREHEN METABOLIC PANEL: CPT

## 2022-10-27 PROCEDURE — 77030013744: Performed by: INTERNAL MEDICINE

## 2022-10-27 PROCEDURE — 83880 ASSAY OF NATRIURETIC PEPTIDE: CPT

## 2022-10-27 PROCEDURE — 36415 COLL VENOUS BLD VENIPUNCTURE: CPT

## 2022-10-27 PROCEDURE — C1751 CATH, INF, PER/CENT/MIDLINE: HCPCS | Performed by: INTERNAL MEDICINE

## 2022-10-27 RX ORDER — POTASSIUM CHLORIDE 750 MG/1
40 TABLET, FILM COATED, EXTENDED RELEASE ORAL 2 TIMES DAILY
Status: DISCONTINUED | OUTPATIENT
Start: 2022-10-27 | End: 2022-10-29

## 2022-10-27 RX ORDER — LIDOCAINE HYDROCHLORIDE 10 MG/ML
INJECTION INFILTRATION; PERINEURAL AS NEEDED
Status: DISCONTINUED | OUTPATIENT
Start: 2022-10-27 | End: 2022-10-27 | Stop reason: HOSPADM

## 2022-10-27 RX ADMIN — FLUTICASONE PROPIONATE 2 SPRAY: 50 SPRAY, METERED NASAL at 08:54

## 2022-10-27 RX ADMIN — SODIUM CHLORIDE, PRESERVATIVE FREE 10 ML: 5 INJECTION INTRAVENOUS at 15:01

## 2022-10-27 RX ADMIN — SODIUM CHLORIDE, PRESERVATIVE FREE 10 ML: 5 INJECTION INTRAVENOUS at 22:09

## 2022-10-27 RX ADMIN — SPIRONOLACTONE 12.5 MG: 25 TABLET ORAL at 08:53

## 2022-10-27 RX ADMIN — EZETIMIBE 10 MG: 10 TABLET ORAL at 08:53

## 2022-10-27 RX ADMIN — Medication 400 MG: at 22:05

## 2022-10-27 RX ADMIN — FAMOTIDINE 20 MG: 20 TABLET ORAL at 08:53

## 2022-10-27 RX ADMIN — POTASSIUM CHLORIDE 40 MEQ: 750 TABLET, FILM COATED, EXTENDED RELEASE ORAL at 14:03

## 2022-10-27 RX ADMIN — TRAZODONE HYDROCHLORIDE 200 MG: 100 TABLET ORAL at 22:06

## 2022-10-27 RX ADMIN — BUMETANIDE 1 MG/HR: 0.25 INJECTION, SOLUTION INTRAMUSCULAR; INTRAVENOUS at 12:54

## 2022-10-27 RX ADMIN — CARVEDILOL 3.12 MG: 3.12 TABLET, FILM COATED ORAL at 08:52

## 2022-10-27 RX ADMIN — ESCITALOPRAM OXALATE 20 MG: 10 TABLET ORAL at 08:53

## 2022-10-27 RX ADMIN — HYDROCORTISONE: 25 CREAM TOPICAL at 22:10

## 2022-10-27 RX ADMIN — DEXTROSE MONOHYDRATE 2.5 MCG/KG/MIN: 50 INJECTION, SOLUTION INTRAVENOUS at 12:54

## 2022-10-27 RX ADMIN — CLONAZEPAM 1 MG: 0.5 TABLET ORAL at 22:06

## 2022-10-27 RX ADMIN — SODIUM CHLORIDE, PRESERVATIVE FREE 10 ML: 5 INJECTION INTRAVENOUS at 07:08

## 2022-10-27 RX ADMIN — FOLIC ACID 1 MG: 1 TABLET ORAL at 08:54

## 2022-10-27 RX ADMIN — ROPINIROLE HYDROCHLORIDE 0.5 MG: 0.25 TABLET, FILM COATED ORAL at 08:52

## 2022-10-27 RX ADMIN — ALLOPURINOL 50 MG: 100 TABLET ORAL at 08:52

## 2022-10-27 RX ADMIN — HYDROCORTISONE: 25 CREAM TOPICAL at 17:35

## 2022-10-27 RX ADMIN — POTASSIUM CHLORIDE 40 MEQ: 750 TABLET, FILM COATED, EXTENDED RELEASE ORAL at 17:32

## 2022-10-27 RX ADMIN — ROPINIROLE HYDROCHLORIDE 0.5 MG: 0.25 TABLET, FILM COATED ORAL at 17:32

## 2022-10-27 RX ADMIN — EMPAGLIFLOZIN 10 MG: 10 TABLET, FILM COATED ORAL at 08:53

## 2022-10-27 RX ADMIN — FUROSEMIDE 40 MG: 10 INJECTION, SOLUTION INTRAVENOUS at 08:54

## 2022-10-27 RX ADMIN — FAMOTIDINE 20 MG: 20 TABLET ORAL at 17:32

## 2022-10-27 RX ADMIN — HYDROCORTISONE: 25 CREAM TOPICAL at 13:15

## 2022-10-27 RX ADMIN — MONTELUKAST 10 MG: 10 TABLET, FILM COATED ORAL at 08:53

## 2022-10-27 RX ADMIN — BUMETANIDE 1 MG/HR: 0.25 INJECTION, SOLUTION INTRAMUSCULAR; INTRAVENOUS at 22:07

## 2022-10-27 RX ADMIN — PRASUGREL 10 MG: 10 TABLET, FILM COATED ORAL at 08:53

## 2022-10-27 RX ADMIN — HYDROCORTISONE: 25 CREAM TOPICAL at 08:55

## 2022-10-27 NOTE — PROGRESS NOTES
[address] [facility]  Hospitalist Group                                                                                          Hospitalist Progress Note  Octavio Herzog MD  Answering service: [phone] OR [phone] from in house phone        Date of Service:  10/27/2022  NAME:  Michelle Rodriguez  :  1980  MRN:  [id number]      Admission Summary:     Patient initially presents with suicidal ideation and found to have congestive heart failure. Interval history / Subjective:     States breathing is not much better, denies pain, has some nausea without emesis, constipation resolved after bowel regimen and now having lose stools.      Assessment & Plan:     Congestive heart failure  -Acute on chronic systolic CHF NYHA class IV on admission  -Patient with ischemic cardiomyopathy with EF with 15 to 20%  -s/p RHC 10/27 plan to start bumetanide gtt and dobutamine gtt today  -continue spironolactone, Jardiance  -other GDMT as BP tolerates    Urinary tract infection  -Completed antibiotic    Hypertension  -Blood pressure is borderline low normal    Type 2 diabetes  -Well-controlled with hemoglobin A1c of 6.3  -Continue sliding scale insulin coverage    Coronary artery disease  -Patient with history of STEMI with stent to LAD  -Continue Effient    Dyslipidemia  -On Zetia    Restless leg syndrome  -On Requip    Hemorrhoids  -Continue Anusol cream    Depression with suicidal ideation  -Patient previously evaluated by psychiatry  -On Lexapro, also on Klonopin for anxiety  -One-on-one sitter discontinued per psych recommendation  -Outpatient follow-up with psychiatry    Anxiety  -Patient on Klonopin for 12 years, continue Klonopin here  -Continue trazodone for sleep  -Outpatient follow-up with psychiatry    Constipation  -resolved    Nausea: likely multifactorial, hx of gastroparesis, bowel edema from CHF  -continue supportive care     Patient is critically-ill needing multiple drips, and at risk for decline, CCT 35 minutes    Code status: Full  Prophylaxis: SCDs  Care Plan discussed with: Patient/RN/CM/AHF  Anticipated Disposition: Likely more than 48 hours, plan for discharge to home when cleared by AHF team.     Hospital Problems  Date Reviewed: 10/14/2022            Codes Class Noted POA    CHF (congestive heart failure) ([facility] [address] ICD-10-CM: I50.9  ICD-9-CM: 428.0  10/21/2022 Unknown           Review of Systems:   A comprehensive review of systems was negative except for that written in the HPI. Vital Signs:    Last 24hrs VS reviewed since prior progress note. Most recent are:  Visit Vitals  /86 (BP 1 Location: Left upper arm, BP Patient Position: Sitting)   Pulse 91   Temp 98 °F (36.7 °C)   Resp 18   Ht 5' 4\" (1.626 m)   Wt 90.3 kg (199 lb)   SpO2 100%   BMI 34.16 kg/m²         Intake/Output Summary (Last 24 hours) at 10/27/2022 1250  Last data filed at 10/26/2022 2337  Gross per 24 hour   Intake 411 ml   Output 200 ml   Net 211 ml          Physical Examination:     I had a face to face encounter with this patient and independently examined them on 10/27/2022 as outlined below:          Constitutional:  No acute distress, non-toxic   ENT:  Oral mucosa moist, oropharynx benign, anicteric sclerae    Resp:  CTA bilaterally. No wheezing/rhonchi/rales. No accessory muscle use. CV:  Regular rhythm, normal rate, no murmurs, +S3 gallop, 1+ b/l LE edema    GI:  Soft, non distended, non tender. normoactive bowel sounds, no hepatosplenomegaly     Musculoskeletal:  warm, 2+ pulses throughout    Neurologic:  Moves all extremities.   AAOx3, CN II-XII reviewed            Data Review:    Review and/or order of clinical lab test      Labs:     Recent Labs     10/26/22  0255   WBC 6.6   HGB 12.6   HCT 39.5          Recent Labs     10/27/22  0423 10/26/22  0255 10/25/22  0428    136 137   K 3.8 3.8 4.4    104 104   CO2 27 24 24   BUN 11 10 12   CREA 0.96 0.89 0.75   GLU 95 95 103*   CA 10.1 9.7 9.5   MG 2. 5* 2.3 2.2       Recent Labs     10/27/22  0423 10/26/22  0255 10/25/22  0428   ALT 29 27 31   AP 87 85 24*   TBILI 2.0* 2.2* 1.9*   TP 7.4 7.0 6.5   ALB 3.6 3.5 3.4*   GLOB 3.8 3.5 3.1   AML 12*  --   --    LPSE 26*  --   --        No results for input(s): INR, PTP, APTT, INREXT, INREXT in the last 72 hours. No results for input(s): FE, TIBC, PSAT, FERR in the last 72 hours. Lab Results   Component Value Date/Time    Folate 20.0 10/23/2022 04:24 AM        No results for input(s): PH, PCO2, PO2 in the last 72 hours. No results for input(s): CPK, CKNDX, TROIQ in the last 72 hours.     No lab exists for component: CPKMB  Lab Results   Component Value Date/Time    Cholesterol, total 95 10/22/2022 06:26 AM    HDL Cholesterol 32 10/22/2022 06:26 AM    LDL, calculated 45 10/22/2022 06:26 AM    Triglyceride 90 10/22/2022 06:26 AM    CHOL/HDL Ratio 3.0 10/22/2022 06:26 AM     Lab Results   Component Value Date/Time    Glucose (POC) 92 10/27/2022 12:16 PM    Glucose (POC) 95 10/27/2022 07:06 AM    Glucose (POC) 94 10/26/2022 09:49 PM    Glucose (POC) 120 (H) 10/26/2022 04:37 PM    Glucose (POC) 98 10/26/2022 11:35 AM     Lab Results   Component Value Date/Time    Color ORANGE 10/17/2022 08:42 AM    Appearance TURBID (A) 10/17/2022 08:42 AM    Specific gravity 1.025 10/17/2022 08:42 AM    Specific gravity 1.024 10/06/2022 08:57 AM    pH (UA) 5.0 10/17/2022 08:42 AM    Protein 30 (A) 10/17/2022 08:42 AM    Glucose Negative 10/17/2022 08:42 AM    Ketone Negative 10/17/2022 08:42 AM    Bilirubin Negative 11/25/2021 09:47 AM    Urobilinogen 1.0 10/17/2022 08:42 AM    Nitrites Positive (A) 10/17/2022 08:42 AM    Leukocyte Esterase SMALL (A) 10/17/2022 08:42 AM    Epithelial cells FEW 10/17/2022 08:42 AM    Bacteria 2+ (A) 10/17/2022 08:42 AM    WBC 5-10 10/17/2022 08:42 AM    RBC 5-10 10/17/2022 08:42 AM         Medications Reviewed:     Current Facility-Administered Medications   Medication Dose Route Frequency bumetanide (BUMEX) 0.25 mg/mL infusion  1 mg/hr IntraVENous CONTINUOUS    DOBUTamine (DOBUTREX) 500 MG/250 mL (2000 mcg/mL) in D5W infusion  2.5 mcg/kg/min IntraVENous CONTINUOUS    potassium chloride SR (KLOR-CON 10) tablet 40 mEq  40 mEq Oral BID    ondansetron (ZOFRAN) injection 4 mg  4 mg IntraVENous Q6H PRN    spironolactone (ALDACTONE) tablet 12.5 mg  12.5 mg Oral DAILY    allopurinoL (ZYLOPRIM) tablet 50 mg  50 mg Oral DAILY    hydrocortisone (ANUSOL-HC) 2.5 % rectal cream   PeriANAL QID    melatonin tablet 10.5 mg  10.5 mg Oral QHS PRN    empagliflozin (JARDIANCE) tablet 10 mg  10 mg Oral DAILY    polyethylene glycol (MIRALAX) packet 17 g  17 g Oral DAILY    magnesium hydroxide (MILK OF MAGNESIA) 400 mg/5 mL oral suspension 30 mL  30 mL Oral DAILY    aluminum & magnesium hydroxide-simethicone (MYLANTA II) oral suspension 30 mL  30 mL Oral Q4H PRN    diazePAM (VALIUM) tablet 5 mg  5 mg Oral Q12H PRN    docusate sodium (COLACE) capsule 100 mg  100 mg Oral DAILY    bisacodyL (DULCOLAX) suppository 10 mg  10 mg Rectal DAILY    albuterol-ipratropium (DUO-NEB) 2.5 MG-0.5 MG/3 ML  3 mL Nebulization Q6H PRN    fluticasone propionate (FLONASE) 50 mcg/actuation nasal spray 2 Spray  2 Spray Both Nostrils DAILY    traZODone (DESYREL) tablet 200 mg  200 mg Oral QHS    magnesium oxide (MAG-OX) tablet 400 mg  400 mg Oral QHS    calcium carbonate (TUMS) chewable tablet 200 mg [elemental]  200 mg Oral TID PRN    clonazePAM (KlonoPIN) tablet 1 mg  1 mg Oral QHS    escitalopram oxalate (LEXAPRO) tablet 20 mg  20 mg Oral DAILY    ezetimibe (ZETIA) tablet 10 mg  10 mg Oral DAILY    famotidine (PEPCID) tablet 20 mg  20 mg Oral BID    folic acid (FOLVITE) tablet 1 mg  1 mg Oral DAILY    montelukast (SINGULAIR) tablet 10 mg  10 mg Oral DAILY    prasugreL (EFFIENT) tablet 10 mg  10 mg Oral DAILY    rOPINIRole (REQUIP) tablet 0.5 mg  0.5 mg Oral BID    [Held by provider] rosuvastatin (CRESTOR) tablet 20 mg  20 mg Oral Q MON, WED & FRI    sodium chloride (NS) flush 5-40 mL  5-40 mL IntraVENous Q8H    sodium chloride (NS) flush 5-40 mL  5-40 mL IntraVENous PRN    acetaminophen (TYLENOL) tablet 650 mg  650 mg Oral Q4H PRN    nitroglycerin (NITROSTAT) tablet 0.4 mg  0.4 mg SubLINGual Q5MIN PRN    glucose chewable tablet 16 g  4 Tablet Oral PRN    glucagon (GLUCAGEN) injection 1 mg  1 mg IntraMUSCular PRN    dextrose 10 % infusion 0-250 mL  0-250 mL IntraVENous PRN    insulin lispro (HUMALOG) injection   SubCUTAneous AC&HS     ______________________________________________________________________  EXPECTED LENGTH OF STAY: 3d 19h  ACTUAL LENGTH OF STAY:          6                 Yazan Elizabeth MD

## 2022-10-27 NOTE — PROGRESS NOTES
[facility] in [address], [place]  Inpatient Progress Note      Patient name: Shaheen Corcoran  Patient : 1980  Patient MRN: [id number]  Consulting MD: Shelby Coyle MD  Date of service: 10/27/22      REASON FOR REFERRAL:  Management of acute on chronic systolic heart failure      PLAN OF CARE   38 y.o. female with history of HTN, CAD s/p STEMI in 2022 (DESx1 to LAD; treated at [facility]) with ischemic cardiomyopathy (EF 20-25%), CHF, GERD, RLS, and fibromyalgia seen as a new patient in [facility] on 10/21/22 and found to be fluid overloaded, and with suicidal ideation. Pt was taken to the ER for further evaluation and admission for acute on chronic HF and mental health crisis. Pt contracted for safety, and is being seen by behavioral health/psych  Ongoing diuresis and remainder of heart failure work-up while in hospital; work on optimizing GDMT after diuresis complete- increase diuresis and start Dobutamine based on [address] on 10/27/22.    GDMT limited in the past d/t hypotension            RECOMMENDATIONS:  Start Dobutamine 2.5mcg/kg/min to augment diuresis- monitor HR  Beta-blocker: hold Coreg while on Dobutamine   ACE/ARB/ARNi: hold while undergoing aggressive diuresis  MRA: Cont spironolactone 12.5mg daily  SGLT2 inhibitor: Jardiance 10mg daily  Diuretic: Transition to Bumex gtt 1mg/hr; goal net -2L next 24 hrs  Change back to potassium pills for replacement  Keep K > 4 and Mag > 2  Cont allopurinol 50mg daily   Continue ASA and prasugrel per primary cards  Statin or PCSK9i: Continue current dose of statin  Treatment of STU: inpatient eval for STU  Likely will need referral for ICD given EF remains <35% 3 mos after MI, may need LifeVest at discharge if no ICD implant  Echocardiogram done on admission, EF 15-20%  RHC done today   Labs: HF and Transplant eval labs ordered; in process  Will have  complete psychosocial evaluation this week  Nutritionist consult  Heart failure education  Needs Advanced care plan    All other care per primary team     IMPRESSION:  Fatigue  Shortness of breath  Volume overload  Acute on Chronic systolic heart failure  Stage C, NYHA class IV symptoms  ischemic cardiomyopathy, LVEF 20-25%  CAD  STEMI July 2022 s/p RICKY to LAD  HTN  Obesity  LHD  Pre-diabetes  Esophageal stenosis s/p dilation  RLS  Fibromyalgia  Anxiety and Depression      INTERVAL HISTORY:  No acute overnight events  K 3.8  Creatinine stable  PBNP stable  Continues to have nausea and stomach pain   RHC this am     LIFE GOALS:  Patient's personal goals include: get better  Important upcoming milestones or family events: the holidays coming up  The patient identifies the following as important for living well: being independent        [facility]:  Echo 10/22/22    Left Ventricle: Severely reduced left ventricular systolic function with a visually estimated EF of 15 - 20%. Left ventricle is mildly dilated. Normal wall thickness. Moderate hypokinesis of the following segments: basal anterior, basal anterolateral, basal anteroseptal, basal inferior, basal inferolateral, basal inferoseptal, mid anterior, mid anterolateral, mid anteroseptal, mid inferior, mid inferolateral and mid inferoseptal. Severe hypokinesis of the following segments: apical anterior, apical septal, apical inferior and apical lateral. Grade III diastolic dysfunction with increased LAP. Mitral Valve: Mild annular dilation. Moderate regurgitation. Tricuspid Valve: Moderately elevated RVSP. The estimated RVSP is 51 mmHg. Left Atrium: Left atrium is mildly dilated. Pericardium: Trivial pericardial effusion present. No indication of cardiac tamponade. Contrast used: Definity. Echo (8/14/22)    Left Ventricle: Severely reduced left ventricular systolic function with a visually estimated EF of 20 - 25%. Left ventricle is mildly dilated.  Increased wall thickness. See diagram for wall motion findings. Grade II diastolic dysfunction with increased LAP. Right Ventricle: Not well visualized. Tricuspid Valve: Moderately elevated RVSP. [address] 10/27/22  PA 56/34/43, RA 16, PCWP 29, CI daisy 1.46      BRIEF HISTORY OF PRESENT ILLNESS:  Eliane Grover is a 38 y.o. female with h/o HTN, CAD s/p STEMI in July 2022 (DESx1 to LAD; treated at [facility]) with ischemic cardiomyopathy (EF 20-25%), CHF, GERD, RLS, and fibromyalgia seen as a new patient in [facility] on 10/21/22 and found to be fluid overloaded, and with suicidal ideation. Pt was taken to the ER for further evaluation and admission for acute on chronic HF and mental health crisis. REVIEW OF SYSTEMS:  Review of Systems   Constitutional:  Negative for chills, fever, malaise/fatigue and weight loss. Respiratory:  Negative for cough and shortness of breath. Cardiovascular:  Negative for chest pain, palpitations, orthopnea and leg swelling. Gastrointestinal:  Positive for nausea. Negative for abdominal pain, constipation, diarrhea, heartburn and vomiting. Musculoskeletal:  Negative for myalgias. Restless legs   Neurological:  Negative for dizziness and weakness. Psychiatric/Behavioral:  Positive for depression. Negative for suicidal ideas. The patient is nervous/anxious and has insomnia. PHYSICAL EXAM:  Visit Vitals  BP 94/79 (BP 1 Location: Left upper arm, BP Patient Position: At rest)   Pulse 94   Temp 97.6 °F (36.4 °C)   Resp 13   Ht 5' 4\" (1.626 m)   Wt 199 lb (90.3 kg)   LMP 10/09/2022   SpO2 99%   BMI 34.16 kg/m²         Physical Exam  Vitals and nursing note reviewed. Constitutional:       Appearance: Normal appearance. She is obese. Cardiovascular:      Rate and Rhythm: Normal rate and regular rhythm. Heart sounds: No murmur heard. No friction rub. Gallop present. S3 sounds present. Pulmonary:      Effort: No respiratory distress.       Breath sounds: Normal breath sounds. Abdominal:      General: Bowel sounds are normal. There is distension. Palpations: Abdomen is soft. Tenderness: There is no abdominal tenderness. Musculoskeletal:      Right lower leg: No edema. Left lower leg: No edema. Skin:     General: Skin is warm and dry. Capillary Refill: Capillary refill takes less than 2 seconds. Neurological:      General: No focal deficit present. Mental Status: She is alert and oriented to person, place, and time. Psychiatric:         Mood and Affect: Mood normal.         Behavior: Behavior normal.         Thought Content: Thought content normal.         Judgment: Judgment normal.            PAST MEDICAL HISTORY:  Past Medical History:   Diagnosis Date    Anemia NEC     Arthritis     Chronic pain     fybromyalsia    Depression     anxiety, depression, OCD    GERD (gastroesophageal reflux disease)     Hypertension     Hypertension     Ill-defined condition     Fibromyalia gastricparesis    MI (myocardial infarction) ([facility] [address]     Musculoskeletal disorder     SOB (shortness of breath)     Stool color black        PAST SURGICAL HISTORY:  Past Surgical History:   Procedure Laterality Date    HX CORONARY STENT PLACEMENT      HX GYN           HX HEENT  2002    wisdom teeth extraction    UPPER GI ENDOSCOPY,BIOPSY  2018         UPPER GI ENDOSCOPY,DIAGNOSIS  2018            FAMILY HISTORY:  Family History   Problem Relation Age of Onset    Hypertension Father     Elevated Lipids Father     Arthritis-rheumatoid Mother         ? Lupus vs RA    Lung Disease Mother     Heart Disease Mother     Cancer Mother         breast in 39y    COPD Mother     Hypertension Mother     Stroke Mother         3-4 strokes    Breast Cancer Mother 50    Diabetes Maternal Grandmother        SOCIAL HISTORY:  Social History     Socioeconomic History    Marital status: SINGLE   Tobacco Use    Smoking status: Former     Packs/day: 0.25     Years: 8.00     Pack years: 2.00     Types: Cigarettes     Quit date: 2022     Years since quittin.2    Smokeless tobacco: Never   Vaping Use    Vaping Use: Never used   Substance and Sexual Activity    Alcohol use: Not Currently     Alcohol/week: 1.0 standard drink     Types: 1 Glasses of wine per week     Comment: Wine with special occassions - not since July    Drug use: Not Currently     Types: Marijuana     Comment: Haven't had any since 2022    Sexual activity: Yes     Partners: Male     Birth control/protection: None     Social Determinants of Health     Financial Resource Strain: High Risk    Difficulty of Paying Living Expenses: Very hard   Food Insecurity: No Food Insecurity    Worried About Running Out of Food in the Last Year: Never true    Ran Out of Food in the Last Year: Never true   Transportation Needs: No Transportation Needs    Lack of Transportation (Medical): No    Lack of Transportation (Non-Medical): No   Physical Activity: Inactive    Days of Exercise per Week: 0 days    Minutes of Exercise per Session: 0 min   Stress: Stress Concern Present    Feeling of Stress :  To some extent   Social Connections: Socially Isolated    Frequency of Communication with Friends and Family: Twice a week    Frequency of Social Gatherings with Friends and Family: Never    Attends Alevism Services: Never    Active Member of Clubs or Organizations: No    Attends Club or Organization Meetings: Never    Marital Status: Never    Housing Stability: Low Risk     Unable to Pay for Housing in the Last Year: No    Number of [place] in the Last Year: 1    Unstable Housing in the Last Year: No       LABORATORY RESULTS:     Labs Latest Ref Rng & Units 10/27/2022 10/26/2022 10/25/2022 10/24/2022 10/23/2022 10/22/2022 10/21/2022   WBC 3.6 - 11.0 K/uL - 6.6 - - - 6.0 5.4   RBC 3.80 - 5.20 M/uL - 4.28 - - - 4.42 4.37   Hemoglobin 11.5 - 16.0 g/dL - 12.6 - - - 12.9 13.1   Hematocrit 35.0 - 47.0 % - 39.5 - - - 40.2 40.2 MCV 80.0 - 99.0 FL - 92.3 - - - 91.0 92.0   Platelets 055 - 777 K/uL - 238 - - - 283 271   Lymphocytes 12 - 49 % - 51(H) - - - 42 46   Monocytes 5 - 13 % - 7 - - - 7 7   Eosinophils 0 - 7 % - 8(H) - - - 8(H) 7   Basophils 0 - 1 % - 1 - - - 1 1   Albumin 3.5 - 5.0 g/dL 3.6 3.5 3. 4(L) 3.5 3. 4(L) - 3. 4(L)   Calcium 8.5 - 10.1 MG/DL 10.1 9.7 9.5 9.7 9.6 9.6 9.2   Glucose 65 - 100 mg/dL 95 95 103(H) 99 113(H) 114(H) 80   BUN 6 - 20 MG/DL 11 10 12 10 9 8 9   Creatinine 0.55 - 1.02 MG/DL 0.96 0.89 0.75 0.86 0.93 0.87 0.81   Sodium 136 - 145 mmol/L 136 136 137 139 141 136 137   Potassium 3.5 - 5.1 mmol/L 3.8 3.8 4.4 3.5 3. 4(L) 3.5 3. 4(L)   TSH 0.36 - 3.74 uIU/mL - - - - - - 4.44(H)   Some recent data might be hidden     Lab Results   Component Value Date/Time    TSH 4.44 (H) 10/21/2022 06:12 PM    TSH 2.12 05/12/2021 10:55 AM    TSH 1.330 12/30/2019 12:00 AM    TSH 3.850 07/25/2018 12:40 PM    TSH 1.620 02/27/2018 08:59 AM    TSH 1.240 12/05/2016 10:13 AM    TSH 1.530 03/18/2016 10:31 AM    TSH 1.400 11/09/2011 09:37 AM    TSH 1.26 08/19/2010 10:36 AM    TSH 1.18 07/28/2010 04:10 PM       ALLERGY:  Allergies   Allergen Reactions    Abilify [Aripiprazole] Anxiety     Can not tolerate     Sulfa (Sulfonamide Antibiotics) Hives    Vicodin [Hydrocodone-Acetaminophen] Nausea and Vomiting        CURRENT MEDICATIONS:    Current Facility-Administered Medications:     ondansetron (ZOFRAN) injection 4 mg, 4 mg, IntraVENous, Q6H PRN, Joo Dominguez MD, 4 mg at 10/26/22 1737    spironolactone (ALDACTONE) tablet 12.5 mg, 12.5 mg, Oral, DAILY, Hiro, Gena B, NP, 12.5 mg at 10/27/22 0853    allopurinoL (ZYLOPRIM) tablet 50 mg, 50 mg, Oral, DAILY, Hiro, Gena B, NP, 50 mg at 10/27/22 0852    hydrocortisone (ANUSOL-HC) 2.5 % rectal cream, , PeriANAL, QID, Joo Dominguez MD, Given at 10/27/22 0855    melatonin tablet 10.5 mg, 10.5 mg, Oral, QHS PRN, Joo Dominguez MD    potassium bicarb-citric acid (EFFER-K) tablet 20 mEq, 20 mEq, Oral, BID, Hiro, Gena B, NP, 20 mEq at 10/26/22 1737    empagliflozin (JARDIANCE) tablet 10 mg, 10 mg, Oral, DAILY, Hiro, Gena B, NP, 10 mg at 10/27/22 0853    polyethylene glycol (MIRALAX) packet 17 g, 17 g, Oral, DAILY, Michelle Dominguez MD, 17 g at 10/26/22 0934    magnesium hydroxide (MILK OF MAGNESIA) 400 mg/5 mL oral suspension 30 mL, 30 mL, Oral, DAILY, Michelle Dominguez MD, 30 mL at 10/26/22 2518    aluminum & magnesium hydroxide-simethicone (MYLANTA II) oral suspension 30 mL, 30 mL, Oral, Q4H PRN, Michelle Dominguez MD, 30 mL at 10/24/22 2004    diazePAM (VALIUM) tablet 5 mg, 5 mg, Oral, Q12H PRN, Michelle Dominguez MD, 5 mg at 10/25/22 1151    docusate sodium (COLACE) capsule 100 mg, 100 mg, Oral, DAILY, Katherine Gomez MD, 100 mg at 10/26/22 2792    bisacodyL (DULCOLAX) suppository 10 mg, 10 mg, Rectal, DAILY, Michelle Dominguez MD, 10 mg at 10/25/22 0184    albuterol-ipratropium (DUO-NEB) 2.5 MG-0.5 MG/3 ML, 3 mL, Nebulization, Q6H PRN, Jorge A Ayers MD    fluticasone propionate (FLONASE) 50 mcg/actuation nasal spray 2 Spray, 2 Spray, Both Nostrils, DAILY, Katherine Gomez MD, 2 [place] at 10/27/22 0854    traZODone (DESYREL) tablet 200 mg, 200 mg, Oral, QHS, Irma Zaragoza, NP, 200 mg at 10/26/22 2122    magnesium oxide (MAG-OX) tablet 400 mg, 400 mg, Oral, QHS, Lanette BAGLEY, NP, 400 mg at 10/26/22 2122    calcium carbonate (TUMS) chewable tablet 200 mg [elemental], 200 mg, Oral, TID PRN, Katherine Gomez MD, 200 mg at 10/24/22 1223    carvediloL (COREG) tablet 3.125 mg, 3.125 mg, Oral, BID, Gena Bosch NP, 3.125 mg at 10/27/22 0852    clonazePAM (KlonoPIN) tablet 1 mg, 1 mg, Oral, QHS, Geoff Rodriguez MD, 1 mg at 10/26/22 2122    escitalopram oxalate (LEXAPRO) tablet 20 mg, 20 mg, Oral, DAILY, Geoff Rodriguez MD, 20 mg at 10/27/22 0853    ezetimibe (ZETIA) tablet 10 mg, 10 mg, Oral, DAILY, Emily Carvalho MD, 10 mg at 10/27/22 0853    famotidine (PEPCID) tablet 20 mg, 20 mg, Oral, BID, Miguel Angel Nazario MD, 20 mg at 48/98/07 1539    folic acid (FOLVITE) tablet 1 mg, 1 mg, Oral, DAILY, Miguel Angel Nazario MD, 1 mg at 10/27/22 0854    montelukast (SINGULAIR) tablet 10 mg, 10 mg, Oral, DAILY, Miguel Angel Nazario MD, 10 mg at 10/27/22 0853    prasugreL (EFFIENT) tablet 10 mg, 10 mg, Oral, DAILY, Miguel Angel Nazario MD, 10 mg at 10/27/22 0853    rOPINIRole (REQUIP) tablet 0.5 mg, 0.5 mg, Oral, BID, Miguel Angel aNzario MD, 0.5 mg at 10/27/22 4276    [Held by provider] rosuvastatin (CRESTOR) tablet 20 mg, 20 mg, Oral, Q MON, WED & Renu Orlando MD, 20 mg at 10/24/22 2241    sodium chloride (NS) flush 5-40 mL, 5-40 mL, IntraVENous, Q8H, Geoff Rodriguez MD, 10 mL at 10/27/22 0708    sodium chloride (NS) flush 5-40 mL, 5-40 mL, IntraVENous, PRN, Miguel Angel Nazario MD    acetaminophen (TYLENOL) tablet 650 mg, 650 mg, Oral, Q4H PRN, Miguel Angel Nazario MD, 650 mg at 10/22/22 5002    furosemide (LASIX) injection 40 mg, 40 mg, IntraVENous, BID, Miguel Angel Nazario MD, 40 mg at 10/27/22 0854    nitroglycerin (NITROSTAT) tablet 0.4 mg, 0.4 mg, SubLINGual, Q5MIN PRN, Miguel Angel Nazario MD, 0.4 mg at 10/21/22 2237    glucose chewable tablet 16 g, 4 Tablet, Oral, PRN, Miguel Angel Nazario MD    glucagon (GLUCAGEN) injection 1 mg, 1 mg, IntraMUSCular, PRN, Neda Ahumada, [place], MD    dextrose 10 % infusion 0-250 mL, 0-250 mL, IntraVENous, PRN, Miguel Angel Nazario MD    insulin lispro (HUMALOG) injection, , SubCUTAneous, AC&HS, Miguel Angel Nazario MD    PATIENT CARE TEAM:  Patient Care Team:  Freddy Angulo MD as PCP - General (Family Medicine)  Freddy Angulo MD as PCP - [facility]  Robert Beasley MD (Surgery General)  Wanda Miguel MD (Cardiovascular Disease Physician)  Linda Rodriguez MD (Otolaryngology)  Gary Schmidt MD (Internal Medicine Physician)  Humera Quesada MD (Female Pelvic Medicine and Reconstructive Surgery)  Dawna Lafleur MD (Neurology)  Beckie Mckeon, Adithya Martinez MD (Psychiatry)  Mari Toscano as Ambulatory Care Manager     Thank you for allowing us to participate in this patient's care.     Brissa Schmidt NP  [address] [place] [address], [address]  Phone: [phone]

## 2022-10-27 NOTE — PROGRESS NOTES
TRANSFER - IN REPORT:    Verbal report received from [address] [place] [address],[address], RN on Derek Devlin  being received from [facility] for routine progression of care. Report consisted of patients Situation, Background, Assessment and Recommendations(SBAR). Information from the following report(s) Procedure Summary and Intake/Output was reviewed with the receiving clinician. Opportunity for questions and clarification was provided. Assessment completed upon patients arrival to [address] [place] [address] and care assumed. Cardiac Cath Lab Recovery Arrival Note:    Derek Devlin arrived to [facility] recovery area. Patient procedure= RHC. Patient on cardiac monitor, non-invasive blood pressure, SPO2 monitor. On RA. Patient status doing well without problems. Patient is A&Ox 4. Patient reports no complaints. PROCEDURE SITE CHECK:    Procedure site:Right brachial site without any bleeding and band aid c/d/i, zero pain/discomfort reported at procedure site. No change in patient status. Continue to monitor patient and status.

## 2022-10-27 NOTE — PROGRESS NOTES
0730 Bedside shift change report given to Amilcar (oncoming nurse) by Chrao Mcfadden (offgoing nurse). Report included the following information SBAR, Kardex, ED Summary, Intake/Output, MAR, and Recent Results. 2000 Bedside shift change report given to Dimitri Mares (oncoming nurse) by Amilcar (offgoing nurse). Report included the following information SBAR, Kardex, ED Summary, Procedure Summary, Intake/Output, MAR, and Recent Results.

## 2022-10-27 NOTE — PROGRESS NOTES
3:30pm: met with pt. In her room and pt. Reported improved mood compared with the previous day when she reported feeling significant stomach pain and physical discomfort. She stated today she has been able to sleep about 4 hours. In describing her hospital stay, pt. Remarked that it has been overwhelming and she has cried several days, nearly every day due to stress of potentially needing transplant. SW requested a time to complete SW assessment with pt. She agreed to a phone call later this evening to complete it.
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
## 2022-10-27 NOTE — TELEPHONE ENCOUNTER
7:30pm Called pt. This evening via [employer] to complete SW assessment. Pt. Answered phone and reported feeling overwhelmed and very distressed. She stated she had reached out to an advocate due to a disagreement with a nurse only to see the same nurse re enter her room a few moments before our call. She reported the nurse apologized then requested to hug her which made her uncomfortable. She became tearful as she explained the incident. She stated she felt fearful and overwhelmed. JORGE suggested calling charge nurse, Fatemeh Barragan, to participate in the call. The charge nurse entered the room and introduced herself on the call. SW then encouraged the pt. To share her concerns. The pt. Reiterated her story about the nurse who re-entered her room and requested the hug. She explained that she felt unsafe and highlighted how important it is for her to minimize stress at this time. The Charge nurse  stated she would take appropriate action(s) and apologized for the impact this incident has had on the patient. SW was able to obtain some information for the SW assessment. However the pt. Reported feeling very overwhelmed after the incident with the nurse. SW suggested meeting with pt. And her father when he is present for a visit tomorrow afternoon.

## 2022-10-27 NOTE — PROGRESS NOTES
Transitions of Care Plan  RUR: 17% - moderate  Clinical Update: RHC - indicates need for dobutamine and bumex gtt  Consults: [facility]; Psychiatry  Baseline: independent without DME; resides w father; disability for psychiatry dx and comorbidites  Barriers to Discharge: medical  Disposition: home with family assistance  Estimated Discharge Date: 2+ days      Clinical update provided by [facility] NP. Patient's RHC resulted indicating volume overload and need for inotrope (dobutamine) and bumex gtt. Anticipate patient will be inpatient through the weekend. Should patient need home inotrope - will need to discuss with patient and father.     Disposition:  Home vs Home Health w/ home infusion service    Kiara Lopes, [address]  Available via [employer] or

## 2022-10-27 NOTE — PROGRESS NOTES
Problem: Falls - Risk of  Goal: *Absence of Falls  Description: Document Clarice Ground Fall Risk and appropriate interventions in the flowsheet.   10/27/2022 0326 by Tyler Maloney RN  Outcome: Progressing Towards Goal  Note: Fall Risk Interventions:  Mobility Interventions: Patient to call before getting OOB    Mentation Interventions: Increase mobility    Medication Interventions: Teach patient to arise slowly, Evaluate medications/consider consulting pharmacy, Assess postural VS orthostatic hypotension    Elimination Interventions: Call light in reach, Patient to call for help with toileting needs, Toileting schedule/hourly rounds  10/27/2022 0326 by Tyler Maloney RN  Outcome: Progressing Towards Goal  Note: Fall Risk Interventions:  Mobility Interventions: Patient to call before getting OOB    Mentation Interventions: Increase mobility    Medication Interventions: Teach patient to arise slowly, Evaluate medications/consider consulting pharmacy, Assess postural VS orthostatic hypotension    Elimination Interventions: Call light in reach, Patient to call for help with toileting needs, Toileting schedule/hourly rounds  Problem: Heart Failure: Day 1  Goal: Activity/Safety  Outcome: Progressing Towards Goal  Goal: Medications  Outcome: Progressing Towards Goal  Goal: Respiratory  Outcome: Progressing Towards Goal     Problem: Heart Failure: Day 2  Goal: Activity/Safety  Outcome: Progressing Towards Goal

## 2022-10-27 NOTE — PROGRESS NOTES
36:   TRANSFER - IN REPORT:    Verbal report received from Sandra Kimbrough RN(name) on Devon Davidson  being received from [facility] (unit) for ordered procedure      Report consisted of patients Situation, Background, Assessment and   Recommendations(SBAR). Information from the following report(s) SBAR was reviewed with the receiving nurse. Opportunity for questions and clarification was provided. Assessment completed upon patients arrival to unit and care assumed. 1000:  Cardiac Cath Lab Recovery Arrival Note:      Devon Davdison arrived to Cardiac Cath Lab, Recovery Area. Staff introduced to patient. Patient identifiers verified with NAME and DATE OF BIRTH. Procedure verified with patient. Consent forms reviewed and signed by patient or authorized representative and verified. Allergies verified. Patient and family oriented to department. Patient and family informed of procedure and plan of care. Questions answered with review. Patient prepped for procedure, per orders from physician, prior to arrival.    Patient on cardiac monitor, non-invasive blood pressure, SPO2 monitor. On RA. Patient is A&Ox 4. Patient reports no complaints. Patient in stretcher, in low position, with side rails up, call bell within reach, patient instructed to call if assistance as needed. Patient prep in: [address], [place]. Patient family has pager #   Family in: home.    Prep by: Mendoza Gómez RN

## 2022-10-28 LAB
ALBUMIN SERPL-MCNC: 4.2 G/DL (ref 3.5–5)
ALBUMIN/GLOB SERPL: 1 {RATIO} (ref 1.1–2.2)
ALP SERPL-CCNC: 112 U/L (ref 45–117)
ALT SERPL-CCNC: 59 U/L (ref 12–78)
ANA HOMOGEN TITR SER: ABNORMAL {TITER}
ANA TITR SER IF: POSITIVE {TITER}
ANION GAP SERPL CALC-SCNC: 8 MMOL/L (ref 5–15)
AST SERPL-CCNC: 70 U/L (ref 15–37)
BILIRUB SERPL-MCNC: 2.1 MG/DL (ref 0.2–1)
BNP SERPL-MCNC: 2676 PG/ML
BUN SERPL-MCNC: 11 MG/DL (ref 6–20)
BUN/CREAT SERPL: 10 (ref 12–20)
CALCIUM SERPL-MCNC: 9.8 MG/DL (ref 8.5–10.1)
CHLORIDE SERPL-SCNC: 100 MMOL/L (ref 97–108)
CO2 SERPL-SCNC: 28 MMOL/L (ref 21–32)
COVID-19 RAPID TEST, COVR: NOT DETECTED
CREAT SERPL-MCNC: 1.06 MG/DL (ref 0.55–1.02)
EBV DNA SPEC QL NAA+PROBE: NEGATIVE
ERYTHROCYTE [DISTWIDTH] IN BLOOD BY AUTOMATED COUNT: 13.6 % (ref 11.5–14.5)
GLOBULIN SER CALC-MCNC: 4.3 G/DL (ref 2–4)
GLUCOSE BLD STRIP.AUTO-MCNC: 100 MG/DL (ref 65–117)
GLUCOSE BLD STRIP.AUTO-MCNC: 86 MG/DL (ref 65–117)
GLUCOSE BLD STRIP.AUTO-MCNC: 88 MG/DL (ref 65–117)
GLUCOSE BLD STRIP.AUTO-MCNC: 90 MG/DL (ref 65–117)
GLUCOSE SERPL-MCNC: 104 MG/DL (ref 65–100)
HCT VFR BLD AUTO: 45.9 % (ref 35–47)
HGB BLD-MCNC: 14.7 G/DL (ref 11.5–16)
MAGNESIUM SERPL-MCNC: 2.3 MG/DL (ref 1.6–2.4)
MCH RBC QN AUTO: 29.1 PG (ref 26–34)
MCHC RBC AUTO-ENTMCNC: 32 G/DL (ref 30–36.5)
MCV RBC AUTO: 90.9 FL (ref 80–99)
NOTE:, 016270: ABNORMAL
NRBC # BLD: 0 K/UL (ref 0–0.01)
NRBC BLD-RTO: 0 PER 100 WBC
PLATELET # BLD AUTO: 262 K/UL (ref 150–400)
PMV BLD AUTO: 11.2 FL (ref 8.9–12.9)
POTASSIUM SERPL-SCNC: 3.7 MMOL/L (ref 3.5–5.1)
PROT SERPL-MCNC: 8.5 G/DL (ref 6.4–8.2)
RBC # BLD AUTO: 5.05 M/UL (ref 3.8–5.2)
SERVICE CMNT-IMP: NORMAL
SODIUM SERPL-SCNC: 136 MMOL/L (ref 136–145)
SOURCE, COVRS: NORMAL
SPECIMEN SOURCE: NORMAL
VIT B12 SERPL-MCNC: 1401 PG/ML (ref 193–986)
WBC # BLD AUTO: 6.2 K/UL (ref 3.6–11)

## 2022-10-28 PROCEDURE — 80053 COMPREHEN METABOLIC PANEL: CPT

## 2022-10-28 PROCEDURE — 74011250637 HC RX REV CODE- 250/637: Performed by: FAMILY MEDICINE

## 2022-10-28 PROCEDURE — 82962 GLUCOSE BLOOD TEST: CPT

## 2022-10-28 PROCEDURE — 74011250637 HC RX REV CODE- 250/637: Performed by: NURSE PRACTITIONER

## 2022-10-28 PROCEDURE — 87635 SARS-COV-2 COVID-19 AMP PRB: CPT

## 2022-10-28 PROCEDURE — 85027 COMPLETE CBC AUTOMATED: CPT

## 2022-10-28 PROCEDURE — 83735 ASSAY OF MAGNESIUM: CPT

## 2022-10-28 PROCEDURE — 74011000250 HC RX REV CODE- 250: Performed by: FAMILY MEDICINE

## 2022-10-28 PROCEDURE — 83880 ASSAY OF NATRIURETIC PEPTIDE: CPT

## 2022-10-28 PROCEDURE — 36415 COLL VENOUS BLD VENIPUNCTURE: CPT

## 2022-10-28 PROCEDURE — 74011250637 HC RX REV CODE- 250/637: Performed by: HOSPITALIST

## 2022-10-28 PROCEDURE — 82607 VITAMIN B-12: CPT

## 2022-10-28 PROCEDURE — 74011250636 HC RX REV CODE- 250/636: Performed by: HOSPITALIST

## 2022-10-28 PROCEDURE — 74011250636 HC RX REV CODE- 250/636: Performed by: NURSE PRACTITIONER

## 2022-10-28 PROCEDURE — 65660000001 HC RM ICU INTERMED STEPDOWN

## 2022-10-28 PROCEDURE — 84630 ASSAY OF ZINC: CPT

## 2022-10-28 PROCEDURE — 99233 SBSQ HOSP IP/OBS HIGH 50: CPT | Performed by: NURSE PRACTITIONER

## 2022-10-28 PROCEDURE — 74011000250 HC RX REV CODE- 250: Performed by: NURSE PRACTITIONER

## 2022-10-28 PROCEDURE — 74011250636 HC RX REV CODE- 250/636: Performed by: FAMILY MEDICINE

## 2022-10-28 RX ORDER — SPIRONOLACTONE 25 MG/1
25 TABLET ORAL DAILY
Status: DISCONTINUED | OUTPATIENT
Start: 2022-10-29 | End: 2022-11-09 | Stop reason: HOSPADM

## 2022-10-28 RX ORDER — PANTOPRAZOLE SODIUM 40 MG/1
40 TABLET, DELAYED RELEASE ORAL DAILY
Status: DISCONTINUED | OUTPATIENT
Start: 2022-10-28 | End: 2022-10-30

## 2022-10-28 RX ORDER — DOBUTAMINE HYDROCHLORIDE 200 MG/100ML
2.5 INJECTION INTRAVENOUS CONTINUOUS
Status: DISCONTINUED | OUTPATIENT
Start: 2022-10-28 | End: 2022-11-01

## 2022-10-28 RX ORDER — POLYETHYLENE GLYCOL 3350 17 G/17G
17 POWDER, FOR SOLUTION ORAL DAILY PRN
Status: DISCONTINUED | OUTPATIENT
Start: 2022-10-28 | End: 2022-11-09 | Stop reason: HOSPADM

## 2022-10-28 RX ORDER — PROCHLORPERAZINE EDISYLATE 5 MG/ML
5 INJECTION INTRAMUSCULAR; INTRAVENOUS
Status: DISCONTINUED | OUTPATIENT
Start: 2022-10-28 | End: 2022-11-09 | Stop reason: HOSPADM

## 2022-10-28 RX ADMIN — POLYETHYLENE GLYCOL 3350 17 G: 17 POWDER, FOR SOLUTION ORAL at 09:34

## 2022-10-28 RX ADMIN — ROPINIROLE HYDROCHLORIDE 0.5 MG: 0.25 TABLET, FILM COATED ORAL at 19:24

## 2022-10-28 RX ADMIN — ESCITALOPRAM OXALATE 20 MG: 10 TABLET ORAL at 09:32

## 2022-10-28 RX ADMIN — SPIRONOLACTONE 12.5 MG: 25 TABLET ORAL at 09:33

## 2022-10-28 RX ADMIN — ALLOPURINOL 50 MG: 100 TABLET ORAL at 09:33

## 2022-10-28 RX ADMIN — SODIUM CHLORIDE, PRESERVATIVE FREE 5 ML: 5 INJECTION INTRAVENOUS at 14:00

## 2022-10-28 RX ADMIN — HYDROCORTISONE: 25 CREAM TOPICAL at 09:35

## 2022-10-28 RX ADMIN — CLONAZEPAM 1 MG: 0.5 TABLET ORAL at 21:02

## 2022-10-28 RX ADMIN — HYDROCORTISONE: 25 CREAM TOPICAL at 19:25

## 2022-10-28 RX ADMIN — ONDANSETRON HYDROCHLORIDE 4 MG: 2 INJECTION, SOLUTION INTRAMUSCULAR; INTRAVENOUS at 01:25

## 2022-10-28 RX ADMIN — BUMETANIDE 1 MG/HR: 0.25 INJECTION, SOLUTION INTRAMUSCULAR; INTRAVENOUS at 16:01

## 2022-10-28 RX ADMIN — TRAZODONE HYDROCHLORIDE 200 MG: 100 TABLET ORAL at 21:02

## 2022-10-28 RX ADMIN — HYDROCORTISONE: 25 CREAM TOPICAL at 12:22

## 2022-10-28 RX ADMIN — FOLIC ACID 1 MG: 1 TABLET ORAL at 09:33

## 2022-10-28 RX ADMIN — BUMETANIDE 1 MG/HR: 0.25 INJECTION, SOLUTION INTRAMUSCULAR; INTRAVENOUS at 08:15

## 2022-10-28 RX ADMIN — SODIUM CHLORIDE, PRESERVATIVE FREE 10 ML: 5 INJECTION INTRAVENOUS at 21:03

## 2022-10-28 RX ADMIN — Medication 400 MG: at 21:02

## 2022-10-28 RX ADMIN — ROPINIROLE HYDROCHLORIDE 0.5 MG: 0.25 TABLET, FILM COATED ORAL at 09:32

## 2022-10-28 RX ADMIN — EZETIMIBE 10 MG: 10 TABLET ORAL at 09:32

## 2022-10-28 RX ADMIN — MONTELUKAST 10 MG: 10 TABLET, FILM COATED ORAL at 09:33

## 2022-10-28 RX ADMIN — SODIUM CHLORIDE, PRESERVATIVE FREE 10 ML: 5 INJECTION INTRAVENOUS at 07:24

## 2022-10-28 RX ADMIN — POTASSIUM CHLORIDE 40 MEQ: 750 TABLET, FILM COATED, EXTENDED RELEASE ORAL at 09:32

## 2022-10-28 RX ADMIN — DOCUSATE SODIUM 100 MG: 100 CAPSULE, LIQUID FILLED ORAL at 09:32

## 2022-10-28 RX ADMIN — HYDROCORTISONE: 25 CREAM TOPICAL at 21:04

## 2022-10-28 RX ADMIN — PROCHLORPERAZINE EDISYLATE 5 MG: 5 INJECTION INTRAMUSCULAR; INTRAVENOUS at 15:57

## 2022-10-28 RX ADMIN — DOBUTAMINE HYDROCHLORIDE 2.5 MCG/KG/MIN: 200 INJECTION INTRAVENOUS at 20:50

## 2022-10-28 RX ADMIN — POTASSIUM CHLORIDE 40 MEQ: 750 TABLET, FILM COATED, EXTENDED RELEASE ORAL at 19:24

## 2022-10-28 RX ADMIN — EMPAGLIFLOZIN 10 MG: 10 TABLET, FILM COATED ORAL at 09:32

## 2022-10-28 RX ADMIN — MAGNESIUM HYDROXIDE 30 ML: 400 SUSPENSION ORAL at 09:31

## 2022-10-28 RX ADMIN — ONDANSETRON HYDROCHLORIDE 4 MG: 2 INJECTION, SOLUTION INTRAMUSCULAR; INTRAVENOUS at 07:24

## 2022-10-28 RX ADMIN — PANTOPRAZOLE SODIUM 40 MG: 40 TABLET, DELAYED RELEASE ORAL at 09:33

## 2022-10-28 RX ADMIN — PRASUGREL 10 MG: 10 TABLET, FILM COATED ORAL at 09:32

## 2022-10-28 NOTE — PROGRESS NOTES
[id number]  Hospitalist Group                                                                                          Hospitalist Progress Note  Alyssia Lynn MD  Answering service: [phone] OR 36 from in house phone        Date of Service:  10/28/2022  NAME:  Tawanna Marrufo  :  1980  MRN:  [id number]      Admission Summary:     Patient initially presents with suicidal ideation and found to have congestive heart failure. Interval history / Subjective:     Her breathing and swelling are better, however she has multiple other complaints including nausea, dysgeusia, hoarse voice, constipation though last bm yesterday and she was complaining of loose stools. She is concerned she may have caught a cold here. Assessment & Plan:     Congestive heart failure  -Acute on chronic systolic CHF NYHA class IV on admission  -Patient with ischemic cardiomyopathy with EF with 15 to 20%  -s/p RHC 10/27 - started on dobutamine and bumetanide gtt  -continue spironolactone, Jardiance  -other GDMT as BP tolerates    Urinary tract infection  -Completed antibiotic    Hypertension  -Blood pressure is borderline low normal    Type 2 diabetes  -Well-controlled with hemoglobin A1c of 6.3  -Continue sliding scale insulin coverage    Coronary artery disease  -Patient with history of STEMI with stent to LAD  -Continue Effient    Dyslipidemia  -On Zetia    Restless leg syndrome  -On Requip    Hemorrhoids  -Continue Anusol cream    Depression with suicidal ideation  -Patient previously evaluated by psychiatry  -On Lexapro, also on Klonopin for anxiety  -One-on-one sitter discontinued per psych recommendation  -Outpatient follow-up with psychiatry    Anxiety  -Patient on Klonopin for 12 years, continue Klonopin here  -Continue trazodone for sleep  -Outpatient follow-up with psychiatry    Constipation  -resolved; continue PRN Miralax    Dysgeusia:   -check B12, zinc, rapid covid.  TSH checked and was only mildly elevated in the setting of acute illness. Nausea: likely multifactorial, hx of gastroparesis, bowel edema from CHF  -continue supportive care     Patient is critically-ill needing multiple drips, and at risk for decline, CCT 35 minutes    Code status: Full  Prophylaxis: SCDs  Care Plan discussed with: Patient/RN/CM/AHF  Anticipated Disposition: Likely more than 48 hours     Hospital Problems  Date Reviewed: 10/14/2022            Codes Class Noted POA    CHF (congestive heart failure) ([facility] [address] ICD-10-CM: I50.9  ICD-9-CM: 428.0  10/21/2022 Unknown         Review of Systems:   A comprehensive review of systems was negative except for that written in the HPI. Vital Signs:    Last 24hrs VS reviewed since prior progress note. Most recent are:  Visit Vitals  /80 (BP 1 Location: Left upper arm, BP Patient Position: At rest)   Pulse 92   Temp 98.1 °F (36.7 °C)   Resp 17   Ht 5' 4\" (1.626 m)   Wt 87.5 kg (192 lb 14.4 oz)   SpO2 100%   BMI 33.11 kg/m²         Intake/Output Summary (Last 24 hours) at 10/28/2022 1038  Last data filed at 10/28/2022 0853  Gross per 24 hour   Intake 980.2 ml   Output 3230 ml   Net -2249.8 ml          Physical Examination:     I had a face to face encounter with this patient and independently examined them on 10/28/2022 as outlined below:          Constitutional:  No acute distress, non-toxic, obese   ENT:  Oral mucosa moist, oropharynx benign, anicteric sclerae    Resp:  CTA bilaterally. No wheezing/rhonchi/rales. No accessory muscle use. CV:  Regular rhythm, normal rate, no murmurs, +S3 gallop, trace b/l LE edema    GI:  Soft, non distended, non tender. normoactive bowel sounds, no hepatosplenomegaly     Musculoskeletal:  warm    Neurologic:  Moves all extremities.   AAOx3, CN II-XII reviewed            Data Review:    Review and/or order of clinical lab test      Labs:     Recent Labs     10/28/22  0343 10/26/22  0255   WBC 6.2 6.6   HGB 14.7 12.6   HCT 45.9 39.5   PLT 262 238       Recent Labs     10/28/22  0343 10/27/22  0423 10/26/22  0255    136 136   K 3.7 3.8 3.8    103 104   CO2 28 27 24   BUN 11 11 10   CREA 1.06* 0.96 0.89   * 95 95   CA 9.8 10.1 9.7   MG 2.3 2.5* 2.3       Recent Labs     10/28/22  0343 10/27/22  0423 10/26/22  0255   ALT 59 29 27    87 85   TBILI 2.1* 2.0* 2.2*   TP 8.5* 7.4 7.0   ALB 4.2 3.6 3.5   GLOB 4.3* 3.8 3.5   AML  --  12*  --    LPSE  --  26*  --        No results for input(s): INR, PTP, APTT, INREXT, INREXT in the last 72 hours. No results for input(s): FE, TIBC, PSAT, FERR in the last 72 hours. Lab Results   Component Value Date/Time    Folate 20.0 10/23/2022 04:24 AM        No results for input(s): PH, PCO2, PO2 in the last 72 hours. No results for input(s): CPK, CKNDX, TROIQ in the last 72 hours.     No lab exists for component: CPKMB  Lab Results   Component Value Date/Time    Cholesterol, total 95 10/22/2022 06:26 AM    HDL Cholesterol 32 10/22/2022 06:26 AM    LDL, calculated 45 10/22/2022 06:26 AM    Triglyceride 90 10/22/2022 06:26 AM    CHOL/HDL Ratio 3.0 10/22/2022 06:26 AM     Lab Results   Component Value Date/Time    Glucose (POC) 90 10/28/2022 07:00 AM    Glucose (POC) 101 10/27/2022 09:20 PM    Glucose (POC) 94 10/27/2022 04:12 PM    Glucose (POC) 92 10/27/2022 12:16 PM    Glucose (POC) 95 10/27/2022 07:06 AM     Lab Results   Component Value Date/Time    Color ORANGE 10/17/2022 08:42 AM    Appearance TURBID (A) 10/17/2022 08:42 AM    Specific gravity 1.025 10/17/2022 08:42 AM    Specific gravity 1.024 10/06/2022 08:57 AM    pH (UA) 5.0 10/17/2022 08:42 AM    Protein 30 (A) 10/17/2022 08:42 AM    Glucose Negative 10/17/2022 08:42 AM    Ketone Negative 10/17/2022 08:42 AM    Bilirubin Negative 11/25/2021 09:47 AM    Urobilinogen 1.0 10/17/2022 08:42 AM    Nitrites Positive (A) 10/17/2022 08:42 AM    Leukocyte Esterase SMALL (A) 10/17/2022 08:42 AM    Epithelial cells FEW 10/17/2022 08:42 AM Bacteria 2+ (A) 10/17/2022 08:42 AM    WBC 5-10 10/17/2022 08:42 AM    RBC 5-10 10/17/2022 08:42 AM         Medications Reviewed:     Current Facility-Administered Medications   Medication Dose Route Frequency    pantoprazole (PROTONIX) tablet 40 mg  40 mg Oral DAILY    polyethylene glycol (MIRALAX) packet 17 g  17 g Oral DAILY PRN    [START ON 10/29/2022] spironolactone (ALDACTONE) tablet 25 mg  25 mg Oral DAILY    bumetanide (BUMEX) 0.25 mg/mL infusion  1 mg/hr IntraVENous CONTINUOUS    DOBUTamine (DOBUTREX) 500 MG/250 mL (2000 mcg/mL) in D5W infusion  2.5 mcg/kg/min IntraVENous CONTINUOUS    potassium chloride SR (KLOR-CON 10) tablet 40 mEq  40 mEq Oral BID    ondansetron (ZOFRAN) injection 4 mg  4 mg IntraVENous Q6H PRN    allopurinoL (ZYLOPRIM) tablet 50 mg  50 mg Oral DAILY    hydrocortisone (ANUSOL-HC) 2.5 % rectal cream   PeriANAL QID    melatonin tablet 10.5 mg  10.5 mg Oral QHS PRN    empagliflozin (JARDIANCE) tablet 10 mg  10 mg Oral DAILY    magnesium hydroxide (MILK OF MAGNESIA) 400 mg/5 mL oral suspension 30 mL  30 mL Oral DAILY    aluminum & magnesium hydroxide-simethicone (MYLANTA II) oral suspension 30 mL  30 mL Oral Q4H PRN    diazePAM (VALIUM) tablet 5 mg  5 mg Oral Q12H PRN    docusate sodium (COLACE) capsule 100 mg  100 mg Oral DAILY    bisacodyL (DULCOLAX) suppository 10 mg  10 mg Rectal DAILY    albuterol-ipratropium (DUO-NEB) 2.5 MG-0.5 MG/3 ML  3 mL Nebulization Q6H PRN    fluticasone propionate (FLONASE) 50 mcg/actuation nasal spray 2 Spray  2 Spray Both Nostrils DAILY    traZODone (DESYREL) tablet 200 mg  200 mg Oral QHS    magnesium oxide (MAG-OX) tablet 400 mg  400 mg Oral QHS    calcium carbonate (TUMS) chewable tablet 200 mg [elemental]  200 mg Oral TID PRN    clonazePAM (KlonoPIN) tablet 1 mg  1 mg Oral QHS    escitalopram oxalate (LEXAPRO) tablet 20 mg  20 mg Oral DAILY    ezetimibe (ZETIA) tablet 10 mg  10 mg Oral DAILY    folic acid (FOLVITE) tablet 1 mg  1 mg Oral DAILY montelukast (SINGULAIR) tablet 10 mg  10 mg Oral DAILY    prasugreL (EFFIENT) tablet 10 mg  10 mg Oral DAILY    rOPINIRole (REQUIP) tablet 0.5 mg  0.5 mg Oral BID    [Held by provider] rosuvastatin (CRESTOR) tablet 20 mg  20 mg Oral Q MON, WED & FRI    sodium chloride (NS) flush 5-40 mL  5-40 mL IntraVENous Q8H    sodium chloride (NS) flush 5-40 mL  5-40 mL IntraVENous PRN    acetaminophen (TYLENOL) tablet 650 mg  650 mg Oral Q4H PRN    nitroglycerin (NITROSTAT) tablet 0.4 mg  0.4 mg SubLINGual Q5MIN PRN    glucose chewable tablet 16 g  4 Tablet Oral PRN    glucagon (GLUCAGEN) injection 1 mg  1 mg IntraMUSCular PRN    dextrose 10 % infusion 0-250 mL  0-250 mL IntraVENous PRN    insulin lispro (HUMALOG) injection   SubCUTAneous AC&HS     ______________________________________________________________________  EXPECTED LENGTH OF STAY: 3d 19h  ACTUAL LENGTH OF STAY:          7                 Danya Yanez MD

## 2022-10-28 NOTE — PROGRESS NOTES
1930 Bedside shift change report given to Nalini Church and Delfino Wheat (oncoming nurse) by Doris Luo (offgoing nurse). Report included the following information SBAR, Kardex, Procedure Summary, Intake/Output, MAR, Recent Results, and Cardiac Rhythm NSR .     0730 Bedside shift change report given to Giuliana (oncoming nurse) by Nalini Church and Delfino Wheat (offgoing nurse). Report included the following information SBAR, Kardex, Procedure Summary, Intake/Output, MAR, Recent Results, and Cardiac Rhythm NSR .      Problem: Patient Education: Go to Patient Education Activity  Goal: Patient/Family Education  Outcome: Progressing Towards Goal     Problem: Heart Failure: Day 5  Goal: Off Pathway (Use only if patient is Off Pathway)  Outcome: Progressing Towards Goal  Goal: Activity/Safety  Outcome: Progressing Towards Goal  Goal: Diagnostic Test/Procedures  Outcome: Progressing Towards Goal  Goal: Nutrition/Diet  Outcome: Progressing Towards Goal  Goal: Discharge Planning  Outcome: Progressing Towards Goal  Goal: Medications  Outcome: Progressing Towards Goal  Goal: Respiratory  Outcome: Progressing Towards Goal  Goal: Treatments/Interventions/Procedures  Outcome: Progressing Towards Goal  Goal: Psychosocial  Outcome: Progressing Towards Goal

## 2022-10-28 NOTE — PROGRESS NOTES
0730: Bedside and Verbal shift change report given to CK Giordano (oncoming nurse) by SN Mich (offgoing nurse). Report included the following information SBAR, Kardex, MAR, and Cardiac Rhythm NSR .     1318: Pt c/o bilateral numb/tingling in face and BLE, overnight labs normal, neuro assessment negative. MD Mazariegos notified. Per pt numb/tingling is chronic and not a new acute change. 1930: Bedside and Verbal shift change report given to Germania Francis RN and SN Armando (oncoming nurse) by Dallas Puente RN (offgoing nurse). Report included the following information SBAR, Kardex, MAR, and Cardiac Rhythm NSR .

## 2022-10-28 NOTE — PROGRESS NOTES
[facility] in [place], [place]  Inpatient Progress Note      Patient name: Eliane Late  Patient : 1980  Patient MRN: [id number]  Consulting MD: Ellyn Ho MD  Date of service: 10/28/22      REASON FOR REFERRAL:  Management of acute on chronic systolic heart failure      PLAN OF CARE   38 y.o. female with history of HTN, CAD s/p STEMI in 2022 (DESx1 to LAD; treated at [facility]) with ischemic cardiomyopathy (EF 20-25%), CHF, GERD, RLS, and fibromyalgia seen as a new patient in [facility] on 10/21/22 and found to be fluid overloaded, and with suicidal ideation. Pt was taken to the ER for further evaluation and admission for acute on chronic HF and mental health crisis. Pt contracted for safety, and is being seen by behavioral health/psych  Ongoing diuresis and remainder of heart failure work-up while in hospital; work on optimizing GDMT after diuresis complete- increase diuresis and start Dobutamine based on [address] on 10/27/22.    GDMT limited in the past d/t hypotension            RECOMMENDATIONS:  Cont Dobutamine 2.5mcg/kg/min to augment diuresis- monitor HR  Beta-blocker: hold Coreg while on Dobutamine   ACE/ARB/ARNi: hold while undergoing aggressive diuresis  MRA: Increase spironolactone to 25mg daily  SGLT2 inhibitor: Jardiance 10mg daily  Diuretic: Cont Bumex gtt 1mg/hr; goal net -2L next 24 hrs  Keep K > 4 and Mag > 2  Cont allopurinol 50mg daily   Continue ASA and prasugrel per primary cards  Statin or PCSK9i: Continue current dose of statin  Treatment of STU: inpatient eval for STU  Likely will need referral for ICD given EF remains <35% 3 mos after MI, may need LifeVest at discharge if no ICD implant  Echocardiogram done on admission, EF 15-20%  Labs: HF and Transplant eval labs ordered; in process  Will have  completed psychosocial evaluation   Nutritionist consult  Heart failure education  Needs Advanced care plan    All other care per primary team     IMPRESSION:  Fatigue  Shortness of breath  Volume overload  Acute on Chronic systolic heart failure  Stage C, NYHA class IV symptoms  ischemic cardiomyopathy, LVEF 20-25%  CAD  STEMI July 2022 s/p RICKY to LAD  HTN  Obesity  LHD  Pre-diabetes  Esophageal stenosis s/p dilation  RLS  Fibromyalgia  Anxiety and Depression      INTERVAL HISTORY:  No acute overnight events  K 3.7  Creatinine stable  PBNP stable  Teary this morning     LIFE GOALS:  Patient's personal goals include: get better  Important upcoming milestones or family events: the holidays coming up  The patient identifies the following as important for living well: being independent        [facility]:  Echo 10/22/22    Left Ventricle: Severely reduced left ventricular systolic function with a visually estimated EF of 15 - 20%. Left ventricle is mildly dilated. Normal wall thickness. Moderate hypokinesis of the following segments: basal anterior, basal anterolateral, basal anteroseptal, basal inferior, basal inferolateral, basal inferoseptal, mid anterior, mid anterolateral, mid anteroseptal, mid inferior, mid inferolateral and mid inferoseptal. Severe hypokinesis of the following segments: apical anterior, apical septal, apical inferior and apical lateral. Grade III diastolic dysfunction with increased LAP. Mitral Valve: Mild annular dilation. Moderate regurgitation. Tricuspid Valve: Moderately elevated RVSP. The estimated RVSP is 51 mmHg. Left Atrium: Left atrium is mildly dilated. Pericardium: Trivial pericardial effusion present. No indication of cardiac tamponade. Contrast used: Definity. Echo (8/14/22)    Left Ventricle: Severely reduced left ventricular systolic function with a visually estimated EF of 20 - 25%. Left ventricle is mildly dilated. Increased wall thickness. See diagram for wall motion findings. Grade II diastolic dysfunction with increased LAP.     Right Ventricle: Not well visualized. Tricuspid Valve: Moderately elevated RVSP. [address] 10/27/22  PA 56/34/43, RA 16, PCWP 29, CI daisy 1.46      BRIEF HISTORY OF PRESENT ILLNESS:  Ashley Sims is a 38 y.o. female with h/o HTN, CAD s/p STEMI in July 2022 (DESx1 to LAD; treated at [facility]) with ischemic cardiomyopathy (EF 20-25%), CHF, GERD, RLS, and fibromyalgia seen as a new patient in [facility] on 10/21/22 and found to be fluid overloaded, and with suicidal ideation. Pt was taken to the ER for further evaluation and admission for acute on chronic HF and mental health crisis. REVIEW OF SYSTEMS:  Review of Systems   Constitutional:  Negative for chills, fever, malaise/fatigue and weight loss. Respiratory:  Negative for cough and shortness of breath. Cardiovascular:  Negative for chest pain, palpitations, orthopnea and leg swelling. Gastrointestinal:  Positive for nausea. Negative for abdominal pain, constipation, diarrhea, heartburn and vomiting. Musculoskeletal:  Negative for myalgias. Restless legs   Neurological:  Negative for dizziness and weakness. Psychiatric/Behavioral:  Positive for depression. Negative for suicidal ideas. The patient is nervous/anxious and has insomnia. PHYSICAL EXAM:  Visit Vitals  /80 (BP 1 Location: Left upper arm, BP Patient Position: At rest)   Pulse 92   Temp 98.1 °F (36.7 °C)   Resp 17   Ht 5' 4\" (1.626 m)   Wt 192 lb 14.4 oz (87.5 kg)   LMP 10/09/2022   SpO2 100%   BMI 33.11 kg/m²         Physical Exam  Vitals and nursing note reviewed. Constitutional:       Appearance: Normal appearance. She is obese. Cardiovascular:      Rate and Rhythm: Normal rate and regular rhythm. Heart sounds: No murmur heard. No friction rub. Gallop present. S3 sounds present. Pulmonary:      Effort: No respiratory distress. Breath sounds: Normal breath sounds. Abdominal:      General: Bowel sounds are normal. There is distension. Palpations: Abdomen is soft. Tenderness: There is no abdominal tenderness. Musculoskeletal:      Right lower leg: No edema. Left lower leg: No edema. Skin:     General: Skin is warm and dry. Capillary Refill: Capillary refill takes less than 2 seconds. Neurological:      General: No focal deficit present. Mental Status: She is alert and oriented to person, place, and time. Psychiatric:         Mood and Affect: Affect is tearful. Behavior: Behavior normal.         Thought Content: Thought content normal.         Judgment: Judgment normal.            PAST MEDICAL HISTORY:  Past Medical History:   Diagnosis Date    Anemia NEC     Arthritis     Chronic pain     fybromyalsia    Depression     anxiety, depression, OCD    GERD (gastroesophageal reflux disease)     Hypertension     Hypertension     Ill-defined condition     Fibromyalia gastricparesis    MI (myocardial infarction) ([facility] [address]     Musculoskeletal disorder     SOB (shortness of breath)     Stool color black        PAST SURGICAL HISTORY:  Past Surgical History:   Procedure Laterality Date    HX CORONARY STENT PLACEMENT      HX GYN       1998    HX HEENT  2002    wisdom teeth extraction    UPPER GI ENDOSCOPY,BIOPSY  2018         UPPER GI ENDOSCOPY,DIAGNOSIS  2018            FAMILY HISTORY:  Family History   Problem Relation Age of Onset    Hypertension Father     Elevated Lipids Father     Arthritis-rheumatoid Mother         ? Lupus vs RA    Lung Disease Mother     Heart Disease Mother     Cancer Mother         breast in 39y    COPD Mother     Hypertension Mother     Stroke Mother         3-4 strokes    Breast Cancer Mother 50    Diabetes Maternal Grandmother        SOCIAL HISTORY:  Social History     Socioeconomic History    Marital status: SINGLE   Tobacco Use    Smoking status: Former     Packs/day: 0.25     Years: 8.00     Pack years: 2.00     Types: Cigarettes     Quit date: 2022     Years since quittin.2    Smokeless tobacco: Never Vaping Use    Vaping Use: Never used   Substance and Sexual Activity    Alcohol use: Not Currently     Alcohol/week: 1.0 standard drink     Types: 1 Glasses of wine per week     Comment: Wine with special occassions - not since July    Drug use: Not Currently     Types: Marijuana     Comment: Haven't had any since 7/24/2022    Sexual activity: Yes     Partners: Male     Birth control/protection: None     Social Determinants of Health     Financial Resource Strain: High Risk    Difficulty of Paying Living Expenses: Very hard   Food Insecurity: No Food Insecurity    Worried About Running Out of Food in the Last Year: Never true    Ran Out of Food in the Last Year: Never true   Transportation Needs: No Transportation Needs    Lack of Transportation (Medical): No    Lack of Transportation (Non-Medical): No   Physical Activity: Inactive    Days of Exercise per Week: 0 days    Minutes of Exercise per Session: 0 min   Stress: Stress Concern Present    Feeling of Stress :  To some extent   Social Connections: Socially Isolated    Frequency of Communication with Friends and Family: Twice a week    Frequency of Social Gatherings with Friends and Family: Never    Attends Sikh Services: Never    Active Member of Clubs or Organizations: No    Attends Club or Organization Meetings: Never    Marital Status: Never    Housing Stability: Low Risk     Unable to Pay for Housing in the Last Year: No    Number of [place] in the Last Year: 1    Unstable Housing in the Last Year: No       LABORATORY RESULTS:     Labs Latest Ref Rng & Units 10/28/2022 10/27/2022 10/26/2022 10/25/2022 10/24/2022 10/23/2022 10/22/2022   WBC 3.6 - 11.0 K/uL 6.2 - 6.6 - - - 6.0   RBC 3.80 - 5.20 M/uL 5.05 - 4.28 - - - 4.42   Hemoglobin 11.5 - 16.0 g/dL 14.7 - 12.6 - - - 12.9   Hematocrit 35.0 - 47.0 % 45.9 - 39.5 - - - 40.2   MCV 80.0 - 99.0 FL 90.9 - 92.3 - - - 91.0   Platelets 968 - 329 K/uL 262 - 238 - - - 283   Lymphocytes 12 - 49 % - - 51(H) - - - 42   Monocytes 5 - 13 % - - 7 - - - 7   Eosinophils 0 - 7 % - - 8(H) - - - 8(H)   Basophils 0 - 1 % - - 1 - - - 1   Albumin 3.5 - 5.0 g/dL 4.2 3.6 3.5 3. 4(L) 3.5 3. 4(L) -   Calcium 8.5 - 10.1 MG/DL 9.8 10.1 9.7 9.5 9.7 9.6 9.6   Glucose 65 - 100 mg/dL 104(H) 95 95 103(H) 99 113(H) 114(H)   BUN 6 - 20 MG/DL 11 11 10 12 10 9 8   Creatinine 0.55 - 1.02 MG/DL 1.06(H) 0.96 0.89 0.75 0.86 0.93 0.87   Sodium 136 - 145 mmol/L 136 136 136 137 139 141 136   Potassium 3.5 - 5.1 mmol/L 3.7 3.8 3.8 4.4 3.5 3. 4(L) 3.5   TSH 0.36 - 3.74 uIU/mL - - - - - - -   Some recent data might be hidden     Lab Results   Component Value Date/Time    TSH 4.44 (H) 10/21/2022 06:12 PM    TSH 2.12 05/12/2021 10:55 AM    TSH 1.330 12/30/2019 12:00 AM    TSH 3.850 07/25/2018 12:40 PM    TSH 1.620 02/27/2018 08:59 AM    TSH 1.240 12/05/2016 10:13 AM    TSH 1.530 03/18/2016 10:31 AM    TSH 1.400 11/09/2011 09:37 AM    TSH 1.26 08/19/2010 10:36 AM    TSH 1.18 07/28/2010 04:10 PM       ALLERGY:  Allergies   Allergen Reactions    Abilify [Aripiprazole] Anxiety     Can not tolerate     Sulfa (Sulfonamide Antibiotics) Hives    Vicodin [Hydrocodone-Acetaminophen] Nausea and Vomiting        CURRENT MEDICATIONS:    Current Facility-Administered Medications:     pantoprazole (PROTONIX) tablet 40 mg, 40 mg, Oral, DAILY, Ashley Mazariegos MD, 40 mg at 10/28/22 0933    polyethylene glycol (MIRALAX) packet 17 g, 17 g, Oral, DAILY PRN, Kaila Cornell MD, 17 g at 10/28/22 0934    bumetanide (BUMEX) 0.25 mg/mL infusion, 1 mg/hr, IntraVENous, CONTINUOUS, Hiro, Gena B, NP, Last Rate: 4 mL/hr at 10/28/22 0815, 1 mg/hr at 10/28/22 0815    DOBUTamine (DOBUTREX) 500 MG/250 mL (2000 mcg/mL) in D5W infusion, 2.5 mcg/kg/min, IntraVENous, CONTINUOUS, Hiro, Egna B, NP, Last Rate: 6.8 mL/hr at 10/28/22 0730, 2.5 mcg/kg/min at 10/28/22 0730    potassium chloride SR (KLOR-CON 10) tablet 40 mEq, 40 mEq, Oral, BID, Hiro, Gena B, NP, 40 mEq at 10/28/22 0932    ondansetron (ZOFRAN) injection 4 mg, 4 mg, IntraVENous, Q6H PRN, Dickson Dominguez MD, 4 mg at 10/28/22 0223    spironolactone (ALDACTONE) tablet 12.5 mg, 12.5 mg, Oral, DAILY, Hiro, Gena B, NP, 12.5 mg at 10/28/22 0933    allopurinoL (ZYLOPRIM) tablet 50 mg, 50 mg, Oral, DAILY, Hiro, Gena B, NP, 50 mg at 10/28/22 0933    hydrocortisone (ANUSOL-HC) 2.5 % rectal cream, , PeriANAL, QID, Dickson Dominguez MD, Given at 10/28/22 0935    melatonin tablet 10.5 mg, 10.5 mg, Oral, QHS PRN, Dickson Dominguez MD    empagliflozin (JARDIANCE) tablet 10 mg, 10 mg, Oral, DAILY, Hiro, Gena B, NP, 10 mg at 10/28/22 0932    magnesium hydroxide (MILK OF MAGNESIA) 400 mg/5 mL oral suspension 30 mL, 30 mL, Oral, DAILY, Dickson Dominguez MD, 30 mL at 10/28/22 0931    aluminum & magnesium hydroxide-simethicone (MYLANTA II) oral suspension 30 mL, 30 mL, Oral, Q4H PRN, Dickson Dominguez MD, 30 mL at 10/24/22 2004    diazePAM (VALIUM) tablet 5 mg, 5 mg, Oral, Q12H PRN, Dickson Dominguez MD, 5 mg at 10/25/22 1151    docusate sodium (COLACE) capsule 100 mg, 100 mg, Oral, DAILY, Gaston Lovett MD, 100 mg at 10/28/22 0932    bisacodyL (DULCOLAX) suppository 10 mg, 10 mg, Rectal, DAILY, Dickson Dominguez MD, 10 mg at 10/25/22 1656    albuterol-ipratropium (DUO-NEB) 2.5 MG-0.5 MG/3 ML, 3 mL, Nebulization, Q6H PRN, Jorge A Foster MD    fluticasone propionate (FLONASE) 50 mcg/actuation nasal spray 2 Spray, 2 Spray, Both Nostrils, DAILY, Jorge A Johnson MD, 2 [place] at 10/27/22 0854    traZODone (DESYREL) tablet 200 mg, 200 mg, Oral, QHS, Irma Zaragoza, NP, 200 mg at 10/27/22 2206    magnesium oxide (MAG-OX) tablet 400 mg, 400 mg, Oral, QHS, Terry ABGLEY, NP, 400 mg at 10/27/22 2205    calcium carbonate (TUMS) chewable tablet 200 mg [elemental], 200 mg, Oral, TID PRN, Gaston Lovett MD, 200 mg at 10/24/22 1223    clonazePAM (KlonoPIN) tablet 1 mg, 1 mg, Oral, QHS, Vicky Negron MD, 1 mg at 10/27/22 2206    escitalopram oxalate (LEXAPRO) tablet 20 mg, 20 mg, Oral, DAILY, Geoff Rodriguez MD, 20 mg at 10/28/22 0932    ezetimibe (ZETIA) tablet 10 mg, 10 mg, Oral, DAILY, Geoff Rodriguez MD, 10 mg at 47/72/56 0944    folic acid (FOLVITE) tablet 1 mg, 1 mg, Oral, DAILY, Geoff Rodriguez MD, 1 mg at 10/28/22 0933    montelukast (SINGULAIR) tablet 10 mg, 10 mg, Oral, DAILY, Geoff Rodriguez MD, 10 mg at 10/28/22 4691    prasugreL (EFFIENT) tablet 10 mg, 10 mg, Oral, DAILY, Geoff Rodriguez MD, 10 mg at 10/28/22 0932    rOPINIRole (REQUIP) tablet 0.5 mg, 0.5 mg, Oral, BID, Raul Pearce MD, 0.5 mg at 10/28/22 0932    [Held by provider] rosuvastatin (CRESTOR) tablet 20 mg, 20 mg, Oral, Q MON, WED & FRI, Raul Pearce MD, 20 mg at 10/24/22 2241    sodium chloride (NS) flush 5-40 mL, 5-40 mL, IntraVENous, Q8H, Geoff Rodriguez MD, 10 mL at 10/28/22 0724    sodium chloride (NS) flush 5-40 mL, 5-40 mL, IntraVENous, PRN, Raul Pearce MD    acetaminophen (TYLENOL) tablet 650 mg, 650 mg, Oral, Q4H PRN, Raul Pearce MD, 650 mg at 10/22/22 0658    nitroglycerin (NITROSTAT) tablet 0.4 mg, 0.4 mg, SubLINGual, Q5MIN PRN, Raul Pearce MD, 0.4 mg at 10/21/22 2237    glucose chewable tablet 16 g, 4 Tablet, Oral, PRN, Raul Pearce MD    glucagon (GLUCAGEN) injection 1 mg, 1 mg, IntraMUSCular, PRN, Geoff Mandel MD    dextrose 10 % infusion 0-250 mL, 0-250 mL, IntraVENous, PRN, Raul Pearce MD    insulin lispro (HUMALOG) injection, , SubCUTAneous, AC&HS, Raul Pearce MD    PATIENT CARE TEAM:  Patient Care Team:  August Buchanan MD as PCP - General (Family Medicine)  August Buchanan MD as PCP - [facility]  Jennifer Pollock MD (Surgery General)  Aracelis Restrepo MD (Cardiovascular Disease Physician)  Jyothi Chen MD (Otolaryngology)  Jatin Myers MD (Internal Medicine Physician)  Brisa Olguin MD (Female Pelvic Medicine and Reconstructive Surgery)  Tamie Conti MD (Neurology)  Reggie Pearl MD (Psychiatry)  Brayan Peterson as Ambulatory Care Manager     Thank you for allowing us to participate in this patient's care.     Michael Gracia NP  [address] [place] [address], [address]  Phone: [phone]

## 2022-10-28 NOTE — PROGRESS NOTES
Met with pt. This morning in her room and obtained release of information ofr Dr. Eduardo Maya, Psychiatrist. Pt. Expressed frustration with her stay at the hospital, especially with difficulty eating and keeping down fluids. She was tearful and expressed concerns about getting better enough to leave. She would like to learn more about her prognosis. Pt. Discussed/reflected on circumstances that she feels may have lead to heart attack and heart failure. She feels that eating a diet of fried foods for a long time, smoking, and a family history of stroke and some family members with HF may have all contributed. She hopes to educate [facility] about the potential impact of these factors on their health and would like to make changes to her own diet moving forward. Pt. Also reflected that she was not aware of some of her family history of heart disease until talking with her father during the SW assessment yesterday afternoon. She was surprised to learn her paternal grandfather had heart trouble. Pt. Hopes to be able to eat today and reports increased anxiety since yesterday. After speaking with pt., JORGE submitted medical record request to pt. Psychiatry office per our previous discussion.

## 2022-10-28 NOTE — CONSULTS
Comprehensive Nutrition Assessment    Type and Reason for Visit: Initial, Consult    Nutrition Recommendations/Plan:   Continue with 4 Carb Choice, Low Fat/Low Chol/2gm Na+ diet  Will order Ensure Clear and Ensure Enlive daily       Malnutrition Assessment:  Malnutrition Status: At risk for malnutrition (specify) (Poor PO for 2 weeks) (10/28/22 8076)         Nutrition Assessment:    39 yo female admitted for CHF. Pmhx: gastroparesis, GERd, HTN, MI, CHF. MD consult received for poor PO intakes. Spoke with pt at bedside. She reports poor PO intakes for a week PTA and since admission secondary to altered taste. C/o everything tasting sour, even water. Has not been drinking and states she feels dehydrated. COVID was tested today secondary to c/o losing her taste, result negative. MD has also ordered Zn and B12. Pt states she did eat some ice-cream a few days ago that she thought actually tasted good. Agreeable to try Ensure Enlive and Clear in hopes that she can tolerate sweet ONS without altered taste. Also c/o nausea and constipation. States she received some medicine yesterday and was able to have a BM but today she feels like she is constipated again. Weight hx indicates 16% loss over last 3 months but most of that has been from fluid removal.  Pt is receiving IV Bumex now. Labs reviewed. Noted in chart that pt expressed interest in learning about heart healthy diet. Provided pt with education on CHF diet. Written education materials and RD contact info provided to pt. EF of 15-20%.        Nutritionally Significant Medications:  Colace, Bumex, Dobutamine, Jardiance, folic acid, MOM, Mag-Ox, Kcl, Aldactone      Estimated Daily Nutrient Needs:  Energy Requirements Based On: Formula  Weight Used for Energy Requirements: Current  Energy (kcal/day): 2000 (MSJ x AF 1.3)  Weight Used for Protein Requirements: Current  Protein (g/day): 105 (1.2 gm/kg)  Method Used for Fluid Requirements: 1 ml/kcal  Fluid (ml/day): 2000    Nutrition Related Findings:   Edema: trace BLE  Last BM: 10/27/22, Loose, Watery    Wounds: None      Current Nutrition Therapies:  Diet: 4 Carb Choice, Low Fat/Low Chol/2gm Na+  Supplements: none  Meal intake: Patient Vitals for the past 168 hrs:   % Diet Eaten   10/28/22 0344 0%   10/27/22 2311 0%   10/27/22 1929 0%   10/27/22 1550 26 - 50%   10/27/22 1254 1 - 25%   10/27/22 0903 0%   10/26/22 1100 1 - 25%   10/25/22 1445 1 - 25%   10/25/22 1159 0%   10/25/22 0900 0%   10/24/22 1545 26 - 50%   10/23/22 1400 51 - 75%   10/22/22 0828 0%   10/21/22 2355 0%   10/21/22 2036 0%     Supplement intake: Patient Vitals for the past 168 hrs:   Supplement intake %   10/28/22 0344 0%   10/27/22 2311 0%   10/27/22 1929 0%   10/22/22 0828 0%   10/21/22 2355 51 - 75%   10/21/22 2036 26 - 50%     Nutrition Support: none      Anthropometric Measures:  Height: 5' 4\" (162.6 cm)  Ideal Body Weight (IBW): 120 lbs (55 kg)     Current Body Wt:  87.5 kg (192 lb 14.4 oz), 160.8 % IBW. Standing scale  Current BMI (kg/m2): 33.1        Weight Adjustment: No adjustment                 BMI Category: Obese class 1 (BMI 30.0-34. 9)    Wt Readings from Last 10 Encounters:   10/28/22 87.5 kg (192 lb 14.4 oz)   10/21/22 93.4 kg (206 lb)   10/17/22 93.9 kg (207 lb)   10/14/22 95 kg (209 lb 6.4 oz)   10/06/22 95.3 kg (210 lb)   10/03/22 95.3 kg (210 lb)   09/29/22 95.4 kg (210 lb 6.4 oz)   09/26/22 95.3 kg (210 lb)   09/09/22 98.9 kg (218 lb)   09/08/22 98.9 kg (218 lb)           Nutrition Diagnosis:   Inadequate oral intake related to inadequate protein-energy intake, altered GI function as evidenced by constipation, nausea, intake 0-25%    Nutrition Interventions:   Food and/or Nutrient Delivery: Continue current diet, Start oral nutrition supplement  Nutrition Education/Counseling: Education completed  Coordination of Nutrition Care: Continue to monitor while inpatient       Goals:     Goals: other (specify)  Specify Other Goals: PO intakes > 75% meals + ONS with no nausea/constipation over next 5-7 days    Nutrition Monitoring and Evaluation:   Behavioral-Environmental Outcomes: None identified  Food/Nutrient Intake Outcomes: Food and nutrient intake, Supplement intake  Physical Signs/Symptoms Outcomes: Biochemical data, GI status, Weight, Constipation, Nausea/vomiting    Discharge Planning:    Continue current diet    Sol ALEK Stock  Available via [employer]

## 2022-10-29 LAB
ALBUMIN SERPL-MCNC: 4.4 G/DL (ref 3.5–5)
ALBUMIN/GLOB SERPL: 1 {RATIO} (ref 1.1–2.2)
ALP SERPL-CCNC: 118 U/L (ref 45–117)
ALT SERPL-CCNC: 57 U/L (ref 12–78)
ANION GAP SERPL CALC-SCNC: 12 MMOL/L (ref 5–15)
AST SERPL-CCNC: 41 U/L (ref 15–37)
BILIRUB SERPL-MCNC: 2.5 MG/DL (ref 0.2–1)
BNP SERPL-MCNC: 1886 PG/ML
BUN SERPL-MCNC: 11 MG/DL (ref 6–20)
BUN/CREAT SERPL: 9 (ref 12–20)
CALCIUM SERPL-MCNC: 10.4 MG/DL (ref 8.5–10.1)
CHLORIDE SERPL-SCNC: 97 MMOL/L (ref 97–108)
CO2 SERPL-SCNC: 28 MMOL/L (ref 21–32)
CREAT SERPL-MCNC: 1.26 MG/DL (ref 0.55–1.02)
GLOBULIN SER CALC-MCNC: 4.4 G/DL (ref 2–4)
GLUCOSE BLD STRIP.AUTO-MCNC: 105 MG/DL (ref 65–117)
GLUCOSE BLD STRIP.AUTO-MCNC: 125 MG/DL (ref 65–117)
GLUCOSE BLD STRIP.AUTO-MCNC: 94 MG/DL (ref 65–117)
GLUCOSE BLD STRIP.AUTO-MCNC: 94 MG/DL (ref 65–117)
GLUCOSE SERPL-MCNC: 131 MG/DL (ref 65–100)
MAGNESIUM SERPL-MCNC: 2.4 MG/DL (ref 1.6–2.4)
POTASSIUM SERPL-SCNC: 3.6 MMOL/L (ref 3.5–5.1)
PROT SERPL-MCNC: 8.8 G/DL (ref 6.4–8.2)
SERVICE CMNT-IMP: ABNORMAL
SERVICE CMNT-IMP: NORMAL
SODIUM SERPL-SCNC: 137 MMOL/L (ref 136–145)

## 2022-10-29 PROCEDURE — 80053 COMPREHEN METABOLIC PANEL: CPT

## 2022-10-29 PROCEDURE — 74011250637 HC RX REV CODE- 250/637: Performed by: HOSPITALIST

## 2022-10-29 PROCEDURE — 74011250637 HC RX REV CODE- 250/637: Performed by: FAMILY MEDICINE

## 2022-10-29 PROCEDURE — 74011000250 HC RX REV CODE- 250: Performed by: NURSE PRACTITIONER

## 2022-10-29 PROCEDURE — 82962 GLUCOSE BLOOD TEST: CPT

## 2022-10-29 PROCEDURE — 36415 COLL VENOUS BLD VENIPUNCTURE: CPT

## 2022-10-29 PROCEDURE — 65660000001 HC RM ICU INTERMED STEPDOWN

## 2022-10-29 PROCEDURE — 99233 SBSQ HOSP IP/OBS HIGH 50: CPT | Performed by: NURSE PRACTITIONER

## 2022-10-29 PROCEDURE — 74011000250 HC RX REV CODE- 250: Performed by: FAMILY MEDICINE

## 2022-10-29 PROCEDURE — 74011250637 HC RX REV CODE- 250/637: Performed by: NURSE PRACTITIONER

## 2022-10-29 PROCEDURE — P9047 ALBUMIN (HUMAN), 25%, 50ML: HCPCS | Performed by: NURSE PRACTITIONER

## 2022-10-29 PROCEDURE — 74011250636 HC RX REV CODE- 250/636: Performed by: NURSE PRACTITIONER

## 2022-10-29 PROCEDURE — 83735 ASSAY OF MAGNESIUM: CPT

## 2022-10-29 PROCEDURE — 84630 ASSAY OF ZINC: CPT

## 2022-10-29 PROCEDURE — 83880 ASSAY OF NATRIURETIC PEPTIDE: CPT

## 2022-10-29 RX ORDER — POTASSIUM CHLORIDE 750 MG/1
60 TABLET, FILM COATED, EXTENDED RELEASE ORAL 2 TIMES DAILY
Status: DISCONTINUED | OUTPATIENT
Start: 2022-10-29 | End: 2022-11-05

## 2022-10-29 RX ORDER — ALBUMIN HUMAN 250 G/1000ML
12.5 SOLUTION INTRAVENOUS 2 TIMES DAILY
Status: COMPLETED | OUTPATIENT
Start: 2022-10-29 | End: 2022-10-29

## 2022-10-29 RX ADMIN — Medication 400 MG: at 21:59

## 2022-10-29 RX ADMIN — TRAZODONE HYDROCHLORIDE 200 MG: 100 TABLET ORAL at 21:59

## 2022-10-29 RX ADMIN — HYDROCORTISONE: 25 CREAM TOPICAL at 22:01

## 2022-10-29 RX ADMIN — HYDROCORTISONE: 25 CREAM TOPICAL at 13:03

## 2022-10-29 RX ADMIN — EMPAGLIFLOZIN 10 MG: 10 TABLET, FILM COATED ORAL at 10:54

## 2022-10-29 RX ADMIN — SODIUM CHLORIDE, PRESERVATIVE FREE 10 ML: 5 INJECTION INTRAVENOUS at 07:12

## 2022-10-29 RX ADMIN — ESCITALOPRAM OXALATE 20 MG: 10 TABLET ORAL at 10:54

## 2022-10-29 RX ADMIN — DOCUSATE SODIUM 100 MG: 100 CAPSULE, LIQUID FILLED ORAL at 11:02

## 2022-10-29 RX ADMIN — BUMETANIDE 1 MG/HR: 0.25 INJECTION, SOLUTION INTRAMUSCULAR; INTRAVENOUS at 21:59

## 2022-10-29 RX ADMIN — POTASSIUM CHLORIDE 60 MEQ: 750 TABLET, FILM COATED, EXTENDED RELEASE ORAL at 10:54

## 2022-10-29 RX ADMIN — ALLOPURINOL 50 MG: 100 TABLET ORAL at 10:54

## 2022-10-29 RX ADMIN — FOLIC ACID 1 MG: 1 TABLET ORAL at 10:55

## 2022-10-29 RX ADMIN — POTASSIUM CHLORIDE 60 MEQ: 750 TABLET, FILM COATED, EXTENDED RELEASE ORAL at 18:12

## 2022-10-29 RX ADMIN — CLONAZEPAM 1 MG: 0.5 TABLET ORAL at 21:59

## 2022-10-29 RX ADMIN — BUMETANIDE 1 MG/HR: 0.25 INJECTION, SOLUTION INTRAMUSCULAR; INTRAVENOUS at 03:13

## 2022-10-29 RX ADMIN — ACETAMINOPHEN 650 MG: 325 TABLET ORAL at 13:03

## 2022-10-29 RX ADMIN — EZETIMIBE 10 MG: 10 TABLET ORAL at 10:54

## 2022-10-29 RX ADMIN — FLUTICASONE PROPIONATE 2 SPRAY: 50 SPRAY, METERED NASAL at 10:58

## 2022-10-29 RX ADMIN — DOBUTAMINE HYDROCHLORIDE 2.5 MCG/KG/MIN: 200 INJECTION INTRAVENOUS at 20:12

## 2022-10-29 RX ADMIN — SODIUM CHLORIDE, PRESERVATIVE FREE 10 ML: 5 INJECTION INTRAVENOUS at 13:03

## 2022-10-29 RX ADMIN — ALBUMIN (HUMAN) 12.5 G: 0.25 INJECTION, SOLUTION INTRAVENOUS at 10:52

## 2022-10-29 RX ADMIN — SODIUM CHLORIDE, PRESERVATIVE FREE 10 ML: 5 INJECTION INTRAVENOUS at 22:00

## 2022-10-29 RX ADMIN — HYDROCORTISONE: 25 CREAM TOPICAL at 11:04

## 2022-10-29 RX ADMIN — PANTOPRAZOLE SODIUM 40 MG: 40 TABLET, DELAYED RELEASE ORAL at 10:55

## 2022-10-29 RX ADMIN — MONTELUKAST 10 MG: 10 TABLET, FILM COATED ORAL at 10:54

## 2022-10-29 RX ADMIN — BUMETANIDE 1 MG/HR: 0.25 INJECTION, SOLUTION INTRAMUSCULAR; INTRAVENOUS at 10:52

## 2022-10-29 RX ADMIN — ALBUMIN (HUMAN) 12.5 G: 0.25 INJECTION, SOLUTION INTRAVENOUS at 18:12

## 2022-10-29 RX ADMIN — MAGNESIUM HYDROXIDE 30 ML: 400 SUSPENSION ORAL at 11:03

## 2022-10-29 RX ADMIN — SPIRONOLACTONE 25 MG: 25 TABLET ORAL at 10:55

## 2022-10-29 RX ADMIN — PRASUGREL 10 MG: 10 TABLET, FILM COATED ORAL at 10:55

## 2022-10-29 RX ADMIN — ROPINIROLE HYDROCHLORIDE 0.5 MG: 0.25 TABLET, FILM COATED ORAL at 18:12

## 2022-10-29 RX ADMIN — ROPINIROLE HYDROCHLORIDE 0.5 MG: 0.25 TABLET, FILM COATED ORAL at 10:55

## 2022-10-29 NOTE — PROGRESS NOTES
0730: Bedside shift change report given to Komal Rodriguez (oncoming nurse) by Kayla Reyes (offgoing nurse). Report included the following information SBAR, Kardex, ED Summary, Procedure Summary, Intake/Output, MAR, and Recent Results.

## 2022-10-29 NOTE — PROGRESS NOTES
[address] [facility] in [place], [facility]  Inpatient Progress Note      Patient name: Cathy Huerta  Patient : 1980  Patient MRN: [id number]  Consulting MD: Regina Díaz MD  Date of service: 10/29/22      REASON FOR REFERRAL:  Management of acute on chronic systolic heart failure      PLAN OF CARE   38 y.o. female with history of HTN, CAD s/p STEMI in 2022 (DESx1 to LAD; treated at [facility]) with ischemic cardiomyopathy (EF 20-25%), CHF, GERD, RLS, and fibromyalgia seen as a new patient in [facility] on 10/21/22 and found to be fluid overloaded, and with suicidal ideation. Pt was taken to the ER for further evaluation and admission for acute on chronic HF and mental health crisis. Pt contracted for safety, and is being seen by behavioral health/psych  Ongoing diuresis and remainder of heart failure work-up while in hospital; work on optimizing GDMT after diuresis complete- increase diuresis and start Dobutamine based on [address] on 10/27/22.    GDMT limited in the past d/t hypotension          RECOMMENDATIONS:  Albumin x 2 doses today   Cont Dobutamine 2.5mcg/kg/min to augment diuresis- monitor HR  Beta-blocker: hold Coreg while on Dobutamine   ACE/ARB/ARNi: hold while undergoing aggressive diuresis  MRA: Cont spironolactone 25mg daily  SGLT2 inhibitor: Jardiance 10mg daily  Consider Verquvo as OP  Diuretic: Cont Bumex gtt 1mg/hr; goal net -2L next 24 hrs  Suspect dry weight is ~185lbs based on RHC  Keep K > 4 and Mag > 2  Cont allopurinol 50mg daily   Continue ASA and prasugrel per primary cards  Statin or [id number]: Continue current dose of statin  Treatment of STU: inpatient eval for STU  Likely will need referral for ICD given EF remains <35% 3 mos after MI, may need LifeVest at discharge if no ICD implant  Labs: HF and Transplant eval labs ordered; in process  Will have  completed psychosocial evaluation   Nutritionist consult  Heart failure education  Needs Advanced care plan    All other care per primary team     IMPRESSION:  Fatigue  Shortness of breath  Volume overload  Acute on Chronic systolic heart failure  Stage C, NYHA class IV symptoms  ischemic cardiomyopathy, LVEF 20-25%  CAD  STEMI July 2022 s/p RICKY to LAD  HTN  Obesity  LHD  Pre-diabetes  Esophageal stenosis s/p dilation  RLS  Fibromyalgia  Anxiety and Depression      INTERVAL HISTORY:  No acute overnight events  K 3.6  Creatinine up to 1.26 today   PBNP down to 1800      LIFE GOALS:  Patient's personal goals include: get better  Important upcoming milestones or family events: the holidays coming up  The patient identifies the following as important for living well: being independent        [facility]:  Echo 10/22/22    Left Ventricle: Severely reduced left ventricular systolic function with a visually estimated EF of 15 - 20%. Left ventricle is mildly dilated. Normal wall thickness. Moderate hypokinesis of the following segments: basal anterior, basal anterolateral, basal anteroseptal, basal inferior, basal inferolateral, basal inferoseptal, mid anterior, mid anterolateral, mid anteroseptal, mid inferior, mid inferolateral and mid inferoseptal. Severe hypokinesis of the following segments: apical anterior, apical septal, apical inferior and apical lateral. Grade III diastolic dysfunction with increased LAP. Mitral Valve: Mild annular dilation. Moderate regurgitation. Tricuspid Valve: Moderately elevated RVSP. The estimated RVSP is 51 mmHg. Left Atrium: Left atrium is mildly dilated. Pericardium: Trivial pericardial effusion present. No indication of cardiac tamponade. Contrast used: Definity. Echo (8/14/22)    Left Ventricle: Severely reduced left ventricular systolic function with a visually estimated EF of 20 - 25%. Left ventricle is mildly dilated. Increased wall thickness. See diagram for wall motion findings.  Grade II diastolic dysfunction with increased LAP. Right Ventricle: Not well visualized. Tricuspid Valve: Moderately elevated RVSP. [address] 10/27/22  PA 56/34/43, RA 16, PCWP 29, CI daisy 1.46      BRIEF HISTORY OF PRESENT ILLNESS:  Navi Maurer is a 38 y.o. female with h/o HTN, CAD s/p STEMI in July 2022 (DESx1 to LAD; treated at [facility]) with ischemic cardiomyopathy (EF 20-25%), CHF, GERD, RLS, and fibromyalgia seen as a new patient in [facility] on 10/21/22 and found to be fluid overloaded, and with suicidal ideation. Pt was taken to the ER for further evaluation and admission for acute on chronic HF and mental health crisis. REVIEW OF SYSTEMS:  Review of Systems   Constitutional:  Negative for chills, fever, malaise/fatigue and weight loss. Respiratory:  Negative for cough and shortness of breath. Cardiovascular:  Negative for chest pain, palpitations, orthopnea and leg swelling. Gastrointestinal:  Negative for abdominal pain, constipation, diarrhea, heartburn, nausea and vomiting. Musculoskeletal:  Negative for myalgias. Restless legs   Neurological:  Negative for dizziness and weakness. Psychiatric/Behavioral:  Positive for depression. Negative for suicidal ideas. The patient is nervous/anxious and has insomnia. PHYSICAL EXAM:  Visit Vitals  /84 (BP 1 Location: Left upper arm, BP Patient Position: At rest)   Pulse 80   Temp 98.5 °F (36.9 °C)   Resp 17   Ht 5' 4\" (1.626 m)   Wt 190 lb 0.6 oz (86.2 kg)   LMP 10/09/2022   SpO2 98%   BMI 32.62 kg/m²         Physical Exam  Vitals and nursing note reviewed. Constitutional:       Appearance: Normal appearance. She is obese. Cardiovascular:      Rate and Rhythm: Normal rate and regular rhythm. Heart sounds: No murmur heard. No friction rub. Gallop present. S3 sounds present. Pulmonary:      Effort: No respiratory distress. Breath sounds: Normal breath sounds.    Abdominal:      General: Bowel sounds are normal. There is distension. Palpations: Abdomen is soft. Tenderness: There is no abdominal tenderness. Musculoskeletal:      Right lower leg: No edema. Left lower leg: No edema. Skin:     General: Skin is warm and dry. Capillary Refill: Capillary refill takes less than 2 seconds. Neurological:      General: No focal deficit present. Mental Status: She is alert and oriented to person, place, and time. Psychiatric:         Mood and Affect: Affect is tearful. Behavior: Behavior normal.         Thought Content: Thought content normal.         Judgment: Judgment normal.            PAST MEDICAL HISTORY:  Past Medical History:   Diagnosis Date    Anemia NEC     Arthritis     Chronic pain     fybromyalsia    Depression     anxiety, depression, OCD    GERD (gastroesophageal reflux disease)     Hypertension     Hypertension     Ill-defined condition     Fibromyalia gastricparesis    MI (myocardial infarction) ([facility] [address]     Musculoskeletal disorder     SOB (shortness of breath)     Stool color black        PAST SURGICAL HISTORY:  Past Surgical History:   Procedure Laterality Date    HX CORONARY STENT PLACEMENT      HX GYN           HX HEENT  2002    wisdom teeth extraction    UPPER GI ENDOSCOPY,BIOPSY  2018         UPPER GI ENDOSCOPY,DIAGNOSIS  2018            FAMILY HISTORY:  Family History   Problem Relation Age of Onset    Hypertension Father     Elevated Lipids Father     Arthritis-rheumatoid Mother         ? Lupus vs RA    Lung Disease Mother     Heart Disease Mother     Cancer Mother         breast in 39y    COPD Mother     Hypertension Mother     Stroke Mother         3-4 strokes    Breast Cancer Mother 50    Diabetes Maternal Grandmother        SOCIAL HISTORY:  Social History     Socioeconomic History    Marital status: SINGLE   Tobacco Use    Smoking status: Former     Packs/day: 0.25     Years: 8.00     Pack years: 2.00     Types: Cigarettes     Quit date: 2022 Years since quittin.2    Smokeless tobacco: Never   Vaping Use    Vaping Use: Never used   Substance and Sexual Activity    Alcohol use: Not Currently     Alcohol/week: 1.0 standard drink     Types: 1 Glasses of wine per week     Comment: Wine with special occassions - not since July    Drug use: Not Currently     Types: Marijuana     Comment: Haven't had any since 2022    Sexual activity: Yes     Partners: Male     Birth control/protection: None     Social Determinants of Health     Financial Resource Strain: High Risk    Difficulty of Paying Living Expenses: Very hard   Food Insecurity: No Food Insecurity    Worried About Running Out of Food in the Last Year: Never true    Ran Out of Food in the Last Year: Never true   Transportation Needs: No Transportation Needs    Lack of Transportation (Medical): No    Lack of Transportation (Non-Medical): No   Physical Activity: Inactive    Days of Exercise per Week: 0 days    Minutes of Exercise per Session: 0 min   Stress: Stress Concern Present    Feeling of Stress :  To some extent   Social Connections: Socially Isolated    Frequency of Communication with Friends and Family: Twice a week    Frequency of Social Gatherings with Friends and Family: Never    Attends Taoist Services: Never    Active Member of Clubs or Organizations: No    Attends Club or Organization Meetings: Never    Marital Status: Never    Housing Stability: Low Risk     Unable to Pay for Housing in the Last Year: No    Number of [place] in the Last Year: 1    Unstable Housing in the Last Year: No       LABORATORY RESULTS:     Labs Latest Ref Rng & Units 10/29/2022 10/28/2022 10/27/2022 10/26/2022 10/25/2022 10/24/2022 10/23/2022   WBC 3.6 - 11.0 K/uL - 6.2 - 6.6 - - -   RBC 3.80 - 5.20 M/uL - 5.05 - 4.28 - - -   Hemoglobin 11.5 - 16.0 g/dL - 14.7 - 12.6 - - -   Hematocrit 35.0 - 47.0 % - 45.9 - 39.5 - - -   MCV 80.0 - 99.0 FL - 90.9 - 92.3 - - -   Platelets 723 - 364 K/uL - 262 - 238 - - -   Lymphocytes 12 - 49 % - - - 51(H) - - -   Monocytes 5 - 13 % - - - 7 - - -   Eosinophils 0 - 7 % - - - 8(H) - - -   Basophils 0 - 1 % - - - 1 - - -   Albumin 3.5 - 5.0 g/dL 4.4 4.2 3.6 3.5 3. 4(L) 3.5 3. 4(L)   Calcium 8.5 - 10.1 MG/DL 10. 4(H) 9.8 10.1 9.7 9.5 9.7 9.6   Glucose 65 - 100 mg/dL 131(H) 104(H) 95 95 103(H) 99 113(H)   BUN 6 - 20 MG/DL 11 11 11 10 12 10 9   Creatinine 0.55 - 1.02 MG/DL 1.26(H) 1.06(H) 0.96 0.89 0.75 0.86 0.93   Sodium 136 - 145 mmol/L 137 136 136 136 137 139 141   Potassium 3.5 - 5.1 mmol/L 3.6 3.7 3.8 3.8 4.4 3.5 3. 4(L)   TSH 0.36 - 3.74 uIU/mL - - - - - - -   Some recent data might be hidden     Lab Results   Component Value Date/Time    TSH 4.44 (H) 10/21/2022 06:12 PM    TSH 2.12 05/12/2021 10:55 AM    TSH 1.330 12/30/2019 12:00 AM    TSH 3.850 07/25/2018 12:40 PM    TSH 1.620 02/27/2018 08:59 AM    TSH 1.240 12/05/2016 10:13 AM    TSH 1.530 03/18/2016 10:31 AM    TSH 1.400 11/09/2011 09:37 AM    TSH 1.26 08/19/2010 10:36 AM    TSH 1.18 07/28/2010 04:10 PM       ALLERGY:  Allergies   Allergen Reactions    Abilify [Aripiprazole] Anxiety     Can not tolerate     Sulfa (Sulfonamide Antibiotics) Hives    Vicodin [Hydrocodone-Acetaminophen] Nausea and Vomiting        CURRENT MEDICATIONS:    Current Facility-Administered Medications:     potassium chloride SR (KLOR-CON 10) tablet 60 mEq, 60 mEq, Oral, BID, Hiro, Gena B, NP    pantoprazole (PROTONIX) tablet 40 mg, 40 mg, Oral, DAILY, Abhishek Mazariegos MD, 40 mg at 10/28/22 0933    polyethylene glycol (MIRALAX) packet 17 g, 17 g, Oral, DAILY PRN, Zoila Rodriguez MD, 17 g at 10/28/22 3809    spironolactone (ALDACTONE) tablet 25 mg, 25 mg, Oral, DAILY, Gena Bosch, NP    prochlorperazine (COMPAZINE) injection 5 mg, 5 mg, IntraVENous, Q6H PRN, Abhishek Mazariegos MD, 5 mg at 10/28/22 1557    DOBUTamine (DOBUTREX) 500 mg/250 mL (2,000 mcg/mL) infusion, 2.5 mcg/kg/min (Order-Specific), IntraVENous, CONTINUOUS, Charo Jin, NP, Last Rate: 6.8 mL/hr at 10/28/22 2050, 2.5 mcg/kg/min at 10/28/22 2050    bumetanide (BUMEX) 0.25 mg/mL infusion, 1 mg/hr, IntraVENous, CONTINUOUS, Gena Bosch NP, Last Rate: 4 mL/hr at 10/29/22 0313, 1 mg/hr at 10/29/22 0313    ondansetron (ZOFRAN) injection 4 mg, 4 mg, IntraVENous, Q6H PRN, Nisha Dominguez MD, 4 mg at 10/28/22 0724    allopurinoL (ZYLOPRIM) tablet 50 mg, 50 mg, Oral, DAILY, Gena Bosch NP, 50 mg at 10/28/22 0933    hydrocortisone (ANUSOL-HC) 2.5 % rectal cream, , PeriANAL, QID, Nisha Dominguez MD, Given at 10/28/22 2104    melatonin tablet 10.5 mg, 10.5 mg, Oral, QHS PRN, Nisha Dominguez MD    empagliflozin (JARDIANCE) tablet 10 mg, 10 mg, Oral, DAILY, Gena Bosch NP, 10 mg at 10/28/22 0932    magnesium hydroxide (MILK OF MAGNESIA) 400 mg/5 mL oral suspension 30 mL, 30 mL, Oral, DAILY, Nisha Dominguez MD, 30 mL at 10/28/22 0931    aluminum & magnesium hydroxide-simethicone (MYLANTA II) oral suspension 30 mL, 30 mL, Oral, Q4H PRN, Nisha Dominguez MD, 30 mL at 10/24/22 2004    diazePAM (VALIUM) tablet 5 mg, 5 mg, Oral, Q12H PRN, Nisha Dominguez MD, 5 mg at 10/25/22 1151    docusate sodium (COLACE) capsule 100 mg, 100 mg, Oral, DAILY, Bertha Hartman MD, 100 mg at 10/28/22 0932    bisacodyL (DULCOLAX) suppository 10 mg, 10 mg, Rectal, DAILY, Nisha Dominguez MD, 10 mg at 10/25/22 3788    albuterol-ipratropium (DUO-NEB) 2.5 MG-0.5 MG/3 ML, 3 mL, Nebulization, Q6H PRN, Alfonse Hamman, Mahmud, MD    fluticasone propionate (FLONASE) 50 mcg/actuation nasal spray 2 Spray, 2 Spray, Both Nostrils, DAILY, Jorge A Johnson MD, 2 [place] at 10/27/22 0854    traZODone (DESYREL) tablet 200 mg, 200 mg, Oral, QHS, Irma Zaragoza, NP, 200 mg at 10/28/22 2102    magnesium oxide (MAG-OX) tablet 400 mg, 400 mg, Oral, QHS, Denia BAGLEY NP, 400 mg at 10/28/22 2102    calcium carbonate (TUMS) chewable tablet 200 mg [elemental], 200 mg, Oral, TID PRN, Ruth An MD, 200 mg at 10/24/22 1223    clonazePAM (KlonoPIN) tablet 1 mg, 1 mg, Oral, QHS, Amilcar Scott MD, 1 mg at 10/28/22 2102    escitalopram oxalate (LEXAPRO) tablet 20 mg, 20 mg, Oral, DAILY, Amilcar Scott MD, 20 mg at 10/28/22 0932    ezetimibe (Kim Balo) tablet 10 mg, 10 mg, Oral, DAILY, Amilcar Scott MD, 10 mg at 07/22/67 0462    folic acid (FOLVITE) tablet 1 mg, 1 mg, Oral, DAILY, Amilcar Scott MD, 1 mg at 10/28/22 0933    montelukast (SINGULAIR) tablet 10 mg, 10 mg, Oral, DAILY, Amilcar Scott MD, 10 mg at 10/28/22 2245    prasugreL (EFFIENT) tablet 10 mg, 10 mg, Oral, DAILY, Amilcar Scott MD, 10 mg at 10/28/22 0932    rOPINIRole (REQUIP) tablet 0.5 mg, 0.5 mg, Oral, BID, Amilcar Scott MD, 0.5 mg at 10/28/22 1924    [Held by provider] rosuvastatin (CRESTOR) tablet 20 mg, 20 mg, Oral, Q MON, WED & FRI, Amilcar Scott MD, 20 mg at 10/24/22 2241    sodium chloride (NS) flush 5-40 mL, 5-40 mL, IntraVENous, Q8H, Geoff Rordiguez MD, 10 mL at 10/29/22 5284    sodium chloride (NS) flush 5-40 mL, 5-40 mL, IntraVENous, PRN, Amilcar Scott MD    acetaminophen (TYLENOL) tablet 650 mg, 650 mg, Oral, Q4H PRN, Amilcar Scott MD, 650 mg at 10/22/22 2751    nitroglycerin (NITROSTAT) tablet 0.4 mg, 0.4 mg, SubLINGual, Q5MIN PRN, Amilcar Scott MD, 0.4 mg at 10/21/22 2237    glucose chewable tablet 16 g, 4 Tablet, Oral, PRN, Amilcar Scott MD    glucagon (GLUCAGEN) injection 1 mg, 1 mg, IntraMUSCular, PRN, Amilcar Scott MD    dextrose 10 % infusion 0-250 mL, 0-250 mL, IntraVENous, PRN, Amilcar Scott MD    insulin lispro (HUMALOG) injection, , SubCUTAneous, AC&HS, Amilcar Scott MD    PATIENT CARE TEAM:  Patient Care Team:  Gena Silva MD as PCP - General (Family Medicine)  Gena Silva MD as PCP - [address] [place] [address]  [facility] Provider  Bobbi Daly MD (Surgery General)  Shae Andersen MD (Cardiovascular Disease Physician)  Mery Jimenez MD (Otolaryngology)  Mary Ann Wilburn Myesha Bhandari MD (Internal Medicine Physician)  Fernie Hagen MD (Female Pelvic Medicine and Reconstructive Surgery)  Dolores Simmons MD (Neurology)  Palmer Mariano MD (Psychiatry)  Real Sheriff as Ambulatory Care Manager     Thank you for allowing us to participate in this patient's care.     Mason Cabrales NP  [address] [place] [address], [address]  Phone: [phone]

## 2022-10-29 NOTE — PROGRESS NOTES
[address] [facility]  Hospitalist Group                                                                                          Hospitalist Progress Note  Renata Garcia MD  Answering service: [phone] or [phone] from in house phone        Date of Service:  10/29/2022  NAME:  Hunter Villafana  :  1980  MRN:  [id number]      Admission Summary:     Patient initially presents with suicidal ideation and found to have congestive heart failure. Interval history / Subjective:     Her breathing and swelling are better, nausea better, reports paresthesias in her hands and feet. No pain. Assessment & Plan:     Congestive heart failure  -Acute on chronic systolic CHF NYHA class IV on admission  -Patient with ischemic cardiomyopathy with EF with 15 to 20%  -s/p RHC 10/27 - started on dobutamine and bumetanide gtt  -continue spironolactone, Jardiance  -other GDMT as BP tolerates    Urinary tract infection  -Completed antibiotic    Hypertension  -Blood pressure is borderline low normal    Type 2 diabetes  -Well-controlled with hemoglobin A1c of 6.3  -Continue sliding scale insulin coverage    Coronary artery disease  -Patient with history of STEMI with stent to LAD  -Continue Effient    Dyslipidemia  -On Zetia    Restless leg syndrome  -On Requip    Hemorrhoids  -Continue Anusol cream    Depression with suicidal ideation  -Patient previously evaluated by psychiatry  -On Lexapro, also on Klonopin for anxiety  -One-on-one sitter discontinued per psych recommendation  -Outpatient follow-up with psychiatry    Anxiety  -Patient on Klonopin for 12 years, continue Klonopin here  -Continue trazodone for sleep  -Outpatient follow-up with psychiatry    Constipation  -resolved; continue PRN Miralax    Dysgeusia:   -check B12 (normal/high), zinc, rapid covid (negative). TSH checked and was only mildly elevated in the setting of acute illness.      Nausea: likely multifactorial, hx of gastroparesis, bowel edema from CHF  -continue supportive care     Patient is critically-ill needing multiple drips, and at risk for decline, CCT 35 minutes    Code status: Full  Prophylaxis: SCDs  Care Plan discussed with: Patient/RN/CM/AHF  Anticipated Disposition: Likely more than 48 hours     Hospital Problems  Date Reviewed: 10/14/2022            Codes Class Noted POA    CHF (congestive heart failure) ([facility] [address] ICD-10-CM: I50.9  ICD-9-CM: 428.0  10/21/2022 Unknown         Review of Systems:   A comprehensive review of systems was negative except for that written in the HPI. Vital Signs:    Last 24hrs VS reviewed since prior progress note. Most recent are:  Visit Vitals  /84 (BP 1 Location: Left upper arm, BP Patient Position: At rest)   Pulse (!) 103   Temp 98.2 °F (36.8 °C)   Resp 16   Ht 5' 4\" (1.626 m)   Wt 86.2 kg (190 lb 0.6 oz)   SpO2 98%   BMI 32.62 kg/m²         Intake/Output Summary (Last 24 hours) at 10/29/2022 1245  Last data filed at 10/29/2022 6786  Gross per 24 hour   Intake 597.54 ml   Output 1200 ml   Net -602.46 ml          Physical Examination:     I had a face to face encounter with this patient and independently examined them on 10/29/2022 as outlined below:          Constitutional:  No acute distress, non-toxic, obese   ENT:  Oral mucosa moist, oropharynx benign, anicteric sclerae    Resp:  CTA bilaterally. No wheezing/rhonchi/rales. No accessory muscle use. CV:  Regular rhythm, normal rate, no murmurs, no gallops, trace b/l LE edema    GI:  Soft, non distended, non tender. normoactive bowel sounds     Musculoskeletal:  warm    Neurologic:  Moves all extremities.   AAOx3, CN II-XII reviewed            Data Review:    Review and/or order of clinical lab test      Labs:     Recent Labs     10/28/22  0343   WBC 6.2   HGB 14.7   HCT 45.9          Recent Labs     10/29/22  0043 10/28/22  0343 10/27/22  0423    136 136   K 3.6 3.7 3.8   CL 97 100 103   CO2 28 28 27   BUN 11 11 11   CREA 1.26* 1.06* 0. 96   * 104* 95   CA 10.4* 9.8 10.1   MG 2.4 2.3 2.5*       Recent Labs     10/29/22  0043 10/28/22  0343 10/27/22  0423   ALT 57 59 29   * 112 87   TBILI 2.5* 2.1* 2.0*   TP 8.8* 8.5* 7.4   ALB 4.4 4.2 3.6   GLOB 4.4* 4.3* 3.8   AML  --   --  12*   LPSE  --   --  26*       No results for input(s): INR, PTP, APTT, INREXT, INREXT in the last 72 hours. No results for input(s): FE, TIBC, PSAT, FERR in the last 72 hours. Lab Results   Component Value Date/Time    Folate 20.0 10/23/2022 04:24 AM        No results for input(s): PH, PCO2, PO2 in the last 72 hours. No results for input(s): CPK, CKNDX, TROIQ in the last 72 hours.     No lab exists for component: CPKMB  Lab Results   Component Value Date/Time    Cholesterol, total 95 10/22/2022 06:26 AM    HDL Cholesterol 32 10/22/2022 06:26 AM    LDL, calculated 45 10/22/2022 06:26 AM    Triglyceride 90 10/22/2022 06:26 AM    CHOL/HDL Ratio 3.0 10/22/2022 06:26 AM     Lab Results   Component Value Date/Time    Glucose (POC) 94 10/29/2022 11:19 AM    Glucose (POC) 105 10/29/2022 07:11 AM    Glucose (POC) 100 10/28/2022 09:00 PM    Glucose (POC) 86 10/28/2022 04:43 PM    Glucose (POC) 88 10/28/2022 11:36 AM     Lab Results   Component Value Date/Time    Color ORANGE 10/17/2022 08:42 AM    Appearance TURBID (A) 10/17/2022 08:42 AM    Specific gravity 1.025 10/17/2022 08:42 AM    Specific gravity 1.024 10/06/2022 08:57 AM    pH (UA) 5.0 10/17/2022 08:42 AM    Protein 30 (A) 10/17/2022 08:42 AM    Glucose Negative 10/17/2022 08:42 AM    Ketone Negative 10/17/2022 08:42 AM    Bilirubin Negative 11/25/2021 09:47 AM    Urobilinogen 1.0 10/17/2022 08:42 AM    Nitrites Positive (A) 10/17/2022 08:42 AM    Leukocyte Esterase SMALL (A) 10/17/2022 08:42 AM    Epithelial cells FEW 10/17/2022 08:42 AM    Bacteria 2+ (A) 10/17/2022 08:42 AM    WBC 5-10 10/17/2022 08:42 AM    RBC 5-10 10/17/2022 08:42 AM         Medications Reviewed:     Current Facility-Administered Medications   Medication Dose Route Frequency    potassium chloride SR (KLOR-CON 10) tablet 60 mEq  60 mEq Oral BID    albumin human 25% (BUMINATE) solution 12.5 g  12.5 g IntraVENous BID    pantoprazole (PROTONIX) tablet 40 mg  40 mg Oral DAILY    polyethylene glycol (MIRALAX) packet 17 g  17 g Oral DAILY PRN    spironolactone (ALDACTONE) tablet 25 mg  25 mg Oral DAILY    prochlorperazine (COMPAZINE) injection 5 mg  5 mg IntraVENous Q6H PRN    DOBUTamine (DOBUTREX) 500 mg/250 mL (2,000 mcg/mL) infusion  2.5 mcg/kg/min (Order-Specific) IntraVENous CONTINUOUS    bumetanide (BUMEX) 0.25 mg/mL infusion  1 mg/hr IntraVENous CONTINUOUS    ondansetron (ZOFRAN) injection 4 mg  4 mg IntraVENous Q6H PRN    allopurinoL (ZYLOPRIM) tablet 50 mg  50 mg Oral DAILY    hydrocortisone (ANUSOL-HC) 2.5 % rectal cream   PeriANAL QID    melatonin tablet 10.5 mg  10.5 mg Oral QHS PRN    empagliflozin (JARDIANCE) tablet 10 mg  10 mg Oral DAILY    magnesium hydroxide (MILK OF MAGNESIA) 400 mg/5 mL oral suspension 30 mL  30 mL Oral DAILY    aluminum & magnesium hydroxide-simethicone (MYLANTA II) oral suspension 30 mL  30 mL Oral Q4H PRN    diazePAM (VALIUM) tablet 5 mg  5 mg Oral Q12H PRN    docusate sodium (COLACE) capsule 100 mg  100 mg Oral DAILY    bisacodyL (DULCOLAX) suppository 10 mg  10 mg Rectal DAILY    albuterol-ipratropium (DUO-NEB) 2.5 MG-0.5 MG/3 ML  3 mL Nebulization Q6H PRN    fluticasone propionate (FLONASE) 50 mcg/actuation nasal spray 2 Spray  2 Spray Both Nostrils DAILY    traZODone (DESYREL) tablet 200 mg  200 mg Oral QHS    magnesium oxide (MAG-OX) tablet 400 mg  400 mg Oral QHS    calcium carbonate (TUMS) chewable tablet 200 mg [elemental]  200 mg Oral TID PRN    clonazePAM (KlonoPIN) tablet 1 mg  1 mg Oral QHS    escitalopram oxalate (LEXAPRO) tablet 20 mg  20 mg Oral DAILY    ezetimibe (ZETIA) tablet 10 mg  10 mg Oral DAILY    folic acid (FOLVITE) tablet 1 mg  1 mg Oral DAILY    montelukast (SINGULAIR) tablet 10 mg 10 mg Oral DAILY    prasugreL (EFFIENT) tablet 10 mg  10 mg Oral DAILY    rOPINIRole (REQUIP) tablet 0.5 mg  0.5 mg Oral BID    [Held by provider] rosuvastatin (CRESTOR) tablet 20 mg  20 mg Oral Q MON, WED & FRI    sodium chloride (NS) flush 5-40 mL  5-40 mL IntraVENous Q8H    sodium chloride (NS) flush 5-40 mL  5-40 mL IntraVENous PRN    acetaminophen (TYLENOL) tablet 650 mg  650 mg Oral Q4H PRN    nitroglycerin (NITROSTAT) tablet 0.4 mg  0.4 mg SubLINGual Q5MIN PRN    glucose chewable tablet 16 g  4 Tablet Oral PRN    glucagon (GLUCAGEN) injection 1 mg  1 mg IntraMUSCular PRN    dextrose 10 % infusion 0-250 mL  0-250 mL IntraVENous PRN    insulin lispro (HUMALOG) injection   SubCUTAneous AC&HS     ______________________________________________________________________  EXPECTED LENGTH OF STAY: 3d 19h  ACTUAL LENGTH OF STAY:          8                 Pepito Carpenter MD

## 2022-10-30 LAB
ALBUMIN SERPL-MCNC: 4.9 G/DL (ref 3.5–5)
ALBUMIN/GLOB SERPL: 1.1 {RATIO} (ref 1.1–2.2)
ALP SERPL-CCNC: 122 U/L (ref 45–117)
ALT SERPL-CCNC: 58 U/L (ref 12–78)
ANION GAP SERPL CALC-SCNC: 9 MMOL/L (ref 5–15)
AST SERPL-CCNC: 55 U/L (ref 15–37)
BILIRUB SERPL-MCNC: 1.9 MG/DL (ref 0.2–1)
BNP SERPL-MCNC: 1892 PG/ML
BUN SERPL-MCNC: 19 MG/DL (ref 6–20)
BUN/CREAT SERPL: 16 (ref 12–20)
CALCIUM SERPL-MCNC: 11.1 MG/DL (ref 8.5–10.1)
CHLORIDE SERPL-SCNC: 99 MMOL/L (ref 97–108)
CO2 SERPL-SCNC: 28 MMOL/L (ref 21–32)
CREAT SERPL-MCNC: 1.16 MG/DL (ref 0.55–1.02)
GLOBULIN SER CALC-MCNC: 4.4 G/DL (ref 2–4)
GLUCOSE BLD STRIP.AUTO-MCNC: 100 MG/DL (ref 65–117)
GLUCOSE BLD STRIP.AUTO-MCNC: 103 MG/DL (ref 65–117)
GLUCOSE BLD STRIP.AUTO-MCNC: 109 MG/DL (ref 65–117)
GLUCOSE BLD STRIP.AUTO-MCNC: 91 MG/DL (ref 65–117)
GLUCOSE SERPL-MCNC: 120 MG/DL (ref 65–100)
MAGNESIUM SERPL-MCNC: 2.9 MG/DL (ref 1.6–2.4)
POTASSIUM SERPL-SCNC: 3.8 MMOL/L (ref 3.5–5.1)
PROT SERPL-MCNC: 9.3 G/DL (ref 6.4–8.2)
SERVICE CMNT-IMP: NORMAL
SODIUM SERPL-SCNC: 136 MMOL/L (ref 136–145)
ZINC SERPL-MCNC: 71 UG/DL (ref 44–115)

## 2022-10-30 PROCEDURE — 99233 SBSQ HOSP IP/OBS HIGH 50: CPT | Performed by: NURSE PRACTITIONER

## 2022-10-30 PROCEDURE — 36415 COLL VENOUS BLD VENIPUNCTURE: CPT

## 2022-10-30 PROCEDURE — 74011250637 HC RX REV CODE- 250/637: Performed by: HOSPITALIST

## 2022-10-30 PROCEDURE — 83880 ASSAY OF NATRIURETIC PEPTIDE: CPT

## 2022-10-30 PROCEDURE — 74011000250 HC RX REV CODE- 250: Performed by: NURSE PRACTITIONER

## 2022-10-30 PROCEDURE — 65660000001 HC RM ICU INTERMED STEPDOWN

## 2022-10-30 PROCEDURE — 82962 GLUCOSE BLOOD TEST: CPT

## 2022-10-30 PROCEDURE — 83735 ASSAY OF MAGNESIUM: CPT

## 2022-10-30 PROCEDURE — 80053 COMPREHEN METABOLIC PANEL: CPT

## 2022-10-30 PROCEDURE — 74011250637 HC RX REV CODE- 250/637: Performed by: NURSE PRACTITIONER

## 2022-10-30 PROCEDURE — 74011000250 HC RX REV CODE- 250: Performed by: FAMILY MEDICINE

## 2022-10-30 PROCEDURE — 74011250637 HC RX REV CODE- 250/637: Performed by: FAMILY MEDICINE

## 2022-10-30 RX ORDER — PANTOPRAZOLE SODIUM 40 MG/1
40 TABLET, DELAYED RELEASE ORAL
Status: DISCONTINUED | OUTPATIENT
Start: 2022-10-30 | End: 2022-11-09 | Stop reason: HOSPADM

## 2022-10-30 RX ADMIN — ALLOPURINOL 50 MG: 100 TABLET ORAL at 08:53

## 2022-10-30 RX ADMIN — CALCIUM CARBONATE (ANTACID) CHEW TAB 500 MG 200 MG: 500 CHEW TAB at 20:52

## 2022-10-30 RX ADMIN — HYDROCORTISONE: 25 CREAM TOPICAL at 12:22

## 2022-10-30 RX ADMIN — EZETIMIBE 10 MG: 10 TABLET ORAL at 08:53

## 2022-10-30 RX ADMIN — BUMETANIDE 1 MG/HR: 0.25 INJECTION, SOLUTION INTRAMUSCULAR; INTRAVENOUS at 09:05

## 2022-10-30 RX ADMIN — ROPINIROLE HYDROCHLORIDE 0.5 MG: 0.25 TABLET, FILM COATED ORAL at 08:53

## 2022-10-30 RX ADMIN — PRASUGREL 10 MG: 10 TABLET, FILM COATED ORAL at 08:53

## 2022-10-30 RX ADMIN — CLONAZEPAM 1 MG: 0.5 TABLET ORAL at 22:17

## 2022-10-30 RX ADMIN — MAGNESIUM HYDROXIDE 30 ML: 400 SUSPENSION ORAL at 08:52

## 2022-10-30 RX ADMIN — HYDROCORTISONE: 25 CREAM TOPICAL at 22:18

## 2022-10-30 RX ADMIN — FLUTICASONE PROPIONATE 2 SPRAY: 50 SPRAY, METERED NASAL at 08:58

## 2022-10-30 RX ADMIN — SODIUM CHLORIDE, PRESERVATIVE FREE 10 ML: 5 INJECTION INTRAVENOUS at 07:11

## 2022-10-30 RX ADMIN — POLYETHYLENE GLYCOL 3350 17 G: 17 POWDER, FOR SOLUTION ORAL at 20:52

## 2022-10-30 RX ADMIN — FOLIC ACID 1 MG: 1 TABLET ORAL at 08:53

## 2022-10-30 RX ADMIN — POTASSIUM CHLORIDE 60 MEQ: 750 TABLET, FILM COATED, EXTENDED RELEASE ORAL at 08:53

## 2022-10-30 RX ADMIN — HYDROCORTISONE: 25 CREAM TOPICAL at 17:20

## 2022-10-30 RX ADMIN — DOCUSATE SODIUM 100 MG: 100 CAPSULE, LIQUID FILLED ORAL at 08:55

## 2022-10-30 RX ADMIN — ESCITALOPRAM OXALATE 20 MG: 10 TABLET ORAL at 08:52

## 2022-10-30 RX ADMIN — HYDROCORTISONE: 25 CREAM TOPICAL at 08:58

## 2022-10-30 RX ADMIN — EMPAGLIFLOZIN 10 MG: 10 TABLET, FILM COATED ORAL at 08:53

## 2022-10-30 RX ADMIN — POTASSIUM CHLORIDE 60 MEQ: 750 TABLET, FILM COATED, EXTENDED RELEASE ORAL at 17:19

## 2022-10-30 RX ADMIN — PANTOPRAZOLE SODIUM 40 MG: 40 TABLET, DELAYED RELEASE ORAL at 08:53

## 2022-10-30 RX ADMIN — TRAZODONE HYDROCHLORIDE 200 MG: 100 TABLET ORAL at 22:17

## 2022-10-30 RX ADMIN — PANTOPRAZOLE SODIUM 40 MG: 40 TABLET, DELAYED RELEASE ORAL at 17:19

## 2022-10-30 RX ADMIN — MONTELUKAST 10 MG: 10 TABLET, FILM COATED ORAL at 08:53

## 2022-10-30 RX ADMIN — SPIRONOLACTONE 25 MG: 25 TABLET ORAL at 08:53

## 2022-10-30 RX ADMIN — ROPINIROLE HYDROCHLORIDE 0.5 MG: 0.25 TABLET, FILM COATED ORAL at 17:19

## 2022-10-30 NOTE — PROGRESS NOTES
0730: Bedside shift change report given to Komal Rodriguez (oncoming nurse) by Kayla Reyes (offgoing nurse). Report included the following information SBAR, Kardex, Intake/Output, MAR, and Recent Results.

## 2022-10-30 NOTE — PROGRESS NOTES
[facility]                                                                                          Hospitalist Progress Note  Andrea Mcguire MD  Answering service: [phone] OR [phone] from in house phone        Date of Service:  10/30/2022  NAME:  Gary Putnam  :  1980  MRN:  [id number]      Admission Summary:     Patient initially presents with suicidal ideation and found to have congestive heart failure. Interval history / Subjective:     Her breathing and swelling are better, nausea better, biggest complaint is dysgeusia, also feels that throat is irritated. Assessment & Plan:     Congestive heart failure  -Acute on chronic systolic CHF NYHA class IV on admission  -Patient with ischemic cardiomyopathy with EF with 15 to 20%  -s/p RHC 10/27 - started on dobutamine and bumetanide gtt  -continue spironolactone, Jardiance  -other GDMT as BP tolerates    Urinary tract infection  -Completed antibiotic    Hypertension  -Blood pressure is borderline low normal    Type 2 diabetes  -Well-controlled with hemoglobin A1c of 6.3  -Continue sliding scale insulin coverage    Coronary artery disease  -Patient with history of STEMI with stent to LAD  -Continue Effient    Dyslipidemia  -On Zetia    Restless leg syndrome  -On Requip    Hemorrhoids  -Continue Anusol cream    Depression with suicidal ideation  -Patient previously evaluated by psychiatry  -On Lexapro, also on Klonopin for anxiety  -One-on-one sitter discontinued per psych recommendation  -Outpatient follow-up with psychiatry    Anxiety  -Patient on Klonopin for 12 years, continue Klonopin here  -Continue trazodone for sleep  -Outpatient follow-up with psychiatry    Constipation  -resolved; continue PRN Miralax    Dysgeusia: unclear etiology  -check B12 (normal/high), zinc, rapid covid (negative). TSH checked and was only mildly elevated in the setting of acute illness.      Nausea: likely multifactorial, hx of gastroparesis, bowel edema from CHF  -continue supportive care     Code status: Full  Prophylaxis: SCDs  Care Plan discussed with: Patient/RN/CM/AHF  Anticipated Disposition: ~48h     Hospital Problems  Date Reviewed: 10/14/2022            Codes Class Noted POA    CHF (congestive heart failure) ([facility] [address] ICD-10-CM: I50.9  ICD-9-CM: 428.0  10/21/2022 Unknown       Review of Systems:   A comprehensive review of systems was negative except for that written in the HPI. Vital Signs:    Last 24hrs VS reviewed since prior progress note. Most recent are:  Visit Vitals  BP (!) 121/91 (BP 1 Location: Left upper arm, BP Patient Position: At rest)   Pulse 97   Temp 97.6 °F (36.4 °C)   Resp 16   Ht 5' 4\" (1.626 m)   Wt 86 kg (189 lb 9.5 oz)   SpO2 97%   BMI 32.54 kg/m²         Intake/Output Summary (Last 24 hours) at 10/30/2022 1247  Last data filed at 10/30/2022 1217  Gross per 24 hour   Intake 2068.74 ml   Output 1150 ml   Net 918.74 ml          Physical Examination:     I had a face to face encounter with this patient and independently examined them on 10/30/2022 as outlined below:          Constitutional:  No acute distress, non-toxic, obese   ENT:  Oral mucosa moist, oropharynx benign, anicteric sclerae    Resp:  CTA bilaterally. No wheezing/rhonchi/rales. No accessory muscle use. CV:  Regular rhythm, normal rate, no murmurs, no gallops, trace b/l LE edema    GI:  Soft, non distended, non tender. normoactive bowel sounds     Musculoskeletal:  warm    Neurologic:  Moves all extremities.   AAOx3, CN II-XII reviewed            Data Review:    Review and/or order of clinical lab test      Labs:     Recent Labs     10/28/22  0343   WBC 6.2   HGB 14.7   HCT 45.9          Recent Labs     10/30/22  0436 10/29/22  0043 10/28/22  0343    137 136   K 3.8 3.6 3.7   CL 99 97 100   CO2 28 28 28   BUN 19 11 11   CREA 1.16* 1.26* 1.06*   * 131* 104*   CA 11.1* 10.4* 9.8   MG 2.9* 2.4 2.3       Recent Labs 10/30/22  0436 10/29/22  0043 10/28/22  0343   ALT 58 57 59   * 118* 112   TBILI 1.9* 2.5* 2.1*   TP 9.3* 8.8* 8.5*   ALB 4.9 4.4 4.2   GLOB 4.4* 4.4* 4.3*       No results for input(s): INR, PTP, APTT, INREXT, INREXT in the last 72 hours. No results for input(s): FE, TIBC, PSAT, FERR in the last 72 hours. Lab Results   Component Value Date/Time    Folate 20.0 10/23/2022 04:24 AM        No results for input(s): PH, PCO2, PO2 in the last 72 hours. No results for input(s): CPK, CKNDX, TROIQ in the last 72 hours.     No lab exists for component: CPKMB  Lab Results   Component Value Date/Time    Cholesterol, total 95 10/22/2022 06:26 AM    HDL Cholesterol 32 10/22/2022 06:26 AM    LDL, calculated 45 10/22/2022 06:26 AM    Triglyceride 90 10/22/2022 06:26 AM    CHOL/HDL Ratio 3.0 10/22/2022 06:26 AM     Lab Results   Component Value Date/Time    Glucose (POC) 91 10/30/2022 12:03 PM    Glucose (POC) 109 10/30/2022 06:44 AM    Glucose (POC) 94 10/29/2022 10:02 PM    Glucose (POC) 125 (H) 10/29/2022 04:24 PM    Glucose (POC) 94 10/29/2022 11:19 AM     Lab Results   Component Value Date/Time    Color ORANGE 10/17/2022 08:42 AM    Appearance TURBID (A) 10/17/2022 08:42 AM    Specific gravity 1.025 10/17/2022 08:42 AM    Specific gravity 1.024 10/06/2022 08:57 AM    pH (UA) 5.0 10/17/2022 08:42 AM    Protein 30 (A) 10/17/2022 08:42 AM    Glucose Negative 10/17/2022 08:42 AM    Ketone Negative 10/17/2022 08:42 AM    Bilirubin Negative 11/25/2021 09:47 AM    Urobilinogen 1.0 10/17/2022 08:42 AM    Nitrites Positive (A) 10/17/2022 08:42 AM    Leukocyte Esterase SMALL (A) 10/17/2022 08:42 AM    Epithelial cells FEW 10/17/2022 08:42 AM    Bacteria 2+ (A) 10/17/2022 08:42 AM    WBC 5-10 10/17/2022 08:42 AM    RBC 5-10 10/17/2022 08:42 AM         Medications Reviewed:     Current Facility-Administered Medications   Medication Dose Route Frequency    pantoprazole (PROTONIX) tablet 40 mg  40 mg Oral ACB&D    potassium chloride SR (KLOR-CON 10) tablet 60 mEq  60 mEq Oral BID    polyethylene glycol (MIRALAX) packet 17 g  17 g Oral DAILY PRN    spironolactone (ALDACTONE) tablet 25 mg  25 mg Oral DAILY    prochlorperazine (COMPAZINE) injection 5 mg  5 mg IntraVENous Q6H PRN    DOBUTamine (DOBUTREX) 500 mg/250 mL (2,000 mcg/mL) infusion  2.5 mcg/kg/min (Order-Specific) IntraVENous CONTINUOUS    bumetanide (BUMEX) 0.25 mg/mL infusion  1 mg/hr IntraVENous CONTINUOUS    ondansetron (ZOFRAN) injection 4 mg  4 mg IntraVENous Q6H PRN    allopurinoL (ZYLOPRIM) tablet 50 mg  50 mg Oral DAILY    hydrocortisone (ANUSOL-HC) 2.5 % rectal cream   PeriANAL QID    melatonin tablet 10.5 mg  10.5 mg Oral QHS PRN    empagliflozin (JARDIANCE) tablet 10 mg  10 mg Oral DAILY    magnesium hydroxide (MILK OF MAGNESIA) 400 mg/5 mL oral suspension 30 mL  30 mL Oral DAILY    aluminum & magnesium hydroxide-simethicone (MYLANTA II) oral suspension 30 mL  30 mL Oral Q4H PRN    diazePAM (VALIUM) tablet 5 mg  5 mg Oral Q12H PRN    docusate sodium (COLACE) capsule 100 mg  100 mg Oral DAILY    bisacodyL (DULCOLAX) suppository 10 mg  10 mg Rectal DAILY    albuterol-ipratropium (DUO-NEB) 2.5 MG-0.5 MG/3 ML  3 mL Nebulization Q6H PRN    fluticasone propionate (FLONASE) 50 mcg/actuation nasal spray 2 Spray  2 Spray Both Nostrils DAILY    traZODone (DESYREL) tablet 200 mg  200 mg Oral QHS    [Held by provider] magnesium oxide (MAG-OX) tablet 400 mg  400 mg Oral QHS    calcium carbonate (TUMS) chewable tablet 200 mg [elemental]  200 mg Oral TID PRN    clonazePAM (KlonoPIN) tablet 1 mg  1 mg Oral QHS    escitalopram oxalate (LEXAPRO) tablet 20 mg  20 mg Oral DAILY    ezetimibe (ZETIA) tablet 10 mg  10 mg Oral DAILY    folic acid (FOLVITE) tablet 1 mg  1 mg Oral DAILY    montelukast (SINGULAIR) tablet 10 mg  10 mg Oral DAILY    prasugreL (EFFIENT) tablet 10 mg  10 mg Oral DAILY    rOPINIRole (REQUIP) tablet 0.5 mg  0.5 mg Oral BID    [Held by provider] rosuvastatin (CRESTOR) tablet 20 mg  20 mg Oral Q MON, WED & FRI    sodium chloride (NS) flush 5-40 mL  5-40 mL IntraVENous Q8H    sodium chloride (NS) flush 5-40 mL  5-40 mL IntraVENous PRN    acetaminophen (TYLENOL) tablet 650 mg  650 mg Oral Q4H PRN    nitroglycerin (NITROSTAT) tablet 0.4 mg  0.4 mg SubLINGual Q5MIN PRN    glucose chewable tablet 16 g  4 Tablet Oral PRN    glucagon (GLUCAGEN) injection 1 mg  1 mg IntraMUSCular PRN    dextrose 10 % infusion 0-250 mL  0-250 mL IntraVENous PRN    insulin lispro (HUMALOG) injection   SubCUTAneous AC&HS     ______________________________________________________________________  EXPECTED LENGTH OF STAY: 3d 19h  ACTUAL LENGTH OF STAY:          9                 Jose Damon MD Normal for race

## 2022-10-30 NOTE — PROGRESS NOTES
[facility] in [place], [place]  Inpatient Progress Note      Patient name: Kelby Louis  Patient : 1980  Patient MRN: [id number]  Consulting MD: Son Garvey MD  Date of service: 10/30/22      REASON FOR REFERRAL:  Management of acute on chronic systolic heart failure      PLAN OF CARE   38 y.o. female with history of HTN, CAD s/p STEMI in 2022 (DESx1 to LAD; treated at [facility]) with ischemic cardiomyopathy (EF 20-25%), CHF, GERD, RLS, and fibromyalgia seen as a new patient in [facility] on 10/21/22 and found to be fluid overloaded, and with suicidal ideation. Pt was taken to the ER for further evaluation and admission for acute on chronic HF and mental health crisis. Pt contracted for safety, and is being seen by behavioral health/psych  Ongoing diuresis and remainder of heart failure work-up while in hospital; work on optimizing GDMT after diuresis complete- increase diuresis and start Dobutamine based on [address] on 10/27/22.    GDMT limited in the past d/t hypotension          RECOMMENDATIONS:  Cont Dobutamine 2.5mcg/kg/min to augment diuresis- monitor HR  Beta-blocker: hold Coreg while on Dobutamine   ACE/ARB/ARNi: hold while undergoing aggressive diuresis  MRA: Cont spironolactone 25mg daily  SGLT2 inhibitor: Jardiance 10mg daily  Consider Verquvo as OP  Diuretic: Cont Bumex gtt 1mg/hr; goal net -2L next 24 hrs  Suspect dry weight is ~185lbs based on RHC  Keep K > 4 and Mag > 2  Cont allopurinol 50mg daily   Continue ASA and prasugrel per primary cards  Statin or [id number]: Continue current dose of statin  Treatment of STU: inpatient eval for STU  Likely will need referral for ICD given EF remains <35% 3 mos after MI, may need LifeVest at discharge if no ICD implant  Labs: HF and Transplant eval labs ordered; in process  Will have  completed psychosocial evaluation   Nutritionist consult  Heart failure education  Needs Advanced care plan    All other care per primary team     IMPRESSION:  Fatigue  Shortness of breath  Volume overload  Acute on Chronic systolic heart failure  Stage C, NYHA class IV symptoms  ischemic cardiomyopathy, LVEF 20-25%  CAD  STEMI July 2022 s/p RICKY to LAD  HTN  Obesity  LHD  Pre-diabetes  Esophageal stenosis s/p dilation  RLS  Fibromyalgia  Anxiety and Depression      INTERVAL HISTORY:  No acute overnight events  K 3.8  Creatinine and PBNP stable  Ambulating around unit  Still not eating much       LIFE GOALS:  Patient's personal goals include: get better  Important upcoming milestones or family events: the holidays coming up  The patient identifies the following as important for living well: being independent        [facility]:  Echo 10/22/22    Left Ventricle: Severely reduced left ventricular systolic function with a visually estimated EF of 15 - 20%. Left ventricle is mildly dilated. Normal wall thickness. Moderate hypokinesis of the following segments: basal anterior, basal anterolateral, basal anteroseptal, basal inferior, basal inferolateral, basal inferoseptal, mid anterior, mid anterolateral, mid anteroseptal, mid inferior, mid inferolateral and mid inferoseptal. Severe hypokinesis of the following segments: apical anterior, apical septal, apical inferior and apical lateral. Grade III diastolic dysfunction with increased LAP. Mitral Valve: Mild annular dilation. Moderate regurgitation. Tricuspid Valve: Moderately elevated RVSP. The estimated RVSP is 51 mmHg. Left Atrium: Left atrium is mildly dilated. Pericardium: Trivial pericardial effusion present. No indication of cardiac tamponade. Contrast used: Definity. Echo (8/14/22)    Left Ventricle: Severely reduced left ventricular systolic function with a visually estimated EF of 20 - 25%. Left ventricle is mildly dilated. Increased wall thickness. See diagram for wall motion findings.  Grade II diastolic dysfunction with increased LAP. Right Ventricle: Not well visualized. Tricuspid Valve: Moderately elevated RVSP. [address] 10/27/22  PA 56/34/43, RA 16, PCWP 29, CI daisy 1.46      BRIEF HISTORY OF PRESENT ILLNESS:  Devon Davidson is a 38 y.o. female with h/o HTN, CAD s/p STEMI in July 2022 (DESx1 to LAD; treated at [facility]) with ischemic cardiomyopathy (EF 20-25%), CHF, GERD, RLS, and fibromyalgia seen as a new patient in [facility] on 10/21/22 and found to be fluid overloaded, and with suicidal ideation. Pt was taken to the ER for further evaluation and admission for acute on chronic HF and mental health crisis. REVIEW OF SYSTEMS:  Review of Systems   Constitutional:  Negative for chills, fever, malaise/fatigue and weight loss. Respiratory:  Negative for cough and shortness of breath. Cardiovascular:  Negative for chest pain, palpitations, orthopnea and leg swelling. Gastrointestinal:  Negative for abdominal pain, constipation, diarrhea, heartburn, nausea and vomiting. Musculoskeletal:  Negative for myalgias. Restless legs   Neurological:  Negative for dizziness and weakness. Psychiatric/Behavioral:  Positive for depression. Negative for suicidal ideas. The patient is nervous/anxious and has insomnia. PHYSICAL EXAM:  Visit Vitals  /88 (BP 1 Location: Left upper arm, BP Patient Position: At rest)   Pulse (!) 105   Temp 97.7 °F (36.5 °C)   Resp 16   Ht 5' 4\" (1.626 m)   Wt 189 lb 9.5 oz (86 kg)   LMP 10/09/2022   SpO2 94%   BMI 32.54 kg/m²         Physical Exam  Vitals and nursing note reviewed. Constitutional:       Appearance: Normal appearance. She is obese. Cardiovascular:      Rate and Rhythm: Normal rate and regular rhythm. Heart sounds: No murmur heard. No friction rub. Gallop present. S3 sounds present. Pulmonary:      Effort: No respiratory distress. Breath sounds: Normal breath sounds. Abdominal:      General: Bowel sounds are normal. There is distension. Palpations: Abdomen is soft. Tenderness: There is no abdominal tenderness. Musculoskeletal:      Right lower leg: No edema. Left lower leg: No edema. Skin:     General: Skin is warm and dry. Capillary Refill: Capillary refill takes less than 2 seconds. Neurological:      General: No focal deficit present. Mental Status: She is alert and oriented to person, place, and time. Psychiatric:         Mood and Affect: Affect is tearful. Behavior: Behavior normal.         Thought Content: Thought content normal.         Judgment: Judgment normal.            PAST MEDICAL HISTORY:  Past Medical History:   Diagnosis Date    Anemia NEC     Arthritis     Chronic pain     fybromyalsia    Depression     anxiety, depression, OCD    GERD (gastroesophageal reflux disease)     Hypertension     Hypertension     Ill-defined condition     Fibromyalia gastricparesis    MI (myocardial infarction) ([facility] [address]     Musculoskeletal disorder     SOB (shortness of breath)     Stool color black        PAST SURGICAL HISTORY:  Past Surgical History:   Procedure Laterality Date    HX CORONARY STENT PLACEMENT      HX GYN           HX HEENT  2002    wisdom teeth extraction    UPPER GI ENDOSCOPY,BIOPSY  2018         UPPER GI ENDOSCOPY,DIAGNOSIS  2018            FAMILY HISTORY:  Family History   Problem Relation Age of Onset    Hypertension Father     Elevated Lipids Father     Arthritis-rheumatoid Mother         ? Lupus vs RA    Lung Disease Mother     Heart Disease Mother     Cancer Mother         breast in 39y    COPD Mother     Hypertension Mother     Stroke Mother         3-4 strokes    Breast Cancer Mother 50    Diabetes Maternal Grandmother        SOCIAL HISTORY:  Social History     Socioeconomic History    Marital status: SINGLE   Tobacco Use    Smoking status: Former     Packs/day: 0.25     Years: 8.00     Pack years: 2.00     Types: Cigarettes     Quit date: 2022     Years since quittin.2    Smokeless tobacco: Never   Vaping Use    Vaping Use: Never used   Substance and Sexual Activity    Alcohol use: Not Currently     Alcohol/week: 1.0 standard drink     Types: 1 Glasses of wine per week     Comment: Wine with special occassions - not since July    Drug use: Not Currently     Types: Marijuana     Comment: Haven't had any since 2022    Sexual activity: Yes     Partners: Male     Birth control/protection: None     Social Determinants of Health     Financial Resource Strain: High Risk    Difficulty of Paying Living Expenses: Very hard   Food Insecurity: No Food Insecurity    Worried About Running Out of Food in the Last Year: Never true    Ran Out of Food in the Last Year: Never true   Transportation Needs: No Transportation Needs    Lack of Transportation (Medical): No    Lack of Transportation (Non-Medical): No   Physical Activity: Inactive    Days of Exercise per Week: 0 days    Minutes of Exercise per Session: 0 min   Stress: Stress Concern Present    Feeling of Stress :  To some extent   Social Connections: Socially Isolated    Frequency of Communication with Friends and Family: Twice a week    Frequency of Social Gatherings with Friends and Family: Never    Attends Gnosticism Services: Never    Active Member of Clubs or Organizations: No    Attends Club or Organization Meetings: Never    Marital Status: Never    Housing Stability: Low Risk     Unable to Pay for Housing in the Last Year: No    Number of [place] in the Last Year: 1    Unstable Housing in the Last Year: No       LABORATORY RESULTS:     Labs Latest Ref Rng & Units 10/30/2022 10/29/2022 10/28/2022 10/27/2022 10/26/2022 10/25/2022 10/24/2022   WBC 3.6 - 11.0 K/uL - - 6.2 - 6.6 - -   RBC 3.80 - 5.20 M/uL - - 5.05 - 4.28 - -   Hemoglobin 11.5 - 16.0 g/dL - - 14.7 - 12.6 - -   Hematocrit 35.0 - 47.0 % - - 45.9 - 39.5 - -   MCV 80.0 - 99.0 FL - - 90.9 - 92.3 - -   Platelets 545 - 959 K/uL - - 262 - 238 - - Lymphocytes 12 - 49 % - - - - 51(H) - -   Monocytes 5 - 13 % - - - - 7 - -   Eosinophils 0 - 7 % - - - - 8(H) - -   Basophils 0 - 1 % - - - - 1 - -   Albumin 3.5 - 5.0 g/dL 4.9 4.4 4.2 3.6 3.5 3. 4(L) 3.5   Calcium 8.5 - 10.1 MG/DL 11. 1(H) 10. 4(H) 9.8 10.1 9.7 9.5 9.7   Glucose 65 - 100 mg/dL 120(H) 131(H) 104(H) 95 95 103(H) 99   BUN 6 - 20 MG/DL 19 11 11 11 10 12 10   Creatinine 0.55 - 1.02 MG/DL 1.16(H) 1.26(H) 1.06(H) 0.96 0.89 0.75 0.86   Sodium 136 - 145 mmol/L 136 137 136 136 136 137 139   Potassium 3.5 - 5.1 mmol/L 3.8 3.6 3.7 3.8 3.8 4.4 3.5   TSH 0.36 - 3.74 uIU/mL - - - - - - -   Some recent data might be hidden     Lab Results   Component Value Date/Time    TSH 4.44 (H) 10/21/2022 06:12 PM    TSH 2.12 05/12/2021 10:55 AM    TSH 1.330 12/30/2019 12:00 AM    TSH 3.850 07/25/2018 12:40 PM    TSH 1.620 02/27/2018 08:59 AM    TSH 1.240 12/05/2016 10:13 AM    TSH 1.530 03/18/2016 10:31 AM    TSH 1.400 11/09/2011 09:37 AM    TSH 1.26 08/19/2010 10:36 AM    TSH 1.18 07/28/2010 04:10 PM       ALLERGY:  Allergies   Allergen Reactions    Abilify [Aripiprazole] Anxiety     Can not tolerate     Sulfa (Sulfonamide Antibiotics) Hives    Vicodin [Hydrocodone-Acetaminophen] Nausea and Vomiting        CURRENT MEDICATIONS:    Current Facility-Administered Medications:     potassium chloride SR (KLOR-CON 10) tablet 60 mEq, 60 mEq, Oral, BID, HiroGena B, NP, 60 mEq at 10/30/22 0853    pantoprazole (PROTONIX) tablet 40 mg, 40 mg, Oral, DAILY, Edie Mazariegos MD, 40 mg at 10/30/22 0853    polyethylene glycol (MIRALAX) packet 17 g, 17 g, Oral, DAILY PRN, Lisandra Napier MD, 17 g at 10/28/22 1098    spironolactone (ALDACTONE) tablet 25 mg, 25 mg, Oral, DAILY, Gena Bosch NP, 25 mg at 10/30/22 0853    prochlorperazine (COMPAZINE) injection 5 mg, 5 mg, IntraVENous, Q6H PRN, Edie Mazariegos MD, 5 mg at 10/28/22 1557    DOBUTamine (DOBUTREX) 500 mg/250 mL (2,000 mcg/mL) infusion, 2.5 mcg/kg/min (Order-Specific), IntraVENous, CONTINUOUS, Hiro, Gena B, NP, Last Rate: 6.8 mL/hr at 10/30/22 0851, 2.5 mcg/kg/min at 10/30/22 0851    bumetanide (BUMEX) 0.25 mg/mL infusion, 1 mg/hr, IntraVENous, CONTINUOUS, Hiro, Gena B, NP, Last Rate: 4 mL/hr at 10/30/22 0905, 1 mg/hr at 10/30/22 0905    ondansetron (ZOFRAN) injection 4 mg, 4 mg, IntraVENous, Q6H PRN, Callie Dominguez MD, 4 mg at 10/28/22 0724    allopurinoL (ZYLOPRIM) tablet 50 mg, 50 mg, Oral, DAILY, Hiro, Gena B, NP, 50 mg at 10/30/22 0853    hydrocortisone (ANUSOL-HC) 2.5 % rectal cream, , PeriANAL, QID, Callie Dominguez MD, Given at 10/30/22 0858    melatonin tablet 10.5 mg, 10.5 mg, Oral, QHS PRN, Callie Dominguez MD    empagliflozin (JARDIANCE) tablet 10 mg, 10 mg, Oral, DAILY, Hiro, Gena B, NP, 10 mg at 10/30/22 0853    magnesium hydroxide (MILK OF MAGNESIA) 400 mg/5 mL oral suspension 30 mL, 30 mL, Oral, DAILY, Callie Dominguez MD, 30 mL at 10/30/22 0852    aluminum & magnesium hydroxide-simethicone (MYLANTA II) oral suspension 30 mL, 30 mL, Oral, Q4H PRN, Callie Dominguez MD, 30 mL at 10/24/22 2004    diazePAM (VALIUM) tablet 5 mg, 5 mg, Oral, Q12H PRN, Callie Dominguez MD, 5 mg at 10/25/22 1151    docusate sodium (COLACE) capsule 100 mg, 100 mg, Oral, DAILY, Celeste Santos MD, 100 mg at 10/30/22 0855    bisacodyL (DULCOLAX) suppository 10 mg, 10 mg, Rectal, DAILY, Callie Dominguez MD, 10 mg at 10/25/22 3084    albuterol-ipratropium (DUO-NEB) 2.5 MG-0.5 MG/3 ML, 3 mL, Nebulization, Q6H PRN, Jorge A Olvera MD    fluticasone propionate (FLONASE) 50 mcg/actuation nasal spray 2 Spray, 2 Spray, Both Nostrils, DAILY, Jorge A Johnson MD, 2 [place] at 10/30/22 0858    traZODone (DESYREL) tablet 200 mg, 200 mg, Oral, QHS, Irma Zaragoza, NP, 200 mg at 10/29/22 2159    [Held by provider] magnesium oxide (MAG-OX) tablet 400 mg, 400 mg, Oral, QHS, Hugo BAGLEY, NP, 400 mg at 10/29/22 2159    calcium carbonate (TUMS) chewable tablet 200 mg [elemental], 200 mg, Oral, TID PRN, Teodora Parker MD, 200 mg at 10/24/22 1223    clonazePAM (KlonoPIN) tablet 1 mg, 1 mg, Oral, QHS, Bonifacio Box MD, 1 mg at 10/29/22 2159    escitalopram oxalate (LEXAPRO) tablet 20 mg, 20 mg, Oral, DAILY, Bonifacio Box MD, 20 mg at 10/30/22 2441    ezetimibe (ZETIA) tablet 10 mg, 10 mg, Oral, DAILY, Bonifacio Box MD, 10 mg at 71/81/95 3938    folic acid (FOLVITE) tablet 1 mg, 1 mg, Oral, DAILY, Bonifacio Box MD, 1 mg at 10/30/22 0853    montelukast (SINGULAIR) tablet 10 mg, 10 mg, Oral, DAILY, Bonifacio Box MD, 10 mg at 10/30/22 0853    prasugreL (EFFIENT) tablet 10 mg, 10 mg, Oral, DAILY, Bonifacio Box MD, 10 mg at 10/30/22 0853    rOPINIRole (REQUIP) tablet 0.5 mg, 0.5 mg, Oral, BID, Bonifacio Box MD, 0.5 mg at 10/30/22 0853    [Held by provider] rosuvastatin (CRESTOR) tablet 20 mg, 20 mg, Oral, Q MON, WED & Zoey Hussein, Jun Felton MD, 20 mg at 10/24/22 2241    sodium chloride (NS) flush 5-40 mL, 5-40 mL, IntraVENous, Q8H, Bonifacio Box MD, 10 mL at 10/30/22 0711    sodium chloride (NS) flush 5-40 mL, 5-40 mL, IntraVENous, PRN, Bonifacio Box MD    acetaminophen (TYLENOL) tablet 650 mg, 650 mg, Oral, Q4H PRN, Bonifacio Box MD, 650 mg at 10/29/22 1303    nitroglycerin (NITROSTAT) tablet 0.4 mg, 0.4 mg, SubLINGual, Q5MIN PRN, Bonifacio oBx MD, 0.4 mg at 10/21/22 2237    glucose chewable tablet 16 g, 4 Tablet, Oral, PRN, Bonifacio Box MD    glucagon (GLUCAGEN) injection 1 mg, 1 mg, IntraMUSCular, PRN, Bonifacio Box MD    dextrose 10 % infusion 0-250 mL, 0-250 mL, IntraVENous, PRN, Bonifacio Box MD    insulin lispro (HUMALOG) injection, , SubCUTAneous, AC&HS, Bonifacio Box MD    PATIENT CARE TEAM:  Patient Care Team:  Liang Guzman MD as PCP - General (Family Medicine)  Liang Guzman MD as PCP - [facility] Provider  Cheyanne Okeefe MD (Surgery General)  Marianne Connolly MD (Cardiovascular Disease Physician)  Elaine Abbasi MD (Otolaryngology)  Ifeanyi Baker MD (Internal Medicine Physician)  Darshan Rincon MD (Female Pelvic Medicine and Reconstructive Surgery)  Jay Amaro MD (Neurology)  Sowmya Caro MD (Psychiatry)  Nando Plunkett as Ambulatory Care Manager     Thank you for allowing us to participate in this patient's care.     Tom Meredith NP  [address] [place] [address], [address]  Phone: [phone]

## 2022-10-30 NOTE — PROGRESS NOTES
1930 Bedside shift change report given to Marium Jefferson  (oncoming nurse) by Ananda Rosas (offgoing nurse). Report included the following information SBAR, Kardex, Intake/Output, MAR, Recent Results, and Cardiac Rhythm NSR .     0700 Bedside shift change report given to Ananda Rosas (oncoming nurse) by Marium Bean and Kaitlyn Jefferson (offgoing nurse). Report included the following information SBAR, Kardex, Intake/Output, MAR, Recent Results, and Cardiac Rhythm NSR . Problem: Falls - Risk of  Goal: *Absence of Falls  Description: Document Anika Que Fall Risk and appropriate interventions in the flowsheet.   Outcome: Progressing Towards Goal  Note: Fall Risk Interventions:  Mobility Interventions: Communicate number of staff needed for ambulation/transfer    Mentation Interventions: Adequate sleep, hydration, pain control, Increase mobility, More frequent rounding    Medication Interventions: Teach patient to arise slowly, Patient to call before getting OOB, Assess postural VS orthostatic hypotension    Elimination Interventions: Call light in reach              Problem: Heart Failure: Day 5  Goal: Activity/Safety  Outcome: Progressing Towards Goal  Goal: Diagnostic Test/Procedures  Outcome: Progressing Towards Goal  Goal: Nutrition/Diet  Outcome: Progressing Towards Goal  Goal: Discharge Planning  Outcome: Progressing Towards Goal  Goal: Medications  Outcome: Progressing Towards Goal  Goal: Respiratory  Outcome: Progressing Towards Goal  Goal: Treatments/Interventions/Procedures  Outcome: Progressing Towards Goal  Goal: Psychosocial  Outcome: Progressing Towards Goal

## 2022-10-31 LAB
ALBUMIN SERPL-MCNC: 4.6 G/DL (ref 3.5–5)
ALBUMIN/GLOB SERPL: 1 {RATIO} (ref 1.1–2.2)
ALP SERPL-CCNC: 121 U/L (ref 45–117)
ALT SERPL-CCNC: 62 U/L (ref 12–78)
ANION GAP SERPL CALC-SCNC: 9 MMOL/L (ref 5–15)
AST SERPL-CCNC: 64 U/L (ref 15–37)
BILIRUB SERPL-MCNC: 1.8 MG/DL (ref 0.2–1)
BNP SERPL-MCNC: 2262 PG/ML
BUN SERPL-MCNC: 20 MG/DL (ref 6–20)
BUN/CREAT SERPL: 18 (ref 12–20)
CALCIUM SERPL-MCNC: 10.6 MG/DL (ref 8.5–10.1)
CHLORIDE SERPL-SCNC: 99 MMOL/L (ref 97–108)
CO2 SERPL-SCNC: 26 MMOL/L (ref 21–32)
CREAT SERPL-MCNC: 1.14 MG/DL (ref 0.55–1.02)
ERYTHROCYTE [DISTWIDTH] IN BLOOD BY AUTOMATED COUNT: 14 % (ref 11.5–14.5)
GLOBULIN SER CALC-MCNC: 4.6 G/DL (ref 2–4)
GLUCOSE BLD STRIP.AUTO-MCNC: 100 MG/DL (ref 65–117)
GLUCOSE BLD STRIP.AUTO-MCNC: 110 MG/DL (ref 65–117)
GLUCOSE BLD STRIP.AUTO-MCNC: 121 MG/DL (ref 65–117)
GLUCOSE BLD STRIP.AUTO-MCNC: 97 MG/DL (ref 65–117)
GLUCOSE SERPL-MCNC: 107 MG/DL (ref 65–100)
HCT VFR BLD AUTO: 50.5 % (ref 35–47)
HGB BLD-MCNC: 15.9 G/DL (ref 11.5–16)
M TB IFN-G BLD-IMP: NEGATIVE
MAGNESIUM SERPL-MCNC: 3 MG/DL (ref 1.6–2.4)
MCH RBC QN AUTO: 29.3 PG (ref 26–34)
MCHC RBC AUTO-ENTMCNC: 31.5 G/DL (ref 30–36.5)
MCV RBC AUTO: 93 FL (ref 80–99)
NRBC # BLD: 0 K/UL (ref 0–0.01)
NRBC BLD-RTO: 0 PER 100 WBC
PLATELET # BLD AUTO: 261 K/UL (ref 150–400)
PMV BLD AUTO: 11.5 FL (ref 8.9–12.9)
POTASSIUM SERPL-SCNC: 3.8 MMOL/L (ref 3.5–5.1)
PROT SERPL-MCNC: 9.2 G/DL (ref 6.4–8.2)
QUANTIFERON CRITERIA, QFI1T: NORMAL
QUANTIFERON MITOGEN VALUE: >10 IU/ML
QUANTIFERON NIL VALUE: 0.13 IU/ML
QUANTIFERON TB1 AG: 0.13 IU/ML
QUANTIFERON TB2 AG: 0.17 IU/ML
RBC # BLD AUTO: 5.43 M/UL (ref 3.8–5.2)
SERVICE CMNT-IMP: ABNORMAL
SERVICE CMNT-IMP: NORMAL
SODIUM SERPL-SCNC: 134 MMOL/L (ref 136–145)
WBC # BLD AUTO: 5.5 K/UL (ref 3.6–11)

## 2022-10-31 PROCEDURE — 74011000250 HC RX REV CODE- 250: Performed by: NURSE PRACTITIONER

## 2022-10-31 PROCEDURE — 74011250637 HC RX REV CODE- 250/637: Performed by: HOSPITALIST

## 2022-10-31 PROCEDURE — 83735 ASSAY OF MAGNESIUM: CPT

## 2022-10-31 PROCEDURE — 65660000001 HC RM ICU INTERMED STEPDOWN

## 2022-10-31 PROCEDURE — 74011250637 HC RX REV CODE- 250/637: Performed by: FAMILY MEDICINE

## 2022-10-31 PROCEDURE — 83880 ASSAY OF NATRIURETIC PEPTIDE: CPT

## 2022-10-31 PROCEDURE — 93005 ELECTROCARDIOGRAM TRACING: CPT

## 2022-10-31 PROCEDURE — 82962 GLUCOSE BLOOD TEST: CPT

## 2022-10-31 PROCEDURE — 85027 COMPLETE CBC AUTOMATED: CPT

## 2022-10-31 PROCEDURE — 74011000250 HC RX REV CODE- 250: Performed by: FAMILY MEDICINE

## 2022-10-31 PROCEDURE — 36415 COLL VENOUS BLD VENIPUNCTURE: CPT

## 2022-10-31 PROCEDURE — 74011250637 HC RX REV CODE- 250/637: Performed by: NURSE PRACTITIONER

## 2022-10-31 PROCEDURE — 99232 SBSQ HOSP IP/OBS MODERATE 35: CPT | Performed by: INTERNAL MEDICINE

## 2022-10-31 PROCEDURE — 74011250636 HC RX REV CODE- 250/636: Performed by: NURSE PRACTITIONER

## 2022-10-31 PROCEDURE — 80053 COMPREHEN METABOLIC PANEL: CPT

## 2022-10-31 RX ORDER — BUMETANIDE 0.25 MG/ML
1 INJECTION INTRAMUSCULAR; INTRAVENOUS 2 TIMES DAILY
Status: DISCONTINUED | OUTPATIENT
Start: 2022-10-31 | End: 2022-11-01

## 2022-10-31 RX ADMIN — DIAZEPAM 5 MG: 5 TABLET ORAL at 12:37

## 2022-10-31 RX ADMIN — PANTOPRAZOLE SODIUM 40 MG: 40 TABLET, DELAYED RELEASE ORAL at 17:51

## 2022-10-31 RX ADMIN — SODIUM CHLORIDE, PRESERVATIVE FREE 10 ML: 5 INJECTION INTRAVENOUS at 14:17

## 2022-10-31 RX ADMIN — POTASSIUM CHLORIDE 60 MEQ: 750 TABLET, FILM COATED, EXTENDED RELEASE ORAL at 17:51

## 2022-10-31 RX ADMIN — EZETIMIBE 10 MG: 10 TABLET ORAL at 09:53

## 2022-10-31 RX ADMIN — DOCUSATE SODIUM 100 MG: 100 CAPSULE, LIQUID FILLED ORAL at 09:46

## 2022-10-31 RX ADMIN — BUMETANIDE 1 MG/HR: 0.25 INJECTION, SOLUTION INTRAMUSCULAR; INTRAVENOUS at 11:11

## 2022-10-31 RX ADMIN — BUMETANIDE 1 MG: 0.25 INJECTION, SOLUTION INTRAMUSCULAR; INTRAVENOUS at 14:15

## 2022-10-31 RX ADMIN — BUMETANIDE 1 MG/HR: 0.25 INJECTION, SOLUTION INTRAMUSCULAR; INTRAVENOUS at 00:05

## 2022-10-31 RX ADMIN — EMPAGLIFLOZIN 10 MG: 10 TABLET, FILM COATED ORAL at 09:00

## 2022-10-31 RX ADMIN — POTASSIUM CHLORIDE 60 MEQ: 750 TABLET, FILM COATED, EXTENDED RELEASE ORAL at 09:46

## 2022-10-31 RX ADMIN — ESCITALOPRAM OXALATE 20 MG: 10 TABLET ORAL at 09:47

## 2022-10-31 RX ADMIN — FOLIC ACID 1 MG: 1 TABLET ORAL at 09:47

## 2022-10-31 RX ADMIN — SODIUM CHLORIDE, PRESERVATIVE FREE 10 ML: 5 INJECTION INTRAVENOUS at 00:12

## 2022-10-31 RX ADMIN — HYDROCORTISONE: 25 CREAM TOPICAL at 12:26

## 2022-10-31 RX ADMIN — SODIUM CHLORIDE, PRESERVATIVE FREE 10 ML: 5 INJECTION INTRAVENOUS at 06:00

## 2022-10-31 RX ADMIN — MONTELUKAST 10 MG: 10 TABLET, FILM COATED ORAL at 09:47

## 2022-10-31 RX ADMIN — PANTOPRAZOLE SODIUM 40 MG: 40 TABLET, DELAYED RELEASE ORAL at 07:04

## 2022-10-31 RX ADMIN — SPIRONOLACTONE 25 MG: 25 TABLET ORAL at 09:47

## 2022-10-31 RX ADMIN — DOBUTAMINE HYDROCHLORIDE 2.5 MCG/KG/MIN: 200 INJECTION INTRAVENOUS at 11:11

## 2022-10-31 RX ADMIN — PRASUGREL 10 MG: 10 TABLET, FILM COATED ORAL at 09:47

## 2022-10-31 RX ADMIN — ALLOPURINOL 50 MG: 100 TABLET ORAL at 09:47

## 2022-10-31 RX ADMIN — HYDROCORTISONE: 25 CREAM TOPICAL at 09:52

## 2022-10-31 RX ADMIN — ROPINIROLE HYDROCHLORIDE 0.5 MG: 0.25 TABLET, FILM COATED ORAL at 09:47

## 2022-10-31 NOTE — PROGRESS NOTES
[facility] in [place], [place]  Inpatient Progress Note      Patient name: Srinivas Dunbar  Patient : 1980  Patient MRN: [id number]  Consulting MD: Kathy Lutz MD  Date of service: 10/31/22    REASON FOR REFERRAL:  Management of acute on chronic systolic heart failure      PLAN OF CARE   38 y.o. female with ischemic cardiomyopathy, LVEF 20-25%, stage C, NYHA class IIIB undergoing diuresis and initiation of home inotropes as bridge to decision  Pt contracted for safety, and is being seen by behavioral health/psychiatry  Autoimmune disease (KITTY 1:320) must be diagnosed and treated, lupus carditis ruled out and psychiatric condition stabilized before considering HT/LVAD  Schedule cardiac MRI outpatient; followed by ICD implant          RECOMMENDATIONS:  Psychiatry consultation for anxiety   Check dsDNA and rheumatology consultation to r/o lupus  Cont Dobutamine 2.5mcg/kg/min to augment diuresis- monitor HR  Start corlanor 2.5mg twice daily and uptitrate dose  Beta-blocker: hold Coreg while on Dobutamine   ACE/ARB/ARNi: hold off until BP can tolerate  MRA: Cont spironolactone 25mg daily  SGLT2 inhibitor: Jardiance 10mg daily  Consider Verquvo as OP  Change bumex to 1mg IV twice daily, probably at dry weight 189lbs  Keep K > 4 and Mag > 2  Cont allopurinol 50mg daily   Continue ASA and prasugrel per primary cards  Statin or PCSK9i: Continue current dose of statin  Treatment of STU: inpatient eval for STU  Will need referral for ICD given EF remains <35% 3 mos after MI  Will need LifeVest at discharge   Labs: HF and Transplant eval labs ordered; in process  Will have  completed psychosocial evaluation   Nutritionist consult  Heart failure education  Needs Advanced care plan     All other care per primary team      IMPRESSION:  Fatigue  Shortness of breath  Volume overload  Acute on Chronic systolic heart failure  Stage C, NYHA class IV symptoms  ischemic cardiomyopathy, LVEF 20-25%  CAD  STEMI July 2022 s/p RICKY to LAD  HTN  Obesity  LHD  Pre-diabetes  Esophageal stenosis s/p dilation  RLS  Fibromyalgia  Anxiety and Depression        INTERVAL HISTORY:  No acute overnight events  K 3.8  Creatinine and PBNP stable  Ambulating around unit  Still not eating much         LIFE GOALS:  Patient's personal goals include: get better  Important upcoming milestones or family events: the holidays coming up  The patient identifies the following as important for living well: being independent           [facility]:  Echo 10/22/22    Left Ventricle: Severely reduced left ventricular systolic function with a visually estimated EF of 15 - 20%. Left ventricle is mildly dilated. Normal wall thickness. Moderate hypokinesis of the following segments: basal anterior, basal anterolateral, basal anteroseptal, basal inferior, basal inferolateral, basal inferoseptal, mid anterior, mid anterolateral, mid anteroseptal, mid inferior, mid inferolateral and mid inferoseptal. Severe hypokinesis of the following segments: apical anterior, apical septal, apical inferior and apical lateral. Grade III diastolic dysfunction with increased LAP. Mitral Valve: Mild annular dilation. Moderate regurgitation. Tricuspid Valve: Moderately elevated RVSP. The estimated RVSP is 51 mmHg. Left Atrium: Left atrium is mildly dilated. Pericardium: Trivial pericardial effusion present. No indication of cardiac tamponade. Contrast used: Definity. Echo (8/14/22)    Left Ventricle: Severely reduced left ventricular systolic function with a visually estimated EF of 20 - 25%. Left ventricle is mildly dilated. Increased wall thickness. See diagram for wall motion findings. Grade II diastolic dysfunction with increased LAP. Right Ventricle: Not well visualized. Tricuspid Valve: Moderately elevated RVSP.      RHC 10/27/22  PA 56/34/43, RA 16, PCWP 29, CI daisy 1.46        BRIEF HISTORY OF PRESENT ILLNESS:  Galdino Rivers is a 38 y.o. female with h/o HTN, CAD s/p STEMI in July 2022 (DESx1 to LAD; treated at [facility]) with ischemic cardiomyopathy (EF 20-25%), CHF, GERD, RLS, and fibromyalgia seen as a new patient in [facility] on 10/21/22 and found to be fluid overloaded, and with suicidal ideation. Pt was taken to the ER for further evaluation and admission for acute on chronic HF and mental health crisis. REVIEW OF SYSTEMS:  Review of Systems   Constitutional:  Negative for chills, fever, malaise/fatigue and weight loss. Respiratory:  Negative for cough and shortness of breath. Cardiovascular:  Negative for chest pain, palpitations, orthopnea and leg swelling. Gastrointestinal:  Negative for abdominal pain, constipation, diarrhea, heartburn, nausea and vomiting. Musculoskeletal:  Negative for myalgias. Restless legs   Neurological:  Negative for dizziness and weakness. Psychiatric/Behavioral:  Positive for depression. Negative for suicidal ideas. The patient is nervous/anxious and has insomnia. PHYSICAL EXAM:  Visit Vitals  BP (!) 121/98 (BP 1 Location: Left upper arm, BP Patient Position: Sitting)   Pulse (!) 105   Temp 97.8 °F (36.6 °C)   Resp 18   Ht 5' 4\" (1.626 m)   Wt 189 lb 6 oz (85.9 kg)   LMP 10/09/2022   SpO2 99%   BMI 32.51 kg/m²     General: Patient is well developed, well-nourished in no acute distress  HEENT: Unremarkable   Neck: Supple. No evidence of thyroid enlargements or lymphadenopathy. JVD: Cannot be appreciated   Lungs: Breath sounds are equal and clear bilaterally. No wheezes, rhonchi, or rales. Heart: Regular rate and rhythm with normal S1 and S2. No murmurs, gallops or rubs. Abdomen: Soft, no mass or tenderness. No organomegaly or hernia. Bowel sounds present. Genitourinary and rectal: deferred  Extremities: No cyanosis, clubbing, or edema. Neurologic: No focal sensory or motor deficits are noted.  Grossly intact. Psychiatric: Awake, alert an doriented x 3. Appropriate mood and affect. Skin: Warm, dry and well perfused. PAST MEDICAL HISTORY:  Past Medical History:   Diagnosis Date    Anemia NEC     Arthritis     Chronic pain     fybromyalsia    Depression     anxiety, depression, OCD    GERD (gastroesophageal reflux disease)     Hypertension     Hypertension     Ill-defined condition     Fibromyalia gastricparesis    MI (myocardial infarction) ([address]     Musculoskeletal disorder     SOB (shortness of breath)     Stool color black        PAST SURGICAL HISTORY:  Past Surgical History:   Procedure Laterality Date    HX CORONARY STENT PLACEMENT      HX GYN           HX HEENT  2002    wisdom teeth extraction    UPPER GI ENDOSCOPY,BIOPSY  2018         UPPER GI ENDOSCOPY,DIAGNOSIS  2018            FAMILY HISTORY:  Family History   Problem Relation Age of Onset    Hypertension Father     Elevated Lipids Father     Arthritis-rheumatoid Mother         ? Lupus vs RA    Lung Disease Mother     Heart Disease Mother     Cancer Mother         breast in 39y    COPD Mother     Hypertension Mother     Stroke Mother         3-4 strokes    Breast Cancer Mother 50    Diabetes Maternal Grandmother        SOCIAL HISTORY:  Social History     Socioeconomic History    Marital status: SINGLE   Tobacco Use    Smoking status: Former     Packs/day: 0.25     Years: 8.00     Pack years: 2.00     Types: Cigarettes     Quit date: 2022     Years since quittin.2    Smokeless tobacco: Never   Vaping Use    Vaping Use: Never used   Substance and Sexual Activity    Alcohol use: Not Currently     Alcohol/week: 1.0 standard drink     Types: 1 Glasses of wine per week     Comment: Wine with special occassions - not since July    Drug use: Not Currently     Types: Marijuana     Comment: Haven't had any since 2022    Sexual activity: Yes     Partners: Male     Birth control/protection: None     Social Determinants of Health     Financial Resource Strain: High Risk    Difficulty of Paying Living Expenses: Very hard   Food Insecurity: No Food Insecurity    Worried About Running Out of Food in the Last Year: Never true    Ran Out of Food in the Last Year: Never true   Transportation Needs: No Transportation Needs    Lack of Transportation (Medical): No    Lack of Transportation (Non-Medical): No   Physical Activity: Inactive    Days of Exercise per Week: 0 days    Minutes of Exercise per Session: 0 min   Stress: Stress Concern Present    Feeling of Stress : To some extent   Social Connections: Socially Isolated    Frequency of Communication with Friends and Family: Twice a week    Frequency of Social Gatherings with Friends and Family: Never    Attends Hoahaoism Services: Never    Active Member of Clubs or Organizations: No    Attends Club or Organization Meetings: Never    Marital Status: Never    Housing Stability: Low Risk     Unable to Pay for Housing in the Last Year: No    Number of [place] in the Last Year: 1    Unstable Housing in the Last Year: No       LABORATORY RESULTS:     Labs Latest Ref Rng & Units 10/31/2022 10/30/2022 10/29/2022 10/28/2022 10/27/2022 10/26/2022 10/25/2022   WBC 3.6 - 11.0 K/uL 5.5 - - 6.2 - 6.6 -   RBC 3.80 - 5.20 M/uL 5.43(H) - - 5.05 - 4.28 -   Hemoglobin 11.5 - 16.0 g/dL 15.9 - - 14.7 - 12.6 -   Hematocrit 35.0 - 47.0 % 50. 5(H) - - 45.9 - 39.5 -   MCV 80.0 - 99.0 FL 93.0 - - 90.9 - 92.3 -   Platelets 889 - 920 K/uL 261 - - 262 - 238 -   Lymphocytes 12 - 49 % - - - - - 51(H) -   Monocytes 5 - 13 % - - - - - 7 -   Eosinophils 0 - 7 % - - - - - 8(H) -   Basophils 0 - 1 % - - - - - 1 -   Albumin 3.5 - 5.0 g/dL 4.6 4.9 4.4 4.2 3.6 3.5 3. 4(L)   Calcium 8.5 - 10.1 MG/DL 10. 6(H) 11. 1(H) 10. 4(H) 9.8 10.1 9.7 9.5   Glucose 65 - 100 mg/dL 107(H) 120(H) 131(H) 104(H) 95 95 103(H)   BUN 6 - 20 MG/DL 20 19 11 11 11 10 12   Creatinine 0.55 - 1.02 MG/DL 1.14(H) 1.16(H) 1.26(H) 1.06(H) 0.96 0.89 0.75 Sodium 136 - 145 mmol/L 134(L) 136 137 136 136 136 137   Potassium 3.5 - 5.1 mmol/L 3.8 3.8 3.6 3.7 3.8 3.8 4.4   TSH 0.36 - 3.74 uIU/mL - - - - - - -   Some recent data might be hidden     Lab Results   Component Value Date/Time    TSH 4.44 (H) 10/21/2022 06:12 PM    TSH 2.12 05/12/2021 10:55 AM    TSH 1.330 12/30/2019 12:00 AM    TSH 3.850 07/25/2018 12:40 PM    TSH 1.620 02/27/2018 08:59 AM    TSH 1.240 12/05/2016 10:13 AM    TSH 1.530 03/18/2016 10:31 AM    TSH 1.400 11/09/2011 09:37 AM    TSH 1.26 08/19/2010 10:36 AM    TSH 1.18 07/28/2010 04:10 PM       ALLERGY:  Allergies   Allergen Reactions    Abilify [Aripiprazole] Anxiety     Can not tolerate     Sulfa (Sulfonamide Antibiotics) Hives    Vicodin [Hydrocodone-Acetaminophen] Nausea and Vomiting        CURRENT MEDICATIONS:    Current Facility-Administered Medications:     bumetanide (BUMEX) injection 1 mg, 1 mg, IntraVENous, BID, Jenni Carlson NP, 1 mg at 10/31/22 1415    pantoprazole (PROTONIX) tablet 40 mg, 40 mg, Oral, ACB&D, Shireen Mazariegos MD, 40 mg at 10/31/22 0704    potassium chloride SR (KLOR-CON 10) tablet 60 mEq, 60 mEq, Oral, BID, Hiro, Gnea B, NP, 60 mEq at 10/31/22 0946    polyethylene glycol (MIRALAX) packet 17 g, 17 g, Oral, DAILY PRN, Rekha Vernon MD, 17 g at 10/30/22 2052    spironolactone (ALDACTONE) tablet 25 mg, 25 mg, Oral, DAILY, HiroSanyain B, NP, 25 mg at 10/31/22 0947    prochlorperazine (COMPAZINE) injection 5 mg, 5 mg, IntraVENous, Q6H PRN, Shireen Mazariegos MD, 5 mg at 10/28/22 1557    DOBUTamine (DOBUTREX) 500 mg/250 mL (2,000 mcg/mL) infusion, 2.5 mcg/kg/min (Order-Specific), IntraVENous, CONTINUOUS, Gena Bosch, NP, Last Rate: 6.8 mL/hr at 10/31/22 1111, 2.5 mcg/kg/min at 10/31/22 1111    ondansetron (ZOFRAN) injection 4 mg, 4 mg, IntraVENous, Q6H PRN, Tristan Dominguez MD, 4 mg at 10/28/22 0724    [Held by provider] allopurinoL (ZYLOPRIM) tablet 50 mg, 50 mg, Oral, DAILY, Krystal Bosch B, NP, 50 mg at 10/31/22 0947    hydrocortisone (ANUSOL-HC) 2.5 % rectal cream, , PeriANAL, QID, Love Dominguez MD, Given at 10/31/22 1226    melatonin tablet 10.5 mg, 10.5 mg, Oral, QHS PRN, Love Dominguez MD    empagliflozin (JARDIANCE) tablet 10 mg, 10 mg, Oral, DAILY, Gena Bosch, NP, 10 mg at 10/31/22 0900    aluminum & magnesium hydroxide-simethicone (MYLANTA II) oral suspension 30 mL, 30 mL, Oral, Q4H PRN, Love Dominguez MD, 30 mL at 10/24/22 2004    diazePAM (VALIUM) tablet 5 mg, 5 mg, Oral, Q12H PRN, Love Dominguez MD, 5 mg at 10/31/22 1237    docusate sodium (COLACE) capsule 100 mg, 100 mg, Oral, DAILY, Patti Lorenzo MD, 100 mg at 10/31/22 0946    bisacodyL (DULCOLAX) suppository 10 mg, 10 mg, Rectal, DAILY, Love Dominguez MD, 10 mg at 10/25/22 4359    albuterol-ipratropium (DUO-NEB) 2.5 MG-0.5 MG/3 ML, 3 mL, Nebulization, Q6H PRN, Jorge A Murphy MD    fluticasone propionate (FLONASE) 50 mcg/actuation nasal spray 2 Spray, 2 Spray, Both Nostrils, DAILY, Patti Lorenzo MD, 2 Dex[place] at 10/30/22 0858    traZODone (DESYREL) tablet 200 mg, 200 mg, Oral, QHS, Irma Zaragoza, NP, 200 mg at 10/30/22 2217    [Held by provider] magnesium oxide (MAG-OX) tablet 400 mg, 400 mg, Oral, QHS, Leigh BAGLEY, NP, 400 mg at 10/29/22 2159    calcium carbonate (TUMS) chewable tablet 200 mg [elemental], 200 mg, Oral, TID PRN, Patti Lorenzo MD, 200 mg at 10/30/22 2052    clonazePAM (KlonoPIN) tablet 1 mg, 1 mg, Oral, QHS, Lavonia Seip, MD, 1 mg at 10/30/22 2217    escitalopram oxalate (LEXAPRO) tablet 20 mg, 20 mg, Oral, DAILY, Geoff Rodriguez MD, 20 mg at 10/31/22 0947    ezetimibe (ZETIA) tablet 10 mg, 10 mg, Oral, DAILY, Geoff Rodriguez MD, 10 mg at 00/92/82 6565    folic acid (FOLVITE) tablet 1 mg, 1 mg, Oral, DAILY, Geoff Rodriguez MD, 1 mg at 10/31/22 0947    montelukast (SINGULAIR) tablet 10 mg, 10 mg, Oral, DAILY, Lavonia Seip, MD, 10 mg at 10/31/22 0947    prasugreL (EFFIENT) tablet 10 mg, 10 mg, Oral, DAILY, Jamey Lozada MD, 10 mg at 10/31/22 0947    rOPINIRole (REQUIP) tablet 0.5 mg, 0.5 mg, Oral, BID, Jamey Lozada MD, 0.5 mg at 10/31/22 0947    [Held by provider] rosuvastatin (CRESTOR) tablet 20 mg, 20 mg, Oral, Q MON, WED & Kelly Garcia MD, 20 mg at 10/24/22 2241    sodium chloride (NS) flush 5-40 mL, 5-40 mL, IntraVENous, Q8H, Geoff Rodriguez MD, 10 mL at 10/31/22 1417    sodium chloride (NS) flush 5-40 mL, 5-40 mL, IntraVENous, PRN, Jamey Lozada MD    acetaminophen (TYLENOL) tablet 650 mg, 650 mg, Oral, Q4H PRN, Jamey Lozada MD, 650 mg at 10/29/22 1303    nitroglycerin (NITROSTAT) tablet 0.4 mg, 0.4 mg, SubLINGual, Q5MIN PRN, Jamey Lozada MD, 0.4 mg at 10/21/22 2237    glucose chewable tablet 16 g, 4 Tablet, Oral, PRN, Geoff Arana MD    glucagon (GLUCAGEN) injection 1 mg, 1 mg, IntraMUSCular, PRN, Geoff Arana MD    dextrose 10 % infusion 0-250 mL, 0-250 mL, IntraVENous, PRN, Jamey Lozada MD    insulin lispro (HUMALOG) injection, , SubCUTAneous, AC&HS, Jamey Lozada MD    PATIENT CARE TEAM:  Patient Care Team:  Dahlia Peralta MD as PCP - General (Family Medicine)  Dahlia Peralta MD as PCP - [facility]  Martina Licona MD (Surgery General)  Joaquin Driver MD (Cardiovascular Disease Physician)  Jhonny Alcantar MD (Otolaryngology)  Ran Sharpe MD (Internal Medicine Physician)  Megha Zamorano MD (Female Pelvic Medicine and Reconstructive Surgery)  Sheryle Boys, MD (Neurology)  Brigida Espana MD (Psychiatry)  María Ford as Ambulatory Care Manager     Thank you for allowing me to participate in this patient's care.     Vickey Dallas MD   [address] [place] [address], [address]  Phone: [phone]

## 2022-10-31 NOTE — PROGRESS NOTES
Problem: Falls - Risk of  Goal: *Absence of Falls  Description: Document Virlinda Gamma Fall Risk and appropriate interventions in the flowsheet.   Outcome: Progressing Towards Goal  Note: Fall Risk Interventions:  Mobility Interventions: Assess mobility with egress test, Communicate number of staff needed for ambulation/transfer, OT consult for ADLs, Patient to call before getting OOB, PT Consult for assist device competence, PT Consult for mobility concerns, Strengthening exercises (ROM-active/passive), Utilize gait belt for transfers/ambulation    Mentation Interventions: Adequate sleep, hydration, pain control, Door open when patient unattended, Evaluate medications/consider consulting pharmacy, Eyeglasses and hearing aids, Familiar objects from home, Gait belt with transfers/ambulation, Increase mobility, More frequent rounding, Reorient patient, Room close to nurse's station, Self-releasing belt, Toileting rounds, Update white board    Medication Interventions: Assess postural VS orthostatic hypotension, Evaluate medications/consider consulting pharmacy, Patient to call before getting OOB, Teach patient to arise slowly, Utilize gait belt for transfers/ambulation    Elimination Interventions: Call light in reach, Patient to call for help with toileting needs, Stay With Me (per policy), Toilet paper/wipes in reach, Toileting schedule/hourly rounds

## 2022-10-31 NOTE — CONSULTS
PSYCHIATRY CONSULT NOTE:    REASON FOR CONSULT: Suicidal ideation    INTERVAL HISTORY  10/31/22: Patient presents as irritable but cooperative. She expressed her concerns and frustrations about the hospital. She complains about the hospital bed being too hard and is making her back hurt, she  does not like the food and the staffs are not treating her well. She made comments that \"everything is just getting to me and I'm in a weird space right now\". She denies current suicidal/homicidal thoughts and AV hallucinations. She reports not sleeping well because she has to constantly go to the bathroom and her bed is not comfortable. She also reports not eating because she does not like the hospital food and she is restricted on going to the cafeteria. She denies paranoia and delusional thinking. HISTORY OF PRESENTING COMPLAINT:  Stan Jarrett is a 38 y.o. BLACK/ female who is currently admitted to the medical floor at [address] arrived to the ED for inotropic therapy for heart failure and expressed SI at that time. She has expressed to staff SI with plans to hang herself in her attic. During assessment, pt was calm and cooperative, in good spirits. She was upset about being on a 1:1 and states that the statements she made about suicide were an extreme reaction to learning she may need a heart transplant, and not a true expression of a desire to end her life. She expresses a strong desire to live, and identifies several reasons to live: her family, goals for the future, motivation to recover. She states the whole reason she was upset and made these extreme statements when she learned the severity of her condition is that she wants to live. Protective factors: her father, who is 70, who she cares for, vacations to go on, hope for the future, other family members she loves, her Yazdanism glenn. Currently denies SI/plan/intent, HI/plan/intent, and AVH.      PAST PSYCHIATRIC HISTORY: Pt has a hx of depression and anxiety. Pt sees Dr. Reyes Wheat at [address]  for psychiatry. She is on Lexapro, trazodone, and Klonopin. She lost her mother in 2008, which is when she began to struggle with depression. No hx of suicide attempts, no hx inpatient psychiatric hospitalizations. No hx of perceptual disturbances. She feels her current regimen is effective. SUBSTANCE ABUSE HISTORY: No drug/alcohol abuse, hx of social drinking and occasional THC use. PSYCHOSOCIAL HISTORY: Pt is single and has no children. She is on disability. PAST MEDICAL HISTORY:  Please see H&P for details. Past Medical History:   Diagnosis Date    Anemia NEC     Arthritis     Chronic pain     fybromyalsia    Depression     anxiety, depression, OCD    GERD (gastroesophageal reflux disease)     Hypertension     Hypertension     Ill-defined condition     Fibromyalia gastricparesis    MI (myocardial infarction) ([facility] [address]     Musculoskeletal disorder     SOB (shortness of breath)     Stool color black      Prior to Admission medications    Medication Sig Start Date End Date Taking? Authorizing Provider   rOPINIRole (REQUIP) 0.5 mg tablet Take 1 Tablet by mouth two (2) times a day. 10/20/22  Yes Sean Arroyo MD   promethazine (PHENERGAN) 25 mg tablet Take 1 Tablet by mouth every six (6) hours as needed for Nausea for up to 10 doses. 10/17/22  Yes Sosa Thorne, NP   ondansetron (ZOFRAN ODT) 4 mg disintegrating tablet Take 1 Tablet by mouth every eight (8) hours as needed for Nausea or Vomiting. 10/14/22  Yes Sean Arroyo MD   furosemide (LASIX) 40 mg tablet TAKE ONE TABLET BY MOUTH EVERY DAY 10/5/22  Yes Bea Luis,    carvediloL (COREG) 3.125 mg tablet TAKE 1 TABLET BY MOUTH TWICE A DAY 9/27/22  Yes Bea Luis, DO   rosuvastatin (CRESTOR) 20 mg tablet Take 1 Tablet by mouth every Monday, Wednesday, Friday.  9/23/22  Yes Maria Elizabeth T, DO   levocetirizine (XYZAL) 5 mg tablet TAKE 1 TABLET EVERY DAY 9/15/22  Yes Jose Low MD   famotidine (PEPCID) 20 mg tablet TAKE 1 TABLET BY MOUTH TWICE A DAY 9/14/22  Yes Jose Low MD   ezetimibe (ZETIA) 10 mg tablet Take 1 Tablet by mouth daily. 9/9/22  Yes Kasi York NP   metFORMIN ER (GLUCOPHAGE XR) 500 mg tablet Take 1 Tablet by mouth daily (with dinner). 9/8/22  Yes Valentino Mckeon MD   montelukast (SINGULAIR) 10 mg tablet TAKE 1 TABLET EVERY DAY 8/24/22  Yes Jose Low MD   diazePAM (VALIUM) 5 mg tablet 5 mg every eight (8) hours as needed. 5 mg, 3 times daily 8/15/22  Yes Provider, Historical   albuterol-ipratropium (DUO-NEB) 2.5 mg-0.5 mg/3 ml nebu 3 mL by Nebulization route four (4) times daily. Yes Provider, Historical   ipratropium (ATROVENT) 21 mcg (0.03 %) nasal spray 1 [place] by Both Nostrils route every twelve (12) hours. 8/16/22  Yes Diana Rodrigues MD   acetaminophen (TYLENOL) 325 mg tablet Take  by mouth every four (4) hours as needed for Pain. Yes Provider, Historical   albuterol (PROVENTIL HFA, VENTOLIN HFA, PROAIR HFA) 90 mcg/actuation inhaler Take  by inhalation. Yes Other, MD Nestor   prasugreL (EFFIENT) 10 mg tablet  7/26/22  Yes Provider, Historical   aspirin delayed-release 81 mg tablet 81 mg. 7/26/22  Yes Provider, Historical   pantoprazole (PROTONIX) 40 mg tablet TAKE 1 TABLET TWICE DAILY 6/1/22  Yes Jose Low MD   ferrous sulfate 325 mg (65 mg iron) tablet TAKE 1 TABLET BY MOUTH ONCE DAILY BEFORE BREAKFAST 5/4/22  Yes Jose Low MD   traZODone (DESYREL) 100 mg tablet 2 tablets at night 4/5/22  Yes Provider, Historical   folic acid (FOLVITE) 1 mg tablet TAKE 1 TABLET EVERY DAY 4/6/22  Yes Jose Low MD   escitalopram oxalate (LEXAPRO) 20 mg tablet Take 20 mg by mouth daily. Yes Provider, Historical   clonazePAM (KLONOPIN) 1 mg tablet Take 1 mg by mouth in the morning.  At bedtime   Yes Provider, Historical   cholecalciferol, vitamin D3, 50 mcg (2,000 unit) tab 2,000 Units. Yes Provider, Historical   promethazine (PHENERGAN) 25 mg suppository Insert 1 Suppository into rectum every six (6) hours as needed for Nausea for up to 5 doses. Patient not taking: No sig reported 10/17/22   Sosa Gomez, NP   ketoconazole (NIZORAL) 2 % shampoo SHAMPOO TWICE WEEKLY  Patient not taking: No sig reported 10/11/22   Massiel Malave MD   empagliflozin (Jardiance) 10 mg tablet Take 1 Tablet by mouth daily. Patient not taking: No sig reported 8/31/22   Monika Pearson DO   hydrocortisone (ANUSOL-HC) 2.5 % rectal cream Insert  into rectum four (4) times daily. Patient not taking: Reported on 10/21/2022 8/17/22   Silvia Arnold MD   fluticasone propionate Naomi Bellis) 50 mcg/actuation nasal spray 2 Sprays by Both Nostrils route daily. Patient not taking: No sig reported    Provider, Historical   nitroglycerin (NITROSTAT) 0.4 mg SL tablet  7/26/22   Provider, Historical   cyclobenzaprine (FLEXERIL) 10 mg tablet three (3) times daily as needed.  Has not needed  Patient not taking: No sig reported 10/6/21   Provider, Historical     Vitals:    10/31/22 1415 10/31/22 1516 10/31/22 1545 10/31/22 1800   BP: (!) 128/93 (!) 121/98     Pulse: (!) 106 (!) 104 (!) 105 (!) 105   Resp:  16 18    Temp:  97.8 °F (36.6 °C)     SpO2:  99%     Weight:       Height:       LMP: 10/09/2022     Lab Results   Component Value Date/Time    WBC 5.5 10/31/2022 03:44 AM    HGB 15.9 10/31/2022 03:44 AM    HCT 50.5 (H) 10/31/2022 03:44 AM    PLATELET 993 63/33/6283 03:44 AM    MCV 93.0 10/31/2022 03:44 AM     Lab Results   Component Value Date/Time    Sodium 134 (L) 10/31/2022 03:44 AM    Potassium 3.8 10/31/2022 03:44 AM    Chloride 99 10/31/2022 03:44 AM    CO2 26 10/31/2022 03:44 AM    Anion gap 9 10/31/2022 03:44 AM    Glucose 107 (H) 10/31/2022 03:44 AM    Glucose 59 (L) 12/30/2019 12:00 AM    BUN 20 10/31/2022 03:44 AM    Creatinine 1.14 (H) 10/31/2022 03:44 AM    BUN/Creatinine ratio 18 10/31/2022 03:44 AM GFR est AA >60 10/03/2022 10:18 AM    GFR est non-AA >60 10/03/2022 10:18 AM    Calcium 10.6 (H) 10/31/2022 03:44 AM    Bilirubin, total 1.8 (H) 10/31/2022 03:44 AM    Alk. phosphatase 121 (H) 10/31/2022 03:44 AM    Protein, total 9.2 (H) 10/31/2022 03:44 AM    Albumin 4.6 10/31/2022 03:44 AM    Globulin 4.6 (H) 10/31/2022 03:44 AM    A-G Ratio 1.0 (L) 10/31/2022 03:44 AM    ALT (SGPT) 62 10/31/2022 03:44 AM    AST (SGOT) 64 (H) 10/31/2022 03:44 AM     No results found for: VALF2, VALAC, VALP, VALPR, DS6, CRBAM, CRBAMP, CARB2, XCRBAM  No results found for: LITHM    MENTAL STATUS EXAM:  General appearance: Boom Beltran is a 38 y.o. BLACK/ female who is lying in a hospital bed, moderately groomed, psychomotor activity is WNL  Eye contact: fair  Speech: Spontaneous, normal rate/volume/rhythm  Affect:irritable  Mood: \"frustrated\"  Thought Process: Logical, goal directed  Perception: Denies AH or VH, no evidence of psychotic processes observed  Thought Content: denies SI/plan/intent, denies HI/plan/intent  Insight: Partial  Judgment: Fair  Cognition: Intact grossly. ASSESSMENT AND PLAN:  Boom Beltran meets criteria for a diagnosis of MDD, recurrent, mild to moderate; unspecified anxiety disorder, adjustment disorder with mixed anxiety and depressed mood. -1:1 sitter may be discontinued. Pt agrees to disclose any further thoughts of suicide to staff and agrees to remain safe in the hospital. Pt is not at high risk for suicide at this time; she has no hx of suicide attempts, she has a strong social support network, goals for the future, and is engaging in treatment and actively hoping to recover from her heart condition and participate in treatment and follow cardiology's recommendation. Pt is in agreement with this plan and is at a low risk for suicide at this time. She understands the severity of the statements she  made, and understands the reason the statements were taken so seriously. -Recommend re-starting pt's trazodone 200mg QHS unless there is a specific reason why this is not restarted. 10/31/22: No new recommendations at this time. Patient denies current SI/HI and does not appear to be at risk to self or others. Can follow up with out patient psychiatrist after discharge. Continue with current medications. Thank your your consult. Please feel free to consult us again as needed.

## 2022-10-31 NOTE — PROGRESS NOTES
1930: Bedside and Verbal shift change report given to CK Serrano (oncoming nurse) by Sky Beltran RN (offgoing nurse). Report included the following information SBAR.

## 2022-10-31 NOTE — PROGRESS NOTES
0730: Bedside and Verbal shift change report given to Herbert Flores RN (oncoming nurse) by Mena Bergeron RN (offgoing nurse). Report included the following information SBAR, Kardex, Intake/Output, MAR, Recent Results, and Cardiac Rhythm Sinus Tach-Normal Sinus . 1008: NICOM Hemodynamic Monitoring     Visit Vitals  BP 95/70 (BP 1 Location: Left upper arm, BP Patient Position: At rest)   Pulse (!) 102   Temp 97.5 °F (36.4 °C)   Resp 18   Ht 5' 4\" (1.626 m)   Wt 85.9 kg (189 lb 6 oz)   LMP 10/09/2022   SpO2 96%   BMI 32.51 kg/m²         Baseline assessment:        CO 5.2        CI 2.2        SVI 28        SVV 19        TPR 1380    1316: Psychiatry consult called at this time. 1930: Bedside and Verbal shift change report given to CK Serrano (oncoming nurse) by Herbert Flores RN (offgoing nurse). Report included the following information SBAR, Kardex, Intake/Output, MAR, Recent Results, and Cardiac Rhythm Sinus Rhythm-Sinus Tach . Care Plans  Problem: Falls - Risk of  Goal: *Absence of Falls  Description: Document Marie Fox Fall Risk and appropriate interventions in the flowsheet.   Outcome: Progressing Towards Goal  Note: Fall Risk Interventions:  Mobility Interventions: Assess mobility with egress test, Communicate number of staff needed for ambulation/transfer, OT consult for ADLs, Patient to call before getting OOB, PT Consult for assist device competence, PT Consult for mobility concerns, Strengthening exercises (ROM-active/passive), Utilize gait belt for transfers/ambulation    Mentation Interventions: Adequate sleep, hydration, pain control, Door open when patient unattended, Evaluate medications/consider consulting pharmacy, Eyeglasses and hearing aids, Familiar objects from home, Gait belt with transfers/ambulation, Increase mobility, More frequent rounding, Reorient patient, Room close to nurse's station, Self-releasing belt, Toileting rounds, Update white board    Medication Interventions: Assess postural VS orthostatic hypotension, Evaluate medications/consider consulting pharmacy, Patient to call before getting OOB, Teach patient to arise slowly, Utilize gait belt for transfers/ambulation    Elimination Interventions: Call light in reach, Patient to call for help with toileting needs, Stay With Me (per policy), Toilet paper/wipes in reach, Toileting schedule/hourly rounds              Problem: Patient Education: Go to Patient Education Activity  Goal: Patient/Family Education  Outcome: Progressing Towards Goal     Problem: Patient Education: Go to Patient Education Activity  Goal: Patient/Family Education  Outcome: Progressing Towards Goal     Problem: Heart Failure: Discharge Outcomes  Goal: *Demonstrates ability to perform prescribed activity without shortness of breath or discomfort  Outcome: Progressing Towards Goal  Goal: *Left ventricular function assessment completed prior to or during stay, or planned for post-discharge  Outcome: Progressing Towards Goal  Goal: *ACEI prescribed if LVEF less than 40% and no contraindications or ARB prescribed  Outcome: Progressing Towards Goal  Goal: *Verbalizes understanding and describes prescribed diet  Outcome: Progressing Towards Goal  Goal: *Verbalizes understanding/describes prescribed medications  Outcome: Progressing Towards Goal  Goal: *Describes available resources and support systems  Description: (eg: Home Health, Palliative Care, Advanced Medical Directive)  Outcome: Progressing Towards Goal  Goal: *Describes smoking cessation resources  Outcome: Progressing Towards Goal  Goal: *Understands and describes signs and symptoms to report to providers(Stroke Metric)  Outcome: Progressing Towards Goal  Goal: *Describes/verbalizes understanding of follow-up/return appt  Description: (eg: to physicians, diabetes treatment coordinator, and other resources  Outcome: Progressing Towards Goal  Goal: *Describes importance of continuing daily weights and changes to report to physician  Outcome: Progressing Towards Goal

## 2022-11-01 ENCOUNTER — APPOINTMENT (OUTPATIENT)
Dept: VASCULAR SURGERY | Age: 42
DRG: 286 | End: 2022-11-01
Attending: INTERNAL MEDICINE
Payer: MEDICARE

## 2022-11-01 ENCOUNTER — TELEPHONE (OUTPATIENT)
Dept: RHEUMATOLOGY | Age: 42
End: 2022-11-01

## 2022-11-01 LAB
ALBUMIN SERPL-MCNC: 4.4 G/DL (ref 3.5–5)
ALBUMIN/GLOB SERPL: 1 {RATIO} (ref 1.1–2.2)
ALP SERPL-CCNC: 111 U/L (ref 45–117)
ALT SERPL-CCNC: 58 U/L (ref 12–78)
ANION GAP SERPL CALC-SCNC: 10 MMOL/L (ref 5–15)
AST SERPL-CCNC: 46 U/L (ref 15–37)
ATRIAL RATE: 102 BPM
ATRIAL RATE: 109 BPM
BILIRUB SERPL-MCNC: 2.2 MG/DL (ref 0.2–1)
BNP SERPL-MCNC: 1721 PG/ML
BUN SERPL-MCNC: 18 MG/DL (ref 6–20)
BUN/CREAT SERPL: 15 (ref 12–20)
CALCIUM SERPL-MCNC: 10.7 MG/DL (ref 8.5–10.1)
CALCULATED P AXIS, ECG09: 52 DEGREES
CALCULATED P AXIS, ECG09: 56 DEGREES
CALCULATED R AXIS, ECG10: -101 DEGREES
CALCULATED R AXIS, ECG10: -96 DEGREES
CALCULATED T AXIS, ECG11: 28 DEGREES
CALCULATED T AXIS, ECG11: 33 DEGREES
CHLORIDE SERPL-SCNC: 99 MMOL/L (ref 97–108)
CO2 SERPL-SCNC: 23 MMOL/L (ref 21–32)
CREAT SERPL-MCNC: 1.21 MG/DL (ref 0.55–1.02)
CRP SERPL-MCNC: 1.34 MG/DL (ref 0–0.6)
DIAGNOSIS, 93000: NORMAL
DIAGNOSIS, 93000: NORMAL
ERYTHROCYTE [SEDIMENTATION RATE] IN BLOOD: 13 MM/HR (ref 0–20)
GLOBULIN SER CALC-MCNC: 4.2 G/DL (ref 2–4)
GLUCOSE BLD STRIP.AUTO-MCNC: 101 MG/DL (ref 65–117)
GLUCOSE BLD STRIP.AUTO-MCNC: 101 MG/DL (ref 65–117)
GLUCOSE BLD STRIP.AUTO-MCNC: 104 MG/DL (ref 65–117)
GLUCOSE BLD STRIP.AUTO-MCNC: 97 MG/DL (ref 65–117)
GLUCOSE SERPL-MCNC: 103 MG/DL (ref 65–100)
MAGNESIUM SERPL-MCNC: 3 MG/DL (ref 1.6–2.4)
MYOGLOBIN SERPL-MCNC: 79 NG/ML (ref 13–71)
P-R INTERVAL, ECG05: 140 MS
P-R INTERVAL, ECG05: 148 MS
POTASSIUM SERPL-SCNC: 4.2 MMOL/L (ref 3.5–5.1)
PROT SERPL-MCNC: 8.6 G/DL (ref 6.4–8.2)
Q-T INTERVAL, ECG07: 388 MS
Q-T INTERVAL, ECG07: 426 MS
QRS DURATION, ECG06: 138 MS
QRS DURATION, ECG06: 148 MS
QTC CALCULATION (BEZET), ECG08: 522 MS
QTC CALCULATION (BEZET), ECG08: 555 MS
SERVICE CMNT-IMP: NORMAL
SODIUM SERPL-SCNC: 132 MMOL/L (ref 136–145)
TROPONIN-HIGH SENSITIVITY: 17 NG/L (ref 0–51)
VENTRICULAR RATE, ECG03: 102 BPM
VENTRICULAR RATE, ECG03: 109 BPM
ZINC SERPL-MCNC: 77 UG/DL (ref 44–115)

## 2022-11-01 PROCEDURE — 65660000001 HC RM ICU INTERMED STEPDOWN

## 2022-11-01 PROCEDURE — 74011250637 HC RX REV CODE- 250/637: Performed by: FAMILY MEDICINE

## 2022-11-01 PROCEDURE — 86225 DNA ANTIBODY NATIVE: CPT

## 2022-11-01 PROCEDURE — 74011250636 HC RX REV CODE- 250/636: Performed by: INTERNAL MEDICINE

## 2022-11-01 PROCEDURE — 74011250637 HC RX REV CODE- 250/637: Performed by: HOSPITALIST

## 2022-11-01 PROCEDURE — 93005 ELECTROCARDIOGRAM TRACING: CPT

## 2022-11-01 PROCEDURE — 85652 RBC SED RATE AUTOMATED: CPT

## 2022-11-01 PROCEDURE — 74011000250 HC RX REV CODE- 250: Performed by: FAMILY MEDICINE

## 2022-11-01 PROCEDURE — 74011250637 HC RX REV CODE- 250/637: Performed by: NURSE PRACTITIONER

## 2022-11-01 PROCEDURE — 36415 COLL VENOUS BLD VENIPUNCTURE: CPT

## 2022-11-01 PROCEDURE — 83874 ASSAY OF MYOGLOBIN: CPT

## 2022-11-01 PROCEDURE — 83735 ASSAY OF MAGNESIUM: CPT

## 2022-11-01 PROCEDURE — 83880 ASSAY OF NATRIURETIC PEPTIDE: CPT

## 2022-11-01 PROCEDURE — 74011000250 HC RX REV CODE- 250: Performed by: NURSE PRACTITIONER

## 2022-11-01 PROCEDURE — 84484 ASSAY OF TROPONIN QUANT: CPT

## 2022-11-01 PROCEDURE — 80053 COMPREHEN METABOLIC PANEL: CPT

## 2022-11-01 PROCEDURE — 86140 C-REACTIVE PROTEIN: CPT

## 2022-11-01 PROCEDURE — 82962 GLUCOSE BLOOD TEST: CPT

## 2022-11-01 PROCEDURE — 93880 EXTRACRANIAL BILAT STUDY: CPT

## 2022-11-01 RX ORDER — DIGOXIN 125 MCG
0.12 TABLET ORAL DAILY
Status: DISCONTINUED | OUTPATIENT
Start: 2022-11-01 | End: 2022-11-02

## 2022-11-01 RX ORDER — BUMETANIDE 0.25 MG/ML
2 INJECTION INTRAMUSCULAR; INTRAVENOUS 3 TIMES DAILY
Status: DISCONTINUED | OUTPATIENT
Start: 2022-11-01 | End: 2022-11-09

## 2022-11-01 RX ORDER — DIGOXIN 250 MCG
0.25 TABLET ORAL DAILY
Status: DISCONTINUED | OUTPATIENT
Start: 2022-11-01 | End: 2022-11-01

## 2022-11-01 RX ORDER — DOBUTAMINE HYDROCHLORIDE 200 MG/100ML
2 INJECTION INTRAVENOUS CONTINUOUS
Status: DISCONTINUED | OUTPATIENT
Start: 2022-11-01 | End: 2022-11-03

## 2022-11-01 RX ORDER — SUCRALFATE 1 G/1
1 TABLET ORAL
Status: COMPLETED | OUTPATIENT
Start: 2022-11-01 | End: 2022-11-04

## 2022-11-01 RX ADMIN — FOLIC ACID 1 MG: 1 TABLET ORAL at 08:14

## 2022-11-01 RX ADMIN — MONTELUKAST 10 MG: 10 TABLET, FILM COATED ORAL at 08:14

## 2022-11-01 RX ADMIN — SPIRONOLACTONE 25 MG: 25 TABLET ORAL at 08:14

## 2022-11-01 RX ADMIN — TRAZODONE HYDROCHLORIDE 200 MG: 100 TABLET ORAL at 23:02

## 2022-11-01 RX ADMIN — DOCUSATE SODIUM 100 MG: 100 CAPSULE, LIQUID FILLED ORAL at 08:14

## 2022-11-01 RX ADMIN — MENTHOL, METHYL SALICYLATE: 10; 15 CREAM TOPICAL at 00:20

## 2022-11-01 RX ADMIN — SODIUM CHLORIDE, PRESERVATIVE FREE 10 ML: 5 INJECTION INTRAVENOUS at 23:01

## 2022-11-01 RX ADMIN — POTASSIUM CHLORIDE 60 MEQ: 750 TABLET, FILM COATED, EXTENDED RELEASE ORAL at 19:01

## 2022-11-01 RX ADMIN — ESCITALOPRAM OXALATE 20 MG: 10 TABLET ORAL at 08:14

## 2022-11-01 RX ADMIN — SODIUM CHLORIDE, PRESERVATIVE FREE 10 ML: 5 INJECTION INTRAVENOUS at 06:51

## 2022-11-01 RX ADMIN — TRAZODONE HYDROCHLORIDE 200 MG: 100 TABLET ORAL at 00:17

## 2022-11-01 RX ADMIN — FLUTICASONE PROPIONATE 2 SPRAY: 50 SPRAY, METERED NASAL at 08:21

## 2022-11-01 RX ADMIN — MENTHOL, METHYL SALICYLATE: 10; 15 CREAM TOPICAL at 19:02

## 2022-11-01 RX ADMIN — SUCRALFATE 1 G: 1 TABLET ORAL at 23:01

## 2022-11-01 RX ADMIN — SUCRALFATE 1 G: 1 TABLET ORAL at 19:01

## 2022-11-01 RX ADMIN — PANTOPRAZOLE SODIUM 40 MG: 40 TABLET, DELAYED RELEASE ORAL at 06:50

## 2022-11-01 RX ADMIN — DOBUTAMINE HYDROCHLORIDE 3 MCG/KG/MIN: 200 INJECTION INTRAVENOUS at 19:00

## 2022-11-01 RX ADMIN — POTASSIUM CHLORIDE 60 MEQ: 750 TABLET, FILM COATED, EXTENDED RELEASE ORAL at 08:14

## 2022-11-01 RX ADMIN — CLONAZEPAM 1 MG: 0.5 TABLET ORAL at 00:17

## 2022-11-01 RX ADMIN — EMPAGLIFLOZIN 10 MG: 10 TABLET, FILM COATED ORAL at 08:14

## 2022-11-01 RX ADMIN — MENTHOL, METHYL SALICYLATE: 10; 15 CREAM TOPICAL at 08:21

## 2022-11-01 RX ADMIN — EZETIMIBE 10 MG: 10 TABLET ORAL at 08:14

## 2022-11-01 RX ADMIN — DIGOXIN 0.12 MG: 125 TABLET ORAL at 12:00

## 2022-11-01 RX ADMIN — DOBUTAMINE HYDROCHLORIDE 3 MCG/KG/MIN: 200 INJECTION INTRAVENOUS at 23:01

## 2022-11-01 RX ADMIN — MENTHOL, METHYL SALICYLATE: 10; 15 CREAM TOPICAL at 23:01

## 2022-11-01 RX ADMIN — PANTOPRAZOLE SODIUM 40 MG: 40 TABLET, DELAYED RELEASE ORAL at 19:01

## 2022-11-01 RX ADMIN — SODIUM CHLORIDE, PRESERVATIVE FREE 10 ML: 5 INJECTION INTRAVENOUS at 00:17

## 2022-11-01 RX ADMIN — CLONAZEPAM 1 MG: 0.5 TABLET ORAL at 23:02

## 2022-11-01 RX ADMIN — PRASUGREL 10 MG: 10 TABLET, FILM COATED ORAL at 08:14

## 2022-11-01 RX ADMIN — SODIUM CHLORIDE, PRESERVATIVE FREE 10 ML: 5 INJECTION INTRAVENOUS at 15:26

## 2022-11-01 RX ADMIN — BUMETANIDE 2 MG: 0.25 INJECTION, SOLUTION INTRAMUSCULAR; INTRAVENOUS at 19:01

## 2022-11-01 RX ADMIN — BUMETANIDE 1 MG: 0.25 INJECTION, SOLUTION INTRAMUSCULAR; INTRAVENOUS at 08:14

## 2022-11-01 NOTE — PROGRESS NOTES
[address] [facility] in [place], [place]        LVAD/Heart Transplant workup initiated per Dr. Guy Juares request. Appropriate labs, imaging and consultsultation orders placed per Dr. Guy Juares (verbal order read back). Met with patient and her father to provide preliminary LVAD education. Joshi: Embrace Excellence Every Day With the HeartMate 3 Left Ventricular Assist Device for the Treatment of Advanced Heart Failure reviewed with patient and father. Provided American Society of Transplantation: Getting a new heart. Information for patients about heart transplant handout for patient to review independently. Discussed evaluation process as well as caregiver expectation following LVAD implantation. Patient stated she has had one pregnancy, denied any blood transfusions, and stated she has had a dental evaluation with Dr. Wicho Garner in [place], [place]. Informed her clearance to be requested. Time given to ask questions. Patient and father had no further questions at this time. Will continue to follow with patient as needed for ongoing LVAD education and support.      Karl Mcfadden RN  VAD Coordinator

## 2022-11-01 NOTE — BSMART NOTE
BSMART Liaison Team Note     LOS:  11     Patient goal(s) for today: communicate needs to staff, continue prescribed medications  BSMART Liaison team focus/goals: assess needs, provide support and encouragement     Progress note:   Pt presented to the ED for inotropic therapy for heart failure and endorsed SI with plan to hang self in the attic. HX: advanced heart failure and ischemic cardiomyopathy. Pt is received sitting up in bed watching TV. She is alert, oriented, thoughts logical and goal directed, affect bright, calm, pleasant and cooperative. She reports having a bad day yesterday but states she is feeling \"much better\" today. She states everything was piling up yesterday (emotions, medical issues, being away from home) and she was having trouble managing it all. She reports she \"just cried and cried\" yesterday because nothing seemed to be getting better. She reports walking around the unit several times,yesterday, states she was feeling physically good, but the medical report showed something different than how she was feeling and she broke down. She reports the nurses and doctors have been very supportive and kind and she appreciates everything they are doing to help. Pt's father is coming for a visit today. Discussed her strong spiritual beliefs, support system, goals for the future, and assisted with practicing slow, mindful breathing. Provided pt with several worksheets on coping skills and mandala coloring pages. Pt reports feeling more hopeful, denies SI/HI/AVH and denies a hx of SI/HI/AVH. Barriers to Discharge: medical clearance  Guns in the home: no      Outpatient provider(s):  Eduardo Neumann MD  Insurance info/prescription coverage:  [employer] MEDICARE/[employer] MEDICARE HMO     Diagnosis: MDD, recurrent, mild to moderate; unspecified anxiety disorder, adjustment disorder with mixed anxiety and depressed mood.    Plan:  Per psych NP, outpatient psychiatric follow-up recommended. BSMART Liaison will continue to follow weekly or as needed.     Follow up Psych Consult placed? no.   Psychiatrist updated? no       Participating treatment team members: Marianela Metz LSW,

## 2022-11-01 NOTE — PROGRESS NOTES
[facility]                                                                                          Hospitalist Progress Note  Tyron Salgado MD  Answering service: [phone] from in house phone        Date of Service:  2022  NAME:  Navi Maurer  :  1980  MRN:  [id number]      Admission Summary:     Patient initially presents with suicidal ideation and found to have congestive heart failure. Interval history / Subjective:     Doing better this AM, breathing well, no pain, n/v improved. Still has a sour taste. Mood better today     Assessment & Plan:     Congestive heart failure  -Acute on chronic systolic CHF NYHA class IV on admission  -Patient with ischemic cardiomyopathy with EF with 15 to 20%  -s/p RHC 10/27 - started on dobutamine and IV bumetanide  -continue spironolactone, Jardiance  -other GDMT as BP tolerates  -holding allopurinol though doubt this is giving her the taste change, but will see if there's any improvement    Urinary tract infection  -Completed antibiotic    Hypertension  -Blood pressure is borderline low normal    Type 2 diabetes  -Well-controlled with hemoglobin A1c of 6.3  -Continue sliding scale insulin coverage    Coronary artery disease  -Patient with history of STEMI with stent to LAD  -Continue Effient    Dyslipidemia  -On Zetia    Restless leg syndrome  -On Requip    Hemorrhoids  -Continue Anusol cream    Depression with suicidal ideation  -Patient previously evaluated by psychiatry  -On Lexapro, also on Klonopin for anxiety  -One-on-one sitter discontinued per psych recommendation  -Outpatient follow-up with psychiatry    Anxiety  -Patient on Klonopin for 12 years, continue Klonopin here  -Continue trazodone for sleep  -Outpatient follow-up with psychiatry    Constipation  -resolved; continue PRN Miralax    Dysgeusia: unclear etiology  -check B12 (normal/high), zinc, rapid covid (negative).  TSH checked and was only mildly elevated in the setting of acute illness.   -hold allopurinol as above  -possibly related to GERD; continue pantoprazole, trial of carafate    Nausea: likely multifactorial, hx of gastroparesis, bowel edema from CHF  -continue supportive care     Code status: Full  Prophylaxis: SCDs  Care Plan discussed with: Patient/RN/CM/AHF  Anticipated Disposition: when off infusions     Hospital Problems  Date Reviewed: 10/14/2022            Codes Class Noted POA    CHF (congestive heart failure) (HCC) ICD-10-CM: I50.9  ICD-9-CM: 428.0  10/21/2022 Unknown       Review of Systems:   A comprehensive review of systems was negative except for that written in the HPI. Vital Signs:    Last 24hrs VS reviewed since prior progress note. Most recent are:  Visit Vitals  /74 (BP 1 Location: Right arm, BP Patient Position: At rest)   Pulse (!) 104   Temp 98.6 °F (37 °C)   Resp (!) 166   Ht 5' 4\" (1.626 m)   Wt 86 kg (189 lb 9.5 oz)   SpO2 99%   BMI 32.54 kg/m²         Intake/Output Summary (Last 24 hours) at 11/1/2022 1138  Last data filed at 11/1/2022 1100  Gross per 24 hour   Intake 1206.88 ml   Output 800 ml   Net 406.88 ml          Physical Examination:     I had a face to face encounter with this patient and independently examined them on 11/1/2022 as outlined below:          Constitutional:  No acute distress, non-toxic, obese   ENT:  Oral mucosa moist, oropharynx benign, anicteric sclerae    Resp:  CTA bilaterally. No wheezing/rhonchi/rales. No accessory muscle use. CV:  Regular rhythm, normal rate, no murmurs, no gallops, trace b/l LE edema    GI:  Soft, non distended, non tender. normoactive bowel sounds     Musculoskeletal:  warm    Neurologic:  Moves all extremities.   AAOx3, CN II-XII reviewed  Psych: normal mood appropriate affect            Data Review:    Review and/or order of clinical lab test      Labs:     Recent Labs     10/31/22  0344   WBC 5.5   HGB 15.9   HCT 50.5*          Recent Labs 11/01/22  0627 10/31/22  0344 10/30/22  0436   * 134* 136   K 4.2 3.8 3.8   CL 99 99 99   CO2 23 26 28   BUN 18 20 19   CREA 1.21* 1.14* 1.16*   * 107* 120*   CA 10.7* 10.6* 11.1*   MG 3.0* 3.0* 2.9*       Recent Labs     11/01/22  0627 10/31/22  0344 10/30/22  0436   ALT 58 62 58    121* 122*   TBILI 2.2* 1.8* 1.9*   TP 8.6* 9.2* 9.3*   ALB 4.4 4.6 4.9   GLOB 4.2* 4.6* 4.4*       No results for input(s): INR, PTP, APTT, INREXT, INREXT in the last 72 hours. No results for input(s): FE, TIBC, PSAT, FERR in the last 72 hours. Lab Results   Component Value Date/Time    Folate 20.0 10/23/2022 04:24 AM        No results for input(s): PH, PCO2, PO2 in the last 72 hours. No results for input(s): CPK, CKNDX, TROIQ in the last 72 hours.     No lab exists for component: CPKMB  Lab Results   Component Value Date/Time    Cholesterol, total 95 10/22/2022 06:26 AM    HDL Cholesterol 32 10/22/2022 06:26 AM    LDL, calculated 45 10/22/2022 06:26 AM    Triglyceride 90 10/22/2022 06:26 AM    CHOL/HDL Ratio 3.0 10/22/2022 06:26 AM     Lab Results   Component Value Date/Time    Glucose (POC) 104 11/01/2022 06:43 AM    Glucose (POC) 100 10/31/2022 11:52 PM    Glucose (POC) 97 10/31/2022 04:22 PM    Glucose (POC) 110 10/31/2022 11:24 AM    Glucose (POC) 121 (H) 10/31/2022 06:56 AM     Lab Results   Component Value Date/Time    Color ORANGE 10/17/2022 08:42 AM    Appearance TURBID (A) 10/17/2022 08:42 AM    Specific gravity 1.025 10/17/2022 08:42 AM    Specific gravity 1.024 10/06/2022 08:57 AM    pH (UA) 5.0 10/17/2022 08:42 AM    Protein 30 (A) 10/17/2022 08:42 AM    Glucose Negative 10/17/2022 08:42 AM    Ketone Negative 10/17/2022 08:42 AM    Bilirubin Negative 11/25/2021 09:47 AM    Urobilinogen 1.0 10/17/2022 08:42 AM    Nitrites Positive (A) 10/17/2022 08:42 AM    Leukocyte Esterase SMALL (A) 10/17/2022 08:42 AM    Epithelial cells FEW 10/17/2022 08:42 AM    Bacteria 2+ (A) 10/17/2022 08:42 AM    WBC 5-10 10/17/2022 08:42 AM    RBC 5-10 10/17/2022 08:42 AM         Medications Reviewed:     Current Facility-Administered Medications   Medication Dose Route Frequency    digoxin (LANOXIN) tablet 0.125 mg  0.125 mg Oral DAILY    bumetanide (BUMEX) injection 1 mg  1 mg IntraVENous BID    methyl salicylate-menthol (BENGAY) 15-10 % cream   Topical TID    pantoprazole (PROTONIX) tablet 40 mg  40 mg Oral ACB&D    potassium chloride SR (KLOR-CON 10) tablet 60 mEq  60 mEq Oral BID    polyethylene glycol (MIRALAX) packet 17 g  17 g Oral DAILY PRN    spironolactone (ALDACTONE) tablet 25 mg  25 mg Oral DAILY    prochlorperazine (COMPAZINE) injection 5 mg  5 mg IntraVENous Q6H PRN    DOBUTamine (DOBUTREX) 500 mg/250 mL (2,000 mcg/mL) infusion  2.5 mcg/kg/min (Order-Specific) IntraVENous CONTINUOUS    ondansetron (ZOFRAN) injection 4 mg  4 mg IntraVENous Q6H PRN    [Held by provider] allopurinoL (ZYLOPRIM) tablet 50 mg  50 mg Oral DAILY    hydrocortisone (ANUSOL-HC) 2.5 % rectal cream   PeriANAL QID    melatonin tablet 10.5 mg  10.5 mg Oral QHS PRN    empagliflozin (JARDIANCE) tablet 10 mg  10 mg Oral DAILY    aluminum & magnesium hydroxide-simethicone (MYLANTA II) oral suspension 30 mL  30 mL Oral Q4H PRN    diazePAM (VALIUM) tablet 5 mg  5 mg Oral Q12H PRN    docusate sodium (COLACE) capsule 100 mg  100 mg Oral DAILY    bisacodyL (DULCOLAX) suppository 10 mg  10 mg Rectal DAILY    albuterol-ipratropium (DUO-NEB) 2.5 MG-0.5 MG/3 ML  3 mL Nebulization Q6H PRN    fluticasone propionate (FLONASE) 50 mcg/actuation nasal spray 2 Spray  2 Spray Both Nostrils DAILY    traZODone (DESYREL) tablet 200 mg  200 mg Oral QHS    [Held by provider] magnesium oxide (MAG-OX) tablet 400 mg  400 mg Oral QHS    calcium carbonate (TUMS) chewable tablet 200 mg [elemental]  200 mg Oral TID PRN    clonazePAM (KlonoPIN) tablet 1 mg  1 mg Oral QHS    escitalopram oxalate (LEXAPRO) tablet 20 mg  20 mg Oral DAILY    ezetimibe (ZETIA) tablet 10 mg  10 mg Oral DAILY    folic acid (FOLVITE) tablet 1 mg  1 mg Oral DAILY    montelukast (SINGULAIR) tablet 10 mg  10 mg Oral DAILY    prasugreL (EFFIENT) tablet 10 mg  10 mg Oral DAILY    [Held by provider] rOPINIRole (REQUIP) tablet 0.5 mg  0.5 mg Oral BID    [Held by provider] rosuvastatin (CRESTOR) tablet 20 mg  20 mg Oral Q MON, WED & FRI    sodium chloride (NS) flush 5-40 mL  5-40 mL IntraVENous Q8H    sodium chloride (NS) flush 5-40 mL  5-40 mL IntraVENous PRN    acetaminophen (TYLENOL) tablet 650 mg  650 mg Oral Q4H PRN    nitroglycerin (NITROSTAT) tablet 0.4 mg  0.4 mg SubLINGual Q5MIN PRN    glucose chewable tablet 16 g  4 Tablet Oral PRN    glucagon (GLUCAGEN) injection 1 mg  1 mg IntraMUSCular PRN    dextrose 10 % infusion 0-250 mL  0-250 mL IntraVENous PRN    insulin lispro (HUMALOG) injection   SubCUTAneous AC&HS     ______________________________________________________________________  EXPECTED LENGTH OF STAY: 3d 19h  ACTUAL LENGTH OF STAY:          11                 Reema Sandhu MD

## 2022-11-01 NOTE — PROGRESS NOTES
Transitions of Care Plan  RUR: 19% - moderate  Clinical Update: remains on dobutamine 2.5mcg  Consults: [facility]  Baseline: independent without DME; resides w father  Barriers to Discharge: medical  Disposition: home +/- home health with inotrope support - [facility] to evaluate if patient appropriate for home inotrope; anticipate patient will need [employer] for low EF  Estimated Discharge Date: 2+ days    Patient continues with IV diureses. Dobutamine remains on at 2.5mcg/kg/min. Psychiatry cleared patient for outpatient follow up. Patient already established with outpatient psychiatrist through [facility]. Disposition:  1)  Home vs home with home health and inotrope therapy - patient resides in [place], [place] with [facility] Medicare - will need [facility] orders to determine [place]    2) DME - [employer] - will need orders and insurance approval prior to fit    CM will continue to follow.     Madelyn Shirley, MPH  Care Manager [facility]  Available via [address] [place] [address] or

## 2022-11-01 NOTE — PROGRESS NOTES
0800:  Bedside and Verbal shift change report given to CK Serrano (oncoming nurse) by [address] [place] [address] (offgoing nurse). Report included the following information SBAR, ED Summary, and Cardiac Rhythm ST .     1045: [facility] Hemodynamic Monitoring     Visit Vitals  /83 (BP 1 Location: Right arm, BP Patient Position: At rest)   Pulse (!) 111   Temp 98.6 °F (37 °C)   Resp 18   Ht 5' 4\" (1.626 m)   Wt 86 kg (189 lb 9.5 oz)   LMP 10/09/2022   SpO2 99%   BMI 32.54 kg/m²         Baseline assessment:        CO 4.9        CI 2.6        SVI 25        SVV 15        TPR 1400    12 lead EKG performed. 1600: Bedside and Verbal shift change report given to Aleisha Summers RN (oncoming nurse) by [address] [place] [address] (offgoing nurse).  Report included the following information SBAR and Cardiac Rhythm ST .

## 2022-11-02 ENCOUNTER — APPOINTMENT (OUTPATIENT)
Dept: NUCLEAR MEDICINE | Age: 42
DRG: 286 | End: 2022-11-02
Attending: INTERNAL MEDICINE
Payer: MEDICARE

## 2022-11-02 ENCOUNTER — APPOINTMENT (OUTPATIENT)
Dept: GENERAL RADIOLOGY | Age: 42
DRG: 286 | End: 2022-11-02
Attending: PEDIATRICS
Payer: MEDICARE

## 2022-11-02 LAB
ALBUMIN SERPL-MCNC: 4.2 G/DL (ref 3.5–5)
ALBUMIN/GLOB SERPL: 1 {RATIO} (ref 1.1–2.2)
ALP SERPL-CCNC: 132 U/L (ref 45–117)
ALT SERPL-CCNC: 84 U/L (ref 12–78)
AMPHET UR QL SCN: NEGATIVE
ANION GAP SERPL CALC-SCNC: 10 MMOL/L (ref 5–15)
AST SERPL-CCNC: 78 U/L (ref 15–37)
BARBITURATES UR QL SCN: NEGATIVE
BENZODIAZ UR QL: POSITIVE
BILIRUB SERPL-MCNC: 1.6 MG/DL (ref 0.2–1)
BNP SERPL-MCNC: 1581 PG/ML
BUN SERPL-MCNC: 20 MG/DL (ref 6–20)
BUN/CREAT SERPL: 17 (ref 12–20)
CALCIUM SERPL-MCNC: 10.4 MG/DL (ref 8.5–10.1)
CALCIUM SERPL-MCNC: 10.4 MG/DL (ref 8.5–10.1)
CANNABINOIDS UR QL SCN: NEGATIVE
CENTROMERE B AB SER-ACNC: <0.2 AI (ref 0–0.9)
CHLORIDE SERPL-SCNC: 100 MMOL/L (ref 97–108)
CHROMATIN AB SERPL-ACNC: <0.2 AI (ref 0–0.9)
CO2 SERPL-SCNC: 24 MMOL/L (ref 21–32)
COCAINE UR QL SCN: NEGATIVE
CREAT SERPL-MCNC: 1.2 MG/DL (ref 0.55–1.02)
CREAT UR-MCNC: 113 MG/DL
DIGOXIN SERPL-MCNC: 0.3 NG/ML (ref 0.9–2)
DRUG SCRN COMMENT,DRGCM: ABNORMAL
DSDNA AB SER-ACNC: <1 IU/ML (ref 0–9)
DSDNA AB SER-ACNC: <1 IU/ML (ref 0–9)
ENA JO1 AB SER-ACNC: <0.2 AI (ref 0–0.9)
ENA RNP AB SER-ACNC: 0.2 AI (ref 0–0.9)
ENA SCL70 AB SER-ACNC: <0.2 AI (ref 0–0.9)
ENA SM AB SER-ACNC: <0.2 AI (ref 0–0.9)
ENA SM+RNP AB SER-ACNC: <0.2 AI (ref 0–0.9)
ENA SS-A AB SER-ACNC: <0.2 AI (ref 0–0.9)
ENA SS-B AB SER-ACNC: <0.2 AI (ref 0–0.9)
GLOBULIN SER CALC-MCNC: 4.2 G/DL (ref 2–4)
GLUCOSE BLD STRIP.AUTO-MCNC: 102 MG/DL (ref 65–117)
GLUCOSE BLD STRIP.AUTO-MCNC: 105 MG/DL (ref 65–117)
GLUCOSE BLD STRIP.AUTO-MCNC: 105 MG/DL (ref 65–117)
GLUCOSE BLD STRIP.AUTO-MCNC: 116 MG/DL (ref 65–117)
GLUCOSE SERPL-MCNC: 103 MG/DL (ref 65–100)
HBV SURFACE AB SER QL: REACTIVE
HBV SURFACE AB SER-ACNC: 59.34 MIU/ML
HBV SURFACE AG SER QL: <0.1 INDEX
HBV SURFACE AG SER QL: NEGATIVE
HIV 1+2 AB+HIV1 P24 AG SERPL QL IA: NONREACTIVE
HIV12 RESULT COMMENT, HHIVC: NORMAL
INR PPP: 1.1 (ref 0.9–1.1)
LACTATE SERPL-SCNC: 1.2 MMOL/L (ref 0.4–2)
LDH SERPL L TO P-CCNC: 214 U/L (ref 81–246)
LEFT CCA DIST DIAS: 19.1 CM/S
LEFT CCA DIST SYS: 60.9 CM/S
LEFT CCA PROX DIAS: 19.1 CM/S
LEFT CCA PROX SYS: 61 CM/S
LEFT ECA DIAS: 11.2 CM/S
LEFT ECA SYS: 47.9 CM/S
LEFT ICA DIST DIAS: 18 CM/S
LEFT ICA DIST SYS: 34.5 CM/S
LEFT ICA MID DIAS: 17.3 CM/S
LEFT ICA MID SYS: 35.7 CM/S
LEFT ICA PROX DIAS: 22.6 CM/S
LEFT ICA PROX SYS: 42.6 CM/S
LEFT ICA/CCA SYS: 0.7 NO UNITS
LEFT VERTEBRAL DIAS: 13 CM/S
LEFT VERTEBRAL SYS: 41.7 CM/S
MAGNESIUM SERPL-MCNC: 3 MG/DL (ref 1.6–2.4)
METHADONE UR QL: NEGATIVE
MICROALBUMIN UR-MCNC: 7.71 MG/DL
MICROALBUMIN/CREAT UR-RTO: 68 MG/G (ref 0–30)
OPIATES UR QL: NEGATIVE
PCP UR QL: NEGATIVE
POTASSIUM SERPL-SCNC: 4.1 MMOL/L (ref 3.5–5.1)
PREALB SERPL-MCNC: 24 MG/DL (ref 20–40)
PROCALCITONIN SERPL-MCNC: <0.05 NG/ML
PROT SERPL-MCNC: 8.4 G/DL (ref 6.4–8.2)
PROTHROMBIN TIME: 11.1 SEC (ref 9–11.1)
PTH-INTACT SERPL-MCNC: 83.8 PG/ML (ref 18.4–88)
RIBOSOMAL P AB SER-ACNC: <0.2 AI (ref 0–0.9)
RIGHT CCA DIST DIAS: 15.3 CM/S
RIGHT CCA DIST SYS: 59.2 CM/S
RIGHT CCA PROX DIAS: 15.3 CM/S
RIGHT CCA PROX SYS: 57 CM/S
RIGHT ECA DIAS: 12 CM/S
RIGHT ECA SYS: 54.8 CM/S
RIGHT ICA DIST DIAS: 21.7 CM/S
RIGHT ICA DIST SYS: 45.1 CM/S
RIGHT ICA MID DIAS: 23.5 CM/S
RIGHT ICA MID SYS: 42.7 CM/S
RIGHT ICA PROX DIAS: 19.7 CM/S
RIGHT ICA PROX SYS: 42.7 CM/S
RIGHT ICA/CCA SYS: 0.8 NO UNITS
RIGHT VERTEBRAL DIAS: 9.3 CM/S
RIGHT VERTEBRAL SYS: 32.2 CM/S
RPR SER QL: NONREACTIVE
SEE BELOW:, 164879: NORMAL
SERVICE CMNT-IMP: NORMAL
SODIUM SERPL-SCNC: 134 MMOL/L (ref 136–145)
STRESS TARGET HR: 180 BPM

## 2022-11-02 PROCEDURE — 86592 SYPHILIS TEST NON-TREP QUAL: CPT

## 2022-11-02 PROCEDURE — 74011250637 HC RX REV CODE- 250/637: Performed by: NURSE PRACTITIONER

## 2022-11-02 PROCEDURE — 82962 GLUCOSE BLOOD TEST: CPT

## 2022-11-02 PROCEDURE — 83615 LACTATE (LD) (LDH) ENZYME: CPT

## 2022-11-02 PROCEDURE — 86706 HEP B SURFACE ANTIBODY: CPT

## 2022-11-02 PROCEDURE — 82043 UR ALBUMIN QUANTITATIVE: CPT

## 2022-11-02 PROCEDURE — 86830 HLA CLASS I PHENOTYPE QUAL: CPT

## 2022-11-02 PROCEDURE — 87040 BLOOD CULTURE FOR BACTERIA: CPT

## 2022-11-02 PROCEDURE — 86831 HLA CLASS II PHENOTYPE QUAL: CPT

## 2022-11-02 PROCEDURE — 80162 ASSAY OF DIGOXIN TOTAL: CPT

## 2022-11-02 PROCEDURE — 81240 F2 GENE: CPT

## 2022-11-02 PROCEDURE — 74011250637 HC RX REV CODE- 250/637: Performed by: HOSPITALIST

## 2022-11-02 PROCEDURE — 84134 ASSAY OF PREALBUMIN: CPT

## 2022-11-02 PROCEDURE — 83880 ASSAY OF NATRIURETIC PEPTIDE: CPT

## 2022-11-02 PROCEDURE — 86704 HEP B CORE ANTIBODY TOTAL: CPT

## 2022-11-02 PROCEDURE — 83735 ASSAY OF MAGNESIUM: CPT

## 2022-11-02 PROCEDURE — 74011000250 HC RX REV CODE- 250: Performed by: FAMILY MEDICINE

## 2022-11-02 PROCEDURE — 86022 PLATELET ANTIBODIES: CPT

## 2022-11-02 PROCEDURE — 74011250637 HC RX REV CODE- 250/637: Performed by: FAMILY MEDICINE

## 2022-11-02 PROCEDURE — 99223 1ST HOSP IP/OBS HIGH 75: CPT | Performed by: NURSE PRACTITIONER

## 2022-11-02 PROCEDURE — 94760 N-INVAS EAR/PLS OXIMETRY 1: CPT

## 2022-11-02 PROCEDURE — A9538 TC99M PYROPHOSPHATE: HCPCS

## 2022-11-02 PROCEDURE — 65660000001 HC RM ICU INTERMED STEPDOWN

## 2022-11-02 PROCEDURE — 83605 ASSAY OF LACTIC ACID: CPT

## 2022-11-02 PROCEDURE — 83051 HEMOGLOBIN PLASMA: CPT

## 2022-11-02 PROCEDURE — 86708 HEPATITIS A ANTIBODY: CPT

## 2022-11-02 PROCEDURE — 80307 DRUG TEST PRSMV CHEM ANLYZR: CPT

## 2022-11-02 PROCEDURE — 73130 X-RAY EXAM OF HAND: CPT

## 2022-11-02 PROCEDURE — 87389 HIV-1 AG W/HIV-1&-2 AB AG IA: CPT

## 2022-11-02 PROCEDURE — 83970 ASSAY OF PARATHORMONE: CPT

## 2022-11-02 PROCEDURE — 85613 RUSSELL VIPER VENOM DILUTED: CPT

## 2022-11-02 PROCEDURE — 80053 COMPREHEN METABOLIC PANEL: CPT

## 2022-11-02 PROCEDURE — 74011250637 HC RX REV CODE- 250/637: Performed by: INTERNAL MEDICINE

## 2022-11-02 PROCEDURE — 74011000250 HC RX REV CODE- 250: Performed by: NURSE PRACTITIONER

## 2022-11-02 PROCEDURE — 85610 PROTHROMBIN TIME: CPT

## 2022-11-02 PROCEDURE — 87340 HEPATITIS B SURFACE AG IA: CPT

## 2022-11-02 PROCEDURE — 36415 COLL VENOUS BLD VENIPUNCTURE: CPT

## 2022-11-02 PROCEDURE — 84145 PROCALCITONIN (PCT): CPT

## 2022-11-02 RX ORDER — DIGOXIN 125 MCG
0.12 TABLET ORAL ONCE
Status: COMPLETED | OUTPATIENT
Start: 2022-11-02 | End: 2022-11-02

## 2022-11-02 RX ORDER — PRAMIPEXOLE DIHYDROCHLORIDE 0.25 MG/1
0.12 TABLET ORAL
Status: DISCONTINUED | OUTPATIENT
Start: 2022-11-02 | End: 2022-11-09 | Stop reason: HOSPADM

## 2022-11-02 RX ORDER — DIGOXIN 250 MCG
0.25 TABLET ORAL DAILY
Status: DISCONTINUED | OUTPATIENT
Start: 2022-11-03 | End: 2022-11-09 | Stop reason: HOSPADM

## 2022-11-02 RX ADMIN — BUMETANIDE 2 MG: 0.25 INJECTION, SOLUTION INTRAMUSCULAR; INTRAVENOUS at 08:39

## 2022-11-02 RX ADMIN — ESCITALOPRAM OXALATE 20 MG: 10 TABLET ORAL at 08:39

## 2022-11-02 RX ADMIN — FLUTICASONE PROPIONATE 2 SPRAY: 50 SPRAY, METERED NASAL at 08:41

## 2022-11-02 RX ADMIN — HYDROCORTISONE: 25 CREAM TOPICAL at 13:30

## 2022-11-02 RX ADMIN — BUMETANIDE 2 MG: 0.25 INJECTION, SOLUTION INTRAMUSCULAR; INTRAVENOUS at 17:40

## 2022-11-02 RX ADMIN — HYDROCORTISONE: 25 CREAM TOPICAL at 17:45

## 2022-11-02 RX ADMIN — HYDROCORTISONE: 25 CREAM TOPICAL at 22:22

## 2022-11-02 RX ADMIN — EZETIMIBE 10 MG: 10 TABLET ORAL at 08:39

## 2022-11-02 RX ADMIN — TRAZODONE HYDROCHLORIDE 200 MG: 100 TABLET ORAL at 22:23

## 2022-11-02 RX ADMIN — SUCRALFATE 1 G: 1 TABLET ORAL at 07:40

## 2022-11-02 RX ADMIN — PANTOPRAZOLE SODIUM 40 MG: 40 TABLET, DELAYED RELEASE ORAL at 07:41

## 2022-11-02 RX ADMIN — MONTELUKAST 10 MG: 10 TABLET, FILM COATED ORAL at 08:39

## 2022-11-02 RX ADMIN — MENTHOL, METHYL SALICYLATE: 10; 15 CREAM TOPICAL at 08:41

## 2022-11-02 RX ADMIN — SUCRALFATE 1 G: 1 TABLET ORAL at 22:23

## 2022-11-02 RX ADMIN — POTASSIUM CHLORIDE 60 MEQ: 750 TABLET, FILM COATED, EXTENDED RELEASE ORAL at 08:39

## 2022-11-02 RX ADMIN — DOCUSATE SODIUM 100 MG: 100 CAPSULE, LIQUID FILLED ORAL at 08:39

## 2022-11-02 RX ADMIN — POTASSIUM CHLORIDE 60 MEQ: 750 TABLET, FILM COATED, EXTENDED RELEASE ORAL at 17:40

## 2022-11-02 RX ADMIN — SPIRONOLACTONE 25 MG: 25 TABLET ORAL at 08:39

## 2022-11-02 RX ADMIN — PRAMIPEXOLE DIHYDROCHLORIDE 0.12 MG: 0.25 TABLET ORAL at 22:30

## 2022-11-02 RX ADMIN — FOLIC ACID 1 MG: 1 TABLET ORAL at 08:39

## 2022-11-02 RX ADMIN — CLONAZEPAM 1 MG: 0.5 TABLET ORAL at 22:23

## 2022-11-02 RX ADMIN — DIGOXIN 0.12 MG: 125 TABLET ORAL at 11:44

## 2022-11-02 RX ADMIN — PRASUGREL 10 MG: 10 TABLET, FILM COATED ORAL at 08:39

## 2022-11-02 RX ADMIN — MENTHOL, METHYL SALICYLATE: 10; 15 CREAM TOPICAL at 22:23

## 2022-11-02 RX ADMIN — MENTHOL, METHYL SALICYLATE: 10; 15 CREAM TOPICAL at 17:41

## 2022-11-02 RX ADMIN — SUCRALFATE 1 G: 1 TABLET ORAL at 11:44

## 2022-11-02 RX ADMIN — DIGOXIN 0.12 MG: 125 TABLET ORAL at 08:39

## 2022-11-02 RX ADMIN — EMPAGLIFLOZIN 10 MG: 10 TABLET, FILM COATED ORAL at 08:40

## 2022-11-02 RX ADMIN — SODIUM CHLORIDE, PRESERVATIVE FREE 10 ML: 5 INJECTION INTRAVENOUS at 07:36

## 2022-11-02 RX ADMIN — SODIUM CHLORIDE, PRESERVATIVE FREE 10 ML: 5 INJECTION INTRAVENOUS at 13:30

## 2022-11-02 RX ADMIN — SUCRALFATE 1 G: 1 TABLET ORAL at 17:41

## 2022-11-02 RX ADMIN — HYDROCORTISONE: 25 CREAM TOPICAL at 08:41

## 2022-11-02 RX ADMIN — PANTOPRAZOLE SODIUM 40 MG: 40 TABLET, DELAYED RELEASE ORAL at 17:40

## 2022-11-02 RX ADMIN — BUMETANIDE 2 MG: 0.25 INJECTION, SOLUTION INTRAMUSCULAR; INTRAVENOUS at 11:59

## 2022-11-02 NOTE — PROGRESS NOTES
[facility] in [address] [place], [place]    Attempted to meet with patient for follow up on LVAD information provided yesterday. Patient out of room at this time. Will attempt to meet with patient tomorrow for follow up. MEG Hester RN.

## 2022-11-02 NOTE — PROGRESS NOTES
Starting pramipexole for RLS due to ropinirole being stopped for tachycardia. Note premipexole can cause orthostatic hypotension and peripheral edema.

## 2022-11-02 NOTE — PROGRESS NOTES
Problem: Falls - Risk of  Goal: *Absence of Falls  Description: Document Lori Purdy Fall Risk and appropriate interventions in the flowsheet.   Outcome: Progressing Towards Goal  Note: Fall Risk Interventions:  Mobility Interventions: Patient to call before getting OOB    Mentation Interventions: Adequate sleep, hydration, pain control    Medication Interventions: Teach patient to arise slowly    Elimination Interventions: Call light in reach              Problem: Heart Failure: Day 5  Goal: Activity/Safety  Outcome: Progressing Towards Goal  Goal: Diagnostic Test/Procedures  Outcome: Progressing Towards Goal  Goal: Medications  Outcome: Progressing Towards Goal  Goal: Respiratory  Outcome: Progressing Towards Goal  Goal: Treatments/Interventions/Procedures  Outcome: Progressing Towards Goal  Goal: Psychosocial  Outcome: Progressing Towards Goal

## 2022-11-02 NOTE — PROGRESS NOTES
[facility]                                                                                          Hospitalist Progress Note  Solis Gardner MD  Answering service: [phone] OR [phone] from in house phone        Date of Service:  2022  NAME:  Tameka Altamirano  :  1980  MRN:  [id number]      Admission Summary:     Patient initially presents with suicidal ideation and found to have congestive heart failure. Interval history / Subjective:     Feeling better this AM, breathing well, no pain, n/v improved. Still has a sour taste. Assessment & Plan:     Congestive heart failure  -Acute on chronic systolic CHF NYHA class IV on admission  -Patient with ischemic cardiomyopathy with EF with 15 to 20%  -s/p RHC 10/27 - started on dobutamine and IV bumetanide  -meds being adjusted per AHF team  -milrinone held de to prolonged QTc    Urinary tract infection  -Completed antibiotic    Hypertension  -Blood pressure is borderline low normal    Type 2 diabetes  -Well-controlled with hemoglobin A1c of 6.3  -Continue sliding scale insulin coverage    Coronary artery disease  -Patient with history of STEMI with stent to LAD  -Continue Effient    Dyslipidemia  -On Zetia    Restless leg syndrome  -On Requip    Hemorrhoids  -Continue Anusol cream    Depression with suicidal ideation  -Patient previously evaluated by psychiatry  -On Lexapro, also on Klonopin for anxiety  -One-on-one sitter discontinued per psych recommendation  -Outpatient follow-up with psychiatry    Anxiety  -Patient on Klonopin for 12 years, continue Klonopin here  -Continue trazodone for sleep  -Outpatient follow-up with psychiatry    Constipation  -resolved; continue PRN Miralax    Dysgeusia: unclear etiology  -check B12 (normal/high), zinc, rapid covid (negative).  TSH checked and was only mildly elevated in the setting of acute illness.   -no benefit with holding allopurinol; will resume this  -possibly related to GERD; continue pantoprazole, trial of carafate    Nausea: likely multifactorial, hx of gastroparesis, bowel edema from CHF  -continue supportive care     Code status: Full  Prophylaxis: SCDs  Care Plan discussed with: Patient/RN/CM/AHF  Anticipated Disposition: D     Hospital Problems  Date Reviewed: 10/14/2022            Codes Class Noted POA    CHF (congestive heart failure) ([facility] [address] ICD-10-CM: I50.9  ICD-9-CM: 428.0  10/21/2022 Unknown       Review of Systems:   A comprehensive review of systems was negative except for that written in the HPI. Vital Signs:    Last 24hrs VS reviewed since prior progress note. Most recent are:  Visit Vitals  /71 (BP 1 Location: Right arm, BP Patient Position: At rest)   Pulse 100   Temp 97.8 °F (36.6 °C)   Resp 18   Ht 5' 4\" (1.626 m)   Wt 86.4 kg (190 lb 7.6 oz)   SpO2 99%   BMI 32.70 kg/m²         Intake/Output Summary (Last 24 hours) at 11/2/2022 1410  Last data filed at 11/2/2022 1134  Gross per 24 hour   Intake 806.95 ml   Output 1650 ml   Net -843.05 ml          Physical Examination:     I had a face to face encounter with this patient and independently examined them on 11/2/2022 as outlined below:          Constitutional:  No acute distress, non-toxic, obese   ENT:  Oral mucosa moist, oropharynx benign, anicteric sclerae    Resp:  CTA bilaterally. No wheezing/rhonchi/rales. No accessory muscle use. CV:  Regular rhythm, normal rate, no murmurs, no gallops, trace b/l LE edema    GI:  Soft, non distended, non tender. normoactive bowel sounds     Musculoskeletal:  warm    Neurologic:  Moves all extremities.   AAOx3, CN II-XII reviewed  Psych: normal mood appropriate affect            Data Review:    Review and/or order of clinical lab test      Labs:     Recent Labs     10/31/22  0344   WBC 5.5   HGB 15.9   HCT 50.5*          Recent Labs     11/02/22  0409 11/02/22  0406 11/01/22  0627 10/31/22  0344   *  --  132* 134*   K 4.1  --  4.2 3.8   --  99 99   CO2 24  --  23 26   BUN 20  --  18 20   CREA 1.20*  --  1.21* 1.14*   *  --  103* 107*   CA 10.4* PENDING 10.7* 10.6*   MG  --  3.0* 3.0* 3.0*       Recent Labs     11/02/22  0409 11/01/22  0627 10/31/22  0344   ALT 84* 58 62   * 111 121*   TBILI 1.6* 2.2* 1.8*   TP 8.4* 8.6* 9.2*   ALB 4.2 4.4 4.6   GLOB 4.2* 4.2* 4.6*       Recent Labs     11/02/22  0406   INR 1.1   PTP 11.1        No results for input(s): FE, TIBC, PSAT, FERR in the last 72 hours. Lab Results   Component Value Date/Time    Folate 20.0 10/23/2022 04:24 AM        No results for input(s): PH, PCO2, PO2 in the last 72 hours. No results for input(s): CPK, CKNDX, TROIQ in the last 72 hours.     No lab exists for component: CPKMB  Lab Results   Component Value Date/Time    Cholesterol, total 95 10/22/2022 06:26 AM    HDL Cholesterol 32 10/22/2022 06:26 AM    LDL, calculated 45 10/22/2022 06:26 AM    Triglyceride 90 10/22/2022 06:26 AM    CHOL/HDL Ratio 3.0 10/22/2022 06:26 AM     Lab Results   Component Value Date/Time    Glucose (POC) 102 11/02/2022 11:32 AM    Glucose (POC) 105 11/02/2022 07:33 AM    Glucose (POC) 97 11/01/2022 09:14 PM    Glucose (POC) 101 11/01/2022 06:59 PM    Glucose (POC) 101 11/01/2022 12:15 PM     Lab Results   Component Value Date/Time    Color ORANGE 10/17/2022 08:42 AM    Appearance TURBID (A) 10/17/2022 08:42 AM    Specific gravity 1.025 10/17/2022 08:42 AM    Specific gravity 1.024 10/06/2022 08:57 AM    pH (UA) 5.0 10/17/2022 08:42 AM    Protein 30 (A) 10/17/2022 08:42 AM    Glucose Negative 10/17/2022 08:42 AM    Ketone Negative 10/17/2022 08:42 AM    Bilirubin Negative 11/25/2021 09:47 AM    Urobilinogen 1.0 10/17/2022 08:42 AM    Nitrites Positive (A) 10/17/2022 08:42 AM    Leukocyte Esterase SMALL (A) 10/17/2022 08:42 AM    Epithelial cells FEW 10/17/2022 08:42 AM    Bacteria 2+ (A) 10/17/2022 08:42 AM    WBC 5-10 10/17/2022 08:42 AM    RBC 5-10 10/17/2022 08:42 AM Medications Reviewed:     Current Facility-Administered Medications   Medication Dose Route Frequency    [START ON 11/3/2022] digoxin (LANOXIN) tablet 0.25 mg  0.25 mg Oral DAILY    sucralfate (CARAFATE) tablet 1 g  1 g Oral AC&HS    bumetanide (BUMEX) injection 2 mg  2 mg IntraVENous TID    DOBUTamine (DOBUTREX) 500 mg/250 mL (2,000 mcg/mL) infusion  3 mcg/kg/min IntraVENous CONTINUOUS    methyl salicylate-menthol (BENGAY) 15-10 % cream   Topical TID    pantoprazole (PROTONIX) tablet 40 mg  40 mg Oral ACB&D    potassium chloride SR (KLOR-CON 10) tablet 60 mEq  60 mEq Oral BID    polyethylene glycol (MIRALAX) packet 17 g  17 g Oral DAILY PRN    spironolactone (ALDACTONE) tablet 25 mg  25 mg Oral DAILY    prochlorperazine (COMPAZINE) injection 5 mg  5 mg IntraVENous Q6H PRN    ondansetron (ZOFRAN) injection 4 mg  4 mg IntraVENous Q6H PRN    [Held by provider] allopurinoL (ZYLOPRIM) tablet 50 mg  50 mg Oral DAILY    hydrocortisone (ANUSOL-HC) 2.5 % rectal cream   PeriANAL QID    melatonin tablet 10.5 mg  10.5 mg Oral QHS PRN    empagliflozin (JARDIANCE) tablet 10 mg  10 mg Oral DAILY    aluminum & magnesium hydroxide-simethicone (MYLANTA II) oral suspension 30 mL  30 mL Oral Q4H PRN    diazePAM (VALIUM) tablet 5 mg  5 mg Oral Q12H PRN    docusate sodium (COLACE) capsule 100 mg  100 mg Oral DAILY    bisacodyL (DULCOLAX) suppository 10 mg  10 mg Rectal DAILY    albuterol-ipratropium (DUO-NEB) 2.5 MG-0.5 MG/3 ML  3 mL Nebulization Q6H PRN    fluticasone propionate (FLONASE) 50 mcg/actuation nasal spray 2 Spray  2 Spray Both Nostrils DAILY    traZODone (DESYREL) tablet 200 mg  200 mg Oral QHS    [Held by provider] magnesium oxide (MAG-OX) tablet 400 mg  400 mg Oral QHS    calcium carbonate (TUMS) chewable tablet 200 mg [elemental]  200 mg Oral TID PRN    clonazePAM (KlonoPIN) tablet 1 mg  1 mg Oral QHS    escitalopram oxalate (LEXAPRO) tablet 20 mg  20 mg Oral DAILY    ezetimibe (ZETIA) tablet 10 mg  10 mg Oral DAILY    folic acid (FOLVITE) tablet 1 mg  1 mg Oral DAILY    montelukast (SINGULAIR) tablet 10 mg  10 mg Oral DAILY    prasugreL (EFFIENT) tablet 10 mg  10 mg Oral DAILY    [Held by provider] rOPINIRole (REQUIP) tablet 0.5 mg  0.5 mg Oral BID    [Held by provider] rosuvastatin (CRESTOR) tablet 20 mg  20 mg Oral Q MON, WED & FRI    sodium chloride (NS) flush 5-40 mL  5-40 mL IntraVENous Q8H    sodium chloride (NS) flush 5-40 mL  5-40 mL IntraVENous PRN    acetaminophen (TYLENOL) tablet 650 mg  650 mg Oral Q4H PRN    nitroglycerin (NITROSTAT) tablet 0.4 mg  0.4 mg SubLINGual Q5MIN PRN    glucose chewable tablet 16 g  4 Tablet Oral PRN    glucagon (GLUCAGEN) injection 1 mg  1 mg IntraMUSCular PRN    dextrose 10 % infusion 0-250 mL  0-250 mL IntraVENous PRN    insulin lispro (HUMALOG) injection   SubCUTAneous AC&HS     ______________________________________________________________________  EXPECTED LENGTH OF STAY: 3d 19h  ACTUAL LENGTH OF STAY:          12                 Jessy Grover MD

## 2022-11-02 NOTE — PROGRESS NOTES
Spiritual Care Assessment/Progress Note  [facility]      NAME: Sergio Parmar      MRN: [id number]  AGE: 38 y.o.  SEX: female  Voodoo Affiliation: Taoism   Language: English     11/2/2022     Total Time (in minutes): 30     Spiritual Assessment begun in [facility] UNIT through conversation with:         [x]Patient        [] Family    [] Friend(s)        Reason for Consult: Palliative Care, Initial/Spiritual Assessment     Spiritual beliefs: (Please include comment if needed)     [x] Identifies with a glenn tradition: Anabaptist       [] Supported by a glenn community:            [] Claims no spiritual orientation:           [] Seeking spiritual identity:                [] Adheres to an individual form of spirituality:           [] Not able to assess:                           Identified resources for coping:      [x] Prayer                               [] Music                  [] Guided Imagery     [x] Family/friends                 [] Pet visits     [] Devotional reading                         [] Unknown     [] Other:                                               Interventions offered during this visit: (See comments for more details)    Patient Interventions: Affirmation of emotions/emotional suffering, Affirmation of glenn, Catharsis/review of pertinent events in supportive environment, Coping skills reviewed/reinforced, Iconic (affirming the presence of God/Higher Power), Normalization of emotional/spiritual concerns, Prayer (assurance of)           Plan of Care:     [] Support spiritual and/or cultural needs    [] Support AMD and/or advance care planning process      [] Support grieving process   [] Coordinate Rites and/or Rituals    [] Coordination with community clergy   [] No spiritual needs identified at this time   [] Detailed Plan of Care below (See Comments)  [] Make referral to Music Therapy  [] Make referral to Pet Therapy     [] Make referral to Addiction services  [] Make referral to [facility]  [] Make referral to Spiritual Care Partner  [] No future visits requested        [x] Follow up visits as needed     Visited patient for palliative initial spiritual assessment. Her chart was consulted. She spoke of her medical issues and how she is waiting to see what possible paths will be presented going forward. She lives with her father, who is her primary care giver. She has other extended family members who she believes will be supportive as well.    Chaplain Jian, MDiv, MS, [facility]

## 2022-11-02 NOTE — PROGRESS NOTES
Physical Therapy    Order acknowledged, chart reviewed. Pt received ambulating on unit independently. States that she is ambulating on unit multiple times per day, taking intermittent rest breaks as needed. Pt encouraged to continue ambulation as tolerated. No PT needs at this time, will sign off.     Melania Palomares, PT, MPT

## 2022-11-02 NOTE — PROGRESS NOTES
[facility] in [address] [place], [place]  Inpatient Progress Note      Patient name: Galdino Rivers  Patient : 1980  Patient MRN: [id number]  Consulting MD: Pearletha Primrose, MD  Date of service: 22    REASON FOR REFERRAL:  Management of acute on chronic systolic heart failure     PLAN OF CARE:  38 y.o. female with ischemic cardiomyopathy, LVEF 15-20%, stage C, NYHA class IIIB initiated on dobutamine 3 mcg/kg/min and undergoing diuresis  Difficulty tolerating small dose dobutamine due to tachycardia; limited options to slow down heart rate due to QTc > 500ms in the setting of antidepressant use  Uncertain if we will be able to discharge home on dobutamine  Started inpatient HT/LVAD workup  Pt contracted for safety, and is being seen by behavioral health/psychiatry  Needs diagnosis for the KITTY 1:320 and whether this could be lupus carditis; heart rate too fast for cardiac MRI; will schedule OP; d/w Dr. Natalia Buckner with rheumatology who will order remainder of autoimmue labs and hand X rays; ?whether we need heart biopsy  Concerns regarding HT/LVAD candidacy upon initial review include possible autoimmune disease that requires diagnosis/treatment, psychiatric condition that needs evaluation/treatment and uncertain support system. RECOMMENDATIONS:  Continue dobutamine to 3mcg/kg/min dosed to weight 86kg; check NICOM  Increase digoxin to 0.25mg daily, check digoxin levels (goal 0.7-1.2)  Give additional dose of digoxin 0.125mcg today  Hold off corlanor due to long QT; likely in the setting of taking antidepressants, d/w Pharm. D. would not use with QTc > 500ms  Beta-blocker: hold Coreg while on dobutamine   ACE/ARB/ARNi: hold off until BP can tolerate  MRA: Cont spironolactone 25mg daily  SGLT2 inhibitor: Jardiance 10mg daily  Continue bumex 2mg IV three times daily, probably at dry weight 189lbs  Hold requip due to tachycardia  Hold crestor due to rise in TB  Cannot fit into PFT today; will order bedside spirometry on Monday  Keep K > 4 and Mag > 2  Cont allopurinol 50mg daily   Continue ASA and prasugrel per primary cards  Treatment of STU: inpatient eval for STU  Will need referral for ICD given EF remains <35% 3 mos after MI  Will need LifeVest at discharge   Labs: HF and Transplant eval labs ordered; in process  Will have  completed psychosocial evaluation   Psychiatry consultation for anxiety appreciated  Nutritionist consult  Heart failure education  Needs Advanced care plan  Rheumatology consultation to r/o lupus  Nephrology consult for hypercalcemia  Palliative care consultation  Physical therapy evaluation     All other care per primary team      IMPRESSION:  Fatigue  Shortness of breath  Volume overload  Acute on Chronic systolic heart failure  Stage C, NYHA class IV symptoms  ischemic cardiomyopathy, LVEF 20-25%  CAD  STEMI July 2022 s/p RICKY to LAD  HTN  Obesity  LHD  Pre-diabetes  Esophageal stenosis s/p dilation  RLS  Fibromyalgia  Anxiety and Depression     INTERVAL HISTORY:  Feels much better with current regimen  Appetite better  Still some bloating  No acute overnight events  Afebrile  SBP 90-110s/70s, -120s  I/O net negative 373cc, urine 1.6 liters  Cr 1.2  K 4.1  T 1.6  Ca 10.4  Pro-NT-BNP not done      LIFE GOALS:  Patient's personal goals include: get better  Important upcoming milestones or family events: the holidays coming up  The patient identifies the following as important for living well: being independent     CARDIAC IMAGING:  Echo 10/22/22    Left Ventricle: Severely reduced left ventricular systolic function with a visually estimated EF of 15 - 20%. Left ventricle is mildly dilated. Normal wall thickness.  Moderate hypokinesis of the following segments: basal anterior, basal anterolateral, basal anteroseptal, basal inferior, basal inferolateral, basal inferoseptal, mid anterior, mid anterolateral, mid anteroseptal, mid inferior, mid inferolateral and mid inferoseptal. Severe hypokinesis of the following segments: apical anterior, apical septal, apical inferior and apical lateral. Grade III diastolic dysfunction with increased LAP. Mitral Valve: Mild annular dilation. Moderate regurgitation. Tricuspid Valve: Moderately elevated RVSP. The estimated RVSP is 51 mmHg. Left Atrium: Left atrium is mildly dilated. Pericardium: Trivial pericardial effusion present. No indication of cardiac tamponade. Contrast used: Definity. Echo (8/14/22)    Left Ventricle: Severely reduced left ventricular systolic function with a visually estimated EF of 20 - 25%. Left ventricle is mildly dilated. Increased wall thickness. See diagram for wall motion findings. Grade II diastolic dysfunction with increased LAP. Right Ventricle: Not well visualized. Tricuspid Valve: Moderately elevated RVSP. [address] 10/27/22  PA 56/34/43, RA 16, PCWP 29, CI daisy 1.46 at 199lbs         BRIEF HISTORY OF PRESENT ILLNESS:  Anthony Clarke is a 38 y.o. female with h/o HTN, CAD s/p STEMI in July 2022 (DESx1 to LAD; treated at [facility]) with ischemic cardiomyopathy (EF 20-25%), CHF, GERD, RLS, and fibromyalgia seen as a new patient in [facility] on 10/21/22 and found to be fluid overloaded, and with suicidal ideation. Pt was taken to the ER for further evaluation and admission for acute on chronic HF and mental health crisis. REVIEW OF SYSTEMS:  Review of Systems   Constitutional:  Negative for chills, fever, malaise/fatigue and weight loss. Respiratory:  Negative for cough and shortness of breath. Cardiovascular:  Negative for chest pain, palpitations, orthopnea and leg swelling. Gastrointestinal:  Negative for abdominal pain, constipation, diarrhea, heartburn, nausea and vomiting. Musculoskeletal:  Negative for myalgias. Restless legs   Neurological:  Negative for dizziness and weakness. Psychiatric/Behavioral:  Positive for depression.  Negative for suicidal ideas. The patient is nervous/anxious and has insomnia. PHYSICAL EXAM:  Visit Vitals  /73 (BP 1 Location: Right arm, BP Patient Position: At rest)   Pulse (!) 105   Temp 98.6 °F (37 °C)   Resp 16   Ht 5' 4\" (1.626 m)   Wt 190 lb 7.6 oz (86.4 kg)   LMP 10/09/2022   SpO2 99%   BMI 32.70 kg/m²     General: Patient is well developed, well-nourished in no acute distress  HEENT: Unremarkable   Neck: Supple. No evidence of thyroid enlargements or lymphadenopathy. JVD: Cannot be appreciated   Lungs: Breath sounds are equal and clear bilaterally. No wheezes, rhonchi, or rales. Heart: Regular rate and rhythm with normal S1 and S2. No murmurs, gallops or rubs. Abdomen: Soft, no mass or tenderness. No organomegaly or hernia. Bowel sounds present. Genitourinary and rectal: deferred  Extremities: No cyanosis, clubbing, or edema. Neurologic: No focal sensory or motor deficits are noted. Grossly intact. Psychiatric: Awake, alert an doriented x 3. Appropriate mood and affect. Skin: Warm, dry and well perfused. REVIEW OF SYSTEMS:  General: Denies fever. Ear, nose and throat: Denies difficulty hearing, sinus problems, nosebleeds  Cardiovascular: see above in the interval history  Respiratory: Denies cough, wheezing, sputum production, hemoptysis.   Gastrointestinal: Denies heartburn, constipation, diarrhea, abdominal pain, nausea, blood in stool  Kidney and bladder: Denies painful urination, frequent urination  Musculoskeletal: Denies joint pain, muscle weakness  Skin and hair: Denies change in existing skin lesions    PAST MEDICAL HISTORY:  Past Medical History:   Diagnosis Date    Anemia NEC     Arthritis     Chronic pain     fybromyalsia    Depression     anxiety, depression, OCD    GERD (gastroesophageal reflux disease)     Hypertension     Hypertension     Ill-defined condition     Fibromyalia gastricparesis    MI (myocardial infarction) ([facility] [address]     Musculoskeletal disorder     SOB (shortness of breath)     Stool color black        PAST SURGICAL HISTORY:  Past Surgical History:   Procedure Laterality Date    HX CORONARY STENT PLACEMENT      HX GYN       1998    HX HEENT  2002    wisdom teeth extraction    UPPER GI ENDOSCOPY,BIOPSY  2018         UPPER GI ENDOSCOPY,DIAGNOSIS  2018            FAMILY HISTORY:  Family History   Problem Relation Age of Onset    Hypertension Father     Elevated Lipids Father     Arthritis-rheumatoid Mother         ? Lupus vs RA    Lung Disease Mother     Heart Disease Mother     Cancer Mother         breast in 39y    COPD Mother     Hypertension Mother     Stroke Mother         3-4 strokes    Breast Cancer Mother 50    Diabetes Maternal Grandmother        SOCIAL HISTORY:  Social History     Socioeconomic History    Marital status: SINGLE   Tobacco Use    Smoking status: Former     Packs/day: 0.25     Years: 8.00     Pack years: 2.00     Types: Cigarettes     Quit date: 2022     Years since quittin.2    Smokeless tobacco: Never   Vaping Use    Vaping Use: Never used   Substance and Sexual Activity    Alcohol use: Not Currently     Alcohol/week: 1.0 standard drink     Types: 1 Glasses of wine per week     Comment: Wine with special occassions - not since July    Drug use: Not Currently     Types: Marijuana     Comment: Haven't had any since 2022    Sexual activity: Yes     Partners: Male     Birth control/protection: None     Social Determinants of Health     Financial Resource Strain: High Risk    Difficulty of Paying Living Expenses: Very hard   Food Insecurity: No Food Insecurity    Worried About Running Out of Food in the Last Year: Never true    Ran Out of Food in the Last Year: Never true   Transportation Needs: No Transportation Needs    Lack of Transportation (Medical): No    Lack of Transportation (Non-Medical):  No   Physical Activity: Inactive    Days of Exercise per Week: 0 days    Minutes of Exercise per Session: 0 min   Stress: Stress Concern Present    Feeling of Stress : To some extent   Social Connections: Socially Isolated    Frequency of Communication with Friends and Family: Twice a week    Frequency of Social Gatherings with Friends and Family: Never    Attends [employer] Services: Never    Active Member of Clubs or Organizations: No    Attends Club or Organization Meetings: Never    Marital Status: Never    Housing Stability: Low Risk     Unable to Pay for Housing in the Last Year: No    Number of [place] in the Last Year: 1    Unstable Housing in the Last Year: No       LABORATORY RESULTS:     Labs Latest Ref Rng & Units 11/2/2022 11/2/2022 11/1/2022 10/31/2022 10/30/2022 10/29/2022 10/28/2022   WBC 3.6 - 11.0 K/uL - - - 5.5 - - 6.2   RBC 3.80 - 5.20 M/uL - - - 5.43(H) - - 5.05   Hemoglobin 11.5 - 16.0 g/dL - - - 15.9 - - 14.7   Hematocrit 35.0 - 47.0 % - - - 50. 5(H) - - 45.9   MCV 80.0 - 99.0 FL - - - 93.0 - - 90.9   Platelets 097 - 179 K/uL - - - 261 - - 262   Lymphocytes 12 - 49 % - - - - - - -   Monocytes 5 - 13 % - - - - - - -   Eosinophils 0 - 7 % - - - - - - -   Basophils 0 - 1 % - - - - - - -   Albumin 3.5 - 5.0 g/dL 4.2 - 4.4 4.6 4.9 4.4 4.2   Calcium 8.5 - 10.1 MG/DL 10. 4(H) PENDING 10. 7(H) 10. 6(H) 11. 1(H) 10. 4(H) 9.8   Glucose 65 - 100 mg/dL 103(H) - 103(H) 107(H) 120(H) 131(H) 104(H)   BUN 6 - 20 MG/DL 20 - 18 20 19 11 11   Creatinine 0.55 - 1.02 MG/DL 1.20(H) - 1.21(H) 1.14(H) 1.16(H) 1.26(H) 1.06(H)   Sodium 136 - 145 mmol/L 134(L) - 132(L) 134(L) 136 137 136   Potassium 3.5 - 5.1 mmol/L 4.1 - 4.2 3.8 3.8 3.6 3.7   TSH 0.36 - 3.74 uIU/mL - - - - - - -   LDH 81 - 246 U/L - 214 - - - - -   Some recent data might be hidden     Lab Results   Component Value Date/Time    TSH 4.44 (H) 10/21/2022 06:12 PM    TSH 2.12 05/12/2021 10:55 AM    TSH 1.330 12/30/2019 12:00 AM    TSH 3.850 07/25/2018 12:40 PM    TSH 1.620 02/27/2018 08:59 AM    TSH 1.240 12/05/2016 10:13 AM    TSH 1.530 03/18/2016 10:31 AM    TSH 1.400 11/09/2011 09:37 AM    TSH 1.26 08/19/2010 10:36 AM    TSH 1.18 07/28/2010 04:10 PM       ALLERGY:  Allergies   Allergen Reactions    Abilify [Aripiprazole] Anxiety     Can not tolerate     Sulfa (Sulfonamide Antibiotics) Hives    Vicodin [Hydrocodone-Acetaminophen] Nausea and Vomiting        CURRENT MEDICATIONS:    Current Facility-Administered Medications:     [COMPLETED] technetium pyrophosphate (PYP) injection 67.1 millicurie, 04.4 millicurie, IntraVENous, RAD ONCE, Marci Mazariegos MD, 26.3 millicurie at 76/56/88 6969    digoxin (LANOXIN) tablet 0.125 mg, 0.125 mg, Oral, DAILY, Jenni Carlson NP, 0.125 mg at 11/02/22 0839    sucralfate (CARAFATE) tablet 1 g, 1 g, Oral, AC&HS, Marci Mazariegos MD, 1 g at 11/02/22 0740    bumetanide (BUMEX) injection 2 mg, 2 mg, IntraVENous, TID, Jenni Carlson NP, 2 mg at 11/02/22 0839    DOBUTamine (DOBUTREX) 500 mg/250 mL (2,000 mcg/mL) infusion, 3 mcg/kg/min, IntraVENous, CONTINUOUS, Bobo Bean MD, Last Rate: 7.7 mL/hr at 11/02/22 0747, 3 mcg/kg/min at 11/02/22 1059    methyl salicylate-menthol (BENGAY) 15-10 % cream, , Topical, TID, Erinn Zaragoza NP, Given at 11/02/22 0841    pantoprazole (PROTONIX) tablet 40 mg, 40 mg, Oral, ACB&D, Marci Mazariegos MD, 40 mg at 11/02/22 0741    potassium chloride SR (KLOR-CON 10) tablet 60 mEq, 60 mEq, Oral, BID, Gena Bosch NP, 60 mEq at 11/02/22 0839    polyethylene glycol (MIRALAX) packet 17 g, 17 g, Oral, DAILY PRN, Jaycob Santiago MD, 17 g at 10/30/22 2052    spironolactone (ALDACTONE) tablet 25 mg, 25 mg, Oral, DAILY, Gena Bosch NP, 25 mg at 11/02/22 0839    prochlorperazine (COMPAZINE) injection 5 mg, 5 mg, IntraVENous, Q6H PRN, Marci Mazariegos MD, 5 mg at 10/28/22 1557    ondansetron (ZOFRAN) injection 4 mg, 4 mg, IntraVENous, Q6H PRN, Nisha Dominguez MD, 4 mg at 10/28/22 0724    [Held by provider] allopurinoL (ZYLOPRIM) tablet 50 mg, 50 mg, Oral, DAILY, Gena Bosch NP, 50 mg at 10/31/22 0947    hydrocortisone (ANUSOL-HC) 2.5 % rectal cream, , PeriANAL, QID, Maycol Dominguez MD, Given at 11/02/22 0841    melatonin tablet 10.5 mg, 10.5 mg, Oral, QHS PRN, Maycol Dominguez MD    empagliflozin (JARDIANCE) tablet 10 mg, 10 mg, Oral, DAILY, Gena Bosch NP, 10 mg at 11/02/22 0840    aluminum & magnesium hydroxide-simethicone (MYLANTA II) oral suspension 30 mL, 30 mL, Oral, Q4H PRN, Maycol Dominguez MD, 30 mL at 10/24/22 2004    diazePAM (VALIUM) tablet 5 mg, 5 mg, Oral, Q12H PRN, Maycol Dominguez MD, 5 mg at 10/31/22 1237    docusate sodium (COLACE) capsule 100 mg, 100 mg, Oral, DAILY, Karsten Galvez MD, 100 mg at 11/02/22 0839    bisacodyL (DULCOLAX) suppository 10 mg, 10 mg, Rectal, DAILY, Maycol Dominguez MD, 10 mg at 10/25/22 6431    albuterol-ipratropium (DUO-NEB) 2.5 MG-0.5 MG/3 ML, 3 mL, Nebulization, Q6H PRN, [facility]    fluticasone propionate (FLONASE) 50 mcg/actuation nasal spray 2 Spray, 2 Spray, Both Nostrils, DAILY, Karsten Galvez MD, 2 [place] at 11/02/22 0841    traZODone (DESYREL) tablet 200 mg, 200 mg, Oral, QHS, Irma Zaragoza NP, 200 mg at 11/01/22 2302    [Held by provider] magnesium oxide (MAG-OX) tablet 400 mg, 400 mg, Oral, QHS, Louis BAGLEY NP, 400 mg at 10/29/22 2159    calcium carbonate (TUMS) chewable tablet 200 mg [elemental], 200 mg, Oral, TID PRN, Karsten Galvez MD, 200 mg at 10/30/22 2052    clonazePAM (KlonoPIN) tablet 1 mg, 1 mg, Oral, QHS, Geoff Rodriguez MD, 1 mg at 11/01/22 2302    escitalopram oxalate (LEXAPRO) tablet 20 mg, 20 mg, Oral, DAILY, Geoff Rodriguez MD, 20 mg at 11/02/22 0839    ezetimibe (ZETIA) tablet 10 mg, 10 mg, Oral, DAILY, Geoff Rodriguez MD, 10 mg at 93/71/81 6597    folic acid (FOLVITE) tablet 1 mg, 1 mg, Oral, DAILY, Geoff Rodriguez MD, 1 mg at 11/02/22 0839    montelukast (SINGULAIR) tablet 10 mg, 10 mg, Oral, DAILY, Kang Mensah MD, 10 mg at 11/02/22 0839    prasugreL (EFFIENT) tablet 10 mg, 10 mg, Oral, DAILY, Judie Spears MD, 10 mg at 11/02/22 0839    [Held by provider] rOPINIRole (REQUIP) tablet 0.5 mg, 0.5 mg, Oral, BID, Judie Spears MD, 0.5 mg at 10/31/22 0947    [Held by provider] rosuvastatin (CRESTOR) tablet 20 mg, 20 mg, Oral, Q MON, WED & Ninetta Guise, Omayra Mack MD, 20 mg at 10/24/22 2241    sodium chloride (NS) flush 5-40 mL, 5-40 mL, IntraVENous, Q8H, Geoff Rodriguez MD, 10 mL at 11/02/22 0736    sodium chloride (NS) flush 5-40 mL, 5-40 mL, IntraVENous, PRN, Judie Spears MD    acetaminophen (TYLENOL) tablet 650 mg, 650 mg, Oral, Q4H PRN, Judie Spears MD, 650 mg at 10/29/22 1303    nitroglycerin (NITROSTAT) tablet 0.4 mg, 0.4 mg, SubLINGual, Q5MIN PRN, Judie Spears MD, 0.4 mg at 10/21/22 2237    glucose chewable tablet 16 g, 4 Tablet, Oral, PRN, Judie Spears MD    glucagon (GLUCAGEN) injection 1 mg, 1 mg, IntraMUSCular, PRN, Geoff Lock MD    dextrose 10 % infusion 0-250 mL, 0-250 mL, IntraVENous, PRN, Judie Spears MD    insulin lispro (HUMALOG) injection, , SubCUTAneous, AC&HS, Judie Spears MD    PATIENT CARE TEAM:  Patient Care Team:  Soila Wayne MD as PCP - General (Family Medicine)  Soila Wayne MD as PCP - [facility] Empaneled Provider  Joy Landon MD (Surgery General)  Aleksandr Damon MD (Cardiovascular Disease Physician)  Gabrielle Marte MD (Otolaryngology)  Stephen Villa MD (Internal Medicine Physician)  Zara Peguero MD (Female Pelvic Medicine and Reconstructive Surgery)  Mark Claire MD (Neurology)  Luis Bernard MD (Psychiatry)  Deidre Alvarez as Ambulatory Care Manager     Thank you for allowing me to participate in this patient's care.     Francine Kong MD   [address] [place] [address], [address]  Phone: [phone]

## 2022-11-02 NOTE — CONSULTS
Palliative Medicine Consult  [place]: [phone] ([phone])    Patient Name: Jailene Peña  YOB: 1980    Date of Initial Consult: 2022  Reason for Consult: Care Decisions   Requesting Provider: LVAD/OHT work up   Primary Care Physician: Sayra Montelongo MD     SUMMARY:   Jailene Peña is a 38 y.o. admitted on 10/21/2022 from home with a diagnosis of fluid volume overload, acute on chronic HF, and mental health crisis. PMH:HTN, obesity, ICM, CAD s/p STEMI 2022, chf, pre diabetes, RLS, FVO, fibromyalgia, anxiety, depression, esophageal stenosis s/p dilation, suicidal ideation,    Current medical issues leading to Palliative Medicine involvement include: support with palliative care needs as it relates to LVAD/OHT workup. .    Social: pt is single and lives at home with her father. She does not have any children and her mother is . She has not worked since 2018 when she was placed on disability. PALLIATIVE DIAGNOSES:   Shortness of breath  Chf  Cardiac Volume overload  ACP  Palliative Care Encounter     PLAN:   Pt seen without family present. She is alert and decisional  Inquired about support at home, symptoms, worries, questions. Pt shared that she is overwhelmed with the state of her health. She is not ready for all of this. Wants all measures to prolong her life. She has received information regarding LVAD and will do whatever she needs to do  She called her father and placed him on speaker. He expressed that visits from our team and the  feel discouraging and worsens his daughter's depression. I acknowledged and explained the respective roles with our intention. Father is her main support. She also has a best friend, neighbors, and other family members. She agreed to complete an AMD and named her father as her agent. She does not want to be prolonged at end of life.   Supportive listening provided and reassurance  Please re-consult if we can be of any further support  Initial consult note routed to primary continuity provider and/or primary health care team members  Communicated plan of care with: Palliative [facility] Team     GOALS OF CARE / TREATMENT PREFERENCES:     GOALS OF CARE:  Patient/Health Care Proxy Stated Goals: Prolong life    TREATMENT PREFERENCES:   Code Status: Full Code    Patient and family's personal goals include: getting better    Important upcoming milestones or family events: holidays     The patient identifies the following as important for living well: maintaining independence        Advance Care Planning:  [] [facility] has updated the ACP Navigator with [address] and Patient Capacity      Primary Decision Maker: Aurea Pereira - Father - [phone]  No flowsheet data found. Medical Interventions: Full interventions       Other:    As far as possible, the palliative care team has discussed with patient / health care proxy about goals of care / treatment preferences for patient.      HISTORY:     History obtained from: chart, team,pt    CHIEF COMPLAINT: Pt admitted with aforementioned history and issues    HPI/SUBJECTIVE:    The patient is:   [x] Verbal and participatory  [] Non-participatory due to: medical condition   No complaints     Clinical Pain Assessment (nonverbal scale for severity on nonverbal patients):   Clinical Pain Assessment  Severity: 5          Duration: for how long has pt been experiencing pain (e.g., 2 days, 1 month, years)  Frequency: how often pain is an issue (e.g., several times per day, once every few days, constant)     FUNCTIONAL ASSESSMENT:     Palliative Performance Scale (PPS):  PPS: 70       PSYCHOSOCIAL/SPIRITUAL SCREENING:     Palliative IDT has assessed this patient for cultural preferences / practices and a referral made as appropriate to needs (Cultural Services, Patient Advocacy, Ethics, etc.)    Any spiritual / Zoroastrianism concerns:  [] Yes /  [x] No  [] Unable to obtain this information  If \"Yes\" to discuss with pastoral care during IDT     Does caregiver feel burdened by caring for their loved one:   [] Yes /  [] No /  [x] No Caregiver Present/Available [] No Caregiver [] Pt Lives at Facility  If \"Yes\" to discuss with social work during IDT    Anticipatory grief assessment:   [x] Normal  / [] Maladaptive   [] Unable to obtain this information  [] n/a  If \"Maladaptive\" to discuss with social work during IDT    ESAS Anxiety: Anxiety: 1    ESAS Depression:          REVIEW OF SYSTEMS:     Positive and pertinent negative findings in ROS are noted above in HPI. The following systems were [x] reviewed / [] unable to be reviewed as noted in HPI  Other findings are noted below. Systems: constitutional, ears/nose/mouth/throat, respiratory, gastrointestinal, genitourinary, musculoskeletal, integumentary, neurologic, psychiatric, endocrine. Positive findings noted below. Modified ESAS Completed by: provider   Fatigue: 0 Drowsiness: 0     Pain: 5   Anxiety: 1 Nausea: 0   Anorexia: 0 Dyspnea: 0           Stool Occurrence(s): 0        PHYSICAL EXAM:     From RN flowsheet:  Wt Readings from Last 3 Encounters:   11/02/22 190 lb 7.6 oz (86.4 kg)   10/21/22 206 lb (93.4 kg)   10/17/22 207 lb (93.9 kg)     Blood pressure 101/71, pulse 100, temperature 97.8 °F (36.6 °C), resp. rate 18, height 5' 4\" (1.626 m), weight 190 lb 7.6 oz (86.4 kg), last menstrual period 10/09/2022, SpO2 99 %.     Pain Scale 1: Numeric (0 - 10)  Pain Intensity 1: 0  Pain Onset 1: Acute  Pain Location 1: Back  Pain Orientation 1: Anterior  Pain Description 1: Aching  Pain Intervention(s) 1: Heat applied  Last bowel movement, if known:     Constitutional: alert, conversant, nad  Eyes: pupils equal, anicteric  ENMT: no nasal discharge, moist mucous membranes  Cardiovascular:   Respiratory: breathing not labored, symmetric  Gastrointestinal:   Musculoskeletal: no deformity, no tenderness to palpation  Skin: warm, dry  Neurologic: following commands, moving all extremities  Psychiatric: flat affect, no hallucinations, depression, anxiety   Other:       HISTORY:     Active Problems:    CHF (congestive heart failure) ([facility] (10/21/2022)    Past Medical History:   Diagnosis Date    Anemia NEC     Arthritis     Chronic pain     fybromyalsia    Depression     anxiety, depression, OCD    GERD (gastroesophageal reflux disease)     Hypertension     Hypertension     Ill-defined condition     Fibromyalia gastricparesis    MI (myocardial infarction) ([facility]     Musculoskeletal disorder     SOB (shortness of breath)     Stool color black       Past Surgical History:   Procedure Laterality Date    HX CORONARY STENT PLACEMENT      HX GYN           HX HEENT      wisdom teeth extraction    UPPER GI ENDOSCOPY,BIOPSY  2018         UPPER GI ENDOSCOPY,DIAGNOSIS  2018           Family History   Problem Relation Age of Onset    Hypertension Father     Elevated Lipids Father     Arthritis-rheumatoid Mother         ? Lupus vs RA    Lung Disease Mother     Heart Disease Mother     Cancer Mother         breast in 39y    COPD Mother     Hypertension Mother     Stroke Mother         3-4 strokes    Breast Cancer Mother 50    Diabetes Maternal Grandmother       History reviewed, no pertinent family history.   Social History     Tobacco Use    Smoking status: Former     Packs/day: 0.25     Years: 8.00     Pack years: 2.00     Types: Cigarettes     Quit date: 2022     Years since quittin.2    Smokeless tobacco: Never   Substance Use Topics    Alcohol use: Not Currently     Alcohol/week: 1.0 standard drink     Types: 1 Glasses of wine per week     Comment: Wine with special occassions - not since July     Allergies   Allergen Reactions    Abilify [Aripiprazole] Anxiety     Can not tolerate     Sulfa (Sulfonamide Antibiotics) Hives    Vicodin [Hydrocodone-Acetaminophen] Nausea and Vomiting Current Facility-Administered Medications   Medication Dose Route Frequency    [START ON 11/3/2022] digoxin (LANOXIN) tablet 0.25 mg  0.25 mg Oral DAILY    sucralfate (CARAFATE) tablet 1 g  1 g Oral AC&HS    bumetanide (BUMEX) injection 2 mg  2 mg IntraVENous TID    DOBUTamine (DOBUTREX) 500 mg/250 mL (2,000 mcg/mL) infusion  3 mcg/kg/min IntraVENous CONTINUOUS    methyl salicylate-menthol (BENGAY) 15-10 % cream   Topical TID    pantoprazole (PROTONIX) tablet 40 mg  40 mg Oral ACB&D    potassium chloride SR (KLOR-CON 10) tablet 60 mEq  60 mEq Oral BID    polyethylene glycol (MIRALAX) packet 17 g  17 g Oral DAILY PRN    spironolactone (ALDACTONE) tablet 25 mg  25 mg Oral DAILY    prochlorperazine (COMPAZINE) injection 5 mg  5 mg IntraVENous Q6H PRN    ondansetron (ZOFRAN) injection 4 mg  4 mg IntraVENous Q6H PRN    [Held by provider] allopurinoL (ZYLOPRIM) tablet 50 mg  50 mg Oral DAILY    hydrocortisone (ANUSOL-HC) 2.5 % rectal cream   PeriANAL QID    melatonin tablet 10.5 mg  10.5 mg Oral QHS PRN    empagliflozin (JARDIANCE) tablet 10 mg  10 mg Oral DAILY    aluminum & magnesium hydroxide-simethicone (MYLANTA II) oral suspension 30 mL  30 mL Oral Q4H PRN    diazePAM (VALIUM) tablet 5 mg  5 mg Oral Q12H PRN    docusate sodium (COLACE) capsule 100 mg  100 mg Oral DAILY    bisacodyL (DULCOLAX) suppository 10 mg  10 mg Rectal DAILY    albuterol-ipratropium (DUO-NEB) 2.5 MG-0.5 MG/3 ML  3 mL Nebulization Q6H PRN    fluticasone propionate (FLONASE) 50 mcg/actuation nasal spray 2 Spray  2 Spray Both Nostrils DAILY    traZODone (DESYREL) tablet 200 mg  200 mg Oral QHS    [Held by provider] magnesium oxide (MAG-OX) tablet 400 mg  400 mg Oral QHS    calcium carbonate (TUMS) chewable tablet 200 mg [elemental]  200 mg Oral TID PRN    clonazePAM (KlonoPIN) tablet 1 mg  1 mg Oral QHS    escitalopram oxalate (LEXAPRO) tablet 20 mg  20 mg Oral DAILY    ezetimibe (ZETIA) tablet 10 mg  10 mg Oral DAILY    folic acid (FOLVITE) tablet 1 mg  1 mg Oral DAILY    montelukast (SINGULAIR) tablet 10 mg  10 mg Oral DAILY    prasugreL (EFFIENT) tablet 10 mg  10 mg Oral DAILY    [Held by provider] rOPINIRole (REQUIP) tablet 0.5 mg  0.5 mg Oral BID    [Held by provider] rosuvastatin (CRESTOR) tablet 20 mg  20 mg Oral Q MON, WED & FRI    sodium chloride (NS) flush 5-40 mL  5-40 mL IntraVENous Q8H    sodium chloride (NS) flush 5-40 mL  5-40 mL IntraVENous PRN    acetaminophen (TYLENOL) tablet 650 mg  650 mg Oral Q4H PRN    nitroglycerin (NITROSTAT) tablet 0.4 mg  0.4 mg SubLINGual Q5MIN PRN    glucose chewable tablet 16 g  4 Tablet Oral PRN    glucagon (GLUCAGEN) injection 1 mg  1 mg IntraMUSCular PRN    dextrose 10 % infusion 0-250 mL  0-250 mL IntraVENous PRN    insulin lispro (HUMALOG) injection   SubCUTAneous AC&HS          LAB AND IMAGING FINDINGS:     Lab Results   Component Value Date/Time    WBC 5.5 10/31/2022 03:44 AM    HGB 15.9 10/31/2022 03:44 AM    PLATELET 632 61/14/2050 03:44 AM     Lab Results   Component Value Date/Time    Sodium 134 (L) 11/02/2022 04:09 AM    Potassium 4.1 11/02/2022 04:09 AM    Chloride 100 11/02/2022 04:09 AM    CO2 24 11/02/2022 04:09 AM    BUN 20 11/02/2022 04:09 AM    Creatinine 1.20 (H) 11/02/2022 04:09 AM    Calcium 10.4 (H) 11/02/2022 04:09 AM    Magnesium 3.0 (H) 11/02/2022 04:06 AM      Lab Results   Component Value Date/Time    Alk.  phosphatase 132 (H) 11/02/2022 04:09 AM    Protein, total 8.4 (H) 11/02/2022 04:09 AM    Albumin 4.2 11/02/2022 04:09 AM    Globulin 4.2 (H) 11/02/2022 04:09 AM     Lab Results   Component Value Date/Time    INR 1.1 11/02/2022 04:06 AM    Prothrombin time 11.1 11/02/2022 04:06 AM    aPTT 25.9 10/21/2022 01:50 PM      Lab Results   Component Value Date/Time    Iron 52 10/23/2022 04:24 AM    TIBC 461 (H) 10/23/2022 04:24 AM    Iron % saturation 11 (L) 10/23/2022 04:24 AM    Ferritin 89 10/23/2022 04:24 AM      No results found for: PH, PCO2, PO2  No components found for: [place] Lab Results   Component Value Date/Time    CK 44 10/23/2022 04:24 AM                Total time:  70 minutes  Counseling / coordination time, spent as noted above:  60 minutes  > 50% counseling / coordination?: yes    Prolonged service was provided for  []30 min   []75 min in face to face time in the presence of the patient, spent as noted above. Time Start:   Time End:   Note: this can only be billed with 53397 (initial) or 93176 (follow up). If multiple start / stop times, list each separately.

## 2022-11-03 LAB
ANION GAP SERPL CALC-SCNC: 8 MMOL/L (ref 5–15)
ARTERIAL PATENCY WRIST A: ABNORMAL
BASE DEFICIT BLD-SCNC: 0.2 MMOL/L
BDY SITE: ABNORMAL
BUN SERPL-MCNC: 19 MG/DL (ref 6–20)
BUN/CREAT SERPL: 16 (ref 12–20)
CALCIUM SERPL-MCNC: 10.3 MG/DL (ref 8.5–10.1)
CHLORIDE SERPL-SCNC: 102 MMOL/L (ref 97–108)
CO2 SERPL-SCNC: 21 MMOL/L (ref 21–32)
COMMENT, HOLDF: NORMAL
CREAT SERPL-MCNC: 1.16 MG/DL (ref 0.55–1.02)
CRP SERPL-MCNC: 1.48 MG/DL (ref 0–0.6)
DSDNA CRITHIDIA LUCILIAE IFA: NEGATIVE
ERYTHROCYTE [DISTWIDTH] IN BLOOD BY AUTOMATED COUNT: 13.7 % (ref 11.5–14.5)
GAS FLOW.O2 O2 DELIVERY SYS: ABNORMAL L/MIN
GLUCOSE BLD STRIP.AUTO-MCNC: 103 MG/DL (ref 65–117)
GLUCOSE BLD STRIP.AUTO-MCNC: 103 MG/DL (ref 65–117)
GLUCOSE BLD STRIP.AUTO-MCNC: 117 MG/DL (ref 65–117)
GLUCOSE BLD STRIP.AUTO-MCNC: 96 MG/DL (ref 65–117)
GLUCOSE SERPL-MCNC: 114 MG/DL (ref 65–100)
HAV AB SER QL IA: NEGATIVE
HBV CORE AB SERPL QL IA: NEGATIVE
HCO3 BLD-SCNC: 23.8 MMOL/L (ref 22–26)
HCT VFR BLD AUTO: 49.8 % (ref 35–47)
HEMOCCULT STL QL: NEGATIVE
HGB BLD-MCNC: 15.8 G/DL (ref 11.5–16)
INTERPRETATION, 117893: NORMAL
MCH RBC QN AUTO: 29.4 PG (ref 26–34)
MCHC RBC AUTO-ENTMCNC: 31.7 G/DL (ref 30–36.5)
MCV RBC AUTO: 92.6 FL (ref 80–99)
NRBC # BLD: 0 K/UL (ref 0–0.01)
NRBC BLD-RTO: 0 PER 100 WBC
PCO2 BLD: 36.6 MMHG (ref 35–45)
PF4 HEPARIN CMPLX AB SER-ACNC: 0.15 OD (ref 0–0.4)
PH BLD: 7.42 [PH] (ref 7.35–7.45)
PHOSPHATE SERPL-MCNC: 4.1 MG/DL (ref 2.6–4.7)
PLATELET # BLD AUTO: 246 K/UL (ref 150–400)
PMV BLD AUTO: 11.1 FL (ref 8.9–12.9)
PO2 BLD: 103 MMHG (ref 80–100)
POTASSIUM SERPL-SCNC: 4.1 MMOL/L (ref 3.5–5.1)
RBC # BLD AUTO: 5.38 M/UL (ref 3.8–5.2)
SAMPLES BEING HELD,HOLD: NORMAL
SAO2 % BLD: 98.1 % (ref 92–97)
SCREEN APTT: 24.3 SEC (ref 0–51.9)
SCREEN DRVVT: 31.5 SEC (ref 0–47)
SERVICE CMNT-IMP: NORMAL
SODIUM SERPL-SCNC: 131 MMOL/L (ref 136–145)
SPECIMEN TYPE: ABNORMAL
WBC # BLD AUTO: 4.5 K/UL (ref 3.6–11)

## 2022-11-03 PROCEDURE — 86147 CARDIOLIPIN ANTIBODY EA IG: CPT

## 2022-11-03 PROCEDURE — 82962 GLUCOSE BLOOD TEST: CPT

## 2022-11-03 PROCEDURE — 76937 US GUIDE VASCULAR ACCESS: CPT

## 2022-11-03 PROCEDURE — 74011250637 HC RX REV CODE- 250/637: Performed by: NURSE PRACTITIONER

## 2022-11-03 PROCEDURE — 82595 ASSAY OF CRYOGLOBULIN: CPT

## 2022-11-03 PROCEDURE — 74011250637 HC RX REV CODE- 250/637: Performed by: FAMILY MEDICINE

## 2022-11-03 PROCEDURE — 74011000250 HC RX REV CODE- 250: Performed by: FAMILY MEDICINE

## 2022-11-03 PROCEDURE — 74011000250 HC RX REV CODE- 250: Performed by: NURSE PRACTITIONER

## 2022-11-03 PROCEDURE — 77030020365 HC SOL INJ SOD CL 0.9% 50ML

## 2022-11-03 PROCEDURE — 74011250636 HC RX REV CODE- 250/636: Performed by: NURSE PRACTITIONER

## 2022-11-03 PROCEDURE — 86235 NUCLEAR ANTIGEN ANTIBODY: CPT

## 2022-11-03 PROCEDURE — 65660000001 HC RM ICU INTERMED STEPDOWN

## 2022-11-03 PROCEDURE — 82272 OCCULT BLD FECES 1-3 TESTS: CPT

## 2022-11-03 PROCEDURE — 80048 BASIC METABOLIC PNL TOTAL CA: CPT

## 2022-11-03 PROCEDURE — 82164 ANGIOTENSIN I ENZYME TEST: CPT

## 2022-11-03 PROCEDURE — 82803 BLOOD GASES ANY COMBINATION: CPT

## 2022-11-03 PROCEDURE — 82570 ASSAY OF URINE CREATININE: CPT

## 2022-11-03 PROCEDURE — 82340 ASSAY OF CALCIUM IN URINE: CPT

## 2022-11-03 PROCEDURE — 85027 COMPLETE CBC AUTOMATED: CPT

## 2022-11-03 PROCEDURE — 86140 C-REACTIVE PROTEIN: CPT

## 2022-11-03 PROCEDURE — 36573 INSJ PICC RS&I 5 YR+: CPT | Performed by: NURSE PRACTITIONER

## 2022-11-03 PROCEDURE — 36600 WITHDRAWAL OF ARTERIAL BLOOD: CPT

## 2022-11-03 PROCEDURE — 83521 IG LIGHT CHAINS FREE EACH: CPT

## 2022-11-03 PROCEDURE — 74011250637 HC RX REV CODE- 250/637: Performed by: INTERNAL MEDICINE

## 2022-11-03 PROCEDURE — 74011250637 HC RX REV CODE- 250/637: Performed by: HOSPITALIST

## 2022-11-03 PROCEDURE — 86146 BETA-2 GLYCOPROTEIN ANTIBODY: CPT

## 2022-11-03 PROCEDURE — 86038 ANTINUCLEAR ANTIBODIES: CPT

## 2022-11-03 PROCEDURE — 86160 COMPLEMENT ANTIGEN: CPT

## 2022-11-03 PROCEDURE — 84100 ASSAY OF PHOSPHORUS: CPT

## 2022-11-03 PROCEDURE — C1751 CATH, INF, PER/CENT/MIDLINE: HCPCS

## 2022-11-03 PROCEDURE — 86162 COMPLEMENT TOTAL (CH50): CPT

## 2022-11-03 PROCEDURE — 74011250636 HC RX REV CODE- 250/636: Performed by: INTERNAL MEDICINE

## 2022-11-03 PROCEDURE — 99233 SBSQ HOSP IP/OBS HIGH 50: CPT | Performed by: NURSE PRACTITIONER

## 2022-11-03 PROCEDURE — 36415 COLL VENOUS BLD VENIPUNCTURE: CPT

## 2022-11-03 RX ORDER — DOBUTAMINE HYDROCHLORIDE 200 MG/100ML
1 INJECTION INTRAVENOUS CONTINUOUS
Status: DISCONTINUED | OUTPATIENT
Start: 2022-11-03 | End: 2022-11-04

## 2022-11-03 RX ORDER — MILRINONE LACTATE 0.2 MG/ML
0.25 INJECTION, SOLUTION INTRAVENOUS CONTINUOUS
Status: DISCONTINUED | OUTPATIENT
Start: 2022-11-03 | End: 2022-11-09 | Stop reason: HOSPADM

## 2022-11-03 RX ADMIN — PRAMIPEXOLE DIHYDROCHLORIDE 0.12 MG: 0.25 TABLET ORAL at 21:56

## 2022-11-03 RX ADMIN — ESCITALOPRAM OXALATE 20 MG: 10 TABLET ORAL at 08:30

## 2022-11-03 RX ADMIN — DOBUTAMINE HYDROCHLORIDE 1 MCG/KG/MIN: 200 INJECTION INTRAVENOUS at 17:06

## 2022-11-03 RX ADMIN — DOCUSATE SODIUM 100 MG: 100 CAPSULE, LIQUID FILLED ORAL at 08:30

## 2022-11-03 RX ADMIN — BUMETANIDE 2 MG: 0.25 INJECTION, SOLUTION INTRAMUSCULAR; INTRAVENOUS at 17:05

## 2022-11-03 RX ADMIN — SUCRALFATE 1 G: 1 TABLET ORAL at 21:56

## 2022-11-03 RX ADMIN — CLONAZEPAM 1 MG: 0.5 TABLET ORAL at 21:56

## 2022-11-03 RX ADMIN — ALUMINUM HYDROXIDE, MAGNESIUM HYDROXIDE, AND DIMETHICONE 30 ML: 400; 400; 40 SUSPENSION ORAL at 05:36

## 2022-11-03 RX ADMIN — SPIRONOLACTONE 25 MG: 25 TABLET ORAL at 08:30

## 2022-11-03 RX ADMIN — FLUTICASONE PROPIONATE 2 SPRAY: 50 SPRAY, METERED NASAL at 08:34

## 2022-11-03 RX ADMIN — PANTOPRAZOLE SODIUM 40 MG: 40 TABLET, DELAYED RELEASE ORAL at 07:30

## 2022-11-03 RX ADMIN — SUCRALFATE 1 G: 1 TABLET ORAL at 17:05

## 2022-11-03 RX ADMIN — HYDROCORTISONE: 25 CREAM TOPICAL at 08:34

## 2022-11-03 RX ADMIN — ALLOPURINOL 50 MG: 100 TABLET ORAL at 08:30

## 2022-11-03 RX ADMIN — FOLIC ACID 1 MG: 1 TABLET ORAL at 08:30

## 2022-11-03 RX ADMIN — MILRINONE LACTATE IN DEXTROSE 0.25 MCG/KG/MIN: 200 INJECTION, SOLUTION INTRAVENOUS at 10:18

## 2022-11-03 RX ADMIN — EMPAGLIFLOZIN 10 MG: 10 TABLET, FILM COATED ORAL at 08:30

## 2022-11-03 RX ADMIN — DIGOXIN 0.25 MG: 0.25 TABLET ORAL at 08:30

## 2022-11-03 RX ADMIN — POTASSIUM CHLORIDE 60 MEQ: 750 TABLET, FILM COATED, EXTENDED RELEASE ORAL at 17:05

## 2022-11-03 RX ADMIN — PANTOPRAZOLE SODIUM 40 MG: 40 TABLET, DELAYED RELEASE ORAL at 17:05

## 2022-11-03 RX ADMIN — SODIUM CHLORIDE, PRESERVATIVE FREE 10 ML: 5 INJECTION INTRAVENOUS at 14:38

## 2022-11-03 RX ADMIN — SUCRALFATE 1 G: 1 TABLET ORAL at 07:30

## 2022-11-03 RX ADMIN — PRASUGREL 10 MG: 10 TABLET, FILM COATED ORAL at 08:30

## 2022-11-03 RX ADMIN — SODIUM CHLORIDE, PRESERVATIVE FREE 10 ML: 5 INJECTION INTRAVENOUS at 21:56

## 2022-11-03 RX ADMIN — MENTHOL, METHYL SALICYLATE: 10; 15 CREAM TOPICAL at 08:34

## 2022-11-03 RX ADMIN — MONTELUKAST 10 MG: 10 TABLET, FILM COATED ORAL at 08:29

## 2022-11-03 RX ADMIN — SUCRALFATE 1 G: 1 TABLET ORAL at 12:46

## 2022-11-03 RX ADMIN — HYDROCORTISONE: 25 CREAM TOPICAL at 19:30

## 2022-11-03 RX ADMIN — DIAZEPAM 5 MG: 5 TABLET ORAL at 12:46

## 2022-11-03 RX ADMIN — EZETIMIBE 10 MG: 10 TABLET ORAL at 08:30

## 2022-11-03 RX ADMIN — HYDROCORTISONE: 25 CREAM TOPICAL at 14:38

## 2022-11-03 RX ADMIN — POTASSIUM CHLORIDE 60 MEQ: 750 TABLET, FILM COATED, EXTENDED RELEASE ORAL at 08:29

## 2022-11-03 RX ADMIN — MILRINONE LACTATE IN DEXTROSE 0.25 MCG/KG/MIN: 200 INJECTION, SOLUTION INTRAVENOUS at 22:05

## 2022-11-03 RX ADMIN — BUMETANIDE 2 MG: 0.25 INJECTION, SOLUTION INTRAMUSCULAR; INTRAVENOUS at 08:29

## 2022-11-03 RX ADMIN — TRAZODONE HYDROCHLORIDE 200 MG: 100 TABLET ORAL at 21:56

## 2022-11-03 RX ADMIN — DOBUTAMINE HYDROCHLORIDE 3 MCG/KG/MIN: 200 INJECTION INTRAVENOUS at 07:30

## 2022-11-03 RX ADMIN — BUMETANIDE 2 MG: 0.25 INJECTION, SOLUTION INTRAMUSCULAR; INTRAVENOUS at 12:45

## 2022-11-03 NOTE — CONSULTS
[facility]   [address] [facility], [id number] [place] [address]  Phone: [phone]   [phone] NOTE     Patient: Derek Devlin MRN: [id number]  PCP: Freddy Angulo MD   :     1980  Age:   38 y.o. Sex:  female      Referring physician: Kelly Woods MD  Reason for consultation: 38 y.o. female with CHF (congestive heart failure) ([facility] [Q49.4] complicated by hypercalcemia  Admission Date: 10/21/2022 11:55 AM  LOS: 13 days      ASSESSMENT and PLAN :   Hypercalcemia:  -Fairly acute in onset  -PTH is nonsuppressed and raises possibility of a parathyroid related problem  -Normal TSH  -Elevated serum protein and globulin levels warrants exclusion of paraproteinemia  - added PTH RP, serum phosphorus, 25 and one 25-hydroxy vitamin D and 24-hour urinary calcium studies  -She has been off of vitamin D supplementation since admission  -She looks clinically dry on examination and could be from intravascular volume depletion  -Continue with daily labs    Positive KITTY  -Being worked up by rheumatology? Sjogren's    Ischemic cardiomyopathy  LVEF 15 to 20%      Care Plan discussed with:  pt        Thank you for consulting [facility] in the care of your patient. Subjective:   HPI: Derek Devlin is a 38 y.o.  female who has been admitted to the hospital for management of decompensated heart failure. She reports being well up until her hospitalization at [facility] where she was admitted with MI. She has severe systolic CHF with an LVEF of 15 to 20%. She was evaluated in the heart failure clinic and was noted to be in decompensated CHF with suicidal ideation for which she was hospitalized. During her hospital course she has developed acute hypercalcemia with nonsuppressed PTH. Nephrology has been asked to evaluate her. She has mild renal insufficiency.   She reports being on vitamin D supplementation prior to SUBJECTIVE:  Christofer Lara is a 75 year old male who presents for follow up and CC of gross hematuria. Patient referred to me by Dr. Marrero. GH first began 1 month ago, last for a few days and has since stopped.  Happened again a few weeks ago.  No symptoms at this time. UA obtained on 7/18/19 revealed positive heme and positive RBCs (6-10/HPF). Patient on Eliquis and aspirin. Daytime frequency of urination -  4-5, Night time frequency of urination - 2, + Gross Hematuria, - UTIs, -dysuria.  Patient here today for cystoscopy to complete his hematuria workup. Cytology revealed no high-grade malignant cells.  PSA 1.20 obtained on 7/30/19. Repeat UA was unremarkable.  Urine culture revealed no growth.  Recent CBC and BMP obtained on 7/30/19 was unremarkable.  Pt doing well today at this time.  No complaints.    CTU obtained on 8/13/19 revealed  Impression:  No nephrolithiasis or hydronephrosis. No suspicious mass or filling defect  identified in the urinary collecting system.     Minimal fibrotic or atelectatic changes at the lung bases. Nonspecific 2 mm  right lower lobe nodule.     Small hiatal hernia.     Cholelithiasis.      ALLERGIES:  No Known Allergies  Past Medical History:   Diagnosis Date   • Coronary artery disease    • DJD (degenerative joint disease)    • HTN (hypertension)    • Post-op bleeding 1/5/2018   • Wears glasses      Past Surgical History:   Procedure Laterality Date   • Cardiac bi-ventricular pacemaker placement     • Cardiac catherization  12/21/2017   • Cardiac surgery  01/04/2018    x4 vessel   • Colonoscopy  2008   • Coronary artery bypass graft     • Eye surgery Bilateral 2017    cataract   • Removal of tonsils,<13 y/o       Current Medications    ACETAMINOPHEN (TYLENOL) 500 MG TABLET    Take 1,000 mg by mouth as needed for Pain.     APIXABAN (ELIQUIS) 5 MG TAB    Take 1 tablet by mouth every 12 hours.    ASPIRIN 81 MG TABLET    Take 81 mg by mouth daily.    ATORVASTATIN (LIPITOR) 20 MG  TABLET    Take 1 tablet by mouth nightly.    CHOLECALCIFEROL (VITAMIN D3) 1000 UNITS TABLET    Take 1,000 Units by mouth daily.     METOPROLOL SUCCINATE (TOPROL XL) 25 MG 24 HR TABLET    Take 0.5 tablets by mouth nightly.    PYRIDOXINE HCL (VITAMIN B-6) 100 MG TABLET    Take 100 mg by mouth daily.    SERTRALINE (ZOLOFT) 50 MG TABLET    Take 0.5 tablets by mouth daily.     Social History     Tobacco Use   • Smoking status: Never Smoker   • Smokeless tobacco: Never Used   Substance Use Topics   • Alcohol use: Yes     Alcohol/week: 3.0 standard drinks     Types: 1 Glasses of wine, 2 Cans of beer per week     Frequency: Monthly or less     Drinks per session: 1 or 2     Binge frequency: Never   • Drug use: No     Family History   Problem Relation Age of Onset   • Stroke Mother    • Hearing Loss Father    • Heart disease Father    • Stroke Father    • Hypertension Father        ROS:  Constitutional: Denies fevers or weight changes  Cardiovascular: Denies chest pain or SOB  GI: Denies nausea, vomiting, diarrhea or constipation  All remaining ROS were negative in a 10-point survey except for those listed in the HPI.    OBJECTIVE:   Physical Exam:   Visit Vitals  /86   Pulse 64   Temp 96.7 °F (35.9 °C)   Ht 6' 1\" (1.854 m)   Wt 83.2 kg   BMI 24.20 kg/m²     General: Well developed, well nourished male  Eyes: Normal conjunctivea and lids, no erythema or edema noted  Ears: Normal Pinna and lobe of the ear.  External ear canal unremarkable  Skin: Normal Color/tone. Without obvious lesions. Warm and dry  Lungs: No laboring or audible wheezes  Abdomen: Soft, non-tender, non-distended. No Heptosplenomegaly. No CVAT  Genitourinary:  Penis is circumcised. Urethral meatus patent w/o lesions or plaques. Testes descended bilaterally without masses or tenderness. Vas, epididymis normal bilaterally. Scrotum, Symmetrical and without lesions. No mass, palpable vas, no hydrocele, no varicocele  Digital Rectal Exam:  admission per PCP. She denies excess calcium intake. She has received some IV albumin during this hospitalization. She has been diuresed with loop diuretics and has been on spironolactone. She was noted to have positive KITTY and rheumatology is evaluating for possible connective tissue disorder i.e. Sjogren's  She denies any history of kidney stones  She denies any history of thyroid problems  She denies excess calcium intake    Past Medical Hx:   Past Medical History:   Diagnosis Date    Anemia NEC     Arthritis     Chronic pain     fybromyalsia    Depression     anxiety, depression, OCD    GERD (gastroesophageal reflux disease)     Hypertension     Hypertension     Ill-defined condition     Fibromyalia gastricparesis    MI (myocardial infarction) ([facility]     Musculoskeletal disorder     SOB (shortness of breath)     Stool color black         Past Surgical Hx:     Past Surgical History:   Procedure Laterality Date    HX CORONARY STENT PLACEMENT      HX GYN           HX HEENT  2002    wisdom teeth extraction    UPPER GI ENDOSCOPY,BIOPSY  2018         UPPER GI ENDOSCOPY,DIAGNOSIS  2018              Allergies   Allergen Reactions    Abilify [Aripiprazole] Anxiety     Can not tolerate     Sulfa (Sulfonamide Antibiotics) Hives    Vicodin [Hydrocodone-Acetaminophen] Nausea and Vomiting       Social Hx:  reports that she quit smoking about 3 months ago. Her smoking use included cigarettes. She has a 2.00 pack-year smoking history. She has never used smokeless tobacco. She reports that she does not currently use alcohol after a past usage of about 1.0 standard drink per week. She reports that she does not currently use drugs after having used the following drugs: Marijuana. Family History   Problem Relation Age of Onset    Hypertension Father     Elevated Lipids Father     Arthritis-rheumatoid Mother         ? Lupus vs RA    Lung Disease Mother     Heart Disease Mother     Cancer Mother Declined    Procedure:  The procedure and potential complications were reviewed between the surgeon and the patient, or patient's family and/or durable power of  if necessary, after which the consent was signed by both the patient.    A flexible cystoscope was inserted transurethrally and guided into the urinary bladder under direct visualization. The vesicle neck, fundus, dome, and bladder floor were all visualized and noted to be with out ulcerations, masses, or lesions.  The right and left ureteral orifices were localized and noted to be in their normal anatomic position. Prostatic urethra was roughly 2 cm in length. Lateral prostatic lobes revealed minimal obstruction, minimal median lobe was present. Anterior urethra was unremarkable The flexible cystoscope was then removed.    Laboratory:  Lab Results   Component Value Date    PSA 1.20 07/30/2019    PSA 0.87 07/13/2018    PSA 0.88 07/15/2017    PSA 0.74 07/07/2016    PSA 0.79 07/02/2015    PSA 0.76 06/30/2014    PSA 0.79 07/02/2013    PSA 0.64 08/16/2011       Most recent UA:  Lab Results   Component Value Date    COL STRAW (A) 07/30/2019    UAPP CLEAR 07/30/2019    USPG <1.005 (L) 07/30/2019    UPH 7.0 07/30/2019    UPROT NEGATIVE 07/30/2019    UGLU NEGATIVE 07/30/2019    UKET NEGATIVE 07/30/2019    UBILI NEGATIVE 07/30/2019    URBC SMALL (A) 07/30/2019    UNITR NEGATIVE 07/30/2019    UROB 0.2 07/30/2019    UWBC NEGATIVE 07/30/2019       No components found for: CREAT    Lab Results   Component Value Date    HGB 16.0 07/30/2019    HCT 49.4 07/30/2019    MCV 98.6 07/30/2019    WBC 5.2 07/30/2019     07/30/2019     07/02/2013        ASSESSMENT:    · Gross hematuria with otherwise unremarkable workup completed today - likely secondary to anticoagulation    PLAN:   -Patient was seen, examined, pertinent labs and images have been reviewed.  -Patient reassured regarding their hematuria  -Will obtain UA annually  -Patient aware that if their UA  breast in 45y    COPD Mother     Hypertension Mother     Stroke Mother         3-4 strokes    Breast Cancer Mother 50    Diabetes Maternal Grandmother        Review of Systems:  A thorough twelve point review of system was performed today. Pertinent positives and negatives are mentioned in the HPI. The reminder of the ROS is negative and noncontributory. Objective:    Vitals:    Vitals:    11/03/22 1129 11/03/22 1200 11/03/22 1400 11/03/22 1516   BP: (!) 113/96   119/74   Pulse: (!) 108 (!) 118 (!) 118 (!) 118   Resp: 18   16   Temp: 98 °F (36.7 °C)   97.6 °F (36.4 °C)   SpO2: 98%   99%   Weight:       Height:         I&O's:  11/02 0701 - 11/03 0700  In: 1014.7 [P.O.:825; I.V.:189.7]  Out: 1750 [Urine:1750]  Visit Vitals  /74 (BP 1 Location: Left upper arm, BP Patient Position: Sitting)   Pulse (!) 118   Temp 97.6 °F (36.4 °C)   Resp 16   Ht 5' 4\" (1.626 m)   Wt 85.4 kg (188 lb 4.4 oz)   LMP 10/09/2022   SpO2 99%   BMI 32.32 kg/m²       Physical Exam:  General: Obese  HEENT: PERRL,  No Pallor , No Icterus  Neck: Supple,no mass palpable  Lungs : CTA  CVS: RRR, S1 S2 normal, No murmur   Abdomen: Soft, NT, BS +  Extremities: Edema  Skin: No rash or lesions.   MS: No joint swelling, erythema, warmth  Neurologic: non focal, AAO x 3  Psych: normal affect/ Unable to assess    Laboratory Results:    Recent Labs     11/03/22  0445 11/02/22  0409 11/02/22  0406 11/01/22  0627   * 134*  --  132*   K 4.1 4.1  --  4.2    100  --  99   CO2 21 24  --  23   * 103*  --  103*   BUN 19 20  --  18   CREA 1.16* 1.20*  --  1.21*   CA 10.3* 10.4* 10.4* 10.7*   MG  --   --  3.0* 3.0*   ALB  --  4.2  --  4.4   ALT  --  84*  --  58   INR  --   --  1.1  --      Recent Labs     11/03/22  0445   WBC 4.5   HGB 15.8   HCT 49.8*        Lab Results   Component Value Date/Time    Specimen Description: URINE 05/16/2011 04:18 PM    Specimen Description: URINE 10/06/2010 03:01 PM    Specimen Description: URINE 02/02/2010 04:50 PM     Lab Results   Component Value Date/Time    Culture result: NO GROWTH AFTER 23 HOURS 11/02/2022 04:09 AM    Culture result: No growth (<1,000 CFU/ML) 10/23/2022 04:43 AM    Culture result: MIXED UROGENITAL PALMA ISOLATED 10/17/2022 08:42 AM     Recent Results (from the past 24 hour(s))   GLUCOSE, POC    Collection Time: 11/02/22 10:21 PM   Result Value Ref Range    Glucose (POC) 105 65 - 117 mg/dL    Performed by KIKI FLORES    CBC W/O DIFF    Collection Time: 11/03/22  4:45 AM   Result Value Ref Range    WBC 4.5 3.6 - 11.0 K/uL    RBC 5.38 (H) 3.80 - 5.20 M/uL    HGB 15.8 11.5 - 16.0 g/dL    HCT 49.8 (H) 35.0 - 47.0 %    MCV 92.6 80.0 - 99.0 FL    MCH 29.4 26.0 - 34.0 PG    MCHC 31.7 30.0 - 36.5 g/dL    RDW 13.7 11.5 - 14.5 %    PLATELET 587 952 - 767 K/uL    MPV 11.1 8.9 - 12.9 FL    NRBC 0.0 0  WBC    ABSOLUTE NRBC 0.00 0.00 - 8.38 K/uL   METABOLIC PANEL, BASIC    Collection Time: 11/03/22  4:45 AM   Result Value Ref Range    Sodium 131 (L) 136 - 145 mmol/L    Potassium 4.1 3.5 - 5.1 mmol/L    Chloride 102 97 - 108 mmol/L    CO2 21 21 - 32 mmol/L    Anion gap 8 5 - 15 mmol/L    Glucose 114 (H) 65 - 100 mg/dL    BUN 19 6 - 20 MG/DL    Creatinine 1.16 (H) 0.55 - 1.02 MG/DL    BUN/Creatinine ratio 16 12 - 20      eGFR >60 >60 ml/min/1.73m2    Calcium 10.3 (H) 8.5 - 10.1 MG/DL   OCCULT BLOOD, STOOL    Collection Time: 11/03/22  5:09 AM   Result Value Ref Range    Occult blood, stool Negative NEG     C REACTIVE PROTEIN, QT    Collection Time: 11/03/22  5:09 AM   Result Value Ref Range    C-Reactive protein 1.48 (H) 0.00 - 0.60 mg/dL   GLUCOSE, POC    Collection Time: 11/03/22  7:33 AM   Result Value Ref Range    Glucose (POC) 117 65 - 117 mg/dL    Performed by Ventura Price    POC G3 - PUL    Collection Time: 11/03/22  8:31 AM   Result Value Ref Range    pH (POC) 7.42 7.35 - 7.45      pCO2 (POC) 36.6 35.0 - 45.0 MMHG    pO2 (POC) 103 (H) 80 - 100 MMHG    HCO3 (POC) 23.8 22 - 26 MMOL/L persistently reveals microscopic hematuria over the next 3-5 years, we will consider repeating the full hematuria workup once again at that time.  -Follow up in 1 year with UA  -Patient verbalized understanding.       Lev Wu, DO   sO2 (POC) 98.1 (H) 92 - 97 %    Base deficit (POC) 0.2 mmol/L    Site RIGHT BRACHIAL      Device: ROOM AIR      Allens test (POC) NOT APPLICABLE      Specimen type (POC) ARTERIAL     GLUCOSE, POC    Collection Time: 11/03/22 11:28 AM   Result Value Ref Range    Glucose (POC) 103 65 - 117 mg/dL    Performed by MIC Yan    GLUCOSE, POC    Collection Time: 11/03/22  5:04 PM   Result Value Ref Range    Glucose (POC) 96 65 - 117 mg/dL    Performed by [facility] Brenda          Urine dipstick:   Lab Results   Component Value Date/Time    Color ORANGE 10/17/2022 08:42 AM    Appearance TURBID (A) 10/17/2022 08:42 AM    Specific gravity 1.025 10/17/2022 08:42 AM    Specific gravity 1.024 10/06/2022 08:57 AM    pH (UA) 5.0 10/17/2022 08:42 AM    Protein 30 (A) 10/17/2022 08:42 AM    Glucose Negative 10/17/2022 08:42 AM    Ketone Negative 10/17/2022 08:42 AM    Bilirubin Negative 11/25/2021 09:47 AM    Urobilinogen 1.0 10/17/2022 08:42 AM    Nitrites Positive (A) 10/17/2022 08:42 AM    Leukocyte Esterase SMALL (A) 10/17/2022 08:42 AM    Epithelial cells FEW 10/17/2022 08:42 AM    Bacteria 2+ (A) 10/17/2022 08:42 AM    WBC 5-10 10/17/2022 08:42 AM    RBC 5-10 10/17/2022 08:42 AM       I have reviewed the following: All pertinent labs, microbiology data, radiology imaging for my assessment     Medications list Personally Reviewed   [x]      Yes     []               No       Medications:  Prior to Admission medications    Medication Sig Start Date End Date Taking? Authorizing Provider   rOPINIRole (REQUIP) 0.5 mg tablet Take 1 Tablet by mouth two (2) times a day. 10/20/22  Yes Shantal Power MD   promethazine (PHENERGAN) 25 mg tablet Take 1 Tablet by mouth every six (6) hours as needed for Nausea for up to 10 doses. 10/17/22  Yes Sosa Baer, NP   ondansetron (ZOFRAN ODT) 4 mg disintegrating tablet Take 1 Tablet by mouth every eight (8) hours as needed for Nausea or Vomiting.  10/14/22  Yes Leroy Guzmán, Fareed Rowe MD   furosemide (LASIX) 40 mg tablet TAKE ONE TABLET BY MOUTH EVERY DAY 10/5/22  Yes Trenton Chang DO   carvediloL (COREG) 3.125 mg tablet TAKE 1 TABLET BY MOUTH TWICE A DAY 9/27/22  Yes Trenton Chang DO   rosuvastatin (CRESTOR) 20 mg tablet Take 1 Tablet by mouth every Monday, Wednesday, Friday. 9/23/22  Yes Trenton Chang DO   levocetirizine (XYZAL) 5 mg tablet TAKE 1 TABLET EVERY DAY 9/15/22  Yes Jose Rooney MD   famotidine (PEPCID) 20 mg tablet TAKE 1 TABLET BY MOUTH TWICE A DAY 9/14/22  Yes Jose Rooney MD   ezetimibe (ZETIA) 10 mg tablet Take 1 Tablet by mouth daily. 9/9/22  Yes Leah Pagan NP   metFORMIN ER (GLUCOPHAGE XR) 500 mg tablet Take 1 Tablet by mouth daily (with dinner). 9/8/22  Yes Usha Mcmullen MD   montelukast (SINGULAIR) 10 mg tablet TAKE 1 TABLET EVERY DAY 8/24/22  Yes Jose Rooney MD   diazePAM (VALIUM) 5 mg tablet 5 mg every eight (8) hours as needed. 5 mg, 3 times daily 8/15/22  Yes Provider, Historical   albuterol-ipratropium (DUO-NEB) 2.5 mg-0.5 mg/3 ml nebu 3 mL by Nebulization route four (4) times daily. Yes Provider, Historical   ipratropium (ATROVENT) 21 mcg (0.03 %) nasal spray 1 Byron by Both Nostrils route every twelve (12) hours. 8/16/22  Yes Cecille Romero MD   acetaminophen (TYLENOL) 325 mg tablet Take  by mouth every four (4) hours as needed for Pain. Yes Provider, Historical   albuterol (PROVENTIL HFA, VENTOLIN HFA, PROAIR HFA) 90 mcg/actuation inhaler Take  by inhalation.    Yes Other, MD Nestor   prasugreL (EFFIENT) 10 mg tablet  7/26/22  Yes Provider, Historical   aspirin delayed-release 81 mg tablet 81 mg. 7/26/22  Yes Provider, Historical   pantoprazole (PROTONIX) 40 mg tablet TAKE 1 TABLET TWICE DAILY 6/1/22  Yes Jose Rooney MD   ferrous sulfate 325 mg (65 mg iron) tablet TAKE 1 TABLET BY MOUTH ONCE DAILY BEFORE BREAKFAST 5/4/22  Yes Jose Rooney MD   traZODone (DESYREL) 100 mg tablet 2 tablets at night 4/5/22  Yes Provider, Historical   folic acid (FOLVITE) 1 mg tablet TAKE 1 TABLET EVERY DAY 4/6/22  Yes Erma Saenz MD   escitalopram oxalate (LEXAPRO) 20 mg tablet Take 20 mg by mouth daily. Yes Provider, Historical   clonazePAM (KLONOPIN) 1 mg tablet Take 1 mg by mouth in the morning. At bedtime   Yes Provider, Historical   cholecalciferol, vitamin D3, 50 mcg (2,000 unit) tab 2,000 Units. Yes Provider, Historical   promethazine (PHENERGAN) 25 mg suppository Insert 1 Suppository into rectum every six (6) hours as needed for Nausea for up to 5 doses. Patient not taking: No sig reported 10/17/22   Sosa Nunez, NP   ketoconazole (NIZORAL) 2 % shampoo SHAMPOO TWICE WEEKLY  Patient not taking: No sig reported 10/11/22   Erma Saenz MD   empagliflozin (Jardiance) 10 mg tablet Take 1 Tablet by mouth daily. Patient not taking: No sig reported 8/31/22   Quinton Wilson DO   hydrocortisone (ANUSOL-HC) 2.5 % rectal cream Insert  into rectum four (4) times daily. Patient not taking: Reported on 10/21/2022 8/17/22   Daniel Jackson MD   fluticasone propionate [facility]) 50 mcg/actuation nasal spray 2 Sprays by Both Nostrils route daily. Patient not taking: No sig reported    Provider, Historical   nitroglycerin (NITROSTAT) 0.4 mg SL tablet  7/26/22   Provider, Historical   cyclobenzaprine (FLEXERIL) 10 mg tablet three (3) times daily as needed. Has not needed  Patient not taking: No sig reported 10/6/21   Provider, Historical        Thank you for allowing us to participate in the care of this patient. We will follow patient. Please dont hesitate to call with any questions    Cody San MD  [facility] [address] [place] [address] [place] [address]  Phone - [phone]   Fax - [phone]  www. [email or web address]

## 2022-11-03 NOTE — PROGRESS NOTES
PICC Placement Note        PRE-PROCEDURE VERIFICATION  Correct Procedure: yes  Correct Site:  yes  Temperature: Temp: 98 °F (36.7 °C), Temperature Source: Temp Source: Oral  Recent Labs     11/03/22  0445 11/02/22  0409 11/02/22  0406   BUN 19   < >  --    CREA 1.16*   < >  --      --   --    INR  --   --  1.1   WBC 4.5  --   --     < > = values in this interval not displayed. Allergies: Abilify [aripiprazole], Sulfa (sulfonamide antibiotics), and Vicodin [hydrocodone-acetaminophen]  Education materials, including PICC Booklet, for PICC Care given to patient: yes. See Patient Education activity for further details. PROCEDURE DETAIL  PICC placed using modified Seldinger method. A double lumen PICC line was started for irritant medication and reliable access. The following documentation is in addition to the PICC properties in the lines/airways flowsheet :  Lot #: [id number]  Was xylocaine 1% used intradermally:  yes  Catheter Length: 38 (cm)  Vein Selection for PICC:right basilic  Central Line Bundle followed yes  Complication Related to Insertion: none    The placement was verified by ECG  technology: The tip location is on the right side and the tip is in the superior vena cava. See ECG results for PICC tip placement. Report given to Kalen De León is okay to use.     Austin Tran RN

## 2022-11-03 NOTE — PROGRESS NOTES
0730 Bedside shift change report given to Bharat (oncsav nurse) by Tawana (offgoing nurse). Report included the following information SBAR, Kardex, ED Summary, Procedure Summary, Intake/Output, MAR, and Recent Results. 2019 Bedside shift change report given to Tawana (oncsav nurse) by Bharat (offgoing nurse). Report included the following information SBAR, Kardex, ED Summary, Procedure Summary, Intake/Output, MAR, and Recent Results.

## 2022-11-03 NOTE — PROGRESS NOTES
0730 Bedside shift change report given to Levi Agustin (oncoming nurse) by Wai Sethi (offgoing nurse). Report included the following information SBAR, Kardex, ED Summary, Procedure Summary, Intake/Output, MAR, and Recent Results. 2000 Bedside shift change report given to [place] (oncoming nurse) by Levi Agustin (offgoing nurse). Report included the following information SBAR, Kardex, ED Summary, Procedure Summary, Intake/Output, MAR, and Recent Results.

## 2022-11-03 NOTE — PROGRESS NOTES
[facility]                                                                                          Hospitalist Progress Note  Cole De La Paz MD  Answering service: [phone] from in house phone        Date of Service:  11/3/2022  NAME:  Stan Jarrett  :  1980  MRN:  [id number]      Admission Summary:     Patient initially presents with suicidal ideation and found to have congestive heart failure. Interval history / Subjective:     Patient seen examined at bedside earlier.   Feels her shortness of breath is improved from prior no chest pain now on milrinone weaning dobutamine gtt      Assessment & Plan:     Nonischemic cm Hfref   -Acute on chronic systolic CHF NYHA class IV on admission  -Patient with ischemic cardiomyopathy with EF with 15 to 20%  -s/p RHC 10/27 - started on dobutamine and IV bumetanide  -meds being adjusted per AHF team  -Patient now tachycardic restarted on milrinone with goal to wean down dobutamine  -rheum on board working up lupus vs srogens, markers have been neg thus far     Urinary tract infection  -Completed antibiotic    Hypertension  -Blood pressure is borderline low normal    Type 2 diabetes  -Well-controlled with hemoglobin A1c of 6.3  -Continue sliding scale insulin coverage    Coronary artery disease  -Patient with history of STEMI with stent to LAD  -Continue Effient    Dyslipidemia  -On Zetia    Restless leg syndrome  -On pramipexole, hold ropinirole secondary to tachycardia    Hemorrhoids  -Continue Anusol cream    Depression with suicidal ideation  -Patient previously evaluated by psychiatry  -On Lexapro, also on Klonopin for anxiety  -One-on-one sitter discontinued per psych recommendation  -Outpatient follow-up with psychiatry    Anxiety  -Patient on Klonopin for 12 years, continue Klonopin here  -Continue trazodone for sleep  -Outpatient follow-up with psychiatry    Constipation  -resolved; continue PRN Miralax    Dysgeusia: unclear etiology  -check B12 (normal/high), zinc, rapid covid (negative). TSH checked and was only mildly elevated in the setting of acute illness.   -no benefit with holding allopurinol; will resume this  -possibly related to GERD; continue pantoprazole, trial of carafate    Nausea: likely multifactorial, hx of gastroparesis, bowel edema from CHF  -continue supportive care    Critically ill, high risk for decompensation. CCT time 40 minutes     Code status: Full  Prophylaxis: SCDs  Care Plan discussed with: Patient/RN/CM/AHF  Anticipated Disposition: TBD > 48 hrs      Hospital Problems  Date Reviewed: 10/14/2022            Codes Class Noted POA    CHF (congestive heart failure) (HCC) ICD-10-CM: I50.9  ICD-9-CM: 428.0  10/21/2022 Unknown       Review of Systems:   A comprehensive review of systems was negative except for that written in the HPI. Vital Signs:    Last 24hrs VS reviewed since prior progress note. Most recent are:  Visit Vitals  BP (!) 113/96 (BP 1 Location: Left arm, BP Patient Position: At rest)   Pulse (!) 118   Temp 98 °F (36.7 °C)   Resp 18   Ht 5' 4\" (1.626 m)   Wt 85.4 kg (188 lb 4.4 oz)   SpO2 98%   BMI 32.32 kg/m²         Intake/Output Summary (Last 24 hours) at 11/3/2022 1318  Last data filed at 11/3/2022 0555  Gross per 24 hour   Intake 459.11 ml   Output 1300 ml   Net -840.89 ml          Physical Examination:     I had a face to face encounter with this patient and independently examined them on 11/3/2022 as outlined below:          Constitutional:  No acute distress, non-toxic, obese   ENT:  Oral mucosa moist, oropharynx benign, anicteric sclerae    Resp:  CTA bilaterally. No wheezing/rhonchi/rales. No accessory muscle use. CV:  Regular rhythm, normal rate, no murmurs, no gallops, trace b/l LE edema    GI:  Soft, non distended, non tender. normoactive bowel sounds     Musculoskeletal:  warm    Neurologic:  Moves all extremities.   AAOx3, CN II-XII reviewed  Psych: normal mood appropriate affect            Data Review:    Review and/or order of clinical lab test      Labs:     Recent Labs     11/03/22 0445   WBC 4.5   HGB 15.8   HCT 49.8*          Recent Labs     11/03/22  0445 11/02/22 0409 11/02/22 0406 11/01/22 0627   * 134*  --  132*   K 4.1 4.1  --  4.2    100  --  99   CO2 21 24  --  23   BUN 19 20  --  18   CREA 1.16* 1.20*  --  1.21*   * 103*  --  103*   CA 10.3* 10.4* 10.4* 10.7*   MG  --   --  3.0* 3.0*       Recent Labs     11/02/22 0409 11/01/22  0627   ALT 84* 58   * 111   TBILI 1.6* 2.2*   TP 8.4* 8.6*   ALB 4.2 4.4   GLOB 4.2* 4.2*       Recent Labs     11/02/22 0406   INR 1.1   PTP 11.1        No results for input(s): FE, TIBC, PSAT, FERR in the last 72 hours. Lab Results   Component Value Date/Time    Folate 20.0 10/23/2022 04:24 AM        No results for input(s): PH, PCO2, PO2 in the last 72 hours. No results for input(s): CPK, CKNDX, TROIQ in the last 72 hours.     No lab exists for component: CPKMB  Lab Results   Component Value Date/Time    Cholesterol, total 95 10/22/2022 06:26 AM    HDL Cholesterol 32 10/22/2022 06:26 AM    LDL, calculated 45 10/22/2022 06:26 AM    Triglyceride 90 10/22/2022 06:26 AM    CHOL/HDL Ratio 3.0 10/22/2022 06:26 AM     Lab Results   Component Value Date/Time    Glucose (POC) 103 11/03/2022 11:28 AM    Glucose (POC) 117 11/03/2022 07:33 AM    Glucose (POC) 105 11/02/2022 10:21 PM    Glucose (POC) 116 11/02/2022 04:27 PM    Glucose (POC) 102 11/02/2022 11:32 AM     Lab Results   Component Value Date/Time    Color ORANGE 10/17/2022 08:42 AM    Appearance TURBID (A) 10/17/2022 08:42 AM    Specific gravity 1.025 10/17/2022 08:42 AM    Specific gravity 1.024 10/06/2022 08:57 AM    pH (UA) 5.0 10/17/2022 08:42 AM    Protein 30 (A) 10/17/2022 08:42 AM    Glucose Negative 10/17/2022 08:42 AM    Ketone Negative 10/17/2022 08:42 AM    Bilirubin Negative 11/25/2021 09:47 AM Urobilinogen 1.0 10/17/2022 08:42 AM    Nitrites Positive (A) 10/17/2022 08:42 AM    Leukocyte Esterase SMALL (A) 10/17/2022 08:42 AM    Epithelial cells FEW 10/17/2022 08:42 AM    Bacteria 2+ (A) 10/17/2022 08:42 AM    WBC 5-10 10/17/2022 08:42 AM    RBC 5-10 10/17/2022 08:42 AM         Medications Reviewed:     Current Facility-Administered Medications   Medication Dose Route Frequency    milrinone (PRIMACOR) 20 MG/100 ML D5W infusion  0.25 mcg/kg/min IntraVENous CONTINUOUS    digoxin (LANOXIN) tablet 0.25 mg  0.25 mg Oral DAILY    pramipexole (MIRAPEX) tablet 0.125 mg  0.125 mg Oral QHS    sucralfate (CARAFATE) tablet 1 g  1 g Oral AC&HS    bumetanide (BUMEX) injection 2 mg  2 mg IntraVENous TID    DOBUTamine (DOBUTREX) 500 mg/250 mL (2,000 mcg/mL) infusion  3 mcg/kg/min IntraVENous CONTINUOUS    methyl salicylate-menthol (BENGAY) 15-10 % cream   Topical TID    pantoprazole (PROTONIX) tablet 40 mg  40 mg Oral ACB&D    potassium chloride SR (KLOR-CON 10) tablet 60 mEq  60 mEq Oral BID    polyethylene glycol (MIRALAX) packet 17 g  17 g Oral DAILY PRN    spironolactone (ALDACTONE) tablet 25 mg  25 mg Oral DAILY    prochlorperazine (COMPAZINE) injection 5 mg  5 mg IntraVENous Q6H PRN    ondansetron (ZOFRAN) injection 4 mg  4 mg IntraVENous Q6H PRN    allopurinoL (ZYLOPRIM) tablet 50 mg  50 mg Oral DAILY    hydrocortisone (ANUSOL-HC) 2.5 % rectal cream   PeriANAL QID    melatonin tablet 10.5 mg  10.5 mg Oral QHS PRN    empagliflozin (JARDIANCE) tablet 10 mg  10 mg Oral DAILY    aluminum & magnesium hydroxide-simethicone (MYLANTA II) oral suspension 30 mL  30 mL Oral Q4H PRN    diazePAM (VALIUM) tablet 5 mg  5 mg Oral Q12H PRN    docusate sodium (COLACE) capsule 100 mg  100 mg Oral DAILY    bisacodyL (DULCOLAX) suppository 10 mg  10 mg Rectal DAILY    albuterol-ipratropium (DUO-NEB) 2.5 MG-0.5 MG/3 ML  3 mL Nebulization Q6H PRN    fluticasone propionate (FLONASE) 50 mcg/actuation nasal spray 2 Spray  2 Spray Both Nostrils DAILY    traZODone (DESYREL) tablet 200 mg  200 mg Oral QHS    [Held by provider] magnesium oxide (MAG-OX) tablet 400 mg  400 mg Oral QHS    calcium carbonate (TUMS) chewable tablet 200 mg [elemental]  200 mg Oral TID PRN    clonazePAM (KlonoPIN) tablet 1 mg  1 mg Oral QHS    escitalopram oxalate (LEXAPRO) tablet 20 mg  20 mg Oral DAILY    ezetimibe (ZETIA) tablet 10 mg  10 mg Oral DAILY    folic acid (FOLVITE) tablet 1 mg  1 mg Oral DAILY    montelukast (SINGULAIR) tablet 10 mg  10 mg Oral DAILY    prasugreL (EFFIENT) tablet 10 mg  10 mg Oral DAILY    [Held by provider] rOPINIRole (REQUIP) tablet 0.5 mg  0.5 mg Oral BID    [Held by provider] rosuvastatin (CRESTOR) tablet 20 mg  20 mg Oral Q MON, WED & FRI    sodium chloride (NS) flush 5-40 mL  5-40 mL IntraVENous Q8H    sodium chloride (NS) flush 5-40 mL  5-40 mL IntraVENous PRN    acetaminophen (TYLENOL) tablet 650 mg  650 mg Oral Q4H PRN    nitroglycerin (NITROSTAT) tablet 0.4 mg  0.4 mg SubLINGual Q5MIN PRN    glucose chewable tablet 16 g  4 Tablet Oral PRN    glucagon (GLUCAGEN) injection 1 mg  1 mg IntraMUSCular PRN    dextrose 10 % infusion 0-250 mL  0-250 mL IntraVENous PRN    insulin lispro (HUMALOG) injection   SubCUTAneous AC&HS     ______________________________________________________________________  EXPECTED LENGTH OF STAY: 5d 4h  ACTUAL LENGTH OF STAY:          13                 Taylor Sabillon MD

## 2022-11-03 NOTE — PROGRESS NOTES
[facility] in [place], [place]  Inpatient Progress Note      Patient name: Tawanna Marrufo  Patient : 1980  Patient MRN: [id number]  Consulting MD: Kartik Mariano MD  Date of service: 22    REASON FOR REFERRAL:  Management of acute on chronic systolic heart failure     INTERVAL HISTORY:  Appetite better  Still some bloating  No acute overnight events  Afebrile  SBP 90-110s/70s, -120s  I/O net negative 700ml, urine 1.7 liters  Cr 1.16  K 4.1  Ca 10.3  Pro-NT-BNP not done    PLAN OF CARE:  38 y.o. female with ischemic cardiomyopathy, LVEF 15-20%, stage C, NYHA class IIIB initiated on dobutamine 3 mcg/kg/min and undergoing diuresis  Difficulty tolerating small dose dobutamine due to tachycardia; limited options to slow down heart rate due to QTc > 500ms in the setting of antidepressant use  Uncertain if we will be able to discharge home on dobutamine  Started inpatient HT/LVAD workup  Pt contracted for safety, and is being seen by behavioral health/psychiatry  Needs diagnosis for the KITTY 1:320 and whether this could be lupus carditis; heart rate too fast for cardiac MRI; will schedule OP; d/w Dr. Shira Arnett with rheumatology who will order remainder of autoimmue labs and hand X rays; ?whether we need heart biopsy  Concerns regarding HT/LVAD candidacy upon initial review include possible autoimmune disease that requires diagnosis/treatment, psychiatric condition that needs evaluation/treatment and uncertain support system. RECOMMENDATIONS:  Decrease dobutamine to 2mcg/kg/min dosed to weight 86kg- will transition to milrinone to offset tachycardia   Start Milrinone 0.25mcg/kg/min   Continue digoxin 0.25mg daily, check digoxin levels (goal 0.7-1.2) 0.3 today   Hold off corlanor due to long QT; likely in the setting of taking antidepressants, d/w Pharm. D. would not use with QTc > 500ms  Beta-blocker: hold Coreg while on dobutamine   ACE/ARB/ARNi: hold off until BP can tolerate  MRA: Cont spironolactone 25mg daily  SGLT2 inhibitor: Jardiance 10mg daily  Continue bumex 2mg IV three times daily, probably at dry weight 189lbs  Hold requip due to tachycardia  Hold crestor due to rise in TB  Cannot fit into PFT today; will order bedside spirometry on Monday  Keep K > 4 and Mag > 2  Cont allopurinol 50mg daily   Continue ASA and prasugrel per primary cards  Treatment of STU: inpatient eval for STU  Will need LifeVest at discharge   Labs: HF and Transplant eval labs ordered; in process  Will have  completed psychosocial evaluation   Psychiatry consultation for anxiety appreciated  Nutritionist consult  Heart failure education  Needs Advanced care plan  Rheumatology consultation to r/o lupus- labs pending   Nephrology consult for hypercalcemia  Palliative care consultation  Physical therapy evaluation     All other care per primary team      IMPRESSION:  Fatigue  Shortness of breath  Volume overload  Acute on Chronic systolic heart failure  Stage C, NYHA class IV symptoms  ischemic cardiomyopathy, LVEF 20-25%  CAD  STEMI July 2022 s/p RICKY to LAD  HTN  Obesity  LHD  Pre-diabetes  Esophageal stenosis s/p dilation  RLS  Fibromyalgia  Anxiety and Depression         LIFE GOALS:  Patient's personal goals include: get better  Important upcoming milestones or family events: the holidays coming up  The patient identifies the following as important for living well: being independent     CARDIAC IMAGING:  Echo 10/22/22    Left Ventricle: Severely reduced left ventricular systolic function with a visually estimated EF of 15 - 20%. Left ventricle is mildly dilated. Normal wall thickness.  Moderate hypokinesis of the following segments: basal anterior, basal anterolateral, basal anteroseptal, basal inferior, basal inferolateral, basal inferoseptal, mid anterior, mid anterolateral, mid anteroseptal, mid inferior, mid inferolateral and mid inferoseptal. Severe hypokinesis of the following segments: apical anterior, apical septal, apical inferior and apical lateral. Grade III diastolic dysfunction with increased LAP. Mitral Valve: Mild annular dilation. Moderate regurgitation. Tricuspid Valve: Moderately elevated RVSP. The estimated RVSP is 51 mmHg. Left Atrium: Left atrium is mildly dilated. Pericardium: Trivial pericardial effusion present. No indication of cardiac tamponade. Contrast used: Definity. Echo (8/14/22)    Left Ventricle: Severely reduced left ventricular systolic function with a visually estimated EF of 20 - 25%. Left ventricle is mildly dilated. Increased wall thickness. See diagram for wall motion findings. Grade II diastolic dysfunction with increased LAP. Right Ventricle: Not well visualized. Tricuspid Valve: Moderately elevated RVSP. [address] 10/27/22  PA 56/34/43, RA 16, PCWP 29, CI daisy 1.46 at 199lbs         BRIEF HISTORY OF PRESENT ILLNESS:  Galdino Rivers is a 38 y.o. female with h/o HTN, CAD s/p STEMI in July 2022 (DESx1 to LAD; treated at [facility]) with ischemic cardiomyopathy (EF 20-25%), CHF, GERD, RLS, and fibromyalgia seen as a new patient in [facility] on 10/21/22 and found to be fluid overloaded, and with suicidal ideation. Pt was taken to the ER for further evaluation and admission for acute on chronic HF and mental health crisis. PHYSICAL EXAM:  Visit Vitals  BP (!) 113/96 (BP 1 Location: Left arm, BP Patient Position: At rest)   Pulse (!) 118   Temp 98 °F (36.7 °C)   Resp 18   Ht 5' 4\" (1.626 m)   Wt 188 lb 4.4 oz (85.4 kg)   LMP 10/09/2022   SpO2 98%   BMI 32.32 kg/m²     Physical Exam  Vitals and nursing note reviewed. Constitutional:       Appearance: Normal appearance. Cardiovascular:      Rate and Rhythm: Regular rhythm. Tachycardia present. Pulses: Normal pulses. Heart sounds: Normal heart sounds. Pulmonary:      Effort: Pulmonary effort is normal. No respiratory distress.       Breath sounds: Normal breath sounds. Abdominal:      General: There is no distension. Palpations: Abdomen is soft. Musculoskeletal:         General: No swelling. Skin:     General: Skin is warm and dry. Neurological:      General: No focal deficit present. Mental Status: She is alert and oriented to person, place, and time. Psychiatric:         Mood and Affect: Mood normal.        REVIEW OF SYSTEMS:  Review of Systems   Constitutional:  Negative for chills, fever and malaise/fatigue. Respiratory:  Negative for cough and shortness of breath. Cardiovascular:  Negative for chest pain, palpitations and leg swelling. Gastrointestinal:  Negative for heartburn and nausea. Musculoskeletal:  Negative for falls and myalgias. Neurological:  Negative for dizziness, weakness and headaches. Psychiatric/Behavioral:  Positive for depression. The patient is nervous/anxious. PAST MEDICAL HISTORY:  Past Medical History:   Diagnosis Date    Anemia NEC     Arthritis     Chronic pain     fybromyalsia    Depression     anxiety, depression, OCD    GERD (gastroesophageal reflux disease)     Hypertension     Hypertension     Ill-defined condition     Fibromyalia gastricparesis    MI (myocardial infarction) ([facility] [address]     Musculoskeletal disorder     SOB (shortness of breath)     Stool color black        PAST SURGICAL HISTORY:  Past Surgical History:   Procedure Laterality Date    HX CORONARY STENT PLACEMENT      HX GYN           HX HEENT  2002    wisdom teeth extraction    UPPER GI ENDOSCOPY,BIOPSY  2018         UPPER GI ENDOSCOPY,DIAGNOSIS  2018            FAMILY HISTORY:  Family History   Problem Relation Age of Onset    Hypertension Father     Elevated Lipids Father     Arthritis-rheumatoid Mother         ? Lupus vs RA    Lung Disease Mother     Heart Disease Mother     Cancer Mother         breast in 39y    COPD Mother     Hypertension Mother     Stroke Mother         3-4 strokes    Breast Cancer Mother 50    Diabetes Maternal Grandmother        SOCIAL HISTORY:  Social History     Socioeconomic History    Marital status: SINGLE   Tobacco Use    Smoking status: Former     Packs/day: 0.25     Years: 8.00     Pack years: 2.00     Types: Cigarettes     Quit date: 2022     Years since quittin.2    Smokeless tobacco: Never   Vaping Use    Vaping Use: Never used   Substance and Sexual Activity    Alcohol use: Not Currently     Alcohol/week: 1.0 standard drink     Types: 1 Glasses of wine per week     Comment: Wine with special occassions - not since July    Drug use: Not Currently     Types: Marijuana     Comment: Haven't had any since 2022    Sexual activity: Yes     Partners: Male     Birth control/protection: None     Social Determinants of Health     Financial Resource Strain: High Risk    Difficulty of Paying Living Expenses: Very hard   Food Insecurity: No Food Insecurity    Worried About Running Out of Food in the Last Year: Never true    Ran Out of Food in the Last Year: Never true   Transportation Needs: No Transportation Needs    Lack of Transportation (Medical): No    Lack of Transportation (Non-Medical): No   Physical Activity: Inactive    Days of Exercise per Week: 0 days    Minutes of Exercise per Session: 0 min   Stress: Stress Concern Present    Feeling of Stress :  To some extent   Social Connections: Socially Isolated    Frequency of Communication with Friends and Family: Twice a week    Frequency of Social Gatherings with Friends and Family: Never    Attends Nondenominational Services: Never    Active Member of Clubs or Organizations: No    Attends Club or Organization Meetings: Never    Marital Status: Never    Housing Stability: [address] to Pay for Housing in the Last Year: No    Number of [place] in the Last Year: 1    Unstable Housing in the Last Year: No       LABORATORY RESULTS:     Labs Latest Ref Rng & Units 11/3/2022 2022 2022 2022 10/31/2022 10/30/2022 10/29/2022   WBC 3.6 - 11.0 K/uL 4.5 - - - 5.5 - -   RBC 3.80 - 5.20 M/uL 5.38(H) - - - 5.43(H) - -   Hemoglobin 11.5 - 16.0 g/dL 15.8 - - - 15.9 - -   Hematocrit 35.0 - 47.0 % 49. 8(H) - - - 50. 5(H) - -   MCV 80.0 - 99.0 FL 92.6 - - - 93.0 - -   Platelets 267 - 241 K/uL 246 - - - 261 - -   Lymphocytes 12 - 49 % - - - - - - -   Monocytes 5 - 13 % - - - - - - -   Eosinophils 0 - 7 % - - - - - - -   Basophils 0 - 1 % - - - - - - -   Albumin 3.5 - 5.0 g/dL - 4.2 - 4.4 4.6 4.9 4.4   Calcium 8.5 - 10.1 MG/DL 10. 3(H) 10. 4(H) 10. 4(H) 10. 7(H) 10. 6(H) 11. 1(H) 10. 4(H)   Glucose 65 - 100 mg/dL 114(H) 103(H) - 103(H) 107(H) 120(H) 131(H)   BUN 6 - 20 MG/DL 19 20 - 18 20 19 11   Creatinine 0.55 - 1.02 MG/DL 1.16(H) 1.20(H) - 1.21(H) 1.14(H) 1.16(H) 1.26(H)   Sodium 136 - 145 mmol/L 131(L) 134(L) - 132(L) 134(L) 136 137   Potassium 3.5 - 5.1 mmol/L 4.1 4.1 - 4.2 3.8 3.8 3.6   TSH 0.36 - 3.74 uIU/mL - - - - - - -   LDH 81 - 246 U/L - - 214 - - - -   Some recent data might be hidden     Lab Results   Component Value Date/Time    TSH 4.44 (H) 10/21/2022 06:12 PM    TSH 2.12 05/12/2021 10:55 AM    TSH 1.330 12/30/2019 12:00 AM    TSH 3.850 07/25/2018 12:40 PM    TSH 1.620 02/27/2018 08:59 AM    TSH 1.240 12/05/2016 10:13 AM    TSH 1.530 03/18/2016 10:31 AM    TSH 1.400 11/09/2011 09:37 AM    TSH 1.26 08/19/2010 10:36 AM    TSH 1.18 07/28/2010 04:10 PM       ALLERGY:  Allergies   Allergen Reactions    Abilify [Aripiprazole] Anxiety     Can not tolerate     Sulfa (Sulfonamide Antibiotics) Hives    Vicodin [Hydrocodone-Acetaminophen] Nausea and Vomiting        CURRENT MEDICATIONS:    Current Facility-Administered Medications:     milrinone (PRIMACOR) 20 MG/100 ML D5W infusion, 0.25 mcg/kg/min, IntraVENous, CONTINUOUS, Gena Bosch, NP, Last Rate: 6.4 mL/hr at 11/03/22 1018, 0.25 mcg/kg/min at 11/03/22 1018    digoxin (LANOXIN) tablet 0.25 mg, 0.25 mg, Oral, DAILY, Talha Blunt MD, 0.25 mg at 11/03/22 0830 pramipexole (MIRAPEX) tablet 0.125 mg, 0.125 mg, Oral, QHS, Alia Mazariegos MD, 0.125 mg at 11/02/22 2230    sucralfate (CARAFATE) tablet 1 g, 1 g, Oral, AC&HS, Alia Mazariegos MD, 1 g at 11/03/22 1246    bumetanide (BUMEX) injection 2 mg, 2 mg, IntraVENous, TID, Jenni Carlson NP, 2 mg at 11/03/22 1245    DOBUTamine (DOBUTREX) 500 mg/250 mL (2,000 mcg/mL) infusion, 2 mcg/kg/min, IntraVENous, CONTINUOUS, Gena Bosch NP, Last Rate: 7.7 mL/hr at 11/03/22 0730, 3 mcg/kg/min at 11/03/22 9328    methyl salicylate-menthol (BENGAY) 15-10 % cream, , Topical, TID, Kourtney Zaragoza, NP, Given at 11/03/22 0834    pantoprazole (PROTONIX) tablet 40 mg, 40 mg, Oral, ACB&D, Alia Mazariegos MD, 40 mg at 11/03/22 0730    potassium chloride SR (KLOR-CON 10) tablet 60 mEq, 60 mEq, Oral, BID, Gena Bosch NP, 60 mEq at 11/03/22 0829    polyethylene glycol (MIRALAX) packet 17 g, 17 g, Oral, DAILY PRN, Carlos Dominguez MD, 17 g at 10/30/22 2052    spironolactone (ALDACTONE) tablet 25 mg, 25 mg, Oral, DAILY, Gena Bosch NP, 25 mg at 11/03/22 0830    prochlorperazine (COMPAZINE) injection 5 mg, 5 mg, IntraVENous, Q6H PRN, Alia Mazariegos MD, 5 mg at 10/28/22 1557    ondansetron (ZOFRAN) injection 4 mg, 4 mg, IntraVENous, Q6H PRN, Francisca Dominguez MD, 4 mg at 10/28/22 0724    allopurinoL (ZYLOPRIM) tablet 50 mg, 50 mg, Oral, DAILY, Hiro, Gena B, NP, 50 mg at 11/03/22 0830    hydrocortisone (ANUSOL-HC) 2.5 % rectal cream, , PeriANAL, QID, Francisca Dominguez MD, Given at 11/03/22 0834    melatonin tablet 10.5 mg, 10.5 mg, Oral, QHS PRN, Francisca Dominguez MD    empagliflozin (JARDIANCE) tablet 10 mg, 10 mg, Oral, DAILY, Hiro, Gena B, NP, 10 mg at 11/03/22 0830    aluminum & magnesium hydroxide-simethicone (MYLANTA II) oral suspension 30 mL, 30 mL, Oral, Q4H PRN, Francisca Dominguez MD, 30 mL at 11/03/22 0536    diazePAM (VALIUM) tablet 5 mg, 5 mg, Oral, Q12H PRN, Royal Dominguez MD, 5 mg at 11/03/22 1246    docusate sodium (COLACE) capsule 100 mg, 100 mg, Oral, DAILY, Rolo Benavides MD, 100 mg at 11/03/22 0830    bisacodyL (DULCOLAX) suppository 10 mg, 10 mg, Rectal, DAILY, Denis Dominguez MD, 10 mg at 10/25/22 7864    albuterol-ipratropium (DUO-NEB) 2.5 MG-0.5 MG/3 ML, 3 mL, Nebulization, Q6H PRN, Jorge A Haile MD    fluticasone propionate (FLONASE) 50 mcg/actuation nasal spray 2 Spray, 2 Spray, Both Nostrils, DAILY, Jorge A Johnson MD, 2 [place] at 11/03/22 0834    traZODone (DESYREL) tablet 200 mg, 200 mg, Oral, QHS, Irma Zaragoza, NP, 200 mg at 11/02/22 2223    [Held by provider] magnesium oxide (MAG-OX) tablet 400 mg, 400 mg, Oral, QHS, Milton BAGLEY, NP, 400 mg at 10/29/22 2159    calcium carbonate (TUMS) chewable tablet 200 mg [elemental], 200 mg, Oral, TID PRN, Rolo Benavides MD, 200 mg at 10/30/22 2052    clonazePAM (KlonoPIN) tablet 1 mg, 1 mg, Oral, QHS, Ernestine Rebolledo MD, 1 mg at 11/02/22 2223    escitalopram oxalate (LEXAPRO) tablet 20 mg, 20 mg, Oral, DAILY, Ernestine Rebolledo MD, 20 mg at 11/03/22 0830    ezetimibe (ZETIA) tablet 10 mg, 10 mg, Oral, DAILY, Ernestine Rebolledo MD, 10 mg at 01/16/76 0438    folic acid (FOLVITE) tablet 1 mg, 1 mg, Oral, DAILY, Ernestine Rebolledo MD, 1 mg at 11/03/22 0830    montelukast (SINGULAIR) tablet 10 mg, 10 mg, Oral, DAILY, Ernestine Rebolledo MD, 10 mg at 11/03/22 6484    prasugreL (EFFIENT) tablet 10 mg, 10 mg, Oral, DAILY, Ernestine Rebolledo MD, 10 mg at 11/03/22 0830    [Held by provider] rOPINIRole (REQUIP) tablet 0.5 mg, 0.5 mg, Oral, BID, Ernestine Rebolledo MD, 0.5 mg at 10/31/22 0947    [Held by provider] rosuvastatin (CRESTOR) tablet 20 mg, 20 mg, Oral, Q MON, WED & FRI, Ernestine Rebolledo MD, 20 mg at 10/24/22 2241    sodium chloride (NS) flush 5-40 mL, 5-40 mL, IntraVENous, Q8H, Geoff Rodriguez MD, 10 mL at 11/02/22 1330    sodium chloride (NS) flush 5-40 mL, 5-40 mL, IntraVENous, PRN, Ernestine Rebolledo MD    acetaminophen (TYLENOL) tablet 650 mg, 650 mg, Oral, Q4H PRN, Carlos Moura MD, 650 mg at 10/29/22 1303    nitroglycerin (NITROSTAT) tablet 0.4 mg, 0.4 mg, SubLINGual, Q5MIN PRN, Carlos Moura MD, 0.4 mg at 10/21/22 2237    glucose chewable tablet 16 g, 4 Tablet, Oral, PRN, Carlos Moura MD    glucagon (GLUCAGEN) injection 1 mg, 1 mg, IntraMUSCular, PRN, Bismark Lips, MD Geoff    dextrose 10 % infusion 0-250 mL, 0-250 mL, IntraVENous, PRN, Carlos Moura MD    insulin lispro (HUMALOG) injection, , SubCUTAneous, AC&HS, Con Deed, MD    PATIENT CARE TEAM:  Patient Care Team:  Krystal Mitchell MD as PCP - General (Family Medicine)  Krystal Mitchell MD as PCP - REHABILITATION [facility] Provider  Brandyn Coronel MD (Surgery General)  Alem Quesada MD (Cardiovascular Disease Physician)  Iris Joseph MD (Otolaryngology)  Kaila Soriano MD (Internal Medicine Physician)  Ramos Restrepo MD (Female Pelvic Medicine and Reconstructive Surgery)  Deirdre Perez MD (Neurology)  Marylen Heater, MD (Psychiatry)  Shaan Garcia as Ambulatory Care Manager     Thank you for allowing me to participate in this patient's care.     Monica López NP   [address] [place] [address], [address]  Phone: [phone]

## 2022-11-03 NOTE — PROGRESS NOTES
[facility] in [place], [place]    Met with patient briefly to follow up on any LVAD questions. Patient stated she had PICC placed today, \"it has not been a good day. \" Portable DVD player with [employer] Heartmate 3 Left Ventricular Assist System Patient Education DVD left with patient to review. Demonstrated how to start and stop DVD player. Time given to ask questions. Patient had no further questions at this time. Will continue to follow as needed for ongoing LVAD education.      Jesús Bustamante RN  VAD Coordinator

## 2022-11-04 ENCOUNTER — APPOINTMENT (OUTPATIENT)
Dept: NUCLEAR MEDICINE | Age: 42
DRG: 286 | End: 2022-11-04
Attending: INTERNAL MEDICINE
Payer: MEDICARE

## 2022-11-04 ENCOUNTER — APPOINTMENT (OUTPATIENT)
Dept: ULTRASOUND IMAGING | Age: 42
DRG: 286 | End: 2022-11-04
Attending: INTERNAL MEDICINE
Payer: MEDICARE

## 2022-11-04 ENCOUNTER — APPOINTMENT (OUTPATIENT)
Dept: VASCULAR SURGERY | Age: 42
DRG: 286 | End: 2022-11-04
Attending: INTERNAL MEDICINE
Payer: MEDICARE

## 2022-11-04 LAB
25(OH)D3 SERPL-MCNC: 70.9 NG/ML (ref 30–100)
ACE SERPL-CCNC: 31 U/L (ref 14–82)
ALBUMIN SERPL-MCNC: 4.3 G/DL (ref 3.5–5)
ALBUMIN/GLOB SERPL: 1 {RATIO} (ref 1.1–2.2)
ALP SERPL-CCNC: 134 U/L (ref 45–117)
ALT SERPL-CCNC: 80 U/L (ref 12–78)
ANION GAP SERPL CALC-SCNC: 8 MMOL/L (ref 5–15)
AST SERPL-CCNC: 53 U/L (ref 15–37)
BACTERIA SPEC CULT: NORMAL
BACTERIA SPEC CULT: NORMAL
BASOPHILS # BLD: 0.1 K/UL (ref 0–0.1)
BASOPHILS NFR BLD: 1 % (ref 0–1)
BILIRUB DIRECT SERPL-MCNC: 0.5 MG/DL (ref 0–0.2)
BILIRUB SERPL-MCNC: 1.8 MG/DL (ref 0.2–1)
BNP SERPL-MCNC: 1603 PG/ML
BUN SERPL-MCNC: 17 MG/DL (ref 6–20)
BUN/CREAT SERPL: 13 (ref 12–20)
C3 SERPL-MCNC: 220 MG/DL (ref 82–167)
C4 SERPL-MCNC: 52 MG/DL (ref 12–38)
CALCIUM SERPL-MCNC: 10.3 MG/DL (ref 8.5–10.1)
CHLORIDE SERPL-SCNC: 98 MMOL/L (ref 97–108)
CO2 SERPL-SCNC: 26 MMOL/L (ref 21–32)
CREAT SERPL-MCNC: 1.3 MG/DL (ref 0.55–1.02)
DIFFERENTIAL METHOD BLD: NORMAL
DSDNA AB SER-ACNC: <1 IU/ML (ref 0–9)
EOSINOPHIL # BLD: 0.1 K/UL (ref 0–0.4)
EOSINOPHIL NFR BLD: 2 % (ref 0–7)
ERYTHROCYTE [DISTWIDTH] IN BLOOD BY AUTOMATED COUNT: 13.4 % (ref 11.5–14.5)
GLOBULIN SER CALC-MCNC: 4.5 G/DL (ref 2–4)
GLUCOSE BLD STRIP.AUTO-MCNC: 102 MG/DL (ref 65–117)
GLUCOSE BLD STRIP.AUTO-MCNC: 108 MG/DL (ref 65–117)
GLUCOSE BLD STRIP.AUTO-MCNC: 112 MG/DL (ref 65–117)
GLUCOSE BLD STRIP.AUTO-MCNC: 97 MG/DL (ref 65–117)
GLUCOSE SERPL-MCNC: 109 MG/DL (ref 65–100)
HCT VFR BLD AUTO: 46.2 % (ref 35–47)
HGB BLD-MCNC: 14.7 G/DL (ref 11.5–16)
HGB FREE PLAS-MCNC: 4.7 MG/DL (ref 0–4.9)
HISTONE IGG SER IA-ACNC: 0.4 UNITS (ref 0–0.9)
HLA AB ID 1: 0 % CPRA
HLA AB ID 2: 0 % CPRA
HLA COMMENT - HLCMTL: NORMAL
HLA COMMENT - HLCMTL: NORMAL
IMM GRANULOCYTES # BLD AUTO: 0 K/UL (ref 0–0.04)
IMM GRANULOCYTES NFR BLD AUTO: 0 % (ref 0–0.5)
LEFT ABI: 1.02
LEFT ARM BP: 90 MMHG
LEFT CFA PROX SYS PSV: 75 CM/S
LEFT DIST ATA VELOCITY: 26.8 CM/S
LEFT DIST PTA PSV: 17.8 CM/S
LEFT POP A PROX VEL RATIO: 0.84
LEFT POPLITEAL DIST SYS PSV: 42.1 CM/S
LEFT POPLITEAL PROX SYS PSV: 36 CM/S
LEFT POSTERIOR TIBIAL: 92 MMHG
LEFT PROX ATA VELOCITY: 26.8 CM/S
LEFT PROX PFA A PSV: 53 CM/S
LEFT PROX PTA PSV: 29.4 CM/S
LEFT SFA DIST VEL RATIO: 0.58
LEFT SFA MID VEL RATIO: 0.93
LEFT SFA PROX VEL RATIO: 1.06
LEFT SUPER FEMORAL DIST SYS PSV: 43.1 CM/S
LEFT SUPER FEMORAL MID SYS PSV: 73.9 CM/S
LEFT SUPER FEMORAL PROX SYS PSV: 79.4 CM/S
LYMPHOCYTES # BLD: 1.6 K/UL (ref 0.8–3.5)
LYMPHOCYTES NFR BLD: 28 % (ref 12–49)
MAGNESIUM SERPL-MCNC: 2.5 MG/DL (ref 1.6–2.4)
MCH RBC QN AUTO: 28.8 PG (ref 26–34)
MCHC RBC AUTO-ENTMCNC: 31.8 G/DL (ref 30–36.5)
MCV RBC AUTO: 90.6 FL (ref 80–99)
MONOCYTES # BLD: 0.7 K/UL (ref 0–1)
MONOCYTES NFR BLD: 13 % (ref 5–13)
NEUTS SEG # BLD: 3.2 K/UL (ref 1.8–8)
NEUTS SEG NFR BLD: 56 % (ref 32–75)
NRBC # BLD: 0 K/UL (ref 0–0.01)
NRBC BLD-RTO: 0 PER 100 WBC
PLATELET # BLD AUTO: 236 K/UL (ref 150–400)
PMV BLD AUTO: 11.2 FL (ref 8.9–12.9)
POTASSIUM SERPL-SCNC: 4 MMOL/L (ref 3.5–5.1)
PROT SERPL-MCNC: 8.8 G/DL (ref 6.4–8.2)
RBC # BLD AUTO: 5.1 M/UL (ref 3.8–5.2)
RIGHT ABI: 1.11
RIGHT CFA PROX SYS PSV: 68.4 CM/S
RIGHT DIST ATA VELOCITY: 29.8 CM/S
RIGHT DIST PTA PSV: 30.1 CM/S
RIGHT POP A PROX VEL RATIO: 0.54
RIGHT POPLITEAL DIST SYS PSV: 37.2 CM/S
RIGHT POPLITEAL PROX SYS PSV: 33.4 CM/S
RIGHT POSTERIOR TIBIAL: 78 MMHG
RIGHT PROX ATA VELOCITY: 29.8 CM/S
RIGHT PROX PFA A PSV: 58.5 CM/S
RIGHT PROX PTA PSV: 26.5 CM/S
RIGHT SFA DIST VEL RATIO: 1.08
RIGHT SFA MID VEL RATIO: 0.8
RIGHT SFA PROX VEL RATIO: 1.1
RIGHT SUPER FEMORAL DIST SYS PSV: 61.8 CM/S
RIGHT SUPER FEMORAL MID SYS PSV: 57.4 CM/S
RIGHT SUPER FEMORAL PROX SYS PSV: 72.8 CM/S
SERVICE CMNT-IMP: NORMAL
SODIUM SERPL-SCNC: 132 MMOL/L (ref 136–145)
VAS LEFT DORSALIS PEDIS BP: 80 MMHG
VAS RIGHT DORSALIS PEDIS BP: 100 MMHG
WBC # BLD AUTO: 5.8 K/UL (ref 3.6–11)

## 2022-11-04 PROCEDURE — 93925 LOWER EXTREMITY STUDY: CPT

## 2022-11-04 PROCEDURE — 65660000001 HC RM ICU INTERMED STEPDOWN

## 2022-11-04 PROCEDURE — 99291 CRITICAL CARE FIRST HOUR: CPT | Performed by: THORACIC SURGERY (CARDIOTHORACIC VASCULAR SURGERY)

## 2022-11-04 PROCEDURE — 85025 COMPLETE CBC W/AUTO DIFF WBC: CPT

## 2022-11-04 PROCEDURE — 36415 COLL VENOUS BLD VENIPUNCTURE: CPT

## 2022-11-04 PROCEDURE — 99233 SBSQ HOSP IP/OBS HIGH 50: CPT | Performed by: NURSE PRACTITIONER

## 2022-11-04 PROCEDURE — 76536 US EXAM OF HEAD AND NECK: CPT

## 2022-11-04 PROCEDURE — 74011250637 HC RX REV CODE- 250/637: Performed by: HOSPITALIST

## 2022-11-04 PROCEDURE — A9500 TC99M SESTAMIBI: HCPCS

## 2022-11-04 PROCEDURE — 74011250637 HC RX REV CODE- 250/637: Performed by: INTERNAL MEDICINE

## 2022-11-04 PROCEDURE — 74011250637 HC RX REV CODE- 250/637: Performed by: FAMILY MEDICINE

## 2022-11-04 PROCEDURE — 83880 ASSAY OF NATRIURETIC PEPTIDE: CPT

## 2022-11-04 PROCEDURE — 82962 GLUCOSE BLOOD TEST: CPT

## 2022-11-04 PROCEDURE — 82306 VITAMIN D 25 HYDROXY: CPT

## 2022-11-04 PROCEDURE — 83735 ASSAY OF MAGNESIUM: CPT

## 2022-11-04 PROCEDURE — 74011250637 HC RX REV CODE- 250/637: Performed by: NURSE PRACTITIONER

## 2022-11-04 PROCEDURE — 74011250636 HC RX REV CODE- 250/636: Performed by: FAMILY MEDICINE

## 2022-11-04 PROCEDURE — 74011250636 HC RX REV CODE- 250/636: Performed by: NURSE PRACTITIONER

## 2022-11-04 PROCEDURE — 80048 BASIC METABOLIC PNL TOTAL CA: CPT

## 2022-11-04 PROCEDURE — 80076 HEPATIC FUNCTION PANEL: CPT

## 2022-11-04 PROCEDURE — 74011000250 HC RX REV CODE- 250: Performed by: FAMILY MEDICINE

## 2022-11-04 RX ORDER — CINACALCET 30 MG/1
30 TABLET, FILM COATED ORAL
Status: DISCONTINUED | OUTPATIENT
Start: 2022-11-05 | End: 2022-11-09 | Stop reason: HOSPADM

## 2022-11-04 RX ORDER — CARVEDILOL 3.12 MG/1
3.12 TABLET ORAL 2 TIMES DAILY WITH MEALS
Status: DISCONTINUED | OUTPATIENT
Start: 2022-11-04 | End: 2022-11-07

## 2022-11-04 RX ORDER — TETRAKIS(2-METHOXYISOBUTYLISOCYANIDE)COPPER(I) TETRAFLUOROBORATE 1 MG/ML
21.2 INJECTION, POWDER, LYOPHILIZED, FOR SOLUTION INTRAVENOUS
Status: COMPLETED | OUTPATIENT
Start: 2022-11-04 | End: 2022-11-04

## 2022-11-04 RX ADMIN — SODIUM CHLORIDE, PRESERVATIVE FREE 10 ML: 5 INJECTION INTRAVENOUS at 21:06

## 2022-11-04 RX ADMIN — MONTELUKAST 10 MG: 10 TABLET, FILM COATED ORAL at 10:29

## 2022-11-04 RX ADMIN — ACETAMINOPHEN 650 MG: 325 TABLET ORAL at 02:30

## 2022-11-04 RX ADMIN — SUCRALFATE 1 G: 1 TABLET ORAL at 08:23

## 2022-11-04 RX ADMIN — PRASUGREL 10 MG: 10 TABLET, FILM COATED ORAL at 10:29

## 2022-11-04 RX ADMIN — SUCRALFATE 1 G: 1 TABLET ORAL at 12:16

## 2022-11-04 RX ADMIN — TRAZODONE HYDROCHLORIDE 200 MG: 100 TABLET ORAL at 21:05

## 2022-11-04 RX ADMIN — FLUTICASONE PROPIONATE 2 SPRAY: 50 SPRAY, METERED NASAL at 10:42

## 2022-11-04 RX ADMIN — EMPAGLIFLOZIN 10 MG: 10 TABLET, FILM COATED ORAL at 10:29

## 2022-11-04 RX ADMIN — PANTOPRAZOLE SODIUM 40 MG: 40 TABLET, DELAYED RELEASE ORAL at 08:23

## 2022-11-04 RX ADMIN — DOCUSATE SODIUM 100 MG: 100 CAPSULE, LIQUID FILLED ORAL at 10:30

## 2022-11-04 RX ADMIN — SPIRONOLACTONE 25 MG: 25 TABLET ORAL at 10:29

## 2022-11-04 RX ADMIN — PRAMIPEXOLE DIHYDROCHLORIDE 0.12 MG: 0.25 TABLET ORAL at 21:06

## 2022-11-04 RX ADMIN — HYDROCORTISONE: 25 CREAM TOPICAL at 10:43

## 2022-11-04 RX ADMIN — CARVEDILOL 3.12 MG: 3.12 TABLET, FILM COATED ORAL at 12:16

## 2022-11-04 RX ADMIN — ESCITALOPRAM OXALATE 20 MG: 10 TABLET ORAL at 10:29

## 2022-11-04 RX ADMIN — FOLIC ACID 1 MG: 1 TABLET ORAL at 10:29

## 2022-11-04 RX ADMIN — SODIUM CHLORIDE, PRESERVATIVE FREE 10 ML: 5 INJECTION INTRAVENOUS at 13:48

## 2022-11-04 RX ADMIN — CARVEDILOL 3.12 MG: 3.12 TABLET, FILM COATED ORAL at 17:15

## 2022-11-04 RX ADMIN — ONDANSETRON HYDROCHLORIDE 4 MG: 2 INJECTION, SOLUTION INTRAMUSCULAR; INTRAVENOUS at 08:23

## 2022-11-04 RX ADMIN — CLONAZEPAM 1 MG: 0.5 TABLET ORAL at 21:06

## 2022-11-04 RX ADMIN — MILRINONE LACTATE IN DEXTROSE 0.25 MCG/KG/MIN: 200 INJECTION, SOLUTION INTRAVENOUS at 14:14

## 2022-11-04 RX ADMIN — DIGOXIN 0.25 MG: 0.25 TABLET ORAL at 10:29

## 2022-11-04 RX ADMIN — SODIUM CHLORIDE, PRESERVATIVE FREE 10 ML: 5 INJECTION INTRAVENOUS at 08:23

## 2022-11-04 RX ADMIN — ALLOPURINOL 50 MG: 100 TABLET ORAL at 10:29

## 2022-11-04 RX ADMIN — TETRAKIS(2-METHOXYISOBUTYLISOCYANIDE)COPPER(I) TETRAFLUOROBORATE 21.2 MILLICURIE: 1 INJECTION, POWDER, LYOPHILIZED, FOR SOLUTION INTRAVENOUS at 10:00

## 2022-11-04 RX ADMIN — PANTOPRAZOLE SODIUM 40 MG: 40 TABLET, DELAYED RELEASE ORAL at 17:15

## 2022-11-04 RX ADMIN — HYDROCORTISONE: 25 CREAM TOPICAL at 21:07

## 2022-11-04 RX ADMIN — POTASSIUM CHLORIDE 60 MEQ: 750 TABLET, FILM COATED, EXTENDED RELEASE ORAL at 17:15

## 2022-11-04 RX ADMIN — POTASSIUM CHLORIDE 60 MEQ: 750 TABLET, FILM COATED, EXTENDED RELEASE ORAL at 10:29

## 2022-11-04 RX ADMIN — EZETIMIBE 10 MG: 10 TABLET ORAL at 10:29

## 2022-11-04 NOTE — PROGRESS NOTES
[facility] in [place], [place]  Inpatient Progress Note      Patient name: Kwan Lung  Patient : 1980  Patient MRN: [id number]  Consulting MD: Tay Willis MD  Date of service: 22    REASON FOR REFERRAL:  Management of acute on chronic systolic heart failure     INTERVAL HISTORY:  Remains tachycardic  Transitioned to milrinone   No acute overnight events  Afebrile  SBP 90-110s/70s, -130s  I/O net negative 1L, urine 2.6 liters  Cr up to 1.3  K 4.0  Ca 10.3  Pro-NT-BNP not done    PLAN OF CARE:  38 y.o. female with ischemic cardiomyopathy, LVEF 15-20%, stage C, NYHA class IIIB initiated on dobutamine 3 mcg/kg/min and undergoing diuresis  Difficulty tolerating small dose dobutamine due to tachycardia; limited options to slow down heart rate due to QTc > 500ms in the setting of antidepressant use  Uncertain if we will be able to discharge home on dobutamine  Started inpatient HT/LVAD workup  Pt contracted for safety, and is being seen by behavioral health/psychiatry  Needs diagnosis for the KITTY 1:320 and whether this could be lupus carditis; heart rate too fast for cardiac MRI; will schedule OP; d/w Dr. Hernandez Adair with rheumatology who will order remainder of autoimmue labs and hand X rays; ?whether we need heart biopsy  Concerns regarding HT/LVAD candidacy upon initial review include possible autoimmune disease that requires diagnosis/treatment, psychiatric condition that needs evaluation/treatment and uncertain support system. RECOMMENDATIONS:  Off Dobutamine   Cont Milrinone 0.25mcg/kg/min   Continue digoxin 0.25mg daily, check digoxin levels (goal 0.7-1.2) 0.3 today   Hold off corlanor due to long QT; likely in the setting of taking antidepressants, d/w Pharm. D. would not use with QTc > 500ms  Beta-blocker: resume low dose Coreg if BP stable    ACE/ARB/ARNi: hold off until BP can tolerate  MRA: Cont spironolactone 25mg daily  SGLT2 inhibitor: Jardiance 10mg daily  Continue bumex 2mg IV three times daily, probably at dry weight 189lbs  Hold requip due to tachycardia  Hold crestor due to rise in TB  Cannot fit into PFT today; will order bedside spirometry on Monday  Keep K > 4 and Mag > 2  Cont allopurinol 50mg daily   Continue ASA and prasugrel per primary cards  Treatment of STU: inpatient eval for STU  Will need LifeVest at discharge   Labs: HF and Transplant eval labs ordered; in process  Will have  completed psychosocial evaluation   Psychiatry consultation for anxiety appreciated  Nutritionist consult  Heart failure education  Needs Advanced care plan  Rheumatology consultation to r/o lupus- labs pending   Nephrology consult for hypercalcemia- appreciate recommendations, work up in progress  Consult to Dr. Gin Catalan for consideration of cardiac biopsy   Palliative care consultation  Physical therapy evaluation     All other care per primary team      IMPRESSION:  Fatigue  Shortness of breath  Volume overload  Acute on Chronic systolic heart failure  Stage C, NYHA class IV symptoms  ischemic cardiomyopathy, LVEF 20-25%  CAD  STEMI July 2022 s/p RICKY to LAD  HTN  Obesity  LHD  Pre-diabetes  Esophageal stenosis s/p dilation  RLS  Fibromyalgia  Anxiety and Depression         LIFE GOALS:  Patient's personal goals include: get better  Important upcoming milestones or family events: the holidays coming up  The patient identifies the following as important for living well: being independent     CARDIAC IMAGING:  Echo 10/22/22    Left Ventricle: Severely reduced left ventricular systolic function with a visually estimated EF of 15 - 20%. Left ventricle is mildly dilated. Normal wall thickness.  Moderate hypokinesis of the following segments: basal anterior, basal anterolateral, basal anteroseptal, basal inferior, basal inferolateral, basal inferoseptal, mid anterior, mid anterolateral, mid anteroseptal, mid inferior, mid inferolateral and mid inferoseptal. Severe hypokinesis of the following segments: apical anterior, apical septal, apical inferior and apical lateral. Grade III diastolic dysfunction with increased LAP. Mitral Valve: Mild annular dilation. Moderate regurgitation. Tricuspid Valve: Moderately elevated RVSP. The estimated RVSP is 51 mmHg. Left Atrium: Left atrium is mildly dilated. Pericardium: Trivial pericardial effusion present. No indication of cardiac tamponade. Contrast used: Definity. Echo (8/14/22)    Left Ventricle: Severely reduced left ventricular systolic function with a visually estimated EF of 20 - 25%. Left ventricle is mildly dilated. Increased wall thickness. See diagram for wall motion findings. Grade II diastolic dysfunction with increased LAP. Right Ventricle: Not well visualized. Tricuspid Valve: Moderately elevated RVSP. [address] 10/27/22  PA 56/34/43, RA 16, PCWP 29, CI daisy 1.46 at 199lbs         BRIEF HISTORY OF PRESENT ILLNESS:  Gary Putnam is a 38 y.o. female with h/o HTN, CAD s/p STEMI in July 2022 (DESx1 to LAD; treated at [facility]) with ischemic cardiomyopathy (EF 20-25%), CHF, GERD, RLS, and fibromyalgia seen as a new patient in [facility] on 10/21/22 and found to be fluid overloaded, and with suicidal ideation. Pt was taken to the ER for further evaluation and admission for acute on chronic HF and mental health crisis. PHYSICAL EXAM:  Visit Vitals  /76 (BP 1 Location: Left upper arm, BP Patient Position: At rest)   Pulse (!) 145   Temp 98.3 °F (36.8 °C)   Resp 18   Ht 5' 4\" (1.626 m)   Wt 188 lb 4.4 oz (85.4 kg)   LMP 10/09/2022   SpO2 96%   BMI 32.32 kg/m²     Physical Exam  Vitals and nursing note reviewed. Constitutional:       Appearance: Normal appearance. Cardiovascular:      Rate and Rhythm: Regular rhythm. Tachycardia present. Pulses: Normal pulses. Heart sounds: Normal heart sounds.    Pulmonary:      Effort: Pulmonary effort is normal. No respiratory distress. Breath sounds: Normal breath sounds. Abdominal:      General: There is no distension. Palpations: Abdomen is soft. Musculoskeletal:         General: No swelling. Skin:     General: Skin is warm and dry. Neurological:      General: No focal deficit present. Mental Status: She is alert and oriented to person, place, and time. Psychiatric:         Mood and Affect: Mood normal.        REVIEW OF SYSTEMS:  Review of Systems   Constitutional:  Negative for chills, fever and malaise/fatigue. Respiratory:  Negative for cough and shortness of breath. Cardiovascular:  Negative for chest pain, palpitations and leg swelling. Gastrointestinal:  Negative for heartburn and nausea. Musculoskeletal:  Negative for falls and myalgias. Neurological:  Negative for dizziness, weakness and headaches. Psychiatric/Behavioral:  Positive for depression. The patient is nervous/anxious. PAST MEDICAL HISTORY:  Past Medical History:   Diagnosis Date    Anemia NEC     Arthritis     Chronic pain     fybromyalsia    Depression     anxiety, depression, OCD    GERD (gastroesophageal reflux disease)     Hypertension     Hypertension     Ill-defined condition     Fibromyalia gastricparesis    MI (myocardial infarction) ([address]     Musculoskeletal disorder     SOB (shortness of breath)     Stool color black        PAST SURGICAL HISTORY:  Past Surgical History:   Procedure Laterality Date    HX CORONARY STENT PLACEMENT      HX GYN           HX HEENT  2002    wisdom teeth extraction    UPPER GI ENDOSCOPY,BIOPSY  2018         UPPER GI ENDOSCOPY,DIAGNOSIS  2018            FAMILY HISTORY:  Family History   Problem Relation Age of Onset    Hypertension Father     Elevated Lipids Father     Arthritis-rheumatoid Mother         ? Lupus vs RA    Lung Disease Mother     Heart Disease Mother     Cancer Mother         breast in 39y    COPD Mother     Hypertension Mother     Stroke Mother         3-4 strokes    Breast Cancer Mother 50    Diabetes Maternal Grandmother        SOCIAL HISTORY:  Social History     Socioeconomic History    Marital status: SINGLE   Tobacco Use    Smoking status: Former     Packs/day: 0.25     Years: 8.00     Pack years: 2.00     Types: Cigarettes     Quit date: 2022     Years since quittin.2    Smokeless tobacco: Never   Vaping Use    Vaping Use: Never used   Substance and Sexual Activity    Alcohol use: Not Currently     Alcohol/week: 1.0 standard drink     Types: 1 Glasses of wine per week     Comment: Wine with special occassions - not since July    Drug use: Not Currently     Types: Marijuana     Comment: Haven't had any since 2022    Sexual activity: Yes     Partners: Male     Birth control/protection: None     Social Determinants of Health     Financial Resource Strain: High Risk    Difficulty of Paying Living Expenses: Very hard   Food Insecurity: No Food Insecurity    Worried About Running Out of Food in the Last Year: Never true    Ran Out of Food in the Last Year: Never true   Transportation Needs: No Transportation Needs    Lack of Transportation (Medical): No    Lack of Transportation (Non-Medical): No   Physical Activity: Inactive    Days of Exercise per Week: 0 days    Minutes of Exercise per Session: 0 min   Stress: Stress Concern Present    Feeling of Stress :  To some extent   Social Connections: Socially Isolated    Frequency of Communication with Friends and Family: Twice a week    Frequency of Social Gatherings with Friends and Family: Never    Attends [employer] Services: Never    Active Member of Clubs or Organizations: No    Attends Club or Organization Meetings: Never    Marital Status: Never    Housing Stability: [address] to Pay for Housing in the Last Year: No    Number of [place] in the Last Year: 1    Unstable Housing in the Last Year: No       LABORATORY RESULTS:     Labs Latest Ref Rng & Units 2022 11/3/2022 11/2/2022 11/2/2022 11/1/2022 10/31/2022 10/30/2022   WBC 3.6 - 11.0 K/uL 5.8 4.5 - - - 5.5 -   RBC 3.80 - 5.20 M/uL 5.10 5.38(H) - - - 5.43(H) -   Hemoglobin 11.5 - 16.0 g/dL 14.7 15.8 - - - 15.9 -   Hematocrit 35.0 - 47.0 % 46.2 49. 8(H) - - - 50. 5(H) -   MCV 80.0 - 99.0 FL 90.6 92.6 - - - 93.0 -   Platelets 075 - 549 K/uL 236 246 - - - 261 -   Lymphocytes 12 - 49 % 28 - - - - - -   Monocytes 5 - 13 % 13 - - - - - -   Eosinophils 0 - 7 % 2 - - - - - -   Basophils 0 - 1 % 1 - - - - - -   Albumin 3.5 - 5.0 g/dL - - 4.2 - 4.4 4.6 4.9   Calcium 8.5 - 10.1 MG/DL 10. 3(H) 10. 3(H) 10. 4(H) 10. 4(H) 10. 7(H) 10. 6(H) 11. 1(H)   Glucose 65 - 100 mg/dL 109(H) 114(H) 103(H) - 103(H) 107(H) 120(H)   BUN 6 - 20 MG/DL 17 19 20 - 18 20 19   Creatinine 0.55 - 1.02 MG/DL 1.30(H) 1.16(H) 1.20(H) - 1.21(H) 1.14(H) 1.16(H)   Sodium 136 - 145 mmol/L 132(L) 131(L) 134(L) - 132(L) 134(L) 136   Potassium 3.5 - 5.1 mmol/L 4.0 4.1 4.1 - 4.2 3.8 3.8   TSH 0.36 - 3.74 uIU/mL - - - - - - -   LDH 81 - 246 U/L - - - 214 - - -   Some recent data might be hidden     Lab Results   Component Value Date/Time    TSH 4.44 (H) 10/21/2022 06:12 PM    TSH 2.12 05/12/2021 10:55 AM    TSH 1.330 12/30/2019 12:00 AM    TSH 3.850 07/25/2018 12:40 PM    TSH 1.620 02/27/2018 08:59 AM    TSH 1.240 12/05/2016 10:13 AM    TSH 1.530 03/18/2016 10:31 AM    TSH 1.400 11/09/2011 09:37 AM    TSH 1.26 08/19/2010 10:36 AM    TSH 1.18 07/28/2010 04:10 PM       ALLERGY:  Allergies   Allergen Reactions    Abilify [Aripiprazole] Anxiety     Can not tolerate     Sulfa (Sulfonamide Antibiotics) Hives    Vicodin [Hydrocodone-Acetaminophen] Nausea and Vomiting        CURRENT MEDICATIONS:    Current Facility-Administered Medications:     milrinone (PRIMACOR) 20 MG/100 ML D5W infusion, 0.25 mcg/kg/min, IntraVENous, CONTINUOUS, Gena Bosch, NP, Last Rate: 6.4 mL/hr at 11/03/22 2205, 0.25 mcg/kg/min at 11/03/22 2205    digoxin (LANOXIN) tablet 0.25 mg, 0.25 mg, Oral, Zoila Bolaños MD, 0.25 mg at 11/03/22 0830    pramipexole (MIRAPEX) tablet 0.125 mg, 0.125 mg, Oral, QHS, Jey Mazariegos MD, 0.125 mg at 11/03/22 2156    sucralfate (CARAFATE) tablet 1 g, 1 g, Oral, AC&HS, Jey Mazariegos MD, 1 g at 11/03/22 2156    [Held by provider] bumetanide (BUMEX) injection 2 mg, 2 mg, IntraVENous, TID, Jenni Carlson NP, 2 mg at 11/03/22 2583    methyl salicylate-menthol (BENGAY) 15-10 % cream, , Topical, TID, Carmel Flores NP, Given at 11/03/22 0834    pantoprazole (PROTONIX) tablet 40 mg, 40 mg, Oral, ACB&D, Jey Mazariegos MD, 40 mg at 11/03/22 1705    potassium chloride SR (KLOR-CON 10) tablet 60 mEq, 60 mEq, Oral, BID, Gena Bosch, NP, 60 mEq at 11/03/22 1705    polyethylene glycol (MIRALAX) packet 17 g, 17 g, Oral, DAILY PRN, David Collins MD, 17 g at 10/30/22 2052    spironolactone (ALDACTONE) tablet 25 mg, 25 mg, Oral, DAILY, Gena Bosch NP, 25 mg at 11/03/22 0830    prochlorperazine (COMPAZINE) injection 5 mg, 5 mg, IntraVENous, Q6H PRN, David Collins MD, 5 mg at 10/28/22 1557    ondansetron (ZOFRAN) injection 4 mg, 4 mg, IntraVENous, Q6H PRN, Ciara Dominguez MD, 4 mg at 10/28/22 0724    allopurinoL (ZYLOPRIM) tablet 50 mg, 50 mg, Oral, DAILY, Gena Bosch NP, 50 mg at 11/03/22 0830    hydrocortisone (ANUSOL-HC) 2.5 % rectal cream, , PeriANAL, QID, Ciara Dominguez MD, Given at 11/03/22 1930    melatonin tablet 10.5 mg, 10.5 mg, Oral, QHS PRN, Ciara Dominguez MD    empagliflozin (JARDIANCE) tablet 10 mg, 10 mg, Oral, DAILY, Hiro, Gena B, NP, 10 mg at 11/03/22 0830    aluminum & magnesium hydroxide-simethicone (MYLANTA II) oral suspension 30 mL, 30 mL, Oral, Q4H PRN, Ciara Dominguez MD, 30 mL at 11/03/22 0536    diazePAM (VALIUM) tablet 5 mg, 5 mg, Oral, Q12H PRN, Ciara Dominguez MD, 5 mg at 11/03/22 1246    docusate sodium (COLACE) capsule 100 mg, 100 mg, Oral, DAILY, James Moseley MD, 100 mg at 11/03/22 0830    bisacodyL (DULCOLAX) suppository 10 mg, 10 mg, Rectal, DAILY, Violet kinney MD, 10 mg at 10/25/22 6933    albuterol-ipratropium (DUO-NEB) 2.5 MG-0.5 MG/3 ML, 3 mL, Nebulization, Q6H PRN, Jorge A Duque MD    fluticasone propionate (FLONASE) 50 mcg/actuation nasal spray 2 Spray, 2 Spray, Both Nostrils, DAILY, Jorge A Johnson MD, 2 [place] at 11/03/22 0834    traZODone (DESYREL) tablet 200 mg, 200 mg, Oral, QHS, Irma Zaragoza NP, 200 mg at 11/03/22 2156    [Held by provider] magnesium oxide (MAG-OX) tablet 400 mg, 400 mg, Oral, QHS, Ashley BAGLEY NP, 400 mg at 10/29/22 2159    calcium carbonate (TUMS) chewable tablet 200 mg [elemental], 200 mg, Oral, TID PRN, Teodora Parker MD, 200 mg at 10/30/22 2052    clonazePAM (KlonoPIN) tablet 1 mg, 1 mg, Oral, QHS, Bonifacio Box MD, 1 mg at 11/03/22 2156    escitalopram oxalate (LEXAPRO) tablet 20 mg, 20 mg, Oral, DAILY, Bonifacio Box MD, 20 mg at 11/03/22 0830    ezetimibe (ZETIA) tablet 10 mg, 10 mg, Oral, DAILY, Bonifacio Box MD, 10 mg at 24/74/96 5450    folic acid (FOLVITE) tablet 1 mg, 1 mg, Oral, DAILY, Bonifacio Box MD, 1 mg at 11/03/22 0830    montelukast (SINGULAIR) tablet 10 mg, 10 mg, Oral, DAILY, Bonifacio Box MD, 10 mg at 11/03/22 7517    prasugreL (EFFIENT) tablet 10 mg, 10 mg, Oral, DAILY, Bonifacio Box MD, 10 mg at 11/03/22 0830    [Held by provider] rOPINIRole (REQUIP) tablet 0.5 mg, 0.5 mg, Oral, BID, Bonifacio Box MD, 0.5 mg at 10/31/22 0947    [Held by provider] rosuvastatin (CRESTOR) tablet 20 mg, 20 mg, Oral, Q MON, WED & FRI, Bonifacio Box MD, 20 mg at 10/24/22 2241    sodium chloride (NS) flush 5-40 mL, 5-40 mL, IntraVENous, Q8H, Geoff Rodriguez MD, 10 mL at 11/03/22 2156    sodium chloride (NS) flush 5-40 mL, 5-40 mL, IntraVENous, PRN, Bonifacio Box MD    acetaminophen (TYLENOL) tablet 650 mg, 650 mg, Oral, Q4H PRN, Bonifacio Box MD, 650 mg at 11/04/22 0230    nitroglycerin (NITROSTAT) tablet 0.4 mg, 0.4 mg, SubLINGual, Q5MIN PRN, Lavonia Seip, MD, 0.4 mg at 10/21/22 7950    glucose chewable tablet 16 g, 4 Tablet, Oral, PRN, Lavonia Seip, MD    glucagon (GLUCAGEN) injection 1 mg, 1 mg, IntraMUSCular, PRN, Geoff Berkowitz MD    dextrose 10 % infusion 0-250 mL, 0-250 mL, IntraVENous, PRN, Lavonia Seip, MD    insulin lispro (HUMALOG) injection, , SubCUTAneous, AC&HS, Lavonia Seip, MD    PATIENT CARE TEAM:  Patient Care Team:  Chelly Reynoso MD as PCP - General (Family Medicine)  Chelly Reynoso MD as PCP - [facility]  Davie Bolanos MD (Surgery General)  Nola Lesches, MD (Cardiovascular Disease Physician)  Tim Gracia MD (Otolaryngology)  Ildefonso Gutierrez MD (Internal Medicine Physician)  Guy Main MD (Female Pelvic Medicine and Reconstructive Surgery)  Mily Davidson MD (Neurology)  Dhara Briggs MD (Psychiatry)  Janice Florez as Ambulatory Care Manager     Thank you for allowing me to participate in this patient's care.     Austin Gardner NP   [address] [place] [address], [address]  Phone: [phone]

## 2022-11-04 NOTE — PROGRESS NOTES
2015: Receive report from CK Nicholson at this time. 2130: Patient's HR in 120-130's but patient declines any pain medication at this time despite patient having discomfort in (R) arm where PIC line was placed and (L) arm in which IV had infiltrated.   2215: 24 hour urine started at this time and explain progress to patient

## 2022-11-04 NOTE — PROGRESS NOTES
Comprehensive Nutrition Assessment    Type and Reason for Visit: Reassess    Nutrition Recommendations/Plan:   Will liberalize diet order to No Added Salt  Continue fluid restriction per MD  Will d/c ONS per pt preference       Malnutrition Assessment:  Malnutrition Status: At risk for malnutrition (specify) (Poor PO for 2 weeks) (10/28/22 8301)         Nutrition Assessment:    37 yo female admitted for CHF. Pmhx: gastroparesis, GERd, HTN, MI, CHF. 11/4:  Follow up. Pt c/o still having altered taste but is now able to eat some. She does not normally eat breakfast so she skips that meal, eating a salad and grilled cheese for lunch and dinner many days. She does not like the food here which is limiting her intakes. Discussed the possibility of liberalizing her diet to promote intakes, pt agreeable. Asked NP with [facility] about liberalizing her diet, she gave approval.  Ensure Enlive and Clear ordered, pt does not like either one, will discontinue. Labs: Vit B12 came back high, Zn WNL; Na+ 132, pre-albumin WNL (24)      10/28: MD consult received for poor PO intakes. Spoke with pt at bedside. She reports poor PO intakes for a week PTA and since admission secondary to altered taste. C/o everything tasting sour, even water. Has not been drinking and states she feels dehydrated. COVID was tested today secondary to c/o losing her taste, result negative. MD has also ordered Zn and B12. Pt states she did eat some ice-cream a few days ago that she thought actually tasted good. Agreeable to try Ensure Enlive and Clear in hopes that she can tolerate sweet ONS without altered taste. Also c/o nausea and constipation. States she received some medicine yesterday and was able to have a BM but today she feels like she is constipated again. Weight hx indicates 16% loss over last 3 months but most of that has been from fluid removal.  Pt is receiving IV Bumex now. Labs reviewed. Noted in chart that pt expressed interest in learning about heart healthy diet. Provided pt with education on CHF diet. Written education materials and RD contact info provided to pt. EF of 15-20%. Nutritionally Significant Medications:  Colace, Jardiance, folic acid, Protonix, Kcl, Aldactone, Carafate, Milrinone, Dobutamine      Estimated Daily Nutrient Needs:  Energy Requirements Based On: Formula  Weight Used for Energy Requirements: Current  Energy (kcal/day): 2000 (MSJ x AF 1.3)  Weight Used for Protein Requirements: Current  Protein (g/day): 105 (1.2 gm/kg)  Method Used for Fluid Requirements: fluid restriction ordered  Fluid (ml/day): 1800    Nutrition Related Findings:   Edema: none  Last BM: 11/03/22, Loose, Watery    Wounds: None      Current Nutrition Therapies:  Diet: 4 Carb, Low Fat/Low Chol/2gm Na+  Supplements: Ensure Enlive and Ensure Clear  Meal intake: Patient Vitals for the past 168 hrs:   % Diet Eaten   11/03/22 1129 26 - 50%   11/03/22 0759 0%   11/02/22 1616 51 - 75%   11/02/22 1134 26 - 50%   11/02/22 0800 0%   10/31/22 1545 1 - 25%   10/31/22 1111 1 - 25%   10/31/22 0850 1 - 25%   10/30/22 1804 76 - 100%   10/30/22 1400 76 - 100%   10/30/22 0905 0%   10/30/22 0346 0%   10/29/22 2341 0%   10/29/22 1945 0%   10/29/22 1501 76 - 100%   10/29/22 1124 0%   10/29/22 0815 0%   10/29/22 0326 0%   10/28/22 2343 0%   10/28/22 1923 0%   10/28/22 1700 1 - 25%     Supplement intake: 0%    Nutrition Support: none      Anthropometric Measures:  Height: 5' 4\" (162.6 cm)  Ideal Body Weight (IBW): 120 lbs (55 kg)     Current Body Wt:  85.2 kg (187 lb 13.3 oz), 156.5 % IBW. Standing scale  Current BMI (kg/m2): 32.2        Weight Adjustment: No adjustment                 BMI Category: Obese class 1 (BMI 30.0-34. 9)    Wt Readings from Last 10 Encounters:   11/04/22 85.2 kg (187 lb 13.3 oz)   10/21/22 93.4 kg (206 lb)   10/17/22 93.9 kg (207 lb)   10/14/22 95 kg (209 lb 6.4 oz)   10/06/22 95.3 kg (210 lb) 10/03/22 95.3 kg (210 lb)   09/29/22 95.4 kg (210 lb 6.4 oz)   09/26/22 95.3 kg (210 lb)   09/09/22 98.9 kg (218 lb)   09/08/22 98.9 kg (218 lb)           Nutrition Diagnosis:   Inadequate oral intake related to inadequate protein-energy intake as evidenced by intake 0-25%, other (specify) (c/o altered taste)    Nutrition Interventions:   Food and/or Nutrient Delivery: Continue current diet, Discontinue oral nutrition supplement  Nutrition Education/Counseling: Education completed  Coordination of Nutrition Care: Continue to monitor while inpatient       Goals:  Previous Goal Met: Progressing toward goal(s)  Goals: PO intake 50% or greater, within 7 days  Specify Other Goals: PO intakes > 75% meals + ONS with no nausea/constipation over next 5-7 days    Nutrition Monitoring and Evaluation:   Behavioral-Environmental Outcomes: None identified  Food/Nutrient Intake Outcomes: Food and nutrient intake, Supplement intake  Physical Signs/Symptoms Outcomes: Biochemical data, GI status, Weight    Discharge Planning:    Continue current diet    Metr Celaya RD  Available via [employer]

## 2022-11-04 NOTE — PROGRESS NOTES
Transitions of Care Plan  RUR: 20% - high  Clinical Update: transition to milrinone; PICC placed  Consults: [facility]  Baseline: independent without DME; resides w father  Barriers to Discharge: medical  Disposition: anticipate home health with inotrope support; anticipate patient will need Lifevest   Estimated Discharge Date: 2+ days    Clinical update per chart review and/or patient discussed during Interdisciplinary Rounds:    Patient is not medically stable for discharge due to ongoing medical needs. CM continues to follow treatment plan for medical progress and disposition needs.     Disposition:  Home Health pending inotrope need    Kirby Ashford, MPH  Care Manager l [employer]  Available via [facility] or

## 2022-11-04 NOTE — PROGRESS NOTES
[facility]  Hospitalist Group                                                                                          Hospitalist Progress Note  Trina Hermosillo MD  Answering service: [phone] OR 36 from in house phone        Date of Service:  2022  NAME:  Pita Crowley  :  1980  MRN:  [id number]      Admission Summary:     Patient initially presents with suicidal ideation and found to have congestive heart failure. Interval history / Subjective:     Patient seen examined at bedside earlier.   Still on milrinone gtt, tachycardia slowly improving      Assessment & Plan:     Nonischemic cm Hfref   -Acute on chronic systolic CHF NYHA class IV on admission  -Patient with ischemic cardiomyopathy with EF with 15 to 20%  -s/p RHC 10/27 - started on dobutamine and IV bumetanide  -meds being adjusted per AHF team  -Patient became tachycardic restarted on milrinone 11/3 weaned off dobutamine gtt   -rheum on board working up lupus vs srogens, markers in progress   -cardio consulted for consideration of cardiac biopsy     Hypercalcemia  -appreciate nephro input  -pth appears like primary?   -us pending, sestamibi scan no uptake     Urinary tract infection  -Completed antibiotic    Hypertension  -Blood pressure is borderline low normal    Type 2 diabetes  -Well-controlled with hemoglobin A1c of 6.3  -Continue sliding scale insulin coverage    Coronary artery disease  -Patient with history of STEMI with stent to LAD  -Continue Effient    Dyslipidemia  -On Zetia    Restless leg syndrome  -On pramipexole, hold ropinirole secondary to tachycardia    Hemorrhoids  -Continue Anusol cream    Depression with suicidal ideation  -Patient previously evaluated by psychiatry  -On Lexapro, also on Klonopin for anxiety  -One-on-one sitter discontinued per psych recommendation  -Outpatient follow-up with psychiatry    Anxiety  -Patient on Klonopin for 12 years, continue Klonopin here  -Continue trazodone for sleep  -Outpatient follow-up with psychiatry    Constipation  -resolved; continue PRN Miralax    Dysgeusia: unclear etiology  -check B12 (normal/high), zinc, rapid covid (negative). TSH checked and was only mildly elevated in the setting of acute illness.   -no benefit with holding allopurinol; will resume this  -possibly related to GERD; continue pantoprazole, trial of carafate    Nausea: likely multifactorial, hx of gastroparesis, bowel edema from CHF  -continue supportive care    Critically ill, high risk for decompensation. CCT time 40 minutes     Code status: Full  Prophylaxis: SCDs  Care Plan discussed with: Patient/RN/CM/AHF  Anticipated Disposition: TBD > 48 hrs      Hospital Problems  Date Reviewed: 10/14/2022            Codes Class Noted POA    CHF (congestive heart failure) (HCC) ICD-10-CM: I50.9  ICD-9-CM: 428.0  10/21/2022 Unknown       Review of Systems:   A comprehensive review of systems was negative except for that written in the HPI. Vital Signs:    Last 24hrs VS reviewed since prior progress note. Most recent are:  Visit Vitals  /83 (BP 1 Location: Left upper arm, BP Patient Position: At rest)   Pulse (!) 113   Temp 97.5 °F (36.4 °C)   Resp 18   Ht 5' 4\" (1.626 m)   Wt 85.2 kg (187 lb 13.3 oz)   SpO2 98%   BMI 32.24 kg/m²         Intake/Output Summary (Last 24 hours) at 11/4/2022 1305  Last data filed at 11/4/2022 9360  Gross per 24 hour   Intake 1032.79 ml   Output 1750 ml   Net -717.21 ml          Physical Examination:     I had a face to face encounter with this patient and independently examined them on 11/4/2022 as outlined below:          Constitutional:  No acute distress, non-toxic, obese   ENT:  Oral mucosa moist, oropharynx benign, anicteric sclerae    Resp:  CTA bilaterally. No wheezing/rhonchi/rales. No accessory muscle use. CV:  Regular rhythm, normal rate, no murmurs, no gallops, trace b/l LE edema    GI:  Soft, non distended, non tender.  normoactive bowel sounds     Musculoskeletal:  warm    Neurologic:  Moves all extremities. AAOx3, CN II-XII reviewed  Psych: normal mood appropriate affect            Data Review:    Review and/or order of clinical lab test      Labs:     Recent Labs     11/04/22 0407 11/03/22 0445   WBC 5.8 4.5   HGB 14.7 15.8   HCT 46.2 49.8*    246       Recent Labs     11/04/22 0407 11/03/22 2005 11/03/22 0445 11/02/22 0409 11/02/22 0406   *  --  131* 134*  --    K 4.0  --  4.1 4.1  --    CL 98  --  102 100  --    CO2 26  --  21 24  --    BUN 17  --  19 20  --    CREA 1.30*  --  1.16* 1.20*  --    *  --  114* 103*  --    CA 10.3*  --  10.3* 10.4* 10.4*   MG 2.5*  --   --   --  3.0*   PHOS  --  4.1  --   --   --        Recent Labs     11/04/22 0407 11/02/22 0409   ALT 80* 84*   * 132*   TBILI 1.8* 1.6*   TP 8.8* 8.4*   ALB 4.3 4.2   GLOB 4.5* 4.2*       Recent Labs     11/02/22 0406   INR 1.1   PTP 11.1        No results for input(s): FE, TIBC, PSAT, FERR in the last 72 hours. Lab Results   Component Value Date/Time    Folate 20.0 10/23/2022 04:24 AM        No results for input(s): PH, PCO2, PO2 in the last 72 hours. No results for input(s): CPK, CKNDX, TROIQ in the last 72 hours.     No lab exists for component: CPKMB  Lab Results   Component Value Date/Time    Cholesterol, total 95 10/22/2022 06:26 AM    HDL Cholesterol 32 10/22/2022 06:26 AM    LDL, calculated 45 10/22/2022 06:26 AM    Triglyceride 90 10/22/2022 06:26 AM    CHOL/HDL Ratio 3.0 10/22/2022 06:26 AM     Lab Results   Component Value Date/Time    Glucose (POC) 97 11/04/2022 12:13 PM    Glucose (POC) 102 11/04/2022 06:30 AM    Glucose (POC) 103 11/03/2022 09:27 PM    Glucose (POC) 96 11/03/2022 05:04 PM    Glucose (POC) 103 11/03/2022 11:28 AM     Lab Results   Component Value Date/Time    Color ORANGE 10/17/2022 08:42 AM    Appearance TURBID (A) 10/17/2022 08:42 AM    Specific gravity 1.025 10/17/2022 08:42 AM    Specific gravity 1.024 10/06/2022 08:57 AM    pH (UA) 5.0 10/17/2022 08:42 AM    Protein 30 (A) 10/17/2022 08:42 AM    Glucose Negative 10/17/2022 08:42 AM    Ketone Negative 10/17/2022 08:42 AM    Bilirubin Negative 11/25/2021 09:47 AM    Urobilinogen 1.0 10/17/2022 08:42 AM    Nitrites Positive (A) 10/17/2022 08:42 AM    Leukocyte Esterase SMALL (A) 10/17/2022 08:42 AM    Epithelial cells FEW 10/17/2022 08:42 AM    Bacteria 2+ (A) 10/17/2022 08:42 AM    WBC 5-10 10/17/2022 08:42 AM    RBC 5-10 10/17/2022 08:42 AM         Medications Reviewed:     Current Facility-Administered Medications   Medication Dose Route Frequency    carvediloL (COREG) tablet 3.125 mg  3.125 mg Oral BID WITH MEALS    milrinone (PRIMACOR) 20 MG/100 ML D5W infusion  0.25 mcg/kg/min IntraVENous CONTINUOUS    digoxin (LANOXIN) tablet 0.25 mg  0.25 mg Oral DAILY    pramipexole (MIRAPEX) tablet 0.125 mg  0.125 mg Oral QHS    [Held by provider] bumetanide (BUMEX) injection 2 mg  2 mg IntraVENous TID    methyl salicylate-menthol (BENGAY) 15-10 % cream   Topical TID    pantoprazole (PROTONIX) tablet 40 mg  40 mg Oral ACB&D    potassium chloride SR (KLOR-CON 10) tablet 60 mEq  60 mEq Oral BID    polyethylene glycol (MIRALAX) packet 17 g  17 g Oral DAILY PRN    spironolactone (ALDACTONE) tablet 25 mg  25 mg Oral DAILY    prochlorperazine (COMPAZINE) injection 5 mg  5 mg IntraVENous Q6H PRN    ondansetron (ZOFRAN) injection 4 mg  4 mg IntraVENous Q6H PRN    allopurinoL (ZYLOPRIM) tablet 50 mg  50 mg Oral DAILY    hydrocortisone (ANUSOL-HC) 2.5 % rectal cream   PeriANAL QID    melatonin tablet 10.5 mg  10.5 mg Oral QHS PRN    empagliflozin (JARDIANCE) tablet 10 mg  10 mg Oral DAILY    aluminum & magnesium hydroxide-simethicone (MYLANTA II) oral suspension 30 mL  30 mL Oral Q4H PRN    diazePAM (VALIUM) tablet 5 mg  5 mg Oral Q12H PRN    docusate sodium (COLACE) capsule 100 mg  100 mg Oral DAILY    bisacodyL (DULCOLAX) suppository 10 mg  10 mg Rectal DAILY albuterol-ipratropium (DUO-NEB) 2.5 MG-0.5 MG/3 ML  3 mL Nebulization Q6H PRN    fluticasone propionate (FLONASE) 50 mcg/actuation nasal spray 2 Spray  2 Spray Both Nostrils DAILY    traZODone (DESYREL) tablet 200 mg  200 mg Oral QHS    [Held by provider] magnesium oxide (MAG-OX) tablet 400 mg  400 mg Oral QHS    calcium carbonate (TUMS) chewable tablet 200 mg [elemental]  200 mg Oral TID PRN    clonazePAM (KlonoPIN) tablet 1 mg  1 mg Oral QHS    escitalopram oxalate (LEXAPRO) tablet 20 mg  20 mg Oral DAILY    ezetimibe (ZETIA) tablet 10 mg  10 mg Oral DAILY    folic acid (FOLVITE) tablet 1 mg  1 mg Oral DAILY    montelukast (SINGULAIR) tablet 10 mg  10 mg Oral DAILY    prasugreL (EFFIENT) tablet 10 mg  10 mg Oral DAILY    [Held by provider] rOPINIRole (REQUIP) tablet 0.5 mg  0.5 mg Oral BID    [Held by provider] rosuvastatin (CRESTOR) tablet 20 mg  20 mg Oral Q MON, WED & FRI    sodium chloride (NS) flush 5-40 mL  5-40 mL IntraVENous Q8H    sodium chloride (NS) flush 5-40 mL  5-40 mL IntraVENous PRN    acetaminophen (TYLENOL) tablet 650 mg  650 mg Oral Q4H PRN    nitroglycerin (NITROSTAT) tablet 0.4 mg  0.4 mg SubLINGual Q5MIN PRN    glucose chewable tablet 16 g  4 Tablet Oral PRN    glucagon (GLUCAGEN) injection 1 mg  1 mg IntraMUSCular PRN    dextrose 10 % infusion 0-250 mL  0-250 mL IntraVENous PRN    insulin lispro (HUMALOG) injection   SubCUTAneous AC&HS     ______________________________________________________________________  EXPECTED LENGTH OF STAY: 5d 4h  ACTUAL LENGTH OF STAY:          14                 Na Abraham MD

## 2022-11-04 NOTE — PROGRESS NOTES
[facility]   [address] [facility], [address] [place] [address]  Phone: [phone]   [phone]    [email or web address]     Nephrology Progress Note    Patient Name : Sara Hyde      : 1980     MRN : [id number]  Date of Admission : 10/21/2022  Date of Servive : 22    CC:  Follow up for Hypercalcemia        Assessment and Plan   Hypercalcemia :  - PTH elevated : concerning for primary hyperparathyroidism   - Parathyroid US : 7 mm R sided adenoma. Sestamibi scan : no uptake   - remaining labs pending and hopefully will be back by Monday   - will benefit from Parathyroidectomy if she is going for LVAD but can also be managed medically for now   - start sensipar 30 mg daily   - daily labs     Positive KITTY  -Being worked up by rheumatology? Sjogren's     Ischemic cardiomyopathy  LVEF 15 to 20%          Interval History:  Seen and examined. Parathyroid scan results were not back when I saw her earlier today   Discussed findings w/ AHF team     Review of Systems: A comprehensive review of systems was negative except for that written in the HPI.     Current Medications:   Current Facility-Administered Medications   Medication Dose Route Frequency    carvediloL (COREG) tablet 3.125 mg  3.125 mg Oral BID WITH MEALS    milrinone (PRIMACOR) 20 MG/100 ML D5W infusion  0.25 mcg/kg/min IntraVENous CONTINUOUS    digoxin (LANOXIN) tablet 0.25 mg  0.25 mg Oral DAILY    pramipexole (MIRAPEX) tablet 0.125 mg  0.125 mg Oral QHS    [Held by provider] bumetanide (BUMEX) injection 2 mg  2 mg IntraVENous TID    methyl salicylate-menthol (BENGAY) 15-10 % cream   Topical TID    pantoprazole (PROTONIX) tablet 40 mg  40 mg Oral ACB&D    potassium chloride SR (KLOR-CON 10) tablet 60 mEq  60 mEq Oral BID    polyethylene glycol (MIRALAX) packet 17 g  17 g Oral DAILY PRN    spironolactone (ALDACTONE) tablet 25 mg  25 mg Oral DAILY    prochlorperazine (COMPAZINE) injection 5 mg  5 mg IntraVENous Q6H PRN    ondansetron (ZOFRAN) injection 4 mg  4 mg IntraVENous Q6H PRN    allopurinoL (ZYLOPRIM) tablet 50 mg  50 mg Oral DAILY    hydrocortisone (ANUSOL-HC) 2.5 % rectal cream   PeriANAL QID    melatonin tablet 10.5 mg  10.5 mg Oral QHS PRN    empagliflozin (JARDIANCE) tablet 10 mg  10 mg Oral DAILY    aluminum & magnesium hydroxide-simethicone (MYLANTA II) oral suspension 30 mL  30 mL Oral Q4H PRN    diazePAM (VALIUM) tablet 5 mg  5 mg Oral Q12H PRN    docusate sodium (COLACE) capsule 100 mg  100 mg Oral DAILY    bisacodyL (DULCOLAX) suppository 10 mg  10 mg Rectal DAILY    albuterol-ipratropium (DUO-NEB) 2.5 MG-0.5 MG/3 ML  3 mL Nebulization Q6H PRN    fluticasone propionate (FLONASE) 50 mcg/actuation nasal spray 2 Spray  2 Spray Both Nostrils DAILY    traZODone (DESYREL) tablet 200 mg  200 mg Oral QHS    [Held by provider] magnesium oxide (MAG-OX) tablet 400 mg  400 mg Oral QHS    calcium carbonate (TUMS) chewable tablet 200 mg [elemental]  200 mg Oral TID PRN    clonazePAM (KlonoPIN) tablet 1 mg  1 mg Oral QHS    escitalopram oxalate (LEXAPRO) tablet 20 mg  20 mg Oral DAILY    ezetimibe (ZETIA) tablet 10 mg  10 mg Oral DAILY    folic acid (FOLVITE) tablet 1 mg  1 mg Oral DAILY    montelukast (SINGULAIR) tablet 10 mg  10 mg Oral DAILY    prasugreL (EFFIENT) tablet 10 mg  10 mg Oral DAILY    [Held by provider] rOPINIRole (REQUIP) tablet 0.5 mg  0.5 mg Oral BID    [Held by provider] rosuvastatin (CRESTOR) tablet 20 mg  20 mg Oral Q MON, WED & FRI    sodium chloride (NS) flush 5-40 mL  5-40 mL IntraVENous Q8H    sodium chloride (NS) flush 5-40 mL  5-40 mL IntraVENous PRN    acetaminophen (TYLENOL) tablet 650 mg  650 mg Oral Q4H PRN    nitroglycerin (NITROSTAT) tablet 0.4 mg  0.4 mg SubLINGual Q5MIN PRN    glucose chewable tablet 16 g  4 Tablet Oral PRN    glucagon (GLUCAGEN) injection 1 mg  1 mg IntraMUSCular PRN    dextrose 10 % infusion 0-250 mL  0-250 mL IntraVENous PRN    insulin lispro (HUMALOG) injection   SubCUTAneous AC&HS      Allergies   Allergen Reactions    Abilify [Aripiprazole] Anxiety     Can not tolerate     Sulfa (Sulfonamide Antibiotics) Hives    Vicodin [Hydrocodone-Acetaminophen] Nausea and Vomiting       Objective:  Vitals:    Vitals:    11/04/22 1222 11/04/22 1400 11/04/22 1448 11/04/22 1509   BP:    92/69   Pulse: (!) 113 (!) 104  (!) 104   Resp:    16   Temp:    98.1 °F (36.7 °C)   SpO2:       Weight:       Height:   5' 4\" (1.626 m)      Intake and Output:  11/04 0701 - 11/04 1900  In: -   Out: 100 [Urine:100]  11/02 1901 - 11/04 0700  In: 1655.7 [P.O.:1365; I.V.:290.7]  Out: 3450 [Urine:3450]    Physical Examination:        General: Obese   Neck:  Supple, no mass  Resp:  Lungs CTA B/L, no wheezing , normal respiratory effort  CV:  RRR,  no murmur or rub, LE edema  GI:  Soft, NT, + BS, no HS megaly  Neurologic:  Non focal    []    High complexity decision making was performed  []    Patient is at high-risk of decompensation with multiple organ involvement    Lab Data Personally Reviewed: I have reviewed all the pertinent labs, microbiology data and radiology studies during assessment.     Recent Labs     11/04/22 0407 11/03/22 2005 11/03/22 0445 11/02/22 0409 11/02/22 0406   *  --  131* 134*  --    K 4.0  --  4.1 4.1  --    CL 98  --  102 100  --    CO2 26  --  21 24  --    *  --  114* 103*  --    BUN 17  --  19 20  --    CREA 1.30*  --  1.16* 1.20*  --    CA 10.3*  --  10.3* 10.4* 10.4*   MG 2.5*  --   --   --  3.0*   PHOS  --  4.1  --   --   --    ALB 4.3  --   --  4.2  --    ALT 80*  --   --  84*  --    INR  --   --   --   --  1.1     Recent Labs     11/04/22 0407 11/03/22  0445   WBC 5.8 4.5   HGB 14.7 15.8   HCT 46.2 49.8*    246     Lab Results   Component Value Date/Time    Specimen Description: URINE 05/16/2011 04:18 PM    Specimen Description: URINE 10/06/2010 03:01 PM    Specimen Description: URINE 02/02/2010 04:50 PM    Specimen Description: VAGINA 06/03/2009 10:38 AM    Specimen Description: URINE 01/13/2009 03:17 PM     Lab Results   Component Value Date/Time    Culture result: NO GROWTH 2 DAYS 11/02/2022 04:09 AM    Culture result: No growth (<1,000 CFU/ML) 10/23/2022 04:43 AM    Culture result: MIXED UROGENITAL PALMA ISOLATED 10/17/2022 08:42 AM    Culture result: MIXED UROGENITAL PALMA ISOLATED 10/06/2022 08:57 AM    Culture result: ESCHERICHIA COLI (A) 06/20/2022 11:12 AM     Recent Results (from the past 24 hour(s))   GLUCOSE, POC    Collection Time: 11/03/22  5:04 PM   Result Value Ref Range    Glucose (POC) 96 65 - 117 mg/dL    Performed by Stacy Horne    Collection Time: 11/03/22  7:50 PM   Result Value Ref Range    SAMPLES BEING HELD 1LAV     COMMENT        Add-on orders for these samples will be processed based on acceptable specimen integrity and analyte stability, which may vary by analyte.    PHOSPHORUS    Collection Time: 11/03/22  8:05 PM   Result Value Ref Range    Phosphorus 4.1 2.6 - 4.7 MG/DL   GLUCOSE, POC    Collection Time: 11/03/22  9:27 PM   Result Value Ref Range    Glucose (POC) 103 65 - 117 mg/dL    Performed by Lindsay Wood    VITAMIN D, 25 HYDROXY    Collection Time: 11/04/22  4:07 AM   Result Value Ref Range    Vitamin D 25-Hydroxy 70.9 30 - 100 ng/mL   NT-PRO BNP    Collection Time: 11/04/22  4:07 AM   Result Value Ref Range    NT pro-BNP 1,603 (H) <125 PG/ML   MAGNESIUM    Collection Time: 11/04/22  4:07 AM   Result Value Ref Range    Magnesium 2.5 (H) 1.6 - 2.4 mg/dL   CBC WITH AUTOMATED DIFF    Collection Time: 11/04/22  4:07 AM   Result Value Ref Range    WBC 5.8 3.6 - 11.0 K/uL    RBC 5.10 3.80 - 5.20 M/uL    HGB 14.7 11.5 - 16.0 g/dL    HCT 46.2 35.0 - 47.0 %    MCV 90.6 80.0 - 99.0 FL    MCH 28.8 26.0 - 34.0 PG    MCHC 31.8 30.0 - 36.5 g/dL    RDW 13.4 11.5 - 14.5 %    PLATELET 804 319 - 954 K/uL    MPV 11.2 8.9 - 12.9 FL    NRBC 0.0 0  WBC    ABSOLUTE NRBC 0.00 0.00 - 0.01 K/uL NEUTROPHILS 56 32 - 75 %    LYMPHOCYTES 28 12 - 49 %    MONOCYTES 13 5 - 13 %    EOSINOPHILS 2 0 - 7 %    BASOPHILS 1 0 - 1 %    IMMATURE GRANULOCYTES 0 0.0 - 0.5 %    ABS. NEUTROPHILS 3.2 1.8 - 8.0 K/UL    ABS. LYMPHOCYTES 1.6 0.8 - 3.5 K/UL    ABS. MONOCYTES 0.7 0.0 - 1.0 K/UL    ABS. EOSINOPHILS 0.1 0.0 - 0.4 K/UL    ABS. BASOPHILS 0.1 0.0 - 0.1 K/UL    ABS. IMM. GRANS. 0.0 0.00 - 0.04 K/UL    DF AUTOMATED     METABOLIC PANEL, BASIC    Collection Time: 11/04/22  4:07 AM   Result Value Ref Range    Sodium 132 (L) 136 - 145 mmol/L    Potassium 4.0 3.5 - 5.1 mmol/L    Chloride 98 97 - 108 mmol/L    CO2 26 21 - 32 mmol/L    Anion gap 8 5 - 15 mmol/L    Glucose 109 (H) 65 - 100 mg/dL    BUN 17 6 - 20 MG/DL    Creatinine 1.30 (H) 0.55 - 1.02 MG/DL    BUN/Creatinine ratio 13 12 - 20      eGFR 53 (L) >60 ml/min/1.73m2    Calcium 10.3 (H) 8.5 - 10.1 MG/DL   HEPATIC FUNCTION PANEL    Collection Time: 11/04/22  4:07 AM   Result Value Ref Range    Protein, total 8.8 (H) 6.4 - 8.2 g/dL    Albumin 4.3 3.5 - 5.0 g/dL    Globulin 4.5 (H) 2.0 - 4.0 g/dL    A-G Ratio 1.0 (L) 1.1 - 2.2      Bilirubin, total 1.8 (H) 0.2 - 1.0 MG/DL    Bilirubin, direct 0.5 (H) 0.0 - 0.2 MG/DL    Alk.  phosphatase 134 (H) 45 - 117 U/L    AST (SGOT) 53 (H) 15 - 37 U/L    ALT (SGPT) 80 (H) 12 - 78 U/L   GLUCOSE, POC    Collection Time: 11/04/22  6:30 AM   Result Value Ref Range    Glucose (POC) 102 65 - 117 mg/dL    Performed by Lena Gibbs    GLUCOSE, POC    Collection Time: 11/04/22 12:13 PM   Result Value Ref Range    Glucose (POC) 97 65 - 117 mg/dL    Performed by [employer] [place]    DUPLEX LOWER EXT ARTERY BILAT    Collection Time: 11/04/22  3:54 PM   Result Value Ref Range    Right CFA prox sys PSV 68.4 cm/s    Right Prox PFA A PSV 58.5 cm/s    Right super femoral prox sys PSV 72.8 cm/s    Right super femoral mid sys PSV 57.4 cm/s    Right super femoral dist sys PSV 61.8 cm/s    Right popliteal prox sys PSV 33.4 cm/s    Right popliteal dist sys PSV 37.2 cm/s    Right Prox PTA PSV 26.5 cm/s    Right Dist PTA PSV 30.1 cm/s    Right Prox YAEL Velocity 29.8 cm/s    Right Dist YAEL Velocity 29.8 cm/s    Left CFA prox sys PSV 75.0 cm/s    Left Prox PFA A PSV 53.0 cm/s    Left super femoral prox sys PSV 79.4 cm/s    Left super femoral mid sys PSV 73.9 cm/s    Left super femoral dist sys PSV 43.1 cm/s    Left popliteal prox sys PSV 36.0 cm/s    Left popliteal dist sys PSV 42.1 cm/s    Left Prox PTA PSV 29.4 cm/s    Left Dist PTA PSV 17.8 cm/s    Left Prox YAEL Velocity 26.8 cm/s    Left Dist YAEL Velocity 26.8 cm/s           I have reviewed the flowsheets. Chart and Pertinent Notes have been reviewed. No change in PMH ,family and social history from Consult note.       Maurisio Cardona [facility]

## 2022-11-04 NOTE — PROGRESS NOTES
0730: Bedside shift change report given to CK Diez (oncoming nurse) by CK Gilliam (offgoing nurse). Report included the following information SBAR, MAR, and Recent Results. 1930: Bedside shift change report given to Silvio Mcintosh RN (oncoming nurse) by CK Diez (offgoing nurse). Report included the following information SBAR, MAR, and Recent Results.

## 2022-11-04 NOTE — PROGRESS NOTES
[facility] in [place], [place]    Met with patient for ongoing LVAD education. She stated, \"I just talked to Angela Alvarado, I just cant view the video right now. \" Patient expressed she is feeling overwhelmed with all the recent changes, including PICC line. She stated she would like some time to process. Emotional support provided. Plan set up with patient to meet Monday with DVD player for her and her father to view, and will meet again Tuesday to discuss any follow up questions. Time given to ask questions. Patient had no further questions at this time.      Liang Salazar RN  VAD Coordinator

## 2022-11-05 LAB
ALBUMIN SERPL-MCNC: 4.1 G/DL (ref 3.5–5)
ALBUMIN/GLOB SERPL: 1.2 {RATIO} (ref 1.1–2.2)
ALP SERPL-CCNC: 136 U/L (ref 45–117)
ALT SERPL-CCNC: 75 U/L (ref 12–78)
ANION GAP SERPL CALC-SCNC: 6 MMOL/L (ref 5–15)
AST SERPL-CCNC: 58 U/L (ref 15–37)
B2 GLYCOPROT1 IGG SER-ACNC: <9 GPI IGG UNITS (ref 0–20)
B2 GLYCOPROT1 IGM SER-ACNC: <9 GPI IGM UNITS (ref 0–32)
BASOPHILS # BLD: 0.1 K/UL (ref 0–0.1)
BASOPHILS NFR BLD: 1 % (ref 0–1)
BILIRUB DIRECT SERPL-MCNC: 0.7 MG/DL (ref 0–0.2)
BILIRUB SERPL-MCNC: 1.5 MG/DL (ref 0.2–1)
BNP SERPL-MCNC: 968 PG/ML
BUN SERPL-MCNC: 18 MG/DL (ref 6–20)
BUN/CREAT SERPL: 18 (ref 12–20)
CALCIUM 24H UR-MRATE: 187 MG/24 HR (ref 142–353)
CALCIUM SERPL-MCNC: 10.7 MG/DL (ref 8.5–10.1)
CARDIOLIPIN IGA SER IA-ACNC: <9 APL U/ML (ref 0–11)
CARDIOLIPIN IGG SER IA-ACNC: <9 GPL U/ML (ref 0–14)
CH50 SERPL-ACNC: >60 U/ML
CHLORIDE SERPL-SCNC: 104 MMOL/L (ref 97–108)
CO2 SERPL-SCNC: 24 MMOL/L (ref 21–32)
COLLECT DURATION TIME UR: 24 HR
COLLECT DURATION TIME UR: 24 HR
CREAT 24H UR-MRATE: 1026 MG/24HR (ref 600–1800)
CREAT SERPL-MCNC: 1.01 MG/DL (ref 0.55–1.02)
DIFFERENTIAL METHOD BLD: NORMAL
EOSINOPHIL # BLD: 0.2 K/UL (ref 0–0.4)
EOSINOPHIL NFR BLD: 5 % (ref 0–7)
ERYTHROCYTE [DISTWIDTH] IN BLOOD BY AUTOMATED COUNT: 13.5 % (ref 11.5–14.5)
GLOBULIN SER CALC-MCNC: 3.5 G/DL (ref 2–4)
GLUCOSE BLD STRIP.AUTO-MCNC: 109 MG/DL (ref 65–117)
GLUCOSE BLD STRIP.AUTO-MCNC: 112 MG/DL (ref 65–117)
GLUCOSE BLD STRIP.AUTO-MCNC: 93 MG/DL (ref 65–117)
GLUCOSE BLD STRIP.AUTO-MCNC: 94 MG/DL (ref 65–117)
GLUCOSE SERPL-MCNC: 104 MG/DL (ref 65–100)
HCT VFR BLD AUTO: 46.4 % (ref 35–47)
HGB BLD-MCNC: 14.6 G/DL (ref 11.5–16)
IMM GRANULOCYTES # BLD AUTO: 0 K/UL (ref 0–0.04)
IMM GRANULOCYTES NFR BLD AUTO: 0 % (ref 0–0.5)
LYMPHOCYTES # BLD: 1.8 K/UL (ref 0.8–3.5)
LYMPHOCYTES NFR BLD: 41 % (ref 12–49)
MAGNESIUM SERPL-MCNC: 2.9 MG/DL (ref 1.6–2.4)
MCH RBC QN AUTO: 29.6 PG (ref 26–34)
MCHC RBC AUTO-ENTMCNC: 31.5 G/DL (ref 30–36.5)
MCV RBC AUTO: 94.1 FL (ref 80–99)
MONOCYTES # BLD: 0.5 K/UL (ref 0–1)
MONOCYTES NFR BLD: 12 % (ref 5–13)
NEUTS SEG # BLD: 1.8 K/UL (ref 1.8–8)
NEUTS SEG NFR BLD: 41 % (ref 32–75)
NRBC # BLD: 0 K/UL (ref 0–0.01)
NRBC BLD-RTO: 0 PER 100 WBC
PLATELET # BLD AUTO: 232 K/UL (ref 150–400)
PMV BLD AUTO: 11 FL (ref 8.9–12.9)
POTASSIUM SERPL-SCNC: 5.2 MMOL/L (ref 3.5–5.1)
PROT SERPL-MCNC: 7.6 G/DL (ref 6.4–8.2)
RBC # BLD AUTO: 4.93 M/UL (ref 3.8–5.2)
SERVICE CMNT-IMP: NORMAL
SODIUM SERPL-SCNC: 134 MMOL/L (ref 136–145)
SPECIMEN VOL 24H UR: 900 ML
SPECIMEN VOL ?TM UR: 900 ML
WBC # BLD AUTO: 4.3 K/UL (ref 3.6–11)

## 2022-11-05 PROCEDURE — 74011250637 HC RX REV CODE- 250/637: Performed by: HOSPITALIST

## 2022-11-05 PROCEDURE — 83735 ASSAY OF MAGNESIUM: CPT

## 2022-11-05 PROCEDURE — 80048 BASIC METABOLIC PNL TOTAL CA: CPT

## 2022-11-05 PROCEDURE — 65660000001 HC RM ICU INTERMED STEPDOWN

## 2022-11-05 PROCEDURE — 74011250637 HC RX REV CODE- 250/637: Performed by: STUDENT IN AN ORGANIZED HEALTH CARE EDUCATION/TRAINING PROGRAM

## 2022-11-05 PROCEDURE — 85025 COMPLETE CBC W/AUTO DIFF WBC: CPT

## 2022-11-05 PROCEDURE — 74011250637 HC RX REV CODE- 250/637: Performed by: NURSE PRACTITIONER

## 2022-11-05 PROCEDURE — 74011250637 HC RX REV CODE- 250/637: Performed by: INTERNAL MEDICINE

## 2022-11-05 PROCEDURE — 36415 COLL VENOUS BLD VENIPUNCTURE: CPT

## 2022-11-05 PROCEDURE — 74011250637 HC RX REV CODE- 250/637: Performed by: FAMILY MEDICINE

## 2022-11-05 PROCEDURE — 94760 N-INVAS EAR/PLS OXIMETRY 1: CPT

## 2022-11-05 PROCEDURE — 74011250636 HC RX REV CODE- 250/636: Performed by: NURSE PRACTITIONER

## 2022-11-05 PROCEDURE — 99291 CRITICAL CARE FIRST HOUR: CPT | Performed by: THORACIC SURGERY (CARDIOTHORACIC VASCULAR SURGERY)

## 2022-11-05 PROCEDURE — 83880 ASSAY OF NATRIURETIC PEPTIDE: CPT

## 2022-11-05 PROCEDURE — 80076 HEPATIC FUNCTION PANEL: CPT

## 2022-11-05 PROCEDURE — 74011000250 HC RX REV CODE- 250: Performed by: FAMILY MEDICINE

## 2022-11-05 PROCEDURE — 82962 GLUCOSE BLOOD TEST: CPT

## 2022-11-05 RX ORDER — NYSTATIN 100000 U/G
OINTMENT TOPICAL 2 TIMES DAILY
Status: DISCONTINUED | OUTPATIENT
Start: 2022-11-05 | End: 2022-11-09 | Stop reason: HOSPADM

## 2022-11-05 RX ORDER — NYSTATIN 100000 U/G
OINTMENT TOPICAL 2 TIMES DAILY
Status: DISCONTINUED | OUTPATIENT
Start: 2022-11-05 | End: 2022-11-05

## 2022-11-05 RX ADMIN — TRAZODONE HYDROCHLORIDE 200 MG: 100 TABLET ORAL at 22:09

## 2022-11-05 RX ADMIN — HYDROCORTISONE: 25 CREAM TOPICAL at 17:03

## 2022-11-05 RX ADMIN — HYDROCORTISONE: 25 CREAM TOPICAL at 14:00

## 2022-11-05 RX ADMIN — EMPAGLIFLOZIN 10 MG: 10 TABLET, FILM COATED ORAL at 09:50

## 2022-11-05 RX ADMIN — EZETIMIBE 10 MG: 10 TABLET ORAL at 09:50

## 2022-11-05 RX ADMIN — CARVEDILOL 3.12 MG: 3.12 TABLET, FILM COATED ORAL at 18:10

## 2022-11-05 RX ADMIN — NYSTATIN OINTMENT: 100000 OINTMENT TOPICAL at 17:04

## 2022-11-05 RX ADMIN — DIGOXIN 0.25 MG: 0.25 TABLET ORAL at 09:49

## 2022-11-05 RX ADMIN — MONTELUKAST 10 MG: 10 TABLET, FILM COATED ORAL at 09:50

## 2022-11-05 RX ADMIN — MILRINONE LACTATE IN DEXTROSE 0.25 MCG/KG/MIN: 200 INJECTION, SOLUTION INTRAVENOUS at 06:58

## 2022-11-05 RX ADMIN — SODIUM CHLORIDE, PRESERVATIVE FREE 5 ML: 5 INJECTION INTRAVENOUS at 14:00

## 2022-11-05 RX ADMIN — SODIUM CHLORIDE, PRESERVATIVE FREE 10 ML: 5 INJECTION INTRAVENOUS at 22:11

## 2022-11-05 RX ADMIN — FLUTICASONE PROPIONATE 2 SPRAY: 50 SPRAY, METERED NASAL at 09:51

## 2022-11-05 RX ADMIN — PANTOPRAZOLE SODIUM 40 MG: 40 TABLET, DELAYED RELEASE ORAL at 18:10

## 2022-11-05 RX ADMIN — HYDROCORTISONE: 25 CREAM TOPICAL at 09:51

## 2022-11-05 RX ADMIN — SODIUM CHLORIDE, PRESERVATIVE FREE 10 ML: 5 INJECTION INTRAVENOUS at 06:55

## 2022-11-05 RX ADMIN — ALLOPURINOL 50 MG: 100 TABLET ORAL at 09:50

## 2022-11-05 RX ADMIN — SPIRONOLACTONE 25 MG: 25 TABLET ORAL at 09:49

## 2022-11-05 RX ADMIN — CARVEDILOL 3.12 MG: 3.12 TABLET, FILM COATED ORAL at 09:49

## 2022-11-05 RX ADMIN — MILRINONE LACTATE IN DEXTROSE 0.25 MCG/KG/MIN: 200 INJECTION, SOLUTION INTRAVENOUS at 22:09

## 2022-11-05 RX ADMIN — ESCITALOPRAM OXALATE 20 MG: 10 TABLET ORAL at 09:49

## 2022-11-05 RX ADMIN — PRAMIPEXOLE DIHYDROCHLORIDE 0.12 MG: 0.25 TABLET ORAL at 22:09

## 2022-11-05 RX ADMIN — PRASUGREL 10 MG: 10 TABLET, FILM COATED ORAL at 09:49

## 2022-11-05 RX ADMIN — PANTOPRAZOLE SODIUM 40 MG: 40 TABLET, DELAYED RELEASE ORAL at 06:52

## 2022-11-05 RX ADMIN — CLONAZEPAM 1 MG: 0.5 TABLET ORAL at 22:09

## 2022-11-05 RX ADMIN — FOLIC ACID 1 MG: 1 TABLET ORAL at 09:50

## 2022-11-05 RX ADMIN — CINACALCET HYDROCHLORIDE 30 MG: 30 TABLET, FILM COATED ORAL at 09:49

## 2022-11-05 RX ADMIN — NYSTATIN OINTMENT: 100000 OINTMENT TOPICAL at 11:31

## 2022-11-05 RX ADMIN — DOCUSATE SODIUM 100 MG: 100 CAPSULE, LIQUID FILLED ORAL at 09:49

## 2022-11-05 NOTE — PROGRESS NOTES
0730: Bedside and Verbal shift change report given to CK Giordano (oncoming nurse) by Sherita Almodovar RN (offgoing nurse). Report included the following information SBAR, Kardex, MAR, and Cardiac Rhythm NSR / ST . Milrinone gtt rate verified. 0591: Serum potassium 5.3, per MD Yared Severino hold scheduled PO potassium. 1930: Bedside and Verbal shift change report given to [address] [place] David, RN (oncoming nurse) by Anshul Verde RN (offgoing nurse).  Report included the following information SBAR, Kardex, MAR, and Cardiac Rhythm NSR / ST .

## 2022-11-05 NOTE — PROGRESS NOTES
[facility] [facility] Adult  Hospitalist Group                                                                                          Hospitalist Progress Note  Elvis Santiago MD  Answering service: [phone] from in house phone        Date of Service:  2022  NAME:  Sandra Duncan  :  1980  MRN:  [id number]      Admission Summary:     Patient initially presents with suicidal ideation and found to have congestive heart failure. Interval history / Subjective:     Patient seen examined at bedside earlier.   Still on milrinone gtt, tachycardia is improved, complains of vaginal yeast infection     Assessment & Plan:     Nonischemic cm Hfref   -Acute on chronic systolic CHF NYHA class IV on admission  -Patient with ischemic cardiomyopathy with EF with 15 to 20%  -s/p RHC 10/27 - started on dobutamine and IV bumetanide  -meds being adjusted per AHF team  -Patient became tachycardic restarted on milrinone gtt 11/3 weaned off dobutamine gtt, continued   -rheum on board working up lupus vs srogens, markers in progress   -cardio consulted for consideration of cardiac biopsy awaiting eval      Hypercalcemia  -appreciate nephro input  -pth appears like primary?   -us shows 7 mm right-sided adenoma, sestamibi scan no uptake, consider parathyroidectomy if she would need LVAD    Hyperkalemia  -Discontinue potassium std will replete as needed  -monitor     Vaginal yeast infection  - Likely complication of Jardiance  - Cannot give fluconazole due to QTC prolongation  - Start miconazole ointment    Urinary tract infection  -Completed antibiotic    Hypertension  -Blood pressure is borderline low normal    Type 2 diabetes  -Well-controlled with hemoglobin A1c of 6.3  -Continue sliding scale insulin coverage    Coronary artery disease  -Patient with history of STEMI with stent to LAD  -Continue Effient    Dyslipidemia  -On Zetia    Restless leg syndrome  -On pramipexole, hold ropinirole secondary to tachycardia    Hemorrhoids  -Continue Anusol cream    Depression with suicidal ideation  -Patient previously evaluated by psychiatry  -On Lexapro, also on Klonopin for anxiety  -One-on-one sitter discontinued per psych recommendation  -Outpatient follow-up with psychiatry    Anxiety  -Patient on Klonopin for 12 years, continue Klonopin here  -Continue trazodone for sleep  -Outpatient follow-up with psychiatry    Constipation  -resolved; continue PRN Miralax    Dysgeusia: unclear etiology  -check B12 (normal/high), zinc, rapid covid (negative). TSH checked and was only mildly elevated in the setting of acute illness.   -no benefit with holding allopurinol; will resume this  -possibly related to GERD; continue pantoprazole, trial of carafate    Nausea: likely multifactorial, hx of gastroparesis, bowel edema from CHF  -continue supportive care    Critically ill, high risk for decompensation. CCT time 40 minutes     Code status: Full  Prophylaxis: SCDs  Care Plan discussed with: Patient/RN/CM/AHF  Anticipated Disposition: TBD > 48 hrs      Hospital Problems  Date Reviewed: 10/14/2022            Codes Class Noted POA    CHF (congestive heart failure) (HCC) ICD-10-CM: I50.9  ICD-9-CM: 428.0  10/21/2022 Unknown       Review of Systems:   A comprehensive review of systems was negative except for that written in the HPI. Vital Signs:    Last 24hrs VS reviewed since prior progress note.  Most recent are:  Visit Vitals  /87   Pulse (!) 103   Temp 97.8 °F (36.6 °C)   Resp 18   Ht 5' 4\" (1.626 m)   Wt 85.6 kg (188 lb 11.4 oz)   SpO2 97%   BMI 32.39 kg/m²         Intake/Output Summary (Last 24 hours) at 11/5/2022 1112  Last data filed at 11/5/2022 0700  Gross per 24 hour   Intake 797.87 ml   Output 400 ml   Net 397.87 ml          Physical Examination:     I had a face to face encounter with this patient and independently examined them on 11/5/2022 as outlined below:          Constitutional:  No acute distress, non-toxic, obese   ENT:  Oral mucosa moist, oropharynx benign, anicteric sclerae    Resp:  CTA bilaterally. No wheezing/rhonchi/rales. No accessory muscle use. CV:  Regular rhythm, normal rate, no murmurs, no gallops, trace b/l LE edema    GI:  Soft, non distended, non tender. normoactive bowel sounds     Musculoskeletal:  warm    Neurologic:  Moves all extremities. AAOx3, CN II-XII reviewed  Psych: normal mood appropriate affect            Data Review:    Review and/or order of clinical lab test      Labs:     Recent Labs     11/05/22 0407 11/04/22 0407   WBC 4.3 5.8   HGB 14.6 14.7   HCT 46.4 46.2    236       Recent Labs     11/05/22 0407 11/04/22 0407 11/03/22 2005 11/03/22 0445   * 132*  --  131*   K 5.2* 4.0  --  4.1    98  --  102   CO2 24 26  --  21   BUN 18 17  --  19   CREA 1.01 1.30*  --  1.16*   * 109*  --  114*   CA 10.7* 10.3*  --  10.3*   MG 2.9* 2.5*  --   --    PHOS  --   --  4.1  --        Recent Labs     11/05/22 0407 11/04/22 0407   ALT 75 80*   * 134*   TBILI 1.5* 1.8*   TP 7.6 8.8*   ALB 4.1 4.3   GLOB 3.5 4.5*       No results for input(s): INR, PTP, APTT, INREXT, INREXT in the last 72 hours. No results for input(s): FE, TIBC, PSAT, FERR in the last 72 hours. Lab Results   Component Value Date/Time    Folate 20.0 10/23/2022 04:24 AM        No results for input(s): PH, PCO2, PO2 in the last 72 hours. No results for input(s): CPK, CKNDX, TROIQ in the last 72 hours.     No lab exists for component: CPKMB  Lab Results   Component Value Date/Time    Cholesterol, total 95 10/22/2022 06:26 AM    HDL Cholesterol 32 10/22/2022 06:26 AM    LDL, calculated 45 10/22/2022 06:26 AM    Triglyceride 90 10/22/2022 06:26 AM    CHOL/HDL Ratio 3.0 10/22/2022 06:26 AM     Lab Results   Component Value Date/Time    Glucose (POC) 112 11/05/2022 06:51 AM    Glucose (POC) 112 11/04/2022 09:05 PM    Glucose (POC) 108 11/04/2022 04:19 PM    Glucose (POC) 97 11/04/2022 12:13 PM    Glucose (POC) 102 11/04/2022 06:30 AM     Lab Results   Component Value Date/Time    Color ORANGE 10/17/2022 08:42 AM    Appearance TURBID (A) 10/17/2022 08:42 AM    Specific gravity 1.025 10/17/2022 08:42 AM    Specific gravity 1.024 10/06/2022 08:57 AM    pH (UA) 5.0 10/17/2022 08:42 AM    Protein 30 (A) 10/17/2022 08:42 AM    Glucose Negative 10/17/2022 08:42 AM    Ketone Negative 10/17/2022 08:42 AM    Bilirubin Negative 11/25/2021 09:47 AM    Urobilinogen 1.0 10/17/2022 08:42 AM    Nitrites Positive (A) 10/17/2022 08:42 AM    Leukocyte Esterase SMALL (A) 10/17/2022 08:42 AM    Epithelial cells FEW 10/17/2022 08:42 AM    Bacteria 2+ (A) 10/17/2022 08:42 AM    WBC 5-10 10/17/2022 08:42 AM    RBC 5-10 10/17/2022 08:42 AM         Medications Reviewed:     Current Facility-Administered Medications   Medication Dose Route Frequency    nystatin (MYCOSTATIN) 100,000 unit/gram ointment   Topical BID    carvediloL (COREG) tablet 3.125 mg  3.125 mg Oral BID WITH MEALS    cinacalcet (SENSIPAR) tablet 30 mg  30 mg Oral DAILY WITH BREAKFAST    milrinone (PRIMACOR) 20 MG/100 ML D5W infusion  0.25 mcg/kg/min IntraVENous CONTINUOUS    digoxin (LANOXIN) tablet 0.25 mg  0.25 mg Oral DAILY    pramipexole (MIRAPEX) tablet 0.125 mg  0.125 mg Oral QHS    [Held by provider] bumetanide (BUMEX) injection 2 mg  2 mg IntraVENous TID    methyl salicylate-menthol (BENGAY) 15-10 % cream   Topical TID    pantoprazole (PROTONIX) tablet 40 mg  40 mg Oral ACB&D    polyethylene glycol (MIRALAX) packet 17 g  17 g Oral DAILY PRN    spironolactone (ALDACTONE) tablet 25 mg  25 mg Oral DAILY    prochlorperazine (COMPAZINE) injection 5 mg  5 mg IntraVENous Q6H PRN    ondansetron (ZOFRAN) injection 4 mg  4 mg IntraVENous Q6H PRN    allopurinoL (ZYLOPRIM) tablet 50 mg  50 mg Oral DAILY    hydrocortisone (ANUSOL-HC) 2.5 % rectal cream   PeriANAL QID    melatonin tablet 10.5 mg  10.5 mg Oral QHS PRN    empagliflozin (JARDIANCE) tablet 10 mg  10 mg Oral DAILY    aluminum & magnesium hydroxide-simethicone (MYLANTA II) oral suspension 30 mL  30 mL Oral Q4H PRN    diazePAM (VALIUM) tablet 5 mg  5 mg Oral Q12H PRN    docusate sodium (COLACE) capsule 100 mg  100 mg Oral DAILY    bisacodyL (DULCOLAX) suppository 10 mg  10 mg Rectal DAILY    albuterol-ipratropium (DUO-NEB) 2.5 MG-0.5 MG/3 ML  3 mL Nebulization Q6H PRN    fluticasone propionate (FLONASE) 50 mcg/actuation nasal spray 2 Spray  2 Spray Both Nostrils DAILY    traZODone (DESYREL) tablet 200 mg  200 mg Oral QHS    [Held by provider] magnesium oxide (MAG-OX) tablet 400 mg  400 mg Oral QHS    calcium carbonate (TUMS) chewable tablet 200 mg [elemental]  200 mg Oral TID PRN    clonazePAM (KlonoPIN) tablet 1 mg  1 mg Oral QHS    escitalopram oxalate (LEXAPRO) tablet 20 mg  20 mg Oral DAILY    ezetimibe (ZETIA) tablet 10 mg  10 mg Oral DAILY    folic acid (FOLVITE) tablet 1 mg  1 mg Oral DAILY    montelukast (SINGULAIR) tablet 10 mg  10 mg Oral DAILY    prasugreL (EFFIENT) tablet 10 mg  10 mg Oral DAILY    [Held by provider] rOPINIRole (REQUIP) tablet 0.5 mg  0.5 mg Oral BID    [Held by provider] rosuvastatin (CRESTOR) tablet 20 mg  20 mg Oral Q MON, WED & FRI    sodium chloride (NS) flush 5-40 mL  5-40 mL IntraVENous Q8H    sodium chloride (NS) flush 5-40 mL  5-40 mL IntraVENous PRN    acetaminophen (TYLENOL) tablet 650 mg  650 mg Oral Q4H PRN    nitroglycerin (NITROSTAT) tablet 0.4 mg  0.4 mg SubLINGual Q5MIN PRN    glucose chewable tablet 16 g  4 Tablet Oral PRN    glucagon (GLUCAGEN) injection 1 mg  1 mg IntraMUSCular PRN    dextrose 10 % infusion 0-250 mL  0-250 mL IntraVENous PRN    insulin lispro (HUMALOG) injection   SubCUTAneous AC&HS     ______________________________________________________________________  EXPECTED LENGTH OF STAY: 5d 4h  ACTUAL LENGTH OF STAY:          15                 Nahid Palacio MD

## 2022-11-05 NOTE — PROGRESS NOTES
Critical Care Note     Cardiac surgery was called for the evaluation and management of: NYHA class IV A/C systolic heart failure, cardiogenic shock, cardio-renal syndrome     The critical nature of the patient's condition includes the following: The patient is at imminent risk of death. She is at high risk of needing temporary MCS. She is undergoing LVAD evaluation     Critical care diagnoses that are being treated:    NYHA class IV A/C systolic heart failure / Cardiogenic shock  Cardio-renal syndrome     HPI: Patient is a 38 woman admitted for NYHA class IV A/C systolic heart failure, cardiogenic shock, and cardio-renal syndrome. The patient is at imminent risk of death. She is at high risk of needing temporary MCS. She is undergoing LVAD evaluation. NYHA class IV A/C systolic heart failure / Cardiogenic shock  Reviewed TTE - EF 15%, reasonable RV function, moderate TR  Did not tolerate Dobutamine due to tachycardia  Switched to milrinone  HR better - now in the 's  Dyspnea improving    Can lie flat at this point   NT pro-BNP 2K  Lactic acid 1.2  Continue inotropes    Cardio-renal syndrome   Creatinine 1.3  Continue inotropes    Hepatic congestion  LFTs in 80's  On inotropes     Intake/Output Summary (Last 24 hours) at 11/5/2022 1141  Last data filed at 11/5/2022 0700  Gross per 24 hour   Intake 797.87 ml   Output 400 ml   Net 397.87 ml      Visit Vitals  /71 (BP 1 Location: Left upper arm, BP Patient Position: Semi fowlers)   Pulse (!) 101   Temp 98.7 °F (37.1 °C)   Resp 18   Ht 5' 4\" (1.626 m)   Wt 188 lb 11.4 oz (85.6 kg)   LMP 10/09/2022   SpO2 98%   BMI 32.39 kg/m²      CXR Results  (Last 48 hours)      None           Total critical care time - 30 minutes (CPT 61665)      I personally spent the above critical care time. This is time spent at this critically ill patient's bedside / unit / floor actively involved in patient care as well as the coordination of care.   This does not include any procedural time which has been billed separately. This does not include any NP/PA patient care time. I had a face to face encounter with the patient, reviewed and interpreted patient data including clinical events, labs, images, vital signs, I/O's, and examined patient. I have discussed the case and the plan and management of the patient's care with the consulting services and bedside nurses. This patient has a high probability of imminent, clinically significant deterioration, which requires the highest level of preparedness to intervene urgently. I participated in the decision-making and personally managed or directed the management of life and organ supporting interventions to treat or prevent imminent deterioration. This patient has a critical illness or injury that is acutely impairing one or more vital organ systems such that there is a high probability of imminent or life threatening deterioration in the patient's condition. This patient requires high complexity medical decision making to assess, manipulate, and support vital organ system failure. After stabilization, this patient still requires management to prevent further life / organ threatening deterioration.

## 2022-11-05 NOTE — PROGRESS NOTES
Critical Care Note     Cardiac surgery was called for the evaluation and management of: NYHA class IV A/C systolic heart failure, cardiogenic shock, cardio-renal syndrome     The critical nature of the patient's condition includes the following: The patient is at imminent risk of death. She is at high risk of needing temporary MCS. She is undergoing LVAD evaluation     Critical care diagnoses that are being treated:    NYHA class IV A/C systolic heart failure / Cardiogenic shock  Cardio-renal syndrome     HPI: Patient is a 38 woman admitted for NYHA class IV A/C systolic heart failure, cardiogenic shock, and cardio-renal syndrome. The patient is at imminent risk of death. She is at high risk of needing temporary MCS. She is undergoing LVAD evaluation. NYHA class IV A/C systolic heart failure / Cardiogenic shock  TTE - EF 15%, reasonable RV function, moderate TR  Continues on milrinone  HR 's  Dyspnea improving    NT pro-'s  Lactic acid 1.2  Continue inotropes    Cardio-renal syndrome   Creatinine 1  Continue inotropes    Hepatic congestion  LFTs in 70's  On inotropes             Intake/Output Summary (Last 24 hours) at 11/5/2022 1150  Last data filed at 11/5/2022 0700  Gross per 24 hour   Intake 797.87 ml   Output 400 ml   Net 397.87 ml      Visit Vitals  /71 (BP 1 Location: Left upper arm, BP Patient Position: Semi fowlers)   Pulse (!) 101   Temp 98.7 °F (37.1 °C)   Resp 18   Ht 5' 4\" (1.626 m)   Wt 188 lb 11.4 oz (85.6 kg)   LMP 10/09/2022   SpO2 98%   BMI 32.39 kg/m²      CXR Results  (Last 48 hours)      None                        Total critical care time - 30 minutes (CPT 31121)      I personally spent the above critical care time. This is time spent at this critically ill patient's bedside / unit / floor actively involved in patient care as well as the coordination of care. This does not include any procedural time which has been billed separately.   This does not include any NP/PA patient care time. I had a face to face encounter with the patient, reviewed and interpreted patient data including clinical events, labs, images, vital signs, I/O's, and examined patient. I have discussed the case and the plan and management of the patient's care with the consulting services and bedside nurses. This patient has a high probability of imminent, clinically significant deterioration, which requires the highest level of preparedness to intervene urgently. I participated in the decision-making and personally managed or directed the management of life and organ supporting interventions to treat or prevent imminent deterioration. This patient has a critical illness or injury that is acutely impairing one or more vital organ systems such that there is a high probability of imminent or life threatening deterioration in the patient's condition. This patient requires high complexity medical decision making to assess, manipulate, and support vital organ system failure. After stabilization, this patient still requires management to prevent further life / organ threatening deterioration.

## 2022-11-06 LAB
ALBUMIN SERPL-MCNC: 3.7 G/DL (ref 3.5–5)
ALBUMIN/GLOB SERPL: 1 {RATIO} (ref 1.1–2.2)
ALP SERPL-CCNC: 134 U/L (ref 45–117)
ALT SERPL-CCNC: 88 U/L (ref 12–78)
ANION GAP SERPL CALC-SCNC: 7 MMOL/L (ref 5–15)
AST SERPL-CCNC: 65 U/L (ref 15–37)
BASOPHILS # BLD: 0.1 K/UL (ref 0–0.1)
BASOPHILS NFR BLD: 1 % (ref 0–1)
BILIRUB DIRECT SERPL-MCNC: 0.6 MG/DL (ref 0–0.2)
BILIRUB SERPL-MCNC: 1.1 MG/DL (ref 0.2–1)
BNP SERPL-MCNC: 1157 PG/ML
BUN SERPL-MCNC: 13 MG/DL (ref 6–20)
BUN/CREAT SERPL: 12 (ref 12–20)
CALCIUM SERPL-MCNC: 9.7 MG/DL (ref 8.5–10.1)
CHLORIDE SERPL-SCNC: 103 MMOL/L (ref 97–108)
CO2 SERPL-SCNC: 26 MMOL/L (ref 21–32)
CREAT SERPL-MCNC: 1.08 MG/DL (ref 0.55–1.02)
DIFFERENTIAL METHOD BLD: NORMAL
EOSINOPHIL # BLD: 0.3 K/UL (ref 0–0.4)
EOSINOPHIL NFR BLD: 6 % (ref 0–7)
ERYTHROCYTE [DISTWIDTH] IN BLOOD BY AUTOMATED COUNT: 13.2 % (ref 11.5–14.5)
GLOBULIN SER CALC-MCNC: 3.7 G/DL (ref 2–4)
GLUCOSE BLD STRIP.AUTO-MCNC: 102 MG/DL (ref 65–117)
GLUCOSE BLD STRIP.AUTO-MCNC: 104 MG/DL (ref 65–117)
GLUCOSE BLD STRIP.AUTO-MCNC: 111 MG/DL (ref 65–117)
GLUCOSE BLD STRIP.AUTO-MCNC: 86 MG/DL (ref 65–117)
GLUCOSE SERPL-MCNC: 135 MG/DL (ref 65–100)
HCT VFR BLD AUTO: 42.4 % (ref 35–47)
HGB BLD-MCNC: 13.5 G/DL (ref 11.5–16)
IMM GRANULOCYTES # BLD AUTO: 0 K/UL (ref 0–0.04)
IMM GRANULOCYTES NFR BLD AUTO: 0 % (ref 0–0.5)
LYMPHOCYTES # BLD: 1.8 K/UL (ref 0.8–3.5)
LYMPHOCYTES NFR BLD: 38 % (ref 12–49)
MAGNESIUM SERPL-MCNC: 2.4 MG/DL (ref 1.6–2.4)
MCH RBC QN AUTO: 29.1 PG (ref 26–34)
MCHC RBC AUTO-ENTMCNC: 31.8 G/DL (ref 30–36.5)
MCV RBC AUTO: 91.4 FL (ref 80–99)
MONOCYTES # BLD: 0.5 K/UL (ref 0–1)
MONOCYTES NFR BLD: 11 % (ref 5–13)
NEUTS SEG # BLD: 2.1 K/UL (ref 1.8–8)
NEUTS SEG NFR BLD: 44 % (ref 32–75)
NRBC # BLD: 0 K/UL (ref 0–0.01)
NRBC BLD-RTO: 0 PER 100 WBC
PLATELET # BLD AUTO: 241 K/UL (ref 150–400)
PMV BLD AUTO: 11.2 FL (ref 8.9–12.9)
POTASSIUM SERPL-SCNC: 4.3 MMOL/L (ref 3.5–5.1)
PROT SERPL-MCNC: 7.4 G/DL (ref 6.4–8.2)
RBC # BLD AUTO: 4.64 M/UL (ref 3.8–5.2)
SERVICE CMNT-IMP: NORMAL
SODIUM SERPL-SCNC: 136 MMOL/L (ref 136–145)
WBC # BLD AUTO: 4.7 K/UL (ref 3.6–11)

## 2022-11-06 PROCEDURE — 74011250637 HC RX REV CODE- 250/637: Performed by: STUDENT IN AN ORGANIZED HEALTH CARE EDUCATION/TRAINING PROGRAM

## 2022-11-06 PROCEDURE — 74011000250 HC RX REV CODE- 250: Performed by: FAMILY MEDICINE

## 2022-11-06 PROCEDURE — 74011250637 HC RX REV CODE- 250/637: Performed by: NURSE PRACTITIONER

## 2022-11-06 PROCEDURE — 74011250637 HC RX REV CODE- 250/637: Performed by: FAMILY MEDICINE

## 2022-11-06 PROCEDURE — 74011250636 HC RX REV CODE- 250/636: Performed by: NURSE PRACTITIONER

## 2022-11-06 PROCEDURE — 99291 CRITICAL CARE FIRST HOUR: CPT | Performed by: THORACIC SURGERY (CARDIOTHORACIC VASCULAR SURGERY)

## 2022-11-06 PROCEDURE — 83735 ASSAY OF MAGNESIUM: CPT

## 2022-11-06 PROCEDURE — 74011250637 HC RX REV CODE- 250/637: Performed by: HOSPITALIST

## 2022-11-06 PROCEDURE — 74011250637 HC RX REV CODE- 250/637: Performed by: INTERNAL MEDICINE

## 2022-11-06 PROCEDURE — 36415 COLL VENOUS BLD VENIPUNCTURE: CPT

## 2022-11-06 PROCEDURE — 85025 COMPLETE CBC W/AUTO DIFF WBC: CPT

## 2022-11-06 PROCEDURE — 80048 BASIC METABOLIC PNL TOTAL CA: CPT

## 2022-11-06 PROCEDURE — 65660000001 HC RM ICU INTERMED STEPDOWN

## 2022-11-06 PROCEDURE — 82962 GLUCOSE BLOOD TEST: CPT

## 2022-11-06 PROCEDURE — 83880 ASSAY OF NATRIURETIC PEPTIDE: CPT

## 2022-11-06 PROCEDURE — 80076 HEPATIC FUNCTION PANEL: CPT

## 2022-11-06 RX ORDER — DICLOFENAC SODIUM 10 MG/G
2 GEL TOPICAL 4 TIMES DAILY
Status: DISCONTINUED | OUTPATIENT
Start: 2022-11-06 | End: 2022-11-09 | Stop reason: HOSPADM

## 2022-11-06 RX ADMIN — PANTOPRAZOLE SODIUM 40 MG: 40 TABLET, DELAYED RELEASE ORAL at 16:34

## 2022-11-06 RX ADMIN — DIGOXIN 0.25 MG: 0.25 TABLET ORAL at 09:58

## 2022-11-06 RX ADMIN — MILRINONE LACTATE IN DEXTROSE 0.25 MCG/KG/MIN: 200 INJECTION, SOLUTION INTRAVENOUS at 16:34

## 2022-11-06 RX ADMIN — EMPAGLIFLOZIN 10 MG: 10 TABLET, FILM COATED ORAL at 09:59

## 2022-11-06 RX ADMIN — SODIUM CHLORIDE, PRESERVATIVE FREE 10 ML: 5 INJECTION INTRAVENOUS at 06:50

## 2022-11-06 RX ADMIN — ACETAMINOPHEN 650 MG: 325 TABLET ORAL at 03:45

## 2022-11-06 RX ADMIN — DICLOFENAC SODIUM 2 G: 10 GEL TOPICAL at 11:59

## 2022-11-06 RX ADMIN — SPIRONOLACTONE 25 MG: 25 TABLET ORAL at 09:59

## 2022-11-06 RX ADMIN — ALLOPURINOL 50 MG: 100 TABLET ORAL at 09:58

## 2022-11-06 RX ADMIN — HYDROCORTISONE: 25 CREAM TOPICAL at 13:34

## 2022-11-06 RX ADMIN — HYDROCORTISONE: 25 CREAM TOPICAL at 18:31

## 2022-11-06 RX ADMIN — PRASUGREL 10 MG: 10 TABLET, FILM COATED ORAL at 09:58

## 2022-11-06 RX ADMIN — CLONAZEPAM 1 MG: 0.5 TABLET ORAL at 22:00

## 2022-11-06 RX ADMIN — PANTOPRAZOLE SODIUM 40 MG: 40 TABLET, DELAYED RELEASE ORAL at 06:50

## 2022-11-06 RX ADMIN — ESCITALOPRAM OXALATE 20 MG: 10 TABLET ORAL at 09:59

## 2022-11-06 RX ADMIN — TRAZODONE HYDROCHLORIDE 200 MG: 100 TABLET ORAL at 22:00

## 2022-11-06 RX ADMIN — EZETIMIBE 10 MG: 10 TABLET ORAL at 09:59

## 2022-11-06 RX ADMIN — NYSTATIN OINTMENT: 100000 OINTMENT TOPICAL at 18:00

## 2022-11-06 RX ADMIN — PRAMIPEXOLE DIHYDROCHLORIDE 0.12 MG: 0.25 TABLET ORAL at 21:59

## 2022-11-06 RX ADMIN — SODIUM CHLORIDE, PRESERVATIVE FREE 5 ML: 5 INJECTION INTRAVENOUS at 13:34

## 2022-11-06 RX ADMIN — HYDROCORTISONE: 25 CREAM TOPICAL at 10:03

## 2022-11-06 RX ADMIN — MONTELUKAST 10 MG: 10 TABLET, FILM COATED ORAL at 09:59

## 2022-11-06 RX ADMIN — DOCUSATE SODIUM 100 MG: 100 CAPSULE, LIQUID FILLED ORAL at 09:59

## 2022-11-06 RX ADMIN — NYSTATIN OINTMENT: 100000 OINTMENT TOPICAL at 10:04

## 2022-11-06 RX ADMIN — CARVEDILOL 3.12 MG: 3.12 TABLET, FILM COATED ORAL at 09:59

## 2022-11-06 RX ADMIN — DICLOFENAC SODIUM 2 G: 10 GEL TOPICAL at 22:02

## 2022-11-06 RX ADMIN — CINACALCET HYDROCHLORIDE 30 MG: 30 TABLET, FILM COATED ORAL at 09:59

## 2022-11-06 RX ADMIN — DICLOFENAC SODIUM 2 G: 10 GEL TOPICAL at 18:31

## 2022-11-06 RX ADMIN — FOLIC ACID 1 MG: 1 TABLET ORAL at 09:58

## 2022-11-06 RX ADMIN — HYDROCORTISONE: 25 CREAM TOPICAL at 22:02

## 2022-11-06 RX ADMIN — SODIUM CHLORIDE, PRESERVATIVE FREE 10 ML: 5 INJECTION INTRAVENOUS at 22:04

## 2022-11-06 RX ADMIN — DICLOFENAC SODIUM 2 G: 10 GEL TOPICAL at 13:32

## 2022-11-06 NOTE — PROGRESS NOTES
[address] [facility] Adult  Hospitalist Group                                                                                          Hospitalist Progress Note  Jessica Knight MD  Answering service: [phone] from in house phone        Date of Service:  2022  NAME:  Luan Dunbar  :  1980  MRN:  [id number]      Admission Summary:     Patient initially presents with suicidal ideation and found to have congestive heart failure. Interval history / Subjective:     Patient seen examined at bedside earlier.   Still on milrinone gtt      Assessment & Plan:     Nonischemic cm Hfref   -Acute on chronic systolic CHF NYHA class IV on admission  -Patient with ischemic cardiomyopathy with EF with 15 to 20%  -s/p RHC 10/27 - started on dobutamine and IV bumetanide  -meds being adjusted per AHF team  -Patient became tachycardic restarted on milrinone gtt 11/3 weaned off dobutamine gtt, continued   -rheum on board working up lupus vs srogens, markers in progress   -cardio consulted for consideration of cardiac biopsy  -patient also undergoing evaluation of lvad     Hypercalcemia  -appreciate nephro input  -pth appears like primary  -us shows 7 mm right-sided adenoma, sestamibi scan no uptake, consider parathyroidectomy if she would need LVAD    Hyperkalemia  -Discontinue potassium std will replete as needed  -monitor     Vaginal yeast infection  - Likely complication of Jardiance  - Cannot give fluconazole due to QTC prolongation  - miconazole ointment    Urinary tract infection  -Completed antibiotic    Hypertension  -Blood pressure is borderline low normal    Type 2 diabetes  -Well-controlled with hemoglobin A1c of 6.3  -Continue sliding scale insulin coverage    Coronary artery disease  -Patient with history of STEMI with stent to LAD  -Continue Effient    Dyslipidemia  -On Zetia    Restless leg syndrome  -On pramipexole, hold ropinirole secondary to tachycardia    Hemorrhoids  -Continue Anusol cream    Depression with suicidal ideation  -Patient previously evaluated by psychiatry  -On Lexapro, also on Klonopin for anxiety  -One-on-one sitter discontinued per psych recommendation  -Outpatient follow-up with psychiatry    Anxiety  -Patient on Klonopin for 12 years, continue Klonopin here  -Continue trazodone for sleep  -Outpatient follow-up with psychiatry    Constipation  -resolved; continue PRN Miralax    Dysgeusia: unclear etiology  -check B12 (normal/high), zinc, rapid covid (negative). TSH checked and was only mildly elevated in the setting of acute illness.   -no benefit with holding allopurinol; will resume this  -possibly related to GERD; continue pantoprazole, trial of carafate    Nausea: likely multifactorial, hx of gastroparesis, bowel edema from CHF  -continue supportive care    Critically ill, high risk for decompensation. CCT time 40 minutes     Code status: Full  Prophylaxis: SCDs  Care Plan discussed with: Patient/RN/CM/AHF  Anticipated Disposition: TBD > 48 hrs      Hospital Problems  Date Reviewed: 10/14/2022            Codes Class Noted POA    CHF (congestive heart failure) (HCC) ICD-10-CM: I50.9  ICD-9-CM: 428.0  10/21/2022 Unknown       Review of Systems:   A comprehensive review of systems was negative except for that written in the HPI. Vital Signs:    Last 24hrs VS reviewed since prior progress note.  Most recent are:  Visit Vitals  /79 (BP 1 Location: Left upper arm, BP Patient Position: Semi fowlers)   Pulse 100   Temp 97.5 °F (36.4 °C)   Resp 16   Ht 5' 4\" (1.626 m)   Wt 86.4 kg (190 lb 7.6 oz)   SpO2 98%   BMI 32.70 kg/m²         Intake/Output Summary (Last 24 hours) at 11/6/2022 1224  Last data filed at 11/6/2022 0600  Gross per 24 hour   Intake 240 ml   Output 1100 ml   Net -860 ml          Physical Examination:     I had a face to face encounter with this patient and independently examined them on 11/6/2022 as outlined below:          Constitutional:  No acute distress, non-toxic, obese   ENT:  Oral mucosa moist, oropharynx benign, anicteric sclerae    Resp:  CTA bilaterally. No wheezing/rhonchi/rales. No accessory muscle use. CV:  Regular rhythm, normal rate, no murmurs, no gallops, trace b/l LE edema    GI:  Soft, non distended, non tender. normoactive bowel sounds     Musculoskeletal:  warm    Neurologic:  Moves all extremities. AAOx3, CN II-XII reviewed  Psych: normal mood appropriate affect            Data Review:    Review and/or order of clinical lab test      Labs:     Recent Labs     11/06/22 0335 11/05/22 0407   WBC 4.7 4.3   HGB 13.5 14.6   HCT 42.4 46.4    232       Recent Labs     11/06/22 0335 11/05/22 0407 11/04/22 0407 11/03/22 2005    134* 132*  --    K 4.3 5.2* 4.0  --     104 98  --    CO2 26 24 26  --    BUN 13 18 17  --    CREA 1.08* 1.01 1.30*  --    * 104* 109*  --    CA 9.7 10.7* 10.3*  --    MG 2.4 2.9* 2.5*  --    PHOS  --   --   --  4.1       Recent Labs     11/06/22 0335 11/05/22 0407 11/04/22 0407   ALT 88* 75 80*   * 136* 134*   TBILI 1.1* 1.5* 1.8*   TP 7.4 7.6 8.8*   ALB 3.7 4.1 4.3   GLOB 3.7 3.5 4.5*       No results for input(s): INR, PTP, APTT, INREXT, INREXT in the last 72 hours. No results for input(s): FE, TIBC, PSAT, FERR in the last 72 hours. Lab Results   Component Value Date/Time    Folate 20.0 10/23/2022 04:24 AM        No results for input(s): PH, PCO2, PO2 in the last 72 hours. No results for input(s): CPK, CKNDX, TROIQ in the last 72 hours.     No lab exists for component: CPKMB  Lab Results   Component Value Date/Time    Cholesterol, total 95 10/22/2022 06:26 AM    HDL Cholesterol 32 10/22/2022 06:26 AM    LDL, calculated 45 10/22/2022 06:26 AM    Triglyceride 90 10/22/2022 06:26 AM    CHOL/HDL Ratio 3.0 10/22/2022 06:26 AM     Lab Results   Component Value Date/Time    Glucose (POC) 86 11/06/2022 11:50 AM    Glucose (POC) 102 11/06/2022 06:50 AM    Glucose (POC) 109 11/05/2022 10:16 PM    Glucose (POC) 94 11/05/2022 04:35 PM    Glucose (POC) 93 11/05/2022 11:32 AM     Lab Results   Component Value Date/Time    Color ORANGE 10/17/2022 08:42 AM    Appearance TURBID (A) 10/17/2022 08:42 AM    Specific gravity 1.025 10/17/2022 08:42 AM    Specific gravity 1.024 10/06/2022 08:57 AM    pH (UA) 5.0 10/17/2022 08:42 AM    Protein 30 (A) 10/17/2022 08:42 AM    Glucose Negative 10/17/2022 08:42 AM    Ketone Negative 10/17/2022 08:42 AM    Bilirubin Negative 11/25/2021 09:47 AM    Urobilinogen 1.0 10/17/2022 08:42 AM    Nitrites Positive (A) 10/17/2022 08:42 AM    Leukocyte Esterase SMALL (A) 10/17/2022 08:42 AM    Epithelial cells FEW 10/17/2022 08:42 AM    Bacteria 2+ (A) 10/17/2022 08:42 AM    WBC 5-10 10/17/2022 08:42 AM    RBC 5-10 10/17/2022 08:42 AM         Medications Reviewed:     Current Facility-Administered Medications   Medication Dose Route Frequency    diclofenac (VOLTAREN) 1 % topical gel 2 g  2 g Topical QID    nystatin (MYCOSTATIN) 100,000 unit/gram ointment   Topical BID    carvediloL (COREG) tablet 3.125 mg  3.125 mg Oral BID WITH MEALS    cinacalcet (SENSIPAR) tablet 30 mg  30 mg Oral DAILY WITH BREAKFAST    milrinone (PRIMACOR) 20 MG/100 ML D5W infusion  0.25 mcg/kg/min IntraVENous CONTINUOUS    digoxin (LANOXIN) tablet 0.25 mg  0.25 mg Oral DAILY    pramipexole (MIRAPEX) tablet 0.125 mg  0.125 mg Oral QHS    [Held by provider] bumetanide (BUMEX) injection 2 mg  2 mg IntraVENous TID    methyl salicylate-menthol (BENGAY) 15-10 % cream   Topical TID    pantoprazole (PROTONIX) tablet 40 mg  40 mg Oral ACB&D    polyethylene glycol (MIRALAX) packet 17 g  17 g Oral DAILY PRN    spironolactone (ALDACTONE) tablet 25 mg  25 mg Oral DAILY    prochlorperazine (COMPAZINE) injection 5 mg  5 mg IntraVENous Q6H PRN    ondansetron (ZOFRAN) injection 4 mg  4 mg IntraVENous Q6H PRN    allopurinoL (ZYLOPRIM) tablet 50 mg  50 mg Oral DAILY    hydrocortisone (ANUSOL-HC) 2.5 % rectal cream   PeriANAL QID    melatonin tablet 10.5 mg  10.5 mg Oral QHS PRN    empagliflozin (JARDIANCE) tablet 10 mg  10 mg Oral DAILY    aluminum & magnesium hydroxide-simethicone (MYLANTA II) oral suspension 30 mL  30 mL Oral Q4H PRN    diazePAM (VALIUM) tablet 5 mg  5 mg Oral Q12H PRN    docusate sodium (COLACE) capsule 100 mg  100 mg Oral DAILY    bisacodyL (DULCOLAX) suppository 10 mg  10 mg Rectal DAILY    albuterol-ipratropium (DUO-NEB) 2.5 MG-0.5 MG/3 ML  3 mL Nebulization Q6H PRN    fluticasone propionate (FLONASE) 50 mcg/actuation nasal spray 2 Spray  2 Spray Both Nostrils DAILY    traZODone (DESYREL) tablet 200 mg  200 mg Oral QHS    [Held by provider] magnesium oxide (MAG-OX) tablet 400 mg  400 mg Oral QHS    calcium carbonate (TUMS) chewable tablet 200 mg [elemental]  200 mg Oral TID PRN    clonazePAM (KlonoPIN) tablet 1 mg  1 mg Oral QHS    escitalopram oxalate (LEXAPRO) tablet 20 mg  20 mg Oral DAILY    ezetimibe (ZETIA) tablet 10 mg  10 mg Oral DAILY    folic acid (FOLVITE) tablet 1 mg  1 mg Oral DAILY    montelukast (SINGULAIR) tablet 10 mg  10 mg Oral DAILY    prasugreL (EFFIENT) tablet 10 mg  10 mg Oral DAILY    [Held by provider] rOPINIRole (REQUIP) tablet 0.5 mg  0.5 mg Oral BID    [Held by provider] rosuvastatin (CRESTOR) tablet 20 mg  20 mg Oral Q MON, WED & FRI    sodium chloride (NS) flush 5-40 mL  5-40 mL IntraVENous Q8H    sodium chloride (NS) flush 5-40 mL  5-40 mL IntraVENous PRN    acetaminophen (TYLENOL) tablet 650 mg  650 mg Oral Q4H PRN    nitroglycerin (NITROSTAT) tablet 0.4 mg  0.4 mg SubLINGual Q5MIN PRN    glucose chewable tablet 16 g  4 Tablet Oral PRN    glucagon (GLUCAGEN) injection 1 mg  1 mg IntraMUSCular PRN    dextrose 10 % infusion 0-250 mL  0-250 mL IntraVENous PRN    insulin lispro (HUMALOG) injection   SubCUTAneous AC&HS     ______________________________________________________________________  EXPECTED LENGTH OF STAY: 5d 4h  ACTUAL LENGTH OF STAY: [address] [place] [address]

## 2022-11-06 NOTE — PROGRESS NOTES
Critical Care Note      Cardiac surgery was called for the evaluation and management of: NYHA class IV A/C systolic heart failure, cardiogenic shock, cardio-renal syndrome      The critical nature of the patient's condition includes the following: The patient is at imminent risk of death. She is at high risk of needing temporary MCS. She is undergoing LVAD evaluation      Critical care diagnoses that are being treated:     NYHA class IV A/C systolic heart failure / Cardiogenic shock  Cardio-renal syndrome      HPI: Patient is a 38 woman admitted for NYHA class IV A/C systolic heart failure, cardiogenic shock, and cardio-renal syndrome. The patient is at imminent risk of death. She is at high risk of needing temporary MCS. She is undergoing LVAD evaluation. NYHA class IV A/C systolic heart failure / Cardiogenic shock  TTE - EF 15%, reasonable RV function, moderate TR  Continues on milrinone  HR still in 's  Dyspnea continues to improve   NT pro-BNP 1.1 K  Lactic acid 1.2  Continue inotropes  LVAD work-up in progress  Suspect we can get her home on inotropes      Cardio-renal syndrome   Creatinine 1.1  Continue inotropes  Holding diuretics      Hepatic congestion  LFTs in 80's  On inotropes             Intake/Output Summary (Last 24 hours) at 11/6/2022 1129  Last data filed at 11/6/2022 0600  Gross per 24 hour   Intake 480 ml   Output 1100 ml   Net -620 ml      Visit Vitals  /75   Pulse 99   Temp 98.4 °F (36.9 °C)   Resp 16   Ht 5' 4\" (1.626 m)   Wt 190 lb 7.6 oz (86.4 kg)   LMP 10/09/2022   SpO2 99%   BMI 32.70 kg/m²      CXR Results  (Last 48 hours)      None              Total critical care time - 30 minutes (CPT 25120)      I personally spent the above critical care time. This is time spent at this critically ill patient's bedside / unit / floor actively involved in patient care as well as the coordination of care. This does not include any procedural time which has been billed separately. This does not include any NP/PA patient care time. I had a face to face encounter with the patient, reviewed and interpreted patient data including clinical events, labs, images, vital signs, I/O's, and examined patient. I have discussed the case and the plan and management of the patient's care with the consulting services and bedside nurses. This patient has a high probability of imminent, clinically significant deterioration, which requires the highest level of preparedness to intervene urgently. I participated in the decision-making and personally managed or directed the management of life and organ supporting interventions to treat or prevent imminent deterioration. This patient has a critical illness or injury that is acutely impairing one or more vital organ systems such that there is a high probability of imminent or life threatening deterioration in the patient's condition. This patient requires high complexity medical decision making to assess, manipulate, and support vital organ system failure. After stabilization, this patient still requires management to prevent further life / organ threatening deterioration.

## 2022-11-06 NOTE — PROGRESS NOTES
0730: Bedside and Verbal shift change report given to CK Giordano (oncoming nurse) by Ramy Churchill RN (offgoing nurse). Report included the following information SBAR, Kardex, MAR, and Cardiac Rhythm NSR / ST .     1640: /73, hold carvedilol per MD Abdullahi Favorite. 2000: Bedside and Verbal shift change report given to Colten Crook RN (oncoming nurse) by Jeffery Carpio RN (offgoing nurse). Report included the following information SBAR, Kardex, MAR, and Cardiac Rhythm NSR / ST .           Problem: Falls - Risk of  Goal: *Absence of Falls  Description: Document Vernia Colder Fall Risk and appropriate interventions in the flowsheet.   Outcome: Progressing Towards Goal  Note: Fall Risk Interventions:  Mobility Interventions: Communicate number of staff needed for ambulation/transfer    Mentation Interventions: More frequent rounding    Medication Interventions: Teach patient to arise slowly    Elimination Interventions: Call light in reach       Problem: Heart Failure: Day 5  Goal: Off Pathway (Use only if patient is Off Pathway)  Outcome: Progressing Towards Goal

## 2022-11-07 LAB
ALBUMIN SERPL-MCNC: 3.4 G/DL (ref 3.5–5)
ALBUMIN/GLOB SERPL: 0.9 {RATIO} (ref 1.1–2.2)
ALP SERPL-CCNC: 127 U/L (ref 45–117)
ALT SERPL-CCNC: 84 U/L (ref 12–78)
ANA HOMOGEN TITR SER: ABNORMAL {TITER}
ANA TITR SER IF: POSITIVE {TITER}
ANION GAP SERPL CALC-SCNC: 4 MMOL/L (ref 5–15)
AST SERPL-CCNC: 62 U/L (ref 15–37)
BACTERIA SPEC CULT: NORMAL
BASOPHILS # BLD: 0.1 K/UL (ref 0–0.1)
BASOPHILS NFR BLD: 1 % (ref 0–1)
BILIRUB DIRECT SERPL-MCNC: 0.4 MG/DL (ref 0–0.2)
BILIRUB SERPL-MCNC: 0.9 MG/DL (ref 0.2–1)
BNP SERPL-MCNC: 1246 PG/ML
BUN SERPL-MCNC: 10 MG/DL (ref 6–20)
BUN/CREAT SERPL: 12 (ref 12–20)
CALCIUM SERPL-MCNC: 8.9 MG/DL (ref 8.5–10.1)
CARDIOLIPIN IGM SER IA-ACNC: 20 MPL U/ML (ref 0–12)
CHLORIDE SERPL-SCNC: 106 MMOL/L (ref 97–108)
CO2 SERPL-SCNC: 26 MMOL/L (ref 21–32)
CREAT SERPL-MCNC: 0.82 MG/DL (ref 0.55–1.02)
DIFFERENTIAL METHOD BLD: ABNORMAL
EOSINOPHIL # BLD: 0.3 K/UL (ref 0–0.4)
EOSINOPHIL NFR BLD: 7 % (ref 0–7)
ERYTHROCYTE [DISTWIDTH] IN BLOOD BY AUTOMATED COUNT: 13.2 % (ref 11.5–14.5)
F2 GENE MUT ANL BLD/T: NORMAL
GLOBULIN SER CALC-MCNC: 3.8 G/DL (ref 2–4)
GLUCOSE BLD STRIP.AUTO-MCNC: 103 MG/DL (ref 65–117)
GLUCOSE BLD STRIP.AUTO-MCNC: 90 MG/DL (ref 65–117)
GLUCOSE BLD STRIP.AUTO-MCNC: 93 MG/DL (ref 65–117)
GLUCOSE BLD STRIP.AUTO-MCNC: 97 MG/DL (ref 65–117)
GLUCOSE SERPL-MCNC: 100 MG/DL (ref 65–100)
HCT VFR BLD AUTO: 41.6 % (ref 35–47)
HGB BLD-MCNC: 13.1 G/DL (ref 11.5–16)
IMM GRANULOCYTES # BLD AUTO: 0 K/UL (ref 0–0.04)
IMM GRANULOCYTES NFR BLD AUTO: 0 % (ref 0–0.5)
KAPPA LC FREE SER-MCNC: 26.9 MG/L (ref 3.3–19.4)
KAPPA LC FREE/LAMBDA FREE SER: 1.96 {RATIO} (ref 0.26–1.65)
LAMBDA LC FREE SERPL-MCNC: 13.7 MG/L (ref 5.7–26.3)
LYMPHOCYTES # BLD: 2.3 K/UL (ref 0.8–3.5)
LYMPHOCYTES NFR BLD: 52 % (ref 12–49)
MAGNESIUM SERPL-MCNC: 2 MG/DL (ref 1.6–2.4)
MCH RBC QN AUTO: 29.3 PG (ref 26–34)
MCHC RBC AUTO-ENTMCNC: 31.5 G/DL (ref 30–36.5)
MCV RBC AUTO: 93.1 FL (ref 80–99)
MONOCYTES # BLD: 0.5 K/UL (ref 0–1)
MONOCYTES NFR BLD: 11 % (ref 5–13)
NEUTS SEG # BLD: 1.3 K/UL (ref 1.8–8)
NEUTS SEG NFR BLD: 29 % (ref 32–75)
NOTE:, 016270: ABNORMAL
NRBC # BLD: 0 K/UL (ref 0–0.01)
NRBC BLD-RTO: 0 PER 100 WBC
PLATELET # BLD AUTO: 227 K/UL (ref 150–400)
PMV BLD AUTO: 11.3 FL (ref 8.9–12.9)
POTASSIUM SERPL-SCNC: 3.9 MMOL/L (ref 3.5–5.1)
PROT SERPL-MCNC: 7.2 G/DL (ref 6.4–8.2)
RBC # BLD AUTO: 4.47 M/UL (ref 3.8–5.2)
SERVICE CMNT-IMP: NORMAL
SODIUM SERPL-SCNC: 136 MMOL/L (ref 136–145)
WBC # BLD AUTO: 4.4 K/UL (ref 3.6–11)

## 2022-11-07 PROCEDURE — 93005 ELECTROCARDIOGRAM TRACING: CPT

## 2022-11-07 PROCEDURE — 36415 COLL VENOUS BLD VENIPUNCTURE: CPT

## 2022-11-07 PROCEDURE — 83735 ASSAY OF MAGNESIUM: CPT

## 2022-11-07 PROCEDURE — 74011250636 HC RX REV CODE- 250/636: Performed by: NURSE PRACTITIONER

## 2022-11-07 PROCEDURE — 74011250637 HC RX REV CODE- 250/637: Performed by: HOSPITALIST

## 2022-11-07 PROCEDURE — 65660000001 HC RM ICU INTERMED STEPDOWN

## 2022-11-07 PROCEDURE — 94760 N-INVAS EAR/PLS OXIMETRY 1: CPT

## 2022-11-07 PROCEDURE — 74011250637 HC RX REV CODE- 250/637: Performed by: NURSE PRACTITIONER

## 2022-11-07 PROCEDURE — 74011250637 HC RX REV CODE- 250/637: Performed by: FAMILY MEDICINE

## 2022-11-07 PROCEDURE — 85025 COMPLETE CBC W/AUTO DIFF WBC: CPT

## 2022-11-07 PROCEDURE — 83880 ASSAY OF NATRIURETIC PEPTIDE: CPT

## 2022-11-07 PROCEDURE — 74011250637 HC RX REV CODE- 250/637: Performed by: INTERNAL MEDICINE

## 2022-11-07 PROCEDURE — 80076 HEPATIC FUNCTION PANEL: CPT

## 2022-11-07 PROCEDURE — 94010 BREATHING CAPACITY TEST: CPT

## 2022-11-07 PROCEDURE — 99203 OFFICE O/P NEW LOW 30 MIN: CPT | Performed by: PEDIATRICS

## 2022-11-07 PROCEDURE — 74011000250 HC RX REV CODE- 250: Performed by: FAMILY MEDICINE

## 2022-11-07 PROCEDURE — 80048 BASIC METABOLIC PNL TOTAL CA: CPT

## 2022-11-07 PROCEDURE — 82962 GLUCOSE BLOOD TEST: CPT

## 2022-11-07 PROCEDURE — 99223 1ST HOSP IP/OBS HIGH 75: CPT | Performed by: INTERNAL MEDICINE

## 2022-11-07 RX ORDER — METOPROLOL SUCCINATE 25 MG/1
12.5 TABLET, EXTENDED RELEASE ORAL EVERY 12 HOURS
Status: DISCONTINUED | OUTPATIENT
Start: 2022-11-07 | End: 2022-11-09 | Stop reason: HOSPADM

## 2022-11-07 RX ORDER — BUMETANIDE 1 MG/1
2 TABLET ORAL 2 TIMES DAILY
Status: DISCONTINUED | OUTPATIENT
Start: 2022-11-07 | End: 2022-11-09 | Stop reason: HOSPADM

## 2022-11-07 RX ADMIN — PANTOPRAZOLE SODIUM 40 MG: 40 TABLET, DELAYED RELEASE ORAL at 17:50

## 2022-11-07 RX ADMIN — PRASUGREL 10 MG: 10 TABLET, FILM COATED ORAL at 09:22

## 2022-11-07 RX ADMIN — HYDROCORTISONE: 25 CREAM TOPICAL at 09:28

## 2022-11-07 RX ADMIN — SODIUM CHLORIDE, PRESERVATIVE FREE 10 ML: 5 INJECTION INTRAVENOUS at 06:28

## 2022-11-07 RX ADMIN — TRAZODONE HYDROCHLORIDE 200 MG: 100 TABLET ORAL at 22:50

## 2022-11-07 RX ADMIN — MILRINONE LACTATE IN DEXTROSE 0.25 MCG/KG/MIN: 200 INJECTION, SOLUTION INTRAVENOUS at 19:47

## 2022-11-07 RX ADMIN — FLUTICASONE PROPIONATE 2 SPRAY: 50 SPRAY, METERED NASAL at 09:30

## 2022-11-07 RX ADMIN — CARVEDILOL 3.12 MG: 3.12 TABLET, FILM COATED ORAL at 09:23

## 2022-11-07 RX ADMIN — MONTELUKAST 10 MG: 10 TABLET, FILM COATED ORAL at 09:23

## 2022-11-07 RX ADMIN — METOPROLOL SUCCINATE 12.5 MG: 25 TABLET, EXTENDED RELEASE ORAL at 22:50

## 2022-11-07 RX ADMIN — METOPROLOL SUCCINATE 12.5 MG: 25 TABLET, EXTENDED RELEASE ORAL at 12:28

## 2022-11-07 RX ADMIN — DIGOXIN 0.25 MG: 0.25 TABLET ORAL at 09:23

## 2022-11-07 RX ADMIN — DICLOFENAC SODIUM 2 G: 10 GEL TOPICAL at 09:29

## 2022-11-07 RX ADMIN — MENTHOL, METHYL SALICYLATE: 10; 15 CREAM TOPICAL at 22:51

## 2022-11-07 RX ADMIN — NYSTATIN OINTMENT: 100000 OINTMENT TOPICAL at 09:29

## 2022-11-07 RX ADMIN — PANTOPRAZOLE SODIUM 40 MG: 40 TABLET, DELAYED RELEASE ORAL at 06:26

## 2022-11-07 RX ADMIN — BUMETANIDE 2 MG: 1 TABLET ORAL at 12:28

## 2022-11-07 RX ADMIN — CINACALCET HYDROCHLORIDE 30 MG: 30 TABLET, FILM COATED ORAL at 09:23

## 2022-11-07 RX ADMIN — ALLOPURINOL 50 MG: 100 TABLET ORAL at 09:23

## 2022-11-07 RX ADMIN — SODIUM CHLORIDE, PRESERVATIVE FREE 10 ML: 5 INJECTION INTRAVENOUS at 17:54

## 2022-11-07 RX ADMIN — FOLIC ACID 1 MG: 1 TABLET ORAL at 09:23

## 2022-11-07 RX ADMIN — SPIRONOLACTONE 25 MG: 25 TABLET ORAL at 09:22

## 2022-11-07 RX ADMIN — NYSTATIN OINTMENT: 100000 OINTMENT TOPICAL at 17:56

## 2022-11-07 RX ADMIN — BUMETANIDE 2 MG: 1 TABLET ORAL at 17:50

## 2022-11-07 RX ADMIN — CALCIUM CARBONATE (ANTACID) CHEW TAB 500 MG 200 MG: 500 CHEW TAB at 23:13

## 2022-11-07 RX ADMIN — EMPAGLIFLOZIN 10 MG: 10 TABLET, FILM COATED ORAL at 09:22

## 2022-11-07 RX ADMIN — DICLOFENAC SODIUM 2 G: 10 GEL TOPICAL at 15:00

## 2022-11-07 RX ADMIN — MILRINONE LACTATE IN DEXTROSE 0.25 MCG/KG/MIN: 200 INJECTION, SOLUTION INTRAVENOUS at 02:15

## 2022-11-07 RX ADMIN — EZETIMIBE 10 MG: 10 TABLET ORAL at 09:22

## 2022-11-07 RX ADMIN — SODIUM CHLORIDE, PRESERVATIVE FREE 10 ML: 5 INJECTION INTRAVENOUS at 22:51

## 2022-11-07 RX ADMIN — DICLOFENAC SODIUM 2 G: 10 GEL TOPICAL at 22:52

## 2022-11-07 RX ADMIN — DOCUSATE SODIUM 100 MG: 100 CAPSULE, LIQUID FILLED ORAL at 09:22

## 2022-11-07 RX ADMIN — CLONAZEPAM 1 MG: 0.5 TABLET ORAL at 22:51

## 2022-11-07 RX ADMIN — ESCITALOPRAM OXALATE 20 MG: 10 TABLET ORAL at 09:22

## 2022-11-07 RX ADMIN — PRAMIPEXOLE DIHYDROCHLORIDE 0.12 MG: 0.25 TABLET ORAL at 22:50

## 2022-11-07 NOTE — H&P
Patient seen and examined on 11/3/22 and 11/4/22    CHIEF COMPLAINT  The patient was seen for rheumatology consultation for evaluation of immune mediated disease    HISTORY OF PRESENT ILLNESS  This is a 38 y.o.  female. Today, the patient complains of pain in the joints. Location: hand  Severity:  6 on a scale of 0-10  Timing: intermittent, on awakening, or morning   Duration:  several years  Modifying factors: none  Context/Associated signs and symptoms: The patient has dry eyes, dry mouth, inflammatory arthralgia and has been told she may have sjogrens syndrome in the past. She was on plaquneil in the past for this with minimal relief. She is not following with a rheumatologist currently. She does not recall having any cardiac issues related to sjogrens in the past. There is no history of pericarditis or myocarditis however these are not always evident. She has not been told she has heart block in the past. Her ROS for other immune mediated conditions such as SLE, scleroderma, RA, myositis is negative.     RHEUMATOLOGY REVIEW OF SYSTEMS   Positives as per HPI  Negatives as follows:  Ephriam Donato:  Denies unexplained persistent fevers, weight change  HEAD/EYES:   Denies eye redness, blurry vision or sudden loss of vision, HA  ENT:    Denies oral/nasal ulcers, recurrent sinus infections  RESPIRATORY:  No pleuritic pain, history of pleural effusions, hemoptysis  CARDIOVASCULAR:  Denies history of pericardial effusions  GASTRO:   Denies heartburn, esophageal dysmotility, abdominal pain, nausea, vomiting, diarrhea, blood in the stool  HEMATOLOGIC:  No easy bruising, purpura, swollen lymph nodes  SKIN:    Denies alopecia, ulcers, nodules, sun sensitivity, unexplained persistent rash   VASCULAR:   Denies edema, cyanosis, raynaud phenomenon  NEUROLOGIC:  Denies specific muscle weakness, paresthesias   PSYCHIATRIC:  No sleep disturbance / snoring, depression, anxiety  MSK:    No SI joint pain, persistent joint swelling, p    MEDICAL  AND SOCIAL HISTORY  This was reviewed with the patient and reviewed in the medical records. Past Medical History:   Diagnosis Date    Anemia NEC     Arthritis     Chronic pain     fybromyalsia    Depression     anxiety, depression, OCD    GERD (gastroesophageal reflux disease)     Hypertension     Hypertension     Ill-defined condition     Fibromyalia gastricparesis    MI (myocardial infarction) ([facility] [address]     Musculoskeletal disorder     SOB (shortness of breath)     Stool color black      Past Surgical History:   Procedure Laterality Date    HX CORONARY STENT PLACEMENT      HX GYN           HX HEENT  2002    wisdom teeth extraction    UPPER GI ENDOSCOPY,BIOPSY  2018         UPPER GI ENDOSCOPY,DIAGNOSIS  2018            Currently not working  Sleep - Good, no issues  Diet - Good  Exercise/Sports - None     FAMILY HISTORY  No autoimmune disease in 1st degree relatives     MEDICATIONS  All the current medications were reviewed in detail. PHYSICAL EXAM  Blood pressure 123/81, pulse (!) 108, temperature 97.5 °F (36.4 °C), resp. rate 20, height 5' 4\" (1.626 m), weight 191 lb 5.8 oz (86.8 kg), last menstrual period 10/09/2022, SpO2 97 %. GENERAL APPEARANCE: Well-nourished in no acute distress. EYES: No scleral erythema, conjunctival injection. ENT: No oral ulcer, parotid enlargement. NECK: No adenopathy, thyroid enlargement. CARDIOVASCULAR: Tachycardic, No rub, gallop. CHEST: Normal vesicular breath sounds. No wheezes, rales, pleural friction rubs. ABDOMINAL: The abdomen is soft and nontender. Liver and spleen are nonpalpable. Bowel sounds are normal.  EXTREMITIES: There is no evidence of clubbing, cyanosis, edema. SKIN: No rash, palpable purpura, digital ulcer, abnormal thickening,   NEUROLOGICAL:full strength in upper and lower extremities, normal sensation to light touch.   MUSCULOSKELETAL:   Upper extremities - full range of motion, no tenderness, no swelling, no synovial thickening and no deformity of joints. Lower extremities - full range of motion, no tenderness, no swelling, no synovial thickening and no deformity of joints. LABS, RADIOLOGY AND PROCEDURES  Previous labs reviewed -Yes  Previous radiology reviewed -Yes  Previous procedures reviewed -Yes  Previous medical records reviewed/summarized -Yes    ASSESSMENT  1. Cardiomyopathy / heart failure in the context of a possible immune mediated condition - dry eyes, dry mouth, inflammatory arthralgia, KITTY 1:320, negative APS labs, negative CARSON panel - The cause of her heart disease could be related to sjogrens syndrome however it is rare to have heart block, myocarditis or pericarditis in this disease. She is a poor historian and does not recall any previous symptoms of these conditions. At this point even if we could find an association of sjogrens and heart disease immunosuppression will not help in managing this. It will possibly prevent any further damage. Other causes such as sarcoid, amyloid can be ruled out with a biopsy. I discussed this with Dr Cruzito Lee last week. PLAN  1. Consider cardiac biopsy   2. No additional labs or imaging from rheumatology perspective    Jayce Buckner MD  Adult and Pediatric Rheumatology     [facility], [address] [place] [address], Phone [phone], Fax [phone]    Visiting  of Pediatrics    Department of Pediatrics, [facility] of [address], [address] [place] [address], Phone [phone], Fax [phone]

## 2022-11-07 NOTE — PROGRESS NOTES
C/o of soreness and itching around PICC insertion site. Dressing changed done. No redness or tenderness noted. Secure port with adhesive  free dressing window applied with biopatch . Please call us if itching still continues.

## 2022-11-07 NOTE — PROGRESS NOTES
[facility]   [id number] [facility], [id number] [place] [address]  Phone: [phone]   [phone]    [email or web address]     Nephrology Progress Note    Patient Name : Elayne Knott      : 1980     MRN : [id number]  Date of Admission : 10/21/2022  Date of Servive : 22    CC:  Follow up for Hypercalcemia        Assessment and Plan   Hypercalcemia :  - from primary hyperparathyroidism  - Parathyroid US : 7 mm R sided adenoma. Sestamibi scan : no uptake   - ACE level normal, 1.25 Vit D pending, will reorder today  - will benefit from Parathyroidectomy if she is going for LVAD but can also be managed medically for now   - Ca stable on sensipar  - no changes for now    Positive KITTY  -Being worked up by rheumatology     Ischemic cardiomyopathy  LVEF 15 to 20%          Interval History:  Seen and examined. K and Ca better this AM>  no cp, sob, n/v/d reported. Review of Systems: A comprehensive review of systems was negative except for that written in the HPI.     Current Medications:   Current Facility-Administered Medications   Medication Dose Route Frequency    diclofenac (VOLTAREN) 1 % topical gel 2 g  2 g Topical QID    nystatin (MYCOSTATIN) 100,000 unit/gram ointment   Topical BID    carvediloL (COREG) tablet 3.125 mg  3.125 mg Oral BID WITH MEALS    cinacalcet (SENSIPAR) tablet 30 mg  30 mg Oral DAILY WITH BREAKFAST    milrinone (PRIMACOR) 20 MG/100 ML D5W infusion  0.25 mcg/kg/min IntraVENous CONTINUOUS    digoxin (LANOXIN) tablet 0.25 mg  0.25 mg Oral DAILY    pramipexole (MIRAPEX) tablet 0.125 mg  0.125 mg Oral QHS    [Held by provider] bumetanide (BUMEX) injection 2 mg  2 mg IntraVENous TID    methyl salicylate-menthol (BENGAY) 15-10 % cream   Topical TID    pantoprazole (PROTONIX) tablet 40 mg  40 mg Oral ACB&D    polyethylene glycol (MIRALAX) packet 17 g  17 g Oral DAILY PRN    spironolactone (ALDACTONE) tablet 25 mg  25 mg Oral DAILY    prochlorperazine (COMPAZINE) injection 5 mg  5 mg IntraVENous Q6H PRN    ondansetron (ZOFRAN) injection 4 mg  4 mg IntraVENous Q6H PRN    allopurinoL (ZYLOPRIM) tablet 50 mg  50 mg Oral DAILY    hydrocortisone (ANUSOL-HC) 2.5 % rectal cream   PeriANAL QID    melatonin tablet 10.5 mg  10.5 mg Oral QHS PRN    empagliflozin (JARDIANCE) tablet 10 mg  10 mg Oral DAILY    aluminum & magnesium hydroxide-simethicone (MYLANTA II) oral suspension 30 mL  30 mL Oral Q4H PRN    diazePAM (VALIUM) tablet 5 mg  5 mg Oral Q12H PRN    docusate sodium (COLACE) capsule 100 mg  100 mg Oral DAILY    bisacodyL (DULCOLAX) suppository 10 mg  10 mg Rectal DAILY    albuterol-ipratropium (DUO-NEB) 2.5 MG-0.5 MG/3 ML  3 mL Nebulization Q6H PRN    fluticasone propionate (FLONASE) 50 mcg/actuation nasal spray 2 Spray  2 Spray Both Nostrils DAILY    traZODone (DESYREL) tablet 200 mg  200 mg Oral QHS    [Held by provider] magnesium oxide (MAG-OX) tablet 400 mg  400 mg Oral QHS    calcium carbonate (TUMS) chewable tablet 200 mg [elemental]  200 mg Oral TID PRN    clonazePAM (KlonoPIN) tablet 1 mg  1 mg Oral QHS    escitalopram oxalate (LEXAPRO) tablet 20 mg  20 mg Oral DAILY    ezetimibe (ZETIA) tablet 10 mg  10 mg Oral DAILY    folic acid (FOLVITE) tablet 1 mg  1 mg Oral DAILY    montelukast (SINGULAIR) tablet 10 mg  10 mg Oral DAILY    prasugreL (EFFIENT) tablet 10 mg  10 mg Oral DAILY    [Held by provider] rOPINIRole (REQUIP) tablet 0.5 mg  0.5 mg Oral BID    [Held by provider] rosuvastatin (CRESTOR) tablet 20 mg  20 mg Oral Q MON, WED & FRI    sodium chloride (NS) flush 5-40 mL  5-40 mL IntraVENous Q8H    sodium chloride (NS) flush 5-40 mL  5-40 mL IntraVENous PRN    acetaminophen (TYLENOL) tablet 650 mg  650 mg Oral Q4H PRN    nitroglycerin (NITROSTAT) tablet 0.4 mg  0.4 mg SubLINGual Q5MIN PRN    glucose chewable tablet 16 g  4 Tablet Oral PRN    glucagon (GLUCAGEN) injection 1 mg  1 mg IntraMUSCular PRN    dextrose 10 % infusion 0-250 mL  0-250 mL IntraVENous PRN    insulin lispro (HUMALOG) injection   SubCUTAneous AC&HS      Allergies   Allergen Reactions    Abilify [Aripiprazole] Anxiety     Can not tolerate     Sulfa (Sulfonamide Antibiotics) Hives    Vicodin [Hydrocodone-Acetaminophen] Nausea and Vomiting       Objective:  Vitals:    Vitals:    11/07/22 0400 11/07/22 0551 11/07/22 0604 11/07/22 0825   BP: 119/72   123/81   Pulse: 93 93  (!) 108   Resp:    20   Temp: 97.3 °F (36.3 °C)   97.5 °F (36.4 °C)   SpO2: 99%  97% 97%   Weight:  86.8 kg (191 lb 5.8 oz)     Height:         Intake and Output:  11/07 0701 - 11/07 1900  In: 100 [P.O.:100]  Out: -   11/05 1901 - 11/07 0700  In: 1066.5 [P.O.:658; I.V.:408.5]  Out: 800 [Urine:800]    Physical Examination:        General: Obese   Neck:  Supple, no mass  Resp:  Lungs CTA B/L, no wheezing , normal respiratory effort  CV:  RRR,  no murmur or rub, LE edema  GI:  Soft, NT, + BS, no HS megaly  Neurologic:  Non focal    []    High complexity decision making was performed  []    Patient is at high-risk of decompensation with multiple organ involvement    Lab Data Personally Reviewed: I have reviewed all the pertinent labs, microbiology data and radiology studies during assessment.     Recent Labs     11/07/22 0326 11/06/22 0335 11/05/22 0407    136 134*   K 3.9 4.3 5.2*    103 104   CO2 26 26 24    135* 104*   BUN 10 13 18   CREA 0.82 1.08* 1.01   CA 8.9 9.7 10.7*   MG 2.0 2.4 2.9*   ALB 3.4* 3.7 4.1   ALT 84* 88* 75       Recent Labs     11/07/22 0326 11/06/22  0335 11/05/22 0407   WBC 4.4 4.7 4.3   HGB 13.1 13.5 14.6   HCT 41.6 42.4 46.4    241 232       Lab Results   Component Value Date/Time    Specimen Description: URINE 05/16/2011 04:18 PM    Specimen Description: URINE 10/06/2010 03:01 PM    Specimen Description: URINE 02/02/2010 04:50 PM    Specimen Description: VAGINA 06/03/2009 10:38 AM    Specimen Description: URINE 01/13/2009 03:17 PM     Lab Results   Component Value Date/Time Culture result: MRSA NOT PRESENT 11/03/2022 10:15 AM    Culture result:  11/03/2022 10:15 AM     Screening of patient nares for MRSA is for surveillance purposes and, if positive, to facilitate isolation considerations in high risk settings. It is not intended for automatic decolonization interventions per se as regimens are not sufficiently effective to warrant routine use. Culture result: NO GROWTH 5 DAYS 11/02/2022 03:09 AM    Culture result: No growth (<1,000 CFU/ML) 10/23/2022 04:43 AM    Culture result: MIXED UROGENITAL PALMA ISOLATED 10/17/2022 08:42 AM     Recent Results (from the past 24 hour(s))   GLUCOSE, POC    Collection Time: 11/06/22 11:50 AM   Result Value Ref Range    Glucose (POC) 86 65 - 117 mg/dL    Performed by [id number] [facility], POC    Collection Time: 11/06/22  4:31 PM   Result Value Ref Range    Glucose (POC) 111 65 - 117 mg/dL    Performed by [email or web address] [facility], POC    Collection Time: 11/06/22  9:58 PM   Result Value Ref Range    Glucose (POC) 104 65 - 117 mg/dL    Performed by Karen Luciano    NT-PRO BNP    Collection Time: 11/07/22  3:26 AM   Result Value Ref Range    NT pro-BNP 1,246 (H) <125 PG/ML   MAGNESIUM    Collection Time: 11/07/22  3:26 AM   Result Value Ref Range    Magnesium 2.0 1.6 - 2.4 mg/dL   CBC WITH AUTOMATED DIFF    Collection Time: 11/07/22  3:26 AM   Result Value Ref Range    WBC 4.4 3.6 - 11.0 K/uL    RBC 4.47 3.80 - 5.20 M/uL    HGB 13.1 11.5 - 16.0 g/dL    HCT 41.6 35.0 - 47.0 %    MCV 93.1 80.0 - 99.0 FL    MCH 29.3 26.0 - 34.0 PG    MCHC 31.5 30.0 - 36.5 g/dL    RDW 13.2 11.5 - 14.5 %    PLATELET 478 462 - 360 K/uL    MPV 11.3 8.9 - 12.9 FL    NRBC 0.0 0  WBC    ABSOLUTE NRBC 0.00 0.00 - 0.01 K/uL    NEUTROPHILS 29 (L) 32 - 75 %    LYMPHOCYTES 52 (H) 12 - 49 %    MONOCYTES 11 5 - 13 %    EOSINOPHILS 7 0 - 7 %    BASOPHILS 1 0 - 1 %    IMMATURE GRANULOCYTES 0 0.0 - 0.5 %    ABS. NEUTROPHILS 1.3 (L) 1.8 - 8.0 K/UL    ABS.  LYMPHOCYTES 2.3 0.8 - 3.5 K/UL    ABS. MONOCYTES 0.5 0.0 - 1.0 K/UL    ABS. EOSINOPHILS 0.3 0.0 - 0.4 K/UL    ABS. BASOPHILS 0.1 0.0 - 0.1 K/UL    ABS. IMM. GRANS. 0.0 0.00 - 0.04 K/UL    DF AUTOMATED     METABOLIC PANEL, BASIC    Collection Time: 11/07/22  3:26 AM   Result Value Ref Range    Sodium 136 136 - 145 mmol/L    Potassium 3.9 3.5 - 5.1 mmol/L    Chloride 106 97 - 108 mmol/L    CO2 26 21 - 32 mmol/L    Anion gap 4 (L) 5 - 15 mmol/L    Glucose 100 65 - 100 mg/dL    BUN 10 6 - 20 MG/DL    Creatinine 0.82 0.55 - 1.02 MG/DL    BUN/Creatinine ratio 12 12 - 20      eGFR >60 >60 ml/min/1.73m2    Calcium 8.9 8.5 - 10.1 MG/DL   HEPATIC FUNCTION PANEL    Collection Time: 11/07/22  3:26 AM   Result Value Ref Range    Protein, total 7.2 6.4 - 8.2 g/dL    Albumin 3.4 (L) 3.5 - 5.0 g/dL    Globulin 3.8 2.0 - 4.0 g/dL    A-G Ratio 0.9 (L) 1.1 - 2.2      Bilirubin, total 0.9 0.2 - 1.0 MG/DL    Bilirubin, direct 0.4 (H) 0.0 - 0.2 MG/DL    Alk. phosphatase 127 (H) 45 - 117 U/L    AST (SGOT) 62 (H) 15 - 37 U/L    ALT (SGPT) 84 (H) 12 - 78 U/L   GLUCOSE, POC    Collection Time: 11/07/22  6:24 AM   Result Value Ref Range    Glucose (POC) 97 65 - 117 mg/dL    Performed by Buck Negron            I have reviewed the flowsheets. Chart and Pertinent Notes have been reviewed. No change in PMH ,family and social history from Consult note.       Marin Cho MD  [facility]

## 2022-11-07 NOTE — PROGRESS NOTES
[address] [place] [address]  Inpatient Progress Note      Patient name: Shayy Fields  Patient : 1980  Patient MRN: [id number]  Consulting MD: Amanda Thakur MD  Date of service: 22    REASON FOR REFERRAL:  Management of chronic systolic heart failure    PLAN OF CARE:  38 y.o. female with ischemic cardiomyopathy, LVEF 15-20%, stage C, NYHA class IIIB initiated on inotropic support; not able to tolerate dobutamine due to tachycardia and switched to milrinone; limited options to slow down heart rate due to QTc > 500ms in the setting of antidepressant use; uncertain if we will be able to be discharged home on inotropes for this reason; started inpatient HT/LVAD workup  Placed consult for cardiac biopsy with Dr. Virginia De Leon due to possibility of lupus carditis and limited ability to do cardiac MRI d/w Dr. Yahir Poole with rheumatology  Concerns regarding HT/LVAD candidacy upon initial review include possible autoimmune disease that requires diagnosis/treatment, psychiatric condition that needs evaluation/treatment and uncertain support system; ongoing evaluation     RECOMMENDATIONS:  Continue milrinone 0.25mcg/kg/min, dosed to weight 85.4kg, up-titration limited by BP/HR  Continue digoxin 0.25mg daily, check digoxin levels today and daily (goal 0.7-1.2)   Hold off corlanor due to long QT; likely in the setting of taking antidepressants, d/w Pharm. D. would not use with QTc > 500ms; check EKG today  Discontinue coreg and start toprol XL 12.5mg q12h, first dose 21:00   Cannot tolerate ACEi/ARB/ARNi due to hypotension and in anticipation of surgery  Continue spironolactone 25mg daily  Continue jardiance 10mg daily  Resume bumex 2mg PO twice daily, check orthostatics  Hold requip due to tachycardia  Hold crestor due to rise in TB; consider resuming when TB stable for a week  Bedside spirometry today  Keep K > 4 and Mag > 2  Cont allopurinol 50mg daily   Continue ASA and prasugrel per primary cards  Treatment of STU: inpatient eval for STU  Will need LifeVest at discharge   Labs: HF and Transplant eval labs ordered; in process   completing psychosocial evaluation (patient lives 1.5 hours away; need to connect with patient's psychiatrist)  Psychiatry consultation for anxiety appreciated for possible wean of antidepressants  Nutritionist consult  Heart failure education  Needs Advanced care plan  Rheumatology consultation to r/o lupus- labs pending, d/w Dr. Fisher Lay   Nephrology consult for hypercalcemia- appreciate recommendations, work up in progress  Consult to Dr. Rosmery Toribio for consideration of cardiac biopsy   Palliative care consultation  Physical therapy evaluation  Family meeting tomorrow with patient's father at [facility] other care per primary team      IMPRESSION:  Fatigue  Shortness of breath  Volume overload  Acute on Chronic systolic heart failure  Stage C, NYHA class IV symptoms  ischemic cardiomyopathy, LVEF 20-25%  CAD  STEMI July 2022 s/p RICKY to LAD  HTN  Obesity  LHD  Pre-diabetes  Esophageal stenosis s/p dilation  RLS  Fibromyalgia  Anxiety and Depression    INTERVAL HISTORY:  Remains tachycardic  Transitioned to milrinone   Held coreg due to hypotension  No acute overnight events  Afebrile  SBP 90-110s/70s, HR 90-110s  I/O net positive 766cc, urine 300cc  Cr 0.82  K 3.9  Ca 8.9  Pro-NT-BNP 1246 from 1157      LIFE GOALS:  Patient's personal goals include: get better  Important upcoming milestones or family events: the holidays coming up  The patient identifies the following as important for living well: being independent     CARDIAC IMAGING:  Echo 10/22/22    Left Ventricle: Severely reduced left ventricular systolic function with a visually estimated EF of 15 - 20%. Left ventricle is mildly dilated. Normal wall thickness.  Moderate hypokinesis of the following segments: basal anterior, basal anterolateral, basal anteroseptal, basal inferior, basal inferolateral, basal inferoseptal, mid anterior, mid anterolateral, mid anteroseptal, mid inferior, mid inferolateral and mid inferoseptal. Severe hypokinesis of the following segments: apical anterior, apical septal, apical inferior and apical lateral. Grade III diastolic dysfunction with increased LAP. Mitral Valve: Mild annular dilation. Moderate regurgitation. Tricuspid Valve: Moderately elevated RVSP. The estimated RVSP is 51 mmHg. Left Atrium: Left atrium is mildly dilated. Pericardium: Trivial pericardial effusion present. No indication of cardiac tamponade. Contrast used: Definity. Echo (8/14/22)    Left Ventricle: Severely reduced left ventricular systolic function with a visually estimated EF of 20 - 25%. Left ventricle is mildly dilated. Increased wall thickness. See diagram for wall motion findings. Grade II diastolic dysfunction with increased LAP. Right Ventricle: Not well visualized. Tricuspid Valve: Moderately elevated RVSP. [address] 10/27/22  PA 56/34/43, RA 16, PCWP 29, CI daisy 1.46 at 199lbs         BRIEF HISTORY OF PRESENT ILLNESS:  Gary Putnam is a 38 y.o. female with h/o HTN, CAD s/p STEMI in July 2022 (DESx1 to LAD; treated at [facility]) with ischemic cardiomyopathy (EF 20-25%), CHF, GERD, RLS, and fibromyalgia seen as a new patient in [facility] on 10/21/22 and found to be fluid overloaded, and with suicidal ideation. Pt was taken to the ER for further evaluation and admission for acute on chronic HF and mental health crisis. PHYSICAL EXAM:  Visit Vitals  /81 (BP 1 Location: Left upper arm, BP Patient Position: Sitting)   Pulse (!) 108   Temp 97.5 °F (36.4 °C)   Resp 20   Ht 5' 4\" (1.626 m)   Wt 191 lb 5.8 oz (86.8 kg)   LMP 10/09/2022   SpO2 97%   BMI 32.85 kg/m²     General: Patient is well developed, well-nourished in no acute distress  HEENT: Unremarkable   Neck: Supple. No evidence of thyroid enlargements or lymphadenopathy.   JVD: Cannot be appreciated Lungs: Breath sounds are equal and clear bilaterally. No wheezes, rhonchi, or rales. Heart: tachycardia, S3 present, normal S1 and S2. No murmurs, gallops or rubs. Abdomen: Soft, no mass or tenderness. No organomegaly or hernia. Bowel sounds present. Genitourinary and rectal: deferred  Extremities: No cyanosis, clubbing, or edema. Neurologic: No focal sensory or motor deficits are noted. Grossly intact. Psychiatric: Awake, alert an doriented x 3. Appropriate mood and affect. Skin: Warm, dry and well perfused. REVIEW OF SYSTEMS:  General: Denies fever. Ear, nose and throat: Denies difficulty hearing, sinus problems, nosebleeds  Cardiovascular: see above in the interval history  Respiratory: Denies cough, wheezing, sputum production, hemoptysis. Gastrointestinal: Denies heartburn, constipation, diarrhea, abdominal pain, nausea, blood in stool  Kidney and bladder: Denies painful urination, frequent urination  Musculoskeletal: Denies joint pain, muscle weakness  Skin and hair: Denies change in existing skin lesions    PAST MEDICAL HISTORY:  Past Medical History:   Diagnosis Date    Anemia NEC     Arthritis     Chronic pain     fybromyalsia    Depression     anxiety, depression, OCD    GERD (gastroesophageal reflux disease)     Hypertension     Hypertension     Ill-defined condition     Fibromyalia gastricparesis    MI (myocardial infarction) ([facility] [address]     Musculoskeletal disorder     SOB (shortness of breath)     Stool color black        PAST SURGICAL HISTORY:  Past Surgical History:   Procedure Laterality Date    HX CORONARY STENT PLACEMENT      HX GYN           HX HEENT  2002    wisdom teeth extraction    UPPER GI ENDOSCOPY,BIOPSY  2018         UPPER GI ENDOSCOPY,DIAGNOSIS  2018            FAMILY HISTORY:  Family History   Problem Relation Age of Onset    Hypertension Father     Elevated Lipids Father     Arthritis-rheumatoid Mother         ? Lupus vs RA    Lung Disease Mother Heart Disease Mother     Cancer Mother         breast in 39y    COPD Mother     Hypertension Mother     Stroke Mother         3-4 strokes    Breast Cancer Mother 50    Diabetes Maternal Grandmother        SOCIAL HISTORY:  Social History     Socioeconomic History    Marital status: SINGLE   Tobacco Use    Smoking status: Former     Packs/day: 0.25     Years: 8.00     Pack years: 2.00     Types: Cigarettes     Quit date: 2022     Years since quittin.2    Smokeless tobacco: Never   Vaping Use    Vaping Use: Never used   Substance and Sexual Activity    Alcohol use: Not Currently     Alcohol/week: 1.0 standard drink     Types: 1 Glasses of wine per week     Comment: Wine with special occassions - not since July    Drug use: Not Currently     Types: Marijuana     Comment: Haven't had any since 2022    Sexual activity: Yes     Partners: Male     Birth control/protection: None     Social Determinants of Health     Financial Resource Strain: High Risk    Difficulty of Paying Living Expenses: Very hard   Food Insecurity: No Food Insecurity    Worried About Running Out of Food in the Last Year: Never true    Ran Out of Food in the Last Year: Never true   Transportation Needs: No Transportation Needs    Lack of Transportation (Medical): No    Lack of Transportation (Non-Medical): No   Physical Activity: Inactive    Days of Exercise per Week: 0 days    Minutes of Exercise per Session: 0 min   Stress: Stress Concern Present    Feeling of Stress :  To some extent   Social Connections: Socially Isolated    Frequency of Communication with Friends and Family: Twice a week    Frequency of Social Gatherings with Friends and Family: Never    Attends [employer]ist Services: Never    Active Member of Clubs or Organizations: No    Attends Club or Organization Meetings: Never    Marital Status: Never    Housing Stability: [address] to Pay for Housing in the Last Year: No    Number of [place] in the Last Year: 1    Unstable Housing in the Last Year: No       LABORATORY RESULTS:     Labs Latest Ref Rng & Units 11/7/2022 11/6/2022 11/5/2022 11/4/2022 11/3/2022 11/2/2022 11/2/2022   WBC 3.6 - 11.0 K/uL 4.4 4.7 4.3 5.8 4.5 - -   RBC 3.80 - 5.20 M/uL 4.47 4.64 4.93 5.10 5.38(H) - -   Hemoglobin 11.5 - 16.0 g/dL 13.1 13.5 14.6 14.7 15.8 - -   Hematocrit 35.0 - 47.0 % 41.6 42.4 46.4 46.2 49. 8(H) - -   MCV 80.0 - 99.0 FL 93.1 91.4 94.1 90.6 92.6 - -   Platelets 927 - 818 K/uL 227 241 232 236 246 - -   Lymphocytes 12 - 49 % 52(H) 38 41 28 - - -   Monocytes 5 - 13 % 11 11 12 13 - - -   Eosinophils 0 - 7 % 7 6 5 2 - - -   Basophils 0 - 1 % 1 1 1 1 - - -   Albumin 3.5 - 5.0 g/dL 3.4(L) 3.7 4.1 4.3 - 4.2 -   Calcium 8.5 - 10.1 MG/DL 8.9 9.7 10. 7(H) 10. 3(H) 10. 3(H) 10. 4(H) 10. 4(H)   Glucose 65 - 100 mg/dL 100 135(H) 104(H) 109(H) 114(H) 103(H) -   BUN 6 - 20 MG/DL 10 13 18 17 19 20 -   Creatinine 0.55 - 1.02 MG/DL 0.82 1.08(H) 1.01 1.30(H) 1.16(H) 1.20(H) -   Sodium 136 - 145 mmol/L 136 136 134(L) 132(L) 131(L) 134(L) -   Potassium 3.5 - 5.1 mmol/L 3.9 4.3 5.2(H) 4.0 4.1 4.1 -   TSH 0.36 - 3.74 uIU/mL - - - - - - -   LDH 81 - 246 U/L - - - - - - 214   Some recent data might be hidden     Lab Results   Component Value Date/Time    TSH 4.44 (H) 10/21/2022 06:12 PM    TSH 2.12 05/12/2021 10:55 AM    TSH 1.330 12/30/2019 12:00 AM    TSH 3.850 07/25/2018 12:40 PM    TSH 1.620 02/27/2018 08:59 AM    TSH 1.240 12/05/2016 10:13 AM    TSH 1.530 03/18/2016 10:31 AM    TSH 1.400 11/09/2011 09:37 AM    TSH 1.26 08/19/2010 10:36 AM    TSH 1.18 07/28/2010 04:10 PM       ALLERGY:  Allergies   Allergen Reactions    Abilify [Aripiprazole] Anxiety     Can not tolerate     Sulfa (Sulfonamide Antibiotics) Hives    Vicodin [Hydrocodone-Acetaminophen] Nausea and Vomiting        CURRENT MEDICATIONS:    Current Facility-Administered Medications:     diclofenac (VOLTAREN) 1 % topical gel 2 g, 2 g, Topical, QID, Claire Callaway MD, 2 g at 11/07/22 9875 nystatin (MYCOSTATIN) 100,000 unit/gram ointment, , Topical, BID, Radha Farnsworth MD, Patient/Family Admin at 11/07/22 0929    carvediloL (COREG) tablet 3.125 mg, 3.125 mg, Oral, BID WITH MEALS, Gena Bosch, NP, 3.125 mg at 11/07/22 7439    cinacalcet (SENSIPAR) tablet 30 mg, 30 mg, Oral, DAILY WITH BREAKFAST, Fernando Martinez MD, 30 mg at 11/07/22 0923    milrinone (PRIMACOR) 20 MG/100 ML D5W infusion, 0.25 mcg/kg/min, IntraVENous, CONTINUOUS, Gena Bosch NP, Last Rate: 6.4 mL/hr at 11/07/22 0215, 0.25 mcg/kg/min at 11/07/22 0215    digoxin (LANOXIN) tablet 0.25 mg, 0.25 mg, Oral, DAILY, Ruba Matta MD, 0.25 mg at 11/07/22 4430    pramipexole (MIRAPEX) tablet 0.125 mg, 0.125 mg, Oral, QHS, Asia Mazariegos MD, 0.125 mg at 11/06/22 2155    [Held by provider] bumetanide (BUMEX) injection 2 mg, 2 mg, IntraVENous, TID, Jenni Carlson NP, 2 mg at 11/03/22 5018    methyl salicylate-menthol (BENGAY) 15-10 % cream, , Topical, TID, Eliezer Zaragoza NP, Given at 11/03/22 0834    pantoprazole (PROTONIX) tablet 40 mg, 40 mg, Oral, ACB&D, Asia Mazariegos MD, 40 mg at 11/07/22 0626    polyethylene glycol (MIRALAX) packet 17 g, 17 g, Oral, DAILY PRN, Kathy Lutz MD, 17 g at 10/30/22 2052    spironolactone (ALDACTONE) tablet 25 mg, 25 mg, Oral, DAILY, Gena Bosch NP, 25 mg at 11/07/22 8489    prochlorperazine (COMPAZINE) injection 5 mg, 5 mg, IntraVENous, Q6H PRN, Asia Mazariegos MD, 5 mg at 10/28/22 1557    ondansetron (ZOFRAN) injection 4 mg, 4 mg, IntraVENous, Q6H PRN, Noemí Dominguez MD, 4 mg at 11/04/22 0823    allopurinoL (ZYLOPRIM) tablet 50 mg, 50 mg, Oral, DAILY, Gena Bosch, NP, 50 mg at 11/07/22 0923    hydrocortisone (ANUSOL-HC) 2.5 % rectal cream, , PeriANAL, QID, Noemí Dominguez MD, Patient/Family Admin at 11/07/22 0928    melatonin tablet 10.5 mg, 10.5 mg, Oral, QHS PRN, Noemí Dominguez MD    empagliflozin (JARDIANCE) tablet 10 mg, 10 mg, Oral, DAILY, Gena Bosch NP, 10 mg at 11/07/22 7460    aluminum & magnesium hydroxide-simethicone (MYLANTA II) oral suspension 30 mL, 30 mL, Oral, Q4H PRN, Mindy Dominguez MD, 30 mL at 11/03/22 0536    diazePAM (VALIUM) tablet 5 mg, 5 mg, Oral, Q12H PRN, Mindy Dominguez MD, 5 mg at 11/03/22 1246    docusate sodium (COLACE) capsule 100 mg, 100 mg, Oral, DAILY, Toribio Verdugo MD, 100 mg at 11/07/22 0324    bisacodyL (DULCOLAX) suppository 10 mg, 10 mg, Rectal, DAILY, Mindy Dominguez MD, 10 mg at 10/25/22 2800    albuterol-ipratropium (DUO-NEB) 2.5 MG-0.5 MG/3 ML, 3 mL, Nebulization, Q6H PRN, Jorge A Cobos MD    fluticasone propionate (FLONASE) 50 mcg/actuation nasal spray 2 Spray, 2 Spray, Both Nostrils, DAILY, Toribio Verdugo MD, 2 [place] at 11/07/22 0930    traZODone (DESYREL) tablet 200 mg, 200 mg, Oral, QHS, Irma Zaragoza, NP, 200 mg at 11/06/22 2200    [Held by provider] magnesium oxide (MAG-OX) tablet 400 mg, 400 mg, Oral, QHS, Chan BAGLEY NP, 400 mg at 10/29/22 2159    calcium carbonate (TUMS) chewable tablet 200 mg [elemental], 200 mg, Oral, TID PRN, Toribio Verdugo MD, 200 mg at 10/30/22 2052    clonazePAM (KlonoPIN) tablet 1 mg, 1 mg, Oral, QHS, Christopher Gonsalez MD, 1 mg at 11/06/22 2200    escitalopram oxalate (LEXAPRO) tablet 20 mg, 20 mg, Oral, DAILY, Geoff Rodriguez MD, 20 mg at 11/07/22 6001    ezetimibe (ZETIA) tablet 10 mg, 10 mg, Oral, DAILY, Geoff Rodriguez MD, 10 mg at 03/78/41 1023    folic acid (FOLVITE) tablet 1 mg, 1 mg, Oral, DAILY, Geoff Rodriguez MD, 1 mg at 11/07/22 0923    montelukast (SINGULAIR) tablet 10 mg, 10 mg, Oral, DAILY, Geoff Rodriguez MD, 10 mg at 11/07/22 3336    prasugreL (EFFIENT) tablet 10 mg, 10 mg, Oral, DAILY, Geoff Rodriguez MD, 10 mg at 11/07/22 4137    [Held by provider] rOPINIRole (REQUIP) tablet 0.5 mg, 0.5 mg, Oral, BID, Christopher Gonsalez MD, 0.5 mg at 10/31/22 0947    [Held by provider] rosuvastatin (CRESTOR) tablet 20 mg, 20 mg, Oral, Q MON, WED & Nanetta Riff, Jesus Grant MD, 20 mg at 10/24/22 2241    sodium chloride (NS) flush 5-40 mL, 5-40 mL, IntraVENous, Q8H, Geoff Rodriguez MD, 10 mL at 11/07/22 7649    sodium chloride (NS) flush 5-40 mL, 5-40 mL, IntraVENous, PRN, Deisy Graham MD    acetaminophen (TYLENOL) tablet 650 mg, 650 mg, Oral, Q4H PRN, Deisy Graham MD, 650 mg at 11/06/22 0345    nitroglycerin (NITROSTAT) tablet 0.4 mg, 0.4 mg, SubLINGual, Q5MIN PRN, Deisy Graham MD, 0.4 mg at 10/21/22 2237    glucose chewable tablet 16 g, 4 Tablet, Oral, PRN, Deisy Graham MD    glucagon (GLUCAGEN) injection 1 mg, 1 mg, IntraMUSCular, PRN, Geoff Rankin MD    dextrose 10 % infusion 0-250 mL, 0-250 mL, IntraVENous, PRN, Deisy Graahm MD    insulin lispro (HUMALOG) injection, , SubCUTAneous, AC&HS, Deisy Graham MD    PATIENT CARE TEAM:  Patient Care Team:  Jose Rooney MD as PCP - General (Family Medicine)  Jose Rooney MD as PCP - [facility]  Allen Noble MD (Surgery General)  Declan Torres MD (Cardiovascular Disease Physician)  Marcelino Colunga MD (Otolaryngology)  Hussein Alfredo MD (Internal Medicine Physician)  Clarissa Rodarte MD (Female Pelvic Medicine and Reconstructive Surgery)  Ijeoma Forbes MD (Neurology)  Andrew Clemente MD (Psychiatry)  Criselda Bumpers as Ambulatory Care Manager     Thank you for allowing me to participate in this patient's care.     Trish Woods MD   [address] [place] [address], [address]  Phone: [phone]

## 2022-11-07 NOTE — PROGRESS NOTES
[address] [facility]  Hospitalist Group                                                                                          Hospitalist Progress Note  Colleen Cerna MD  Answering service: [phone] from in house phone        Date of Service:  2022  NAME:  Apryl Pena  :  1980  MRN:  [id number]      Admission Summary:     Patient initially presents with suicidal ideation and found to have congestive heart failure. Interval history / Subjective:     Patient seen examined at bedside earlier. Still on milrinone gtt.  A bit tearful but did recover during our conversation     Assessment & Plan:     Nonischemic cm Hfref   -Acute on chronic systolic CHF NYHA class IV on admission  -Patient with ischemic cardiomyopathy with EF with 15 to 20%  -s/p RHC 10/27 - started on dobutamine and IV bumetanide  -meds being adjusted per AHF team  -Patient became tachycardic restarted on milrinone gtt 11/3 weaned off dobutamine gtt, continued   -rheum on board  srogens consideration, appreciate recs, steroids at this pt wouldn't sig help cardiac disease   -cardio consulted for consideration of cardiac biopsy, still waiting on their input  -patient also undergoing evaluation of lvad   -bumex BID, toprol     +KITTY  -as discussed above     Hypercalcemia  -appreciate nephro input  -pth appears like primary  -us shows 7 mm right-sided adenoma, sestamibi scan no uptake, consider parathyroidectomy if she would need LVAD    Hyperkalemia  -Discontinue potassium std will replete as needed  -monitor     Vaginal yeast infection  - Likely complication of Jardiance  - Cannot give fluconazole due to QTC prolongation  - miconazole ointment    Urinary tract infection  -Completed antibiotic    Hypertension  -Blood pressure is borderline low normal    Type 2 diabetes  -Well-controlled with hemoglobin A1c of 6.3  -Continue sliding scale insulin coverage    Coronary artery disease  -Patient with history of STEMI with stent to LAD  -Continue Effient    Dyslipidemia  -On Zetia    Restless leg syndrome  -On pramipexole, hold ropinirole secondary to tachycardia    Hemorrhoids  -Continue Anusol cream    Depression with suicidal ideation  -Patient previously evaluated by psychiatry  -On Lexapro, also on Klonopin for anxiety  -One-on-one sitter discontinued per psych recommendation  -Outpatient follow-up with psychiatry    Anxiety  -Patient on Klonopin for 12 years, continue Klonopin here  -Continue trazodone for sleep  -Outpatient follow-up with psychiatry    Constipation  -resolved; continue PRN Miralax    Dysgeusia: unclear etiology  -check B12 (normal/high), zinc, rapid covid (negative). TSH checked and was only mildly elevated in the setting of acute illness.   -no benefit with holding allopurinol; will resume this  -possibly related to GERD; continue pantoprazole, trial of carafate    Nausea: likely multifactorial, hx of gastroparesis, bowel edema from CHF  -continue supportive care    Critically ill, high risk for decompensation. CCT time 40 minutes    Noted plans for family meeting tomorrow per HF team      Code status: Full  Prophylaxis: SCDs  Care Plan discussed with: Patient/RN/CM/AHF  Anticipated Disposition: TBD > 48 hrs      Hospital Problems  Date Reviewed: 10/14/2022            Codes Class Noted POA    [facility] ICD-10-CM: I50.9  ICD-9-CM: 428.0  10/21/2022 Unknown       Review of Systems:   A comprehensive review of systems was negative except for that written in the HPI. Vital Signs:    Last 24hrs VS reviewed since prior progress note.  Most recent are:  Visit Vitals  /81 (BP 1 Location: Left upper arm, BP Patient Position: Sitting)   Pulse 96   Temp 97.5 °F (36.4 °C)   Resp 20   Ht 5' 4\" (1.626 m)   Wt 86.8 kg (191 lb 5.8 oz)   SpO2 97%   BMI 32.85 kg/m²         Intake/Output Summary (Last 24 hours) at 11/7/2022 1113  Last data filed at 11/7/2022 0926  Gross per 24 hour   Intake 926.53 ml   Output 300 ml   Net 626.53 ml          Physical Examination:     I had a face to face encounter with this patient and independently examined them on 11/7/2022 as outlined below:          Constitutional:  No acute distress, non-toxic, obese   ENT:  Oral mucosa moist, oropharynx benign, anicteric sclerae    Resp:  CTA bilaterally. No wheezing/rhonchi/rales. No accessory muscle use. CV:  Regular rhythm, normal rate, no murmurs, no gallops, trace b/l LE edema    GI:  Soft, non distended, non tender. normoactive bowel sounds     Musculoskeletal:  warm    Neurologic:  Moves all extremities. AAOx3, CN II-XII reviewed  Psych: normal mood appropriate affect            Data Review:    Review and/or order of clinical lab test      Labs:     Recent Labs     11/07/22 0326 11/06/22 0335   WBC 4.4 4.7   HGB 13.1 13.5   HCT 41.6 42.4    241       Recent Labs     11/07/22 0326 11/06/22 0335 11/05/22  0407    136 134*   K 3.9 4.3 5.2*    103 104   CO2 26 26 24   BUN 10 13 18   CREA 0.82 1.08* 1.01    135* 104*   CA 8.9 9.7 10.7*   MG 2.0 2.4 2.9*       Recent Labs     11/07/22 0326 11/06/22 0335 11/05/22  0407   ALT 84* 88* 75   * 134* 136*   TBILI 0.9 1.1* 1.5*   TP 7.2 7.4 7.6   ALB 3.4* 3.7 4.1   GLOB 3.8 3.7 3.5       No results for input(s): INR, PTP, APTT, INREXT, INREXT in the last 72 hours. No results for input(s): FE, TIBC, PSAT, FERR in the last 72 hours. Lab Results   Component Value Date/Time    Folate 20.0 10/23/2022 04:24 AM        No results for input(s): PH, PCO2, PO2 in the last 72 hours. No results for input(s): CPK, CKNDX, TROIQ in the last 72 hours.     No lab exists for component: CPKMB  Lab Results   Component Value Date/Time    Cholesterol, total 95 10/22/2022 06:26 AM    HDL Cholesterol 32 10/22/2022 06:26 AM    LDL, calculated 45 10/22/2022 06:26 AM    Triglyceride 90 10/22/2022 06:26 AM    CHOL/HDL Ratio 3.0 10/22/2022 06:26 AM     Lab Results   Component Value Date/Time    Glucose (POC) 97 11/07/2022 06:24 AM    Glucose (POC) 104 11/06/2022 09:58 PM    Glucose (POC) 111 11/06/2022 04:31 PM    Glucose (POC) 86 11/06/2022 11:50 AM    Glucose (POC) 102 11/06/2022 06:50 AM     Lab Results   Component Value Date/Time    Color ORANGE 10/17/2022 08:42 AM    Appearance TURBID (A) 10/17/2022 08:42 AM    Specific gravity 1.025 10/17/2022 08:42 AM    Specific gravity 1.024 10/06/2022 08:57 AM    pH (UA) 5.0 10/17/2022 08:42 AM    Protein 30 (A) 10/17/2022 08:42 AM    Glucose Negative 10/17/2022 08:42 AM    Ketone Negative 10/17/2022 08:42 AM    Bilirubin Negative 11/25/2021 09:47 AM    Urobilinogen 1.0 10/17/2022 08:42 AM    Nitrites Positive (A) 10/17/2022 08:42 AM    Leukocyte Esterase SMALL (A) 10/17/2022 08:42 AM    Epithelial cells FEW 10/17/2022 08:42 AM    Bacteria 2+ (A) 10/17/2022 08:42 AM    WBC 5-10 10/17/2022 08:42 AM    RBC 5-10 10/17/2022 08:42 AM         Medications Reviewed:     Current Facility-Administered Medications   Medication Dose Route Frequency    metoprolol succinate (TOPROL-XL) XL tablet 12.5 mg  12.5 mg Oral Q12H    bumetanide (BUMEX) tablet 2 mg  2 mg Oral BID    diclofenac (VOLTAREN) 1 % topical gel 2 g  2 g Topical QID    nystatin (MYCOSTATIN) 100,000 unit/gram ointment   Topical BID    cinacalcet (SENSIPAR) tablet 30 mg  30 mg Oral DAILY WITH BREAKFAST    milrinone (PRIMACOR) 20 MG/100 ML D5W infusion  0.25 mcg/kg/min IntraVENous CONTINUOUS    digoxin (LANOXIN) tablet 0.25 mg  0.25 mg Oral DAILY    pramipexole (MIRAPEX) tablet 0.125 mg  0.125 mg Oral QHS    [Held by provider] bumetanide (BUMEX) injection 2 mg  2 mg IntraVENous TID    methyl salicylate-menthol (BENGAY) 15-10 % cream   Topical TID    pantoprazole (PROTONIX) tablet 40 mg  40 mg Oral ACB&D    polyethylene glycol (MIRALAX) packet 17 g  17 g Oral DAILY PRN    spironolactone (ALDACTONE) tablet 25 mg  25 mg Oral DAILY    prochlorperazine (COMPAZINE) injection 5 mg  5 mg IntraVENous Q6H PRN    ondansetron (ZOFRAN) injection 4 mg  4 mg IntraVENous Q6H PRN    allopurinoL (ZYLOPRIM) tablet 50 mg  50 mg Oral DAILY    hydrocortisone (ANUSOL-HC) 2.5 % rectal cream   PeriANAL QID    melatonin tablet 10.5 mg  10.5 mg Oral QHS PRN    empagliflozin (JARDIANCE) tablet 10 mg  10 mg Oral DAILY    aluminum & magnesium hydroxide-simethicone (MYLANTA II) oral suspension 30 mL  30 mL Oral Q4H PRN    diazePAM (VALIUM) tablet 5 mg  5 mg Oral Q12H PRN    docusate sodium (COLACE) capsule 100 mg  100 mg Oral DAILY    bisacodyL (DULCOLAX) suppository 10 mg  10 mg Rectal DAILY    albuterol-ipratropium (DUO-NEB) 2.5 MG-0.5 MG/3 ML  3 mL Nebulization Q6H PRN    fluticasone propionate (FLONASE) 50 mcg/actuation nasal spray 2 Spray  2 Spray Both Nostrils DAILY    traZODone (DESYREL) tablet 200 mg  200 mg Oral QHS    [Held by provider] magnesium oxide (MAG-OX) tablet 400 mg  400 mg Oral QHS    calcium carbonate (TUMS) chewable tablet 200 mg [elemental]  200 mg Oral TID PRN    clonazePAM (KlonoPIN) tablet 1 mg  1 mg Oral QHS    escitalopram oxalate (LEXAPRO) tablet 20 mg  20 mg Oral DAILY    ezetimibe (ZETIA) tablet 10 mg  10 mg Oral DAILY    folic acid (FOLVITE) tablet 1 mg  1 mg Oral DAILY    montelukast (SINGULAIR) tablet 10 mg  10 mg Oral DAILY    prasugreL (EFFIENT) tablet 10 mg  10 mg Oral DAILY    [Held by provider] rOPINIRole (REQUIP) tablet 0.5 mg  0.5 mg Oral BID    [Held by provider] rosuvastatin (CRESTOR) tablet 20 mg  20 mg Oral Q MON, WED & FRI    sodium chloride (NS) flush 5-40 mL  5-40 mL IntraVENous Q8H    sodium chloride (NS) flush 5-40 mL  5-40 mL IntraVENous PRN    acetaminophen (TYLENOL) tablet 650 mg  650 mg Oral Q4H PRN    nitroglycerin (NITROSTAT) tablet 0.4 mg  0.4 mg SubLINGual Q5MIN PRN    glucose chewable tablet 16 g  4 Tablet Oral PRN    glucagon (GLUCAGEN) injection 1 mg  1 mg IntraMUSCular PRN    dextrose 10 % infusion 0-250 mL  0-250 mL IntraVENous PRN    insulin lispro (HUMALOG) injection   SubCUTAneous AC&HS     ______________________________________________________________________  EXPECTED LENGTH OF STAY: 5d 4h  ACTUAL LENGTH OF STAY:          Prince Echeverria MD

## 2022-11-07 NOTE — WOUND CARE
WOCN Note:     New consult placed for vaginal candidiasis. Patient currently being treated with Nystatin cream ordered by Dr Claire Acevedo. Will sign off, reconsult it needed.     JUAN BoothN RN [facility] [place] [facility]  Available on [employer]  Office [phone]

## 2022-11-07 NOTE — PROGRESS NOTES
Bedside and Verbal shift change report given to [address] (oncoming nurse) by Hari Goodwin (offgoing nurse). Report included the following information SBAR, Kardex, Intake/Output, MAR, Recent Results, and Med Rec Status. Blood Pressure on the high 80's to low 90's but MAP is 70 informed NP. No orders made, maintain milrinone at current rate, will monitor        Problem: Falls - Risk of  Goal: *Absence of Falls  Description: Document Sarah Nap Fall Risk and appropriate interventions in the flowsheet.   Outcome: Progressing Towards Goal  Note: Fall Risk Interventions:  Mobility Interventions: Communicate number of staff needed for ambulation/transfer    Mentation Interventions: More frequent rounding    Medication Interventions: Teach patient to arise slowly    Elimination Interventions: Call light in reach    Problem: Patient Education: Go to Patient Education Activity  Goal: Patient/Family Education  Outcome: Progressing Towards Goal     Problem: Heart Failure: Day 1  Goal: Off Pathway (Use only if patient is Off Pathway)  Outcome: Progressing Towards Goal

## 2022-11-07 NOTE — PROGRESS NOTES
[facility] in [facility], [address]    Met with patient for ongoing LVAD education. Patient stated she is feeling better today, but still having some issues with the PICC line. She stated she was open with proceeding with further education. Portable DVD player with [employer] 3 Left Ventricular Assist System Patient Education DVD left with patient to review. Demonstrated how to start and stop DVD player. Patient stated she will watch video with her father and requested meeting with VAD coordinator to follow up. Meeting time set for 11/8/22 at 1000. Briefly discussed LVAD driveline and driveline site care. Educated patient on caregiver expectations in re: to driveline exit site care. Time given to ask questions. Patient had no further questions at this time.     Joel Kent RN  VAD Coordinator

## 2022-11-07 NOTE — CONSULTS
Dr. Aide Lozano. [phone]            Cardiology Consult/Progress Note      Requesting/referring provider: Shelby Davis MD, Dr. Saeed Mckeon, Dr. Maya Camacho    Reason for Consult: Discussion regarding endomyocardial biopsy in the setting of chronic heart failure    Assessment/Plan:  1. Chronic biventricular dysfunction with LVEF less than 20%  2. Acute NYHA class IV congestive heart failure secondary to #1  3. Elevated/abnormal KITTY in the setting of probable inflammatory arthritis and a probable diagnosis of Sjogren syndrome  4. Lack of acutely elevated inflammatory biomarkers with normal sed rate  5. Inotrope dependent  6. Mitral and tricuspid regurgitation functional in nature at least moderate to severe    Ms. Maritza Rojas is evaluated at the request of Dr. Saeed Mckeon. I was asked to consider endomyocardial biopsy to rule out evidence of any active myocarditis. Her echocardiogram is more consistent with chronic disease with enlargement of both right and left ventricle as well as accompanying moderate to severe mitral and tricuspid regurgitation. Her interventricular septum as well as RV wall thickness is extremely reduced which would significantly increase the risk of mechanical complications from endomyocardial biopsy. In the setting of lack of active inflammatory markers, endomyocardial biopsy is unlikely to . Notes from rheumatology were also reviewed which suggest that there is no role for directed therapy for Sjogren syndrome in reversing underlying heart failure. This was personally discussed with Dr. Saeed Mckeon. At this point of time recommend avoiding EM BX.     Investigations ordered    []    High complexity decision making was performed  []    Patient is at high-risk of decompensation with multiple organ involvement  []    Complex/difficult social determinants of health outcomes  Total of ** minutes were spent on reviewing the records, analyzing investigations and documentation in the chart, on the day of visit including time for examination and time spent with the patient  Investigations personally reviewed and interpreted  Echocardiogram from 22nd October was personally reviewed and shows evidence of at least moderate to severe possibly severe mitral regurgitation as well as moderate to severe tricuspid regurgitation. PA  systolicpressures were about 60 mm. LV is significantly dilated and his RV. LV wall thickness is normal whereas both interventricular septum as well as RV free wall thickness are reduced. ESR current hospitalization 13  Cardiac index current hospitalization less than 1.8 L/min/m²  Investigations reviewed    HPI: Eliane Grover, a 38y.o. year-old who is seen for evaluation of biventricular dysfunction and management of heart failure. He has biventricular dysfunction which overall appears to be chronic. Nonetheless she has evidence of significantly elevated KITTY. Exact cause is uncertain but clinically according to rheumatology evaluation of may fit with Sjogren syndrome. She does not have any evidence of elevated biomarkers at this point of time. She does not report of any acute joint pains currently but has had previous history. She is currently on inotrope support which is difficult to be weaned off. Patient is being considered for possible LVAD implantation but reversible causes are being evaluated and I was asked to evaluate the patient for possible endomyocardial biopsy. She  has a past medical history of Anemia NEC, Arthritis, Chronic pain, Depression, GERD (gastroesophageal reflux disease), Hypertension, Hypertension, Ill-defined condition, MI (myocardial infarction) [facility]), Musculoskeletal disorder, SOB (shortness of breath), and Stool color black. Review of system:Patient reports no dyspnea/PND/Orthpnea/CP. She reports no cough/fever/focal neurological deficits/abdominal pain. All other systems negative except as above. Family History   Problem Relation Age of Onset    Hypertension Father     Elevated Lipids Father     Arthritis-rheumatoid Mother         ? Lupus vs RA    Lung Disease Mother     Heart Disease Mother     Cancer Mother         breast in 39y    COPD Mother     Hypertension Mother     Stroke Mother         3-4 strokes    Breast Cancer Mother 50    Diabetes Maternal Grandmother       Social History     Socioeconomic History    Marital status: SINGLE   Tobacco Use    Smoking status: Former     Packs/day: 0.25     Years: 8.00     Pack years: 2.00     Types: Cigarettes     Quit date: 2022     Years since quittin.2    Smokeless tobacco: Never   Vaping Use    Vaping Use: Never used   Substance and Sexual Activity    Alcohol use: Not Currently     Alcohol/week: 1.0 standard drink     Types: 1 Glasses of wine per week     Comment: Wine with special occassions - not since July    Drug use: Not Currently     Types: Marijuana     Comment: Haven't had any since 2022    Sexual activity: Yes     Partners: Male     Birth control/protection: None     Social Determinants of Health     Financial Resource Strain: High Risk    Difficulty of Paying Living Expenses: Very hard   Food Insecurity: No Food Insecurity    Worried About Running Out of Food in the Last Year: Never true    Ran Out of Food in the Last Year: Never true   Transportation Needs: No Transportation Needs    Lack of Transportation (Medical): No    Lack of Transportation (Non-Medical): No   Physical Activity: Inactive    Days of Exercise per Week: 0 days    Minutes of Exercise per Session: 0 min   Stress: Stress Concern Present    Feeling of Stress :  To some extent   Social Connections: Socially Isolated    Frequency of Communication with Friends and Family: Twice a week    Frequency of Social Gatherings with Friends and Family: Never    Attends Catholic Services: Never    Active Member of Clubs or Organizations: No    Attends Club or Organization Meetings: Never    Marital Status: Never    Housing Stability: Low Risk     Unable to Pay for Housing in the Last Year: No    Number of Places Lived in the Last Year: 1    Unstable Housing in the Last Year: No      PE  Vitals:    11/07/22 1000 11/07/22 1122 11/07/22 1200 11/07/22 1227   BP:  105/75  117/80   Pulse: 96 87 95 92   Resp:  18     Temp:  97.7 °F (36.5 °C)     SpO2:  97%     Weight:       Height:        Body mass index is 32.85 kg/m². General:    Alert, cooperative, no distress. Psychiatric:    Normal Mood and affect    Eye/ENT:      Pupils equal, No asymmetry, Conjunctival pink. Able to hear voice at normal amplitude   Lungs:      Visibly symmetric chest expansion, No palpable tenderness. Clear to auscultation bilaterally. Heart[de-identified]    Regular rate and rhythm, S1, S2 normal, no murmur, click, rub . Yelena Gull LV S3 is heard. Apical impulse is lateralized. No JVD, Normal palpable peripheral pulses. No cyanosis   Abdomen:     Soft, non-tender. Bowel sounds normal. No masses,  No      organomegaly. Extremities:   Extremities normal, atraumatic, no edema. Neurologic:   CN II-XII grossly intact.  No gross focal deficits           Recent Labs:  Lab Results   Component Value Date/Time    Cholesterol, total 95 10/22/2022 06:26 AM    HDL Cholesterol 32 10/22/2022 06:26 AM    LDL, calculated 45 10/22/2022 06:26 AM    Triglyceride 90 10/22/2022 06:26 AM    CHOL/HDL Ratio 3.0 10/22/2022 06:26 AM     Lab Results   Component Value Date/Time    Creatinine 0.82 11/07/2022 03:26 AM     Lab Results   Component Value Date/Time    BUN 10 11/07/2022 03:26 AM     Lab Results   Component Value Date/Time    Potassium 3.9 11/07/2022 03:26 AM     Lab Results   Component Value Date/Time    Hemoglobin A1c 6.3 (H) 10/21/2022 06:12 PM     Lab Results   Component Value Date/Time    HGB 13.1 11/07/2022 03:26 AM     Lab Results   Component Value Date/Time    PLATELET 232 73/27/3510 03:26 AM       Reviewed:  Past Medical History:   Diagnosis Date    Anemia NEC     Arthritis     Chronic pain     fybromyalsia    Depression     anxiety, depression, OCD    GERD (gastroesophageal reflux disease)     Hypertension     Hypertension     Ill-defined condition     Fibromyalia gastricparesis    MI (myocardial infarction) ([facility] [place] [facility]     Musculoskeletal disorder     SOB (shortness of breath)     Stool color black      Social History     Tobacco Use   Smoking Status Former    Packs/day: 0.25    Years: 8.00    Pack years: 2.00    Types: Cigarettes    Quit date: 2022    Years since quittin.2   Smokeless Tobacco Never     Social History     Substance and Sexual Activity   Alcohol Use Not Currently    Alcohol/week: 1.0 standard drink    Types: 1 Glasses of wine per week    Comment: Wine with special occassions - not since July     Allergies   Allergen Reactions    Abilify [Aripiprazole] Anxiety     Can not tolerate     Sulfa (Sulfonamide Antibiotics) Hives    Vicodin [Hydrocodone-Acetaminophen] Nausea and Vomiting     Family History   Problem Relation Age of Onset    Hypertension Father     Elevated Lipids Father     Arthritis-rheumatoid Mother         ? Lupus vs RA    Lung Disease Mother     Heart Disease Mother     Cancer Mother         breast in 39y    COPD Mother     Hypertension Mother     Stroke Mother         3-4 strokes    Breast Cancer Mother 50    Diabetes Maternal Grandmother         Current Facility-Administered Medications   Medication Dose Route Frequency    metoprolol succinate (TOPROL-XL) XL tablet 12.5 mg  12.5 mg Oral Q12H    bumetanide (BUMEX) tablet 2 mg  2 mg Oral BID    diclofenac (VOLTAREN) 1 % topical gel 2 g  2 g Topical QID    nystatin (MYCOSTATIN) 100,000 unit/gram ointment   Topical BID    cinacalcet (SENSIPAR) tablet 30 mg  30 mg Oral DAILY WITH BREAKFAST    milrinone (PRIMACOR) 20 MG/100 ML D5W infusion  0.25 mcg/kg/min IntraVENous CONTINUOUS    digoxin (LANOXIN) tablet 0.25 mg  0.25 mg Oral DAILY pramipexole (MIRAPEX) tablet 0.125 mg  0.125 mg Oral QHS    [Held by provider] bumetanide (BUMEX) injection 2 mg  2 mg IntraVENous TID    methyl salicylate-menthol (BENGAY) 15-10 % cream   Topical TID    pantoprazole (PROTONIX) tablet 40 mg  40 mg Oral ACB&D    polyethylene glycol (MIRALAX) packet 17 g  17 g Oral DAILY PRN    spironolactone (ALDACTONE) tablet 25 mg  25 mg Oral DAILY    prochlorperazine (COMPAZINE) injection 5 mg  5 mg IntraVENous Q6H PRN    ondansetron (ZOFRAN) injection 4 mg  4 mg IntraVENous Q6H PRN    allopurinoL (ZYLOPRIM) tablet 50 mg  50 mg Oral DAILY    hydrocortisone (ANUSOL-HC) 2.5 % rectal cream   PeriANAL QID    melatonin tablet 10.5 mg  10.5 mg Oral QHS PRN    empagliflozin (JARDIANCE) tablet 10 mg  10 mg Oral DAILY    aluminum & magnesium hydroxide-simethicone (MYLANTA II) oral suspension 30 mL  30 mL Oral Q4H PRN    diazePAM (VALIUM) tablet 5 mg  5 mg Oral Q12H PRN    docusate sodium (COLACE) capsule 100 mg  100 mg Oral DAILY    bisacodyL (DULCOLAX) suppository 10 mg  10 mg Rectal DAILY    albuterol-ipratropium (DUO-NEB) 2.5 MG-0.5 MG/3 ML  3 mL Nebulization Q6H PRN    fluticasone propionate (FLONASE) 50 mcg/actuation nasal spray 2 Spray  2 Spray Both Nostrils DAILY    traZODone (DESYREL) tablet 200 mg  200 mg Oral QHS    [Held by provider] magnesium oxide (MAG-OX) tablet 400 mg  400 mg Oral QHS    calcium carbonate (TUMS) chewable tablet 200 mg [elemental]  200 mg Oral TID PRN    clonazePAM (KlonoPIN) tablet 1 mg  1 mg Oral QHS    escitalopram oxalate (LEXAPRO) tablet 20 mg  20 mg Oral DAILY    ezetimibe (ZETIA) tablet 10 mg  10 mg Oral DAILY    folic acid (FOLVITE) tablet 1 mg  1 mg Oral DAILY    montelukast (SINGULAIR) tablet 10 mg  10 mg Oral DAILY    prasugreL (EFFIENT) tablet 10 mg  10 mg Oral DAILY    [Held by provider] rOPINIRole (REQUIP) tablet 0.5 mg  0.5 mg Oral BID    [Held by provider] rosuvastatin (CRESTOR) tablet 20 mg  20 mg Oral Q MON, WED & FRI    sodium chloride (NS) flush 5-40 mL  5-40 mL IntraVENous Q8H    sodium chloride (NS) flush 5-40 mL  5-40 mL IntraVENous PRN    acetaminophen (TYLENOL) tablet 650 mg  650 mg Oral Q4H PRN    nitroglycerin (NITROSTAT) tablet 0.4 mg  0.4 mg SubLINGual Q5MIN PRN    glucose chewable tablet 16 g  4 Tablet Oral PRN    glucagon (GLUCAGEN) injection 1 mg  1 mg IntraMUSCular PRN    dextrose 10 % infusion 0-250 mL  0-250 mL IntraVENous PRN    insulin lispro (HUMALOG) injection   SubCUTAneous AC&HS         ATTENTION:   This medical record was transcribed using an electronic medical records/speech recognition system. Although proofread, it may and can contain electronic, spelling and other errors. Corrections may be executed at a later time. Please feel free to contact us for any clarifications as needed.     [address] [facility] [place]  [facility]. [phone]

## 2022-11-08 LAB
ALBUMIN SERPL-MCNC: 4 G/DL (ref 3.5–5)
ALBUMIN/GLOB SERPL: 0.9 {RATIO} (ref 1.1–2.2)
ALP SERPL-CCNC: 149 U/L (ref 45–117)
ALT SERPL-CCNC: 95 U/L (ref 12–78)
ANION GAP SERPL CALC-SCNC: 10 MMOL/L (ref 5–15)
AST SERPL-CCNC: 61 U/L (ref 15–37)
ATRIAL RATE: 92 BPM
ATRIAL RATE: 92 BPM
BASOPHILS # BLD: 0.1 K/UL (ref 0–0.1)
BASOPHILS NFR BLD: 1 % (ref 0–1)
BILIRUB DIRECT SERPL-MCNC: 0.5 MG/DL (ref 0–0.2)
BILIRUB SERPL-MCNC: 1.1 MG/DL (ref 0.2–1)
BNP SERPL-MCNC: 1832 PG/ML
BUN SERPL-MCNC: 8 MG/DL (ref 6–20)
BUN/CREAT SERPL: 7 (ref 12–20)
CALCIUM SERPL-MCNC: 9.8 MG/DL (ref 8.5–10.1)
CALCULATED P AXIS, ECG09: 56 DEGREES
CALCULATED P AXIS, ECG09: 61 DEGREES
CALCULATED R AXIS, ECG10: -104 DEGREES
CALCULATED R AXIS, ECG10: -98 DEGREES
CALCULATED T AXIS, ECG11: 34 DEGREES
CALCULATED T AXIS, ECG11: 35 DEGREES
CHLORIDE SERPL-SCNC: 99 MMOL/L (ref 97–108)
CO2 SERPL-SCNC: 27 MMOL/L (ref 21–32)
CREAT SERPL-MCNC: 1.09 MG/DL (ref 0.55–1.02)
DIAGNOSIS, 93000: NORMAL
DIAGNOSIS, 93000: NORMAL
DIFFERENTIAL METHOD BLD: NORMAL
DIGOXIN SERPL-MCNC: 1.2 NG/ML (ref 0.9–2)
EOSINOPHIL # BLD: 0.3 K/UL (ref 0–0.4)
EOSINOPHIL NFR BLD: 6 % (ref 0–7)
ERYTHROCYTE [DISTWIDTH] IN BLOOD BY AUTOMATED COUNT: 13.2 % (ref 11.5–14.5)
GLOBULIN SER CALC-MCNC: 4.3 G/DL (ref 2–4)
GLUCOSE BLD STRIP.AUTO-MCNC: 105 MG/DL (ref 65–117)
GLUCOSE BLD STRIP.AUTO-MCNC: 88 MG/DL (ref 65–117)
GLUCOSE BLD STRIP.AUTO-MCNC: 89 MG/DL (ref 65–117)
GLUCOSE BLD STRIP.AUTO-MCNC: 95 MG/DL (ref 65–117)
GLUCOSE SERPL-MCNC: 106 MG/DL (ref 65–100)
HCT VFR BLD AUTO: 44.4 % (ref 35–47)
HGB BLD-MCNC: 14.3 G/DL (ref 11.5–16)
IMM GRANULOCYTES # BLD AUTO: 0 K/UL (ref 0–0.04)
IMM GRANULOCYTES NFR BLD AUTO: 0 % (ref 0–0.5)
LYMPHOCYTES # BLD: 2 K/UL (ref 0.8–3.5)
LYMPHOCYTES NFR BLD: 43 % (ref 12–49)
MAGNESIUM SERPL-MCNC: 2 MG/DL (ref 1.6–2.4)
MCH RBC QN AUTO: 29.2 PG (ref 26–34)
MCHC RBC AUTO-ENTMCNC: 32.2 G/DL (ref 30–36.5)
MCV RBC AUTO: 90.8 FL (ref 80–99)
MONOCYTES # BLD: 0.5 K/UL (ref 0–1)
MONOCYTES NFR BLD: 11 % (ref 5–13)
NEUTS SEG # BLD: 1.8 K/UL (ref 1.8–8)
NEUTS SEG NFR BLD: 39 % (ref 32–75)
NRBC # BLD: 0 K/UL (ref 0–0.01)
NRBC BLD-RTO: 0 PER 100 WBC
P-R INTERVAL, ECG05: 150 MS
P-R INTERVAL, ECG05: 152 MS
PLATELET # BLD AUTO: 231 K/UL (ref 150–400)
PMV BLD AUTO: 10.8 FL (ref 8.9–12.9)
POTASSIUM SERPL-SCNC: 3.4 MMOL/L (ref 3.5–5.1)
PROT SERPL-MCNC: 8.3 G/DL (ref 6.4–8.2)
Q-T INTERVAL, ECG07: 424 MS
Q-T INTERVAL, ECG07: 436 MS
QRS DURATION, ECG06: 158 MS
QRS DURATION, ECG06: 160 MS
QTC CALCULATION (BEZET), ECG08: 524 MS
QTC CALCULATION (BEZET), ECG08: 539 MS
RBC # BLD AUTO: 4.89 M/UL (ref 3.8–5.2)
SERVICE CMNT-IMP: NORMAL
SODIUM SERPL-SCNC: 136 MMOL/L (ref 136–145)
VENTRICULAR RATE, ECG03: 92 BPM
VENTRICULAR RATE, ECG03: 92 BPM
WBC # BLD AUTO: 4.6 K/UL (ref 3.6–11)

## 2022-11-08 PROCEDURE — 80321 ALCOHOLS BIOMARKERS 1OR 2: CPT

## 2022-11-08 PROCEDURE — 80162 ASSAY OF DIGOXIN TOTAL: CPT

## 2022-11-08 PROCEDURE — 74011250637 HC RX REV CODE- 250/637: Performed by: HOSPITALIST

## 2022-11-08 PROCEDURE — 74011000250 HC RX REV CODE- 250: Performed by: FAMILY MEDICINE

## 2022-11-08 PROCEDURE — 82962 GLUCOSE BLOOD TEST: CPT

## 2022-11-08 PROCEDURE — 83735 ASSAY OF MAGNESIUM: CPT

## 2022-11-08 PROCEDURE — 74011250637 HC RX REV CODE- 250/637: Performed by: INTERNAL MEDICINE

## 2022-11-08 PROCEDURE — 99233 SBSQ HOSP IP/OBS HIGH 50: CPT | Performed by: INTERNAL MEDICINE

## 2022-11-08 PROCEDURE — 83880 ASSAY OF NATRIURETIC PEPTIDE: CPT

## 2022-11-08 PROCEDURE — 65660000001 HC RM ICU INTERMED STEPDOWN

## 2022-11-08 PROCEDURE — 74011250637 HC RX REV CODE- 250/637: Performed by: STUDENT IN AN ORGANIZED HEALTH CARE EDUCATION/TRAINING PROGRAM

## 2022-11-08 PROCEDURE — 80048 BASIC METABOLIC PNL TOTAL CA: CPT

## 2022-11-08 PROCEDURE — 74011250637 HC RX REV CODE- 250/637: Performed by: FAMILY MEDICINE

## 2022-11-08 PROCEDURE — 80076 HEPATIC FUNCTION PANEL: CPT

## 2022-11-08 PROCEDURE — 36415 COLL VENOUS BLD VENIPUNCTURE: CPT

## 2022-11-08 PROCEDURE — 74011250636 HC RX REV CODE- 250/636: Performed by: NURSE PRACTITIONER

## 2022-11-08 PROCEDURE — 85025 COMPLETE CBC W/AUTO DIFF WBC: CPT

## 2022-11-08 PROCEDURE — 74011250637 HC RX REV CODE- 250/637: Performed by: NURSE PRACTITIONER

## 2022-11-08 RX ORDER — DIPHENHYDRAMINE HCL 25 MG
25 CAPSULE ORAL
Status: DISCONTINUED | OUTPATIENT
Start: 2022-11-08 | End: 2022-11-09 | Stop reason: HOSPADM

## 2022-11-08 RX ORDER — POTASSIUM CHLORIDE 750 MG/1
60 TABLET, FILM COATED, EXTENDED RELEASE ORAL
Status: COMPLETED | OUTPATIENT
Start: 2022-11-08 | End: 2022-11-08

## 2022-11-08 RX ADMIN — PANTOPRAZOLE SODIUM 40 MG: 40 TABLET, DELAYED RELEASE ORAL at 17:05

## 2022-11-08 RX ADMIN — DICLOFENAC SODIUM 2 G: 10 GEL TOPICAL at 17:06

## 2022-11-08 RX ADMIN — SODIUM CHLORIDE, PRESERVATIVE FREE 10 ML: 5 INJECTION INTRAVENOUS at 23:19

## 2022-11-08 RX ADMIN — MILRINONE LACTATE IN DEXTROSE 0.25 MCG/KG/MIN: 200 INJECTION, SOLUTION INTRAVENOUS at 11:43

## 2022-11-08 RX ADMIN — EZETIMIBE 10 MG: 10 TABLET ORAL at 08:37

## 2022-11-08 RX ADMIN — DIPHENHYDRAMINE HYDROCHLORIDE 25 MG: 25 CAPSULE ORAL at 15:13

## 2022-11-08 RX ADMIN — FOLIC ACID 1 MG: 1 TABLET ORAL at 08:37

## 2022-11-08 RX ADMIN — NYSTATIN OINTMENT: 100000 OINTMENT TOPICAL at 17:05

## 2022-11-08 RX ADMIN — DICLOFENAC SODIUM 2 G: 10 GEL TOPICAL at 13:18

## 2022-11-08 RX ADMIN — SPIRONOLACTONE 25 MG: 25 TABLET ORAL at 08:38

## 2022-11-08 RX ADMIN — ESCITALOPRAM OXALATE 20 MG: 10 TABLET ORAL at 08:37

## 2022-11-08 RX ADMIN — DOCUSATE SODIUM 100 MG: 100 CAPSULE, LIQUID FILLED ORAL at 08:37

## 2022-11-08 RX ADMIN — PANTOPRAZOLE SODIUM 40 MG: 40 TABLET, DELAYED RELEASE ORAL at 08:37

## 2022-11-08 RX ADMIN — METOPROLOL SUCCINATE 12.5 MG: 25 TABLET, EXTENDED RELEASE ORAL at 08:37

## 2022-11-08 RX ADMIN — SODIUM CHLORIDE, PRESERVATIVE FREE 10 ML: 5 INJECTION INTRAVENOUS at 13:18

## 2022-11-08 RX ADMIN — PRAMIPEXOLE DIHYDROCHLORIDE 0.12 MG: 0.25 TABLET ORAL at 23:07

## 2022-11-08 RX ADMIN — SODIUM CHLORIDE, PRESERVATIVE FREE 10 ML: 5 INJECTION INTRAVENOUS at 08:32

## 2022-11-08 RX ADMIN — FLUTICASONE PROPIONATE 2 SPRAY: 50 SPRAY, METERED NASAL at 08:40

## 2022-11-08 RX ADMIN — NYSTATIN OINTMENT: 100000 OINTMENT TOPICAL at 08:40

## 2022-11-08 RX ADMIN — PRASUGREL 10 MG: 10 TABLET, FILM COATED ORAL at 08:38

## 2022-11-08 RX ADMIN — CLONAZEPAM 1 MG: 0.5 TABLET ORAL at 23:07

## 2022-11-08 RX ADMIN — POTASSIUM CHLORIDE 60 MEQ: 750 TABLET, FILM COATED, EXTENDED RELEASE ORAL at 08:38

## 2022-11-08 RX ADMIN — DICLOFENAC SODIUM 2 G: 10 GEL TOPICAL at 08:41

## 2022-11-08 RX ADMIN — TRAZODONE HYDROCHLORIDE 200 MG: 100 TABLET ORAL at 23:07

## 2022-11-08 RX ADMIN — MENTHOL, METHYL SALICYLATE: 10; 15 CREAM TOPICAL at 08:39

## 2022-11-08 RX ADMIN — ALLOPURINOL 50 MG: 100 TABLET ORAL at 08:37

## 2022-11-08 RX ADMIN — MONTELUKAST 10 MG: 10 TABLET, FILM COATED ORAL at 08:38

## 2022-11-08 RX ADMIN — EMPAGLIFLOZIN 10 MG: 10 TABLET, FILM COATED ORAL at 08:37

## 2022-11-08 RX ADMIN — DIGOXIN 0.25 MG: 0.25 TABLET ORAL at 08:38

## 2022-11-08 RX ADMIN — CINACALCET HYDROCHLORIDE 30 MG: 30 TABLET, FILM COATED ORAL at 08:38

## 2022-11-08 NOTE — PROGRESS NOTES
Transitions of Care Plan  RUR: 20% - high  Clinical Update: HR stabilizing; milrinone continues   Consults: [facility]  Baseline: independent without DME; resides w father  Barriers to Discharge: medical  Disposition: home health and home infusion; DME - [facility]  Estimated Discharge Date: 11/9/22    CM received home health orders for milrinone. CM sent orders and supporting clinicals to home health agencies that populate near patient's residence in [place], [place] via [employer]. Noted order for [facility] - will order tomorrow for approval and fitting for patient. Barrier for Discharge: Home Health for patient with [facility] in [place], [place]    Disposition:  [address] - referral sent to multiple agencies for review  Home Infusion - [facility]    CM will continue to follow.     Anatoly Ortiz, MPH  Care Manager [facility]  Available via [employer] or

## 2022-11-08 NOTE — PROGRESS NOTES
Occupational Therapy  11/08/22    Order, acknowledged, chart reviewed, discussed with PT. Per PT & RN, patient is IND for ADLs and mobility with no self care concerns. RN to assess HR with activity however no indication for skilled therapy evaluation, will complete orders.     Thank you,   Jasson Suarez, OTD, OTR/L

## 2022-11-08 NOTE — PROGRESS NOTES
Problem: Falls - Risk of  Goal: *Absence of Falls  Description: Document Dennie Oak Fall Risk and appropriate interventions in the flowsheet.   Outcome: Progressing Towards Goal  Note: Fall Risk Interventions:  Mobility Interventions: Communicate number of staff needed for ambulation/transfer, Utilize walker, cane, or other assistive device    Mentation Interventions: Adequate sleep, hydration, pain control, Bed/chair exit alarm, Eyeglasses and hearing aids    Medication Interventions: Teach patient to arise slowly    Elimination Interventions: Bed/chair exit alarm, Call light in reach              Problem: Heart Failure: Day 5  Goal: Activity/Safety  Outcome: Progressing Towards Goal  Goal: Diagnostic Test/Procedures  Outcome: Progressing Towards Goal  Goal: Nutrition/Diet  Outcome: Progressing Towards Goal  Goal: Medications  Outcome: Progressing Towards Goal  Goal: Respiratory  Outcome: Progressing Towards Goal  Goal: Treatments/Interventions/Procedures  Outcome: Progressing Towards Goal  Goal: Psychosocial  Outcome: Progressing Towards Goal

## 2022-11-08 NOTE — PROGRESS NOTES
1930: Bedside and Verbal shift change report given to CK Serrano (oncoming nurse) by Helena Grimm RN (offgoing nurse). Report included the following information SBAR.

## 2022-11-08 NOTE — PROGRESS NOTES
[facility] in [place], [place]  Inpatient Progress Note      Patient name: Kody Olivia  Patient : 1980  Patient MRN: [id number]  Consulting MD: Joseline Frost MD  Date of service: 22    REASON FOR REFERRAL:  Management of chronic systolic heart failure     PLAN OF CARE:  38 y.o. female with ischemic cardiomyopathy, LVEF 15-20%, stage C, NYHA class IIIB initiated on inotropic support; not able to tolerate dobutamine due to tachycardia and switched to milrinone; limited options to slow down heart rate due to QTc > 500ms in the setting of antidepressant use; appears to have HR in better control; initiated inpatient HT/LVAD workup  Concerns regarding HT/LVAD candidacy upon initial review include possible autoimmune disease that requires diagnosis/treatment, psychiatric condition that needs evaluation/treatment and uncertain support system; ongoing evaluation  Tentative discharge home tomorrow or Friday with lifevest and milrinone gtt     RECOMMENDATIONS:  Continue milrinone 0.25mcg/kg/min, dosed to weight 85.4kg, up-titration limited by BP/HR  Ambulate daily and check 6 minute walk  Continue digoxin 0.25mg daily, check digoxin levels today and daily 1.2 (goal 0.7-1.2)   Hold off corlanor due to long QT; likely in the setting of taking antidepressants, d/w Pharm. D. would not use with QTc > 500ms; QTc 539ms 22  Continue toprol XL 12.5mg q12h  Cannot tolerate ACEi/ARB/ARNi due to hypotension and in anticipation of surgery  Continue spironolactone 25mg daily  Continue jardiance 10mg daily  Continue bumex 2mg PO twice daily, check orthostatics  Hold requip due to tachycardia  Hold crestor due to rise in TB; consider resuming when TB stable for a week  Keep K > 4 and Mag > 2  Cont allopurinol 50mg daily   Continue ASA and prasugrel per primary cards  Treatment of STU: inpatient eval for STU  Will need LifeVest at discharge   Plan on outpatient cardiac MRI  Dr. Irene Carlson consult appreciated, too high risk for cardiac biopsy  Dr. Cheikh Anderson consult appreciated  Labs: HF and Transplant eval labs ordered; in process   completing psychosocial evaluation (patient lives 1.5 hours away; need to connect with patient's psychiatrist)  Psychiatry consultation for anxiety appreciated for possible wean of antidepressants  Nephrology consult appreciated; will benefit from Parathyroidectomy if she is going for LVAD but can also be managed medically for now   Nutritionist consult  Heart failure education  Needs Advanced care plan  Physical therapy evaluation     All other care per primary team      IMPRESSION:  Fatigue  Shortness of breath  Volume overload  Acute on Chronic systolic heart failure  Stage C, NYHA class IV symptoms  ischemic cardiomyopathy, LVEF 20-25%  CAD  STEMI July 2022 s/p RICKY to LAD  At risk of sudden cardiac death  HTN  Obesity  LHD  Pre-diabetes  Esophageal stenosis s/p dilation  RLS  Fibromyalgia  Anxiety and Depression     INTERVAL HISTORY:  Remains tachycardic  Transitioned to milrinone   No acute overnight events  Afebrile  -110s/70s, HR 80-90s  I/O net negative 2 liters, urine 2.5 liters  Cr 1.09  K 3.4  Ca 8.9  Pro-NT-BNP 1832 from 1246 from 1157      LIFE GOALS:  Patient's personal goals include: get better  Important upcoming milestones or family events: the holidays coming up  The patient identifies the following as important for living well: being independent     CARDIAC IMAGING:  Echo 10/22/22    Left Ventricle: Severely reduced left ventricular systolic function with a visually estimated EF of 15 - 20%. Left ventricle is mildly dilated. Normal wall thickness.  Moderate hypokinesis of the following segments: basal anterior, basal anterolateral, basal anteroseptal, basal inferior, basal inferolateral, basal inferoseptal, mid anterior, mid anterolateral, mid anteroseptal, mid inferior, mid inferolateral and mid inferoseptal. Severe hypokinesis of the following segments: apical anterior, apical septal, apical inferior and apical lateral. Grade III diastolic dysfunction with increased LAP. Mitral Valve: Mild annular dilation. Moderate regurgitation. Tricuspid Valve: Moderately elevated RVSP. The estimated RVSP is 51 mmHg. Left Atrium: Left atrium is mildly dilated. Pericardium: Trivial pericardial effusion present. No indication of cardiac tamponade. Contrast used: Definity. Echo (8/14/22)    Left Ventricle: Severely reduced left ventricular systolic function with a visually estimated EF of 20 - 25%. Left ventricle is mildly dilated. Increased wall thickness. See diagram for wall motion findings. Grade II diastolic dysfunction with increased LAP. Right Ventricle: Not well visualized. Tricuspid Valve: Moderately elevated RVSP. [address] 10/27/22  PA 56/34/43, RA 16, PCWP 29, CI daisy 1.46 at 199lbs         BRIEF HISTORY OF PRESENT ILLNESS:  Luan Dunbar is a 38 y.o. female with h/o HTN, CAD s/p STEMI in July 2022 (DESx1 to LAD; treated at [facility]) with ischemic cardiomyopathy (EF 20-25%), CHF, GERD, RLS, and fibromyalgia seen as a new patient in [facility] on 10/21/22 and found to be fluid overloaded, and with suicidal ideation. Pt was taken to the ER for further evaluation and admission for acute on chronic HF and mental health crisis. PHYSICAL EXAM:  Visit Vitals  /70 (BP 1 Location: Left upper arm, BP Patient Position: At rest)   Pulse 90   Temp 98 °F (36.7 °C)   Resp 20   Ht 5' 4\" (1.626 m)   Wt 187 lb 2.7 oz (84.9 kg)   LMP 10/09/2022   SpO2 98%   BMI 32.13 kg/m²     General: Patient is well developed, well-nourished in no acute distress  HEENT: Unremarkable   Neck: Supple. No evidence of thyroid enlargements or lymphadenopathy. JVD: Cannot be appreciated   Lungs: Breath sounds are equal and clear bilaterally. No wheezes, rhonchi, or rales. Heart: Regular rate and rhythm with normal S1 and S2.  No murmurs, gallops or rubs. Abdomen: Soft, no mass or tenderness. No organomegaly or hernia. Bowel sounds present. Genitourinary and rectal: deferred  Extremities: No cyanosis, clubbing, or edema. Neurologic: No focal sensory or motor deficits are noted. Grossly intact. Psychiatric: Awake, alert an doriented x 3. Appropriate mood and affect. Skin: Warm, dry and well perfused. REVIEW OF SYSTEMS:  General: Denies fever. Ear, nose and throat: Denies difficulty hearing, sinus problems, nosebleeds  Cardiovascular: see above in the interval history  Respiratory: Denies cough, wheezing, sputum production, hemoptysis. Gastrointestinal: Denies heartburn, constipation, diarrhea, abdominal pain, nausea, blood in stool  Kidney and bladder: Denies painful urination, frequent urination  Musculoskeletal: Denies joint pain, muscle weakness  Skin and hair: Denies change in existing skin lesions    PAST MEDICAL HISTORY:  Past Medical History:   Diagnosis Date    Anemia NEC     Arthritis     Chronic pain     fybromyalsia    Depression     anxiety, depression, OCD    GERD (gastroesophageal reflux disease)     Hypertension     Hypertension     Ill-defined condition     Fibromyalia gastricparesis    MI (myocardial infarction) ([facility] [address]     Musculoskeletal disorder     SOB (shortness of breath)     Stool color black        PAST SURGICAL HISTORY:  Past Surgical History:   Procedure Laterality Date    HX CORONARY STENT PLACEMENT      HX GYN           HX HEENT  2002    wisdom teeth extraction    UPPER GI ENDOSCOPY,BIOPSY  2018         UPPER GI ENDOSCOPY,DIAGNOSIS  2018            FAMILY HISTORY:  Family History   Problem Relation Age of Onset    Hypertension Father     Elevated Lipids Father     Arthritis-rheumatoid Mother         ? Lupus vs RA    Lung Disease Mother     Heart Disease Mother     Cancer Mother         breast in 39y    COPD Mother     Hypertension Mother     Stroke Mother         3-4 strokes    Breast Cancer Mother 50    Diabetes Maternal Grandmother        SOCIAL HISTORY:  Social History     Socioeconomic History    Marital status: SINGLE   Tobacco Use    Smoking status: Former     Packs/day: 0.25     Years: 8.00     Pack years: 2.00     Types: Cigarettes     Quit date: 2022     Years since quittin.2    Smokeless tobacco: Never   Vaping Use    Vaping Use: Never used   Substance and Sexual Activity    Alcohol use: Not Currently     Alcohol/week: 1.0 standard drink     Types: 1 Glasses of wine per week     Comment: Wine with special occassions - not since July    Drug use: Not Currently     Types: Marijuana     Comment: Haven't had any since 2022    Sexual activity: Yes     Partners: Male     Birth control/protection: None     Social Determinants of Health     Financial Resource Strain: High Risk    Difficulty of Paying Living Expenses: Very hard   Food Insecurity: No Food Insecurity    Worried About Running Out of Food in the Last Year: Never true    Ran Out of Food in the Last Year: Never true   Transportation Needs: No Transportation Needs    Lack of Transportation (Medical): No    Lack of Transportation (Non-Medical): No   Physical Activity: Inactive    Days of Exercise per Week: 0 days    Minutes of Exercise per Session: 0 min   Stress: Stress Concern Present    Feeling of Stress :  To some extent   Social Connections: Socially Isolated    Frequency of Communication with Friends and Family: Twice a week    Frequency of Social Gatherings with Friends and Family: Never    Attends [employer] Services: Never    Active Member of Clubs or Organizations: No    Attends Club or Organization Meetings: Never    Marital Status: Never    Housing Stability: [address] to Pay for Housing in the Last Year: No    Number of [place] in the Last Year: 1    Unstable Housing in the Last Year: No       LABORATORY RESULTS:     Labs Latest Ref Rng & Units 2022 11/3/2022 11/2/2022   WBC 3.6 - 11.0 K/uL 4.6 4.4 4.7 4.3 5.8 4.5 -   RBC 3.80 - 5.20 M/uL 4.89 4.47 4.64 4.93 5.10 5.38(H) -   Hemoglobin 11.5 - 16.0 g/dL 14.3 13.1 13.5 14.6 14.7 15.8 -   Hematocrit 35.0 - 47.0 % 44.4 41.6 42.4 46.4 46.2 49. 8(H) -   MCV 80.0 - 99.0 FL 90.8 93.1 91.4 94.1 90.6 92.6 -   Platelets 839 - 845 K/uL 231 227 241 232 236 246 -   Lymphocytes 12 - 49 % 43 52(H) 38 41 28 - -   Monocytes 5 - 13 % 11 11 11 12 13 - -   Eosinophils 0 - 7 % 6 7 6 5 2 - -   Basophils 0 - 1 % 1 1 1 1 1 - -   Albumin 3.5 - 5.0 g/dL 4.0 3.4(L) 3.7 4.1 4.3 - 4.2   Calcium 8.5 - 10.1 MG/DL 9.8 8.9 9.7 10. 7(H) 10. 3(H) 10. 3(H) 10. 4(H)   Glucose 65 - 100 mg/dL 106(H) 100 135(H) 104(H) 109(H) 114(H) 103(H)   BUN 6 - 20 MG/DL 8 10 13 18 17 19 20   Creatinine 0.55 - 1.02 MG/DL 1.09(H) 0.82 1.08(H) 1.01 1.30(H) 1.16(H) 1.20(H)   Sodium 136 - 145 mmol/L 136 136 136 134(L) 132(L) 131(L) 134(L)   Potassium 3.5 - 5.1 mmol/L 3.4(L) 3.9 4.3 5.2(H) 4.0 4.1 4.1   TSH 0.36 - 3.74 uIU/mL - - - - - - -   LDH 81 - 246 U/L - - - - - - -   Some recent data might be hidden     Lab Results   Component Value Date/Time    TSH 4.44 (H) 10/21/2022 06:12 PM    TSH 2.12 05/12/2021 10:55 AM    TSH 1.330 12/30/2019 12:00 AM    TSH 3.850 07/25/2018 12:40 PM    TSH 1.620 02/27/2018 08:59 AM    TSH 1.240 12/05/2016 10:13 AM    TSH 1.530 03/18/2016 10:31 AM    TSH 1.400 11/09/2011 09:37 AM    TSH 1.26 08/19/2010 10:36 AM    TSH 1.18 07/28/2010 04:10 PM       ALLERGY:  Allergies   Allergen Reactions    Abilify [Aripiprazole] Anxiety     Can not tolerate     Sulfa (Sulfonamide Antibiotics) Hives    Vicodin [Hydrocodone-Acetaminophen] Nausea and Vomiting        CURRENT MEDICATIONS:    Current Facility-Administered Medications:     diphenhydrAMINE (BENADRYL) capsule 25 mg, 25 mg, Oral, Q6H PRN, Kelly Woods MD, 25 mg at 11/08/22 1513    metoprolol succinate (TOPROL-XL) XL tablet 12.5 mg, 12.5 mg, Oral, Q12H, Lynn Magallanes MD, 12.5 mg at 11/08/22 9498    [Held by provider] bumetanide (BUMEX) tablet 2 mg, 2 mg, Oral, BID, Peggy Hernandez MD, 2 mg at 11/07/22 1750    diclofenac (VOLTAREN) 1 % topical gel 2 g, 2 g, Topical, QID, Trinity Khan MD, 2 g at 11/08/22 1318    nystatin (MYCOSTATIN) 100,000 unit/gram ointment, , Topical, BID, Trinity Khan MD, Given at 11/08/22 0840    cinacalcet (SENSIPAR) tablet 30 mg, 30 mg, Oral, DAILY WITH BREAKFAST, Fernando Martinez MD, 30 mg at 11/08/22 0838    milrinone (PRIMACOR) 20 MG/100 ML D5W infusion, 0.25 mcg/kg/min, IntraVENous, CONTINUOUS, Gena Bosch NP, Last Rate: 6.4 mL/hr at 11/08/22 1143, 0.25 mcg/kg/min at 11/08/22 1143    digoxin (LANOXIN) tablet 0.25 mg, 0.25 mg, Oral, DAILY, Peggy Hernandez MD, 0.25 mg at 11/08/22 9555    pramipexole (MIRAPEX) tablet 0.125 mg, 0.125 mg, Oral, QHS, Nathalie Mazariegos MD, 0.125 mg at 11/07/22 2250    [Held by provider] bumetanide (BUMEX) injection 2 mg, 2 mg, IntraVENous, TID, Jenni Carlson NP, 2 mg at 11/03/22 4101    methyl salicylate-menthol (BENGAY) 15-10 % cream, , Topical, TID, Scott Zaragoza NP, Given at 11/08/22 0839    pantoprazole (PROTONIX) tablet 40 mg, 40 mg, Oral, ACB&D, Nathalie Mazariegos MD, 40 mg at 11/08/22 0837    polyethylene glycol (MIRALAX) packet 17 g, 17 g, Oral, DAILY PRN, Jamey Dove MD, 17 g at 10/30/22 2052    spironolactone (ALDACTONE) tablet 25 mg, 25 mg, Oral, DAILY, Hiro, Gena B, NP, 25 mg at 11/08/22 0838    prochlorperazine (COMPAZINE) injection 5 mg, 5 mg, IntraVENous, Q6H PRN, Nathalie Mazariegos MD, 5 mg at 10/28/22 1557    ondansetron (ZOFRAN) injection 4 mg, 4 mg, IntraVENous, Q6H PRN, Sylvain Dominguez MD, 4 mg at 11/04/22 0823    allopurinoL (ZYLOPRIM) tablet 50 mg, 50 mg, Oral, DAILY, Hiro, Gena B, NP, 50 mg at 11/08/22 0837    hydrocortisone (ANUSOL-HC) 2.5 % rectal cream, , PeriANAL, QID, Sylvain Dominguez MD, Patient/Family Admin at 11/07/22 0928    melatonin tablet 10.5 mg, 10.5 mg, Oral, QHS PRN, Sylvain Dominguez MD    empagliflozin (JARDIANCE) tablet 10 mg, 10 mg, Oral, DAILY, Gena Bosch NP, 10 mg at 11/08/22 0837    aluminum & magnesium hydroxide-simethicone (MYLANTA II) oral suspension 30 mL, 30 mL, Oral, Q4H PRN, Sylvain Dominguez MD, 30 mL at 11/03/22 0536    diazePAM (VALIUM) tablet 5 mg, 5 mg, Oral, Q12H PRN, Sylvain Dominguez MD, 5 mg at 11/03/22 1246    docusate sodium (COLACE) capsule 100 mg, 100 mg, Oral, DAILY, Roslyn Philippe MD, 100 mg at 11/08/22 0837    bisacodyL (DULCOLAX) suppository 10 mg, 10 mg, Rectal, DAILY, Sylvain Dominguez MD, 10 mg at 10/25/22 2738    albuterol-ipratropium (DUO-NEB) 2.5 MG-0.5 MG/3 ML, 3 mL, Nebulization, Q6H PRN, Jorge A Alicia MD    fluticasone propionate (FLONASE) 50 mcg/actuation nasal spray 2 Spray, 2 Spray, Both Nostrils, DAILY, Roslyn Philippe MD, 2 [place] at 11/08/22 0840    traZODone (DESYREL) tablet 200 mg, 200 mg, Oral, QHS, Irma Zaragzoa, NP, 200 mg at 11/07/22 2250    [Held by provider] magnesium oxide (MAG-OX) tablet 400 mg, 400 mg, Oral, QHS, Alton Sauceda B, NP, 400 mg at 10/29/22 2159    calcium carbonate (TUMS) chewable tablet 200 mg [elemental], 200 mg, Oral, TID PRN, Roslyn Philippe MD, 200 mg at 11/07/22 2313    clonazePAM (KlonoPIN) tablet 1 mg, 1 mg, Oral, QHS, Judie Spears MD, 1 mg at 11/07/22 2251    escitalopram oxalate (LEXAPRO) tablet 20 mg, 20 mg, Oral, DAILY, Judie Spears MD, 20 mg at 11/08/22 0837    ezetimibe (ZETIA) tablet 10 mg, 10 mg, Oral, DAILY, Judie Spears MD, 10 mg at 25/40/64 2946    folic acid (FOLVITE) tablet 1 mg, 1 mg, Oral, DAILY, Judie Spears MD, 1 mg at 11/08/22 0837    montelukast (SINGULAIR) tablet 10 mg, 10 mg, Oral, DAILY, Geoff Rodriguez MD, 10 mg at 11/08/22 0838    prasugreL (EFFIENT) tablet 10 mg, 10 mg, Oral, DAILY, Geoff Rodriguez MD, 10 mg at 11/08/22 0838    [Held by provider] rOPINIRole (REQUIP) tablet 0.5 mg, 0.5 mg, Oral, BID, Michael Sherrie Munguia MD, 0.5 mg at 10/31/22 0947    [Held by provider] rosuvastatin (CRESTOR) tablet 20 mg, 20 mg, Oral, Q MON, WED & [place], Sherrie Munguia MD, 20 mg at 10/24/22 2241    sodium chloride (NS) flush 5-40 mL, 5-40 mL, IntraVENous, Q8H, Geoff Rodriguez MD, 10 mL at 11/08/22 1318    sodium chloride (NS) flush 5-40 mL, 5-40 mL, IntraVENous, PRN, Hallie Gayle MD    acetaminophen (TYLENOL) tablet 650 mg, 650 mg, Oral, Q4H PRN, Hallie Gayle MD, 650 mg at 11/06/22 0345    nitroglycerin (NITROSTAT) tablet 0.4 mg, 0.4 mg, SubLINGual, Q5MIN PRN, Hallie Gayle MD, 0.4 mg at 10/21/22 2237    glucose chewable tablet 16 g, 4 Tablet, Oral, PRN, Hallie Gayle MD    glucagon (GLUCAGEN) injection 1 mg, 1 mg, IntraMUSCular, PRN, Geoff Vasquez MD    dextrose 10 % infusion 0-250 mL, 0-250 mL, IntraVENous, PRN, Hallie Gayle MD    insulin lispro (HUMALOG) injection, , SubCUTAneous, AC&HS, Hallie Gayle MD    PATIENT CARE TEAM:  Patient Care Team:  Tracee Johnson MD as PCP - General (Family Medicine)  Tracee Johnson MD as PCP - [address] [place] [address] Provider  Lorna Rucker MD (Surgery General)  Oliverio Wilson MD (Cardiovascular Disease Physician)  Yusra Torres MD (Otolaryngology)  Becca Smith MD (Internal Medicine Physician)  Valarie Carolina MD (Female Pelvic Medicine and Reconstructive Surgery)  Vickie Turpin MD (Neurology)  Wilbert Fried MD (Psychiatry)  Gareth Edmonds as Ambulatory Care Manager     Thank you for allowing me to participate in this patient's care.     Abel Newman MD   [address] [place] [address], [address]  Phone: [phone]

## 2022-11-08 NOTE — PROGRESS NOTES
[facility]  Hospitalist Group                                                                                          Hospitalist Progress Note  Zamzam Sarah MD  Answering service: [phone] from in house phone        Date of Service:  2022  NAME:  Tameka Altamirano  :  1980  MRN:  [id number]      Admission Summary:     Patient initially presents with suicidal ideation and found to have congestive heart failure. Interval history / Subjective:     Patient seen examined at bedside earlier. Cont on  milrinone gtt. Father present at bedside.  States she was crying this am but now calm and feels well      Assessment & Plan:     Nonischemic cm Hfref   -Acute on chronic systolic CHF NYHA class IV on admission  -Patient with ischemic cardiomyopathy with EF with 15 to 20%  -s/p RHC 10/27 - started on dobutamine and IV bumetanide  -meds being adjusted per AHF team bumex on hold   -Patient became tachycardic restarted on milrinone gtt 11/3 weaned off dobutamine gtt, continued   -rheum on board  srogens consideration, ai workup has been neg thus far, appreciate recs, steroids at this pt wouldn't sig help cardiac disease   -cardio consulted for consideration of cardiac biopsy, appreciate recs, too high risk and no significant utility, do not recommend this   -bumex on hold, toprol     +KITTY  -as discussed above     Hypercalcemia  -appreciate nephro input  -pth appears  primary  -us shows 7 mm right-sided adenoma, sestamibi scan no uptake, consider parathyroidectomy if she would need LVAD    Hyperkalemia improved   -Discontinue potassium std will replete as needed  -monitor     Vaginal yeast infection  - Likely complication of Jardiance  - Cannot give fluconazole due to QTC prolongation  - miconazole ointment    Urinary tract infection  -Completed antibiotic    Hypertension  -Blood pressure is borderline low normal    Type 2 diabetes  -Well-controlled with hemoglobin A1c of 6.3  -Continue sliding scale insulin coverage    Coronary artery disease  -Patient with history of STEMI with stent to LAD  -Continue Effient    Dyslipidemia  -On Zetia    Restless leg syndrome  -On pramipexole, hold ropinirole secondary to tachycardia    Hemorrhoids  -Continue Anusol cream    Depression with suicidal ideation  -Patient previously evaluated by psychiatry  -On Lexapro, also on Klonopin for anxiety  -One-on-one sitter discontinued per psych recommendation  -Outpatient follow-up with psychiatry    Anxiety  -Patient on Klonopin for 12 years, continue Klonopin here  -Continue trazodone for sleep  -Outpatient follow-up with psychiatry    Constipation  -resolved; continue PRN Miralax    Dysgeusia: unclear etiology  -check B12 (normal/high), zinc, rapid covid (negative). TSH checked and was only mildly elevated in the setting of acute illness.   -no benefit with holding allopurinol; will resume this  -possibly related to GERD; continue pantoprazole, trial of carafate    Nausea: likely multifactorial, hx of gastroparesis, bowel edema from CHF  -continue supportive care, improved     PTOT    Critically ill, high risk for decompensation. CCT time 40 minutes    Family meeting with AHF team today      Code status: Full  Prophylaxis: SCDs  Care Plan discussed with: Patient/RN/CM/AHF  Anticipated Disposition: TBD > 48 hrs      Hospital Problems  Date Reviewed: 10/14/2022            Codes Class Noted POA    CHF (congestive heart failure) ([facility] [address] ICD-10-CM: I50.9  ICD-9-CM: 428.0  10/21/2022 Unknown       Review of Systems:   A comprehensive review of systems was negative except for that written in the HPI. Vital Signs:    Last 24hrs VS reviewed since prior progress note.  Most recent are:  Visit Vitals  /88 (BP 1 Location: Left upper arm, BP Patient Position: At rest)   Pulse 89   Temp 97.7 °F (36.5 °C)   Resp 20   Ht 5' 4\" (1.626 m)   Wt 84.9 kg (187 lb 2.7 oz)   SpO2 97%   BMI 32.13 kg/m² Intake/Output Summary (Last 24 hours) at 11/8/2022 1058  Last data filed at 11/8/2022 3831  Gross per 24 hour   Intake 629.12 ml   Output 2550 ml   Net -1920.88 ml          Physical Examination:     I had a face to face encounter with this patient and independently examined them on 11/8/2022 as outlined below:          Constitutional:  No acute distress, non-toxic, obese   ENT:  Oral mucosa moist, oropharynx benign, anicteric sclerae    Resp:  CTA bilaterally. No wheezing/rhonchi/rales. No accessory muscle use. CV:  Regular rhythm, normal rate, no murmurs, no gallops, trace b/l LE edema    GI:  Soft, non distended, non tender. normoactive bowel sounds     Musculoskeletal:  warm    Neurologic:  Moves all extremities. AAOx3, CN II-XII reviewed  Psych: normal mood appropriate affect            Data Review:    Review and/or order of clinical lab test      Labs:     Recent Labs     11/08/22 0320 11/07/22  0326   WBC 4.6 4.4   HGB 14.3 13.1   HCT 44.4 41.6    227       Recent Labs     11/08/22 0320 11/07/22 0326 11/06/22  0335    136 136   K 3.4* 3.9 4.3   CL 99 106 103   CO2 27 26 26   BUN 8 10 13   CREA 1.09* 0.82 1.08*   * 100 135*   CA 9.8 8.9 9.7   MG 2.0 2.0 2.4       Recent Labs     11/08/22 0320 11/07/22 0326 11/06/22  0335   ALT 95* 84* 88*   * 127* 134*   TBILI 1.1* 0.9 1.1*   TP 8.3* 7.2 7.4   ALB 4.0 3.4* 3.7   GLOB 4.3* 3.8 3.7       No results for input(s): INR, PTP, APTT, INREXT, INREXT in the last 72 hours. No results for input(s): FE, TIBC, PSAT, FERR in the last 72 hours. Lab Results   Component Value Date/Time    Folate 20.0 10/23/2022 04:24 AM        No results for input(s): PH, PCO2, PO2 in the last 72 hours. No results for input(s): CPK, CKNDX, TROIQ in the last 72 hours.     No lab exists for component: CPKMB  Lab Results   Component Value Date/Time    Cholesterol, total 95 10/22/2022 06:26 AM    HDL Cholesterol 32 10/22/2022 06:26 AM    LDL, calculated 45 10/22/2022 06:26 AM    Triglyceride 90 10/22/2022 06:26 AM    CHOL/HDL Ratio 3.0 10/22/2022 06:26 AM     Lab Results   Component Value Date/Time    Glucose (POC) 88 11/08/2022 10:51 AM    Glucose (POC) 95 11/08/2022 08:29 AM    Glucose (POC) 103 11/07/2022 08:41 PM    Glucose (POC) 93 11/07/2022 04:10 PM    Glucose (POC) 90 11/07/2022 11:22 AM     Lab Results   Component Value Date/Time    Color ORANGE 10/17/2022 08:42 AM    Appearance TURBID (A) 10/17/2022 08:42 AM    Specific gravity 1.025 10/17/2022 08:42 AM    Specific gravity 1.024 10/06/2022 08:57 AM    pH (UA) 5.0 10/17/2022 08:42 AM    Protein 30 (A) 10/17/2022 08:42 AM    Glucose Negative 10/17/2022 08:42 AM    Ketone Negative 10/17/2022 08:42 AM    Bilirubin Negative 11/25/2021 09:47 AM    Urobilinogen 1.0 10/17/2022 08:42 AM    Nitrites Positive (A) 10/17/2022 08:42 AM    Leukocyte Esterase SMALL (A) 10/17/2022 08:42 AM    Epithelial cells FEW 10/17/2022 08:42 AM    Bacteria 2+ (A) 10/17/2022 08:42 AM    WBC 5-10 10/17/2022 08:42 AM    RBC 5-10 10/17/2022 08:42 AM         Medications Reviewed:     Current Facility-Administered Medications   Medication Dose Route Frequency    metoprolol succinate (TOPROL-XL) XL tablet 12.5 mg  12.5 mg Oral Q12H    [Held by provider] bumetanide (BUMEX) tablet 2 mg  2 mg Oral BID    diclofenac (VOLTAREN) 1 % topical gel 2 g  2 g Topical QID    nystatin (MYCOSTATIN) 100,000 unit/gram ointment   Topical BID    cinacalcet (SENSIPAR) tablet 30 mg  30 mg Oral DAILY WITH BREAKFAST    milrinone (PRIMACOR) 20 MG/100 ML D5W infusion  0.25 mcg/kg/min IntraVENous CONTINUOUS    digoxin (LANOXIN) tablet 0.25 mg  0.25 mg Oral DAILY    pramipexole (MIRAPEX) tablet 0.125 mg  0.125 mg Oral QHS    [Held by provider] bumetanide (BUMEX) injection 2 mg  2 mg IntraVENous TID    methyl salicylate-menthol (BENGAY) 15-10 % cream   Topical TID    pantoprazole (PROTONIX) tablet 40 mg  40 mg Oral ACB&D    polyethylene glycol (MIRALAX) packet 17 g  17 g Oral DAILY PRN    spironolactone (ALDACTONE) tablet 25 mg  25 mg Oral DAILY    prochlorperazine (COMPAZINE) injection 5 mg  5 mg IntraVENous Q6H PRN    ondansetron (ZOFRAN) injection 4 mg  4 mg IntraVENous Q6H PRN    allopurinoL (ZYLOPRIM) tablet 50 mg  50 mg Oral DAILY    hydrocortisone (ANUSOL-HC) 2.5 % rectal cream   PeriANAL QID    melatonin tablet 10.5 mg  10.5 mg Oral QHS PRN    empagliflozin (JARDIANCE) tablet 10 mg  10 mg Oral DAILY    aluminum & magnesium hydroxide-simethicone (MYLANTA II) oral suspension 30 mL  30 mL Oral Q4H PRN    diazePAM (VALIUM) tablet 5 mg  5 mg Oral Q12H PRN    docusate sodium (COLACE) capsule 100 mg  100 mg Oral DAILY    bisacodyL (DULCOLAX) suppository 10 mg  10 mg Rectal DAILY    albuterol-ipratropium (DUO-NEB) 2.5 MG-0.5 MG/3 ML  3 mL Nebulization Q6H PRN    fluticasone propionate (FLONASE) 50 mcg/actuation nasal spray 2 Spray  2 Spray Both Nostrils DAILY    traZODone (DESYREL) tablet 200 mg  200 mg Oral QHS    [Held by provider] magnesium oxide (MAG-OX) tablet 400 mg  400 mg Oral QHS    calcium carbonate (TUMS) chewable tablet 200 mg [elemental]  200 mg Oral TID PRN    clonazePAM (KlonoPIN) tablet 1 mg  1 mg Oral QHS    escitalopram oxalate (LEXAPRO) tablet 20 mg  20 mg Oral DAILY    ezetimibe (ZETIA) tablet 10 mg  10 mg Oral DAILY    folic acid (FOLVITE) tablet 1 mg  1 mg Oral DAILY    montelukast (SINGULAIR) tablet 10 mg  10 mg Oral DAILY    prasugreL (EFFIENT) tablet 10 mg  10 mg Oral DAILY    [Held by provider] rOPINIRole (REQUIP) tablet 0.5 mg  0.5 mg Oral BID    [Held by provider] rosuvastatin (CRESTOR) tablet 20 mg  20 mg Oral Q MON, WED & FRI    sodium chloride (NS) flush 5-40 mL  5-40 mL IntraVENous Q8H    sodium chloride (NS) flush 5-40 mL  5-40 mL IntraVENous PRN    acetaminophen (TYLENOL) tablet 650 mg  650 mg Oral Q4H PRN    nitroglycerin (NITROSTAT) tablet 0.4 mg  0.4 mg SubLINGual Q5MIN PRN    glucose chewable tablet 16 g  4 Tablet Oral PRN glucagon (GLUCAGEN) injection 1 mg  1 mg IntraMUSCular PRN    dextrose 10 % infusion 0-250 mL  0-250 mL IntraVENous PRN    insulin lispro (HUMALOG) injection   SubCUTAneous AC&HS     ______________________________________________________________________  EXPECTED LENGTH OF STAY: 5d 4h  ACTUAL LENGTH OF STAY:          Martita Rey MD

## 2022-11-08 NOTE — PROGRESS NOTES
[facility]   [address] [facility], [address] [place] [address]  Phone: [phone]   [phone]    [email or web address]     Nephrology Progress Note    Patient Name : Tameka Altamirano      : 1980     MRN : [id number]  Date of Admission : 10/21/2022  Date of Servive : 22    CC:  Follow up for Hypercalcemia        Assessment and Plan   Hypercalcemia :  - from primary hyperparathyroidism  - Parathyroid US : 7 mm R sided adenoma. Sestamibi scan : no uptake   - ACE level normal, 1.25 Vit D pending, will reorder today  - will benefit from Parathyroidectomy if she is going for LVAD but can also be managed medically for now   - Ca stable on sensipar  - no changes for now    Positive KITTY  -Being worked up by rheumatology     Ischemic cardiomyopathy  LVEF 15 to 20%          Interval History:  Seen and examined. Stable renal function and calcium. No cp, sob, n/v/d. Voiding ok. Remains on milrinone    Review of Systems: A comprehensive review of systems was negative except for that written in the HPI.     Current Medications:   Current Facility-Administered Medications   Medication Dose Route Frequency    metoprolol succinate (TOPROL-XL) XL tablet 12.5 mg  12.5 mg Oral Q12H    [Held by provider] bumetanide (BUMEX) tablet 2 mg  2 mg Oral BID    diclofenac (VOLTAREN) 1 % topical gel 2 g  2 g Topical QID    nystatin (MYCOSTATIN) 100,000 unit/gram ointment   Topical BID    cinacalcet (SENSIPAR) tablet 30 mg  30 mg Oral DAILY WITH BREAKFAST    milrinone (PRIMACOR) 20 MG/100 ML D5W infusion  0.25 mcg/kg/min IntraVENous CONTINUOUS    digoxin (LANOXIN) tablet 0.25 mg  0.25 mg Oral DAILY    pramipexole (MIRAPEX) tablet 0.125 mg  0.125 mg Oral QHS    [Held by provider] bumetanide (BUMEX) injection 2 mg  2 mg IntraVENous TID    methyl salicylate-menthol (BENGAY) 15-10 % cream   Topical TID    pantoprazole (PROTONIX) tablet 40 mg  40 mg Oral ACB&D    polyethylene glycol (MIRALAX) packet 17 g  17 g Oral DAILY PRN    spironolactone (ALDACTONE) tablet 25 mg  25 mg Oral DAILY    prochlorperazine (COMPAZINE) injection 5 mg  5 mg IntraVENous Q6H PRN    ondansetron (ZOFRAN) injection 4 mg  4 mg IntraVENous Q6H PRN    allopurinoL (ZYLOPRIM) tablet 50 mg  50 mg Oral DAILY    hydrocortisone (ANUSOL-HC) 2.5 % rectal cream   PeriANAL QID    melatonin tablet 10.5 mg  10.5 mg Oral QHS PRN    empagliflozin (JARDIANCE) tablet 10 mg  10 mg Oral DAILY    aluminum & magnesium hydroxide-simethicone (MYLANTA II) oral suspension 30 mL  30 mL Oral Q4H PRN    diazePAM (VALIUM) tablet 5 mg  5 mg Oral Q12H PRN    docusate sodium (COLACE) capsule 100 mg  100 mg Oral DAILY    bisacodyL (DULCOLAX) suppository 10 mg  10 mg Rectal DAILY    albuterol-ipratropium (DUO-NEB) 2.5 MG-0.5 MG/3 ML  3 mL Nebulization Q6H PRN    fluticasone propionate (FLONASE) 50 mcg/actuation nasal spray 2 Spray  2 Spray Both Nostrils DAILY    traZODone (DESYREL) tablet 200 mg  200 mg Oral QHS    [Held by provider] magnesium oxide (MAG-OX) tablet 400 mg  400 mg Oral QHS    calcium carbonate (TUMS) chewable tablet 200 mg [elemental]  200 mg Oral TID PRN    clonazePAM (KlonoPIN) tablet 1 mg  1 mg Oral QHS    escitalopram oxalate (LEXAPRO) tablet 20 mg  20 mg Oral DAILY    ezetimibe (ZETIA) tablet 10 mg  10 mg Oral DAILY    folic acid (FOLVITE) tablet 1 mg  1 mg Oral DAILY    montelukast (SINGULAIR) tablet 10 mg  10 mg Oral DAILY    prasugreL (EFFIENT) tablet 10 mg  10 mg Oral DAILY    [Held by provider] rOPINIRole (REQUIP) tablet 0.5 mg  0.5 mg Oral BID    [Held by provider] rosuvastatin (CRESTOR) tablet 20 mg  20 mg Oral Q MON, WED & FRI    sodium chloride (NS) flush 5-40 mL  5-40 mL IntraVENous Q8H    sodium chloride (NS) flush 5-40 mL  5-40 mL IntraVENous PRN    acetaminophen (TYLENOL) tablet 650 mg  650 mg Oral Q4H PRN    nitroglycerin (NITROSTAT) tablet 0.4 mg  0.4 mg SubLINGual Q5MIN PRN    glucose chewable tablet 16 g  4 Tablet Oral PRN    glucagon (GLUCAGEN) injection 1 mg  1 mg IntraMUSCular PRN    dextrose 10 % infusion 0-250 mL  0-250 mL IntraVENous PRN    insulin lispro (HUMALOG) injection   SubCUTAneous AC&HS      Allergies   Allergen Reactions    Abilify [Aripiprazole] Anxiety     Can not tolerate     Sulfa (Sulfonamide Antibiotics) Hives    Vicodin [Hydrocodone-Acetaminophen] Nausea and Vomiting       Objective:  Vitals:    Vitals:    11/08/22 0557 11/08/22 0826 11/08/22 1000 11/08/22 1106   BP:  113/88  108/77   Pulse: (!) 106 99 89 86   Resp:  20  20   Temp:  97.7 °F (36.5 °C)  97.5 °F (36.4 °C)   SpO2:  97%  99%   Weight:       Height:         Intake and Output:  11/08 0701 - 11/08 1900  In: 529.9 [P.O.:480; I.V.:49.9]  Out: -   11/06 1901 - 11/08 0700  In: 897.7 [P.O.:340; I.V.:557.7]  Out: 2850 [Urine:2850]    Physical Examination:        General: Obese   Neck:  Supple, no mass  Resp:  Lungs CTA B/L, no wheezing , normal respiratory effort  CV:  RRR,  no murmur or rub, LE edema  GI:  Soft, NT, + BS, no HS megaly  Neurologic:  Non focal    []    High complexity decision making was performed  []    Patient is at high-risk of decompensation with multiple organ involvement    Lab Data Personally Reviewed: I have reviewed all the pertinent labs, microbiology data and radiology studies during assessment.     Recent Labs     11/08/22 0320 11/07/22 0326 11/06/22  0335    136 136   K 3.4* 3.9 4.3   CL 99 106 103   CO2 27 26 26   * 100 135*   BUN 8 10 13   CREA 1.09* 0.82 1.08*   CA 9.8 8.9 9.7   MG 2.0 2.0 2.4   ALB 4.0 3.4* 3.7   ALT 95* 84* 88*       Recent Labs     11/08/22  0320 11/07/22 0326 11/06/22  0335   WBC 4.6 4.4 4.7   HGB 14.3 13.1 13.5   HCT 44.4 41.6 42.4    227 241       Lab Results   Component Value Date/Time    Specimen Description: URINE 05/16/2011 04:18 PM    Specimen Description: URINE 10/06/2010 03:01 PM    Specimen Description: URINE 02/02/2010 04:50 PM    Specimen Description: VAGINA 06/03/2009 10:38 AM Specimen Description: URINE 01/13/2009 03:17 PM     Lab Results   Component Value Date/Time    Culture result: MRSA NOT PRESENT 11/03/2022 10:15 AM    Culture result:  11/03/2022 10:15 AM     Screening of patient nares for MRSA is for surveillance purposes and, if positive, to facilitate isolation considerations in high risk settings. It is not intended for automatic decolonization interventions per se as regimens are not sufficiently effective to warrant routine use.       Culture result: NO GROWTH 5 DAYS 11/02/2022 03:09 AM    Culture result: No growth (<1,000 CFU/ML) 10/23/2022 04:43 AM    Culture result: MIXED UROGENITAL PALMA ISOLATED 10/17/2022 08:42 AM     Recent Results (from the past 24 hour(s))   GLUCOSE, POC    Collection Time: 11/07/22  4:10 PM   Result Value Ref Range    Glucose (POC) 93 65 - 117 mg/dL    Performed by Margarito MCNAIR    GLUCOSE, POC    Collection Time: 11/07/22  8:41 PM   Result Value Ref Range    Glucose (POC) 103 65 - 117 mg/dL    Performed by MIC Yan    EKG, 12 LEAD, INITIAL    Collection Time: 11/07/22 11:00 PM   Result Value Ref Range    Ventricular Rate 92 BPM    Atrial Rate 92 BPM    P-R Interval 150 ms    QRS Duration 160 ms    Q-T Interval 436 ms    QTC Calculation (Bezet) 539 ms    Calculated P Axis 61 degrees    Calculated R Axis -104 degrees    Calculated T Axis 35 degrees    Diagnosis       Sinus rhythm with premature supraventricular complexes  Right bundle branch block  Anterolateral infarct (cited on or before 13-AUG-2022)  When compared with ECG of 01-NOV-2022 10:40,  premature supraventricular complexes are now present  Criteria for Inferior infarct are no longer present  Questionable change in initial forces of Lateral leads     NT-PRO BNP    Collection Time: 11/08/22  3:20 AM   Result Value Ref Range    NT pro-BNP 1,832 (H) <125 PG/ML   MAGNESIUM    Collection Time: 11/08/22  3:20 AM   Result Value Ref Range    Magnesium 2.0 1.6 - 2.4 mg/dL   CBC WITH AUTOMATED DIFF    Collection Time: 11/08/22  3:20 AM   Result Value Ref Range    WBC 4.6 3.6 - 11.0 K/uL    RBC 4.89 3.80 - 5.20 M/uL    HGB 14.3 11.5 - 16.0 g/dL    HCT 44.4 35.0 - 47.0 %    MCV 90.8 80.0 - 99.0 FL    MCH 29.2 26.0 - 34.0 PG    MCHC 32.2 30.0 - 36.5 g/dL    RDW 13.2 11.5 - 14.5 %    PLATELET 785 926 - 419 K/uL    MPV 10.8 8.9 - 12.9 FL    NRBC 0.0 0  WBC    ABSOLUTE NRBC 0.00 0.00 - 0.01 K/uL    NEUTROPHILS 39 32 - 75 %    LYMPHOCYTES 43 12 - 49 %    MONOCYTES 11 5 - 13 %    EOSINOPHILS 6 0 - 7 %    BASOPHILS 1 0 - 1 %    IMMATURE GRANULOCYTES 0 0.0 - 0.5 %    ABS. NEUTROPHILS 1.8 1.8 - 8.0 K/UL    ABS. LYMPHOCYTES 2.0 0.8 - 3.5 K/UL    ABS. MONOCYTES 0.5 0.0 - 1.0 K/UL    ABS. EOSINOPHILS 0.3 0.0 - 0.4 K/UL    ABS. BASOPHILS 0.1 0.0 - 0.1 K/UL    ABS. IMM. GRANS. 0.0 0.00 - 0.04 K/UL    DF AUTOMATED     METABOLIC PANEL, BASIC    Collection Time: 11/08/22  3:20 AM   Result Value Ref Range    Sodium 136 136 - 145 mmol/L    Potassium 3.4 (L) 3.5 - 5.1 mmol/L    Chloride 99 97 - 108 mmol/L    CO2 27 21 - 32 mmol/L    Anion gap 10 5 - 15 mmol/L    Glucose 106 (H) 65 - 100 mg/dL    BUN 8 6 - 20 MG/DL    Creatinine 1.09 (H) 0.55 - 1.02 MG/DL    BUN/Creatinine ratio 7 (L) 12 - 20      eGFR >60 >60 ml/min/1.73m2    Calcium 9.8 8.5 - 10.1 MG/DL   HEPATIC FUNCTION PANEL    Collection Time: 11/08/22  3:20 AM   Result Value Ref Range    Protein, total 8.3 (H) 6.4 - 8.2 g/dL    Albumin 4.0 3.5 - 5.0 g/dL    Globulin 4.3 (H) 2.0 - 4.0 g/dL    A-G Ratio 0.9 (L) 1.1 - 2.2      Bilirubin, total 1.1 (H) 0.2 - 1.0 MG/DL    Bilirubin, direct 0.5 (H) 0.0 - 0.2 MG/DL    Alk.  phosphatase 149 (H) 45 - 117 U/L    AST (SGOT) 61 (H) 15 - 37 U/L    ALT (SGPT) 95 (H) 12 - 78 U/L   DIGOXIN    Collection Time: 11/08/22  3:20 AM   Result Value Ref Range    Digoxin level 1.2 0.90 - 2.00 NG/ML   GLUCOSE, POC    Collection Time: 11/08/22  8:29 AM   Result Value Ref Range    Glucose (POC) 95 65 - 117 mg/dL    Performed by Jwuan Ferro Isabel Lanier    GLUCOSE, POC    Collection Time: 11/08/22 10:51 AM   Result Value Ref Range    Glucose (POC) 88 65 - 117 mg/dL    Performed by Kiki [address]            I have reviewed the flowsheets. Chart and Pertinent Notes have been reviewed. No change in PMH ,family and social history from Consult note.       Kayla Gale MD  [facility]

## 2022-11-08 NOTE — PROGRESS NOTES
0730: Bedside shift change report given to Juan Jose Beasley RN (oncoming nurse) by Ramirez Bain RN (offgoing nurse). Report included the following information SBAR, MAR, and Recent Results. 1530: Patient ambulated 3 laps around unit, -103s. Patient tolerated well.     1930: Bedside shift change report given to Wang Langley RN (oncoming nurse) by Juan Jose Beasley RN (offgoing nurse). Report included the following information SBAR, MAR, and Recent Results.

## 2022-11-08 NOTE — PROGRESS NOTES
Physical Therapy 11/8/2022    Orders received and chart reviewed up to date. Pt reports ambulatory with no mobility concerns. RN in agreement. Pt reports need to assess HR with activity?, RN states she will assess HR changes with pt activity. Pt with no acute PT needs. Will follow-up as appropriate. Thank you.   Saeed Iyer, PT, DPT

## 2022-11-08 NOTE — PROGRESS NOTES
[address] [facility] [place] [address] [address] [facility]    1010: LVAD Education-    Met with patient and father for ongoing LVAD education. Patient stated she and her father did have an opportunity to review the [employer] DVD. Discussed LVAD equipment and functions. Discussed wearable accessories. Briefly discussed LVAD implant and expected recovery time. Discussed caregiver expectations. Time given to ask questions. Patient and father had no further questions at this time. VAD coordinator contact information provided in case of any follow up questions. Will continue to follow as needed for ongoing LVAD education and support. 1417: Noted Phosphatidylethanol (PEth) testing discontinued as specimen not received and 6 minute walk discontinued with note on 11/4/22 stating, \"Pt request to perform six minute walk beginning of next week. \" Both tests reordered per Dr. Zakia SAUER. Contacted [facility] and spoke with Edie Chinchilla RN. Requested testing be done ASAP and explained importance of completion prior to discharge as part of LVAD/OHT work up. She verbalized understanding and confirmed will notify nurse caring for patient today. She had no further questions.     Petra Burgess RN  VAD Coordinator

## 2022-11-09 ENCOUNTER — TELEPHONE (OUTPATIENT)
Dept: FAMILY MEDICINE CLINIC | Age: 42
End: 2022-11-09

## 2022-11-09 ENCOUNTER — DOCUMENTATION ONLY (OUTPATIENT)
Dept: CARDIOLOGY CLINIC | Age: 42
End: 2022-11-09

## 2022-11-09 VITALS
BODY MASS INDEX: 32.37 KG/M2 | DIASTOLIC BLOOD PRESSURE: 76 MMHG | HEIGHT: 64 IN | OXYGEN SATURATION: 98 % | HEART RATE: 93 BPM | RESPIRATION RATE: 18 BRPM | WEIGHT: 189.6 LBS | TEMPERATURE: 98.1 F | SYSTOLIC BLOOD PRESSURE: 124 MMHG

## 2022-11-09 LAB
ALBUMIN SERPL-MCNC: 3.4 G/DL (ref 3.5–5)
ALBUMIN/GLOB SERPL: 0.9 {RATIO} (ref 1.1–2.2)
ALP SERPL-CCNC: 142 U/L (ref 45–117)
ALT SERPL-CCNC: 82 U/L (ref 12–78)
ANION GAP SERPL CALC-SCNC: 8 MMOL/L (ref 5–15)
AST SERPL-CCNC: 61 U/L (ref 15–37)
BASOPHILS # BLD: 0.1 K/UL (ref 0–0.1)
BASOPHILS NFR BLD: 1 % (ref 0–1)
BILIRUB DIRECT SERPL-MCNC: 0.4 MG/DL (ref 0–0.2)
BILIRUB SERPL-MCNC: 0.7 MG/DL (ref 0.2–1)
BNP SERPL-MCNC: 1054 PG/ML
BUN SERPL-MCNC: 12 MG/DL (ref 6–20)
BUN/CREAT SERPL: 12 (ref 12–20)
CALCIUM SERPL-MCNC: 9.2 MG/DL (ref 8.5–10.1)
CHLORIDE SERPL-SCNC: 105 MMOL/L (ref 97–108)
CO2 SERPL-SCNC: 26 MMOL/L (ref 21–32)
CREAT SERPL-MCNC: 1 MG/DL (ref 0.55–1.02)
CRYOGLOB SER QL 1D COLD INC: NORMAL
DIFFERENTIAL METHOD BLD: ABNORMAL
DIGOXIN SERPL-MCNC: 1.3 NG/ML (ref 0.9–2)
EOSINOPHIL # BLD: 0.2 K/UL (ref 0–0.4)
EOSINOPHIL NFR BLD: 5 % (ref 0–7)
ERYTHROCYTE [DISTWIDTH] IN BLOOD BY AUTOMATED COUNT: 13.1 % (ref 11.5–14.5)
GLOBULIN SER CALC-MCNC: 3.6 G/DL (ref 2–4)
GLUCOSE BLD STRIP.AUTO-MCNC: 123 MG/DL (ref 65–117)
GLUCOSE BLD STRIP.AUTO-MCNC: 92 MG/DL (ref 65–117)
GLUCOSE BLD STRIP.AUTO-MCNC: 94 MG/DL (ref 65–117)
GLUCOSE SERPL-MCNC: 150 MG/DL (ref 65–100)
HCT VFR BLD AUTO: 41.4 % (ref 35–47)
HGB BLD-MCNC: 13 G/DL (ref 11.5–16)
IMM GRANULOCYTES # BLD AUTO: 0 K/UL (ref 0–0.04)
IMM GRANULOCYTES NFR BLD AUTO: 0 % (ref 0–0.5)
LYMPHOCYTES # BLD: 2.3 K/UL (ref 0.8–3.5)
LYMPHOCYTES NFR BLD: 47 % (ref 12–49)
MAGNESIUM SERPL-MCNC: 2 MG/DL (ref 1.6–2.4)
MCH RBC QN AUTO: 28.7 PG (ref 26–34)
MCHC RBC AUTO-ENTMCNC: 31.4 G/DL (ref 30–36.5)
MCV RBC AUTO: 91.4 FL (ref 80–99)
MONOCYTES # BLD: 0.6 K/UL (ref 0–1)
MONOCYTES NFR BLD: 12 % (ref 5–13)
NEUTS SEG # BLD: 1.7 K/UL (ref 1.8–8)
NEUTS SEG NFR BLD: 35 % (ref 32–75)
NRBC # BLD: 0 K/UL (ref 0–0.01)
NRBC BLD-RTO: 0 PER 100 WBC
PLATELET # BLD AUTO: 239 K/UL (ref 150–400)
PMV BLD AUTO: 11.2 FL (ref 8.9–12.9)
POTASSIUM SERPL-SCNC: 3.8 MMOL/L (ref 3.5–5.1)
PROT SERPL-MCNC: 7 G/DL (ref 6.4–8.2)
RBC # BLD AUTO: 4.53 M/UL (ref 3.8–5.2)
SERVICE CMNT-IMP: ABNORMAL
SERVICE CMNT-IMP: NORMAL
SERVICE CMNT-IMP: NORMAL
SODIUM SERPL-SCNC: 139 MMOL/L (ref 136–145)
WBC # BLD AUTO: 4.9 K/UL (ref 3.6–11)

## 2022-11-09 PROCEDURE — 74011000250 HC RX REV CODE- 250: Performed by: FAMILY MEDICINE

## 2022-11-09 PROCEDURE — 82962 GLUCOSE BLOOD TEST: CPT

## 2022-11-09 PROCEDURE — 85025 COMPLETE CBC W/AUTO DIFF WBC: CPT

## 2022-11-09 PROCEDURE — 80076 HEPATIC FUNCTION PANEL: CPT

## 2022-11-09 PROCEDURE — 74011250636 HC RX REV CODE- 250/636: Performed by: NURSE PRACTITIONER

## 2022-11-09 PROCEDURE — 74011250637 HC RX REV CODE- 250/637: Performed by: INTERNAL MEDICINE

## 2022-11-09 PROCEDURE — 74011250637 HC RX REV CODE- 250/637: Performed by: FAMILY MEDICINE

## 2022-11-09 PROCEDURE — 74011250637 HC RX REV CODE- 250/637: Performed by: NURSE PRACTITIONER

## 2022-11-09 PROCEDURE — 80048 BASIC METABOLIC PNL TOTAL CA: CPT

## 2022-11-09 PROCEDURE — 36415 COLL VENOUS BLD VENIPUNCTURE: CPT

## 2022-11-09 PROCEDURE — 83735 ASSAY OF MAGNESIUM: CPT

## 2022-11-09 PROCEDURE — 74011250637 HC RX REV CODE- 250/637: Performed by: STUDENT IN AN ORGANIZED HEALTH CARE EDUCATION/TRAINING PROGRAM

## 2022-11-09 PROCEDURE — 74011250637 HC RX REV CODE- 250/637: Performed by: HOSPITALIST

## 2022-11-09 PROCEDURE — 80162 ASSAY OF DIGOXIN TOTAL: CPT

## 2022-11-09 PROCEDURE — 99233 SBSQ HOSP IP/OBS HIGH 50: CPT | Performed by: NURSE PRACTITIONER

## 2022-11-09 PROCEDURE — 94618 PULMONARY STRESS TESTING: CPT

## 2022-11-09 PROCEDURE — 83880 ASSAY OF NATRIURETIC PEPTIDE: CPT

## 2022-11-09 RX ORDER — DIPHENHYDRAMINE HCL 25 MG
25 CAPSULE ORAL
Qty: 40 CAPSULE | Refills: 0 | Status: SHIPPED | OUTPATIENT
Start: 2022-11-09 | End: 2022-11-19

## 2022-11-09 RX ORDER — SPIRONOLACTONE 25 MG/1
25 TABLET ORAL DAILY
Qty: 30 TABLET | Refills: 0 | Status: SHIPPED | OUTPATIENT
Start: 2022-11-10 | End: 2022-11-28 | Stop reason: SDUPTHER

## 2022-11-09 RX ORDER — ALLOPURINOL 100 MG/1
50 TABLET ORAL DAILY
Qty: 15 TABLET | Refills: 0 | Status: SHIPPED | OUTPATIENT
Start: 2022-11-10 | End: 2022-11-28 | Stop reason: SDUPTHER

## 2022-11-09 RX ORDER — PRAMIPEXOLE DIHYDROCHLORIDE 0.12 MG/1
0.12 TABLET ORAL
Qty: 30 TABLET | Refills: 0 | Status: SHIPPED | OUTPATIENT
Start: 2022-11-09 | End: 2022-12-09

## 2022-11-09 RX ORDER — MILRINONE LACTATE 0.2 MG/ML
0.25 INJECTION, SOLUTION INTRAVENOUS CONTINUOUS
Qty: 100 ML | Refills: 0 | Status: SHIPPED
Start: 2022-11-09 | End: 2022-12-09

## 2022-11-09 RX ORDER — DOCUSATE SODIUM 100 MG/1
100 CAPSULE, LIQUID FILLED ORAL DAILY
Qty: 30 CAPSULE | Refills: 2 | Status: SHIPPED | OUTPATIENT
Start: 2022-11-10 | End: 2023-02-08

## 2022-11-09 RX ORDER — NYSTATIN 100000 U/G
OINTMENT TOPICAL 2 TIMES DAILY
Qty: 15 G | Refills: 0 | Status: SHIPPED | OUTPATIENT
Start: 2022-11-09 | End: 2022-11-12

## 2022-11-09 RX ORDER — BUMETANIDE 2 MG/1
2 TABLET ORAL 2 TIMES DAILY
Qty: 60 TABLET | Refills: 0 | Status: SHIPPED | OUTPATIENT
Start: 2022-11-09 | End: 2022-11-17

## 2022-11-09 RX ORDER — CINACALCET 30 MG/1
30 TABLET, FILM COATED ORAL
Qty: 30 TABLET | Refills: 2 | Status: SHIPPED | OUTPATIENT
Start: 2022-11-10 | End: 2023-02-08

## 2022-11-09 RX ORDER — DIGOXIN 250 MCG
0.25 TABLET ORAL DAILY
Qty: 30 TABLET | Refills: 0 | Status: SHIPPED | OUTPATIENT
Start: 2022-11-10 | End: 2022-11-28 | Stop reason: SDUPTHER

## 2022-11-09 RX ORDER — METOPROLOL SUCCINATE 25 MG/1
12.5 TABLET, EXTENDED RELEASE ORAL EVERY 12 HOURS
Qty: 30 TABLET | Refills: 0 | Status: SHIPPED | OUTPATIENT
Start: 2022-11-09 | End: 2022-11-28 | Stop reason: SDUPTHER

## 2022-11-09 RX ADMIN — EZETIMIBE 10 MG: 10 TABLET ORAL at 08:58

## 2022-11-09 RX ADMIN — METOPROLOL SUCCINATE 12.5 MG: 25 TABLET, EXTENDED RELEASE ORAL at 08:57

## 2022-11-09 RX ADMIN — DICLOFENAC SODIUM 2 G: 10 GEL TOPICAL at 13:35

## 2022-11-09 RX ADMIN — FOLIC ACID 1 MG: 1 TABLET ORAL at 08:57

## 2022-11-09 RX ADMIN — NYSTATIN OINTMENT: 100000 OINTMENT TOPICAL at 08:51

## 2022-11-09 RX ADMIN — PRASUGREL 10 MG: 10 TABLET, FILM COATED ORAL at 08:58

## 2022-11-09 RX ADMIN — SPIRONOLACTONE 25 MG: 25 TABLET ORAL at 08:58

## 2022-11-09 RX ADMIN — MONTELUKAST 10 MG: 10 TABLET, FILM COATED ORAL at 08:58

## 2022-11-09 RX ADMIN — BUMETANIDE 2 MG: 1 TABLET ORAL at 17:42

## 2022-11-09 RX ADMIN — DICLOFENAC SODIUM 2 G: 10 GEL TOPICAL at 08:51

## 2022-11-09 RX ADMIN — DOCUSATE SODIUM 100 MG: 100 CAPSULE, LIQUID FILLED ORAL at 08:57

## 2022-11-09 RX ADMIN — ALLOPURINOL 50 MG: 100 TABLET ORAL at 08:57

## 2022-11-09 RX ADMIN — PANTOPRAZOLE SODIUM 40 MG: 40 TABLET, DELAYED RELEASE ORAL at 07:15

## 2022-11-09 RX ADMIN — DIGOXIN 0.25 MG: 0.25 TABLET ORAL at 08:57

## 2022-11-09 RX ADMIN — ESCITALOPRAM OXALATE 20 MG: 10 TABLET ORAL at 08:57

## 2022-11-09 RX ADMIN — SODIUM CHLORIDE, PRESERVATIVE FREE 10 ML: 5 INJECTION INTRAVENOUS at 07:16

## 2022-11-09 RX ADMIN — CINACALCET HYDROCHLORIDE 30 MG: 30 TABLET, FILM COATED ORAL at 08:57

## 2022-11-09 RX ADMIN — PANTOPRAZOLE SODIUM 40 MG: 40 TABLET, DELAYED RELEASE ORAL at 17:42

## 2022-11-09 RX ADMIN — SODIUM CHLORIDE, PRESERVATIVE FREE 10 ML: 5 INJECTION INTRAVENOUS at 13:34

## 2022-11-09 RX ADMIN — FLUTICASONE PROPIONATE 2 SPRAY: 50 SPRAY, METERED NASAL at 08:51

## 2022-11-09 RX ADMIN — EMPAGLIFLOZIN 10 MG: 10 TABLET, FILM COATED ORAL at 08:57

## 2022-11-09 RX ADMIN — HYDROCORTISONE: 25 CREAM TOPICAL at 08:52

## 2022-11-09 RX ADMIN — DIPHENHYDRAMINE HYDROCHLORIDE 25 MG: 25 CAPSULE ORAL at 07:15

## 2022-11-09 RX ADMIN — MILRINONE LACTATE IN DEXTROSE 0.25 MCG/KG/MIN: 200 INJECTION, SOLUTION INTRAVENOUS at 03:23

## 2022-11-09 NOTE — PROGRESS NOTES
Hospital follow-up PCP transitional care appointment has been scheduled with Dr. Florencia Rebolledo for Friday, November 25th,2022 at 10:20 p.m. Her regular PCP was not available. Pending patient discharge. This was earliest appointment available.  Criss Horner, Care Management Assistant

## 2022-11-09 NOTE — PROGRESS NOTES
[facility]   [id number] [facility], [address] [place] [address]  Phone: [phone]   [phone]    [email or web address]     Nephrology Progress Note    Patient Name : Galdino Rivers      : 1980     MRN : [id number]  Date of Admission : 10/21/2022  Date of Servive : 22    CC:  Follow up for Hypercalcemia        Assessment and Plan   Hypercalcemia :  - from primary hyperparathyroidism  - Parathyroid US : 7 mm R sided adenoma. Sestamibi scan : no uptake   - ACE level normal, 1.25 Vit D pending  - will benefit from Parathyroidectomy if she is going for LVAD but can also be managed medically for now   - Ca stable on sensipar  - no changes for now  - ok for d/c home   - would send home on sensipar and set up with ENT eval    Positive KITTY  -Being worked up by rheumatology  - possible Sjogren's     Ischemic cardiomyopathy  LVEF 15 to 20%          Interval History:  Seen and examined. Stable renal function and calcium. No cp, sob, n/v/d. Voiding ok. Remains on milrinone. Plan for d/c home today or tomorrow with home milrinone    Review of Systems: A comprehensive review of systems was negative except for that written in the HPI.     Current Medications:   Current Facility-Administered Medications   Medication Dose Route Frequency    phenol throat spray (CHLORASEPTIC) 1 Spray  1 Spray Oral PRN    diphenhydrAMINE (BENADRYL) capsule 25 mg  25 mg Oral Q6H PRN    metoprolol succinate (TOPROL-XL) XL tablet 12.5 mg  12.5 mg Oral Q12H    bumetanide (BUMEX) tablet 2 mg  2 mg Oral BID    diclofenac (VOLTAREN) 1 % topical gel 2 g  2 g Topical QID    nystatin (MYCOSTATIN) 100,000 unit/gram ointment   Topical BID    cinacalcet (SENSIPAR) tablet 30 mg  30 mg Oral DAILY WITH BREAKFAST    milrinone (PRIMACOR) 20 MG/100 ML D5W infusion  0.25 mcg/kg/min IntraVENous CONTINUOUS    digoxin (LANOXIN) tablet 0.25 mg  0.25 mg Oral DAILY    pramipexole (MIRAPEX) tablet 0.125 mg  0.125 mg Oral QHS    methyl salicylate-menthol (BENGAY) 15-10 % cream   Topical TID    pantoprazole (PROTONIX) tablet 40 mg  40 mg Oral ACB&D    polyethylene glycol (MIRALAX) packet 17 g  17 g Oral DAILY PRN    spironolactone (ALDACTONE) tablet 25 mg  25 mg Oral DAILY    prochlorperazine (COMPAZINE) injection 5 mg  5 mg IntraVENous Q6H PRN    ondansetron (ZOFRAN) injection 4 mg  4 mg IntraVENous Q6H PRN    allopurinoL (ZYLOPRIM) tablet 50 mg  50 mg Oral DAILY    hydrocortisone (ANUSOL-HC) 2.5 % rectal cream   PeriANAL QID    melatonin tablet 10.5 mg  10.5 mg Oral QHS PRN    empagliflozin (JARDIANCE) tablet 10 mg  10 mg Oral DAILY    aluminum & magnesium hydroxide-simethicone (MYLANTA II) oral suspension 30 mL  30 mL Oral Q4H PRN    diazePAM (VALIUM) tablet 5 mg  5 mg Oral Q12H PRN    docusate sodium (COLACE) capsule 100 mg  100 mg Oral DAILY    bisacodyL (DULCOLAX) suppository 10 mg  10 mg Rectal DAILY    albuterol-ipratropium (DUO-NEB) 2.5 MG-0.5 MG/3 ML  3 mL Nebulization Q6H PRN    fluticasone propionate (FLONASE) 50 mcg/actuation nasal spray 2 Spray  2 Spray Both Nostrils DAILY    traZODone (DESYREL) tablet 200 mg  200 mg Oral QHS    [Held by provider] magnesium oxide (MAG-OX) tablet 400 mg  400 mg Oral QHS    calcium carbonate (TUMS) chewable tablet 200 mg [elemental]  200 mg Oral TID PRN    clonazePAM (KlonoPIN) tablet 1 mg  1 mg Oral QHS    escitalopram oxalate (LEXAPRO) tablet 20 mg  20 mg Oral DAILY    ezetimibe (ZETIA) tablet 10 mg  10 mg Oral DAILY    folic acid (FOLVITE) tablet 1 mg  1 mg Oral DAILY    montelukast (SINGULAIR) tablet 10 mg  10 mg Oral DAILY    prasugreL (EFFIENT) tablet 10 mg  10 mg Oral DAILY    [Held by provider] rOPINIRole (REQUIP) tablet 0.5 mg  0.5 mg Oral BID    [Held by provider] rosuvastatin (CRESTOR) tablet 20 mg  20 mg Oral Q MON, WED & FRI    sodium chloride (NS) flush 5-40 mL  5-40 mL IntraVENous Q8H    sodium chloride (NS) flush 5-40 mL  5-40 mL IntraVENous PRN    acetaminophen (TYLENOL) tablet 650 mg  650 mg Oral Q4H PRN    nitroglycerin (NITROSTAT) tablet 0.4 mg  0.4 mg SubLINGual Q5MIN PRN    glucose chewable tablet 16 g  4 Tablet Oral PRN    glucagon (GLUCAGEN) injection 1 mg  1 mg IntraMUSCular PRN    dextrose 10 % infusion 0-250 mL  0-250 mL IntraVENous PRN    insulin lispro (HUMALOG) injection   SubCUTAneous AC&HS      Allergies   Allergen Reactions    Abilify [Aripiprazole] Anxiety     Can not tolerate     Sulfa (Sulfonamide Antibiotics) Hives    Vicodin [Hydrocodone-Acetaminophen] Nausea and Vomiting       Objective:  Vitals:    Vitals:    11/09/22 1000 11/09/22 1048 11/09/22 1051 11/09/22 1055   BP:  98/68 110/77 113/85   Pulse: 90      Resp:       Temp:       SpO2:       Weight:       Height:         Intake and Output:  11/09 0701 - 11/09 1900  In: 240 [P.O.:240]  Out: -   11/07 1901 - 11/09 0700  In: 1825.1 [P.O.:1600; I.V.:225.1]  Out: 1450 [Urine:1450]    Physical Examination:        General: Obese   Neck:  Supple, no mass  Resp:  Lungs CTA B/L, no wheezing , normal respiratory effort  CV:  RRR,  no murmur or rub, LE edema  GI:  Soft, NT, + BS, no HS megaly  Neurologic:  Non focal    []    High complexity decision making was performed  []    Patient is at high-risk of decompensation with multiple organ involvement    Lab Data Personally Reviewed: I have reviewed all the pertinent labs, microbiology data and radiology studies during assessment.     Recent Labs     11/09/22 0328 11/08/22 0320 11/07/22 0326    136 136   K 3.8 3.4* 3.9    99 106   CO2 26 27 26   * 106* 100   BUN 12 8 10   CREA 1.00 1.09* 0.82   CA 9.2 9.8 8.9   MG 2.0 2.0 2.0   ALB 3.4* 4.0 3.4*   ALT 82* 95* 84*       Recent Labs     11/09/22 0328 11/08/22 0320 11/07/22 0326   WBC 4.9 4.6 4.4   HGB 13.0 14.3 13.1   HCT 41.4 44.4 41.6    231 227       Lab Results   Component Value Date/Time    Specimen Description: URINE 05/16/2011 04:18 PM    Specimen Description: URINE 10/06/2010 03:01 PM Specimen Description: URINE 02/02/2010 04:50 PM    Specimen Description: VAGINA 06/03/2009 10:38 AM    Specimen Description: URINE 01/13/2009 03:17 PM     Lab Results   Component Value Date/Time    Culture result: MRSA NOT PRESENT 11/03/2022 10:15 AM    Culture result:  11/03/2022 10:15 AM     Screening of patient nares for MRSA is for surveillance purposes and, if positive, to facilitate isolation considerations in high risk settings. It is not intended for automatic decolonization interventions per se as regimens are not sufficiently effective to warrant routine use. Culture result: NO GROWTH 5 DAYS 11/02/2022 03:09 AM    Culture result: No growth (<1,000 CFU/ML) 10/23/2022 04:43 AM    Culture result: MIXED UROGENITAL PALMA ISOLATED 10/17/2022 08:42 AM     Recent Results (from the past 24 hour(s))   GLUCOSE, POC    Collection Time: 11/08/22  4:48 PM   Result Value Ref Range    Glucose (POC) 89 65 - 117 mg/dL    Performed by Zhou Milan    GLUCOSE, POC    Collection Time: 11/08/22 11:06 PM   Result Value Ref Range    Glucose (POC) 105 65 - 117 mg/dL    Performed by Alexis Davis    NT-PRO BNP    Collection Time: 11/09/22  3:28 AM   Result Value Ref Range    NT pro-BNP 1,054 (H) <125 PG/ML   MAGNESIUM    Collection Time: 11/09/22  3:28 AM   Result Value Ref Range    Magnesium 2.0 1.6 - 2.4 mg/dL   CBC WITH AUTOMATED DIFF    Collection Time: 11/09/22  3:28 AM   Result Value Ref Range    WBC 4.9 3.6 - 11.0 K/uL    RBC 4.53 3.80 - 5.20 M/uL    HGB 13.0 11.5 - 16.0 g/dL    HCT 41.4 35.0 - 47.0 %    MCV 91.4 80.0 - 99.0 FL    MCH 28.7 26.0 - 34.0 PG    MCHC 31.4 30.0 - 36.5 g/dL    RDW 13.1 11.5 - 14.5 %    PLATELET 292 707 - 494 K/uL    MPV 11.2 8.9 - 12.9 FL    NRBC 0.0 0  WBC    ABSOLUTE NRBC 0.00 0.00 - 0.01 K/uL    NEUTROPHILS 35 32 - 75 %    LYMPHOCYTES 47 12 - 49 %    MONOCYTES 12 5 - 13 %    EOSINOPHILS 5 0 - 7 %    BASOPHILS 1 0 - 1 %    IMMATURE GRANULOCYTES 0 0.0 - 0.5 %    ABS. NEUTROPHILS 1.7 (L) 1.8 - 8.0 K/UL    ABS. LYMPHOCYTES 2.3 0.8 - 3.5 K/UL    ABS. MONOCYTES 0.6 0.0 - 1.0 K/UL    ABS. EOSINOPHILS 0.2 0.0 - 0.4 K/UL    ABS. BASOPHILS 0.1 0.0 - 0.1 K/UL    ABS. IMM. GRANS. 0.0 0.00 - 0.04 K/UL    DF AUTOMATED     METABOLIC PANEL, BASIC    Collection Time: 11/09/22  3:28 AM   Result Value Ref Range    Sodium 139 136 - 145 mmol/L    Potassium 3.8 3.5 - 5.1 mmol/L    Chloride 105 97 - 108 mmol/L    CO2 26 21 - 32 mmol/L    Anion gap 8 5 - 15 mmol/L    Glucose 150 (H) 65 - 100 mg/dL    BUN 12 6 - 20 MG/DL    Creatinine 1.00 0.55 - 1.02 MG/DL    BUN/Creatinine ratio 12 12 - 20      eGFR >60 >60 ml/min/1.73m2    Calcium 9.2 8.5 - 10.1 MG/DL   HEPATIC FUNCTION PANEL    Collection Time: 11/09/22  3:28 AM   Result Value Ref Range    Protein, total 7.0 6.4 - 8.2 g/dL    Albumin 3.4 (L) 3.5 - 5.0 g/dL    Globulin 3.6 2.0 - 4.0 g/dL    A-G Ratio 0.9 (L) 1.1 - 2.2      Bilirubin, total 0.7 0.2 - 1.0 MG/DL    Bilirubin, direct 0.4 (H) 0.0 - 0.2 MG/DL    Alk. phosphatase 142 (H) 45 - 117 U/L    AST (SGOT) 61 (H) 15 - 37 U/L    ALT (SGPT) 82 (H) 12 - 78 U/L   DIGOXIN    Collection Time: 11/09/22  3:28 AM   Result Value Ref Range    Digoxin level 1.3 0.90 - 2.00 NG/ML   GLUCOSE, POC    Collection Time: 11/09/22  7:18 AM   Result Value Ref Range    Glucose (POC) 94 65 - 117 mg/dL    Performed by Noel Mendoza I have reviewed the flowsheets. Chart and Pertinent Notes have been reviewed. No change in PMH ,family and social history from Consult note.       Anup Rangel MD  [facility]

## 2022-11-09 NOTE — PROGRESS NOTES
0730: Bedside shift change report given to Ruby Levy RN (oncoming nurse) by Honorio Pryor RN (offgoing nurse). Report included the following information SBAR, MAR, and Recent Results. 1530: Zoll at bedside for discharge education and teaching regarding [address]     8049: Infusion nurse at bedside for discharge education and teaching regarding home milrinone infusion. Milrinone medication given to patient at bedside. 1830: I have reviewed discharge instructions with the patient. The patient verbalized understanding. AVS was reviewed with the patient with specific information and education provided regarding new medications listed below. All medications were sent to patient's home pharmacy listed in the patient chart. Additional education provided regarding heart failure included in the AVS. Follow-up appointments as indicated in the AVS were reviewed with the patient-- the patient verbalized understanding, and all questions were answered. Discharge Medication List as of 11/9/2022  2:38 PM        START taking these medications    Details   allopurinoL (ZYLOPRIM) 100 mg tablet Take 0.5 Tablets by mouth daily for 30 days. , Normal, Disp-15 Tablet, R-0      bumetanide (BUMEX) 2 mg tablet Take 1 Tablet by mouth two (2) times a day for 30 days. , Normal, Disp-60 Tablet, R-0      cinacalcet (SENSIPAR) 30 mg tablet Take 1 Tablet by mouth daily (with breakfast) for 90 days. , Normal, Disp-30 Tablet, R-2      digoxin (LANOXIN) 0.25 mg tablet Take 1 Tablet by mouth daily for 30 days. , Normal, Disp-30 Tablet, R-0      diphenhydrAMINE (BENADRYL) 25 mg capsule Take 1 Capsule by mouth every six (6) hours as needed for Itching for up to 10 days. , Normal, Disp-40 Capsule, R-0      docusate sodium (COLACE) 100 mg capsule Take 1 Capsule by mouth daily for 90 days. , Normal, Disp-30 Capsule, R-2      metoprolol succinate (TOPROL-XL) 25 mg XL tablet Take 0.5 Tablets by mouth every twelve (12) hours for 30 days. , Normal, Disp-30 Tablet, R-0 milrinone (PRIMACOR) 20 mg/100 mL (200 mcg/mL) infusion 21.35 mcg/min by IntraVENous route continuous for 30 days. , No Print, Disp-100 mL, R-0      pramipexole (MIRAPEX) 0.125 mg tablet Take 1 Tablet by mouth nightly for 30 days. , Normal, Disp-30 Tablet, R-0      spironolactone (ALDACTONE) 25 mg tablet Take 1 Tablet by mouth daily for 30 days. , Normal, Disp-30 Tablet, R-0      nystatin (MYCOSTATIN) 100,000 unit/gram ointment Apply  to affected area two (2) times a day for 5 doses. , Normal, Disp-15 g, R-0           CONTINUE these medications which have CHANGED    Details   empagliflozin (Jardiance) 10 mg tablet Take 1 Tablet by mouth daily for 60 days. , Normal, Disp-30 Tablet, R-1           CONTINUE these medications which have NOT CHANGED    Details   promethazine (PHENERGAN) 25 mg tablet Take 1 Tablet by mouth every six (6) hours as needed for Nausea for up to 10 doses. , Normal, Disp-10 Tablet, R-0      ondansetron (ZOFRAN ODT) 4 mg disintegrating tablet Take 1 Tablet by mouth every eight (8) hours as needed for Nausea or Vomiting., Normal, Disp-60 Tablet, R-1      rosuvastatin (CRESTOR) 20 mg tablet Take 1 Tablet by mouth every Monday, Wednesday, Friday., Normal, Disp-90 Tablet, R-1      levocetirizine (XYZAL) 5 mg tablet TAKE 1 TABLET EVERY DAY, Normal, Disp-90 Tablet, R-1      famotidine (PEPCID) 20 mg tablet TAKE 1 TABLET BY MOUTH TWICE A DAY, Normal, Disp-180 Tablet, R-1      ezetimibe (ZETIA) 10 mg tablet Take 1 Tablet by mouth daily. , Normal, Disp-90 Tablet, R-0      metFORMIN ER (GLUCOPHAGE XR) 500 mg tablet Take 1 Tablet by mouth daily (with dinner). , Normal, Disp-90 Tablet, R-1      montelukast (SINGULAIR) 10 mg tablet TAKE 1 TABLET EVERY DAY, Normal, Disp-90 Tablet, R-1      diazePAM (VALIUM) 5 mg tablet 5 mg every eight (8) hours as needed. 5 mg, 3 times daily, Historical Med      albuterol-ipratropium (DUO-NEB) 2.5 mg-0.5 mg/3 ml nebu 3 mL by Nebulization route four (4) times daily. , Historical Med ipratropium (ATROVENT) 21 mcg (0.03 %) nasal spray 1 Wall by Both Nostrils route every twelve (12) hours. , Normal, Disp-30 mL, R-0      acetaminophen (TYLENOL) 325 mg tablet Take  by mouth every four (4) hours as needed for Pain., Historical Med      albuterol (PROVENTIL HFA, VENTOLIN HFA, PROAIR HFA) 90 mcg/actuation inhaler Take  by inhalation. , Historical Med      prasugreL (EFFIENT) 10 mg tablet Historical Med      aspirin delayed-release 81 mg tablet 81 mg., Historical Med      pantoprazole (PROTONIX) 40 mg tablet TAKE 1 TABLET TWICE DAILY, Normal, Disp-180 Tablet, R-1      ferrous sulfate 325 mg (65 mg iron) tablet TAKE 1 TABLET BY MOUTH ONCE DAILY BEFORE BREAKFAST, Normal, Disp-90 Tablet, R-1      traZODone (DESYREL) 100 mg tablet 2 tablets at night, Historical Med      folic acid (FOLVITE) 1 mg tablet TAKE 1 TABLET EVERY DAY, Normal, Disp-90 Tablet, R-1      escitalopram oxalate (LEXAPRO) 20 mg tablet Take 20 mg by mouth daily. , Historical Med      clonazePAM (KLONOPIN) 1 mg tablet Take 1 mg by mouth in the morning. At bedtime, Historical Med      cholecalciferol, vitamin D3, 50 mcg (2,000 unit) tab 2,000 Units. , Historical Med      promethazine (PHENERGAN) 25 mg suppository Insert 1 Suppository into rectum every six (6) hours as needed for Nausea for up to 5 doses. , Normal, Disp-5 Suppository, R-0      ketoconazole (NIZORAL) 2 % shampoo SHAMPOO TWICE WEEKLY, Normal, Disp-120 mL, R-1      hydrocortisone (ANUSOL-HC) 2.5 % rectal cream Insert  into rectum four (4) times daily. , Normal, Disp-30 g, R-0      fluticasone propionate (FLONASE) 50 mcg/actuation nasal spray 2 Sprays by Both Nostrils route daily. , Historical Med      nitroglycerin (NITROSTAT) 0.4 mg SL tablet Historical Med      cyclobenzaprine (FLEXERIL) 10 mg tablet three (3) times daily as needed.  Has not needed, Historical Med           STOP taking these medications       cephALEXin (Keflex) 500 mg capsule Comments:   Reason for Stopping: rOPINIRole (REQUIP) 0.5 mg tablet Comments:   Reason for Stopping:         furosemide (LASIX) 40 mg tablet Comments:   Reason for Stopping:         carvediloL (COREG) 3.125 mg tablet Comments:   Reason for Stopping:

## 2022-11-09 NOTE — DISCHARGE SUMMARY
Discharge Summary       PATIENT ID: Apryl Pena  MRN: [id number]   YOB: 1980    DATE OF ADMISSION: 10/21/2022 11:55 AM    DATE OF DISCHARGE: 11/09/22    PRIMARY CARE PROVIDER: Krystal Mitchell MD     ATTENDING PHYSICIAN: Colleen Cerna MD   DISCHARGING PROVIDER: Colleen Cerna MD    To contact this individual call [phone] and ask the  to page. If unavailable ask to be transferred the Adult Hospitalist Department. CONSULTATIONS: IP CONSULT TO HOSPITALIST  IP CONSULT TO PSYCHIATRY  IP CONSULT TO PSYCHIATRY  IP CONSULT TO RHEUMATOLOGY  IP CONSULT TO PALLIATIVE CARE - PROVIDER  IP CONSULT TO NEPHROLOGY  IP CONSULT TO CARDIOLOGY  IP CONSULT TO CARDIOLOGY  IP CONSULT TO CARDIOLOGY  IP CONSULT TO CARDIOLOGY    PROCEDURES/SURGERIES: Procedure(s) with comments:  RIGHT HEART CATH - RHC . R, [address] COURSE:   HPI: Ayad Barahona is a 38 y.o. female who presents with shortness of breath.       Patient with history of CHF, presents with shortness of breath, also patient with suicidal ideation, reports that she has been depressed lately, reports that she has thought of suicide and has a plan to hang herself in the attic, was sent to the ER and was requested to be admitted to the hospitalist service, patient reports that she has been having shortness of breath, reports that she was 3 pillow Orthopnea, reports mild cough but denies any fever or chills\"        DISCHARGE DIAGNOSES / PLAN:      Nonischemic cm Hfref   -Acute on chronic systolic CHF NYHA class IV on admission and dc  -Patient with ischemic cardiomyopathy with EF with 15 to 20%  -s/p RHC 10/27 - started on dobutamine and IV bumetanide  -meds being adjusted per AHF team   -Patient became tachycardic restarted on milrinone gtt 11/3 weaned off dobutamine gtt  -rheum on board  srogens consideration, ai workup has been neg thus far, appreciate recs, steroids at this pt wouldn't sig help cardiac disease   -cardio consulted for consideration of cardiac biopsy, appreciate recs, too high risk and no significant utility, do not recommend this   -bumex 2mg PO BID , toprol, Jardiance, Aldactone, on dc not on ace/arb due to hypotension   -pt ot - no needs   -life vest to be delivered today     +KITTY  -as discussed above      Hypercalcemia  -appreciate nephro input  -pth appears  primary  -us shows 7 mm right-sided adenoma, sestamibi scan no uptake, consider parathyroidectomy if she would need LVAD  -cont Sensipar on DC follow-up outpatient with ENT     Hyperkalemia improved   -Discontinue potassium std will replete as needed  -monitor      Vaginal yeast infection  - Likely complication of Jardiance  - Cannot give fluconazole due to QTC prolongation  - miconazole ointment     Urinary tract infection  -Completed antibiotic     Hypertension  -Blood pressure is borderline low normal     Type 2 diabetes  -Well-controlled with hemoglobin A1c of 6.3  -Continue sliding scale insulin coverage     Coronary artery disease  -Patient with history of STEMI with stent to LAD  -Continue Effient     Dyslipidemia  -On Zetia     Restless leg syndrome  -On pramipexole, hold ropinirole secondary to tachycardia     Hemorrhoids  -Continue Anusol cream     Depression with suicidal ideation  -Patient previously evaluated by psychiatry  -On Lexapro, also on Klonopin for anxiety  -One-on-one sitter discontinued per psych recommendation  -Outpatient follow-up with psychiatry     Anxiety  -Patient on Klonopin for 12 years, continue Klonopin here  -Continue trazodone for sleep  -Outpatient follow-up with psychiatry     Constipation  -resolved; continue PRN Miralax     Dysgeusia: unclear etiology  -check B12 (normal/high), zinc, rapid covid (negative).  TSH checked and was only mildly elevated in the setting of acute illness.   -no benefit with holding allopurinol; will resume this  -possibly related to GERD; continue pantoprazole, trial of carafate     Nausea: likely multifactorial, hx of gastroparesis, bowel edema from CHF  -continue supportive care, improved      Throat irritation  -not clinically infected, encourage warm fluids, throat spray prn      PTOT     Critically ill, high risk for decompensation. CCT time 40 minutes     S/p Family meeting with AHF team 11/8     Code status: Full  Prophylaxis: SCDs  Care Plan discussed with: Patient/RN/CM/AHF  Anticipated Disposition: HH on milrinone gtt          FOLLOW UP APPOINTMENTS:    Follow-up Information       Follow up With Specialties Details Why Contact Info    [facility]  Call after discharge to enroll in outpatient program [place] [facility] in [address] [facility]  [address]    [address] [facility] Follow up on 11/17/2022 1pm [address]    Dahlia Peralta MD Family Medicine   [address] [address]  [phone]      [address] [facility] DEPT OF OTOLARYNGOLOGY  Call in 1 day(s)  [address] [facility] [place] [address]  [phone]             ADDITIONAL CARE RECOMMENDATIONS: Tio CBC, CMP 3 to 5 days    DIET: Cardiac Diet    ACTIVITY: Activity as tolerated        DISCHARGE MEDICATIONS:  Current Discharge Medication List        START taking these medications    Details   allopurinoL (ZYLOPRIM) 100 mg tablet Take 0.5 Tablets by mouth daily for 30 days. Qty: 15 Tablet, Refills: 0  Start date: 11/10/2022, End date: 12/10/2022      bumetanide (BUMEX) 2 mg tablet Take 1 Tablet by mouth two (2) times a day for 30 days. Qty: 60 Tablet, Refills: 0  Start date: 11/9/2022, End date: 12/9/2022      cinacalcet (SENSIPAR) 30 mg tablet Take 1 Tablet by mouth daily (with breakfast) for 90 days. Qty: 30 Tablet, Refills: 2  Start date: 11/10/2022, End date: 2/8/2023      digoxin (LANOXIN) 0.25 mg tablet Take 1 Tablet by mouth daily for 30 days.   Qty: 30 Tablet, Refills: 0  Start date: 11/10/2022, End date: 12/10/2022      diphenhydrAMINE (BENADRYL) 25 mg capsule Take 1 Capsule by mouth every six (6) hours as needed for Itching for up to 10 days. Qty: 40 Capsule, Refills: 0  Start date: 11/9/2022, End date: 11/19/2022      docusate sodium (COLACE) 100 mg capsule Take 1 Capsule by mouth daily for 90 days. Qty: 30 Capsule, Refills: 2  Start date: 11/10/2022, End date: 2/8/2023      metoprolol succinate (TOPROL-XL) 25 mg XL tablet Take 0.5 Tablets by mouth every twelve (12) hours for 30 days. Qty: 30 Tablet, Refills: 0  Start date: 11/9/2022, End date: 12/9/2022      milrinone (PRIMACOR) 20 mg/100 mL (200 mcg/mL) infusion 21.35 mcg/min by IntraVENous route continuous for 30 days. Qty: 100 mL, Refills: 0  Start date: 11/9/2022, End date: 12/9/2022      pramipexole (MIRAPEX) 0.125 mg tablet Take 1 Tablet by mouth nightly for 30 days. Qty: 30 Tablet, Refills: 0  Start date: 11/9/2022, End date: 12/9/2022      spironolactone (ALDACTONE) 25 mg tablet Take 1 Tablet by mouth daily for 30 days. Qty: 30 Tablet, Refills: 0  Start date: 11/10/2022, End date: 12/10/2022      nystatin (MYCOSTATIN) 100,000 unit/gram ointment Apply  to affected area two (2) times a day for 5 doses. Qty: 15 g, Refills: 0  Start date: 11/9/2022, End date: 11/12/2022           CONTINUE these medications which have CHANGED    Details   empagliflozin (Jardiance) 10 mg tablet Take 1 Tablet by mouth daily for 60 days. Qty: 30 Tablet, Refills: 1  Start date: 11/9/2022, End date: 1/8/2023           CONTINUE these medications which have NOT CHANGED    Details   promethazine (PHENERGAN) 25 mg tablet Take 1 Tablet by mouth every six (6) hours as needed for Nausea for up to 10 doses. Qty: 10 Tablet, Refills: 0      ondansetron (ZOFRAN ODT) 4 mg disintegrating tablet Take 1 Tablet by mouth every eight (8) hours as needed for Nausea or Vomiting.   Qty: 60 Tablet, Refills: 1    Associated Diagnoses: Nausea and vomiting, unspecified vomiting type      rosuvastatin (CRESTOR) 20 mg tablet Take 1 Tablet by mouth every Monday, Wednesday, Friday. Qty: 90 Tablet, Refills: 1      levocetirizine (XYZAL) 5 mg tablet TAKE 1 TABLET EVERY DAY  Qty: 90 Tablet, Refills: 1      famotidine (PEPCID) 20 mg tablet TAKE 1 TABLET BY MOUTH TWICE A DAY  Qty: 180 Tablet, Refills: 1      ezetimibe (ZETIA) 10 mg tablet Take 1 Tablet by mouth daily. Qty: 90 Tablet, Refills: 0      metFORMIN ER (GLUCOPHAGE XR) 500 mg tablet Take 1 Tablet by mouth daily (with dinner). Qty: 90 Tablet, Refills: 1      montelukast (SINGULAIR) 10 mg tablet TAKE 1 TABLET EVERY DAY  Qty: 90 Tablet, Refills: 1    Associated Diagnoses: Environmental allergies      diazePAM (VALIUM) 5 mg tablet 5 mg every eight (8) hours as needed. 5 mg, 3 times daily      albuterol-ipratropium (DUO-NEB) 2.5 mg-0.5 mg/3 ml nebu 3 mL by Nebulization route four (4) times daily. ipratropium (ATROVENT) 21 mcg (0.03 %) nasal spray 1 [place] by Both Nostrils route every twelve (12) hours. Qty: 30 mL, Refills: 0    Associated Diagnoses: Nasal congestion      acetaminophen (TYLENOL) 325 mg tablet Take  by mouth every four (4) hours as needed for Pain. albuterol (PROVENTIL HFA, VENTOLIN HFA, PROAIR HFA) 90 mcg/actuation inhaler Take  by inhalation.       prasugreL (EFFIENT) 10 mg tablet       aspirin delayed-release 81 mg tablet 81 mg.      pantoprazole (PROTONIX) 40 mg tablet TAKE 1 TABLET TWICE DAILY  Qty: 180 Tablet, Refills: 1    Associated Diagnoses: Gastroesophageal reflux disease without esophagitis      ferrous sulfate 325 mg (65 mg iron) tablet TAKE 1 TABLET BY MOUTH ONCE DAILY BEFORE BREAKFAST  Qty: 90 Tablet, Refills: 1    Associated Diagnoses: Iron deficiency anemia, unspecified iron deficiency anemia type      traZODone (DESYREL) 100 mg tablet 2 tablets at night      folic acid (FOLVITE) 1 mg tablet TAKE 1 TABLET EVERY DAY  Qty: 90 Tablet, Refills: 1    Associated Diagnoses: Low folate escitalopram oxalate (LEXAPRO) 20 mg tablet Take 20 mg by mouth daily. clonazePAM (KLONOPIN) 1 mg tablet Take 1 mg by mouth in the morning. At bedtime      cholecalciferol, vitamin D3, 50 mcg (2,000 unit) tab 2,000 Units. promethazine (PHENERGAN) 25 mg suppository Insert 1 Suppository into rectum every six (6) hours as needed for Nausea for up to 5 doses. Qty: 5 Suppository, Refills: 0      ketoconazole (NIZORAL) 2 % shampoo SHAMPOO TWICE WEEKLY  Qty: 120 mL, Refills: 1      hydrocortisone (ANUSOL-HC) 2.5 % rectal cream Insert  into rectum four (4) times daily. Qty: 30 g, Refills: 0      fluticasone propionate (FLONASE) 50 mcg/actuation nasal spray 2 Sprays by Both Nostrils route daily. nitroglycerin (NITROSTAT) 0.4 mg SL tablet       cyclobenzaprine (FLEXERIL) 10 mg tablet three (3) times daily as needed. Has not needed           STOP taking these medications       rOPINIRole (REQUIP) 0.5 mg tablet Comments:   Reason for Stopping:         furosemide (LASIX) 40 mg tablet Comments:   Reason for Stopping:         carvediloL (COREG) 3.125 mg tablet Comments:   Reason for Stopping:                 NOTIFY YOUR PHYSICIAN FOR ANY OF THE FOLLOWING:   Fever over 101 degrees for 24 hours. Chest pain, shortness of breath, fever, chills, nausea, vomiting, diarrhea, change in mentation, falling, weakness, bleeding. Severe pain or pain not relieved by medications. Or, any other signs or symptoms that you may have questions about.     DISPOSITION:    Home With:   OT  PT x HH  RN       Long term SNF/Inpatient Rehab    Independent/assisted living    Hospice    Other:       PATIENT CONDITION AT DISCHARGE:     Functional status    Poor     Deconditioned    x Independent      Cognition    x Lucid     Forgetful     Dementia      Catheters/lines (plus indication)    Acharya    x PICC     PEG     None      Code status    x Full code     DNR      PHYSICAL EXAMINATION AT DISCHARGE:   Refer to Progress Note      CHRONIC MEDICAL DIAGNOSES:  Problem List as of 11/9/2022 Date Reviewed: 10/14/2022            Codes Class Noted - Resolved    CHF (congestive heart failure) ([facility] ICD-10-CM: I50.9  ICD-9-CM: 428.0  10/21/2022 - Present        Chronic systolic (congestive) heart failure ICD-10-CM: I50.22  ICD-9-CM: 428.22, 428.0  9/7/2022 - Present        Coronary artery disease involving native coronary artery of native heart without angina pectoris (Chronic) ICD-10-CM: I25.10  ICD-9-CM: 414.01  8/29/2022 - Present        Acute exacerbation of CHF (congestive heart failure) ([facility] ICD-10-CM: I50.9  ICD-9-CM: 428.0  8/13/2022 - Present        Neuropathy (Chronic) ICD-10-CM: G62.9  ICD-9-CM: 355.9  10/15/2019 - Present    Overview Signed 10/15/2019  1:58 PM by Rajani Marrufo MD     Feet.                Gastroparesis (Chronic) ICD-10-CM: K31.84  ICD-9-CM: 536.3  11/29/2018 - Present        Plantar fasciitis of left foot (Chronic) ICD-10-CM: M72.2  ICD-9-CM: 728.71  11/29/2018 - Present        Positive KITTY (antinuclear antibody) (Chronic) ICD-10-CM: R76.8  ICD-9-CM: 795.79  11/29/2018 - Present        Severe obesity ([facility] ICD-10-CM: E66.01  ICD-9-CM: 278.01  11/29/2018 - Present        Class 2 obesity due to excess calories without serious comorbidity in adult ICD-10-CM: E66.09  ICD-9-CM: 278.00  7/25/2018 - Present        Mood disorder ([facility] ICD-10-CM: F39  ICD-9-CM: 296.90  6/12/2018 - Present        Chronic pain syndrome (Chronic) ICD-10-CM: G89.4  ICD-9-CM: 338.4  6/12/2018 - Present        Fibromyalgia (Chronic) ICD-10-CM: M79.7  ICD-9-CM: 729.1  6/12/2018 - Present        Panic attack ICD-10-CM: F41.0  ICD-9-CM: 300.01  6/12/2018 - Present        Tremor ICD-10-CM: R25.1  ICD-9-CM: 781.0  6/12/2018 - Present        Recurrent depression ([facility] ICD-10-CM: F33.9  ICD-9-CM: 296.30  1/15/2018 - Present        Inflammatory arthritis ICD-10-CM: M19.90  ICD-9-CM: 714.9  8/10/2016 - Present        Vitamin D deficiency (Chronic) ICD-10-CM: E55.9  ICD-9-CM: 268.9  3/21/2016 - Present        Thrombosed external hemorrhoid ICD-10-CM: K64.5  ICD-9-CM: 455.4  2/5/2016 - Present    Overview Signed 2/5/2016 12:05 PM by Kolby Navarrete MD     At 5 o'clock. IFG (impaired fasting glucose) (Chronic) ICD-10-CM: R73.01  ICD-9-CM: 790.21  9/10/2015 - Present        Hyperlipidemia (Chronic) ICD-10-CM: E78.5  ICD-9-CM: 272.4  9/10/2015 - Present        Hoarseness ICD-10-CM: R49.0  ICD-9-CM: 784.42  3/25/2013 - Present        OCD (obsessive compulsive disorder) ICD-10-CM: F42.9  ICD-9-CM: 300.3  2/27/2012 - Present        HSV (herpes simplex virus) anogenital infection ICD-10-CM: A60.9  ICD-9-CM: 054.9  9/12/2011 - Present        Costochondritis ICD-10-CM: M94.0  ICD-9-CM: 733.6  5/17/2011 - Present        Anxiety ICD-10-CM: F41.9  ICD-9-CM: 300.00  9/13/2010 - Present        Itch of skin ICD-10-CM: L29.9  ICD-9-CM: 698.9  5/26/2010 - Present        Depression (Chronic) ICD-10-CM: F32. A  ICD-9-CM: 295  Unknown - Present        GERD (gastroesophageal reflux disease) (Chronic) ICD-10-CM: K21.9  ICD-9-CM: 530.81  Unknown - Present        Anemia, unspecified (Chronic) ICD-10-CM: D64.9  ICD-9-CM: 285. 9  Unknown - Present        RESOLVED: Hypertension (Chronic) ICD-10-CM: I10  ICD-9-CM: 401.9  Unknown - 5/12/2021           Greater than 30 minutes were spent with the patient on counseling and coordination of care    Signed:   Sid Khan MD  11/9/2022  1:50 PM

## 2022-11-09 NOTE — PROGRESS NOTES
Transitions of Care Plan  RUR: 20% - high  Clinical Update: stable  Consults: AHFC; Therapy  Baseline: independent without DME; resides w father  Barriers to Discharge: home health & home infusion; DME  Disposition:  Home Health: [address] [facility] Infusion: [facility]  DME: [facility]  Estimated Discharge Date: 11/9/22    0900 - CM received confirmation from [facility]; agency able to accept patient for home health skilled nursing/inotrope and PICC care. 2336 - CM sent referral and inotrope order to [facility] for review. Patient will need Medicare approval for home infusion - clinicals submitted for approval.    5566 - CM sent Pennie Morrison order with supporting clinicals. 56 - CM spoke with patient about disposition plan. Provided education on home milrinone set up and home health services. Also provided high level education with [facility]. Patient with appropriate questions for both services and CM deferred questions to respective agencies. Patient advised CM that she has patient financial assistance for Jardiance through Dr. Brinton Cheadle office and has 2 bottles at home. CM advised patient to let the [facility] team know of same. 1035 - CM spoke with [facility]. Confirmed order and supporting clinicals received. Will keep CM updated on timeframe for fitting. Timeframe for Discharge:  Inotrope Education/Delivery: 1700  [facility]: 1630    Medicare pt has received, reviewed, and signed 2nd IM letter informing them of their right to appeal the discharge. Signed copied has been placed on pt bedside chart. CM will continue to follow.     Disposition:  Home Health: [facility]  Home Infusion: [facility]  DME: Saji Roy, MPH  Care Manager l [facility]  Available via [facility] or

## 2022-11-09 NOTE — PROGRESS NOTES
[address] [facility]  Hospitalist Group                                                                                          Hospitalist Progress Note  Mireille Freedman MD  Answering service: [phone] from in house phone        Date of Service:  2022  NAME:  Sara Hyde  :  1980  MRN:  [id number]      Admission Summary:     Patient initially presents with suicidal ideation and found to have congestive heart failure. Interval history / Subjective:     Patient seen examined at bedside earlier. Cont on  milrinone gtt.  Had some mild throat irritation this am      Assessment & Plan:     Nonischemic cm Hfref   -Acute on chronic systolic CHF NYHA class IV on admission  -Patient with ischemic cardiomyopathy with EF with 15 to 20%  -s/p RHC 10/27 - started on dobutamine and IV bumetanide  -meds being adjusted per AHF team bumex on hold   -Patient became tachycardic restarted on milrinone gtt 11/3 weaned off dobutamine gtt  -rheum on board  srogens consideration, ai workup has been neg thus far, appreciate recs, steroids at this pt wouldn't sig help cardiac disease   -cardio consulted for consideration of cardiac biopsy, appreciate recs, too high risk and no significant utility, do not recommend this   -bumex on hold, toprol   -hopefully dc in the next 24-48 hrs when stable per HF team  -PTOT    +KITTY  -as discussed above     Hypercalcemia  -appreciate nephro input  -pth appears  primary  -us shows 7 mm right-sided adenoma, sestamibi scan no uptake, consider parathyroidectomy if she would need LVAD    Hyperkalemia improved   -Discontinue potassium std will replete as needed  -monitor     Vaginal yeast infection  - Likely complication of Jardiance  - Cannot give fluconazole due to QTC prolongation  - miconazole ointment    Urinary tract infection  -Completed antibiotic    Hypertension  -Blood pressure is borderline low normal    Type 2 diabetes  -Well-controlled with hemoglobin A1c of 6.3  -Continue sliding scale insulin coverage    Coronary artery disease  -Patient with history of STEMI with stent to LAD  -Continue Effient    Dyslipidemia  -On Zetia    Restless leg syndrome  -On pramipexole, hold ropinirole secondary to tachycardia    Hemorrhoids  -Continue Anusol cream    Depression with suicidal ideation  -Patient previously evaluated by psychiatry  -On Lexapro, also on Klonopin for anxiety  -One-on-one sitter discontinued per psych recommendation  -Outpatient follow-up with psychiatry    Anxiety  -Patient on Klonopin for 12 years, continue Klonopin here  -Continue trazodone for sleep  -Outpatient follow-up with psychiatry    Constipation  -resolved; continue PRN Miralax    Dysgeusia: unclear etiology  -check B12 (normal/high), zinc, rapid covid (negative). TSH checked and was only mildly elevated in the setting of acute illness.   -no benefit with holding allopurinol; will resume this  -possibly related to GERD; continue pantoprazole, trial of carafate    Nausea: likely multifactorial, hx of gastroparesis, bowel edema from CHF  -continue supportive care, improved     Throat irritation  -not clinically infected, encourage warm fluids, throat spray prn     PTOT    Critically ill, high risk for decompensation. CCT time 40 minutes    Family meeting with AHF team today      Code status: Full  Prophylaxis: SCDs  Care Plan discussed with: Patient/RN/CM/AHF  Anticipated Disposition: 24-48 hrs [facility] on milrinone gtt     Hospital Problems  Date Reviewed: 10/14/2022            Codes Class Noted POA    CHF (congestive heart failure) ([facility] [address] ICD-10-CM: I50.9  ICD-9-CM: 428.0  10/21/2022 Unknown       Review of Systems:   A comprehensive review of systems was negative except for that written in the HPI. Vital Signs:    Last 24hrs VS reviewed since prior progress note.  Most recent are:  Visit Vitals  /85 (BP 1 Location: Left upper arm, BP Patient Position: Standing)   Pulse 90   Temp 98.3 °F (36.8 °C)   Resp 22   Ht 5' 4\" (1.626 m)   Wt 86 kg (189 lb 9.5 oz)   SpO2 96%   BMI 32.54 kg/m²         Intake/Output Summary (Last 24 hours) at 11/9/2022 1107  Last data filed at 11/9/2022 0848  Gross per 24 hour   Intake 1146.03 ml   Output 400 ml   Net 746.03 ml          Physical Examination:     I had a face to face encounter with this patient and independently examined them on 11/9/2022 as outlined below:          Constitutional:  No acute distress, non-toxic, obese   ENT:  Oral mucosa moist, oropharynx benign, anicteric sclerae    Resp:  CTA bilaterally. No wheezing/rhonchi/rales. No accessory muscle use. CV:  Regular rhythm, normal rate, no murmurs, no gallops, trace b/l LE edema    GI:  Soft, non distended, non tender. normoactive bowel sounds     Musculoskeletal:  warm    Neurologic:  Moves all extremities. AAOx3, CN II-XII reviewed  Psych: normal mood appropriate affect            Data Review:    Review and/or order of clinical lab test      Labs:     Recent Labs     11/09/22 0328 11/08/22 0320   WBC 4.9 4.6   HGB 13.0 14.3   HCT 41.4 44.4    231       Recent Labs     11/09/22 0328 11/08/22 0320 11/07/22 0326    136 136   K 3.8 3.4* 3.9    99 106   CO2 26 27 26   BUN 12 8 10   CREA 1.00 1.09* 0.82   * 106* 100   CA 9.2 9.8 8.9   MG 2.0 2.0 2.0       Recent Labs     11/09/22 0328 11/08/22 0320 11/07/22 0326   ALT 82* 95* 84*   * 149* 127*   TBILI 0.7 1.1* 0.9   TP 7.0 8.3* 7.2   ALB 3.4* 4.0 3.4*   GLOB 3.6 4.3* 3.8       No results for input(s): INR, PTP, APTT, INREXT, INREXT in the last 72 hours. No results for input(s): FE, TIBC, PSAT, FERR in the last 72 hours. Lab Results   Component Value Date/Time    Folate 20.0 10/23/2022 04:24 AM        No results for input(s): PH, PCO2, PO2 in the last 72 hours. No results for input(s): CPK, CKNDX, TROIQ in the last 72 hours.     No lab exists for component: CPKMB  Lab Results   Component Value Date/Time Cholesterol, total 95 10/22/2022 06:26 AM    HDL Cholesterol 32 10/22/2022 06:26 AM    LDL, calculated 45 10/22/2022 06:26 AM    Triglyceride 90 10/22/2022 06:26 AM    CHOL/HDL Ratio 3.0 10/22/2022 06:26 AM     Lab Results   Component Value Date/Time    Glucose (POC) 94 11/09/2022 07:18 AM    Glucose (POC) 105 11/08/2022 11:06 PM    Glucose (POC) 89 11/08/2022 04:48 PM    Glucose (POC) 88 11/08/2022 10:51 AM    Glucose (POC) 95 11/08/2022 08:29 AM     Lab Results   Component Value Date/Time    Color ORANGE 10/17/2022 08:42 AM    Appearance TURBID (A) 10/17/2022 08:42 AM    Specific gravity 1.025 10/17/2022 08:42 AM    Specific gravity 1.024 10/06/2022 08:57 AM    pH (UA) 5.0 10/17/2022 08:42 AM    Protein 30 (A) 10/17/2022 08:42 AM    Glucose Negative 10/17/2022 08:42 AM    Ketone Negative 10/17/2022 08:42 AM    Bilirubin Negative 11/25/2021 09:47 AM    Urobilinogen 1.0 10/17/2022 08:42 AM    Nitrites Positive (A) 10/17/2022 08:42 AM    Leukocyte Esterase SMALL (A) 10/17/2022 08:42 AM    Epithelial cells FEW 10/17/2022 08:42 AM    Bacteria 2+ (A) 10/17/2022 08:42 AM    WBC 5-10 10/17/2022 08:42 AM    RBC 5-10 10/17/2022 08:42 AM         Medications Reviewed:     Current Facility-Administered Medications   Medication Dose Route Frequency    phenol throat spray (CHLORASEPTIC) 1 Spray  1 Spray Oral PRN    diphenhydrAMINE (BENADRYL) capsule 25 mg  25 mg Oral Q6H PRN    metoprolol succinate (TOPROL-XL) XL tablet 12.5 mg  12.5 mg Oral Q12H    bumetanide (BUMEX) tablet 2 mg  2 mg Oral BID    diclofenac (VOLTAREN) 1 % topical gel 2 g  2 g Topical QID    nystatin (MYCOSTATIN) 100,000 unit/gram ointment   Topical BID    cinacalcet (SENSIPAR) tablet 30 mg  30 mg Oral DAILY WITH BREAKFAST    milrinone (PRIMACOR) 20 MG/100 ML D5W infusion  0.25 mcg/kg/min IntraVENous CONTINUOUS    digoxin (LANOXIN) tablet 0.25 mg  0.25 mg Oral DAILY    pramipexole (MIRAPEX) tablet 0.125 mg  0.125 mg Oral QHS    methyl salicylate-menthol (Arelia Lizzy) 15-10 % cream   Topical TID    pantoprazole (PROTONIX) tablet 40 mg  40 mg Oral ACB&D    polyethylene glycol (MIRALAX) packet 17 g  17 g Oral DAILY PRN    spironolactone (ALDACTONE) tablet 25 mg  25 mg Oral DAILY    prochlorperazine (COMPAZINE) injection 5 mg  5 mg IntraVENous Q6H PRN    ondansetron (ZOFRAN) injection 4 mg  4 mg IntraVENous Q6H PRN    allopurinoL (ZYLOPRIM) tablet 50 mg  50 mg Oral DAILY    hydrocortisone (ANUSOL-HC) 2.5 % rectal cream   PeriANAL QID    melatonin tablet 10.5 mg  10.5 mg Oral QHS PRN    empagliflozin (JARDIANCE) tablet 10 mg  10 mg Oral DAILY    aluminum & magnesium hydroxide-simethicone (MYLANTA II) oral suspension 30 mL  30 mL Oral Q4H PRN    diazePAM (VALIUM) tablet 5 mg  5 mg Oral Q12H PRN    docusate sodium (COLACE) capsule 100 mg  100 mg Oral DAILY    bisacodyL (DULCOLAX) suppository 10 mg  10 mg Rectal DAILY    albuterol-ipratropium (DUO-NEB) 2.5 MG-0.5 MG/3 ML  3 mL Nebulization Q6H PRN    fluticasone propionate (FLONASE) 50 mcg/actuation nasal spray 2 Spray  2 Spray Both Nostrils DAILY    traZODone (DESYREL) tablet 200 mg  200 mg Oral QHS    [Held by provider] magnesium oxide (MAG-OX) tablet 400 mg  400 mg Oral QHS    calcium carbonate (TUMS) chewable tablet 200 mg [elemental]  200 mg Oral TID PRN    clonazePAM (KlonoPIN) tablet 1 mg  1 mg Oral QHS    escitalopram oxalate (LEXAPRO) tablet 20 mg  20 mg Oral DAILY    ezetimibe (ZETIA) tablet 10 mg  10 mg Oral DAILY    folic acid (FOLVITE) tablet 1 mg  1 mg Oral DAILY    montelukast (SINGULAIR) tablet 10 mg  10 mg Oral DAILY    prasugreL (EFFIENT) tablet 10 mg  10 mg Oral DAILY    [Held by provider] rOPINIRole (REQUIP) tablet 0.5 mg  0.5 mg Oral BID    [Held by provider] rosuvastatin (CRESTOR) tablet 20 mg  20 mg Oral Q MON, WED & FRI    sodium chloride (NS) flush 5-40 mL  5-40 mL IntraVENous Q8H    sodium chloride (NS) flush 5-40 mL  5-40 mL IntraVENous PRN    acetaminophen (TYLENOL) tablet 650 mg  650 mg Oral Q4H PRN nitroglycerin (NITROSTAT) tablet 0.4 mg  0.4 mg SubLINGual Q5MIN PRN    glucose chewable tablet 16 g  4 Tablet Oral PRN    glucagon (GLUCAGEN) injection 1 mg  1 mg IntraMUSCular PRN    dextrose 10 % infusion 0-250 mL  0-250 mL IntraVENous PRN    insulin lispro (HUMALOG) injection   SubCUTAneous AC&HS     ______________________________________________________________________  EXPECTED LENGTH OF STAY: 5d 4h  ACTUAL LENGTH OF STAY:          19                 Kapil Cunningham MD

## 2022-11-09 NOTE — PROGRESS NOTES
[address] [place] [address]  Inpatient Progress Note      Patient name: Melodie Ferro  Patient : 1980  Patient MRN: [id number]  Consulting MD: Bonnie Escobar MD  Date of service: 22    REASON FOR REFERRAL:  Management of chronic systolic heart failure     PLAN OF CARE:  38 y.o. female with ischemic cardiomyopathy, LVEF 15-20%, stage C, NYHA class IIIB initiated on inotropic support; not able to tolerate dobutamine due to tachycardia and switched to milrinone; limited options to slow down heart rate due to QTc > 500ms in the setting of antidepressant use; appears to have HR in better control; initiated inpatient HT/LVAD workup  Concerns regarding HT/LVAD candidacy upon initial review include possible autoimmune disease that requires diagnosis/treatment, psychiatric condition that needs evaluation/treatment and uncertain support system; ongoing evaluation  Tentative discharge home today with lifevest and milrinone gtt     RECOMMENDATIONS:  Continue milrinone 0.25mcg/kg/min, dosed to weight 85.4kg, up-titration limited by BP/HR  Continue digoxin 0.25mg daily, check digoxin levels today and daily 1.2 (goal 0.7-1.2)   Hold off corlanor due to long QT; likely in the setting of taking antidepressants, d/w Pharm. D. would not use with QTc > 500ms; QTc 539ms 22  Continue toprol XL 12.5mg q12h  Cannot tolerate ACEi/ARB/ARNi due to hypotension   Continue spironolactone 25mg daily  Continue jardiance 10mg daily  Continue bumex 2mg PO twice daily, check orthostatics.  Keep goal wt 185-189lbs  Hold requip due to tachycardia  Hold crestor due to rise in TB; consider resuming if TB stable as OP  Keep K > 4 and Mag > 2  Cont allopurinol 50mg daily   Continue ASA and prasugrel per primary cards  Treatment of STU: inpatient eval for STU  Lifevest ordered   Plan on outpatient cardiac MRI  Dr. Ben Gay consult appreciated, too high risk for cardiac biopsy  Dr. Jarad Kellogg consult appreciated  Labs: HF and Transplant eval labs ordered; in process   completing psychosocial evaluation (patient lives 1.5 hours away; need to connect with patient's psychiatrist)  Psychiatry consultation for anxiety appreciated for possible wean of antidepressants  Nephrology consult appreciated; will benefit from Parathyroidectomy if she is going for LVAD but can also be managed medically for now   Will send referral to ENT ( Dr. Lali Winston for parathyroidectomy)  Nutritionist consult  Heart failure education  Needs Advanced care plan     All other care per primary team, ok to DC home today      IMPRESSION:  Fatigue  Shortness of breath  Volume overload  Acute on Chronic systolic heart failure  Stage C, NYHA class IV symptoms  ischemic cardiomyopathy, LVEF 20-25%  CAD  STEMI July 2022 s/p RICKY to LAD  At risk of sudden cardiac death- lifevest ordered   HTN  Obesity  LHD  Pre-diabetes  Esophageal stenosis s/p dilation  RLS  Fibromyalgia  Anxiety and Depression     INTERVAL HISTORY:  No acute overnight events  Afebrile  -110s/70s, HR 80-90s  I/O inaccurate  Cr 1.00  K 3.8  Ca 9.2  Pro-NT-BNP stable       LIFE GOALS:  Patient's personal goals include: get better  Important upcoming milestones or family events: the holidays coming up  The patient identifies the following as important for living well: being independent     CARDIAC IMAGING:  Echo 10/22/22    Left Ventricle: Severely reduced left ventricular systolic function with a visually estimated EF of 15 - 20%. Left ventricle is mildly dilated. Normal wall thickness.  Moderate hypokinesis of the following segments: basal anterior, basal anterolateral, basal anteroseptal, basal inferior, basal inferolateral, basal inferoseptal, mid anterior, mid anterolateral, mid anteroseptal, mid inferior, mid inferolateral and mid inferoseptal. Severe hypokinesis of the following segments: apical anterior, apical septal, apical inferior and apical lateral. Grade III diastolic dysfunction with increased LAP. Mitral Valve: Mild annular dilation. Moderate regurgitation. Tricuspid Valve: Moderately elevated RVSP. The estimated RVSP is 51 mmHg. Left Atrium: Left atrium is mildly dilated. Pericardium: Trivial pericardial effusion present. No indication of cardiac tamponade. Contrast used: Definity. Echo (8/14/22)    Left Ventricle: Severely reduced left ventricular systolic function with a visually estimated EF of 20 - 25%. Left ventricle is mildly dilated. Increased wall thickness. See diagram for wall motion findings. Grade II diastolic dysfunction with increased LAP. Right Ventricle: Not well visualized. Tricuspid Valve: Moderately elevated RVSP. [address] 10/27/22  PA 56/34/43, RA 16, PCWP 29, CI daisy 1.46 at 199lbs    6MW 11/8/22 164meters           BRIEF HISTORY OF PRESENT ILLNESS:  Anthony Clarke is a 38 y.o. female with h/o HTN, CAD s/p STEMI in July 2022 (DESx1 to LAD; treated at [facility]) with ischemic cardiomyopathy (EF 20-25%), CHF, GERD, RLS, and fibromyalgia seen as a new patient in [facility] on 10/21/22 and found to be fluid overloaded, and with suicidal ideation. Pt was taken to the ER for further evaluation and admission for acute on chronic HF and mental health crisis. PHYSICAL EXAM:  Visit Vitals  /72 (BP 1 Location: Left upper arm, BP Patient Position: Sitting)   Pulse 88   Temp 97.8 °F (36.6 °C)   Resp 16   Ht 5' 4\" (1.626 m)   Wt 189 lb 9.5 oz (86 kg)   SpO2 97%   BMI 32.54 kg/m²     Physical Exam  Vitals and nursing note reviewed. Constitutional:       Appearance: Normal appearance. Cardiovascular:      Rate and Rhythm: Normal rate and regular rhythm. Pulses: Normal pulses. Heart sounds: Normal heart sounds. Pulmonary:      Effort: No respiratory distress. Breath sounds: Normal breath sounds. Abdominal:      General: There is no distension. Palpations: Abdomen is soft.    Musculoskeletal: General: No swelling. Skin:     General: Skin is warm and dry. Neurological:      General: No focal deficit present. Mental Status: She is alert and oriented to person, place, and time. Psychiatric:         Mood and Affect: Mood normal.        REVIEW OF SYSTEMS:  Review of Systems   Constitutional:  Negative for chills, fever and malaise/fatigue. Respiratory:  Negative for cough and shortness of breath. Cardiovascular:  Negative for chest pain, palpitations and leg swelling. Gastrointestinal:  Negative for heartburn and nausea. Genitourinary:  Negative for dysuria. Musculoskeletal:  Negative for falls. Neurological:  Negative for dizziness and headaches. Endo/Heme/Allergies:  Does not bruise/bleed easily. Psychiatric/Behavioral:  Positive for depression. The patient is nervous/anxious. PAST MEDICAL HISTORY:  Past Medical History:   Diagnosis Date    Anemia NEC     Arthritis     Chronic pain     fybromyalsia    Depression     anxiety, depression, OCD    GERD (gastroesophageal reflux disease)     Hypertension     Hypertension     Ill-defined condition     Fibromyalia gastricparesis    MI (myocardial infarction) ([facility] [address]     Musculoskeletal disorder     SOB (shortness of breath)     Stool color black        PAST SURGICAL HISTORY:  Past Surgical History:   Procedure Laterality Date    HX CORONARY STENT PLACEMENT      HX GYN           HX HEENT  2002    wisdom teeth extraction    UPPER GI ENDOSCOPY,BIOPSY  2018         UPPER GI ENDOSCOPY,DIAGNOSIS  2018            FAMILY HISTORY:  Family History   Problem Relation Age of Onset    Hypertension Father     Elevated Lipids Father     Arthritis-rheumatoid Mother         ? Lupus vs RA    Lung Disease Mother     Heart Disease Mother     Cancer Mother         breast in 39y    COPD Mother     Hypertension Mother     Stroke Mother         3-4 strokes    Breast Cancer Mother 50    Diabetes Maternal Grandmother        SOCIAL HISTORY:  Social History     Socioeconomic History    Marital status: SINGLE   Tobacco Use    Smoking status: Former     Packs/day: 0.25     Years: 8.00     Pack years: 2.00     Types: Cigarettes     Quit date: 2022     Years since quittin.2    Smokeless tobacco: Never   Vaping Use    Vaping Use: Never used   Substance and Sexual Activity    Alcohol use: Not Currently     Alcohol/week: 1.0 standard drink     Types: 1 Glasses of wine per week     Comment: Wine with special occassions - not since July    Drug use: Not Currently     Types: Marijuana     Comment: Haven't had any since 2022    Sexual activity: Yes     Partners: Male     Birth control/protection: None     Social Determinants of Health     Financial Resource Strain: High Risk    Difficulty of Paying Living Expenses: Very hard   Food Insecurity: No Food Insecurity    Worried About Running Out of Food in the Last Year: Never true    Ran Out of Food in the Last Year: Never true   Transportation Needs: No Transportation Needs    Lack of Transportation (Medical): No    Lack of Transportation (Non-Medical): No   Physical Activity: Inactive    Days of Exercise per Week: 0 days    Minutes of Exercise per Session: 0 min   Stress: Stress Concern Present    Feeling of Stress :  To some extent   Social Connections: Socially Isolated    Frequency of Communication with Friends and Family: Twice a week    Frequency of Social Gatherings with Friends and Family: Never    Attends Hoahaoism Services: Never    Active Member of Clubs or Organizations: No    Attends Club or Organization Meetings: Never    Marital Status: Never    Housing Stability: [address] to Pay for Housing in the Last Year: No    Number of [place] in the Last Year: 1    Unstable Housing in the Last Year: No       LABORATORY RESULTS:     Labs Latest Ref Rng & Units 2022 2022 2022 2022 2022 2022 11/3/2022   WBC 3.6 - 11.0 K/uL 4.9 4.6 4.4 4.7 4.3 5.8 4.5   RBC 3.80 - 5.20 M/uL 4.53 4.89 4.47 4.64 4.93 5.10 5.38(H)   Hemoglobin 11.5 - 16.0 g/dL 13.0 14.3 13.1 13.5 14.6 14.7 15.8   Hematocrit 35.0 - 47.0 % 41.4 44.4 41.6 42.4 46.4 46.2 49. 8(H)   MCV 80.0 - 99.0 FL 91.4 90.8 93.1 91.4 94.1 90.6 92.6   Platelets 154 - 454 K/uL 239 231 227 241 232 236 246   Lymphocytes 12 - 49 % 47 43 52(H) 38 41 28 -   Monocytes 5 - 13 % 12 11 11 11 12 13 -   Eosinophils 0 - 7 % 5 6 7 6 5 2 -   Basophils 0 - 1 % 1 1 1 1 1 1 -   Albumin 3.5 - 5.0 g/dL 3.4(L) 4.0 3.4(L) 3.7 4.1 4.3 -   Calcium 8.5 - 10.1 MG/DL 9.2 9.8 8.9 9.7 10. 7(H) 10. 3(H) 10. 3(H)   Glucose 65 - 100 mg/dL 150(H) 106(H) 100 135(H) 104(H) 109(H) 114(H)   BUN 6 - 20 MG/DL 12 8 10 13 18 17 19   Creatinine 0.55 - 1.02 MG/DL 1.00 1.09(H) 0.82 1.08(H) 1.01 1.30(H) 1.16(H)   Sodium 136 - 145 mmol/L 139 136 136 136 134(L) 132(L) 131(L)   Potassium 3.5 - 5.1 mmol/L 3.8 3.4(L) 3.9 4.3 5.2(H) 4.0 4.1   TSH 0.36 - 3.74 uIU/mL - - - - - - -   LDH 81 - 246 U/L - - - - - - -   Some recent data might be hidden     Lab Results   Component Value Date/Time    TSH 4.44 (H) 10/21/2022 06:12 PM    TSH 2.12 05/12/2021 10:55 AM    TSH 1.330 12/30/2019 12:00 AM    TSH 3.850 07/25/2018 12:40 PM    TSH 1.620 02/27/2018 08:59 AM    TSH 1.240 12/05/2016 10:13 AM    TSH 1.530 03/18/2016 10:31 AM    TSH 1.400 11/09/2011 09:37 AM    TSH 1.26 08/19/2010 10:36 AM    TSH 1.18 07/28/2010 04:10 PM       ALLERGY:  Allergies   Allergen Reactions    Abilify [Aripiprazole] Anxiety     Can not tolerate     Sulfa (Sulfonamide Antibiotics) Hives    Vicodin [Hydrocodone-Acetaminophen] Nausea and Vomiting        CURRENT MEDICATIONS:    Current Facility-Administered Medications:     phenol throat spray (CHLORASEPTIC) 1 Spray, 1 Spray, Oral, PRN, Conner Macias MD    diphenhydrAMINE (BENADRYL) capsule 25 mg, 25 mg, Oral, Q6H PRN, Conner Macias MD, 25 mg at 11/09/22 0715    metoprolol succinate (TOPROL-XL) XL tablet 12.5 mg, 12.5 mg, Oral, Q12H, Perla Lazar MD, 12.5 mg at 11/09/22 0857    bumetanide (BUMEX) tablet 2 mg, 2 mg, Oral, BID, Perla Lazar MD, 2 mg at 11/07/22 1750    diclofenac (VOLTAREN) 1 % topical gel 2 g, 2 g, Topical, QID, Eliceo Salcido MD, 2 g at 11/09/22 0851    nystatin (MYCOSTATIN) 100,000 unit/gram ointment, , Topical, BID, Eliceo Salcido MD, Given at 11/09/22 0851    cinacalcet (SENSIPAR) tablet 30 mg, 30 mg, Oral, DAILY WITH BREAKFAST, Fernando Martinez MD, 30 mg at 11/09/22 0857    milrinone (PRIMACOR) 20 MG/100 ML D5W infusion, 0.25 mcg/kg/min, IntraVENous, CONTINUOUS, Gena Bosch NP, Last Rate: 6.4 mL/hr at 11/09/22 0323, 0.25 mcg/kg/min at 11/09/22 0323    digoxin (LANOXIN) tablet 0.25 mg, 0.25 mg, Oral, DAILY, Perla Lazar MD, 0.25 mg at 11/09/22 0857    pramipexole (MIRAPEX) tablet 0.125 mg, 0.125 mg, Oral, QHS, Shireen Mazariegos MD, 0.125 mg at 11/08/22 2044    methyl salicylate-menthol (BENGAY) 15-10 % cream, , Topical, TID, Sara Zaragoza NP, Given at 11/08/22 0839    pantoprazole (PROTONIX) tablet 40 mg, 40 mg, Oral, ACB&D, Shireen Mazariegos MD, 40 mg at 11/09/22 0715    polyethylene glycol (MIRALAX) packet 17 g, 17 g, Oral, DAILY PRN, Rekha Vernon MD, 17 g at 10/30/22 2052    spironolactone (ALDACTONE) tablet 25 mg, 25 mg, Oral, DAILY, Gena Bosch NP, 25 mg at 11/09/22 0858    prochlorperazine (COMPAZINE) injection 5 mg, 5 mg, IntraVENous, Q6H PRN, Shireen Mazariegos MD, 5 mg at 10/28/22 1557    ondansetron (ZOFRAN) injection 4 mg, 4 mg, IntraVENous, Q6H PRN, Tristan Dominguez MD, 4 mg at 11/04/22 0823    allopurinoL (ZYLOPRIM) tablet 50 mg, 50 mg, Oral, DAILY, Gena Bosch NP, 50 mg at 11/09/22 0857    hydrocortisone (ANUSOL-HC) 2.5 % rectal cream, , PeriANAL, QID, Tristan Dominguez MD, Given at 11/09/22 0852    melatonin tablet 10.5 mg, 10.5 mg, Oral, QHS PRN, Tristan Dominguez MD    empagliflozin (JARDIANCE) tablet 10 mg, 10 mg, Oral, DAILY, Hiro Gena BAGLEY, NP, 10 mg at 11/09/22 0857    aluminum & magnesium hydroxide-simethicone (MYLANTA II) oral suspension 30 mL, 30 mL, Oral, Q4H PRN, Sylvain Dominguez MD, 30 mL at 11/03/22 0536    diazePAM (VALIUM) tablet 5 mg, 5 mg, Oral, Q12H PRN, Sylvain Dominguez MD, 5 mg at 11/03/22 1246    docusate sodium (COLACE) capsule 100 mg, 100 mg, Oral, DAILY, Roslyn Philippe MD, 100 mg at 11/09/22 0857    bisacodyL (DULCOLAX) suppository 10 mg, 10 mg, Rectal, DAILY, Sylvain Dominguez MD, 10 mg at 10/25/22 4383    albuterol-ipratropium (DUO-NEB) 2.5 MG-0.5 MG/3 ML, 3 mL, Nebulization, Q6H PRN, Jorge A Alicia MD    fluticasone propionate (FLONASE) 50 mcg/actuation nasal spray 2 Spray, 2 Spray, Both Nostrils, DAILY, Roslyn Philippe MD, 2 [place] at 11/09/22 0851    traZODone (DESYREL) tablet 200 mg, 200 mg, Oral, QHS, Irma Zaragoza, NP, 200 mg at 11/08/22 2307    [Held by provider] magnesium oxide (MAG-OX) tablet 400 mg, 400 mg, Oral, QHS, Alton BAGLEY, NP, 400 mg at 10/29/22 2159    calcium carbonate (TUMS) chewable tablet 200 mg [elemental], 200 mg, Oral, TID PRN, Roslyn Philippe MD, 200 mg at 11/07/22 2313    clonazePAM (KlonoPIN) tablet 1 mg, 1 mg, Oral, QHS, Judie Spears MD, 1 mg at 11/08/22 2307    escitalopram oxalate (LEXAPRO) tablet 20 mg, 20 mg, Oral, DAILY, Geoff Rodriguez MD, 20 mg at 11/09/22 0857    ezetimibe (ZETIA) tablet 10 mg, 10 mg, Oral, DAILY, Geoff Rodriguez MD, 10 mg at 80/21/49 8950    folic acid (FOLVITE) tablet 1 mg, 1 mg, Oral, DAILY, Geoff Rodriguez MD, 1 mg at 11/09/22 0857    montelukast (SINGULAIR) tablet 10 mg, 10 mg, Oral, DAILY, Geoff Rodriguez MD, 10 mg at 11/09/22 0858    prasugreL (EFFIENT) tablet 10 mg, 10 mg, Oral, DAILY, Geoff Rodriguez MD, 10 mg at 11/09/22 0858    [Held by provider] rOPINIRole (REQUIP) tablet 0.5 mg, 0.5 mg, Oral, BID, Geoff Rodriguez MD, 0.5 mg at 10/31/22 0947    [Held by provider] rosuvastatin (CRESTOR) tablet 20 mg, 20 mg, Oral, Q MON, WED & FRI, Judie Spears MD, 20 mg at 10/24/22 2241    sodium chloride (NS) flush 5-40 mL, 5-40 mL, IntraVENous, Q8H, Geoff Rodriguez MD, 10 mL at 11/09/22 0716    sodium chloride (NS) flush 5-40 mL, 5-40 mL, IntraVENous, PRN, Judie Spears MD    acetaminophen (TYLENOL) tablet 650 mg, 650 mg, Oral, Q4H PRN, Judie Spears MD, 650 mg at 11/06/22 0345    nitroglycerin (NITROSTAT) tablet 0.4 mg, 0.4 mg, SubLINGual, Q5MIN PRN, Judie Spears MD, 0.4 mg at 10/21/22 2237    glucose chewable tablet 16 g, 4 Tablet, Oral, PRN, Judie Spears MD    glucagon (GLUCAGEN) injection 1 mg, 1 mg, IntraMUSCular, PRN, Geoff Lock MD    dextrose 10 % infusion 0-250 mL, 0-250 mL, IntraVENous, PRN, Judie Spears MD    insulin lispro (HUMALOG) injection, , SubCUTAneous, AC&HS, Judie Spears MD    PATIENT CARE TEAM:  Patient Care Team:  Soila Wayne MD as PCP - General (Family Medicine)  Soila Wayne MD as PCP - [facility] Provider  Joy Landon MD (Surgery General)  Aleksandr Damon MD (Cardiovascular Disease Physician)  Gabrielle Marte MD (Otolaryngology)  Stephen Villa MD (Internal Medicine Physician)  Zara Peguero MD (Female Pelvic Medicine and Reconstructive Surgery)  Mark Claire MD (Neurology)  Luis Bernard MD (Psychiatry)  Deidre Alvarez as Ambulatory Care Manager     Thank you for allowing me to participate in this patient's care.     Bea Miller NP   [address] [place] [address], [address]  Phone: [phone]

## 2022-11-09 NOTE — PROGRESS NOTES
Problem: Falls - Risk of  Goal: *Absence of Falls  Description: Document Stacey Embs Fall Risk and appropriate interventions in the flowsheet.   Outcome: Progressing Towards Goal  Note: Fall Risk Interventions:  Mobility Interventions: Patient to call before getting OOB, Communicate number of staff needed for ambulation/transfer    Mentation Interventions: Adequate sleep, hydration, pain control, Update white board    Medication Interventions: Patient to call before getting OOB, Teach patient to arise slowly    Elimination Interventions: Call light in reach

## 2022-11-09 NOTE — PROGRESS NOTES
MECHANICAL CIRCULATORY SUPPORT & TRANSPLANT EVALUATION  Advanced Heart Failure [address]   1980            38 y.o.   female   Ethnicity:   Marital status: Single  [place] [address]     Phone: [phone]  MRN: [id number]          INTERMACS:   Time of GDMT: Inotrope Dependent  Height:     Ht Readings from Last 1 Encounters:   12/03/22 5' 4\" (1.626 m)     Weight:   Wt Readings from Last 1 Encounters:   12/05/22 184 lb 11.9 oz (83.8 kg)     BMI: 31.71 kg/m2  Blood type: A POSITIVE  Referring MD: Dr. Capo Bryan  Date Consented to Eval: 11/1/22  Date of Presentation: 12/16/22      Cardiac history:  Fatigue  Shortness of breath  Volume overload  Acute on Chronic systolic heart failure  Stage C, NYHA class IV symptoms  ischemic cardiomyopathy, LVEF 20-25%  CAD  STEMI July 2022 s/p RICKY to LAD  At risk of sudden cardiac death- lifevest ordered   HTN  Obesity  LHD    Devices: Lifevest  Sternotomies: None Other medical history:  Pre-diabetes  Esophageal stenosis s/p dilation  RLS  Fibromyalgia  Anxiety and Depression     Other surgical history:  As above      Medications:  Jardiance 10mg daily  Bumex 1mg daily  Cinacalcet 30mg daily  Digoxin 0.25mg daily  Metoprolol succinate 12.5mg BID  Milrinone 0.25mcg/kg/min  Pramipexole 0.125mg nightly  Spironolactone 25mg daily  Allopurinol 50mg daily  Levocetirizine 5mg daily  Metformin 500mg daily  Diazepam 5mg Q8hrs prn  Aspirin 81mg daily  Pantoprazole 40mg BID  Trazadone 150mg daily  Lexapro 20mg daily  Klonopin 1.5mg daily  Ativan 1mg Q8hrs PRN  Magnesium 400mg BID  Potassium 20 mEq BID  Singulair 10mg daily  Effient 10 mg (?) Allergies:   Allergies   Allergen Reactions    Abilify [Aripiprazole] Anxiety     Can not tolerate     Sulfa (Sulfonamide Antibiotics) Hives    Vicodin [Hydrocodone-Acetaminophen] Nausea and Vomiting             Cardiovascular imaging:  Echo (10/22/22)  LVEF: 15-20%        LVIDd: 5.9cm    Left Ventricle: Severely reduced left ventricular systolic function with a visually estimated EF of 15 - 20%. Left ventricle is mildly dilated. Normal wall thickness. Moderate hypokinesis of the following segments: basal anterior, basal anterolateral, basal anteroseptal, basal inferior, basal inferolateral, basal inferoseptal, mid anterior, mid anterolateral, mid anteroseptal, mid inferior, mid inferolateral and mid inferoseptal. Severe hypokinesis of the following segments: apical anterior, apical septal, apical inferior and apical lateral. Grade III diastolic dysfunction with increased LAP. Mitral Valve: Mild annular dilation. Moderate regurgitation. Tricuspid Valve: Moderately elevated RVSP. The estimated RVSP is 51 mmHg. Left Atrium: Left atrium is mildly dilated. Pericardium: Trivial pericardial effusion present. No indication of cardiac tamponade. Contrast used: Definity. Cardiac MRI: Not done     PYP test (11/2/22): IMPRESSION: PYP scan is equivocal for ATTR cardiac amyloidosis based on the H:CL ratio of  1.3 and semiquantitative score of 1. LHC: Not done    NST: Not done    Viability: Not done EKG (12/2/22): Component Ref Range & Units 6 d ago   Ventricular Rate BPM 98    Atrial Rate BPM 98    P-R Interval ms 134    QRS Duration ms 146    Q-T Interval ms 382    QTC Calculation (Bezet) ms 487    Calculated P Axis degrees 39    Calculated R Axis degrees -157    Calculated T Axis degrees 35    Diagnosis  Sinus rhythm with premature atrial complexes   Right bundle branch block   Left posterior fascicular block   When compared with ECG of 18-NOV-2022 14:34,   premature atrial complexes are now present   Confirmed by Mart Mcgee ([id number]) on 12/3/2022 5:59:07 PM         EKG (11/7/22):   Component Ref Range & Units 2 d ago   Ventricular Rate BPM 92    Atrial Rate BPM 92    P-R Interval ms 152    QRS Duration ms 158    Q-T Interval ms 424    QTC Calculation (Bezet) ms 524    Calculated P Axis degrees 56    Calculated R Axis degrees -98    Calculated T Axis degrees 34    Diagnosis  Sinus rhythm with premature supraventricular complexes   Possible Left atrial enlargement   Right bundle branch block   When compared with ECG of 07-NOV-2022 23:00,   No significant change   Confirmed by [place] ([id number]) on 11/8/2022 5:23:44 PM          RHC (10/27/22):  PA: 56/34/43  RA Mean: 16  PCWP Mean: 29  Jannet CI: 1.46  SVR: Not calculated  PVR: 4.89 louie    CPEST: Not done    6MW:    6MWT    Date: 11/17/22   Pre/Post HR: 109/127   Pre/Post SpO2: 98%/92%   Distance (meters): 342.29     6MWT    Date: 11/9/22   Pre/Post HR: 89/112   Pre/Post SpO2: 97%/97%   Distance (meters): 164.287    Abdominal ultrasound (10/25/22): FINDINGS:   The liver is without focal abnormality visualized. The common bile duct is  obscured by bowel gas. The gallbladder appears within normal limits. The main  portal vein is patent with hepatopedal flow. The pancreatic head appears unremarkable. The remainder is obscured by bowel gas  The spleen measures 9.5 cm in length and is within normal limits. The right kidney measures 9.1 cm in length. No focal lesion or hydronephrosis. The left kidney measures 10.4 cm in length. No focal lesion or hydronephrosis. The visualized aorta is within normal limits. The visualized IVC is within  normal limits. Trace ascites  IMPRESSION  Pancreatic head has an unremarkable sonographic appearance. The remainder is  obscured by bowel gas. Pancreas is not well evaluated by ultrasound. Recommend  follow-up with nonemergent pancreas CT protocol. Trace ascites. Ankle-Brachial Index (11/4/22): Bilateral lower extremity artery duplex completed with manual MONY due to equipment issues. IMPRESSION:  No evidence of hemodynamically significant right or left lower extremity arterial obstruction     Carotid doppler (11/1/22):   No evidence of right or left internal carotid artery stenosis. Vertebral arteries are patent with antegrade flow. Non-cardiac imaging:  CT abd/pelvis (10/17/22): IMPRESSION  Hepatic steatosis. Cardiomegaly and small bilateral pleural effusions with right middle lobe  atelectasis/pneumonia. Follow-up chest CT in 4-6 weeks to evaluate for  resolution recommended. Subtle hypodensities of the pancreatic tail and pancreatic uncinate,  nonspecific, may be artifactual nonemergent pancreatic mass protocol CT  recommended. Minimal intraperitoneal ascites and pericholecystic fluid may be related to  volume overload. Please see above for additional nonemergent incidental findings. CTA Chest (8/14/22): FINDINGS:  There are small bilateral pleural effusions with overlying atelectasis. The  lungs are otherwise clear of mass, nodule, airspace disease or edema. The pulmonary arteries are well enhanced and no pulmonary emboli are identified. There is no mediastinal or hilar adenopathy or mass. The heart is normal in size  without pericardial effusion. A left anterior descending coronary artery stent  is noted. The aorta enhances normally without evidence of aneurysm or  dissection. The visualized portions of the upper abdominal organs are normal. Chest wall  structures show focal right breast asymmetry measuring 1.3 x 2.1 cm. IMPRESSION  No pulmonary embolus with small bilateral pleural effusions and  overlying atelectasis. Incidentals as above including right breast asymmetry for  which diagnostic mammogram and possible ultrasound is recommended. Head CT:N/A    CXR (12/2/22): FINDINGS: The cardiac silhouette is within normal limits. The pulmonary  vasculature is within normal limits. The lungs and pleural spaces are clear. The visualized bones and upper abdomen  are age-appropriate. Right subclavian PICC line is unchanged in position.   IMPRESSION  No acute process on portable chest.      NM Parathyroid Scan and Thyroid US(11/4/22):   PARATHYROID SCAN:  FINDINGS:  The initial images demonstrate physiologic tracer uptake in the salivary glands,  thyroid, and myocardium. The delayed images demonstrate no abnormal tracer activity in the neck or chest  to suggest parathyroid adenoma. IMPRESSION  No evidence of parathyroid adenoma. THYROID ULTRASOUND:   Realtime sonographic imaging of the thyroid gland was performed. The right gland measures 3.8 x 1.4 x 1.0 cm and the left gland measures 4.2 x 1.3 x 1.0 cm. The  isthmus measures 3 mm. There is a 7 mm homogeneous hypoechoic nodule posterior  to the mid pole right thyroid gland. The thyroid gland itself is normal in  appearance. IMPRESSION: Likely 7 mm right parathyroid adenoma. PFT: 12/9/22   Predicted Measured %   FVC 3.55 3.57 101   FEV1 2.76 2.70 98   FEV1/FVC 77 76    DLCO 25.1 18.6 74     Bedside Spirometry (11/7/22): Base % Predicted   FVC 2.74 74   FEV1 2.30 76   FEV1/FVC 84% N/A    EGD (3/3/22): Findings:   Esophagus:Mucosa within normal throughout the esophagus, no stricture, inflammation or lesions seen. Stomach: Mucosa within normal throughout the stomach. Duodenum/jejunum: normal  Therapies:  Empiric esophageal dilation with savary sizes 51 Fr  Specimens: Mid-esophagus  Complications:   None; patient tolerated the procedure well. EBL:  None. Impression:    Upper endoscopy within normal, no esophagitis or stricture seen. Empiric dilation performed. Biopsies were obtained to rule out eosinophilic esophagitis. Recommendations:  -Await pathology.  -Continue with anti-reflux measures  -Continue with soft diet and eating slowly  -Follow-up office visit as needed  PATHOLOGY:  Mid esophagus; biopsy:        Stratified squamous mucosa with mild reactive changes. No increased number of intraepithelial eosinophils and no dysplasia   noted. Colonoscopy: N/A    Mammogram (9/23/22): IMPRESSION  1. BI-RADS Assessment Category 2: Benign finding.  Left breast lipoma is  comparable to the 2013 ultrasound study when accounting for differences in  technique. 2. Normal appearing right breast fibroglandular tissue is stable  mammographically over many years. No evidence of right breast malignancy. Recommendations:  Continue annual screening mammography    GYN/Pap smear (4/16/21): Pap normal, HPV neg, next Pap 2026    Dexa scan: Not done      Labs:  CBC (12/6/22):  -WBC: 5.1  -HGB: 11.1  -PLT: 187  BMP (12/6/22):   -Na: 137  -K: 4.0  -Glu:87   -BUN: 5  -Cr: 0.72  -Ca: 8.7  -Tbili: 0.8  -ALT: 17  -AST: 39  -Alk phos: 101  -Albumin: 3.5  ProBNP (12/6/22): 4,126 (H)  ABG (11/3/22): pO2 103 (H); sO2 98.1 (H); Otherwise WDL  KITTY (11/9/22): Positive (!)  KITTY AB Homogeneous Pattern (11/3/22): 1:320 (H)  Blood culture (12/2/22): Klebsiella pneumonia (!)  CK (10/23/22): 44  Cortisol (10/24/22): 16.9  CRP (11/3/22): 1.48  COVID PCR (10/28/22): Negative  Digoxin level (11/9/22): 1.3  ESR (11/1/22): 13  Fecal Occult (11/3/22): Negative  Ferritin (10/23/22): 89  G6PD (10/24/22): 4.62  H.pylori Ab (10/24/22): Negative  Heparin Ab (11/2/22): 0.148  Hep C Ab (10/24/22): Negaive  HgbA1C (10/21/22): 6.3 (H)  HIV 1,2 Ab (11/2/22): Nonreactive  INR (11/2/22): 1.1  Iron sat (10/23/22): 11% (L)  Lactic acid (12/3/22): 0.8  LDH (11/2/22): 214  Lipid profile (10/22/22): WDL  Lupus anticoagulant (11/2/22): dRVVT 31.5 Magnesium (11/9/22): 2.0  MRSA Swab (11/3/22): Negative  Plasma Free Hgb (11/2/22): 4.7  Prealbumin (12/6/22): 4.70  Procalcitonin (11/2/22): <0.05  Protein C (10/24/22): 101  Protein S (10/24/22): Functional 51 % (L), otherwise WDL  Prothrombin gene mutation (11/2/22): Negative  PSA: N/A  PTH (11/2/22): 83.8  PTT (10/21/22): 25.9  RPR (11/2/22):  Nonreactive  ESPERANZA: N/A  Troponin I (12/2/22): 22  TSH (10/21/22): 4.44 (H)  T3 (10/24/22): 2.7  T4 (10/24/22): 1.3  Uric acid (10/23/22): 8.1 (H)  Vit D level (11/4/22): 70.9  Gammopathy Profile (10/23/22):  -IgG level: 827  -M-spike: Not observed  -Free Kappa: 15.7 -Free lambda: 9.0  -Kappa/lambda: 1.74 (H)     Urine Studies:  Cotinine (10/24/22): Negative  Peth (11/8/22): Negative  Microalbumin (11/2/22): Ratio 68 (H)  Pregnancy Test (10/17/22): Negative  UA (12/3/22): Glucose 500 (H); Otherwise WDL  Urine Cx (10/23/22): Negative    Transplant Labs:  CMV IgG (10/24/22): Negative  EBV IgG (10/24/22): Negative  Hep A Ab (11/2/22): Negative  Hep B core Ab - total (11/2/22): Negative  Hep B surface Ag (11/2/22): Negative  Hep B Surface Ab (11/2/22): Reactive  HSV 1 IgG (10/24/22): <0.91 Negative  HSV2 IgG (10/24/22): 8.07 (H)  HTLV1, 2 IgG (10/24/22): Negative  Mumps IgG (10/24/22): Immune  Quantiferon Gold (10/24/22): Negative  Rubella IgG (10/24/22): Reactive  Rubeola IgG (10/24/22): Immune  Toxo IgG (10/24/22): Negative  Varicella IgG (10/24/22):   Immune HLA Class I (11/2/22): 0%  HLA Class II (11/2/22): 0%              Immunizations:  COVID: 3/21/21, 4/25/21, 12/28/21  Hep A  Hep B  Influenza  Pneumonia  Td: 1/25/14  Zoster       Consultations:  Specialty Date: Results:   Advanced Heart Failure 12/6/22 Update                                              11/9/22  Update                              10/22/22 Briefly Kelby Louis is a 38 y.o. female with ischemic cardiomyopathy, LVEF 15-20%, stage C, NYHA class IIIB initiated on inotropic support; not able to tolerate dobutamine due to tachycardia and switched to milrinone; limited options to slow down heart rate due to QTc > 500ms in the setting of antidepressant use; appears to have HR in better control; initiated inpatient HT/LVAD workup  Concerns regarding HT/LVAD candidacy upon initial review include possible autoimmune disease that requires diagnosis/treatment, psychiatric condition that needs evaluation/treatment and uncertain support system; ongoing evaluation for heart failure etiology (high risk for biopsy and awaiting cMRI)  Length of antibiotic therapy and timing to replace PICC line per ID consult  After hospital discharge patient needs to follow-up with [facility] to evaluate transplant candidacy, ENT for parathyroidectomy, cardiac MRI and sleep medicine, psychiatry, rheumatology   If no recovery of LVEF on 3-month echo, then consider ICD/CRT for non-LBBB QRS 146ms    38 y.o. female with ischemic cardiomyopathy, LVEF 15-20%, stage C, NYHA class IIIB initiated on inotropic support; not able to tolerate dobutamine due to tachycardia and switched to milrinone; limited options to slow down heart rate due to QTc > 500ms in the setting of antidepressant use; appears to have HR in better control; initiated inpatient HT/LVAD workup  Concerns regarding HT/LVAD candidacy upon initial review include possible autoimmune disease that requires diagnosis/treatment, psychiatric condition that needs evaluation/treatment and uncertain support system; ongoing evaluation  Tentative discharge home today with lifevest and milrinone gtt    38 y.o. female with history of HTN, CAD s/p STEMI in July 2022 (DESx1 to LAD; treated at [facility]) with ischemic cardiomyopathy (EF 20-25%), CHF, GERD, RLS, and fibromyalgia seen as a new patient in [facility] on 10/21/22 and found to be fluid overloaded, and with suicidal ideation. Pt was taken to the ER for further evaluation and admission for acute on chronic HF and mental health crisis. Pt contracted for safety, and is being seen by behavioral health/psych  Starting diuresis and will initiate remainder of heart failure work-up while in hospital; work on optimizing GDMT after diuresis complete- may need inotrope assistance. Plan for RHC once close to euvolemia. GDMT limited in the past d/t hypotension   Cardiac surgery 11/4/22 HPI: Patient is a 38 woman admitted for NYHA class IV A/C systolic heart failure, cardiogenic shock, and cardio-renal syndrome. The patient is at imminent risk of death. She is at high risk of needing temporary MCS. She is undergoing LVAD evaluation.   NYHA class IV A/C systolic heart failure / Cardiogenic shock  Reviewed TTE - EF 15%, reasonable RV function, moderate TR  Did not tolerate Dobutamine due to tachycardia  Switched to milrinone  HR better - now in the 's  Dyspnea improving    Can lie flat at this point   NT pro-BNP 2K  Lactic acid 1.2  Continue inotropes  Cardio-renal syndrome   Creatinine 1.3  Continue inotropes  Hepatic congestion  LFTs in 80's  On inotropes    Gastroenterology  N/A   Nutrition 10/28/22 Nutrition Recommendations/Plan:   Continue with 4 Carb Choice, Low Fat/Low Chol/2gm Na+ diet  Will order Ensure Clear and Ensure Enlive daily    Nutrition Assessment:    39 yo female admitted for CHF. Pmhx: gastroparesis, GERd, HTN, MI, CHF. MD consult received for poor PO intakes. Spoke with pt at bedside. She reports poor PO intakes for a week PTA and since admission secondary to altered taste. C/o everything tasting sour, even water. Has not been drinking and states she feels dehydrated. COVID was tested today secondary to c/o losing her taste, result negative. MD has also ordered Zn and B12. Pt states she did eat some ice-cream a few days ago that she thought actually tasted good. Agreeable to try Ensure Enlive and Clear in hopes that she can tolerate sweet ONS without altered taste. Also c/o nausea and constipation. States she received some medicine yesterday and was able to have a BM but today she feels like she is constipated again. Weight hx indicates 16% loss over last 3 months but most of that has been from fluid removal.  Pt is receiving IV Bumex now. Labs reviewed. Noted in chart that pt expressed interest in learning about heart healthy diet. Provided pt with education on CHF diet. Written education materials and RD contact info provided to pt. EF of 15-20%. Palliative care 11/2/22 PLAN:   Pt seen without family present. She is alert and decisional  Inquired about support at home, symptoms, worries, questions.   Pt shared that she is overwhelmed with the state of her health. She is not ready for all of this. Wants all measures to prolong her life. She has received information regarding LVAD and will do whatever she needs to do  She called her father and placed him on speaker. He expressed that visits from our team and the  feel discouraging and worsens his daughter's depression. I acknowledged and explained the respective roles with our intention. Father is her main support. She also has a best friend, neighbors, and other family members. She agreed to complete an AMD and named her father as her agent. She does not want to be prolonged at end of life. Supportive listening provided and reassurance   Dental 49/65/16 \"I certify that:  -This patient had a dental exam with radiographs 12/6/21  -This patient had dental cleaning 12/6/21  -The patient may have active dental infection  This patient's last visit was to address a dental abscess 5/11/22. Pt has not had treatment by our office since. Sravanthi Mancini. Ry DDS\" (See scanned report in media)    Patient was scheduled for re-eval 12/5/22, but cancelled d/t admission. Patient to r/s follow up. As Indicated: Infectious Disease 12/6/22 Update                                      12/4/22 IV Antibiotic Orders   1. Diagnosis:  Line infection, CHF/home milrinone  2. Routine PICC care  3. Antibiotic:  Ceftriaxone 2 grams IV Q 24 hours  4. Lab each Monday:             CBC/diff/platelets             BMP  5. Lab each Thursday:             CBC/diff/platelets             BMP  6. Fax lab to Dr. Denny Grimm @ [phone].  7.  Call Dr. Denny Grimm @ [phone] for WBC under 4 or creatinine over 2  8. Duration of therapy: 12/18/22 and DO NOT REMOVE PICC             Please call Dr. Denny Grimm @ [phone] before stopping therapy. 9.  Allergies: None to current regimen  10.  Call [phone] with name of [address]      Impression:   GNR bacteremia  Probable line infection  PICC out  CHF  Prolonged QT precludes oral antibiotic therapy     Plan:   Stop vancomycin if cultures show no gram positive growth  PICC can be replaced Tuesday or so  Will need 2 weeks total IV antibiotic therapy     Anti-infectives:   Cefepime   Vancomycin    Cardiology 11/7/22 Assessment/Plan:  1. Chronic biventricular dysfunction with LVEF less than 20%  2. Acute NYHA class IV congestive heart failure secondary to #1  3. Elevated/abnormal KITTY in the setting of probable inflammatory arthritis and a probable diagnosis of Sjogren syndrome  4. Lack of acutely elevated inflammatory biomarkers with normal sed rate  5. Inotrope dependent  6. Mitral and tricuspid regurgitation functional in nature at least moderate to severe    Ms. Troy Ruelas is evaluated at the request of Dr. Batool Myles. I was asked to consider endomyocardial biopsy to rule out evidence of any active myocarditis. Her echocardiogram is more consistent with chronic disease with enlargement of both right and left ventricle as well as accompanying moderate to severe mitral and tricuspid regurgitation. Her interventricular septum as well as RV wall thickness is extremely reduced which would significantly increase the risk of mechanical complications from endomyocardial biopsy. In the setting of lack of active inflammatory markers, endomyocardial biopsy is unlikely to . Notes from rheumatology were also reviewed which suggest that there is no role for directed therapy for Sjogren syndrome in reversing underlying heart failure. This was personally discussed with Dr. Batool Myles. At this point of time recommend avoiding EM BX. Nephrology 11/9/22                                        11/3/22 Assessment and Plan    Hypercalcemia :  - from primary hyperparathyroidism  - Parathyroid US : 7 mm R sided adenoma.  Sestamibi scan : no uptake   - ACE level normal, 1.25 Vit D pending  - will benefit from Parathyroidectomy if she is going for LVAD but can also be managed medically for now   - Ca stable on sensipar  - no changes for now  - ok for d/c home   - would send home on sensipar and set up with ENT eval     Positive KITTY  -Being worked up by rheumatology  - possible Sjogren's     Ischemic cardiomyopathy  LVEF 15 to 20%      ASSESSMENT and PLAN :    Hypercalcemia:  -Fairly acute in onset  -PTH is nonsuppressed and raises possibility of a parathyroid related problem  -Normal TSH  -Elevated serum protein and globulin levels warrants exclusion of paraproteinemia  - added PTH RP, serum phosphorus, 25 and one 25-hydroxy vitamin D and 24-hour urinary calcium studies  -She has been off of vitamin D supplementation since admission  -She looks clinically dry on examination and could be from intravascular volume depletion  -Continue with daily labs     Positive KITTY  -Being worked up by rheumatology? Sjogren's     Ischemic cardiomyopathy  LVEF 15 to 20%      Rheumatology 11/7/22 ASSESSMENT  1. Cardiomyopathy / heart failure in the context of a possible immune mediated condition - dry eyes, dry mouth, inflammatory arthralgia, KITTY 1:320, negative APS labs, negative CARSON panel - The cause of her heart disease could be related to sjogrens syndrome however it is rare to have heart block, myocarditis or pericarditis in this disease. She is a poor historian and does not recall any previous symptoms of these conditions. At this point even if we could find an association of sjogrens and heart disease immunosuppression will not help in managing this. It will possibly prevent any further damage. Other causes such as sarcoid, amyloid can be ruled out with a biopsy. I discussed this with Dr Garcia Osborn last week. PLAN  1. Consider cardiac biopsy   2.  No additional labs or imaging from rheumatology perspective   Psychiatry 10/22/22 ASSESSMENT AND PLAN:  Tameka Altamirano meets criteria for a diagnosis of MDD, recurrent, mild to moderate; unspecified anxiety disorder, adjustment disorder with mixed anxiety and depressed mood. -1:1 sitter may be discontinued. Pt agrees to disclose any further thoughts of suicide to staff and agrees to remain safe in the hospital. Pt is not at high risk for suicide at this time; she has no hx of suicide attempts, she has a strong social support network, goals for the future, and is engaging in treatment and actively hoping to recover from her heart condition and participate in treatment and follow cardiology's recommendation. Pt is in agreement with this plan and is at a low risk for suicide at this time. She understands the severity of the statements she  made, and understands the reason the statements were taken so seriously. -Recommend re-starting pt's trazodone 200mg QHS unless there is a specific reason why this is not restarted. Hematology  N/A   Pulmonology   N/A   Endocrinology   N/A   GYN exam 21  HPI:  Sergio Parmar is a 47 y.o. female presenting for well woman exam.   LMP : 2 weeks ago  Length of periods: 7 days  Menstrual flow: heavy in the beginning and then lighten up    Last Herpes outbreak, months ago; takes Valtrex during outbreaks  Sexually active?: one partner, no condoms   Method of family planning: sprintec  Diet: \"not good\"  Exercise: no   Assessment/Plan:      ICD-10-CM ICD-9-CM     1. Well woman exam  Z01.419 V72.31 HEPATITIS C AB         HIV 1/2 AG/AB, 4TH GENERATION,W RFLX CONFIRM         RPR   2. Allergy, subsequent encounter  T78.40XD V58.89 montelukast (SINGULAIR) 10 mg tablet   3. Pap smear for cervical cancer screening  Z12.4 V76.2 PAP IG, CT-NG-TV, APTIMA HPV AND RFX 80/62,75([id number])   4. Mixed incontinence  N39.46 788.33 REFERRAL TO UROGYNECOLOGY     Health maintenance: Pap and STI screening today, next Mammogram 2021 (known lump in left breast), Hep C today  Allergies: with atopic presentation. Stop Claritin, start Singulair and Flonase  Mixed incontinence.  Seen Uro and put on Myrbetric before, wants second opinion. Referral for Dr. Alton Lesches. Urology   N/A   Podiatry   N/A   Ophthalmology   N/A   Sleep medicine Pending Patient cancelled appointments 11/22/22 and 11/29/22; Sleep study scheduled 12/22/22 with follow up with Shawn Blankenship scheduled 12/29/22. Frailty test   N/A   [facility]   N/A   VAD education  Ongoing   Tx education   Deferred      Psychosocial and financial evaluation:  Date: 10/27/22   : Genna Isaacs LCSW   : Insurance coverage: [employer]  Transplant sites in network: [facility], [facility]   Recommendations: Impressions/Barriers:   Barriers: Anxiety and mood disorder with medication management unclear/disrupted by current hospitalization. Support system is also somewhat limited. Pt. Reports recent nicotine use within 6 months and chronic use over 20 years. Support system (father and his significant other) is limited in capacity to physically care for pt. Pt. Does have an interest in learning about transplant, but expresses fear and anxiety when presented with new information. She demonstrates capacity to learn and communicate her questions, needs, and concerns. She has successfully obtained psychiatric care and been adherent to psychiatric medications. However she is unsure if medications have been been effective in managing all symptoms of Bipolar II diagnosis. She has not had any psychiatric hospitalizations, but does have a recent history of S/I during office visit. Pt. Is adjusting to heart failure diagnosis and may benefit from supportive counseling.         Hayes Phipps RN  VAD Coordinator  [facility] [address]  [address] [place] [address], [address]  Phone: [phone]  Fax: [phone]

## 2022-11-09 NOTE — PROGRESS NOTES
11/09/22 1115 11/09/22 1117 11/09/22 1119   RT Walking Oximetry   Stage Resting (Room Air) During Walk (Room Air) During Walk (Room Air)   SpO2 97 % 97 % 97 %   HR 89 bpm 102 bpm 110 bpm  (mild tachy)   Rate of Dyspnea 0 0 0   Symptoms   (no symptoms)   (no symptoms)  --    O2 Device None (Room air) None (Room air) None (Room air)   FIO2 (%) 21 % 21 % 21 %   Distance Walked in Feet (ft)  --   --   --    Comments  --   --   --       11/09/22 1121   RT Walking Oximetry   Stage After Walk   SpO2 97 %    bpm   Rate of Dyspnea 0   Symptoms  --    O2 Device  --    FIO2 (%)  --    Distance Walked in Feet (ft) 539 ft   Comments   (Pt passed six minute walk test.)

## 2022-11-09 NOTE — BSMART NOTE
BSMART Liaison Team Note     LOS:  23     Patient goal(s) for today: communicate needs to staff, continue prescribed medications  BSMART Liaison team focus/goals: assess needs, provide support and encouragement     Progress note:   Pt is received sitting up in bed watching TV. She is alert, oriented, thoughts logical and goal directed. Pt denies SI/HI/AVH. Her affect is bright and she reports feeling \"Good but nervous\". She reports she is discharging today with a cardiac LifeVest and is nervous about going home and what that entails. She states she is ready to get back home, has a good support system, and will have home health checking on her 3x weekly. She states she will be wearing the LifeVest 24/7 and states she is comforted knowing she is being monitored so closely. She reports she will will do everything the doctors ask of her and will make sure she keeps the vest on at all times - except when showering. Pt states she has been practicing her breathing and grounding techniques to manage anxiety and will continue post-discharge. She is future focused with good insight and judgement. According to pt chart, she will receive home demarco services from [facility]. Pt's father will transport her home today. Barriers to Discharge: pt discharging today with cardiac LifeVest  Guns in the home: no      Outpatient provider(s):  Mary Khanna MD  Insurance info/prescription coverage:  [employer] MEDICARE/[employer] MEDICARE [employer]     Diagnosis: MDD, recurrent, mild to moderate; unspecified anxiety disorder, adjustment disorder with mixed anxiety and depressed mood.    Plan:  Pt discharging today with cardiac LifeVest and home health services     Follow up Psych Consult placed? no.   Psychiatrist updated? no        Participating treatment team members: Misael David LSW,

## 2022-11-10 ENCOUNTER — PATIENT OUTREACH (OUTPATIENT)
Dept: CASE MANAGEMENT | Age: 42
End: 2022-11-10

## 2022-11-10 ENCOUNTER — TELEPHONE (OUTPATIENT)
Dept: CARDIOLOGY CLINIC | Age: 42
End: 2022-11-10

## 2022-11-10 NOTE — TELEPHONE ENCOUNTER
Patient is Discharged today and doing well. Will get changed to Hospital follow up on Novemebr 18th.     MD URBAN White & MARSHAL CARMEN [facility]  11/09/22

## 2022-11-10 NOTE — PROGRESS NOTES
Care Transitions Initial Call    Call within 2 business days of discharge: Yes     Patient: Luan Dunbar Patient : 1980 MRN: [id number]    Last Discharge [address]       Date Complaint Diagnosis Description Type Department Provider    10/21/22 Referral / Consult; Mental Health Problem End stage congestive heart failure ([facility] [address] . .. ED to Hosp-Admission (Discharged) (ADMIT) Zahida Reyes MD; Adela Tran, ... Was this an external facility discharge? No   Challenges to be reviewed by the provider   Additional needs identified to be addressed with provider: yes  advance care planning- Does not have an ACP on file  sent ACP information on stiQRdhart     22 04:06   Hep B surface Ab Interp. REACTIVE !   !: Data is abnormal   7/28/10 16:10 20 11:35 10/24/22 06:23 22 06:27 11/3/22 05:09   C REACTIVE PROTEIN, QT Rpt ! Rpt ! Rpt ! Rpt ! Rpt !   !: Data is abnormal  Rpt: View report in Results Review for more information   Latest Reference Range & Units 11/3/22 05:09   Complement C3 82 - 167 mg/dL 220 (H)   (H): Data is abnormally high  Contains abnormal data COMPLEMENT, C4  Order: [id number]  Collected 11/3/2022 05:09    Status: Final result    Next appt: 2022 at 01:00 PM in Cardiology Jeferson Cerda MD)    0 Result Notes  Component 11/3/22 0509    Complement C4 52 High     Comment: (NOTE)   Performed At: [facility] [address] [facility] [id number]   Eduardo Nazario MD [id number]       Contains abnormal data FREE LIGHT CHAINS, KAPPA/LAMBDA, QT  Order: [id number]  Collected 11/3/2022 19:05    Status: Final result    Next appt: 2022 at 01:00 PM in Cardiology Jeferson Cerda MD)    0 Result Notes  Component 11/3/22 1905 10/23/22 0424   Free Kappa Lt Chains, serum 26.9 High   15.7    Kappa/Lambda ratio, serum 1.96 High   1.74 High  CM          22 03:20 22 03:28   ALT 95 (H) 82 (H)   AST 61 (H) 61 (H)   Alk.  phosphatase 149 (H) 142 (H) (H): Data is abnormally high  (L): Data is abnormally low         Method of communication with provider : staff message    Discussed COVID-19 related testing which was available at this time. Test results were negative. Patient informed of results, if available? no     Advance Care Planning:   Does patient have an Advance Directive: decision makers updated, not on file; education provided. Sent [employer] message on how to update on Mychart    Inpatient Readmission Risk score: Unplanned Readmit Risk Score: 21    Was this a readmission? no   Patient stated reason for the admission:     Patients top risk factors for readmission: depression, ineffective coping, lack of knowledge about disease, and medical condition-HF, CAD, HTN,     Interventions to address risk factors: Scheduled appointment with PCP-PCP office Dr Luly Anderson 22 10:20 am, Scheduled appointment with Specialist[facility] 22 at 1pm, and Assessment and support for treatment adherence and medication management-Sleep study 22, Dr Vasile Bates  at 11 am,    Care Transition Nurse (CTN) contacted the patient by telephone to perform post hospital discharge assessment. Verified name and  with patient as identifiers. Provided introduction to self, and explanation of the CTN role. CTN reviewed discharge instructions, medical action plan and red flags with patient who verbalized understanding. Were discharge instructions available to patient? yes. Reviewed appropriate site of care based on symptoms and resources available to patient including: PCP and Specialist. Patient given an opportunity to ask questions and does not have any further questions or concerns at this time. The patient agrees to contact the PCP office for questions related to their healthcare. Medication reconciliation was performed with patient, who verbalizes understanding of administration of home medications.    Referral to Pharm D needed: no     Home Health/Outpatient orders at discharge: home health care and Mary[facility]: [facility]  Date of initial visit: 11/10/22     Medical supplies ordered at discharge:  [address]: Jasper Vergara received: 11/9/22    Was patient discharged with a pulse oximeter? no    Discussed follow-up appointments. If no appointment was previously scheduled, appointment scheduling offered: yes. Is follow up appointment scheduled within 7 days of discharge? yes. Hung [address] Dr follow up appointment(s):   Future Appointments   Date Time Provider Komal Rodriguez   11/17/2022  1:00 PM Houston Rice MD [facility]   11/22/2022  8:00 PM BEDROOM [address] [place] [address] SLEEP LAB    11/25/2022 10:20 AM MD SOPHIE Dial [facility]   11/29/2022 11:00 AM Dre Osuna MD [facility] BS AMB   1/4/2023 10:20 AM DO MEL Maxwell BS AMB     Non-[facility] follow up appointment(s): Patient needs to call and make an appt with [facility] for evaluation on Parathyroid. Plan for follow-up call in 5-7 days based on severity of symptoms and risk factors. CTN provided contact information for future needs. Goals Addressed                   This Visit's Progress     Attends follow-up appointments as directed. 11/11/22   Future Appointments   Date Time Provider Komal Rodriguez   11/17/2022  1:00 PM Houston Rice MD [facility]   11/22/2022  8:00 PM BEDROOM [address] [place] [address] SLEEP LAB    11/25/2022 10:20 [facility]   11/29/2022 11:00 AM Dre Osuna MD [facility] BS AMB   1/4/2023 10:20 AM DO SABA MaxwellSF BS AMB   Patient states she has an appt with Dr Ivon Jett on 11/18 scheduled does not know time yet. Patient is questioning if she needs to F/U with Dr Sang Yung if she is going to [facility]. Patient needs to call and make an appt with ENT as [facility] for evaluation on Parathyroid. Patient father drives her to her appts.   Monitor status of these appt on next call. Kari Linder MSN, RN, CCM / Care Transition Nurse / [phone]          Prevent complications post hospitalization. 11/11/22   Patient denies any SOB, cough, pedal edema, does have some \" abd funny\"  \"queasy\" feeling and some nausea today, this is not new for her. Feels this may also be her anxiety and was trying not to take her valium. Patient is on low sodium diet and fluid restrictions. Patient weight today was 186 lbs. Down from 189 from yesterday, reviewed what she would do if she gained 3 lbs in 1 day or 5 lbs in 1 week-call provider. Patient has her PICC line and was seen by [facility] yesterday and will be receiving weekly PICC line dressing changes. Patient on Milrinone at 0.25 MCG or 21.35 ml via portable pump and is also wearing her Life Vest.  Still adjusting to life vest how she lays when she sleeps and such. Patient is to start Cardiac Rehab with [address] [place] [address], wants to talk to Dr Yuki Hernandez before starting. Patient states she is having trouble paying for the Milrinone it is $19 a day or $570 and that is about 1/2 of her income. Patient states that [facility] is working with her on this issue. Patient does not have an ACP on file, send [place] message about this to patient. Monitor SOB, cough, pedal edema, daily weight, PICC line, abd queasy?, wearing Life Vest?, activity tolerance, PO intake, fluid restriction? Hear anything about Milrinone assistance? Decision about Cardiac Rehab, ACP information sent-looked at?     Kari Linder MSN, RN, CCM / Care Transition Nurse / [phone]

## 2022-11-10 NOTE — TELEPHONE ENCOUNTER
11/10/22 11:28     Called patient to check in since discharge. Left HIPPA compliant message that the call was to check in on patient after discharge and requested call back to [facility]. Provided [facility] call back number. Will await return call. Marek Smiley RN       11/10/22 16:32    Returned patients call using two patient identifiers. Patient states that she is feel good. She notes that she met again with  nurse who reviewed how to change inotrope bag and reviewed the steps. She states that she did not take blood pressure today but did take weight she states weight today was 189.8 lbs and also took in kg which was 84.9 kg. Educated patient to keep daily logs of blood pressures in the morning before medications and two hours after medications. Also, educated patient to keep daily weight logs in the morning after urination around the same time daily using the same scale and using one setting on LBS. Notified patient to bring those logs to clinic. She stated understanding and had no further questions. Marek Smiley RN     Per Autumn Conteh NP no changes at this time.  Marek Smiley RN

## 2022-11-11 ENCOUNTER — TELEPHONE (OUTPATIENT)
Dept: CARDIOLOGY CLINIC | Age: 42
End: 2022-11-11

## 2022-11-11 ENCOUNTER — DOCUMENTATION ONLY (OUTPATIENT)
Dept: CARDIOLOGY CLINIC | Age: 42
End: 2022-11-11

## 2022-11-11 NOTE — TELEPHONE ENCOUNTER
The appointment was for November 25th with Dr. Wendy Dos Santos. With all going on Dr. Florentin Crockett spoke with Pt when she got home and wants her to be seen on next Friday with him. Please call Pt and and schedule.

## 2022-11-14 ENCOUNTER — TELEPHONE (OUTPATIENT)
Dept: CARDIOLOGY CLINIC | Age: 42
End: 2022-11-14

## 2022-11-14 ENCOUNTER — APPOINTMENT (OUTPATIENT)
Dept: GENERAL RADIOLOGY | Age: 42
End: 2022-11-14
Attending: EMERGENCY MEDICINE
Payer: MEDICARE

## 2022-11-14 ENCOUNTER — PATIENT OUTREACH (OUTPATIENT)
Dept: CASE MANAGEMENT | Age: 42
End: 2022-11-14

## 2022-11-14 ENCOUNTER — HOSPITAL ENCOUNTER (EMERGENCY)
Age: 42
Discharge: HOME | End: 2022-11-14
Attending: EMERGENCY MEDICINE
Payer: MEDICARE

## 2022-11-14 VITALS
TEMPERATURE: 98.4 F | DIASTOLIC BLOOD PRESSURE: 87 MMHG | SYSTOLIC BLOOD PRESSURE: 122 MMHG | OXYGEN SATURATION: 99 % | RESPIRATION RATE: 28 BRPM | HEART RATE: 75 BPM

## 2022-11-14 DIAGNOSIS — E83.42 HYPOMAGNESEMIA: ICD-10-CM

## 2022-11-14 DIAGNOSIS — R20.2 NUMBNESS AND TINGLING: Primary | ICD-10-CM

## 2022-11-14 DIAGNOSIS — R29.0 CARPOPEDAL SPASM: ICD-10-CM

## 2022-11-14 DIAGNOSIS — E87.6 HYPOKALEMIA: ICD-10-CM

## 2022-11-14 DIAGNOSIS — R20.0 NUMBNESS AND TINGLING: Primary | ICD-10-CM

## 2022-11-14 LAB
ALBUMIN SERPL-MCNC: 4.1 G/DL (ref 3.5–5)
ALBUMIN/GLOB SERPL: 0.9 {RATIO} (ref 1.1–2.2)
ALP SERPL-CCNC: 156 U/L (ref 45–117)
ALT SERPL-CCNC: 79 U/L (ref 12–78)
ANION GAP SERPL CALC-SCNC: 13 MMOL/L (ref 5–15)
AST SERPL-CCNC: 59 U/L (ref 15–37)
BASOPHILS # BLD: 0 K/UL (ref 0–0.1)
BASOPHILS NFR BLD: 1 % (ref 0–1)
BILIRUB SERPL-MCNC: 1.5 MG/DL (ref 0.2–1)
BNP SERPL-MCNC: 1602 PG/ML
BUN SERPL-MCNC: 12 MG/DL (ref 6–20)
BUN/CREAT SERPL: 10 (ref 12–20)
CALCIUM SERPL-MCNC: 9.6 MG/DL (ref 8.5–10.1)
CHLORIDE SERPL-SCNC: 95 MMOL/L (ref 97–108)
CO2 SERPL-SCNC: 26 MMOL/L (ref 21–32)
CREAT SERPL-MCNC: 1.23 MG/DL (ref 0.55–1.02)
DIFFERENTIAL METHOD BLD: NORMAL
EOSINOPHIL # BLD: 0.1 K/UL (ref 0–0.4)
EOSINOPHIL NFR BLD: 1 % (ref 0–7)
ERYTHROCYTE [DISTWIDTH] IN BLOOD BY AUTOMATED COUNT: 13.2 % (ref 11.5–14.5)
GLOBULIN SER CALC-MCNC: 4.5 G/DL (ref 2–4)
GLUCOSE SERPL-MCNC: 127 MG/DL (ref 65–100)
HCT VFR BLD AUTO: 45 % (ref 35–47)
HGB BLD-MCNC: 14.8 G/DL (ref 11.5–16)
IMM GRANULOCYTES # BLD AUTO: 0 K/UL (ref 0–0.04)
IMM GRANULOCYTES NFR BLD AUTO: 0 % (ref 0–0.5)
LYMPHOCYTES # BLD: 2.4 K/UL (ref 0.8–3.5)
LYMPHOCYTES NFR BLD: 32 % (ref 12–49)
MAGNESIUM SERPL-MCNC: 1.5 MG/DL (ref 1.6–2.4)
MCH RBC QN AUTO: 29.1 PG (ref 26–34)
MCHC RBC AUTO-ENTMCNC: 32.9 G/DL (ref 30–36.5)
MCV RBC AUTO: 88.4 FL (ref 80–99)
MONOCYTES # BLD: 0.6 K/UL (ref 0–1)
MONOCYTES NFR BLD: 9 % (ref 5–13)
NEUTS SEG # BLD: 4.2 K/UL (ref 1.8–8)
NEUTS SEG NFR BLD: 57 % (ref 32–75)
NRBC # BLD: 0 K/UL (ref 0–0.01)
NRBC BLD-RTO: 0 PER 100 WBC
PLATELET # BLD AUTO: 300 K/UL (ref 150–400)
PMV BLD AUTO: 10.7 FL (ref 8.9–12.9)
POTASSIUM SERPL-SCNC: 3.1 MMOL/L (ref 3.5–5.1)
PROT SERPL-MCNC: 8.6 G/DL (ref 6.4–8.2)
RBC # BLD AUTO: 5.09 M/UL (ref 3.8–5.2)
SODIUM SERPL-SCNC: 134 MMOL/L (ref 136–145)
TROPONIN-HIGH SENSITIVITY: 20 NG/L (ref 0–51)
TROPONIN-HIGH SENSITIVITY: 23 NG/L (ref 0–51)
WBC # BLD AUTO: 7.3 K/UL (ref 3.6–11)

## 2022-11-14 PROCEDURE — 85025 COMPLETE CBC W/AUTO DIFF WBC: CPT

## 2022-11-14 PROCEDURE — 84484 ASSAY OF TROPONIN QUANT: CPT

## 2022-11-14 PROCEDURE — 96366 THER/PROPH/DIAG IV INF ADDON: CPT

## 2022-11-14 PROCEDURE — 99285 EMERGENCY DEPT VISIT HI MDM: CPT

## 2022-11-14 PROCEDURE — 71045 X-RAY EXAM CHEST 1 VIEW: CPT

## 2022-11-14 PROCEDURE — 83880 ASSAY OF NATRIURETIC PEPTIDE: CPT

## 2022-11-14 PROCEDURE — 93005 ELECTROCARDIOGRAM TRACING: CPT

## 2022-11-14 PROCEDURE — 36415 COLL VENOUS BLD VENIPUNCTURE: CPT

## 2022-11-14 PROCEDURE — 74011250637 HC RX REV CODE- 250/637: Performed by: EMERGENCY MEDICINE

## 2022-11-14 PROCEDURE — 83735 ASSAY OF MAGNESIUM: CPT

## 2022-11-14 PROCEDURE — 80053 COMPREHEN METABOLIC PANEL: CPT

## 2022-11-14 PROCEDURE — 96365 THER/PROPH/DIAG IV INF INIT: CPT

## 2022-11-14 PROCEDURE — 74011250636 HC RX REV CODE- 250/636: Performed by: EMERGENCY MEDICINE

## 2022-11-14 RX ORDER — POTASSIUM CHLORIDE 750 MG/1
40 TABLET, FILM COATED, EXTENDED RELEASE ORAL
Status: COMPLETED | OUTPATIENT
Start: 2022-11-14 | End: 2022-11-14

## 2022-11-14 RX ORDER — LORAZEPAM 0.5 MG/1
1 TABLET ORAL
Status: COMPLETED | OUTPATIENT
Start: 2022-11-14 | End: 2022-11-14

## 2022-11-14 RX ORDER — POTASSIUM CHLORIDE 750 MG/1
20 TABLET, EXTENDED RELEASE ORAL DAILY
Qty: 14 TABLET | Refills: 0 | Status: SHIPPED | OUTPATIENT
Start: 2022-11-14 | End: 2022-11-15 | Stop reason: SDUPTHER

## 2022-11-14 RX ORDER — MAGNESIUM SULFATE HEPTAHYDRATE 40 MG/ML
2 INJECTION, SOLUTION INTRAVENOUS ONCE
Status: COMPLETED | OUTPATIENT
Start: 2022-11-14 | End: 2022-11-14

## 2022-11-14 RX ADMIN — MAGNESIUM SULFATE HEPTAHYDRATE 2 G: 40 INJECTION, SOLUTION INTRAVENOUS at 17:00

## 2022-11-14 RX ADMIN — LORAZEPAM 1 MG: 0.5 TABLET ORAL at 15:20

## 2022-11-14 RX ADMIN — POTASSIUM CHLORIDE 40 MEQ: 750 TABLET, FILM COATED, EXTENDED RELEASE ORAL at 16:59

## 2022-11-14 NOTE — ED TRIAGE NOTES
Pt arrives c/o tingling in bilateral arms,legs, and tingling around mouth. Pt hyperventilating in triage. Pt reports she spoke with Dr. Nikos Lopez by telephone and was told to go to ED. Pt arrives with Lifevest in place.

## 2022-11-14 NOTE — TELEPHONE ENCOUNTER
I have attempted to contact this patient by phone with the following results: left message to return my call.
(4) walks frequently

## 2022-11-14 NOTE — PROGRESS NOTES
Patient has graduated from the Complex Case Management  program on 11/14/22. Patient currently in a BRIAN post hospitalization. No further Ambulatory Care Manager follow up scheduled. Goals Addressed    None         Patient has Ambulatory Care Manager's contact information for any further questions, concerns, or needs.   Patients upcoming visits:    Future Appointments   Date Time Provider Komal Rodriguez   11/17/2022  1:00 PM Jace Pearce MD [facility] BS AMB   11/18/2022  4:00 [facility]   11/22/2022  8:00 PM BEDROOM [address] SLEEP LAB    11/29/2022 11:00 AM Rosalie Bowling MD [facility] BS AMB   1/4/2023 10:20 AM DO MEL Miller BS AMB

## 2022-11-14 NOTE — ED PROVIDER NOTES
HPI   28-year-old woman with a past medical history of ischemic cardiomyopathy with an EF of 15 to 20%, coronary artery disease, and anxiety/depression who presents to the emergency department due to full body numbness and carpopedal spasms. Shortly after waking up this morning the patient began to feel numb all over her body. She says she feels numb in both of her arms and legs as well as her chest and her lips. She says her breathing heavy secondary to this and then her hands and wrist became clenched. She denies any chest pain. She says she is not short of breath per se but she feels like she is having a panic attack. She denies any weakness in her extremities. She was recently admitted to [facility] for what sounds like an exacerbation of her CHF. She is currently on a continuous milrinone infusion. During that admission she also reported suicidal ideation, but says she is not feeling like that anymore. She denies any headache. No visual changes. No speech deficits. Past Medical History:   Diagnosis Date    Anemia NEC     Arthritis     Chronic pain     fybromyalsia    Depression     anxiety, depression, OCD    GERD (gastroesophageal reflux disease)     Hypertension     Hypertension     Ill-defined condition     Fibromyalia gastricparesis    MI (myocardial infarction) ([facility] [address]     Musculoskeletal disorder     SOB (shortness of breath)     Stool color black        Past Surgical History:   Procedure Laterality Date    HX CORONARY STENT PLACEMENT      HX GYN           HX HEENT  2002    wisdom teeth extraction    UPPER GI ENDOSCOPY,BIOPSY  2018         UPPER GI ENDOSCOPY,DIAGNOSIS  2018              Family History:   Problem Relation Age of Onset    Hypertension Father     Elevated Lipids Father     Arthritis-rheumatoid Mother         ? Lupus vs RA    Lung Disease Mother     Heart Disease Mother     Cancer Mother         breast in 39y    COPD Mother     Hypertension Mother     Stroke Mother         3-4 strokes    Breast Cancer Mother 50    Diabetes Maternal Grandmother        Social History     Socioeconomic History    Marital status: SINGLE     Spouse name: Not on file    Number of children: Not on file    Years of education: Not on file    Highest education level: Not on file   Occupational History    Not on file   Tobacco Use    Smoking status: Former     Packs/day: 0.25     Years: 8.00     Pack years: 2.00     Types: Cigarettes     Quit date: 2022     Years since quittin.3    Smokeless tobacco: Never   Vaping Use    Vaping Use: Never used   Substance and Sexual Activity    Alcohol use: Not Currently     Alcohol/week: 1.0 standard drink     Types: 1 Glasses of wine per week     Comment: Wine with special occassions - not since July    Drug use: Not Currently     Types: Marijuana     Comment: Haven't had any since 2022    Sexual activity: Yes     Partners: Male     Birth control/protection: None   Other Topics Concern    Not on file   Social History Narrative    Not on file     Social Determinants of Health     Financial Resource Strain: High Risk    Difficulty of Paying Living Expenses: Very hard   Food Insecurity: No Food Insecurity    Worried About Running Out of Food in the Last Year: Never true    [address] in the Last Year: Never true   Transportation Needs: No Transportation Needs    Lack of Transportation (Medical): No    Lack of Transportation (Non-Medical): No   Physical Activity: Inactive    Days of Exercise per Week: 0 days    Minutes of Exercise per Session: 0 min   Stress: Stress Concern Present    Feeling of Stress :  To some extent   Social Connections: Socially Isolated    Frequency of Communication with Friends and Family: Twice a week    Frequency of Social Gatherings with Friends and Family: Never    Attends [employer] Services: Never    Active Member of Clubs or Organizations: No    Attends Club or Organization Meetings: Never    Marital Status: Never    Intimate Partner Violence: Not At Risk    Fear of Current or Ex-Partner: No    Emotionally Abused: No    Physically Abused: No    Sexually Abused: No   Housing Stability: Low Risk     Unable to Pay for Housing in the Last Year: No    Number of Places Lived in the Last Year: 1    Unstable Housing in the Last Year: No         ALLERGIES: Abilify [aripiprazole], Sulfa (sulfonamide antibiotics), and Vicodin [hydrocodone-acetaminophen]    Review of Systems  A complete review of systems was performed and all systems reviewed are negative unless otherwise documented in the HPI  Vitals:    11/14/22 1419   Pulse: (!) 122   Resp: 24   Temp: 98 °F (36.7 °C)   SpO2: 99%            Physical Exam  Constitutional:       Comments: Slightly tearful and anxious but nontoxic   HENT:      Mouth/Throat:      Comments: Moist mucous membranes  Eyes:      General: No scleral icterus. Extraocular Movements: Extraocular movements intact. Neck:      Comments: Trachea midline. No JVD  Cardiovascular:      Comments: Tachycardic. Regular rhythm. No murmurs. 2+ radial pulses bilaterally  Pulmonary:      Effort: Pulmonary effort is normal. No respiratory distress. Breath sounds: Normal breath sounds. No wheezing or rales. Abdominal:      General: There is no distension. Palpations: Abdomen is soft. Tenderness: There is no abdominal tenderness. Musculoskeletal:         General: Normal range of motion. Cervical back: Normal range of motion. Right lower leg: No edema. Left lower leg: No edema. Skin:     General: Skin is warm and dry. Neurological:      Comments: Awake and alert. Speech normal.  GCS 15        MDM  46-year-old woman who presents with the above chief complaint. Vitals notable for heart rate of 122 in triage. Apparently she was hyperventilating then but at the time of my evaluation her heart rate was in the low 100s. She is not tachypneic.   She does have appreciable carpopedal spasm in her wrist and her hands. She does not appear volume overloaded. Work-up for electrolyte abnormalities will be performed. She will be given symptomatic treatment. Symptoms do not seem consistent with stroke, ACS or CHF. However if she is not feeling better after symptomatic therapy we may need to broaden out her work-up. ED Course as of 11/14/22 1938   Mon Nov 14, 2022   1632 EKG shows normal sinus rhythm. She has a right bundle branch block. Rate is 93. QTc is 549. No concerning ST elevations or depressions noted. [MM]      ED Course User Index  [MM] Faviola Fay MD   Patient symptoms significantly improved after Ativan. She has slightly hypomagnesemic and hypokalemic and this will be supplemented. The remainder of her work-up is reassuring. Her BNP is slightly more elevated than it was at the time of her discharge but her chest x-ray is clear without pulmonary edema. She even says that she is no longer having any orthopnea anymore. I think she is appropriate for discharge. I will prescribe her some potassium as she says this has been an issue for her lately. She was discharged in stable condition.     Procedures

## 2022-11-15 ENCOUNTER — TELEPHONE (OUTPATIENT)
Dept: CARDIOLOGY CLINIC | Age: 42
End: 2022-11-15

## 2022-11-15 LAB
ATRIAL RATE: 74 BPM
CALCULATED P AXIS, ECG09: 59 DEGREES
CALCULATED R AXIS, ECG10: -90 DEGREES
CALCULATED T AXIS, ECG11: 50 DEGREES
DIAGNOSIS, 93000: NORMAL
P-R INTERVAL, ECG05: 160 MS
Q-T INTERVAL, ECG07: 442 MS
QRS DURATION, ECG06: 164 MS
QTC CALCULATION (BEZET), ECG08: 549 MS
VENTRICULAR RATE, ECG03: 93 BPM

## 2022-11-15 RX ORDER — LANOLIN ALCOHOL/MO/W.PET/CERES
400 CREAM (GRAM) TOPICAL DAILY
Qty: 30 TABLET | Refills: 0 | Status: SHIPPED | OUTPATIENT
Start: 2022-11-15 | End: 2022-11-27

## 2022-11-15 RX ORDER — POTASSIUM CHLORIDE 750 MG/1
20 TABLET, EXTENDED RELEASE ORAL DAILY
Qty: 60 TABLET | Refills: 0 | Status: SHIPPED | OUTPATIENT
Start: 2022-11-15 | End: 2022-11-27

## 2022-11-15 NOTE — TELEPHONE ENCOUNTER
11/15/22 11:41    Called patient using two patient identifiers. Patient states that she presented to ED for evaluation yesterday to due to total body numbness, muscle cramping, and muscle pain. She states that when she was evaluated in ED, the provider told patient that potassium and magnesium was low. She states that she received IV magnesium and 40meq of potassium in ED. She notes she was send home with prescription of potassium 20meq daily for 7 days but did not receive a script for magnesium for home. She also notes that the ED provider said that milrinone could possibly be causing the numbness. Patient states that she is still having bilateral numbness in legs but that muscle cramping has lessened. She notes that she is still also having muscle pain mostly in legs for which she is taking OTC tylenol. She states this is only helping with the pain a little bit and that she is trying to sleep more so she can ignore the pain. She also states that she continues to have nausea cause by chronic GI issues. She notes that she does not eat well balanced meals due to this. She is trying to follow up with GI specialist but it is taking a while to get an appt. Educated patient to follow up with PCP regarding nausea, sour taste in mouth, and other GI symptoms to see which medications they recommend. Patient states he has appt scheduled with PCP on Friday and she will follow up with them then. Patients reports weight today is 183.2 and that this is stable for her. Bp today was 98/79 and that this bp was taken a couple of mins after meds. Patient denies symptoms of sob, swelling, dizziness, or lightheadedness. Patient would like to know how to proceed with symptoms and medications for potassium and magnesium. Notified patient I will send a message to provider and call back with additional information.  Kristi Bosch, Francisco Goldman NP  You 1 hour ago (1:16 PM)     EP  She can have a prescription for Mag Ox 400mg daily, she needs to cut back on her tylenol to protect her liver- she can take 335mg BID for now. Milrinone is not the likely cause of her HF but we will explore this further with her appt on Thursday. Please have her hold her statin if she is taking it and see if this helps. Per Julissa Timmons NP verbal order read back   milrinone not likely cause of numbness  hold (ezetimibe) zetia and (rosuvastatin) crestor    refill potassium 20meq daily  Okay to fill magnesium 400mg daily. Called patient using two patient identifiers. Advised patient on above information per Julissa Timmons NP. Patient stated understanding of instructions and notes that she will  new prescriptions from pharmacy. Educated patient that appt is scheduled with Dr. Kyle Alberts 11/17/22 at 1pm. Patient state she will present to scheduled appt. Patient had no further questions. Anai Evans RN     Requested Prescriptions     Signed Prescriptions Disp Refills    potassium chloride (KLOR-CON M10) 10 mEq tablet 60 Tablet 0     Sig: Take 2 Tablets by mouth daily. Authorizing Provider: Iwona Alexandra     Ordering User: BRYCE Wilkinson    magnesium oxide (MAG-OX) 400 mg tablet 30 Tablet 0     Sig: Take 1 Tablet by mouth daily.      Authorizing Provider: Iwona Alexandra     Ordering User: Kenan Nieves

## 2022-11-15 NOTE — DISCHARGE INSTRUCTIONS
Please follow-up with your heart failure doctor as well as your primary care doctor. While taking the potassium I want you to have your blood work rechecked in 1 week. Return with any new or worsening symptoms.

## 2022-11-16 NOTE — TELEPHONE ENCOUNTER
Father came by to let Dr. Gooden Adjutant know that Pt has been back to the hospital. Please check on her.  thanks

## 2022-11-17 ENCOUNTER — PATIENT OUTREACH (OUTPATIENT)
Dept: CASE MANAGEMENT | Age: 42
End: 2022-11-17

## 2022-11-17 ENCOUNTER — TELEPHONE (OUTPATIENT)
Dept: CARDIOLOGY CLINIC | Age: 42
End: 2022-11-17

## 2022-11-17 ENCOUNTER — OFFICE VISIT (OUTPATIENT)
Dept: CARDIOLOGY CLINIC | Age: 42
End: 2022-11-17
Payer: MEDICARE

## 2022-11-17 VITALS
DIASTOLIC BLOOD PRESSURE: 70 MMHG | HEIGHT: 64 IN | OXYGEN SATURATION: 98 % | HEART RATE: 53 BPM | BODY MASS INDEX: 31.58 KG/M2 | WEIGHT: 185 LBS | TEMPERATURE: 97.7 F | RESPIRATION RATE: 20 BRPM | SYSTOLIC BLOOD PRESSURE: 106 MMHG

## 2022-11-17 DIAGNOSIS — R76.8 POSITIVE ANA (ANTINUCLEAR ANTIBODY): ICD-10-CM

## 2022-11-17 DIAGNOSIS — I50.22 CHRONIC SYSTOLIC (CONGESTIVE) HEART FAILURE (HCC): Primary | ICD-10-CM

## 2022-11-17 DIAGNOSIS — M19.90 INFLAMMATORY ARTHRITIS: ICD-10-CM

## 2022-11-17 DIAGNOSIS — R06.02 SHORTNESS OF BREATH: ICD-10-CM

## 2022-11-17 LAB
Lab: NORMAL
REFERENCE LAB,REFLB: NORMAL
TEST DESCRIPTION:,ATST: NORMAL

## 2022-11-17 PROCEDURE — 94729 DIFFUSING CAPACITY: CPT | Performed by: INTERNAL MEDICINE

## 2022-11-17 PROCEDURE — 99215 OFFICE O/P EST HI 40 MIN: CPT | Performed by: INTERNAL MEDICINE

## 2022-11-17 PROCEDURE — 94618 PULMONARY STRESS TESTING: CPT | Performed by: INTERNAL MEDICINE

## 2022-11-17 PROCEDURE — G8417 CALC BMI ABV UP PARAM F/U: HCPCS | Performed by: INTERNAL MEDICINE

## 2022-11-17 PROCEDURE — 1111F DSCHRG MED/CURRENT MED MERGE: CPT | Performed by: INTERNAL MEDICINE

## 2022-11-17 PROCEDURE — 93000 ELECTROCARDIOGRAM COMPLETE: CPT | Performed by: INTERNAL MEDICINE

## 2022-11-17 PROCEDURE — G8427 DOCREV CUR MEDS BY ELIG CLIN: HCPCS | Performed by: INTERNAL MEDICINE

## 2022-11-17 PROCEDURE — G9717 DOC PT DX DEP/BP F/U NT REQ: HCPCS | Performed by: INTERNAL MEDICINE

## 2022-11-17 RX ORDER — BUMETANIDE 2 MG/1
2 TABLET ORAL DAILY
Qty: 30 TABLET | Refills: 0
Start: 2022-11-17 | End: 2022-11-27

## 2022-11-17 NOTE — TELEPHONE ENCOUNTER
----- Message from Sherine Cummings, RN sent at 11/11/2022 10:45 AM EST -----  Regarding: Milrinone cost assistance  Aurelio Perez,  My name is Brad Aguayo and I am a Care Transition Nurse for patient. She is telling me that her Milrinone is $19 per day and she can't afford this. Patient states that \"someone\" from [facility] is helping her with assistance for this medication. I just wanted to make sure this is true before I start with the process. I was wondering if you know anything about this? Thanks!   Brad Aguayo MSN, RN, [facility] / Care Transition Nurse / [phone]

## 2022-11-17 NOTE — PATIENT INSTRUCTIONS
Medication changes: You may use artificial saliva or chewing gum found over the counter for sjogrens    Maximum amount of Tylenol per day to be taken is 1000 mg (no more than 2 extra strength per day)    Hold bumex for 3 days then resume at once per day, may take an additional tablet as needed to keep your weight at 188 lb    Please take this to your pharmacy to notify them of the change in medications. Testing Ordered:    Labs to be drawn by home health nurse today    Other Recommendations:     A referral to Rheumatology has been placed. You will be contacted for scheduling. Ensure your drinking an adequate amount of water with a goal of 6-8 eight ounce glasses (1.5-2 liters) of fluid daily. Your urine should be clear and light yellow straw colored. If your blood pressure begins to consistently run below 90/60 and/or you begin to experience dizziness or lightheadedness, please contact the [facility] at [phone]. Follow up in one month with NP with [facility]      Please monitor your weights daily upon waking and after using the bathroom. Keep a written records of your weights and bring to your next appointment. If you have a weight gain of 3 or more pounds overnight OR 5 or more pounds in one week please contact our office. Thank you for allowing us the privilege of being a part of your healthcare team! Please do not hesitate to contact our office at [phone] with any questions or concerns. Virtual Heart Failure [address] invites you to learn more about heart failure and to share your questions, ideas, and experiences with others. Each month, the Heart Failure Support Group features a new educational topic and a guest speaker, followed by an interactive discussion. Our Heart Failure Nurse Navigator will moderate each session. You will be able to participate by phone, tablet or computer through [employer].  This support group takes place on the 3rd Thursday of each month from 6:00-7:30PM. All individuals living with heart failure and their caregivers are welcome to join. If you are interested in participating, please contact us at [email or web address] and you will be sent the link to join the [employer].

## 2022-11-17 NOTE — TELEPHONE ENCOUNTER
Called pt. And left VM requesting call back. Plan to call pt. Tomorrow morning to address inotrope cost issue.

## 2022-11-17 NOTE — PROGRESS NOTES
[facility] in [place], [facility] Note    Patient name: Ashley Sims  Patient : 1980  Patient MRN: [id number]  Date of service: 22    Primary care physician: Shantal Power MD  Primary general cardiologist:      Primary [facility] cardiologist: Twila Valle MD    CHIEF COMPLAINT:  Chronic systolic heart failure    PLAN OF CARE:  Briefly Ashley Sims is a 38 y.o. female with ischemic cardiomyopathy, LVEF 15-20%, stage C, NYHA class IIIB initiated on inotropic support; not able to tolerate dobutamine due to tachycardia and switched to milrinone; limited options to slow down heart rate due to QTc > 500ms in the setting of antidepressant use; appears to have HR in better control; initiated inpatient HT/LVAD workup  Concerns regarding HT/LVAD candidacy upon initial review include possible autoimmune disease that requires diagnosis/treatment, psychiatric condition that needs evaluation/treatment and uncertain support system; ongoing evaluation for heart failure etiology (high risk for biopsy and awaiting cMRI)  If no recovery of LVEF on 3-month echo, then consider ICD/CRT for non-LBBB QRS 146ms    RECOMMENDATIONS:  Continue milrinone 0.25mcg/kg/min, dosed to weight 85.4kg  Continue digoxin 0.25mg daily, check digoxin levels   Hold off corlanor due to long QT; likely in the setting of taking antidepressants, d/w Pharm. D. would not use with QTc > 500ms; QTc 539ms 22  Continue toprol XL 12.5mg BID  Cannot tolerate ACEi/ARB/ARNi due to hypotension   Continue spironolactone 25mg daily  Continue jardiance 10mg daily  Hold bumex for 3 days then resume 1mg daily  Hold requip due to tachycardia  Continue crestor  Cont allopurinol 50mg daily   Continue ASA and prasugrel per primary cards  Treatment of STU  Continue lifevest  Schedule cardiac MRI  Use artificial saliva or chewing gum for sjogrens  Dr. Irma Heredia consult appreciated, too high risk for cardiac biopsy  Dr. Jarad Kellogg consult appreciated; no further workup, needs to see rheumatology outpatient   completing psychosocial evaluation (patient lives 1.5 hours away; need to connect with patient's psychiatrist)  Psychiatry consultation for anxiety appreciated for possible wean of antidepressants  Nephrology consult appreciated  Follow-up with Dr. Lori Wild for parathyroidectomy  Nutritionist consult  Heart failure education  Needs Advanced care plan  Return to [facility] in one month with NP     IMPRESSION:  Fatigue  Shortness of breath  Volume overload  Acute on Chronic systolic heart failure  Stage C, NYHA class IV symptoms  ischemic cardiomyopathy, LVEF 20-25%  RBBB, QRS 146ms  CAD  STEMI July 2022 s/p RICKY to LAD  At risk of sudden cardiac death- lifevest ordered   HTN  Obesity  LHD  Pre-diabetes  Esophageal stenosis s/p dilation  RLS  Fibromyalgia  Anxiety and Depression    LIFE GOALS:  Patient's personal goals include: get better  Important upcoming milestones or family events: the holidays coming up  The patient identifies the following as important for living well: being independent     CARDIAC IMAGING:  Echo 10/22/22    Left Ventricle: Severely reduced left ventricular systolic function with a visually estimated EF of 15 - 20%. Left ventricle is mildly dilated. Normal wall thickness. Moderate hypokinesis of the following segments: basal anterior, basal anterolateral, basal anteroseptal, basal inferior, basal inferolateral, basal inferoseptal, mid anterior, mid anterolateral, mid anteroseptal, mid inferior, mid inferolateral and mid inferoseptal. Severe hypokinesis of the following segments: apical anterior, apical septal, apical inferior and apical lateral. Grade III diastolic dysfunction with increased LAP. Mitral Valve: Mild annular dilation. Moderate regurgitation. Tricuspid Valve: Moderately elevated RVSP. The estimated RVSP is 51 mmHg.     Left Atrium: Left atrium is mildly dilated. Pericardium: Trivial pericardial effusion present. No indication of cardiac tamponade. Contrast used: Definity. Echo (8/14/22)    Left Ventricle: Severely reduced left ventricular systolic function with a visually estimated EF of 20 - 25%. Left ventricle is mildly dilated. Increased wall thickness. See diagram for wall motion findings. Grade II diastolic dysfunction with increased LAP. Right Ventricle: Not well visualized. Tricuspid Valve: Moderately elevated RVSP. [address] 10/27/22  PA 56/34/43, RA 16, PCWP 29, CI daisy 1.46 at 199lbs     6MW 11/8/22 164meters       BRIEF HISTORY OF PRESENT ILLNESS:  Satnam Dallas is a 38 y.o. female with h/o HTN, CAD s/p STEMI in July 2022 (DESx1 to LAD; treated at [facility]) with ischemic cardiomyopathy (EF 20-25%), CHF, GERD, RLS, and fibromyalgia seen as a new patient in [facility] on 10/21/22 and found to be fluid overloaded, and with suicidal ideation. Pt was taken to the ER for further evaluation and admission for acute on chronic HF and mental health crisis. Patient was admitted for diuresis, initiation of inotropic support and LVAD/HT workup. INTERVAL HISTORY:  Today, patient presents for routine clinic visit. Patient is doing very well. Patient walked to our clinic from parking garage without having to slow down or stop. Patient can walk more than one block without symptoms of fatigue or shortness of breath or chest pain. Patient can walk one flight of stairs without symptoms of fatigue or shortness of breath or chest pain. Patient can perform home activities without problem and routinely participates in daily walking for more than 15 minutes. Patient denies symptoms of volume overload or leg edema. Patient denies abdominal bloating or change of appetite. Patient's weight remained stable. Patient denies orthopnea, PND or nocturia. Patient denies irregular heart rate or palpitations. No presyncope or syncope.  Mack Tamez has not fired. Patient denies other cardiac symptoms such as chest pain or leg pain with walking. Patient is compliant with fluid restriction and taking medications as prescribed. Patient manages his own medications. PHYSICAL EXAM:  Visit Vitals  /70 (BP 1 Location: Left upper arm, BP Patient Position: Sitting, BP Cuff Size: Large adult)   Pulse (!) 53   Temp 97.7 °F (36.5 °C) (Oral)   Resp 20   Ht 5' 4\" (1.626 m)   Wt 185 lb (83.9 kg)   SpO2 98%   BMI 31.76 kg/m²     General: Patient is well developed, well-nourished in no acute distress  HEENT: Unremarkable   Neck: Supple. No evidence of thyroid enlargements or lymphadenopathy. JVD: Cannot be appreciated   Lungs: Breath sounds are equal and clear bilaterally. No wheezes, rhonchi, or rales. Heart: Regular rate and rhythm with normal S1 and S2. No murmurs, gallops or rubs. Abdomen: Soft, no mass or tenderness. No organomegaly or hernia. Bowel sounds present. Genitourinary and rectal: deferred  Extremities: No cyanosis, clubbing, or edema. Neurologic: No focal sensory or motor deficits are noted. Grossly intact. Psychiatric: Awake, alert an doriented x 3. Appropriate mood and affect. Skin: Warm, dry and well perfused. REVIEW OF SYSTEMS:  General: Denies fever. Ear, nose and throat: Denies difficulty hearing, sinus problems, nosebleeds  Cardiovascular: see above in the interval history  Respiratory: Denies cough, wheezing, sputum production, hemoptysis.   Gastrointestinal: Denies heartburn, constipation, diarrhea, abdominal pain, nausea, blood in stool  Kidney and bladder: Denies painful urination, frequent urination  Musculoskeletal: Denies joint pain, muscle weakness  Skin and hair: Denies change in existing skin lesions    PAST MEDICAL HISTORY:  Past Medical History:   Diagnosis Date    Anemia NEC     Arthritis     Chronic pain     fybromyalsia    Depression     anxiety, depression, OCD    GERD (gastroesophageal reflux disease)     Hypertension Hypertension     Ill-defined condition     Fibromyalia gastricparesis    MI (myocardial infarction) ([facility] [address]     Musculoskeletal disorder     SOB (shortness of breath)     Stool color black        PAST SURGICAL HISTORY:  Past Surgical History:   Procedure Laterality Date    HX CORONARY STENT PLACEMENT      HX GYN       1998    HX HEENT  2002    wisdom teeth extraction    UPPER GI ENDOSCOPY,BIOPSY  2018         UPPER GI ENDOSCOPY,DIAGNOSIS  2018            FAMILY HISTORY:  Family History   Problem Relation Age of Onset    Hypertension Father     Elevated Lipids Father     Arthritis-rheumatoid Mother         ? Lupus vs RA    Lung Disease Mother     Heart Disease Mother     Cancer Mother         breast in 39y    COPD Mother     Hypertension Mother     Stroke Mother         3-4 strokes    Breast Cancer Mother 50    Diabetes Maternal Grandmother        SOCIAL HISTORY:  Social History     Socioeconomic History    Marital status: SINGLE   Tobacco Use    Smoking status: Former     Packs/day: 0.25     Years: 8.00     Pack years: 2.00     Types: Cigarettes     Quit date: 2022     Years since quittin.3    Smokeless tobacco: Never   Vaping Use    Vaping Use: Never used   Substance and Sexual Activity    Alcohol use: Not Currently     Alcohol/week: 1.0 standard drink     Types: 1 Glasses of wine per week     Comment: Wine with special occassions - not since July    Drug use: Not Currently     Types: Marijuana     Comment: Haven't had any since 2022    Sexual activity: Yes     Partners: Male     Birth control/protection: None     Social Determinants of Health     Financial Resource Strain: High Risk    Difficulty of Paying Living Expenses: Very hard   Food Insecurity: No Food Insecurity    Worried About Running Out of Food in the Last Year: Never true    Ran Out of Food in the Last Year: Never true   Transportation Needs: No Transportation Needs    Lack of Transportation (Medical): No    Lack of Transportation (Non-Medical): No   Physical Activity: Inactive    Days of Exercise per Week: 0 days    Minutes of Exercise per Session: 0 min   Stress: Stress Concern Present    Feeling of Stress : To some extent   Social Connections: Socially Isolated    Frequency of Communication with Friends and Family: Twice a week    Frequency of Social Gatherings with Friends and Family: Never    Attends Confucianism Services: Never    Active Member of Clubs or Organizations: No    Attends Club or Organization Meetings: Never    Marital Status: Never    Housing Stability: [address] to Pay for Housing in the Last Year: No    Number of [place] in the Last Year: 1    Unstable Housing in the Last Year: No       LABORATORY RESULTS:  Labs Latest Ref Rng & Units 12/6/2022 12/5/2022 12/4/2022 12/3/2022 12/2/2022 11/26/2022 11/18/2022   WBC 3.6 - 11.0 K/uL 5.1 5.7 5.3 9.8 4.8 8.5 7.4   RBC 3.80 - 5.20 M/uL 3.94 4.07 4.04 4.47 4.69 4.69 4.94   Hemoglobin 11.5 - 16.0 g/dL 11. 1(L) 12.0 11.6 13.0 13.6 13.5 14.1   Hematocrit 35.0 - 47.0 % 34. 4(L) 36.4 35.5 39.8 40.8 41.0 43.8   MCV 80.0 - 99.0 FL 87.3 89.4 87.9 89.0 87.0 87.4 88.7   Platelets 550 - 311 K/uL 187 192 151 167 191 244 269   Lymphocytes 12 - 49 % 37 41 29 6(L) 21 19 35   Monocytes 5 - 13 % 12 12 17(H) 7 10 9 10   Eosinophils 0 - 7 % 4 3 4 0 0 1 2   Basophils 0 - 1 % 1 1 1 0 1 0 1   Albumin 3.5 - 5.0 g/dL 2. 8(L) 2. 8(L) - - 3.5 3.8 4.0   Calcium 8.5 - 10.1 MG/DL 8.7 - 8.5 8. 0(L) 8. 1(L) 8.4(L) 9.5   Glucose 65 - 100 mg/dL 87 - 81 112(H) 96 120(H) 84   BUN 6 - 20 MG/DL 5(L) - 11 10 10 10 19   Creatinine 0.55 - 1.02 MG/DL 0.72 - 0.80 1.02 0.92 1.07(H) 0.92   Sodium 136 - 145 mmol/L 137 - 136 133(L) 130(L) 137 135(L)   Potassium 3.5 - 5.1 mmol/L 4.0 - 4.0 3.4(L) 3.5 3.5 3.5   TSH 0.36 - 3.74 uIU/mL - - - - - - -   LDH 81 - 246 U/L - - - - - - -   Some recent data might be hidden       ALLERGY:  Allergies   Allergen Reactions    Abilify [Aripiprazole] Anxiety     Can not tolerate     Sulfa (Sulfonamide Antibiotics) Hives    Vicodin [Hydrocodone-Acetaminophen] Nausea and Vomiting        CURRENT MEDICATIONS:    Current Outpatient Medications:     cinacalcet (SENSIPAR) 30 mg tablet, Take 1 Tablet by mouth daily (with breakfast) for 90 days. , Disp: 30 Tablet, Rfl: 2    docusate sodium (COLACE) 100 mg capsule, Take 1 Capsule by mouth daily for 90 days. , Disp: 30 Capsule, Rfl: 2    pramipexole (MIRAPEX) 0.125 mg tablet, Take 1 Tablet by mouth nightly for 30 days. , Disp: 30 Tablet, Rfl: 0    empagliflozin (Jardiance) 10 mg tablet, Take 1 Tablet by mouth daily for 60 days. , Disp: 30 Tablet, Rfl: 1    ondansetron (ZOFRAN ODT) 4 mg disintegrating tablet, Take 1 Tablet by mouth every eight (8) hours as needed for Nausea or Vomiting., Disp: 60 Tablet, Rfl: 1    levocetirizine (XYZAL) 5 mg tablet, TAKE 1 TABLET EVERY DAY, Disp: 90 Tablet, Rfl: 1    montelukast (SINGULAIR) 10 mg tablet, TAKE 1 TABLET EVERY DAY, Disp: 90 Tablet, Rfl: 1    diazePAM (VALIUM) 5 mg tablet, 5 mg every eight (8) hours as needed. 5 mg, 3 times daily, Disp: , Rfl:     albuterol-ipratropium (DUO-NEB) 2.5 mg-0.5 mg/3 ml nebu, 3 mL by Nebulization route every four (4) hours as needed. , Disp: , Rfl:     fluticasone propionate (FLONASE) 50 mcg/actuation nasal spray, 2 Sprays by Both Nostrils route daily. , Disp: , Rfl:     ipratropium (ATROVENT) 21 mcg (0.03 %) nasal spray, 1 [place] by Both Nostrils route every twelve (12) hours. (Patient taking differently: 1 Spray by Both Nostrils route as needed.), Disp: 30 mL, Rfl: 0    acetaminophen (TYLENOL) 325 mg tablet, Take  by mouth every four (4) hours as needed for Pain., Disp: , Rfl:     albuterol (PROVENTIL HFA, VENTOLIN HFA, PROAIR HFA) 90 mcg/actuation inhaler, Take  by inhalation as needed. , Disp: , Rfl:     prasugreL (EFFIENT) 10 mg tablet, , Disp: , Rfl:     aspirin delayed-release 81 mg tablet, 81 mg., Disp: , Rfl:     pantoprazole (PROTONIX) 40 mg tablet, TAKE 1 TABLET TWICE DAILY, Disp: 180 Tablet, Rfl: 1    ferrous sulfate 325 mg (65 mg iron) tablet, TAKE 1 TABLET BY MOUTH ONCE DAILY BEFORE BREAKFAST, Disp: 90 Tablet, Rfl: 1    traZODone (DESYREL) 100 mg tablet, Weaning-150 mg at night, Disp: , Rfl:     escitalopram oxalate (LEXAPRO) 20 mg tablet, Take 20 mg by mouth daily. , Disp: , Rfl:     clonazePAM (KLONOPIN) 1 mg tablet, Take 1.5 mg by mouth nightly., Disp: , Rfl:     cholecalciferol, vitamin D3, 50 mcg (2,000 unit) tab, 2,000 Units. , Disp: , Rfl:     milrinone (PRIMACOR) 20 mg/100 mL (200 mcg/mL) infusion, 20.975 mcg/min by IntraVENous route continuous. , Disp: 100 mL, Rfl: 50    miconazole (MICOTIN) 200 mg vaginal suppository, Insert 1 Suppository into vagina nightly for 3 doses. , Disp: 3 Suppository, Rfl: 0    LORazepam (ATIVAN) 1 mg tablet, Take 1 mg by mouth every eight (8) hours as needed. , Disp: , Rfl:     folic acid (FOLVITE) 1 mg tablet, TAKE 1 TABLET EVERY DAY, Disp: 90 Tablet, Rfl: 1    spironolactone (ALDACTONE) 25 mg tablet, Take 1 Tablet by mouth daily. , Disp: 30 Tablet, Rfl: 1    metoprolol succinate (TOPROL-XL) 25 mg XL tablet, Take 0.5 Tablets by mouth every twelve (12) hours. , Disp: 30 Tablet, Rfl: 1    digoxin (LANOXIN) 0.25 mg tablet, Take 1 Tablet by mouth daily. , Disp: 30 Tablet, Rfl: 1    allopurinoL (ZYLOPRIM) 100 mg tablet, Take 0.5 Tablets by mouth daily. , Disp: 15 Tablet, Rfl: 1    bumetanide (BUMEX) 2 mg tablet, Take 0.5 Tablets by mouth daily. May take an additional tablet as needed to keep weight at 188 lb, Disp: 30 Tablet, Rfl: 0    magnesium oxide (MAG-OX) 400 mg tablet, Take 1 Tablet by mouth two (2) times a day., Disp: 60 Tablet, Rfl: 0    potassium chloride (KLOR-CON M10) 10 mEq tablet, Take 2 Tablets by mouth two (2) times a day., Disp: 120 Tablet, Rfl: 0    cyclobenzaprine (FLEXERIL) 10 mg tablet, Take 1 Tablet by mouth three (3) times daily as needed for Muscle Spasm(s).  Has not needed, Disp: 20 Tablet, Rfl: 0    metFORMIN ER (GLUCOPHAGE XR) 500 mg tablet, Take 1 Tablet by mouth daily (with dinner). , Disp: 90 Tablet, Rfl: 1    nitroglycerin (NITROSTAT) 0.4 mg SL tablet, , Disp: , Rfl:   No current facility-administered medications for this visit.     Facility-Administered Medications Ordered in Other Visits:     cefTRIAXone (ROCEPHIN) 2 g in 0.9% sodium chloride 20 mL IV syringe, 2 g, IntraVENous, Q24H, Carlton Moon MD, 2 g at 12/06/22 1611    fluticasone propionate (FLONASE) 50 mcg/actuation nasal spray 2 Spray, 2 Spray, Both Nostrils, DAILY, Jenni Carlson NP, 2 Spray at 12/06/22 1612    cholecalciferol (VITAMIN D3) (1000 Units /25 mcg) tablet 2,000 Units, 2,000 Units, Oral, DAILY, Jenni Carlson, NP, 2,000 Units at 12/06/22 0914    traZODone (DESYREL) tablet 150 mg, 150 mg, Oral, QHS, Jenni Carlson, NP, 150 mg at 12/05/22 2202    [START ON 12/9/2022] traZODone (DESYREL) tablet 100 mg, 100 mg, Oral, QHS, Jenni Carlson, NP    miconazole (MICOTIN) 200 mg vaginal suppository 200 mg, 200 mg, Vaginal, QHS, Fouzia Carlsona GIACOMO, NP    L.acidophilus-paracasei-S.thermophil-bifidobacter (RISAQUAD) 8 billion cell capsule, 1 Capsule, Oral, DAILY, Jenni Carlson NP, 1 Capsule at 12/06/22 0914    milrinone (PRIMACOR) 20 MG/100 ML D5W infusion, 0.25 mcg/kg/min, IntraVENous, CONTINUOUS, Chloe Duane, MD, Last Rate: 6.3 mL/hr at 12/06/22 0706, 0.25 mcg/kg/min at 12/06/22 0706    pramipexole (MIRAPEX) tablet 0.125 mg, 0.125 mg, Oral, QHS, Irma Zaragoza NP, 0.125 mg at 12/05/22 2203    melatonin tablet 3 mg, 3 mg, Oral, QHS PRN, Irma Quintanilla NP, 3 mg at 12/05/22 0009    potassium chloride SR (KLOR-CON 10) tablet 20 mEq, 20 mEq, Oral, DAILY, Gray Menendeznegro, Aide Franco MD, 20 mEq at 12/06/22 0916    sodium chloride (NS) flush 5-40 mL, 5-40 mL, IntraVENous, Q8H, Mariam Jacobs MD, 10 mL at 12/06/22 1618    sodium chloride (NS) flush 5-40 mL, 5-40 mL, IntraVENous, PRN, Mariam Jacobs MD    acetaminophen (TYLENOL) tablet 650 mg, 650 mg, Oral, Q6H PRN, 650 mg at 12/06/22 0405 **OR** acetaminophen (TYLENOL) suppository 650 mg, 650 mg, Rectal, Q6H PRN, Ruth Edmondson MD    ondansetron (ZOFRAN ODT) tablet 4 mg, 4 mg, Oral, Q8H PRN **OR** ondansetron (ZOFRAN) injection 4 mg, 4 mg, IntraVENous, Q6H PRN, Ruth Edmondson MD    enoxaparin (LOVENOX) injection 40 mg, 40 mg, SubCUTAneous, DAILY, Ruth Edmondson MD, 40 mg at 12/06/22 1116    prasugreL (EFFIENT) tablet 10 mg, 10 mg, Oral, DAILY, Ruth Edmondson MD, 10 mg at 12/06/22 0914    LORazepam (ATIVAN) tablet 1 mg, 1 mg, Oral, Q8H PRN, uRth Edmondson MD, 1 mg at 12/06/22 0916    escitalopram oxalate (LEXAPRO) tablet 20 mg, 20 mg, Oral, DAILY, Ruth Edmondson MD, 20 mg at 12/06/22 0916    clonazePAM (KlonoPIN) tablet 1.5 mg, 1.5 mg, Oral, QHS, Ruth Edmondson MD, 1.5 mg at 12/05/22 2202    aspirin delayed-release tablet 81 mg, 81 mg, Oral, DAILY, Ruth Edmondson MD, 81 mg at 12/06/22 0916    albuterol-ipratropium (DUO-NEB) 2.5 MG-0.5 MG/3 ML, 3 mL, Nebulization, Q4H PRN, Ruth Edmondson MD    spironolactone (ALDACTONE) tablet 25 mg, 25 mg, Oral, DAILY, Ruth Edmondson MD, 25 mg at 12/06/22 0916    pantoprazole (PROTONIX) tablet 40 mg, 40 mg, Oral, BID, Ruth Edmondson MD, 40 mg at 12/06/22 0916    [Held by provider] metoprolol succinate (TOPROL-XL) XL tablet 12.5 mg, 12.5 mg, Oral, Q12H, Ruth Edmondson MD    cetirizine (ZYRTEC) tablet 10 mg, 10 mg, Oral, DAILY, Ruth Edmondson MD, 10 mg at 12/06/22 0916    montelukast (SINGULAIR) tablet 10 mg, 10 mg, Oral, QHS, Ruth Edmondson MD, 10 mg at 12/05/22 2203    [Held by provider] empagliflozin (JARDIANCE) tablet 10 mg, 10 mg, Oral, DAILY, Ruth Edmondson MD, 10 mg at 12/05/22 1026    digoxin (LANOXIN) tablet 0.25 mg, 0.25 mg, Oral, DAILY, Ruth Edmondson MD, 0.25 mg at 12/06/22 0916    [Held by provider] bumetanide (BUMEX) tablet 1 mg, 1 mg, Oral, DAILY, Ruth Edmondson MD    allopurinoL (ZYLOPRIM) tablet 50 mg, 50 mg, Oral, DAILY, Kristopher Johnson MD, 50 mg at 12/06/22 4694    Thank you for your referral and allowing me to participate in this patient's care.     Kellen Nicole MD PhD  [address] [place] [address], [address]  Phone: [phone]  Fax: [phone]    PATIENT CARE TEAM:  Patient Care Team:  Erma Saenz MD as PCP - General (Family Medicine)  Erma Saenz MD as PCP - [facility]  Gertrude Ellison MD (Surgery General)  Júnior Parrish MD (Cardiovascular Disease Physician)  Nayeli Dooley MD (Otolaryngology)  Cuate Cohen MD (Internal Medicine Physician)  Ad Alexis MD (Female Pelvic Medicine and Reconstructive Surgery)  Jeremias Mccartney MD (Neurology)  Enid Turpin MD (Psychiatry)  Bhargav Plascencia RN as Care Transitions Nurse     Total visit time:40 minutes  (> 50% spent face-to-face counseling)

## 2022-11-17 NOTE — PROGRESS NOTES
Care Transitions Follow Up Call    Challenges to be reviewed by the provider   Additional needs identified to be addressed with provider: yes  Continues to have nausea and Inder leg cramps  Does not have an ACP on file  Needs Milrinone cost assistance patient states costs $15 per day. Method of communication with provider : staff message    Care Transition Nurse (CTN) contacted the patient by telephone to follow up. Verified name and  with patient as identifiers. Addressed changes since last contact:  Seen in ED   Follow up appointment completed? no.   Was follow up appointment scheduled within 7 days of discharge? no.     Advance Care Planning:   Does patient have an Advance Directive:  currently not on file; education provided     CTN reviewed discharge instructions, medical action plan and red flags with patient and discussed any barriers to care and/or understanding of plan of care after discharge. Discussed appropriate site of care based on symptoms and resources available to patient including: PCP and Specialist. The patient agrees to contact the PCP office for questions related to their healthcare. [address] follow up appointment(s):   Future Appointments   Date Time Provider Komal Rodriguez   2022  1:00 PM Javier Hawkins MD [facility] BS [facility]   2022  4:00 PM [facility]   2022  8:00 PM BEDROOM [address] [place] [address] SLEEP LAB    2022 11:00 AM Edison Mattson MD [facility]   2023 10:20 AM [facility]     Non-[facility] follow up appointment(s): none    CTN provided contact information for future needs. Plan for follow-up call in 3-5 days based on severity of symptoms and risk factors. Goals Addressed                   This Visit's Progress     Attends follow-up appointments as directed.    On track     22   Future Appointments   Date Time Provider Komal Rodriguez   2022  1:00 PM Jaiver Hawkins MD [facility] BS AMB   11/22/2022  8:00 PM [facility]OM [facility]   11/25/2022 10:20 AM Kaleb Morrison MD [facility] BS AMB   11/29/2022 11:00 AM Rory Diaz MD [facility] BS AMB   1/4/2023 10:20 AM Anish Adan DO [facility] BS AMB   Patient states she has an appt with Dr Terrence Pacheco on 11/18 scheduled does not know time yet. Patient is questioning if she needs to F/U with Dr Katherine Arcos if she is [facility]. Patient needs to call and make an appt with ENT as [facility] for evaluation on Parathyroid. Patient father drives her to her appts. Monitor status of these appt on next call. Lew Pittman MSN, RN, CCM / Care Transition Nurse / [phone]     11/17/22   Patient has an appt with Dr Kyle Alberts today at 1 pm.  Patient has an appt with PCP on 11/18 at 4 pm  Patient is still working on appt with GI for nausea and sour taste in mouth. Need to ask if she was in contact with [facility] for ENT appt. Monitor status of thee appt on next call. Lew Pittman MSN, RN, CCM / Care Transition Nurse / [phone]          Prevent complications post hospitalization. Not on track     11/11/22   Patient denies any SOB, cough, pedal edema, does have some \" abd funny\"  \"queasy\" feeling and some nausea today, this is not new for her. Feels this may also be her anxiety and was trying not to take her valium. Patient is on low sodium diet and fluid restrictions. Patient weight today was 186 lbs. Down from 189 from yesterday, reviewed what she would do if she gained 3 lbs in 1 day or 5 lbs in 1 week-call provider. Patient has her PICC line and was seen by [facility] yesterday and will be receiving weekly PICC line dressing changes. Patient on Milrinone at 0.25 MCG or 21.35 ml via portable pump and is also wearing her Life Vest.  Still adjusting to life vest how she lays when she sleeps and such. Patient is to start Cardiac Rehab with [facility], wants to talk to Dr Nina Raygoza before starting.    Patient states she is having trouble paying for the Milrinone it is $19 a day or $570 and that is about 1/2 of her income. Patient states that [facility] is working with her on this issue. Patient does not have an ACP on file, send [employer] message about this to patient. Monitor SOB, cough, pedal edema, daily weight, PICC line, abd queasy?, wearing Life Vest?, activity tolerance, PO intake, fluid restriction? Hear anything about Milrinone assistance? Decision about Cardiac Rehab, ACP information sent-looked at? Brad FAJARDO, RN, CCM / Care Transition Nurse / [phone]       11/17/22   Patient to [facility] ED on 11/14 with nausea and tingling in Inder legs and arms and around mouth. Patient denies SOB or cough, does have nausea, sour taste in mouth and Inder leg cramps that she can't sleep at HS. Weight today was 181.8 lbs   Continues to have numbness in arms and around mouth but not as bad as on 11/14. Patient also C/O slight nose bleed, feels that her nose is dry, encouraged to get some N/S gtts for irritation. Has not heard anything about assistance with Milrinone cost.    Has not looked at [employer] information has not felt well enough to look at it. Monitor SOB, cough, pedal edema, nausea, sour taste in mouth, daily weight, leg cramps, numbness, did she look at ACP information or hear anything about Milrinone assistance? Brad FAJARDO, RN, CCM / Care Transition Nurse / [phone]     Brad FAJARDO, RN, CCM / Care Transition Nurse / [phone]          COMPLETED: Understand CHF action plan. 10/20/22  Patient has an appt at Heart Failure clinic tomorrow, she called to report that she had vomited after a big meal.  She says not in any distress at this time and doesn't feel nauseous anymore. Feels it was just the meal.  Breathing is WNL, patient agrees to keep appt tomorrow. ACM will follow up next week after appt.   TS    10/18/22  Patient has been to the ER 3 times this month for CHF related symptoms, she's still trying to navigate this disease and needs more education and monitoring. She has first appt with the HF clinic 10/21/22. Until then we talked about ways to prevent exacerbation including doing her daily weights, complying to her 1.5L fluid restriction, and reporting increased SOB to MD- patient agreeable to this plan. I sent her some pamphlets through My Chart- HF education and Heart Failure Zones. Will review them with her at next outreach. ACM will follow up in a week.   TS

## 2022-11-18 ENCOUNTER — HOSPITAL ENCOUNTER (EMERGENCY)
Age: 42
Discharge: HOME | End: 2022-11-18
Attending: EMERGENCY MEDICINE
Payer: MEDICARE

## 2022-11-18 ENCOUNTER — TELEPHONE (OUTPATIENT)
Dept: CARDIOLOGY CLINIC | Age: 42
End: 2022-11-18

## 2022-11-18 ENCOUNTER — APPOINTMENT (OUTPATIENT)
Dept: GENERAL RADIOLOGY | Age: 42
End: 2022-11-18
Attending: EMERGENCY MEDICINE
Payer: MEDICARE

## 2022-11-18 VITALS
SYSTOLIC BLOOD PRESSURE: 118 MMHG | HEIGHT: 64 IN | BODY MASS INDEX: 31.58 KG/M2 | DIASTOLIC BLOOD PRESSURE: 79 MMHG | OXYGEN SATURATION: 100 % | WEIGHT: 185 LBS | HEART RATE: 90 BPM | RESPIRATION RATE: 16 BRPM | TEMPERATURE: 98 F

## 2022-11-18 DIAGNOSIS — M54.41 ACUTE BILATERAL LOW BACK PAIN WITH BILATERAL SCIATICA: Primary | ICD-10-CM

## 2022-11-18 DIAGNOSIS — M54.42 ACUTE BILATERAL LOW BACK PAIN WITH BILATERAL SCIATICA: Primary | ICD-10-CM

## 2022-11-18 LAB
ALBUMIN SERPL-MCNC: 4 G/DL (ref 3.5–5)
ALBUMIN/GLOB SERPL: 0.9 {RATIO} (ref 1.1–2.2)
ALP SERPL-CCNC: 129 U/L (ref 45–117)
ALT SERPL-CCNC: 60 U/L (ref 12–78)
ANION GAP SERPL CALC-SCNC: 9 MMOL/L (ref 5–15)
AST SERPL-CCNC: 43 U/L (ref 15–37)
BASOPHILS # BLD: 0 K/UL (ref 0–0.1)
BASOPHILS NFR BLD: 1 % (ref 0–1)
BILIRUB SERPL-MCNC: 1 MG/DL (ref 0.2–1)
BUN SERPL-MCNC: 19 MG/DL (ref 6–20)
BUN/CREAT SERPL: 21 (ref 12–20)
CALCIUM SERPL-MCNC: 9.5 MG/DL (ref 8.5–10.1)
CHLORIDE SERPL-SCNC: 98 MMOL/L (ref 97–108)
CO2 SERPL-SCNC: 28 MMOL/L (ref 21–32)
COMMENT, HOLDF: NORMAL
CREAT SERPL-MCNC: 0.92 MG/DL (ref 0.55–1.02)
DIFFERENTIAL METHOD BLD: NORMAL
EOSINOPHIL # BLD: 0.1 K/UL (ref 0–0.4)
EOSINOPHIL NFR BLD: 2 % (ref 0–7)
ERYTHROCYTE [DISTWIDTH] IN BLOOD BY AUTOMATED COUNT: 13.3 % (ref 11.5–14.5)
GLOBULIN SER CALC-MCNC: 4.3 G/DL (ref 2–4)
GLUCOSE SERPL-MCNC: 84 MG/DL (ref 65–100)
HCT VFR BLD AUTO: 43.8 % (ref 35–47)
HGB BLD-MCNC: 14.1 G/DL (ref 11.5–16)
IMM GRANULOCYTES # BLD AUTO: 0 K/UL (ref 0–0.04)
IMM GRANULOCYTES NFR BLD AUTO: 0 % (ref 0–0.5)
LYMPHOCYTES # BLD: 2.6 K/UL (ref 0.8–3.5)
LYMPHOCYTES NFR BLD: 35 % (ref 12–49)
MAGNESIUM SERPL-MCNC: 2.3 MG/DL (ref 1.6–2.4)
MCH RBC QN AUTO: 28.5 PG (ref 26–34)
MCHC RBC AUTO-ENTMCNC: 32.2 G/DL (ref 30–36.5)
MCV RBC AUTO: 88.7 FL (ref 80–99)
MONOCYTES # BLD: 0.8 K/UL (ref 0–1)
MONOCYTES NFR BLD: 10 % (ref 5–13)
NEUTS SEG # BLD: 3.9 K/UL (ref 1.8–8)
NEUTS SEG NFR BLD: 52 % (ref 32–75)
NRBC # BLD: 0 K/UL (ref 0–0.01)
NRBC BLD-RTO: 0 PER 100 WBC
PLATELET # BLD AUTO: 269 K/UL (ref 150–400)
PMV BLD AUTO: 11.4 FL (ref 8.9–12.9)
POTASSIUM SERPL-SCNC: 3.5 MMOL/L (ref 3.5–5.1)
PROT SERPL-MCNC: 8.3 G/DL (ref 6.4–8.2)
RBC # BLD AUTO: 4.94 M/UL (ref 3.8–5.2)
SAMPLES BEING HELD,HOLD: NORMAL
SODIUM SERPL-SCNC: 135 MMOL/L (ref 136–145)
TROPONIN-HIGH SENSITIVITY: 22 NG/L (ref 0–51)
WBC # BLD AUTO: 7.4 K/UL (ref 3.6–11)

## 2022-11-18 PROCEDURE — 85025 COMPLETE CBC W/AUTO DIFF WBC: CPT

## 2022-11-18 PROCEDURE — 93005 ELECTROCARDIOGRAM TRACING: CPT

## 2022-11-18 PROCEDURE — 83735 ASSAY OF MAGNESIUM: CPT

## 2022-11-18 PROCEDURE — 80053 COMPREHEN METABOLIC PANEL: CPT

## 2022-11-18 PROCEDURE — 96374 THER/PROPH/DIAG INJ IV PUSH: CPT

## 2022-11-18 PROCEDURE — 74011250637 HC RX REV CODE- 250/637: Performed by: EMERGENCY MEDICINE

## 2022-11-18 PROCEDURE — 36415 COLL VENOUS BLD VENIPUNCTURE: CPT

## 2022-11-18 PROCEDURE — 72100 X-RAY EXAM L-S SPINE 2/3 VWS: CPT

## 2022-11-18 PROCEDURE — 84484 ASSAY OF TROPONIN QUANT: CPT

## 2022-11-18 PROCEDURE — 74011250636 HC RX REV CODE- 250/636: Performed by: EMERGENCY MEDICINE

## 2022-11-18 PROCEDURE — 99285 EMERGENCY DEPT VISIT HI MDM: CPT

## 2022-11-18 RX ORDER — CYCLOBENZAPRINE HCL 10 MG
10 TABLET ORAL
Status: COMPLETED | OUTPATIENT
Start: 2022-11-18 | End: 2022-11-18

## 2022-11-18 RX ORDER — POTASSIUM CHLORIDE 750 MG/1
40 TABLET, FILM COATED, EXTENDED RELEASE ORAL
Status: COMPLETED | OUTPATIENT
Start: 2022-11-18 | End: 2022-11-18

## 2022-11-18 RX ORDER — CYCLOBENZAPRINE HCL 10 MG
10 TABLET ORAL
Qty: 20 TABLET | Refills: 0 | Status: SHIPPED | OUTPATIENT
Start: 2022-11-18

## 2022-11-18 RX ORDER — KETOROLAC TROMETHAMINE 30 MG/ML
30 INJECTION, SOLUTION INTRAMUSCULAR; INTRAVENOUS
Status: COMPLETED | OUTPATIENT
Start: 2022-11-18 | End: 2022-11-18

## 2022-11-18 RX ADMIN — POTASSIUM CHLORIDE 40 MEQ: 750 TABLET, FILM COATED, EXTENDED RELEASE ORAL at 16:09

## 2022-11-18 RX ADMIN — CYCLOBENZAPRINE 10 MG: 10 TABLET, FILM COATED ORAL at 16:09

## 2022-11-18 RX ADMIN — KETOROLAC TROMETHAMINE 30 MG: 30 INJECTION, SOLUTION INTRAMUSCULAR at 16:09

## 2022-11-18 NOTE — TELEPHONE ENCOUNTER
Received call from patient requesting to speak with nurse. Stating she is experiencing excruciating leg pain.

## 2022-11-18 NOTE — TELEPHONE ENCOUNTER
Reviewed CHart, appears returned to ER today, severe bilateral leg pains present.     MD URBAN Mckenzie & MARSHAL CARMEN [facility]  11/18/22

## 2022-11-18 NOTE — TELEPHONE ENCOUNTER
Patient called back to notify us that her father is taking her to [facility] because the pain in her legs is \"excruciating\". She would like to get labs completed.

## 2022-11-18 NOTE — ED TRIAGE NOTES
Patient reports bilateral leg numbness began Monday and was seen at [facility] and told her K+ was low at 3.1 and they like her to be 4.4. patient has recent hx of MI, CHF, Life vest. Patient of jude.

## 2022-11-18 NOTE — ED PROVIDER NOTES
Edyta Butterfield is a 37 yo F with bilateral leg pain and tingling to her hands and feet. She was recently admitted to [facility] with MI, CHF and has a life vest and milrinone drip. The pain is in her lower back and radiates down both legs. She had similar symptoms on Monday and went to [facility] and was told that she had low potassium. She suspects the same is occurring today. Past Medical History:   Diagnosis Date    Anemia NEC     Arthritis     Chronic pain     fybromyalsia    Depression     anxiety, depression, OCD    GERD (gastroesophageal reflux disease)     Hypertension     Hypertension     Ill-defined condition     Fibromyalia gastricparesis    MI (myocardial infarction) ([address]     Musculoskeletal disorder     SOB (shortness of breath)     Stool color black        Past Surgical History:   Procedure Laterality Date    HX CORONARY STENT PLACEMENT      HX GYN           HX HEENT  2002    wisdom teeth extraction    UPPER GI ENDOSCOPY,BIOPSY  2018         UPPER GI ENDOSCOPY,DIAGNOSIS  2018              Family History:   Problem Relation Age of Onset    Hypertension Father     Elevated Lipids Father     Arthritis-rheumatoid Mother         ? Lupus vs RA    Lung Disease Mother     Heart Disease Mother     Cancer Mother         breast in 39y    COPD Mother     Hypertension Mother     Stroke Mother         3-4 strokes    Breast Cancer Mother 50    Diabetes Maternal Grandmother        Social History     Socioeconomic History    Marital status: SINGLE     Spouse name: Not on file    Number of children: Not on file    Years of education: Not on file    Highest education level: Not on file   Occupational History    Not on file   Tobacco Use    Smoking status: Former     Packs/day: 0.25     Years: 8.00     Pack years: 2.00     Types: Cigarettes     Quit date: 2022     Years since quittin.3    Smokeless tobacco: Never   Vaping Use    Vaping Use: Never used   Substance and Sexual Activity Alcohol use: Not Currently     Alcohol/week: 1.0 standard drink     Types: 1 Glasses of wine per week     Comment: Wine with special occassions - not since July    Drug use: Not Currently     Types: Marijuana     Comment: Haven't had any since 7/24/2022    Sexual activity: Yes     Partners: Male     Birth control/protection: None   Other Topics Concern    Not on file   Social History Narrative    Not on file     Social Determinants of Health     Financial Resource Strain: High Risk    Difficulty of Paying Living Expenses: Very hard   Food Insecurity: No Food Insecurity    Worried About Running Out of Food in the Last Year: Never true    Ran Out of Food in the Last Year: Never true   Transportation Needs: No Transportation Needs    Lack of Transportation (Medical): No    Lack of Transportation (Non-Medical): No   Physical Activity: Inactive    Days of Exercise per Week: 0 days    Minutes of Exercise per Session: 0 min   Stress: Stress Concern Present    Feeling of Stress : To some extent   Social Connections: Socially Isolated    Frequency of Communication with Friends and Family: Twice a week    Frequency of Social Gatherings with Friends and Family: Never    Attends [employer] Services: Never    Active Member of Clubs or Organizations: No    Attends Club or Organization Meetings: Never    Marital Status: Never    Intimate Partner Violence: Not At Risk    Fear of Current or Ex-Partner: No    Emotionally Abused: No    Physically Abused: No    Sexually Abused: No   Housing Stability: Low Risk     Unable to Pay for Housing in the Last Year: No    Number of [place] in the Last Year: 1    Unstable Housing in the Last Year: No         ALLERGIES: Abilify [aripiprazole], Sulfa (sulfonamide antibiotics), and Vicodin [hydrocodone-acetaminophen]    Review of Systems   Constitutional:  Negative for fever. HENT:  Negative for sore throat. Eyes:  Negative for visual disturbance. Respiratory:  Negative for cough. Cardiovascular:  Negative for chest pain. Gastrointestinal:  Negative for abdominal pain. Genitourinary:  Negative for dysuria. Musculoskeletal:  Positive for back pain. Skin:  Negative for rash. Neurological:  Positive for numbness. Negative for headaches. Vitals:    11/18/22 1448   BP: 105/73   Pulse: (!) 49   Resp: 16   Temp: 98 °F (36.7 °C)   SpO2: 100%   Weight: 83.9 kg (185 lb)   Height: 5' 4\" (1.626 m)            Physical Exam  Vitals and nursing note reviewed. Constitutional:       General: She is not in acute distress. Appearance: She is well-developed. HENT:      Head: Normocephalic and atraumatic. Mouth/Throat:      Mouth: Mucous membranes are moist.   Eyes:      Extraocular Movements: Extraocular movements intact. Conjunctiva/sclera: Conjunctivae normal.   Neck:      Trachea: Phonation normal.   Cardiovascular:      Rate and Rhythm: Normal rate. Pulses:           Dorsalis pedis pulses are 2+ on the right side and 2+ on the left side. Pulmonary:      Effort: Pulmonary effort is normal. No respiratory distress. Breath sounds: No rales. Abdominal:      General: There is no distension. Musculoskeletal:         General: No tenderness. Normal range of motion. Cervical back: Normal range of motion. Skin:     General: Skin is warm and dry. Neurological:      General: No focal deficit present. Mental Status: She is alert. She is not disoriented. Motor: No abnormal muscle tone. MDM           3:55 PM  Patient reassessed and continues to have back pain. Labs reviewed and K 3.5, Mg 2.3.  40 meq KCL PO ordered as well as toradol, flexeril and XR L Spine.     7:02 PM  XR L spine normal.  Will discharge home with prescription for flexeril.     Procedures

## 2022-11-18 NOTE — TELEPHONE ENCOUNTER
Patient called and left vm crying stating her legs and back and hurting terribly. I notified DR. Kishan Pacheco, who advised patient present to ED. I called patient back and she states she ate some mustard and pain is down to a 5/10, she would like to wait until she sees her PCP today, [facility] nurse is also coming at 3:00 pm to draw labs. I advised her that we still recommend she present to ED since pain is still so severe. She declines at this time.

## 2022-11-20 LAB
ATRIAL RATE: 100 BPM
CALCULATED P AXIS, ECG09: 62 DEGREES
CALCULATED R AXIS, ECG10: -96 DEGREES
CALCULATED T AXIS, ECG11: 46 DEGREES
DIAGNOSIS, 93000: NORMAL
P-R INTERVAL, ECG05: 144 MS
Q-T INTERVAL, ECG07: 400 MS
QRS DURATION, ECG06: 152 MS
QTC CALCULATION (BEZET), ECG08: 516 MS
VENTRICULAR RATE, ECG03: 100 BPM

## 2022-11-21 ENCOUNTER — TELEPHONE (OUTPATIENT)
Dept: CARDIOLOGY CLINIC | Age: 42
End: 2022-11-21

## 2022-11-22 ENCOUNTER — PATIENT OUTREACH (OUTPATIENT)
Dept: CASE MANAGEMENT | Age: 42
End: 2022-11-22

## 2022-11-22 ENCOUNTER — TELEPHONE (OUTPATIENT)
Dept: CARDIOLOGY CLINIC | Age: 42
End: 2022-11-22

## 2022-11-22 NOTE — TELEPHONE ENCOUNTER
Contacted [employer] and asked abt. Pt. Assistance for Melrinone. Representative will have the patient payment department contact pt. To complete financial screening for assistance. SW contacted pt. To notify her she may receive a call and inquired if she was able to obtain Medicaid. Pt. Reports she has completed her telephone application and stated she has gathered necessary documents to give to [address] [place] [address] services department this week.

## 2022-11-22 NOTE — PROGRESS NOTES
Care Transitions Follow Up Call    Challenges to be reviewed by the provider   Additional needs identified to be addressed with provider: yes  Might CXL sleep study today due to her facial numbness and tingling. Method of communication with provider : staff message    Care Transition Nurse (CTN) contacted the patient by telephone to follow up . Verified name and  with patient as identifiers. Addressed changes since last contact: To ED 22  Follow up appointment completed? yes. Was follow up appointment scheduled within 7 days of discharge? no.     Advance Care Planning:   Does patient have an Advance Directive:  currently not on file; education provided     CTN reviewed discharge instructions, medical action plan and red flags with patient and discussed any barriers to care and/or understanding of plan of care after discharge. Discussed appropriate site of care based on symptoms and resources available to patient including: PCP and Specialist. The patient agrees to contact the PCP office for questions related to their healthcare. [address] follow up appointment(s):   Future Appointments   Date Time Provider Komal Rodriguez   2022  8:20 AM Terrence Castillo MD [facility]   2022  8:00 PM BEDROOM 2 [address] SLEEP LAB    2022 10:30 AM Chloe Duane, MD [facility] BS [facility]   2022 10:00 AM Florina Jimenez MD [facility]   2023 10:20 AM David OCHOA DO [facility]     Non-[facility] follow up appointment(s):     CTN provided contact information for future needs. Plan for follow-up call in 7-10 days based on severity of symptoms and risk factors. Goals Addressed                   This Visit's Progress     Attends follow-up appointments as directed.    On track     22   Future Appointments   Date Time Provider Komal Rodriguez   2022  1:00 PM Chloe Duane, MD [facility]   2022  8:00 PM BEDROOM 1 [address] SLEEP LAB    2022 10:20 AM Silvia Arnold MD [facility] BS AMB   11/29/2022 11:00 AM [facility]   1/4/2023 10:20 AM DO ASBA LopezS BS AMB   Patient states she has an appt with Dr Karli Devries on 11/18 scheduled does not know time yet. Patient is questioning if she needs to F/U with Dr Yogesh Deleon if she is going to [facility]. Patient needs to call and make an appt with ENT as [facility] for evaluation on Parathyroid. Patient father drives her to her appts. Monitor status of these appt on next call. Jesus Hastings MSN, RN, CCM / Care Transition Nurse / [phone]     11/17/22   Patient has an appt with Dr Joni Palafox today at 1 pm.  Patient has an appt with PCP on 11/18 at 4 pm  Patient is still working on appt with GI for nausea and sour taste in mouth. Need to ask if she was in contact with [facility] for ENT appt. Monitor status of thee appt on next call. Jesus Hastings MSN, RN, CCM / Care Transition Nurse / [phone]     11/22/22   Patient was seen by Dr Joni Palafox as scheduled. Patient did not attend PCP appt was in ED, now has an appt with Dr Sofi Enrique on 12/6/22 scheduled. Patient is scheduled for a sleep study tonight, she does not feel \"comfortable\" going with her face numbness and they were not aware that she has life vest and Milrinone. Attempted to explain to patient that she would be monitored very closely while she had her sleep study, electrodes on head, chest, pulse ox and HR will be monitored. Patient wants to CXL sleep study for tonight and is planning on calling and letting them know. Monitor PCP office appt, on 12/6,  did she reschedule sleep study? Make an appt for [facility] ENT yet? Jesus Hastings MSN, RN, CCM / Care Transition Nurse / [phone]     Patient has not made an appt with ENT yet \"too much going on\". Prevent complications post hospitalization.         11/11/22   Patient denies any SOB, cough, pedal edema, does have some \" abd funny\"  \"queasy\" feeling and some nausea today, this is not new for her. Feels this may also be her anxiety and was trying not to take her valium. Patient is on low sodium diet and fluid restrictions. Patient weight today was 186 lbs. Down from 189 from yesterday, reviewed what she would do if she gained 3 lbs in 1 day or 5 lbs in 1 week-call provider. Patient has her PICC line and was seen by [facility] yesterday and will be receiving weekly PICC line dressing changes. Patient on Milrinone at 0.25 MCG or 21.35 ml via portable pump and is also wearing her Life Vest.  Still adjusting to life vest how she lays when she sleeps and such. Patient is to start Cardiac Rehab with [address] [place] [address], wants to talk to Dr Nicole Bullock before starting. Patient states she is having trouble paying for the Milrinone it is $19 a day or $570 and that is about 1/2 of her income. Patient states that [facility] is working with her on this issue. Patient does not have an ACP on file, send [employer] message about this to patient. Monitor SOB, cough, pedal edema, daily weight, PICC line, abd queasy?, wearing Life Vest?, activity tolerance, PO intake, fluid restriction? Hear anything about Milrinone assistance? Decision about Cardiac Rehab, ACP information sent-looked at? Jeffery Alberto MSN, RN, CCM / Care Transition Nurse / [phone]       11/17/22   Patient to [facility] ED on 11/14 with nausea and tingling in Inder legs and arms and around mouth. Patient denies SOB or cough, does have nausea, sour taste in mouth and Inder leg cramps that she can't sleep at HS. Weight today was 181.8 lbs   Continues to have numbness in arms and around mouth but not as bad as on 11/14. Patient also C/O slight nose bleed, feels that her nose is dry, encouraged to get some N/S gtts for irritation. Has not heard anything about assistance with Milrinone cost.    Has not looked at [address] information has not felt well enough to look at it.    Monitor SOB, cough, pedal edema, nausea, sour taste in mouth, daily weight, leg cramps, numbness, did she look at ACP information or hear anything about Milrinone assistance? Siva Negron MSN, RN, CCM / Care Transition Nurse / [phone]     11/22/22   Patient states she has continued tingling and numbness on face and hands. Seen in ED on 11/18/22 for the same. Patient continues to have nausea and sour taste in mouth, states she has been told she has Sjogrens Syndrome. Continues to have occasional nose. Bleeds and using gttts. Continues to wear her life vest, milrinone via PICC line remains infusing. Patient denies any SOB, cough or pedal edema, weight yesterday was 184.8 lbs. Patient has not looked at ACP information sent on [employer]. Eating weird diet, had yogurt and then cucumbers with vinegar and the baked chicken for supper. Patient states she is to hear back about Milrinone assistance today. Monitor SOB, cough, nose bleed, pedal edema, face numbness and tingling ? Still have sour taste in mouth and nausea?  Did she look at ACP information?, Wearing Life vest?    Siva Negron MSN, RN, [facility] / Care Transition Nurse / [phone]

## 2022-11-23 ENCOUNTER — TELEPHONE (OUTPATIENT)
Dept: CARDIOLOGY CLINIC | Age: 42
End: 2022-11-23

## 2022-11-23 NOTE — TELEPHONE ENCOUNTER
Return call received from [facility] with [address] stating request for clearance not received. She requested it be resent via secure email to [email or web address]. She had no further questions. Request recent this date. Radha Zimmer RN.

## 2022-11-23 NOTE — TELEPHONE ENCOUNTER
[address] [facility] to inquire about status of dental clearance request sent 11/2/22 and resent 11/11/22. Per office staff will need to look into and return call. Call back number provided. Will await return call.

## 2022-11-23 NOTE — TELEPHONE ENCOUNTER
Patient called with report that [facility] was not able to draw blood from PICC line. She is worried that her PICC line is not infusing milrinone. I re-assured her that Milrinone is infusing ok, reinforced drinking fluids. If still cannot draw next week, will order alteplase. She states understanding.

## 2022-11-26 ENCOUNTER — HOSPITAL ENCOUNTER (EMERGENCY)
Age: 42
Discharge: HOME | End: 2022-11-27
Attending: EMERGENCY MEDICINE
Payer: MEDICARE

## 2022-11-26 DIAGNOSIS — R29.0 CARPOPEDAL SPASM: Primary | ICD-10-CM

## 2022-11-26 LAB
ALBUMIN SERPL-MCNC: 3.8 G/DL (ref 3.5–5)
ALBUMIN/GLOB SERPL: 1.2 {RATIO} (ref 1.1–2.2)
ALP SERPL-CCNC: 124 U/L (ref 45–117)
ALT SERPL-CCNC: 53 U/L (ref 12–78)
ANION GAP SERPL CALC-SCNC: 10 MMOL/L (ref 5–15)
AST SERPL-CCNC: 38 U/L (ref 15–37)
BASOPHILS # BLD: 0 K/UL (ref 0–0.1)
BASOPHILS NFR BLD: 0 % (ref 0–1)
BILIRUB SERPL-MCNC: 0.8 MG/DL (ref 0.2–1)
BUN SERPL-MCNC: 10 MG/DL (ref 6–20)
BUN/CREAT SERPL: 9 (ref 12–20)
CALCIUM SERPL-MCNC: 8.4 MG/DL (ref 8.5–10.1)
CHLORIDE SERPL-SCNC: 103 MMOL/L (ref 97–108)
CO2 SERPL-SCNC: 24 MMOL/L (ref 21–32)
COMMENT, HOLDF: NORMAL
CREAT SERPL-MCNC: 1.07 MG/DL (ref 0.55–1.02)
DIFFERENTIAL METHOD BLD: NORMAL
EOSINOPHIL # BLD: 0.1 K/UL (ref 0–0.4)
EOSINOPHIL NFR BLD: 1 % (ref 0–7)
ERYTHROCYTE [DISTWIDTH] IN BLOOD BY AUTOMATED COUNT: 13.9 % (ref 11.5–14.5)
GLOBULIN SER CALC-MCNC: 3.3 G/DL (ref 2–4)
GLUCOSE SERPL-MCNC: 120 MG/DL (ref 65–100)
HCT VFR BLD AUTO: 41 % (ref 35–47)
HGB BLD-MCNC: 13.5 G/DL (ref 11.5–16)
IMM GRANULOCYTES # BLD AUTO: 0 K/UL (ref 0–0.04)
IMM GRANULOCYTES NFR BLD AUTO: 0 % (ref 0–0.5)
LYMPHOCYTES # BLD: 1.6 K/UL (ref 0.8–3.5)
LYMPHOCYTES NFR BLD: 19 % (ref 12–49)
MAGNESIUM SERPL-MCNC: 1.7 MG/DL (ref 1.6–2.4)
MCH RBC QN AUTO: 28.8 PG (ref 26–34)
MCHC RBC AUTO-ENTMCNC: 32.9 G/DL (ref 30–36.5)
MCV RBC AUTO: 87.4 FL (ref 80–99)
MONOCYTES # BLD: 0.8 K/UL (ref 0–1)
MONOCYTES NFR BLD: 9 % (ref 5–13)
NEUTS SEG # BLD: 6 K/UL (ref 1.8–8)
NEUTS SEG NFR BLD: 71 % (ref 32–75)
NRBC # BLD: 0 K/UL (ref 0–0.01)
NRBC BLD-RTO: 0 PER 100 WBC
PLATELET # BLD AUTO: 244 K/UL (ref 150–400)
PMV BLD AUTO: 11.6 FL (ref 8.9–12.9)
POTASSIUM SERPL-SCNC: 3.5 MMOL/L (ref 3.5–5.1)
PROT SERPL-MCNC: 7.1 G/DL (ref 6.4–8.2)
RBC # BLD AUTO: 4.69 M/UL (ref 3.8–5.2)
SAMPLES BEING HELD,HOLD: NORMAL
SODIUM SERPL-SCNC: 137 MMOL/L (ref 136–145)
WBC # BLD AUTO: 8.5 K/UL (ref 3.6–11)

## 2022-11-26 PROCEDURE — 85025 COMPLETE CBC W/AUTO DIFF WBC: CPT

## 2022-11-26 PROCEDURE — 80053 COMPREHEN METABOLIC PANEL: CPT

## 2022-11-26 PROCEDURE — 74011250636 HC RX REV CODE- 250/636: Performed by: EMERGENCY MEDICINE

## 2022-11-26 PROCEDURE — 99284 EMERGENCY DEPT VISIT MOD MDM: CPT

## 2022-11-26 PROCEDURE — 83735 ASSAY OF MAGNESIUM: CPT

## 2022-11-26 PROCEDURE — 96374 THER/PROPH/DIAG INJ IV PUSH: CPT

## 2022-11-26 RX ORDER — LORAZEPAM 2 MG/ML
2 INJECTION INTRAMUSCULAR ONCE
Status: COMPLETED | OUTPATIENT
Start: 2022-11-26 | End: 2022-11-26

## 2022-11-26 RX ADMIN — SODIUM CHLORIDE 1000 ML: 9 INJECTION, SOLUTION INTRAVENOUS at 22:01

## 2022-11-26 RX ADMIN — LORAZEPAM 2 MG: 2 INJECTION INTRAMUSCULAR; INTRAVENOUS at 22:01

## 2022-11-27 ENCOUNTER — TELEPHONE (OUTPATIENT)
Dept: CARDIOLOGY CLINIC | Age: 42
End: 2022-11-27

## 2022-11-27 VITALS
TEMPERATURE: 97.3 F | BODY MASS INDEX: 31.07 KG/M2 | HEART RATE: 104 BPM | HEIGHT: 64 IN | OXYGEN SATURATION: 99 % | DIASTOLIC BLOOD PRESSURE: 82 MMHG | RESPIRATION RATE: 20 BRPM | SYSTOLIC BLOOD PRESSURE: 105 MMHG | WEIGHT: 182 LBS

## 2022-11-27 RX ORDER — BUMETANIDE 2 MG/1
1 TABLET ORAL DAILY
Qty: 30 TABLET | Refills: 0 | Status: ON HOLD | OUTPATIENT
Start: 2022-11-27

## 2022-11-27 RX ORDER — LANOLIN ALCOHOL/MO/W.PET/CERES
400 CREAM (GRAM) TOPICAL 2 TIMES DAILY
Qty: 60 TABLET | Refills: 0 | Status: ON HOLD | OUTPATIENT
Start: 2022-11-27

## 2022-11-27 RX ORDER — POTASSIUM CHLORIDE 750 MG/1
20 TABLET, EXTENDED RELEASE ORAL 2 TIMES DAILY
Qty: 120 TABLET | Refills: 0 | Status: ON HOLD | OUTPATIENT
Start: 2022-11-27

## 2022-11-27 NOTE — ED TRIAGE NOTES
Patient reports a couple of hours of feeling numbness all over and pain in both hands and feet, states \"I think my potassium is low. \" Reports history of congestive heart failure, has continuous infusion of milrinone going into picc line in right arm. Patient in too much pain to tolerate blood pressure being taken. Seen by MD in triage, taken directly to bed 18.

## 2022-11-27 NOTE — ED NOTES
Pt doing much  better and is just about out of the  spasms   in her hands but she did not want me to take her  blood pressure yet. She is afraid it will still hurt.

## 2022-11-27 NOTE — ED NOTES
Pt was low on  SPO2 at 88 % on  RA had a good  pleth going and  his heart rate was right close to each other. I put him on  2 liter which  brought him up to  95 but stays around  94%. MD aware.

## 2022-11-27 NOTE — ED PROVIDER NOTES
27-year-old female history of anxiety, anemia, chronic pain, GERD, hypertension, advanced CHF on outpatient milrinone infusion presents to the emergency department chief complaint of hand cramping. She appears very anxious on exam.  Review of the chart indicates this has happened before with some mild calcium and potassium changes. No focal weakness. Symptoms began a couple of hours prior to arrival.    The history is provided by the patient and medical records. Arm Pain   This is a recurrent problem. The current episode started 1 to 2 hours ago. The problem occurs constantly. Associated symptoms include numbness. There has been no history of extremity trauma. Numbness  Pertinent negatives include no agitation. Pertinent negatives include no shortness of breath, no chest pain, no vomiting, no headaches and no nausea. Past Medical History:   Diagnosis Date    Anemia NEC     Arthritis     Chronic pain     fybromyalsia    Depression     anxiety, depression, OCD    GERD (gastroesophageal reflux disease)     Hypertension     Hypertension     Ill-defined condition     Fibromyalia gastricparesis    MI (myocardial infarction) ([address]     Musculoskeletal disorder     SOB (shortness of breath)     Stool color black        Past Surgical History:   Procedure Laterality Date    HX CORONARY STENT PLACEMENT      HX GYN           HX HEENT  2002    wisdom teeth extraction    UPPER GI ENDOSCOPY,BIOPSY  2018         UPPER GI ENDOSCOPY,DIAGNOSIS  2018              Family History:   Problem Relation Age of Onset    Hypertension Father     Elevated Lipids Father     Arthritis-rheumatoid Mother         ? Lupus vs RA    Lung Disease Mother     Heart Disease Mother     Cancer Mother         breast in 39y    COPD Mother     Hypertension Mother     Stroke Mother         3-4 strokes    Breast Cancer Mother 50    Diabetes Maternal Grandmother        Social History     Socioeconomic History    Marital status: SINGLE     Spouse name: Not on file    Number of children: Not on file    Years of education: Not on file    Highest education level: Not on file   Occupational History    Not on file   Tobacco Use    Smoking status: Former     Packs/day: 0.25     Years: 8.00     Pack years: 2.00     Types: Cigarettes     Quit date: 2022     Years since quittin.3    Smokeless tobacco: Never   Vaping Use    Vaping Use: Never used   Substance and Sexual Activity    Alcohol use: Not Currently     Alcohol/week: 1.0 standard drink     Types: 1 Glasses of wine per week     Comment: Wine with special occassions - not since July    Drug use: Not Currently     Types: Marijuana     Comment: Haven't had any since 2022    Sexual activity: Yes     Partners: Male     Birth control/protection: None   Other Topics Concern    Not on file   Social History Narrative    Not on file     Social Determinants of Health     Financial Resource Strain: High Risk    Difficulty of Paying Living Expenses: Very hard   Food Insecurity: No Food Insecurity    Worried About Running Out of Food in the Last Year: Never true    [address] in the Last Year: Never true   Transportation Needs: No Transportation Needs    Lack of Transportation (Medical): No    Lack of Transportation (Non-Medical): No   Physical Activity: Inactive    Days of Exercise per Week: 0 days    Minutes of Exercise per Session: 0 min   Stress: Stress Concern Present    Feeling of Stress :  To some extent   Social Connections: Socially Isolated    Frequency of Communication with Friends and Family: Twice a week    Frequency of Social Gatherings with Friends and Family: Never    Attends Rastafari Services: Never    Active Member of Clubs or Organizations: No    Attends Club or Organization Meetings: Never    Marital Status: Never    Intimate Partner Violence: Not At Risk    Fear of Current or Ex-Partner: No    Emotionally Abused: No    Physically Abused: No    Sexually Abused: No   Housing Stability: Low Risk     Unable to Pay for Housing in the Last Year: No    Number of Places Lived in the Last Year: 1    Unstable Housing in the Last Year: No         ALLERGIES: Abilify [aripiprazole], Sulfa (sulfonamide antibiotics), and Vicodin [hydrocodone-acetaminophen]    Review of Systems   Constitutional:  Negative for fatigue and fever. HENT:  Negative for sneezing and sore throat. Respiratory:  Negative for cough and shortness of breath. Cardiovascular:  Negative for chest pain and leg swelling. Gastrointestinal:  Negative for abdominal pain, diarrhea, nausea and vomiting. Genitourinary:  Negative for difficulty urinating and dysuria. Musculoskeletal:  Negative for arthralgias and myalgias. Skin:  Negative for color change and rash. Neurological:  Positive for numbness. Negative for weakness and headaches. Psychiatric/Behavioral:  Negative for agitation and behavioral problems. Vitals:    11/26/22 2100   Pulse: (!) 113   Resp: 18   Temp: 97.3 °F (36.3 °C)   SpO2: 98%   Weight: 82.6 kg (182 lb)   Height: 5' 4\" (1.626 m)            Physical Exam  Vitals and nursing note reviewed. Constitutional:       General: She is not in acute distress. Appearance: Normal appearance. She is well-developed. She is not ill-appearing, toxic-appearing or diaphoretic. HENT:      Head: Normocephalic and atraumatic. Nose: Nose normal.      Mouth/Throat:      Mouth: Mucous membranes are moist.      Pharynx: Oropharynx is clear. Eyes:      Extraocular Movements: Extraocular movements intact. Conjunctiva/sclera: Conjunctivae normal.      Pupils: Pupils are equal, round, and reactive to light. Cardiovascular:      Rate and Rhythm: Normal rate and regular rhythm. Pulses: Normal pulses. Heart sounds: Normal heart sounds. Pulmonary:      Effort: Pulmonary effort is normal. No respiratory distress. Breath sounds: Normal breath sounds. No wheezing.    Chest: Chest wall: No tenderness. Abdominal:      General: Abdomen is flat. There is no distension. Palpations: Abdomen is soft. Tenderness: There is no abdominal tenderness. There is no guarding or rebound. Musculoskeletal:         General: No swelling, tenderness, deformity or signs of injury. Normal range of motion. Cervical back: Normal range of motion and neck supple. No rigidity. No muscular tenderness. Right lower leg: No edema. Left lower leg: No edema. Comments: Bilateral carpal pedal spasms   Skin:     General: Skin is warm and dry. Capillary Refill: Capillary refill takes less than 2 seconds. Neurological:      General: No focal deficit present. Mental Status: She is alert and oriented to person, place, and time. Psychiatric:         Mood and Affect: Mood normal.         Behavior: Behavior normal.        MDM  Number of Diagnoses or Management Options  Carpopedal spasm  Diagnosis management comments: 46-year-old female presents as above with complaint of carpopedal spasms. Labs are reassuring. Her potassium and magnesium are slightly low for her target given her cardiac disease, but should not be causing her symptoms at this level. Suspect anxiety/hyperventilation are playing a significant role. Will discharge with instructions to follow-up with primary care, return if needed.        Amount and/or Complexity of Data Reviewed  Clinical lab tests: reviewed  Decide to obtain previous medical records or to obtain history from someone other than the patient: yes           Procedures

## 2022-11-27 NOTE — ED NOTES
Pt doing much  better and is  ready to go home. She is talking to MD now about her test results  and family  had questions.

## 2022-11-27 NOTE — ED NOTES
Hugo Anders blood from  left hand and  sent to lab. Used a butterfly 23 pt tolerated well. She is doing much  better. Given  ice bag at her  request to help with hand spasms.

## 2022-11-27 NOTE — ED NOTES
Pt was very up set that she was told this was a panic attack when it happened a week ago it was potassium being low. She is still having numbness all over particularly in her hands and  arms. She felt like she was not ready to go home. She is ambulatory at time of d/c. Going home with  her father.

## 2022-11-27 NOTE — TELEPHONE ENCOUNTER
Pt paged d/t complaints of muscle cramps and weight loss, concerned about her Mg and K+ and that she may be losing too much fluid. Pt was seen in ER at [facility] yesterday for same, labs with K 3.5 and Mg 1.7. Pt's weight yesterday was 181lbs, has not weighed today yet. Advised pt to increase KCl to 20mEq BID, increase Mg Ox to 400mg BID, decrease bumex to 1mg daily. Will ask home health to get repeat BMP, NT Pro and Mg at visit tomorrow. Pt notes that they have been unable to get blood return from PICC- can send order for TPA to line.

## 2022-11-28 ENCOUNTER — PATIENT OUTREACH (OUTPATIENT)
Dept: CASE MANAGEMENT | Age: 42
End: 2022-11-28

## 2022-11-28 ENCOUNTER — TELEPHONE (OUTPATIENT)
Dept: CARDIOLOGY CLINIC | Age: 42
End: 2022-11-28

## 2022-11-28 DIAGNOSIS — E21.3 HYPERPARATHYROIDISM (HCC): ICD-10-CM

## 2022-11-28 DIAGNOSIS — E53.8 LOW FOLATE: ICD-10-CM

## 2022-11-28 DIAGNOSIS — I50.22 CHRONIC SYSTOLIC (CONGESTIVE) HEART FAILURE (HCC): Primary | ICD-10-CM

## 2022-11-28 RX ORDER — SPIRONOLACTONE 25 MG/1
25 TABLET ORAL DAILY
Qty: 30 TABLET | Refills: 1 | Status: ON HOLD | OUTPATIENT
Start: 2022-11-28

## 2022-11-28 RX ORDER — METOPROLOL SUCCINATE 25 MG/1
12.5 TABLET, EXTENDED RELEASE ORAL EVERY 12 HOURS
Qty: 30 TABLET | Refills: 1 | Status: ON HOLD | OUTPATIENT
Start: 2022-11-28

## 2022-11-28 RX ORDER — ALLOPURINOL 100 MG/1
50 TABLET ORAL DAILY
Qty: 15 TABLET | Refills: 1 | Status: ON HOLD | OUTPATIENT
Start: 2022-11-28

## 2022-11-28 RX ORDER — FOLIC ACID 1 MG/1
TABLET ORAL
Qty: 90 TABLET | Refills: 1 | Status: ON HOLD | OUTPATIENT
Start: 2022-11-28

## 2022-11-28 RX ORDER — DIGOXIN 250 MCG
0.25 TABLET ORAL DAILY
Qty: 30 TABLET | Refills: 1 | Status: ON HOLD | OUTPATIENT
Start: 2022-11-28

## 2022-11-28 NOTE — TELEPHONE ENCOUNTER
Patient called to report ED visit from Saturday evening, she followed up with Shashank Vela on Saturday, but she is concerned regarding her potassium and magnesium and numbness, thinking it is due to her medication. She does not think it was due to a panic attack and she also thinks she needs labs to be drawn more frequently. Please review notes and advise. I called [facility] and requested per HELDER Bosch:  Lets get the labs from [facility] today and see if the K and Mag are improved. They state understanding. I notified patient. Patient states HH has not been changing dressing every week and that they have not been able to draw back on PICC line. I spoke with Tawana at [facility], she states that [facility] nurse will change dressing today. She is not sure regarding biopatch (not on dressing right now)-I will check with [facility]. I will also call [facility] regarding Alteplase. Per Merissa Torres, pharmacist at [facility], they will send Alteplase, they also state the Biopatch is included with dressing supplies in separate bag. I called Tawana at [facility] back to notify her of both.

## 2022-11-29 NOTE — PROGRESS NOTES
Goals Addressed                   This Visit's Progress     Attends follow-up appointments as directed. 11/11/22   Future Appointments   Date Time Provider Komal Silvestrei   11/17/2022  1:00 PM Marcela Scherer MD [facility] BS AMB   11/22/2022  8:00 PM BEDROOM 1 [address] SLEEP LAB    11/25/2022 10:20 AM Bobby Tomlinson MD [facility] BS AMB   11/29/2022 11:00 AM Cyrus Willis MD [facility] BS AMB   1/4/2023 10:20 AM Mela Louise DO [facility] BS AMB   Patient states she has an appt with Dr Leroy Guzmán on 11/18 scheduled does not know time yet. Patient is questioning if she needs to F/U with Dr Jennifer Cabrera if she is going to [facility]. Patient needs to call and make an appt with ENT as [facility] for evaluation on Parathyroid. Patient father drives her to her appts. Monitor status of these appt on next call. Edie Fry MSN, RN, CCM / Care Transition Nurse / [phone]     11/17/22   Patient has an appt with Dr Heydi Finley today at 1 pm.  Patient has an appt with PCP on 11/18 at 4 pm  Patient is still working on appt with GI for nausea and sour taste in mouth. Need to ask if she was in contact with [facility] for ENT appt. Monitor status of thee appt on next call. Edie FAJARDO, RN, CCM / Care Transition Nurse / [phone]     11/22/22   Patient was seen by Dr Heydi Finley as scheduled. Patient did not attend PCP appt was in ED, now has an appt with Dr Brock Kumar on 12/6/22 scheduled. Patient is scheduled for a sleep study tonight, she does not feel \"comfortable\" going with her face numbness and they were not aware that she has life vest and Milrinone. Attempted to explain to patient that she would be monitored very closely while she had her sleep study, electrodes on head, chest, pulse ox and HR will be monitored. Patient wants to CXL sleep study for tonight and is planning on calling and letting them know. Monitor PCP office appt, on 12/6,  did she reschedule sleep study? Make an appt for [facility] ENT yet?     Gerald Castillo Aaron FAJARDO, RN, CCM / Care Transition Nurse / [phone]     Patient has not made an appt with ENT yet \"too much going on\". 11/28/22  Care Transitions Outreach Attempt-TCB. Eleni Drew MSN, RN, CCM / Care Transition Nurse / [phone]           Prevent complications post hospitalization. 11/11/22   Patient denies any SOB, cough, pedal edema, does have some \" abd funny\"  \"queasy\" feeling and some nausea today, this is not new for her. Feels this may also be her anxiety and was trying not to take her valium. Patient is on low sodium diet and fluid restrictions. Patient weight today was 186 lbs. Down from 189 from yesterday, reviewed what she would do if she gained 3 lbs in 1 day or 5 lbs in 1 week-call provider. Patient has her PICC line and was seen by [facility] yesterday and will be receiving weekly PICC line dressing changes. Patient on Milrinone at 0.25 MCG or 21.35 ml via portable pump and is also wearing her Life Vest.  Still adjusting to life vest how she lays when she sleeps and such. Patient is to start Cardiac Rehab with [address] [place] [address], wants to talk to Dr Leslie Alan before starting. Patient states she is having trouble paying for the Milrinone it is $19 a day or $570 and that is about 1/2 of her income. Patient states that [facility] is working with her on this issue. Patient does not have an ACP on file, send [employer] message about this to patient. Monitor SOB, cough, pedal edema, daily weight, PICC line, abd queasy?, wearing Life Vest?, activity tolerance, PO intake, fluid restriction? Hear anything about Milrinone assistance? Decision about Cardiac Rehab, ACP information sent-looked at? Eleni FAJARDO, RN, CCM / Care Transition Nurse / [phone]       11/17/22   Patient to [facility] ED on 11/14 with nausea and tingling in In[place] legs and arms and around mouth. Patient denies SOB or cough, does have nausea, sour taste in mouth and Inder leg cramps that she can't sleep at HS. Weight today was 181.8 lbs   Continues to have numbness in arms and around mouth but not as bad as on 11/14. Patient also C/O slight nose bleed, feels that her nose is dry, encouraged to get some N/S gtts for irritation. Has not heard anything about assistance with Milrinone cost.    Has not looked at [address] information has not felt well enough to look at it. Monitor SOB, cough, pedal edema, nausea, sour taste in mouth, daily weight, leg cramps, numbness, did she look at ACP information or hear anything about Milrinone assistance? Eelni Drew MSN, RN, CCM / Care Transition Nurse / [phone]     11/22/22   Patient states she has continued tingling and numbness on face and hands. Seen in ED on 11/18/22 for the same. Patient continues to have nausea and sour taste in mouth, states she has been told she has Sjogrens Syndrome. Continues to have occasional nose. Bleeds and using gttts. Continues to wear her life vest, milrinone via PICC line remains infusing. Patient denies any SOB, cough or pedal edema, weight yesterday was 184.8 lbs. Patient has not looked at ACP information sent on [facility]. Eating weird diet, had yogurt and then cucumbers with vinegar and the baked chicken for supper. Patient states she is to hear back about Milrinone assistance today. Monitor SOB, cough, nose bleed, pedal edema, face numbness and tingling ? Still have sour taste in mouth and nausea? Did she look at ACP information?, Wearing Life vest?    Eleni Drew MSN, RN, CCM / Care Transition Nurse / [phone]     11/28/22   Had incoming message from patient letting me know that she was in the ED Sat for being :numb all over: Attempted to contact patient, LMV, see that she is to have repeat labs drawn from [facility] today.   Eleni Drew MSN, RN, CCM / Care Transition Nurse / [phone]

## 2022-11-30 ENCOUNTER — TELEPHONE (OUTPATIENT)
Dept: CARDIOLOGY CLINIC | Age: 42
End: 2022-11-30

## 2022-11-30 NOTE — TELEPHONE ENCOUNTER
Return call received from patient. Discussed need for dental eval and PFT scheduling. Number to coordination of care provided. Patient stated she will call to schedule and will also reach out to Dr. Jamey Parnell to make appointment. Requested she notify [facility] coordinator of appointment details so records may be requested. Patient verbalized understanding and had no further questions. Radha Zimmer RN.

## 2022-11-30 NOTE — TELEPHONE ENCOUNTER
Dental letter received from Dr. Teja Guzman DDS which stated \"the patient may have an active dental infection. This patient's last visit was to address a dental abscess 5/11/22. Pt has not had treatment by our office since,\" (See scanned report in media). PFT also to be scheduled. Attempted to contact patient to discuss. Message left. Will await return call. Magy Luna RN.

## 2022-12-01 ENCOUNTER — PATIENT OUTREACH (OUTPATIENT)
Dept: CASE MANAGEMENT | Age: 42
End: 2022-12-01

## 2022-12-01 ENCOUNTER — TELEPHONE (OUTPATIENT)
Dept: CARDIOLOGY CLINIC | Age: 42
End: 2022-12-01

## 2022-12-01 RX ORDER — LORAZEPAM 1 MG/1
1 TABLET ORAL
COMMUNITY
Start: 2022-11-30

## 2022-12-01 NOTE — TELEPHONE ENCOUNTER
Return call received from patient reporting she is scheduled to see her dentist, Dr. Yamini Berrios, 12/5/22 at 1400 and PFT scheduled 12/9/22. Informed her will request dental records following appointment and await PFT results. She verbalized understanding and had no further questions. Wei Salgado RN.

## 2022-12-01 NOTE — PROGRESS NOTES
Care Transitions Follow Up Call    Challenges to be reviewed by the provider   Additional needs identified to be addressed with provider: no  none           Method of communication with provider : none    Care Transition Nurse (CTN) contacted the patient by telephone to follow up. Verified name and  with patient as identifiers. Addressed changes since last contact: none  Follow up appointment completed? no.   Was follow up appointment scheduled within 7 days of discharge? no.     Advance Care Planning:   Does patient have an Advance Directive:  currently not on file; education provided     CTN reviewed discharge instructions, medical action plan and red flags with patient and discussed any barriers to care and/or understanding of plan of care after discharge. Discussed appropriate site of care based on symptoms and resources available to patient including: PCP and Specialist. The patient agrees to contact the PCP office for questions related to their healthcare. [facility] follow up appointment(s):   Future Appointments   Date Time Provider Komal Rodriguez   2022  8:20 AM Kavitha Henson MD [facility] BS AMB   2022  1:00 PM PULMONARY LAB [facility]   2022  8:00 PM BEDROOM 2 [facility] 75 LAB    2022 10:30 AM Lynn Magallanes MD [facility] BS AMB   2022 10:00 AM Demar Field MD [facility] HSP BS AMB   2023 10:20 AM Merlin OCHOA DO [facility] BS AMB     Non-[facility] follow up appointment(s): [facility] ENT 23    CTN provided contact information for future needs. Plan for follow-up call in 7-10 days based on severity of symptoms and risk factors. Goals Addressed                   This Visit's Progress     Attends follow-up appointments as directed.    On track     22   Future Appointments   Date Time Provider Komal Rodriguez   2022  1:00 PM Lynn Magallanes MD [facility] BS AMB   2022  8:00 PM BEDROOM 1 [address] SLEEP LAB    2022 10:20 AM [facility]   11/29/2022 11:00 AM Keon Jackson MD [facility] HSPTL BS AMB   1/4/2023 10:20 AM Pam Britton DO [facility] BS AMB   Patient states she has an appt with Dr Leal Speaker on 11/18 scheduled does not know time yet. Patient is questioning if she needs to F/U with Dr Angy Willingham if she is going to [facility]. Patient needs to call and make an appt with ENT as [facility] for evaluation on Parathyroid. Patient father drives her to her appts. Monitor status of these appt on next call. Christelle Tapia MSN, RN, CCM / Care Transition Nurse / [phone]     11/17/22   Patient has an appt with Dr Katherine Dove today at 1 pm.  Patient has an appt with PCP on 11/18 at 4 pm  Patient is still working on appt with GI for nausea and sour taste in mouth. Need to ask if she was in contact with [facility] for ENT appt. Monitor status of thee appt on next call. Christelle Tapia MSN, RN, CCM / Care Transition Nurse / [phone]     11/22/22   Patient was seen by Dr Katherine Dove as scheduled. Patient did not attend PCP appt was in ED, now has an appt with Dr Vianey Marie on 12/6/22 scheduled. Patient is scheduled for a sleep study tonight, she does not feel \"comfortable\" going with her face numbness and they were not aware that she has life vest and Milrinone. Attempted to explain to patient that she would be monitored very closely while she had her sleep study, electrodes on head, chest, pulse ox and HR will be monitored. Patient wants to CXL sleep study for tonight and is planning on calling and letting them know. Monitor PCP office appt, on 12/6,  did she reschedule sleep study? Make an appt for [facility] ENT yet? Christelle FAJARDO, RN, CCM / Care Transition Nurse / [phone]     Patient has not made an appt with ENT yet \"too much going on\". 11/28/22  Care Transitions Outreach Attempt-LMTCB. Christelle Tapia MSN, RN, CCM / Care Transition Nurse / [phone]      12/1/22  Patient has an appt with Dr Vianey Marie on 12/6/22.    Patient has an appt with Sleep provider on 12/9  Patient has an appt with ENT on 1/5/23. Monitor status of these appt on next call. Curtis Saenz MSN, RN, CCM / Care Transition Nurse / [phone]          Prevent complications post hospitalization. On track     11/11/22   Patient denies any SOB, cough, pedal edema, does have some \" abd funny\"  \"queasy\" feeling and some nausea today, this is not new for her. Feels this may also be her anxiety and was trying not to take her valium. Patient is on low sodium diet and fluid restrictions. Patient weight today was 186 lbs. Down from 189 from yesterday, reviewed what she would do if she gained 3 lbs in 1 day or 5 lbs in 1 week-call provider. Patient has her PICC line and was seen by [facility] yesterday and will be receiving weekly PICC line dressing changes. Patient on Milrinone at 0.25 MCG or 21.35 ml via portable pump and is also wearing her Life Vest.  Still adjusting to life vest how she lays when she sleeps and such. Patient is to start Cardiac Rehab with [address] [place] [address], wants to talk to Dr Veda Cartwright before starting. Patient states she is having trouble paying for the Milrinone it is $19 a day or $570 and that is about 1/2 of her income. Patient states that [facility] is working with her on this issue. Patient does not have an ACP on file, send [employer] message about this to patient. Monitor SOB, cough, pedal edema, daily weight, PICC line, abd queasy?, wearing Life Vest?, activity tolerance, PO intake, fluid restriction? Hear anything about Milrinone assistance? Decision about Cardiac Rehab, ACP information sent-looked at? Curtis FAJARDO, RN, CCM / Care Transition Nurse / [phone]       11/17/22   Patient to [facility] ED on 11/14 with nausea and tingling in Inder legs and arms and around mouth. Patient denies SOB or cough, does have nausea, sour taste in mouth and Inder leg cramps that she can't sleep at HS.  Weight today was 181.8 lbs   Continues to have numbness in arms and around mouth but not as bad as on 11/14. Patient also C/O slight nose bleed, feels that her nose is dry, encouraged to get some N/S gtts for irritation. Has not heard anything about assistance with Milrinone cost.    Has not looked at [employer] [address] information has not felt well enough to look at it. Monitor SOB, cough, pedal edema, nausea, sour taste in mouth, daily weight, leg cramps, numbness, did she look at ACP information or hear anything about Milrinone assistance? Ludmila Gutierrez MSN, RN, CCM / Care Transition Nurse / [phone]     11/22/22   Patient states she has continued tingling and numbness on face and hands. Seen in ED on 11/18/22 for the same. Patient continues to have nausea and sour taste in mouth, states she has been told she has Sjogrens Syndrome. Continues to have occasional nose. Bleeds and using gttts. Continues to wear her life vest, milrinone via PICC line remains infusing. Patient denies any SOB, cough or pedal edema, weight yesterday was 184.8 lbs. Patient has not looked at ACP information sent on [employer]. Eating weird diet, had yogurt and then cucumbers with vinegar and the baked chicken for supper. Patient states she is to hear back about Milrinone assistance today. Monitor SOB, cough, nose bleed, pedal edema, face numbness and tingling ? Still have sour taste in mouth and nausea? Did she look at ACP information?, Wearing Life vest?    Ludmila FAJARDO, RN, CCM / Care Transition Nurse / [phone]     11/28/22   Had incoming message from patient letting me know that she was in the ED Sat for being :numb all over: Attempted to contact patient, LMV, see that she is to have repeat labs drawn from [facility] today. Ludmila FAJARDO, RN, CCM / Care Transition Nurse / [phone]     12/01/22   Patient states yesterday she was starting to get H/A and took her muscle relaxer and her H/A did not come then.     Denies SOB, C/P, pedal edema. Continues to have decreased appetite and her food \" doesn't taste right\". Having some issues with [facility], waiting for them to put clot buster in her 1 side of port and then they were not coming weekly like they were suppose to for PICC line dressing changes. Cannot see [employer]IT message that I sent gives her error 150? Will send letter saying same thing. Monitor SOB, C/P, cough, pedal edema, daily weight, PICC line, port unclogged? Still have bad taste in mouth and decreased appetite?       Wende Duane MSN, RN, CCM / Care Transition Nurse / [phone]

## 2022-12-01 NOTE — TELEPHONE ENCOUNTER
Patient called to report that her [facility] nurse told her that she would not receive Alteplase today because she had not received an order and also \"because it would take an hour to give and she didn't have that much time\". Patient left 2 voicemails stating she was very anxious and upset. I called Tawana, supervisor at [employer] she stated \"if patient does not like this company, she can just switch\". I explained to Tawana that patient has a lot of anxiety and I am trying to understanding the sequence of events and if Alteplase will be infused. She explained that [facility] nurse had to call Brandon Bhatti for order but now has it and will infuse it today if she cannot get blood return on PICC. I asked that in future, Mojgan Cook contact [facility] for communication, which may decrease anxiety on patient. I then called patient, explained process, and provided emotional support. She is still concerned about body cramping, I reviewed her latest lab results, ensured her that we will continue to monitor.   I updated her latest medications from her psychiatrist.

## 2022-12-02 ENCOUNTER — TELEPHONE (OUTPATIENT)
Dept: CARDIOLOGY CLINIC | Age: 42
End: 2022-12-02

## 2022-12-02 ENCOUNTER — APPOINTMENT (OUTPATIENT)
Dept: GENERAL RADIOLOGY | Age: 42
DRG: 314 | End: 2022-12-02
Attending: FAMILY MEDICINE
Payer: MEDICARE

## 2022-12-02 ENCOUNTER — HOSPITAL ENCOUNTER (INPATIENT)
Age: 42
LOS: 4 days | Discharge: HOME HEALTH | DRG: 314 | End: 2022-12-06
Attending: EMERGENCY MEDICINE | Admitting: FAMILY MEDICINE
Payer: MEDICARE

## 2022-12-02 DIAGNOSIS — R50.9 ACUTE FEBRILE ILLNESS: Primary | ICD-10-CM

## 2022-12-02 DIAGNOSIS — T80.219A PICC LINE INFECTION, INITIAL ENCOUNTER: ICD-10-CM

## 2022-12-02 DIAGNOSIS — I50.22 SYSTOLIC CHF, CHRONIC (HCC): ICD-10-CM

## 2022-12-02 DIAGNOSIS — Z79.899 RECEIVING INOTROPIC MEDICATION: ICD-10-CM

## 2022-12-02 LAB
ALBUMIN SERPL-MCNC: 3.5 G/DL (ref 3.5–5)
ALBUMIN/GLOB SERPL: 0.8 {RATIO} (ref 1.1–2.2)
ALP SERPL-CCNC: 128 U/L (ref 45–117)
ALT SERPL-CCNC: 92 U/L (ref 12–78)
ANION GAP SERPL CALC-SCNC: 7 MMOL/L (ref 5–15)
AST SERPL-CCNC: 91 U/L (ref 15–37)
BASOPHILS # BLD: 0 K/UL (ref 0–0.1)
BASOPHILS NFR BLD: 1 % (ref 0–1)
BILIRUB SERPL-MCNC: 1.4 MG/DL (ref 0.2–1)
BUN SERPL-MCNC: 10 MG/DL (ref 6–20)
BUN/CREAT SERPL: 11 (ref 12–20)
CALCIUM SERPL-MCNC: 8.1 MG/DL (ref 8.5–10.1)
CHLORIDE SERPL-SCNC: 97 MMOL/L (ref 97–108)
CO2 SERPL-SCNC: 26 MMOL/L (ref 21–32)
COMMENT, HOLDF: NORMAL
CREAT SERPL-MCNC: 0.92 MG/DL (ref 0.55–1.02)
DIFFERENTIAL METHOD BLD: NORMAL
EOSINOPHIL # BLD: 0 K/UL (ref 0–0.4)
EOSINOPHIL NFR BLD: 0 % (ref 0–7)
ERYTHROCYTE [DISTWIDTH] IN BLOOD BY AUTOMATED COUNT: 14.5 % (ref 11.5–14.5)
FLUAV AG NPH QL IA: NEGATIVE
FLUBV AG NOSE QL IA: NEGATIVE
GLOBULIN SER CALC-MCNC: 4.6 G/DL (ref 2–4)
GLUCOSE SERPL-MCNC: 96 MG/DL (ref 65–100)
HCT VFR BLD AUTO: 40.8 % (ref 35–47)
HGB BLD-MCNC: 13.6 G/DL (ref 11.5–16)
IMM GRANULOCYTES # BLD AUTO: 0 K/UL (ref 0–0.04)
IMM GRANULOCYTES NFR BLD AUTO: 0 % (ref 0–0.5)
LYMPHOCYTES # BLD: 1 K/UL (ref 0.8–3.5)
LYMPHOCYTES NFR BLD: 21 % (ref 12–49)
MCH RBC QN AUTO: 29 PG (ref 26–34)
MCHC RBC AUTO-ENTMCNC: 33.3 G/DL (ref 30–36.5)
MCV RBC AUTO: 87 FL (ref 80–99)
MONOCYTES # BLD: 0.5 K/UL (ref 0–1)
MONOCYTES NFR BLD: 10 % (ref 5–13)
NEUTS SEG # BLD: 3.2 K/UL (ref 1.8–8)
NEUTS SEG NFR BLD: 68 % (ref 32–75)
NRBC # BLD: 0 K/UL (ref 0–0.01)
NRBC BLD-RTO: 0 PER 100 WBC
PLATELET # BLD AUTO: 191 K/UL (ref 150–400)
PMV BLD AUTO: 11.7 FL (ref 8.9–12.9)
POTASSIUM SERPL-SCNC: 3.5 MMOL/L (ref 3.5–5.1)
PROCALCITONIN SERPL-MCNC: 1.66 NG/ML
PROT SERPL-MCNC: 8.1 G/DL (ref 6.4–8.2)
RBC # BLD AUTO: 4.69 M/UL (ref 3.8–5.2)
SAMPLES BEING HELD,HOLD: NORMAL
SODIUM SERPL-SCNC: 130 MMOL/L (ref 136–145)
TROPONIN-HIGH SENSITIVITY: 22 NG/L (ref 0–51)
WBC # BLD AUTO: 4.8 K/UL (ref 3.6–11)

## 2022-12-02 PROCEDURE — 65660000001 HC RM ICU INTERMED STEPDOWN

## 2022-12-02 PROCEDURE — 84145 PROCALCITONIN (PCT): CPT

## 2022-12-02 PROCEDURE — 93005 ELECTROCARDIOGRAM TRACING: CPT

## 2022-12-02 PROCEDURE — 36415 COLL VENOUS BLD VENIPUNCTURE: CPT

## 2022-12-02 PROCEDURE — 87804 INFLUENZA ASSAY W/OPTIC: CPT

## 2022-12-02 PROCEDURE — 80053 COMPREHEN METABOLIC PANEL: CPT

## 2022-12-02 PROCEDURE — 99285 EMERGENCY DEPT VISIT HI MDM: CPT

## 2022-12-02 PROCEDURE — 87040 BLOOD CULTURE FOR BACTERIA: CPT

## 2022-12-02 PROCEDURE — 74011000250 HC RX REV CODE- 250: Performed by: EMERGENCY MEDICINE

## 2022-12-02 PROCEDURE — 87150 DNA/RNA AMPLIFIED PROBE: CPT

## 2022-12-02 PROCEDURE — 71045 X-RAY EXAM CHEST 1 VIEW: CPT

## 2022-12-02 PROCEDURE — 87077 CULTURE AEROBIC IDENTIFY: CPT

## 2022-12-02 PROCEDURE — 74011250637 HC RX REV CODE- 250/637: Performed by: EMERGENCY MEDICINE

## 2022-12-02 PROCEDURE — 87186 SC STD MICRODIL/AGAR DIL: CPT

## 2022-12-02 PROCEDURE — 74011000250 HC RX REV CODE- 250: Performed by: FAMILY MEDICINE

## 2022-12-02 PROCEDURE — 84484 ASSAY OF TROPONIN QUANT: CPT

## 2022-12-02 PROCEDURE — 74011250636 HC RX REV CODE- 250/636: Performed by: EMERGENCY MEDICINE

## 2022-12-02 PROCEDURE — 85025 COMPLETE CBC W/AUTO DIFF WBC: CPT

## 2022-12-02 PROCEDURE — 74011250636 HC RX REV CODE- 250/636: Performed by: FAMILY MEDICINE

## 2022-12-02 RX ORDER — ONDANSETRON 2 MG/ML
4 INJECTION INTRAMUSCULAR; INTRAVENOUS
Status: DISCONTINUED | OUTPATIENT
Start: 2022-12-02 | End: 2022-12-06 | Stop reason: HOSPADM

## 2022-12-02 RX ORDER — CLONAZEPAM 1 MG/1
1.5 TABLET ORAL
Status: DISCONTINUED | OUTPATIENT
Start: 2022-12-02 | End: 2022-12-06 | Stop reason: HOSPADM

## 2022-12-02 RX ORDER — ASPIRIN 81 MG/1
81 TABLET ORAL DAILY
Status: DISCONTINUED | OUTPATIENT
Start: 2022-12-03 | End: 2022-12-06 | Stop reason: HOSPADM

## 2022-12-02 RX ORDER — ACETAMINOPHEN 325 MG/1
650 TABLET ORAL
Status: DISCONTINUED | OUTPATIENT
Start: 2022-12-02 | End: 2022-12-06 | Stop reason: HOSPADM

## 2022-12-02 RX ORDER — MILRINONE LACTATE 0.2 MG/ML
0.25 INJECTION, SOLUTION INTRAVENOUS CONTINUOUS
Status: DISCONTINUED | OUTPATIENT
Start: 2022-12-02 | End: 2022-12-04

## 2022-12-02 RX ORDER — LORAZEPAM 1 MG/1
1 TABLET ORAL
Status: DISCONTINUED | OUTPATIENT
Start: 2022-12-02 | End: 2022-12-06 | Stop reason: HOSPADM

## 2022-12-02 RX ORDER — CETIRIZINE HYDROCHLORIDE 10 MG/1
10 TABLET ORAL DAILY
Status: DISCONTINUED | OUTPATIENT
Start: 2022-12-03 | End: 2022-12-06 | Stop reason: HOSPADM

## 2022-12-02 RX ORDER — SODIUM CHLORIDE 0.9 % (FLUSH) 0.9 %
5-40 SYRINGE (ML) INJECTION EVERY 8 HOURS
Status: DISCONTINUED | OUTPATIENT
Start: 2022-12-02 | End: 2022-12-06 | Stop reason: HOSPADM

## 2022-12-02 RX ORDER — DIGOXIN 125 MCG
0.25 TABLET ORAL DAILY
Status: DISCONTINUED | OUTPATIENT
Start: 2022-12-03 | End: 2022-12-06 | Stop reason: HOSPADM

## 2022-12-02 RX ORDER — SODIUM CHLORIDE 0.9 % (FLUSH) 0.9 %
5-40 SYRINGE (ML) INJECTION AS NEEDED
Status: DISCONTINUED | OUTPATIENT
Start: 2022-12-02 | End: 2022-12-06 | Stop reason: HOSPADM

## 2022-12-02 RX ORDER — ONDANSETRON 4 MG/1
4 TABLET, ORALLY DISINTEGRATING ORAL
Status: DISCONTINUED | OUTPATIENT
Start: 2022-12-02 | End: 2022-12-06 | Stop reason: HOSPADM

## 2022-12-02 RX ORDER — ESCITALOPRAM OXALATE 10 MG/1
20 TABLET ORAL DAILY
Status: DISCONTINUED | OUTPATIENT
Start: 2022-12-03 | End: 2022-12-06 | Stop reason: HOSPADM

## 2022-12-02 RX ORDER — FOLIC ACID 1 MG/1
1 TABLET ORAL DAILY
Status: DISCONTINUED | OUTPATIENT
Start: 2022-12-03 | End: 2022-12-02

## 2022-12-02 RX ORDER — ENOXAPARIN SODIUM 100 MG/ML
40 INJECTION SUBCUTANEOUS DAILY
Status: DISCONTINUED | OUTPATIENT
Start: 2022-12-03 | End: 2022-12-06 | Stop reason: HOSPADM

## 2022-12-02 RX ORDER — IPRATROPIUM BROMIDE AND ALBUTEROL SULFATE 2.5; .5 MG/3ML; MG/3ML
3 SOLUTION RESPIRATORY (INHALATION)
Status: DISCONTINUED | OUTPATIENT
Start: 2022-12-02 | End: 2022-12-06 | Stop reason: HOSPADM

## 2022-12-02 RX ORDER — ACETAMINOPHEN 500 MG
1000 TABLET ORAL
Status: COMPLETED | OUTPATIENT
Start: 2022-12-02 | End: 2022-12-02

## 2022-12-02 RX ORDER — PANTOPRAZOLE SODIUM 40 MG/1
40 TABLET, DELAYED RELEASE ORAL 2 TIMES DAILY
Status: DISCONTINUED | OUTPATIENT
Start: 2022-12-03 | End: 2022-12-06 | Stop reason: HOSPADM

## 2022-12-02 RX ORDER — VANCOMYCIN 2 GRAM/500 ML IN 0.9 % SODIUM CHLORIDE INTRAVENOUS
2000 ONCE
Status: COMPLETED | OUTPATIENT
Start: 2022-12-02 | End: 2022-12-03

## 2022-12-02 RX ORDER — PRASUGREL 10 MG/1
10 TABLET, FILM COATED ORAL DAILY
Status: DISCONTINUED | OUTPATIENT
Start: 2022-12-03 | End: 2022-12-06 | Stop reason: HOSPADM

## 2022-12-02 RX ORDER — ALLOPURINOL 100 MG/1
50 TABLET ORAL DAILY
Status: DISCONTINUED | OUTPATIENT
Start: 2022-12-03 | End: 2022-12-06 | Stop reason: HOSPADM

## 2022-12-02 RX ORDER — ACETAMINOPHEN 650 MG/1
650 SUPPOSITORY RECTAL
Status: DISCONTINUED | OUTPATIENT
Start: 2022-12-02 | End: 2022-12-06 | Stop reason: HOSPADM

## 2022-12-02 RX ORDER — CINACALCET 30 MG/1
30 TABLET, FILM COATED ORAL
Status: DISCONTINUED | OUTPATIENT
Start: 2022-12-03 | End: 2022-12-02

## 2022-12-02 RX ORDER — LANOLIN ALCOHOL/MO/W.PET/CERES
1 CREAM (GRAM) TOPICAL
Status: DISCONTINUED | OUTPATIENT
Start: 2022-12-03 | End: 2022-12-02

## 2022-12-02 RX ORDER — BUMETANIDE 1 MG/1
1 TABLET ORAL DAILY
Status: DISCONTINUED | OUTPATIENT
Start: 2022-12-03 | End: 2022-12-06 | Stop reason: HOSPADM

## 2022-12-02 RX ORDER — MONTELUKAST SODIUM 10 MG/1
10 TABLET ORAL
Status: DISCONTINUED | OUTPATIENT
Start: 2022-12-02 | End: 2022-12-06 | Stop reason: HOSPADM

## 2022-12-02 RX ORDER — SPIRONOLACTONE 25 MG/1
25 TABLET ORAL DAILY
Status: DISCONTINUED | OUTPATIENT
Start: 2022-12-03 | End: 2022-12-06 | Stop reason: HOSPADM

## 2022-12-02 RX ORDER — METOPROLOL SUCCINATE 25 MG/1
12.5 TABLET, EXTENDED RELEASE ORAL EVERY 12 HOURS
Status: DISCONTINUED | OUTPATIENT
Start: 2022-12-02 | End: 2022-12-06 | Stop reason: HOSPADM

## 2022-12-02 RX ADMIN — ACETAMINOPHEN 1000 MG: 500 TABLET ORAL at 21:26

## 2022-12-02 RX ADMIN — SODIUM CHLORIDE, PRESERVATIVE FREE 10 ML: 5 INJECTION INTRAVENOUS at 22:04

## 2022-12-02 RX ADMIN — CEFEPIME 2 G: 2 INJECTION, POWDER, FOR SOLUTION INTRAVENOUS at 22:03

## 2022-12-02 RX ADMIN — MILRINONE LACTATE IN DEXTROSE 0.25 MCG/KG/MIN: 200 INJECTION, SOLUTION INTRAVENOUS at 23:16

## 2022-12-02 RX ADMIN — VANCOMYCIN HYDROCHLORIDE 2000 MG: 10 INJECTION, POWDER, LYOPHILIZED, FOR SOLUTION INTRAVENOUS at 22:26

## 2022-12-02 NOTE — TELEPHONE ENCOUNTER
Patient called with complaint of temp of 100.7, associated with headache. I notified Derrick Gamble, who advises patient present to ED for further evaluation, as patient has reported some pain at PICC site this week and delay in dressing changes by [address]. I returned call to patient, she states temp is down to 97.1 without use of tylenol, but she does report having chills last night and headache now, as well as pain at PICC site. I advised her to report to ED for chills or fever, she states she will go now, as she \"will worry all weekend\".

## 2022-12-02 NOTE — ED TRIAGE NOTES
Patient arrives to ED complaining of chills starting last night, reports temperature of 100.4 this morning. Patient has PICC line with milrinone gtt infusing and lifevest in place.

## 2022-12-03 ENCOUNTER — TELEPHONE (OUTPATIENT)
Dept: CARDIOLOGY CLINIC | Age: 42
End: 2022-12-03

## 2022-12-03 LAB
ACC. NO. FROM MICRO ORDER, ACCP: ABNORMAL
ACINETOBACTER CALCOACETICUS-BAUMANII COMPLEX, ACBCX: NOT DETECTED
ANION GAP SERPL CALC-SCNC: 7 MMOL/L (ref 5–15)
APPEARANCE UR: CLEAR
ATRIAL RATE: 98 BPM
BACTEROIDES FRAGILIS, BFRA: NOT DETECTED
BASOPHILS # BLD: 0 K/UL (ref 0–0.1)
BASOPHILS NFR BLD: 0 % (ref 0–1)
BILIRUB UR QL: NEGATIVE
BIOFIRE COMMENT, BCIDPF: ABNORMAL
BUN SERPL-MCNC: 10 MG/DL (ref 6–20)
BUN/CREAT SERPL: 10 (ref 12–20)
C GLABRATA DNA VAG QL NAA+PROBE: NOT DETECTED
CALCIUM SERPL-MCNC: 8 MG/DL (ref 8.5–10.1)
CALCULATED P AXIS, ECG09: 39 DEGREES
CALCULATED R AXIS, ECG10: -157 DEGREES
CALCULATED T AXIS, ECG11: 35 DEGREES
CANDIDA ALBICANS: NOT DETECTED
CANDIDA AURIS, CAAU: NOT DETECTED
CANDIDA KRUSEI, CKRP: NOT DETECTED
CANDIDA PARAPSILOSIS, CPAUP: NOT DETECTED
CANDIDA TROPICALIS, CTROP: NOT DETECTED
CHLORIDE SERPL-SCNC: 100 MMOL/L (ref 97–108)
CO2 SERPL-SCNC: 26 MMOL/L (ref 21–32)
COLOR UR: ABNORMAL
COMMENT, HOLDF: NORMAL
CREAT SERPL-MCNC: 1.02 MG/DL (ref 0.55–1.02)
CRYPTO NEOFORMANS/GATTII, CRYNEG: NOT DETECTED
CTX-M (ESBL RESISTANT GENE), CTX: NOT DETECTED
DIAGNOSIS, 93000: NORMAL
DIFFERENTIAL METHOD BLD: ABNORMAL
ENTEROBACTER CLOACAE COMPLEX, ECCP: NOT DETECTED
ENTEROBACTERALES SP. , ENBLS: DETECTED
ENTEROCOCCUS FAECALIS, ENFA: NOT DETECTED
ENTEROCOCCUS FAECIUM, ENFAM: NOT DETECTED
EOSINOPHIL # BLD: 0 K/UL (ref 0–0.4)
EOSINOPHIL NFR BLD: 0 % (ref 0–7)
ERYTHROCYTE [DISTWIDTH] IN BLOOD BY AUTOMATED COUNT: 14.5 % (ref 11.5–14.5)
ESCHERICHIA COLI: NOT DETECTED
GLUCOSE SERPL-MCNC: 112 MG/DL (ref 65–100)
GLUCOSE UR STRIP.AUTO-MCNC: 500 MG/DL
HAEMOPHILUS INFLUENZAE, HMI: NOT DETECTED
HCT VFR BLD AUTO: 39.8 % (ref 35–47)
HGB BLD-MCNC: 13 G/DL (ref 11.5–16)
HGB UR QL STRIP: NEGATIVE
IMM GRANULOCYTES # BLD AUTO: 0 K/UL (ref 0–0.04)
IMM GRANULOCYTES NFR BLD AUTO: 0 % (ref 0–0.5)
IMP (CARBAPENEMASE RESISTANT GENE), IMPC: NOT DETECTED
KETONES UR QL STRIP.AUTO: NEGATIVE MG/DL
KLEBSIELLA AEROGENES, KLAE: NOT DETECTED
KLEBSIELLA OXYTOCA: NOT DETECTED
KLEBSIELLA PNEUMONIAE GROUP, KPPG: DETECTED
KPC (CARBAPENEM RESISTANCE GENE): NOT DETECTED
LACTATE SERPL-SCNC: 0.8 MMOL/L (ref 0.4–2)
LEUKOCYTE ESTERASE UR QL STRIP.AUTO: NEGATIVE
LISTERIA MONOCYTOGENES, LMONP: NOT DETECTED
LYMPHOCYTES # BLD: 0.6 K/UL (ref 0.8–3.5)
LYMPHOCYTES NFR BLD: 6 % (ref 12–49)
MAGNESIUM SERPL-MCNC: 2.2 MG/DL (ref 1.6–2.4)
MCH RBC QN AUTO: 29.1 PG (ref 26–34)
MCHC RBC AUTO-ENTMCNC: 32.7 G/DL (ref 30–36.5)
MCR-1 (COLISTIN RESISTANT GENE), MCR: NOT DETECTED
MCV RBC AUTO: 89 FL (ref 80–99)
MONOCYTES # BLD: 0.7 K/UL (ref 0–1)
MONOCYTES NFR BLD: 7 % (ref 5–13)
NDM (CARBAPENEMASE RESISTANT GENE), NDM: NOT DETECTED
NEISSERIA MENINGITIDIS, NMNI: NOT DETECTED
NEUTS SEG # BLD: 8.5 K/UL (ref 1.8–8)
NEUTS SEG NFR BLD: 87 % (ref 32–75)
NITRITE UR QL STRIP.AUTO: NEGATIVE
NRBC # BLD: 0 K/UL (ref 0–0.01)
NRBC BLD-RTO: 0 PER 100 WBC
OXA-48-LIKE (CARBAPENEMASE RESISTANT GENE), OXA48: NOT DETECTED
P-R INTERVAL, ECG05: 134 MS
PH UR STRIP: 5.5 [PH] (ref 5–8)
PLATELET # BLD AUTO: 167 K/UL (ref 150–400)
PMV BLD AUTO: 12.1 FL (ref 8.9–12.9)
POTASSIUM SERPL-SCNC: 3.4 MMOL/L (ref 3.5–5.1)
PROT UR STRIP-MCNC: NEGATIVE MG/DL
PROTEUS, PRP: NOT DETECTED
PSEUDOMONAS AERUGINOSA: NOT DETECTED
Q-T INTERVAL, ECG07: 382 MS
QRS DURATION, ECG06: 146 MS
QTC CALCULATION (BEZET), ECG08: 487 MS
RBC # BLD AUTO: 4.47 M/UL (ref 3.8–5.2)
RBC MORPH BLD: ABNORMAL
RBC MORPH BLD: ABNORMAL
RESISTANT GENE SPACE, REGENE: ABNORMAL
SALMONELLA, SALMO: NOT DETECTED
SAMPLES BEING HELD,HOLD: NORMAL
SERRATIA MARCESCENS: NOT DETECTED
SODIUM SERPL-SCNC: 133 MMOL/L (ref 136–145)
SP GR UR REFRACTOMETRY: 1.01 (ref 1–1.03)
STAPH EPIDERMIDIS, STEP: NOT DETECTED
STAPH LUGDUNENSIS, STALUG: NOT DETECTED
STAPHYLOCOCCUS AUREUS: NOT DETECTED
STAPHYLOCOCCUS, STAPP: NOT DETECTED
STENO MALTOPHILIA, STMA: NOT DETECTED
STREPTOCOCCUS , STPSP: NOT DETECTED
STREPTOCOCCUS AGALACTIAE (GROUP B): NOT DETECTED
STREPTOCOCCUS PNEUMONIAE , SPNP: NOT DETECTED
STREPTOCOCCUS PYOGENES (GROUP A), SPYOP: NOT DETECTED
UR CULT HOLD, URHOLD: NORMAL
UROBILINOGEN UR QL STRIP.AUTO: 0.2 EU/DL (ref 0.2–1)
VENTRICULAR RATE, ECG03: 98 BPM
VIM (CARBAPENEMASE RESISTANT GENE), VIM: NOT DETECTED
WBC # BLD AUTO: 9.8 K/UL (ref 3.6–11)

## 2022-12-03 PROCEDURE — 80048 BASIC METABOLIC PNL TOTAL CA: CPT

## 2022-12-03 PROCEDURE — 74011000258 HC RX REV CODE- 258: Performed by: FAMILY MEDICINE

## 2022-12-03 PROCEDURE — 65660000001 HC RM ICU INTERMED STEPDOWN

## 2022-12-03 PROCEDURE — 85025 COMPLETE CBC W/AUTO DIFF WBC: CPT

## 2022-12-03 PROCEDURE — 74011250636 HC RX REV CODE- 250/636: Performed by: FAMILY MEDICINE

## 2022-12-03 PROCEDURE — 81003 URINALYSIS AUTO W/O SCOPE: CPT

## 2022-12-03 PROCEDURE — 36415 COLL VENOUS BLD VENIPUNCTURE: CPT

## 2022-12-03 PROCEDURE — 74011000250 HC RX REV CODE- 250: Performed by: FAMILY MEDICINE

## 2022-12-03 PROCEDURE — 74011250637 HC RX REV CODE- 250/637: Performed by: FAMILY MEDICINE

## 2022-12-03 PROCEDURE — 83735 ASSAY OF MAGNESIUM: CPT

## 2022-12-03 PROCEDURE — 83605 ASSAY OF LACTIC ACID: CPT

## 2022-12-03 PROCEDURE — 74011250637 HC RX REV CODE- 250/637: Performed by: STUDENT IN AN ORGANIZED HEALTH CARE EDUCATION/TRAINING PROGRAM

## 2022-12-03 RX ORDER — MAGNESIUM SULFATE 1 G/100ML
1 INJECTION INTRAVENOUS ONCE
Status: COMPLETED | OUTPATIENT
Start: 2022-12-03 | End: 2022-12-03

## 2022-12-03 RX ORDER — POTASSIUM CHLORIDE 750 MG/1
40 TABLET, FILM COATED, EXTENDED RELEASE ORAL
Status: COMPLETED | OUTPATIENT
Start: 2022-12-03 | End: 2022-12-03

## 2022-12-03 RX ORDER — POTASSIUM CHLORIDE 750 MG/1
20 TABLET, FILM COATED, EXTENDED RELEASE ORAL DAILY
Status: DISCONTINUED | OUTPATIENT
Start: 2022-12-03 | End: 2022-12-06 | Stop reason: HOSPADM

## 2022-12-03 RX ORDER — SODIUM CHLORIDE 9 MG/ML
250 INJECTION, SOLUTION INTRAVENOUS ONCE
Status: COMPLETED | OUTPATIENT
Start: 2022-12-03 | End: 2022-12-03

## 2022-12-03 RX ADMIN — ALLOPURINOL 50 MG: 100 TABLET ORAL at 09:21

## 2022-12-03 RX ADMIN — CLONAZEPAM 1 MG: 1 TABLET ORAL at 22:47

## 2022-12-03 RX ADMIN — CEFEPIME 2 G: 2 INJECTION, POWDER, FOR SOLUTION INTRAVENOUS at 22:53

## 2022-12-03 RX ADMIN — CLONAZEPAM 1.5 MG: 1 TABLET ORAL at 00:07

## 2022-12-03 RX ADMIN — ENOXAPARIN SODIUM 40 MG: 100 INJECTION SUBCUTANEOUS at 09:21

## 2022-12-03 RX ADMIN — MAGNESIUM SULFATE 1 G: 1 INJECTION INTRAVENOUS at 02:18

## 2022-12-03 RX ADMIN — SODIUM CHLORIDE, PRESERVATIVE FREE 10 ML: 5 INJECTION INTRAVENOUS at 07:17

## 2022-12-03 RX ADMIN — SODIUM CHLORIDE, PRESERVATIVE FREE 10 ML: 5 INJECTION INTRAVENOUS at 15:37

## 2022-12-03 RX ADMIN — POTASSIUM CHLORIDE 40 MEQ: 750 TABLET, FILM COATED, EXTENDED RELEASE ORAL at 02:16

## 2022-12-03 RX ADMIN — ASPIRIN 81 MG: 81 TABLET, COATED ORAL at 09:22

## 2022-12-03 RX ADMIN — VANCOMYCIN HYDROCHLORIDE 1000 MG: 1 INJECTION, POWDER, LYOPHILIZED, FOR SOLUTION INTRAVENOUS at 22:52

## 2022-12-03 RX ADMIN — ESCITALOPRAM OXALATE 20 MG: 10 TABLET ORAL at 09:22

## 2022-12-03 RX ADMIN — SODIUM CHLORIDE 250 ML: 9 INJECTION, SOLUTION INTRAVENOUS at 02:19

## 2022-12-03 RX ADMIN — PRASUGREL 10 MG: 10 TABLET, FILM COATED ORAL at 09:21

## 2022-12-03 RX ADMIN — VANCOMYCIN HYDROCHLORIDE 1000 MG: 1 INJECTION, POWDER, LYOPHILIZED, FOR SOLUTION INTRAVENOUS at 14:03

## 2022-12-03 RX ADMIN — POTASSIUM CHLORIDE 20 MEQ: 750 TABLET, FILM COATED, EXTENDED RELEASE ORAL at 15:03

## 2022-12-03 RX ADMIN — PANTOPRAZOLE SODIUM 40 MG: 40 TABLET, DELAYED RELEASE ORAL at 22:47

## 2022-12-03 RX ADMIN — EMPAGLIFLOZIN 10 MG: 10 TABLET, FILM COATED ORAL at 09:22

## 2022-12-03 RX ADMIN — DIGOXIN 0.25 MG: 0.25 TABLET ORAL at 09:22

## 2022-12-03 RX ADMIN — PANTOPRAZOLE SODIUM 40 MG: 40 TABLET, DELAYED RELEASE ORAL at 09:22

## 2022-12-03 RX ADMIN — ACETAMINOPHEN 650 MG: 325 TABLET ORAL at 16:53

## 2022-12-03 RX ADMIN — SODIUM CHLORIDE, PRESERVATIVE FREE 10 ML: 5 INJECTION INTRAVENOUS at 23:20

## 2022-12-03 RX ADMIN — CEFEPIME 2 G: 2 INJECTION, POWDER, FOR SOLUTION INTRAVENOUS at 15:25

## 2022-12-03 RX ADMIN — CETIRIZINE HYDROCHLORIDE 10 MG: 10 TABLET, FILM COATED ORAL at 09:22

## 2022-12-03 RX ADMIN — CEFEPIME 2 G: 2 INJECTION, POWDER, FOR SOLUTION INTRAVENOUS at 07:17

## 2022-12-03 RX ADMIN — MONTELUKAST 10 MG: 10 TABLET, FILM COATED ORAL at 22:47

## 2022-12-03 RX ADMIN — LORAZEPAM 1 MG: 1 TABLET ORAL at 12:12

## 2022-12-03 NOTE — PROGRESS NOTES
[address] [facility]  Hospitalist Group                                                                                          Hospitalist Progress Note  Aaron Dickens MD  Answering service: [phone] OR [phone] from in house phone        Date of Service:  12/3/2022  NAME:  Ashley Sims  :  1980  MRN:  [id number]      Admission Summary:   Ashley Sims is a 38 y.o. female with apmhx depression, anxiety, CAD, HFrEF (EF 20-25%)s/p Life Vest on milrinone, prolonged Qtc, GERD, who presents with fever, and is being admitted for sepsis of unknown etiology. She reports chills and fever with T max to 100.7 after her PICC line was flushed on . Accompanying sx include decreased     In the ED, T max was 100.8. Other VSS. Labs were significant for sodium 130. EKG ordered, and CXR negative for acute intracranial process. In the ED, she received vancomycin, and cefepime. Interval history / Subjective:   Patient seen and examined earlier today by me for follow-up of sepsis. I explained to her that the likely source was her PICC line. Patient reported a constellation of symptoms between numbness of one arm the numbness of the entire body, potential carpopedal spasm, and other symptoms. At the time of my exam patient laying comfortably in bed.      Assessment & Plan:     #hypotension  -pt with SBP in the 80's despite small fluid bolus, and spo2 now 91% on RA  -consult ICU as unclear if hypotension is cardiogenic, septic, or hypovolemic  -hold spironolactone, bumex, and metop  -consult ICU  12/3-continue current management with fluids  Proving     #Sepsis of unknown source, likely PICC line  -fever, and tachycardia with suspected PICC line infection  12/3-source looks to be PICC line  Gram-negative rods in blood  Sinew Vanco and cefepime  Monitor  We will continue to monitor respiratory status due to severe CHF  Remove PICC     #HFrEF, Stage C, NYHA IV s/p life vest, on milrinone  #CAD  -continue home bumex, spironolactone, digoxin, jardiance, and metoprolol  -continue effient, and ASA  -continue milrinone at current rate  -consult advanced HF  12/3-continue milrinone     #Depression  #anxiety  -continue home klonopin scheduled, and prn alprazolam with lexapro  -EKG pending     #Gout  -continue home allopurinol     #GERD  -PPI     #+KITTY  -currently being evaluated for possible autoimmune disease     Code status: full  Prophylaxis: SCD  Care Plan discussed with:   Anticipated Disposition:      Hospital Problems  Date Reviewed: 10/14/2022            Codes Class Noted POA    Fever ICD-10-CM: R50.9  ICD-9-CM: 780.60  12/2/2022 Unknown             Review of Systems:   A comprehensive review of systems was negative except for that written in the HPI. Vital Signs:    Last 24hrs VS reviewed since prior progress note. Most recent are:  Visit Vitals  BP 92/63   Pulse (!) 103   Temp 100.3 °F (37.9 °C)   Resp 19   Ht 5' 4\" (1.626 m)   Wt 83.9 kg (184 lb 15.5 oz)   SpO2 99%   BMI 31.75 kg/m²         Intake/Output Summary (Last 24 hours) at 12/3/2022 1628  Last data filed at 12/3/2022 1300  Gross per 24 hour   Intake 1090 ml   Output 450 ml   Net 640 ml        Physical Examination:     I had a face to face encounter with this patient and independently examined them on 12/3/2022 as outlined below:          Constitutional:  No acute distress, cooperative, pleasant    ENT:  Oral mucosa moist, oropharynx benign. Resp:  CTA bilaterally. No wheezing/rhonchi/rales. No accessory muscle use. CV:  Regular rhythm, normal rate, no murmurs, gallops, rubs    GI:  Soft, non distended, non tender. normoactive bowel sounds, no hepatosplenomegaly     Musculoskeletal:  No edema, warm, 2+ pulses throughout    Neurologic:  Moves all extremities.   AAOx3, CN II-XII reviewed            Data Review:    Review and/or order of clinical lab test  Review and/or order of tests in the radiology section of CPT  Review and/or order of tests in the medicine section of CPT      Labs:     Recent Labs     12/03/22  0204 12/02/22  1552   WBC 9.8 4.8   HGB 13.0 13.6   HCT 39.8 40.8    191     Recent Labs     12/03/22  0204 12/02/22  1552   * 130*   K 3.4* 3.5    97   CO2 26 26   BUN 10 10   CREA 1.02 0.92   * 96   CA 8.0* 8.1*   MG 2.2  --      Recent Labs     12/02/22  1552   ALT 92*   *   TBILI 1.4*   TP 8.1   ALB 3.5   GLOB 4.6*     No results for input(s): INR, PTP, APTT, INREXT in the last 72 hours. No results for input(s): FE, TIBC, PSAT, FERR in the last 72 hours. Lab Results   Component Value Date/Time    Folate 20.0 10/23/2022 04:24 AM      No results for input(s): PH, PCO2, PO2 in the last 72 hours. No results for input(s): CPK, CKNDX, TROIQ in the last 72 hours.     No lab exists for component: CPKMB  Lab Results   Component Value Date/Time    Cholesterol, total 95 10/22/2022 06:26 AM    HDL Cholesterol 32 10/22/2022 06:26 AM    LDL, calculated 45 10/22/2022 06:26 AM    Triglyceride 90 10/22/2022 06:26 AM    CHOL/HDL Ratio 3.0 10/22/2022 06:26 AM     Lab Results   Component Value Date/Time    Glucose (POC) 92 11/09/2022 04:06 PM    Glucose (POC) 123 (H) 11/09/2022 11:32 AM    Glucose (POC) 94 11/09/2022 07:18 AM    Glucose (POC) 105 11/08/2022 11:06 PM    Glucose (POC) 89 11/08/2022 04:48 PM     Lab Results   Component Value Date/Time    Color YELLOW/STRAW 12/03/2022 02:10 AM    Appearance CLEAR 12/03/2022 02:10 AM    Specific gravity 1.008 12/03/2022 02:10 AM    Specific gravity 1.025 10/17/2022 08:42 AM    pH (UA) 5.5 12/03/2022 02:10 AM    Protein Negative 12/03/2022 02:10 AM    Glucose 500 (A) 12/03/2022 02:10 AM    Ketone Negative 12/03/2022 02:10 AM    Bilirubin Negative 12/03/2022 02:10 AM    Urobilinogen 0.2 12/03/2022 02:10 AM    Nitrites Negative 12/03/2022 02:10 AM    Leukocyte Esterase Negative 12/03/2022 02:10 AM    Epithelial cells FEW 10/17/2022 08:42 AM Bacteria 2+ (A) 10/17/2022 08:42 AM    WBC 5-10 10/17/2022 08:42 AM    RBC 5-10 10/17/2022 08:42 AM         Medications Reviewed:     Current Facility-Administered Medications   Medication Dose Route Frequency    vancomycin (VANCOCIN) 1,000 mg in 0.9% sodium chloride 250 mL ([id number])  1,000 mg IntraVENous Q12H    Vancomycin dosing per pharmacy   Other Rx Dosing/Monitoring    potassium chloride SR (KLOR-CON 10) tablet 20 mEq  20 mEq Oral DAILY    milrinone (PRIMACOR) 20 MG/100 ML D5W infusion  0.25 mcg/kg/min IntraVENous CONTINUOUS    sodium chloride (NS) flush 5-40 mL  5-40 mL IntraVENous Q8H    sodium chloride (NS) flush 5-40 mL  5-40 mL IntraVENous PRN    acetaminophen (TYLENOL) tablet 650 mg  650 mg Oral Q6H PRN    Or    acetaminophen (TYLENOL) suppository 650 mg  650 mg Rectal Q6H PRN    ondansetron (ZOFRAN ODT) tablet 4 mg  4 mg Oral Q8H PRN    Or    ondansetron (ZOFRAN) injection 4 mg  4 mg IntraVENous Q6H PRN    enoxaparin (LOVENOX) injection 40 mg  40 mg SubCUTAneous DAILY    cefepime (MAXIPIME) 2 g in 0.9% sodium chloride (MBP/ADV) 100 mL MBP  2 g IntraVENous Q8H    prasugreL (EFFIENT) tablet 10 mg  10 mg Oral DAILY    LORazepam (ATIVAN) tablet 1 mg  1 mg Oral Q8H PRN    escitalopram oxalate (LEXAPRO) tablet 20 mg  20 mg Oral DAILY    clonazePAM (KlonoPIN) tablet 1.5 mg  1.5 mg Oral QHS    aspirin delayed-release tablet 81 mg  81 mg Oral DAILY    albuterol-ipratropium (DUO-NEB) 2.5 MG-0.5 MG/3 ML  3 mL Nebulization Q4H PRN    [Held by provider] spironolactone (ALDACTONE) tablet 25 mg  25 mg Oral DAILY    pantoprazole (PROTONIX) tablet 40 mg  40 mg Oral BID    [Held by provider] metoprolol succinate (TOPROL-XL) XL tablet 12.5 mg  12.5 mg Oral Q12H    cetirizine (ZYRTEC) tablet 10 mg  10 mg Oral DAILY    montelukast (SINGULAIR) tablet 10 mg  10 mg Oral QHS    empagliflozin (JARDIANCE) tablet 10 mg  10 mg Oral DAILY    digoxin (LANOXIN) tablet 0.25 mg  0.25 mg Oral DAILY    [Held by provider] bumetanide (BUMEX) tablet 1 mg  1 mg Oral DAILY    allopurinoL (ZYLOPRIM) tablet 50 mg  50 mg Oral DAILY     ______________________________________________________________________  EXPECTED LENGTH OF STAY: - - -  ACTUAL LENGTH OF STAY:          [id number] [facility] Edgard Gonzales MD

## 2022-12-03 NOTE — ED PROVIDER NOTES
Jim Seip is a 37 yo F with h/o CHF with life vest and PICC line for milrinone drip who presents to the ED with concern for fever and possible infection in her PICC line. She states that after the nurse flushed her line yesterday and left she developed chills. This morning she checked her temperature and it was 100.7 and then an hour later it was 97.7. She called her doctor and was advised to come to the ED for evaluation for possible PICC line infection. She has also noted some nasal congestion and wonders if she may have the flu and she took a home COVID test and it was negative. She checked has notice increased pain in her PICC line this week. Past Medical History:   Diagnosis Date    Anemia NEC     Arthritis     Chronic pain     fybromyalsia    Depression     anxiety, depression, OCD    GERD (gastroesophageal reflux disease)     Hypertension     Hypertension     Ill-defined condition     Fibromyalia gastricparesis    MI (myocardial infarction) ([address]     Musculoskeletal disorder     SOB (shortness of breath)     Stool color black        Past Surgical History:   Procedure Laterality Date    HX CORONARY STENT PLACEMENT      HX GYN           HX HEENT  2002    wisdom teeth extraction    UPPER GI ENDOSCOPY,BIOPSY  2018         UPPER GI ENDOSCOPY,DIAGNOSIS  2018              Family History:   Problem Relation Age of Onset    Hypertension Father     Elevated Lipids Father     Arthritis-rheumatoid Mother         ? Lupus vs RA    Lung Disease Mother     Heart Disease Mother     Cancer Mother         breast in 39y    COPD Mother     Hypertension Mother     Stroke Mother         3-4 strokes    Breast Cancer Mother 50    Diabetes Maternal Grandmother        Social History     Socioeconomic History    Marital status: SINGLE     Spouse name: Not on file    Number of children: Not on file    Years of education: Not on file    Highest education level: Not on file   Occupational History Not on file   Tobacco Use    Smoking status: Former     Packs/day: 0.25     Years: 8.00     Pack years: 2.00     Types: Cigarettes     Quit date: 2022     Years since quittin.3    Smokeless tobacco: Never   Vaping Use    Vaping Use: Never used   Substance and Sexual Activity    Alcohol use: Not Currently     Alcohol/week: 1.0 standard drink     Types: 1 Glasses of wine per week     Comment: Wine with special occassions - not since July    Drug use: Not Currently     Types: Marijuana     Comment: Haven't had any since 2022    Sexual activity: Yes     Partners: Male     Birth control/protection: None   Other Topics Concern    Not on file   Social History Narrative    Not on file     Social Determinants of Health     Financial Resource Strain: High Risk    Difficulty of Paying Living Expenses: Very hard   Food Insecurity: No Food Insecurity    Worried About Running Out of Food in the Last Year: Never true    Ran Out of Food in the Last Year: Never true   Transportation Needs: No Transportation Needs    Lack of Transportation (Medical): No    Lack of Transportation (Non-Medical): No   Physical Activity: Inactive    Days of Exercise per Week: 0 days    Minutes of Exercise per Session: 0 min   Stress: Stress Concern Present    Feeling of Stress :  To some extent   Social Connections: Socially Isolated    Frequency of Communication with Friends and Family: Twice a week    Frequency of Social Gatherings with Friends and Family: Never    Attends Church Services: Never    Active Member of Clubs or Organizations: No    Attends Club or Organization Meetings: Never    Marital Status: Never    Intimate Partner Violence: Not At Risk    Fear of Current or Ex-Partner: No    Emotionally Abused: No    Physically Abused: No    Sexually Abused: No   Housing Stability: Low Risk     Unable to Pay for Housing in the Last Year: No    Number of [place] in the Last Year: 1    Unstable Housing in the Last Year: No ALLERGIES: Abilify [aripiprazole], Sulfa (sulfonamide antibiotics), and Vicodin [hydrocodone-acetaminophen]    Review of Systems   Constitutional:  Positive for chills. HENT:  Negative for sore throat. Eyes:  Negative for visual disturbance. Respiratory:  Negative for cough. Cardiovascular:  Negative for chest pain. Gastrointestinal:  Negative for abdominal pain. Genitourinary:  Negative for dysuria. Musculoskeletal:  Negative for back pain. Skin:  Negative for rash. Neurological:  Negative for headaches. Vitals:    12/02/22 1528 12/02/22 1910   BP: 94/64 112/78   Pulse: 95 99   Resp: 18 20   Temp: 97.6 °F (36.4 °C) 98.5 °F (36.9 °C)   SpO2: 97% 96%            Physical Exam  Vitals and nursing note reviewed. Constitutional:       General: She is not in acute distress. Appearance: She is well-developed. HENT:      Head: Normocephalic and atraumatic. Mouth/Throat:      Mouth: Mucous membranes are moist.   Eyes:      Extraocular Movements: Extraocular movements intact. Conjunctiva/sclera: Conjunctivae normal.   Neck:      Trachea: Phonation normal.   Cardiovascular:      Rate and Rhythm: Normal rate. Pulmonary:      Effort: Pulmonary effort is normal. No respiratory distress. Abdominal:      General: There is no distension. Musculoskeletal:         General: No tenderness. Normal range of motion. Cervical back: Normal range of motion. Skin:     General: Skin is warm and dry. Comments: LUE picc line site with no erythema. Neurological:      General: No focal deficit present. Mental Status: She is alert. She is not disoriented. Motor: No abnormal muscle tone. ED EKG interpretation:  Rhythm: normal sinus rhythm, RBBB, and PAC's; and regular . Rate (approx.): 98; Axis: normal; P wave: normal; QRS interval: prolonged; ST/T wave: non-specific changes; Other findings: abnormal ekg.  This EKG was interpreted by Miesha Witt MD,ED Provider. [facility]     Amount and/or Complexity of Data Reviewed  Clinical lab tests: reviewed  Decide to obtain previous medical records or to obtain history from someone other than the patient: yes         Perfect Serve Consult for Admission  9:25 PM    ED Room Number: ER15/15  Patient Name and age:  Clarissa Knott 38 y.o.  female  Working Diagnosis:   1. Acute febrile illness    2. PICC line infection, initial encounter        COVID-19 Suspicion:  no  Sepsis present:  yes  Reassessment needed: yes  Code Status:  Full Code  Readmission: no  Isolation Requirements:  no  Recommended Level of Care:  telemetry  Department:[facility] Adult ED - 21   Other:  h/o CHF with PICC line on milrinone drip and LifeVest.  Had fever and chills last night after home health nurse flushed PICC. Advised to come to ED for concern for line infection. Initially afebrile in ED but now Temp 100.8 and procalcitonin 1.66. We have obtained blood cultures and are removing the PICC line and I have ordered her milrinone as well as cefepime and tylenol. Rapid Flu was negative.       Procedures

## 2022-12-03 NOTE — ROUTINE PROCESS
TRANSFER - OUT REPORT:    Verbal report given to CK Naranjo(name) on [facility]  being transferred to [address] [place] [address](unit) for routine progression of care       Report consisted of patients Situation, Background, Assessment and   Recommendations(SBAR). Information from the following report(s) SBAR, Kardex, ED Summary, MAR, Recent Results, and Cardiac Rhythm NSR  was reviewed with the receiving nurse. Lines:   PICC Double Lumen 58/41/28 Right;Basilic (Active)       Peripheral IV 12/02/22 Left;Upper Forearm (Active)   Site Assessment Clean, dry, & intact 12/02/22 2257   Phlebitis Assessment 0 12/02/22 2257   Infiltration Assessment 0 12/02/22 2257   Dressing Status Clean, dry, & intact 12/02/22 2257       Peripheral IV 12/02/22 Anterior;Proximal;Right Forearm (Active)       Peripheral IV 12/02/22 Anterior;Left;Proximal Forearm (Active)        Opportunity for questions and clarification was provided.       Patient transported with:   Monitor  Patient-specific medications from Pharmacy  Registered Nurse  Tech

## 2022-12-03 NOTE — PROGRESS NOTES
Pharmacist Note - Vancomycin Dosing    Consult provided for this 38 y.o. female for indication of bacteremia. Antibiotic regimen(s): Vancomycin and Cefepime  Patient on vancomycin PTA? NO     Recent Labs     22  0204 22  1552   WBC 9.8 4.8   CREA 1.02 0.92   BUN 10 10     Frequency of BMP: daily  Height: 162.6 cm  Weight: 82.6 kg  Est CrCl: ~76 ml/min  Temp (24hrs), Av.2 °F (37.3 °C), Min:97.6 °F (36.4 °C), Max:100.8 °F (38.2 °C)    Cultures:   Blood:  pending x2    MRSA Swab ordered (if applicable)? YES    The plan below is expected to result in a target range of AUC/STEWART 400-600    Therapy will be initiated with a loading dose of 2000 mg IV x 1 to be followed by a maintenance dose of 1000 mg IV every 12 hours. Pharmacy to follow patient daily and order levels / make dose adjustments as appropriate. *Vancomycin has been dosed used Bayesian kinetics software to target an AUC/STEWART of 400-600, which provides adequate exposure for an assumed infection due to MRSA with an STEWART of 1 or less while reducing the risk of nephrotoxicity as seen with traditional trough based dosing goals.

## 2022-12-03 NOTE — CONSULTS
CRITICAL CARE CONSULT NOTE      Name: Sandra Duncan   : 1980   MRN: [id number]   Date: 12/3/2022      Reason for ICU Consult: Hypotension, concern for cardiogenic vs distributive shock        HISTORY OF PRESENT ILLNESS:   Pt is a 42F w/ PMH CAD, HFrEF on home inotrope w/ life vest who presents to ED for fever, malaise, low PO intake for 2-3 days. Pt states fever after PICC line accessed, and also states exposure to father w/ recent URI/LRI like symptoms, otherwise denies sick contacts, or discharge/erythema from PICC line. Pt states home SBP mid 80s to 90s. Denies palpitations, SOB, orthopnea, BLE edema. In ED BP low normotensive for pt baseline, otherwise VSS. Pct mildly elevated, otherwise labs including lct, and CXR grossly unremarkable for acute process. BP improved w/ gentle IVFs. Bcx obtained and given doses broad abx in ED,     On POCUS eval pt w/o evidence of cardiogenic shock, no evidence of pulmonary edema, appears grossly euvolemic to slightly fluid responsive. HFrEF on home milrinone, not in acute exacerbation  R/O sepsis  R/O PICC line infection  IVVD    - agree w/ infectious workup and abx  - continue genlte fluid resuscitation  - Does not appear clinically in cardiogenic shock  - would add respiratory viral panel  - mg/phos/k >2/3/4  - No indication for ICU admission at this time, discussed w/ hospitalist MD    Please re-consult if acute change in clinical status. Review of Systems:     Review of Systems   Constitutional:  Positive for fever and malaise/fatigue. Negative for chills, diaphoresis and weight loss. HENT:  Negative for congestion, nosebleeds and sinus pain. Eyes:  Negative for blurred vision, double vision and pain. Respiratory:  Negative for cough, hemoptysis, shortness of breath and wheezing. Cardiovascular:  Negative for chest pain, palpitations and leg swelling. Gastrointestinal:  Negative for abdominal pain, diarrhea, nausea and vomiting. Genitourinary:  Negative for dysuria, hematuria and urgency. Musculoskeletal:  Negative for back pain, joint pain and neck pain. Skin:  Negative for itching and rash. Neurological:  Negative for dizziness, sensory change, speech change and focal weakness. Endo/Heme/Allergies:  Negative for polydipsia. Does not bruise/bleed easily. Psychiatric/Behavioral:  Negative for hallucinations. The patient is not nervous/anxious. Past Medical History:      has a past medical history of Anemia NEC, Arthritis, Chronic pain, Depression, GERD (gastroesophageal reflux disease), Hypertension, Hypertension, Ill-defined condition, MI (myocardial infarction) ([facility] [address] Musculoskeletal disorder, SOB (shortness of breath), and Stool color black. Past Surgical History:      has a past surgical history that includes hx gyn; hx heent (2002); upper gi endoscopy,diagnosis (06/07/2018); upper gi endoscopy,biopsy (06/21/2018); and hx coronary stent placement. Home Medications:     Prior to Admission medications    Medication Sig Start Date End Date Taking? Authorizing Provider   LORazepam (ATIVAN) 1 mg tablet Take 1 mg by mouth every eight (8) hours as needed. 11/30/22   Provider, Historical   folic acid (FOLVITE) 1 mg tablet TAKE 1 TABLET EVERY DAY 11/28/22   Gildardo Frye MD   spironolactone (ALDACTONE) 25 mg tablet Take 1 Tablet by mouth daily. 11/28/22   Cydney Bosch NP   metoprolol succinate (TOPROL-XL) 25 mg XL tablet Take 0.5 Tablets by mouth every twelve (12) hours. 11/28/22   Cydney Bosch NP   digoxin (LANOXIN) 0.25 mg tablet Take 1 Tablet by mouth daily. 11/28/22   Cydney Bosch NP   allopurinoL (ZYLOPRIM) 100 mg tablet Take 0.5 Tablets by mouth daily. 11/28/22   Cydney Bosch NP   bumetanide (BUMEX) 2 mg tablet Take 0.5 Tablets by mouth daily.  May take an additional tablet as needed to keep weight at 188 lb 11/27/22   Claudia Creluis BAGLEY NP   magnesium oxide (MAG-OX) 400 mg tablet Take 1 Tablet by mouth two (2) times a day. 11/27/22   Roma Sapp NP   potassium chloride (KLOR-CON M10) 10 mEq tablet Take 2 Tablets by mouth two (2) times a day. 11/27/22   Rmoa Sapp NP   cyclobenzaprine (FLEXERIL) 10 mg tablet Take 1 Tablet by mouth three (3) times daily as needed for Muscle Spasm(s). Has not needed 11/18/22   Nghia Bronson MD   cinacalcet BON [facility]) 30 mg tablet Take 1 Tablet by mouth daily (with breakfast) for 90 days. 11/10/22 2/8/23  Joseline Frost MD   docusate sodium (COLACE) 100 mg capsule Take 1 Capsule by mouth daily for 90 days. 11/10/22 2/8/23  Joseline Frost MD   milrinone (PRIMACOR) 20 mg/100 mL (200 mcg/mL) infusion 21.35 mcg/min by IntraVENous route continuous for 30 days. 11/9/22 12/9/22  Joseline Frost MD   pramipexole (MIRAPEX) 0.125 mg tablet Take 1 Tablet by mouth nightly for 30 days. 11/9/22 12/9/22  Joseline Frost MD   empagliflozin (Jardiance) 10 mg tablet Take 1 Tablet by mouth daily for 60 days. 11/9/22 1/8/23  Joseline Frost MD   promethazine (PHENERGAN) 25 mg suppository Insert 1 Suppository into rectum every six (6) hours as needed for Nausea for up to 5 doses. Patient not taking: No sig reported 10/17/22   Sosa Sapp NP   promethazine (PHENERGAN) 25 mg tablet Take 1 Tablet by mouth every six (6) hours as needed for Nausea for up to 10 doses. Patient not taking: Reported on 11/17/2022 10/17/22   Sosa Sapp NP   ondansetron (ZOFRAN ODT) 4 mg disintegrating tablet Take 1 Tablet by mouth every eight (8) hours as needed for Nausea or Vomiting. 10/14/22   Jose Wiggins MD   rosuvastatin (CRESTOR) 20 mg tablet Take 1 Tablet by mouth every Monday, Wednesday, Friday.   Patient not taking: Reported on 11/17/2022 9/23/22   Morgan Retana DO   levocetirizine [place]) 5 mg tablet TAKE 1 TABLET EVERY DAY 9/15/22   Jose Wiggins MD   famotidine (PEPCID) 20 mg tablet TAKE 1 TABLET BY MOUTH TWICE A DAY  Patient not taking: Reported on 11/17/2022 9/14/22   Abmer Mandujano MD   ezetimibe (ZETIA) 10 mg tablet Take 1 Tablet by mouth daily. Patient not taking: Reported on 11/17/2022 9/9/22   Kris BAGLEY, CAM   metFORMIN ER (GLUCOPHAGE XR) 500 mg tablet Take 1 Tablet by mouth daily (with dinner). Patient not taking: Reported on 11/17/2022 9/8/22   Simon Booth MD   montelukast (SINGULAIR) 10 mg tablet TAKE 1 TABLET EVERY DAY 8/24/22   Amber Mandujano MD   diazePAM (VALIUM) 5 mg tablet 5 mg every eight (8) hours as needed. 5 mg, 3 times daily 8/15/22   Provider, Historical   albuterol-ipratropium (DUO-NEB) 2.5 mg-0.5 mg/3 ml nebu 3 mL by Nebulization route every four (4) hours as needed. Provider, Historical   fluticasone propionate (FLONASE) 50 mcg/actuation nasal spray 2 Sprays by Both Nostrils route daily. Provider, Historical   ipratropium (ATROVENT) 21 mcg (0.03 %) nasal spray 1 [place] by Both Nostrils route every twelve (12) hours. Patient taking differently: 1 Spray by Both Nostrils route as needed. 8/16/22   Kenan Becerra MD   acetaminophen (TYLENOL) 325 mg tablet Take  by mouth every four (4) hours as needed for Pain. Provider, Historical   albuterol (PROVENTIL HFA, VENTOLIN HFA, PROAIR HFA) 90 mcg/actuation inhaler Take  by inhalation as needed. Other, MD Nestor   prasugreL (EFFIENT) 10 mg tablet  7/26/22   Provider, Historical   nitroglycerin (NITROSTAT) 0.4 mg SL tablet  7/26/22   Provider, Historical   aspirin delayed-release 81 mg tablet 81 mg. 7/26/22   Provider, Historical   pantoprazole (PROTONIX) 40 mg tablet TAKE 1 TABLET TWICE DAILY 6/1/22   Amber Mandujano MD   ferrous sulfate 325 mg (65 mg iron) tablet TAKE 1 TABLET BY MOUTH ONCE DAILY BEFORE BREAKFAST 5/4/22   Amber Mandujano MD   traZODone (DESYREL) 100 mg tablet Weaning-150 mg at night 4/5/22   Provider, Historical   escitalopram oxalate (LEXAPRO) 20 mg tablet Take 20 mg by mouth daily. Provider, Historical   clonazePAM (KLONOPIN) 1 mg tablet Take 1.5 mg by mouth nightly. Provider, Historical   cholecalciferol, vitamin D3, 50 mcg (2,000 unit) tab 2,000 Units. Provider, Historical       Allergies/Social/Family History: Allergies   Allergen Reactions    Abilify [Aripiprazole] Anxiety     Can not tolerate     Sulfa (Sulfonamide Antibiotics) Hives    Vicodin [Hydrocodone-Acetaminophen] Nausea and Vomiting      Social History     Tobacco Use    Smoking status: Former     Packs/day: 0.25     Years: 8.00     Pack years: 2.00     Types: Cigarettes     Quit date: 2022     Years since quittin.3    Smokeless tobacco: Never   Substance Use Topics    Alcohol use: Not Currently     Alcohol/week: 1.0 standard drink     Types: 1 Glasses of wine per week     Comment: Wine with special occassions - not since July      Family History   Problem Relation Age of Onset    Hypertension Father     Elevated Lipids Father     Arthritis-rheumatoid Mother         ? Lupus vs RA    Lung Disease Mother     Heart Disease Mother     Cancer Mother         breast in 39y    COPD Mother     Hypertension Mother     Stroke Mother         3-4 strokes    Breast Cancer Mother 50    Diabetes Maternal Grandmother          OBJECTIVE:     Labs and Data: Reviewed 22  Medications: Reviewed 22  Imaging: Reviewed 22    Physical Exam  Vitals and nursing note reviewed. Constitutional:       General: She is not in acute distress. Appearance: Normal appearance. She is obese. HENT:      Head: Normocephalic and atraumatic. Nose: Nose normal. No congestion. Mouth/Throat:      Mouth: Mucous membranes are moist.      Pharynx: Oropharynx is clear. No oropharyngeal exudate. Eyes:      General: No scleral icterus. Extraocular Movements: Extraocular movements intact. Conjunctiva/sclera: Conjunctivae normal.      Pupils: Pupils are equal, round, and reactive to light.    Cardiovascular:      Rate and Rhythm: Normal rate and regular rhythm. Pulses: Normal pulses. Heart sounds: Normal heart sounds. Pulmonary:      Effort: Pulmonary effort is normal. No respiratory distress. Breath sounds: Normal breath sounds. No wheezing. Abdominal:      General: Abdomen is flat. There is no distension. Palpations: Abdomen is soft. Tenderness: There is no abdominal tenderness. There is no guarding. Musculoskeletal:         General: Normal range of motion. Cervical back: Normal range of motion and neck supple. No rigidity. Right lower leg: No edema. Left lower leg: No edema. Skin:     General: Skin is warm and dry. Capillary Refill: Capillary refill takes less than 2 seconds. Coloration: Skin is not jaundiced. Neurological:      General: No focal deficit present. Mental Status: She is alert and oriented to person, place, and time. Mental status is at baseline. Psychiatric:         Mood and Affect: Mood normal.         Behavior: Behavior normal.        Visit Vitals  BP (!) 87/71   Pulse 84   Temp 98.4 °F (36.9 °C)   Resp (!) 31   Ht 5' 4\" (1.626 m)   Wt 82.6 kg (182 lb)   SpO2 97%   BMI 31.24 kg/m²      O2 Device: None (Room air) Temp (24hrs), Av.2 °F (37.3 °C), Min:97.6 °F (36.4 °C), Max:100.8 °F (38.2 °C)           Intake/Output:   No intake or output data in the 24 hours ending 22 0430    Imaging    10/21/22    ECHO ADULT COMPLETE 10/22/2022 10/22/2022    Interpretation Summary    Left Ventricle: Severely reduced left ventricular systolic function with a visually estimated EF of 15 - 20%. Left ventricle is mildly dilated. Normal wall thickness.  Moderate hypokinesis of the following segments: basal anterior, basal anterolateral, basal anteroseptal, basal inferior, basal inferolateral, basal inferoseptal, mid anterior, mid anterolateral, mid anteroseptal, mid inferior, mid inferolateral and mid inferoseptal. Severe hypokinesis of the following segments: apical anterior, apical septal, apical inferior and apical lateral. Grade III diastolic dysfunction with increased LAP. Mitral Valve: Mild annular dilation. Moderate regurgitation. Tricuspid Valve: Moderately elevated RVSP. The estimated RVSP is 51 mmHg. Left Atrium: Left atrium is mildly dilated. Pericardium: Trivial pericardial effusion present. No indication of cardiac tamponade. Contrast used: Definity. Signed by: Samaria Espana MD on 10/22/2022  6:16 PM           CRITICAL CARE DOCUMENTATION  I had a face to face encounter with the patient, reviewed and interpreted patient data including clinical events, labs, images, vital signs, I/O's, and examined patient. I have discussed the case and the plan and management of the patient's care with the consulting services, the bedside nurses and the respiratory therapist.      NOTE OF PERSONAL INVOLVEMENT IN CARE   This patient has a high probability of imminent, clinically significant deterioration, which requires the highest level of preparedness to intervene urgently. I participated in the decision-making and personally managed or directed the management of the following life and organ supporting interventions that required my frequent assessment to treat or prevent imminent deterioration. I personally spent 30 minutes of critical care time. This is time spent at this critically ill patient's bedside actively involved in patient care as well as the coordination of care. This does not include any procedural time which has been billed separately. Casa Sewell MD  Staff [facility]

## 2022-12-03 NOTE — ED NOTES
.Bedside shift change report given to Hai Claytno (oncoming nurse) by Sunitha Acosta (offgoing nurse). Report included the following information SBAR, Kardex, ED Summary and MAR.
Bedside and Verbal shift change report given to Hai Clayton RN (oncoming nurse) by Sunitha Acosta RN (offgoing nurse). Report included the following information SBAR, Kardex, and ED Summary.
Bedside shift change report given to Diamond Avila (oncoming nurse) by CK Veliz (offgoing nurse). Report included the following information SBAR, Kardex, ED Summary, MAR, and Recent Results.
RN informed MD patient's BP was still <90 after 250 ml bolus. MD stated level of care would be changed to CCU.
Stable

## 2022-12-03 NOTE — PROGRESS NOTES
SGLT2 Verification Procedure    SGLT2 Inhibitor ordered: Empagliflozin    Indication: Heart Failure     eGFR : >60 mL/min    Action taken: Order verified    Progress note posted: N/A    Medication Indication Pharmacist Action   Empagliflozin     *Contraindicated if eGFR<20 mL/min (including IHD, PD, CRRT, PIRRT) HF or CKD     eGFR ? 20 mL/min: Verify; Automatically change dose to 10 mg daily    eGFR < 20 mL/min: Notify provider to recommend discontinuation    Diabetes only  Discontinue   Dapagliflozin  HF or CKD     eGFR ?  20 mL/min: Automatically substitute to empagliflozin 10 mg daily    eGFR < 20 mL/min: Notify provider to recommend discontinuation    Diabetes only Discontinue    Canagliflozin   Any  Discontinue   Ertugliflozin  Any  Discontinue

## 2022-12-04 LAB
ANION GAP SERPL CALC-SCNC: 6 MMOL/L (ref 5–15)
BASOPHILS # BLD: 0.1 K/UL (ref 0–0.1)
BASOPHILS NFR BLD: 1 % (ref 0–1)
BUN SERPL-MCNC: 11 MG/DL (ref 6–20)
BUN/CREAT SERPL: 14 (ref 12–20)
CALCIUM SERPL-MCNC: 8.5 MG/DL (ref 8.5–10.1)
CHLORIDE SERPL-SCNC: 106 MMOL/L (ref 97–108)
CO2 SERPL-SCNC: 24 MMOL/L (ref 21–32)
CREAT SERPL-MCNC: 0.8 MG/DL (ref 0.55–1.02)
DIFFERENTIAL METHOD BLD: ABNORMAL
EOSINOPHIL # BLD: 0.2 K/UL (ref 0–0.4)
EOSINOPHIL NFR BLD: 4 % (ref 0–7)
ERYTHROCYTE [DISTWIDTH] IN BLOOD BY AUTOMATED COUNT: 14.6 % (ref 11.5–14.5)
GLUCOSE BLD STRIP.AUTO-MCNC: 53 MG/DL (ref 65–117)
GLUCOSE BLD STRIP.AUTO-MCNC: 65 MG/DL (ref 65–117)
GLUCOSE BLD STRIP.AUTO-MCNC: 70 MG/DL (ref 65–117)
GLUCOSE BLD STRIP.AUTO-MCNC: 73 MG/DL (ref 65–117)
GLUCOSE SERPL-MCNC: 81 MG/DL (ref 65–100)
HCT VFR BLD AUTO: 35.5 % (ref 35–47)
HGB BLD-MCNC: 11.6 G/DL (ref 11.5–16)
IMM GRANULOCYTES # BLD AUTO: 0 K/UL (ref 0–0.04)
IMM GRANULOCYTES NFR BLD AUTO: 0 % (ref 0–0.5)
LYMPHOCYTES # BLD: 1.5 K/UL (ref 0.8–3.5)
LYMPHOCYTES NFR BLD: 29 % (ref 12–49)
MAGNESIUM SERPL-MCNC: 2.4 MG/DL (ref 1.6–2.4)
MCH RBC QN AUTO: 28.7 PG (ref 26–34)
MCHC RBC AUTO-ENTMCNC: 32.7 G/DL (ref 30–36.5)
MCV RBC AUTO: 87.9 FL (ref 80–99)
MONOCYTES # BLD: 0.9 K/UL (ref 0–1)
MONOCYTES NFR BLD: 17 % (ref 5–13)
NEUTS SEG # BLD: 2.6 K/UL (ref 1.8–8)
NEUTS SEG NFR BLD: 49 % (ref 32–75)
NRBC # BLD: 0 K/UL (ref 0–0.01)
NRBC BLD-RTO: 0 PER 100 WBC
PLATELET # BLD AUTO: 151 K/UL (ref 150–400)
PMV BLD AUTO: 12 FL (ref 8.9–12.9)
POTASSIUM SERPL-SCNC: 4 MMOL/L (ref 3.5–5.1)
RBC # BLD AUTO: 4.04 M/UL (ref 3.8–5.2)
SERVICE CMNT-IMP: ABNORMAL
SERVICE CMNT-IMP: NORMAL
SODIUM SERPL-SCNC: 136 MMOL/L (ref 136–145)
WBC # BLD AUTO: 5.3 K/UL (ref 3.6–11)

## 2022-12-04 PROCEDURE — 74011250636 HC RX REV CODE- 250/636: Performed by: INTERNAL MEDICINE

## 2022-12-04 PROCEDURE — 74011250636 HC RX REV CODE- 250/636: Performed by: EMERGENCY MEDICINE

## 2022-12-04 PROCEDURE — 82962 GLUCOSE BLOOD TEST: CPT

## 2022-12-04 PROCEDURE — 74011000258 HC RX REV CODE- 258: Performed by: FAMILY MEDICINE

## 2022-12-04 PROCEDURE — 99232 SBSQ HOSP IP/OBS MODERATE 35: CPT | Performed by: INTERNAL MEDICINE

## 2022-12-04 PROCEDURE — 85025 COMPLETE CBC W/AUTO DIFF WBC: CPT

## 2022-12-04 PROCEDURE — 83735 ASSAY OF MAGNESIUM: CPT

## 2022-12-04 PROCEDURE — 74011250636 HC RX REV CODE- 250/636: Performed by: FAMILY MEDICINE

## 2022-12-04 PROCEDURE — 74011000250 HC RX REV CODE- 250: Performed by: FAMILY MEDICINE

## 2022-12-04 PROCEDURE — 36415 COLL VENOUS BLD VENIPUNCTURE: CPT

## 2022-12-04 PROCEDURE — 65660000001 HC RM ICU INTERMED STEPDOWN

## 2022-12-04 PROCEDURE — 74011250637 HC RX REV CODE- 250/637: Performed by: STUDENT IN AN ORGANIZED HEALTH CARE EDUCATION/TRAINING PROGRAM

## 2022-12-04 PROCEDURE — 80048 BASIC METABOLIC PNL TOTAL CA: CPT

## 2022-12-04 PROCEDURE — 74011250637 HC RX REV CODE- 250/637: Performed by: NURSE PRACTITIONER

## 2022-12-04 PROCEDURE — 74011250637 HC RX REV CODE- 250/637: Performed by: FAMILY MEDICINE

## 2022-12-04 RX ORDER — PRAMIPEXOLE DIHYDROCHLORIDE 0.25 MG/1
0.12 TABLET ORAL
Status: DISCONTINUED | OUTPATIENT
Start: 2022-12-04 | End: 2022-12-06 | Stop reason: HOSPADM

## 2022-12-04 RX ORDER — LANOLIN ALCOHOL/MO/W.PET/CERES
3 CREAM (GRAM) TOPICAL
Status: DISCONTINUED | OUTPATIENT
Start: 2022-12-04 | End: 2022-12-06 | Stop reason: HOSPADM

## 2022-12-04 RX ORDER — MILRINONE LACTATE 0.2 MG/ML
0.25 INJECTION, SOLUTION INTRAVENOUS CONTINUOUS
Status: DISCONTINUED | OUTPATIENT
Start: 2022-12-04 | End: 2022-12-06 | Stop reason: HOSPADM

## 2022-12-04 RX ORDER — SODIUM CHLORIDE 9 MG/ML
100 INJECTION, SOLUTION INTRAVENOUS CONTINUOUS
Status: DISPENSED | OUTPATIENT
Start: 2022-12-04 | End: 2022-12-04

## 2022-12-04 RX ADMIN — CEFEPIME 2 G: 2 INJECTION, POWDER, FOR SOLUTION INTRAVENOUS at 13:40

## 2022-12-04 RX ADMIN — VANCOMYCIN HYDROCHLORIDE 1000 MG: 1 INJECTION, POWDER, LYOPHILIZED, FOR SOLUTION INTRAVENOUS at 12:05

## 2022-12-04 RX ADMIN — CETIRIZINE HYDROCHLORIDE 10 MG: 10 TABLET, FILM COATED ORAL at 08:43

## 2022-12-04 RX ADMIN — ENOXAPARIN SODIUM 40 MG: 100 INJECTION SUBCUTANEOUS at 08:45

## 2022-12-04 RX ADMIN — CLONAZEPAM 1.5 MG: 1 TABLET ORAL at 21:24

## 2022-12-04 RX ADMIN — ALLOPURINOL 50 MG: 100 TABLET ORAL at 08:43

## 2022-12-04 RX ADMIN — PANTOPRAZOLE SODIUM 40 MG: 40 TABLET, DELAYED RELEASE ORAL at 08:44

## 2022-12-04 RX ADMIN — SODIUM CHLORIDE, PRESERVATIVE FREE 10 ML: 5 INJECTION INTRAVENOUS at 06:33

## 2022-12-04 RX ADMIN — SODIUM CHLORIDE 100 ML/HR: 9 INJECTION, SOLUTION INTRAVENOUS at 13:35

## 2022-12-04 RX ADMIN — MILRINONE LACTATE IN DEXTROSE 0.25 MCG/KG/MIN: 200 INJECTION, SOLUTION INTRAVENOUS at 13:39

## 2022-12-04 RX ADMIN — LORAZEPAM 1 MG: 1 TABLET ORAL at 15:14

## 2022-12-04 RX ADMIN — PRASUGREL 10 MG: 10 TABLET, FILM COATED ORAL at 08:44

## 2022-12-04 RX ADMIN — SODIUM CHLORIDE, PRESERVATIVE FREE 10 ML: 5 INJECTION INTRAVENOUS at 21:25

## 2022-12-04 RX ADMIN — PRAMIPEXOLE DIHYDROCHLORIDE 0.12 MG: 0.25 TABLET ORAL at 21:24

## 2022-12-04 RX ADMIN — MONTELUKAST 10 MG: 10 TABLET, FILM COATED ORAL at 21:24

## 2022-12-04 RX ADMIN — CLONAZEPAM 0.5 MG: 1 TABLET ORAL at 00:34

## 2022-12-04 RX ADMIN — POTASSIUM CHLORIDE 20 MEQ: 750 TABLET, FILM COATED, EXTENDED RELEASE ORAL at 08:44

## 2022-12-04 RX ADMIN — PANTOPRAZOLE SODIUM 40 MG: 40 TABLET, DELAYED RELEASE ORAL at 21:24

## 2022-12-04 RX ADMIN — EMPAGLIFLOZIN 10 MG: 10 TABLET, FILM COATED ORAL at 08:44

## 2022-12-04 RX ADMIN — DIGOXIN 0.25 MG: 0.25 TABLET ORAL at 08:43

## 2022-12-04 RX ADMIN — CEFEPIME 2 G: 2 INJECTION, POWDER, FOR SOLUTION INTRAVENOUS at 06:30

## 2022-12-04 RX ADMIN — CEFEPIME 2 G: 2 INJECTION, POWDER, FOR SOLUTION INTRAVENOUS at 21:24

## 2022-12-04 RX ADMIN — ESCITALOPRAM OXALATE 20 MG: 10 TABLET ORAL at 08:44

## 2022-12-04 RX ADMIN — MILRINONE LACTATE IN DEXTROSE 0.25 MCG/KG/MIN: 200 INJECTION, SOLUTION INTRAVENOUS at 06:27

## 2022-12-04 RX ADMIN — ASPIRIN 81 MG: 81 TABLET, COATED ORAL at 08:44

## 2022-12-04 RX ADMIN — MILRINONE LACTATE IN DEXTROSE 0.25 MCG/KG/MIN: 200 INJECTION, SOLUTION INTRAVENOUS at 21:37

## 2022-12-04 NOTE — CONSULTS
Infectious Disease Consult    Today's Date: 12/4/2022   Admit Date: 12/2/2022    Impression:   GNR bacteremia  Probable line infection  PICC out  CHF  Prolonged QT precludes oral antibiotic therapy    Plan:   Stop vancomycin if cultures show no gram positive growth  PICC can be replaced Tuesday or so  Will need 2 weeks total IV antibiotic therapy    Anti-infectives:   Cefepime   Vancomycin     Subjective:   Date of Consultation:  December 4, 2022  Referring Physician: Dr Em Herman    Patient is a 38 y.o. female admitted with fevers after PICC manipulation. She is on milrinone at home for CHF. GNR are growing from blood cultures and we are asked to see her in consultation. Patient Active Problem List   Diagnosis Code    Depression F32. A    GERD (gastroesophageal reflux disease) K21.9    Anemia, unspecified D64.9    Itch of skin L29.9    Anxiety F41.9    Costochondritis M94.0    HSV (herpes simplex virus) anogenital infection A60.9    OCD (obsessive compulsive disorder) F42.9    Hoarseness R49.0    IFG (impaired fasting glucose) R73.01    Hyperlipidemia E78.5    Thrombosed external hemorrhoid K64.5    Vitamin D deficiency E55.9    Inflammatory arthritis M19.90    Recurrent depression (HCC) F33.9    Mood disorder (HCC) F39    Chronic pain syndrome G89.4    Fibromyalgia M79.7    Panic attack F41.0    Tremor R25.1    Class 2 obesity due to excess calories without serious comorbidity in adult E66.09    Gastroparesis K31.84    Plantar fasciitis of left foot M72.2    Positive KITTY (antinuclear antibody) R76.8    Severe obesity (HCC) E66.01    Neuropathy G62.9    Acute exacerbation of CHF (congestive heart failure) (HCC) I50.9    Coronary artery disease involving native coronary artery of native heart without angina pectoris I25.10    Chronic systolic (congestive) heart failure I50.22    CHF (congestive heart failure) ([facility]) I50.9    Fever R50.9     Past Medical History:   Diagnosis Date    Anemia NEC     Arthritis Chronic pain     fybromyalsia    Depression     anxiety, depression, OCD    GERD (gastroesophageal reflux disease)     Hypertension     Hypertension     Ill-defined condition     Fibromyalia gastricparesis    MI (myocardial infarction) ([facility] [address]     Musculoskeletal disorder     SOB (shortness of breath)     Stool color black       Family History   Problem Relation Age of Onset    Hypertension Father     Elevated Lipids Father     Arthritis-rheumatoid Mother         ? Lupus vs RA    Lung Disease Mother     Heart Disease Mother     Cancer Mother         breast in 39y    COPD Mother     Hypertension Mother     Stroke Mother         3-4 strokes    Breast Cancer Mother 50    Diabetes Maternal Grandmother       Social History     Tobacco Use    Smoking status: Former     Packs/day: 0.25     Years: 8.00     Pack years: 2.00     Types: Cigarettes     Quit date: 2022     Years since quittin.3    Smokeless tobacco: Never   Substance Use Topics    Alcohol use: Not Currently     Alcohol/week: 1.0 standard drink     Types: 1 Glasses of wine per week     Comment: Wine with special occassions - not since July     Past Surgical History:   Procedure Laterality Date    HX CORONARY STENT PLACEMENT      HX GYN           HX HEENT  2002    wisdom teeth extraction    UPPER GI ENDOSCOPY,BIOPSY  2018         UPPER GI ENDOSCOPY,DIAGNOSIS  2018           Prior to Admission medications    Medication Sig Start Date End Date Taking? Authorizing Provider   LORazepam (ATIVAN) 1 mg tablet Take 1 mg by mouth every eight (8) hours as needed. 22   Provider, Historical   folic acid (FOLVITE) 1 mg tablet TAKE 1 TABLET EVERY DAY 22   Jeanna Lopez MD   spironolactone (ALDACTONE) 25 mg tablet Take 1 Tablet by mouth daily. 22   Angela Bosch NP   metoprolol succinate (TOPROL-XL) 25 mg XL tablet Take 0.5 Tablets by mouth every twelve (12) hours.  22   Ulises Chamberlain NP   digoxin (LANOXIN) 0.25 mg tablet Take 1 Tablet by mouth daily. 11/28/22   Krystal Bosch NP   allopurinoL (ZYLOPRIM) 100 mg tablet Take 0.5 Tablets by mouth daily. 11/28/22   Krystal Bosch NP   bumetanide (BUMEX) 2 mg tablet Take 0.5 Tablets by mouth daily. May take an additional tablet as needed to keep weight at 188 lb 11/27/22   Adriana Curiel NP   magnesium oxide (MAG-OX) 400 mg tablet Take 1 Tablet by mouth two (2) times a day. 11/27/22   Adriana Curiel NP   potassium chloride (KLOR-CON M10) 10 mEq tablet Take 2 Tablets by mouth two (2) times a day. 11/27/22   Adriana Curiel NP   cyclobenzaprine (FLEXERIL) 10 mg tablet Take 1 Tablet by mouth three (3) times daily as needed for Muscle Spasm(s). Has not needed 11/18/22   Flash Duval MD   cinacalcet [facility]) 30 mg tablet Take 1 Tablet by mouth daily (with breakfast) for 90 days. 11/10/22 2/8/23  Eliceo Salcido MD   docusate sodium (COLACE) 100 mg capsule Take 1 Capsule by mouth daily for 90 days. 11/10/22 2/8/23  Eliceo Salcido MD   milrinone (PRIMACOR) 20 mg/100 mL (200 mcg/mL) infusion 21.35 mcg/min by IntraVENous route continuous for 30 days. 11/9/22 12/9/22  Eliceo Salcido MD   pramipexole (MIRAPEX) 0.125 mg tablet Take 1 Tablet by mouth nightly for 30 days. 11/9/22 12/9/22  Eliceo Salcido MD   empagliflozin (Jardiance) 10 mg tablet Take 1 Tablet by mouth daily for 60 days. 11/9/22 1/8/23  Eliceo Salcido MD   promethazine (PHENERGAN) 25 mg suppository Insert 1 Suppository into rectum every six (6) hours as needed for Nausea for up to 5 doses. Patient not taking: No sig reported 10/17/22   Sosa Moreno NP   promethazine (PHENERGAN) 25 mg tablet Take 1 Tablet by mouth every six (6) hours as needed for Nausea for up to 10 doses.   Patient not taking: Reported on 11/17/2022 10/17/22   Sosa Moreno NP   ondansetron (ZOFRAN ODT) 4 mg disintegrating tablet Take 1 Tablet by mouth every eight (8) hours as needed for Nausea or Vomiting. 10/14/22   Lu Garcia MD   rosuvastatin (CRESTOR) 20 mg tablet Take 1 Tablet by mouth every Monday, Wednesday, Friday. Patient not taking: Reported on 11/17/2022 9/23/22   Oswald OCHOA DO   levocetirizine Jacque Colette) 5 mg tablet TAKE 1 TABLET EVERY DAY 9/15/22   Lu Garcia MD   famotidine (PEPCID) 20 mg tablet TAKE 1 TABLET BY MOUTH TWICE A DAY  Patient not taking: Reported on 11/17/2022 9/14/22   Lu Garcia MD   ezetimibe (ZETIA) 10 mg tablet Take 1 Tablet by mouth daily. Patient not taking: Reported on 11/17/2022 9/9/22   Johnathon BAGLEY NP   metFORMIN ER (GLUCOPHAGE XR) 500 mg tablet Take 1 Tablet by mouth daily (with dinner). Patient not taking: Reported on 11/17/2022 9/8/22   Farshad Talamantes MD   montelukast (SINGULAIR) 10 mg tablet TAKE 1 TABLET EVERY DAY 8/24/22   Lu Garcia MD   diazePAM (VALIUM) 5 mg tablet 5 mg every eight (8) hours as needed. 5 mg, 3 times daily 8/15/22   Provider, Historical   albuterol-ipratropium (DUO-NEB) 2.5 mg-0.5 mg/3 ml nebu 3 mL by Nebulization route every four (4) hours as needed. Provider, Historical   fluticasone propionate (FLONASE) 50 mcg/actuation nasal spray 2 Sprays by Both Nostrils route daily. Provider, Historical   ipratropium (ATROVENT) 21 mcg (0.03 %) nasal spray 1 Pfeifer by Both Nostrils route every twelve (12) hours. Patient taking differently: 1 Spray by Both Nostrils route as needed. 8/16/22   Enid Groves MD   acetaminophen (TYLENOL) 325 mg tablet Take  by mouth every four (4) hours as needed for Pain. Provider, Historical   albuterol (PROVENTIL HFA, VENTOLIN HFA, PROAIR HFA) 90 mcg/actuation inhaler Take  by inhalation as needed.     Other, MD Nestor   prasugreL (EFFIENT) 10 mg tablet  7/26/22   Provider, Historical   nitroglycerin (NITROSTAT) 0.4 mg SL tablet  7/26/22   Provider, Historical   aspirin delayed-release 81 mg tablet 81 mg. 7/26/22   Provider, Historical pantoprazole (PROTONIX) 40 mg tablet TAKE 1 TABLET TWICE DAILY 22   Vandana Tsai MD   ferrous sulfate 325 mg (65 mg iron) tablet TAKE 1 TABLET BY MOUTH ONCE DAILY BEFORE BREAKFAST 22   Vandana Tsai MD   traZODone (DESYREL) 100 mg tablet Weaning-150 mg at night 22   Provider, Historical   escitalopram oxalate (LEXAPRO) 20 mg tablet Take 20 mg by mouth daily. Provider, Historical   clonazePAM (KLONOPIN) 1 mg tablet Take 1.5 mg by mouth nightly. Provider, Historical   cholecalciferol, vitamin D3, 50 mcg (2,000 unit) tab 2,000 Units. Provider, Historical       Allergies   Allergen Reactions    Abilify [Aripiprazole] Anxiety     Can not tolerate     Sulfa (Sulfonamide Antibiotics) Hives    Vicodin [Hydrocodone-Acetaminophen] Nausea and Vomiting        Review of Systems:  A comprehensive review of systems was negative except for that written in the History of Present Illness. Objective:     Visit Vitals  BP (!) 95/58   Pulse 99   Temp 98 °F (36.7 °C)   Resp 21   Ht 5' 4\" (1.626 m)   Wt 83.9 kg (184 lb 15.5 oz)   SpO2 95%   BMI 31.75 kg/m²     Temp (24hrs), Av.8 °F (37.1 °C), Min:97.7 °F (36.5 °C), Max:100.3 °F (37.9 °C)       Lines:  Peripheral IV:       Physical Exam:  Lungs:  clear to auscultation bilaterally  Heart:  regular rate and rhythm  Abdomen:  soft, non-tender.  Bowel sounds normal. No masses,  no organomegaly  Skin:  no rash or abnormalities    Data Review:     CBC:  Recent Labs     22  04422  0204 22  1552   WBC 5.3 9.8 4.8   GRANS 49 87* 68   MONOS 17* 7 10   EOS 4 0 0   ANEU 2.6 8.5* 3.2   ABL 1.5 0.6* 1.0   HGB 11.6 13.0 13.6   HCT 35.5 39.8 40.8    167 191       BMP:  Recent Labs     22  0441 22  0204 22  1552   CREA 0.80 1.02 0.92   BUN 11 10 10    133* 130*   K 4.0 3.4* 3.5    100 97   CO2 24 26 26   AGAP 6 7 7   GLU 81 112* 96       LFTS:  Recent Labs     22  1552   TBILI 1.4*   ALT 92*   AP 128*   TP 8.1   ALB 3.5       Microbiology:     All Micro Results       Procedure Component Value Units Date/Time    CULTURE, BLOOD [[id number]]  (Abnormal) Collected: 12/02/22 1938    Order Status: Completed Specimen: Blood Updated: 12/03/22 1635     Special Requests: NO SPECIAL REQUESTS        Culture result:       PROBABLE KLEBSIELLA PNEUMONIAE GROWING IN 1 OF 2 BOTTLES DRAWN LA 1 SITE                  REMAINING BOTTLE(S) HAS/HAVE NO GROWTH SO FAR          CULTURE, BLOOD [[id number]]  (Abnormal) Collected: 12/02/22 2033    Order Status: Completed Specimen: Blood from PICC Updated: 12/03/22 1408     Special Requests: NO SPECIAL REQUESTS        Culture result:       GRAM NEGATIVE RODS GROWING IN 1 OF 2 BOTTLES DRAWN SITE  = R PICC                  REMAINING BOTTLE(S) HAS/HAVE NO GROWTH SO FAR          CULTURE, BLOOD, PAIRED [[id number]]     Order Status: Canceled Specimen: Blood     CULTURE, MRSA [[id number]]     Order Status: Sent Specimen: Nasal     URINE CULTURE HOLD SAMPLE [[id number]] Collected: 12/03/22 0210    Order Status: Completed Specimen: Urine Updated: 12/03/22 0216     Urine culture hold       Urine on hold in Microbiology dept for 2 days. If unpreserved urine is submitted, it cannot be used for addtional testing after 24 hours, recollection will be required.                   Imaging:   Reviewed     Signed By: Aditi Nicole MD     December 4, 2022

## 2022-12-04 NOTE — PROGRESS NOTES
[facility]  Hospitalist Group                                                                                          Hospitalist Progress Note  Chelsea Castillo MD  Answering service: [phone] from in house phone        Date of Service:  2022  NAME:  Gary Putnam  :  1980  MRN:  [id number]      Admission Summary:   Gary Putnam is a 38 y.o. female with apmhx depression, anxiety, CAD, HFrEF (EF 20-25%)s/p Life Vest on milrinone, prolonged Qtc, GERD, who presents with fever, and is being admitted for sepsis of unknown etiology. She reports chills and fever with T max to 100.7 after her PICC line was flushed on . Accompanying sx include decreased     In the ED, T max was 100.8. Other VSS. Labs were significant for sodium 130. EKG ordered, and CXR negative for acute intracranial process. In the ED, she received vancomycin, and cefepime. Interval history / Subjective:   Patient seen and examined earlier today by me for follow-up of sepsis. I explained to her that the likely source was her PICC line. No acute events overnight. At the time my exam, patient still had her PICC line placed. Patient reported frustration with her condition and said she was ready to give up.      Assessment & Plan:     #hypotension  -pt with SBP in the 80's despite small fluid bolus, and spo2 now 91% on RA  -consult ICU as unclear if hypotension is cardiogenic, septic, or hypovolemic  -hold spironolactone, bumex, and metop  -consult ICU  12/3-continue current management with fluids  Proving  -stable and improved     #Sepsis of unknown source, likely PICC line  -fever, and tachycardia with suspected PICC line infection  /3-source looks to be PICC line  Gram-negative rods in blood  Sinew Vanco and cefepime  Monitor  We will continue to monitor respiratory status due to severe CHF  Remove PICC  -PICC removed today  ID consulted and patient will need about 2 weeks of antibiotic therapy IV history of QTC does not allow for p.o. therapy  Can replace PICC around Tuesday     #HFrEF, Stage C, NYHA IV s/p life vest, on milrinone  #CAD  -continue home bumex, spironolactone, digoxin, jardiance, and metoprolol  -continue effient, and ASA  -continue milrinone at current rate  -consult advanced HF  12/3-continue milrinone  12/4-appreciate advanced heart failure     #Depression  #anxiety  -continue home klonopin scheduled, and prn alprazolam with lexapro  -EKG pending     #Gout  -continue home allopurinol     #GERD  -PPI     #+KITTY  -currently being evaluated for possible autoimmune disease     Code status: full  Prophylaxis: lovenox  Care Plan discussed with: Patient, nurse  Anticipated Disposition: 48 hours     Hospital Problems  Date Reviewed: 10/14/2022            Codes Class Noted POA    Fever ICD-10-CM: R50.9  ICD-9-CM: 780.60  12/2/2022 Unknown           Review of Systems:   A comprehensive review of systems was negative except for that written in the HPI. Vital Signs:    Last 24hrs VS reviewed since prior progress note. Most recent are:  Visit Vitals  BP (!) 95/58   Pulse (!) 103   Temp 98 °F (36.7 °C)   Resp 21   Ht 5' 4\" (1.626 m)   Wt 83.9 kg (184 lb 15.5 oz)   SpO2 95%   BMI 31.75 kg/m²         Intake/Output Summary (Last 24 hours) at 12/4/2022 1358  Last data filed at 12/3/2022 1452  Gross per 24 hour   Intake 446.72 ml   Output --   Net 446.72 ml          Physical Examination:     I had a face to face encounter with this patient and independently examined them on 12/4/2022 as outlined below:          Constitutional:  No acute distress, cooperative, pleasant    ENT:  Oral mucosa moist, oropharynx benign. Resp:  CTA bilaterally. No wheezing/rhonchi/rales. No accessory muscle use. CV:  Regular rhythm, normal rate, no murmurs, gallops, rubs    GI:  Soft, non distended, non tender.  normoactive bowel sounds, no hepatosplenomegaly     Musculoskeletal:  No edema, warm, 2+ pulses throughout    Neurologic:  Moves all extremities. AAOx3, CN II-XII reviewed            Data Review:    Review and/or order of clinical lab test  Review and/or order of tests in the radiology section of CPT  Review and/or order of tests in the medicine section of CPT      Labs:     Recent Labs     12/04/22  0441 12/03/22  0204   WBC 5.3 9.8   HGB 11.6 13.0   HCT 35.5 39.8    167       Recent Labs     12/04/22  0441 12/03/22  0204 12/02/22  1552    133* 130*   K 4.0 3.4* 3.5    100 97   CO2 24 26 26   BUN 11 10 10   CREA 0.80 1.02 0.92   GLU 81 112* 96   CA 8.5 8.0* 8.1*   MG 2.4 2.2  --        Recent Labs     12/02/22  1552   ALT 92*   *   TBILI 1.4*   TP 8.1   ALB 3.5   GLOB 4.6*       No results for input(s): INR, PTP, APTT, INREXT, INREXT in the last 72 hours. No results for input(s): FE, TIBC, PSAT, FERR in the last 72 hours. Lab Results   Component Value Date/Time    Folate 20.0 10/23/2022 04:24 AM        No results for input(s): PH, PCO2, PO2 in the last 72 hours. No results for input(s): CPK, CKNDX, TROIQ in the last 72 hours.     No lab exists for component: CPKMB  Lab Results   Component Value Date/Time    Cholesterol, total 95 10/22/2022 06:26 AM    HDL Cholesterol 32 10/22/2022 06:26 AM    LDL, calculated 45 10/22/2022 06:26 AM    Triglyceride 90 10/22/2022 06:26 AM    CHOL/HDL Ratio 3.0 10/22/2022 06:26 AM     Lab Results   Component Value Date/Time    Glucose (POC) 73 12/04/2022 11:10 AM    Glucose (POC) 92 11/09/2022 04:06 PM    Glucose (POC) 123 (H) 11/09/2022 11:32 AM    Glucose (POC) 94 11/09/2022 07:18 AM    Glucose (POC) 105 11/08/2022 11:06 PM     Lab Results   Component Value Date/Time    Color YELLOW/STRAW 12/03/2022 02:10 AM    Appearance CLEAR 12/03/2022 02:10 AM    Specific gravity 1.008 12/03/2022 02:10 AM    Specific gravity 1.025 10/17/2022 08:42 AM    pH (UA) 5.5 12/03/2022 02:10 AM    Protein Negative 12/03/2022 02:10 AM    Glucose 500 (A) 12/03/2022 02:10 AM    Ketone Negative 12/03/2022 02:10 AM    Bilirubin Negative 12/03/2022 02:10 AM    Urobilinogen 0.2 12/03/2022 02:10 AM    Nitrites Negative 12/03/2022 02:10 AM    Leukocyte Esterase Negative 12/03/2022 02:10 AM    Epithelial cells FEW 10/17/2022 08:42 AM    Bacteria 2+ (A) 10/17/2022 08:42 AM    WBC 5-10 10/17/2022 08:42 AM    RBC 5-10 10/17/2022 08:42 AM         Medications Reviewed:     Current Facility-Administered Medications   Medication Dose Route Frequency    [START ON 12/5/2022] Vancomycin Random - Please draw level on 12/5 with AM labs, thanks!    Other ONCE    milrinone (PRIMACOR) 20 MG/100 ML D5W infusion  0.25 mcg/kg/min IntraVENous CONTINUOUS    0.9% sodium chloride infusion  100 mL/hr IntraVENous CONTINUOUS    vancomycin (VANCOCIN) 1,000 mg in 0.9% sodium chloride 250 mL (Iuru2Vod)  1,000 mg IntraVENous Q12H    Vancomycin dosing per pharmacy   Other Rx Dosing/Monitoring    potassium chloride SR (KLOR-CON 10) tablet 20 mEq  20 mEq Oral DAILY    sodium chloride (NS) flush 5-40 mL  5-40 mL IntraVENous Q8H    sodium chloride (NS) flush 5-40 mL  5-40 mL IntraVENous PRN    acetaminophen (TYLENOL) tablet 650 mg  650 mg Oral Q6H PRN    Or    acetaminophen (TYLENOL) suppository 650 mg  650 mg Rectal Q6H PRN    ondansetron (ZOFRAN ODT) tablet 4 mg  4 mg Oral Q8H PRN    Or    ondansetron (ZOFRAN) injection 4 mg  4 mg IntraVENous Q6H PRN    enoxaparin (LOVENOX) injection 40 mg  40 mg SubCUTAneous DAILY    cefepime (MAXIPIME) 2 g in 0.9% sodium chloride (MBP/ADV) 100 mL MBP  2 g IntraVENous Q8H    prasugreL (EFFIENT) tablet 10 mg  10 mg Oral DAILY    LORazepam (ATIVAN) tablet 1 mg  1 mg Oral Q8H PRN    escitalopram oxalate (LEXAPRO) tablet 20 mg  20 mg Oral DAILY    clonazePAM (KlonoPIN) tablet 1.5 mg  1.5 mg Oral QHS    aspirin delayed-release tablet 81 mg  81 mg Oral DAILY    albuterol-ipratropium (DUO-NEB) 2.5 MG-0.5 MG/3 ML  3 mL Nebulization Q4H PRN    spironolactone (ALDACTONE) tablet 25 mg  25 mg Oral DAILY    pantoprazole (PROTONIX) tablet 40 mg  40 mg Oral BID    [Held by provider] metoprolol succinate (TOPROL-XL) XL tablet 12.5 mg  12.5 mg Oral Q12H    cetirizine (ZYRTEC) tablet 10 mg  10 mg Oral DAILY    montelukast (SINGULAIR) tablet 10 mg  10 mg Oral QHS    empagliflozin (JARDIANCE) tablet 10 mg  10 mg Oral DAILY    digoxin (LANOXIN) tablet 0.25 mg  0.25 mg Oral DAILY    [Held by provider] bumetanide (BUMEX) tablet 1 mg  1 mg Oral DAILY    allopurinoL (ZYLOPRIM) tablet 50 mg  50 mg Oral DAILY     ______________________________________________________________________  EXPECTED LENGTH OF STAY: - - -  ACTUAL LENGTH OF STAY:          2                 Chelsea Castillo MD

## 2022-12-04 NOTE — PROGRESS NOTES
Bedside and Verbal shift change report given to [address] [place] [address] (oncoming nurse) by Indy Oreilly (offgoing nurse). Report included the following information SBAR, Kardex, ED Summary, Intake/Output, MAR, Recent Results, Cardiac Rhythm NSR/BBB, and Alarm Parameters . Problem: Falls - Risk of  Goal: *Absence of Falls  Description: Document Marie Fox Fall Risk and appropriate interventions in the flowsheet. Outcome: Progressing Towards Goal  Note: Fall Risk Interventions:            Medication Interventions: Patient to call before getting OOB, Teach patient to arise slowly    Elimination Interventions: Bed/chair exit alarm, Call light in reach, Patient to call for help with toileting needs, Toilet paper/wipes in reach              Problem: Patient Education: Go to Patient Education Activity  Goal: Patient/Family Education  Outcome: Progressing Towards Goal     Problem: Pressure Injury - Risk of  Goal: *Prevention of pressure injury  Description: Document Darshan Scale and appropriate interventions in the flowsheet. Outcome: Progressing Towards Goal  Note: Pressure Injury Interventions:  Sensory Interventions: Assess changes in LOC, Check visual cues for pain, Keep linens dry and wrinkle-free, Minimize linen layers, Turn and reposition approx. every two hours (pillows and wedges if needed)    Moisture Interventions: Absorbent underpads, Minimize layers    Activity Interventions: Chair cushion, Increase time out of bed, PT/OT evaluation    Mobility Interventions: Chair cushion, HOB 30 degrees or less, Pressure redistribution bed/mattress (bed type), PT/OT evaluation, Turn and reposition approx.  every two hours(pillow and wedges)    Nutrition Interventions: Document food/fluid/supplement intake, Offer support with meals,snacks and hydration                     Problem: Patient Education: Go to Patient Education Activity  Goal: Patient/Family Education  Outcome: Progressing Towards Goal     Problem: Patient Education: Go to Patient Education Activity  Goal: Patient/Family Education  Outcome: Progressing Towards Goal     Problem: Heart Failure: Day 1  Goal: Off Pathway (Use only if patient is Off Pathway)  Outcome: Progressing Towards Goal  Goal: Activity/Safety  Outcome: Progressing Towards Goal  Goal: Consults, if ordered  Outcome: Progressing Towards Goal  Goal: Diagnostic Test/Procedures  Outcome: Progressing Towards Goal  Goal: Nutrition/Diet  Outcome: Progressing Towards Goal  Goal: Discharge Planning  Outcome: Progressing Towards Goal  Goal: Medications  Outcome: Progressing Towards Goal  Goal: Respiratory  Outcome: Progressing Towards Goal  Goal: Treatments/Interventions/Procedures  Outcome: Progressing Towards Goal  Goal: Psychosocial  Outcome: Progressing Towards Goal  Goal: *Oxygen saturation within defined limits  Outcome: Progressing Towards Goal  Goal: *Hemodynamically stable  Outcome: Progressing Towards Goal  Goal: *Optimal pain control at patient's stated goal  Outcome: Progressing Towards Goal  Goal: *Anxiety reduced or absent  Outcome: Progressing Towards Goal     Problem: Heart Failure: Day 2  Goal: Off Pathway (Use only if patient is Off Pathway)  Outcome: Progressing Towards Goal  Goal: Activity/Safety  Outcome: Progressing Towards Goal  Goal: Consults, if ordered  Outcome: Progressing Towards Goal  Goal: Diagnostic Test/Procedures  Outcome: Progressing Towards Goal  Goal: Nutrition/Diet  Outcome: Progressing Towards Goal  Goal: Discharge Planning  Outcome: Progressing Towards Goal  Goal: Medications  Outcome: Progressing Towards Goal  Goal: Respiratory  Outcome: Progressing Towards Goal  Goal: Treatments/Interventions/Procedures  Outcome: Progressing Towards Goal  Goal: Psychosocial  Outcome: Progressing Towards Goal  Goal: *Oxygen saturation within defined limits  Outcome: Progressing Towards Goal  Goal: *Hemodynamically stable  Outcome: Progressing Towards Goal  Goal: *Optimal pain control at patient's stated goal  Outcome: Progressing Towards Goal  Goal: *Anxiety reduced or absent  Outcome: Progressing Towards Goal  Goal: *Demonstrates progressive activity  Outcome: Progressing Towards Goal     Problem: Heart Failure: Day 3  Goal: Off Pathway (Use only if patient is Off Pathway)  Outcome: Progressing Towards Goal  Goal: Activity/Safety  Outcome: Progressing Towards Goal  Goal: Diagnostic Test/Procedures  Outcome: Progressing Towards Goal  Goal: Nutrition/Diet  Outcome: Progressing Towards Goal  Goal: Discharge Planning  Outcome: Progressing Towards Goal  Goal: Medications  Outcome: Progressing Towards Goal  Goal: Respiratory  Outcome: Progressing Towards Goal  Goal: Treatments/Interventions/Procedures  Outcome: Progressing Towards Goal  Goal: Psychosocial  Outcome: Progressing Towards Goal  Goal: *Oxygen saturation within defined limits  Outcome: Progressing Towards Goal  Goal: *Hemodynamically stable  Outcome: Progressing Towards Goal  Goal: *Optimal pain control at patient's stated goal  Outcome: Progressing Towards Goal  Goal: *Anxiety reduced or absent  Outcome: Progressing Towards Goal  Goal: *Demonstrates progressive activity  Outcome: Progressing Towards Goal     Problem: Heart Failure: Day 4  Goal: Off Pathway (Use only if patient is Off Pathway)  Outcome: Progressing Towards Goal  Goal: Activity/Safety  Outcome: Progressing Towards Goal  Goal: Diagnostic Test/Procedures  Outcome: Progressing Towards Goal  Goal: Nutrition/Diet  Outcome: Progressing Towards Goal  Goal: Discharge Planning  Outcome: Progressing Towards Goal  Goal: Medications  Outcome: Progressing Towards Goal  Goal: Respiratory  Outcome: Progressing Towards Goal  Goal: Treatments/Interventions/Procedures  Outcome: Progressing Towards Goal  Goal: Psychosocial  Outcome: Progressing Towards Goal  Goal: *Oxygen saturation within defined limits  Outcome: Progressing Towards Goal  Goal: *Hemodynamically stable  Outcome: Progressing Towards Goal  Goal: *Optimal pain control at patient's stated goal  Outcome: Progressing Towards Goal  Goal: *Anxiety reduced or absent  Outcome: Progressing Towards Goal  Goal: *Demonstrates progressive activity  Outcome: Progressing Towards Goal     Problem: Heart Failure: Day 5  Goal: Off Pathway (Use only if patient is Off Pathway)  Outcome: Progressing Towards Goal  Goal: Activity/Safety  Outcome: Progressing Towards Goal  Goal: Diagnostic Test/Procedures  Outcome: Progressing Towards Goal  Goal: Nutrition/Diet  Outcome: Progressing Towards Goal  Goal: Discharge Planning  Outcome: Progressing Towards Goal  Goal: Medications  Outcome: Progressing Towards Goal  Goal: Respiratory  Outcome: Progressing Towards Goal  Goal: Treatments/Interventions/Procedures  Outcome: Progressing Towards Goal  Goal: Psychosocial  Outcome: Progressing Towards Goal

## 2022-12-04 NOTE — PROGRESS NOTES
[address]  Inpatient Progress Note      Patient name: Kody Olivia  Patient : 1980  Patient MRN: [id number]  Consulting MD: Azalea Hernandez, *  Date of service: 22    REASON FOR REFERRAL:  Management of chronic systolic heart failure     PLAN OF CARE:  38 y.o. female with ischemic cardiomyopathy, LVEF 15-20%, stage C, NYHA class IIIB initiated on inotropic support; not able to tolerate dobutamine due to tachycardia and switched to milrinone; limited options to slow down heart rate due to QTc > 500ms in the setting of antidepressant use; appears to have HR in better control; initiated inpatient HT/LVAD workup  Concerns regarding HT/LVAD candidacy upon initial review include possible autoimmune disease that requires diagnosis/treatment, psychiatric condition that needs evaluation/treatment and uncertain support system; ongoing evaluation for heart failure etiology (high risk for biopsy and awaiting cMRI)  Length of antibiotic therapy and timing to replace PICC line per ID consult  After hospital discharge patient needs to follow-up with [facility] to evaluate transplant candidacy, ENT for parathyroidectomy, cardiac MRI and sleep medicine, psychiatry, rheumatology   If no recovery of LVEF on 3-month echo, then consider ICD/CRT for non-LBBB QRS 146ms     RECOMMENDATIONS:  Continue milrinone 0.25mcg/kg/min, re-dose to current weight 83.9kg (originally dosed to weight 85.4kg)  Continue digoxin 0.25mg daily, check digoxin levels today and daily (goal 0.7-1.2)   Hold off corlanor due to long QT; likely in the setting of taking antidepressants, d/w Pharm. D. would not use with QTc > 500ms; QTc 539ms 22  Hold toprol XL 12.5mg q12h until BP improves  Cannot tolerate ACEi/ARB/ARNi due to hypotension   Resume spironolactone 25mg daily  Continue jardiance 10mg daily  Hold bumex; give IVF 500cc over 5 hours; goal weight 85.4kg  Hold requip due to tachycardia  Hold crestor due to rise in TB; consider resuming if TB stable as OP; check LFT today and daily  Keep K > 4 and Mag > 2  Cont allopurinol 50mg daily   Continue ASA and prasugrel per primary cards  Treatment of STU: inpatient eval for STU  Continue lifevest  Plan on outpatient cardiac MRI  Antibiotics per primary team and ID consult  Remove PICC line and PIV established for infusion of milrinone  Dr. Jacqueline Pastor consult appreciated, too high risk for cardiac biopsy  Dr. Rivka Becker consult appreciated; no further workup, needs to see rheumatology outpatient   completing psychosocial evaluation (patient lives 1.5 hours away; need to connect with patient's psychiatrist)  Psychiatry consultation for anxiety appreciated for possible wean of antidepressants  Nephrology consult appreciated; will benefit from Parathyroidectomy if she is going for LVAD but can also be managed medically for now; referred to Dr. Sherly Timmons for parathyroidectomy  Nutritionist consult  Heart failure education  Needs Advanced care plan  Patient would like to speak to CM re: concerns re: HH     IMPRESSION:  Fatigue  Shortness of breath  Volume overload  Acute on Chronic systolic heart failure  Stage C, NYHA class IV symptoms  ischemic cardiomyopathy, LVEF 20-25%  RBBB, QRS 146ms  CAD  STEMI July 2022 s/p RICKY to LAD  At risk of sudden cardiac death- lifevest ordered   HTN  Obesity  LHD  Pre-diabetes  Esophageal stenosis s/p dilation  RLS  Fibromyalgia  Anxiety and Depression     INTERVAL HISTORY:  No acute overnight events  Afebrile  SBP 90s/50s, HR 90-100s  I/O inaccurate  Weight 183lbs  Cr 0.8  K 4.0  Ca 9.2  Pro-NT-BNP stable       LIFE GOALS:  Patient's personal goals include: get better  Important upcoming milestones or family events: the holidays coming up  The patient identifies the following as important for living well: being independent     CARDIAC IMAGING:  Echo 10/22/22    Left Ventricle: Severely reduced left ventricular systolic function with a visually estimated EF of 15 - 20%. Left ventricle is mildly dilated. Normal wall thickness. Moderate hypokinesis of the following segments: basal anterior, basal anterolateral, basal anteroseptal, basal inferior, basal inferolateral, basal inferoseptal, mid anterior, mid anterolateral, mid anteroseptal, mid inferior, mid inferolateral and mid inferoseptal. Severe hypokinesis of the following segments: apical anterior, apical septal, apical inferior and apical lateral. Grade III diastolic dysfunction with increased LAP. Mitral Valve: Mild annular dilation. Moderate regurgitation. Tricuspid Valve: Moderately elevated RVSP. The estimated RVSP is 51 mmHg. Left Atrium: Left atrium is mildly dilated. Pericardium: Trivial pericardial effusion present. No indication of cardiac tamponade. Contrast used: Definity. Echo (8/14/22)    Left Ventricle: Severely reduced left ventricular systolic function with a visually estimated EF of 20 - 25%. Left ventricle is mildly dilated. Increased wall thickness. See diagram for wall motion findings. Grade II diastolic dysfunction with increased LAP. Right Ventricle: Not well visualized. Tricuspid Valve: Moderately elevated RVSP. [address] 10/27/22  PA 56/34/43, RA 16, PCWP 29, CI daisy 1.46 at 199lbs     6MW 11/8/22 164meters            BRIEF HISTORY OF PRESENT ILLNESS:  Katherine Ocasio is a 38 y.o. female with h/o HTN, CAD s/p STEMI in July 2022 (DESx1 to LAD; treated at [facility]) with ischemic cardiomyopathy (EF 20-25%), CHF, GERD, RLS, and fibromyalgia seen as a new patient in [facility] on 10/21/22 and found to be fluid overloaded, and with suicidal ideation. Pt was taken to the ER for further evaluation and admission for acute on chronic HF and mental health crisis.     PHYSICAL EXAM:  Visit Vitals  BP (!) 95/58   Pulse (!) 103   Temp 98 °F (36.7 °C)   Resp 21   Ht 5' 4\" (1.626 m)   Wt 184 lb 15.5 oz (83.9 kg)   SpO2 95%   BMI 31.75 kg/m² General: Patient is well developed, well-nourished in no acute distress  HEENT: Unremarkable   Neck: Supple. No evidence of thyroid enlargements or lymphadenopathy. JVD: Cannot be appreciated   Lungs: Breath sounds are equal and clear bilaterally. No wheezes, rhonchi, or rales. Heart: Regular rate and rhythm with normal S1 and S2. No murmurs, gallops or rubs. Abdomen: Soft, no mass or tenderness. No organomegaly or hernia. Bowel sounds present. Genitourinary and rectal: deferred  Extremities: No cyanosis, clubbing, or edema. Neurologic: No focal sensory or motor deficits are noted. Grossly intact. Psychiatric: Awake, alert an doriented x 3. Appropriate mood and affect. Skin: Warm, dry and well perfused. REVIEW OF SYSTEMS:  General: Denies fever. Ear, nose and throat: Denies difficulty hearing, sinus problems, nosebleeds  Cardiovascular: see above in the interval history  Respiratory: Denies cough, wheezing, sputum production, hemoptysis.   Gastrointestinal: Denies heartburn, constipation, diarrhea, abdominal pain, nausea, blood in stool  Kidney and bladder: Denies painful urination, frequent urination  Musculoskeletal: Denies joint pain, muscle weakness  Skin and hair: Denies change in existing skin lesions    PAST MEDICAL HISTORY:  Past Medical History:   Diagnosis Date    Anemia NEC     Arthritis     Chronic pain     fybromyalsia    Depression     anxiety, depression, OCD    GERD (gastroesophageal reflux disease)     Hypertension     Hypertension     Ill-defined condition     Fibromyalia gastricparesis    MI (myocardial infarction) ([facility] [address]     Musculoskeletal disorder     SOB (shortness of breath)     Stool color black        PAST SURGICAL HISTORY:  Past Surgical History:   Procedure Laterality Date    HX CORONARY STENT PLACEMENT      HX GYN           HX HEENT  2002    wisdom teeth extraction    UPPER GI ENDOSCOPY,BIOPSY  2018         UPPER GI ENDOSCOPY,DIAGNOSIS  2018 FAMILY HISTORY:  Family History   Problem Relation Age of Onset    Hypertension Father     Elevated Lipids Father     Arthritis-rheumatoid Mother         ? Lupus vs RA    Lung Disease Mother     Heart Disease Mother     Cancer Mother         breast in 39y    COPD Mother     Hypertension Mother     Stroke Mother         3-4 strokes    Breast Cancer Mother 50    Diabetes Maternal Grandmother        SOCIAL HISTORY:  Social History     Socioeconomic History    Marital status: SINGLE   Tobacco Use    Smoking status: Former     Packs/day: 0.25     Years: 8.00     Pack years: 2.00     Types: Cigarettes     Quit date: 2022     Years since quittin.3    Smokeless tobacco: Never   Vaping Use    Vaping Use: Never used   Substance and Sexual Activity    Alcohol use: Not Currently     Alcohol/week: 1.0 standard drink     Types: 1 Glasses of wine per week     Comment: Wine with special occassions - not since July    Drug use: Not Currently     Types: Marijuana     Comment: Haven't had any since 2022    Sexual activity: Yes     Partners: Male     Birth control/protection: None     Social Determinants of Health     Financial Resource Strain: High Risk    Difficulty of Paying Living Expenses: Very hard   Food Insecurity: No Food Insecurity    Worried About Running Out of Food in the Last Year: Never true    Ran Out of Food in the Last Year: Never true   Transportation Needs: No Transportation Needs    Lack of Transportation (Medical): No    Lack of Transportation (Non-Medical): No   Physical Activity: Inactive    Days of Exercise per Week: 0 days    Minutes of Exercise per Session: 0 min   Stress: Stress Concern Present    Feeling of Stress :  To some extent   Social Connections: Socially Isolated    Frequency of Communication with Friends and Family: Twice a week    Frequency of Social Gatherings with Friends and Family: Never    Attends [employer]ist Services: Never    Active Member of Clubs or Organizations: No Attends Club or Organization Meetings: Never    Marital Status: Never    Housing Stability: Low Risk     Unable to Pay for Housing in the Last Year: No    Number of Places Lived in the Last Year: 1    Unstable Housing in the Last Year: No       LABORATORY RESULTS:     Labs Latest Ref Rng & Units 12/4/2022 12/3/2022 12/2/2022 11/26/2022 11/18/2022 11/14/2022 11/9/2022   WBC 3.6 - 11.0 K/uL 5.3 9.8 4.8 8.5 7.4 7.3 4.9   RBC 3.80 - 5.20 M/uL 4.04 4.47 4.69 4.69 4.94 5.09 4.53   Hemoglobin 11.5 - 16.0 g/dL 11.6 13.0 13.6 13.5 14.1 14.8 13.0   Hematocrit 35.0 - 47.0 % 35.5 39.8 40.8 41.0 43.8 45.0 41.4   MCV 80.0 - 99.0 FL 87.9 89.0 87.0 87.4 88.7 88.4 91.4   Platelets 468 - 037 K/uL 151 167 191 244 269 300 239   Lymphocytes 12 - 49 % 29 6(L) 21 19 35 32 47   Monocytes 5 - 13 % 17(H) 7 10 9 10 9 12   Eosinophils 0 - 7 % 4 0 0 1 2 1 5   Basophils 0 - 1 % 1 0 1 0 1 1 1   Albumin 3.5 - 5.0 g/dL - - 3.5 3.8 4.0 4.1 3.4(L)   Calcium 8.5 - 10.1 MG/DL 8.5 8. 0(L) 8. 1(L) 8.4(L) 9.5 9.6 9.2   Glucose 65 - 100 mg/dL 81 112(H) 96 120(H) 84 127(H) 150(H)   BUN 6 - 20 MG/DL 11 10 10 10 19 12 12   Creatinine 0.55 - 1.02 MG/DL 0.80 1.02 0.92 1.07(H) 0.92 1.23(H) 1.00   Sodium 136 - 145 mmol/L 136 133(L) 130(L) 137 135(L) 134(L) 139   Potassium 3.5 - 5.1 mmol/L 4.0 3.4(L) 3.5 3.5 3.5 3. 1(L) 3.8   TSH 0.36 - 3.74 uIU/mL - - - - - - -   LDH 81 - 246 U/L - - - - - - -   Some recent data might be hidden     Lab Results   Component Value Date/Time    TSH 4.44 (H) 10/21/2022 06:12 PM    TSH 2.12 05/12/2021 10:55 AM    TSH 1.330 12/30/2019 12:00 AM    TSH 3.850 07/25/2018 12:40 PM    TSH 1.620 02/27/2018 08:59 AM    TSH 1.240 12/05/2016 10:13 AM    TSH 1.530 03/18/2016 10:31 AM    TSH 1.400 11/09/2011 09:37 AM    TSH 1.26 08/19/2010 10:36 AM    TSH 1.18 07/28/2010 04:10 PM       ALLERGY:  Allergies   Allergen Reactions    Abilify [Aripiprazole] Anxiety     Can not tolerate     Sulfa (Sulfonamide Antibiotics) Hives    Vicodin [Hydrocodone-Acetaminophen] Nausea and Vomiting        CURRENT MEDICATIONS:    Current Facility-Administered Medications:     [START ON 12/5/2022] Vancomycin Random - Please draw level on 12/5 with AM labs, thanks!, , Other, ONCE, Lisa Khan MD    vancomycin (VANCOCIN) 1,000 mg in 0.9% sodium chloride 250 mL ([id number]), 1,000 mg, IntraVENous, Q12H, Ketan Monte MD, Last Rate: 250 mL/hr at 12/04/22 1205, 1,000 mg at 12/04/22 1205    Vancomycin dosing per pharmacy, , Other, Rx Dosing/Monitoring, Ketan Monte MD    potassium chloride SR (KLOR-CON 10) tablet 20 mEq, 20 mEq, Oral, DAILY, Pedro Pablo Garcia MD, 20 mEq at 12/04/22 0844    milrinone (PRIMACOR) 20 MG/100 ML D5W infusion, 0.25 mcg/kg/min, IntraVENous, CONTINUOUS, Regina Araya MD, Last Rate: 6.2 mL/hr at 12/04/22 0627, 0.25 mcg/kg/min at 12/04/22 6290    sodium chloride (NS) flush 5-40 mL, 5-40 mL, IntraVENous, Q8H, Ketan Monte MD, 10 mL at 12/04/22 2508    sodium chloride (NS) flush 5-40 mL, 5-40 mL, IntraVENous, PRN, Ketan oMnte MD    acetaminophen (TYLENOL) tablet 650 mg, 650 mg, Oral, Q6H PRN, 650 mg at 12/03/22 1653 **OR** acetaminophen (TYLENOL) suppository 650 mg, 650 mg, Rectal, Q6H PRN, Ketan Monte MD    ondansetron (ZOFRAN ODT) tablet 4 mg, 4 mg, Oral, Q8H PRN **OR** ondansetron (ZOFRAN) injection 4 mg, 4 mg, IntraVENous, Q6H PRN, Ketan Monte MD    enoxaparin (LOVENOX) injection 40 mg, 40 mg, SubCUTAneous, DAILY, Ketan Monte MD, 40 mg at 12/04/22 0845    cefepime (MAXIPIME) 2 g in 0.9% sodium chloride (MBP/ADV) 100 mL MBP, 2 g, IntraVENous, Q8H, Ketan Monte MD, Last Rate: 25 mL/hr at 12/04/22 0630, 2 g at 12/04/22 0630    prasugreL (EFFIENT) tablet 10 mg, 10 mg, Oral, DAILY, Ketan Monte MD, 10 mg at 12/04/22 0844    LORazepam (ATIVAN) tablet 1 mg, 1 mg, Oral, Q8H PRN, Ketan Monte MD, 1 mg at 12/03/22 1212    escitalopram oxalate (LEXAPRO) tablet 20 mg, 20 mg, Oral, DAILY, Fany Samson MD, 20 mg at 12/04/22 0844    clonazePAM (KlonoPIN) tablet 1.5 mg, 1.5 mg, Oral, QHS, Fany Samson MD, 0.5 mg at 12/04/22 0034    aspirin delayed-release tablet 81 mg, 81 mg, Oral, DAILY, Fany Samson MD, 81 mg at 12/04/22 0844    albuterol-ipratropium (DUO-NEB) 2.5 MG-0.5 MG/3 ML, 3 mL, Nebulization, Q4H PRN, Fany Samson MD    [Held by provider] spironolactone (ALDACTONE) tablet 25 mg, 25 mg, Oral, DAILY, Fany Samson MD    pantoprazole (PROTONIX) tablet 40 mg, 40 mg, Oral, BID, Fany Samson MD, 40 mg at 12/04/22 0844    [Held by provider] metoprolol succinate (TOPROL-XL) XL tablet 12.5 mg, 12.5 mg, Oral, Q12H, Fany Samson MD    cetirizine (ZYRTEC) tablet 10 mg, 10 mg, Oral, DAILY, Fany Samson MD, 10 mg at 12/04/22 0843    montelukast (SINGULAIR) tablet 10 mg, 10 mg, Oral, QHS, Fany Samson MD, 10 mg at 12/03/22 2247    empagliflozin (JARDIANCE) tablet 10 mg, 10 mg, Oral, DAILY, Fany Samson MD, 10 mg at 12/04/22 0844    digoxin (LANOXIN) tablet 0.25 mg, 0.25 mg, Oral, DAILY, Fany Samson MD, 0.25 mg at 12/04/22 0843    [Held by provider] bumetanide (BUMEX) tablet 1 mg, 1 mg, Oral, DAILY, Fany Samson MD    allopurinoL (ZYLOPRIM) tablet 50 mg, 50 mg, Oral, DAILY, Fany Samson MD, 50 mg at 12/04/22 0570    PATIENT CARE TEAM:  Patient Care Team:  Liang Guzman MD as PCP - General (Family Medicine)  Liang Guzman MD as PCP - [facility]  Cheyanne Okeefe MD (Surgery General)  Marianne Connolly MD (Cardiovascular Disease Physician)  Sakina Mtz MD (Otolaryngology)  Clarita Matamoros MD (Internal Medicine Physician)  J Carlos Colbert MD (Female Pelvic Medicine and Reconstructive Surgery)  Severiano Gartner, MD (Neurology)  Costa Vega MD (Psychiatry)  Shiva Dahl RN as Care Transitions Nurse     Thank you for allowing me to participate in this patient's care.     Natalie Hope MD   Advanced Heart Failure [address]  [address] [facility], [address]  Phone: [phone]

## 2022-12-04 NOTE — TELEPHONE ENCOUNTER
3778: Page received from patient. Returned call. Patient stated she is currently inpatient and told she may have an infected PICC line. Patient also stated she had not received her potassium supplement and \"I know it was low it was 3. 5. \" Encouraged patient to discuss with bedside RN due to current inpatient status. She verbalized understanding and had no further questions.  Karl Mcfadden RN

## 2022-12-05 ENCOUNTER — DOCUMENTATION ONLY (OUTPATIENT)
Dept: CARDIOLOGY CLINIC | Age: 42
End: 2022-12-05

## 2022-12-05 LAB
ALBUMIN SERPL-MCNC: 2.8 G/DL (ref 3.5–5)
ALBUMIN/GLOB SERPL: 0.8 {RATIO} (ref 1.1–2.2)
ALP SERPL-CCNC: 104 U/L (ref 45–117)
ALT SERPL-CCNC: 44 U/L (ref 12–78)
AST SERPL-CCNC: 16 U/L (ref 15–37)
BASOPHILS # BLD: 0 K/UL (ref 0–0.1)
BASOPHILS NFR BLD: 1 % (ref 0–1)
BILIRUB DIRECT SERPL-MCNC: 0.3 MG/DL (ref 0–0.2)
BILIRUB SERPL-MCNC: 0.8 MG/DL (ref 0.2–1)
BNP SERPL-MCNC: 4743 PG/ML
DIFFERENTIAL METHOD BLD: NORMAL
DIGOXIN SERPL-MCNC: 1.3 NG/ML (ref 0.9–2)
EOSINOPHIL # BLD: 0.2 K/UL (ref 0–0.4)
EOSINOPHIL NFR BLD: 3 % (ref 0–7)
ERYTHROCYTE [DISTWIDTH] IN BLOOD BY AUTOMATED COUNT: 14 % (ref 11.5–14.5)
GLOBULIN SER CALC-MCNC: 3.4 G/DL (ref 2–4)
HCT VFR BLD AUTO: 36.4 % (ref 35–47)
HGB BLD-MCNC: 12 G/DL (ref 11.5–16)
IMM GRANULOCYTES # BLD AUTO: 0 K/UL (ref 0–0.04)
IMM GRANULOCYTES NFR BLD AUTO: 0 % (ref 0–0.5)
LYMPHOCYTES # BLD: 2.3 K/UL (ref 0.8–3.5)
LYMPHOCYTES NFR BLD: 41 % (ref 12–49)
MAGNESIUM SERPL-MCNC: 2.1 MG/DL (ref 1.6–2.4)
MCH RBC QN AUTO: 29.5 PG (ref 26–34)
MCHC RBC AUTO-ENTMCNC: 33 G/DL (ref 30–36.5)
MCV RBC AUTO: 89.4 FL (ref 80–99)
MONOCYTES # BLD: 0.7 K/UL (ref 0–1)
MONOCYTES NFR BLD: 12 % (ref 5–13)
NEUTS SEG # BLD: 2.5 K/UL (ref 1.8–8)
NEUTS SEG NFR BLD: 43 % (ref 32–75)
NRBC # BLD: 0 K/UL (ref 0–0.01)
NRBC BLD-RTO: 0 PER 100 WBC
PLATELET # BLD AUTO: 192 K/UL (ref 150–400)
PMV BLD AUTO: 11.4 FL (ref 8.9–12.9)
PROCALCITONIN SERPL-MCNC: 6.82 NG/ML
PROT SERPL-MCNC: 6.2 G/DL (ref 6.4–8.2)
RBC # BLD AUTO: 4.07 M/UL (ref 3.8–5.2)
VANCOMYCIN SERPL-MCNC: 15.2 UG/ML
WBC # BLD AUTO: 5.7 K/UL (ref 3.6–11)

## 2022-12-05 PROCEDURE — 80202 ASSAY OF VANCOMYCIN: CPT

## 2022-12-05 PROCEDURE — 84145 PROCALCITONIN (PCT): CPT

## 2022-12-05 PROCEDURE — 74011000258 HC RX REV CODE- 258: Performed by: FAMILY MEDICINE

## 2022-12-05 PROCEDURE — 80076 HEPATIC FUNCTION PANEL: CPT

## 2022-12-05 PROCEDURE — 74011250637 HC RX REV CODE- 250/637: Performed by: NURSE PRACTITIONER

## 2022-12-05 PROCEDURE — 36573 INSJ PICC RS&I 5 YR+: CPT | Performed by: HOSPITALIST

## 2022-12-05 PROCEDURE — 80162 ASSAY OF DIGOXIN TOTAL: CPT

## 2022-12-05 PROCEDURE — 85025 COMPLETE CBC W/AUTO DIFF WBC: CPT

## 2022-12-05 PROCEDURE — 02HV33Z INSERTION OF INFUSION DEVICE INTO SUPERIOR VENA CAVA, PERCUTANEOUS APPROACH: ICD-10-PCS | Performed by: HOSPITALIST

## 2022-12-05 PROCEDURE — 83735 ASSAY OF MAGNESIUM: CPT

## 2022-12-05 PROCEDURE — 36415 COLL VENOUS BLD VENIPUNCTURE: CPT

## 2022-12-05 PROCEDURE — 74011250636 HC RX REV CODE- 250/636: Performed by: FAMILY MEDICINE

## 2022-12-05 PROCEDURE — 65270000046 HC RM TELEMETRY

## 2022-12-05 PROCEDURE — 83880 ASSAY OF NATRIURETIC PEPTIDE: CPT

## 2022-12-05 PROCEDURE — 74011250636 HC RX REV CODE- 250/636: Performed by: INTERNAL MEDICINE

## 2022-12-05 PROCEDURE — 74011250637 HC RX REV CODE- 250/637: Performed by: STUDENT IN AN ORGANIZED HEALTH CARE EDUCATION/TRAINING PROGRAM

## 2022-12-05 PROCEDURE — 74011250637 HC RX REV CODE- 250/637: Performed by: FAMILY MEDICINE

## 2022-12-05 PROCEDURE — 94760 N-INVAS EAR/PLS OXIMETRY 1: CPT

## 2022-12-05 PROCEDURE — 74011000250 HC RX REV CODE- 250: Performed by: FAMILY MEDICINE

## 2022-12-05 RX ORDER — TRAZODONE HYDROCHLORIDE 100 MG/1
100 TABLET ORAL
Status: DISCONTINUED | OUTPATIENT
Start: 2022-12-09 | End: 2022-12-06 | Stop reason: HOSPADM

## 2022-12-05 RX ORDER — MELATONIN
2000 DAILY
Status: DISCONTINUED | OUTPATIENT
Start: 2022-12-05 | End: 2022-12-06 | Stop reason: HOSPADM

## 2022-12-05 RX ADMIN — CETIRIZINE HYDROCHLORIDE 10 MG: 10 TABLET, FILM COATED ORAL at 10:26

## 2022-12-05 RX ADMIN — MONTELUKAST 10 MG: 10 TABLET, FILM COATED ORAL at 22:03

## 2022-12-05 RX ADMIN — CEFEPIME 2 G: 2 INJECTION, POWDER, FOR SOLUTION INTRAVENOUS at 14:33

## 2022-12-05 RX ADMIN — Medication 1 CAPSULE: at 14:32

## 2022-12-05 RX ADMIN — PRAMIPEXOLE DIHYDROCHLORIDE 0.12 MG: 0.25 TABLET ORAL at 22:03

## 2022-12-05 RX ADMIN — TRAZODONE HYDROCHLORIDE 150 MG: 100 TABLET ORAL at 22:02

## 2022-12-05 RX ADMIN — SODIUM CHLORIDE, PRESERVATIVE FREE 10 ML: 5 INJECTION INTRAVENOUS at 14:33

## 2022-12-05 RX ADMIN — ESCITALOPRAM OXALATE 20 MG: 10 TABLET ORAL at 10:26

## 2022-12-05 RX ADMIN — CLONAZEPAM 1.5 MG: 1 TABLET ORAL at 22:02

## 2022-12-05 RX ADMIN — PANTOPRAZOLE SODIUM 40 MG: 40 TABLET, DELAYED RELEASE ORAL at 18:02

## 2022-12-05 RX ADMIN — ENOXAPARIN SODIUM 40 MG: 100 INJECTION SUBCUTANEOUS at 10:26

## 2022-12-05 RX ADMIN — SODIUM CHLORIDE, PRESERVATIVE FREE 10 ML: 5 INJECTION INTRAVENOUS at 22:16

## 2022-12-05 RX ADMIN — ASPIRIN 81 MG: 81 TABLET, COATED ORAL at 10:26

## 2022-12-05 RX ADMIN — ALLOPURINOL 50 MG: 100 TABLET ORAL at 10:27

## 2022-12-05 RX ADMIN — SODIUM CHLORIDE, PRESERVATIVE FREE 5 ML: 5 INJECTION INTRAVENOUS at 06:00

## 2022-12-05 RX ADMIN — VANCOMYCIN HYDROCHLORIDE 1000 MG: 1 INJECTION, POWDER, LYOPHILIZED, FOR SOLUTION INTRAVENOUS at 10:25

## 2022-12-05 RX ADMIN — PRASUGREL 10 MG: 10 TABLET, FILM COATED ORAL at 10:26

## 2022-12-05 RX ADMIN — VANCOMYCIN HYDROCHLORIDE 1250 MG: 1.25 INJECTION, POWDER, LYOPHILIZED, FOR SOLUTION INTRAVENOUS at 22:01

## 2022-12-05 RX ADMIN — PANTOPRAZOLE SODIUM 40 MG: 40 TABLET, DELAYED RELEASE ORAL at 10:28

## 2022-12-05 RX ADMIN — VANCOMYCIN HYDROCHLORIDE 1000 MG: 1 INJECTION, POWDER, LYOPHILIZED, FOR SOLUTION INTRAVENOUS at 00:09

## 2022-12-05 RX ADMIN — CEFEPIME 2 G: 2 INJECTION, POWDER, FOR SOLUTION INTRAVENOUS at 05:10

## 2022-12-05 RX ADMIN — Medication 2000 UNITS: at 14:32

## 2022-12-05 RX ADMIN — MILRINONE LACTATE IN DEXTROSE 0.25 MCG/KG/MIN: 200 INJECTION, SOLUTION INTRAVENOUS at 14:32

## 2022-12-05 RX ADMIN — Medication 3 MG: at 00:09

## 2022-12-05 RX ADMIN — EMPAGLIFLOZIN 10 MG: 10 TABLET, FILM COATED ORAL at 10:26

## 2022-12-05 RX ADMIN — DIGOXIN 0.25 MG: 0.25 TABLET ORAL at 10:25

## 2022-12-05 RX ADMIN — POTASSIUM CHLORIDE 20 MEQ: 750 TABLET, FILM COATED, EXTENDED RELEASE ORAL at 10:26

## 2022-12-05 NOTE — PROGRESS NOTES
[facility]  Hospitalist Group                                                                                          Hospitalist Progress Note  Eduardo Neumann MD  Answering service: [phone] from in house phone        Date of Service:  2022  NAME:  Luan Dunbar  :  1980  MRN:  [id number]      Admission Summary:   Luan Dunbar is a 38 y.o. female with apmhx depression, anxiety, CAD, HFrEF (EF 20-25%)s/p Life Vest on milrinone, prolonged Qtc, GERD, who presents with fever, and is being admitted for sepsis of unknown etiology. She reports chills and fever with T max to 100.7 after her PICC line was flushed on . Accompanying sx include decreased     In the ED, T max was 100.8. Other VSS. Labs were significant for sodium 130. EKG ordered, and CXR negative for acute intracranial process. In the ED, she received vancomycin, and cefepime.         Interval history / Subjective:     Sepsis following PICC line infection line taken out  Will be placed tomorrow will need IV antibiotics ID following milrinone drip as per cardiology     Assessment & Plan:     #Acute chronic systolic heart failure due to coronary artery disease heart failure reduced ejection fraction on milrinone drip  -Currently thought to be from Sepsis currently resolving back on milrinone drip  --Continue IV fluid per cardiology and supported albumin antibiotics for sepsis     #Sepsis of unknown source, likely PICC line  -fever, and tachycardia with suspected PICC line infection  Gram-negative rods in blood  Remove PICC  -PICC removed today  ID consulted and patient will need about 2 weeks of antibiotic therapy IV history of QTC does not allow for p.o. therapy  Can replace PICC around      #HFrEF, Stage C, NYHA IV s/p life vest, on milrinone  #CAD  -continue home bumex, spironolactone, digoxin, jardiance, and metoprolol  -continue effient, and ASA  -continue milrinone at current rate  -consult advanced HF  12/3-continue milrinone     #Depression  #anxiety  -continue home klonopin scheduled, and prn alprazolam with lexapro  -EKG pending     #Gout  -continue home allopurinol     #GERD  -PPI     #+KITTY  -currently being evaluated for possible autoimmune disease     Code status: full  Prophylaxis: lovenox  Care Plan discussed with: Patient, nurse  Anticipated Disposition: Home tomorrow with IV antibiotics and PICC line and milrinone drip    CRITICAL CARE ATTESTATION:  I had a face to face encounter with the patient, reviewed and interpreted patient data including clinical events, labs, images, vital signs, I/O's, and examined patient. I have discussed the case and the plan and management of the patient's care with the consulting services, the bedside nurses and necessary ancillary providers. NOTE OF PERSONAL INVOLVEMENT IN CARE   This patient has a high probability of imminent, clinically significant deterioration, which requires the highest level of preparedness to intervene urgently. I participated in the decision-making and personally managed or directed the management of the following life and organ supporting interventions that required my frequent assessment to treat or prevent imminent deterioration. I personally spent 45 minutes of critical care time. This is time spent at this critically ill patient's bedside actively involved in patient care as well as the coordination of care and discussions with the patient's family. This does not include any procedural time which has been billed separately. Hospital Problems  Date Reviewed: 10/14/2022            Codes Class Noted POA    Fever ICD-10-CM: R50.9  ICD-9-CM: 780.60  12/2/2022 Unknown         Review of Systems:   A comprehensive review of systems was negative except for that written in the HPI. Vital Signs:    Last 24hrs VS reviewed since prior progress note.  Most recent are:  Visit Vitals  /73 (BP 1 Location: Left upper arm, BP Patient Position: Sitting)   Pulse 94   Temp 98 °F (36.7 °C)   Resp 13   Ht 5' 4\" (1.626 m)   Wt 83.8 kg (184 lb 11.9 oz)   SpO2 100%   BMI 31.71 kg/m²         Intake/Output Summary (Last 24 hours) at 12/5/2022 1407  Last data filed at 12/5/2022 6884  Gross per 24 hour   Intake --   Output 950 ml   Net -950 ml          Physical Examination:     I had a face to face encounter with this patient and independently examined them on 12/5/2022 as outlined below:          Constitutional:  No acute distress, cooperative, pleasant    ENT:  Oral mucosa moist, oropharynx benign. Resp:  CTA bilaterally. No wheezing/rhonchi/rales. No accessory muscle use. CV:  Regular rhythm, normal rate, no murmurs, gallops, rubs    GI:  Soft, non distended, non tender. normoactive bowel sounds, no hepatosplenomegaly     Musculoskeletal:  No edema, warm, 2+ pulses throughout    Neurologic:  Moves all extremities. AAOx3, CN II-XII reviewed            Data Review:    Review and/or order of clinical lab test  Review and/or order of tests in the radiology section of CPT  Review and/or order of tests in the medicine section of CPT      Labs:     Recent Labs     12/04/22  0441 12/03/22  0204   WBC 5.3 9.8   HGB 11.6 13.0   HCT 35.5 39.8    167       Recent Labs     12/04/22  0441 12/03/22  0204 12/02/22  1552    133* 130*   K 4.0 3.4* 3.5    100 97   CO2 24 26 26   BUN 11 10 10   CREA 0.80 1.02 0.92   GLU 81 112* 96   CA 8.5 8.0* 8.1*   MG 2.4 2.2  --        Recent Labs     12/02/22  1552   ALT 92*   *   TBILI 1.4*   TP 8.1   ALB 3.5   GLOB 4.6*       No results for input(s): INR, PTP, APTT, INREXT, INREXT in the last 72 hours. No results for input(s): FE, TIBC, PSAT, FERR in the last 72 hours. Lab Results   Component Value Date/Time    Folate 20.0 10/23/2022 04:24 AM        No results for input(s): PH, PCO2, PO2 in the last 72 hours.   No results for input(s): CPK, CKNDX, TROIQ in the last 72 hours.     No lab exists for component: CPKMB  Lab Results   Component Value Date/Time    Cholesterol, total 95 10/22/2022 06:26 AM    HDL Cholesterol 32 10/22/2022 06:26 AM    LDL, calculated 45 10/22/2022 06:26 AM    Triglyceride 90 10/22/2022 06:26 AM    CHOL/HDL Ratio 3.0 10/22/2022 06:26 AM     Lab Results   Component Value Date/Time    Glucose (POC) 70 12/04/2022 04:43 PM    Glucose (POC) 65 12/04/2022 04:24 PM    Glucose (POC) 53 (LL) 12/04/2022 04:23 PM    Glucose (POC) 73 12/04/2022 11:10 AM    Glucose (POC) 92 11/09/2022 04:06 PM     Lab Results   Component Value Date/Time    Color YELLOW/STRAW 12/03/2022 02:10 AM    Appearance CLEAR 12/03/2022 02:10 AM    Specific gravity 1.008 12/03/2022 02:10 AM    Specific gravity 1.025 10/17/2022 08:42 AM    pH (UA) 5.5 12/03/2022 02:10 AM    Protein Negative 12/03/2022 02:10 AM    Glucose 500 (A) 12/03/2022 02:10 AM    Ketone Negative 12/03/2022 02:10 AM    Bilirubin Negative 12/03/2022 02:10 AM    Urobilinogen 0.2 12/03/2022 02:10 AM    Nitrites Negative 12/03/2022 02:10 AM    Leukocyte Esterase Negative 12/03/2022 02:10 AM    Epithelial cells FEW 10/17/2022 08:42 AM    Bacteria 2+ (A) 10/17/2022 08:42 AM    WBC 5-10 10/17/2022 08:42 AM    RBC 5-10 10/17/2022 08:42 AM         Medications Reviewed:     Current Facility-Administered Medications   Medication Dose Route Frequency    cholecalciferol (VITAMIN D3) (1000 Units /25 mcg) tablet 2,000 Units  2,000 Units Oral DAILY    traZODone (DESYREL) tablet 150 mg  150 mg Oral QHS    [START ON 12/9/2022] traZODone (DESYREL) tablet 100 mg  100 mg Oral QHS    miconazole (MICOTIN) 200 mg vaginal suppository 200 mg  200 mg Vaginal QHS    L.acidophilus-paracasei-S.thermophil-bifidobacter (RISAQUAD) 8 billion cell capsule  1 Capsule Oral DAILY    milrinone (PRIMACOR) 20 MG/100 ML D5W infusion  0.25 mcg/kg/min IntraVENous CONTINUOUS    pramipexole (MIRAPEX) tablet 0.125 mg  0.125 mg Oral QHS    melatonin tablet 3 mg  3 mg Oral QHS PRN    vancomycin (VANCOCIN) 1,000 mg in 0.9% sodium chloride 250 mL ([id number])  1,000 mg IntraVENous Q12H    Vancomycin dosing per pharmacy   Other Rx Dosing/Monitoring    potassium chloride SR (KLOR-CON 10) tablet 20 mEq  20 mEq Oral DAILY    sodium chloride (NS) flush 5-40 mL  5-40 mL IntraVENous Q8H    sodium chloride (NS) flush 5-40 mL  5-40 mL IntraVENous PRN    acetaminophen (TYLENOL) tablet 650 mg  650 mg Oral Q6H PRN    Or    acetaminophen (TYLENOL) suppository 650 mg  650 mg Rectal Q6H PRN    ondansetron (ZOFRAN ODT) tablet 4 mg  4 mg Oral Q8H PRN    Or    ondansetron (ZOFRAN) injection 4 mg  4 mg IntraVENous Q6H PRN    enoxaparin (LOVENOX) injection 40 mg  40 mg SubCUTAneous DAILY    cefepime (MAXIPIME) 2 g in 0.9% sodium chloride (MBP/ADV) 100 mL MBP  2 g IntraVENous Q8H    prasugreL (EFFIENT) tablet 10 mg  10 mg Oral DAILY    LORazepam (ATIVAN) tablet 1 mg  1 mg Oral Q8H PRN    escitalopram oxalate (LEXAPRO) tablet 20 mg  20 mg Oral DAILY    clonazePAM (KlonoPIN) tablet 1.5 mg  1.5 mg Oral QHS    aspirin delayed-release tablet 81 mg  81 mg Oral DAILY    albuterol-ipratropium (DUO-NEB) 2.5 MG-0.5 MG/3 ML  3 mL Nebulization Q4H PRN    spironolactone (ALDACTONE) tablet 25 mg  25 mg Oral DAILY    pantoprazole (PROTONIX) tablet 40 mg  40 mg Oral BID    [Held by provider] metoprolol succinate (TOPROL-XL) XL tablet 12.5 mg  12.5 mg Oral Q12H    cetirizine (ZYRTEC) tablet 10 mg  10 mg Oral DAILY    montelukast (SINGULAIR) tablet 10 mg  10 mg Oral QHS    empagliflozin (JARDIANCE) tablet 10 mg  10 mg Oral DAILY    digoxin (LANOXIN) tablet 0.25 mg  0.25 mg Oral DAILY    [Held by provider] bumetanide (BUMEX) tablet 1 mg  1 mg Oral DAILY    allopurinoL (ZYLOPRIM) tablet 50 mg  50 mg Oral DAILY     ______________________________________________________________________  EXPECTED LENGTH OF STAY: 4d 19h  ACTUAL LENGTH OF STAY:          3                 Tavo Diallo MD

## 2022-12-05 NOTE — PROGRESS NOTES
RRT RN at bedside to assist the hard stick to obtain more access. Pt reporting 22G in L hand is sore but that the 18G in R arm is okay. Able to start 20G PIV in R wrist, gives good blood return and flushes well. Just after placement, pt voicing concerns of caring for herself and not being able to bend her wrists d/t PIVs. Offered to remove 20G and look for another location, she states to leave it in. Pt states it will be difficult to care for herself with this IV location. Discussed use of call bell and asking for help. Informed pt this RN will update primary RN that pt would like help getting cleaned up. Primary RN updated that pt is requesting assistance with clean up and of IV placement. Opportunity for questions and concerns provided.

## 2022-12-05 NOTE — DISCHARGE INSTRUCTIONS
Download the Heart Failure Bonnie Carrie: Search in your [employer] (Android) or [employer] Carrie Store ([employer] for [employer]-phone): Search for- HF Bonnie Carrie.    Zaire    HF Bonnie is a brand-new phone carrie that helps you track daily symptoms, vitals, mood, energy level and more. You can even add your heart failure care team members to view your data and monitor your condition at home. HF Bonnie lets you:  Track symptoms, medications and more  Share health information with your health care team  Connect with others living with heart failure          Discharge Instructions       PATIENT ID: Kwan Vail  MRN: [id number]   YOB: 1980    DATE OF ADMISSION: [unfilled]    DATE OF DISCHARGE: 12/6/2022    PRIMARY CARE PROVIDER: @PCP@     ATTENDING PHYSICIAN: [unfilled]  DISCHARGING PROVIDER: Vee Kitchen MD    To contact this individual call [phone] and ask the  to page. If unavailable ask to be transferred the Adult Hospitalist Department. DISCHARGE DIAGNOSES PICC line infection    CONSULTATIONS: [unfilled]    PROCEDURES/SURGERIES: * No surgery found *    PENDING TEST RESULTS:   At the time of discharge the following test results are still pending:         [facility]   [facility]     ADDITIONAL CARE RECOMMENDATIONS:   IV Antibiotic Orders     1. Diagnosis:  Line infection, CHF/home milrinone  2. Routine PICC care  3. Antibiotic:  Ceftriaxone 2 grams IV Q 24 hours  4. Lab each Monday:             CBC/diff/platelets             BMP  5. Lab each Thursday:             CBC/diff/platelets             BMP  6. Fax lab to Dr. Shasha Benites @ [phone].  7.  Call Dr. Shasha Benites @ [phone] for WBC under 4 or creatinine over 2  8. Duration of therapy: 12/18/22 and DO NOT REMOVE PICC             Please call Dr. Shasha Benites @ [phone] before stopping therapy. 9.  Allergies: None to current regimen  10. Call [phone] with name of [facility] [place] MD Kristi    IV Milrinone as well per cardiology        DIET: Cardiac Diet    ACTIVITY: Activity as tolerated    WOUND CARE:     EQUIPMENT needed:       Radiology      DISCHARGE MEDICATIONS:   See Medication Reconciliation Form    It is important that you take the medication exactly as they are prescribed. Keep your medication in the bottles provided by the pharmacist and keep a list of the medication names, dosages, and times to be taken in your wallet. Do not take other medications without consulting your doctor. NOTIFY YOUR PHYSICIAN FOR ANY OF THE FOLLOWING:   Fever over 101 degrees for 24 hours. Chest pain, shortness of breath, fever, chills, nausea, vomiting, diarrhea, change in mentation, falling, weakness, bleeding. Severe pain or pain not relieved by medications. Or, any other signs or symptoms that you may have questions about.       DISPOSITION:  x  Home With:   OT  PT x HH  RN       SNF/Inpatient Rehab/LTAC    Independent/assisted living    Hospice    Other:     CDMP Checked:   Yes x     PROBLEM LIST Updated:  Yes x       Signed:   Audrey Soria MD  12/6/2022  12:06 PM

## 2022-12-05 NOTE — PROGRESS NOTES
ID Progress Note  2022    Subjective:     She is feeling better this pm    Objective:     Antibiotics:  Vancomycin   Cefepime       Vitals: Visit Vitals  BP (!) 101/58 (BP 1 Location: Left upper arm, BP Patient Position: Sitting)   Pulse 90   Temp 98.1 °F (36.7 °C)   Resp 14   Ht 5' 4\" (1.626 m)   Wt 83.8 kg (184 lb 11.9 oz)   SpO2 100%   BMI 31.71 kg/m²        Tmax:  Temp (24hrs), Av.2 °F (36.8 °C), Min:97.8 °F (36.6 °C), Max:98.8 °F (37.1 °C)      Exam:  Lungs:  clear to auscultation bilaterally  Heart:  regular rate and rhythm  Abdomen:  soft, non-tender.  Bowel sounds normal. No masses,  no organomegaly  Skin:  no rash or abnormalities    Labs:      Recent Labs     22  1655 22  0441 22  0204   WBC 5.7 5.3 9.8   HGB 12.0 11.6 13.0    151 167   BUN  --  11 10   CREA  --  0.80 1.02       Cultures:     Lab Results   Component Value Date/Time    Specimen Description: URINE 2011 04:18 PM    Specimen Description: URINE 10/06/2010 03:01 PM    Specimen Description: URINE 2010 04:50 PM     Lab Results   Component Value Date/Time    Culture result: (A) 2022 08:33 PM     KLEBSIELLA PNEUMONIAE GROWING IN 1 OF 2 BOTTLES DRAWN SITE  = R PICC REFER TO [id number] FOR SENSITIVITIES    Culture result: REMAINING BOTTLE(S) HAS/HAVE NO GROWTH SO FAR 2022 08:33 PM    Culture result: (A) 2022 07:38 PM     KLEBSIELLA PNEUMONIAE GROWING IN 1 OF 2 BOTTLES DRAWN LA 1 SITE    Culture result: REMAINING BOTTLE(S) HAS/HAVE NO GROWTH SO FAR 2022 07:38 PM       Radiology:     Line/Insert Date:           Assessment:     Line infection  Bacteremia  CHF    Objective:     Adjust antibiotic therapy prn  Ok to replace PICC  or later  Will need 2 weeks IV antibiotic therapy total--can complete therapy with ceftriaxone as long as culture information doesn't change    Antoinette Pritchett MD

## 2022-12-05 NOTE — PROGRESS NOTES
Problem: Falls - Risk of  Goal: *Absence of Falls  Description: Document [place] Fall Risk and appropriate interventions in the flowsheet. Outcome: Progressing Towards Goal  Note: Fall Risk Interventions:  Mobility Interventions: Patient to call before getting OOB, PT Consult for mobility concerns    Mentation Interventions: Adequate sleep, hydration, pain control, Door open when patient unattended    Medication Interventions: Patient to call before getting OOB, Evaluate medications/consider consulting pharmacy, Teach patient to arise slowly    Elimination Interventions: Call light in reach, Patient to call for help with toileting needs, Stay With Me (per policy)              Problem: Patient Education: Go to Patient Education Activity  Goal: Patient/Family Education  Outcome: Progressing Towards Goal     Problem: Pressure Injury - Risk of  Goal: *Prevention of pressure injury  Description: Document Darshan Scale and appropriate interventions in the flowsheet.   Outcome: Progressing Towards Goal  Note: Pressure Injury Interventions:  Sensory Interventions: Assess changes in LOC    Moisture Interventions: Absorbent underpads    Activity Interventions: PT/OT evaluation, Pressure redistribution bed/mattress(bed type)    Mobility Interventions: PT/OT evaluation    Nutrition Interventions: Document food/fluid/supplement intake    Friction and Shear Interventions: Lift sheet, Minimize layers                Problem: Patient Education: Go to Patient Education Activity  Goal: Patient/Family Education  Outcome: Progressing Towards Goal     Problem: Patient Education: Go to Patient Education Activity  Goal: Patient/Family Education  Outcome: Progressing Towards Goal     Problem: Heart Failure: Day 1  Goal: Off Pathway (Use only if patient is Off Pathway)  Outcome: Progressing Towards Goal  Goal: Activity/Safety  Outcome: Progressing Towards Goal  Goal: Consults, if ordered  Outcome: Progressing Towards Goal  Goal: Diagnostic Test/Procedures  Outcome: Progressing Towards Goal  Goal: Nutrition/Diet  Outcome: Progressing Towards Goal  Goal: Discharge Planning  Outcome: Progressing Towards Goal  Goal: Medications  Outcome: Progressing Towards Goal  Goal: Respiratory  Outcome: Progressing Towards Goal  Goal: Treatments/Interventions/Procedures  Outcome: Progressing Towards Goal  Goal: Psychosocial  Outcome: Progressing Towards Goal  Goal: *Oxygen saturation within defined limits  Outcome: Progressing Towards Goal  Goal: *Hemodynamically stable  Outcome: Progressing Towards Goal  Goal: *Optimal pain control at patient's stated goal  Outcome: Progressing Towards Goal  Goal: *Anxiety reduced or absent  Outcome: Progressing Towards Goal     Problem: Heart Failure: Day 2  Goal: Off Pathway (Use only if patient is Off Pathway)  Outcome: Progressing Towards Goal  Goal: Activity/Safety  Outcome: Progressing Towards Goal  Goal: Consults, if ordered  Outcome: Progressing Towards Goal  Goal: Diagnostic Test/Procedures  Outcome: Progressing Towards Goal  Goal: Nutrition/Diet  Outcome: Progressing Towards Goal  Goal: Discharge Planning  Outcome: Progressing Towards Goal  Goal: Medications  Outcome: Progressing Towards Goal  Goal: Respiratory  Outcome: Progressing Towards Goal  Goal: Treatments/Interventions/Procedures  Outcome: Progressing Towards Goal  Goal: Psychosocial  Outcome: Progressing Towards Goal  Goal: *Oxygen saturation within defined limits  Outcome: Progressing Towards Goal  Goal: *Hemodynamically stable  Outcome: Progressing Towards Goal  Goal: *Optimal pain control at patient's stated goal  Outcome: Progressing Towards Goal  Goal: *Anxiety reduced or absent  Outcome: Progressing Towards Goal  Goal: *Demonstrates progressive activity  Outcome: Progressing Towards Goal     Problem: Heart Failure: Day 3  Goal: Off Pathway (Use only if patient is Off Pathway)  Outcome: Progressing Towards Goal  Goal: Activity/Safety  Outcome: Progressing Towards Goal  Goal: Diagnostic Test/Procedures  Outcome: Progressing Towards Goal  Goal: Nutrition/Diet  Outcome: Progressing Towards Goal  Goal: Discharge Planning  Outcome: Progressing Towards Goal  Goal: Medications  Outcome: Progressing Towards Goal  Goal: Respiratory  Outcome: Progressing Towards Goal  Goal: Treatments/Interventions/Procedures  Outcome: Progressing Towards Goal  Goal: Psychosocial  Outcome: Progressing Towards Goal  Goal: *Oxygen saturation within defined limits  Outcome: Progressing Towards Goal  Goal: *Hemodynamically stable  Outcome: Progressing Towards Goal  Goal: *Optimal pain control at patient's stated goal  Outcome: Progressing Towards Goal  Goal: *Anxiety reduced or absent  Outcome: Progressing Towards Goal  Goal: *Demonstrates progressive activity  Outcome: Progressing Towards Goal     Problem: Heart Failure: Day 4  Goal: Off Pathway (Use only if patient is Off Pathway)  Outcome: Progressing Towards Goal  Goal: Activity/Safety  Outcome: Progressing Towards Goal  Goal: Diagnostic Test/Procedures  Outcome: Progressing Towards Goal  Goal: Nutrition/Diet  Outcome: Progressing Towards Goal  Goal: Discharge Planning  Outcome: Progressing Towards Goal  Goal: Medications  Outcome: Progressing Towards Goal  Goal: Respiratory  Outcome: Progressing Towards Goal  Goal: Treatments/Interventions/Procedures  Outcome: Progressing Towards Goal  Goal: Psychosocial  Outcome: Progressing Towards Goal  Goal: *Oxygen saturation within defined limits  Outcome: Progressing Towards Goal  Goal: *Hemodynamically stable  Outcome: Progressing Towards Goal  Goal: *Optimal pain control at patient's stated goal  Outcome: Progressing Towards Goal  Goal: *Anxiety reduced or absent  Outcome: Progressing Towards Goal  Goal: *Demonstrates progressive activity  Outcome: Progressing Towards Goal     Problem: Heart Failure: Day 5  Goal: Off Pathway (Use only if patient is Off Pathway)  Outcome: Progressing Towards Goal  Goal: Activity/Safety  Outcome: Progressing Towards Goal  Goal: Diagnostic Test/Procedures  Outcome: Progressing Towards Goal  Goal: Nutrition/Diet  Outcome: Progressing Towards Goal  Goal: Discharge Planning  Outcome: Progressing Towards Goal  Goal: Medications  Outcome: Progressing Towards Goal  Goal: Respiratory  Outcome: Progressing Towards Goal  Goal: Treatments/Interventions/Procedures  Outcome: Progressing Towards Goal  Goal: Psychosocial  Outcome: Progressing Towards Goal      Patient expressed frustrations with hospital admission and staff concerns regarding hygiene and infection prevention protocols. Patient spoke with MD about concerns and appears to be in an improved mood.  Patient given opportunity to express concerns as well as have them addressed using therapeutic communication

## 2022-12-05 NOTE — PROGRESS NOTES
Pharmacist Note - Vancomycin Dosing  Therapy day 3  Indication: bacteremia  Current regimen: 1000 mg q12h    Recent Labs     12/05/22  1655 12/04/22  0441 12/03/22  0204   WBC 5.7 5.3 9.8   CREA  --  0.80 1.02   BUN  --  11 10       A random vancomycin level of 15.2 mcg/mL was obtained at 16:55 and from this level, the patient's AUC24 is calculated to be 383 with the current regimen. Goal target range AUC/STEWART 400-600      Plan: Change to 1250 mg q12h. Pharmacy will continue to monitor this patient daily for changes in clinical status and renal function. *Random vancomycin levels are used to calculate AUC/STEWART, this level should not be interpreted as a trough. Vancomycin has been dosed using Bayesian kinetics software to target an AUC24:STEWART of 400-600, which provides adequate exposure for as assumed infection due to MRSA with an STEWART of 1 or less while reducing the risk of nephrotoxicity as seen with traditional trough based dosing goals.

## 2022-12-05 NOTE — PROGRESS NOTES
[facility] in [place], [place]  Inpatient Progress Note      Patient name: Shaheen Corcoran  Patient : 1980  Patient MRN: [id number]  Consulting MD: Rene Cerna MD  Date of service: 22    REASON FOR REFERRAL:  Management of chronic systolic heart failure     PLAN OF CARE:  38 y.o. female with ischemic cardiomyopathy, LVEF 15-20%, stage C, NYHA class IIIB initiated on inotropic support; not able to tolerate dobutamine due to tachycardia and switched to milrinone; limited options to slow down heart rate due to QTc > 500ms in the setting of antidepressant use; appears to have HR in better control; initiated inpatient HT/LVAD workup  Concerns regarding HT/LVAD candidacy upon initial review include possible autoimmune disease that requires diagnosis/treatment, psychiatric condition that needs evaluation/treatment and uncertain support system; ongoing evaluation for heart failure etiology (high risk for biopsy and awaiting cMRI)  Length of antibiotic therapy and timing to replace PICC line per ID consult  After hospital discharge patient needs to follow-up with [facility] to evaluate transplant candidacy, ENT for parathyroidectomy, cardiac MRI and sleep medicine, psychiatry, rheumatology   If no recovery of LVEF on 3-month echo, then consider ICD/CRT for non-LBBB QRS 146ms     INTERVAL HISTORY:  -tachy; BP 90s  -no labs despite orders   -weight same; I/O inc  -Ms. Homero Ramirez is upset. She feels that she's not receiving appropriate nursing care. She didn't sleep due to not having her trazodone since she was admitted. She's c/o a yeast infection and the itching making it hard to sleep. No chest pain, SOB, JAFFE, orthopnea, PND, leg swelling. Her arm feels better since the PICC line was removed. RECOMMENDATIONS:  Continue milrinone 0.25mcg/kg/min  Continue digoxin 0.25mg daily. Labs not drawn today despite orders. Unsure why.    Hold off corlanor due to long QT; likely in the setting of taking antidepressants, d/w Pharm. D. would not use with QTc > 500ms; QTc 539ms 11/7/22  Hold toprol XL 12.5mg q12h until BP improves  Cannot tolerate ACEi/ARB/ARNi due to hypotension   Continue spironolactone 25mg daily  Continue jardiance 10mg daily  Continue to hold bumex; goal weight 85.4kg.  technically getting IVF with each IV antibiotic   Hold requip due to tachycardia  Hold crestor due to rise in TB; consider resuming if TB stable as OP; check LFT today and daily  Keep K > 4 and Mag > 2  Cont allopurinol 50mg daily   Continue ASA and prasugrel per primary cards  Treatment of STU: inpatient eval for STU  Continue lifevest  Plan on outpatient cardiac MRI, rheumatology consult, psych follow up, dental follow up - patient states she's missing this appointment due to admission    Antibiotics per primary team and ID consult.   New PICC line timing per ID   Following up out patient for possible thyroidectomy with Dr. Bisi Bautista consult  Heart failure education  Patient would like to speak to CM re: concerns re:   Added prebiotic and miconazole for patient's self-described yeast infection   Added patient's home trazodone back - she is currently undergoing a weaning process with her psych MD and has been on this medication chronically     IMPRESSION:  Fatigue  Appears dry  Chronic systolic heart failure  Stage C, NYHA class IV symptoms  ischemic cardiomyopathy, LVEF 20-25%  RBBB, QRS 146ms  CAD  STEMI July 2022 s/p RICKY to LAD  At risk of sudden cardiac death- lifevest in place   HTN  Obesity  LHD  Pre-diabetes  Esophageal stenosis s/p dilation  RLS  Fibromyalgia  Anxiety and Depression  Sepsis - likely infection from PICC line            LIFE GOALS:  Patient's personal goals include: get better  Important upcoming milestones or family events: the holidays coming up  The patient identifies the following as important for living well: being independent     CARDIAC IMAGING:  Echo 10/22/22    Left Ventricle: Severely reduced left ventricular systolic function with a visually estimated EF of 15 - 20%. Left ventricle is mildly dilated. Normal wall thickness. Moderate hypokinesis of the following segments: basal anterior, basal anterolateral, basal anteroseptal, basal inferior, basal inferolateral, basal inferoseptal, mid anterior, mid anterolateral, mid anteroseptal, mid inferior, mid inferolateral and mid inferoseptal. Severe hypokinesis of the following segments: apical anterior, apical septal, apical inferior and apical lateral. Grade III diastolic dysfunction with increased LAP. Mitral Valve: Mild annular dilation. Moderate regurgitation. Tricuspid Valve: Moderately elevated RVSP. The estimated RVSP is 51 mmHg. Left Atrium: Left atrium is mildly dilated. Pericardium: Trivial pericardial effusion present. No indication of cardiac tamponade. Contrast used: Definity. Echo (8/14/22)    Left Ventricle: Severely reduced left ventricular systolic function with a visually estimated EF of 20 - 25%. Left ventricle is mildly dilated. Increased wall thickness. See diagram for wall motion findings. Grade II diastolic dysfunction with increased LAP. Right Ventricle: Not well visualized. Tricuspid Valve: Moderately elevated RVSP. [address] 10/27/22  PA 56/34/43, RA 16, PCWP 29, CI daisy 1.46 at 199lbs     6MW 11/8/22 164meters            BRIEF HISTORY OF PRESENT ILLNESS:  Katherine Ocasio is a 38 y.o. female with h/o HTN, CAD s/p STEMI in July 2022 (DESx1 to LAD; treated at [facility]) with ischemic cardiomyopathy (EF 20-25%), CHF, GERD, RLS, and fibromyalgia seen as a new patient in [facility] on 10/21/22 and found to be fluid overloaded, and with suicidal ideation. Pt was taken to the ER for further evaluation and admission for acute on chronic HF and mental health crisis.     PHYSICAL EXAM:  Visit Vitals  /73 (BP 1 Location: Left upper arm, BP Patient Position: Sitting)   Pulse 94 Temp 98 °F (36.7 °C)   Resp 13   Ht 5' 4\" (1.626 m)   Wt 184 lb 11.9 oz (83.8 kg)   SpO2 100%   BMI 31.71 kg/m²     Physical Assessment:   General Appearance: alert, cooperative, obese AAF sitting upright in bed in NAD; appears stated age  Eyes: sclera anicteric  Mouth/Throat: moist mucous membranes; oral pharynx clear  Neck: supple; no JVD   Pulmonary:  clear to auscultation bilaterally; good effort;   Cardiovascular: regular rate and rhythm; no murmur, click, rub, or gallop  Abdomen: soft, non-tender, non-distended; bowel sounds normal  Musculoskeletal: no swelling or deformity; moves all extremities  Extremities: no edema; palpable distal pulses   Skin: warm and dry  Neuro: grossly normal  Psych: anxious       REVIEW OF SYSTEMS:  Review of Symptoms:  Constitutional: fatigue   Eyes: negative  Ears, nose, mouth, throat, and face: negative  Respiratory: negative  Cardiovascular: negative  Gastrointestinal: chronic awful taste in mouth   Genitourinary:yeast infection/itching   Musculoskeletal: PICC site soreness improving   Neurological: negative  Behvioral/Psych: negative  Endocrine: negative      PAST MEDICAL HISTORY:  Past Medical History:   Diagnosis Date    Anemia NEC     Arthritis     Chronic pain     fybromyalsia    Depression     anxiety, depression, OCD    GERD (gastroesophageal reflux disease)     Hypertension     Hypertension     Ill-defined condition     Fibromyalia gastricparesis    MI (myocardial infarction) (HCC)     Musculoskeletal disorder     SOB (shortness of breath)     Stool color black        PAST SURGICAL HISTORY:  Past Surgical History:   Procedure Laterality Date    HX CORONARY STENT PLACEMENT      HX GYN           HX HEENT  2002    wisdom teeth extraction    UPPER GI ENDOSCOPY,BIOPSY  2018         UPPER GI ENDOSCOPY,DIAGNOSIS  2018            FAMILY HISTORY:  Family History   Problem Relation Age of Onset    Hypertension Father     Elevated Lipids Father Arthritis-rheumatoid Mother         ? Lupus vs RA    Lung Disease Mother     Heart Disease Mother     Cancer Mother         breast in 39y    COPD Mother     Hypertension Mother     Stroke Mother         3-4 strokes    Breast Cancer Mother 50    Diabetes Maternal Grandmother        SOCIAL HISTORY:  Social History     Socioeconomic History    Marital status: SINGLE   Tobacco Use    Smoking status: Former     Packs/day: 0.25     Years: 8.00     Pack years: 2.00     Types: Cigarettes     Quit date: 2022     Years since quittin.3    Smokeless tobacco: Never   Vaping Use    Vaping Use: Never used   Substance and Sexual Activity    Alcohol use: Not Currently     Alcohol/week: 1.0 standard drink     Types: 1 Glasses of wine per week     Comment: Wine with special occassions - not since July    Drug use: Not Currently     Types: Marijuana     Comment: Haven't had any since 2022    Sexual activity: Yes     Partners: Male     Birth control/protection: None     Social Determinants of Health     Financial Resource Strain: High Risk    Difficulty of Paying Living Expenses: Very hard   Food Insecurity: No Food Insecurity    Worried About Running Out of Food in the Last Year: Never true    Ran Out of Food in the Last Year: Never true   Transportation Needs: No Transportation Needs    Lack of Transportation (Medical): No    Lack of Transportation (Non-Medical): No   Physical Activity: Inactive    Days of Exercise per Week: 0 days    Minutes of Exercise per Session: 0 min   Stress: Stress Concern Present    Feeling of Stress :  To some extent   Social Connections: Socially Isolated    Frequency of Communication with Friends and Family: Twice a week    Frequency of Social Gatherings with Friends and Family: Never    Attends Faith Services: Never    Active Member of Clubs or Organizations: No    Attends Club or Organization Meetings: Never    Marital Status: Never    Housing Stability: Low Risk     Unable to Pay for Housing in the Last Year: No    Number of Places Lived in the Last Year: 1    Unstable Housing in the Last Year: No       LABORATORY RESULTS:     Labs Latest Ref Rng & Units 12/4/2022 12/3/2022 12/2/2022 11/26/2022 11/18/2022 11/14/2022 11/9/2022   WBC 3.6 - 11.0 K/uL 5.3 9.8 4.8 8.5 7.4 7.3 4.9   RBC 3.80 - 5.20 M/uL 4.04 4.47 4.69 4.69 4.94 5.09 4.53   Hemoglobin 11.5 - 16.0 g/dL 11.6 13.0 13.6 13.5 14.1 14.8 13.0   Hematocrit 35.0 - 47.0 % 35.5 39.8 40.8 41.0 43.8 45.0 41.4   MCV 80.0 - 99.0 FL 87.9 89.0 87.0 87.4 88.7 88.4 91.4   Platelets 565 - 226 K/uL 151 167 191 244 269 300 239   Lymphocytes 12 - 49 % 29 6(L) 21 19 35 32 47   Monocytes 5 - 13 % 17(H) 7 10 9 10 9 12   Eosinophils 0 - 7 % 4 0 0 1 2 1 5   Basophils 0 - 1 % 1 0 1 0 1 1 1   Albumin 3.5 - 5.0 g/dL - - 3.5 3.8 4.0 4.1 3.4(L)   Calcium 8.5 - 10.1 MG/DL 8.5 8. 0(L) 8. 1(L) 8.4(L) 9.5 9.6 9.2   Glucose 65 - 100 mg/dL 81 112(H) 96 120(H) 84 127(H) 150(H)   BUN 6 - 20 MG/DL 11 10 10 10 19 12 12   Creatinine 0.55 - 1.02 MG/DL 0.80 1.02 0.92 1.07(H) 0.92 1.23(H) 1.00   Sodium 136 - 145 mmol/L 136 133(L) 130(L) 137 135(L) 134(L) 139   Potassium 3.5 - 5.1 mmol/L 4.0 3.4(L) 3.5 3.5 3.5 3. 1(L) 3.8   TSH 0.36 - 3.74 uIU/mL - - - - - - -   LDH 81 - 246 U/L - - - - - - -   Some recent data might be hidden     Lab Results   Component Value Date/Time    TSH 4.44 (H) 10/21/2022 06:12 PM    TSH 2.12 05/12/2021 10:55 AM    TSH 1.330 12/30/2019 12:00 AM    TSH 3.850 07/25/2018 12:40 PM    TSH 1.620 02/27/2018 08:59 AM    TSH 1.240 12/05/2016 10:13 AM    TSH 1.530 03/18/2016 10:31 AM    TSH 1.400 11/09/2011 09:37 AM    TSH 1.26 08/19/2010 10:36 AM    TSH 1.18 07/28/2010 04:10 PM       ALLERGY:  Allergies   Allergen Reactions    Abilify [Aripiprazole] Anxiety     Can not tolerate     Sulfa (Sulfonamide Antibiotics) Hives    Vicodin [Hydrocodone-Acetaminophen] Nausea and Vomiting        CURRENT MEDICATIONS:    Current Facility-Administered Medications: cholecalciferol (VITAMIN D3) (1000 Units /25 mcg) tablet 2,000 Units, 2,000 Units, Oral, DAILY, Jenni Carlson NP    traZODone (DESYREL) tablet 150 mg, 150 mg, Oral, QHS, Jenni Carlson NP    [START ON 12/9/2022] traZODone (DESYREL) tablet 100 mg, 100 mg, Oral, QHS, Jenni Carlson NP    miconazole (MICOTIN) 200 mg vaginal suppository 200 mg, 200 mg, Vaginal, QHS, Jenni Carlson NP    L.acidophilus-paracasei-S.thermophil-bifidobacter (RISAQUAD) 8 billion cell capsule, 1 Capsule, Oral, DAILY, Jenni Carlson NP    milrinone (PRIMACOR) 20 MG/100 ML D5W infusion, 0.25 mcg/kg/min, IntraVENous, CONTINUOUS, Bobo Bean MD, Last Rate: 6.3 mL/hr at 12/04/22 2137, 0.25 mcg/kg/min at 12/04/22 2137    pramipexole (MIRAPEX) tablet 0.125 mg, 0.125 mg, Oral, QHS, Irma Zaragoza NP, 0.125 mg at 12/04/22 2124    melatonin tablet 3 mg, 3 mg, Oral, QHS PRN, Irma Carrillo NP, 3 mg at 12/05/22 0009    vancomycin (VANCOCIN) 1,000 mg in 0.9% sodium chloride 250 mL ([id number]), 1,000 mg, IntraVENous, Q12H, Humaira Gabriel MD, Last Rate: 250 mL/hr at 12/05/22 1025, 1,000 mg at 12/05/22 1025    Vancomycin dosing per pharmacy, , Other, Rx Dosing/Monitoring, Humaira Gabriel MD    potassium chloride SR (KLOR-CON 10) tablet 20 mEq, 20 mEq, Oral, DAILY, Mariam Martino MD, 20 mEq at 12/05/22 1026    sodium chloride (NS) flush 5-40 mL, 5-40 mL, IntraVENous, Q8H, Humaira Gabriel MD, 5 mL at 12/05/22 0600    sodium chloride (NS) flush 5-40 mL, 5-40 mL, IntraVENous, PRN, Humaira Gabriel MD    acetaminophen (TYLENOL) tablet 650 mg, 650 mg, Oral, Q6H PRN, 650 mg at 12/03/22 1653 **OR** acetaminophen (TYLENOL) suppository 650 mg, 650 mg, Rectal, Q6H PRN, Humaira Gabriel MD    ondansetron (ZOFRAN ODT) tablet 4 mg, 4 mg, Oral, Q8H PRN **OR** ondansetron (ZOFRAN) injection 4 mg, 4 mg, IntraVENous, Q6H PRN, Humaira Gabriel MD    enoxaparin (LOVENOX) injection 40 mg, 40 mg, SubCUTAneous, DAILY, Marjan Craw, Kisha Biggs MD, 40 mg at 12/05/22 1026    cefepime (MAXIPIME) 2 g in 0.9% sodium chloride (MBP/ADV) 100 mL MBP, 2 g, IntraVENous, Q8H, Harles Ormond, MD, Last Rate: 25 mL/hr at 12/05/22 0510, 2 g at 12/05/22 0510    prasugreL (EFFIENT) tablet 10 mg, 10 mg, Oral, DAILY, Harles Ormond, MD, 10 mg at 12/05/22 1026    LORazepam (ATIVAN) tablet 1 mg, 1 mg, Oral, Q8H PRN, Harles Ormond, MD, 1 mg at 12/04/22 1514    escitalopram oxalate (LEXAPRO) tablet 20 mg, 20 mg, Oral, DAILY, Harles Ormond, MD, 20 mg at 12/05/22 1026    clonazePAM (KlonoPIN) tablet 1.5 mg, 1.5 mg, Oral, QHS, Harles Ormond, MD, 1.5 mg at 12/04/22 2124    aspirin delayed-release tablet 81 mg, 81 mg, Oral, DAILY, Harles Ormond, MD, 81 mg at 12/05/22 1026    albuterol-ipratropium (DUO-NEB) 2.5 MG-0.5 MG/3 ML, 3 mL, Nebulization, Q4H PRN, Harles Ormond, MD    spironolactone (ALDACTONE) tablet 25 mg, 25 mg, Oral, DAILY, Harles Ormond, MD    pantoprazole (PROTONIX) tablet 40 mg, 40 mg, Oral, BID, Harles Ormond, MD, 40 mg at 12/05/22 1028    [Held by provider] metoprolol succinate (TOPROL-XL) XL tablet 12.5 mg, 12.5 mg, Oral, Q12H, Harles Ormond, MD    cetirizine (ZYRTEC) tablet 10 mg, 10 mg, Oral, DAILY, Harles Ormond, MD, 10 mg at 12/05/22 1026    montelukast (SINGULAIR) tablet 10 mg, 10 mg, Oral, QHS, Harles Ormond, MD, 10 mg at 12/04/22 2124    empagliflozin (JARDIANCE) tablet 10 mg, 10 mg, Oral, DAILY, Harles Ormond, MD, 10 mg at 12/05/22 1026    digoxin (LANOXIN) tablet 0.25 mg, 0.25 mg, Oral, DAILY, Harles Ormond, MD, 0.25 mg at 12/05/22 1025    [Held by provider] bumetanide (BUMEX) tablet 1 mg, 1 mg, Oral, DAILY, Harles Ormond, MD    allopurinoL (ZYLOPRIM) tablet 50 mg, 50 mg, Oral, DAILY, Harles Ormond, MD, 50 mg at 12/05/22 1027    PATIENT CARE TEAM:  Patient Care Team:  August Buchanan MD as PCP - General (Family Medicine)  August Buchanan MD as PCP - [facility]  Jennifer Pollock MD (Surgery General)  Ross Morris MD (Cardiovascular Disease Physician)  Vonda Clements MD (Otolaryngology)  Sarwat Medel MD (Internal Medicine Physician)  Deloris Sandoval MD (Female Pelvic Medicine and Reconstructive Surgery)  Flor Harrington MD (Neurology)  Mitchell Mahajan MD (Psychiatry)  Gal Torres RN as Care Transitions Nurse     Thank you for allowing me to participate in this patient's care. Anna White NP   [address] [place] [address], [address]  Phone: [phone]    On this date 12/5/2022, I have spent 35 minutes personally reviewing new vitals, test results, notes, telemetry/EKG, face to face encounter/physical exam of patient, writing orders, performing medical decision making, and documenting.

## 2022-12-05 NOTE — NURSE NAVIGATOR
HEART FAILURE NURSE NAVIGATOR NOTE  [address]    Patient chart was reviewed by Heart Failure (HF) Nurse Navigators for compliance of prescribed treatment with guidelines directed medical therapy (GDMT) and HF database completed. Please, review beneath recommendations for symptomatic patients with HF with Reduced Ejection Fraction ? 40% (HFrEF)* and patients whose LVEF improved > 40% (HFimpEF)* for your consideration when taking care of this patient. Current Medical Therapy:    Name Sandra ELIZABETH 1980   LVEF 15/20   NYHA Functional Class IV   ARNi/ACEi/ARB Contraindicated hypotension   Beta-blocker Toprol XL   Aldosterone Antagonist Aldactone   SGLT2 inhibitor Jardiance   Hydralazine/Isosorbide Dinitrate Not prescribed. See recommendation below   Consulting Cardiologist: [facility]     Recommendations:    Please, add the following GDMT for HFrEF ? 40% [Class 1] or document in discharge summary/progress note why patient cannot take the medication:  ARNi/ACEi or ARB  Beta-blockers (carvedilol, sustained-release metoprolol succinate or bisoprolol)  Aldosterone antagonists GFR > 30 and K< 5  SGLT2 inhibitor  Hydralazine/Isosorbide dinitrate for  Americans with Class III/IV symptoms  Adjust diuretic dose at discharge if hospitalized for volume overload    Consider adding the following GDMT for HFrEF ? 40%, if appropriate [Class 2b]:   Ivabradine for patients on maximally tolerated beta-blocker dose in order to achieve HR 70-80bpm  Digoxin, goal level 0.5-0.9  Polyunsaturated fatty acids  Vericuguat  For patient with hyperkalemia while on RAASi > 5.5, consider adding potassium binders (patiromer, sodium zirconium cycosilicate)    Note: the following medications may be potentially harmful in heart failure [Class 3]:  Calcium channel blockers (doxazosin, diltiazem, verapamil, nifedipine)  Antiarrhythmics (flecanide, disopyrimide, sotalol, dronedarone)  Diabetes medications (thiasolidinediones, saxagliptin, alogliptin)  NSAIDs and PIZARRO 2 inhibitors    Consider vaccinations for respiratory illnesses (flu, pneumonia, covid) [Class 2b]      Your patient is a woman in childbearing age (16-50 years). If appropriate, please,  patient to speak to provider when planning to become pregnant due to teratogenic effect of some HF medications and increased risk/potential contraindication of pregnancy [Class 1]      Patient Education:     Teach back in heart failure education provided, including information about medical therapy, lifestyle modifications, diet and fluid restrictions, physical activity. Educational resources provided: Living with Heart Failure booklet; Signs/Symptoms magnet; Weight Calendar; Dispatch Health flyer; Preparation for Successful Discharge Checklist.  Information provided about HF support group. Heart failure avoiding triggers on discharge instructions. Plan for Transitional Care:    Post discharge follow up phone call to be made within 48-72 hours of discharge. Patient will follow-up with PCP. Patient will follow-up with Primary Cardiologist.  Obstructive sleep apnea screening done and patient was referred to Sleep Medicine. Referral/follow-up with Cardiac Rehabilitation. Referral/follow-up with Advanced Heart Failure Specialist.  Referral/follow-up with Palliative Care Specialist.      Heart Failure Nurse Navigator  Phone: [phone]  /  [phone]    *Recommendations listed above are based on 2022 AHA/ACC/HFSA Guideline for the Management of Heart Failure: A Report of the [address] on Clinical Practice Guidelines. Circulation 2022; [id number].  and 2017 AHA/ACC/HRS guideline for management of patients with ventricular arrhythmias and the prevention of sudden cardiac death: A Report of the [facility] of Cardiology/American Heart Association Task Force on Clinical Practice Guidelines and the Heart Rhythm Society.  Heart Rhythm, Vol 15, No 10, October 2018 *Class of Recommendation: Class 1 (strong), Class 2a (moderate), Class 2b (weak), Class 3 (not recommended, potentially harmful)

## 2022-12-05 NOTE — PROGRESS NOTES
PICC order noted. Awaiting ID clearance prior to placement. See Dr. Shar Basilio notes. We will place PICC in am 12/6.

## 2022-12-06 ENCOUNTER — TELEPHONE (OUTPATIENT)
Dept: RHEUMATOLOGY | Age: 42
End: 2022-12-06

## 2022-12-06 ENCOUNTER — TELEPHONE (OUTPATIENT)
Dept: CARDIOLOGY CLINIC | Age: 42
End: 2022-12-06

## 2022-12-06 VITALS
OXYGEN SATURATION: 100 % | RESPIRATION RATE: 16 BRPM | TEMPERATURE: 98.9 F | WEIGHT: 184.75 LBS | BODY MASS INDEX: 31.54 KG/M2 | HEART RATE: 96 BPM | DIASTOLIC BLOOD PRESSURE: 75 MMHG | SYSTOLIC BLOOD PRESSURE: 109 MMHG | HEIGHT: 64 IN

## 2022-12-06 LAB
ALBUMIN SERPL-MCNC: 2.8 G/DL (ref 3.5–5)
ALBUMIN/GLOB SERPL: 0.9 {RATIO} (ref 1.1–2.2)
ALP SERPL-CCNC: 101 U/L (ref 45–117)
ALT SERPL-CCNC: 39 U/L (ref 12–78)
ANION GAP SERPL CALC-SCNC: 7 MMOL/L (ref 5–15)
AST SERPL-CCNC: 17 U/L (ref 15–37)
BASOPHILS # BLD: 0 K/UL (ref 0–0.1)
BASOPHILS NFR BLD: 1 % (ref 0–1)
BILIRUB DIRECT SERPL-MCNC: 0.4 MG/DL (ref 0–0.2)
BILIRUB SERPL-MCNC: 0.8 MG/DL (ref 0.2–1)
BNP SERPL-MCNC: 4126 PG/ML
BUN SERPL-MCNC: 5 MG/DL (ref 6–20)
BUN/CREAT SERPL: 7 (ref 12–20)
CALCIUM SERPL-MCNC: 8.7 MG/DL (ref 8.5–10.1)
CHLORIDE SERPL-SCNC: 109 MMOL/L (ref 97–108)
CO2 SERPL-SCNC: 21 MMOL/L (ref 21–32)
CREAT SERPL-MCNC: 0.72 MG/DL (ref 0.55–1.02)
DIFFERENTIAL METHOD BLD: ABNORMAL
DIGOXIN SERPL-MCNC: 1.2 NG/ML (ref 0.9–2)
EOSINOPHIL # BLD: 0.2 K/UL (ref 0–0.4)
EOSINOPHIL NFR BLD: 4 % (ref 0–7)
ERYTHROCYTE [DISTWIDTH] IN BLOOD BY AUTOMATED COUNT: 14 % (ref 11.5–14.5)
GLOBULIN SER CALC-MCNC: 3.2 G/DL (ref 2–4)
GLUCOSE BLD STRIP.AUTO-MCNC: 73 MG/DL (ref 65–117)
GLUCOSE BLD STRIP.AUTO-MCNC: 75 MG/DL (ref 65–117)
GLUCOSE SERPL-MCNC: 87 MG/DL (ref 65–100)
HCT VFR BLD AUTO: 34.4 % (ref 35–47)
HGB BLD-MCNC: 11.1 G/DL (ref 11.5–16)
IMM GRANULOCYTES # BLD AUTO: 0 K/UL (ref 0–0.04)
IMM GRANULOCYTES NFR BLD AUTO: 0 % (ref 0–0.5)
LYMPHOCYTES # BLD: 1.9 K/UL (ref 0.8–3.5)
LYMPHOCYTES NFR BLD: 37 % (ref 12–49)
MAGNESIUM SERPL-MCNC: 2.1 MG/DL (ref 1.6–2.4)
MCH RBC QN AUTO: 28.2 PG (ref 26–34)
MCHC RBC AUTO-ENTMCNC: 32.3 G/DL (ref 30–36.5)
MCV RBC AUTO: 87.3 FL (ref 80–99)
MONOCYTES # BLD: 0.6 K/UL (ref 0–1)
MONOCYTES NFR BLD: 12 % (ref 5–13)
NEUTS SEG # BLD: 2.4 K/UL (ref 1.8–8)
NEUTS SEG NFR BLD: 46 % (ref 32–75)
NRBC # BLD: 0 K/UL (ref 0–0.01)
NRBC BLD-RTO: 0 PER 100 WBC
PLATELET # BLD AUTO: 187 K/UL (ref 150–400)
PMV BLD AUTO: 11.7 FL (ref 8.9–12.9)
POTASSIUM SERPL-SCNC: 4 MMOL/L (ref 3.5–5.1)
PROCALCITONIN SERPL-MCNC: 4.7 NG/ML
PROT SERPL-MCNC: 6 G/DL (ref 6.4–8.2)
RBC # BLD AUTO: 3.94 M/UL (ref 3.8–5.2)
SERVICE CMNT-IMP: NORMAL
SERVICE CMNT-IMP: NORMAL
SODIUM SERPL-SCNC: 137 MMOL/L (ref 136–145)
WBC # BLD AUTO: 5.1 K/UL (ref 3.6–11)

## 2022-12-06 PROCEDURE — 83735 ASSAY OF MAGNESIUM: CPT

## 2022-12-06 PROCEDURE — 74011250636 HC RX REV CODE- 250/636: Performed by: FAMILY MEDICINE

## 2022-12-06 PROCEDURE — 85025 COMPLETE CBC W/AUTO DIFF WBC: CPT

## 2022-12-06 PROCEDURE — 83880 ASSAY OF NATRIURETIC PEPTIDE: CPT

## 2022-12-06 PROCEDURE — 80076 HEPATIC FUNCTION PANEL: CPT

## 2022-12-06 PROCEDURE — 74011250636 HC RX REV CODE- 250/636: Performed by: INTERNAL MEDICINE

## 2022-12-06 PROCEDURE — 80162 ASSAY OF DIGOXIN TOTAL: CPT

## 2022-12-06 PROCEDURE — 74011000250 HC RX REV CODE- 250: Performed by: INTERNAL MEDICINE

## 2022-12-06 PROCEDURE — C1751 CATH, INF, PER/CENT/MIDLINE: HCPCS

## 2022-12-06 PROCEDURE — 36415 COLL VENOUS BLD VENIPUNCTURE: CPT

## 2022-12-06 PROCEDURE — 74011250637 HC RX REV CODE- 250/637: Performed by: FAMILY MEDICINE

## 2022-12-06 PROCEDURE — 74011000250 HC RX REV CODE- 250: Performed by: FAMILY MEDICINE

## 2022-12-06 PROCEDURE — 74011000258 HC RX REV CODE- 258: Performed by: FAMILY MEDICINE

## 2022-12-06 PROCEDURE — 84145 PROCALCITONIN (PCT): CPT

## 2022-12-06 PROCEDURE — 82962 GLUCOSE BLOOD TEST: CPT

## 2022-12-06 PROCEDURE — 76937 US GUIDE VASCULAR ACCESS: CPT

## 2022-12-06 PROCEDURE — 74011250637 HC RX REV CODE- 250/637: Performed by: NURSE PRACTITIONER

## 2022-12-06 PROCEDURE — 80048 BASIC METABOLIC PNL TOTAL CA: CPT

## 2022-12-06 PROCEDURE — 74011250637 HC RX REV CODE- 250/637: Performed by: STUDENT IN AN ORGANIZED HEALTH CARE EDUCATION/TRAINING PROGRAM

## 2022-12-06 PROCEDURE — 77030020365 HC SOL INJ SOD CL 0.9% 50ML

## 2022-12-06 RX ORDER — MILRINONE LACTATE 0.2 MG/ML
0.25 INJECTION, SOLUTION INTRAVENOUS CONTINUOUS
Qty: 100 ML | Refills: 50 | Status: SHIPPED
Start: 2022-12-06

## 2022-12-06 RX ORDER — FLUTICASONE PROPIONATE 50 MCG
2 SPRAY, SUSPENSION (ML) NASAL DAILY
Status: DISCONTINUED | OUTPATIENT
Start: 2022-12-06 | End: 2022-12-06 | Stop reason: HOSPADM

## 2022-12-06 RX ADMIN — ESCITALOPRAM OXALATE 20 MG: 10 TABLET ORAL at 09:16

## 2022-12-06 RX ADMIN — CETIRIZINE HYDROCHLORIDE 10 MG: 10 TABLET, FILM COATED ORAL at 09:16

## 2022-12-06 RX ADMIN — Medication 1 CAPSULE: at 09:14

## 2022-12-06 RX ADMIN — CEFTRIAXONE 2 G: 2 INJECTION, POWDER, FOR SOLUTION INTRAMUSCULAR; INTRAVENOUS at 16:11

## 2022-12-06 RX ADMIN — FLUTICASONE PROPIONATE 2 SPRAY: 50 SPRAY, METERED NASAL at 16:12

## 2022-12-06 RX ADMIN — SODIUM CHLORIDE, PRESERVATIVE FREE 10 ML: 5 INJECTION INTRAVENOUS at 06:51

## 2022-12-06 RX ADMIN — CEFEPIME 2 G: 2 INJECTION, POWDER, FOR SOLUTION INTRAVENOUS at 01:07

## 2022-12-06 RX ADMIN — ENOXAPARIN SODIUM 40 MG: 100 INJECTION SUBCUTANEOUS at 11:16

## 2022-12-06 RX ADMIN — CEFEPIME 2 G: 2 INJECTION, POWDER, FOR SOLUTION INTRAVENOUS at 09:39

## 2022-12-06 RX ADMIN — POTASSIUM CHLORIDE 20 MEQ: 750 TABLET, FILM COATED, EXTENDED RELEASE ORAL at 09:16

## 2022-12-06 RX ADMIN — MILRINONE LACTATE IN DEXTROSE 0.25 MCG/KG/MIN: 200 INJECTION, SOLUTION INTRAVENOUS at 07:06

## 2022-12-06 RX ADMIN — SPIRONOLACTONE 25 MG: 25 TABLET ORAL at 09:16

## 2022-12-06 RX ADMIN — VANCOMYCIN HYDROCHLORIDE 1250 MG: 1.25 INJECTION, POWDER, LYOPHILIZED, FOR SOLUTION INTRAVENOUS at 09:39

## 2022-12-06 RX ADMIN — ALLOPURINOL 50 MG: 100 TABLET ORAL at 09:15

## 2022-12-06 RX ADMIN — Medication 2000 UNITS: at 09:14

## 2022-12-06 RX ADMIN — LORAZEPAM 1 MG: 1 TABLET ORAL at 09:16

## 2022-12-06 RX ADMIN — PRASUGREL 10 MG: 10 TABLET, FILM COATED ORAL at 09:14

## 2022-12-06 RX ADMIN — DIGOXIN 0.25 MG: 0.25 TABLET ORAL at 09:16

## 2022-12-06 RX ADMIN — PANTOPRAZOLE SODIUM 40 MG: 40 TABLET, DELAYED RELEASE ORAL at 09:16

## 2022-12-06 RX ADMIN — ACETAMINOPHEN 650 MG: 325 TABLET ORAL at 04:05

## 2022-12-06 RX ADMIN — SODIUM CHLORIDE, PRESERVATIVE FREE 10 ML: 5 INJECTION INTRAVENOUS at 16:18

## 2022-12-06 RX ADMIN — ASPIRIN 81 MG: 81 TABLET, COATED ORAL at 09:16

## 2022-12-06 NOTE — TELEPHONE ENCOUNTER
Received referral from referring provider [facility] office spoke with nurse advise physician npt schedule here in our office is out until Oct of 2023 nurse stated she would find another location referral information is scan into the system.  Sdh

## 2022-12-06 NOTE — PROGRESS NOTES
A Spiritual Care Partner Volunteer visited patient in Room 443 on 12/6/2022.   Documented by:  Chaplain Pearson MDiv, MS, [facility]

## 2022-12-06 NOTE — PROGRESS NOTES
CM received message this morning that patient was being d/c'd home today with milrinone infusion. CM informed that PICC line placed this morning. Patient address on demographic sheet is listed in [address]. [facility] may be a challenge for servicing. Per hospitalist note 12/5/22  d/c today with IV abx and milrinone. Per ID patient to complete 2 weeks  iv abx therapy on ceftriaxone. CM awaiting orders for milrinone and IV abx    CM continuing to follow. Marisa Lamerd, [address] [facility]  [phone]    10:35a  Attempt made to visit patient for assessment. Do Not Enter sign on door. Patient may be getting picc line placement at this time. CM conducting further chart review for names of previous [facility] and Infusion companies. 11am  CM received orders for milrinone and IV abx. CM met with patient to introduce self and role. Demographics verified. The patient lives with her dad in a 1-story residence. She has been known to [address] [place], [place] [address]ia and [facility] (infusion)  in the past. Patient voiced her  concerns with  agency nurse's  length of time between visits (over 7 days) for picc line care and lack of following protocol for  picc line care. She did agree to stay with same agency as she had a better understanding of line care (per [facility] PICC team) and felt she was in a better position to advocate for herself. Reason for Admission:   infected PICC Line                 RUR Score:   23% HIGH        PCP: First and Last name:  Lu Garcia MD     Name of Practice:   [facility]   Are you a current patient: Yes/No:  yes   Approximate date of last visit:    Can you do a virtual visit with your PCP:              Resources/supports as identified by patient/family:   supportive family                Top Challenges facing patient (as identified by patient/family and CM): Finances/Medication cost?                    Transportation?                 Support system or lack thereof? Living arrangements? Self-care/ADLs/Cognition? Current Advanced Directive/Advance Care Plan:  Full Code      Healthcare Decision Maker:   Click here to complete HealthCare Decision Makers including selection of the Healthcare Decision Maker Relationship (ie \"Primary\")      Primary Decision MakeAspen Jones - [phone]    Payor Source Payor: Pallavi Gaines / Plan: [address] HMO / Product Type: Managed Care Medicare /                             Plan for utilizing home health:    already known to [facility] health                  Transition of Care Plan:   home with IV ABX-new and Milrinone      Referrals sent via Careport to above-named agencies. CM continuing to follow. ANGELIQEU Cooper Cera, CRM  [phone]    12:45p Barrier:   [facility] denied referral.  CM called agency for reason of denial.  Agency supervision stated patient called agency prior to [facility] admission and advised that services from that agency would no longer be needed. Agency is unable to accept patient back for servicing. CM checking other home health agencies that  service [facility]. [employer] has accepted patient via [address] [place] [address],Ground Floor. CM discussed referral briefly with liaison and advised that [facility] agency declined referral.     CM spoke with patient and provided updates. 1:59pm  Referral sent [address] [facility] and [facility].      2:06pm CM received call from [facility] with [facility],  She has located a contracted [facility] to service patient. All parties being notified via perfect serve.

## 2022-12-06 NOTE — PROGRESS NOTES
PICC Placement Note    PRE-PROCEDURE VERIFICATION  Correct Procedure: yes  Correct Site:  yes  Temperature: Temp: 98 °F (36.7 °C), Temperature Source: Temp Source: Oral  Recent Labs     12/06/22  0702   BUN 5*   CREA 0.72      WBC 5.1     Allergies: Abilify [aripiprazole], Sulfa (sulfonamide antibiotics), and Vicodin [hydrocodone-acetaminophen]  Education materials for PICC Care given: yes. See Patient Education activity for further details. PICC Booklet placed at bedside: yes    PROCEDURE DETAIL  Consent was obtained and all questions were answered related to risks and benefits. A double lumen PICC line was inserted, as a sterile procedure using ultrasound and modified Seldinger technique for Home IV Therapy. The following documentation is in addition to the PICC properties in the lines/airways flowsheet :  Lot #: [id number]  Lidocaine 1% administered intradermally :yes  Internal Catheter Total Length: 37 (cm)  Vein Selection for PICC:right basilic  Central Line Bundle followed yes  Complication Related to Insertion:no    The placement was verified by EKG, MAX P WAVE @ 37 (cm). PER EKG PICC TIP @ C/A junction. Line is okay to use: yes    Ce Moyer RN made aware.      Karime Benson RN

## 2022-12-06 NOTE — PROGRESS NOTES
Hospital follow-up PCP transitional care appointment has been scheduled with Dr. India Donnelly for Thursday, December 22nd, 2022 at 9:20 a.m. Pending patient discharge. This was earliest appointment available.  Taqueria Clark, Care Management Assistant

## 2022-12-06 NOTE — PROGRESS NOTES
[facility] in [place], [place]  Inpatient Progress Note      Patient name: Elayne Knott  Patient : 1980  Patient MRN: [id number]  Consulting MD: Frank Fernandez MD  Date of service: 22    REASON FOR REFERRAL:  Management of chronic systolic heart failure    PLAN OF CARE - ATTENDING ATTESTATION     Briefly Elayne Knott is a 38 y.o. female with ischemic cardiomyopathy, LVEF 15-20%, stage C, NYHA class IIIB initiated on inotropic support; not able to tolerate dobutamine due to tachycardia and switched to milrinone; limited options to slow down heart rate due to QTc > 500ms in the setting of antidepressant use; appears to have HR in better control; initiated inpatient HT/LVAD workup  Concerns regarding HT/LVAD candidacy upon initial review include possible autoimmune disease that requires diagnosis/treatment, psychiatric condition that needs evaluation/treatment and uncertain support system; ongoing evaluation for heart failure etiology (high risk for biopsy and awaiting cMRI)  Length of antibiotic therapy and timing to replace PICC line per ID consult  After hospital discharge patient needs to follow-up with [facility] to evaluate transplant candidacy, ENT for parathyroidectomy, cardiac MRI and sleep medicine, psychiatry, rheumatology   If no recovery of LVEF on 3-month echo, then consider ICD/CRT for non-LBBB QRS 146ms    Patient was seen and examined by me. I reviewed the note and data. I have discussed and agree with the plan as noted in the ANP note beneath with the following corrections: none. Time of visit: 25 minutes     Johnnie Pantoja MD PhD  [address] HISTORY:  -HF and BP improved;  still borderline BPs  -dig 1.2; pBNP decreasing   -no weight; no I/o  -Ms. Cassy Turner is feeling better today. Slept better. No pain. No SOB. Laying completely flat without difficulty. Less itching from yeast infection. RECOMMENDATIONS:  Continue milrinone 0.25mcg/kg/min  Continue digoxin 0.25mg daily. Hold off corlanor due to long QT; likely in the setting of taking antidepressants, d/w Pharm. D. would not use with QTc > 500ms; QTc 539ms 11/7/22  Hold toprol XL until BP improves further and will likely restart at 12.5mg daily - possibly at night instead of AM   Cannot tolerate ACEi/ARB/ARNi due to hypotension   Continue spironolactone 25mg daily  Continue jardiance 10mg daily. Yeast infection likely from IV antibiotics. If does not resolve with prebiotics and miconazole, then would hold temporarily. Continue to hold bumex today; goal weight 85.4kg. would restart bumex when weight is back to baseline   Hold requip due to tachycardia  Hold crestor due to rise in TB; consider resuming if TB stable as OP; check LFT today and daily  Keep K > 4 and Mag > 2  Cont allopurinol 50mg daily   Continue ASA and prasugrel per primary cards  Treatment of STU: inpatient eval for STU  Continue lifevest  Plan on outpatient cardiac MRI, rheumatology consult, psych follow up, dental follow up - patient states she's missing this appointment due to admission    Antibiotics per primary team and ID consult. New PICC line timing per ID   Following up out patient for possible thyroidectomy with Dr. Bisi Bautista consult  Heart failure education  Patient would like to speak to CM re: concerns re:   Added prebiotic and miconazole for patient's self-described yeast infection- would continue these on discharge    Continue patient's home trazodone back - she is currently undergoing a weaning process with her psych MD and has been on this medication chronically  Getting new PICC today. Will place new home inotrope orders. Will schedule follow up appointment with HF clinic.  Discharge timing per hospitalist.       IMPRESSION:  Fatigue  Appears euvolemic   Chronic systolic heart failure  Stage C, NYHA class IV symptoms  ischemic cardiomyopathy, LVEF 20-25%  RBBB, QRS 146ms  CAD  STEMI July 2022 s/p RICKY to LAD  At risk of sudden cardiac death- lifevest in place   HTN  Obesity  LHD  Pre-diabetes  Esophageal stenosis s/p dilation  RLS  Fibromyalgia  Anxiety and Depression  Sepsis - likely infection from PICC line            LIFE GOALS:  Patient's personal goals include: get better  Important upcoming milestones or family events: the holidays coming up  The patient identifies the following as important for living well: being independent     CARDIAC IMAGING:  Echo 10/22/22    Left Ventricle: Severely reduced left ventricular systolic function with a visually estimated EF of 15 - 20%. Left ventricle is mildly dilated. Normal wall thickness. Moderate hypokinesis of the following segments: basal anterior, basal anterolateral, basal anteroseptal, basal inferior, basal inferolateral, basal inferoseptal, mid anterior, mid anterolateral, mid anteroseptal, mid inferior, mid inferolateral and mid inferoseptal. Severe hypokinesis of the following segments: apical anterior, apical septal, apical inferior and apical lateral. Grade III diastolic dysfunction with increased LAP. Mitral Valve: Mild annular dilation. Moderate regurgitation. Tricuspid Valve: Moderately elevated RVSP. The estimated RVSP is 51 mmHg. Left Atrium: Left atrium is mildly dilated. Pericardium: Trivial pericardial effusion present. No indication of cardiac tamponade. Contrast used: Definity. Echo (8/14/22)    Left Ventricle: Severely reduced left ventricular systolic function with a visually estimated EF of 20 - 25%. Left ventricle is mildly dilated. Increased wall thickness. See diagram for wall motion findings. Grade II diastolic dysfunction with increased LAP. Right Ventricle: Not well visualized. Tricuspid Valve: Moderately elevated RVSP.      [address] 10/27/22  PA 56/34/43, RA 16, PCWP 29, CI daisy 1.46 at 199lbs     6MW 11/8/22 164meters            BRIEF HISTORY OF PRESENT ILLNESS:  Boom Beltran is a 38 y.o. female with h/o HTN, CAD s/p STEMI in July 2022 (DESx1 to LAD; treated at [facility]) with ischemic cardiomyopathy (EF 20-25%), CHF, GERD, RLS, and fibromyalgia seen as a new patient in [facility] on 10/21/22 and found to be fluid overloaded, and with suicidal ideation. Pt was taken to the ER for further evaluation and admission for acute on chronic HF and mental health crisis.     PHYSICAL EXAM:  Visit Vitals  /67   Pulse 82   Temp 98 °F (36.7 °C)   Resp 21   Ht 5' 4\" (1.626 m)   Wt 184 lb 11.9 oz (83.8 kg)   SpO2 98%   BMI 31.71 kg/m²     Physical Assessment:   General Appearance: alert, cooperative, obese AAF laying completely flat in bed in NAD; appears stated age  Eyes: sclera anicteric  Mouth/Throat: moist mucous membranes; oral pharynx clear  Neck: supple; no JVD   Pulmonary:  clear to auscultation bilaterally; good effort;   Cardiovascular: regular rate and rhythm; no murmur, click, rub, or gallop  Abdomen: soft, non-tender, non-distended; bowel sounds normal  Musculoskeletal: no swelling or deformity; moves all extremities  Extremities: no edema; palpable distal pulses   Skin: warm and dry  Neuro: grossly normal  Psych: anxious       REVIEW OF SYSTEMS:  Review of Symptoms:  Constitutional: fatigue improving   Eyes: negative  Ears, nose, mouth, throat, and face: negative  Respiratory: negative  Cardiovascular: negative  Gastrointestinal: chronic awful taste in mouth   Genitourinary:yeast infection/itching improved   Musculoskeletal: PICC site soreness gone  Neurological: negative  Behvioral/Psych: anxious   Endocrine: negative      PAST MEDICAL HISTORY:  Past Medical History:   Diagnosis Date    Anemia NEC     Arthritis     Chronic pain     fybromyalsia    Depression     anxiety, depression, OCD    GERD (gastroesophageal reflux disease)     Hypertension     Hypertension     Ill-defined condition     Fibromyalia gastricparesis    MI (myocardial infarction) ([facility] [address] Musculoskeletal disorder     SOB (shortness of breath)     Stool color black        PAST SURGICAL HISTORY:  Past Surgical History:   Procedure Laterality Date    HX CORONARY STENT PLACEMENT      HX GYN       1998    HX HEENT  2002    wisdom teeth extraction    UPPER GI ENDOSCOPY,BIOPSY  2018         UPPER GI ENDOSCOPY,DIAGNOSIS  2018            FAMILY HISTORY:  Family History   Problem Relation Age of Onset    Hypertension Father     Elevated Lipids Father     Arthritis-rheumatoid Mother         ? Lupus vs RA    Lung Disease Mother     Heart Disease Mother     Cancer Mother         breast in 39y    COPD Mother     Hypertension Mother     Stroke Mother         3-4 strokes    Breast Cancer Mother 50    Diabetes Maternal Grandmother        SOCIAL HISTORY:  Social History     Socioeconomic History    Marital status: SINGLE   Tobacco Use    Smoking status: Former     Packs/day: 0.25     Years: 8.00     Pack years: 2.00     Types: Cigarettes     Quit date: 2022     Years since quittin.3    Smokeless tobacco: Never   Vaping Use    Vaping Use: Never used   Substance and Sexual Activity    Alcohol use: Not Currently     Alcohol/week: 1.0 standard drink     Types: 1 Glasses of wine per week     Comment: Wine with special occassions - not since July    Drug use: Not Currently     Types: Marijuana     Comment: Haven't had any since 2022    Sexual activity: Yes     Partners: Male     Birth control/protection: None     Social Determinants of Health     Financial Resource Strain: High Risk    Difficulty of Paying Living Expenses: Very hard   Food Insecurity: No Food Insecurity    Worried About Running Out of Food in the Last Year: Never true    Ran Out of Food in the Last Year: Never true   Transportation Needs: No Transportation Needs    Lack of Transportation (Medical): No    Lack of Transportation (Non-Medical):  No   Physical Activity: Inactive    Days of Exercise per Week: 0 days    Minutes of Exercise per Session: 0 min   Stress: Stress Concern Present    Feeling of Stress : To some extent   Social Connections: Socially Isolated    Frequency of Communication with Friends and Family: Twice a week    Frequency of Social Gatherings with Friends and Family: Never    Attends Confucianism Services: Never    Active Member of Clubs or Organizations: No    Attends Club or Organization Meetings: Never    Marital Status: Never    Housing Stability: [address] to Pay for Housing in the Last Year: No    Number of [place] in the Last Year: 1    Unstable Housing in the Last Year: No       LABORATORY RESULTS:     Labs Latest Ref Rng & Units 12/6/2022 12/5/2022 12/4/2022 12/3/2022 12/2/2022 11/26/2022 11/18/2022   WBC 3.6 - 11.0 K/uL 5.1 5.7 5.3 9.8 4.8 8.5 7.4   RBC 3.80 - 5.20 M/uL 3.94 4.07 4.04 4.47 4.69 4.69 4.94   Hemoglobin 11.5 - 16.0 g/dL 11. 1(L) 12.0 11.6 13.0 13.6 13.5 14.1   Hematocrit 35.0 - 47.0 % 34. 4(L) 36.4 35.5 39.8 40.8 41.0 43.8   MCV 80.0 - 99.0 FL 87.3 89.4 87.9 89.0 87.0 87.4 88.7   Platelets 604 - 704 K/uL 187 192 151 167 191 244 269   Lymphocytes 12 - 49 % 37 41 29 6(L) 21 19 35   Monocytes 5 - 13 % 12 12 17(H) 7 10 9 10   Eosinophils 0 - 7 % 4 3 4 0 0 1 2   Basophils 0 - 1 % 1 1 1 0 1 0 1   Albumin 3.5 - 5.0 g/dL 2. 8(L) 2. 8(L) - - 3.5 3.8 4.0   Calcium 8.5 - 10.1 MG/DL 8.7 - 8.5 8. 0(L) 8. 1(L) 8.4(L) 9.5   Glucose 65 - 100 mg/dL 87 - 81 112(H) 96 120(H) 84   BUN 6 - 20 MG/DL 5(L) - 11 10 10 10 19   Creatinine 0.55 - 1.02 MG/DL 0.72 - 0.80 1.02 0.92 1.07(H) 0.92   Sodium 136 - 145 mmol/L 137 - 136 133(L) 130(L) 137 135(L)   Potassium 3.5 - 5.1 mmol/L 4.0 - 4.0 3.4(L) 3.5 3.5 3.5   TSH 0.36 - 3.74 uIU/mL - - - - - - -   LDH 81 - 246 U/L - - - - - - -   Some recent data might be hidden     Lab Results   Component Value Date/Time    TSH 4.44 (H) 10/21/2022 06:12 PM    TSH 2.12 05/12/2021 10:55 AM    TSH 1.330 12/30/2019 12:00 AM    TSH 3.850 07/25/2018 12:40 PM    TSH 1.620 02/27/2018 08:59 AM    TSH 1.240 12/05/2016 10:13 AM    TSH 1.530 03/18/2016 10:31 AM    TSH 1.400 11/09/2011 09:37 AM    TSH 1.26 08/19/2010 10:36 AM    TSH 1.18 07/28/2010 04:10 PM       ALLERGY:  Allergies   Allergen Reactions    Abilify [Aripiprazole] Anxiety     Can not tolerate     Sulfa (Sulfonamide Antibiotics) Hives    Vicodin [Hydrocodone-Acetaminophen] Nausea and Vomiting        CURRENT MEDICATIONS:    Current Facility-Administered Medications:     cefTRIAXone (ROCEPHIN) 2 g in 0.9% sodium chloride 20 mL IV syringe, 2 g, IntraVENous, Q24H, Kentrell Moon MD    cholecalciferol (VITAMIN D3) (1000 Units /25 mcg) tablet 2,000 Units, 2,000 Units, Oral, DAILY, Jenni Carlson NP, 2,000 Units at 12/06/22 0914    traZODone (DESYREL) tablet 150 mg, 150 mg, Oral, QHS, Jenni Carlson NP, 150 mg at 12/05/22 2202    [START ON 12/9/2022] traZODone (DESYREL) tablet 100 mg, 100 mg, Oral, QHS, Jenni Carlson NP    miconazole (MICOTIN) 200 mg vaginal suppository 200 mg, 200 mg, Vaginal, QHS, Jenni Carlson, NP    L.acidophilus-paracasei-S.thermophil-bifidobacter (RISAQUAD) 8 billion cell capsule, 1 Capsule, Oral, DAILY, Jenni Carlson NP, 1 Capsule at 12/06/22 0914    milrinone (PRIMACOR) 20 MG/100 ML D5W infusion, 0.25 mcg/kg/min, IntraVENous, CONTINUOUS, Summer Henson MD, Last Rate: 6.3 mL/hr at 12/06/22 0706, 0.25 mcg/kg/min at 12/06/22 0706    pramipexole (MIRAPEX) tablet 0.125 mg, 0.125 mg, Oral, QHS, Irma Zaragoza NP, 0.125 mg at 12/05/22 2203    melatonin tablet 3 mg, 3 mg, Oral, QHS PRN, Irma Kee NP, 3 mg at 12/05/22 0009    potassium chloride SR (KLOR-CON 10) tablet 20 mEq, 20 mEq, Oral, DAILY, Marce Yip MD, 20 mEq at 12/06/22 0916    sodium chloride (NS) flush 5-40 mL, 5-40 mL, IntraVENous, Q8H, Samaria Abreu MD, 10 mL at 12/06/22 0651    sodium chloride (NS) flush 5-40 mL, 5-40 mL, IntraVENous, PRN, Samaria Abreu MD    acetaminophen (TYLENOL) tablet 650 mg, 650 mg, Oral, Q6H PRN, 650 mg at 12/06/22 0405 **OR** acetaminophen (TYLENOL) suppository 650 mg, 650 mg, Rectal, Q6H PRN, Samaria Abreu MD    ondansetron (ZOFRAN ODT) tablet 4 mg, 4 mg, Oral, Q8H PRN **OR** ondansetron (ZOFRAN) injection 4 mg, 4 mg, IntraVENous, Q6H PRN, Samaria Abreu MD    enoxaparin (LOVENOX) injection 40 mg, 40 mg, SubCUTAneous, DAILY, Samaria Abreu MD, 40 mg at 12/05/22 1026    prasugreL (EFFIENT) tablet 10 mg, 10 mg, Oral, DAILY, Samaria Abreu MD, 10 mg at 12/06/22 0914    LORazepam (ATIVAN) tablet 1 mg, 1 mg, Oral, Q8H PRN, Samaria Abreu MD, 1 mg at 12/06/22 0916    escitalopram oxalate (LEXAPRO) tablet 20 mg, 20 mg, Oral, DAILY, Samaria Abreu MD, 20 mg at 12/06/22 0916    clonazePAM (KlonoPIN) tablet 1.5 mg, 1.5 mg, Oral, QHS, Samaria Abreu MD, 1.5 mg at 12/05/22 2202    aspirin delayed-release tablet 81 mg, 81 mg, Oral, DAILY, Samaria Abreu MD, 81 mg at 12/06/22 0916    albuterol-ipratropium (DUO-NEB) 2.5 MG-0.5 MG/3 ML, 3 mL, Nebulization, Q4H PRN, Samaria Abreu MD    spironolactone (ALDACTONE) tablet 25 mg, 25 mg, Oral, DAILY, Samaria Abreu MD, 25 mg at 12/06/22 0916    pantoprazole (PROTONIX) tablet 40 mg, 40 mg, Oral, BID, Samaria Abreu MD, 40 mg at 12/06/22 0916    [Held by provider] metoprolol succinate (TOPROL-XL) XL tablet 12.5 mg, 12.5 mg, Oral, Q12H, Samaria Abreu MD    cetirizine (ZYRTEC) tablet 10 mg, 10 mg, Oral, DAILY, Samaria Abreu MD, 10 mg at 12/06/22 0916    montelukast (SINGULAIR) tablet 10 mg, 10 mg, Oral, QHS, Samaria Abreu MD, 10 mg at 12/05/22 2203    [Held by provider] empagliflozin (JARDIANCE) tablet 10 mg, 10 mg, Oral, DAILY, Samaria Abreu MD, 10 mg at 12/05/22 1026    digoxin (LANOXIN) tablet 0.25 mg, 0.25 mg, Oral, DAILY, Samaria Abreu MD, 0.25 mg at 12/06/22 0916    [Held by provider] bumetanide (BUMEX) tablet 1 mg, 1 mg, Oral, DAILY, Samaria Abreu MD    allopurinoL (ZYLOPRIM) tablet 50 mg, 50 mg, Oral, DAILY, Doris Solis MD, 50 mg at 12/06/22 0915    PATIENT CARE TEAM:  Patient Care Team:  Sherel Severance, MD as PCP - General (Family Medicine)  Sherel Severance, MD as PCP - [facility]  Tamra Cox MD (Surgery General)  Mariely Cole MD (Cardiovascular Disease Physician)  Luciano Henley MD (Otolaryngology)  Barbara Carroll MD (Internal Medicine Physician)  Stephanie Singh MD (Female Pelvic Medicine and Reconstructive Surgery)  Tamie Conti MD (Neurology)  Reggie Pearl MD (Psychiatry)  Ella Gudino RN as Care Transitions Nurse     Thank you for allowing me to participate in this patient's care. Justin Mchugh NP   [address] [place] [address], [address]  Phone: [phone]    On this date 12/6/2022, I have spent 30 minutes personally reviewing new vitals, test results, notes, telemetry/EKG, face to face encounter/physical exam of patient, writing orders, performing medical decision making, and documenting.

## 2022-12-06 NOTE — PROGRESS NOTES
Bedside and Verbal shift change report given to Trent Cerda RN (oncoming nurse) by CK Carney (offgoing nurse).  Report included the following information SBAR, Kardex, ED Summary, Intake/Output, MAR, Recent Results, and Cardiac Rhythm NSR-ST .

## 2022-12-06 NOTE — PROGRESS NOTES
IV Antibiotic Orders    1. Diagnosis:  Line infection, CHF/home milrinone  2. Routine PICC care  3. Antibiotic:  Ceftriaxone 2 grams IV Q 24 hours  4. Lab each Monday:   CBC/diff/platelets   BMP  5. Lab each Thursday:   CBC/diff/platelets   BMP  6. Fax lab to Dr. Michael Hurtado @ [phone].  7.  Call Dr. Michael Hurtado @ [phone] for WBC under 4 or creatinine over 2  8. Duration of therapy: 12/18/22 and DO NOT REMOVE PICC   Please call Dr. Michael Hurtado @ [phone] before stopping therapy. 9.  Allergies: None to current regimen  10.  Call [phone] with name of Tiffany Fontana MD

## 2022-12-06 NOTE — PROGRESS NOTES
BRIAN:  RUR: 23% high    Disposition:  Home with  Home Health (Milrinone and IV abx    D/C Transport:  Patient stated earlier that her dad was unable to transport patient and [facility] ([phone]) was contacted. Cost roundtrip was $368.30. CM Dept Mgr approved transport. Patient calls back and her dad is now able to transport patient home. Cab transport is being cancelled. Message left for Elliott Kent to cancel trip. Emily Cornell ([employer]) will provide IV abx and teaching now Dad will bring milrinone here and transport patient home.      Updates added to AVS.    Mirna Ashraf, [address], [address]

## 2022-12-06 NOTE — PROGRESS NOTES
Bedside shift change report given to CK Carney (oncoming nurse) by Devorah Hernandez (offgoing nurse). Report included the following information SBAR, Kardex, Intake/Output, MAR, and Cardiac Rhythm Sinus tachy .

## 2022-12-07 ENCOUNTER — TELEPHONE (OUTPATIENT)
Dept: FAMILY MEDICINE CLINIC | Age: 42
End: 2022-12-07

## 2022-12-07 ENCOUNTER — PATIENT MESSAGE (OUTPATIENT)
Dept: FAMILY MEDICINE CLINIC | Age: 42
End: 2022-12-07

## 2022-12-07 ENCOUNTER — PATIENT OUTREACH (OUTPATIENT)
Dept: CASE MANAGEMENT | Age: 42
End: 2022-12-07

## 2022-12-07 ENCOUNTER — TELEPHONE (OUTPATIENT)
Dept: CARDIOLOGY CLINIC | Age: 42
End: 2022-12-07

## 2022-12-07 RX ORDER — PRAMIPEXOLE DIHYDROCHLORIDE 0.12 MG/1
0.12 TABLET ORAL
Qty: 30 TABLET | Refills: 0 | Status: SHIPPED | OUTPATIENT
Start: 2022-12-07 | End: 2023-01-06

## 2022-12-07 NOTE — TELEPHONE ENCOUNTER
Patient called to confirm dosing of bumex: per Dr. Katherine Dove; she is to take bumex only for goal weight over 188 lb. Patient states understanding. I also reviewed with patient how to give herself home IV antibiotics and how to flush line after.

## 2022-12-07 NOTE — TELEPHONE ENCOUNTER
Left message with Patient. Follow ing up after recent hospitalization. Calling to see how patient is feeling at this time.     MD URBAN Castaneda & MARSHAL CARMEN [facility]  12/07/22

## 2022-12-07 NOTE — TELEPHONE ENCOUNTER
From: Sara Hyde  To: Felicia Houston MD  Sent: 12/7/2022 4:15 PM EST  Subject: Prescription    Hi Dr. Kevyn Duque I don't know if you know but I have been in the hospital [facility] due to a PICC line infection. I got out yesterday I trying to get a refill on my restless medication they put me on pramipexole know as mirapex 0.125mg I going to need a refill it's iny charts the Cardiologist nurse Darshan Trinidad said that my primary care doctor can do it . I would appreciate it I will run out before I see you. If you can keep me in your prayers it's really rough now.  Thank you

## 2022-12-07 NOTE — PROGRESS NOTES
Care Transitions Initial Call    Call within 2 business days of discharge: Yes     Patient: Michelle Rodriguez Patient : 1980 MRN: [id number]    Last Discharge [address]       Date Complaint Diagnosis Description Type Department Provider    22 Chills; Fever Acute febrile illness . .. ED to Hosp-Admission (Discharged) (ADMIT) Henrietta Overton MD; Violeta Overton. .. Was this an external facility discharge? No     Challenges to be reviewed by the provider   Additional needs identified to be addressed with provider: yes    BLOOD CULTURE ID PANEL  Order: [id number]  Collected 2022 19:38    Status: Final result    Specimen Information: Blood   0 Result Notes  Component Ref Range & Units 22 1938    Enterobacterales species NOTD   Detected Abnormal     Klebsiella pneumoniae group NOTD   Detected Abnormal     Comment   False positive results may rarely occur. Correlate with clinical,epidemiologic, and other laboratory findings   Comment: Please see BCID Interpretation Guide in EPIC Links        Result Notes  Component 22 1655 22 1552   Protein, total 6.2 Low   8.1    Albumin 2.8 Low   3.5    A-G Ratio 0.8 Low   0.8 Low     Bilirubin, total 0.8  1.4 High               Method of communication with provider : staff message    Discussed COVID-19 related testing which was not done at this time. Test results were not done. Patient informed of results, if available? no     Advance Care Planning:   Does patient have an Advance Directive: yes, reviewed and current. Inpatient Readmission Risk score: Unplanned Readmit Risk Score: 21    Was this a readmission?  yes   Patient stated reason for the admission: fever    Patients top risk factors for readmission: medical condition-HF, CAD, HTN, Sepsis, PICC line failure    Interventions to address risk factors: Scheduled appointment with PCP-Dr Saverio Ganser  at 9:20 am first available, Scheduled appointment with Specialist- [facility] 12/15/22 at 9 am, Cards- Dr Kaylin Mtz  at 10:30 am, Sleep Center-Dr Catheline Babinski 22 at 10 am, Cards-Dr Jeri Rader 10:20 am, and Assessment and support for treatment adherence and medication management-HF, CAD, HTN, Sepsis, PICC line failure     Care Transition Nurse (CTN) contacted the patient by telephone to perform post hospital discharge assessment. Verified name and  with patient as identifiers. Provided introduction to self, and explanation of the CTN role. CTN reviewed discharge instructions, medical action plan and red flags with patient who verbalized understanding. Were discharge instructions available to patient? yes. Reviewed appropriate site of care based on symptoms and resources available to patient including: PCP and Specialist. Patient given an opportunity to ask questions and does not have any further questions or concerns at this time. The patient agrees to contact the PCP office for questions related to their healthcare. Medication reconciliation was performed with patient, who verbalizes understanding of administration of home medications. Referral to Pharm D needed: no     Home Health/Outpatient orders at discharge: [facility]: [facility]  Date of initial visit: 22      On Monday and Thursday draw-CBC/Diff/PLT/BMP and fax to Dr Kristopher Lopes ordered at discharge:  IV Milrinone 0.25 mcg/kg and Ceftriaxone 2 gm Q24h  until 22  [facility]: [employer]  Durable Medical Equipment received: 22    Was patient discharged with a pulse oximeter? no    Discussed follow-up appointments. If no appointment was previously scheduled, appointment scheduling offered: yes. Is follow up appointment scheduled within 7 days of discharge? no.   [facility] follow up appointment(s):   Future Appointments   Date Time Provider Komal Rodriguez   2022  1:00 PM [facility]   2022  9:20 AM [facility]   12/22/2022  8:00 PM BEDROOM 2 [facility] [address] LAB    12/23/2022 10:30 AM Daryn Carter MD [facility] BS AMB   12/29/2022 10:00 AM Simin Valle MD [facility] BS AMB   1/4/2023 10:20 AM DO SABA RiveraSF BS AMB     Non-[facility] follow up appointment(s): [address]- ENT 1/5/23 at 3:40 pm, [address] Transplant 12/15/22 at 9 am    Plan for follow-up call in 5-7 days based on severity of symptoms and risk factors. CTN provided contact information for future needs. Goals Addressed                   This Visit's Progress     Attends follow-up appointments as directed. 11/11/22   Future Appointments   Date Time Provider Komal Rodriguez   11/17/2022  1:00 PM Daryn Carter MD [facility]   11/22/2022  8:00 PM BEDROOM 1 [facility] [address] [place] [address] SLEEP LAB    11/25/2022 10:20 AM Jodie Morley MD [facility]   11/29/2022 11:00 AM Simin Valle MD [facility] BS [facility]   1/4/2023 10:20 AM Everlyn Cabot, DO [facility] BS AMB   Patient states she has an appt with Dr Rika Quiñones on 11/18 scheduled does not know time yet. Patient is questioning if she needs to F/U with Dr Florencio Gandara if she is going to [facility]. Patient needs to call and make an appt with ENT as [facility] for evaluation on Parathyroid. Patient father drives her to her appts. Monitor status of these appt on next call. Sharon FAJARDO, RN, CCM / Care Transition Nurse / [phone]     11/17/22   Patient has an appt with Dr Kaylin Mtz today at 1 pm.  Patient has an appt with PCP on 11/18 at 4 pm  Patient is still working on appt with GI for nausea and sour taste in mouth. Need to ask if she was in contact with [facility] for ENT appt. Monitor status of thee appt on next call. [place] Fore MSN, RN, CCM / Care Transition Nurse / [phone]     11/22/22   Patient was seen by Dr Kaylin Mtz as scheduled. Patient did not attend PCP appt was in ED, now has an appt with Dr Rachell Allan on 12/6/22 scheduled.   Patient is scheduled for a sleep study tonight, she does not feel \"comfortable\" going with her face numbness and they were not aware that she has life vest and Milrinone. Attempted to explain to patient that she would be monitored very closely while she had her sleep study, electrodes on head, chest, pulse ox and HR will be monitored. Patient wants to CXL sleep study for tonight and is planning on calling and letting them know. Monitor PCP office appt, on 12/6,  did she reschedule sleep study? Make an appt for [facility] ENT yet? Brad FAJARDO, RN, CCM / Care Transition Nurse / [phone]     Patient has not made an appt with ENT yet \"too much going on\". 11/28/22  Care Transitions Outreach Attempt-LMTCB. Brad FAJARDO, RN, CCM / Care Transition Nurse / [phone]      12/1/22  Patient has an appt with Dr Leo Nazario on 12/6/22. Patient has an appt with Sleep provider on 12/9  Patient has an appt with ENT on 1/5/23. Monitor status of these appt on next call. Brad FAJARDO, RN, CCM / Care Transition Nurse / [phone]     12/07/22 Patient readmitted 12/2-12/6 with fever  12/9/2022  1:00 PM PULMONARY LAB [facility]   12/22/2022  9:20 AM August Buchanan MD BS AMB   12/22/2022  8:00 PM BEDROOM 2 [facility] SLEEP LAB    12/23/2022 10:30 AM Antionette Roca MD BS AMB   12/29/2022 10:00 AM Criss Zelaya MD BS AMB   1/4/2023 10:20 AM Thea Rainey DO BS AMB   Patient also has an appt with [facility] Transplant on 12/15 at 9 am.  Patient has an appt with [facility] ENT on 1/5/23 at 3:40 pm.  Monitor status of these appt on next call. Brad FAJARDO, RN, CCM / Care Transition Nurse / [phone]        Prevent complications post hospitalization. 11/11/22   Patient denies any SOB, cough, pedal edema, does have some \" abd funny\"  \"queasy\" feeling and some nausea today, this is not new for her. Feels this may also be her anxiety and was trying not to take her valium.   Patient is on low sodium diet and fluid restrictions. Patient weight today was 186 lbs. Down from 189 from yesterday, reviewed what she would do if she gained 3 lbs in 1 day or 5 lbs in 1 week-call provider. Patient has her PICC line and was seen by [facility] [place] operated by [facility] yesterday and will be receiving weekly PICC line dressing changes. Patient on Milrinone at 0.25 MCG or 21.35 ml via portable pump and is also wearing her Life Vest.  Still adjusting to life vest how she lays when she sleeps and such. Patient is to start Cardiac Rehab with [address] [place] [address], wants to talk to Dr Katie Cooper before starting. Patient states she is having trouble paying for the Milrinone it is $19 a day or $570 and that is about 1/2 of her income. Patient states that [facility] is working with her on this issue. Patient does not have an ACP on file, send [employer] message about this to patient. Monitor SOB, cough, pedal edema, daily weight, PICC line, abd queasy?, wearing Life Vest?, activity tolerance, PO intake, fluid restriction? Hear anything about Milrinone assistance? Decision about Cardiac Rehab, ACP information sent-looked at? Brad Aguayo MSN, RN, CCM / Care Transition Nurse / [phone]       11/17/22   [facility] to [facility] on 11/14 with nausea and tingling in Inder legs and arms and around mouth. Patient denies SOB or cough, does have nausea, sour taste in mouth and Inder leg cramps that she can't sleep at HS. Weight today was 181.8 lbs   Continues to have numbness in arms and around mouth but not as bad as on 11/14. Patient also C/O slight nose bleed, feels that her nose is dry, encouraged to get some N/S gtts for irritation. Has not heard anything about assistance with Milrinone cost.    Has not looked at [address] information has not felt well enough to look at it. Monitor SOB, cough, pedal edema, nausea, sour taste in mouth, daily weight, leg cramps, numbness, did she look at ACP information or hear anything about Milrinone assistance? Ludmila FAJARDO, RN, CCM / Care Transition Nurse / [phone]     11/22/22   Patient states she has continued tingling and numbness on face and hands. Seen in ED on 11/18/22 for the same. Patient continues to have nausea and sour taste in mouth, states she has been told she has Sjogrens Syndrome. Continues to have occasional nose. Bleeds and using gttts. Continues to wear her life vest, milrinone via PICC line remains infusing. Patient denies any SOB, cough or pedal edema, weight yesterday was 184.8 lbs. Patient has not looked at ACP information sent on [employer]. Eating weird diet, had yogurt and then cucumbers with vinegar and the baked chicken for supper. Patient states she is to hear back about Milrinone assistance today. Monitor SOB, cough, nose bleed, pedal edema, face numbness and tingling ? Still have sour taste in mouth and nausea? Did she look at ACP information?, Wearing Life vest?    Ludmila FAJARDO, RN, [facility] / Care Transition Nurse / [phone]     11/28/22   Had incoming message from patient letting me know that she was in the ED Sat for being :numb all over: Attempted to contact patient, LMV, see that she is to have repeat labs drawn from [facility] today. Ludmila FAJARDO, RN, [facility] / Care Transition Nurse / [phone]     12/01/22   Patient states yesterday she was starting to get H/A and took her muscle relaxer and her H/A did not come then. Denies SOB, C/P, pedal edema. Continues to have decreased appetite and her food \" doesn't taste right\". Having some issues with [facility], waiting for them to put clot buster in her 1 side of port and then they were not coming weekly like they were suppose to for PICC line dressing changes. Cannot see [employer] message that I sent gives her error 150? Will send letter saying same thing. Monitor SOB, C/P, cough, pedal edema, daily weight, PICC line, port unclogged? Still have bad taste in mouth and decreased appetite?       Ludmila FAJARDO, RN, CCM / Care Transition Nurse / [phone]     12/07/22  Patient readmitted 12/2-12/6 with fever. Patient denies any SOB, cough or fever. Weight today was 182.6 lbs Not taking Bumex yet. Wearing Life vest.  Continues to have sour taste in mouth and decreased appetite. Denies any numbness or tingling at this time. Did have diarrhea while admitted and now eating Yogurt. Has Milrinone via PICC line and is to have Ceftriaxone 2 gm Q24H until 12/18/22. This is supplied by [employer], has not received ABX for today yet, patient calling [employer] to find out when ETA is. (R) arm PICC line  Patient to have [facility], they have called and spoke to patient to come today or tomorrow. I am thinking tomorrow since it is Thursday and she needs her labs drawn on Mon and Thursdays  CBC/Diff, PTL and BMP. Monitor SOB, cough, pedal edema, fever, daily weight, PO intake, diarrhea, yeast infection, sour taste in mouth, numb and tingling? How is  and [employer] going with PICC, Milrinone and IV ABX?      Leland Harvey MSN, RN, CCM / Care Transition Nurse / [phone]

## 2022-12-07 NOTE — DISCHARGE SUMMARY
Discharge Summary       PATIENT ID: Virginia French  MRN: [id number]   YOB: 1980    DATE OF ADMISSION: 12/2/2022  6:51 PM    DATE OF DISCHARGE: 12/6/22   PRIMARY CARE PROVIDER: Sean Arroyo MD     ATTENDING PHYSICIAN: Justo Villalpando  DISCHARGING PROVIDER: Bob Ralph MD    To contact this individual call [phone] and ask the  to page. If unavailable ask to be transferred the Adult Hospitalist Department. CONSULTATIONS: IP CONSULT TO INTENSIVIST  IP CONSULT TO INFECTIOUS DISEASES    PROCEDURES/SURGERIES: * No surgery found *    DISCHARGE DIAGNOSES: Sepsis from PICC line infection    Virginia French is a 38 y.o. female with apmhx depression, anxiety, CAD, HFrEF (EF 20-25%)s/p Life Vest on milrinone, prolonged Qtc, GERD, who presents with fever, and is being admitted for sepsis of unknown etiology. She reports chills and fever with T max to 100.7 after her PICC line was flushed on 12/1. Accompanying sx include decreased     In the ED, T max was 100.8. Other VSS. Labs were significant for sodium 130. EKG ordered, and CXR negative for acute intracranial process. In the ED, she received vancomycin, and cefepime.         [address] [facility],[address] COURSE:   Patient has acute on chronic systolic heart failure on milrinone drip at home and had a PICC line which was infected and he came in sepsis treated with IV antibiotics and discharged on IV antibiotics as per ID a new PICC line was placed patient sent home on LifeVest on milrinone drip and IV antibiotics    #Acute chronic systolic heart failure due to coronary artery disease heart failure reduced ejection fraction on milrinone dri     #Sepsis of unknown source, likely PICC line  New PICC line placed IV antibiotics per ID     #HFrEF, Stage C, NYHA IV s/p life vest, on milrinone  #CAD  -continue home bumex, spironolactone, digoxin, jardiance, and metoprolol  -continue effient, and ASA  -continue milrinone at current rate     #Depression  #anxiety  -continue home klonopin scheduled, and prn alprazolam with lexapro  -EKG pending     #Gout  -continue home allopurinol     #GERD  -PPI     #+KITTY  -currently being evaluated for possible autoimmune disease       DISCHARGE DIAGNOSES / PLAN:      Discharge home    BMI: Body mass index is 31.71 kg/m². . This patient: Meets criteria for obesity given BMI >/= 30 and < 40 due to excess calories/nutritional. Weight loss and lifestyle modifications should be encouraged as an outpatient. PENDING TEST RESULTS:   At the time of discharge the following test results are still pending:      ADDITIONAL CARE RECOMMENDATIONS:        NOTIFY YOUR PHYSICIAN FOR ANY OF THE FOLLOWING:   Fever over 101 degrees for 24 hours. Chest pain, shortness of breath, fever, chills, nausea, vomiting, diarrhea, change in mentation, falling, weakness, bleeding. Severe pain or pain not relieved by medications, as well as any other signs or symptoms that you may have questions about. FOLLOW UP APPOINTMENTS:    Follow-up Information       Follow up With Specialties Details Why Contact Info    Lu Garcia MD Family Medicine Follow up in 1 week(s) Hospital follow up with PCP scheduled for December 22nd, 2022 at 9:20 a.m. this was [facility] appointment available [place] [address]  [phone]      [facility]  Follow up  [phone]               DIET: Cardiac Diet    ACTIVITY: Activity as tolerated    EQUIPMENT needed:     DISCHARGE MEDICATIONS:  Discharge Medication List as of 12/6/2022  4:55 PM        START taking these medications    Details   miconazole (MICOTIN) 200 mg vaginal suppository Insert 1 Suppository into vagina nightly for 3 doses. , Normal, Disp-3 Suppository, R-0           CONTINUE these medications which have CHANGED    Details   milrinone (PRIMACOR) 20 mg/100 mL (200 mcg/mL) infusion 20.975 mcg/min by IntraVENous route continuous. , No Print, Disp-100 mL, R-50           CONTINUE these medications which have NOT CHANGED    Details   LORazepam (ATIVAN) 1 mg tablet Take 1 mg by mouth every eight (8) hours as needed., Historical Med      folic acid (FOLVITE) 1 mg tablet TAKE 1 TABLET EVERY DAY, Normal, Disp-90 Tablet, R-1      spironolactone (ALDACTONE) 25 mg tablet Take 1 Tablet by mouth daily. , Normal, Disp-30 Tablet, R-1      metoprolol succinate (TOPROL-XL) 25 mg XL tablet Take 0.5 Tablets by mouth every twelve (12) hours. , Normal, Disp-30 Tablet, R-1      digoxin (LANOXIN) 0.25 mg tablet Take 1 Tablet by mouth daily. , Normal, Disp-30 Tablet, R-1      allopurinoL (ZYLOPRIM) 100 mg tablet Take 0.5 Tablets by mouth daily. , Normal, Disp-15 Tablet, R-1      bumetanide (BUMEX) 2 mg tablet Take 0.5 Tablets by mouth daily. May take an additional tablet as needed to keep weight at 188 lb, Normal, Disp-30 Tablet, R-0      magnesium oxide (MAG-OX) 400 mg tablet Take 1 Tablet by mouth two (2) times a day., Normal, Disp-60 Tablet, R-0      potassium chloride (KLOR-CON M10) 10 mEq tablet Take 2 Tablets by mouth two (2) times a day., Normal, Disp-120 Tablet, R-0      cyclobenzaprine (FLEXERIL) 10 mg tablet Take 1 Tablet by mouth three (3) times daily as needed for Muscle Spasm(s). Has not needed, Print, Disp-20 Tablet, R-0      cinacalcet (SENSIPAR) 30 mg tablet Take 1 Tablet by mouth daily (with breakfast) for 90 days. , Normal, Disp-30 Tablet, R-2      docusate sodium (COLACE) 100 mg capsule Take 1 Capsule by mouth daily for 90 days. , Normal, Disp-30 Capsule, R-2      pramipexole (MIRAPEX) 0.125 mg tablet Take 1 Tablet by mouth nightly for 30 days. , Normal, Disp-30 Tablet, R-0      empagliflozin (Jardiance) 10 mg tablet Take 1 Tablet by mouth daily for 60 days. , Normal, Disp-30 Tablet, R-1      ondansetron (ZOFRAN ODT) 4 mg disintegrating tablet Take 1 Tablet by mouth every eight (8) hours as needed for Nausea or Vomiting., Normal, Disp-60 Tablet, R-1      levocetirizine (XYZAL) 5 mg tablet TAKE 1 TABLET EVERY DAY, Normal, Disp-90 Tablet, R-1      metFORMIN ER (GLUCOPHAGE XR) 500 mg tablet Take 1 Tablet by mouth daily (with dinner). , Normal, Disp-90 Tablet, R-1      montelukast (SINGULAIR) 10 mg tablet TAKE 1 TABLET EVERY DAY, Normal, Disp-90 Tablet, R-1      diazePAM (VALIUM) 5 mg tablet 5 mg every eight (8) hours as needed. 5 mg, 3 times daily, Historical Med      albuterol-ipratropium (DUO-NEB) 2.5 mg-0.5 mg/3 ml nebu 3 mL by Nebulization route every four (4) hours as needed., Historical Med      fluticasone propionate (FLONASE) 50 mcg/actuation nasal spray 2 Sprays by Both Nostrils route daily. , Historical Med      ipratropium (ATROVENT) 21 mcg (0.03 %) nasal spray 1 [place] by Both Nostrils route every twelve (12) hours. , Normal, Disp-30 mL, R-0      acetaminophen (TYLENOL) 325 mg tablet Take  by mouth every four (4) hours as needed for Pain., Historical Med      albuterol (PROVENTIL HFA, VENTOLIN HFA, PROAIR HFA) 90 mcg/actuation inhaler Take  by inhalation as needed., Historical Med      prasugreL (EFFIENT) 10 mg tablet Historical Med      nitroglycerin (NITROSTAT) 0.4 mg SL tablet Historical Med      aspirin delayed-release 81 mg tablet 81 mg., Historical Med      pantoprazole (PROTONIX) 40 mg tablet TAKE 1 TABLET TWICE DAILY, Normal, Disp-180 Tablet, R-1      ferrous sulfate 325 mg (65 mg iron) tablet TAKE 1 TABLET BY MOUTH ONCE DAILY BEFORE BREAKFAST, Normal, Disp-90 Tablet, R-1      traZODone (DESYREL) 100 mg tablet Weaning-150 mg at night, Historical Med      escitalopram oxalate (LEXAPRO) 20 mg tablet Take 20 mg by mouth daily. , Historical Med      clonazePAM (KLONOPIN) 1 mg tablet Take 1.5 mg by mouth nightly., Historical Med      cholecalciferol, vitamin D3, 50 mcg (2,000 unit) tab 2,000 Units. , Historical Med           STOP taking these medications       promethazine (PHENERGAN) 25 mg suppository Comments:   Reason for Stopping:         promethazine (PHENERGAN) 25 mg tablet Comments:   Reason for Stopping:         rosuvastatin (CRESTOR) 20 mg tablet Comments:   Reason for Stopping:         famotidine (PEPCID) 20 mg tablet Comments:   Reason for Stopping:         ezetimibe (ZETIA) 10 mg tablet Comments:   Reason for Stopping:               DISPOSITION:  x  Home With:   OT  PT  HH  RN       Long term SNF/Inpatient Rehab    Independent/assisted living    Hospice    Other:       PATIENT CONDITION AT DISCHARGE:     Functional status    Poor     Deconditioned    x Independent      Cognition    x Lucid     Forgetful     Dementia      Catheters/lines (plus indication)    Acharya    x PICC     PEG     None      Code status    x Full code     DNR      PHYSICAL EXAMINATION AT DISCHARGE:  General:          Alert, cooperative, no distress, appears stated age. HEENT:           Atraumatic, anicteric sclerae, pink conjunctivae                          No oral ulcers, mucosa moist, throat clear, dentition fair  Neck:               Supple, symmetrical  Lungs:             Clear to auscultation bilaterally. No Wheezing or Rhonchi. No rales. Chest wall:      No tenderness  No Accessory muscle use. Heart:              Regular  rhythm,  No  murmur   No edema  Abdomen:        Soft, non-tender. Not distended. Bowel sounds normal  Extremities:     No cyanosis. No clubbing,                            Skin turgor normal, Capillary refill normal  Skin:                Not pale. Not Jaundiced  No rashes   Psych:             Not anxious or agitated.   Neurologic:      Alert, moves all extremities, answers questions appropriately and responds to commands       CHRONIC MEDICAL DIAGNOSES:  Problem List as of 12/6/2022 Date Reviewed: 12/6/2022            Codes Class Noted - Resolved    Fever ICD-10-CM: R50.9  ICD-9-CM: 780.60  12/2/2022 - Present        CHF (congestive heart failure) ([facility] ICD-10-CM: I50.9  ICD-9-CM: 428.0  10/21/2022 - Present        Chronic systolic (congestive) heart failure ICD-10-CM: I50.22  ICD-9-CM: 428.22, 428.0  9/7/2022 - Present        Coronary artery disease involving native coronary artery of native heart without angina pectoris (Chronic) ICD-10-CM: I25.10  ICD-9-CM: 414.01  8/29/2022 - Present        Acute exacerbation of CHF (congestive heart failure) ([facility] ICD-10-CM: I50.9  ICD-9-CM: 428.0  8/13/2022 - Present        Neuropathy (Chronic) ICD-10-CM: G62.9  ICD-9-CM: 355.9  10/15/2019 - Present    Overview Signed 10/15/2019  1:58 PM by Charlotte Coyle MD     Feet. Gastroparesis (Chronic) ICD-10-CM: K31.84  ICD-9-CM: 536.3  11/29/2018 - Present        Plantar fasciitis of left foot (Chronic) ICD-10-CM: M72.2  ICD-9-CM: 728.71  11/29/2018 - Present        Positive KITTY (antinuclear antibody) (Chronic) ICD-10-CM: R76.8  ICD-9-CM: 795.79  11/29/2018 - Present        Severe obesity ([facility] ICD-10-CM: E66.01  ICD-9-CM: 278.01  11/29/2018 - Present        Class 2 obesity due to excess calories without serious comorbidity in adult ICD-10-CM: E66.09  ICD-9-CM: 278.00  7/25/2018 - Present        Mood disorder ([facility] ICD-10-CM: F39  ICD-9-CM: 296.90  6/12/2018 - Present        Chronic pain syndrome (Chronic) ICD-10-CM: G89.4  ICD-9-CM: 338.4  6/12/2018 - Present        Fibromyalgia (Chronic) ICD-10-CM: M79.7  ICD-9-CM: 729.1  6/12/2018 - Present        Panic attack ICD-10-CM: F41.0  ICD-9-CM: 300.01  6/12/2018 - Present        Tremor ICD-10-CM: R25.1  ICD-9-CM: 781.0  6/12/2018 - Present        Recurrent depression ([facility] ICD-10-CM: F33.9  ICD-9-CM: 296.30  1/15/2018 - Present        Inflammatory arthritis ICD-10-CM: M19.90  ICD-9-CM: 714.9  8/10/2016 - Present        Vitamin D deficiency (Chronic) ICD-10-CM: E55.9  ICD-9-CM: 268.9  3/21/2016 - Present        Thrombosed external hemorrhoid ICD-10-CM: K64.5  ICD-9-CM: 455.4  2/5/2016 - Present    Overview Signed 2/5/2016 12:05 PM by Pamella Calderon MD     At 5 o'clock.              IFG (impaired fasting glucose) (Chronic) ICD-10-CM: R73.01  ICD-9-CM: 790.21  9/10/2015 - Present        Hyperlipidemia (Chronic) ICD-10-CM: E78.5  ICD-9-CM: 272.4  9/10/2015 - Present        Hoarseness ICD-10-CM: R49.0  ICD-9-CM: 784.42  3/25/2013 - Present        OCD (obsessive compulsive disorder) ICD-10-CM: F42.9  ICD-9-CM: 300.3  2/27/2012 - Present        HSV (herpes simplex virus) anogenital infection ICD-10-CM: A60.9  ICD-9-CM: 054.9  9/12/2011 - Present        Costochondritis ICD-10-CM: M94.0  ICD-9-CM: 733.6  5/17/2011 - Present        Anxiety ICD-10-CM: F41.9  ICD-9-CM: 300.00  9/13/2010 - Present        Itch of skin ICD-10-CM: L29.9  ICD-9-CM: 698.9  5/26/2010 - Present        Depression (Chronic) ICD-10-CM: F32. A  ICD-9-CM: 595  Unknown - Present        GERD (gastroesophageal reflux disease) (Chronic) ICD-10-CM: K21.9  ICD-9-CM: 530.81  Unknown - Present        Anemia, unspecified (Chronic) ICD-10-CM: D64.9  ICD-9-CM: 285. 9  Unknown - Present        RESOLVED: Hypertension (Chronic) ICD-10-CM: I10  ICD-9-CM: 401.9  Unknown - 5/12/2021           Greater than 30  minutes were spent with the patient on counseling and coordination of care    Signed:   Valerie Garcia MD  12/7/2022  1:13 PM

## 2022-12-09 ENCOUNTER — TELEPHONE (OUTPATIENT)
Dept: FAMILY MEDICINE CLINIC | Age: 42
End: 2022-12-09

## 2022-12-09 ENCOUNTER — HOSPITAL ENCOUNTER (OUTPATIENT)
Dept: PULMONOLOGY | Age: 42
End: 2022-12-09
Attending: INTERNAL MEDICINE
Payer: MEDICARE

## 2022-12-09 VITALS — OXYGEN SATURATION: 96 %

## 2022-12-09 DIAGNOSIS — I50.22 CHRONIC SYSTOLIC (CONGESTIVE) HEART FAILURE (HCC): ICD-10-CM

## 2022-12-09 DIAGNOSIS — R06.02 SHORTNESS OF BREATH: ICD-10-CM

## 2022-12-09 LAB
BACTERIA SPEC CULT: ABNORMAL
SERVICE CMNT-IMP: ABNORMAL
SERVICE CMNT-IMP: ABNORMAL

## 2022-12-09 PROCEDURE — 94726 PLETHYSMOGRAPHY LUNG VOLUMES: CPT

## 2022-12-09 PROCEDURE — 94729 DIFFUSING CAPACITY: CPT

## 2022-12-09 PROCEDURE — 94375 RESPIRATORY FLOW VOLUME LOOP: CPT

## 2022-12-09 NOTE — TELEPHONE ENCOUNTER
Call placed to pt was not answered and message left. If she calls back please pass call back to nurse

## 2022-12-13 ENCOUNTER — TELEPHONE (OUTPATIENT)
Dept: CARDIOLOGY CLINIC | Age: 42
End: 2022-12-13

## 2022-12-13 ENCOUNTER — PATIENT OUTREACH (OUTPATIENT)
Dept: CASE MANAGEMENT | Age: 42
End: 2022-12-13

## 2022-12-13 NOTE — TELEPHONE ENCOUNTER
Yana Adamson, NP  [facility] Nurses 50 minutes ago (2:59 PM)     EP  Dig level is 1.7, please have her hold dig x 2 doses, then resume. Make sure she is taking at night time. Called patient using two patient identifiers. Advised patient on above information per Edgard Taylor Np. Patient states that she has been taking digoxin in the morning. Placed patient on hold and spoke with Edgard Taylor NP who recommended patient to hold digoxin x 2 doses then resume Friday 12/16/22 at night and next level as scheduled with [facility] weekly labs. Took patient off hold and advised per Edgard Taylor NP. She stated understanding and had no further questions.  Cynthia Lynn RN

## 2022-12-13 NOTE — PROGRESS NOTES
Goals Addressed                   This Visit's Progress     Attends follow-up appointments as directed. 11/11/22   Future Appointments   Date Time Provider Komal Silvestrei   11/17/2022  1:00 PM Kim Payton MD [facility] BS AMB   11/22/2022  8:00 PM BEDROOM 1 [address] SLEEP LAB    11/25/2022 10:20 AM Shireen Novak MD [facility] BS AMB   11/29/2022 11:00 AM Francia Michelle MD [facility] BS AMB   1/4/2023 10:20 AM Heather Bee DO [facility] BS AMB   Patient states she has an appt with Dr Luiza Maldonado on 11/18 scheduled does not know time yet. Patient is questioning if she needs to F/U with Dr Audra Hemphill if she is going to [facility]. Patient needs to call and make an appt with ENT as [facility] for evaluation on Parathyroid. Patient father drives her to her appts. Monitor status of these appt on next call. Sherif FAJARDO, RN, CCM / Care Transition Nurse / [phone]     11/17/22   Patient has an appt with Dr Doris Maher today at 1 pm.  Patient has an appt with PCP on 11/18 at 4 pm  Patient is still working on appt with GI for nausea and sour taste in mouth. Need to ask if she was in contact with [facility] for ENT appt. Monitor status of thee appt on next call. Sherif FAJARDO, RN, CCM / Care Transition Nurse / [phone]     11/22/22   Patient was seen by Dr Doris Maher as scheduled. Patient did not attend PCP appt was in ED, now has an appt with Dr Chucho Morrison on 12/6/22 scheduled. Patient is scheduled for a sleep study tonight, she does not feel \"comfortable\" going with her face numbness and they were not aware that she has life vest and Milrinone. Attempted to explain to patient that she would be monitored very closely while she had her sleep study, electrodes on head, chest, pulse ox and HR will be monitored. Patient wants to CXL sleep study for tonight and is planning on calling and letting them know. Monitor PCP office appt, on 12/6,  did she reschedule sleep study? Make an appt for [facility] ENT yet?     [address] Aaron FAJARDO, RN, CCM / Care Transition Nurse / [phone]     Patient has not made an appt with ENT yet \"too much going on\". 11/28/22  Care Transitions Outreach Attempt-LMTCB. Marvin Loving MSN, RN, CCM / Care Transition Nurse / [phone]      12/1/22  Patient has an appt with Dr Jennifer Guzmán on 12/6/22. Patient has an appt with Sleep provider on 12/9  Patient has an appt with ENT on 1/5/23. Monitor status of these appt on next call. Marvin FAJARDO, RN, CCM / Care Transition Nurse / [phone]     12/07/22 Patient readmitted 12/2-12/6 with fever  12/9/2022  1:00 PM PULMONARY LAB [facility] Distance   12/22/2022  9:20 AM Erick Conner MD BS AMB   12/22/2022  8:00 PM BEDROOM [address] [place] [address] SLEEP LAB    12/23/2022 10:30 AM Chloe Duane, MD BS AMB   12/29/2022 10:00 AM Florina Jimenez MD BS AMB   1/4/2023 10:20 AM Luci Keith DO BS AMB   Patient also has an appt with [facility] on 12/15 at 9 am.  Patient has an appt with [facility] on 1/5/23 at 3:40 pm.  Monitor status of these appt on next call. Marvin FAJARDO, RN, CCM / Care Transition Nurse / [phone]     12/13/22   Care Transitions Outreach Attempt-LMTCB. Marvin FAJARDO, RN, CCM / Care Transition Nurse / [phone]         Prevent complications post hospitalization. 11/11/22   Patient denies any SOB, cough, pedal edema, does have some \" abd funny\"  \"queasy\" feeling and some nausea today, this is not new for her. Feels this may also be her anxiety and was trying not to take her valium. Patient is on low sodium diet and fluid restrictions. Patient weight today was 186 lbs. Down from 189 from yesterday, reviewed what she would do if she gained 3 lbs in 1 day or 5 lbs in 1 week-call provider. Patient has her PICC line and was seen by [facility] yesterday and will be receiving weekly PICC line dressing changes.   Patient on Milrinone at 0.25 MCG or 21.35 ml via portable pump and is also wearing her Life Vest. Still adjusting to life vest how she lays when she sleeps and such. Patient is to start Cardiac Rehab with [address], wants to talk to Dr Jaqueline Martin before starting. Patient states she is having trouble paying for the Milrinone it is $19 a day or $570 and that is about 1/2 of her income. Patient states that [facility] is working with her on this issue. Patient does not have an ACP on file, send [employer] message about this to patient. Monitor SOB, cough, pedal edema, daily weight, PICC line, abd queasy?, wearing Life Vest?, activity tolerance, PO intake, fluid restriction? Hear anything about Milrinone assistance? Decision about Cardiac Rehab, ACP information sent-looked at? Manisha Lenz MSN, RN, CCM / Care Transition Nurse / [phone]       11/17/22   Patient to [facility] ED on 11/14 with nausea and tingling in Inder legs and arms and around mouth. Patient denies SOB or cough, does have nausea, sour taste in mouth and Inder leg cramps that she can't sleep at HS. Weight today was 181.8 lbs   Continues to have numbness in arms and around mouth but not as bad as on 11/14. Patient also C/O slight nose bleed, feels that her nose is dry, encouraged to get some N/S gtts for irritation. Has not heard anything about assistance with Milrinone cost.    Has not looked at [address] information has not felt well enough to look at it. Monitor SOB, cough, pedal edema, nausea, sour taste in mouth, daily weight, leg cramps, numbness, did she look at ACP information or hear anything about Milrinone assistance? Manisha FAJARDO, RN, CCM / Care Transition Nurse / [phone]     11/22/22   Patient states she has continued tingling and numbness on face and hands. Seen in ED on 11/18/22 for the same. Patient continues to have nausea and sour taste in mouth, states she has been told she has Sjogrens Syndrome. Continues to have occasional nose. Bleeds and using gttts.   Continues to wear her life vest, milrinone via PICC line remains infusing. Patient denies any SOB, cough or pedal edema, weight yesterday was 184.8 lbs. Patient has not looked at ACP information sent on [employer]. Eating weird diet, had yogurt and then cucumbers with vinegar and the baked chicken for supper. Patient states she is to hear back about Milrinone assistance today. Monitor SOB, cough, nose bleed, pedal edema, face numbness and tingling ? Still have sour taste in mouth and nausea? Did she look at ACP information?, Wearing Life vest?    Tammi FAJARDO, RN, CCM / Care Transition Nurse / [phone]     11/28/22   Had incoming message from patient letting me know that she was in the ED Sat for being :numb all over: Attempted to contact patient, LMV, see that she is to have repeat labs drawn from [facility] today. Tammi FAJARDO, RN, CCM / Care Transition Nurse / [phone]     12/01/22   Patient states yesterday she was starting to get H/A and took her muscle relaxer and her H/A did not come then. Denies SOB, C/P, pedal edema. Continues to have decreased appetite and her food \" doesn't taste right\". Having some issues with [facility], waiting for them to put clot buster in her 1 side of port and then they were not coming weekly like they were suppose to for PICC line dressing changes. Cannot see [employer] message that I sent gives her error 150? Will send letter saying same thing. Monitor SOB, C/P, cough, pedal edema, daily weight, PICC line, port unclogged? Still have bad taste in mouth and decreased appetite? Tammi FAJARDO, RN, CCM / Care Transition Nurse / [phone]     12/07/22  Patient readmitted 12/2-12/6 with fever. Patient denies any SOB, cough or fever. Weight today was 182.6 lbs Not taking Bumex yet. Wearing Life vest.  Continues to have sour taste in mouth and decreased appetite. Denies any numbness or tingling at this time. Did have diarrhea while admitted and now eating Yogurt.   Has Milrinone via PICC line and is to have Ceftriaxone 2 gm Q24H until 12/18/22. This is supplied by [facility], has not received ABX for today yet, patient calling [facility] to find out when ETA is. (R) arm PICC line  Patient to have [facility], they have called and spoke to patient to come today or tomorrow. I am thinking tomorrow since it is Thursday and she needs her labs drawn on Mon and Thursdays  CBC/Diff, PTL and BMP. Monitor SOB, cough, pedal edema, fever, daily weight, PO intake, diarrhea, yeast infection, sour taste in mouth, numb and tingling? How is HH and [facility] going with PICC, Milrinone and IV ABX? Hima Dhaliwal MSN, RN, CCM / Care Transition Nurse / [phone]     12/13/22   Care Transitions Outreach Attempt-LMTCB.     Hima FAJARDO, RN, CCM / Care Transition Nurse / [phone]

## 2022-12-13 NOTE — PROCEDURES
[facility] Guevara [facility] [place] [facility]  PULMONARY FUNCTION TEST    Name:  Callie Peterson  MR#:  [id number]  :  1980  ACCOUNT #:  [de-identified]  DATE OF SERVICE:  2022      Spirometry reveals a normal FEV1-to-FVC ratio at 76% predicted. FEV1 and FVC are both within normal limits. FEV1 is 2.70 liters and 98% predicted. FVC is 3.57 liters and 101% predicted. No evidence of air trapping or hyperinflation with a residual volume of 109% predicted and a total lung capacity of 106% predicted. DLCO is mildly reduced at 74% predicted but does correct taking into consideration alveolar volume.       Eliza Kitchen MD      [id number]  D:  2022 16:48  T:  2022 22:24  JOB #:  [id number]

## 2022-12-14 ENCOUNTER — TELEPHONE (OUTPATIENT)
Dept: CARDIOLOGY CLINIC | Age: 42
End: 2022-12-14

## 2022-12-14 ENCOUNTER — PATIENT OUTREACH (OUTPATIENT)
Dept: CASE MANAGEMENT | Age: 42
End: 2022-12-14

## 2022-12-14 NOTE — TELEPHONE ENCOUNTER
Patient called, stating she has appt with [facility] tomorrow, but she is feeling stuffiness in her nose and slight shortness of breath when laying flat. Weight today is 181 lb, /70, temp 97.0 F. She has not used her Flonase at all recently. I advised she use her Flonase, saline nasal spray, call [facility] in am if still feeling bad for further guidance on appt. She states understanding.

## 2022-12-15 ENCOUNTER — PATIENT OUTREACH (OUTPATIENT)
Dept: CASE MANAGEMENT | Age: 42
End: 2022-12-15

## 2022-12-15 ENCOUNTER — VIRTUAL VISIT (OUTPATIENT)
Dept: FAMILY MEDICINE CLINIC | Age: 42
End: 2022-12-15
Payer: MEDICARE

## 2022-12-15 ENCOUNTER — PATIENT MESSAGE (OUTPATIENT)
Dept: FAMILY MEDICINE CLINIC | Age: 42
End: 2022-12-15

## 2022-12-15 DIAGNOSIS — I50.22 CHRONIC SYSTOLIC (CONGESTIVE) HEART FAILURE (HCC): ICD-10-CM

## 2022-12-15 DIAGNOSIS — K21.9 GASTROESOPHAGEAL REFLUX DISEASE WITHOUT ESOPHAGITIS: Chronic | ICD-10-CM

## 2022-12-15 DIAGNOSIS — R76.8 POSITIVE ANA (ANTINUCLEAR ANTIBODY): ICD-10-CM

## 2022-12-15 DIAGNOSIS — I25.10 CORONARY ARTERY DISEASE INVOLVING NATIVE CORONARY ARTERY OF NATIVE HEART WITHOUT ANGINA PECTORIS: Primary | Chronic | ICD-10-CM

## 2022-12-15 DIAGNOSIS — F32.A DEPRESSION, UNSPECIFIED DEPRESSION TYPE: Chronic | ICD-10-CM

## 2022-12-15 DIAGNOSIS — R73.03 PREDIABETES: Primary | ICD-10-CM

## 2022-12-15 DIAGNOSIS — G89.4 CHRONIC PAIN SYNDROME: Chronic | ICD-10-CM

## 2022-12-15 DIAGNOSIS — E66.09 CLASS 2 OBESITY DUE TO EXCESS CALORIES WITHOUT SERIOUS COMORBIDITY WITH BODY MASS INDEX (BMI) OF 35.0 TO 35.9 IN ADULT: ICD-10-CM

## 2022-12-15 PROCEDURE — 99442 PR PHYS/QHP TELEPHONE EVALUATION 11-20 MIN: CPT | Performed by: FAMILY MEDICINE

## 2022-12-15 RX ORDER — BLOOD-GLUCOSE METER
EACH MISCELLANEOUS
Qty: 1 EACH | Refills: 0 | Status: SHIPPED | OUTPATIENT
Start: 2022-12-15 | End: 2022-12-16

## 2022-12-15 RX ORDER — LANCETS
EACH MISCELLANEOUS
Qty: 100 EACH | Refills: 1 | Status: SHIPPED | OUTPATIENT
Start: 2022-12-15 | End: 2022-12-16

## 2022-12-15 RX ORDER — BLOOD SUGAR DIAGNOSTIC
STRIP MISCELLANEOUS
Qty: 100 STRIP | Refills: 1 | Status: SHIPPED | OUTPATIENT
Start: 2022-12-15 | End: 2022-12-16

## 2022-12-15 NOTE — PROGRESS NOTES
Care Transitions Follow Up Call    Challenges to be reviewed by the provider   Additional needs identified to be addressed with provider: no  none           Method of communication with provider : none    Care Transition Nurse (CTN) contacted the patient by telephone to follow up. Verified name and  with patient as identifiers. Addressed changes since last contact:  Seen by [facility] today  Follow up appointment completed? yes. Advance Care Planning:   Does patient have an Advance Directive:  currently not on file; education provided Patient states that the [address] [place] [address] nurse is also sending her ACP information    CTN reviewed discharge instructions, medical action plan and red flags with patient and discussed any barriers to care and/or understanding of plan of care after discharge. Discussed appropriate site of care based on symptoms and resources available to patient including: PCP and Specialist. The patient agrees to contact the PCP office for questions related to their healthcare. [facility] follow up appointment(s):   Future Appointments   Date Time Provider Komal Rodriguez   12/15/2022  4:00 PM [facility]   2022  9:20 AM [facility]   2022  8:00 PM BEDROOM [address] [place] [address] SLEEP LAB    2022 10:30 AM Javier Barron MD [facility]   2022 10:00 AM Maral Mason MD [facility]   2023 10:20 AM Malcolm OCHOA DO [facility]     Non-[facility] follow up appointment(s): ENT- [facility]    CTN provided contact information for future needs. Plan for follow-up call in 7-10 days based on severity of symptoms and risk factors. Goals Addressed                   This Visit's Progress     Attends follow-up appointments as directed.    On track     22   Future Appointments   Date Time Provider Komal Rodriguez   2022  1:00 PM Javier Barron MD [facility]   2022  8:00 PM B[place]ROOM [place] [address] [address] [place] [address] SLEEP LAB    11/25/2022 10:20 AM Brayan Jenkins MD [facility] BS AMB   11/29/2022 11:00 AM Dre Osuna MD [facility] BS AMB   1/4/2023 10:20 AM Gabriella Hernandez DO [facility] BS AMB   Patient states she has an appt with Dr Ivon Jett on 11/18 scheduled does not know time yet. Patient is questioning if she needs to F/U with Dr Sang Yung if she is [facility]. Patient needs to call and make an appt with ENT as [facility] for evaluation on Parathyroid. Patient father drives her to her appts. Monitor status of these appt on next call. Galo Mendiola MSN, RN, CCM / Care Transition Nurse / [phone]     11/17/22   Patient has an appt with Dr Benigno Dakins today at 1 pm.  Patient has an appt with PCP on 11/18 at 4 pm  Patient is still working on appt with GI for nausea and sour taste in mouth. Need to ask if she was in contact [facility]. Monitor status of thee appt on next call. Galo Mendiola MSN, RN, CCM / Care Transition Nurse / [phone]     11/22/22   Patient was seen by Dr Benigno Dakins as scheduled. Patient did not attend PCP appt was in ED, now has an appt with Dr Karime Bowman on 12/6/22 scheduled. Patient is scheduled for a sleep study tonight, she does not feel \"comfortable\" going with her face numbness and they were not aware that she has life vest and Milrinone. Attempted to explain to patient that she would be monitored very closely while she had her sleep study, electrodes on head, chest, pulse ox and HR will be monitored. Patient wants to CXL sleep study for tonight and is planning on calling and letting them know. Monitor PCP office appt, on 12/6,  did she reschedule sleep study? Make an appt for [facility] yet? Galo Mendiola MSN, RN, CCM / Care Transition Nurse / [phone]     Patient has not made an appt with ENT yet \"too much going on\". 11/28/22  Care Transitions Outreach Attempt-LMTCB.     Galo Mendiola MSN, RN, CCM / Care Transition Nurse / [phone]      12/1/22  Patient has an appt with Dr Rebbeca Hodgkins on 12/6/22. Patient has an appt with Sleep provider on 12/9  Patient has an appt with ENT on 1/5/23. Monitor status of these appt on next call. Larissa FAJARDO, RN, CCM / Care Transition Nurse / [phone]     12/07/22 Patient readmitted 12/2-12/6 with fever  12/9/2022  1:00 PM PULMONARY LAB [facility]   12/22/2022  9:20 AM Lu Garcia MD BS AMB   12/22/2022  8:00 PM BEDROOM 2 [facility] SLEEP LAB HU   12/23/2022 10:30 AM Brit Ren MD BS AMB   12/29/2022 10:00 AM Kenn Vernon MD BS AMB   1/4/2023 10:20 AM Luis Miguel Corrales DO BS AMB   Patient also has an appt with [facility] Transplant on 12/15 at 9 am.  Patient has an appt with [facility] ENT on 1/5/23 at 3:40 pm.  Monitor status of these appt on next call. Larissa FAJARDO, RN, CCM / Care Transition Nurse / [phone]     12/13/22   Care Transitions Outreach Attempt-LMTCB. Larissa FAJARDO, RN, CCM / Care Transition Nurse / [phone]        12/15/22  Patient states she seen virtually by PCP today and also had appt at [facility] transplant today. Still has Sleep study on 12/22 and keeping PCP appt for this day also. Dr Andrew Moseley on 12/23/22  ENT 1/5/23 at [facility]  Monitor status of these appt on next call. Larissa FAJARDO, RN, CCM / Care Transition Nurse / [phone]         Prevent complications post hospitalization. On track     11/11/22   Patient denies any SOB, cough, pedal edema, does have some \" abd funny\"  \"queasy\" feeling and some nausea today, this is not new for her. Feels this may also be her anxiety and was trying not to take her valium. Patient is on low sodium diet and fluid restrictions. Patient weight today was 186 lbs. Down from 189 from yesterday, reviewed what she would do if she gained 3 lbs in 1 day or 5 lbs in 1 week-call provider. Patient has her PICC line and was [facility] yesterday and will be receiving weekly PICC line dressing changes.   Patient on Milrinone at 0.25 MCG or 21.35 ml via portable pump and is also wearing her Life Vest.  Still adjusting to life vest how she lays when she sleeps and such. Patient is to start Cardiac Rehab with [address], wants to talk to Dr Viviana Rothman before starting. Patient states she is having trouble paying for the Milrinone it is $19 a day or $570 and that is about 1/2 of her income. Patient states that [facility] is working with her on this issue. Patient does not have an ACP on file, send [employer] message about this to patient. Monitor SOB, cough, pedal edema, daily weight, PICC line, abd queasy?, wearing Life Vest?, activity tolerance, PO intake, fluid restriction? Hear anything about Milrinone assistance? Decision about Cardiac Rehab, ACP information sent-looked at? Marvin Loving MSN, RN, CCM / Care Transition Nurse / [phone]       11/17/22   Patient to [facility] ED on 11/14 with nausea and tingling in Inder legs and arms and around mouth. Patient denies SOB or cough, does have nausea, sour taste in mouth and Inder leg cramps that she can't sleep at HS. Weight today was 181.8 lbs   Continues to have numbness in arms and around mouth but not as bad as on 11/14. Patient also C/O slight nose bleed, feels that her nose is dry, encouraged to get some N/S gtts for irritation. Has not heard anything about assistance with Milrinone cost.    Has not looked at [address] information has not felt well enough to look at it. Monitor SOB, cough, pedal edema, nausea, sour taste in mouth, daily weight, leg cramps, numbness, did she look at ACP information or hear anything about Milrinone assistance? Marvin Loving MSN, RN, CCM / Care Transition Nurse / [phone]     11/22/22   Patient states she has continued tingling and numbness on face and hands. Seen in ED on 11/18/22 for the same. Patient continues to have nausea and sour taste in mouth, states she has been told she has Sjogrens Syndrome.   Continues to have occasional nose. Bleeds and using gttts. Continues to wear her life vest, milrinone via PICC line remains infusing. Patient denies any SOB, cough or pedal edema, weight yesterday was 184.8 lbs. Patient has not looked at ACP information sent on [employer]. Eating weird diet, had yogurt and then cucumbers with vinegar and the baked chicken for supper. Patient states she is to hear back about Milrinone assistance today. Monitor SOB, cough, nose bleed, pedal edema, face numbness and tingling ? Still have sour taste in mouth and nausea? Did she look at ACP information?, Wearing Life vest?    Allyn Payment MSN, RN, CCM / Care Transition Nurse / [phone]     11/28/22   Had incoming message from patient letting me know that she was in the ED Sat for being :numb all over: Attempted to contact patient, CORIN, see that she is to have repeat labs drawn from [facility] today. Allyn Payment MSN, RN, CCM / Care Transition Nurse / [phone]     12/01/22   Patient states yesterday she was starting to get H/A and took her muscle relaxer and her H/A did not come then. Denies SOB, C/P, pedal edema. Continues to have decreased appetite and her food \" doesn't taste right\". Having some issues with [facility], waiting for them to put clot buster in her 1 side of port and then they were not coming weekly like they were suppose to for PICC line dressing changes. Cannot see [employer] message that I sent gives her error 150? Will send letter saying same thing. Monitor SOB, C/P, cough, pedal edema, daily weight, PICC line, port unclogged? Still have bad taste in mouth and decreased appetite? Allyn Payment MSN, RN, CCM / Care Transition Nurse / [phone]     12/07/22  Patient readmitted 12/2-12/6 with fever. Patient denies any SOB, cough or fever. Weight today was 182.6 lbs Not taking Bumex yet. Wearing Life vest.  Continues to have sour taste in mouth and decreased appetite. Denies any numbness or tingling at this time.   Did have diarrhea while admitted and now eating Yogurt. Has Milrinone via PICC line and is to have Ceftriaxone 2 gm Q24H until 12/18/22. This is supplied by [facility], has not received ABX for today yet, patient calling [employer] to find out when ETA is. (R) arm PICC line  Patient to have [facility], they have called and spoke to patient to come today or tomorrow. I am thinking tomorrow since it is Thursday and she needs her labs drawn on Mon and Thursdays  CBC/Diff, PTL and BMP. Monitor SOB, cough, pedal edema, fever, daily weight, PO intake, diarrhea, yeast infection, sour taste in mouth, numb and tingling? How is HH and [facility] going with PICC, Milrinone and IV ABX? Payam Lopez MSN, RN, CCM / Care Transition Nurse / [phone]     12/13/22   [facility] Payam Lopez MSN, RN, CCM / Care Transition Nurse / [phone]      12/15/22   Patient states she is doing well, continues to receive IV ABX via PICC line. Still has sour taste in mouth and appetite is about the same. Denies any SOB, cough or fever. Continues to get W/U at [facility] for heart transplant. Monitor for SOB, cough, daily weight, done with IV ABX, how is PICC line doing, PO intake, numbness or tingling?     Payam Lopez MSN, RN, CCM / Care Transition Nurse / [phone]

## 2022-12-15 NOTE — PROGRESS NOTES
Cathy Huerta is a 38 y.o. female evaluated via Telephone on 12/15/22. Patient Identity confirmed by . Consent:  He and/or health care decision maker is aware that that he may receive a bill for this telephone service, depending on his insurance coverage, and has provided verbal consent to proceed: Yes    Physician Location: Office  Patient Location: Home  Nurse Assisting with Encounter: Reggie Shetty LPN    Chief Complaint   Patient presents with    Follow-up        Information gathered from patient and/or health care decision maker. HPI:   Patient has seen Cardiology and they are planning on moving her to the Heart Transplant List.  Patient is still on Milrinone, and is still getting IV antibiotics for PICC line infection so will be getting labs done tomorrow. Patient is noting she is feeling \"okay\" at this point, noting that with current medications she feels tired/Weak at this time. Patient is also awaiting to see rheumatology to review possible Sjogrens disease as well. Patient is wearing the Life Vest.  Noting causing some pain in the chest area. Review of Systems   Constitutional:  Negative for chills and fever. Cardiovascular:  Negative for chest pain and palpitations. Gastrointestinal:  Negative for abdominal pain and nausea. Limited Exam:  Due to this being a TeleHealth evaluation, many elements of the physical examination are unable to be assessed. Constitutional:  in no apparent distress   Mental status: Alert and awake, Oriented to person/place/time, Able to follow commands  Psychiatric: Normal affect, normal judgment/insight. No hallucinations     Current Outpatient Medications on File Prior to Visit   Medication Sig Dispense Refill    pramipexole (MIRAPEX) 0.125 mg tablet Take 1 Tablet by mouth nightly for 30 days. 30 Tablet 0    milrinone (PRIMACOR) 20 mg/100 mL (200 mcg/mL) infusion 20.975 mcg/min by IntraVENous route continuous.  100 mL 50    LORazepam (ATIVAN) 1 mg tablet Take 1 mg by mouth every eight (8) hours as needed. folic acid (FOLVITE) 1 mg tablet TAKE 1 TABLET EVERY DAY 90 Tablet 1    spironolactone (ALDACTONE) 25 mg tablet Take 1 Tablet by mouth daily. 30 Tablet 1    metoprolol succinate (TOPROL-XL) 25 mg XL tablet Take 0.5 Tablets by mouth every twelve (12) hours. 30 Tablet 1    digoxin (LANOXIN) 0.25 mg tablet Take 1 Tablet by mouth daily. 30 Tablet 1    allopurinoL (ZYLOPRIM) 100 mg tablet Take 0.5 Tablets by mouth daily. 15 Tablet 1    bumetanide (BUMEX) 2 mg tablet Take 0.5 Tablets by mouth daily. May take an additional tablet as needed to keep weight at 188 lb 30 Tablet 0    magnesium oxide (MAG-OX) 400 mg tablet Take 1 Tablet by mouth two (2) times a day. 60 Tablet 0    potassium chloride (KLOR-CON M10) 10 mEq tablet Take 2 Tablets by mouth two (2) times a day. 120 Tablet 0    cyclobenzaprine (FLEXERIL) 10 mg tablet Take 1 Tablet by mouth three (3) times daily as needed for Muscle Spasm(s). Has not needed 20 Tablet 0    cinacalcet (SENSIPAR) 30 mg tablet Take 1 Tablet by mouth daily (with breakfast) for 90 days. 30 Tablet 2    docusate sodium (COLACE) 100 mg capsule Take 1 Capsule by mouth daily for 90 days. 30 Capsule 2    empagliflozin (Jardiance) 10 mg tablet Take 1 Tablet by mouth daily for 60 days. 30 Tablet 1    ondansetron (ZOFRAN ODT) 4 mg disintegrating tablet Take 1 Tablet by mouth every eight (8) hours as needed for Nausea or Vomiting. 60 Tablet 1    levocetirizine (XYZAL) 5 mg tablet TAKE 1 TABLET EVERY DAY 90 Tablet 1    metFORMIN ER (GLUCOPHAGE XR) 500 mg tablet Take 1 Tablet by mouth daily (with dinner). 90 Tablet 1    montelukast (SINGULAIR) 10 mg tablet TAKE 1 TABLET EVERY DAY 90 Tablet 1    diazePAM (VALIUM) 5 mg tablet 5 mg every eight (8) hours as needed.  5 mg, 3 times daily (Patient not taking: Reported on 12/7/2022)      albuterol-ipratropium (DUO-NEB) 2.5 mg-0.5 mg/3 ml nebu 3 mL by Nebulization route every four (4) hours as needed. fluticasone propionate (FLONASE) 50 mcg/actuation nasal spray 2 Sprays by Both Nostrils route daily. ipratropium (ATROVENT) 21 mcg (0.03 %) nasal spray 1 Driscoll by Both Nostrils route every twelve (12) hours. (Patient taking differently: 1 Spray by Both Nostrils route as needed.) 30 mL 0    acetaminophen (TYLENOL) 325 mg tablet Take  by mouth every four (4) hours as needed for Pain. albuterol (PROVENTIL HFA, VENTOLIN HFA, PROAIR HFA) 90 mcg/actuation inhaler Take  by inhalation as needed. prasugreL (EFFIENT) 10 mg tablet       nitroglycerin (NITROSTAT) 0.4 mg SL tablet  (Patient not taking: Reported on 12/7/2022)      aspirin delayed-release 81 mg tablet 81 mg.      pantoprazole (PROTONIX) 40 mg tablet TAKE 1 TABLET TWICE DAILY 180 Tablet 1    ferrous sulfate 325 mg (65 mg iron) tablet TAKE 1 TABLET BY MOUTH ONCE DAILY BEFORE BREAKFAST 90 Tablet 1    traZODone (DESYREL) 100 mg tablet Weaning-150 mg at night      escitalopram oxalate (LEXAPRO) 20 mg tablet Take 20 mg by mouth daily. clonazePAM (KLONOPIN) 1 mg tablet Take 1.5 mg by mouth nightly. cholecalciferol, vitamin D3, 50 mcg (2,000 unit) tab 2,000 Units. No current facility-administered medications on file prior to visit.         Allergies   Allergen Reactions    Abilify [Aripiprazole] Anxiety     Can not tolerate     Sulfa (Sulfonamide Antibiotics) Hives    Vicodin [Hydrocodone-Acetaminophen] Nausea and Vomiting        Patient Active Problem List    Diagnosis Date Noted    Fever 12/02/2022    CHF (congestive heart failure) ([facility] 10/21/2022    Chronic systolic (congestive) heart failure 09/07/2022    Coronary artery disease involving native coronary artery of native heart without angina pectoris 08/29/2022    Acute exacerbation of CHF (congestive heart failure) ([facility] 08/13/2022    Neuropathy 10/15/2019    Gastroparesis 11/29/2018    Plantar fasciitis of left foot 11/29/2018    Positive KITTY (antinuclear antibody) 11/29/2018    Severe obesity ([facility] 11/29/2018    Class 2 obesity due to excess calories without serious comorbidity in adult 07/25/2018    Mood disorder ([facility] 06/12/2018    Chronic pain syndrome 06/12/2018    Fibromyalgia 06/12/2018    Panic attack 06/12/2018    Tremor 06/12/2018    [facility] 01/15/2018    Inflammatory arthritis 08/10/2016    Vitamin D deficiency 03/21/2016    Thrombosed external hemorrhoid 02/05/2016    IFG (impaired fasting glucose) 09/10/2015    Hyperlipidemia 09/10/2015    Hoarseness 03/25/2013    OCD (obsessive compulsive disorder) 02/27/2012    HSV (herpes simplex virus) anogenital infection 09/12/2011    Costochondritis 05/17/2011    Anxiety 09/13/2010    Itch of skin 05/26/2010    Depression     GERD (gastroesophageal reflux disease)     Anemia, unspecified         Health Maintenance Due   Topic Date Due    Pneumococcal 0-64 years (1 - PCV) Never done    COVID-19 Vaccine (4 - Booster for Moderna series) 02/22/2022    Flu Vaccine (1) Never done    Medicare Yearly Exam  12/03/2022        Assessment/Plan:  Details of this discussion including any medical advice provided: Will continue to follow up. Trial of Voltaren gel for now for pain. Patient is overall handling situation well. Noting that she is waiting to see rheumatology, referral out now for soonest possible appointment. ICD-10-CM ICD-9-CM    1. Coronary artery disease involving native coronary artery of native heart without angina pectoris  I25.10 414.01       2. Chronic systolic (congestive) heart failure  I50.22 428.22      428.0       3. Gastroesophageal reflux disease without esophagitis  K21.9 530.81       4. Depression, unspecified depression type  F32. A 311       5. Chronic pain syndrome  G89.4 338.4       6. Class 2 obesity due to excess calories without serious comorbidity with body mass index (BMI) of 35.0 to 35.9 in adult  E66.09 278.00     Z68.35 V85.35       7.  Positive KITTY (antinuclear antibody)  R76.8 795.79 REFERRAL TO RHEUMATOLOGY        Follow-up and Dispositions    Return in about 4 weeks (around 1/12/2023). Total Time: minutes: 11-20 minutes was spent on telemedicine encounter discussing above problems and plans. Patient Problem list, medications, and Allergies were reviewed during this encounter. Pursuant to the emergency declaration under the [address] [place] [address] [facility] authority and the [employer] Act, this Telephone Visit was conducted, with patient's consent, to reduce the patient's risk of exposure to COVID-19 and provide continuity of care for an established patient. I affirm this is a Patient Initiated Episode with an Established Patient who has not had a related appointment within my department in the past 7 days or scheduled within the next 24 hours. Discussed diagnoses in detail with patient. Medication risks/benefits/side effects discussed with patient. All of the patient's questions were addressed. The patient understands and agrees with our plan of care. The patient knows to call back if they are unsure of or forget any changes we discussed today or if the symptoms change.     Note: not billable if this call serves to triage the patient into an appointment for the relevant concern    MD URBAN Granados & MARSHAL CARMEN [facility]  12/15/22

## 2022-12-15 NOTE — PROCEDURES
[address]   PULMONARY FUNCTION TEST    Name:  Myke Alvarez  MR#:  [id number]  :  1980  ACCOUNT #:  [de-identified]  DATE OF SERVICE:  2022    INDICATION:  None given. FINDINGS:  BEDSIDE SPIROMETRY:  Reduced FVC, reduced FEV1, normal FEV1/FVC ratio. IMPRESSION:  Mild restrictive ventilatory defect based on spirometry. Clinical correlation is recommended.         Brisa Cueva MD      [id number]  D:  12/15/2022 11:59  T:  12/15/2022 13:47  JOB #:  [id number]

## 2022-12-15 NOTE — TELEPHONE ENCOUNTER
From: Eliane Grover  To: Stephanie Corrales MD  Sent: 12/15/2022 3:13 PM EST  Subject: Diabetes monitor     Dr. Lauren Jefferson I forget to ask you about getting a diabetes monitor, my blood sugar was really off in the hospital on my last stat it was 50 and I had to drink all this juice. The lady from [facility] said it may be a good idea for me to get one and when they did my blood work I was at 6.2 at the hospital in October. Sapna said if you approved that you can send it in to the [address] and they will pay for the monitor and strips .

## 2022-12-16 ENCOUNTER — TELEPHONE (OUTPATIENT)
Dept: CARDIOLOGY CLINIC | Age: 42
End: 2022-12-16

## 2022-12-16 ENCOUNTER — TELEPHONE (OUTPATIENT)
Dept: FAMILY MEDICINE CLINIC | Age: 42
End: 2022-12-16

## 2022-12-16 ENCOUNTER — DOCUMENTATION ONLY (OUTPATIENT)
Dept: CARDIOLOGY CLINIC | Age: 42
End: 2022-12-16

## 2022-12-16 DIAGNOSIS — R73.03 PREDIABETES: Primary | ICD-10-CM

## 2022-12-16 DIAGNOSIS — R73.01 IFG (IMPAIRED FASTING GLUCOSE): Chronic | ICD-10-CM

## 2022-12-16 RX ORDER — BLOOD SUGAR DIAGNOSTIC
STRIP MISCELLANEOUS
Qty: 100 STRIP | Refills: 1 | Status: SHIPPED | OUTPATIENT
Start: 2022-12-16

## 2022-12-16 RX ORDER — LANCETS
EACH MISCELLANEOUS
Qty: 100 EACH | Refills: 1 | Status: SHIPPED | OUTPATIENT
Start: 2022-12-16

## 2022-12-16 RX ORDER — INSULIN PUMP SYRINGE, 3 ML
EACH MISCELLANEOUS
Qty: 1 KIT | Refills: 0 | Status: SHIPPED | OUTPATIENT
Start: 2022-12-16

## 2022-12-16 NOTE — LETTER
12/16/2022 11:31 AM    Ms. Shaheen Corcoran  [address]. [id number]            To Whom it May Concern,        Shaheen Corcoran 1980 is currently under the care of the [address] [place] [address]. She is currently being worked up for a Left Ventricular Assist Device (LVAD) heart pump and possible heart transplant. As part of the workup Ms. Homero Ramirez will need a letter of clearance to proceed with surgery from a dental standpoint. If you are unable to provide clearance please indicate any dental disease or infection present and any further work that may need to be done prior to LVAD implant or transplant. Ms. Homero Ramirez indicated she has been following with your office, and your assistance would be greatly appreciated. Letters may be faxed to [phone]. Please do not hesitate to contact the [facility] at [phone] with any further questions.  Again, we appreciate your assistance in the care of this patient.            Sincerely,           Genevieve Magana RN LVAD Coordinator  [address] [place] [address]

## 2022-12-16 NOTE — TELEPHONE ENCOUNTER
Contacted patient to inquire about dental clearance. She stated she did see dentist Monday and was told there was no signs of infections. Per patient they should be sending records. She had no further questions. Dental clearance re-requested this date. Will await return response. Radha Zimmer RN.

## 2022-12-16 NOTE — TELEPHONE ENCOUNTER
Reordered Diabetes supplies.     MD URBAN [place] & MARSHAL CARMEN [facility]  12/16/22

## 2022-12-16 NOTE — PROGRESS NOTES
LVAD/TRANSPLANT [id number]   Date: 12/16/2022   Committee Members:   Dr. Caroline Veloz MD PhD (Medical Director), Dr. Ivan Carpio MD (Surgical Director), CARIDAD Eller (Chief Nurse Practitioner), Octavio Valentino NP ([facility] Nurse Practitioner), Zbigniew Etienne RN (VAD Coordinator), Luz Marina Driscoll RN (VAD Coordinator), Toi Briones RN (VAD Coordinator), Akin Silva LCSW ()   Criteria Met:  Chronic systolic heart failure  Stage D, NYHA class IV symptoms  LVEF < 25%  Failure to respond to GDMT for at least 45 of the last 60 days  Resting cardiac index < 2.2 L/min/m2  Inotrope dependence for more than 14 days  Potentially not candidate for OHT   Absolute contraindications:  None   Relative contraindications:  Potential for recovery on GDMT with possible   Incomplete work up. Needs rheumatology follow up, ENT, Repeat protein s, repeat Chest CT end of December, Cardiac MRI  Need clarity of psych condition  Limited psychosocial support     Decision:  Preliminary approval for LVAD pending completion of work up  Plan for representation February with possible rpt [address] prior   Plan of care discussion:  N/A   Additional comments:  N/A   Prepared by:   MEG Berrios RN  VAD Coordinator

## 2022-12-16 NOTE — TELEPHONE ENCOUNTER
Pharmacy stated the they do not have the Acucheck meter or supplies in stock. They do have the One Touch ultra 2 and One Touch Verio Flex. Please send a new Rx along with Rx's for supplies.

## 2022-12-19 ENCOUNTER — TELEPHONE (OUTPATIENT)
Dept: CARDIOLOGY CLINIC | Age: 42
End: 2022-12-19

## 2022-12-19 NOTE — TELEPHONE ENCOUNTER
Patient called stating she spoke with NP on Friday and was told to hold her metoprolol for low BP. She is calling because her BP has remained low all weekend and she is still holding metoprolol. BP today 94/71, HR 90's, although she states once in awhile it has dipped to 50's. She denies dizziness, shortness of breath or swelling. Weight is 180 lb. She does admit to only drinking at most 30 ounces of water per day. She has also not taken her spironolactone in about a week. I advised her that refill has been sent for spironolactone, so pick it up at pharmacy, she also needs to drink a minimum of 48-54 ounces of water, which may increase blood pressure enough to re-start metoprolol, but to hold metoprolol until further guidance from NP. I returned call to patient, advised her per Tj Schafer:  Continue to hold it for now,  please have her keep a log and call on Thursday after she has resumed spironolactone and had some more fluids for the next 2 days. She states understanding.

## 2022-12-20 ENCOUNTER — PATIENT OUTREACH (OUTPATIENT)
Dept: CASE MANAGEMENT | Age: 42
End: 2022-12-20

## 2022-12-20 ENCOUNTER — PATIENT MESSAGE (OUTPATIENT)
Dept: FAMILY MEDICINE CLINIC | Age: 42
End: 2022-12-20

## 2022-12-20 DIAGNOSIS — R73.03 PREDIABETES: ICD-10-CM

## 2022-12-20 DIAGNOSIS — R73.01 IFG (IMPAIRED FASTING GLUCOSE): Chronic | ICD-10-CM

## 2022-12-20 RX ORDER — KETOCONAZOLE 20 MG/ML
SHAMPOO, SUSPENSION TOPICAL
Qty: 120 ML | Refills: 1 | Status: SHIPPED | OUTPATIENT
Start: 2022-12-20

## 2022-12-20 RX ORDER — LANCETS
EACH MISCELLANEOUS
Qty: 100 EACH | Refills: 1 | Status: SHIPPED | OUTPATIENT
Start: 2022-12-20 | End: 2022-12-22 | Stop reason: ALTCHOICE

## 2022-12-20 RX ORDER — BLOOD SUGAR DIAGNOSTIC
STRIP MISCELLANEOUS
Qty: 100 STRIP | Refills: 1 | Status: SHIPPED | OUTPATIENT
Start: 2022-12-20 | End: 2022-12-22 | Stop reason: ALTCHOICE

## 2022-12-20 RX ORDER — INSULIN PUMP SYRINGE, 3 ML
EACH MISCELLANEOUS
Qty: 1 KIT | Refills: 0 | Status: SHIPPED | OUTPATIENT
Start: 2022-12-20 | End: 2022-12-22 | Stop reason: ALTCHOICE

## 2022-12-20 RX ORDER — LEVOCETIRIZINE DIHYDROCHLORIDE 5 MG/1
TABLET, FILM COATED ORAL
Qty: 90 TABLET | Refills: 1 | Status: SHIPPED | OUTPATIENT
Start: 2022-12-20

## 2022-12-21 ENCOUNTER — TELEPHONE (OUTPATIENT)
Dept: CARDIOLOGY CLINIC | Age: 42
End: 2022-12-21

## 2022-12-21 NOTE — TELEPHONE ENCOUNTER
[address] Records to request RHC done 2022. Per medical records request with patient name,  and information needed can be faxed to [phone]. The request will then be processed and sent. She had no further questions.

## 2022-12-21 NOTE — PROGRESS NOTES
Goals Addressed                   This Visit's Progress     Prevent complications post hospitalization. 11/11/22   Patient denies any SOB, cough, pedal edema, does have some \" abd funny\"  \"queasy\" feeling and some nausea today, this is not new for her. Feels this may also be her anxiety and was trying not to take her valium. Patient is on low sodium diet and fluid restrictions. Patient weight today was 186 lbs. Down from 189 from yesterday, reviewed what she would do if she gained 3 lbs in 1 day or 5 lbs in 1 week-call provider. Patient has her PICC line and was seen by [facility] yesterday and will be receiving weekly PICC line dressing changes. Patient on Milrinone at 0.25 MCG or 21.35 ml via portable pump and is also wearing her Life Vest.  Still adjusting to life vest how she lays when she sleeps and such. Patient is to start Cardiac Rehab with [address] [place] [address], wants to talk to Dr Enrique Araujo before starting. Patient states she is having trouble paying for the Milrinone it is $19 a day or $570 and that is about 1/2 of her income. Patient states that [facility] is working with her on this issue. Patient does not have an ACP on file, send [employer] message about this to patient. Monitor SOB, cough, pedal edema, daily weight, PICC line, abd queasy?, wearing Life Vest?, activity tolerance, PO intake, fluid restriction? Hear anything about Milrinone assistance? Decision about Cardiac Rehab, ACP information sent-looked at? Martina Lopez MSN, RN, CCM / Care Transition Nurse / [phone]       11/17/22   Patient to [facility] on 11/14 with nausea and tingling in Inder legs and arms and around mouth. Patient denies SOB or cough, does have nausea, sour taste in mouth and Inder leg cramps that she can't sleep at HS. Weight today was 181.8 lbs   Continues to have numbness in arms and around mouth but not as bad as on 11/14.   Patient also C/O slight nose bleed, feels that her nose is dry, encouraged to get some N/S gtts for irritation. Has not heard anything about assistance with Milrinone cost.    Has not looked at [address] information has not felt well enough to look at it. Monitor SOB, cough, pedal edema, nausea, sour taste in mouth, daily weight, leg cramps, numbness, did she look at ACP information or hear anything about Milrinone assistance? Melvina FAJARDO, RN, CCM / Care Transition Nurse / [phone]     11/22/22   Patient states she has continued tingling and numbness on face and hands. Seen in ED on 11/18/22 for the same. Patient continues to have nausea and sour taste in mouth, states she has been told she has Sjogrens Syndrome. Continues to have occasional nose. Bleeds and using gttts. Continues to wear her life vest, milrinone via PICC line remains infusing. Patient denies any SOB, cough or pedal edema, weight yesterday was 184.8 lbs. Patient has not looked at ACP information sent on [employer]. Eating weird diet, had yogurt and then cucumbers with vinegar and the baked chicken for supper. Patient states she is to hear back about Milrinone assistance today. Monitor SOB, cough, nose bleed, pedal edema, face numbness and tingling ? Still have sour taste in mouth and nausea? Did she look at ACP information?, Wearing Life vest?    Melvina FAJARDO, RN, CCM / Care Transition Nurse / [phone]     11/28/22   Had incoming message from patient letting me know that she was in the ED Sat for being :numb all over: Attempted to contact patient, LMV, see that she is to have repeat labs drawn from [facility] today. Melvina FAJARDO, RN, CCM / Care Transition Nurse / [phone]     12/01/22   Patient states yesterday she was starting to get H/A and took her muscle relaxer and her H/A did not come then. Denies SOB, C/P, pedal edema. Continues to have decreased appetite and her food \" doesn't taste right\".    Having some issues with [facility], waiting for them to put clot buster in her 1 side of port and then they were not coming weekly like they were suppose to for PICC line dressing changes. Cannot see [employer] message that I sent gives her error 150? Will send letter saying same thing. Monitor SOB, C/P, cough, pedal edema, daily weight, PICC line, port unclogged? Still have bad taste in mouth and decreased appetite? Leland FAJARDO, RN, CCM / Care Transition Nurse / [phone]     12/07/22  Patient readmitted 12/2-12/6 with fever. Patient denies any SOB, cough or fever. Weight today was 182.6 lbs Not taking Bumex yet. Wearing Life vest.  Continues to have sour taste in mouth and decreased appetite. Denies any numbness or tingling at this time. Did have diarrhea while admitted and now eating Yogurt. Has Milrinone via PICC line and is to have Ceftriaxone 2 gm Q24H until 12/18/22. This is supplied by [facility], has not received ABX for today yet, patient calling [facility] to find out when ETA is. (R) arm PICC line  Patient to have [facility], they have called and spoke to patient to come today or tomorrow. I am thinking tomorrow since it is Thursday and she needs her labs drawn on Mon and Thursdays  CBC/Diff, PTL and BMP. Monitor SOB, cough, pedal edema, fever, daily weight, PO intake, diarrhea, yeast infection, sour taste in mouth, numb and tingling? How is HH and [facility] going with PICC, Milrinone and IV ABX? Leland FAJARDO, RN, CCM / Care Transition Nurse / [phone]     12/13/22   Care Transitions Outreach Attempt-LMTCB. Leland FAJARDO, RN, CCM / Care Transition Nurse / [phone]      12/15/22   Patient states she is doing well, continues to receive IV ABX via PICC line. Still has sour taste in mouth and appetite is about the same. Denies any SOB, cough or fever. Continues to get W/U at [facility] for heart transplant.    Monitor for SOB, cough, daily weight, done with IV ABX, how is PICC line doing, PO intake, numbness or tingling?     Martina Lopez MSN, RN, CCM / Care Transition Nurse / [phone]     12/20/22  Care Transitions Outreach Attempt-    Martina Lopez MSN, RN, CCM / Care Transition Nurse / [phone]

## 2022-12-22 ENCOUNTER — PATIENT MESSAGE (OUTPATIENT)
Dept: FAMILY MEDICINE CLINIC | Age: 42
End: 2022-12-22

## 2022-12-22 ENCOUNTER — OFFICE VISIT (OUTPATIENT)
Dept: CARDIOLOGY CLINIC | Age: 42
End: 2022-12-22
Payer: MEDICARE

## 2022-12-22 ENCOUNTER — HOSPITAL ENCOUNTER (OUTPATIENT)
Dept: SLEEP MEDICINE | Age: 42
Discharge: HOME | End: 2022-12-22
Attending: NURSE PRACTITIONER

## 2022-12-22 VITALS
BODY MASS INDEX: 30.73 KG/M2 | OXYGEN SATURATION: 97 % | TEMPERATURE: 97.1 F | RESPIRATION RATE: 20 BRPM | HEIGHT: 64 IN | WEIGHT: 180 LBS | SYSTOLIC BLOOD PRESSURE: 98 MMHG | DIASTOLIC BLOOD PRESSURE: 76 MMHG | HEART RATE: 98 BPM

## 2022-12-22 DIAGNOSIS — R76.8 POSITIVE ANA (ANTINUCLEAR ANTIBODY): ICD-10-CM

## 2022-12-22 DIAGNOSIS — R06.02 SHORTNESS OF BREATH: ICD-10-CM

## 2022-12-22 DIAGNOSIS — R73.03 PREDIABETES: Primary | ICD-10-CM

## 2022-12-22 DIAGNOSIS — I50.22 CHRONIC SYSTOLIC HEART FAILURE (HCC): Primary | ICD-10-CM

## 2022-12-22 RX ORDER — LANCETS
EACH MISCELLANEOUS
Qty: 100 EACH | Refills: 1 | Status: SHIPPED | OUTPATIENT
Start: 2022-12-22

## 2022-12-22 RX ORDER — BLOOD-GLUCOSE METER
EACH MISCELLANEOUS
Qty: 1 EACH | Refills: 0 | Status: SHIPPED | OUTPATIENT
Start: 2022-12-22

## 2022-12-22 RX ORDER — BLOOD SUGAR DIAGNOSTIC
STRIP MISCELLANEOUS
Qty: 100 STRIP | Refills: 1 | Status: SHIPPED | OUTPATIENT
Start: 2022-12-22

## 2022-12-22 NOTE — PROGRESS NOTES
Patient reports unable to get in with Rheumatology until October of 2023, Dr Zakia Dunn requests patient try [facility] to see if sooner available appointments. Called Carlos A with [facility] regarding patient having issues with life vest. Electrodes continue to flip so that electrode is not touching skin. Patient has life vest on the tightest setting, but it might be too large. Carlos A will see about a refitting or ordering size smaller vest. She also mentioned having patient get an ace wrap to wrap over life vest to help keep electrodes in place for the times being. RN called [facility] regarding adding on digoxin level to next weeks labs. Camden Garner took verbal order for add on. Patient called to update on above, reports understanding. She states her sleep study tonight had to be rescheduled and will likely be at the end of January.

## 2022-12-22 NOTE — PROGRESS NOTES
Purpose of Visit:   Met with the pt. During clinic visit discussed insurance concerns and mood management. Observations/Notes:   Affect:WNR  Mood:reports some anxiety today, but states it is manageable. Thought Content/Process:    Results of brief psychosocial needs assessment:  During present encounter, pt. Discussed possile change to her insurance that might reduce cost of inotropes. While she was just denied for Medicaid, sw advised she contact her  and find out if she can use current inotrope bills toward her spend down and explore LTC medicaid options if she needs assistance with ADLs as well. Also discussed other finance options. Pt. Has Care Card with [address] [place] [address] and she also has financial aide application with [facility]. Pt. Is going through transplant testing and is hoping to get on heart transplant list. She has seen her psychiatrist this week and reports she will continue trazodone. Her symptoms are currently lack of sleep and nervousness after family member's passing. Pt. Denies S/I at this time and is continuing to focus on improving health behaviors. She is working to improve eating and progress with weight loss. Follow up Plan:   SW will continue to monitor for changes in psychosocial needs, assist with insurance concerns, and support pt. In mood management. Carolina Kendrick LMSW, LCSW Supervisee.    [facility] [facility], [address]  Phone: [phone]  Fax: [phone]

## 2022-12-22 NOTE — TELEPHONE ENCOUNTER
Reordered Glucometer and supplies.     MD URBAN Watson & MARSHAL CARMEN [facility]  12/22/22

## 2022-12-22 NOTE — PATIENT INSTRUCTIONS
Medication changes:    Hold toprol if Blood pressure <100, please keep a log of when you take/hold toprol    Dr Kishan Pacheco recommends starting a probiotic daily, please pick one up over the counter. Please take this to your pharmacy to notify them of the change in medications. Testing Ordered: An order for cardiac MRI has been placed to be done as soon as available. You will be receiving an automated call from Coordination of Care to schedule this test. If you are unavailable to receive the call or would like to contact coordination of care yourself you may contact [phone] to schedule. You will need to contact coordination of care yourself if you miss their calls as they will only make 3 attempts to reach you. Lab work has been ordered to be done with home health. Our office will notify you of any abnormal results requiring changes to your current plan of care. Other Recommendations:     A referral to EP (electrophysiology, type of cardiology) has been placed. You will be contacted for scheduling. Call to schedule appt with rheumatology     Ensure your drinking an adequate amount of water with a goal of 6-8 eight ounce glasses (1.5-2 liters) of fluid daily. Your urine should be clear and light yellow straw colored. If your blood pressure begins to consistently run below 90/60 and/or you begin to experience dizziness or lightheadedness, please contact the [facility] at [phone]. Follow up in 2 months with [facility]      Please monitor your weights daily upon waking and after using the bathroom. Keep a written records of your weights and bring to your next appointment. If you have a weight gain of 3 or more pounds overnight OR 5 or more pounds in one week please contact our office.        Thank you for allowing us the privilege of being a part of your healthcare team! Please do not hesitate to contact our office at [phone] with any questions or concerns. Virtual Heart Failure [address] invites you to learn more about heart failure and to share your questions, ideas, and experiences with others. Each month, the Heart Failure Support Group features a new educational topic and a guest speaker, followed by an interactive discussion. Our Heart Failure Nurse Navigator will moderate each session. You will be able to participate by phone, tablet or computer through [employer]. This support group takes place on the 3rd Thursday of each month from 6:00-7:30PM. All individuals living with heart failure and their caregivers are welcome to join. If you are interested in participating, please contact us at [email or web address] and you will be sent the link to join the [employer].

## 2022-12-22 NOTE — PROGRESS NOTES
[facility] in [facility], [facility] Note    Patient name: Gary Putnam  Patient : 1980  Patient MRN: [id number]  Date of service: 22    Primary care physician: Jose Low MD  Primary general cardiologist:      Primary F cardiologist: Lucy Castillo MD    CHIEF COMPLAINT:  Chronic systolic heart failure    PLAN OF CARE:  Briefly Gary Putnam is a 38 y.o. female with ischemic cardiomyopathy, LVEF 15-20%, stage C, NYHA class IIIB initiated on inotropic support; not able to tolerate dobutamine due to tachycardia and switched to milrinone; limited options to slow down heart rate due to QTc > 500ms in the setting of antidepressant use; appears to have HR in better control; initiated inpatient HT/LVAD workup  Concerns regarding HT/LVAD candidacy include possible autoimmune disease that requires diagnosis/treatment, psychiatric condition that needs evaluation/treatment and uncertain support system; ongoing evaluation for heart failure etiology (high risk for biopsy per Dr. Aurelio Nazario, awaiting sleep study tonight and cardiac MRI); preliminary approval for LVAD-BTT as backup at [facility]  PICC line was infected requiring IV antibiotics and hospitalizations only 2 weeks after discharge; changed [facility] team, on surveillance of inflammatory markers  Seen by [facility] heart transplant program and evaluation will begin 22  If no recovery of LVEF on 3-month echo, then referral to EP to consider ICD/CRT for non-LBBB QRS 150ms as bridge to recovery/improved symptoms    PLAN:  Continue current medical therapy for heart failure  Continue milrinone 0.25mcg/kg/min  Continue digoxin 0.25mg daily, check levels  Cannot tolerate corlanor due to proloned QTc; likely in the setting of taking antidepressants, d/w Pharm. D. would not use with QTc > 500ms; QTc 539ms 22  Resume toprol XL 12.5mg daily; do not take if SBP < 100mmHg; keep diary how often used beta-blocker  Cannot tolerate ACEi/ARB/ARNi due to hypotension   Continue spironolactone 25mg daily  Continue jardiance 10mg daily  Continue allopurinol 100mg daily  Continue magnesium 400mg twice daily  Continue potassium 20meq twice daily  Continue effient 10mg daily and baby aspirin  Continue bumex 1mg daily, keep current weight 81.4kg on our scale  Take probiotics daily after recent IV abx therapy  Hold requip due to tachycardia  Hold crestor due to rise in TB; consider resuming if TB stable as OP; check LFT today and daily  Keep K > 4 and Mag > 2  Sleep study tonight  Continue lifevest  Schedule cardiac MRI  Labs monthly to include digoxin, procalcitonin, sed rate and CRP-Q  Follow-up with rheumatology at [facility] (next appt at [facility] in 10/23)  Follow-up with psychiatry  Follow-up with dentist  Follow-up with Dr. Carlos De León ENT to parathyroidectomy  Follow-up with nutritionist  Follow-up with primary cardiologist  Follow-up with [facility] transplant team 1/30/23  Referral to EP cardiologist   consultation re: curt  Recommend flu, covid and pneumonia vaccinations  Return to [facility] end of February with MD/NP    IMPRESSION:  Fatigue  Appears euvolemic   Chronic systolic heart failure  Stage C, NYHA class IV symptoms improved to II on inotropes  Ischemic cardiomyopathy, LVEF 20-25%  Most likely etiology of cardiomyopathy: CAD  +/- possible STU  +/- BBB-related  +/- possible autoimmune disease +/- undergoing evaluation  Chronic inotropic support c/b PICC line infection (10/2022)  RBBB, QRS 150ms  CAD  STEMI July 2022 s/p RICKY to LAD  At risk of sudden cardiac death  Lifevest in place   Cardiac risk factors:  HTN  Obesity  LHD  Pre-diabetes  Esophageal stenosis s/p dilation  RLS  Fibromyalgia  Anxiety and Depression      LIFE GOALS:  Patient's personal goals include: get better  Important upcoming milestones or family events: the holidays coming up  The patient identifies the following as important for living well: being independent     CARDIAC IMAGING:  Echo 10/22/22    Left Ventricle: Severely reduced left ventricular systolic function with a visually estimated EF of 15 - 20%. Left ventricle is mildly dilated. Normal wall thickness. Moderate hypokinesis of the following segments: basal anterior, basal anterolateral, basal anteroseptal, basal inferior, basal inferolateral, basal inferoseptal, mid anterior, mid anterolateral, mid anteroseptal, mid inferior, mid inferolateral and mid inferoseptal. Severe hypokinesis of the following segments: apical anterior, apical septal, apical inferior and apical lateral. Grade III diastolic dysfunction with increased LAP. Mitral Valve: Mild annular dilation. Moderate regurgitation. Tricuspid Valve: Moderately elevated RVSP. The estimated RVSP is 51 mmHg. Left Atrium: Left atrium is mildly dilated. Pericardium: Trivial pericardial effusion present. No indication of cardiac tamponade. Contrast used: Definity. Echo (8/14/22)    Left Ventricle: Severely reduced left ventricular systolic function with a visually estimated EF of 20 - 25%. Left ventricle is mildly dilated. Increased wall thickness. See diagram for wall motion findings. Grade II diastolic dysfunction with increased LAP. Right Ventricle: Not well visualized. Tricuspid Valve: Moderately elevated RVSP. HEMODYNAMICS:  RHC 10/27/22  PA 56/34/43, RA 16, PCWP 29, CI daisy 1.46 at 199lbs  6MW 11/8/22 164meters     BRIEF HISTORY OF PRESENT ILLNESS:  Elayne Knott is a 38 y.o. female with h/o HTN, CAD s/p STEMI in July 2022 (DESx1 to LAD; treated at [facility]) with ischemic cardiomyopathy (EF 20-25%), CHF, GERD, RLS, and fibromyalgia seen as a new patient in [facility] on 10/21/22 and found to be fluid overloaded, and with suicidal ideation. Pt was taken to the ER for further evaluation and admission for acute on chronic HF and mental health crisis.  Patient was initiated on inotropes and evaluated for LVAD/HT. Patient admitted 12/2-12/6/22 with sepsis s/p IV antibiotics and replacement of PICC line. Kcl SR 20 take 40 daily  Cristina Irwin [id number]  [id number]    INTERVAL HISTORY:  Today, patient presents for routine clinic visit. Patient is doing well. She feels better now that antibiotics completed. Patient walked to our clinic from parking garage without having to slow down or stop. Patient can walk more than one block without symptoms of fatigue or shortness of breath or chest pain. Patient can walk one flight of stairs without symptoms of fatigue or shortness of breath or chest pain. Patient can perform home activities without problem and routinely participates in daily walking for more than 15 minutes. Patient denies symptoms of volume overload or leg edema. Patient denies abdominal bloating or change of appetite. Patient's weight remained stable. Patient denies orthopnea, PND or nocturia. Patient denies irregular heart rate or palpitations. No presyncope or syncope. Lifevest has not fired. Patient denies other cardiac symptoms such as chest pain or leg pain with walking. Patient is compliant with fluid restriction and taking medications as prescribed. Patient manages his own medications. PHYSICAL EXAM:  Visit Vitals  BP 98/76 (BP 1 Location: Left upper arm, BP Patient Position: Sitting, BP Cuff Size: Adult)   Pulse 98   Temp 97.1 °F (36.2 °C) (Oral)   Resp 20   Ht 5' 4\" (1.626 m)   Wt 180 lb (81.6 kg)   SpO2 97%   BMI 30.90 kg/m²     General: Patient is well developed, well-nourished in no acute distress  HEENT: Unremarkable   Neck: Supple. No evidence of thyroid enlargements or lymphadenopathy. JVD: Cannot be appreciated   Lungs: Breath sounds are equal and clear bilaterally. No wheezes, rhonchi, or rales. Heart: Regular rate and rhythm with normal S1 and S2. No murmurs, gallops or rubs. Abdomen: Soft, no mass or tenderness. No organomegaly or hernia.  Bowel sounds present. Genitourinary and rectal: deferred  Extremities: No cyanosis, clubbing, or edema. Neurologic: No focal sensory or motor deficits are noted. Grossly intact. Psychiatric: Awake, alert an doriented x 3. Appropriate mood and affect. Skin: Warm, dry and well perfused. REVIEW OF SYSTEMS:  General: Denies fever. Ear, nose and throat: Denies difficulty hearing, sinus problems, nosebleeds  Cardiovascular: see above in the interval history  Respiratory: Denies cough, wheezing, sputum production, hemoptysis. Gastrointestinal: Denies heartburn, constipation, diarrhea, abdominal pain, nausea, blood in stool  Kidney and bladder: Denies painful urination, frequent urination  Musculoskeletal: Denies joint pain, muscle weakness  Skin and hair: Denies change in existing skin lesions    PAST MEDICAL HISTORY:  Past Medical History:   Diagnosis Date    Anemia NEC     Arthritis     Chronic pain     fybromyalsia    Depression     anxiety, depression, OCD    GERD (gastroesophageal reflux disease)     Hypertension     Hypertension     Ill-defined condition     Fibromyalia gastricparesis    MI (myocardial infarction) ([address]     Musculoskeletal disorder     Sepsis ([facility] [address]     SOB (shortness of breath)     Stool color black        PAST SURGICAL HISTORY:  Past Surgical History:   Procedure Laterality Date    HX CORONARY STENT PLACEMENT      HX GYN           HX HEENT  2002    wisdom teeth extraction    UPPER GI ENDOSCOPY,BIOPSY  2018         UPPER GI ENDOSCOPY,DIAGNOSIS  2018            FAMILY HISTORY:  Family History   Problem Relation Age of Onset    Hypertension Father     Elevated Lipids Father     Arthritis-rheumatoid Mother         ? Lupus vs RA    Lung Disease Mother     Heart Disease Mother     Cancer Mother         breast in 39y    COPD Mother     Hypertension Mother     Stroke Mother         3-4 strokes    Breast Cancer Mother 50    Diabetes Maternal Grandmother        SOCIAL HISTORY:  Social History     Socioeconomic History    Marital status: SINGLE   Tobacco Use    Smoking status: Former     Packs/day: 0.25     Years: 8.00     Pack years: 2.00     Types: Cigarettes     Quit date: 2022     Years since quittin.4    Smokeless tobacco: Never   Vaping Use    Vaping Use: Never used   Substance and Sexual Activity    Alcohol use: Not Currently     Alcohol/week: 1.0 standard drink     Types: 1 Glasses of wine per week     Comment: Wine with special occassions - not since July    Drug use: Not Currently     Types: Marijuana     Comment: Haven't had any since 2022    Sexual activity: Yes     Partners: Male     Birth control/protection: None     Social Determinants of Health     Financial Resource Strain: High Risk    Difficulty of Paying Living Expenses: Very hard   Food Insecurity: No Food Insecurity    Worried About Running Out of Food in the Last Year: Never true    Ran Out of Food in the Last Year: Never true   Transportation Needs: No Transportation Needs    Lack of Transportation (Medical): No    Lack of Transportation (Non-Medical): No   Physical Activity: Inactive    Days of Exercise per Week: 0 days    Minutes of Exercise per Session: 0 min   Stress: Stress Concern Present    Feeling of Stress :  To some extent   Social Connections: Socially Isolated    Frequency of Communication with Friends and Family: Twice a week    Frequency of Social Gatherings with Friends and Family: Never    Attends Caodaism Services: Never    Active Member of Clubs or Organizations: No    Attends Club or Organization Meetings: Never    Marital Status: Never    Housing Stability: [address] to Pay for Housing in the Last Year: No    Number of [place] in the Last Year: 1    Unstable Housing in the Last Year: No       LABORATORY RESULTS:  Labs Latest Ref Rng & Units 2022 2022 2022 12/3/2022 2022 2022 2022   WBC 3.6 - 11.0 K/uL 5.1 5.7 5.3 9.8 4.8 8.5 7.4   RBC 3.80 - 5.20 M/uL 3.94 4.07 4.04 4.47 4.69 4.69 4.94   Hemoglobin 11.5 - 16.0 g/dL 11. 1(L) 12.0 11.6 13.0 13.6 13.5 14.1   Hematocrit 35.0 - 47.0 % 34. 4(L) 36.4 35.5 39.8 40.8 41.0 43.8   MCV 80.0 - 99.0 FL 87.3 89.4 87.9 89.0 87.0 87.4 88.7   Platelets 875 - 774 K/uL 187 192 151 167 191 244 269   Lymphocytes 12 - 49 % 37 41 29 6(L) 21 19 35   Monocytes 5 - 13 % 12 12 17(H) 7 10 9 10   Eosinophils 0 - 7 % 4 3 4 0 0 1 2   Basophils 0 - 1 % 1 1 1 0 1 0 1   Albumin 3.5 - 5.0 g/dL 2. 8(L) 2. 8(L) - - 3.5 3.8 4.0   Calcium 8.5 - 10.1 MG/DL 8.7 - 8.5 8. 0(L) 8. 1(L) 8.4(L) 9.5   Glucose 65 - 100 mg/dL 87 - 81 112(H) 96 120(H) 84   BUN 6 - 20 MG/DL 5(L) - 11 10 10 10 19   Creatinine 0.55 - 1.02 MG/DL 0.72 - 0.80 1.02 0.92 1.07(H) 0.92   Sodium 136 - 145 mmol/L 137 - 136 133(L) 130(L) 137 135(L)   Potassium 3.5 - 5.1 mmol/L 4.0 - 4.0 3.4(L) 3.5 3.5 3.5   LDH 81 - 246 U/L - - - - - - -   Some recent data might be hidden       ALLERGY:  Allergies   Allergen Reactions    Abilify [Aripiprazole] Anxiety     Can not tolerate     Sulfa (Sulfonamide Antibiotics) Hives    Vicodin [Hydrocodone-Acetaminophen] Nausea and Vomiting        CURRENT MEDICATIONS:    Current Outpatient Medications:     Blood-Glucose Meter (OneTouch Ultra2 Meter) monitoring kit, Use to check BG daily, Disp: 1 Kit, Rfl: 0    glucose blood VI test strips (OneTouch Ultra Test) strip, Use to check BG daily, Disp: 100 Strip, Rfl: 1    lancets (OneTouch UltraSoft Lancets) misc, Use to check BG daily, Disp: 100 Each, Rfl: 1    levocetirizine (XYZAL) 5 mg tablet, TAKE 1 TABLET EVERY DAY, Disp: 90 Tablet, Rfl: 1    pramipexole (MIRAPEX) 0.125 mg tablet, Take 1 Tablet by mouth nightly for 30 days. , Disp: 30 Tablet, Rfl: 0    milrinone (PRIMACOR) 20 mg/100 mL (200 mcg/mL) infusion, 20.975 mcg/min by IntraVENous route continuous. , Disp: 100 mL, Rfl: 50    LORazepam (ATIVAN) 1 mg tablet, Take 1 mg by mouth every eight (8) hours as needed. , Disp: , Rfl:     folic acid (Kathleen Delong) 1 mg tablet, TAKE 1 TABLET EVERY DAY, Disp: 90 Tablet, Rfl: 1    spironolactone (ALDACTONE) 25 mg tablet, Take 1 Tablet by mouth daily. , Disp: 30 Tablet, Rfl: 1    digoxin (LANOXIN) 0.25 mg tablet, Take 1 Tablet by mouth daily. , Disp: 30 Tablet, Rfl: 1    allopurinoL (ZYLOPRIM) 100 mg tablet, Take 0.5 Tablets by mouth daily. , Disp: 15 Tablet, Rfl: 1    magnesium oxide (MAG-OX) 400 mg tablet, Take 1 Tablet by mouth two (2) times a day., Disp: 60 Tablet, Rfl: 0    potassium chloride (KLOR-CON M10) 10 mEq tablet, Take 2 Tablets by mouth two (2) times a day., Disp: 120 Tablet, Rfl: 0    cyclobenzaprine (FLEXERIL) 10 mg tablet, Take 1 Tablet by mouth three (3) times daily as needed for Muscle Spasm(s). Has not needed, Disp: 20 Tablet, Rfl: 0    cinacalcet (SENSIPAR) 30 mg tablet, Take 1 Tablet by mouth daily (with breakfast) for 90 days. , Disp: 30 Tablet, Rfl: 2    docusate sodium (COLACE) 100 mg capsule, Take 1 Capsule by mouth daily for 90 days. , Disp: 30 Capsule, Rfl: 2    empagliflozin (Jardiance) 10 mg tablet, Take 1 Tablet by mouth daily for 60 days. , Disp: 30 Tablet, Rfl: 1    ondansetron (ZOFRAN ODT) 4 mg disintegrating tablet, Take 1 Tablet by mouth every eight (8) hours as needed for Nausea or Vomiting., Disp: 60 Tablet, Rfl: 1    metFORMIN ER (GLUCOPHAGE XR) 500 mg tablet, Take 1 Tablet by mouth daily (with dinner). , Disp: 90 Tablet, Rfl: 1    montelukast (SINGULAIR) 10 mg tablet, TAKE 1 TABLET EVERY DAY, Disp: 90 Tablet, Rfl: 1    albuterol-ipratropium (DUO-NEB) 2.5 mg-0.5 mg/3 ml nebu, 3 mL by Nebulization route every four (4) hours as needed. , Disp: , Rfl:     fluticasone propionate (FLONASE) 50 mcg/actuation nasal spray, 2 Sprays by Both Nostrils route daily. , Disp: , Rfl:     acetaminophen (TYLENOL) 325 mg tablet, Take  by mouth every four (4) hours as needed for Pain., Disp: , Rfl:     albuterol (PROVENTIL HFA, VENTOLIN HFA, PROAIR HFA) 90 mcg/actuation inhaler, Take  by inhalation as needed. , Disp: , Rfl:     prasugreL (EFFIENT) 10 mg tablet, , Disp: , Rfl:     aspirin delayed-release 81 mg tablet, 81 mg., Disp: , Rfl:     pantoprazole (PROTONIX) 40 mg tablet, TAKE 1 TABLET TWICE DAILY, Disp: 180 Tablet, Rfl: 1    ferrous sulfate 325 mg (65 mg iron) tablet, TAKE 1 TABLET BY MOUTH ONCE DAILY BEFORE BREAKFAST, Disp: 90 Tablet, Rfl: 1    traZODone (DESYREL) 100 mg tablet, Weaning-150 mg at night, Disp: , Rfl:     escitalopram oxalate (LEXAPRO) 20 mg tablet, Take 20 mg by mouth daily. , Disp: , Rfl:     clonazePAM (KLONOPIN) 1 mg tablet, Take 1.5 mg by mouth nightly., Disp: , Rfl:     cholecalciferol, vitamin D3, 50 mcg (2,000 unit) tab, 2,000 Units. , Disp: , Rfl:     ketoconazole (NIZORAL) 2 % shampoo, SHAMPOO TWICE WEEKLY (Patient not taking: Reported on 2022), Disp: 120 mL, Rfl: 1    metoprolol succinate (TOPROL-XL) 25 mg XL tablet, Take 0.5 Tablets by mouth every twelve (12) hours. (Patient not taking: Reported on 2022), Disp: 30 Tablet, Rfl: 1    bumetanide (BUMEX) 2 mg tablet, Take 0.5 Tablets by mouth daily. May take an additional tablet as needed to keep weight at 188 lb (Patient not taking: Reported on 2022), Disp: 30 Tablet, Rfl: 0    diazePAM (VALIUM) 5 mg tablet, 5 mg every eight (8) hours as needed. 5 mg, 3 times daily (Patient not taking: No sig reported), Disp: , Rfl:     ipratropium (ATROVENT) 21 mcg (0.03 %) nasal spray, 1 [place] by Both Nostrils route every twelve (12) hours. (Patient not taking: Reported on 2022), Disp: 30 mL, Rfl: 0    nitroglycerin (NITROSTAT) 0.4 mg SL tablet, , Disp: , Rfl:     Thank you for your referral and allowing me to participate in this patient's care.     Lucy Castillo MD PhD  [address] [place] [address], [address]  Phone: [phone]  Fax: [phone]    PATIENT CARE TEAM:  Patient Care Team:  Jose Low MD as PCP - General (Family Medicine)  Jose Low MD as PCP - [facility] Empaneled Provider  Lorna Rucker MD (Surgery General)  Oliverio Wilson MD (Cardiovascular Disease Physician)  Yusra Torres MD (Otolaryngology)  Becca Smith MD (Internal Medicine Physician)  Valarie Carolina MD (Female Pelvic Medicine and Reconstructive Surgery)  Vickie Turpin MD (Neurology)  Wilbert Fried MD (Psychiatry)  Daisy Abbasi RN as Care Transitions Nurse     Total visit time:40 minutes  (> 50% spent face-to-face counseling)

## 2022-12-22 NOTE — TELEPHONE ENCOUNTER
Message left inquiring about status of dental clearance. Return message received from Dr. Hitesh Rowan who stated the following:    \"PREM Sultana was seen for an evaluation. There are no signs of an active dental infection. Several carious lesions, but nothing that would be likely to prevent any surgery she will need. \"    Provider updated. Sammie Mason RN.

## 2022-12-23 ENCOUNTER — PATIENT OUTREACH (OUTPATIENT)
Dept: CASE MANAGEMENT | Age: 42
End: 2022-12-23

## 2022-12-23 NOTE — PROGRESS NOTES
Goals Addressed                   This Visit's Progress     Attends follow-up appointments as directed. On track     11/11/22   Future Appointments   Date Time Provider Komal Rodriguez   11/17/2022  1:00 PM Anibal Treviño MD [facility] BS AMB   11/22/2022  8:00 PM BEDROOM 1 [address] SLEEP LAB    11/25/2022 10:20 AM Onel Georges MD [facility] BS AMB   11/29/2022 11:00 AM Prasad Castle MD [facility] BS AMB   1/4/2023 10:20 AM Torie Mason DO [facility] BS AMB   Patient states she has an appt with Dr Guanaco Garcia on 11/18 scheduled does not know time yet. Patient is questioning if she needs to F/U with Dr Emilia Arevalo if she is going to [facility]. Patient needs to call and make an appt with ENT as [facility] for evaluation on Parathyroid. Patient father drives her to her appts. Monitor status of these appt on next call. Siva Negron MSN, RN, CCM / Care Transition Nurse / [phone]     11/17/22   Patient has an appt with Dr Robb Dominguez today at 1 pm.  Patient has an appt with PCP on 11/18 at 4 pm  Patient is still working on appt with GI for nausea and sour taste in mouth. Need to ask if she was in contact with [facility] for ENT appt. Monitor status of thee appt on next call. Siva FAJARDO, RN, CCM / Care Transition Nurse / [phone]     11/22/22   Patient was seen by Dr Robb Dominguez as scheduled. Patient did not attend PCP appt was in ED, now has an appt with Dr Ramos Allison on 12/6/22 scheduled. Patient is scheduled for a sleep study tonight, she does not feel \"comfortable\" going with her face numbness and they were not aware that she has life vest and Milrinone. Attempted to explain to patient that she would be monitored very closely while she had her sleep study, electrodes on head, chest, pulse ox and HR will be monitored. Patient wants to CXL sleep study for tonight and is planning on calling and letting them know. Monitor PCP office appt, on 12/6,  did she reschedule sleep study?  Make an appt for [facility] ENT yet?    Kolby FAJARDO, RN, CCM / Care Transition Nurse / [phone]     Patient has not made an appt with ENT yet \"too much going on\". 11/28/22  Care Transitions Outreach Attempt-LMTCB. Kolby FAJARDO, RN, CCM / Care Transition Nurse / [phone]      12/1/22  Patient has an appt with Dr Carrington Baird on 12/6/22. Patient has an appt with Sleep provider on 12/9  Patient has an appt with ENT on 1/5/23. Monitor status of these appt on next call. Kolby FAJARDO, RN, CCM / Care Transition Nurse / [phone]     12/07/22 Patient readmitted 12/2-12/6 with fever  12/9/2022  1:00 PM PULMONARY LAB [facility]   12/22/2022  9:20 AM Chelly Reynoso MD BS AMB   12/22/2022  8:00 PM BEDROOM [address] [place] [address] SLEEP LAB    12/23/2022 10:30 AM Huong Constantino MD BS AMB   12/29/2022 10:00 AM Radha Montaño MD BS AMB   1/4/2023 10:20 AM Stewart Caraballo DO BS AMB   Patient also has an appt with [facility] on 12/15 at 9 am.  Patient has an appt with [facility] ENT on 1/5/23 at 3:40 pm.  Monitor status of these appt on next call. Kolby FAJARDO, RN, [facility] / Care Transition Nurse / [phone]     12/13/22   Care Transitions Outreach Attempt-LMTCB. Kolby FAJARDO, RN, [facility] / Care Transition Nurse / [phone]        12/15/22  Patient states she seen virtually by PCP today and also had appt at [facility] transplant today. Still has Sleep study on 12/22 and keeping PCP appt for this day also. Dr Kevin Anderson on 12/23/22  ENT 1/5/23 at [facility]  Monitor status of these appt on next call. Kolby FAJARDO, RN, [facility] / Care Transition Nurse / [phone]    12/23/22   Sleep study rescheduled, was seen by Cards as scheduled on 12/23/22. Has an appt with [facility] ENT on 1/5/23. Monitor status of Sleep Study and ENT appt on next call. Kolby Walker MSN, RN, [facility] / Care Transition Nurse / [phone]          Prevent complications post hospitalization.    On track     11/11/22   Patient denies any SOB, cough, pedal edema, does have some \" abd funny\"  \"queasy\" feeling and some nausea today, this is not new for her. Feels this may also be her anxiety and was trying not to take her valium. Patient is on low sodium diet and fluid restrictions. Patient weight today was 186 lbs. Down from 189 from yesterday, reviewed what she would do if she gained 3 lbs in 1 day or 5 lbs in 1 week-call provider. Patient has her PICC line and was seen by [facility] yesterday and will be receiving weekly PICC line dressing changes. Patient on Milrinone at 0.25 MCG or 21.35 ml via portable pump and is also wearing her Life Vest.  Still adjusting to life vest how she lays when she sleeps and such. Patient is to start Cardiac Rehab with [address] [place] [address], wants to talk to Dr Veda Cartwright before starting. Patient states she is having trouble paying for the Milrinone it is $19 a day or $570 and that is about 1/2 of her income. Patient states that [facility] is working with her on this issue. Patient does not have an ACP on file, send [employer] message about this to patient. Monitor SOB, cough, pedal edema, daily weight, PICC line, abd queasy?, wearing Life Vest?, activity tolerance, PO intake, fluid restriction? Hear anything about Milrinone assistance? Decision about Cardiac Rehab, ACP information sent-looked at? Curtis Saenz MSN, RN, CCM / Care Transition Nurse / [phone]       11/17/22   Patient to [facility] ED on 11/14 with nausea and tingling in Inder legs and arms and around mouth. Patient denies SOB or cough, does have nausea, sour taste in mouth and Inder leg cramps that she can't sleep at HS. Weight today was 181.8 lbs   Continues to have numbness in arms and around mouth but not as bad as on 11/14. Patient also C/O slight nose bleed, feels that her nose is dry, encouraged to get some N/S gtts for irritation.   Has not heard anything about assistance with Milrinone cost.    Has not looked at Yannick Koenig information has not felt well enough to look at it. Monitor SOB, cough, pedal edema, nausea, sour taste in mouth, daily weight, leg cramps, numbness, did she look at ACP information or hear anything about Milrinone assistance? Martina Lopez MSN, RN, CCM / Care Transition Nurse / [phone]     11/22/22   Patient states she has continued tingling and numbness on face and hands. Seen in ED on 11/18/22 for the same. Patient continues to have nausea and sour taste in mouth, states she has been told she has Sjogrens Syndrome. Continues to have occasional nose. Bleeds and using gttts. Continues to wear her life vest, milrinone via PICC line remains infusing. Patient denies any SOB, cough or pedal edema, weight yesterday was 184.8 lbs. Patient has not looked at ACP information sent on [employer]. Eating weird diet, had yogurt and then cucumbers with vinegar and the baked chicken for supper. Patient states she is to hear back about Milrinone assistance today. Monitor SOB, cough, nose bleed, pedal edema, face numbness and tingling ? Still have sour taste in mouth and nausea? Did she look at ACP information?, Wearing Life vest?    Martina FAJARDO, RN, CCM / Care Transition Nurse / [phone]     11/28/22   Had incoming message from patient letting me know that she was in the ED Sat for being :numb all over: Attempted to contact patient, LMV, see that she is to have repeat labs drawn from [facility] today. Martina FAJARDO, RN, CCM / Care Transition Nurse / [phone]     12/01/22   Patient states yesterday she was starting to get H/A and took her muscle relaxer and her H/A did not come then. Denies SOB, C/P, pedal edema. Continues to have decreased appetite and her food \" doesn't taste right\". Having some issues with [facility], waiting for them to put clot buster in her 1 side of port and then they were not coming weekly like they were suppose to for PICC line dressing changes.    Cannot see [employer] message that I sent gives her error 150? Will send letter saying same thing. Monitor SOB, C/P, cough, pedal edema, daily weight, PICC line, port unclogged? Still have bad taste in mouth and decreased appetite? Marvin FAJARDO, RN, [facility] / Care Transition Nurse / [phone]     12/07/22  Patient readmitted 12/2-12/6 with fever. Patient denies any SOB, cough or fever. Weight today was 182.6 lbs Not taking Bumex yet. Wearing Life vest.  Continues to have sour taste in mouth and decreased appetite. Denies any numbness or tingling at this time. Did have diarrhea while admitted and now eating Yogurt. Has Milrinone via PICC line and is to have Ceftriaxone 2 gm Q24H until 12/18/22. This is supplied by [employer], has not received ABX for today yet, patient calling [employer] to find out when ETA is. (R) arm PICC line  Patient to have [facility], they have called and spoke to patient to come today or tomorrow. I am thinking tomorrow since it is Thursday and she needs her labs drawn on Mon and Thursdays  CBC/Diff, PTL and BMP. Monitor SOB, cough, pedal edema, fever, daily weight, PO intake, diarrhea, yeast infection, sour taste in mouth, numb and tingling? How is HH and [facility] going with PICC, Milrinone and IV ABX? Marvin FAJARDO, RN, CCM / Care Transition Nurse / [phone]     12/13/22   Care Transitions Outreach Attempt-TCB. Marvin FAJARDO, RN, [facility] / Care Transition Nurse / [phone]      12/15/22   Patient states she is doing well, continues to receive IV ABX via PICC line. Still has sour taste in mouth and appetite is about the same. Denies any SOB, cough or fever. Continues to get W/U at [facility] for heart transplant. Monitor for SOB, cough, daily weight, done with IV ABX, how is PICC line doing, PO intake, numbness or tingling?     Marvin FAJARDO, RN, CCM / Care Transition Nurse / [phone]     12/20/22  Care Transitions Outreach Attempt-    Marvin Inder MSN, RN, CCM / Care Transition Nurse / [phone]      12/23/22   Patient denies any fever, done with IV ABX continues on Milrinone via PICC line. Patient does have tingling /numbness off and on not as bad as before. Still has sour taste in moth and is to see [facility] for this. Continues to hold Metoprolol if Systolic is below 503, continues to take spironolactone. Is trying to drink more fluid, is to drink 48-54 oz and she is getting in 40 oz. Still waiting on glucometer from pharmacy. Monitor SOB, cough, pedal edema, PO intake, PICC line, fluid intake, get glucometer?     Christelle Tapia MSN, RN, CCM / Care Transition Nurse / [phone]

## 2022-12-23 NOTE — PROGRESS NOTES
Goals Addressed                   This Visit's Progress     Prevent complications post hospitalization. 11/11/22   Patient denies any SOB, cough, pedal edema, does have some \" abd funny\"  \"queasy\" feeling and some nausea today, this is not new for her. Feels this may also be her anxiety and was trying not to take her valium. Patient is on low sodium diet and fluid restrictions. Patient weight today was 186 lbs. Down from 189 from yesterday, reviewed what she would do if she gained 3 lbs in 1 day or 5 lbs in 1 week-call provider. Patient has her PICC line and was seen by [facility] [place] operated by [facility] yesterday and will be receiving weekly PICC line dressing changes. Patient on Milrinone at 0.25 MCG or 21.35 ml via portable pump and is also wearing her Life Vest.  Still adjusting to life vest how she lays when she sleeps and such. Patient is to start Cardiac Rehab with [address] [place] [address], wants to talk to Dr Leslie Alan before starting. Patient states she is having trouble paying for the Milrinone it is $19 a day or $570 and that is about 1/2 of her income. Patient states that [facility] is working with her on this issue. Patient does not have an ACP on file, send [employer] message about this to patient. Monitor SOB, cough, pedal edema, daily weight, PICC line, abd queasy?, wearing Life Vest?, activity tolerance, PO intake, fluid restriction? Hear anything about Milrinone assistance? Decision about Cardiac Rehab, ACP information sent-looked at? Eleni Drew MSN, RN, CCM / Care Transition Nurse / [phone]       11/17/22   Patient to [facility] on 11/14 with nausea and tingling in Inder legs and arms and around mouth. Patient denies SOB or cough, does have nausea, sour taste in mouth and Inder leg cramps that she can't sleep at HS. Weight today was 181.8 lbs   Continues to have numbness in arms and around mouth but not as bad as on 11/14.   Patient also C/O slight nose bleed, feels that her nose is dry, encouraged to get some N/S gtts for irritation. Has not heard anything about assistance with Milrinone cost.    Has not looked at [employer] information has not felt well enough to look at it. Monitor SOB, cough, pedal edema, nausea, sour taste in mouth, daily weight, leg cramps, numbness, did she look at ACP information or hear anything about Milrinone assistance? Wende Duane MSN, RN, CCM / Care Transition Nurse / [phone]     11/22/22   Patient states she has continued tingling and numbness on face and hands. Seen in ED on 11/18/22 for the same. Patient continues to have nausea and sour taste in mouth, states she has been told she has Sjogrens Syndrome. Continues to have occasional nose. Bleeds and using gttts. Continues to wear her life vest, milrinone via PICC line remains infusing. Patient denies any SOB, cough or pedal edema, weight yesterday was 184.8 lbs. Patient has not looked at ACP information sent on [employer]. Eating weird diet, had yogurt and then cucumbers with vinegar and the baked chicken for supper. Patient states she is to hear back about Milrinone assistance today. Monitor SOB, cough, nose bleed, pedal edema, face numbness and tingling ? Still have sour taste in mouth and nausea? Did she look at ACP information?, Wearing Life vest?    Wende Duane MSN, RN, CCM / Care Transition Nurse / [phone]     11/28/22   Had incoming message from patient letting me know that she was in the ED Sat for being :numb all over: Attempted to contact patient, LMV, see that she is to have repeat labs drawn from [facility] today. Wende Duane MSN, RN, CCM / Care Transition Nurse / [phone]     12/01/22   Patient states yesterday she was starting to get H/A and took her muscle relaxer and her H/A did not come then. Denies SOB, C/P, pedal edema. Continues to have decreased appetite and her food \" doesn't taste right\".    Having some issues with [facility], waiting for them to put clot buster in her 1 side of port and then they were not coming weekly like they were suppose to for PICC line dressing changes. Cannot see [employer] message that I sent gives her error 150? Will send letter saying same thing. Monitor SOB, C/P, cough, pedal edema, daily weight, PICC line, port unclogged? Still have bad taste in mouth and decreased appetite? Galo Mendiola MSN, RN, CCM / Care Transition Nurse / [phone]     12/07/22  Patient readmitted 12/2-12/6 with fever. Patient denies any SOB, cough or fever. Weight today was 182.6 lbs Not taking Bumex yet. Wearing Life vest.  Continues to have sour taste in mouth and decreased appetite. Denies any numbness or tingling at this time. Did have diarrhea while admitted and now eating Yogurt. Has Milrinone via PICC line and is to have Ceftriaxone 2 gm Q24H until 12/18/22. This is supplied by [employer], has not received ABX for today yet, patient calling [employer] to find out when ETA is. (R) arm PICC line  Patient to have [facility], they have called and spoke to patient to come today or tomorrow. I am thinking tomorrow since it is Thursday and she needs her labs drawn on Mon and Thursdays  CBC/Diff, PTL and BMP. Monitor SOB, cough, pedal edema, fever, daily weight, PO intake, diarrhea, yeast infection, sour taste in mouth, numb and tingling? How is HH and [facility] going with PICC, Milrinone and IV ABX? Galo Mendiola MSN, RN, CCM / Care Transition Nurse / [phone]     12/13/22   Care Transitions Outreach Attempt-LMTCB. Galo Mendiola MSN, RN, CCM / Care Transition Nurse / [phone]      12/15/22   Patient states she is doing well, continues to receive IV ABX via PICC line. Still has sour taste in mouth and appetite is about the same. Denies any SOB, cough or fever. Continues to get W/U at [facility] for heart transplant.    Monitor for SOB, cough, daily weight, done with IV ABX, how is PICC line doing, PO intake, numbness or tingling?     Nelly Chou MSN, RN, CCM / Care Transition Nurse / [phone]     12/20/22  Care Transitions Outreach Attempt-    Nelly Chou MSN, RN, [facility] / Care Transition Nurse / [phone]      12/23/22   Care Transitions Outreach Attempt-  Nelly Chou MSN, RN, [facility] / Care Transition Nurse / [phone]

## 2022-12-27 DIAGNOSIS — R73.03 PREDIABETES: ICD-10-CM

## 2022-12-27 RX ORDER — BLOOD-GLUCOSE METER
EACH MISCELLANEOUS
Qty: 1 EACH | Refills: 0 | Status: SHIPPED | OUTPATIENT
Start: 2022-12-27

## 2022-12-28 ENCOUNTER — TELEPHONE (OUTPATIENT)
Dept: CARDIOLOGY CLINIC | Age: 42
End: 2022-12-28

## 2022-12-28 ENCOUNTER — TELEPHONE (OUTPATIENT)
Dept: FAMILY MEDICINE CLINIC | Age: 42
End: 2022-12-28

## 2022-12-28 RX ORDER — TERCONAZOLE 4 MG/G
CREAM VAGINAL
Qty: 45 G | Refills: 0 | Status: SHIPPED | OUTPATIENT
Start: 2022-12-28

## 2022-12-28 NOTE — TELEPHONE ENCOUNTER
Returned patients call using two patient identifiers. Patient states that as of Wednesday she has been having a bit of congestion with clear mucus production. She states that she has taken a home covid test today and it was negative. She also notes that [facility] came out yesterday and assessed her lungs and told patient that lungs sounded clear. Advised patient that she should follow up with PCP in regards to congestion. She states she will send a message today to PCP. Patient states she will talk with PCP about mucinex. Advised patient it is recommended that heart failure patients avoid any product with dextromethorphan (DM) in the title. Patient states understanding and will discuss use of original mucinex with PCP. Patient notes that while nurse was in the home she was unable to draw blood back from picc line so nurse had to stick patient for labs. Patient went on to state that she has intermittent sharp pains that comes and goes since picc line was replaced. She also notes that sometimes instead of pain it is itchy at insertion site. Patient denies any redness or swelling at site. Patient denies any fevers. Patient does note that heart rates have been elevated around 10 resting. She states that she does not keep track of heart rates when taking blood pressures but will start. Patient provided blood pressure log as follows. 12/23 3:22 pm 104/71 took metoprolol        pt did not take night time pressure   12/24 11am 107/78 took metoprolol       11pm 102/70 took metoprolol   12/25 11:50 am 96/67 did not take metoprolol did not take night time pressure   12/26 10:25 am 113/82 took metoprolol       pt did not take night time pressure   12/27  8:30 am 87/71 didn't take metoprolol 9pm 106/69 took metoprolol   12/28 1:35 pm 102/62 took metoprolol       Educated patient on importance of taking blood pressures and heart rates before taking medications and tow hours after taking medications in morning.  Advised patient to keep a detail log of blood pressures, heart rates, and when she holds metoprolol for systolic less than 616. Patient stated understanding and states she will be better at keeping more detail logs and taking her bp at nighttime as well. Advised patient that I will send message to provider and call back only if changes are needed. She stated understanding of all instructions and had no further questions. Nilam Gonzalez RN     Per Colten Pratt NP v.o.r.b continue current dose of metoprolol and patient to have one time dose of alteplase. [facility] and spoke with nurse manager Carly Nobles. Asked if [facility] nurses are able to administer alteplase in the home. She states that they are but a lot of insurance do not cover home administration so to check with [facility] about patients coverage. Called [facility] and spoke with pharmacist Kraig. He states that patients insurance will not cover home administration of alteplase and that it will need to be done by an infusion center or hospital facility. Nilam Gonzalez RN       Called patient using two patient identifiers. Notified her on above information. Patient states that she already received a vial of alteplase back in November that was not used because she ended up going to emergency room for eval and replacement of PICC line. She states that she received the medication in november in placed in her refrigerator as instructed. She notes that the vials do not  until . Advised patient not to self administer drug Notified patient that I will call back to [facility] to speak with pharmacist. Nilam Gonzalez RN       Returned call to [facility] pharmacist with [employer]. Notified him on above information per Patient. He states that he does see where order was placed back in November and medication was sent to patients home. He notes that vial is still able to be used and is within expiration date. He states he will fax over provided order to patients [address].  Nilam Gonzalez RN [facility] and spoke with Jim Pinedo advised him on above information per Pharmacist Kraig with [address] [facility]. Notified him that orders should be faxed to office today. He states understanding and will notify [facility] nurse. Provided call back number for any additional questions DANIELA Villa RN       Called patient using two patient identifiers. Notified patient on above information. Advised patient that [facility] will contact patient once orders are received and [address] will come to administer medication. She stated understanding and had no further questions.  Everett Keating RN

## 2022-12-28 NOTE — TELEPHONE ENCOUNTER
----- Message from Portia Koenig sent at 12/28/2022  3:30 PM EST -----  Subject: Appointment Request    Reason for Call: Established Patient Appointment needed: Semi-Routine   Cough, Cold Symptoms    QUESTIONS    Reason for appointment request? No appointments available during search     Additional Information for Provider? Patient states that [address] [place] [address] stated that she need to call PCP because patient   is experiencing congestion. She is requesting that a prescription be   called in.  Patient stated it is not major  ---------------------------------------------------------------------------  --------------  [address] [employer]  [phone]; OK to leave message on voicemail  ---------------------------------------------------------------------------  --------------  SCRIPT ANSWERS  COVID Screen: Red

## 2022-12-29 ENCOUNTER — TELEPHONE (OUTPATIENT)
Dept: FAMILY MEDICINE CLINIC | Age: 42
End: 2022-12-29

## 2022-12-29 ENCOUNTER — TELEPHONE (OUTPATIENT)
Dept: CARDIOLOGY CLINIC | Age: 42
End: 2022-12-29

## 2022-12-29 DIAGNOSIS — J06.9 VIRAL UPPER RESPIRATORY TRACT INFECTION: Primary | ICD-10-CM

## 2022-12-29 NOTE — TELEPHONE ENCOUNTER
----- Message from Portia Koenig sent at 12/28/2022  3:30 PM EST -----  Subject: Appointment Request    Reason for Call: Established Patient Appointment needed: Semi-Routine   Cough, Cold Symptoms    QUESTIONS    Reason for appointment request? No appointments available during search     Additional Information for Provider? Patient states that [address] [place] [address] stated that she need to call PCP because patient   is experiencing congestion. She is requesting that a prescription be   called in.  Patient stated it is not major  ---------------------------------------------------------------------------  --------------  [address] [employer]  [phone]; OK to leave message on voicemail  ---------------------------------------------------------------------------  --------------  SCRIPT ANSWERS  COVID Screen: Red

## 2022-12-29 NOTE — TELEPHONE ENCOUNTER
Spoke to patient and starting Mucinex. Patient denies cough at this time.     Aileen Baumgarten, MD [facility]  12/29/22

## 2022-12-29 NOTE — TELEPHONE ENCOUNTER
Patient called in. Used two patient identifiers. Patient states that she was trying to schedule cardiac MRI when  states that she would nee to be without her life vest for around 1 to 1.5 hours. She also notes that she is not sure if she will have enough IV tubing to be able to have her milrinone pump outside of MRI machine. She states that  wanted her to follow up before scheduling. Rachel Berg RN       Called [facility] MRI department in regards to above information they state to call back Tuesday to speak to Shivam about cardiac MRI while patient has continuous infusion therapy for more information DANIELA Lopez RN       Called [facility] MRI department  [phone] and spoke with Shivam. He states that any pump would need to be MRI compatible He notes that extension tubing would not work as the door would need to shut which would clamp tubing without an access door which he states [facility] does not have. He states to check with [facility] facilities such as [facility] or [facility] who may have needed equipment. [id number] [facility] MRI department and spoke with [facility]. She states that patient would need to bring extra medication bag with medication dosage listed to be used with MRI compatible pump. She states that patient would be disconnected from home bag and pump to be reconnected to MRI safe pump. Then once MRI is completed patient would switch back to her home equipment. Called care coordination patient scheduled for 2/28/22 at 11am for MRI  at [facility]. Per care coordinator patient should not take any fluid pills within 2 to 3 hours of MAT and patient should avoid wearing any metal or jewelry. Rachel Berg RN       Per Noel Higuera NP v.o.r.b.    Patient will need an additional dose of milrinone medication bag that is unprimed to be taken with her for MRI please call [facility] to request medication  Patient to wear life vest to appointment but can take it off for duration of MRI then put vest back on   Confirm that patient is comfortable with changing her own bags and resuming pump       Called [facility] and spoke with pharmacist devon. Advised him per Vaishnavi Hinton NP. He states that an additional unprimed bag can be given to patient for use during MRI. He recommends that [facility] is called back one week prior to MRI to be reminded of need for additional dose of medication.  Caren Montoya RN

## 2022-12-30 ENCOUNTER — PATIENT OUTREACH (OUTPATIENT)
Dept: CASE MANAGEMENT | Age: 42
End: 2022-12-30

## 2022-12-30 NOTE — PROGRESS NOTES
Care Transitions Follow Up Call    Challenges to be reviewed by the provider   Additional needs identified to be addressed with provider: no  none           Method of communication with provider : none    Care Transition Nurse (CTN) contacted the patient by telephone to follow up. Verified name and  with patient as identifiers. Addressed changes since last contact: none    CTN reviewed discharge instructions, medical action plan and red flags with patient and discussed any barriers to care and/or understanding of plan of care after discharge. Discussed appropriate site of care based on symptoms and resources available to patient including: PCP and Specialist. The patient agrees to contact the PCP office for questions related to their healthcare. [facility] follow up appointment(s):   Future Appointments   Date Time Provider Komal Rodriguez   2023 10:20 AM DO DORIAN MaradiagaF BS AMB   2023  9:30 PM BEDROOM 3 [address] SLEEP LAB    2023 10:20 AM MD [address] BS AMB   2023  2:00 PM Ramez Beltran MD [facility]     Non-[facility] follow up appointment(s): [facility]- ENT 23. CTN provided contact information for future needs. Plan for follow-up call in 5-7 days based on severity of symptoms and risk factors. Goals Addressed                   This Visit's Progress     Attends follow-up appointments as directed. On track     22   Future Appointments   Date Time Provider Komal Rodriguez   2022  1:00 PM Ramez Beltran MD [facility] AMB   2022  8:00 PM BEDROOM 1 [address] SLEEP LAB    2022 10:20 AM Daniel Jackson MD [facility] BS AMB   2022 11:00 AM MD [address] BS AMB   2023 10:20 AM DO SABA AguileraSF BS AMB   Patient states she has an appt with Dr Hansel Cagle on  scheduled does not know time yet. Patient is questioning if she needs to F/U with Dr Sam Burnette if she is going to [facility].   Patient needs to call and make an app[facility]. Patient father drives her to her appts. Monitor status of these appt on next call. Brad FAJARDO, RN, [facility] / Care Transition Nurse / [phone]     11/17/22   Patient has an appt with Dr Gavin Smith today at 1 pm.  Patient has an appt with PCP on 11/18 at 4 pm  Patient is still working on appt with GI for nausea and sour taste in mouth. Need to ask if she was in contact with [facility] for ENT appt. Monitor status of thee appt on next call. Brad FAJARDO, RN, CCM / Care Transition Nurse / [phone]     11/22/22   Patient was seen by Dr Gavin Smith as scheduled. Patient did not attend PCP appt was in ED, now has an appt with Dr Leo Nazario on 12/6/22 scheduled. Patient is scheduled for a sleep study tonight, she does not feel \"comfortable\" going with her face numbness and they were not aware that she has life vest and Milrinone. Attempted to explain to patient that she would be monitored very closely while she had her sleep study, electrodes on head, chest, pulse ox and HR will be monitored. Patient wants to CXL sleep study for tonight and is planning on calling and letting them know. Monitor PCP office appt, on 12/6,  did she reschedule sleep study? Make an appt for [facility] ENT yet? Brad FAJARDO, RN, CCM / Care Transition Nurse / [phone]     Patient has not made an appt with ENT yet \"too much going on\". 11/28/22  Care Transitions Outreach Attempt-LMTCB. Brad FAJARDO, RN, CCM / Care Transition Nurse / [phone]      12/1/22  Patient has an appt with Dr Leo Nazario on 12/6/22. Patient has an appt with Sleep provider on 12/9  Patient has an appt with ENT on 1/5/23. Monitor status of these appt on next call.     Brad FAJARDO, RN, CCM / Care Transition Nurse / [phone]     12/07/22 Patient readmitted 12/2-12/6 with fever  12/9/2022  1:00 PM PULMONARY LAB [facility]  Ladonna Royce   12/22/2022  9:20 AM August Buchanan MD BS AMB   12/22/2022  8:00 PM BEDROOM 2 [facility] SLEEP LAB    12/23/2022 10:30 AM Houston Rice MD BS AMB   12/29/2022 10:00 AM Dre Osuna MD BS AMB   1/4/2023 10:20 AM Gabriella Hernandez DO BS AMB   Patient also has an appt with [facility] Transplant on 12/15 at 9 am.  Patient has an appt with [facility] ENT on 1/5/23 at 3:40 pm.  Monitor status of these appt on next call. Galo FAJARDO, RN, [facility] / Care Transition Nurse / [phone]     12/13/22   Care Transitions Outreach Attempt-TCB. Galo FAJARDO, RN, [facility] / Care Transition Nurse / [phone]        12/15/22  Patient states she seen virtually by PCP today and also had appt at [facility] transplant today. Still has Sleep study on 12/22 and keeping PCP appt for this day also. Dr Peyton Daugherty on 12/23/22  ENT 1/5/23 at [facility]  Monitor status of these appt on next call. Galo FAJARDO, RN, CCM / Care Transition Nurse / [phone]    12/23/22   Sleep study rescheduled, was seen by Cards as scheduled on 12/23/22. Has an appt with [facility] on 1/5/23. Monitor status of Sleep Study and ENT appt on next call. Galo FAJARDO, RN, [facility] / Care Transition Nurse / [phone]     12/30/22   Patient on way to [facility] for Heart Transplant and Psychiatric appt today. Still has an appt with ENT on 1/5/23 and Sleep Center on 1/26/23 scheduled. Monitor status of these appt on next call. Galo FAJARDO, RN, [facility] / Care Transition Nurse / [phone]          Prevent complications post hospitalization. On track     11/11/22   Patient denies any SOB, cough, pedal edema, does have some \" abd funny\"  \"queasy\" feeling and some nausea today, this is not new for her. Feels this may also be her anxiety and was trying not to take her valium. Patient is on low sodium diet and fluid restrictions. Patient weight today was 186 lbs. Down from 189 from yesterday, reviewed what she would do if she gained 3 lbs in 1 day or 5 lbs in 1 week-call provider.   Patient has her PICC line and was seen by [facility] yesterday and will be receiving weekly PICC line dressing changes. Patient on Milrinone at 0.25 MCG or 21.35 ml via portable pump and is also wearing her Life Vest.  Still adjusting to life vest how she lays when she sleeps and such. Patient is to start Cardiac Rehab with [address] [place] [address], wants to talk to Dr Jaye Hall before starting. Patient states she is having trouble paying for the Milrinone it is $19 a day or $570 and that is about 1/2 of her income. Patient states that [facility] is working with her on this issue. Patient does not have an ACP on file, send [employer] message about this to patient. Monitor SOB, cough, pedal edema, daily weight, PICC line, abd queasy?, wearing Life Vest?, activity tolerance, PO intake, fluid restriction? Hear anything about Milrinone assistance? Decision about Cardiac Rehab, ACP information sent-looked at? Adalgisa Ramirez MSN, RN, CCM / Care Transition Nurse / [phone]       11/17/22   Patient to [facility] ED on 11/14 with nausea and tingling in Inder legs and arms and around mouth. Patient denies SOB or cough, does have nausea, sour taste in mouth and Inder leg cramps that she can't sleep at HS. Weight today was 181.8 lbs   Continues to have numbness in arms and around mouth but not as bad as on 11/14. Patient also C/O slight nose bleed, feels that her nose is dry, encouraged to get some N/S gtts for irritation. Has not heard anything about assistance with Milrinone cost.    Has not looked at [address] information has not felt well enough to look at it. Monitor SOB, cough, pedal edema, nausea, sour taste in mouth, daily weight, leg cramps, numbness, did she look at ACP information or hear anything about Milrinone assistance? Adalgisa Ramirez MSN, RN, CCM / Care Transition Nurse / [phone]     11/22/22   Patient states she has continued tingling and numbness on face and hands. Seen in ED on 11/18/22 for the same. Patient continues to have nausea and sour taste in mouth, states she has been told she has Sjogrens Syndrome. Continues to have occasional nose. Bleeds and using gttts. Continues to wear her life vest, milrinone via PICC line remains infusing. Patient denies any SOB, cough or pedal edema, weight yesterday was 184.8 lbs. Patient has not looked at ACP information sent on [employer]. Eating weird diet, had yogurt and then cucumbers with vinegar and the baked chicken for supper. Patient states she is to hear back about Milrinone assistance today. Monitor SOB, cough, nose bleed, pedal edema, face numbness and tingling ? Still have sour taste in mouth and nausea? Did she look at ACP information?, Wearing Life vest?    Eleni FAJARDO, RN, CCM / Care Transition Nurse / [phone]     11/28/22   Had incoming message from patient letting me know that she was in the ED Sat for being :numb all over: Attempted to contact patient, LMV, see that she is to have repeat labs drawn from [facility] today. Eleni FAJARDO, RN, CCM / Care Transition Nurse / [phone]     12/01/22   Patient states yesterday she was starting to get H/A and took her muscle relaxer and her H/A did not come then. Denies SOB, C/P, pedal edema. Continues to have decreased appetite and her food \" doesn't taste right\". Having some issues with [facility], waiting for them to put clot buster in her 1 side of port and then they were not coming weekly like they were suppose to for PICC line dressing changes. Cannot see Mychart message that I sent gives her error 150? Will send letter saying same thing. Monitor SOB, C/P, cough, pedal edema, daily weight, PICC line, port unclogged? Still have bad taste in mouth and decreased appetite? Eleni FAJARDO, RN, CCM / Care Transition Nurse / [phone]     12/07/22  Patient readmitted 12/2-12/6 with fever. Patient denies any SOB, cough or fever. Weight today was 182.6 lbs Not taking Bumex yet. Wearing Life vest.  Continues to have sour taste in mouth and decreased appetite. Denies any numbness or tingling at this time. Did have diarrhea while admitted and now eating Yogurt. Has Milrinone via PICC line and is to have Ceftriaxone 2 gm Q24H until 12/18/22. This is supplied by [facility], has not received ABX for today yet, patient calling [facility] to find out when ETA is. (R) arm PICC line  Patient to have [facility], they have called and spoke to patient to come today or tomorrow. I am thinking tomorrow since it is Thursday and she needs her labs drawn on Mon and Thursdays  CBC/Diff, PTL and BMP. Monitor SOB, cough, pedal edema, fever, daily weight, PO intake, diarrhea, yeast infection, sour taste in mouth, numb and tingling? How is HH and [facility] going with PICC, Milrinone and IV ABX? Armando FAJARDO, RN, CCM / Care Transition Nurse / [phone]     12/13/22   Care Transitions Outreach Attempt-TCB. Armando FAJARDO, RN, CCM / Care Transition Nurse / [phone]      12/15/22   Patient states she is doing well, continues to receive IV ABX via PICC line. Still has sour taste in mouth and appetite is about the same. Denies any SOB, cough or fever. Continues to get W/U at [address] [place] [address] for heart transplant. Monitor for SOB, cough, daily weight, done with IV ABX, how is PICC line doing, PO intake, numbness or tingling? Armando FAJARDO, RN, CCM / Care Transition Nurse / [phone]     12/20/22  Care Transitions Outreach Attempt-    Armando FAJARDO, RN, CCM / Care Transition Nurse / [phone]      12/23/22   Patient denies any fever, done with IV ABX continues on Milrinone via PICC line. Patient does have tingling /numbness off and on not as bad as before. Still has sour taste in moth and is to see [address] [place] [address] Rheumatology for this. Continues to hold Metoprolol if Systolic is below 336, continues to take spironolactone.   Is trying to drink more fluid, is to drink 48-54 oz and she is getting in 40 oz. Still waiting on glucometer from pharmacy. Monitor SOB, cough, pedal edema, PO intake, PICC line, fluid intake, get glucometer? Chuckie Campbell MSN, RN, CCM / Care Transition Nurse / [phone]      12/30/22   Patient states her congestion is better since she started Mucinex. Her Life vest battery pack got into some water, is to get new battery today at Transplant visit. Old battery is working but making sure that sg=he has functional battery. [place] call since she was in car on way to [facility]. Monitor congestion, SOB, cough, milrinone gtt, PICC line, PO intake, glucometer/glucose reading.   Chuckie Campbell MSN, RN, CCM / Care Transition Nurse / [phone]

## 2023-01-03 ENCOUNTER — TELEPHONE (OUTPATIENT)
Dept: CARDIOLOGY CLINIC | Age: 43
End: 2023-01-03

## 2023-01-03 NOTE — TELEPHONE ENCOUNTER
1/3/23   Called patient using two patient identifiers. Advised patient that we are working out details to determine if patient will be able to have cardiac MRI due to home pump but once details are worked out patient will be called with more information. Patient stated understanding. Patient states that she feels like her heart rates are still up and down and that her weight is up. Patient states that she has had a little bit of sob but has has been experiencing congestion so she is unsure if it is due to that. Patient denies swelling. Patient notes that she has some abdominal bloating but patient thinks that she might have eaten too many beans yesterday that caused bloating. Patient notes that she has been doing ipratropium nebulizer treats which she feels might also be affecting heart rates. Patient also states that she is currently taking original mucinex without DM  to help with sinus drainage. Patient provided logs as follows        12/30 weight 182.8 lbs  bp 116/85 (121) Pt did not take bp two hours after meds   12/31 weight 182.8 lbs bp 102/68 (102) two hours after meds 115/78 (106)   1/1 weight 183.2 lbs 99/67 (101) didn't take metoprolol   1/2  weight 185.2 lbs 113/79 (117)  1/3 weight 185.4 lbs 104/67 (107) two hours after meds 107/72 (103)       Advised patient that I will send message to provider and only call back if changes are needed. Hai Bacon RN, NP  You 7 minutes ago (2:20 PM)     EP  Lets have her take an extra dose of bumex this evening to see if we can get her weight back to 182 please     Called patient using two patient identifiers. Advised patient on above information. Patient states that she has not taken bumex in a while and was told to only take bumex for weight above 188lbs. Advised patient I will speak with provider and call back.        Teresa Shaver NP  You 27 minutes ago (3:35 PM)     EP  Shes had quite a jump so have her take 1 dose tomorrow morning Called patient using two patient identifiers. Advised patient on above information per Anastasia Resendiz NP. Patient states understanding. Patient notes that she also sent a my chart message to provider for review. Advised patient that I will review and forward to provider. Patient notes that in the message she states that since PICC line infection she has been having high heart rates and is not sure as to why. Advised patient that we are continuing to monitor heart rates and symptoms. Educated patient that keeping detailed logs of bp and hr before taking medications and two hours after medications will help for the providers to adjust regimen as needed. Patient states understanding and states that she will do better at keeping more accurate logs. Asked patient to call back on Thursday with weights, bp, and hr logs. Advised patient that I will let provider know and only call back if provider would like to make further changes. Patient stated understanding and had no further questions.   Ashli Wylie RN       Per Anastasia Resendiz NP v.o.r.b  No changes continue current regimen   Patient to keep more detailed logs   Follow up on rheumatology order

## 2023-01-04 ENCOUNTER — OFFICE VISIT (OUTPATIENT)
Dept: FAMILY MEDICINE CLINIC | Age: 43
End: 2023-01-04
Payer: MEDICARE

## 2023-01-04 VITALS
RESPIRATION RATE: 16 BRPM | DIASTOLIC BLOOD PRESSURE: 68 MMHG | SYSTOLIC BLOOD PRESSURE: 100 MMHG | WEIGHT: 186 LBS | BODY MASS INDEX: 31.76 KG/M2 | TEMPERATURE: 98.5 F | HEART RATE: 101 BPM | OXYGEN SATURATION: 98 % | HEIGHT: 64 IN

## 2023-01-04 DIAGNOSIS — I50.22 CHRONIC SYSTOLIC HEART FAILURE (HCC): ICD-10-CM

## 2023-01-04 DIAGNOSIS — N63.11 MASS OF UPPER OUTER QUADRANT OF RIGHT BREAST: Primary | ICD-10-CM

## 2023-01-04 DIAGNOSIS — F33.9 RECURRENT DEPRESSION (HCC): ICD-10-CM

## 2023-01-04 DIAGNOSIS — Z23 ENCOUNTER FOR IMMUNIZATION: ICD-10-CM

## 2023-01-04 PROBLEM — I50.9 CHF (CONGESTIVE HEART FAILURE) (HCC): Status: RESOLVED | Noted: 2022-10-21 | Resolved: 2023-01-04

## 2023-01-04 PROBLEM — R73.03 PREDIABETES: Status: ACTIVE | Noted: 2023-01-04

## 2023-01-04 PROBLEM — R73.03 PREDIABETES: Chronic | Status: ACTIVE | Noted: 2023-01-04

## 2023-01-04 PROBLEM — M35.00 SJOGREN'S SYNDROME (HCC): Status: ACTIVE | Noted: 2023-01-04

## 2023-01-04 PROCEDURE — 99214 OFFICE O/P EST MOD 30 MIN: CPT | Performed by: FAMILY MEDICINE

## 2023-01-04 RX ORDER — ISOPROPYL ALCOHOL 70 ML/100ML
SWAB TOPICAL
Qty: 100 PAD | Refills: 1 | Status: SHIPPED | OUTPATIENT
Start: 2023-01-04

## 2023-01-04 RX ORDER — LANCING DEVICE
EACH MISCELLANEOUS
Qty: 1 KIT | Refills: 1 | Status: SHIPPED | OUTPATIENT
Start: 2023-01-04

## 2023-01-04 RX ORDER — BLOOD-GLUCOSE METER
EACH MISCELLANEOUS
Qty: 1 EACH | Refills: 0 | Status: SHIPPED | OUTPATIENT
Start: 2023-01-04

## 2023-01-04 RX ORDER — BLOOD GLUCOSE CONTROL HIGH,LOW
EACH MISCELLANEOUS
Qty: 4 EACH | Refills: 1 | Status: SHIPPED | OUTPATIENT
Start: 2023-01-04

## 2023-01-04 RX ORDER — BLOOD SUGAR DIAGNOSTIC
STRIP MISCELLANEOUS
Qty: 100 STRIP | Refills: 1 | Status: SHIPPED | OUTPATIENT
Start: 2023-01-04

## 2023-01-04 RX ORDER — LANCETS
EACH MISCELLANEOUS
Qty: 100 EACH | Refills: 1 | Status: SHIPPED | OUTPATIENT
Start: 2023-01-04

## 2023-01-04 NOTE — PROGRESS NOTES
Chief Complaint   Patient presents with    Breast Problem     1. \"Have you been to the ER, urgent care clinic since your last visit? Hospitalized since your last visit? \" No    2. \"Have you seen or consulted any other health care providers outside of the [address] [place] [address] since your last visit? \" No     3. For patients aged 39-70: Has the patient had a colonoscopy / FIT/ Cologuard? NA - based on age      If the patient is female:    4. For patients aged 41-77: Has the patient had a mammogram within the past 2 years? Yes - no Care Gap present      5. For patients aged 21-65: Has the patient had a pap smear?  Yes - no Care Gap present    Health Maintenance Due   Topic Date Due    Pneumococcal 0-64 years (1 - PCV) Never done    COVID-19 Vaccine (4 - Booster for Moderna series) 02/22/2022    Flu Vaccine (1) Never done    Medicare Yearly Exam  12/03/2022

## 2023-01-04 NOTE — ASSESSMENT & PLAN NOTE
monitored by specialist. No acute findings meriting change in the plan  On Life Vest 11/22, through [facility] for cardiovascular surgery, ENT part of transplant eval 1/5/23

## 2023-01-04 NOTE — PROGRESS NOTES
Ordered testing materials for prediabetes.     MD URBAN Pittman & MARSHAL CARMEN [facility]  01/04/23

## 2023-01-04 NOTE — PROGRESS NOTES
[facility]    History of Present Illness:   Rubén Duarte is a 38 y.o. female here for   Chief Complaint   Patient presents with    Breast Problem         HPI:  C/o lump in right breast.  She says this is been present for some time. Had a CT done at the hospital this past summer that showed an abnormality on one of the breast potentially so a diagnostic mammo is done  and read as benign. She had a left breast ultrasound done at that time which showed a fibroadenoma. Of note she is undergoing an evaluation for possible heart transplant at [facility]. She is seeing cardiology thoracic surgery again tomorrow. She now has a LifeVest along with milrinone through a PICC line. She has complained of some recent sinus symptoms and was advised by her PCP to try Mucinex. She is having more shortness of breath so cardiology advised her to start doing the Bumex daily along with spironolactone and that has seemed to help. Scheduled for a sleep study this month. Also seeing ENT to evaluate her thyroid per her report.       Health Maintenance  Health Maintenance Due   Topic Date Due    Pneumococcal 0-64 years (1 - PCV) Never done    COVID-19 Vaccine (4 - Booster for Moderna series) 2022    Medicare Yearly Exam  2022       Past Medical, Family, and Social History:     Past Medical History:   Diagnosis Date    Anemia NEC     Arthritis     Chronic pain     fybromyalsia    Depression     anxiety, depression, OCD    GERD (gastroesophageal reflux disease)     Hypertension     Hypertension     Ill-defined condition     Fibromyalia gastricparesis    MI (myocardial infarction) ([address]     Musculoskeletal disorder     Sepsis ([address]     SOB (shortness of breath)     Stool color black       Past Surgical History:   Procedure Laterality Date    HX CORONARY STENT PLACEMENT      HX GYN           HX HEENT  2002    wisdom teeth extraction    UPPER GI ENDOSCOPY,BIOPSY  2018         UPPER GI ENDOSCOPY,DIAGNOSIS  06/07/2018            Current Outpatient Medications   Medication Sig Dispense Refill    levocetirizine (XYZAL) 5 mg tablet TAKE 1 TABLET EVERY DAY 90 Tablet 1    pramipexole (MIRAPEX) 0.125 mg tablet Take 1 Tablet by mouth nightly for 30 days. 30 Tablet 0    milrinone (PRIMACOR) 20 mg/100 mL (200 mcg/mL) infusion 20.975 mcg/min by IntraVENous route continuous. 100 mL 50    LORazepam (ATIVAN) 1 mg tablet Take 1 mg by mouth every eight (8) hours as needed. folic acid (FOLVITE) 1 mg tablet TAKE 1 TABLET EVERY DAY 90 Tablet 1    spironolactone (ALDACTONE) 25 mg tablet Take 1 Tablet by mouth daily. 30 Tablet 1    metoprolol succinate (TOPROL-XL) 25 mg XL tablet Take 0.5 Tablets by mouth every twelve (12) hours. (Patient taking differently: Take 12.5 mg by mouth every twelve (12) hours. Hold if sbp less than 100.) 30 Tablet 1    digoxin (LANOXIN) 0.25 mg tablet Take 1 Tablet by mouth daily. 30 Tablet 1    allopurinoL (ZYLOPRIM) 100 mg tablet Take 0.5 Tablets by mouth daily. 15 Tablet 1    bumetanide (BUMEX) 2 mg tablet Take 0.5 Tablets by mouth daily. May take an additional tablet as needed to keep weight at 188 lb 30 Tablet 0    magnesium oxide (MAG-OX) 400 mg tablet Take 1 Tablet by mouth two (2) times a day. 60 Tablet 0    potassium chloride (KLOR-CON M10) 10 mEq tablet Take 2 Tablets by mouth two (2) times a day. 120 Tablet 0    cyclobenzaprine (FLEXERIL) 10 mg tablet Take 1 Tablet by mouth three (3) times daily as needed for Muscle Spasm(s). Has not needed 20 Tablet 0    cinacalcet (SENSIPAR) 30 mg tablet Take 1 Tablet by mouth daily (with breakfast) for 90 days. 30 Tablet 2    docusate sodium (COLACE) 100 mg capsule Take 1 Capsule by mouth daily for 90 days. 30 Capsule 2    empagliflozin (Jardiance) 10 mg tablet Take 1 Tablet by mouth daily for 60 days. 30 Tablet 1    metFORMIN ER (GLUCOPHAGE XR) 500 mg tablet Take 1 Tablet by mouth daily (with dinner).  90 Tablet 1    montelukast (SINGULAIR) 10 mg tablet TAKE 1 TABLET EVERY DAY 90 Tablet 1    albuterol-ipratropium (DUO-NEB) 2.5 mg-0.5 mg/3 ml nebu 3 mL by Nebulization route every four (4) hours as needed. fluticasone propionate (FLONASE) 50 mcg/actuation nasal spray 2 Sprays by Both Nostrils route daily. acetaminophen (TYLENOL) 325 mg tablet Take  by mouth every four (4) hours as needed for Pain. albuterol (PROVENTIL HFA, VENTOLIN HFA, PROAIR HFA) 90 mcg/actuation inhaler Take  by inhalation as needed. prasugreL (EFFIENT) 10 mg tablet       aspirin delayed-release 81 mg tablet 81 mg.      pantoprazole (PROTONIX) 40 mg tablet TAKE 1 TABLET TWICE DAILY 180 Tablet 1    ferrous sulfate 325 mg (65 mg iron) tablet TAKE 1 TABLET BY MOUTH ONCE DAILY BEFORE BREAKFAST 90 Tablet 1    traZODone (DESYREL) 100 mg tablet Weaning-150 mg at night      escitalopram oxalate (LEXAPRO) 20 mg tablet Take 20 mg by mouth daily. clonazePAM (KLONOPIN) 1 mg tablet Take 1.5 mg by mouth nightly. cholecalciferol, vitamin D3, 50 mcg (2,000 unit) tab 2,000 Units. glucose blood VI test strips (Accu-Chek Guide test strips) strip Check Blood Sugar once daily. Dx. R73.03 100 Strip 1    Blood Glucose Control High&Low (Accu-Chek Guide L1-L2 Ctrl Sol) soln Check Blood Sugar once daily. Dx. R73.03 4 Each 1    Blood-Glucose Meter (Accu-Chek Guide Glucose Meter) misc Check Blood Sugar once daily. Dx. R73.03 1 Each 0    lancets (Accu-Chek Softclix Lancets) misc Check Blood Sugar once daily. Dx. R73.03 100 Each 1    Lancing Device with Lancets (Accu-Chek Soft Dev Lancets) kit Check Blood Sugar once daily. Dx. R73.03 1 Kit 1    alcohol swabs (BD Single Use Swabs Regular) padm Check Blood Sugar once daily. Dx. R73.03 100 Pad 1           Patient Active Problem List   Diagnosis Code    Depression F32. A    GERD (gastroesophageal reflux disease) K21.9    Anemia, unspecified D64.9    Itch of skin L29.9    Anxiety F41.9    Costochondritis M94.0    HSV (herpes simplex virus) anogenital infection A60.9    OCD (obsessive compulsive disorder) F42.9    Hoarseness R49.0    IFG (impaired fasting glucose) R73.01    Hyperlipidemia E78.5    Thrombosed external hemorrhoid K64.5    Vitamin D deficiency E55.9    Inflammatory arthritis M19.90    Recurrent depression (HCC) F33.9    Mood disorder (HCC) F39    Chronic pain syndrome G89.4    Fibromyalgia M79.7    Panic attack F41.0    Tremor R25.1    Class 2 obesity due to excess calories without serious comorbidity in adult E66.09    Gastroparesis K31.84    Plantar fasciitis of left foot M72.2    Positive KITTY (antinuclear antibody) R76.8    Severe obesity (HCC) E66.01    Neuropathy G62.9    Acute exacerbation of CHF (congestive heart failure) ([facility]) I50.9    Coronary artery disease involving native coronary artery of native heart without angina pectoris I25.10    Chronic systolic heart failure ([facility]) I50.22    Fever R50.9    Sjogren's syndrome ([facility] M35.00    Prediabetes R73.03       Social History     Socioeconomic History    Marital status: SINGLE   Tobacco Use    Smoking status: Former     Packs/day: 0.25     Years: 8.00     Pack years: 2.00     Types: Cigarettes     Quit date: 2022     Years since quittin.4    Smokeless tobacco: Never   Vaping Use    Vaping Use: Never used   Substance and Sexual Activity    Alcohol use: Not Currently     Alcohol/week: 1.0 standard drink     Types: 1 Glasses of wine per week     Comment: Wine with special occassions - not since July    Drug use: Not Currently     Types: Marijuana     Comment: Haven't had any since 2022    Sexual activity: Yes     Partners: Male     Birth control/protection: None     Social Determinants of Health     Financial Resource Strain: High Risk    Difficulty of Paying Living Expenses: Very hard   Food Insecurity: No Food Insecurity    Worried About Running Out of Food in the Last Year: Never true    Ran Out of Food in the Last Year: Never true Transportation Needs: No Transportation Needs    Lack of Transportation (Medical): No    Lack of Transportation (Non-Medical): No   Physical Activity: Inactive    Days of Exercise per Week: 0 days    Minutes of Exercise per Session: 0 min   Stress: Stress Concern Present    Feeling of Stress : To some extent   Social Connections: Socially Isolated    Frequency of Communication with Friends and Family: Twice a week    Frequency of Social Gatherings with Friends and Family: Never    Attends Synagogue Services: Never    Active Member of Clubs or Organizations: No    Attends Club or Organization Meetings: Never    Marital Status: Never    Housing Stability: Low Risk     Unable to Pay for Housing in the Last Year: No    Number of [place] in the Last Year: 1    Unstable Housing in the Last Year: No        Review of Systems   Review of Systems   Constitutional:  Negative for chills and fever. HENT:  Positive for congestion. Respiratory:  Positive for cough and shortness of breath. Negative for sputum production and wheezing. Objective:   Visit Vitals  /68 (BP 1 Location: Left upper arm, BP Patient Position: Sitting, BP Cuff Size: Adult)   Pulse (!) 101   Temp 98.5 °F (36.9 °C) (Oral)   Resp 16   Ht 5' 4\" (1.626 m)   Wt 186 lb (84.4 kg)   LMP 12/21/2022 (Approximate)   SpO2 98%   BMI 31.93 kg/m²        Physical Exam  PHYSICAL EXAM:  Gen: Pt sitting in chair, in NAD  Head: Normocephalic, atraumatic  Eyes: Sclera anicteric, EOM grossly intact,  Ears: TM's pearly with good light reflex b/l  Throat: MMM, normal lips, tongue, teeth and gums  CVS: Normal S1, S2, no m/r/g  Resp: CTAB, no wheezes or rales  Breasts: fibrous changes in both upper outer quadrants of bilateral breasts, R breast 10 oclock area approx 6 cm from nipple.   Neuro: Alert, oriented, appropriate    Pertinent Labs/Studies:  3 most recent PHQ Screens 1/4/2023   PHQ Not Done -   Little interest or pleasure in doing things Not at all Feeling down, depressed, irritable, or hopeless More than half the days   Total Score PHQ 2 2   Trouble falling or staying asleep, or sleeping too much -   Feeling tired or having little energy -   Poor appetite, weight loss, or overeating -   Feeling bad about yourself - or that you are a failure or have let yourself or your family down -   Trouble concentrating on things such as school, work, reading, or watching TV -   Moving or speaking so slowly that other people could have noticed; or the opposite being so fidgety that others notice -   Thoughts of being better off dead, or hurting yourself in some way -   PHQ 9 Score -   How difficult have these problems made it for you to do your work, take care of your home and get along with others -        Assessment and orders:       ICD-10-CM ICD-9-CM    1. Mass of upper outer quadrant of right breast  N63.11 611.72 US BREAST RT LIMITED=<3 QUAD      2. Chronic systolic heart failure (HCC)  I50.22 428.22       3. Recurrent depression ([facility])  F33.9 296.30       4. Encounter for immunization  Z23 V03.89 INFLUENZA, FLUARIX, FLULAVAL, FLUZONE (AGE 6 MO+), AFLURIA(AGE 3Y+) IM, PF, 0.5 ML        Diagnoses and all orders for this visit:    1. Mass of upper outer quadrant of right breast  -     US BREAST RT LIMITED=<3 QUAD; Future    2. Chronic systolic heart failure ([facility]  Assessment & Plan:   monitored by specialist. No acute findings meriting change in the plan      3. Recurrent depression ([facility]  Assessment & Plan:   monitored by specialist. No acute findings meriting change in the plan  Seeing psych  No SI      4. Encounter for immunization  -     INFLUENZA, FLUARIX, FLULAVAL, FLUZONE (AGE 6 MO+), AFLURIA(AGE 3Y+) IM, PF, 0.5 ML    Other orders  -     pneumococcal 20-sandy conj-dip, PF, (PREVNAR 20) 0.5 mL syrg injection; 0.5 mL by IntraMUSCular route once for 1 dose.       current treatment plan is effective, no change in therapy  radiology results and schedule of future radiology studies reviewed with patient    As she is undergoing evaluation for transplant will get ultrasound done of the right breast as well although diagnostic was negative in September which is reassuring. RECOMMENDATIONS given include: Symptom care/Env changes/Rest/Push clears constantly. F/U with MD if symptoms worsen or fail to improve and resolve as anticipated. Spent 35 min with patient, reviewing chart and face to face exam, clinical documentation. I have discussed the diagnosis with the patient and the intended plan as seen in the above orders. Social history, medical history, and labs were reviewed. The patient has received an after-visit summary and questions were answered concerning future plans. I have discussed medication side effects and warnings with the patient as well. Patient/guardian verbalized understanding and accepts plan & risks. Please note that this dictation was completed with [employer], the computer voice recognition software. Quite often unanticipated grammatical, syntax, homophones, and other interpretive errors are inadvertently transcribed by the computer software. Please disregard these errors. Please excuse any errors that have escaped final proofreading. Thank you.      MD URBAN Perla & MARSHAL CARMEN [facility]  01/05/23

## 2023-01-05 ENCOUNTER — TELEPHONE (OUTPATIENT)
Dept: CARDIOLOGY CLINIC | Age: 43
End: 2023-01-05

## 2023-01-05 PROCEDURE — G0008 ADMIN INFLUENZA VIRUS VAC: HCPCS | Performed by: FAMILY MEDICINE

## 2023-01-05 PROCEDURE — 90686 IIV4 VACC NO PRSV 0.5 ML IM: CPT | Performed by: FAMILY MEDICINE

## 2023-01-05 RX ORDER — BUMETANIDE 2 MG/1
1 TABLET ORAL
Qty: 30 TABLET | Refills: 1 | Status: SHIPPED | OUTPATIENT
Start: 2023-01-05 | End: 2023-02-06 | Stop reason: ALTCHOICE

## 2023-01-05 RX ORDER — METOPROLOL SUCCINATE 25 MG/1
12.5 TABLET, EXTENDED RELEASE ORAL EVERY 12 HOURS
Qty: 60 TABLET | Refills: 1 | Status: SHIPPED | OUTPATIENT
Start: 2023-01-05 | End: 2023-02-09

## 2023-01-05 NOTE — TELEPHONE ENCOUNTER
Received call from patient that she took 1 mg bumex as directed yesterday, weight yesterday was 185.4 lb, today is 180.4 lb. Her BP today is 102/75, HR is 102. She states she is currently taking potassium 40 meq twice daily but is only taking bumex 1 mg as needed and yesterday is the first time she has taken it \"in a few weeks\", as she was directed to take it only if her weight went over 188 lb. I called Claudia Padilla [facility] to request lab results from last 3 weeks, they state they will have [facility]  nurse reach out, as they have not received lab results either. Subhash Hoyos notified. Requested Prescriptions     Signed Prescriptions Disp Refills    bumetanide (BUMEX) 2 mg tablet 30 Tablet 1     Sig: Take 0.5 Tablets by mouth daily as needed (as needed daily for weight gain of over 188 lb). Authorizing Provider: Susanne Stoll     Ordering User: Cathy General    metoprolol succinate (TOPROL-XL) 25 mg XL tablet 60 Tablet 1     Sig: Take 0.5 Tablets by mouth every twelve (12) hours. Hold if sbp less than 100.      Authorizing Provider: Susanne Stoll     Ordering User: Cathy Bee

## 2023-01-06 ENCOUNTER — PATIENT OUTREACH (OUTPATIENT)
Dept: CASE MANAGEMENT | Age: 43
End: 2023-01-06

## 2023-01-06 ENCOUNTER — TELEPHONE (OUTPATIENT)
Dept: FAMILY MEDICINE CLINIC | Age: 43
End: 2023-01-06

## 2023-01-06 DIAGNOSIS — R92.8 ABNORMAL MAMMOGRAM OF RIGHT BREAST: Primary | ICD-10-CM

## 2023-01-06 NOTE — TELEPHONE ENCOUNTER
Concepcion Mccallum stats the Pt needs an order for a 3D diagnostic mammogram of right breast. Please advise.

## 2023-01-06 NOTE — TELEPHONE ENCOUNTER
Imaging Ordered.     MD URBAN Barnett & MARSHAL CARMEN [facility]  01/06/23

## 2023-01-06 NOTE — PROGRESS NOTES
Patient has graduated from the Transitions of Care Coordination  program on 1/6/23. Patient/family has the ability to self-manage at this time Care management goals have been completed. Patient was not referred to the [facility] team for further management. Goals Addressed                   This Visit's Progress     COMPLETED: Attends follow-up appointments as directed. 11/11/22   Future Appointments   Date Time Provider Komal Jennifer   11/17/2022  1:00 PM Kranthi Dey MD [facility] BS AMB   11/22/2022  8:00 PM BEDROOM 1 [address] SLEEP LAB    11/25/2022 10:20 AM Ted Briggs MD [facility] BS AMB   11/29/2022 11:00 AM Angelina Thomsa MD [facility] BS AMB   1/4/2023 10:20 AM DO MEL Medeiros  AMB   Patient states she has an appt with Dr Beuford Cockayne on 11/18 scheduled does not know time yet. Patient is questioning if she needs to F/U with Dr Devin Portillo if she is going [facility]. Patient needs to call and make an appt with ENT as [facility] for evaluation on Parathyroid. Patient father drives her to her appts. Monitor status of these appt on next call. Ted Tucker MSN, RN, CCM / Care Transition Nurse / [phone]     11/17/22   Patient has an appt with Dr Michael Sam today at 1 pm.  Patient has an appt with PCP on 11/18 at 4 pm  Patient is still working on appt with GI for nausea and sour taste in mouth. Need to ask if she was in contact with [facility] for [facility] appt. Monitor status of thee appt on next call. Ted FAJARDO, RN, CCM / Care Transition Nurse / [phone]     11/22/22   Patient was seen by Dr Michael Sam as scheduled. Patient did not attend PCP appt was in ED, now has an appt with Dr Shaji Khan on 12/6/22 scheduled. Patient is scheduled for a sleep study tonight, she does not feel \"comfortable\" going with her face numbness and they were not aware that she has life vest and Milrinone.   Attempted to explain to patient that she would be monitored very closely while she had her sleep study, electrodes on head, chest, pulse ox and HR will be monitored. Patient wants to CXL sleep study for tonight and is planning on calling and letting them know. Monitor PCP office appt, on 12/6,  did she reschedule sleep study? Make an appt for [facility] ENT yet? Tracee FAJARDO, RN, CCM / Care Transition Nurse / [phone]     Patient has not made an appt with ENT yet \"too much going on\". 11/28/22  Care Transitions Outreach Attempt-LMTCB. Tracee FAJARDO, RN, CCM / Care Transition Nurse / [phone]      12/1/22  Patient has an appt with Dr Maria M Armendariz on 12/6/22. Patient has an appt with Sleep provider on 12/9  Patient has an appt with ENT on 1/5/23. Monitor status of these appt on next call. Tracee FAJARDO, RN, CCM / Care Transition Nurse / [phone]     12/07/22 Patient readmitted 12/2-12/6 with fever  12/9/2022  1:00 PM PULMONARY LAB [facility]   12/22/2022  9:20 AM Hoang Almanza MD BS AMB   12/22/2022  8:00 PM BEDROOM [address] [place] [address] SLEEP LAB    12/23/2022 10:30 AM Samy Ford MD BS AMB   12/29/2022 10:00 AM Mely Mcdonald MD BS AMB   1/4/2023 10:20 AM Julien Francois DO BS AMB   Patient also has an appt with [facility] Transplant on 12/15 at 9 am.  Patient has an appt with [facility] ENT on 1/5/23 at 3:40 pm.  Monitor status of these appt on next call. Tracee FAJARDO, RN, [facility] / Care Transition Nurse / [phone]     12/13/22   Care Transitions Outreach Attempt-LMTCB. Tracee FAJARDO, RN, [facility] / Care Transition Nurse / [phone]        12/15/22  Patient states she seen virtually by PCP today and also had appt at [facility] transplant today. Still has Sleep study on 12/22 and keeping PCP appt for this day also. Dr Neumann Putt on 12/23/22  [facility]/23 at [facility]  Monitor status of these appt on next call. Tracee Groves MSN, RN, CCM / Care Transition Nurse / [phone]    12/23/22   Sleep study rescheduled, was seen by Cards as scheduled on 12/23/22.   Has an appt with [facility] ENT on 1/5/23. Monitor status of Sleep Study and ENT appt on next call. Irma FAJARDO, RN, CCM / Care Transition Nurse / [phone]     12/30/22   Patient on way to [facility] for Heart Transplant and Psychiatric appt today. Still has an appt with ENT on 1/5/23 and Sleep Center on 1/26/23 scheduled. Monitor status of these appt on next call. Irma FAJARDO, RN, CCM / Care Transition Nurse / [phone]     01/06/23   Patient did attend her Cardiac Surgery and ENT appt as schedule. Care Transitions Outreach Attempt-TCB. Irma FAJARDO, RN, CCM / Care Transition Nurse / [phone]           COMPLETED: Prevent complications post hospitalization. 11/11/22   Patient denies any SOB, cough, pedal edema, does have some \" abd funny\"  \"queasy\" feeling and some nausea today, this is not new for her. Feels this may also be her anxiety and was trying not to take her valium. Patient is on low sodium diet and fluid restrictions. Patient weight today was 186 lbs. Down from 189 from yesterday, reviewed what she would do if she gained 3 lbs in 1 day or 5 lbs in 1 week-call provider. Patient has her PICC line and was seen by [facility] yesterday and will be receiving weekly PICC line dressing changes. Patient on Milrinone at 0.25 MCG or 21.35 ml via portable pump and is also wearing her Life Vest.  Still adjusting to life vest how she lays when she sleeps and such. Patient is to start Cardiac Rehab with [address] [place] [address], wants to talk to Dr Meng Garcia before starting. Patient states she is having trouble paying for the Milrinone it is $19 a day or $570 and that is about 1/2 of her income. Patient states that [facility] is working with her on this issue. Patient does not have an ACP on file, send MyChart message about this to patient. Monitor SOB, cough, pedal edema, daily weight, PICC line, abd queasy?, wearing Life Vest?, activity tolerance, PO intake, fluid restriction?   Hear anything about Milrinone assistance? Decision about Cardiac Rehab, ACP information sent-looked at? Robert Wilkerson MSN, RN, CCM / Care Transition Nurse / [phone]       11/17/22   Patient to [facility] ED on 11/14 with nausea and tingling in Inder legs and arms and around mouth. Patient denies SOB or cough, does have nausea, sour taste in mouth and Inder leg cramps that she can't sleep at HS. Weight today was 181.8 lbs   Continues to have numbness in arms and around mouth but not as bad as on 11/14. Patient also C/O slight nose bleed, feels that her nose is dry, encouraged to get some N/S gtts for irritation. Has not heard anything about assistance with Milrinone cost.    Has not looked at [address] information has not felt well enough to look at it. Monitor SOB, cough, pedal edema, nausea, sour taste in mouth, daily weight, leg cramps, numbness, did she look at ACP information or hear anything about Milrinone assistance? Robert FAJARDO, RN, CCM / Care Transition Nurse / [phone]     11/22/22   Patient states she has continued tingling and numbness on face and hands. Seen in ED on 11/18/22 for the same. Patient continues to have nausea and sour taste in mouth, states she has been told she has Sjogrens Syndrome. Continues to have occasional nose. Bleeds and using gttts. Continues to wear her life vest, milrinone via PICC line remains infusing. Patient denies any SOB, cough or pedal edema, weight yesterday was 184.8 lbs. Patient has not looked at ACP information sent on MyChart. Eating weird diet, had yogurt and then cucumbers with vinegar and the baked chicken for supper. Patient states she is to hear back about Milrinone assistance today. Monitor SOB, cough, nose bleed, pedal edema, face numbness and tingling ? Still have sour taste in mouth and nausea?  Did she look at ACP information?, Wearing Life vest?    Robert FAJARDO, RN, CCM / Care Transition Nurse / [phone]     11/28/22   Had incoming message from patient letting me know that she was in the ED Sat for being :numb all over: Attempted to contact patient, LMV, see that she is to have repeat labs drawn from [facility] today. Grace Pitts MSN, RN, CCM / Care Transition Nurse / [phone]     12/01/22   Patient states yesterday she was starting to get H/A and took her muscle relaxer and her H/A did not come then. Denies SOB, C/P, pedal edema. Continues to have decreased appetite and her food \" doesn't taste right\". Having some issues with [facility], waiting for them to put clot buster in her 1 side of port and then they were not coming weekly like they were suppose to for PICC line dressing changes. Cannot see [employer] message that I sent gives her error 150? Will send letter saying same thing. Monitor SOB, C/P, cough, pedal edema, daily weight, PICC line, port unclogged? Still have bad taste in mouth and decreased appetite? Grace FAJARDO, RN, CCM / Care Transition Nurse / [phone]     12/07/22  Patient readmitted 12/2-12/6 with fever. Patient denies any SOB, cough or fever. Weight today was 182.6 lbs Not taking Bumex yet. Wearing Life vest.  Continues to have sour taste in mouth and decreased appetite. Denies any numbness or tingling at this time. Did have diarrhea while admitted and now eating Yogurt. Has Milrinone via PICC line and is to have Ceftriaxone 2 gm Q24H until 12/18/22. This is supplied by [employer], has not received ABX for today yet, patient calling [facility] to find out when ETA is. (R) arm PICC line  Patient to have [facility], they have called and spoke to patient to come today or tomorrow. I am thinking tomorrow since it is Thursday and she needs her labs drawn on Mon and Thursdays  CBC/Diff, PTL and BMP. Monitor SOB, cough, pedal edema, fever, daily weight, PO intake, diarrhea, yeast infection, sour taste in mouth, numb and tingling?  How is  and [employer] going with PICC, Milrinone and IV ABX? Margarito FAJARDO, RN, CCM / Care Transition Nurse / [phone]     12/13/22   Care Transitions Outreach Attempt-LMTCB. Margarito FAJARDO, RN, CCM / Care Transition Nurse / [phone]      12/15/22   Patient states she is doing well, continues to receive IV ABX via PICC line. Still has sour taste in mouth and appetite is about the same. Denies any SOB, cough or fever. Continues to get W/U at [facility] for heart transplant. Monitor for SOB, cough, daily weight, done with IV ABX, how is PICC line doing, PO intake, numbness or tingling? Margarito FAJARDO, RN, CCM / Care Transition Nurse / [phone]     12/20/22  Care Transitions Outreach Attempt-    Margarito FAJARDO, RN, [facility] / Care Transition Nurse / [phone]      12/23/22   Patient denies any fever, done with IV ABX continues on Milrinone via PICC line. Patient does have tingling /numbness off and on not as bad as before. Still has sour taste in moth and is to see [facility] Rheumatology for this. Continues to hold Metoprolol if Systolic is below 386, continues to take spironolactone. Is trying to drink more fluid, is to drink 48-54 oz and she is getting in 40 oz. Still waiting on glucometer from pharmacy. Monitor SOB, cough, pedal edema, PO intake, PICC line, fluid intake, get glucometer? Margarito FAJARDO, RN, [facility] / Care Transition Nurse / [phone]      12/30/22   Patient states her congestion is better since she started Mucinex. Her Life vest battery pack got into some water, is to get new battery today at Transplant visit. Old battery is working but making sure that sg=he has functional battery. [place] call since she was in car on way to [facility]. Monitor congestion, SOB, cough, milrinone gtt, PICC line, PO intake, glucometer/glucose reading. Margarito FAJARDO, RN, CCM / Care Transition Nurse / [phone]     01/06/23   Care Transitions Outreach Attempt-LMTCB.     Philemon Nichole MSN, RN, CCM / Care Transition Nurse / [phone]                           Patient has Care Transition Nurse's contact information for any further questions, concerns, or needs.   Patients upcoming visits:    Future Appointments   Date Time Provider Komal Rodriguez   1/31/2023 10:20 AM Mela Villalba MD [facility] BS AMB   2/22/2023  2:00 PM Esperanza Johnson MD [facility] BS AMB   2/28/2023 11:15 AM [facility] MRI 1 [facility]

## 2023-01-07 DIAGNOSIS — K21.9 GASTROESOPHAGEAL REFLUX DISEASE WITHOUT ESOPHAGITIS: ICD-10-CM

## 2023-01-09 ENCOUNTER — TELEPHONE (OUTPATIENT)
Dept: CARDIOLOGY CLINIC | Age: 43
End: 2023-01-09

## 2023-01-09 ENCOUNTER — PATIENT MESSAGE (OUTPATIENT)
Dept: FAMILY MEDICINE CLINIC | Age: 43
End: 2023-01-09

## 2023-01-09 ENCOUNTER — PATIENT OUTREACH (OUTPATIENT)
Dept: CASE MANAGEMENT | Age: 43
End: 2023-01-09

## 2023-01-09 DIAGNOSIS — R77.8 ABNORMAL SERUM PROTEIN TEST: ICD-10-CM

## 2023-01-09 DIAGNOSIS — I50.22 CHRONIC SYSTOLIC HEART FAILURE (HCC): Primary | ICD-10-CM

## 2023-01-09 RX ORDER — BLOOD-GLUCOSE METER
EACH MISCELLANEOUS
Qty: 1 EACH | Refills: 0 | Status: SHIPPED | OUTPATIENT
Start: 2023-01-09

## 2023-01-09 RX ORDER — BLOOD GLUCOSE CONTROL HIGH,LOW
EACH MISCELLANEOUS
Qty: 4 EACH | Refills: 1 | Status: SHIPPED | OUTPATIENT
Start: 2023-01-09 | End: 2023-01-12 | Stop reason: SDUPTHER

## 2023-01-09 RX ORDER — POTASSIUM CHLORIDE 750 MG/1
20 TABLET, EXTENDED RELEASE ORAL 2 TIMES DAILY
Qty: 120 TABLET | Refills: 1 | Status: SHIPPED | OUTPATIENT
Start: 2023-01-09

## 2023-01-09 RX ORDER — ISOPROPYL ALCOHOL 70 ML/100ML
SWAB TOPICAL
Qty: 100 PAD | Refills: 1 | Status: SHIPPED | OUTPATIENT
Start: 2023-01-09

## 2023-01-09 RX ORDER — PANTOPRAZOLE SODIUM 40 MG/1
TABLET, DELAYED RELEASE ORAL
Qty: 180 TABLET | Refills: 1 | Status: SHIPPED | OUTPATIENT
Start: 2023-01-09

## 2023-01-09 RX ORDER — LANCETS
EACH MISCELLANEOUS
Qty: 100 EACH | Refills: 1 | Status: SHIPPED | OUTPATIENT
Start: 2023-01-09 | End: 2023-01-12 | Stop reason: SDUPTHER

## 2023-01-09 RX ORDER — LANCING DEVICE
EACH MISCELLANEOUS
Qty: 1 KIT | Refills: 1 | Status: SHIPPED | OUTPATIENT
Start: 2023-01-09

## 2023-01-09 RX ORDER — BLOOD SUGAR DIAGNOSTIC
STRIP MISCELLANEOUS
Qty: 100 STRIP | Refills: 1 | Status: SHIPPED | OUTPATIENT
Start: 2023-01-09

## 2023-01-09 NOTE — TELEPHONE ENCOUNTER
Return call received from patient who stated she spoke with lab she plans on going to tomorrow on [employer] at [facility]. Per Lab personnel will need requisition sent and office to reach out to [facility] directly to discuss. Per patient they may be reached at [phone]. Informed her will return call if any issues. She verbalized understanding and had no further questions. Attempted to contact [facility] draw site. Message left requesting return call with fax number. Will await return response. Landon Narayanan RN.

## 2023-01-09 NOTE — TELEPHONE ENCOUNTER
Returned patients call using two patient identifiers. Advised patient that rheumatology referral with [facility] has been faxed. Provided patient with number to follow up with rheumatology. Also, advised patient that a refill of potassium has been sent to her pharmacy. She states she will pick that up today or tomorrow. Discussed with patient on previous note on cardiac MRI. Patient states that she is comfortable with using home equipment to stop and resume milrinone pump. Advised patient that an extra bag of medication will be sent to be taken with her the day of cardiac MRI. Notified patient per Remy Antonio NP v.o.r.b. patient to wear life vest to appt but okay to be without life vest for duration of MRI then patient should put life vest back on as soon as MRI is completed. Provided patient with appt date and time for MRI which is 2/28/23 at [facility] at 11:15. Patient stated understanding of all instructions and had no further questions.  Sandra Carrasco RN

## 2023-01-09 NOTE — TELEPHONE ENCOUNTER
Per Dr. Cailin Aguila patient to have repeat chest CT and protein S. [address] to request protein S be added to next set of labs. Spoke with Tabatha Myles who stated home health unable to draw frozen specimen. Contacted patient. Informed her of needed testing. Explained she will receive a call from coordination of care for CT scheduling. She stated she has appointment at [facility] for ultrasound tomorrow and will also need to have labs drawn. She requested lab order be sent to [facility]. Inquired which lab she will be presenting to. She stated she was unsure, but will call to obtain lab fax so orders may be sent and will return call with information. She had no further questions. Will await return call with fax. Alton Doran RN.

## 2023-01-09 NOTE — PROGRESS NOTES
Ambulatory Care Management Note    Date/Time:  1/9/2023 12:19 PM    This patient was received as a referral from Care Transition Nurse. Ambulatory Care Manager outreached to patient today to offer care management services. Introduction to self and role of care manager provided. Patient accepted care management services at this time. Follow up call scheduled at this time. Patient has Ambulatory Care Manager's contact number for any questions or concerns.

## 2023-01-09 NOTE — TELEPHONE ENCOUNTER
Requested Prescriptions     Signed Prescriptions Disp Refills    potassium chloride (KLOR-CON M10) 10 mEq tablet 120 Tablet 1     Sig: Take 2 Tablets by mouth two (2) times a day.      Authorizing Provider: Kiera Cochran     Ordering User: Angy Driscoll

## 2023-01-09 NOTE — TELEPHONE ENCOUNTER
Contacted [facility] lab for fax number. Placed on hold by lab personnel to obtain fax. Call then disconnected. Attempted to return call. Call went to voicemail. Message left. Will await return call. Ria Kepm RN.

## 2023-01-10 ENCOUNTER — TELEPHONE (OUTPATIENT)
Dept: CARDIOLOGY CLINIC | Age: 43
End: 2023-01-10

## 2023-01-10 NOTE — TELEPHONE ENCOUNTER
Received voicemail from patient stating that she attempted to get labwork done at [facility] but they did not have her lab orders so she was unable to complete. Returned call to patient who stated she can go to [facility] in [place] to complete labwork but requires 3 days notice to set up transportation. Provided phone number to lab and requested patient call to verify of an appointment is needed and if lab order was received. Patient states she will do this tomorrow.      Labs faxed to [address] [place] [address]

## 2023-01-11 ENCOUNTER — TELEPHONE (OUTPATIENT)
Dept: CARDIOLOGY CLINIC | Age: 43
End: 2023-01-11

## 2023-01-11 NOTE — TELEPHONE ENCOUNTER
Called [facility] and spoke with kacie. She states that 0033 application for jardiance was received and that it will take 7 to 10 business days for processing. She notes that the clinic will receive a notification once determination has been made for jardiance patient assistance.  Jovany Doran RN

## 2023-01-12 ENCOUNTER — TELEPHONE (OUTPATIENT)
Dept: CARDIOLOGY CLINIC | Age: 43
End: 2023-01-12

## 2023-01-12 NOTE — TELEPHONE ENCOUNTER
Called patient using two patient identifiers. Advised patient that she has been approved for jardiance patient assistance. Provided patient with number to call to set up shipment of medication. Patient stated understanding. Patient notes that she is concerned because at her last dressing change of her picc line she noted a purple spot about the size of a pin cap where her last STAT lock was placed. She notes that this has been a bit painful. She states that the area is not hot touch and does not having any swelling or redness. Patient states her [address] is already using the sensitive skin dressing changes. Advised patient that this is common with long term PICC dressings and that from time to time skin breakdown and pressures injury can happen and that she should continue to monitor the site for any changes. Notified patient that I will send message to provider and call back with any changes. Shari Sagastume, NP  You 39 minutes ago (3:51 PM)     RH  No changes. Likely some skin damage from the adhesive. Called patient using two patient identifiers. Advised patient to continue to monitor skin at dressing site and call back if irritation worsens. She stated understanding and had no further questions.

## 2023-01-13 RX ORDER — BLOOD GLUCOSE CONTROL HIGH,LOW
EACH MISCELLANEOUS
Qty: 4 EACH | Refills: 1 | Status: SHIPPED | OUTPATIENT
Start: 2023-01-13

## 2023-01-13 RX ORDER — LANCETS
EACH MISCELLANEOUS
Qty: 100 EACH | Refills: 1 | Status: SHIPPED | OUTPATIENT
Start: 2023-01-13

## 2023-01-16 ENCOUNTER — HOSPITAL ENCOUNTER (OUTPATIENT)
Dept: CT IMAGING | Age: 43
Discharge: HOME | End: 2023-01-16
Attending: INTERNAL MEDICINE
Payer: MEDICARE

## 2023-01-16 DIAGNOSIS — I50.22 CHRONIC SYSTOLIC HEART FAILURE (HCC): ICD-10-CM

## 2023-01-16 PROCEDURE — 71250 CT THORAX DX C-: CPT

## 2023-01-18 ENCOUNTER — TELEPHONE (OUTPATIENT)
Dept: SLEEP MEDICINE | Age: 43
End: 2023-01-18

## 2023-01-18 LAB — PROT S ACT/NOR PPP: 73 % (ref 63–140)

## 2023-01-18 NOTE — TELEPHONE ENCOUNTER
Spoke with patient to see if she wanted to move the appointment up earlier; patient decided to keep the original appointment.

## 2023-01-26 ENCOUNTER — TELEPHONE (OUTPATIENT)
Dept: CARDIOLOGY CLINIC | Age: 43
End: 2023-01-26

## 2023-01-26 RX ORDER — DIGOXIN 125 MCG
0.12 TABLET ORAL DAILY
Qty: 30 TABLET | Refills: 2 | Status: SHIPPED | OUTPATIENT
Start: 2023-01-26 | End: 2023-01-27 | Stop reason: SDUPTHER

## 2023-01-26 NOTE — TELEPHONE ENCOUNTER
Per SANTOS Carlson NP-Can one of you please decrease her digoxin to 0.125mg daily and call her to let her know? Called patient to alert of digoxin dose adjustment related to last lab values. Sent new rx for 0.125mg to pharmacy for patient. Patient verbalized understanding. Requested Prescriptions     Signed Prescriptions Disp Refills    digoxin (LANOXIN) 0.125 mg tablet 30 Tablet 2     Sig: Take 1 Tablet by mouth daily. Authorizing Provider: Chelsea Edmond     Ordering User: Sindy Bowers      Patient also noted that she is scheduled to have a [address]  one Wednesday at [facility] with Dr Checo Farfan.

## 2023-01-26 NOTE — TELEPHONE ENCOUNTER
Reviewed life vest report with Salima Zepeda NP who recommended v.o.r.b no changes at this time.  Hanh Serrano RN

## 2023-01-27 ENCOUNTER — TELEPHONE (OUTPATIENT)
Dept: CARDIOLOGY CLINIC | Age: 43
End: 2023-01-27

## 2023-01-27 RX ORDER — DIGOXIN 125 MCG
0.12 TABLET ORAL DAILY
Qty: 30 TABLET | Refills: 2 | Status: SHIPPED | OUTPATIENT
Start: 2023-01-27

## 2023-01-27 NOTE — TELEPHONE ENCOUNTER
Patient called and LM stating digoxin was not available at pharmacy yet. She also states she hasnt heard anything from referrals sent to [facility] rhematology or EP with [facility]. She also wonders why she was only called yesterday about the elevated dig level and med change when labs were drawn on Monday. RN sent rx yesterday to mail order pharmacy, (patient had been instructed to cut home dose in half until received new dose) resent to local drug store. Following up on referrals- Patient had already been given number to [facility] rheumatology during call on 1/9. Asked patient to call again. RN resending fax for [facility] referral to another fax number and sending staff message to EP nurses at [facility]. Patient verbalized understanding. Reviewed log as patient stated had to take 2 prn bumex this week. Reviewed low sodium diet.   1/23- 179lb  1/24- 182lb (1mg bumex)  1/25- 179.6lb  1/26- 179lb  1/27- 181lb (1mg bumex)

## 2023-02-01 ENCOUNTER — PATIENT OUTREACH (OUTPATIENT)
Dept: CASE MANAGEMENT | Age: 43
End: 2023-02-01

## 2023-02-01 NOTE — PROGRESS NOTES
02/01/23  2:34 PM  Atttempted ACM outreach for CCM follow up, patient answered but said she was at [address] [place] [address] post right heart catheterization and was being admitted for further testing. Agrees to call this ACM with update on status.

## 2023-02-06 ENCOUNTER — TELEPHONE (OUTPATIENT)
Dept: CARDIOLOGY CLINIC | Age: 43
End: 2023-02-06

## 2023-02-06 RX ORDER — FUROSEMIDE 40 MG/1
40 TABLET ORAL DAILY
COMMUNITY
End: 2023-02-16

## 2023-02-06 NOTE — TELEPHONE ENCOUNTER
Returned patient call using two patient identifiers. Patient states that she was admitted last week after undergoing a right heart cath to complete transplant evaluation at [address] [place] [address]. Patient states [facility] milrinone was increased to 0.375 mcg/kg/min dosed at 85.4 kgs. Patient notes that several medications were stopped including  effient, metoprolol, jardiance, and bumex. She notes she was started on furosemide 40mg daily and an additional 40mg daily until weight is 175 lbs on home scale. She states she was told to take two additional 10meq potassium tablets daily while taking additional dose of lasix. She also notes she was started on losartan 12.5 mg nightly. Patient states that she notified Dr. Ang Traore that she has had more hand and leg cramping since discharge and that dr. Ang Traore changed lasix dose today to 40mg am in 20mg pm. Patient also notes that she has had higher heart rates since discharge and is discussing with Dr. Severiano Miss office for how to proceeded. Patient states that Dr. Ang Traore did suggest to keep all appts with Dr. Leah Salamanca at [facility] while work up determination is being made. Patient states she just wanted to notify out office of changes. Patient requests refills on allopurinol and spironolactone at this time. Sera Whiteside RN     Notified Dr. Leah Salamanca on above information. She states that patient will primarily be managed by [facility] team at [address] [place] [address]. She states patient is to keep February follow up and she will discuss further management of patient care with [facility].

## 2023-02-07 ENCOUNTER — HOSPITAL ENCOUNTER (OUTPATIENT)
Dept: SLEEP MEDICINE | Age: 43
Discharge: HOME | End: 2023-02-07
Payer: MEDICARE

## 2023-02-07 VITALS
OXYGEN SATURATION: 98 % | BODY MASS INDEX: 31.76 KG/M2 | HEIGHT: 64 IN | WEIGHT: 186 LBS | SYSTOLIC BLOOD PRESSURE: 91 MMHG | TEMPERATURE: 96.9 F | HEART RATE: 100 BPM | DIASTOLIC BLOOD PRESSURE: 66 MMHG

## 2023-02-07 DIAGNOSIS — G47.33 OSA (OBSTRUCTIVE SLEEP APNEA): ICD-10-CM

## 2023-02-07 PROCEDURE — 95810 POLYSOM 6/> YRS 4/> PARAM: CPT | Performed by: SPECIALIST

## 2023-02-08 NOTE — PROGRESS NOTES
[facility]. [place], [address]  Tel.  [phone]  Fax. [facility] [address]  [place], [address] [facility]  Tel.  [phone]  Fax. [phone] [address]  Tel.  [phone]  Fax. [phone]     Sleep Study Technical Notes        PRE-Test:  Rafael Carrasco (: 1980) arrived in the lobby. Patient was greeted, temperature checked (96.9 ) and screening questions asked. The patient was taken directly to their assigned room. BP (91/66) and SpO2 (98%) were taken. Procedure was explained to the patient and questions were answered. Pt expressed understanding. Electrodes were applied without incident. The patient was placed in bed and the study was started. Acquisition Notes:  Lights off: 9:01pm  Sleep onset: 9:27pm  Lights on: 05:00am  Respiratory events: Hypopnea  ECG:  LVAD - Pic Line with heart medication - HR tachycardic   PAP titration: NA  Mask(s) Used: NA  Other comments: Order is for a PSG. Patient was able to sleep on her right side and supine during the study. Sleep stages N1, N2 and REM observed. Patient is on a LVAD and has a pic line with heart medication that is used to keep her heart pumping, according to the patient. No audible snoring observed. POST Test:  Patient was awakened. Electrodes were removed. The patient was discharged after completing the Post Questionnaire. Patient stated that they were alert and ok to drive. Equipment and room cleaned per infection control policy.

## 2023-02-09 ENCOUNTER — OFFICE VISIT (OUTPATIENT)
Dept: FAMILY MEDICINE CLINIC | Age: 43
End: 2023-02-09
Payer: MEDICARE

## 2023-02-09 VITALS
WEIGHT: 181 LBS | HEIGHT: 64 IN | DIASTOLIC BLOOD PRESSURE: 66 MMHG | RESPIRATION RATE: 16 BRPM | BODY MASS INDEX: 30.9 KG/M2 | OXYGEN SATURATION: 100 % | SYSTOLIC BLOOD PRESSURE: 90 MMHG | HEART RATE: 97 BPM | TEMPERATURE: 97 F

## 2023-02-09 DIAGNOSIS — R73.03 PREDIABETES: ICD-10-CM

## 2023-02-09 DIAGNOSIS — Z09 HOSPITAL DISCHARGE FOLLOW-UP: ICD-10-CM

## 2023-02-09 DIAGNOSIS — M35.00 SJOGREN'S SYNDROME, WITH UNSPECIFIED ORGAN INVOLVEMENT (HCC): ICD-10-CM

## 2023-02-09 DIAGNOSIS — E21.3 HYPERPARATHYROIDISM (HCC): Primary | ICD-10-CM

## 2023-02-09 LAB — HBA1C MFR BLD HPLC: 5.2 %

## 2023-02-09 PROCEDURE — G9717 DOC PT DX DEP/BP F/U NT REQ: HCPCS | Performed by: FAMILY MEDICINE

## 2023-02-09 PROCEDURE — G8417 CALC BMI ABV UP PARAM F/U: HCPCS | Performed by: FAMILY MEDICINE

## 2023-02-09 PROCEDURE — 1111F DSCHRG MED/CURRENT MED MERGE: CPT | Performed by: FAMILY MEDICINE

## 2023-02-09 PROCEDURE — G8427 DOCREV CUR MEDS BY ELIG CLIN: HCPCS | Performed by: FAMILY MEDICINE

## 2023-02-09 PROCEDURE — 99214 OFFICE O/P EST MOD 30 MIN: CPT | Performed by: FAMILY MEDICINE

## 2023-02-09 PROCEDURE — 83036 HEMOGLOBIN GLYCOSYLATED A1C: CPT | Performed by: FAMILY MEDICINE

## 2023-02-09 RX ORDER — HYDROCORTISONE 1 %
CREAM (GRAM) TOPICAL AS NEEDED
COMMUNITY
Start: 2022-08-16

## 2023-02-09 RX ORDER — CINACALCET 30 MG/1
30 TABLET, FILM COATED ORAL
Qty: 30 TABLET | Refills: 2 | Status: SHIPPED | OUTPATIENT
Start: 2023-02-09 | End: 2023-05-10

## 2023-02-09 RX ORDER — VALACYCLOVIR HYDROCHLORIDE 500 MG/1
500 TABLET, FILM COATED ORAL AS NEEDED
COMMUNITY

## 2023-02-09 RX ORDER — KETOCONAZOLE 20 MG/ML
SHAMPOO, SUSPENSION TOPICAL
COMMUNITY

## 2023-02-09 RX ORDER — LOSARTAN POTASSIUM 25 MG/1
12.5 TABLET ORAL AT BEDTIME
COMMUNITY
Start: 2023-02-03

## 2023-02-09 NOTE — PATIENT INSTRUCTIONS
Lab Results   Component Value Date/Time    Hemoglobin A1c 6.3 (H) 10/21/2022 06:12 PM    Hemoglobin A1c (POC) 5.0 04/04/2018 03:15 PM

## 2023-02-09 NOTE — PROGRESS NOTES
1. \"Have you been to the ER, urgent care clinic since your last visit? Hospitalized since your last visit? \" Yes When: [facility]    2. \"Have you seen or consulted any other health care providers outside of the [address] [place] [address] since your last visit? \" No     3. For patients aged 39-70: Has the patient had a colonoscopy / FIT/ Cologuard? NA - based on age      If the patient is female:    4. For patients aged 41-77: Has the patient had a mammogram within the past 2 years? Yes - no Care Gap present      5. For patients aged 21-65: Has the patient had a pap smear?  Yes - no Care Gap present    Health Maintenance Due   Topic Date Due    Pneumococcal 0-64 years (1 - PCV) Never done    COVID-19 Vaccine (4 - Booster for Moderna series) 02/22/2022    Medicare Yearly Exam  12/03/2022

## 2023-02-09 NOTE — PROGRESS NOTES
[facility]    History of Present Illness:   Madeline Nicole is a 38 y.o. female here for   Chief Complaint   Patient presents with    Hospital Follow Up         HPI:  Here for hospital follow-up. She was admitted February 3 after right heart catheterization showed worsening than expected. Her meds were tweaked, milrinone and Lasix were both increased. Also says she has been holding doses of metformin due to diarrhea. She only takes it for prediabetes. She is transferring her cardiac care from [facility] to [facility] as she is undergoing eval for transplant. She was told that the Sensipar which was started back in October would need to be taken over by primary care. She is seeing a surgeon February 24 to evaluate whether she will need to have a parathyroidectomy. PTH-- January 10 was normal at 46.2 on the Sensipar. She denies any SI now. Per ENT:   working up to get on the heart transplant list with incidental finding of parathyroid adenoma. Parathyroid adenoma: Possible, noted on outside ultrasound. Will draw PTH and also repeat ultrasound at our institution to confirm impression that this is an adenoma and not a thyroid nodule. Referral to Dr. Nat Sher for consideration of surgical management. Hosp discharge as follows:  Ambulatory cardiogenic shock  Acute on chronic systolic heart failure due to ischemic cardiomyopathy, LVEF 15%, stage D  Pulmonary hypertension, mixed pre and post capillary  -She had acute MI in 7/2022 and has had progressive heart failure since. Started on Milrinone in 10/2022 (did not tolerate Dobutamine d/t tachycardia). Undergoing outpatient evaluation for advanced HF therapies.  -Cardiac index is still reduced but has improved on higher dose of inotrope.  Her pulmonary pressures have also improved and are acceptable for transplant listing.  -Inotrope: Discharge home on increased Milrinone 0.375 mcg/kg/min.  -Diuresis: Transition to Lasix 40 mg PO daily dosing. Instructed to take extra dose if weight >175 lbs. Continue KCl 20 mEq bid. -GDMT: Continue Digoxin 125 mcg daily, Spironolactone 25 mg daily, and Losartan 12.5 mg daily. Stopped Jardiance due to recent yeast infection.  -Transplant evaluation:  Discussed with surgical oncology: breast mass consistent with lipoma, no further workup required. Discussed with ENT: parathyroid adenoma is very low suspicion for malignancy, can defer any further workup until after transplant. Pulmonary pressures are now acceptable for listing. Will discuss with interdisciplinary team, anticipate she can be listed as status 4 for transplant if committee approves. -Continue LifeVest.     ICM with recent MI s/p RICKY 7/24/22  -Continue Aspirin 81 mg daily. Stop Prasugrel in anticipation of transplant listing. Iron deficiency  -S/p IV iron sucrose x 2 doses during hospital stay, can resume PO supplementation at discharge. Bipolar II disorder, controlled  Anxiety and depression  -Continue home Escitalopram 20 mg daily, Clonazepam 1.5 mg nightly, Trazodone 150 mg nightly, and Lorazepam 1 mg PRN anxiety.  -Follow up with psychiatry outpatient.      Additional chronic conditions:  # Sjogren's syndrome: placed ambulatory referral to establish care with [facility] rheumatology  # Fibromyalgia  # Restless leg syndrome: on Pramipexole  # Dyslipidemia, intolerant to statins due to myalgia  # Gastroparesis  # Thyroid nodule on ultrasound: on Cinacalcet  # Prediabetes  # Esophageal stenosis, s/p dilatation  # Obesity  # Prior tobacco and marijuana use, stopped after MI in 07/2022         Health Maintenance  Health Maintenance Due   Topic Date Due    Pneumococcal 0-64 years (1 - PCV) Never done    COVID-19 Vaccine (4 - Booster for Wadie Hogan series) 02/22/2022    Medicare Yearly Exam  12/03/2022       Past Medical, Family, and Social History:     Past Medical History:   Diagnosis Date    Anemia NEC     Arthritis     Chronic pain     fybromyalsia Depression     anxiety, depression, OCD    GERD (gastroesophageal reflux disease)     Hypertension     Hypertension     Ill-defined condition     Fibromyalia gastricparesis    MI (myocardial infarction) ([facility] [address]     Musculoskeletal disorder     Sepsis ([facility] [address]     SOB (shortness of breath)     Stool color black       Past Surgical History:   Procedure Laterality Date    HX CORONARY STENT PLACEMENT      HX GYN           HX HEENT  2002    wisdom teeth extraction    UPPER GI ENDOSCOPY,BIOPSY  2018         UPPER GI ENDOSCOPY,DIAGNOSIS  2018            Current Outpatient Medications on File Prior to Visit   Medication Sig Dispense Refill    losartan (COZAAR) 25 mg tablet Take 12.5 mg by mouth At bedtime. hydrocortisone (CORTAID) 1 % topical cream Apply  to affected area as needed. ketoconazole (NIZORAL) 2 % shampoo Apply  to affected area. valACYclovir (VALTREX) 500 mg tablet Take 500 mg by mouth as needed. furosemide (LASIX) 40 mg tablet Take 40 mg by mouth daily. Patient takes 40mg daily and an additional 40mg daily as needed to maintain weight of 175 lbs  Dosed by heart transplant team      digoxin (LANOXIN) 0.125 mg tablet Take 1 Tablet by mouth daily. 30 Tablet 2    pantoprazole (PROTONIX) 40 mg tablet TAKE 1 TABLET TWICE DAILY 180 Tablet 1    pramipexole (MIRAPEX) 0.125 mg tablet Take 1 Tablet by mouth nightly. 90 Tablet 1    potassium chloride (KLOR-CON M10) 10 mEq tablet Take 2 Tablets by mouth two (2) times a day. (Patient taking differently: Take  by mouth. Patient takes two 10 meq tablets twice a day may take an additional two 10meq tablets as needed when taking additional tablet of lasix.) 120 Tablet 1    levocetirizine (XYZAL) 5 mg tablet TAKE 1 TABLET EVERY DAY 90 Tablet 1    milrinone (PRIMACOR) 20 mg/100 mL (200 mcg/mL) infusion 20.975 mcg/min by IntraVENous route continuous. (Patient taking differently: 0.375 mcg/kg/min by IntraVENous route continuous.  Dosing weight 85.4 kg) 100 mL 50    LORazepam (ATIVAN) 1 mg tablet Take 1 mg by mouth every eight (8) hours as needed. folic acid (FOLVITE) 1 mg tablet TAKE 1 TABLET EVERY DAY 90 Tablet 1    spironolactone (ALDACTONE) 25 mg tablet Take 1 Tablet by mouth daily. 30 Tablet 1    allopurinoL (ZYLOPRIM) 100 mg tablet Take 0.5 Tablets by mouth daily. 15 Tablet 1    magnesium oxide (MAG-OX) 400 mg tablet Take 1 Tablet by mouth two (2) times a day. 60 Tablet 0    cyclobenzaprine (FLEXERIL) 10 mg tablet Take 1 Tablet by mouth three (3) times daily as needed for Muscle Spasm(s). Has not needed 20 Tablet 0    montelukast (SINGULAIR) 10 mg tablet TAKE 1 TABLET EVERY DAY 90 Tablet 1    albuterol-ipratropium (DUO-NEB) 2.5 mg-0.5 mg/3 ml nebu 3 mL by Nebulization route every four (4) hours as needed. fluticasone propionate (FLONASE) 50 mcg/actuation nasal spray 2 Sprays by Both Nostrils route daily. acetaminophen (TYLENOL) 325 mg tablet Take  by mouth every four (4) hours as needed for Pain. albuterol (PROVENTIL HFA, VENTOLIN HFA, PROAIR HFA) 90 mcg/actuation inhaler Take  by inhalation as needed. aspirin delayed-release 81 mg tablet 81 mg.      ferrous sulfate 325 mg (65 mg iron) tablet TAKE 1 TABLET BY MOUTH ONCE DAILY BEFORE BREAKFAST 90 Tablet 1    traZODone (DESYREL) 100 mg tablet Weaning-150 mg at night      escitalopram oxalate (LEXAPRO) 20 mg tablet Take 20 mg by mouth daily. clonazePAM (KLONOPIN) 1 mg tablet Take 1.5 mg by mouth nightly. cholecalciferol, vitamin D3, 50 mcg (2,000 unit) tab Take 2,000 Units by mouth daily. lancets (Accu-Chek Softclix Lancets) misc Check Blood Sugar once daily. Dx. R73.03 100 Each 1    Blood Glucose Control High&Low (Accu-Chek Guide L1-L2 Ctrl Sol) soln Check Blood Sugar once daily. Dx. R73.03 4 Each 1    glucose blood VI test strips (Accu-Chek Guide test strips) strip Check Blood Sugar once daily.   Dx. R73.03 100 Strip 1    Blood-Glucose Meter (Accu-Chek Guide Glucose Meter) misc Check Blood Sugar once daily. Dx. R73.03 1 Each 0    Lancing Device with Lancets (Accu-Chek Soft Dev Lancets) kit Check Blood Sugar once daily. Dx. R73.03 1 Kit 1    alcohol swabs (BD Single Use Swabs Regular) padm Check Blood Sugar once daily. Dx. R73.03 100 Pad 1     No current facility-administered medications on file prior to visit. Patient Active Problem List   Diagnosis Code    Depression F32. A    GERD (gastroesophageal reflux disease) K21.9    Anemia, unspecified D64.9    Itch of skin L29.9    Anxiety F41.9    Costochondritis M94.0    HSV (herpes simplex virus) anogenital infection A60.9    OCD (obsessive compulsive disorder) F42.9    Hoarseness R49.0    IFG (impaired fasting glucose) R73.01    Hyperlipidemia E78.5    Thrombosed external hemorrhoid K64.5    Vitamin D deficiency E55.9    Inflammatory arthritis M19.90    Recurrent depression ([facility]) F33.9    Mood disorder ([facility]) F39    Chronic pain syndrome G89.4    Fibromyalgia M79.7    Panic attack F41.0    Tremor R25.1    Class 2 obesity due to excess calories without serious comorbidity in adult E66.09    Gastroparesis K31.84    Plantar fasciitis of left foot M72.2    Positive KITTY (antinuclear antibody) R76.8    Severe obesity ([facility]) E66.01    Neuropathy G62.9    Acute exacerbation of CHF (congestive heart failure) ([facility]) I50.9    Coronary artery disease involving native coronary artery of native heart without angina pectoris I25.10    Chronic systolic heart failure ([facility]) I50.22    Fever R50.9    Sjogren's syndrome ([facility]) M35.00    Prediabetes R73.03       Social History     Socioeconomic History    Marital status: SINGLE   Tobacco Use    Smoking status: Former     Packs/day: 0.25     Years: 8.00     Pack years: 2.00     Types: Cigarettes     Quit date: 2022     Years since quittin.5    Smokeless tobacco: Never   Vaping Use    Vaping Use: Never used   Substance and Sexual Activity    Alcohol use: Not Currently Alcohol/week: 1.0 standard drink     Types: 1 Glasses of wine per week     Comment: Wine with special occassions - not since July    Drug use: Not Currently     Types: Marijuana     Comment: Haven't had any since 7/24/2022    Sexual activity: Yes     Partners: Male     Birth control/protection: None     Social Determinants of Health     Financial Resource Strain: High Risk    Difficulty of Paying Living Expenses: Very hard   Food Insecurity: No Food Insecurity    Worried About Running Out of Food in the Last Year: Never true    Ran Out of Food in the Last Year: Never true   Transportation Needs: No Transportation Needs    Lack of Transportation (Medical): No    Lack of Transportation (Non-Medical): No   Physical Activity: Inactive    Days of Exercise per Week: 0 days    Minutes of Exercise per Session: 0 min   Stress: Stress Concern Present    Feeling of Stress : To some extent   Social Connections: Socially Isolated    Frequency of Communication with Friends and Family: Twice a week    Frequency of Social Gatherings with Friends and Family: Never    Attends Jewish Services: Never    Active Member of Clubs or Organizations: No    Attends Club or Organization Meetings: Never    Marital Status: Never    Housing Stability: Low Risk     Unable to Pay for Housing in the Last Year: No    Number of [place] in the Last Year: 1    Unstable Housing in the Last Year: No        Review of Systems   Review of Systems   Constitutional:  Negative for chills and fever. Respiratory:  Negative for cough and shortness of breath. Cardiovascular:  Negative for chest pain and leg swelling.      Objective:   Visit Vitals  BP 90/66 (BP 1 Location: Left upper arm, BP Patient Position: Sitting, BP Cuff Size: Adult)   Pulse 97   Temp 97 °F (36.1 °C) (Oral)   Resp 16   Ht 5' 4\" (1.626 m)   Wt 181 lb (82.1 kg)   SpO2 100%   BMI 31.07 kg/m²        Physical Exam  PHYSICAL EXAM:  Gen: Pt sitting in chair, in NAD  Head: Normocephalic, atraumatic  Eyes: Sclera anicteric, EOM grossly intact,  Neck: Supple, no carotid bruits  CVS: Normal S1, S2, no m/r/g  Resp: CTAB, no wheezes or rales  Abd: Soft, non-tender, non-distended  Extrem: Atraumatic, no edema  Neuro: Alert, oriented, appropriate    Pertinent Labs/Studies:  Testing preformed onsite at today's visit:  Results for orders placed or performed in visit on 02/09/23   AMB POC HEMOGLOBIN A1C   Result Value Ref Range    Hemoglobin A1c (POC) 5.2 %        Assessment and orders:       ICD-10-CM ICD-9-CM    1. Hyperparathyroidism ([facility] [address]  E21.3 252.00 cinacalcet (SENSIPAR) 30 mg tablet      2. Sjogren's syndrome, with unspecified organ involvement ([address]  M35.00 710.2       3. Prediabetes  R73.03 790.29 AMB POC HEMOGLOBIN A1C      4. Hospital discharge follow-up  Z09 V67.59 CO DISCHRG MEDS RECONCILED W/CURRENT MED LIST        Diagnoses and all orders for this visit:    1. Hyperparathyroidism ([address]  -     cinacalcet (SENSIPAR) 30 mg tablet; Take 1 Tablet by mouth daily (with breakfast) for 90 days. 2. Sjogren's syndrome, with unspecified organ involvement ([address]  Assessment & Plan:   monitored by specialist. No acute findings meriting change in the plan      3. Prediabetes  -     AMB POC HEMOGLOBIN A1C    4. Hospital discharge follow-up  -     CO [address] MEDS RECONCILED W/CURRENT MED LIST      the following changes in treatment are made: Stop metformin as she is not diabetic and it is causing side effects  reviewed medications and side effects in detail  Keep appt with ENT and ask how long she should remain on cinacalcet  Spent 30 min with patient, reviewing chart and face to face exam, clinical documentation. I have discussed the diagnosis with the patient and the intended plan as seen in the above orders. Social history, medical history, and labs were reviewed. The patient has received an after-visit summary and questions were answered concerning future plans.   I have discussed medication side effects and warnings with the patient as well. Patient/guardian verbalized understanding and accepts plan & risks. Please note that this dictation was completed with [employer], the computer voice recognition software. Quite often unanticipated grammatical, syntax, homophones, and other interpretive errors are inadvertently transcribed by the computer software. Please disregard these errors. Please excuse any errors that have escaped final proofreading. Thank you.      MD URBAN Luis & MARSHAL CARMEN [facility]  02/10/23

## 2023-02-10 ENCOUNTER — HOSPITAL ENCOUNTER (OUTPATIENT)
Dept: MAMMOGRAPHY | Age: 43
End: 2023-02-10
Attending: FAMILY MEDICINE
Payer: MEDICARE

## 2023-02-10 DIAGNOSIS — N63.11 MASS OF UPPER OUTER QUADRANT OF RIGHT BREAST: ICD-10-CM

## 2023-02-10 DIAGNOSIS — N63.10 BREAST MASS, RIGHT: ICD-10-CM

## 2023-02-10 PROCEDURE — 76642 ULTRASOUND BREAST LIMITED: CPT

## 2023-02-10 PROCEDURE — 77061 BREAST TOMOSYNTHESIS UNI: CPT

## 2023-02-13 RX ORDER — SPIRONOLACTONE 25 MG/1
25 TABLET ORAL DAILY
Qty: 30 TABLET | Refills: 1 | Status: SHIPPED | OUTPATIENT
Start: 2023-02-13

## 2023-02-13 RX ORDER — ALLOPURINOL 100 MG/1
50 TABLET ORAL DAILY
Qty: 15 TABLET | Refills: 1 | Status: SHIPPED | OUTPATIENT
Start: 2023-02-13

## 2023-02-13 NOTE — TELEPHONE ENCOUNTER
Requested Prescriptions     Signed Prescriptions Disp Refills    allopurinoL (ZYLOPRIM) 100 mg tablet 15 Tablet 1     Sig: Take 0.5 Tablets by mouth daily. Authorizing Provider: Paulette Fletcher     Ordering User: Thom Porter    spironolactone (ALDACTONE) 25 mg tablet 30 Tablet 1     Sig: Take 1 Tablet by mouth daily.      Authorizing Provider: Paulette Fletcher     Ordering User: Thom Porter

## 2023-02-15 ENCOUNTER — OFFICE VISIT (OUTPATIENT)
Dept: SLEEP MEDICINE | Age: 43
End: 2023-02-15
Payer: MEDICARE

## 2023-02-15 VITALS
TEMPERATURE: 98.9 F | WEIGHT: 185 LBS | BODY MASS INDEX: 31.58 KG/M2 | HEART RATE: 102 BPM | OXYGEN SATURATION: 100 % | HEIGHT: 64 IN | DIASTOLIC BLOOD PRESSURE: 70 MMHG | SYSTOLIC BLOOD PRESSURE: 90 MMHG

## 2023-02-15 DIAGNOSIS — G47.33 OSA (OBSTRUCTIVE SLEEP APNEA): Primary | ICD-10-CM

## 2023-02-15 NOTE — PROGRESS NOTES
Identified pt with two pt identifiers. Reviewed record in preparation for visit and have obtained necessary documentation. All patient medications has been reviewed. Chief Complaint   Patient presents with    Sleep Problem     New pt     Additional information about chief complaint:    Visit Vitals  BP 90/70 (BP 1 Location: Left upper arm, BP Patient Position: Sitting, BP Cuff Size: Large adult)   Pulse (!) 102   Temp 98.9 °F (37.2 °C) (Temporal)   Ht 5' 4\" (1.626 m)   Wt 185 lb (83.9 kg)   SpO2 100%   BMI 31.76 kg/m²       Health Maintenance Due   Topic    Pneumococcal 0-64 years (1 - PCV)    COVID-19 Vaccine (4 - Booster for Moderna series)    Medicare Yearly Exam        1. Have you been to the ER, urgent care clinic since your last visit? Hospitalized since your last visit? N/a    2. Have you seen or consulted any other health care providers outside of the [address] [place] [address] since your last visit? Include any pap smears or colon screening.  N/a

## 2023-02-15 NOTE — PROGRESS NOTES
[address] [facility] [place]. [place], [address]  Tel.  [phone]  Fax. 100 [facility] 60  [place], [address] [facility]  Tel.  [phone]  Fax. [phone] [address] [facility] Abhi Hunter   Tel.  [phone]  Fax. [phone]       Chief Complaint       Chief Complaint   Patient presents with    Sleep Problem     New pt       HPI      Antonieta Tenorio is 38 y.o. female seen for evaluation of a sleep disorder. Patient has a history of ischemic cardiomyopathy, LVEF 15-20%, stage C, NYHA class IIIB . Sleep study performed for potential sleep disordered breathing.     477.5 minutes recorded of which 288 minutes spent asleep with a sleep efficiency of 60.3%. Sleep onset at 25.5 minutes; REM onset at 175.5 minutes with total REM representing 19.6% of sleep time. 2 respiratory events occurred of which 1 hypopnea and 1 central apnea. Overall AHI 0.4/h.  REM related AHI 2.1/h. Minimal SaO2 90%. Snoring not noted. Impression: PSG did not demonstrate significant sleep disordered breathing. [place] Sleepiness Score: (P) 13       Allergies   Allergen Reactions    Abilify [Aripiprazole] Anxiety     Can not tolerate     Codeine Nausea and Vomiting    Sulfa (Sulfonamide Antibiotics) Hives    Vicodin [Hydrocodone-Acetaminophen] Nausea and Vomiting       Current Outpatient Medications   Medication Sig Dispense Refill    allopurinoL (ZYLOPRIM) 100 mg tablet Take 0.5 Tablets by mouth daily. 15 Tablet 1    spironolactone (ALDACTONE) 25 mg tablet Take 1 Tablet by mouth daily. 30 Tablet 1    losartan (COZAAR) 25 mg tablet Take 12.5 mg by mouth At bedtime. hydrocortisone (CORTAID) 1 % topical cream Apply  to affected area as needed. ketoconazole (NIZORAL) 2 % shampoo Apply  to affected area. valACYclovir (VALTREX) 500 mg tablet Take 500 mg by mouth as needed.       cinacalcet (SENSIPAR) 30 mg tablet Take 1 Tablet by mouth daily (with breakfast) for 90 days. 30 Tablet 2    furosemide (LASIX) 40 mg tablet Take 40 mg by mouth daily. Patient takes 40mg daily and an additional 40mg daily as needed to maintain weight of 175 lbs  Dosed by heart transplant team      digoxin (LANOXIN) 0.125 mg tablet Take 1 Tablet by mouth daily. 30 Tablet 2    lancets (Accu-Chek Softclix Lancets) misc Check Blood Sugar once daily. Dx. R73.03 100 Each 1    Blood Glucose Control High&Low (Accu-Chek Guide L1-L2 Ctrl Sol) soln Check Blood Sugar once daily. Dx. R73.03 4 Each 1    pantoprazole (PROTONIX) 40 mg tablet TAKE 1 TABLET TWICE DAILY 180 Tablet 1    pramipexole (MIRAPEX) 0.125 mg tablet Take 1 Tablet by mouth nightly. 90 Tablet 1    potassium chloride (KLOR-CON M10) 10 mEq tablet Take 2 Tablets by mouth two (2) times a day. (Patient taking differently: Take  by mouth. Patient takes two 10 meq tablets twice a day may take an additional two 10meq tablets as needed when taking additional tablet of lasix.) 120 Tablet 1    glucose blood VI test strips (Accu-Chek Guide test strips) strip Check Blood Sugar once daily. Dx. R73.03 100 Strip 1    Blood-Glucose Meter (Accu-Chek Guide Glucose Meter) misc Check Blood Sugar once daily. Dx. R73.03 1 Each 0    Lancing Device with Lancets (Accu-Chek Soft Dev Lancets) kit Check Blood Sugar once daily. Dx. R73.03 1 Kit 1    alcohol swabs (BD Single Use Swabs Regular) padm Check Blood Sugar once daily. Dx. R73.03 100 Pad 1    levocetirizine (XYZAL) 5 mg tablet TAKE 1 TABLET EVERY DAY 90 Tablet 1    milrinone (PRIMACOR) 20 mg/100 mL (200 mcg/mL) infusion 20.975 mcg/min by IntraVENous route continuous. (Patient taking differently: 0.375 mcg/kg/min by IntraVENous route continuous. Dosing weight 85.4 kg) 100 mL 50    LORazepam (ATIVAN) 1 mg tablet Take 1 mg by mouth every eight (8) hours as needed.       folic acid (FOLVITE) 1 mg tablet TAKE 1 TABLET EVERY DAY 90 Tablet 1    magnesium oxide (MAG-OX) 400 mg tablet Take 1 Tablet by mouth two (2) times a day. 60 Tablet 0    cyclobenzaprine (FLEXERIL) 10 mg tablet Take 1 Tablet by mouth three (3) times daily as needed for Muscle Spasm(s). Has not needed 20 Tablet 0    montelukast (SINGULAIR) 10 mg tablet TAKE 1 TABLET EVERY DAY 90 Tablet 1    albuterol-ipratropium (DUO-NEB) 2.5 mg-0.5 mg/3 ml nebu 3 mL by Nebulization route every four (4) hours as needed. fluticasone propionate (FLONASE) 50 mcg/actuation nasal spray 2 Sprays by Both Nostrils route daily. acetaminophen (TYLENOL) 325 mg tablet Take  by mouth every four (4) hours as needed for Pain. albuterol (PROVENTIL HFA, VENTOLIN HFA, PROAIR HFA) 90 mcg/actuation inhaler Take  by inhalation as needed. aspirin delayed-release 81 mg tablet 81 mg.      ferrous sulfate 325 mg (65 mg iron) tablet TAKE 1 TABLET BY MOUTH ONCE DAILY BEFORE BREAKFAST 90 Tablet 1    traZODone (DESYREL) 100 mg tablet Weaning-150 mg at night      escitalopram oxalate (LEXAPRO) 20 mg tablet Take 20 mg by mouth daily. clonazePAM (KLONOPIN) 1 mg tablet Take 1.5 mg by mouth nightly. cholecalciferol, vitamin D3, 50 mcg (2,000 unit) tab Take 2,000 Units by mouth daily. She  has a past medical history of Anemia NEC, Arthritis, Chronic pain, Depression, GERD (gastroesophageal reflux disease), Hypertension, Hypertension, Ill-defined condition, MI (myocardial infarction) ([address] Musculoskeletal disorder, Sepsis ([address] SOB (shortness of breath), and Stool color black. She  has a past surgical history that includes hx gyn; hx heent (2002); upper gi endoscopy,diagnosis (06/07/2018); upper gi endoscopy,biopsy (06/21/2018); and hx coronary stent placement.     She family history includes Arthritis-rheumatoid in her mother; Breast Cancer (age of onset: 50) in her mother; COPD in her mother; Cancer in her mother; Diabetes in her maternal grandmother; Elevated Lipids in her father; Heart Disease in her mother; Hypertension in her father and mother; Lung Disease in her mother; Stroke in her mother. She  reports that she quit smoking about 6 months ago. Her smoking use included cigarettes. She has a 2.00 pack-year smoking history. She has never used smokeless tobacco. She reports that she does not currently use alcohol after a past usage of about 1.0 standard drink per week. She reports that she does not currently use drugs after having used the following drugs: Marijuana. Review of Systems:  ROS      Objective:   Visit Vitals  BP 90/70 (BP 1 Location: Left upper arm, BP Patient Position: Sitting, BP Cuff Size: Large adult)   Pulse (!) 102   Temp 98.9 °F (37.2 °C) (Temporal)   Ht 5' 4\" (1.626 m)   Wt 185 lb (83.9 kg)   SpO2 100%   BMI 31.76 kg/m²     Body mass index is 31.76 kg/m². General:   Conversant, cooperative       Oropharynx:   Mallampati score I, tongue normal                       Neuro:  Speech fluent, face symmetrical, tongue movement normal   Psych:  Normal affect,  normal countenance        Assessment:       ICD-10-CM ICD-9-CM    1. STU (obstructive sleep apnea)  G47.33 327.23         Sleep study did not demonstrate significant sleep disordered breathing. Plan:     No orders of the defined types were placed in this encounter. * Sleep testing results were reviewed. * Treatment options if indicated were reviewed today. Instructions:    Call or return if symptoms worsen or persist.          Willy Monahan MD, [facility]  Electronically signed 02/15/23       This note was created using voice recognition software. Despite editing, there may be syntax errors. This note will not be viewable in [address]

## 2023-02-16 ENCOUNTER — OFFICE VISIT (OUTPATIENT)
Dept: CARDIOLOGY CLINIC | Age: 43
End: 2023-02-16

## 2023-02-16 ENCOUNTER — DOCUMENTATION ONLY (OUTPATIENT)
Dept: CARDIOLOGY CLINIC | Age: 43
End: 2023-02-16

## 2023-02-16 ENCOUNTER — OFFICE VISIT (OUTPATIENT)
Dept: FAMILY MEDICINE CLINIC | Age: 43
End: 2023-02-16
Payer: MEDICARE

## 2023-02-16 ENCOUNTER — DOCUMENTATION ONLY (OUTPATIENT)
Dept: SLEEP MEDICINE | Age: 43
End: 2023-02-16

## 2023-02-16 VITALS
OXYGEN SATURATION: 100 % | DIASTOLIC BLOOD PRESSURE: 56 MMHG | RESPIRATION RATE: 16 BRPM | HEART RATE: 89 BPM | SYSTOLIC BLOOD PRESSURE: 83 MMHG | TEMPERATURE: 98.5 F | BODY MASS INDEX: 31.24 KG/M2 | WEIGHT: 183 LBS | HEIGHT: 64 IN

## 2023-02-16 VITALS
HEIGHT: 64 IN | WEIGHT: 181 LBS | OXYGEN SATURATION: 98 % | SYSTOLIC BLOOD PRESSURE: 92 MMHG | BODY MASS INDEX: 30.9 KG/M2 | HEART RATE: 99 BPM | DIASTOLIC BLOOD PRESSURE: 62 MMHG

## 2023-02-16 DIAGNOSIS — I25.5 ISCHEMIC CARDIOMYOPATHY: ICD-10-CM

## 2023-02-16 DIAGNOSIS — I25.10 CORONARY ARTERY DISEASE INVOLVING NATIVE CORONARY ARTERY OF NATIVE HEART WITHOUT ANGINA PECTORIS: Chronic | ICD-10-CM

## 2023-02-16 DIAGNOSIS — I50.22 CHRONIC SYSTOLIC HEART FAILURE (HCC): Primary | ICD-10-CM

## 2023-02-16 DIAGNOSIS — I95.2 HYPOTENSION DUE TO DRUGS: ICD-10-CM

## 2023-02-16 DIAGNOSIS — I50.22 CHRONIC SYSTOLIC HEART FAILURE (HCC): ICD-10-CM

## 2023-02-16 DIAGNOSIS — B37.31 VAGINAL CANDIDIASIS: ICD-10-CM

## 2023-02-16 DIAGNOSIS — J01.00 ACUTE NON-RECURRENT MAXILLARY SINUSITIS: Primary | ICD-10-CM

## 2023-02-16 PROCEDURE — G8427 DOCREV CUR MEDS BY ELIG CLIN: HCPCS | Performed by: FAMILY MEDICINE

## 2023-02-16 PROCEDURE — G9717 DOC PT DX DEP/BP F/U NT REQ: HCPCS | Performed by: FAMILY MEDICINE

## 2023-02-16 PROCEDURE — 99214 OFFICE O/P EST MOD 30 MIN: CPT | Performed by: FAMILY MEDICINE

## 2023-02-16 PROCEDURE — G8417 CALC BMI ABV UP PARAM F/U: HCPCS | Performed by: FAMILY MEDICINE

## 2023-02-16 RX ORDER — DOXYCYCLINE 100 MG/1
100 CAPSULE ORAL 2 TIMES DAILY
Qty: 14 CAPSULE | Refills: 0 | Status: SHIPPED | OUTPATIENT
Start: 2023-02-16 | End: 2023-02-23

## 2023-02-16 RX ORDER — METOPROLOL SUCCINATE 25 MG/1
12.5 TABLET, EXTENDED RELEASE ORAL DAILY
COMMUNITY
Start: 2023-02-10 | End: 2023-04-11

## 2023-02-16 RX ORDER — TORSEMIDE 20 MG/1
20 TABLET ORAL 2 TIMES DAILY
COMMUNITY
Start: 2023-02-14 | End: 2024-02-14

## 2023-02-16 NOTE — PROGRESS NOTES
1. Have you been to the ER, urgent care clinic since your last visit? Hospitalized since your last visit? No    2. Have you seen or consulted any other health care providers outside of the [address] [place] [address] since your last visit? Include any pap smears or colon screening. No  Reviewed record in preparation for visit and have necessary documentation  Pt did not bring medication to office visit for review  opportunity was given for questions      3. For patients aged 39-70: Has the patient had a colonoscopy / FIT/ Cologuard? NA - based on age      If the patient is female:    4. For patients aged 41-77: Has the patient had a mammogram within the past 2 years? Yes - no Care Gap present      5. For patients aged 21-65: Has the patient had a pap smear?  Yes - no Care Gap present      Goals that were addressed and/or need to be completed during or after this appointment include   Health Maintenance Due   Topic Date Due    Pneumococcal 0-64 years (1 - PCV) Never done    COVID-19 Vaccine (4 - Booster for Moderna series) 02/22/2022    Medicare Yearly Exam  12/03/2022

## 2023-02-16 NOTE — PROGRESS NOTES
Cardiac Electrophysiology OFFICE Consultation Note       Assessment/Plan:   1. Chronic systolic heart failure ([address]  2. Ischemic cardiomyopathy  3. Coronary artery disease involving native coronary artery of native heart without angina pectoris     Chronic systolic heart failure. LVEF 15-20%, stage C, NYHA class III  Followed by Advanced Heart Failure and [facility] Transplant Team  Currently on milrinone  Wearing a LifeVest  Possibly being listed for heart transplant in near future   - Continue GDMT with Toprol, losartan, spironolactone, and Jardiance. - The timeline for heart transplant would determine if ICD implant is appropriate or if she should continue LifeVest.   -Will contact [phone] Transplant Team (Dr. Pooja Bustillo) to determine timing. If the timeframe of transplant is still unclear, patient is having difficulties with wearing the LifeVest for an extensive period of time. We could consider ICD implantation in the near future. If she is being considered for transplant soon, I would hold off on any device therapies. - I have discussed the risks and benefits of ICD implant with the pt including but not limited to MI, death, bleeding, infection, lead dislodgement, pneumothorax, tamponade. We have reviewed an evidence-based tool (https://patientdecisionaid.org/wp-content/uploads/2016/06/ICD-tool-shortened-V1-3-. pdf), all questions were answered and patient reports full understanding of discussion   -I will be in touch with Dr. Marifer Swanson prior to consideration for ICD  - We will reach out the patient for further recommendations    Ischemic cardiomyopathy  As above    CAD. STEMI July 2022, s/p RICKY to LAD at [address] aspirin       Subjective:       Antonieta Tenorio is a 38 y.o. patient who is seen for evaluation of  cardiomyopathy and consideration for ICD. She had a STEMI July 2022 with a stent placed at that time.  She was found to have ischemic cardiomyopathy and started on GDMT. She is currently wearing a LifeVest. She has been followed by [facility] and is on milrinone. She was referred to [address] [place] [address] for evaluation for heart transplant. She has completed the workup and is awaiting the final decision regarding transplant. She denies chest pain, dizziness or syncope. Dyspnea improved with recent change of Lasix to torsemide. Patient Active Problem List   Diagnosis Code    Depression F32. A    GERD (gastroesophageal reflux disease) K21.9    Anemia, unspecified D64.9    Itch of skin L29.9    Anxiety F41.9    Costochondritis M94.0    HSV (herpes simplex virus) anogenital infection A60.9    OCD (obsessive compulsive disorder) F42.9    Hoarseness R49.0    IFG (impaired fasting glucose) R73.01    Hyperlipidemia E78.5    Thrombosed external hemorrhoid K64.5    Vitamin D deficiency E55.9    Inflammatory arthritis M19.90    Recurrent depression ([facility]) F33.9    Mood disorder ([facility]) F39    Chronic pain syndrome G89.4    Fibromyalgia M79.7    Panic attack F41.0    Tremor R25.1    Class 2 obesity due to excess calories without serious comorbidity in adult E66.09    Gastroparesis K31.84    Plantar fasciitis of left foot M72.2    Positive KITTY (antinuclear antibody) R76.8    Severe obesity ([facility]) E66.01    Neuropathy G62.9    Acute exacerbation of CHF (congestive heart failure) ([facility]) I50.9    Coronary artery disease involving native coronary artery of native heart without angina pectoris I25.10    Chronic systolic heart failure ([facility]) I50.22    Fever R50.9    Sjogren's syndrome ([facility]) M35.00    Prediabetes R73.03    Ischemic cardiomyopathy I25.5     Current Outpatient Medications   Medication Sig Dispense Refill    empagliflozin (JARDIANCE) 10 mg tablet Take 10 mg by mouth daily. metoprolol succinate (TOPROL-XL) 25 mg XL tablet Take 12.5 mg by mouth daily. torsemide (DEMADEX) 20 mg tablet Take 20 mg by mouth two (2) times a day.       allopurinoL (ZYLOPRIM) 100 mg tablet Take 0.5 Tablets by mouth daily. 15 Tablet 1    spironolactone (ALDACTONE) 25 mg tablet Take 1 Tablet by mouth daily. 30 Tablet 1    losartan (COZAAR) 25 mg tablet Take 12.5 mg by mouth At bedtime. hydrocortisone (CORTAID) 1 % topical cream Apply  to affected area as needed. ketoconazole (NIZORAL) 2 % shampoo Apply  to affected area. valACYclovir (VALTREX) 500 mg tablet Take 500 mg by mouth as needed. cinacalcet (SENSIPAR) 30 mg tablet Take 1 Tablet by mouth daily (with breakfast) for 90 days. 30 Tablet 2    digoxin (LANOXIN) 0.125 mg tablet Take 1 Tablet by mouth daily. 30 Tablet 2    pantoprazole (PROTONIX) 40 mg tablet TAKE 1 TABLET TWICE DAILY 180 Tablet 1    pramipexole (MIRAPEX) 0.125 mg tablet Take 1 Tablet by mouth nightly. 90 Tablet 1    potassium chloride (KLOR-CON M10) 10 mEq tablet Take 2 Tablets by mouth two (2) times a day. (Patient taking differently: Take 40 mEq by mouth two (2) times a day.) 120 Tablet 1    alcohol swabs (BD Single Use Swabs Regular) padm Check Blood Sugar once daily. Dx. R73.03 100 Pad 1    levocetirizine (XYZAL) 5 mg tablet TAKE 1 TABLET EVERY DAY 90 Tablet 1    milrinone (PRIMACOR) 20 mg/100 mL (200 mcg/mL) infusion 20.975 mcg/min by IntraVENous route continuous. (Patient taking differently: 0.375 mcg/kg/min by IntraVENous route continuous. Dosing weight 85.4 kg) 100 mL 50    LORazepam (ATIVAN) 1 mg tablet Take 1 mg by mouth every eight (8) hours as needed. folic acid (FOLVITE) 1 mg tablet TAKE 1 TABLET EVERY DAY 90 Tablet 1    magnesium oxide (MAG-OX) 400 mg tablet Take 1 Tablet by mouth two (2) times a day. (Patient taking differently: Take 800 mg by mouth two (2) times a day.) 60 Tablet 0    cyclobenzaprine (FLEXERIL) 10 mg tablet Take 1 Tablet by mouth three (3) times daily as needed for Muscle Spasm(s).  Has not needed 20 Tablet 0    montelukast (SINGULAIR) 10 mg tablet TAKE 1 TABLET EVERY DAY 90 Tablet 1    albuterol-ipratropium (DUO-NEB) 2.5 mg-0.5 mg/3 ml nebu 3 mL by Nebulization route every four (4) hours as needed. fluticasone propionate (FLONASE) 50 mcg/actuation nasal spray 2 Sprays by Both Nostrils route daily. acetaminophen (TYLENOL) 325 mg tablet Take  by mouth every four (4) hours as needed for Pain. albuterol (PROVENTIL HFA, VENTOLIN HFA, PROAIR HFA) 90 mcg/actuation inhaler Take  by inhalation as needed. aspirin delayed-release 81 mg tablet 81 mg.      ferrous sulfate 325 mg (65 mg iron) tablet TAKE 1 TABLET BY MOUTH ONCE DAILY BEFORE BREAKFAST 90 Tablet 1    traZODone (DESYREL) 100 mg tablet Weaning-150 mg at night      escitalopram oxalate (LEXAPRO) 20 mg tablet Take 20 mg by mouth daily. clonazePAM (KLONOPIN) 1 mg tablet Take 1.5 mg by mouth nightly. cholecalciferol, vitamin D3, 50 mcg (2,000 unit) tab Take 2,000 Units by mouth daily. Allergies   Allergen Reactions    Abilify [Aripiprazole] Anxiety     Can not tolerate     Codeine Nausea and Vomiting    Sulfa (Sulfonamide Antibiotics) Hives    Vicodin [Hydrocodone-Acetaminophen] Nausea and Vomiting     Past Medical History:   Diagnosis Date    Anemia NEC     Arthritis     Chronic pain     fybromyalsia    Depression     anxiety, depression, OCD    GERD (gastroesophageal reflux disease)     Hypertension     Hypertension     Ill-defined condition     Fibromyalia gastricparesis    MI (myocardial infarction) ([address]     Musculoskeletal disorder     Sepsis ([address]     SOB (shortness of breath)     Stool color black      Past Surgical History:   Procedure Laterality Date    HX CORONARY STENT PLACEMENT      HX GYN           HX HEENT  2002    wisdom teeth extraction    UPPER GI ENDOSCOPY,BIOPSY  2018         UPPER GI ENDOSCOPY,DIAGNOSIS  2018          Family History   Problem Relation Age of Onset    Hypertension Father     Elevated Lipids Father     Arthritis-rheumatoid Mother         ? Lupus vs RA    Lung Disease Mother     Heart Disease Mother     Cancer Mother         breast in 39y    COPD Mother     Hypertension Mother     Stroke Mother         3-4 strokes    Breast Cancer Mother 50    Diabetes Maternal Grandmother      Social History     Tobacco Use    Smoking status: Former     Packs/day: 0.25     Years: 8.00     Pack years: 2.00     Types: Cigarettes     Quit date: 2022     Years since quittin.5    Smokeless tobacco: Never   Substance Use Topics    Alcohol use: Not Currently     Alcohol/week: 1.0 standard drink     Types: 1 Glasses of wine per week     Comment: Wine with special occassions - not since July        Review of Systems:   12 point review of systems was performed. All negative except for HPI     Objective:   BP 92/62   Pulse 99   Ht 5' 4\" (1.626 m)   Wt 181 lb (82.1 kg)   SpO2 98%   BMI 31.07 kg/m²     Physical Exam:   General:  Alert and oriented, in no acute distress; wearing LifeVest  Head:  Atraumatic, normocephalic  Eyes:  extraocular muscles intact  Neck:  Supple, normal range of motion  Lungs:  Clear to auscultation bilaterally, no wheezes/rales/rhonchi   Cardiovascular:  Regular rate and rhythm, normal S1-S2, no murmurs/rubs/gallops  Abdomen:  Soft, nontender, nondistended, normoactive bowel sounds  Skin:  Intact, no rash  Extremities:, no clubbing, cyanosis, or edema  Musculoskeletal: normal range of motion  Neurological:  Alert and oriented, no focal neurologic deficits  Psychiatric:  Normal mood and affect    Lab Results   Component Value Date/Time    Hemoglobin A1c 6.3 (H) 10/21/2022 06:12 PM    Hemoglobin A1c (POC) 5.2 2023 02:00 PM     EKG 2022: SR, RBBB,  ms    10/21/22    ECHO ADULT COMPLETE 10/22/2022 10/22/2022    Interpretation Summary    Left Ventricle: Severely reduced left ventricular systolic function with a visually estimated EF of 15 - 20%. Left ventricle is mildly dilated. Normal wall thickness.  Moderate hypokinesis of the following segments: basal anterior, basal anterolateral, basal anteroseptal, basal inferior, basal inferolateral, basal inferoseptal, mid anterior, mid anterolateral, mid anteroseptal, mid inferior, mid inferolateral and mid inferoseptal. Severe hypokinesis of the following segments: apical anterior, apical septal, apical inferior and apical lateral. Grade III diastolic dysfunction with increased LAP. Mitral Valve: Mild annular dilation. Moderate regurgitation. Tricuspid Valve: Moderately elevated RVSP. The estimated RVSP is 51 mmHg. Left Atrium: Left atrium is mildly dilated. Pericardium: Trivial pericardial effusion present. No indication of cardiac tamponade. Contrast used: Definity. Signed by: Vee Edmonds MD on 10/22/2022  6:16 PM    10/21/22    CARDIAC PROCEDURE 10/27/2022 10/27/2022    Conclusion  · Severely elevated right and left-sided filling pressures. · Moderate pulmonary hypertension of postcapillary origin. · Moderately to severely reduced cardiac output and index. Signed by: Antonieta Reese MD on 10/27/2022  5:20 PM    10/21/22    NUCLEAR CARDIAC AMYLOID SPECT 11/02/2022 11/2/2022    Narrative  Clinical history: Amyloid, congestive heart failure    Plantar imaging of the chest was performed followed by SPECT imaging in the transverse, sagittal, and coronal planes utilizing  15.5 mCi Tc-99M PYP. Quantitative: The heart to contralateral lung ratio was 1.3. Semiquantitative: Visual comparison of the cardiac uptake with the bone and uptake was performed. The visual score is grade 1. Impression  : PYP scan is equivocal for ATTR cardiac amyloidosis based on the H:CL ratio of  1.3 and semiquantitative score of 1.     Signed by: Aniket López MD on 11/2/2022  3:12 PM    Results for orders placed or performed during the hospital encounter of 12/02/22   EKG, 12 LEAD, INITIAL   Result Value Ref Range    Ventricular Rate 98 BPM    Atrial Rate 98 BPM    P-R Interval 134 ms    QRS Duration 146 ms    Q-T Interval 382 ms    QTC Calculation (Bezet) 487 ms    Calculated P Axis 39 degrees    Calculated R Axis -157 degrees    Calculated T Axis 35 degrees    Diagnosis       Sinus rhythm with premature atrial complexes  Right bundle branch block  Left posterior fascicular block  When compared with ECG of 18-NOV-2022 14:34,  premature atrial complexes are now present  Confirmed by Cheryl Killian ([id number]) on 12/3/2022 5:59:07 PM       *Note: Due to a large number of results and/or encounters for the requested time period, some results have not been displayed. A complete set of results can be found in Results Review. Thank you for involving me in this patient's care and please call with further concerns or questions. ________________________________________  Angy Pham MD, [facility]  Cardiac Electrophysiology  [facility] [place]  [address] [place] [address]                             [phone]     [address] [place] [address].  [address] [place] [address], [address] [facility]  [phone]

## 2023-02-16 NOTE — PROGRESS NOTES
CC: HA, concern for sinus infection    HPI: Pt is a 38 y.o. female who presents for HA, concern for sinus infection. She has been having HA, sinus pressure for the past week. Associated with jaw pain on the L side, congestion. She has been using a nasal spray but it isn't helping. Home COVID test was negative. Of note, she has been following with Cardiology for CHF and is currently wearing a LifeVest. They are trying to determine whether or not she should be on the list for transplant. She is on multiple heart-related medications that lower her BP and today BP is 83/56. She denies dizziness or lightheadedness with standing, but has not taken her metoprolol yet and is not sure if she should take it. She is on the lowest dose of all of these medications. She notes that she tends to get a yeast infection after taking antibiotics. Past Medical History:   Diagnosis Date    Anemia NEC     Arthritis     Chronic pain     fybromyalsia    Depression     anxiety, depression, OCD    GERD (gastroesophageal reflux disease)     Hypertension     Hypertension     Ill-defined condition     Fibromyalia gastricparesis    MI (myocardial infarction) ([address]     Musculoskeletal disorder     Sepsis ([address]     SOB (shortness of breath)     Stool color black        Family History   Problem Relation Age of Onset    Hypertension Father     Elevated Lipids Father     Arthritis-rheumatoid Mother         ? Lupus vs RA    Lung Disease Mother     Heart Disease Mother     Cancer Mother         breast in 39y    COPD Mother     Hypertension Mother     Stroke Mother         3-4 strokes    Breast Cancer Mother 50    Diabetes Maternal Grandmother        Social History     Tobacco Use    Smoking status: Former     Packs/day: 0.25     Years: 8.00     Pack years: 2.00     Types: Cigarettes     Quit date: 2022     Years since quittin.5    Smokeless tobacco: Never   Vaping Use    Vaping Use: Never used   Substance Use Topics    Alcohol use: Not Currently     Alcohol/week: 1.0 standard drink     Types: 1 Glasses of wine per week     Comment: Wine with special occassions - not since July    Drug use: Not Currently     Types: Marijuana     Comment: Haven't had any since 7/24/2022       ROS:  Per HPI    PE:  Visit Vitals  BP (!) 83/56   Pulse 89   Temp 98.5 °F (36.9 °C)   Resp 16   Ht 5' 4\" (1.626 m)   Wt 183 lb (83 kg)   SpO2 100%   BMI 31.41 kg/m²     Gen: Pt sitting in chair, in NAD  Head: Normocephalic, atraumatic  Eyes: Sclera anicteric, EOM grossly intact, PERRL  Ears: L TM dull. R TM pearly with good light reflex b/l  Nose: Normal nasal mucosa  Throat: MMM, normal lips, tongue and gums, +clear drainage in throat. Neck: Supple, no LAD  CVS: Normal S1, S2, no m/r/g  Resp: CTAB, no wheezes or rales  Extrem: Atraumatic, no cyanosis  Pulses: 2+   Skin: Warm, dry  Neuro: Alert, oriented, appropriate      A/P:   Encounter Diagnoses     ICD-10-CM ICD-9-CM   1. Acute non-recurrent maxillary sinusitis  J01.00 461.0   2. Vaginal candidiasis  B37.31 112.1   3. Chronic systolic heart failure (HCC)  I50.22 428.22   4. Hypotension due to drugs  I95.2 458.8     E947.9     1. Acute non-recurrent maxillary sinusitis: Given duration of symptoms and comorbidities, will treat with abx. Advised pt that this may be viral in which case the abx will not help, but the risk of serious infection given her cardiac comorbidities seems higher than the risk of side effects from the antibiotic, so decision made to treat. - doxycycline (MONODOX) 100 mg capsule; Take 1 Capsule by mouth two (2) times a day for 7 days. Dispense: 14 Capsule; Refill: 0    2. Vaginal candidiasis: Will send in rx for clotrimazole cream in case pt develops symptoms of yeast infection. - clotrimazole (GYNE-LOTRIMIN) 2 % vaginal cream; Insert 1 Applicatorful into vagina nightly for 3 days. Dispense: 21 g; Refill: 0    3. Chronic systolic heart failure [facility]): Pt following with Cardiology.     4. Hypotension due to medication: Pt not having symptoms but BP is significantly low. Advised her to reach out to her Cardiologist for their recommendations on which medications to hold and when. RTC prn if symptoms worsen or fail to improve      Discussed diagnoses in detail with patient. Medication risks/benefits/side effects discussed with patient. All of the patient's questions were addressed. The patient understands and agrees with our plan of care. The patient knows to call back if they are unsure of or forget any changes we discussed today or if the symptoms change. The patient received an After-Visit Summary which contains VS, orders, medication list and allergy list. This can be used as a \"mini-medical record\" should they have to seek medical care while out of town. Current Outpatient Medications on File Prior to Visit   Medication Sig Dispense Refill    empagliflozin (JARDIANCE) 10 mg tablet Take 10 mg by mouth daily. metoprolol succinate (TOPROL-XL) 25 mg XL tablet Take 12.5 mg by mouth daily. torsemide (DEMADEX) 20 mg tablet Take 20 mg by mouth two (2) times a day. allopurinoL (ZYLOPRIM) 100 mg tablet Take 0.5 Tablets by mouth daily. 15 Tablet 1    spironolactone (ALDACTONE) 25 mg tablet Take 1 Tablet by mouth daily. 30 Tablet 1    losartan (COZAAR) 25 mg tablet Take 12.5 mg by mouth At bedtime. hydrocortisone (CORTAID) 1 % topical cream Apply  to affected area as needed. ketoconazole (NIZORAL) 2 % shampoo Apply  to affected area. valACYclovir (VALTREX) 500 mg tablet Take 500 mg by mouth as needed. cinacalcet (SENSIPAR) 30 mg tablet Take 1 Tablet by mouth daily (with breakfast) for 90 days. 30 Tablet 2    digoxin (LANOXIN) 0.125 mg tablet Take 1 Tablet by mouth daily. 30 Tablet 2    pantoprazole (PROTONIX) 40 mg tablet TAKE 1 TABLET TWICE DAILY 180 Tablet 1    pramipexole (MIRAPEX) 0.125 mg tablet Take 1 Tablet by mouth nightly.  90 Tablet 1    potassium chloride (KLOR-CON M10) 10 mEq tablet Take 2 Tablets by mouth two (2) times a day. (Patient taking differently: Take 40 mEq by mouth two (2) times a day.) 120 Tablet 1    alcohol swabs (BD Single Use Swabs Regular) padm Check Blood Sugar once daily. Dx. R73.03 100 Pad 1    levocetirizine (XYZAL) 5 mg tablet TAKE 1 TABLET EVERY DAY 90 Tablet 1    milrinone (PRIMACOR) 20 mg/100 mL (200 mcg/mL) infusion 20.975 mcg/min by IntraVENous route continuous. (Patient taking differently: 0.375 mcg/kg/min by IntraVENous route continuous. Dosing weight 85.4 kg) 100 mL 50    LORazepam (ATIVAN) 1 mg tablet Take 1 mg by mouth every eight (8) hours as needed. folic acid (FOLVITE) 1 mg tablet TAKE 1 TABLET EVERY DAY 90 Tablet 1    magnesium oxide (MAG-OX) 400 mg tablet Take 1 Tablet by mouth two (2) times a day. (Patient taking differently: Take 800 mg by mouth two (2) times a day.) 60 Tablet 0    cyclobenzaprine (FLEXERIL) 10 mg tablet Take 1 Tablet by mouth three (3) times daily as needed for Muscle Spasm(s). Has not needed 20 Tablet 0    montelukast (SINGULAIR) 10 mg tablet TAKE 1 TABLET EVERY DAY 90 Tablet 1    albuterol-ipratropium (DUO-NEB) 2.5 mg-0.5 mg/3 ml nebu 3 mL by Nebulization route every four (4) hours as needed. fluticasone propionate (FLONASE) 50 mcg/actuation nasal spray 2 Sprays by Both Nostrils route daily. acetaminophen (TYLENOL) 325 mg tablet Take  by mouth every four (4) hours as needed for Pain. albuterol (PROVENTIL HFA, VENTOLIN HFA, PROAIR HFA) 90 mcg/actuation inhaler Take  by inhalation as needed. aspirin delayed-release 81 mg tablet 81 mg.      ferrous sulfate 325 mg (65 mg iron) tablet TAKE 1 TABLET BY MOUTH ONCE DAILY BEFORE BREAKFAST 90 Tablet 1    traZODone (DESYREL) 100 mg tablet Weaning-150 mg at night      escitalopram oxalate (LEXAPRO) 20 mg tablet Take 20 mg by mouth daily. clonazePAM (KLONOPIN) 1 mg tablet Take 1.5 mg by mouth nightly.       cholecalciferol, vitamin D3, 50 mcg (2,000 unit) tab Take 2,000 Units by mouth daily. No current facility-administered medications on file prior to visit.

## 2023-02-17 ENCOUNTER — TELEPHONE (OUTPATIENT)
Dept: CARDIOLOGY CLINIC | Age: 43
End: 2023-02-17

## 2023-02-17 NOTE — TELEPHONE ENCOUNTER
Called [facility]cript and spoke with pharmacist [facility]. Reminded him as requested that patient will need additional unspiked bag of milrinone for upcoming cardiac MRI on 2/28/23. He stated understanding and will provide additional back with closest fill to that date.  Shae Solis RN

## 2023-02-22 ENCOUNTER — TELEPHONE (OUTPATIENT)
Dept: CARDIOLOGY CLINIC | Age: 43
End: 2023-02-22

## 2023-02-22 NOTE — TELEPHONE ENCOUNTER
Patient called in regarding appt cancellation for todays appt. Patient is unsure if she needs to reschedule appt with us or if [facility] has taken over all care. She is also asking if cardiac MRI that Dr Tank Butcher ordered still needs to be done, patient states she has already been approved for transplant and is on the list so she doesn't know if this is needed. RN called and LM at [facility] adv heart failure, awaiting for return call to clarify. Call returned by [facility] to confirm patient care has been taken over by [facility]. But patient should still have cardiac MRI as she has not yet had one. Results of MRI to be faxed to [facility]. RN spoke to patient to alert of above. Patient to reach out to [facility] to let them know to forward labs to [facility].

## 2023-02-27 ENCOUNTER — OFFICE VISIT (OUTPATIENT)
Dept: FAMILY MEDICINE CLINIC | Age: 43
End: 2023-02-27
Payer: MEDICARE

## 2023-02-27 VITALS
BODY MASS INDEX: 31.48 KG/M2 | TEMPERATURE: 97.3 F | SYSTOLIC BLOOD PRESSURE: 87 MMHG | RESPIRATION RATE: 18 BRPM | HEIGHT: 64 IN | WEIGHT: 184.4 LBS | OXYGEN SATURATION: 97 % | DIASTOLIC BLOOD PRESSURE: 64 MMHG | HEART RATE: 94 BPM

## 2023-02-27 DIAGNOSIS — R73.03 PREDIABETES: Chronic | ICD-10-CM

## 2023-02-27 DIAGNOSIS — M79.7 FIBROMYALGIA: Chronic | ICD-10-CM

## 2023-02-27 DIAGNOSIS — F32.A DEPRESSION, UNSPECIFIED DEPRESSION TYPE: Chronic | ICD-10-CM

## 2023-02-27 DIAGNOSIS — K21.9 GASTROESOPHAGEAL REFLUX DISEASE WITHOUT ESOPHAGITIS: Chronic | ICD-10-CM

## 2023-02-27 DIAGNOSIS — I50.22 CHRONIC SYSTOLIC HEART FAILURE (HCC): ICD-10-CM

## 2023-02-27 DIAGNOSIS — E78.00 PURE HYPERCHOLESTEROLEMIA: Chronic | ICD-10-CM

## 2023-02-27 DIAGNOSIS — I25.10 CORONARY ARTERY DISEASE INVOLVING NATIVE CORONARY ARTERY OF NATIVE HEART WITHOUT ANGINA PECTORIS: Chronic | ICD-10-CM

## 2023-02-27 DIAGNOSIS — I25.5 ISCHEMIC CARDIOMYOPATHY: ICD-10-CM

## 2023-02-27 DIAGNOSIS — Z00.00 MEDICARE ANNUAL WELLNESS VISIT, SUBSEQUENT: Primary | ICD-10-CM

## 2023-02-27 PROBLEM — I50.9 ACUTE EXACERBATION OF CHF (CONGESTIVE HEART FAILURE) (HCC): Status: RESOLVED | Noted: 2022-08-13 | Resolved: 2023-02-27

## 2023-02-27 PROCEDURE — G8417 CALC BMI ABV UP PARAM F/U: HCPCS | Performed by: FAMILY MEDICINE

## 2023-02-27 PROCEDURE — G8427 DOCREV CUR MEDS BY ELIG CLIN: HCPCS | Performed by: FAMILY MEDICINE

## 2023-02-27 PROCEDURE — G0439 PPPS, SUBSEQ VISIT: HCPCS | Performed by: FAMILY MEDICINE

## 2023-02-27 PROCEDURE — 99212 OFFICE O/P EST SF 10 MIN: CPT | Performed by: FAMILY MEDICINE

## 2023-02-27 PROCEDURE — G9717 DOC PT DX DEP/BP F/U NT REQ: HCPCS | Performed by: FAMILY MEDICINE

## 2023-02-27 NOTE — PROGRESS NOTES
1. Have you been to the ER, urgent care clinic since your last visit? Hospitalized since your last visit? No    2. Have you seen or consulted any other health care providers outside of the [address] [place] [address] since your last visit? Including any pap smears or colon screening.  Yes   [facility]      Health Maintenance Due   Topic Date Due    Pneumococcal 0-64 years (1 - PCV) Never done    COVID-19 Vaccine (4 - Booster for Moderna series) 02/22/2022    Medicare Yearly Exam  12/03/2022

## 2023-02-27 NOTE — PROGRESS NOTES
[facility]    History of Present Illness:   Ryan Hermosillo is a 38 y.o. female with history of  CHF, CAD, GERD, Arthritis, Fibromyalgia, HLD, Anxiety, HSV, OCD, Depression, Obesity  CC: Follow up  History provided by patient and Records    HPI:  Coronary Artery Disease Follow up: Today patient reports he is feeling well and overall his symptoms are controlled on his current medications. she  has had a history of acute myocardial infarction in the past.  Prior management has included:   - Coronary stenting: YES  - Coronary bypass: NO    she has had CHF   10/21/22    ECHO ADULT COMPLETE 10/22/2022 10/22/2022    Interpretation Summary    Left Ventricle: Severely reduced left ventricular systolic function with a visually estimated EF of 15 - 20%. Left ventricle is mildly dilated. Normal wall thickness. Moderate hypokinesis of the following segments: basal anterior, basal anterolateral, basal anteroseptal, basal inferior, basal inferolateral, basal inferoseptal, mid anterior, mid anterolateral, mid anteroseptal, mid inferior, mid inferolateral and mid inferoseptal. Severe hypokinesis of the following segments: apical anterior, apical septal, apical inferior and apical lateral. Grade III diastolic dysfunction with increased LAP. Mitral Valve: Mild annular dilation. Moderate regurgitation. Tricuspid Valve: Moderately elevated RVSP. The estimated RVSP is 51 mmHg. Left Atrium: Left atrium is mildly dilated. Pericardium: Trivial pericardial effusion present. No indication of cardiac tamponade. Contrast used: Definity. Signed by: José Miguel Tripathi MD on 10/22/2022  6:16 PM     Key CAD CHF Meds               metoprolol succinate (TOPROL-XL) 25 mg XL tablet (Taking) Take 12.5 mg by mouth daily. torsemide (DEMADEX) 20 mg tablet (Taking) Take 20 mg by mouth two (2) times a day. spironolactone (ALDACTONE) 25 mg tablet (Taking) Take 1 Tablet by mouth daily.     losartan (COZAAR) 25 mg tablet (Taking) Take 12.5 mg by mouth At bedtime. digoxin (LANOXIN) 0.125 mg tablet (Taking) Take 1 Tablet by mouth daily. milrinone (PRIMACOR) 20 mg/100 mL (200 mcg/mL) infusion (Taking) 20.975 mcg/min by IntraVENous route continuous. aspirin delayed-release 81 mg tablet (Taking) 81 mg.           she is not on a statin. she is not on antiplatelet therapy. she is on a beta blocker. she is not on a regular Nitrate therapy. she does not use Nitroglycerin as needed for chest pain. Residual symptoms of CAD include dyspnea, exertional chest pressure/discomfort. Patient denies any current chest pain, lower extremity edema. Risk factors are controlled or at goal.  Primary risk factors include hypertension and known history of coronary artery disease. Gastroesophageal Reflux:  Current control of Symptoms: Controlled  Primary symptoms: heartburn  Hiatal Hernia: No  Current Medications: Protonix  The patient has no history melena or bright red blood in the stools. The patient avoids high dose aspirin and NSAID therapy. The patient is aware of diet changes needed, elevating the head of the bed and appropriate use of antacids. Health Maintenance  Health Maintenance Due   Topic Date Due    Pneumococcal 0-64 years (1 - PCV) Never done    COVID-19 Vaccine (4 - Booster for Garth Early series) 02/22/2022    Medicare Yearly Exam  12/03/2022       Past Medical, Family, and Social History:     Current Outpatient Medications on File Prior to Visit   Medication Sig Dispense Refill    empagliflozin (JARDIANCE) 10 mg tablet Take 10 mg by mouth daily. metoprolol succinate (TOPROL-XL) 25 mg XL tablet Take 12.5 mg by mouth daily. torsemide (DEMADEX) 20 mg tablet Take 20 mg by mouth two (2) times a day. allopurinoL (ZYLOPRIM) 100 mg tablet Take 0.5 Tablets by mouth daily. 15 Tablet 1    spironolactone (ALDACTONE) 25 mg tablet Take 1 Tablet by mouth daily.  30 Tablet 1    losartan (COZAAR) 25 mg tablet Take 12.5 mg by mouth At bedtime. hydrocortisone (CORTAID) 1 % topical cream Apply  to affected area as needed. ketoconazole (NIZORAL) 2 % shampoo Apply  to affected area. valACYclovir (VALTREX) 500 mg tablet Take 500 mg by mouth as needed. cinacalcet (SENSIPAR) 30 mg tablet Take 1 Tablet by mouth daily (with breakfast) for 90 days. 30 Tablet 2    digoxin (LANOXIN) 0.125 mg tablet Take 1 Tablet by mouth daily. 30 Tablet 2    pantoprazole (PROTONIX) 40 mg tablet TAKE 1 TABLET TWICE DAILY 180 Tablet 1    pramipexole (MIRAPEX) 0.125 mg tablet Take 1 Tablet by mouth nightly. 90 Tablet 1    potassium chloride (KLOR-CON M10) 10 mEq tablet Take 2 Tablets by mouth two (2) times a day. (Patient taking differently: Take 40 mEq by mouth two (2) times a day.) 120 Tablet 1    levocetirizine (XYZAL) 5 mg tablet TAKE 1 TABLET EVERY DAY 90 Tablet 1    milrinone (PRIMACOR) 20 mg/100 mL (200 mcg/mL) infusion 20.975 mcg/min by IntraVENous route continuous. (Patient taking differently: 0.375 mcg/kg/min by IntraVENous route continuous. Dosing weight 85.4 kg) 100 mL 50    LORazepam (ATIVAN) 1 mg tablet Take 1 mg by mouth every eight (8) hours as needed. folic acid (FOLVITE) 1 mg tablet TAKE 1 TABLET EVERY DAY 90 Tablet 1    magnesium oxide (MAG-OX) 400 mg tablet Take 1 Tablet by mouth two (2) times a day. (Patient taking differently: Take 800 mg by mouth two (2) times a day.) 60 Tablet 0    cyclobenzaprine (FLEXERIL) 10 mg tablet Take 1 Tablet by mouth three (3) times daily as needed for Muscle Spasm(s). Has not needed 20 Tablet 0    montelukast (SINGULAIR) 10 mg tablet TAKE 1 TABLET EVERY DAY 90 Tablet 1    albuterol-ipratropium (DUO-NEB) 2.5 mg-0.5 mg/3 ml nebu 3 mL by Nebulization route every four (4) hours as needed. fluticasone propionate (FLONASE) 50 mcg/actuation nasal spray 2 Sprays by Both Nostrils route daily.       acetaminophen (TYLENOL) 325 mg tablet Take  by mouth every four (4) hours as needed for Pain. albuterol (PROVENTIL HFA, VENTOLIN HFA, PROAIR HFA) 90 mcg/actuation inhaler Take  by inhalation as needed. aspirin delayed-release 81 mg tablet 81 mg.      ferrous sulfate 325 mg (65 mg iron) tablet TAKE 1 TABLET BY MOUTH ONCE DAILY BEFORE BREAKFAST 90 Tablet 1    traZODone (DESYREL) 100 mg tablet Weaning-150 mg at night      escitalopram oxalate (LEXAPRO) 20 mg tablet Take 20 mg by mouth daily. clonazePAM (KLONOPIN) 1 mg tablet Take 1.5 mg by mouth nightly. cholecalciferol, vitamin D3, 50 mcg (2,000 unit) tab Take 2,000 Units by mouth daily. alcohol swabs (BD Single Use Swabs Regular) padm Check Blood Sugar once daily. Dx. R73.03 100 Pad 1     No current facility-administered medications on file prior to visit. Patient Active Problem List   Diagnosis Code    Depression F32. A    GERD (gastroesophageal reflux disease) K21.9    Anemia, unspecified D64.9    Itch of skin L29.9    Anxiety F41.9    Costochondritis M94.0    HSV (herpes simplex virus) anogenital infection A60.9    OCD (obsessive compulsive disorder) F42.9    Hoarseness R49.0    IFG (impaired fasting glucose) R73.01    Hyperlipidemia E78.5    Thrombosed external hemorrhoid K64.5    Vitamin D deficiency E55.9    Inflammatory arthritis M19.90    Recurrent depression (HCC) F33.9    Mood disorder (HCC) F39    Chronic pain syndrome G89.4    Fibromyalgia M79.7    Panic attack F41.0    Tremor R25.1    Class 2 obesity due to excess calories without serious comorbidity in adult E66.09    Gastroparesis K31.84    Plantar fasciitis of left foot M72.2    Positive KITTY (antinuclear antibody) R76.8    Severe obesity (HCC) E66.01    Neuropathy G62.9    Coronary artery disease involving native coronary artery of native heart without angina pectoris I25.10    Chronic systolic heart failure (HCC) I50.22    Fever R50.9    Sjogren's syndrome ([facility]) M35.00    Prediabetes R73.03    Ischemic cardiomyopathy I25.5       Social History Socioeconomic History    Marital status: SINGLE   Tobacco Use    Smoking status: Former     Packs/day: 0.25     Years: 8.00     Pack years: 2.00     Types: Cigarettes     Quit date: 2022     Years since quittin.5    Smokeless tobacco: Never   Vaping Use    Vaping Use: Never used   Substance and Sexual Activity    Alcohol use: Not Currently     Alcohol/week: 1.0 standard drink     Types: 1 Glasses of wine per week     Comment: Wine with special occassions - not since July    Drug use: Not Currently     Types: Marijuana     Comment: Haven't had any since 2022    Sexual activity: Yes     Partners: Male     Birth control/protection: None     Social Determinants of Health     Financial Resource Strain: High Risk    Difficulty of Paying Living Expenses: Very hard   Food Insecurity: No Food Insecurity    Worried About Running Out of Food in the Last Year: Never true    Ran Out of Food in the Last Year: Never true   Transportation Needs: No Transportation Needs    Lack of Transportation (Medical): No    Lack of Transportation (Non-Medical): No   Physical Activity: Inactive    Days of Exercise per Week: 0 days    Minutes of Exercise per Session: 0 min   Stress: Stress Concern Present    Feeling of Stress : To some extent   Social Connections: Socially Isolated    Frequency of Communication with Friends and Family: Twice a week    Frequency of Social Gatherings with Friends and Family: Never    Attends Hoahaoism Services: Never    Active Member of Clubs or Organizations: No    Attends Club or Organization Meetings: Never    Marital Status: Never    Housing Stability: Low Risk     Unable to Pay for Housing in the Last Year: No    Number of [place] in the Last Year: 1    Unstable Housing in the Last Year: No        Review of Systems   Review of Systems   Constitutional:  Negative for chills and fever. Cardiovascular:  Negative for chest pain. Gastrointestinal:  Negative for nausea and vomiting. Neurological:  Negative for dizziness, tingling and headaches. Objective:   Visit Vitals  BP (!) 87/64 (BP 1 Location: Left arm, BP Patient Position: Sitting, BP Cuff Size: Large adult)   Pulse 94   Temp 97.3 °F (36.3 °C) (Oral)   Resp 18   Ht 5' 4\" (1.626 m)   Wt 184 lb 6.4 oz (83.6 kg)   SpO2 97%   BMI 31.65 kg/m²        Physical Exam  Vitals and nursing note reviewed. Constitutional:       Appearance: Normal appearance. HENT:      Head: Normocephalic and atraumatic. Cardiovascular:      Rate and Rhythm: Normal rate and regular rhythm. Pulses: Normal pulses. Heart sounds: Normal heart sounds. No murmur heard. No friction rub. No gallop. Pulmonary:      Effort: Pulmonary effort is normal.      Breath sounds: Normal breath sounds. Abdominal:      General: Abdomen is flat. Bowel sounds are normal.      Palpations: Abdomen is soft. Musculoskeletal:      Cervical back: Normal range of motion and neck supple. Skin:     General: Skin is warm and dry. Neurological:      Mental Status: She is alert. Pertinent Labs/Studies:      Assessment and orders:       ICD-10-CM ICD-9-CM    1. Coronary artery disease involving native coronary artery of native heart without angina pectoris  I25.10 414.01       2. Chronic systolic heart failure (HCC)  I50.22 428.22       3. Gastroesophageal reflux disease without esophagitis  K21.9 530.81       4. Pure hypercholesterolemia  E78.00 272.0       5. Fibromyalgia  M79.7 729.1       6. Prediabetes  R73.03 790.29       7. Depression, unspecified depression type  F32. A 311       8. Ischemic cardiomyopathy  I25.5 414.8         Diagnoses and all orders for this visit:    1. Coronary artery disease involving native coronary artery of native heart without angina pectoris: Follow ing with [facility], Awaiting heart transplant    2. Chronic systolic heart failure ([address]    3. Gastroesophageal reflux disease without esophagitis    4.  Pure hypercholesterolemia: The patient is aware of our goal to reduce or eliminate the long term problems (such as strokes and heart attacks) related to poorly controlled Triglycerides, LDL, Cholesterol. 5. Fibromyalgia: Multiple pain issues    6. Prediabetes: Continue Jardiance    7. Depression, unspecified depression type: Seeing psychiatrist.    8. Ischemic cardiomyopathy    Follow-up and Dispositions    Return in about 3 months (around 5/27/2023). I have discussed the diagnosis with the patient and the intended plan as seen in the above orders. Social history, medical history, and labs were reviewed. The patient has received an after-visit summary and questions were answered concerning future plans. I have discussed medication side effects and warnings with the patient as well.     MD URBAN Velazquez & MARSHAL CARMEN [facility]  02/27/23

## 2023-02-27 NOTE — PROGRESS NOTES
This is the Subsequent Medicare Annual Wellness Exam, performed 12 months or more after the Initial AWV or the last Subsequent AWV    I have reviewed the patient's medical history in detail and updated the computerized patient record. History     Past Medical History:   Diagnosis Date    Anemia NEC     Arthritis     Chronic pain     fybromyalsia    Depression     anxiety, depression, OCD    GERD (gastroesophageal reflux disease)     Hypertension     Hypertension     Ill-defined condition     Fibromyalia gastricparesis    MI (myocardial infarction) ([facility] [address]     Musculoskeletal disorder     Sepsis ([facility] [address]     SOB (shortness of breath)     Stool color black       Past Surgical History:   Procedure Laterality Date    HX CORONARY STENT PLACEMENT      HX GYN           HX HEENT      wisdom teeth extraction    UPPER GI ENDOSCOPY,BIOPSY  2018         UPPER GI ENDOSCOPY,DIAGNOSIS  2018          Allergies   Allergen Reactions    Abilify [Aripiprazole] Anxiety     Can not tolerate     Augmentin [Amoxicillin-Pot Clavulanate] Diarrhea    Codeine Nausea and Vomiting    Sulfa (Sulfonamide Antibiotics) Hives    Vicodin [Hydrocodone-Acetaminophen] Nausea and Vomiting     Family History   Problem Relation Age of Onset    Hypertension Father     Elevated Lipids Father     Arthritis-rheumatoid Mother         ? Lupus vs RA    Lung Disease Mother     Heart Disease Mother     Cancer Mother         breast in 39y    COPD Mother     Hypertension Mother     Stroke Mother         3-4 strokes    Breast Cancer Mother 50    Diabetes Maternal Grandmother      Social History     Tobacco Use    Smoking status: Former     Packs/day: 0.25     Years: 8.00     Pack years: 2.00     Types: Cigarettes     Quit date: 2022     Years since quittin.5    Smokeless tobacco: Never   Substance Use Topics    Alcohol use: Not Currently     Alcohol/week: 1.0 standard drink     Types: 1 Glasses of wine per week     Comment: Wine with special occassions - not since July     Depression Risk Factor Screening:     3 most recent PHQ Screens 2/27/2023   PHQ Not Done -   Little interest or pleasure in doing things Several days   Feeling down, depressed, irritable, or hopeless Several days   Total Score PHQ 2 2   Trouble falling or staying asleep, or sleeping too much -   Feeling tired or having little energy -   Poor appetite, weight loss, or overeating -   Feeling bad about yourself - or that you are a failure or have let yourself or your family down -   Trouble concentrating on things such as school, work, reading, or watching TV -   Moving or speaking so slowly that other people could have noticed; or the opposite being so fidgety that others notice -   Thoughts of being better off dead, or hurting yourself in some way -   PHQ 9 Score -   How difficult have these problems made it for you to do your work, take care of your home and get along with others -     Alcohol Risk Factor Screening:    Do you average more than 1 drink per night or more than 7 drinks a week: No    In the past three months have you have had more than 4 drinks containing alcohol on one occasion: No  Functional Ability and Level of Safety:   Hearing Loss  Hearing is good. Activities of Daily Living  The home contains: handrails  Patient needs help with:  shopping and housework  No flowsheet data found. Ambulation: with mild difficulty    Fall Risk  Fall Risk Assessment, last 12 mths 12/21/2020   Able to walk? Yes   Fall in past 12 months? No   Number of falls in past 12 months -   Fall with injury? -       Abuse Screen  Abuse Screening Questionnaire 8/25/2022   Do you ever feel afraid of your partner? N   Are you in a relationship with someone who physically or mentally threatens you? N   Is it safe for you to go home?  Y     Cognitive Screening   Evaluation of Cognitive Function:  Has your family/caregiver stated any concerns about your memory: no  Normal, MMSE  No flowsheet data found. Patient Care Team   Patient Care Team:  Geraldine Olson MD as PCP - General (Family Medicine)  Geraldine Olson MD as PCP - [facility]  Abhinav Schwab MD (Surgery General)  Lilliam Ro MD (Cardiovascular Disease Physician)  Lucinda Mares MD (Otolaryngology)  Elmer Pena MD (Internal Medicine Physician)  Diane Blackburn MD (Female Pelvic Medicine and Reconstructive Surgery)  Diamond Longoria MD (Neurology)  Dominique Araujo MD (Psychiatry)  Juan Carlos Cooper as Ambulatory Care Manager  Assessment/Plan   Education and counseling provided:  Are appropriate based on today's review and evaluation    Diagnoses and all orders for this visit:    1. Medicare annual wellness visit, subsequent    2. Coronary artery disease involving native coronary artery of native heart without angina pectoris    3. Chronic systolic heart failure ([facility] [address]    4. Gastroesophageal reflux disease without esophagitis    5. Pure hypercholesterolemia    6. Fibromyalgia    7. Prediabetes    8. Depression, unspecified depression type    9.  Ischemic cardiomyopathy      Health Maintenance Topics with due status: Overdue       Topic Date Due    Pneumococcal 0-64 years Never done    COVID-19 Vaccine 02/22/2022     Health Maintenance Topics with due status: Not Due       Topic Last Completion Date    DTaP/Tdap/Td series 01/25/2014    Cervical cancer screen 04/16/2021    Lipid Screen 10/22/2022    Depression Monitoring 01/04/2023    A1C test (Diabetic or Prediabetic) 02/09/2023    Breast Cancer Screen Mammogram 02/10/2023    Medicare Yearly Exam 02/27/2023     Health Maintenance Topics with due status: Completed       Topic Last Completion Date    Hepatitis C Screening 10/24/2022    Flu Vaccine 01/05/2023

## 2023-02-28 ENCOUNTER — HOSPITAL ENCOUNTER (OUTPATIENT)
Dept: MRI IMAGING | Age: 43
Discharge: HOME | End: 2023-02-28
Attending: INTERNAL MEDICINE
Payer: MEDICARE

## 2023-02-28 ENCOUNTER — PATIENT OUTREACH (OUTPATIENT)
Dept: CASE MANAGEMENT | Age: 43
End: 2023-02-28

## 2023-02-28 DIAGNOSIS — R06.02 SHORTNESS OF BREATH: ICD-10-CM

## 2023-02-28 DIAGNOSIS — I50.22 CHRONIC SYSTOLIC HEART FAILURE (HCC): ICD-10-CM

## 2023-02-28 PROCEDURE — A9576 INJ PROHANCE MULTIPACK: HCPCS | Performed by: INTERNAL MEDICINE

## 2023-02-28 PROCEDURE — 75561 CARDIAC MRI FOR MORPH W/DYE: CPT

## 2023-02-28 PROCEDURE — 75561 CARDIAC MRI FOR MORPH W/DYE: CPT | Performed by: INTERNAL MEDICINE

## 2023-02-28 PROCEDURE — 74011250636 HC RX REV CODE- 250/636: Performed by: INTERNAL MEDICINE

## 2023-02-28 RX ADMIN — GADOTERIDOL 25 ML: 279.3 INJECTION, SOLUTION INTRAVENOUS at 11:57

## 2023-02-28 NOTE — PROGRESS NOTES
Ambulatory Care Management Note    Date/Time:  2/28/2023 3:50 PM    Patient Current Location: [place]    Patient has graduated from the Complex Case Management  program on 02/28/23. Patient/family has the ability to self-manage at this time. Care management goals have been completed. No further Ambulatory Care Manager follow up scheduled. Goals Addressed    None         Patient has Ambulatory Care Manager's contact information for any further questions, concerns, or needs. Patients upcoming visits:  No future appointments.

## 2023-03-02 ENCOUNTER — TELEPHONE (OUTPATIENT)
Dept: CARDIOLOGY CLINIC | Age: 43
End: 2023-03-02

## 2023-03-02 NOTE — TELEPHONE ENCOUNTER
LM for [facility] heart failure to request letter from [facility] team stating patient care has been transferred. RN also faxing over MRI results to [facility] at [phone]. Spoke with Elodia Bentley at [facility] they have received MRI results, they are not familiar of any letter of transfer of care. But already documented in connect care by [facility] that patient is being fully managed by them.

## 2023-03-31 ENCOUNTER — PATIENT MESSAGE (OUTPATIENT)
Dept: FAMILY MEDICINE CLINIC | Age: 43
End: 2023-03-31

## 2023-03-31 RX ORDER — CYCLOBENZAPRINE HCL 10 MG
10 TABLET ORAL
Qty: 60 TABLET | Refills: 0 | Status: SHIPPED | OUTPATIENT
Start: 2023-03-31

## 2023-03-31 NOTE — TELEPHONE ENCOUNTER
From: Fela Romero  To: Len Montes MD  Sent: 3/31/2023 6:53 AM EDT  Subject: Aminta Russell I needed to know are you able to prescribe me the muscle relaxers I was on I think it is cyclobenzaprine . I having a flare up with the sciatica I have it's going down the legs. It's a lot going on but I'm hanging in here the best I can having issues with my piccline just a new one put in but it not working properly that to go and get it looked at.  I will be getting a defibrillator put in soon so I can get rid of this terrible vest . I'm taking it day by day

## 2023-04-25 ENCOUNTER — PATIENT MESSAGE (OUTPATIENT)
Dept: FAMILY MEDICINE CLINIC | Age: 43
End: 2023-04-25

## 2023-04-25 DIAGNOSIS — K64.5 THROMBOSED EXTERNAL HEMORRHOID: Primary | ICD-10-CM

## 2023-04-27 ENCOUNTER — OFFICE VISIT (OUTPATIENT)
Dept: FAMILY MEDICINE CLINIC | Age: 43
End: 2023-04-27
Payer: MEDICARE

## 2023-04-27 VITALS
HEIGHT: 64 IN | RESPIRATION RATE: 20 BRPM | DIASTOLIC BLOOD PRESSURE: 61 MMHG | OXYGEN SATURATION: 95 % | TEMPERATURE: 97.7 F | BODY MASS INDEX: 29.19 KG/M2 | WEIGHT: 171 LBS | SYSTOLIC BLOOD PRESSURE: 105 MMHG | HEART RATE: 82 BPM

## 2023-04-27 DIAGNOSIS — I25.5 ISCHEMIC CARDIOMYOPATHY: ICD-10-CM

## 2023-04-27 DIAGNOSIS — K64.5 THROMBOSED EXTERNAL HEMORRHOID: Primary | ICD-10-CM

## 2023-04-27 PROCEDURE — G8427 DOCREV CUR MEDS BY ELIG CLIN: HCPCS | Performed by: FAMILY MEDICINE

## 2023-04-27 PROCEDURE — G9717 DOC PT DX DEP/BP F/U NT REQ: HCPCS | Performed by: FAMILY MEDICINE

## 2023-04-27 PROCEDURE — G8417 CALC BMI ABV UP PARAM F/U: HCPCS | Performed by: FAMILY MEDICINE

## 2023-04-27 PROCEDURE — 99214 OFFICE O/P EST MOD 30 MIN: CPT | Performed by: FAMILY MEDICINE

## 2023-04-27 RX ORDER — TACROLIMUS 1 MG/1
7 CAPSULE ORAL 2 TIMES DAILY
Qty: 420 CAPSULE | Refills: 11 | COMMUNITY
Start: 2023-04-27 | End: 2024-04-26

## 2023-04-27 RX ORDER — ATOVAQUONE 750 MG/5ML
1500 SUSPENSION ORAL DAILY
Qty: 300 ML | Refills: 5 | COMMUNITY
Start: 2023-04-27 | End: 2023-10-24

## 2023-04-27 RX ORDER — VALGANCICLOVIR 450 MG/1
900 TABLET, FILM COATED ORAL DAILY
Qty: 60 TABLET | Refills: 5 | COMMUNITY
Start: 2023-04-27 | End: 2023-10-17

## 2023-04-27 RX ORDER — LIDOCAINE HYDROCHLORIDE AND HYDROCORTISONE ACETATE 30; 5 MG/G; MG/G
CREAM RECTAL 2 TIMES DAILY
Qty: 60 G | Refills: 1 | Status: SHIPPED | OUTPATIENT
Start: 2023-04-27

## 2023-04-27 RX ORDER — OXYCODONE HYDROCHLORIDE 10 MG/1
10 TABLET ORAL
Qty: 21 TABLET | Refills: 0 | Status: SHIPPED | OUTPATIENT
Start: 2023-04-27 | End: 2023-05-04

## 2023-04-27 RX ORDER — OXYCODONE HYDROCHLORIDE 5 MG/1
5 TABLET ORAL
COMMUNITY
Start: 2023-04-21 | End: 2023-04-27 | Stop reason: DRUGHIGH

## 2023-04-27 RX ORDER — CHOLECALCIFEROL (VITAMIN D3) 125 MCG
5 CAPSULE ORAL AT BEDTIME
Qty: 30 TABLET | Refills: 11 | COMMUNITY
Start: 2023-04-27 | End: 2024-04-26

## 2023-04-27 RX ORDER — ONDANSETRON 4 MG/1
4 TABLET, ORALLY DISINTEGRATING ORAL
COMMUNITY
Start: 2023-04-21 | End: 2023-05-21

## 2023-04-27 RX ORDER — MYCOPHENOLATE MOFETIL 500 MG/1
1000 TABLET ORAL 2 TIMES DAILY
Qty: 120 TABLET | Refills: 11 | COMMUNITY
Start: 2023-04-27 | End: 2024-04-26

## 2023-04-27 RX ORDER — SENNOSIDES 8.6 MG/1
17.2 TABLET ORAL
COMMUNITY
Start: 2023-04-27 | End: 2024-04-26

## 2023-04-27 RX ORDER — NALOXONE HYDROCHLORIDE 4 MG/.1ML
SPRAY NASAL
COMMUNITY
Start: 2023-04-21

## 2023-04-27 NOTE — PROGRESS NOTES
1. \"Have you been to the ER, urgent care clinic since your last visit? Hospitalized since your last visit? \" Yes When: [facility]     2. \"Have you seen or consulted any other health care providers outside of the [address] [place] [address] since your last visit? \" Yes When: [facility] heart transplant       3. For patients aged 39-70: Has the patient had a colonoscopy / FIT/ Cologuard? NA - based on age      If the patient is female:    4. For patients aged 41-77: Has the patient had a mammogram within the past 2 years? NA - based on age or sex      11. For patients aged 21-65: Has the patient had a pap smear?  Yes - no Care Gap present    Health Maintenance Due   Topic Date Due    Varicella Vaccine (1 of 2 - 2-dose childhood series) Never done    Pneumococcal 0-64 years (1 - PCV) Never done    COVID-19 Vaccine (4 - Booster for Moderna series) 02/22/2022

## 2023-04-28 RX ORDER — HYDROCORTISONE 25 MG/G
CREAM TOPICAL 2 TIMES DAILY
Qty: 453.6 G | Refills: 1 | Status: SHIPPED | OUTPATIENT
Start: 2023-04-28

## 2023-04-28 NOTE — TELEPHONE ENCOUNTER
From: Sandra Madera  To: Kat Boogie MD  Sent: 4/25/2023 5:13 PM EDT  Subject: I got a [place] Dr. Alfredo Doan I don't know if you know but I was blessed with a heart on April the 4th . I'm doing really well the heart is doing so good. I got home Tuesday the 18th. I'm just really, really sore but I'm getting stronger everyday. I just wanted to make sure you new the terrific news.

## 2023-05-10 RX ORDER — MONTELUKAST SODIUM 10 MG/1
TABLET ORAL
Qty: 90 TABLET | Refills: 1 | Status: SHIPPED | OUTPATIENT
Start: 2023-05-10

## 2023-05-10 RX ORDER — KETOCONAZOLE 20 MG/ML
SHAMPOO TOPICAL
Qty: 120 ML | Refills: 1 | Status: SHIPPED | OUTPATIENT
Start: 2023-05-10

## 2023-05-11 ENCOUNTER — PATIENT MESSAGE (OUTPATIENT)
Facility: CLINIC | Age: 43
End: 2023-05-11

## 2023-05-12 NOTE — TELEPHONE ENCOUNTER
From: Andrey Sapp  To: Dr. Krissy Murillo  Sent: 5/11/2023 11:13 AM EDT  Subject: Pain    Hi Dr. Tiffanie Browne I have been doing so rough with recover my pain meds aren't doing nothing I'm in pain not stop no relief I am losing all of my mind crying everyday all day what can I do I not getting help . I can't be in pain 24/7 I won't make it . I did get that expensive cream it is working little by little so that's better but that still hurts what would you recommend I had my chest sawed open and opened up twice I'm hurting.  please help me.  I will be meeting with a counselor next Friday because I'm falling all the way apart right now

## 2023-05-31 ENCOUNTER — TELEPHONE (OUTPATIENT)
Facility: CLINIC | Age: 43
End: 2023-05-31

## 2023-06-23 ENCOUNTER — PATIENT MESSAGE (OUTPATIENT)
Facility: CLINIC | Age: 43
End: 2023-06-23

## 2023-06-23 DIAGNOSIS — T50.905A HYPERGLYCEMIA, DRUG-INDUCED: ICD-10-CM

## 2023-06-23 DIAGNOSIS — R73.09 ABNORMAL BLOOD SUGAR: Primary | ICD-10-CM

## 2023-06-23 DIAGNOSIS — R73.9 HYPERGLYCEMIA, DRUG-INDUCED: ICD-10-CM

## 2023-06-27 RX ORDER — BLOOD SUGAR DIAGNOSTIC
STRIP MISCELLANEOUS
Qty: 100 EACH | Refills: 3 | Status: SHIPPED | OUTPATIENT
Start: 2023-06-27 | End: 2023-06-28

## 2023-06-27 RX ORDER — LANCETS
EACH MISCELLANEOUS
Qty: 200 EACH | Refills: 3 | Status: SHIPPED | OUTPATIENT
Start: 2023-06-27 | End: 2023-06-28

## 2023-06-27 RX ORDER — UBIQUINOL 100 MG
CAPSULE ORAL
Qty: 200 EACH | Refills: 3 | Status: SHIPPED | OUTPATIENT
Start: 2023-06-27 | End: 2023-06-28

## 2023-06-28 RX ORDER — BLOOD SUGAR DIAGNOSTIC
STRIP MISCELLANEOUS
Qty: 300 EACH | Refills: 3 | Status: SHIPPED | OUTPATIENT
Start: 2023-06-28

## 2023-06-28 RX ORDER — UBIQUINOL 100 MG
CAPSULE ORAL
Qty: 300 EACH | Refills: 3 | Status: SHIPPED | OUTPATIENT
Start: 2023-06-28

## 2023-06-28 RX ORDER — LANCETS
EACH MISCELLANEOUS
Qty: 300 EACH | Refills: 3 | Status: SHIPPED | OUTPATIENT
Start: 2023-06-28

## 2023-08-02 ENCOUNTER — TELEPHONE (OUTPATIENT)
Facility: CLINIC | Age: 43
End: 2023-08-02

## 2023-08-02 DIAGNOSIS — R30.0 DYSURIA: Primary | ICD-10-CM

## 2023-08-02 NOTE — TELEPHONE ENCOUNTER
Pt thinks she has a UTI. Pt states she can not be on antibiotics due to her recent heart transplant. Pt would like to have Dr Justin Colon put in a urine lab for her to check for a UTI. Please advise.

## 2023-08-02 NOTE — TELEPHONE ENCOUNTER
Labs Ordered.     MD JULIAN Neil & JERSEY PUGH [facility]  08/02/23 WDL

## 2023-08-03 ENCOUNTER — NURSE ONLY (OUTPATIENT)
Facility: CLINIC | Age: 43
End: 2023-08-03

## 2023-08-03 DIAGNOSIS — R30.0 DYSURIA: ICD-10-CM

## 2023-08-04 LAB
APPEARANCE UR: ABNORMAL
BACTERIA URNS QL MICRO: ABNORMAL /HPF
BILIRUB UR QL: NEGATIVE
COLOR UR: ABNORMAL
EPITH CASTS URNS QL MICRO: ABNORMAL /LPF
GLUCOSE UR STRIP.AUTO-MCNC: NEGATIVE MG/DL
HGB UR QL STRIP: ABNORMAL
HYALINE CASTS URNS QL MICRO: >20 /LPF (ref 0–5)
KETONES UR QL STRIP.AUTO: NEGATIVE MG/DL
LEUKOCYTE ESTERASE UR QL STRIP.AUTO: ABNORMAL
NITRITE UR QL STRIP.AUTO: POSITIVE
PH UR STRIP: 5 (ref 5–8)
PROT UR STRIP-MCNC: 30 MG/DL
RBC #/AREA URNS HPF: ABNORMAL /HPF (ref 0–5)
SP GR UR REFRACTOMETRY: 1.03 (ref 1–1.03)
UROBILINOGEN UR QL STRIP.AUTO: 0.2 EU/DL (ref 0.2–1)
WBC URNS QL MICRO: >100 /HPF (ref 0–4)

## 2023-08-06 LAB
BACTERIA SPEC CULT: ABNORMAL
CC UR VC: ABNORMAL
SERVICE CMNT-IMP: ABNORMAL

## 2023-08-07 ENCOUNTER — TELEPHONE (OUTPATIENT)
Facility: CLINIC | Age: 43
End: 2023-08-07

## 2023-08-07 NOTE — TELEPHONE ENCOUNTER
Called patient, started on Cip[ro for UTI by [facility]. Advised to start Cranberry extract pills daily.      MD JULIAN Zaidi & JERSEY PUGH [facility]  08/07/23

## 2023-08-11 ENCOUNTER — TELEPHONE (OUTPATIENT)
Facility: CLINIC | Age: 43
End: 2023-08-11

## 2023-08-11 NOTE — TELEPHONE ENCOUNTER
----- Message from April Jeanie sent at 8/11/2023  8:28 AM EDT -----  Subject: Appointment Request    Reason for Call: Established Patient Appointment needed: Urgent (Patient   Request) No Script    QUESTIONS    Reason for appointment request? Available appointments did not meet   patient need     Additional Information for Provider? Patient was treated for a UTI by her   cardiologist, and now believes she has a yeast infection, she has white   discharge and the top of her right leg hurts. patient would like to be   seen sooner than the 8/18 first available I offered her, and prefers a   female physician for this.  please call patient back to advise, thank you   ---------------------------------------------------------------------------  --------------  Jose Ferreira  [phone]; OK to leave message on voicemail  ---------------------------------------------------------------------------  --------------  SCRIPT ANSWERS

## 2023-08-14 ENCOUNTER — OFFICE VISIT (OUTPATIENT)
Facility: CLINIC | Age: 43
End: 2023-08-14
Payer: COMMERCIAL

## 2023-08-14 VITALS
DIASTOLIC BLOOD PRESSURE: 73 MMHG | SYSTOLIC BLOOD PRESSURE: 103 MMHG | BODY MASS INDEX: 30.05 KG/M2 | OXYGEN SATURATION: 98 % | WEIGHT: 176 LBS | HEART RATE: 73 BPM | TEMPERATURE: 97.9 F | HEIGHT: 64 IN | RESPIRATION RATE: 20 BRPM

## 2023-08-14 DIAGNOSIS — N76.0 ACUTE VAGINITIS: ICD-10-CM

## 2023-08-14 DIAGNOSIS — R30.0 DYSURIA: Primary | ICD-10-CM

## 2023-08-14 PROBLEM — R50.9 FEVER: Status: RESOLVED | Noted: 2022-12-02 | Resolved: 2023-08-14

## 2023-08-14 PROBLEM — M72.2 PLANTAR FASCIITIS OF LEFT FOOT: Status: RESOLVED | Noted: 2018-11-29 | Resolved: 2023-08-14

## 2023-08-14 LAB
BILIRUBIN, URINE, POC: NORMAL
BLOOD URINE, POC: NEGATIVE
GLUCOSE URINE, POC: NEGATIVE
KETONES, URINE, POC: NEGATIVE
LEUKOCYTE ESTERASE, URINE, POC: NEGATIVE
NITRITE, URINE, POC: NEGATIVE
PH, URINE, POC: 5 (ref 4.6–8)
PROTEIN,URINE, POC: NORMAL
SPECIFIC GRAVITY, URINE, POC: 1.03 (ref 1–1.03)
URINALYSIS CLARITY, POC: NORMAL
URINALYSIS COLOR, POC: YELLOW
UROBILINOGEN, POC: NORMAL

## 2023-08-14 PROCEDURE — 81003 URINALYSIS AUTO W/O SCOPE: CPT | Performed by: FAMILY MEDICINE

## 2023-08-14 PROCEDURE — 99213 OFFICE O/P EST LOW 20 MIN: CPT | Performed by: FAMILY MEDICINE

## 2023-08-14 RX ORDER — ZOLPIDEM TARTRATE 10 MG/1
TABLET ORAL
COMMUNITY
Start: 2023-08-11

## 2023-08-14 RX ORDER — LOPERAMIDE HYDROCHLORIDE 2 MG/1
CAPSULE ORAL
COMMUNITY
Start: 2023-07-17

## 2023-08-14 RX ORDER — LIDOCAINE HYDROCHLORIDE AND HYDROCORTISONE ACETATE 30; 5 MG/G; MG/G
CREAM RECTAL
COMMUNITY
Start: 2023-05-08

## 2023-08-14 RX ORDER — ROSUVASTATIN CALCIUM 20 MG/1
20 TABLET, COATED ORAL DAILY
COMMUNITY
Start: 2023-07-10

## 2023-08-14 RX ORDER — PREDNISONE 5 MG/1
10 TABLET ORAL DAILY
Qty: 60 TABLET | Refills: 11 | COMMUNITY
Start: 2023-07-10 | End: 2024-07-09

## 2023-08-14 RX ORDER — CHOLECALCIFEROL (VITAMIN D3) 125 MCG
5 CAPSULE ORAL NIGHTLY
Qty: 30 TABLET | Refills: 11 | COMMUNITY
Start: 2023-04-27 | End: 2024-04-26

## 2023-08-14 RX ORDER — ATOVAQUONE 750 MG/5ML
SUSPENSION ORAL
COMMUNITY
Start: 2023-07-27

## 2023-08-14 RX ORDER — TACROLIMUS 1 MG/1
7 CAPSULE ORAL 2 TIMES DAILY
Qty: 420 CAPSULE | Refills: 11 | COMMUNITY
Start: 2023-04-27 | End: 2024-04-26

## 2023-08-14 RX ORDER — MYCOPHENOLIC ACID 180 MG/1
540 TABLET, DELAYED RELEASE ORAL 2 TIMES DAILY
Qty: 180 TABLET | Refills: 0 | COMMUNITY
Start: 2023-07-27 | End: 2023-08-26

## 2023-08-14 SDOH — ECONOMIC STABILITY: HOUSING INSECURITY
IN THE LAST 12 MONTHS, WAS THERE A TIME WHEN YOU DID NOT HAVE A STEADY PLACE TO SLEEP OR SLEPT IN A SHELTER (INCLUDING NOW)?: NO

## 2023-08-14 SDOH — ECONOMIC STABILITY: INCOME INSECURITY: HOW HARD IS IT FOR YOU TO PAY FOR THE VERY BASICS LIKE FOOD, HOUSING, MEDICAL CARE, AND HEATING?: VERY HARD

## 2023-08-14 SDOH — ECONOMIC STABILITY: FOOD INSECURITY: WITHIN THE PAST 12 MONTHS, YOU WORRIED THAT YOUR FOOD WOULD RUN OUT BEFORE YOU GOT MONEY TO BUY MORE.: SOMETIMES TRUE

## 2023-08-14 SDOH — ECONOMIC STABILITY: FOOD INSECURITY: WITHIN THE PAST 12 MONTHS, THE FOOD YOU BOUGHT JUST DIDN'T LAST AND YOU DIDN'T HAVE MONEY TO GET MORE.: NEVER TRUE

## 2023-08-14 NOTE — PATIENT INSTRUCTIONS
Food pantry options in area: The number is:  211 or you can also look at this website:  https://www. [employer].org/  The quality of the information on this site will be based on information having been inputted by the local agencies. Here is the link for [employer]:  https://www. [employer]. EL[employer]ZA/   Food distributions are carried out every Saturday from 8 AM to 10:30 AM from their warehouse at [address] [place] [address] and at [employer], [address] [address] [facility]. The eligibility information is on their website. There are also directions on how to contact them if you have any questions. Here is a link for the [facility]:  [email or web address]   This has information about nonprofits in our region that provide services to local citizens with various needs.     HELP - [address] [place] [address], every Friday 11-3 pm (east of [facility])    [employer] Sat 7:30 -10, [address] [facility] [place], 3rd Saturday of the month,  8-10:30 am    [address], [place] 9-11 (for those who live [address] [facility] of [address] [facility])

## 2023-08-14 NOTE — PROGRESS NOTES
[facility]  Chief Complaint   Patient presents with    Vaginal Itching    Vaginal Discharge       History of Present Illness:   Humaira Mckeon is a 38 y.o. female       HPI:  Recently treated with cipro for UTI. Still having vaginal irritation. She has been bathing with water mixed with simply green as well. Heart transplant 4 months ago.      Health Maintenance  Health Maintenance Due   Topic Date Due    Varicella vaccine (1 of 2 - 2-dose childhood series) Never done    Pneumococcal 0-64 years Vaccine (1 - PCV) Never done    Shingles vaccine (1 of 2) Never done    COVID-19 Vaccine (4 - Booster for Moderna series) 2022    Flu vaccine (1) 2023       Past Medical, Family, and Social History:     Past Medical History:   Diagnosis Date    Arthritis     Chronic pain     fybromyalsia    Depression     anxiety, depression, OCD    GERD (gastroesophageal reflux disease)     Hypertension     Hypertension     Ill-defined condition     Fibromyalia gastricparesis    MI (myocardial infarction) ([address])     Musculoskeletal disorder     Sepsis ([address])     SOB (shortness of breath)     Stool color black       Past Surgical History:   Procedure Laterality Date    CORONARY ANGIOPLASTY WITH STENT PLACEMENT      GYN           HEENT      wisdom teeth extraction    UPPER GI ENDOSCOPY,BIOPSY  2018         UPPER GI ENDOSCOPY,DIAGNOSIS  2018          Current Outpatient Medications   Medication Sig Dispense Refill    atovaquone (MEPRON) 750 MG/5ML suspension       loperamide (IMODIUM) 2 MG capsule       lidocaine-Hydrocort 3-0.5 % cream APPLY TO AFFECTED AREA TWICE DAILY      melatonin 5 MG TABS tablet Take 1 tablet by mouth nightly 30 tablet 11    mycophenolate (MYFORTIC) 180 MG DR tablet Take 3 tablets by mouth 2 times daily 180 tablet 0    tacrolimus (PROGRAF) 1 MG capsule Take 7 capsules by mouth 2 times daily 420 capsule 11    rosuvastatin (CRESTOR) 20 MG tablet Take 1 tablet by

## 2023-08-16 RX ORDER — PANTOPRAZOLE SODIUM 40 MG/1
TABLET, DELAYED RELEASE ORAL
Qty: 180 TABLET | Refills: 0 | Status: SHIPPED | OUTPATIENT
Start: 2023-08-16

## 2023-09-05 ENCOUNTER — PATIENT MESSAGE (OUTPATIENT)
Facility: CLINIC | Age: 43
End: 2023-09-05

## 2023-09-05 NOTE — TELEPHONE ENCOUNTER
Pt would like a call back by Dr Ericka Villareal in regards to her urinary issues. Pt also states that her family member is Dr Brandy Skaggs nurse and she does not want her involved in her care. Please advise.

## 2023-09-06 ENCOUNTER — NURSE ONLY (OUTPATIENT)
Facility: CLINIC | Age: 43
End: 2023-09-06

## 2023-09-06 DIAGNOSIS — R30.0 DYSURIA: ICD-10-CM

## 2023-09-07 LAB
APPEARANCE UR: ABNORMAL
BACTERIA URNS QL MICRO: ABNORMAL /HPF
BILIRUB UR QL: NEGATIVE
CAOX CRY URNS QL MICRO: ABNORMAL
COLOR UR: ABNORMAL
EPITH CASTS URNS QL MICRO: ABNORMAL /LPF
GLUCOSE UR STRIP.AUTO-MCNC: NEGATIVE MG/DL
HGB UR QL STRIP: NEGATIVE
KETONES UR QL STRIP.AUTO: NEGATIVE MG/DL
LEUKOCYTE ESTERASE UR QL STRIP.AUTO: NEGATIVE
NITRITE UR QL STRIP.AUTO: NEGATIVE
PH UR STRIP: 5.5 (ref 5–8)
PROT UR STRIP-MCNC: NEGATIVE MG/DL
RBC #/AREA URNS HPF: ABNORMAL /HPF (ref 0–5)
SP GR UR REFRACTOMETRY: 1.03 (ref 1–1.03)
UROBILINOGEN UR QL STRIP.AUTO: 0.2 EU/DL (ref 0.2–1)
WBC URNS QL MICRO: ABNORMAL /HPF (ref 0–4)

## 2023-09-08 ENCOUNTER — HOSPITAL ENCOUNTER (EMERGENCY)
Facility: HOSPITAL | Age: 43
Discharge: HOME | End: 2023-09-08
Attending: FAMILY MEDICINE
Payer: COMMERCIAL

## 2023-09-08 VITALS
WEIGHT: 175 LBS | DIASTOLIC BLOOD PRESSURE: 91 MMHG | SYSTOLIC BLOOD PRESSURE: 125 MMHG | RESPIRATION RATE: 18 BRPM | BODY MASS INDEX: 29.88 KG/M2 | TEMPERATURE: 98.5 F | HEIGHT: 64 IN | HEART RATE: 83 BPM | OXYGEN SATURATION: 100 %

## 2023-09-08 DIAGNOSIS — R35.0 URINARY FREQUENCY: ICD-10-CM

## 2023-09-08 DIAGNOSIS — N30.91 CYSTITIS WITH HEMATURIA: Primary | ICD-10-CM

## 2023-09-08 LAB
APPEARANCE UR: ABNORMAL
BACTERIA SPEC CULT: NORMAL
BACTERIA URNS QL MICRO: ABNORMAL /HPF
BILIRUB UR QL CFM: NEGATIVE
BILIRUB UR QL: ABNORMAL
CC UR VC: NORMAL
COLOR UR: ABNORMAL
EPITH CASTS URNS QL MICRO: ABNORMAL /LPF
GLUCOSE UR STRIP.AUTO-MCNC: NEGATIVE MG/DL
HCG UR QL: NEGATIVE
HGB UR QL STRIP: NEGATIVE
KETONES UR QL STRIP.AUTO: 15 MG/DL
LEUKOCYTE ESTERASE UR QL STRIP.AUTO: ABNORMAL
NITRITE UR QL STRIP.AUTO: NEGATIVE
PH UR STRIP: 5 (ref 5–8)
PROT UR STRIP-MCNC: 30 MG/DL
RBC #/AREA URNS HPF: ABNORMAL /HPF (ref 0–5)
SERVICE CMNT-IMP: NORMAL
SP GR UR REFRACTOMETRY: 1.03 (ref 1–1.03)
URINE CULTURE IF INDICATED: ABNORMAL
UROBILINOGEN UR QL STRIP.AUTO: 0.1 EU/DL (ref 0.2–1)
WBC URNS QL MICRO: ABNORMAL /HPF (ref 0–4)

## 2023-09-08 PROCEDURE — 81025 URINE PREGNANCY TEST: CPT

## 2023-09-08 PROCEDURE — 81001 URINALYSIS AUTO W/SCOPE: CPT

## 2023-09-08 PROCEDURE — 99283 EMERGENCY DEPT VISIT LOW MDM: CPT

## 2023-09-08 RX ORDER — NITROFURANTOIN 25; 75 MG/1; MG/1
100 CAPSULE ORAL 2 TIMES DAILY
Qty: 10 CAPSULE | Refills: 0 | Status: SHIPPED | OUTPATIENT
Start: 2023-09-08 | End: 2023-09-13

## 2023-09-08 ASSESSMENT — PAIN SCALES - GENERAL: PAINLEVEL_OUTOF10: 5

## 2023-09-08 ASSESSMENT — PAIN - FUNCTIONAL ASSESSMENT: PAIN_FUNCTIONAL_ASSESSMENT: 0-10

## 2023-09-08 NOTE — DISCHARGE INSTRUCTIONS
Thank you! Thank you for allowing me to care for you in the emergency department. It is my goal to provide you with excellent care. If you have not received excellent quality care, please ask to speak to the nurse manager. Please fill out the survey that will come to you by mail or email since we listen to your feedback! Below you will find a list of your tests from today's visit. Should you have any questions, please do not hesitate to call the emergency department. Labs  Recent Results (from the past 12 hour(s))   Urinalysis with Reflex to Culture    Collection Time: 09/08/23 12:15 PM    Specimen: Urine   Result Value Ref Range    Color, UA Dark Yellow      Appearance Hazy (A) Clear      Specific Gravity, UA 1.030 1.003 - 1.030      pH, Urine 5.0 5.0 - 8.0      Protein, UA 30 (A) Negative mg/dL    Glucose, UA Negative Negative mg/dL    Ketones, Urine 15 (A) Negative mg/dL    Bilirubin Urine Small (A) Negative      Blood, Urine Negative Negative      Urobilinogen, Urine 0.1 (L) 0.2 - 1.0 EU/dL    Nitrite, Urine Negative Negative      Leukocyte Esterase, Urine Trace (A) Negative      Bilirubin Confirmation, UA Negative Negative      WBC, UA 0-4 0 - 4 /hpf    RBC, UA 0-5 0 - 5 /hpf    Epithelial Cells UA Many (A) Few /lpf    BACTERIA, URINE 2+ (A) Negative /hpf    Urine Culture if Indicated Culture not indicated by UA result Culture not indicated by UA result     POC Pregnancy Urine Qual    Collection Time: 09/08/23 12:25 PM   Result Value Ref Range    Preg Test, Ur Negative Negative         Radiologic Studies  No orders to display     ------------------------------------------------------------------------------------------------------------  The exam and treatment you received in the Emergency Department were for an urgent problem and are not intended as complete care.  It is important that you follow-up with a doctor, nurse practitioner, or physician assistant to:  (1) confirm your diagnosis,  (2)

## 2023-09-08 NOTE — ED TRIAGE NOTES
Pt c/o urinary frequency and now its coming out on it's own for the past 6 weeks. [facility] is aware of her issues ( heart transplant at [facility] in April).

## 2023-09-11 ENCOUNTER — OFFICE VISIT (OUTPATIENT)
Facility: CLINIC | Age: 43
End: 2023-09-11
Payer: COMMERCIAL

## 2023-09-11 VITALS
WEIGHT: 187.8 LBS | SYSTOLIC BLOOD PRESSURE: 122 MMHG | OXYGEN SATURATION: 100 % | HEIGHT: 64 IN | HEART RATE: 81 BPM | BODY MASS INDEX: 32.06 KG/M2 | RESPIRATION RATE: 14 BRPM | TEMPERATURE: 98 F | DIASTOLIC BLOOD PRESSURE: 93 MMHG

## 2023-09-11 DIAGNOSIS — N32.81 OAB (OVERACTIVE BLADDER): Primary | ICD-10-CM

## 2023-09-11 PROCEDURE — 99214 OFFICE O/P EST MOD 30 MIN: CPT | Performed by: FAMILY MEDICINE

## 2023-09-11 RX ORDER — OXYBUTYNIN CHLORIDE 5 MG/1
5 TABLET, EXTENDED RELEASE ORAL DAILY
Qty: 30 TABLET | Refills: 1 | Status: SHIPPED | OUTPATIENT
Start: 2023-09-11

## 2023-09-11 SDOH — ECONOMIC STABILITY: FOOD INSECURITY: WITHIN THE PAST 12 MONTHS, THE FOOD YOU BOUGHT JUST DIDN'T LAST AND YOU DIDN'T HAVE MONEY TO GET MORE.: NEVER TRUE

## 2023-09-11 SDOH — ECONOMIC STABILITY: FOOD INSECURITY: WITHIN THE PAST 12 MONTHS, YOU WORRIED THAT YOUR FOOD WOULD RUN OUT BEFORE YOU GOT MONEY TO BUY MORE.: NEVER TRUE

## 2023-09-11 SDOH — ECONOMIC STABILITY: INCOME INSECURITY: HOW HARD IS IT FOR YOU TO PAY FOR THE VERY BASICS LIKE FOOD, HOUSING, MEDICAL CARE, AND HEATING?: VERY HARD

## 2023-09-11 ASSESSMENT — PATIENT HEALTH QUESTIONNAIRE - PHQ9
SUM OF ALL RESPONSES TO PHQ9 QUESTIONS 1 & 2: 6
8. MOVING OR SPEAKING SO SLOWLY THAT OTHER PEOPLE COULD HAVE NOTICED. OR THE OPPOSITE, BEING SO FIGETY OR RESTLESS THAT YOU HAVE BEEN MOVING AROUND A LOT MORE THAN USUAL: 0
3. TROUBLE FALLING OR STAYING ASLEEP: 3
SUM OF ALL RESPONSES TO PHQ QUESTIONS 1-9: 16
2. FEELING DOWN, DEPRESSED OR HOPELESS: 3
SUM OF ALL RESPONSES TO PHQ QUESTIONS 1-9: 15
SUM OF ALL RESPONSES TO PHQ QUESTIONS 1-9: 16
5. POOR APPETITE OR OVEREATING: 1
7. TROUBLE CONCENTRATING ON THINGS, SUCH AS READING THE NEWSPAPER OR WATCHING TELEVISION: 3
4. FEELING TIRED OR HAVING LITTLE ENERGY: 1
10. IF YOU CHECKED OFF ANY PROBLEMS, HOW DIFFICULT HAVE THESE PROBLEMS MADE IT FOR YOU TO DO YOUR WORK, TAKE CARE OF THINGS AT HOME, OR GET ALONG WITH OTHER PEOPLE: 3
1. LITTLE INTEREST OR PLEASURE IN DOING THINGS: 3
SUM OF ALL RESPONSES TO PHQ QUESTIONS 1-9: 16
9. THOUGHTS THAT YOU WOULD BE BETTER OFF DEAD, OR OF HURTING YOURSELF: 1
6. FEELING BAD ABOUT YOURSELF - OR THAT YOU ARE A FAILURE OR HAVE LET YOURSELF OR YOUR FAMILY DOWN: 1

## 2023-09-11 ASSESSMENT — ENCOUNTER SYMPTOMS
CHEST TIGHTNESS: 0
APNEA: 0

## 2023-09-11 NOTE — PROGRESS NOTES
1. \"Have you been to the ER, urgent care clinic since your last visit? Hospitalized since your last visit? \" YES 9-8-23 UTI    2. \"Have you seen or consulted any other health care providers outside of the [address] [place] [address] Avenue since your last visit? \" Yes, [facility]    3. For patients aged 43-73: Has the patient had a colonoscopy / FIT/ Cologuard? N/A      If the patient is female:    4. For patients aged 43-66: Has the patient had a mammogram within the past 2 years? YES      5. For patients aged 21-65: Has the patient had a pap smear?  YES    Health Maintenance Due   Topic Date Due    Hepatitis B vaccine (1 of 3 - 3-dose series) Never done    Varicella vaccine (1 of 2 - 2-dose childhood series) Never done    Pneumococcal 0-64 years Vaccine (1 - PCV) Never done    Shingles vaccine (1 of 2) Never done    COVID-19 Vaccine (4 - Moderna risk series) 02/22/2022    Flu vaccine (1) 08/01/2023
Exam  Vitals and nursing note reviewed. Constitutional:       Appearance: Normal appearance. HENT:      Head: Normocephalic and atraumatic. Cardiovascular:      Rate and Rhythm: Normal rate and regular rhythm. Pulses: Normal pulses. Heart sounds: Normal heart sounds. No murmur heard. No friction rub. No gallop. Pulmonary:      Effort: Pulmonary effort is normal.      Breath sounds: Normal breath sounds. Abdominal:      General: Abdomen is flat. Bowel sounds are normal.      Palpations: Abdomen is soft. Musculoskeletal:      Cervical back: Normal range of motion and neck supple. Skin:     General: Skin is warm and dry. Neurological:      Mental Status: She is alert. Pertinent Labs/Studies:      Assessment and orders:       ICD-10-CM    1. OAB (overactive bladder)  N32.81 oxybutynin (DITROPAN XL) 5 MG extended release tablet          1. OAB (overactive bladder)  Trial of Oxybutynin. - oxybutynin (DITROPAN XL) 5 MG extended release tablet; Take 1 tablet by mouth daily  Dispense: 30 tablet; Refill: 1       Follow-up and Dispositions    Return in about 3 months (around 12/11/2023). I have discussed the diagnosis with the patient and the intended plan as seen in the above orders. Social history, medical history, and labs were reviewed. The patient has received an after-visit summary and questions were answered concerning future plans. I have discussed medication side effects and warnings with the patient as well.     MD JULIAN Sawant & JERSEY PUGH [facility]  09/11/23

## 2023-09-26 DIAGNOSIS — R73.09 ABNORMAL BLOOD SUGAR: ICD-10-CM

## 2023-09-26 RX ORDER — ALCOHOL ANTISEPTIC PADS
PADS, MEDICATED (EA) TOPICAL
Qty: 100 EACH | Refills: 1 | Status: SHIPPED | OUTPATIENT
Start: 2023-09-26

## 2023-10-03 ENCOUNTER — OFFICE VISIT (OUTPATIENT)
Facility: CLINIC | Age: 43
End: 2023-10-03
Payer: COMMERCIAL

## 2023-10-03 VITALS
RESPIRATION RATE: 17 BRPM | BODY MASS INDEX: 31.92 KG/M2 | WEIGHT: 187 LBS | SYSTOLIC BLOOD PRESSURE: 97 MMHG | OXYGEN SATURATION: 99 % | DIASTOLIC BLOOD PRESSURE: 68 MMHG | HEIGHT: 64 IN | HEART RATE: 89 BPM | TEMPERATURE: 98.6 F

## 2023-10-03 DIAGNOSIS — M79.7 FIBROMYALGIA: ICD-10-CM

## 2023-10-03 DIAGNOSIS — L29.9 ITCHING: Primary | ICD-10-CM

## 2023-10-03 DIAGNOSIS — R74.8 ALKALINE PHOSPHATASE ELEVATION: ICD-10-CM

## 2023-10-03 DIAGNOSIS — I25.5 ISCHEMIC CARDIOMYOPATHY: ICD-10-CM

## 2023-10-03 DIAGNOSIS — N32.81 OAB (OVERACTIVE BLADDER): ICD-10-CM

## 2023-10-03 DIAGNOSIS — K31.84 GASTROPARESIS: ICD-10-CM

## 2023-10-03 PROCEDURE — 99214 OFFICE O/P EST MOD 30 MIN: CPT | Performed by: FAMILY MEDICINE

## 2023-10-03 RX ORDER — VALGANCICLOVIR 450 MG/1
TABLET, FILM COATED ORAL
COMMUNITY
Start: 2023-09-18 | End: 2023-10-03

## 2023-10-03 RX ORDER — NORTRIPTYLINE HYDROCHLORIDE 10 MG/1
CAPSULE ORAL
COMMUNITY
Start: 2023-09-28

## 2023-10-03 RX ORDER — TRIAMCINOLONE ACETONIDE 0.25 MG/ML
1 LOTION TOPICAL 2 TIMES DAILY
Qty: 240 ML | Refills: 1 | Status: SHIPPED | OUTPATIENT
Start: 2023-10-03

## 2023-10-03 RX ORDER — HYDROXYZINE HYDROCHLORIDE 25 MG/1
25 TABLET, FILM COATED ORAL EVERY 8 HOURS PRN
Qty: 90 TABLET | Refills: 0 | Status: SHIPPED | OUTPATIENT
Start: 2023-10-03

## 2023-10-03 RX ORDER — CEPHALEXIN 500 MG/1
CAPSULE ORAL
COMMUNITY
Start: 2023-09-26

## 2023-10-03 RX ORDER — LORATADINE 10 MG/1
10 TABLET ORAL DAILY
COMMUNITY

## 2023-10-03 RX ORDER — MYCOPHENOLATE MOFETIL 500 MG/1
TABLET ORAL
COMMUNITY
Start: 2023-09-18

## 2023-10-03 ASSESSMENT — ENCOUNTER SYMPTOMS: ROS SKIN COMMENTS: ITCHING

## 2023-10-03 NOTE — PATIENT INSTRUCTIONS
- Trial of Hydroxyzine for itching control, is this safe with the heart? (25 mg up to 3 times a day)  - Topical steroid ordered. - Is there any medication she is currently on that would elevate her Alkaline Phosphatase? With this elevated, could it cause itching?  - Could the itching be because she came off of Prednisone and the itching is now unmasked?

## 2023-10-04 PROBLEM — N39.0 RECURRENT UTI: Status: ACTIVE | Noted: 2023-10-04

## 2023-10-04 PROBLEM — R35.0 FREQUENCY OF MICTURITION: Status: ACTIVE | Noted: 2023-10-04

## 2023-10-04 NOTE — PROGRESS NOTES
HISTORY OF PRESENT ILLNESS  Tino Eddy is a 41 y.o. female   Who has had a complex medical history and she is only 41. She has recurrent infections she has urgency frequency. She has had angioplasties and other sorts of muscle skeletal disorders including possible autoimmune disease. Option for her 1 was changing her diet to she can try the Ditropan XL for frequency and add to that [facility]. And see if that helps her bladder. See her back in a few weeks and decide what the best way to go. She needed cystoscopy hydrodistention? Her puff questionnaire was 27 so clearly she is in the category of possible interstitial cystitis  1. OAB (overactive bladder)  -     vibegron (GEMTESA) 75 MG TABS tablet; Take 1 tablet by mouth daily, Disp-30 tablet, R-5Normal  -     oxybutynin (DITROPAN XL) 10 MG extended release tablet; Take 1 tablet by mouth daily, Disp-30 tablet, R-2Normal (Patient taking differently: 20 mg, Oral, DAILY)  2. Recurrent UTI  -     AMB POC URINALYSIS DIP STICK AUTO W/O MICRO  -     Culture, Urine  3. Frequency of micturition  -     AMB POC URINALYSIS DIP STICK AUTO W/O MICRO  4. Neuropathy  Overview:  Feet. 5. Positive DELIA (antinuclear antibody)  6. Fibromyalgia        PAST MEDICAL HISTORY  PMHx (including negatives):  has a past medical history of Arthritis, Chronic pain, Depression, GERD (gastroesophageal reflux disease), Hypertension, Hypertension, Ill-defined condition, MI (myocardial infarction) ([address]), Musculoskeletal disorder, Sepsis ([address]), SOB (shortness of breath), and Stool color black. PSurgHx:  has a past surgical history that includes Coronary angioplasty with stent; upper gi endoscopy,biopsy (06/21/2018); upper gi endoscopy,diagnosis (06/07/2018); heent (2002); and gyn. PSocHx:  reports that she quit smoking about 14 months ago. Her smoking use included cigarettes. She started smoking about 19 years ago. She has a 4.50 pack-year smoking history.  She has been exposed to tobacco

## 2023-10-05 ENCOUNTER — OFFICE VISIT (OUTPATIENT)
Age: 43
End: 2023-10-05
Payer: COMMERCIAL

## 2023-10-05 VITALS
OXYGEN SATURATION: 100 % | HEART RATE: 78 BPM | RESPIRATION RATE: 16 BRPM | HEIGHT: 64 IN | SYSTOLIC BLOOD PRESSURE: 119 MMHG | DIASTOLIC BLOOD PRESSURE: 82 MMHG | BODY MASS INDEX: 32.27 KG/M2 | WEIGHT: 189 LBS

## 2023-10-05 DIAGNOSIS — N32.81 OAB (OVERACTIVE BLADDER): Primary | ICD-10-CM

## 2023-10-05 DIAGNOSIS — N39.0 RECURRENT UTI: ICD-10-CM

## 2023-10-05 DIAGNOSIS — R35.0 FREQUENCY OF MICTURITION: ICD-10-CM

## 2023-10-05 DIAGNOSIS — R76.8 POSITIVE ANA (ANTINUCLEAR ANTIBODY): ICD-10-CM

## 2023-10-05 DIAGNOSIS — M79.7 FIBROMYALGIA: ICD-10-CM

## 2023-10-05 DIAGNOSIS — G62.9 NEUROPATHY: ICD-10-CM

## 2023-10-05 LAB
BILIRUBIN, URINE, POC: NEGATIVE
BLOOD URINE, POC: NORMAL
GLUCOSE URINE, POC: NEGATIVE
KETONES, URINE, POC: NEGATIVE
LEUKOCYTE ESTERASE, URINE, POC: NEGATIVE
NITRITE, URINE, POC: NEGATIVE
PH, URINE, POC: 5.5 (ref 4.6–8)
PROTEIN,URINE, POC: NORMAL
SPECIFIC GRAVITY, URINE, POC: 1.03 (ref 1–1.03)
URINALYSIS CLARITY, POC: CLEAR
URINALYSIS COLOR, POC: YELLOW
UROBILINOGEN, POC: NORMAL

## 2023-10-05 PROCEDURE — 81003 URINALYSIS AUTO W/O SCOPE: CPT | Performed by: UROLOGY

## 2023-10-05 PROCEDURE — 99204 OFFICE O/P NEW MOD 45 MIN: CPT | Performed by: UROLOGY

## 2023-10-05 RX ORDER — VIBEGRON 75 MG/1
75 TABLET, FILM COATED ORAL DAILY
Qty: 30 TABLET | Refills: 5 | Status: SHIPPED | OUTPATIENT
Start: 2023-10-05

## 2023-10-05 RX ORDER — OXYBUTYNIN CHLORIDE 10 MG/1
10 TABLET, EXTENDED RELEASE ORAL DAILY
Qty: 30 TABLET | Refills: 2 | Status: SHIPPED | OUTPATIENT
Start: 2023-10-05

## 2023-10-06 ENCOUNTER — TELEPHONE (OUTPATIENT)
Age: 43
End: 2023-10-06

## 2023-10-06 RX ORDER — VIBEGRON 75 MG/1
75 TABLET, FILM COATED ORAL DAILY
Qty: 30 TABLET | Refills: 3 | Status: SHIPPED | OUTPATIENT
Start: 2023-10-06 | End: 2023-10-31 | Stop reason: ALTCHOICE

## 2023-10-06 NOTE — TELEPHONE ENCOUNTER
GEMTESA PRESCRIPTIONS NEED TO NOW GO TO  [facility]AD OF [facility].  PLEASE REROUTE SCRIPT THAT WAS SENT YESTERDAY

## 2023-10-09 ENCOUNTER — HOSPITAL ENCOUNTER (EMERGENCY)
Facility: HOSPITAL | Age: 43
Discharge: HOME | End: 2023-10-09
Payer: COMMERCIAL

## 2023-10-09 VITALS
HEART RATE: 102 BPM | WEIGHT: 189 LBS | BODY MASS INDEX: 32.27 KG/M2 | TEMPERATURE: 98.2 F | HEIGHT: 64 IN | SYSTOLIC BLOOD PRESSURE: 109 MMHG | RESPIRATION RATE: 16 BRPM | OXYGEN SATURATION: 99 % | DIASTOLIC BLOOD PRESSURE: 69 MMHG

## 2023-10-09 DIAGNOSIS — M79.604 BILATERAL LEG PAIN: ICD-10-CM

## 2023-10-09 DIAGNOSIS — T88.7XXA MEDICATION SIDE EFFECT: Primary | ICD-10-CM

## 2023-10-09 DIAGNOSIS — R74.8 ELEVATED ALKALINE PHOSPHATASE LEVEL: ICD-10-CM

## 2023-10-09 DIAGNOSIS — M79.605 BILATERAL LEG PAIN: ICD-10-CM

## 2023-10-09 LAB
ALBUMIN SERPL-MCNC: 3.6 G/DL (ref 3.5–5)
ALBUMIN/GLOB SERPL: 1.1 (ref 1.1–2.2)
ALP SERPL-CCNC: 601 U/L (ref 45–117)
ALT SERPL-CCNC: 36 U/L (ref 12–78)
ANION GAP SERPL CALC-SCNC: 9 MMOL/L (ref 5–15)
AST SERPL W P-5'-P-CCNC: 27 U/L (ref 15–37)
BACTERIA UR CULT: ABNORMAL
BASOPHILS # BLD: 0 K/UL (ref 0–0.1)
BASOPHILS NFR BLD: 0 % (ref 0–1)
BILIRUB SERPL-MCNC: 0.7 MG/DL (ref 0.2–1)
BUN SERPL-MCNC: 12 MG/DL (ref 6–20)
BUN/CREAT SERPL: 10 (ref 12–20)
CA-I BLD-MCNC: 9.4 MG/DL (ref 8.5–10.1)
CHLORIDE SERPL-SCNC: 105 MMOL/L (ref 97–108)
CK SERPL-CCNC: 37 U/L (ref 26–192)
CO2 SERPL-SCNC: 25 MMOL/L (ref 21–32)
CREAT SERPL-MCNC: 1.25 MG/DL (ref 0.55–1.02)
DEPRECATED S PYO AG THROAT QL EIA: NEGATIVE
DIFFERENTIAL METHOD BLD: ABNORMAL
EOSINOPHIL # BLD: 0.1 K/UL (ref 0–0.4)
EOSINOPHIL NFR BLD: 2 % (ref 0–7)
ERYTHROCYTE [DISTWIDTH] IN BLOOD BY AUTOMATED COUNT: 11.6 % (ref 11.5–14.5)
GLOBULIN SER CALC-MCNC: 3.4 G/DL (ref 2–4)
GLUCOSE SERPL-MCNC: 108 MG/DL (ref 65–100)
HCT VFR BLD AUTO: 34.5 % (ref 35–47)
HGB BLD-MCNC: 11 G/DL (ref 11.5–16)
IMM GRANULOCYTES # BLD AUTO: 0 K/UL
IMM GRANULOCYTES NFR BLD AUTO: 0 %
LYMPHOCYTES # BLD: 2.2 K/UL (ref 0.8–3.5)
LYMPHOCYTES NFR BLD: 41 % (ref 12–49)
MCH RBC QN AUTO: 29.7 PG (ref 26–34)
MCHC RBC AUTO-ENTMCNC: 31.9 G/DL (ref 30–36.5)
MCV RBC AUTO: 93.2 FL (ref 80–99)
MONOCYTES # BLD: 0.3 K/UL (ref 0–1)
MONOCYTES NFR BLD: 6 % (ref 5–13)
NEUTS BAND NFR BLD MANUAL: 1 % (ref 0–6)
NEUTS SEG # BLD: 2.7 K/UL (ref 1.8–8)
NEUTS SEG NFR BLD: 50 % (ref 32–75)
PLATELET # BLD AUTO: 260 K/UL (ref 150–400)
PMV BLD AUTO: 9.9 FL (ref 8.9–12.9)
POTASSIUM SERPL-SCNC: 3.8 MMOL/L (ref 3.5–5.1)
PROT SERPL-MCNC: 7 G/DL (ref 6.4–8.2)
RBC # BLD AUTO: 3.7 M/UL (ref 3.8–5.2)
RBC MORPH BLD: ABNORMAL
SODIUM SERPL-SCNC: 139 MMOL/L (ref 136–145)
WBC # BLD AUTO: 5.3 K/UL (ref 3.6–11)

## 2023-10-09 PROCEDURE — 87070 CULTURE OTHR SPECIMN AEROBIC: CPT

## 2023-10-09 PROCEDURE — 85025 COMPLETE CBC W/AUTO DIFF WBC: CPT

## 2023-10-09 PROCEDURE — 82550 ASSAY OF CK (CPK): CPT

## 2023-10-09 PROCEDURE — 87880 STREP A ASSAY W/OPTIC: CPT

## 2023-10-09 PROCEDURE — 80053 COMPREHEN METABOLIC PANEL: CPT

## 2023-10-09 PROCEDURE — 99283 EMERGENCY DEPT VISIT LOW MDM: CPT

## 2023-10-09 RX ORDER — TRAMADOL HYDROCHLORIDE 50 MG/1
50 TABLET ORAL EVERY 4 HOURS PRN
Qty: 18 TABLET | Refills: 0 | Status: SHIPPED | OUTPATIENT
Start: 2023-10-09 | End: 2023-10-12

## 2023-10-09 RX ORDER — CIPROFLOXACIN 500 MG/1
500 TABLET, FILM COATED ORAL 2 TIMES DAILY
Qty: 14 TABLET | Refills: 0 | Status: SHIPPED | OUTPATIENT
Start: 2023-10-09 | End: 2023-10-16

## 2023-10-09 RX ORDER — VITAMIN B COMPLEX
200 TABLET ORAL DAILY
Qty: 30 CAPSULE | Refills: 0 | Status: SHIPPED | OUTPATIENT
Start: 2023-10-09 | End: 2023-10-09 | Stop reason: ALTCHOICE

## 2023-10-09 ASSESSMENT — PAIN DESCRIPTION - LOCATION: LOCATION: LEG

## 2023-10-09 ASSESSMENT — PAIN SCALES - GENERAL: PAINLEVEL_OUTOF10: 7

## 2023-10-09 ASSESSMENT — PAIN - FUNCTIONAL ASSESSMENT: PAIN_FUNCTIONAL_ASSESSMENT: 0-10

## 2023-10-09 ASSESSMENT — PAIN DESCRIPTION - ORIENTATION: ORIENTATION: LEFT;RIGHT

## 2023-10-09 NOTE — ED TRIAGE NOTES
Patient reports bilateral leg pain that has been going on for \"awhile\" but yesterday she began having worse pain in her legs. Reports hx of heart transplant in April & she has had issues with leg pain from her rosuvastatin & has emailed her provider about the pain but they said that it could be stress related.

## 2023-10-09 NOTE — ED PROVIDER NOTES
DISPOSITION/PLAN   DISPOSITION Decision To Discharge 10/09/2023 04:23:35 PM    Discharge Note: The patient is stable for discharge home. The signs, symptoms, diagnosis, and discharge instructions have been discussed, understanding conveyed, and agreed upon. The patient is to follow up as recommended or return to ER should their symptoms worsen. PATIENT REFERRED TO:  Freida Leary MD  [address] [place] [address]  [phone]    Schedule an appointment as soon as possible for a visit in 2 days  If symptoms worsen, As needed    follow up with cardiology    Schedule an appointment as soon as possible for a visit in 1 day          DISCHARGE MEDICATIONS:     Medication List        START taking these medications      traMADol 50 MG tablet  Commonly known as: Ultram  Take 1 tablet by mouth every 4 hours as needed for Pain for up to 3 days. Intended supply: 3 days. Take lowest dose possible to manage pain Max Daily Amount: 300 mg            CONTINUE taking these medications      Accu-Chek Softclix Lancets Misc  Use to check Blood sugar 3 times daily. Dx R73.9. Uncontrolled hyperglycemia due to medications. UltiCare Alcohol Swabs 70 % Pads  Use to check Blood sugar once daily            ASK your doctor about these medications      Accu-Chek Guide strip  Generic drug: blood glucose test strips  Use to check Blood sugar 3 times daily. Dx R73.9. Uncontrolled hyperglycemia due to medications.      acetaminophen 325 MG tablet  Commonly known as: TYLENOL     albuterol sulfate  (90 Base) MCG/ACT inhaler  Commonly known as: PROVENTIL;VENTOLIN;PROAIR     aspirin 81 MG EC tablet     cephALEXin 500 MG capsule  Commonly known as: KEFLEX     ciprofloxacin 500 MG tablet  Commonly known as: Cipro  Take 1 tablet by mouth 2 times daily for 7 days Stop with tendon pain     clonazePAM 1 MG tablet  Commonly known as: KLONOPIN     escitalopram 20 MG tablet  Commonly known as: LEXAPRO     fluticasone 50

## 2023-10-10 ENCOUNTER — TELEPHONE (OUTPATIENT)
Age: 43
End: 2023-10-10

## 2023-10-10 LAB
BACTERIA SPEC CULT: NORMAL
Lab: NORMAL

## 2023-10-10 NOTE — TELEPHONE ENCOUNTER
Pt called and would like to speak with a nurse regarding two prescription orders that was sent yesterday

## 2023-10-13 ENCOUNTER — OFFICE VISIT (OUTPATIENT)
Facility: CLINIC | Age: 43
End: 2023-10-13
Payer: COMMERCIAL

## 2023-10-13 VITALS
RESPIRATION RATE: 20 BRPM | TEMPERATURE: 97.6 F | HEART RATE: 99 BPM | OXYGEN SATURATION: 97 % | HEIGHT: 64 IN | DIASTOLIC BLOOD PRESSURE: 69 MMHG | BODY MASS INDEX: 31.76 KG/M2 | SYSTOLIC BLOOD PRESSURE: 95 MMHG | WEIGHT: 186 LBS

## 2023-10-13 DIAGNOSIS — R25.2 BILATERAL LEG CRAMPS: Primary | ICD-10-CM

## 2023-10-13 DIAGNOSIS — M54.42 CHRONIC BILATERAL LOW BACK PAIN WITH BILATERAL SCIATICA: ICD-10-CM

## 2023-10-13 DIAGNOSIS — G89.29 CHRONIC BILATERAL LOW BACK PAIN WITH BILATERAL SCIATICA: ICD-10-CM

## 2023-10-13 DIAGNOSIS — M54.41 CHRONIC BILATERAL LOW BACK PAIN WITH BILATERAL SCIATICA: ICD-10-CM

## 2023-10-13 PROCEDURE — 99214 OFFICE O/P EST MOD 30 MIN: CPT | Performed by: FAMILY MEDICINE

## 2023-10-13 RX ORDER — VENLAFAXINE 37.5 MG/1
TABLET ORAL
COMMUNITY
Start: 2023-10-03

## 2023-10-13 RX ORDER — PREGABALIN 75 MG/1
75 CAPSULE ORAL 2 TIMES DAILY
Qty: 60 CAPSULE | Refills: 0 | Status: SHIPPED | OUTPATIENT
Start: 2023-10-13 | End: 2023-11-12

## 2023-10-13 ASSESSMENT — ENCOUNTER SYMPTOMS
BACK PAIN: 1
SHORTNESS OF BREATH: 0

## 2023-10-13 NOTE — PROGRESS NOTES
[facility]    History of Present Illness:   Sarah Penny is a 41 y.o. female with history of CHF, CAD, GERD, Arthritis, Fibromyalgia, HLD, Anxiety, HSV, OCD, Depression, Obesity  CC: Leg Pains  History provided by patient and Records    HPI:  Bilateral Leg Cramping: Patient noting having bilateral lower extremity cramping that has been present for about 3-4 months per patient. States it started after doing a procedure with cardiac cath through the neck and reports there were issues at the time. Today noting it is squeezing and releasing recurrently while standing. Pain starts in the low back and radiates down the legs. Pain does improve with walking, but returns after stopping walking for a short time. Patient also noting issues with chest pains as well following procedure as well. Has tried Flexeril and Methacarbalmol with no significant improvmeent.         Health Maintenance  Health Maintenance Due   Topic Date Due    Hepatitis B vaccine (1 of 3 - 3-dose series) Never done    Varicella vaccine (1 of 2 - 2-dose childhood series) Never done    Pneumococcal 0-64 years Vaccine (1 - PCV) Never done    Shingles vaccine (1 of 2) Never done    COVID-19 Vaccine (4 - Moderna risk series) 02/22/2022    Flu vaccine (1) 08/01/2023    Lipids  10/22/2023       Past Medical, Family, and Social History:     Current Outpatient Medications on File Prior to Visit   Medication Sig Dispense Refill    oxybutynin (DITROPAN XL) 10 MG extended release tablet Take 1 tablet by mouth daily (Patient taking differently: Take 2 tablets by mouth daily) 30 tablet 2    cephALEXin (KEFLEX) 500 MG capsule TAKE ONE CAPSULE BY MOUTH TWICE DAILY FOR 10 DAYS      mycophenolate (CELLCEPT) 500 MG tablet       loratadine (CLARITIN) 10 MG tablet Take 1 tablet by mouth daily      nortriptyline (PAMELOR) 10 MG capsule TAKE ONE CAPSULE BY MOUTH AT BEDTIME      hydrOXYzine HCl (ATARAX) 25 MG tablet Take 1 tablet by mouth every 8

## 2023-10-13 NOTE — PROGRESS NOTES
1. \"Have you been to the ER, urgent care clinic since your last visit? Hospitalized since your last visit? \" YES    2. \"Have you seen or consulted any other health care providers outside of the [address] [place] [address] since your last visit? \" yes    3. For patients aged 43-73: Has the patient had a colonoscopy / FIT/ Cologuard? N/A      If the patient is female:    4. For patients aged 43-66: Has the patient had a mammogram within the past 2 years? NO      5. For patients aged 21-65: Has the patient had a pap smear?  YES    Health Maintenance Due   Topic Date Due    Hepatitis B vaccine (1 of 3 - 3-dose series) Never done    Varicella vaccine (1 of 2 - 2-dose childhood series) Never done    Pneumococcal 0-64 years Vaccine (1 - PCV) Never done    Shingles vaccine (1 of 2) Never done    COVID-19 Vaccine (4 - Moderna risk series) 02/22/2022    Flu vaccine (1) 08/01/2023    Lipids  10/22/2023

## 2023-10-16 PROBLEM — N32.81 OAB (OVERACTIVE BLADDER): Status: ACTIVE | Noted: 2023-10-16

## 2023-10-16 ASSESSMENT — ENCOUNTER SYMPTOMS
BACK PAIN: 1
NAUSEA: 1
TROUBLE SWALLOWING: 1
SORE THROAT: 1
SINUS PAIN: 1
DIARRHEA: 1
ABDOMINAL PAIN: 1
SHORTNESS OF BREATH: 1
SINUS PRESSURE: 1
EYE DISCHARGE: 1
EYE ITCHING: 1
VOMITING: 1

## 2023-10-18 NOTE — PROGRESS NOTES
[facility]    History of Present Illness:   Dhruv Bain is a 38 y.o. female with history of CHF, CAD, GERD, Arthritis, Fibromyalgia, HLD, Anxiety, HSV, OCD, Depression, Obesity  CC: Hemorrhoids  History provided by patient and Records    HPI:  Patient has a large Hemorrhoid that has been painful for the last 3 days in particular with worsening pain and is severe at this time. Patient had BM today and was barely able to sit. Note severe pressure present. Has been trying Preporation H/Topical steroids with minimal effect. Patient does note has had similar issue 2 years ago that has improved. Noting recent Constipation is likely cause. Ischemic Cardiomyopathy: Patient has a new heart recently transplanted at this time. Has some pain at this point that is not well controlled on the Oxycodone 5 mg. Health Maintenance  Health Maintenance Due   Topic Date Due    Varicella Vaccine (1 of 2 - 2-dose childhood series) Never done    Pneumococcal 0-64 years (1 - PCV) Never done    Shingles Vaccine (1 of 2) Never done    COVID-19 Vaccine (3 - Moderna risk series) 01/25/2022       Past Medical, Family, and Social History:     Current Outpatient Medications on File Prior to Visit   Medication Sig Dispense Refill    atovaquone (MEPRON) 750 mg/5 mL suspension Take 10 mL by mouth daily. 300 mL 5    melatonin 5 mg tablet Take 1 Tablet by mouth At bedtime. 30 Tablet 11    mycophenolate (CELLCEPT) 500 mg tablet Take 2 Tablets by mouth two (2) times a day. 120 Tablet 11    naloxone (NARCAN) 4 mg/actuation nasal spray Administer 1 spray into one nostril as needed for opioid reversal or respiratory depression. Call 911. If patient does not respond, or responds and then relapses, give additional doses every 2 to 3 minutes, alternating nostrils, until medical help arrives. Use as directed. ondansetron (ZOFRAN ODT) 4 mg disintegrating tablet Take 1 Tablet by mouth every eight (8) hours as needed. senna (SENOKOT) 8.6 mg tablet Take 2 Tablets by mouth. tacrolimus (PROGRAF) 1 mg capsule Take 7 Capsules by mouth two (2) times a day. 420 Capsule 11    valGANciclovir (VALCYTE) 450 mg tablet Take 2 Tablets by mouth daily. 60 Tablet 5    ketoconazole (NIZORAL) 2 % shampoo SHAMPOO TWICE WEEKLY 120 mL 1    torsemide (DEMADEX) 20 mg tablet Take 1 Tablet by mouth two (2) times a day. spironolactone (ALDACTONE) 25 mg tablet Take 1 Tablet by mouth daily. 30 Tablet 1    hydrocortisone (CORTAID) 1 % topical cream Apply  to affected area as needed. valACYclovir (VALTREX) 500 mg tablet Take 1 Tablet by mouth as needed. cinacalcet (SENSIPAR) 30 mg tablet Take 1 Tablet by mouth daily (with breakfast) for 90 days. 30 Tablet 2    pantoprazole (PROTONIX) 40 mg tablet TAKE 1 TABLET TWICE DAILY 180 Tablet 1    pramipexole (MIRAPEX) 0.125 mg tablet Take 1 Tablet by mouth nightly. 90 Tablet 1    potassium chloride (KLOR-CON M10) 10 mEq tablet Take 2 Tablets by mouth two (2) times a day. (Patient taking differently: Take 4 Tablets by mouth two (2) times a day.) 120 Tablet 1    alcohol swabs (BD Single Use Swabs Regular) padm Check Blood Sugar once daily. Dx. R73.03 100 Pad 1    LORazepam (ATIVAN) 1 mg tablet Take 1 Tablet by mouth every eight (8) hours as needed. magnesium oxide (MAG-OX) 400 mg tablet Take 1 Tablet by mouth two (2) times a day. (Patient taking differently: Take 2 Tablets by mouth two (2) times a day.) 60 Tablet 0    montelukast (SINGULAIR) 10 mg tablet TAKE 1 TABLET EVERY DAY 90 Tablet 1    albuterol-ipratropium (DUO-NEB) 2.5 mg-0.5 mg/3 ml nebu 3 mL by Nebulization route every four (4) hours as needed. fluticasone propionate (FLONASE) 50 mcg/actuation nasal spray 2 Sprays by Both Nostrils route daily. acetaminophen (TYLENOL) 325 mg tablet Take  by mouth every four (4) hours as needed for Pain.       albuterol (PROVENTIL HFA, VENTOLIN HFA, PROAIR HFA) 90 mcg/actuation inhaler Take by inhalation as needed. aspirin delayed-release 81 mg tablet 1 Tablet. traZODone (DESYREL) 100 mg tablet Weaning-150 mg at night      escitalopram oxalate (LEXAPRO) 20 mg tablet Take 1 Tablet by mouth daily. clonazePAM (KLONOPIN) 1 mg tablet Take 1.5 Tablets by mouth nightly. [DISCONTINUED] oxyCODONE IR (ROXICODONE) 5 mg immediate release tablet Take 1 Tablet by mouth every four (4) hours as needed. [DISCONTINUED] cyclobenzaprine (FLEXERIL) 10 mg tablet Take 1 Tablet by mouth three (3) times daily as needed for Muscle Spasm(s). Has not needed (Patient not taking: Reported on 4/27/2023) 60 Tablet 0    [DISCONTINUED] empagliflozin (JARDIANCE) 10 mg tablet Take 10 mg by mouth daily. (Patient not taking: Reported on 4/27/2023)      [DISCONTINUED] allopurinoL (ZYLOPRIM) 100 mg tablet Take 0.5 Tablets by mouth daily. (Patient not taking: Reported on 4/27/2023) 15 Tablet 1    [DISCONTINUED] losartan (COZAAR) 25 mg tablet Take 0.5 Tablets by mouth At bedtime. [DISCONTINUED] digoxin (LANOXIN) 0.125 mg tablet Take 1 Tablet by mouth daily. (Patient not taking: Reported on 4/27/2023) 30 Tablet 2    [DISCONTINUED] levocetirizine (XYZAL) 5 mg tablet TAKE 1 TABLET EVERY DAY (Patient not taking: Reported on 4/27/2023) 90 Tablet 1    [DISCONTINUED] milrinone (PRIMACOR) 20 mg/100 mL (200 mcg/mL) infusion 20.975 mcg/min by IntraVENous route continuous. (Patient not taking: Reported on 4/27/2023) 100 mL 50    [DISCONTINUED] folic acid (FOLVITE) 1 mg tablet TAKE 1 TABLET EVERY DAY (Patient not taking: Reported on 4/27/2023) 90 Tablet 1    [DISCONTINUED] ferrous sulfate 325 mg (65 mg iron) tablet TAKE 1 TABLET BY MOUTH ONCE DAILY BEFORE BREAKFAST (Patient not taking: Reported on 4/27/2023) 90 Tablet 1    [DISCONTINUED] cholecalciferol, vitamin D3, 50 mcg (2,000 unit) tab Take 2,000 Units by mouth daily.  (Patient not taking: Reported on 4/27/2023)       No current facility-administered medications on file prior to visit. Patient Active Problem List   Diagnosis Code    Depression F32. A    GERD (gastroesophageal reflux disease) K21.9    Anemia, unspecified D64.9    Itch of skin L29.9    Anxiety F41.9    Costochondritis M94.0    HSV (herpes simplex virus) anogenital infection A60.9    OCD (obsessive compulsive disorder) F42.9    Hoarseness R49.0    IFG (impaired fasting glucose) R73.01    Hyperlipidemia E78.5    Thrombosed external hemorrhoid K64.5    Vitamin D deficiency E55.9    Inflammatory arthritis M19.90    Recurrent depression (HCC) F33.9    Mood disorder (HCC) F39    Chronic pain syndrome G89.4    Fibromyalgia M79.7    Panic attack F41.0    Tremor R25.1    Class 2 obesity due to excess calories without serious comorbidity in adult E66.09    Gastroparesis K31.84    Plantar fasciitis of left foot M72.2    Positive KITTY (antinuclear antibody) R76.8    Severe obesity (HCC) E66.01    Neuropathy G62.9    Coronary artery disease involving native coronary artery of native heart without angina pectoris I25.10    Chronic systolic heart failure (HCC) I50.22    Fever R50.9    Sjogren's syndrome (HCC) M35.00    Prediabetes R73.03    Ischemic cardiomyopathy I25.5       Social History     Socioeconomic History    Marital status: SINGLE   Tobacco Use    Smoking status: Former     Packs/day: 0.25     Years: 8.00     Pack years: 2.00     Types: Cigarettes     Quit date: 2022     Years since quittin.7    Smokeless tobacco: Never   Vaping Use    Vaping Use: Never used   Substance and Sexual Activity    Alcohol use: Not Currently     Alcohol/week: 1.0 standard drink     Types: 1 Glasses of wine per week     Comment: Wine with special occassions - not since July    Drug use: Not Currently     Types: Marijuana     Comment: Haven't had any since 2022    Sexual activity: Yes     Partners: Male     Birth control/protection: None     Social Determinants of Health     Financial Resource Strain: High Risk    Difficulty of Paying Living Expenses: Very hard   Food Insecurity: No Food Insecurity    Worried About Running Out of Food in the Last Year: Never true    Ran Out of Food in the Last Year: Never true   Transportation Needs: No Transportation Needs    Lack of Transportation (Medical): No    Lack of Transportation (Non-Medical): No   Physical Activity: Inactive    Days of Exercise per Week: 0 days    Minutes of Exercise per Session: 0 min   Stress: Stress Concern Present    Feeling of Stress : To some extent   Social Connections: Socially Isolated    Frequency of Communication with Friends and Family: Twice a week    Frequency of Social Gatherings with Friends and Family: Never    Attends Sabianist Services: Never    Active Member of Clubs or Organizations: No    Attends Club or Organization Meetings: Never    Marital Status: Never    Housing Stability: Low Risk     Unable to Pay for Housing in the Last Year: No    Number of [place] in the Last Year: 1    Unstable Housing in the Last Year: No        Review of Systems   Review of Systems   Constitutional:  Negative for chills and fever. Cardiovascular:  Negative for chest pain and palpitations. Gastrointestinal:  Negative for nausea and vomiting. Neurological:  Negative for dizziness and headaches. Objective:   Visit Vitals  /61 (BP 1 Location: Right arm, BP Patient Position: Sitting, BP Cuff Size: Adult)   Pulse 82   Temp 97.7 °F (36.5 °C) (Oral)   Resp 20   Ht 5' 4\" (1.626 m)   Wt 171 lb (77.6 kg)   SpO2 95%   BMI 29.35 kg/m²        Physical Exam  Vitals and nursing note reviewed. Constitutional:       Appearance: Normal appearance. HENT:      Head: Normocephalic and atraumatic. Cardiovascular:      Rate and Rhythm: Normal rate and regular rhythm. Pulses: Normal pulses. Heart sounds: Normal heart sounds. Pulmonary:      Effort: Pulmonary effort is normal.      Breath sounds: Normal breath sounds.    Abdominal:      General: Abdomen is flat. Bowel sounds are normal.      Palpations: Abdomen is soft. Musculoskeletal:      Cervical back: Normal range of motion and neck supple. Neurological:      Mental Status: She is alert. Pertinent Labs/Studies:      Assessment and orders:       ICD-10-CM ICD-9-CM    1. Thrombosed external hemorrhoid  K64.5 455.4 lidocaine-hydrocortisone 3-0.5 % rectal cream      oxyCODONE IR (ROXICODONE) 10 mg tab immediate release tablet      2. Ischemic cardiomyopathy  I25.5 414.8 oxyCODONE IR (ROXICODONE) 10 mg tab immediate release tablet        Diagnoses and all orders for this visit:    1. Thrombosed external hemorrhoid: Severe hemorrhoids, topicals now, and will also increase Oxycodone to 10 mg TID PRN for 7 days only. Patient is not candidate for NSAIDs at this time gven heart transplant. Has Naloxone ordered as well. Has failed OTC Preperation H as well as a topical steroid. Trial combination topical now. -     lidocaine-hydrocortisone 3-0.5 % rectal cream; Apply  to affected area two (2) times a day. -     oxyCODONE IR (ROXICODONE) 10 mg tab immediate release tablet; Take 1 Tablet by mouth every eight (8) hours as needed for Pain for up to 7 days. Max Daily Amount: 30 mg.    2. Ischemic cardiomyopathy  -     oxyCODONE IR (ROXICODONE) 10 mg tab immediate release tablet; Take 1 Tablet by mouth every eight (8) hours as needed for Pain for up to 7 days. Max Daily Amount: 30 mg. Follow-up and Dispositions    Return in about 4 weeks (around 5/25/2023). I have discussed the diagnosis with the patient and the intended plan as seen in the above orders. Social history, medical history, and labs were reviewed. The patient has received an after-visit summary and questions were answered concerning future plans. I have discussed medication side effects and warnings with the patient as well.     MD URBAN Strauss & MARSHAL CARMEN [facility]  04/27/23 Azithromycin Counseling:  I discussed with the patient the risks of azithromycin including but not limited to GI upset, allergic reaction, drug rash, diarrhea, and yeast infections.

## 2023-10-20 ENCOUNTER — OFFICE VISIT (OUTPATIENT)
Age: 43
End: 2023-10-20

## 2023-10-20 VITALS
HEIGHT: 64 IN | OXYGEN SATURATION: 100 % | BODY MASS INDEX: 31.76 KG/M2 | SYSTOLIC BLOOD PRESSURE: 116 MMHG | WEIGHT: 186 LBS | HEART RATE: 82 BPM | DIASTOLIC BLOOD PRESSURE: 83 MMHG

## 2023-10-20 DIAGNOSIS — H69.93 DYSFUNCTION OF BOTH EUSTACHIAN TUBES: ICD-10-CM

## 2023-10-20 DIAGNOSIS — R09.82 POST-NASAL DRIP: ICD-10-CM

## 2023-10-20 DIAGNOSIS — E04.1 THYROID NODULE: ICD-10-CM

## 2023-10-20 DIAGNOSIS — J30.9 ALLERGIC RHINITIS, UNSPECIFIED SEASONALITY, UNSPECIFIED TRIGGER: Primary | ICD-10-CM

## 2023-10-20 DIAGNOSIS — R09.81 NASAL CONGESTION: ICD-10-CM

## 2023-10-20 DIAGNOSIS — J34.3 HYPERTROPHY OF INFERIOR NASAL TURBINATE: ICD-10-CM

## 2023-10-20 RX ORDER — FLUTICASONE PROPIONATE 50 MCG
2 SPRAY, SUSPENSION (ML) NASAL 2 TIMES DAILY
Qty: 16 G | Refills: 0 | Status: SHIPPED | OUTPATIENT
Start: 2023-10-20

## 2023-10-20 RX ORDER — AZELASTINE 1 MG/ML
1 SPRAY, METERED NASAL 2 TIMES DAILY
Qty: 60 ML | Refills: 1 | Status: SHIPPED | OUTPATIENT
Start: 2023-10-20

## 2023-10-20 NOTE — PROGRESS NOTES
Subjective:   Cresencio Lazcano   41 y.o.   1980     Refered by: No referring provider defined for this encounter. New Patient Visit  Chief Compliant: nasal congestion    History of Present Illness:  Cresencio Lazcano is a 41 y.o. female with past medical history of arthritis, GERD, fibromyalgia, CHF after MI -now started status post heart transplant, who presents today for evaluation of nasal congestion. Patient complaining of bilateral nasal congestion right greater than left, frontal and maxillary sinus pain and pressure, as well as postnasal drip. Complaining of significant throat irritation, likely secondary to postnasal drip. She has a history of seasonal allergies, was previously tested about 10 years ago, was found to be positive for grasses and ragweed. Has never tried immunotherapy. Uses Flonase, saline irrigation, Singulair, and Loratadine. Additional complaint of bilateral hearing loss. Father has early onset hearing loss as well, was told that she he has some sort of tumor causing hearing loss. Patient is unsure about details. Additional complaint of aural fullness. No otorrhea or otalgia. Review of Systems  Consitutional: denies fever, excessive weight gain or loss. Eyes: denies diplopia, eye pain. Integumentary: denies new concerning skin lesions. Ears, Nose, Mouth, Throat: denies except as per HPI.   Endocrine: denies hot or cold intolerance, increased thirst.  Respiratory: denies cough, hemoptysis, wheezing  Gastrointestinal: denies trouble swallowing, nausea, emesis, regurgitation  Musculoskeletal: denies muscle weakness or wasting  Cardiovascular: denies chest pain, shortness of breath  Neurologic: denies seizures, numbness or tingling, syncope  Hematologic: denies easy bleeding or bruising       Past Medical History:   Diagnosis Date    Arthritis     Chronic pain     fybromyalsia    Depression     anxiety, depression, OCD    GERD (gastroesophageal reflux disease)

## 2023-10-23 ENCOUNTER — PATIENT MESSAGE (OUTPATIENT)
Facility: CLINIC | Age: 43
End: 2023-10-23

## 2023-10-23 ENCOUNTER — OFFICE VISIT (OUTPATIENT)
Age: 43
End: 2023-10-23

## 2023-10-23 VITALS
WEIGHT: 186 LBS | HEART RATE: 79 BPM | OXYGEN SATURATION: 100 % | BODY MASS INDEX: 31.76 KG/M2 | DIASTOLIC BLOOD PRESSURE: 79 MMHG | SYSTOLIC BLOOD PRESSURE: 121 MMHG | HEIGHT: 64 IN

## 2023-10-23 DIAGNOSIS — A60.9 HSV (HERPES SIMPLEX VIRUS) ANOGENITAL INFECTION: Primary | ICD-10-CM

## 2023-10-23 DIAGNOSIS — N39.0 RECURRENT UTI: Primary | ICD-10-CM

## 2023-10-23 DIAGNOSIS — R35.0 FREQUENCY OF URINATION: ICD-10-CM

## 2023-10-23 LAB
BILIRUBIN, URINE, POC: ABNORMAL
BLOOD URINE, POC: NEGATIVE
GLUCOSE URINE, POC: NEGATIVE
KETONES, URINE, POC: ABNORMAL
LEUKOCYTE ESTERASE, URINE, POC: NEGATIVE
NITRITE, URINE, POC: NEGATIVE
PH, URINE, POC: 5 (ref 4.6–8)
PROTEIN,URINE, POC: 30
PVR, POC: NORMAL CC
SPECIFIC GRAVITY, URINE, POC: 1.03 (ref 1–1.03)
URINALYSIS CLARITY, POC: CLEAR
URINALYSIS COLOR, POC: YELLOW
UROBILINOGEN, POC: ABNORMAL

## 2023-10-23 RX ORDER — VALACYCLOVIR HYDROCHLORIDE 1 G/1
1000 TABLET, FILM COATED ORAL 3 TIMES DAILY
Qty: 21 TABLET | Refills: 0 | Status: SHIPPED | OUTPATIENT
Start: 2023-10-23 | End: 2023-10-30

## 2023-10-23 RX ORDER — OXYBUTYNIN CHLORIDE 15 MG/1
15 TABLET, EXTENDED RELEASE ORAL DAILY
Qty: 30 TABLET | Refills: 3 | Status: SHIPPED | OUTPATIENT
Start: 2023-10-23

## 2023-10-23 RX ORDER — VIBEGRON 75 MG/1
75 TABLET, FILM COATED ORAL DAILY
Qty: 30 TABLET | Refills: 2 | Status: SHIPPED | OUTPATIENT
Start: 2023-10-23

## 2023-10-23 NOTE — TELEPHONE ENCOUNTER
From: Humaira Mckeon  To: Dr. Edel Qiu  Sent: 10/23/2023 4:12 PM EDT  Subject: I need the Pina Yamel Coffman I need the Valtrex the generic I'm having a problem thank you

## 2023-10-23 NOTE — PROGRESS NOTES
HISTORY OF PRESENT ILLNESS    Audie Lal is a 41 y.o. female is here with cc of urinary tract infection. . The patient is seen by Dr. Katharine Harris  and has a history of  urgency frequency and angioplasties and other sorts of muscle skeletal disorders including possible autoimmune disease. She was prescribed oxybutynin, but  did not get gemtesa. Today she reports going to the bathroom every 30-40 mins. She felt better when she was taking cipro for UTI infection, but as soon as abx was stopped her symptoms came back up. She reports that oxybutynin is not helping her   Her PUFF score was 21 last appointment, which is indicative of IC. She was also put on gemtesa, but unable to afford it. She will schedule procedure with Dr. Katharine Harris for cystoscopy and bladder hydrodistention to relive her symptoms, as it was discussed last appointment. Her urine is clean today. We will send it for culture. PVR is 0 cc           Past Medical History:  PMHx (including negatives):  has a past medical history of Arthritis, Chronic pain, Depression, GERD (gastroesophageal reflux disease), Hypertension, Hypertension, Ill-defined condition, MI (myocardial infarction) ([address]), Musculoskeletal disorder, Sepsis ([address]), SOB (shortness of breath), and Stool color black. PSurgHx:  has a past surgical history that includes Coronary angioplasty with stent; upper gi endoscopy,biopsy (06/21/2018); upper gi endoscopy,diagnosis (06/07/2018); heent (2002); gyn; and Heart transplant. PSocHx:  reports that she quit smoking about 15 months ago. Her smoking use included cigarettes. She started smoking about 19 years ago. She has a 4.50 pack-year smoking history. She has been exposed to tobacco smoke. She has never used smokeless tobacco. She reports that she does not currently use alcohol after a past usage of about 1.0 standard drink of alcohol per week. She reports that she does not currently use drugs after having used the following drugs: Marijuana Florecita Sella).

## 2023-10-25 LAB — BACTERIA UR CULT: NORMAL

## 2023-10-26 PROBLEM — R35.0 FREQUENCY OF URINATION: Status: ACTIVE | Noted: 2023-10-26

## 2023-10-27 ENCOUNTER — TELEPHONE (OUTPATIENT)
Age: 43
End: 2023-10-27

## 2023-10-27 NOTE — TELEPHONE ENCOUNTER
Pt called stating the nasal spray she was given is\" way to strong, it taste terrible and the taste lingers all day \" she also stated she she doesn't think its working, she's still having a lot of mucus that she feels is sitting at the roof of her mouth where her sinuses drain. She is requesting something else for the weekend , thank you .

## 2023-10-27 NOTE — TELEPHONE ENCOUNTER
Called Ms. Fredrick Willis to schedule her surgery to no avail. Voicemail full unable to leave a message.

## 2023-10-30 ENCOUNTER — NURSE TRIAGE (OUTPATIENT)
Dept: OTHER | Facility: CLINIC | Age: 43
End: 2023-10-30

## 2023-10-30 NOTE — TELEPHONE ENCOUNTER
[facility]    Received call from [address] at [facility], [place] with [employer]. Subjective: Caller states \"Gastroparesis. Dry mouth, throat is burning, difficulty swallowing. \"     Current Symptoms:   Burning throat, swabbed for strep and saw otolaryngolo. Dx with allergies. Nasal spray seems to make it worse. Thick saliva along roof of mouth, collects at corners of mouth. Eating and drinking normally. Activity level is normal typically. Pt questions if related to medications she is taking. Heart transplant in April    Associated Symptoms: NA    Pain Severity: No new pain, fibromyalgia     Temperature: Denies    LMP: NA Pregnant: NA    Recommended disposition: See PCP within 3 Days    Care advice provided, patient verbalizes understanding; denies any other questions or concerns; instructed to call back for any new or worsening symptoms. Patient/Caller agrees with recommended disposition; writer provided warm transfer to [employer] at [facility], [place] for appointment scheduling    Attention Provider: Thank you for allowing me to participate in the care of your patient. The patient was connected to triage in response to information provided to the ECC/PSC. Please do not respond through this encounter as the response is not directed to a shared pool.     Reason for Disposition   White patches that stick to tongue or inner cheek, which can be wiped off    Protocols used: Mouth Symptoms-ADULT-[place]

## 2023-10-31 ENCOUNTER — OFFICE VISIT (OUTPATIENT)
Facility: CLINIC | Age: 43
End: 2023-10-31
Payer: MEDICARE

## 2023-10-31 VITALS
HEART RATE: 84 BPM | RESPIRATION RATE: 16 BRPM | BODY MASS INDEX: 31.72 KG/M2 | OXYGEN SATURATION: 99 % | WEIGHT: 185.8 LBS | TEMPERATURE: 97 F | SYSTOLIC BLOOD PRESSURE: 104 MMHG | HEIGHT: 64 IN | DIASTOLIC BLOOD PRESSURE: 74 MMHG

## 2023-10-31 DIAGNOSIS — B37.0 ORAL THRUSH: Primary | ICD-10-CM

## 2023-10-31 DIAGNOSIS — N32.81 OAB (OVERACTIVE BLADDER): ICD-10-CM

## 2023-10-31 PROCEDURE — 99214 OFFICE O/P EST MOD 30 MIN: CPT | Performed by: FAMILY MEDICINE

## 2023-10-31 PROCEDURE — G8427 DOCREV CUR MEDS BY ELIG CLIN: HCPCS | Performed by: FAMILY MEDICINE

## 2023-10-31 PROCEDURE — 1036F TOBACCO NON-USER: CPT | Performed by: FAMILY MEDICINE

## 2023-10-31 PROCEDURE — G8417 CALC BMI ABV UP PARAM F/U: HCPCS | Performed by: FAMILY MEDICINE

## 2023-10-31 PROCEDURE — G8484 FLU IMMUNIZE NO ADMIN: HCPCS | Performed by: FAMILY MEDICINE

## 2023-10-31 ASSESSMENT — ENCOUNTER SYMPTOMS
ABDOMINAL PAIN: 0
SORE THROAT: 1
TROUBLE SWALLOWING: 1
CHEST TIGHTNESS: 0

## 2023-11-08 ENCOUNTER — ANESTHESIA EVENT (OUTPATIENT)
Facility: HOSPITAL | Age: 43
End: 2023-11-08
Payer: MEDICARE

## 2023-11-08 ENCOUNTER — TELEPHONE (OUTPATIENT)
Age: 43
End: 2023-11-08

## 2023-11-08 RX ORDER — TACROLIMUS 1 MG/1
5 CAPSULE ORAL
COMMUNITY

## 2023-11-08 NOTE — DISCHARGE INSTRUCTIONS
How is a hydrodistention with cystoscopy done? The stretching of any hollow organ (bladder, stomach) can cause severe pain thus hydrodistention of the bladder is ALWAYS performed under general anesthesia in a hospital setting. Patients are usually admitted for an outpatient procedure and most leave the hospital a few hours later. While you are asleep, the physician will first insert a cystoscope through the urethra and into the bladder to take a close look at the bladder wall. They are looking for any ulcers, lesions, growths or unusual findings. They will then begin the hydrodistension portion of the procedure by filling your bladder with fluid at a very low pressure. Recovery in the hospital  Once the hydrodistention is complete, you will be sent to the recovery room. Some patients find the post op recovery very easy while others report pain, pressure and discomfort after the procedure. Please request a lower acid drink such as water  It may take several hours for your bladder to wake up enough for you to urinate. The first few times you do urinate afterwards, your urethra may be quite painful. This is normal because your urethra was stretched by the cystoscope. You will get better. Your urine may also contain blood or clots that should also diminish over time. Recovery at home & pain care  Once you are able to urinate on your own, you will be sent home under the supervision of family and/or friends. Because the pain can be severe for the first 24 to 48 hours, patients are normally provided a small amount of stronger pain medication, such as short acting opiate. In general, patients slowly improve throughout the following week. If your symptoms or bleeding worsens, your pain levels increase and/or you experience a sudden fever, you should contact your physician immediately  The recovery period varies. You should rest and follow the IC diet carefully to avoid irritating your traumatized bladder wall.  Some

## 2023-11-08 NOTE — PROGRESS NOTES
Dr. Janell Eduardo notified that patient had a recent heart transplant at [facility] in April 2023. Dr. Janell Eduardo reviewed all of patients records in the EMR. Patient okay to proceed, no new orders received. Spoke to Willy Bagley, Transplant coordinator at [facility], she verbalized patient is cleared for surgery, per Dr. Ricardo Diaz. Cardiac clearance request form faxed for Dr. Ricardo Diaz to sign.      PA completed by Lara Apple, per [facility] patient held ASA x 7 days as instructed by Dr. Bria Olson.

## 2023-11-09 ENCOUNTER — TELEPHONE (OUTPATIENT)
Age: 43
End: 2023-11-09

## 2023-11-09 ENCOUNTER — ANESTHESIA (OUTPATIENT)
Facility: HOSPITAL | Age: 43
End: 2023-11-09
Payer: MEDICARE

## 2023-11-09 ENCOUNTER — HOSPITAL ENCOUNTER (OUTPATIENT)
Facility: HOSPITAL | Age: 43
Discharge: HOME | End: 2023-11-09
Attending: UROLOGY | Admitting: UROLOGY
Payer: MEDICARE

## 2023-11-09 VITALS
OXYGEN SATURATION: 98 % | SYSTOLIC BLOOD PRESSURE: 119 MMHG | TEMPERATURE: 97.8 F | BODY MASS INDEX: 32.44 KG/M2 | DIASTOLIC BLOOD PRESSURE: 74 MMHG | WEIGHT: 190 LBS | HEIGHT: 64 IN | HEART RATE: 74 BPM | RESPIRATION RATE: 16 BRPM

## 2023-11-09 LAB
GLUCOSE BLD STRIP.AUTO-MCNC: 101 MG/DL (ref 65–100)
HCG UR QL: NEGATIVE
HCG UR QL: NEGATIVE
PERFORMED BY:: ABNORMAL

## 2023-11-09 PROCEDURE — 52260 CYSTOSCOPY AND TREATMENT: CPT | Performed by: UROLOGY

## 2023-11-09 PROCEDURE — 7100000011 HC PHASE II RECOVERY - ADDTL 15 MIN: Performed by: UROLOGY

## 2023-11-09 PROCEDURE — 6360000002 HC RX W HCPCS: Performed by: NURSE ANESTHETIST, CERTIFIED REGISTERED

## 2023-11-09 PROCEDURE — 3600000002 HC SURGERY LEVEL 2 BASE: Performed by: UROLOGY

## 2023-11-09 PROCEDURE — 3700000000 HC ANESTHESIA ATTENDED CARE: Performed by: UROLOGY

## 2023-11-09 PROCEDURE — 2500000003 HC RX 250 WO HCPCS: Performed by: UROLOGY

## 2023-11-09 PROCEDURE — 81025 URINE PREGNANCY TEST: CPT

## 2023-11-09 PROCEDURE — 7100000001 HC PACU RECOVERY - ADDTL 15 MIN: Performed by: UROLOGY

## 2023-11-09 PROCEDURE — 6360000002 HC RX W HCPCS: Performed by: UROLOGY

## 2023-11-09 PROCEDURE — 6360000002 HC RX W HCPCS: Performed by: ANESTHESIOLOGY

## 2023-11-09 PROCEDURE — 7100000000 HC PACU RECOVERY - FIRST 15 MIN: Performed by: UROLOGY

## 2023-11-09 PROCEDURE — 7100000010 HC PHASE II RECOVERY - FIRST 15 MIN: Performed by: UROLOGY

## 2023-11-09 PROCEDURE — 2500000003 HC RX 250 WO HCPCS: Performed by: NURSE ANESTHETIST, CERTIFIED REGISTERED

## 2023-11-09 PROCEDURE — 2709999900 HC NON-CHARGEABLE SUPPLY: Performed by: UROLOGY

## 2023-11-09 PROCEDURE — 3600000012 HC SURGERY LEVEL 2 ADDTL 15MIN: Performed by: UROLOGY

## 2023-11-09 PROCEDURE — 82962 GLUCOSE BLOOD TEST: CPT

## 2023-11-09 PROCEDURE — 3700000001 HC ADD 15 MINUTES (ANESTHESIA): Performed by: UROLOGY

## 2023-11-09 PROCEDURE — 6370000000 HC RX 637 (ALT 250 FOR IP): Performed by: UROLOGY

## 2023-11-09 PROCEDURE — 93005 ELECTROCARDIOGRAM TRACING: CPT | Performed by: UROLOGY

## 2023-11-09 PROCEDURE — 51700 IRRIGATION OF BLADDER: CPT | Performed by: UROLOGY

## 2023-11-09 PROCEDURE — 2580000003 HC RX 258: Performed by: UROLOGY

## 2023-11-09 RX ORDER — KETOROLAC TROMETHAMINE 30 MG/ML
15 INJECTION, SOLUTION INTRAMUSCULAR; INTRAVENOUS
Status: COMPLETED | OUTPATIENT
Start: 2023-11-09 | End: 2023-11-09

## 2023-11-09 RX ORDER — DOXYCYCLINE HYCLATE 100 MG
100 TABLET ORAL 2 TIMES DAILY
Qty: 14 TABLET | Refills: 0 | Status: SHIPPED | OUTPATIENT
Start: 2023-11-09 | End: 2023-11-16

## 2023-11-09 RX ORDER — FENTANYL CITRATE 50 UG/ML
50 INJECTION, SOLUTION INTRAMUSCULAR; INTRAVENOUS EVERY 5 MIN PRN
Status: DISCONTINUED | OUTPATIENT
Start: 2023-11-09 | End: 2023-11-09 | Stop reason: HOSPADM

## 2023-11-09 RX ORDER — MIDAZOLAM HYDROCHLORIDE 1 MG/ML
INJECTION INTRAMUSCULAR; INTRAVENOUS PRN
Status: DISCONTINUED | OUTPATIENT
Start: 2023-11-09 | End: 2023-11-09 | Stop reason: SDUPTHER

## 2023-11-09 RX ORDER — GLUCAGON 1 MG/ML
1 KIT INJECTION PRN
Status: DISCONTINUED | OUTPATIENT
Start: 2023-11-09 | End: 2023-11-09 | Stop reason: HOSPADM

## 2023-11-09 RX ORDER — OXYCODONE HYDROCHLORIDE 5 MG/1
5 TABLET ORAL PRN
Status: DISCONTINUED | OUTPATIENT
Start: 2023-11-09 | End: 2023-11-09 | Stop reason: HOSPADM

## 2023-11-09 RX ORDER — OXYCODONE HYDROCHLORIDE 5 MG/1
10 TABLET ORAL PRN
Status: DISCONTINUED | OUTPATIENT
Start: 2023-11-09 | End: 2023-11-09 | Stop reason: HOSPADM

## 2023-11-09 RX ORDER — PROPOFOL 10 MG/ML
INJECTION, EMULSION INTRAVENOUS PRN
Status: DISCONTINUED | OUTPATIENT
Start: 2023-11-09 | End: 2023-11-09 | Stop reason: SDUPTHER

## 2023-11-09 RX ORDER — LIDOCAINE 4 G/G
1 PATCH TOPICAL AS NEEDED
Status: DISCONTINUED | OUTPATIENT
Start: 2023-11-09 | End: 2023-11-09 | Stop reason: HOSPADM

## 2023-11-09 RX ORDER — ONDANSETRON 2 MG/ML
4 INJECTION INTRAMUSCULAR; INTRAVENOUS
Status: DISCONTINUED | OUTPATIENT
Start: 2023-11-09 | End: 2023-11-09 | Stop reason: HOSPADM

## 2023-11-09 RX ORDER — PHENAZOPYRIDINE HYDROCHLORIDE 100 MG/1
100 TABLET, FILM COATED ORAL 3 TIMES DAILY PRN
Qty: 30 TABLET | Refills: 2 | Status: SHIPPED | OUTPATIENT
Start: 2023-11-09 | End: 2023-11-12

## 2023-11-09 RX ORDER — IPRATROPIUM BROMIDE AND ALBUTEROL SULFATE 2.5; .5 MG/3ML; MG/3ML
1 SOLUTION RESPIRATORY (INHALATION)
Status: DISCONTINUED | OUTPATIENT
Start: 2023-11-09 | End: 2023-11-09 | Stop reason: HOSPADM

## 2023-11-09 RX ORDER — KETOROLAC TROMETHAMINE 10 MG/1
10 TABLET, FILM COATED ORAL EVERY 6 HOURS PRN
Qty: 20 TABLET | Refills: 0 | Status: SHIPPED | OUTPATIENT
Start: 2023-11-09

## 2023-11-09 RX ORDER — LABETALOL HYDROCHLORIDE 5 MG/ML
10 INJECTION, SOLUTION INTRAVENOUS
Status: DISCONTINUED | OUTPATIENT
Start: 2023-11-09 | End: 2023-11-09 | Stop reason: HOSPADM

## 2023-11-09 RX ORDER — LIDOCAINE HYDROCHLORIDE 20 MG/ML
JELLY TOPICAL PRN
Status: DISCONTINUED | OUTPATIENT
Start: 2023-11-09 | End: 2023-11-09 | Stop reason: ALTCHOICE

## 2023-11-09 RX ORDER — SODIUM CHLORIDE 0.9 % (FLUSH) 0.9 %
5-40 SYRINGE (ML) INJECTION PRN
Status: DISCONTINUED | OUTPATIENT
Start: 2023-11-09 | End: 2023-11-09 | Stop reason: HOSPADM

## 2023-11-09 RX ORDER — SODIUM CHLORIDE 0.9 % (FLUSH) 0.9 %
5-40 SYRINGE (ML) INJECTION EVERY 12 HOURS SCHEDULED
Status: DISCONTINUED | OUTPATIENT
Start: 2023-11-09 | End: 2023-11-09 | Stop reason: HOSPADM

## 2023-11-09 RX ORDER — LIDOCAINE HYDROCHLORIDE 20 MG/ML
INJECTION, SOLUTION EPIDURAL; INFILTRATION; INTRACAUDAL; PERINEURAL PRN
Status: DISCONTINUED | OUTPATIENT
Start: 2023-11-09 | End: 2023-11-09 | Stop reason: SDUPTHER

## 2023-11-09 RX ORDER — HYDRALAZINE HYDROCHLORIDE 20 MG/ML
10 INJECTION INTRAMUSCULAR; INTRAVENOUS
Status: DISCONTINUED | OUTPATIENT
Start: 2023-11-09 | End: 2023-11-09 | Stop reason: HOSPADM

## 2023-11-09 RX ORDER — SODIUM CHLORIDE, SODIUM LACTATE, POTASSIUM CHLORIDE, CALCIUM CHLORIDE 600; 310; 30; 20 MG/100ML; MG/100ML; MG/100ML; MG/100ML
INJECTION, SOLUTION INTRAVENOUS CONTINUOUS
Status: DISCONTINUED | OUTPATIENT
Start: 2023-11-09 | End: 2023-11-09 | Stop reason: HOSPADM

## 2023-11-09 RX ORDER — SODIUM CHLORIDE 9 MG/ML
INJECTION, SOLUTION INTRAVENOUS PRN
Status: DISCONTINUED | OUTPATIENT
Start: 2023-11-09 | End: 2023-11-09 | Stop reason: HOSPADM

## 2023-11-09 RX ORDER — SODIUM CHLORIDE, SODIUM LACTATE, POTASSIUM CHLORIDE, CALCIUM CHLORIDE 600; 310; 30; 20 MG/100ML; MG/100ML; MG/100ML; MG/100ML
INJECTION, SOLUTION INTRAVENOUS ONCE
Status: DISCONTINUED | OUTPATIENT
Start: 2023-11-09 | End: 2023-11-09 | Stop reason: HOSPADM

## 2023-11-09 RX ORDER — DIPHENHYDRAMINE HYDROCHLORIDE 50 MG/ML
12.5 INJECTION INTRAMUSCULAR; INTRAVENOUS
Status: DISCONTINUED | OUTPATIENT
Start: 2023-11-09 | End: 2023-11-09 | Stop reason: HOSPADM

## 2023-11-09 RX ORDER — HYDROMORPHONE HYDROCHLORIDE 1 MG/ML
0.5 INJECTION, SOLUTION INTRAMUSCULAR; INTRAVENOUS; SUBCUTANEOUS EVERY 5 MIN PRN
Status: DISCONTINUED | OUTPATIENT
Start: 2023-11-09 | End: 2023-11-09 | Stop reason: HOSPADM

## 2023-11-09 RX ORDER — DEXTROSE MONOHYDRATE 100 MG/ML
INJECTION, SOLUTION INTRAVENOUS CONTINUOUS PRN
Status: DISCONTINUED | OUTPATIENT
Start: 2023-11-09 | End: 2023-11-09 | Stop reason: HOSPADM

## 2023-11-09 RX ORDER — FENTANYL CITRATE 50 UG/ML
INJECTION, SOLUTION INTRAMUSCULAR; INTRAVENOUS PRN
Status: DISCONTINUED | OUTPATIENT
Start: 2023-11-09 | End: 2023-11-09 | Stop reason: SDUPTHER

## 2023-11-09 RX ADMIN — PROPOFOL 30 MG: 10 INJECTION, EMULSION INTRAVENOUS at 08:23

## 2023-11-09 RX ADMIN — MIDAZOLAM HYDROCHLORIDE 2 MG: 2 INJECTION, SOLUTION INTRAMUSCULAR; INTRAVENOUS at 08:05

## 2023-11-09 RX ADMIN — PROPOFOL 30 MG: 10 INJECTION, EMULSION INTRAVENOUS at 08:15

## 2023-11-09 RX ADMIN — SODIUM CHLORIDE, POTASSIUM CHLORIDE, SODIUM LACTATE AND CALCIUM CHLORIDE: 600; 310; 30; 20 INJECTION, SOLUTION INTRAVENOUS at 07:04

## 2023-11-09 RX ADMIN — PROPOFOL 30 MG: 10 INJECTION, EMULSION INTRAVENOUS at 08:20

## 2023-11-09 RX ADMIN — LIDOCAINE HYDROCHLORIDE 60 MG: 20 INJECTION, SOLUTION EPIDURAL; INFILTRATION; INTRACAUDAL; PERINEURAL at 08:11

## 2023-11-09 RX ADMIN — PROPOFOL 30 MG: 10 INJECTION, EMULSION INTRAVENOUS at 08:18

## 2023-11-09 RX ADMIN — GENTAMICIN SULFATE 80 MG: 40 INJECTION, SOLUTION INTRAMUSCULAR; INTRAVENOUS at 07:18

## 2023-11-09 RX ADMIN — KETOROLAC TROMETHAMINE 15 MG: 30 INJECTION INTRAMUSCULAR; INTRAVENOUS at 09:14

## 2023-11-09 RX ADMIN — FENTANYL CITRATE 50 MCG: 50 INJECTION, SOLUTION INTRAMUSCULAR; INTRAVENOUS at 08:11

## 2023-11-09 RX ADMIN — PROPOFOL 50 MG: 10 INJECTION, EMULSION INTRAVENOUS at 08:11

## 2023-11-09 RX ADMIN — HEPARIN SODIUM: 1000 INJECTION, SOLUTION INTRAVENOUS; SUBCUTANEOUS at 08:51

## 2023-11-09 RX ADMIN — FENTANYL CITRATE 50 MCG: 50 INJECTION, SOLUTION INTRAMUSCULAR; INTRAVENOUS at 08:18

## 2023-11-09 RX ADMIN — FENTANYL CITRATE 50 MCG: 50 INJECTION, SOLUTION INTRAMUSCULAR; INTRAVENOUS at 08:57

## 2023-11-09 ASSESSMENT — PAIN SCALES - GENERAL
PAINLEVEL_OUTOF10: 4
PAINLEVEL_OUTOF10: 5
PAINLEVEL_OUTOF10: 3
PAINLEVEL_OUTOF10: 7

## 2023-11-09 ASSESSMENT — PAIN DESCRIPTION - LOCATION
LOCATION: ABDOMEN
LOCATION: ABDOMEN

## 2023-11-09 ASSESSMENT — PAIN DESCRIPTION - DESCRIPTORS: DESCRIPTORS: CRAMPING

## 2023-11-09 ASSESSMENT — PAIN - FUNCTIONAL ASSESSMENT: PAIN_FUNCTIONAL_ASSESSMENT: 0-10

## 2023-11-09 NOTE — PROGRESS NOTES
At  discharge offered pain pill  pt  states  pain is  better  advised to   eat  and  take  another pain pill when  gets  home  pt verbalized understands

## 2023-11-09 NOTE — TELEPHONE ENCOUNTER
Patient called via [employer] at 6 pm 11/8/23. Unsure of when she needed to arrive at [facility] for planned OR today. Confirmed time with OR and . Pt should arrive at 6 am for surgery at 8 am.    LVM for patient. OR also spoke to patient.

## 2023-11-09 NOTE — OP NOTE
Operative Note      Patient: Laura Morse  YOB: 1980  MRN: [id number]    Date of Procedure: 11/9/2023    Pre-Op Diagnosis Codes:     * Recurrent UTI [N39.0]  Interstitial cystitis  Post-Op Diagnosis: Same       Procedure(s):  CYSTOURETHROSCOPY AND HYDRODISTENTION OF BLADDER WITH RESCUE SOLUTION    Surgeon(s):  Kelsi Villarreal MD    Assistant:   * No surgical staff found *    Anesthesia: General    Estimated Blood Loss (mL): Minimal    Complications: None    Specimens:   * No specimens in log *    Implants:  * No implants in log *      Drains: * No LDAs found *    Findings: 600 cc bladder some petechiae hyperemic response        Detailed Description of Procedure:   Patient prepped and draped usual sterile fashion after undergoing general anesthesia placed lithotomy position timeout was performed. Preoperative antibiotics given. SCDs were applied. Patient was scoped with 24 [place] scope. Had minimal in her bladder no stones or tumors appreciated. I then filled her bladder with normal saline and her capacity 600 cc. I then removed all of saline in her bladder. She had a hyperemic response throughout the bladder and petechiae on the base on the right-hand side. I then put a Kendrick catheter in so we can instill rescue solution. Patient was taken off the table to recovery area.   Once in the recovery area he waited until the pharmacy finally filled the order and instilled rescue solution which is bicarb-lidocaine-heparin and I removed the Kendrick    Electronically signed by Kelsi Villarreal MD on 11/9/2023 at 9:11 AM

## 2023-11-09 NOTE — ANESTHESIA POSTPROCEDURE EVALUATION
Department of Anesthesiology  Postprocedure Note    Patient: Steph Thomas  MRN: [id number]  YOB: 1980  Date of evaluation: 11/9/2023      Procedure Summary     Date: 11/09/23 Room / Location: [facility]ST[facility] / [facility] MA[facility] OR    Anesthesia Start: 0805 Anesthesia Stop: 8986    Procedure: CYSTOURETHROSCOPY AND HYDRODISTENTION OF BLADDER WITH RESCUE SOLUTION (Bladder) Diagnosis:       Recurrent UTI      (Recurrent UTI [N39.0])    Surgeons: Kimberly Evans MD Responsible Provider: Varsha Bragg MD    Anesthesia Type: MAC ASA Status: 4          Anesthesia Type: MAC    William Phase I: William Score: 10    William Phase II: William Score: 10      Anesthesia Post Evaluation    Patient location during evaluation: PACU  Patient participation: complete - patient participated  Level of consciousness: awake  Pain score: 0  Airway patency: patent  Nausea & Vomiting: no nausea and no vomiting  Complications: no  Cardiovascular status: hemodynamically stable  Respiratory status: acceptable  Hydration status: stable  Multimodal analgesia pain management approach

## 2023-11-10 ENCOUNTER — OFFICE VISIT (OUTPATIENT)
Facility: CLINIC | Age: 43
End: 2023-11-10
Payer: MEDICARE

## 2023-11-10 VITALS
BODY MASS INDEX: 32.91 KG/M2 | DIASTOLIC BLOOD PRESSURE: 72 MMHG | TEMPERATURE: 97.1 F | WEIGHT: 192.8 LBS | OXYGEN SATURATION: 99 % | HEIGHT: 64 IN | RESPIRATION RATE: 16 BRPM | SYSTOLIC BLOOD PRESSURE: 107 MMHG | HEART RATE: 86 BPM

## 2023-11-10 DIAGNOSIS — N32.81 OAB (OVERACTIVE BLADDER): ICD-10-CM

## 2023-11-10 DIAGNOSIS — I25.5 ISCHEMIC CARDIOMYOPATHY: ICD-10-CM

## 2023-11-10 DIAGNOSIS — B37.0 ORAL THRUSH: Primary | ICD-10-CM

## 2023-11-10 DIAGNOSIS — I50.22 CHRONIC SYSTOLIC HEART FAILURE (HCC): ICD-10-CM

## 2023-11-10 DIAGNOSIS — B25.9 CYTOMEGALOVIRUS INFECTION, UNSPECIFIED CYTOMEGALOVIRAL INFECTION TYPE (HCC): ICD-10-CM

## 2023-11-10 DIAGNOSIS — Z94.1 HEART TRANSPLANT RECIPIENT (HCC): ICD-10-CM

## 2023-11-10 LAB
EKG ATRIAL RATE: 73 BPM
EKG DIAGNOSIS: NORMAL
EKG P AXIS: 41 DEGREES
EKG P-R INTERVAL: 152 MS
EKG Q-T INTERVAL: 402 MS
EKG QRS DURATION: 80 MS
EKG QTC CALCULATION (BAZETT): 442 MS
EKG R AXIS: 11 DEGREES
EKG T AXIS: 45 DEGREES
EKG VENTRICULAR RATE: 73 BPM

## 2023-11-10 PROCEDURE — 99212 OFFICE O/P EST SF 10 MIN: CPT | Performed by: FAMILY MEDICINE

## 2023-11-10 PROCEDURE — 1036F TOBACCO NON-USER: CPT | Performed by: FAMILY MEDICINE

## 2023-11-10 PROCEDURE — G8427 DOCREV CUR MEDS BY ELIG CLIN: HCPCS | Performed by: FAMILY MEDICINE

## 2023-11-10 PROCEDURE — G8484 FLU IMMUNIZE NO ADMIN: HCPCS | Performed by: FAMILY MEDICINE

## 2023-11-10 PROCEDURE — G8417 CALC BMI ABV UP PARAM F/U: HCPCS | Performed by: FAMILY MEDICINE

## 2023-11-10 ASSESSMENT — ENCOUNTER SYMPTOMS
DIARRHEA: 1
CHEST TIGHTNESS: 0
ABDOMINAL PAIN: 0

## 2023-11-16 ENCOUNTER — TELEPHONE (OUTPATIENT)
Age: 43
End: 2023-11-16

## 2023-11-16 NOTE — TELEPHONE ENCOUNTER
Patient had procedure completed last Thursday and is now experienceing frequency and nocturia.   Patient wants to know if this is normal?    Please advise

## 2023-11-16 NOTE — TELEPHONE ENCOUNTER
Called the patient and discussed her symptoms. She reports  having frequency and nocturia. She is not in pain. Discussed IC diet.  She will have follow up appointment with Dr. German Bhatt on 11/30/2023 detailed exam negative

## 2023-11-16 NOTE — TELEPHONE ENCOUNTER
Pt called again regarding previous encounter and stated she would like to speak with a different nurse because she was having a hard time understanding Bahchis

## 2023-11-16 NOTE — TELEPHONE ENCOUNTER
Pt lvm stating she had a procedure last Thursday and is still having issues.  Wants advisement can you please reach out to her

## 2023-11-17 ENCOUNTER — TELEPHONE (OUTPATIENT)
Age: 43
End: 2023-11-17

## 2023-11-17 NOTE — TELEPHONE ENCOUNTER
Pt called and wanted me to tell you she stopped taking the red pill and its still pain down there where she go's to the bathroom her lower stomach is hurting and its been a week I told her I would send you a message and will call her back if you need me to

## 2023-11-21 ENCOUNTER — PATIENT MESSAGE (OUTPATIENT)
Facility: CLINIC | Age: 43
End: 2023-11-21

## 2023-11-21 DIAGNOSIS — L29.9 SCALP ITCH: Primary | ICD-10-CM

## 2023-11-22 PROBLEM — N30.10 INTERSTITIAL CYSTITIS: Status: ACTIVE | Noted: 2023-11-22

## 2023-11-22 RX ORDER — CLOBETASOL PROPIONATE 0.46 MG/ML
1 SOLUTION TOPICAL 2 TIMES DAILY
Qty: 150 ML | Refills: 1 | Status: SHIPPED | OUTPATIENT
Start: 2023-11-22

## 2023-11-22 NOTE — TELEPHONE ENCOUNTER
From: Theodora Mosquera  To: Dr. Lia Caldera  Sent: 11/21/2023 12:45 PM EST  Subject: Scalp problems     I Dr. Monika Leyva I sorry to brother you but my scalp is sore to the touch I just came on like Thursday I been using the Tramcinolone acetone ointment and the the medicated shampoo that start with a k the name rubbed odd I was giving this for years at the dermatologist but I don't have this. I got this one I had the cmv . I can't comb my hair good is there something else to try that is stronger. I been reading on it and it said autoimmune disease bring it on and cmv is a autoimmune disease. Please help thank you.

## 2023-11-30 ENCOUNTER — OFFICE VISIT (OUTPATIENT)
Age: 43
End: 2023-11-30
Payer: MEDICARE

## 2023-11-30 VITALS
RESPIRATION RATE: 18 BRPM | BODY MASS INDEX: 32.95 KG/M2 | TEMPERATURE: 98 F | HEIGHT: 64 IN | SYSTOLIC BLOOD PRESSURE: 124 MMHG | WEIGHT: 193 LBS | DIASTOLIC BLOOD PRESSURE: 85 MMHG | OXYGEN SATURATION: 97 % | HEART RATE: 85 BPM

## 2023-11-30 DIAGNOSIS — N32.81 OAB (OVERACTIVE BLADDER): ICD-10-CM

## 2023-11-30 DIAGNOSIS — N30.10 INTERSTITIAL CYSTITIS: Primary | ICD-10-CM

## 2023-11-30 DIAGNOSIS — N95.9 PREMENOPAUSAL PATIENT: ICD-10-CM

## 2023-11-30 DIAGNOSIS — N39.0 RECURRENT UTI: ICD-10-CM

## 2023-11-30 DIAGNOSIS — R35.0 FREQUENCY OF MICTURITION: ICD-10-CM

## 2023-11-30 LAB
BILIRUBIN, URINE, POC: NEGATIVE
BLOOD URINE, POC: NEGATIVE
GLUCOSE URINE, POC: NEGATIVE
KETONES, URINE, POC: NEGATIVE
LEUKOCYTE ESTERASE, URINE, POC: NEGATIVE
NITRITE, URINE, POC: NEGATIVE
PH, URINE, POC: 5.5 (ref 4.6–8)
PROTEIN,URINE, POC: NEGATIVE
SPECIFIC GRAVITY, URINE, POC: 1.01 (ref 1–1.03)
URINALYSIS CLARITY, POC: CLEAR
URINALYSIS COLOR, POC: YELLOW
UROBILINOGEN, POC: NORMAL

## 2023-11-30 PROCEDURE — 99214 OFFICE O/P EST MOD 30 MIN: CPT | Performed by: UROLOGY

## 2023-11-30 PROCEDURE — G8417 CALC BMI ABV UP PARAM F/U: HCPCS | Performed by: UROLOGY

## 2023-11-30 PROCEDURE — 81003 URINALYSIS AUTO W/O SCOPE: CPT | Performed by: UROLOGY

## 2023-11-30 PROCEDURE — 1036F TOBACCO NON-USER: CPT | Performed by: UROLOGY

## 2023-11-30 PROCEDURE — G8484 FLU IMMUNIZE NO ADMIN: HCPCS | Performed by: UROLOGY

## 2023-11-30 PROCEDURE — G8427 DOCREV CUR MEDS BY ELIG CLIN: HCPCS | Performed by: UROLOGY

## 2023-11-30 RX ORDER — ESTRADIOL 0.1 MG/G
1 CREAM VAGINAL
Qty: 42.5 G | Refills: 3 | Status: SHIPPED | OUTPATIENT
Start: 2023-12-01

## 2023-11-30 RX ORDER — OXYBUTYNIN CHLORIDE 15 MG/1
15 TABLET, EXTENDED RELEASE ORAL DAILY
Qty: 90 TABLET | Refills: 3 | Status: SHIPPED | OUTPATIENT
Start: 2023-11-30

## 2023-11-30 ASSESSMENT — PATIENT HEALTH QUESTIONNAIRE - PHQ9
2. FEELING DOWN, DEPRESSED OR HOPELESS: 0
SUM OF ALL RESPONSES TO PHQ QUESTIONS 1-9: 0
SUM OF ALL RESPONSES TO PHQ QUESTIONS 1-9: 0
SUM OF ALL RESPONSES TO PHQ9 QUESTIONS 1 & 2: 0
1. LITTLE INTEREST OR PLEASURE IN DOING THINGS: 0
SUM OF ALL RESPONSES TO PHQ QUESTIONS 1-9: 0
SUM OF ALL RESPONSES TO PHQ QUESTIONS 1-9: 0

## 2023-11-30 ASSESSMENT — ENCOUNTER SYMPTOMS
BACK PAIN: 1
ABDOMINAL PAIN: 1
SORE THROAT: 1
EYE ITCHING: 1
TROUBLE SWALLOWING: 1
EYE DISCHARGE: 1

## 2023-11-30 NOTE — PROGRESS NOTES
HISTORY OF PRESENT ILLNESS  Roselia Avina is a 41 y.o. female   Patient continues with some urgency though she can now go 5 hours without voiding. She still voids at home with urgency and frequency. Feels like she could have a low-grade bladder infection now so we will send urine off for culture. She also is premenopausal she has irregular periods now and may benefit by Estrace cream.  I warned her of side effects and taught her how to apply the cream and I will see her back in 4 to 6 weeks  1. Interstitial cystitis  Overview:  S/p Cystouretheroscopy with hydrodistention 11/9/23  Orders:  -     AMB POC URINALYSIS DIP STICK AUTO W/O MICRO  2. Recurrent UTI  Overview:  Urine culture 10/5/23: positive for Pseudomonas. Treated with Ciprofloxacin. Orders:  -     AMB POC URINALYSIS DIP STICK AUTO W/O MICRO  -     Culture, Urine  3. OAB (overactive bladder)  Overview:  Frequency ever 30-40 minutes. Oxybutynin not beneficial. Unable to afford Gemtesa. Orders:  -     AMB POC URINALYSIS DIP STICK AUTO W/O MICRO  4. Frequency of micturition  -     AMB POC URINALYSIS DIP STICK AUTO W/O MICRO  -     estradiol (ESTRACE VAGINAL) 0.1 MG/GM vaginal cream; Place 1 g vaginally three times a week, Disp-42.5 g, R-3Normal  -     oxybutynin (DITROPAN XL) 15 MG extended release tablet; Take 1 tablet by mouth daily, Disp-90 tablet, R-3Normal  5.  Premenopausal patient  -     estradiol (ESTRACE VAGINAL) 0.1 MG/GM vaginal cream; Place 1 g vaginally three times a week, Disp-42.5 g, R-3Normal        PAST MEDICAL HISTORY  PMHx (including negatives):  has a past medical history of Anastomotic stricture of esophagus, Arthritis, CHF (congestive heart failure) ([facility]), Chronic pain, CMV (cytomegalovirus infection) ([address]), Depression, GERD (gastroesophageal reflux disease), Hypertension, Hypertension, Ill-defined condition, MI (myocardial infarction) ([address]), Musculoskeletal disorder, Sepsis ([address]), SOB (shortness of breath), and Stool color

## 2023-12-05 ENCOUNTER — OFFICE VISIT (OUTPATIENT)
Age: 43
End: 2023-12-05
Payer: MEDICARE

## 2023-12-05 ENCOUNTER — NURSE ONLY (OUTPATIENT)
Age: 43
End: 2023-12-05
Payer: MEDICARE

## 2023-12-05 DIAGNOSIS — J30.9 ALLERGIC RHINITIS, UNSPECIFIED SEASONALITY, UNSPECIFIED TRIGGER: Primary | ICD-10-CM

## 2023-12-05 DIAGNOSIS — H93.13 TINNITUS, BILATERAL: Primary | ICD-10-CM

## 2023-12-05 DIAGNOSIS — J30.89 ALLERGIC RHINITIS DUE TO OTHER ALLERGIC TRIGGER, UNSPECIFIED SEASONALITY: ICD-10-CM

## 2023-12-05 PROCEDURE — 95024 IQ TESTS W/ALLERGENIC XTRCS: CPT | Performed by: NURSE PRACTITIONER

## 2023-12-05 PROCEDURE — 92557 COMPREHENSIVE HEARING TEST: CPT | Performed by: AUDIOLOGIST

## 2023-12-05 PROCEDURE — 95004 PERQ TESTS W/ALRGNC XTRCS: CPT | Performed by: NURSE PRACTITIONER

## 2023-12-05 PROCEDURE — 92567 TYMPANOMETRY: CPT | Performed by: AUDIOLOGIST

## 2023-12-05 NOTE — PROGRESS NOTES
Inserts    See scanned audiogram dated 12/5/2023  for results. PATIENT EDUCATION:       The following items were discussed with the patient:   - Good Communication Strategies  - Tinnitus Management Strategies      Educational information was shared in the After Visit Summary. RECOMMENDATIONS:                                                                                                                                                                                                                                                            The following items are recommended based on patient report and results from today's appointment:   - Continue medical follow-up with Yon Blackmon MD.    - Retest hearing as medically indicated and/or sooner if a change in hearing is noted. - Utilize \"Good Communication Strategies\" as discussed to assist in speech understanding with communication partners. - Maintain a sound enriched environment to assist in the management of tinnitus symptoms.        Jennifer Hadry  Audiologist    Chart CC'd to: Yon Blackmon MD      Degree of   Hearing Sensitivity dB Range   Within Normal Limits (WNL) 0 - 20   Mild 20 - 40   Moderate 40 - 55   Moderately-Severe 55 - 70   Severe 70 - 90   Profound 90 +

## 2023-12-06 DIAGNOSIS — M54.41 CHRONIC BILATERAL LOW BACK PAIN WITH BILATERAL SCIATICA: ICD-10-CM

## 2023-12-06 DIAGNOSIS — G89.29 CHRONIC BILATERAL LOW BACK PAIN WITH BILATERAL SCIATICA: ICD-10-CM

## 2023-12-06 DIAGNOSIS — R25.2 BILATERAL LEG CRAMPS: ICD-10-CM

## 2023-12-06 DIAGNOSIS — M54.42 CHRONIC BILATERAL LOW BACK PAIN WITH BILATERAL SCIATICA: ICD-10-CM

## 2023-12-06 RX ORDER — PREGABALIN 75 MG/1
75 CAPSULE ORAL 2 TIMES DAILY
Qty: 60 CAPSULE | Refills: 0 | Status: SHIPPED | OUTPATIENT
Start: 2023-12-06 | End: 2024-01-05

## 2023-12-26 RX ORDER — MONTELUKAST SODIUM 10 MG/1
10 TABLET ORAL DAILY
Qty: 90 TABLET | Refills: 3 | Status: SHIPPED | OUTPATIENT
Start: 2023-12-26

## 2024-01-03 ENCOUNTER — OFFICE VISIT (OUTPATIENT)
Age: 44
End: 2024-01-03
Payer: MEDICARE

## 2024-01-03 ENCOUNTER — NURSE TRIAGE (OUTPATIENT)
Dept: OTHER | Facility: CLINIC | Age: 44
End: 2024-01-03

## 2024-01-03 VITALS — SYSTOLIC BLOOD PRESSURE: 146 MMHG | HEART RATE: 109 BPM | DIASTOLIC BLOOD PRESSURE: 68 MMHG

## 2024-01-03 DIAGNOSIS — R35.0 FREQUENCY OF URINATION: ICD-10-CM

## 2024-01-03 DIAGNOSIS — N32.81 OAB (OVERACTIVE BLADDER): ICD-10-CM

## 2024-01-03 DIAGNOSIS — N39.0 RECURRENT UTI: Primary | ICD-10-CM

## 2024-01-03 DIAGNOSIS — E66.01 SEVERE OBESITY (BMI 35.0-39.9) WITH COMORBIDITY (HCC): ICD-10-CM

## 2024-01-03 DIAGNOSIS — Z94.1 STATUS POST HEART TRANSPLANT (HCC): ICD-10-CM

## 2024-01-03 DIAGNOSIS — G20.A1 PARKINSON'S DISEASE, UNSPECIFIED WHETHER DYSKINESIA PRESENT, UNSPECIFIED WHETHER MANIFESTATIONS FLUCTUATE: ICD-10-CM

## 2024-01-03 DIAGNOSIS — N30.10 INTERSTITIAL CYSTITIS: ICD-10-CM

## 2024-01-03 LAB
BILIRUBIN, URINE, POC: NEGATIVE
BLOOD URINE, POC: NEGATIVE
GLUCOSE URINE, POC: NEGATIVE
KETONES, URINE, POC: ABNORMAL
LEUKOCYTE ESTERASE, URINE, POC: NEGATIVE
NITRITE, URINE, POC: NEGATIVE
PH, URINE, POC: 5.5 (ref 4.6–8)
PROTEIN,URINE, POC: NEGATIVE
PVR, POC: NORMAL CC
SPECIFIC GRAVITY, URINE, POC: 1.02 (ref 1–1.03)
URINALYSIS CLARITY, POC: CLEAR
URINALYSIS COLOR, POC: ABNORMAL
UROBILINOGEN, POC: ABNORMAL

## 2024-01-03 PROCEDURE — 51798 US URINE CAPACITY MEASURE: CPT | Performed by: UROLOGY

## 2024-01-03 PROCEDURE — G8427 DOCREV CUR MEDS BY ELIG CLIN: HCPCS | Performed by: UROLOGY

## 2024-01-03 PROCEDURE — G8417 CALC BMI ABV UP PARAM F/U: HCPCS | Performed by: UROLOGY

## 2024-01-03 PROCEDURE — 81001 URINALYSIS AUTO W/SCOPE: CPT | Performed by: UROLOGY

## 2024-01-03 PROCEDURE — G8484 FLU IMMUNIZE NO ADMIN: HCPCS | Performed by: UROLOGY

## 2024-01-03 PROCEDURE — 1036F TOBACCO NON-USER: CPT | Performed by: UROLOGY

## 2024-01-03 PROCEDURE — 99214 OFFICE O/P EST MOD 30 MIN: CPT | Performed by: UROLOGY

## 2024-01-03 RX ORDER — EZETIMIBE 10 MG/1
10 TABLET ORAL DAILY
COMMUNITY

## 2024-01-03 NOTE — PROGRESS NOTES
Chief Complaint   Patient presents with    Follow-up    Urinary Tract Infection    Cystitis        BP (!) 146/68   Pulse (!) 109      PHQ-9 score is    Negative      1. \"Have you been to the ER, urgent care clinic since your last visit?  Hospitalized since your last visit?\" No    2. \"Have you seen or consulted any other health care providers outside of the [facility] since your last visit?\" No     3. For patients aged 45-75: Has the patient had a colonoscopy / FIT/ Cologuard? NA - based on age      If the patient is female:    4. For patients aged 40-74: Has the patient had a mammogram within the past 2 years? Yes - no Care Gap present      5. For patients aged 21-65: Has the patient had a pap smear? Yes - no Care Gap present  
side and 2+ on the left side.        Dorsalis pedis pulses are 2+ on the right side and 2+ on the left side.      Heart sounds: Normal heart sounds.   Pulmonary:      Effort: Pulmonary effort is normal. No respiratory distress.      Breath sounds: Normal breath sounds. No wheezing or rhonchi.   She does have a upper respiratory infection perhaps she is followed by her the heart clinic very closely  Chest:      Comments: Surgical scar noted to sternum   Musculoskeletal:         General: No swelling. Normal range of motion.      Cervical back: Normal range of motion and neck supple.      Right lower leg: No edema.      Left lower leg: No edema.   Skin:     General: Skin is warm and dry.      Capillary Refill: Capillary refill takes less than 2 seconds.      Findings: No bruising, ecchymosis, erythema or rash.   Neurological:      Mental Status: She is alert and oriented to person, place, and time.   ASSESSMENT and PLAN  1. Recurrent UTI  -     AMB POC URINALYSIS DIP STICK AUTO W/ MICRO  -     AMB POC PVR, ABDULAZIZ,POST-VOID RES,US,NON-IMAGING  2. OAB (overactive bladder)  3. Interstitial cystitis  4. Frequency of urination  5. Severe obesity (BMI 35.0-39.9) with comorbidity (HCC)  6. Parkinson's disease, unspecified whether dyskinesia present, unspecified whether manifestations fluctuate  7. Status post heart transplant (HCC)    Patient is on Lasix so we will have some frequency.  Will keep her in the Ditropan in the Estrace cream see her back in 6 months    Jose Alberto Tan MD      Please note that portions of this note was potentially completed with Dragon dictation, the computer voice recognition software.  Quite often unanticipated grammatical, syntax, homophones, and other interpretive errors are inadvertently transcribed by the computer software.  Please disregard these errors.  Please excuse any errors that have escaped final proofreading.  Thank you.

## 2024-01-03 NOTE — TELEPHONE ENCOUNTER
[facility]    Received call from Kandis at [facility] with Red Flag Complaint.    Subjective: Caller states heart transplant patient with SOB    Current Symptoms:   -hoarseness to voice x 2 weeks  -COVID test at home negative today  -sore throat began today  -patient is speaking in full sentences without difficulty during call    Onset: 2 weeks ago; unchanged    Pain Severity: 2/10; sore; constant    Temperature: denies fever     What has been tried:   -Mucinex  -tea with lemon  -nasal spray    LMP: NA Pregnant: No    Recommended disposition: See PCP within 3 Days    Care advice provided, patient verbalizes understanding; denies any other questions or concerns; instructed to call back for any new or worsening symptoms.    Patient/Caller agrees with recommended disposition; writer provided warm transfer to [facility] at [facility] for appointment scheduling    Attention Provider:  Thank you for allowing me to participate in the care of your patient.  The patient was connected to triage in response to information provided to the [facility].  Please do not respond through this encounter as the response is not directed to a shared pool.      Reason for Disposition   MODERATE longstanding difficulty breathing (e.g., speaks in phrases, SOB even at rest, pulse 100-120) and SAME as normal    Protocols used: Breathing Difficulty-ADULT-[place]

## 2024-01-05 ENCOUNTER — PATIENT MESSAGE (OUTPATIENT)
Facility: CLINIC | Age: 44
End: 2024-01-05

## 2024-01-05 DIAGNOSIS — G25.81 RLS (RESTLESS LEGS SYNDROME): Primary | ICD-10-CM

## 2024-01-05 RX ORDER — PRAMIPEXOLE DIHYDROCHLORIDE 0.25 MG/1
0.25 TABLET ORAL NIGHTLY
Qty: 90 TABLET | Refills: 0 | Status: SHIPPED | OUTPATIENT
Start: 2024-01-05

## 2024-01-05 NOTE — TELEPHONE ENCOUNTER
From: Coco Monge  To: Dr. Ralph Good  Sent: 1/5/2024 7:04 AM EST  Subject: Covid restless leg    Hi Dr. Good, I got covid I'm doing infusions for 3 days in a row put patient. I feel so bad. My restless has picked up can we up my restless leg medication . I talk to Petra my heart transplant nurse she said to get with you. I'm having it so bad even with the medication. I need your help really bad. They don't want me in the er. because my oxygen level and temperature is good. I really need your help it is so bad.

## 2024-01-08 ENCOUNTER — TELEPHONE (OUTPATIENT)
Facility: CLINIC | Age: 44
End: 2024-01-08

## 2024-01-08 NOTE — TELEPHONE ENCOUNTER
----- Message from Lisseth Garcia sent at 1/8/2024 10:22 AM EST -----  Subject: Message to Provider    QUESTIONS  Information for Provider? Coco had an appointment today at 8:20 am   01/08/2024 she missed her appointment she is still not feeling well and   feeling weak, she did not want a NT, she will call at a later time and   reshcedule  ---------------------------------------------------------------------------  --------------  CALL BACK INFO  [phone]; Do not leave any message, patient will call back for answer  ---------------------------------------------------------------------------  --------------  SCRIPT ANSWERS  Relationship to Patient? Self

## 2024-01-10 RX ORDER — PANTOPRAZOLE SODIUM 40 MG/1
TABLET, DELAYED RELEASE ORAL
Qty: 180 TABLET | Refills: 3 | Status: SHIPPED | OUTPATIENT
Start: 2024-01-10

## 2024-01-11 DIAGNOSIS — M54.42 CHRONIC BILATERAL LOW BACK PAIN WITH BILATERAL SCIATICA: ICD-10-CM

## 2024-01-11 DIAGNOSIS — G89.29 CHRONIC BILATERAL LOW BACK PAIN WITH BILATERAL SCIATICA: ICD-10-CM

## 2024-01-11 DIAGNOSIS — R25.2 BILATERAL LEG CRAMPS: ICD-10-CM

## 2024-01-11 DIAGNOSIS — M54.41 CHRONIC BILATERAL LOW BACK PAIN WITH BILATERAL SCIATICA: ICD-10-CM

## 2024-01-12 ENCOUNTER — PATIENT MESSAGE (OUTPATIENT)
Facility: CLINIC | Age: 44
End: 2024-01-12

## 2024-01-12 RX ORDER — PREGABALIN 75 MG/1
75 CAPSULE ORAL 2 TIMES DAILY
Qty: 60 CAPSULE | Refills: 2 | Status: SHIPPED | OUTPATIENT
Start: 2024-01-12 | End: 2024-04-11

## 2024-01-12 NOTE — TELEPHONE ENCOUNTER
Problem: At Risk for Falls  Goal: # Takes action to control fall-related risks  Outcome: Outcome Met, Continue evaluating goal progress toward completion  Note: Bed alarm on, call light within reach, non skid socks worn during activities     Problem: VTE, Risk for  Goal: # No s/s of VTE  Outcome: Outcome Met, Continue evaluating goal progress toward completion  Note: No S/S of VTE      Pt called to check the status of the med refill request, pt stated she is completely out as of today.    Please advise..

## 2024-01-12 NOTE — TELEPHONE ENCOUNTER
From: Coco Monge  To: Dr. Ralph Good  Sent: 1/12/2024 10:19 AM EST  Subject: Pregabalin     Hi Dr. Good I sent in a request because I need my pregabalin refilled I have ran out.

## 2024-01-16 ENCOUNTER — PATIENT MESSAGE (OUTPATIENT)
Facility: CLINIC | Age: 44
End: 2024-01-16

## 2024-01-16 RX ORDER — ALBUTEROL SULFATE 90 UG/1
1 AEROSOL, METERED RESPIRATORY (INHALATION) 4 TIMES DAILY
Qty: 18 G | Refills: 2 | Status: SHIPPED | OUTPATIENT
Start: 2024-01-16

## 2024-01-16 NOTE — TELEPHONE ENCOUNTER
From: Coco Monge  To: Dr. Ralph Good  Sent: 1/16/2024 2:16 PM EST  Subject: Inhaler    Hi Ney Good I need to get an inhaler the covid really messed me up I'm doing my breathing treat as of today but i need an inhaler please.     Thank you

## 2024-01-23 ENCOUNTER — TELEPHONE (OUTPATIENT)
Facility: CLINIC | Age: 44
End: 2024-01-23

## 2024-01-23 ENCOUNTER — OFFICE VISIT (OUTPATIENT)
Facility: CLINIC | Age: 44
End: 2024-01-23
Payer: MEDICARE

## 2024-01-23 VITALS
HEART RATE: 85 BPM | TEMPERATURE: 97.1 F | WEIGHT: 205.6 LBS | BODY MASS INDEX: 35.1 KG/M2 | HEIGHT: 64 IN | OXYGEN SATURATION: 98 % | DIASTOLIC BLOOD PRESSURE: 88 MMHG | RESPIRATION RATE: 14 BRPM | SYSTOLIC BLOOD PRESSURE: 115 MMHG

## 2024-01-23 DIAGNOSIS — M25.561 CHRONIC PAIN OF RIGHT KNEE: ICD-10-CM

## 2024-01-23 DIAGNOSIS — U09.9 POST-COVID CHRONIC FATIGUE: ICD-10-CM

## 2024-01-23 DIAGNOSIS — G89.29 CHRONIC PAIN OF RIGHT KNEE: ICD-10-CM

## 2024-01-23 DIAGNOSIS — J40 BRONCHITIS: Primary | ICD-10-CM

## 2024-01-23 DIAGNOSIS — G93.32 POST-COVID CHRONIC FATIGUE: ICD-10-CM

## 2024-01-23 DIAGNOSIS — I50.22 CHRONIC SYSTOLIC HEART FAILURE (HCC): ICD-10-CM

## 2024-01-23 DIAGNOSIS — J30.1 NON-SEASONAL ALLERGIC RHINITIS DUE TO POLLEN: ICD-10-CM

## 2024-01-23 DIAGNOSIS — Z94.1 H/O HEART TRANSPLANT (HCC): ICD-10-CM

## 2024-01-23 PROBLEM — E66.01 SEVERE OBESITY (BMI 35.0-39.9) WITH COMORBIDITY (HCC): Status: ACTIVE | Noted: 2018-11-29

## 2024-01-23 PROBLEM — G20.A1 PARKINSON'S DISEASE: Status: ACTIVE | Noted: 2024-01-23

## 2024-01-23 PROCEDURE — 99214 OFFICE O/P EST MOD 30 MIN: CPT | Performed by: FAMILY MEDICINE

## 2024-01-23 PROCEDURE — G8484 FLU IMMUNIZE NO ADMIN: HCPCS | Performed by: FAMILY MEDICINE

## 2024-01-23 PROCEDURE — G8417 CALC BMI ABV UP PARAM F/U: HCPCS | Performed by: FAMILY MEDICINE

## 2024-01-23 PROCEDURE — G8428 CUR MEDS NOT DOCUMENT: HCPCS | Performed by: FAMILY MEDICINE

## 2024-01-23 PROCEDURE — 1036F TOBACCO NON-USER: CPT | Performed by: FAMILY MEDICINE

## 2024-01-23 RX ORDER — FLUOCINOLONE ACETONIDE 0.11 MG/ML
OIL TOPICAL
COMMUNITY
Start: 2024-01-22

## 2024-01-23 RX ORDER — FLUTICASONE PROPIONATE 50 MCG
2 SPRAY, SUSPENSION (ML) NASAL 2 TIMES DAILY
Qty: 16 G | Refills: 3 | Status: SHIPPED | OUTPATIENT
Start: 2024-01-23

## 2024-01-23 RX ORDER — AZELASTINE 1 MG/ML
1 SPRAY, METERED NASAL
COMMUNITY
Start: 2023-10-20

## 2024-01-23 RX ORDER — PREGABALIN 150 MG/1
150 CAPSULE ORAL 2 TIMES DAILY
Qty: 60 CAPSULE | Refills: 2 | Status: SHIPPED | OUTPATIENT
Start: 2024-01-23 | End: 2024-01-26 | Stop reason: DRUGHIGH

## 2024-01-23 RX ORDER — VALGANCICLOVIR 450 MG/1
TABLET, FILM COATED ORAL
COMMUNITY
Start: 2023-12-13

## 2024-01-23 RX ORDER — IPRATROPIUM BROMIDE AND ALBUTEROL SULFATE 2.5; .5 MG/3ML; MG/3ML
1 SOLUTION RESPIRATORY (INHALATION) EVERY 4 HOURS
COMMUNITY

## 2024-01-23 RX ORDER — LORAZEPAM 1 MG/1
1 TABLET ORAL EVERY 6 HOURS PRN
COMMUNITY

## 2024-01-23 RX ORDER — FUROSEMIDE 40 MG/1
40 TABLET ORAL DAILY
COMMUNITY
Start: 2024-01-17

## 2024-01-23 ASSESSMENT — ENCOUNTER SYMPTOMS
ABDOMINAL PAIN: 1
SINUS PRESSURE: 1
CHEST TIGHTNESS: 0
SORE THROAT: 1
SHORTNESS OF BREATH: 1

## 2024-01-23 NOTE — PROGRESS NOTES
Chief Complaint   Patient presents with    Knee Pain     Bilateral knee pain     Follow-up     Follow up from covid 1/4/24         \"Have you been to the ER, urgent care clinic since your last visit?  Hospitalized since your last visit?\"    Yes [facility]     “Have you seen or consulted any other health care providers outside of [facility] since your last visit?”    [facility]            Health Maintenance Due   Topic Date Due    Shingles vaccine (1 of 2) Never done    Flu vaccine (1) 08/01/2023    COVID-19 Vaccine (4 - 2023-24 season) 09/01/2023    Lipids  10/22/2023    Annual Wellness Visit (Medicare Advantage)  Never done    A1C test (Diabetic or Prediabetic)  02/09/2024    Breast cancer screen  02/10/2024       
Occupational Health - Occupational Stress Questionnaire     Feeling of Stress : To some extent   Social Connections: Socially Isolated (9/30/2022)    Social Connection and Isolation Panel [NHANES]     Frequency of Communication with Friends and Family: Twice a week     Frequency of Social Gatherings with Friends and Family: Never     Attends Mandaeism Services: Never     Active Member of Clubs or Organizations: No     Attends Club or Organization Meetings: Never     Marital Status: Never    Intimate Partner Violence: Not At Risk (9/30/2022)    Humiliation, Afraid, Rape, and Kick questionnaire     Fear of Current or Ex-Partner: No     Emotionally Abused: No     Physically Abused: No     Sexually Abused: No   Housing Stability: Unknown (9/11/2023)    Housing Stability Vital Sign     Unstable Housing in the Last Year: No        Review of Systems   Review of Systems   Constitutional:  Negative for activity change and appetite change.   Respiratory:  Negative for chest tightness.    Cardiovascular:  Negative for chest pain.   Musculoskeletal:  Positive for arthralgias.     Objective:   /88 (Site: Right Upper Arm, Position: Sitting, Cuff Size: Large Adult)   Pulse 85   Temp 97.1 °F (36.2 °C) (Oral)   Resp 14   Ht 1.626 m (5' 4\")   Wt 93.3 kg (205 lb 9.6 oz)   SpO2 98%   BMI 35.29 kg/m²      Physical Exam  Vitals and nursing note reviewed.   Constitutional:       Appearance: Normal appearance.   HENT:      Head: Normocephalic and atraumatic.   Cardiovascular:      Rate and Rhythm: Normal rate and regular rhythm.      Pulses: Normal pulses.      Heart sounds: Normal heart sounds. No murmur heard.     No friction rub. No gallop.   Pulmonary:      Effort: Pulmonary effort is normal.      Breath sounds: Normal breath sounds.   Abdominal:      General: Abdomen is flat. Bowel sounds are normal.      Palpations: Abdomen is soft.   Musculoskeletal:      Cervical back: Normal range of motion and neck supple.

## 2024-01-23 NOTE — TELEPHONE ENCOUNTER
Mariola from [facility] called to report that pt's insurance is out of network with them,  so they will not be able to complete pt's order for a nebulizer.    Mariola recommended trying [facility].    Please advise..

## 2024-01-24 ENCOUNTER — PATIENT MESSAGE (OUTPATIENT)
Facility: CLINIC | Age: 44
End: 2024-01-24

## 2024-01-24 DIAGNOSIS — G25.81 RLS (RESTLESS LEGS SYNDROME): Primary | ICD-10-CM

## 2024-01-24 RX ORDER — KETOCONAZOLE 20 MG/ML
SHAMPOO TOPICAL
Qty: 120 ML | Refills: 3 | Status: SHIPPED | OUTPATIENT
Start: 2024-01-24

## 2024-01-25 RX ORDER — PRAMIPEXOLE DIHYDROCHLORIDE 0.5 MG/1
0.5 TABLET ORAL NIGHTLY
Qty: 90 TABLET | Refills: 1 | Status: SHIPPED | OUTPATIENT
Start: 2024-01-25

## 2024-01-25 NOTE — TELEPHONE ENCOUNTER
From: Coco Monge  To: Dr. Ralph Good  Sent: 1/24/2024 8:32 AM EST  Subject: Restless leg    Hi Dr. Good I'm sorry to bother you but I forgot to tell you about the restless leg. It's really bad. I'm sore all over and my legs really hurt . This restless leg is driving me crazy it so bad .I got to keep moving and my legs hurt more.

## 2024-02-06 ENCOUNTER — OFFICE VISIT (OUTPATIENT)
Facility: CLINIC | Age: 44
End: 2024-02-06
Payer: MEDICARE

## 2024-02-06 ENCOUNTER — PATIENT MESSAGE (OUTPATIENT)
Facility: CLINIC | Age: 44
End: 2024-02-06

## 2024-02-06 VITALS
OXYGEN SATURATION: 96 % | SYSTOLIC BLOOD PRESSURE: 125 MMHG | HEIGHT: 64 IN | HEART RATE: 84 BPM | RESPIRATION RATE: 18 BRPM | BODY MASS INDEX: 36.02 KG/M2 | DIASTOLIC BLOOD PRESSURE: 85 MMHG | WEIGHT: 211 LBS | TEMPERATURE: 98.3 F

## 2024-02-06 DIAGNOSIS — Z92.25 PERSONAL HISTORY OF IMMUNOSUPPRESSIVE THERAPY: ICD-10-CM

## 2024-02-06 DIAGNOSIS — M25.562 ACUTE PAIN OF LEFT KNEE: ICD-10-CM

## 2024-02-06 DIAGNOSIS — G89.29 CHRONIC PAIN OF RIGHT KNEE: Primary | ICD-10-CM

## 2024-02-06 DIAGNOSIS — M79.7 FIBROMYALGIA: Primary | ICD-10-CM

## 2024-02-06 DIAGNOSIS — M22.2X1 PATELLOFEMORAL PAIN SYNDROME OF RIGHT KNEE: ICD-10-CM

## 2024-02-06 DIAGNOSIS — M25.561 CHRONIC PAIN OF RIGHT KNEE: Primary | ICD-10-CM

## 2024-02-06 PROCEDURE — G8427 DOCREV CUR MEDS BY ELIG CLIN: HCPCS | Performed by: STUDENT IN AN ORGANIZED HEALTH CARE EDUCATION/TRAINING PROGRAM

## 2024-02-06 PROCEDURE — G8417 CALC BMI ABV UP PARAM F/U: HCPCS | Performed by: STUDENT IN AN ORGANIZED HEALTH CARE EDUCATION/TRAINING PROGRAM

## 2024-02-06 PROCEDURE — 1036F TOBACCO NON-USER: CPT | Performed by: STUDENT IN AN ORGANIZED HEALTH CARE EDUCATION/TRAINING PROGRAM

## 2024-02-06 PROCEDURE — 99213 OFFICE O/P EST LOW 20 MIN: CPT | Performed by: STUDENT IN AN ORGANIZED HEALTH CARE EDUCATION/TRAINING PROGRAM

## 2024-02-06 PROCEDURE — G8484 FLU IMMUNIZE NO ADMIN: HCPCS | Performed by: STUDENT IN AN ORGANIZED HEALTH CARE EDUCATION/TRAINING PROGRAM

## 2024-02-06 ASSESSMENT — PATIENT HEALTH QUESTIONNAIRE - PHQ9
SUM OF ALL RESPONSES TO PHQ9 QUESTIONS 1 & 2: 0
SUM OF ALL RESPONSES TO PHQ QUESTIONS 1-9: 9
5. POOR APPETITE OR OVEREATING: 1
4. FEELING TIRED OR HAVING LITTLE ENERGY: 1
2. FEELING DOWN, DEPRESSED OR HOPELESS: 0
SUM OF ALL RESPONSES TO PHQ QUESTIONS 1-9: 10
SUM OF ALL RESPONSES TO PHQ QUESTIONS 1-9: 10
10. IF YOU CHECKED OFF ANY PROBLEMS, HOW DIFFICULT HAVE THESE PROBLEMS MADE IT FOR YOU TO DO YOUR WORK, TAKE CARE OF THINGS AT HOME, OR GET ALONG WITH OTHER PEOPLE: 3
1. LITTLE INTEREST OR PLEASURE IN DOING THINGS: 0
9. THOUGHTS THAT YOU WOULD BE BETTER OFF DEAD, OR OF HURTING YOURSELF: 1
7. TROUBLE CONCENTRATING ON THINGS, SUCH AS READING THE NEWSPAPER OR WATCHING TELEVISION: 3
6. FEELING BAD ABOUT YOURSELF - OR THAT YOU ARE A FAILURE OR HAVE LET YOURSELF OR YOUR FAMILY DOWN: 1
8. MOVING OR SPEAKING SO SLOWLY THAT OTHER PEOPLE COULD HAVE NOTICED. OR THE OPPOSITE, BEING SO FIGETY OR RESTLESS THAT YOU HAVE BEEN MOVING AROUND A LOT MORE THAN USUAL: 0
SUM OF ALL RESPONSES TO PHQ QUESTIONS 1-9: 10
3. TROUBLE FALLING OR STAYING ASLEEP: 3

## 2024-02-06 ASSESSMENT — COLUMBIA-SUICIDE SEVERITY RATING SCALE - C-SSRS
1. WITHIN THE PAST MONTH, HAVE YOU WISHED YOU WERE DEAD OR WISHED YOU COULD GO TO SLEEP AND NOT WAKE UP?: NO
6. HAVE YOU EVER DONE ANYTHING, STARTED TO DO ANYTHING, OR PREPARED TO DO ANYTHING TO END YOUR LIFE?: NO
2. HAVE YOU ACTUALLY HAD ANY THOUGHTS OF KILLING YOURSELF?: NO

## 2024-02-06 NOTE — PATIENT INSTRUCTIONS
Contact insurance company to ask if they prefer a specific home health service for PT. We typically use [facility] or [facility].

## 2024-02-06 NOTE — PROGRESS NOTES
[facility]      Chief Complaint:     Chief Complaint   Patient presents with    Knee Pain     Bilateral knee pain       Coco Monge is a 43 y.o. female that presents for: Right knee pain      Assessment/Plan:   I personally reviewed the following Pertinent Labs/Studies:   - Encounter notes, labs, and imaging from 11/8/21.    Pt is a 43 year old female with chronic joint pains presenting with acute left knee pain and acute on chronic right knee pain.     Coco was seen today for knee pain.    Diagnoses and all orders for this visit:    Chronic pain of right knee: right knee XR with fairly significant post-patellar arthritic change which correlates with the location and pattern of her pain. Provided quadriceps strengthening exercises and will refer to home health. [place] testing indeterminate but history and physical findings lower my concern for meniscal injury. Stability testing otherwise normal.   -     XR KNEE RIGHT (1-2 VIEWS); Future  -     External Referral To Home Health    Acute pain of left knee: no significant arthritis or abnormality on XR. Symptoms also seem more c/w patellofemoral pain. Recommend PT and conservative measures only.   -     XR KNEE LEFT (1-2 VIEWS); Future  -     External Referral To Home Health    Patellofemoral pain syndrome of right knee: positive patellofemoral grind test and arthritic change noted on XR. Quad strengthening and PT as above. Can use topical NSAIDs, heat, ice as needed. Will plan for intra-articular steroid injection but have requested patient get clearance from her transplant team. Will hold off on ortho referral as she can't have any surgical procedures for the indefinite future.   -     External Referral To Home Health    Personal history of immunosuppressive therapy: would benefit from home health as opposed to outpatient PT given immunosuppressed status.   -     External Referral To Home Health           Follow up:     Return in about 1 week

## 2024-02-06 NOTE — PROGRESS NOTES
Chief Complaint   Patient presents with    Knee Pain     Bilateral knee pain       \"Have you been to the ER, urgent care clinic since your last visit?  Hospitalized since your last visit?\"    NO    “Have you seen or consulted any other health care providers outside of [facility] since your last visit?”    NO              Health Maintenance Due   Topic Date Due    Shingles vaccine (1 of 2) Never done    Flu vaccine (1) 08/01/2023    COVID-19 Vaccine (4 - 2023-24 season) 09/01/2023    Lipids  10/22/2023    Annual Wellness Visit (Medicare Advantage)  Never done    DTaP/Tdap/Td vaccine (2 - Td or Tdap) 01/25/2024    A1C test (Diabetic or Prediabetic)  02/09/2024    Breast cancer screen  02/10/2024

## 2024-02-07 NOTE — TELEPHONE ENCOUNTER
From: Coco Monge  To: Alexander Mcdermott MD  Sent: 2/6/2024 3:21 PM EST  Subject: Medication    Hi, are you able to call in or send in the diclofenac sodium topical gel to [facility] please . Thank you

## 2024-02-09 ENCOUNTER — TELEPHONE (OUTPATIENT)
Facility: CLINIC | Age: 44
End: 2024-02-09

## 2024-02-09 NOTE — TELEPHONE ENCOUNTER
----- Message from Juliana Rowe sent at 2/9/2024 11:43 AM EST -----  Subject: Message to Provider    QUESTIONS  Information for Provider? Pt has a procedure scheduled with Dr. Mcdermott on   2/15/24 for knee injections, Pt needs to reschedule this appt, please   contact Pt back to reschedule.   ---------------------------------------------------------------------------  --------------  CALL BACK INFO  [phone]; OK to leave message on voicemail  ---------------------------------------------------------------------------  --------------  SCRIPT ANSWERS  Relationship to Patient? Self  \"Have your symptoms changed?\" (If routine visit such as AWV, Physical, PAP   or Well Child Visit then answer no)? No  (Is the patient requesting to see the provider for a procedure?)? Yes

## 2024-02-15 DIAGNOSIS — R73.09 ABNORMAL BLOOD SUGAR: ICD-10-CM

## 2024-02-15 RX ORDER — ISOPROPYL ALCOHOL 70 ML/100ML
SWAB TOPICAL
Qty: 100 EACH | Refills: 3 | Status: SHIPPED | OUTPATIENT
Start: 2024-02-15

## 2024-02-18 DIAGNOSIS — R35.0 FREQUENCY OF MICTURITION: ICD-10-CM

## 2024-02-19 ENCOUNTER — PATIENT MESSAGE (OUTPATIENT)
Facility: CLINIC | Age: 44
End: 2024-02-19

## 2024-02-19 DIAGNOSIS — J40 BRONCHITIS: Primary | ICD-10-CM

## 2024-02-19 RX ORDER — OXYBUTYNIN CHLORIDE 15 MG/1
TABLET, EXTENDED RELEASE ORAL
Qty: 30 TABLET | Refills: 3 | Status: SHIPPED | OUTPATIENT
Start: 2024-02-19

## 2024-02-19 RX ORDER — IPRATROPIUM BROMIDE AND ALBUTEROL SULFATE 2.5; .5 MG/3ML; MG/3ML
1 SOLUTION RESPIRATORY (INHALATION) EVERY 4 HOURS
Qty: 360 ML | Refills: 1 | Status: SHIPPED | OUTPATIENT
Start: 2024-02-19

## 2024-02-19 NOTE — TELEPHONE ENCOUNTER
From: Coco Monge  To: Dr. Ralph Good  Sent: 2/19/2024 3:09 PM EST  Subject: Medication     Hi I need to get a refill on the ipraptrop/0.5-3mg/3ml neb sol , please

## 2024-02-20 ENCOUNTER — PROCEDURE VISIT (OUTPATIENT)
Facility: CLINIC | Age: 44
End: 2024-02-20
Payer: MEDICARE

## 2024-02-20 VITALS
DIASTOLIC BLOOD PRESSURE: 92 MMHG | SYSTOLIC BLOOD PRESSURE: 147 MMHG | RESPIRATION RATE: 20 BRPM | OXYGEN SATURATION: 95 % | TEMPERATURE: 97.9 F | HEIGHT: 64 IN | WEIGHT: 221 LBS | BODY MASS INDEX: 37.73 KG/M2 | HEART RATE: 76 BPM

## 2024-02-20 DIAGNOSIS — M22.2X1 PATELLOFEMORAL PAIN SYNDROME OF RIGHT KNEE: Primary | ICD-10-CM

## 2024-02-20 DIAGNOSIS — M17.11 PRIMARY OSTEOARTHRITIS OF RIGHT KNEE: ICD-10-CM

## 2024-02-20 DIAGNOSIS — J01.90 ACUTE BACTERIAL SINUSITIS: ICD-10-CM

## 2024-02-20 DIAGNOSIS — B96.89 ACUTE BACTERIAL SINUSITIS: ICD-10-CM

## 2024-02-20 PROCEDURE — 99213 OFFICE O/P EST LOW 20 MIN: CPT | Performed by: FAMILY MEDICINE

## 2024-02-20 PROCEDURE — G8417 CALC BMI ABV UP PARAM F/U: HCPCS | Performed by: FAMILY MEDICINE

## 2024-02-20 PROCEDURE — 20610 DRAIN/INJ JOINT/BURSA W/O US: CPT | Performed by: FAMILY MEDICINE

## 2024-02-20 PROCEDURE — G8427 DOCREV CUR MEDS BY ELIG CLIN: HCPCS | Performed by: FAMILY MEDICINE

## 2024-02-20 PROCEDURE — G8484 FLU IMMUNIZE NO ADMIN: HCPCS | Performed by: FAMILY MEDICINE

## 2024-02-20 PROCEDURE — 1036F TOBACCO NON-USER: CPT | Performed by: FAMILY MEDICINE

## 2024-02-20 RX ORDER — CEPHALEXIN 500 MG/1
CAPSULE ORAL
COMMUNITY
Start: 2024-02-13 | End: 2024-02-23 | Stop reason: ALTCHOICE

## 2024-02-20 RX ORDER — LIDOCAINE HYDROCHLORIDE 10 MG/ML
3 INJECTION, SOLUTION EPIDURAL; INFILTRATION; INTRACAUDAL; PERINEURAL ONCE
Status: COMPLETED | OUTPATIENT
Start: 2024-02-20 | End: 2024-02-20

## 2024-02-20 RX ORDER — CEPHALEXIN 500 MG/1
500 CAPSULE ORAL 2 TIMES DAILY
Qty: 6 CAPSULE | Refills: 0 | Status: SHIPPED | OUTPATIENT
Start: 2024-02-20 | End: 2024-02-23

## 2024-02-20 RX ORDER — BETAMETHASONE SODIUM PHOSPHATE AND BETAMETHASONE ACETATE 3; 3 MG/ML; MG/ML
12 INJECTION, SUSPENSION INTRA-ARTICULAR; INTRALESIONAL; INTRAMUSCULAR; SOFT TISSUE ONCE
Status: COMPLETED | OUTPATIENT
Start: 2024-02-20 | End: 2024-02-20

## 2024-02-20 RX ORDER — ESCITALOPRAM OXALATE 5 MG/1
5 TABLET ORAL EVERY MORNING
COMMUNITY
Start: 2024-01-26

## 2024-02-20 RX ADMIN — LIDOCAINE HYDROCHLORIDE 3 ML: 10 INJECTION, SOLUTION EPIDURAL; INFILTRATION; INTRACAUDAL; PERINEURAL at 11:00

## 2024-02-20 RX ADMIN — BETAMETHASONE SODIUM PHOSPHATE AND BETAMETHASONE ACETATE 12 MG: 3; 3 INJECTION, SUSPENSION INTRA-ARTICULAR; INTRALESIONAL; INTRAMUSCULAR; SOFT TISSUE at 11:00

## 2024-02-20 NOTE — PROGRESS NOTES
[facility]      Chief Complaint:     Chief Complaint   Patient presents with    Knee Pain    URI       Coco Monge is a 43 y.o. female that presents for: Knee pain and URI      Assessment/Plan:   I personally reviewed the following Pertinent Labs/Studies:   - Encounter notes, labs, and imaging from 2/6/24.    Patient is a 43 year old female with patellofemoral pain and arthritis presenting for steroid injection of the right knee.    Coco was seen today for knee pain and uri.    Diagnoses and all orders for this visit:    Patellofemoral pain syndrome of right knee: no effusion. Knee injected with lidocaine and betamethasone which provided immediate relief.   -     betamethasone acetate-betamethasone sodium phosphate (CELESTONE) injection 12 mg  -     lidocaine PF 1 % injection 3 mL  -     DRAIN/INJECT LARGE JOINT/BURSA    Primary osteoarthritis of right knee: injection as above. Pt has home PT scheduled for tomorrow.   -     betamethasone acetate-betamethasone sodium phosphate (CELESTONE) injection 12 mg  -     lidocaine PF 1 % injection 3 mL  -     DRAIN/INJECT LARGE JOINT/BURSA    Acute bacterial sinusitis: was given 7 day course of Keflex. Pt noting minimal improvement. Will extend course for three additional days and advised to return next week if still no improvement for alternate therapy with Doxy or Vantin.   -     cephALEXin (KEFLEX) 500 MG capsule; Take 1 capsule by mouth 2 times daily           Follow up:     Return in about 1 week (around 2/27/2024) for follow up on elevated BP, and if sinus symptoms aren't better..     Subjective:   HPI:  Coco Monge is a 43 y.o. female that presents for:    Knee injection. Pain largely unchanged from last visit. Has been 15 or so years since last knee injection. Pt did get approval from her transplant team. Also with persistent symptoms of a sinus infection. Her other doctor prescribed her Keflex. She hasn't noticed much improvement since

## 2024-02-20 NOTE — PROGRESS NOTES
[facility]  Office Procedure Progress Note  Chart reviewed for the following:   Alexander CAST MD, have reviewed the History, Physical and updated the Allergic reactions for Coco Monge     TIME OUT performed immediately prior to start of procedure:  Alexander CAST MD, have performed the following reviews on Coco Monge prior to the start of the procedure:            * Patient was identified by name and date of birth   * Agreement on procedure being performed was verified  * Risks and Benefits explained to the patient  * Procedure site verified and marked as necessary  * Patient was positioned for comfort  * Consent was verbally obtained     Time: 1:00 PM  Date of procedure: 2/20/2024  Procedure performed by:  Alexander Mcdermott MD  Provider assisted by: Rhea Love LPN  How tolerated by patient: tolerated the procedure well with no complications    Procedure Name: Knee Joint Aspiration/Steroid Injection  Indication: Pain  Location: right  Pre-Procedure Diagnosis: Osteoarthritis  Post-Procedure Diagnosis: Same    PROCEDURE:  The appropriate anatomic landmarks were located to guide needle placement and the skin was marked.  The area was prepped in the usual sterile fashion and the overlying skin cleaned using Betadine x3.  A needle of appropriate length and gauge was inserted into the joint using a infrapatellar lateral approach. Gentle aspiration demonstrated no blood. 3 ml of Lidocaine 1% without epinephrine and 2ml of Betamethasone 6 mg/1mL was injected without resistance.     Estimated blood loss was less than 0.5 cc.    An adhesive bandage was applied to the area. Anticipatory guidance, as well as standard post-procedure care, was explained. Return precautions were given. The patient tolerated the procedure well without complications. Follow-up visit scheduled.    Patient discussed with Dr. Martínez (attending physician).    Alexander Mcdermott DO  2/20/23

## 2024-02-22 NOTE — PROGRESS NOTES
I reviewed with the resident the medical history and the resident's findings on the physical examination.  I discussed with the resident the patient's diagnosis and concur with the plan.    I was present during the critical and key portions of the procedure and immediately available to furnish services.

## 2024-02-23 ENCOUNTER — OFFICE VISIT (OUTPATIENT)
Facility: CLINIC | Age: 44
End: 2024-02-23
Payer: MEDICARE

## 2024-02-23 VITALS
DIASTOLIC BLOOD PRESSURE: 75 MMHG | RESPIRATION RATE: 17 BRPM | TEMPERATURE: 97 F | BODY MASS INDEX: 36.19 KG/M2 | WEIGHT: 212 LBS | SYSTOLIC BLOOD PRESSURE: 105 MMHG | HEART RATE: 84 BPM | HEIGHT: 64 IN | OXYGEN SATURATION: 100 %

## 2024-02-23 DIAGNOSIS — G89.4 CHRONIC PAIN SYNDROME: ICD-10-CM

## 2024-02-23 DIAGNOSIS — G62.9 NEUROPATHY: ICD-10-CM

## 2024-02-23 DIAGNOSIS — J01.90 ACUTE BACTERIAL SINUSITIS: Primary | ICD-10-CM

## 2024-02-23 DIAGNOSIS — I25.5 ISCHEMIC CARDIOMYOPATHY: ICD-10-CM

## 2024-02-23 DIAGNOSIS — Z79.899 CONTROLLED SUBSTANCE AGREEMENT SIGNED: ICD-10-CM

## 2024-02-23 DIAGNOSIS — B96.89 ACUTE BACTERIAL SINUSITIS: Primary | ICD-10-CM

## 2024-02-23 PROCEDURE — 1036F TOBACCO NON-USER: CPT | Performed by: FAMILY MEDICINE

## 2024-02-23 PROCEDURE — G8417 CALC BMI ABV UP PARAM F/U: HCPCS | Performed by: FAMILY MEDICINE

## 2024-02-23 PROCEDURE — G8427 DOCREV CUR MEDS BY ELIG CLIN: HCPCS | Performed by: FAMILY MEDICINE

## 2024-02-23 PROCEDURE — 99214 OFFICE O/P EST MOD 30 MIN: CPT | Performed by: FAMILY MEDICINE

## 2024-02-23 PROCEDURE — G8484 FLU IMMUNIZE NO ADMIN: HCPCS | Performed by: FAMILY MEDICINE

## 2024-02-23 RX ORDER — PREGABALIN 100 MG/1
100 CAPSULE ORAL 2 TIMES DAILY
Qty: 60 CAPSULE | Refills: 2 | Status: SHIPPED | OUTPATIENT
Start: 2024-02-23 | End: 2024-05-23

## 2024-02-23 RX ORDER — DOXYCYCLINE HYCLATE 100 MG
100 TABLET ORAL 2 TIMES DAILY
Qty: 14 TABLET | Refills: 0 | Status: SHIPPED | OUTPATIENT
Start: 2024-02-23 | End: 2024-03-01

## 2024-02-23 ASSESSMENT — ENCOUNTER SYMPTOMS
RHINORRHEA: 1
CHEST TIGHTNESS: 0
SINUS PAIN: 1
SORE THROAT: 1
SINUS PRESSURE: 1

## 2024-02-23 NOTE — PROGRESS NOTES
[facility]    History of Present Illness:   Coco Monge is a 43 y.o. female with history of CHF, CAD, GERD, Arthritis, Fibromyalgia, HLD, Anxiety, HSV, OCD, Depression, Obesity   CC: Follow up  History provided by patient and Records    HPI:  Sinusitis: Patient presents with sinusitis symptoms over the last 10 days.  Patient recently had issue with dental/gum issues and started on Keflex  Patient reports symptoms including nasal congestion, clear rhinorrhea, sore throats, headaches, facial pain and denies fevers.  Patient describes pain of the left sinus(es).  Patient does report Viral URI prior to developing these symptoms.  Previous OTC treatments include decongestant, Flonase, Keflex which have been moderately effective in treating symptoms.  she denies recent sick contacts.  she does not have history of recurrent sinusitis.  - Last antibiotic use: Just finished Keflex     Recent COVID: Taste is coming back    Chronic Pain: Needs refills on [employer] Maintenance  Health Maintenance Due   Topic Date Due    Shingles vaccine (1 of 2) Never done    Flu vaccine (1) 08/01/2023    COVID-19 Vaccine (4 - 2023-24 season) 09/01/2023    Lipids  10/22/2023    Annual Wellness Visit (Medicare Advantage)  Never done    DTaP/Tdap/Td vaccine (2 - Td or Tdap) 01/25/2024    A1C test (Diabetic or Prediabetic)  02/09/2024    Breast cancer screen  02/10/2024       Past Medical, Family, and Social History:     Current Outpatient Medications on File Prior to Visit   Medication Sig Dispense Refill    escitalopram (LEXAPRO) 5 MG tablet Take 1 tablet by mouth every morning      oxyBUTYnin (DITROPAN XL) 15 MG extended release tablet TAKE ONE TABLET BY MOUTH EVERY DAY *STOP OXYBUTYNIN 10 MG* 30 tablet 3    ipratropium 0.5 mg-albuterol 2.5 mg (DUONEB) 0.5-2.5 (3) MG/3ML SOLN nebulizer solution Inhale 3 mLs into the lungs every 4 hours 360 mL 1    Alcohol Swabs (DROPSAFE ALCOHOL PREP) 70 % PADS USE AS DIRECTED

## 2024-02-23 NOTE — PROGRESS NOTES
Chief Complaint   Patient presents with    Sinusitis       \"Have you been to the ER, urgent care clinic since your last visit?  Hospitalized since your last visit?\"    NO    “Have you seen or consulted any other health care providers outside of [facility] since your last visit?”    NO       Have you had a mammogram?”   NO            Health Maintenance Due   Topic Date Due    Shingles vaccine (1 of 2) Never done    Flu vaccine (1) 08/01/2023    COVID-19 Vaccine (4 - 2023-24 season) 09/01/2023    Lipids  10/22/2023    Annual Wellness Visit (Medicare Advantage)  Never done    DTaP/Tdap/Td vaccine (2 - Td or Tdap) 01/25/2024    A1C test (Diabetic or Prediabetic)  02/09/2024    Breast cancer screen  02/10/2024

## 2024-02-26 NOTE — PROGRESS NOTES
1. Have you been to the ER, urgent care clinic since your last visit? Hospitalized since your last visit? No    2. Have you seen or consulted any other health care providers outside of the [facility] since your last visit? Include any pap smears or colon screening. No  Reviewed record in preparation for visit and have necessary documentation  Pt did not bring medication to office visit for review  opportunity was given for questions  Goals that were addressed and/or need to be completed during or after this appointment include  There are no preventive care reminders to display for this patient. Detail Level: Zone Continue Regimen: Arazlo Render In Strict Bullet Format?: No Discontinue Regimen: Spironolactone Initiate Treatment: clindamycin

## 2024-03-01 ENCOUNTER — OFFICE VISIT (OUTPATIENT)
Facility: CLINIC | Age: 44
End: 2024-03-01
Payer: MEDICARE

## 2024-03-01 ENCOUNTER — PATIENT MESSAGE (OUTPATIENT)
Facility: CLINIC | Age: 44
End: 2024-03-01

## 2024-03-01 VITALS
TEMPERATURE: 97.5 F | OXYGEN SATURATION: 99 % | HEART RATE: 89 BPM | HEIGHT: 64 IN | RESPIRATION RATE: 18 BRPM | BODY MASS INDEX: 36.64 KG/M2 | SYSTOLIC BLOOD PRESSURE: 119 MMHG | WEIGHT: 214.6 LBS | DIASTOLIC BLOOD PRESSURE: 76 MMHG

## 2024-03-01 DIAGNOSIS — N62 LARGE BREASTS: ICD-10-CM

## 2024-03-01 DIAGNOSIS — K21.9 GASTROESOPHAGEAL REFLUX DISEASE WITHOUT ESOPHAGITIS: Primary | ICD-10-CM

## 2024-03-01 DIAGNOSIS — E21.3 HYPERPARATHYROIDISM, UNSPECIFIED (HCC): ICD-10-CM

## 2024-03-01 DIAGNOSIS — F33.1 MODERATE EPISODE OF RECURRENT MAJOR DEPRESSIVE DISORDER (HCC): ICD-10-CM

## 2024-03-01 DIAGNOSIS — M35.00 SJOGREN SYNDROME, UNSPECIFIED (HCC): ICD-10-CM

## 2024-03-01 DIAGNOSIS — R07.89 CHEST PAIN, MID STERNAL: ICD-10-CM

## 2024-03-01 DIAGNOSIS — I25.10 CORONARY ARTERY DISEASE INVOLVING NATIVE CORONARY ARTERY OF NATIVE HEART WITHOUT ANGINA PECTORIS: Primary | ICD-10-CM

## 2024-03-01 DIAGNOSIS — Z94.1 HX OF HEART TRANSPLANT (HCC): ICD-10-CM

## 2024-03-01 PROCEDURE — 1036F TOBACCO NON-USER: CPT | Performed by: FAMILY MEDICINE

## 2024-03-01 PROCEDURE — G8417 CALC BMI ABV UP PARAM F/U: HCPCS | Performed by: FAMILY MEDICINE

## 2024-03-01 PROCEDURE — 99214 OFFICE O/P EST MOD 30 MIN: CPT | Performed by: FAMILY MEDICINE

## 2024-03-01 PROCEDURE — G8484 FLU IMMUNIZE NO ADMIN: HCPCS | Performed by: FAMILY MEDICINE

## 2024-03-01 PROCEDURE — G8427 DOCREV CUR MEDS BY ELIG CLIN: HCPCS | Performed by: FAMILY MEDICINE

## 2024-03-01 RX ORDER — PANTOPRAZOLE SODIUM 40 MG/1
40 TABLET, DELAYED RELEASE ORAL 2 TIMES DAILY
Qty: 180 TABLET | Refills: 1 | Status: SHIPPED | OUTPATIENT
Start: 2024-03-01

## 2024-03-01 ASSESSMENT — PATIENT HEALTH QUESTIONNAIRE - PHQ9
5. POOR APPETITE OR OVEREATING: 1
2. FEELING DOWN, DEPRESSED OR HOPELESS: 0
10. IF YOU CHECKED OFF ANY PROBLEMS, HOW DIFFICULT HAVE THESE PROBLEMS MADE IT FOR YOU TO DO YOUR WORK, TAKE CARE OF THINGS AT HOME, OR GET ALONG WITH OTHER PEOPLE: 3
7. TROUBLE CONCENTRATING ON THINGS, SUCH AS READING THE NEWSPAPER OR WATCHING TELEVISION: 3
SUM OF ALL RESPONSES TO PHQ QUESTIONS 1-9: 7
1. LITTLE INTEREST OR PLEASURE IN DOING THINGS: 0
8. MOVING OR SPEAKING SO SLOWLY THAT OTHER PEOPLE COULD HAVE NOTICED. OR THE OPPOSITE, BEING SO FIGETY OR RESTLESS THAT YOU HAVE BEEN MOVING AROUND A LOT MORE THAN USUAL: 0
6. FEELING BAD ABOUT YOURSELF - OR THAT YOU ARE A FAILURE OR HAVE LET YOURSELF OR YOUR FAMILY DOWN: 1
SUM OF ALL RESPONSES TO PHQ QUESTIONS 1-9: 7
SUM OF ALL RESPONSES TO PHQ9 QUESTIONS 1 & 2: 0
SUM OF ALL RESPONSES TO PHQ QUESTIONS 1-9: 6
4. FEELING TIRED OR HAVING LITTLE ENERGY: 1
SUM OF ALL RESPONSES TO PHQ QUESTIONS 1-9: 7
3. TROUBLE FALLING OR STAYING ASLEEP: 0
9. THOUGHTS THAT YOU WOULD BE BETTER OFF DEAD, OR OF HURTING YOURSELF: 1

## 2024-03-01 ASSESSMENT — LIFESTYLE VARIABLES
HOW OFTEN DO YOU HAVE A DRINK CONTAINING ALCOHOL: MONTHLY OR LESS
HOW MANY STANDARD DRINKS CONTAINING ALCOHOL DO YOU HAVE ON A TYPICAL DAY: 1 OR 2

## 2024-03-01 ASSESSMENT — ENCOUNTER SYMPTOMS: ABDOMINAL PAIN: 0

## 2024-03-01 NOTE — PROGRESS NOTES
Chief Complaint   Patient presents with    pain management     Discuss pain mgt       \"Have you been to the ER, urgent care clinic since your last visit?  Hospitalized since your last visit?\"    NO    “Have you seen or consulted any other health care providers outside of [facility] since your last visit?”    NO       Have you had a mammogram?”   NO           Health Maintenance Due   Topic Date Due    Shingles vaccine (1 of 2) Never done    Flu vaccine (1) 08/01/2023    COVID-19 Vaccine (4 - 2023-24 season) 09/01/2023    Lipids  10/22/2023    Annual Wellness Visit (Medicare Advantage)  Never done    DTaP/Tdap/Td vaccine (2 - Td or Tdap) 01/25/2024    A1C test (Diabetic or Prediabetic)  02/09/2024    Breast cancer screen  02/10/2024        
transplant ([facility])  - [facility] - Salma Michaels MD, Breast Surgery, [facility]  - Hakeem Schaeffer MD, Pain Medicine, [place]       Follow-up and Dispositions    Return in about 4 weeks (around 3/29/2024).       I spent 30+ minutes with the patient personally.  Over 50% of the 30+ minutes face to face with Coco Monge consisted of counseling and/or discussing treatment plans in reference to her Chest pain.     I have discussed the diagnosis with the patient and the intended plan as seen in the above orders.  Social history, medical history, and labs were reviewed.  The patient has received an after-visit summary and questions were answered concerning future plans.  I have discussed medication side effects and warnings with the patient as well.    Ralph Good MD  [facility]  03/01/24

## 2024-03-01 NOTE — TELEPHONE ENCOUNTER
From: Coco Monge  To: Dr. Ralph Good  Sent: 3/1/2024 1:24 PM EST  Subject: Medication     I forgot to ask you about something u doctor Alonso moyer he has seen me twice he prescribed me rabeprazole sodium 20 once a day. He said it was the strongest but know it's not so I feel like I need to just go back to pantropazole 40 twice a day. I being referred to [facility] because my esophagus needs stretching. He didn't send the referral so I'm trying to get him to do it. Should I just stay on pantropazole

## 2024-03-06 ENCOUNTER — TELEPHONE (OUTPATIENT)
Facility: CLINIC | Age: 44
End: 2024-03-06

## 2024-03-06 NOTE — TELEPHONE ENCOUNTER
Spoke to Cardiologist and noting has been weaning down Benzo's as well.  Patient taking ambien at this time still.  - Switching Effexor to Cymbalta  - Continue Robaxin  - Consider Tramadol but is seeing pain psychiatrist.    Dr. Guzman [phone]

## 2024-03-07 ENCOUNTER — PATIENT MESSAGE (OUTPATIENT)
Facility: CLINIC | Age: 44
End: 2024-03-07

## 2024-03-08 RX ORDER — MONTELUKAST SODIUM 10 MG/1
10 TABLET ORAL DAILY
Qty: 90 TABLET | Refills: 3 | Status: SHIPPED | OUTPATIENT
Start: 2024-03-08

## 2024-03-08 NOTE — TELEPHONE ENCOUNTER
From: Coco Monge  To: Dr. Ralph Good  Sent: 3/7/2024 5:23 PM EST  Subject: Singular     Dr. Good can you send in the montelukast 10mg for me tomorrow I don't have but 2 left and [facility]. Thank you so much

## 2024-03-13 ENCOUNTER — OFFICE VISIT (OUTPATIENT)
Facility: CLINIC | Age: 44
End: 2024-03-13
Payer: MEDICARE

## 2024-03-13 VITALS
TEMPERATURE: 97.8 F | SYSTOLIC BLOOD PRESSURE: 114 MMHG | DIASTOLIC BLOOD PRESSURE: 81 MMHG | RESPIRATION RATE: 14 BRPM | HEIGHT: 64 IN | OXYGEN SATURATION: 95 % | WEIGHT: 217.4 LBS | HEART RATE: 99 BPM | BODY MASS INDEX: 37.11 KG/M2

## 2024-03-13 DIAGNOSIS — R60.0 LEG EDEMA, RIGHT: ICD-10-CM

## 2024-03-13 DIAGNOSIS — M79.604 RIGHT LEG PAIN: Primary | ICD-10-CM

## 2024-03-13 DIAGNOSIS — Z94.1 HX OF HEART TRANSPLANT (HCC): ICD-10-CM

## 2024-03-13 DIAGNOSIS — E66.01 SEVERE OBESITY (BMI 35.0-39.9) WITH COMORBIDITY (HCC): ICD-10-CM

## 2024-03-13 DIAGNOSIS — M17.11 PRIMARY OSTEOARTHRITIS OF RIGHT KNEE: ICD-10-CM

## 2024-03-13 PROCEDURE — G8417 CALC BMI ABV UP PARAM F/U: HCPCS | Performed by: FAMILY MEDICINE

## 2024-03-13 PROCEDURE — 1036F TOBACCO NON-USER: CPT | Performed by: FAMILY MEDICINE

## 2024-03-13 PROCEDURE — 99213 OFFICE O/P EST LOW 20 MIN: CPT | Performed by: FAMILY MEDICINE

## 2024-03-13 PROCEDURE — G8484 FLU IMMUNIZE NO ADMIN: HCPCS | Performed by: FAMILY MEDICINE

## 2024-03-13 PROCEDURE — G8427 DOCREV CUR MEDS BY ELIG CLIN: HCPCS | Performed by: FAMILY MEDICINE

## 2024-03-13 RX ORDER — VALGANCICLOVIR 450 MG/1
TABLET, FILM COATED ORAL
COMMUNITY
Start: 2024-03-06

## 2024-03-13 RX ORDER — DULOXETIN HYDROCHLORIDE 30 MG/1
CAPSULE, DELAYED RELEASE ORAL
COMMUNITY
Start: 2024-03-07 | End: 2024-04-13

## 2024-03-13 RX ORDER — CLONAZEPAM 1 MG/1
TABLET ORAL
COMMUNITY
Start: 2024-03-09

## 2024-03-13 NOTE — PATIENT INSTRUCTIONS
Your opinion matters...   & your survey makes a difference!     We would love to hear feedback from YOU! Help us better serve our community by completing your survey after your visit at [facility]. You will be receiving a survey via text or e-mail to complete. Thank You!

## 2024-03-13 NOTE — PROGRESS NOTES
Chief Complaint   Patient presents with    Knee Pain     Right knee pain worsening since injection since February          \"Have you been to the ER, urgent care clinic since your last visit?  Hospitalized since your last visit?\"    NO    “Have you seen or consulted any other health care providers outside of [facility] since your last visit?”    Yes        Have you had a mammogram?”   Yes     Date of last Mammogram: 2/10/2023         Health Maintenance Due   Topic Date Due    Shingles vaccine (1 of 2) Never done    Flu vaccine (1) 08/01/2023    COVID-19 Vaccine (4 - 2023-24 season) 09/01/2023    Lipids  10/22/2023    Annual Wellness Visit (Medicare Advantage)  Never done    DTaP/Tdap/Td vaccine (2 - Td or Tdap) 01/25/2024    A1C test (Diabetic or Prediabetic)  02/09/2024    Breast cancer screen  02/10/2024

## 2024-03-14 ENCOUNTER — HOSPITAL ENCOUNTER (EMERGENCY)
Facility: HOSPITAL | Age: 44
Discharge: HOME | End: 2024-03-14
Attending: STUDENT IN AN ORGANIZED HEALTH CARE EDUCATION/TRAINING PROGRAM
Payer: MEDICARE

## 2024-03-14 ENCOUNTER — APPOINTMENT (OUTPATIENT)
Facility: HOSPITAL | Age: 44
End: 2024-03-14
Payer: MEDICARE

## 2024-03-14 VITALS
HEART RATE: 85 BPM | WEIGHT: 217 LBS | BODY MASS INDEX: 37.05 KG/M2 | SYSTOLIC BLOOD PRESSURE: 129 MMHG | DIASTOLIC BLOOD PRESSURE: 85 MMHG | HEIGHT: 64 IN | RESPIRATION RATE: 18 BRPM | OXYGEN SATURATION: 100 % | TEMPERATURE: 98.1 F

## 2024-03-14 DIAGNOSIS — M25.561 PAIN IN BOTH KNEES, UNSPECIFIED CHRONICITY: Primary | ICD-10-CM

## 2024-03-14 DIAGNOSIS — M25.562 PAIN IN BOTH KNEES, UNSPECIFIED CHRONICITY: Primary | ICD-10-CM

## 2024-03-14 PROCEDURE — 99283 EMERGENCY DEPT VISIT LOW MDM: CPT

## 2024-03-14 PROCEDURE — 73562 X-RAY EXAM OF KNEE 3: CPT

## 2024-03-14 PROCEDURE — 6370000000 HC RX 637 (ALT 250 FOR IP): Performed by: STUDENT IN AN ORGANIZED HEALTH CARE EDUCATION/TRAINING PROGRAM

## 2024-03-14 RX ORDER — LIDOCAINE 4 G/G
1 PATCH TOPICAL DAILY
Qty: 10 PATCH | Refills: 0 | Status: SHIPPED | OUTPATIENT
Start: 2024-03-14 | End: 2024-03-24

## 2024-03-14 RX ORDER — ACETAMINOPHEN 500 MG
1000 TABLET ORAL
Status: COMPLETED | OUTPATIENT
Start: 2024-03-14 | End: 2024-03-14

## 2024-03-14 RX ORDER — LIDOCAINE 4 G/G
1 PATCH TOPICAL
Status: DISCONTINUED | OUTPATIENT
Start: 2024-03-14 | End: 2024-03-14 | Stop reason: HOSPADM

## 2024-03-14 RX ADMIN — ACETAMINOPHEN 1000 MG: 500 TABLET ORAL at 10:05

## 2024-03-14 ASSESSMENT — PAIN SCALES - GENERAL: PAINLEVEL_OUTOF10: 7

## 2024-03-14 ASSESSMENT — PAIN - FUNCTIONAL ASSESSMENT: PAIN_FUNCTIONAL_ASSESSMENT: 0-10

## 2024-03-14 NOTE — ED TRIAGE NOTES
Pt states that she was seen at [facility] yesterday for knee pain.. Bilateral pain for the past 10yrs and recently pain has flared.  Has the order for vascular study. But wants to have both knees xray'd today. Pt not concerned about the vascular study at this time.

## 2024-03-14 NOTE — ED PROVIDER NOTES
cartilage and has previously had injections in the knees.  No recent trauma.  Physical exam with tenderness over the patella and patellar tendon on the right side.  No deformities.  Suspect patellar strain versus arthritis versus lower suspicion for underlying fracture but will evaluate with bilateral x-rays.  Treating pain here with Tylenol and lidocaine.  No leg swelling or tenderness on the DVT tract so do not suspect DVT [BQ]      ED Course User Index  [BQ] Walt Dickey MD       Records Reviewed (source and summary of external notes): Prior medical records and Nursing notes    Vitals:    Vitals:    03/14/24 0925   BP: 129/85   Pulse: 85   Resp: 18   Temp: 98.1 °F (36.7 °C)   SpO2: 100%   Weight: 98.4 kg (217 lb)   Height: 1.626 m (5' 4\")          Disposition Considerations (Tests not done, Shared Decision Making, Pt Expectation of Test or Treatment.): See MDM and ED Course  Patient was given the following medications:  Medications   lidocaine 4 % external patch 1 patch (1 patch TransDERmal Patch Applied 3/14/24 1005)   acetaminophen (TYLENOL) tablet 1,000 mg (1,000 mg Oral Given 3/14/24 1005)       CONSULTS: (Who and What was discussed)  None     Sepsis Reassessment: Sepsis reassessment not applicable    Social Determinants affecting Dx or Tx: None    PROCEDURES   Unless otherwise noted above, none  Procedures      CRITICAL CARE TIME   Patient does not meet Critical Care Time, 0 minutes    ED FINAL IMPRESSION     1. Pain in both knees, unspecified chronicity          DISPOSITION/PLAN   DISPOSITION      Discharge Note: The patient is stable for discharge home. The signs, symptoms, diagnosis, and discharge instructions have been discussed, understanding conveyed, and agreed upon. The patient is to follow up as recommended or return to ER should their symptoms worsen.      PATIENT REFERRED TO:  Your Orthopedic Doctor          [facility] EMERGENCY DEPARTMENT  [address] [place] [address]

## 2024-03-15 ENCOUNTER — PATIENT MESSAGE (OUTPATIENT)
Facility: CLINIC | Age: 44
End: 2024-03-15

## 2024-03-15 DIAGNOSIS — G62.9 NEUROPATHY: Primary | ICD-10-CM

## 2024-03-20 NOTE — PROGRESS NOTES
Progress Note    Patient: Coco Monge MRN: [id number]  SSN: [id number]    YOB: 1980  Age: 43 y.o.  Sex: female        Chief Complaint   Patient presents with    Knee Pain     Right knee pain worsening since injection since February      she is a 43 y.o. year old female new to me who presents for chronic right knee pain. She says pain has worsened after steroidal injection. She denies any acute injury since that time. She complains of lower leg edema and pain from knee to ankle. Discussed concern for DVT. Patient resistant to getting vascular study. She asks for xray though this was done last month.      Encounter Diagnoses   Name Primary?    Right leg pain Yes    Leg edema, right     Primary osteoarthritis of right knee     Hx of heart transplant ([facility])     Severe obesity (BMI 35.0-39.9) with comorbidity ([facility])        Patient Active Problem List   Diagnosis    Inflammatory arthritis    Fibromyalgia    Severe obesity (BMI 35.0-39.9) with comorbidity ([facility])    Anxiety    HSV (herpes simplex virus) anogenital infection    Vitamin D deficiency    Neuropathy    IFG (impaired fasting glucose)    OCD (obsessive compulsive disorder)    Positive DELIA (antinuclear antibody)    Hyperlipidemia    Mood disorder ([facility])    Thrombosed external hemorrhoid    Anemia, unspecified    Depression    Recurrent depression ([facility])    Panic attack    Tremor    Gastroparesis    Class 2 obesity due to excess calories without serious comorbidity in adult    GERD (gastroesophageal reflux disease)    Chronic pain syndrome    Coronary artery disease involving native coronary artery of native heart without angina pectoris    Sjogren's syndrome ([facility])    Chronic systolic heart failure ([facility])    Prediabetes    Ischemic cardiomyopathy    Recurrent UTI    Frequency of micturition    OAB (overactive bladder)    Frequency of urination    Interstitial cystitis    Parkinson's disease    Status post heart transplant ([facility])    Chronic pain of right

## 2024-03-21 NOTE — TELEPHONE ENCOUNTER
From: Coco Monge  To: Dr. Ralph Good  Sent: 3/15/2024 3:52 PM EDT  Subject: Medication     Hi Dr. Florentino Guzmna is putting this on you to prescribe the pain pills, please I need you do help . What can you prescribe this is and emergency tramdol made me itch I need something, help please because they wont help me at all. I been in pain for almost a year this is not fair. Im not going to get my yearly checkup with them . My heart is fine I will do blood test but I'm not going back down there for that. Nobody helped me and I need your help.

## 2024-03-22 RX ORDER — NORTRIPTYLINE HYDROCHLORIDE 25 MG/1
25 CAPSULE ORAL NIGHTLY
Qty: 90 CAPSULE | Refills: 1 | Status: SHIPPED | OUTPATIENT
Start: 2024-03-22

## 2024-03-26 ENCOUNTER — PATIENT MESSAGE (OUTPATIENT)
Facility: CLINIC | Age: 44
End: 2024-03-26

## 2024-03-26 DIAGNOSIS — G62.9 NEUROPATHY: Primary | ICD-10-CM

## 2024-03-26 DIAGNOSIS — R21 SKIN RASH: ICD-10-CM

## 2024-03-27 ENCOUNTER — TELEPHONE (OUTPATIENT)
Facility: CLINIC | Age: 44
End: 2024-03-27

## 2024-03-27 RX ORDER — NORTRIPTYLINE HYDROCHLORIDE 50 MG/1
50 CAPSULE ORAL NIGHTLY
Qty: 90 CAPSULE | Refills: 1 | Status: SHIPPED | OUTPATIENT
Start: 2024-03-27

## 2024-03-27 NOTE — TELEPHONE ENCOUNTER
Alisha states the pt is on the following medications and has noticed that the pt is kind of out of it when she picks up meds. Please review and if PCP has any questions please contact Alisha at [facility].    Nortriptyline 50 mg  Lorazepam 1 mg tab 2 times daily  Ambien 10 mg at bed time  Trazadone 100 at bed  Duloxetine 30 mg  Clonazapam 1 mg

## 2024-03-27 NOTE — TELEPHONE ENCOUNTER
From: Coco Monge  To: Dr. Ralph Good  Sent: 3/26/2024 7:44 PM EDT  Subject: Medication trying to still get it straight     Dr. Good I feel like you don't understand me i need pain medication please if you can call so I can explain what's going on. I need to be on the medication but it takes forever to get a reply I need to be on 50mg. Also I need to get the hydrocortisone 2.5 because im breaking out and itching from my echo MONDAY. The stuff they used on me made me itch. I really need this straight please. This is giving me anxiety. [facility]. I just need this straight. Thanks

## 2024-04-02 ENCOUNTER — PATIENT MESSAGE (OUTPATIENT)
Facility: CLINIC | Age: 44
End: 2024-04-02

## 2024-04-02 DIAGNOSIS — G62.9 NEUROPATHY: ICD-10-CM

## 2024-04-02 DIAGNOSIS — G25.81 RESTLESS LEGS: ICD-10-CM

## 2024-04-02 DIAGNOSIS — Z12.31 BREAST CANCER SCREENING BY MAMMOGRAM: Primary | ICD-10-CM

## 2024-04-02 NOTE — TELEPHONE ENCOUNTER
From: Coco Monge  To: Dr. Ralph Good  Sent: 4/2/2024 1:44 PM EDT  Subject: Mammogram     Hey Dr. Good I need to get you to send a referral for to get my mammogram I guess a diagnostic mammogram because of the lump in the left breast. Same place [facility].     Thank you

## 2024-04-05 DIAGNOSIS — G62.9 NEUROPATHY: ICD-10-CM

## 2024-04-05 RX ORDER — PRAMIPEXOLE DIHYDROCHLORIDE 0.5 MG/1
0.75 TABLET ORAL NIGHTLY
Qty: 135 TABLET | Refills: 1 | Status: SHIPPED | OUTPATIENT
Start: 2024-04-05

## 2024-04-05 RX ORDER — NORTRIPTYLINE HYDROCHLORIDE 25 MG/1
25 CAPSULE ORAL NIGHTLY
Qty: 90 CAPSULE | Refills: 1 | OUTPATIENT
Start: 2024-04-05

## 2024-04-05 RX ORDER — NORTRIPTYLINE HYDROCHLORIDE 50 MG/1
50 CAPSULE ORAL NIGHTLY
Qty: 90 CAPSULE | Refills: 1 | OUTPATIENT
Start: 2024-04-05

## 2024-04-05 RX ORDER — NORTRIPTYLINE HYDROCHLORIDE 50 MG/1
50 CAPSULE ORAL NIGHTLY
Qty: 90 CAPSULE | Refills: 1 | Status: SHIPPED | OUTPATIENT
Start: 2024-04-05

## 2024-04-08 ENCOUNTER — TELEPHONE (OUTPATIENT)
Facility: CLINIC | Age: 44
End: 2024-04-08

## 2024-04-08 DIAGNOSIS — Z12.31 BREAST CANCER SCREENING BY MAMMOGRAM: Primary | ICD-10-CM

## 2024-04-08 DIAGNOSIS — N63.0 MASS OF BREAST, UNSPECIFIED LATERALITY: ICD-10-CM

## 2024-04-08 DIAGNOSIS — N63.15 UNSPECIFIED LUMP IN THE RIGHT BREAST, OVERLAPPING QUADRANTS: ICD-10-CM

## 2024-04-08 NOTE — TELEPHONE ENCOUNTER
Pt needs an order for a Diagnostic Bilateral Mammogram due to lump. Please put in new order so pt can be scheduled.

## 2024-04-17 ENCOUNTER — PATIENT MESSAGE (OUTPATIENT)
Facility: CLINIC | Age: 44
End: 2024-04-17

## 2024-04-17 DIAGNOSIS — K31.84 GASTROPARESIS: Primary | ICD-10-CM

## 2024-04-17 DIAGNOSIS — M79.7 FIBROMYALGIA: ICD-10-CM

## 2024-04-17 RX ORDER — METHOCARBAMOL 500 MG/1
500 TABLET, FILM COATED ORAL 4 TIMES DAILY
Qty: 360 TABLET | Refills: 1 | Status: SHIPPED | OUTPATIENT
Start: 2024-04-17

## 2024-04-17 NOTE — TELEPHONE ENCOUNTER
From: Coco Monge  To: Dr. Ralph Good  Sent: 4/17/2024 2:52 PM EDT  Subject: Methocarbamol     Hey Dr. Good, i just went to my appt at [facility] , I don't have to come back until 3months. I'm was on methocarbamol 4 times a day. I need to get that again they said they were ok with you prescribing it. They were not able to prescribe it goes back to primary care. The cymbalta is helping a little. We are working toward getting breast reduction I have to loose now about 26pounds. I'm going to do it. If you can prescribe the methocarbamol 500 MG I would appreciate it.

## 2024-04-22 ENCOUNTER — TELEPHONE (OUTPATIENT)
Facility: CLINIC | Age: 44
End: 2024-04-22

## 2024-04-22 DIAGNOSIS — Z12.31 BREAST CANCER SCREENING BY MAMMOGRAM: ICD-10-CM

## 2024-04-22 DIAGNOSIS — N63.15 UNSPECIFIED LUMP IN THE RIGHT BREAST, OVERLAPPING QUADRANTS: ICD-10-CM

## 2024-04-22 DIAGNOSIS — N63.0 MASS OF BREAST, UNSPECIFIED LATERALITY: Primary | ICD-10-CM

## 2024-04-23 ENCOUNTER — OFFICE VISIT (OUTPATIENT)
Facility: CLINIC | Age: 44
End: 2024-04-23
Payer: MEDICARE

## 2024-04-23 VITALS
BODY MASS INDEX: 38.41 KG/M2 | OXYGEN SATURATION: 97 % | SYSTOLIC BLOOD PRESSURE: 96 MMHG | HEIGHT: 64 IN | WEIGHT: 225 LBS | RESPIRATION RATE: 20 BRPM | HEART RATE: 101 BPM | TEMPERATURE: 97 F | DIASTOLIC BLOOD PRESSURE: 66 MMHG

## 2024-04-23 DIAGNOSIS — Z91.09 ENVIRONMENTAL ALLERGIES: ICD-10-CM

## 2024-04-23 DIAGNOSIS — M22.2X1 PATELLOFEMORAL PAIN SYNDROME OF RIGHT KNEE: ICD-10-CM

## 2024-04-23 DIAGNOSIS — M25.551 RIGHT HIP PAIN: Primary | ICD-10-CM

## 2024-04-23 DIAGNOSIS — M17.11 PRIMARY OSTEOARTHRITIS OF RIGHT KNEE: ICD-10-CM

## 2024-04-23 DIAGNOSIS — I25.10 CORONARY ARTERY DISEASE INVOLVING NATIVE CORONARY ARTERY OF NATIVE HEART WITHOUT ANGINA PECTORIS: ICD-10-CM

## 2024-04-23 PROCEDURE — G8427 DOCREV CUR MEDS BY ELIG CLIN: HCPCS | Performed by: FAMILY MEDICINE

## 2024-04-23 PROCEDURE — 1036F TOBACCO NON-USER: CPT | Performed by: FAMILY MEDICINE

## 2024-04-23 PROCEDURE — 99214 OFFICE O/P EST MOD 30 MIN: CPT | Performed by: FAMILY MEDICINE

## 2024-04-23 PROCEDURE — G8417 CALC BMI ABV UP PARAM F/U: HCPCS | Performed by: FAMILY MEDICINE

## 2024-04-23 RX ORDER — SEMAGLUTIDE 0.5 MG/.5ML
0.5 INJECTION, SOLUTION SUBCUTANEOUS
COMMUNITY
Start: 2024-04-18 | End: 2024-07-17

## 2024-04-23 RX ORDER — TORSEMIDE 20 MG/1
TABLET ORAL
COMMUNITY
Start: 2024-04-17 | End: 2025-04-21

## 2024-04-23 RX ORDER — DULOXETIN HYDROCHLORIDE 60 MG/1
CAPSULE, DELAYED RELEASE ORAL
COMMUNITY
Start: 2024-04-08

## 2024-04-23 RX ORDER — EVOLOCUMAB 140 MG/ML
INJECTION, SOLUTION SUBCUTANEOUS
COMMUNITY
Start: 2024-04-17

## 2024-04-23 RX ORDER — PREDNISONE 10 MG/1
10 TABLET ORAL DAILY
Qty: 5 TABLET | Refills: 0 | Status: SHIPPED | OUTPATIENT
Start: 2024-04-23 | End: 2024-04-28

## 2024-04-23 ASSESSMENT — LIFESTYLE VARIABLES
HOW OFTEN DO YOU HAVE A DRINK CONTAINING ALCOHOL: 2-4 TIMES A MONTH
HOW MANY STANDARD DRINKS CONTAINING ALCOHOL DO YOU HAVE ON A TYPICAL DAY: 1 OR 2

## 2024-04-23 ASSESSMENT — PATIENT HEALTH QUESTIONNAIRE - PHQ9
8. MOVING OR SPEAKING SO SLOWLY THAT OTHER PEOPLE COULD HAVE NOTICED. OR THE OPPOSITE, BEING SO FIGETY OR RESTLESS THAT YOU HAVE BEEN MOVING AROUND A LOT MORE THAN USUAL: MORE THAN HALF THE DAYS
SUM OF ALL RESPONSES TO PHQ QUESTIONS 1-9: 9
7. TROUBLE CONCENTRATING ON THINGS, SUCH AS READING THE NEWSPAPER OR WATCHING TELEVISION: SEVERAL DAYS
SUM OF ALL RESPONSES TO PHQ QUESTIONS 1-9: 9
9. THOUGHTS THAT YOU WOULD BE BETTER OFF DEAD, OR OF HURTING YOURSELF: NOT AT ALL
3. TROUBLE FALLING OR STAYING ASLEEP: SEVERAL DAYS
6. FEELING BAD ABOUT YOURSELF - OR THAT YOU ARE A FAILURE OR HAVE LET YOURSELF OR YOUR FAMILY DOWN: SEVERAL DAYS
4. FEELING TIRED OR HAVING LITTLE ENERGY: SEVERAL DAYS
10. IF YOU CHECKED OFF ANY PROBLEMS, HOW DIFFICULT HAVE THESE PROBLEMS MADE IT FOR YOU TO DO YOUR WORK, TAKE CARE OF THINGS AT HOME, OR GET ALONG WITH OTHER PEOPLE: VERY DIFFICULT
SUM OF ALL RESPONSES TO PHQ QUESTIONS 1-9: 9
1. LITTLE INTEREST OR PLEASURE IN DOING THINGS: SEVERAL DAYS
2. FEELING DOWN, DEPRESSED OR HOPELESS: SEVERAL DAYS
5. POOR APPETITE OR OVEREATING: SEVERAL DAYS
SUM OF ALL RESPONSES TO PHQ QUESTIONS 1-9: 9
SUM OF ALL RESPONSES TO PHQ9 QUESTIONS 1 & 2: 2

## 2024-04-23 ASSESSMENT — ENCOUNTER SYMPTOMS
CHEST TIGHTNESS: 0
ABDOMINAL PAIN: 0

## 2024-04-23 NOTE — PROGRESS NOTES
[facility]    History of Present Illness:   Coco Moneg is a 43 y.o. female with history of CHF, CAD, GERD, Arthritis, Fibromyalgia, HLD, Anxiety, HSV, OCD, Depression, Obesity    CC: Right hip pain, Ear pain  History provided by patient and Records    HPI:  Bilateral ear pain:  Noting issues with bilateral ear feeling stopped up at this time.  Is using Flonase once daily and Astelin.    Environmental Allergies: Overall acting up at this time with congestion.    Right hip pain: had a fall yesterday onto the right hip with a persistent pain now but is able to bear weight at this time.  Noting though also exacerbated right knee pain as well.  Fall due to tripping at home, no syncope, no head injury.      Health Maintenance  Health Maintenance Due   Topic Date Due    Shingles vaccine (1 of 2) Never done    COVID-19 Vaccine (4 - 2023-24 season) 09/01/2023    Lipids  10/22/2023    Annual Wellness Visit (Medicare Advantage)  Never done    DTaP/Tdap/Td vaccine (2 - Td or Tdap) 01/25/2024    A1C test (Diabetic or Prediabetic)  02/09/2024    Breast cancer screen  02/10/2024       Past Medical, Family, and Social History:     Current Outpatient Medications on File Prior to Visit   Medication Sig Dispense Refill    REPATHA SURECLICK 140 MG/ML SOAJ       Semaglutide-Weight Management (WEGOVY) 0.5 MG/0.5ML SOAJ SC injection Inject 0.5 mg into the skin every 7 days      torsemide (DEMADEX) 20 MG tablet Take by mouth      DULoxetine (CYMBALTA) 60 MG extended release capsule TAKE ONE CAPSULE BY MOUTH EVERY DAY DO NOT CRUSH OR CHEW      methocarbamol (ROBAXIN) 500 MG tablet Take 1 tablet by mouth 4 times daily 360 tablet 1    pramipexole (MIRAPEX) 0.5 MG tablet Take 1.5 tablets by mouth nightly 135 tablet 1    nortriptyline (PAMELOR) 50 MG capsule Take 1 capsule by mouth nightly 90 capsule 1    clonazePAM (KLONOPIN) 1 MG tablet TAKE ONE TABLET BY MOUTH AT BEDTIME AS NEEDED FOR SLEEP      DULoxetine

## 2024-05-14 ENCOUNTER — PATIENT MESSAGE (OUTPATIENT)
Facility: CLINIC | Age: 44
End: 2024-05-14

## 2024-05-14 DIAGNOSIS — G62.9 NEUROPATHY: ICD-10-CM

## 2024-05-14 DIAGNOSIS — G89.4 CHRONIC PAIN SYNDROME: ICD-10-CM

## 2024-05-14 DIAGNOSIS — R21 SKIN RASH: Primary | ICD-10-CM

## 2024-05-14 RX ORDER — PREGABALIN 100 MG/1
CAPSULE ORAL
Qty: 60 CAPSULE | Refills: 2 | Status: SHIPPED | OUTPATIENT
Start: 2024-05-14 | End: 2024-08-12

## 2024-05-15 RX ORDER — KETOCONAZOLE 20 MG/ML
SHAMPOO TOPICAL
Qty: 240 ML | Refills: 3 | Status: SHIPPED | OUTPATIENT
Start: 2024-05-15 | End: 2024-05-16 | Stop reason: SDUPTHER

## 2024-05-15 NOTE — TELEPHONE ENCOUNTER
From: Coco Monge  To: Dr. Ralph Good  Sent: 5/14/2024 4:28 PM EDT  Subject: Ketoconazole2%    Hi Dr. Good I need a refill on my ketoconzole 2% my scalp is inflamed.    Thank you

## 2024-05-16 ENCOUNTER — OFFICE VISIT (OUTPATIENT)
Facility: CLINIC | Age: 44
End: 2024-05-16
Payer: MEDICARE

## 2024-05-16 VITALS
TEMPERATURE: 98.1 F | WEIGHT: 230.2 LBS | RESPIRATION RATE: 18 BRPM | HEIGHT: 64 IN | DIASTOLIC BLOOD PRESSURE: 81 MMHG | OXYGEN SATURATION: 100 % | BODY MASS INDEX: 39.3 KG/M2 | SYSTOLIC BLOOD PRESSURE: 113 MMHG | HEART RATE: 85 BPM

## 2024-05-16 DIAGNOSIS — L30.9 DERMATITIS: Primary | ICD-10-CM

## 2024-05-16 PROCEDURE — G8417 CALC BMI ABV UP PARAM F/U: HCPCS

## 2024-05-16 PROCEDURE — G8428 CUR MEDS NOT DOCUMENT: HCPCS

## 2024-05-16 PROCEDURE — 99213 OFFICE O/P EST LOW 20 MIN: CPT

## 2024-05-16 PROCEDURE — 1036F TOBACCO NON-USER: CPT

## 2024-05-16 RX ORDER — KETOCONAZOLE 20 MG/ML
SHAMPOO TOPICAL
Qty: 240 ML | Refills: 3 | Status: SHIPPED | OUTPATIENT
Start: 2024-05-16

## 2024-05-16 NOTE — PROGRESS NOTES
Chief Complaint   Patient presents with    Hair/Scalp Problem     Scabs on scalp       \"Have you been to the ER, urgent care clinic since your last visit?  Hospitalized since your last visit?\"    NO    “Have you seen or consulted any other health care providers outside of [facility] since your last visit?”    NO       Have you had a mammogram?”   NO           Health Maintenance Due   Topic Date Due    Shingles vaccine (1 of 2) Never done    COVID-19 Vaccine (4 - 2023-24 season) 09/01/2023    Lipids  10/22/2023    Annual Wellness Visit (Medicare Advantage)  Never done    DTaP/Tdap/Td vaccine (2 - Td or Tdap) 01/25/2024    A1C test (Diabetic or Prediabetic)  02/09/2024    Breast cancer screen  02/10/2024

## 2024-05-16 NOTE — PROGRESS NOTES
[facility] Medicine Residency   [facility]    Subjective:  CC:   Chief Complaint   Patient presents with    Hair/Scalp Problem     Scabs on scalp       HPI:  Coco Monge is 43 y.o. female who presents today for scalp concern. She reports that she has a history of psoriasis with lesions on scalp and is concerned about infection. Is using multiple scalp treatments prescribed by her dermatologist and brought them with her today.       Past Medical History:   Diagnosis Date    Anastomotic stricture of esophagus     patient reports \"stretching done\" in the past    Arthritis     CHF (congestive heart failure) ([facility])     prior to transplant    Chronic pain     fybromyalsia    CMV (cytomegalovirus infection) ([facility])     Depression     anxiety, depression, OCD    GERD (gastroesophageal reflux disease)     Hypertension     not currently being treated    Hypertension     Ill-defined condition     Fibromyalia gastricparesis    MI (myocardial infarction) ([facility])     7/24/2022 MI    Musculoskeletal disorder     Sepsis ([facility])     SOB (shortness of breath)     much better since transplant    Stool color black      Current Outpatient Medications on File Prior to Visit   Medication Sig Dispense Refill    pregabalin (LYRICA) 100 MG capsule TAKE ONE CAPSULE BY MOUTH TWICE A DAY. MAX DAILY AMOUNT: 200 MG 60 capsule 2    REPATHA SURECLICK 140 MG/ML SOAJ       Semaglutide-Weight Management (WEGOVY) 0.5 MG/0.5ML SOAJ SC injection Inject 0.5 mg into the skin every 7 days      torsemide (DEMADEX) 20 MG tablet Take by mouth      DULoxetine (CYMBALTA) 60 MG extended release capsule TAKE ONE CAPSULE BY MOUTH EVERY DAY DO NOT CRUSH OR CHEW      methocarbamol (ROBAXIN) 500 MG tablet Take 1 tablet by mouth 4 times daily 360 tablet 1    pramipexole (MIRAPEX) 0.5 MG tablet Take 1.5 tablets by mouth nightly 135 tablet 1    nortriptyline (PAMELOR) 50 MG capsule Take 1 capsule by mouth nightly 90 capsule 1    clonazePAM (KLONOPIN)

## 2024-05-20 RX ORDER — BLOOD SUGAR DIAGNOSTIC
STRIP MISCELLANEOUS
Qty: 300 STRIP | Refills: 3 | Status: SHIPPED | OUTPATIENT
Start: 2024-05-20

## 2024-05-20 NOTE — PROGRESS NOTES
I reviewed with the resident the medical history and the resident's findings on the physical examination.  I discussed with the resident the patient's diagnosis and concur with the plan.     Joellen Pinzon MD 5/20/2024

## 2024-05-24 ENCOUNTER — PATIENT MESSAGE (OUTPATIENT)
Facility: CLINIC | Age: 44
End: 2024-05-24

## 2024-05-24 DIAGNOSIS — G89.4 CHRONIC PAIN SYNDROME: ICD-10-CM

## 2024-05-24 DIAGNOSIS — G62.9 NEUROPATHY: ICD-10-CM

## 2024-05-24 RX ORDER — PREGABALIN 100 MG/1
CAPSULE ORAL
Qty: 60 CAPSULE | Refills: 2 | Status: SHIPPED | OUTPATIENT
Start: 2024-05-24 | End: 2024-06-24

## 2024-05-24 NOTE — TELEPHONE ENCOUNTER
From: Coco Monge  To: Dr. Ralph Good  Sent: 5/24/2024 4:10 PM EDT  Subject: Pregabalin     Hi Dr. Good I need my pregablin I will have enough until Tuesday. Thank you

## 2024-05-26 NOTE — TELEPHONE ENCOUNTER
Spoke with patient and advised her of the following per Dr. Troy Hill:          Patient asked about her EMG.  She had this at neurology. Keyona Trivedi did not show any severe neuropathy so the numbness and tingling which can be part of an early neuropathy -but it obviously is not severe.  Numbness and tingling could also be part of anxiety syndrome.     No medication changes are required or necessary. Thank you,   Dr. Troy Hill      She verbalized understanding. She says that she forgot to ask during her last visit about the dryness in her feet. She is having to apply lotion constantly to both feet. Please advise. Bed/Stretcher in lowest position, wheels locked, appropriate side rails in place/Call bell, personal items and telephone in reach/Instruct patient to call for assistance before getting out of bed/chair/stretcher/Non-slip footwear applied when patient is off stretcher/[place] to call system/Physically safe environment - no spills, clutter or unnecessary equipment/Purposeful proactive rounding/Room/bathroom lighting operational, light cord in reach

## 2024-05-31 ENCOUNTER — HOSPITAL ENCOUNTER (OUTPATIENT)
Facility: HOSPITAL | Age: 44
End: 2024-05-31
Payer: MEDICARE

## 2024-05-31 DIAGNOSIS — Z12.31 VISIT FOR SCREENING MAMMOGRAM: ICD-10-CM

## 2024-05-31 PROCEDURE — 77063 BREAST TOMOSYNTHESIS BI: CPT

## 2024-06-04 ENCOUNTER — OFFICE VISIT (OUTPATIENT)
Age: 44
End: 2024-06-04
Payer: MEDICARE

## 2024-06-04 VITALS
SYSTOLIC BLOOD PRESSURE: 123 MMHG | BODY MASS INDEX: 40.51 KG/M2 | WEIGHT: 236 LBS | DIASTOLIC BLOOD PRESSURE: 82 MMHG | HEART RATE: 91 BPM

## 2024-06-04 DIAGNOSIS — Z12.4 SCREENING FOR CERVICAL CANCER: ICD-10-CM

## 2024-06-04 DIAGNOSIS — Z01.419 ENCOUNTER FOR WELL WOMAN EXAM WITH ROUTINE GYNECOLOGICAL EXAM: Primary | ICD-10-CM

## 2024-06-04 PROBLEM — R87.619 ABNORMAL PAP SMEAR OF CERVIX: Status: ACTIVE | Noted: 2024-06-04

## 2024-06-04 PROCEDURE — 99386 PREV VISIT NEW AGE 40-64: CPT | Performed by: OBSTETRICS & GYNECOLOGY

## 2024-06-04 NOTE — PROGRESS NOTES
Coco Monge is a 43 y.o. female returns for an annual exam     Chief Complaint   Patient presents with    Annual Exam       Patient's last menstrual period was 05/29/2024.  Her periods are light in flow and usually regular with a 26-32 day interval with 3-7 day duration.  She has dysmenorrhea but heat help.  Problems: no problems  Birth Control: condoms .  Last Pap: normal obtained 4/16/21  She does have a history of abnormal pap smear    Pap 3/7/12 LGSIL (no hpv done)  Pap 4/16/21 normal    Last Mammogram: had a recent mammogram 6/1/24 which was negative for malignancy.   
   Chronic pain of right knee    Patellofemoral pain syndrome of right knee    Personal history of immunosuppressive therapy    Primary osteoarthritis of right knee    Hyperparathyroidism, unspecified (HCC)    Abnormal Pap smear of cervix         Review of Systems - History obtained from the patient  Constitutional: negative for weight loss, fever  HEENT: negative for hearing loss, earache, congestion, snoring, sorethroat  CV: negative for chest pain, palpitations, edema  Resp: negative for cough, shortness of breath, wheezing  GI: negative for change in bowel habits, abdominal pain, black or bloody stools  : negative for frequency, dysuria, hematuria, vaginal discharge  MSK: negative for back pain, joint pain, muscle pain  Breast: negative for breast lumps, nipple discharge, galactorrhea  Skin :negative for itching, rash, hives  Neuro: negative for dizziness, headache, confusion, weakness  Psych: negative for anxiety, depression, change in mood  Heme/lymph: negative for bleeding, bruising, pallor    Physical Exam    /82   Pulse 91   Wt 107 kg (236 lb)   LMP 05/29/2024   BMI 40.51 kg/m²     Constitutional  Appearance: well-nourished, well developed, alert, in no acute distress    HENT  Head and Face: appears normal    Neck  Inspection/Palpation: normal appearance, no masses or tenderness  Lymph Nodes: no lymphadenopathy present  Thyroid: gland size normal, nontender, no nodules or masses present on palpation    Chest  Respiratory Effort: breathing unlabored  Auscultation: normal breath sounds    Cardiovascular  Heart:  Auscultation: regular rate and rhythm without murmur    Breasts  Inspection of Breasts: breasts symmetrical, no skin changes, no discharge present, nipple appearance normal, no skin retraction present  Palpation of Breasts and Axillae: no masses present on palpation, no breast tenderness  Axillary Lymph Nodes: no lymphadenopathy present    Gastrointestinal  Abdominal Examination: abdomen

## 2024-06-08 LAB
CYTOLOGIST CVX/VAG CYTO: NORMAL
CYTOLOGY CVX/VAG DOC CYTO: NORMAL
CYTOLOGY CVX/VAG DOC THIN PREP: NORMAL
DX ICD CODE: NORMAL
HPV GENOTYPE REFLEX: NORMAL
HPV I/H RISK 4 DNA CVX QL PROBE+SIG AMP: NEGATIVE
Lab: NORMAL
OTHER STN SPEC: NORMAL
STAT OF ADQ CVX/VAG CYTO-IMP: NORMAL

## 2024-06-17 ENCOUNTER — OFFICE VISIT (OUTPATIENT)
Facility: CLINIC | Age: 44
End: 2024-06-17
Payer: MEDICARE

## 2024-06-17 VITALS
HEIGHT: 64 IN | HEART RATE: 93 BPM | SYSTOLIC BLOOD PRESSURE: 109 MMHG | DIASTOLIC BLOOD PRESSURE: 76 MMHG | BODY MASS INDEX: 39.95 KG/M2 | OXYGEN SATURATION: 99 % | WEIGHT: 234 LBS | RESPIRATION RATE: 16 BRPM | TEMPERATURE: 97.6 F

## 2024-06-17 DIAGNOSIS — E66.01 CLASS 3 SEVERE OBESITY DUE TO EXCESS CALORIES WITHOUT SERIOUS COMORBIDITY WITH BODY MASS INDEX (BMI) OF 40.0 TO 44.9 IN ADULT (HCC): ICD-10-CM

## 2024-06-17 DIAGNOSIS — Z91.89 INCREASED RISK OF BREAST CANCER: ICD-10-CM

## 2024-06-17 DIAGNOSIS — R92.30 DENSE BREAST TISSUE: Primary | ICD-10-CM

## 2024-06-17 DIAGNOSIS — R42 LIGHTHEADEDNESS: ICD-10-CM

## 2024-06-17 PROCEDURE — G8427 DOCREV CUR MEDS BY ELIG CLIN: HCPCS | Performed by: FAMILY MEDICINE

## 2024-06-17 PROCEDURE — 99214 OFFICE O/P EST MOD 30 MIN: CPT | Performed by: FAMILY MEDICINE

## 2024-06-17 PROCEDURE — 1036F TOBACCO NON-USER: CPT | Performed by: FAMILY MEDICINE

## 2024-06-17 PROCEDURE — G8417 CALC BMI ABV UP PARAM F/U: HCPCS | Performed by: FAMILY MEDICINE

## 2024-06-17 RX ORDER — OXCARBAZEPINE 300 MG/1
TABLET, FILM COATED ORAL
COMMUNITY
Start: 2024-06-10

## 2024-06-17 ASSESSMENT — ENCOUNTER SYMPTOMS
BACK PAIN: 0
APNEA: 0
CHEST TIGHTNESS: 0

## 2024-06-17 NOTE — PROGRESS NOTES
[facility]    History of Present Illness:   Coco Monge is a 43 y.o. female with history of CHF, CAD, GERD, Arthritis, Fibromyalgia, HLD, Anxiety, HSV, OCD, Depression, Obesity    CC: Breast imaging  History provided by patient and Records    HPI:  Breast imaging: Noting recently had a normal Mammogram but based on dense breast tissue she has increased risk of breast cancer and they want an MRI.    Lightheadedness: Noting some issues since being on the Wegovy, lightheadedness with going from sitting to standing often.    Obesity: Is taking Wegovy, noting abdominal symptoms and is having reduced appetite.    Health Maintenance  Health Maintenance Due   Topic Date Due    Shingles vaccine (1 of 2) Never done    COVID-19 Vaccine (4 - 2023-24 season) 09/01/2023    Lipids  10/22/2023    Annual Wellness Visit (Medicare Advantage)  Never done    DTaP/Tdap/Td vaccine (2 - Td or Tdap) 01/25/2024    A1C test (Diabetic or Prediabetic)  02/09/2024       Past Medical, Family, and Social History:     Current Outpatient Medications on File Prior to Visit   Medication Sig Dispense Refill    OXcarbazepine (TRILEPTAL) 300 MG tablet TAKE ONE-HALF TABLET BY MOUTH TWICE A DAY      pregabalin (LYRICA) 100 MG capsule TAKE ONE CAPSULE BY MOUTH TWICE A DAY. MAX DAILY AMOUNT: 200 MG 60 capsule 2    blood glucose test strips (ACCU-CHEK GUIDE) strip USE TO CHECK BLOOD SUGAR 3 TIMES DAILY. FOR UNCONTROLLED HYPERGLYCEMIA DUE TO MEDICATIONS. 300 strip 3    ketoconazole (NIZORAL) 2 % shampoo SHAMPOO TWICE WEEKLY 240 mL 3    REPATHA SURECLICK 140 MG/ML SOAJ       Semaglutide-Weight Management (WEGOVY) 0.5 MG/0.5ML SOAJ SC injection Inject 0.5 mg into the skin every 7 days      torsemide (DEMADEX) 20 MG tablet Take by mouth      DULoxetine (CYMBALTA) 60 MG extended release capsule TAKE ONE CAPSULE BY MOUTH EVERY DAY DO NOT CRUSH OR CHEW      methocarbamol (ROBAXIN) 500 MG tablet Take 1 tablet by mouth 4 times daily 360

## 2024-06-17 NOTE — PROGRESS NOTES
Chief Complaint   Patient presents with    Follow-up        \"Have you been to the ER, urgent care clinic since your last visit?  Hospitalized since your last visit?\"    NO    “Have you seen or consulted any other health care providers outside of [facility] since your last visit?”    Yes [facility], dr barbara lamas phone call for psych             Click Here for Release of Records Request     Health Maintenance Due   Topic Date Due    Shingles vaccine (1 of 2) Never done    COVID-19 Vaccine (4 - 2023-24 season) 09/01/2023    Lipids  10/22/2023    Annual Wellness Visit (Medicare Advantage)  Never done    DTaP/Tdap/Td vaccine (2 - Td or Tdap) 01/25/2024    A1C test (Diabetic or Prediabetic)  02/09/2024

## 2024-06-18 ENCOUNTER — OFFICE VISIT (OUTPATIENT)
Facility: CLINIC | Age: 44
End: 2024-06-18

## 2024-06-18 VITALS
BODY MASS INDEX: 39.95 KG/M2 | OXYGEN SATURATION: 97 % | HEART RATE: 93 BPM | SYSTOLIC BLOOD PRESSURE: 95 MMHG | DIASTOLIC BLOOD PRESSURE: 67 MMHG | WEIGHT: 234 LBS | RESPIRATION RATE: 19 BRPM | TEMPERATURE: 98.4 F | HEIGHT: 64 IN

## 2024-06-18 DIAGNOSIS — T78.40XD ALLERGIC REACTION, SUBSEQUENT ENCOUNTER: ICD-10-CM

## 2024-06-18 DIAGNOSIS — T63.461A WASP STING, ACCIDENTAL OR UNINTENTIONAL, INITIAL ENCOUNTER: Primary | ICD-10-CM

## 2024-06-18 RX ORDER — PREDNISONE 50 MG/1
50 TABLET ORAL DAILY
Qty: 5 TABLET | Refills: 0 | Status: SHIPPED | OUTPATIENT
Start: 2024-06-18 | End: 2024-06-23

## 2024-06-18 RX ORDER — EPINEPHRINE 0.3 MG/.3ML
0.3 INJECTION SUBCUTANEOUS ONCE
Qty: 0.3 ML | Refills: 0 | Status: SHIPPED | OUTPATIENT
Start: 2024-06-18 | End: 2024-06-18

## 2024-06-18 RX ORDER — DIPHENHYDRAMINE HCL 25 MG
25 CAPSULE ORAL ONCE
Status: COMPLETED | OUTPATIENT
Start: 2024-06-18 | End: 2024-06-18

## 2024-06-18 RX ADMIN — Medication 25 MG: at 15:56

## 2024-06-18 NOTE — PROGRESS NOTES
Chief Complaint   Patient presents with    Insect Bite     Right wrist. Approximately 45 minutes prior to appointment.          \"Have you been to the ER, urgent care clinic since your last visit?  Hospitalized since your last visit?\"    NO    “Have you seen or consulted any other health care providers outside of [facility] since your last visit?”    NO            Click Here for Release of Records Request     Health Maintenance Due   Topic Date Due    Shingles vaccine (1 of 2) Never done    COVID-19 Vaccine (4 - 2023-24 season) 09/01/2023    Lipids  10/22/2023    Annual Wellness Visit (Medicare Advantage)  Never done    DTaP/Tdap/Td vaccine (2 - Td or Tdap) 01/25/2024    A1C test (Diabetic or Prediabetic)  02/09/2024       
initial encounter: Patient presents for evaluation after being stung by a wasp on the dorsal surface of her right wrist.  She reports several years ago that she was stung by multiple wasp, developed swelling, and was treated with a steroid injection.  She currently denies any shortness of breath, difficulty swallowing, other skin rash.  On exam, VSS.  Breath sounds clear.  There is a 1.5 cm erythematous wheal of the right dorsal wrist.  There is mildly tender to palpation.  Favor diagnosis of limited local reaction to wasp sting.  Administered Benadryl 25 mg p.o. x 1.  She reported subjective improvement in symptoms after a short period of observation.  Provided a prescription for prednisone 50 mg daily for 5 days.  Discussed that steroid medication could worsen blood glucose control.  Provided prescription for EpiPen if needed in case of recurrent wasp sting.  Recommended follow-up in 1 month for Medicare wellness visit.  -     diphenhydrAMINE (BENADRYL) capsule 25 mg  -     predniSONE (DELTASONE) 50 MG tablet; Take 1 tablet by mouth daily for 5 days    Allergic reaction, subsequent encounter: Per above.  -     EPINEPHrine (EPIPEN 2-FAIZA) 0.3 MG/0.3ML SOAJ injection; Inject 0.3 mLs into the muscle once for 1 dose Use as directed for allergic reaction  -     predniSONE (DELTASONE) 50 MG tablet; Take 1 tablet by mouth daily for 5 days        Return in about 4 weeks (around 7/16/2024) for Medicare Wellness Visit.    Coco Monge expressed understanding of this plan. An AVS was printed and given to the patient.     Pt discussed with Dr. Castro (Attending Physician),    Jean Claude Mejia MD  Family Medicine Resident    Please note that this dictation was completed with kontoblick, the [employer] voice recognition software.  Quite often unanticipated grammatical, syntax, homophones, and other interpretive errors are inadvertently transcribed by the computer software. Please disregard these errors.  Please excuse any errors

## 2024-06-19 DIAGNOSIS — R35.0 FREQUENCY OF MICTURITION: ICD-10-CM

## 2024-06-20 RX ORDER — OXYBUTYNIN CHLORIDE 15 MG/1
TABLET, EXTENDED RELEASE ORAL
Qty: 30 TABLET | Refills: 3 | Status: SHIPPED | OUTPATIENT
Start: 2024-06-20

## 2024-06-26 ENCOUNTER — TELEPHONE (OUTPATIENT)
Facility: CLINIC | Age: 44
End: 2024-06-26

## 2024-06-26 DIAGNOSIS — R35.0 FREQUENCY OF MICTURITION: ICD-10-CM

## 2024-06-26 DIAGNOSIS — R30.0 DYSURIA: Primary | ICD-10-CM

## 2024-06-26 DIAGNOSIS — R30.0 DYSURIA: ICD-10-CM

## 2024-06-26 NOTE — TELEPHONE ENCOUNTER
Pt states she believes she has a UTI. Pt states she is having all the symptoms, and she feel really sick. Pt asked if she can come in and leave a urine sample today. Please advise.

## 2024-06-27 ENCOUNTER — PATIENT MESSAGE (OUTPATIENT)
Facility: CLINIC | Age: 44
End: 2024-06-27

## 2024-06-27 DIAGNOSIS — R30.0 DYSURIA: Primary | ICD-10-CM

## 2024-06-27 LAB
APPEARANCE UR: CLEAR
BACTERIA SPEC CULT: NORMAL
BACTERIA URNS QL MICRO: ABNORMAL /HPF
BILIRUB UR QL: NEGATIVE
COLOR UR: ABNORMAL
EPITH CASTS URNS QL MICRO: ABNORMAL /LPF
GLUCOSE UR STRIP.AUTO-MCNC: NEGATIVE MG/DL
HGB UR QL STRIP: NEGATIVE
KETONES UR QL STRIP.AUTO: ABNORMAL MG/DL
LEUKOCYTE ESTERASE UR QL STRIP.AUTO: NEGATIVE
NITRITE UR QL STRIP.AUTO: NEGATIVE
PH UR STRIP: 5.5 (ref 5–8)
PROT UR STRIP-MCNC: ABNORMAL MG/DL
RBC #/AREA URNS HPF: ABNORMAL /HPF (ref 0–5)
SERVICE CMNT-IMP: NORMAL
SP GR UR REFRACTOMETRY: 1.02 (ref 1–1.03)
UROBILINOGEN UR QL STRIP.AUTO: 0.2 EU/DL (ref 0.2–1)
WBC URNS QL MICRO: ABNORMAL /HPF (ref 0–4)

## 2024-06-28 ENCOUNTER — TELEPHONE (OUTPATIENT)
Age: 44
End: 2024-06-28

## 2024-06-28 DIAGNOSIS — R35.0 FREQUENCY OF MICTURITION: ICD-10-CM

## 2024-06-28 RX ORDER — OXYBUTYNIN CHLORIDE 15 MG/1
TABLET, EXTENDED RELEASE ORAL
Qty: 30 TABLET | Refills: 3 | Status: SHIPPED | OUTPATIENT
Start: 2024-06-28

## 2024-06-28 RX ORDER — NITROFURANTOIN 25; 75 MG/1; MG/1
100 CAPSULE ORAL 2 TIMES DAILY
Qty: 14 CAPSULE | Refills: 0 | Status: SHIPPED | OUTPATIENT
Start: 2024-06-28 | End: 2024-07-05

## 2024-06-28 NOTE — TELEPHONE ENCOUNTER
From: Coco Monge  To: Dr. Ralph Good  Sent: 6/27/2024 12:10 PM EDT  Subject: Uti    Hi, Dr. Good I saw my results and it looks like I have bacteria. I think I have had a uti for some week. I thought it was my kidneys because that was not looking good. I feel really bad.

## 2024-06-28 NOTE — TELEPHONE ENCOUNTER
Pt called stating she has ran out of oxyBUTYnin (DITROPAN XL) 15 MG extended release tablet and need a refill.

## 2024-06-30 DIAGNOSIS — R73.09 ABNORMAL BLOOD SUGAR: ICD-10-CM

## 2024-07-01 RX ORDER — ISOPROPYL ALCOHOL 70 ML/100ML
SWAB TOPICAL
Qty: 180 EACH | Refills: 3 | Status: SHIPPED | OUTPATIENT
Start: 2024-07-01

## 2024-07-02 RX ORDER — OXYBUTYNIN CHLORIDE 15 MG/1
TABLET, EXTENDED RELEASE ORAL
Qty: 30 TABLET | Refills: 3 | OUTPATIENT
Start: 2024-07-02

## 2024-07-05 ENCOUNTER — PATIENT MESSAGE (OUTPATIENT)
Facility: CLINIC | Age: 44
End: 2024-07-05

## 2024-07-05 DIAGNOSIS — K04.7 DENTAL INFECTION: ICD-10-CM

## 2024-07-05 DIAGNOSIS — N30.00 ACUTE CYSTITIS WITHOUT HEMATURIA: Primary | ICD-10-CM

## 2024-07-05 RX ORDER — CLINDAMYCIN HYDROCHLORIDE 300 MG/1
300 CAPSULE ORAL 3 TIMES DAILY
Qty: 21 CAPSULE | Refills: 0 | Status: SHIPPED | OUTPATIENT
Start: 2024-07-05 | End: 2024-07-12

## 2024-07-05 NOTE — TELEPHONE ENCOUNTER
From: Coco Monge  To: Dr. Ralph Good  Sent: 7/5/2024 11:21 AM EDT  Subject: Uti    Hi Dr. Good, it been so rough have not been sleeping good I got I'm infection in my tooth in the back got [facility] so I'm walking around with infection naman tooth. I definitely have a uti right now I take the rest of the antibiotics today. I peeing every few minutes it trying me insane. I go the the orthopedic today my knee is totally messed up. I need to stop the peeing. Can you help me.  
Plan: Use Sunscreen SPF 35 or greater to sun exposed areas daily.
Detail Level: Zone

## 2024-07-15 ENCOUNTER — PATIENT MESSAGE (OUTPATIENT)
Facility: CLINIC | Age: 44
End: 2024-07-15

## 2024-07-15 RX ORDER — ROSUVASTATIN CALCIUM 20 MG/1
20 TABLET, COATED ORAL DAILY
Qty: 45 TABLET | Refills: 1 | Status: SHIPPED | OUTPATIENT
Start: 2024-07-15

## 2024-07-15 RX ORDER — MONTELUKAST SODIUM 10 MG/1
10 TABLET ORAL DAILY
Qty: 90 TABLET | Refills: 3 | Status: SHIPPED | OUTPATIENT
Start: 2024-07-15

## 2024-07-15 NOTE — TELEPHONE ENCOUNTER
From: Coco Monge  To: Dr. Ralph Good  Sent: 7/15/2024 4:40 PM EDT  Subject: Medicine    I need to get the ravasatin. They won't let me put it in.

## 2024-07-17 ENCOUNTER — OFFICE VISIT (OUTPATIENT)
Age: 44
End: 2024-07-17

## 2024-07-17 VITALS — DIASTOLIC BLOOD PRESSURE: 98 MMHG | SYSTOLIC BLOOD PRESSURE: 141 MMHG | HEART RATE: 101 BPM

## 2024-07-17 DIAGNOSIS — R35.0 FREQUENCY OF MICTURITION: ICD-10-CM

## 2024-07-17 DIAGNOSIS — N95.9 PREMENOPAUSAL PATIENT: ICD-10-CM

## 2024-07-17 DIAGNOSIS — N39.0 RECURRENT UTI: Primary | ICD-10-CM

## 2024-07-17 DIAGNOSIS — N32.81 OAB (OVERACTIVE BLADDER): ICD-10-CM

## 2024-07-17 LAB
BILIRUBIN, URINE, POC: NORMAL
BLOOD URINE, POC: NORMAL
GLUCOSE URINE, POC: NEGATIVE
KETONES, URINE, POC: NEGATIVE
LEUKOCYTE ESTERASE, URINE, POC: NEGATIVE
NITRITE, URINE, POC: NEGATIVE
PH, URINE, POC: 5 (ref 4.6–8)
PROTEIN,URINE, POC: 30
PVR, POC: NORMAL CC
SPECIFIC GRAVITY, URINE, POC: 1.03 (ref 1–1.03)
URINALYSIS CLARITY, POC: CLEAR
URINALYSIS COLOR, POC: YELLOW
UROBILINOGEN, POC: NORMAL

## 2024-07-17 RX ORDER — SEMAGLUTIDE 0.25 MG/.5ML
INJECTION, SOLUTION SUBCUTANEOUS
COMMUNITY
Start: 2024-06-19

## 2024-07-17 RX ORDER — ESTRADIOL 0.1 MG/G
1 CREAM VAGINAL
Qty: 42.5 G | Refills: 3 | Status: SHIPPED | OUTPATIENT
Start: 2024-07-17

## 2024-07-17 NOTE — PROGRESS NOTES
Chief Complaint   Patient presents with    Follow-up    Urinary Frequency        BP (!) 141/98   Pulse (!) 101   LMP 05/29/2024 (Approximate)      PHQ-9 score is    Negative      1. \"Have you been to the ER, urgent care clinic since your last visit?  Hospitalized since your last visit?\" No    2. \"Have you seen or consulted any other health care providers outside of the [facility] since your last visit?\" No     3. For patients aged 45-75: Has the patient had a colonoscopy / FIT/ Cologuard? NA - based on age      If the patient is female:    4. For patients aged 40-74: Has the patient had a mammogram within the past 2 years? Yes - no Care Gap present      5. For patients aged 21-65: Has the patient had a pap smear? Yes - no Care Gap present

## 2024-07-17 NOTE — PROGRESS NOTES
HISTORY OF PRESENT ILLNESS  Coco Monge is a 43 y.o. female   Patient still on the oxybutynin Estrace cream doing relatively well.  Urine culture was infected in the past today she is doing relatively well denies fevers chills flank pain nausea vomiting.  1. Recurrent UTI  Overview:  Urine culture 10/5/23: positive for Pseudomonas. Treated with Ciprofloxacin.   Orders:  -     AMB POC URINALYSIS DIP STICK AUTO W/O MICRO  -     AMB POC PVR, ABDULAZIZ,POST-VOID RES,US,NON-IMAGING  -     estradiol (ESTRACE VAGINAL) 0.1 MG/GM vaginal cream; Place 1 g vaginally three times a week Do not use applicator use pea size drop with finger tip directly on urethra 3x a week, Disp-42.5 g, R-3Normal  2. Frequency of micturition  Overview:  H/o urgency and frequency.   Tx with oxybutynin  3. OAB (overactive bladder)  Overview:  Frequency ever 30-40 minutes.   Tx with Oxybutynin and estrace cream. Unable to afford Gemtesa.   Orders:  -     AMB POC URINALYSIS DIP STICK AUTO W/O MICRO  -     AMB POC PVR, ADBULAZIZ,POST-VOID RES,US,NON-IMAGING  -     estradiol (ESTRACE VAGINAL) 0.1 MG/GM vaginal cream; Place 1 g vaginally three times a week Do not use applicator use pea size drop with finger tip directly on urethra 3x a week, Disp-42.5 g, R-3Normal  4. Premenopausal patient  -     estradiol (ESTRACE VAGINAL) 0.1 MG/GM vaginal cream; Place 1 g vaginally three times a week Do not use applicator use pea size drop with finger tip directly on urethra 3x a week, Disp-42.5 g, R-3Normal        PAST MEDICAL HISTORY  PMHx (including negatives):  has a past medical history of Abnormal Pap smear of cervix, Anastomotic stricture of esophagus, Arthritis, CHF (congestive heart failure) ([facility]), Chronic pain, CMV (cytomegalovirus infection) ([facility]), Depression, GERD (gastroesophageal reflux disease), Hypertension, Ill-defined condition, MI (myocardial infarction) ([facility]), Musculoskeletal disorder, Routine Papanicolaou smear, Sepsis ([facility]), SOB (shortness of breath),

## 2024-07-31 ENCOUNTER — PATIENT MESSAGE (OUTPATIENT)
Facility: CLINIC | Age: 44
End: 2024-07-31

## 2024-07-31 DIAGNOSIS — L29.9 ITCHING: Primary | ICD-10-CM

## 2024-07-31 DIAGNOSIS — L29.9 ITCHING: ICD-10-CM

## 2024-08-01 DIAGNOSIS — L29.9 ITCHING: ICD-10-CM

## 2024-08-01 RX ORDER — PREDNISONE 10 MG/1
10 TABLET ORAL DAILY
Qty: 10 TABLET | Refills: 0 | Status: SHIPPED | OUTPATIENT
Start: 2024-08-01 | End: 2024-08-11

## 2024-08-01 RX ORDER — HYDROXYZINE HYDROCHLORIDE 25 MG/1
TABLET, FILM COATED ORAL
Qty: 90 TABLET | Refills: 0 | OUTPATIENT
Start: 2024-08-01

## 2024-08-01 RX ORDER — HYDROXYZINE HYDROCHLORIDE 25 MG/1
25 TABLET, FILM COATED ORAL EVERY 8 HOURS PRN
Qty: 90 TABLET | Refills: 1 | Status: SHIPPED | OUTPATIENT
Start: 2024-08-01

## 2024-08-01 NOTE — TELEPHONE ENCOUNTER
From: Coco Monge  To: Dr. Ralph Good  Sent: 7/31/2024 10:12 AM EDT  Subject: Itching    Hi Dr. Good is there something you can prescribe me for itching because I can't use benadryl. My skin has started back itching no rash just itching.    Thank you   Coco Monge

## 2024-08-05 DIAGNOSIS — L29.9 SCALP ITCH: ICD-10-CM

## 2024-08-05 RX ORDER — CLOBETASOL PROPIONATE 0.5 MG/ML
SOLUTION TOPICAL
Qty: 150 ML | Refills: 1 | Status: SHIPPED | OUTPATIENT
Start: 2024-08-05

## 2024-08-15 DIAGNOSIS — G62.9 NEUROPATHY: ICD-10-CM

## 2024-08-15 DIAGNOSIS — G89.4 CHRONIC PAIN SYNDROME: ICD-10-CM

## 2024-08-16 RX ORDER — PREGABALIN 100 MG/1
CAPSULE ORAL
Qty: 60 CAPSULE | Refills: 2 | Status: SHIPPED | OUTPATIENT
Start: 2024-08-16 | End: 2024-11-15

## 2024-08-23 ENCOUNTER — PATIENT MESSAGE (OUTPATIENT)
Facility: CLINIC | Age: 44
End: 2024-08-23

## 2024-08-23 DIAGNOSIS — G89.4 CHRONIC PAIN SYNDROME: ICD-10-CM

## 2024-08-23 DIAGNOSIS — G62.9 NEUROPATHY: ICD-10-CM

## 2024-08-23 RX ORDER — PREGABALIN 100 MG/1
CAPSULE ORAL
Qty: 60 CAPSULE | Refills: 2 | Status: SHIPPED | OUTPATIENT
Start: 2024-08-23 | End: 2024-11-22

## 2024-08-26 ENCOUNTER — OFFICE VISIT (OUTPATIENT)
Facility: CLINIC | Age: 44
End: 2024-08-26
Payer: MEDICARE

## 2024-08-26 VITALS
HEIGHT: 64 IN | SYSTOLIC BLOOD PRESSURE: 130 MMHG | WEIGHT: 237.4 LBS | HEART RATE: 98 BPM | RESPIRATION RATE: 18 BRPM | DIASTOLIC BLOOD PRESSURE: 88 MMHG | TEMPERATURE: 98.1 F | OXYGEN SATURATION: 99 % | BODY MASS INDEX: 40.53 KG/M2

## 2024-08-26 DIAGNOSIS — J02.9 ACUTE VIRAL PHARYNGITIS: Primary | ICD-10-CM

## 2024-08-26 DIAGNOSIS — K21.9 GASTROESOPHAGEAL REFLUX DISEASE, UNSPECIFIED WHETHER ESOPHAGITIS PRESENT: ICD-10-CM

## 2024-08-26 DIAGNOSIS — N32.81 OAB (OVERACTIVE BLADDER): ICD-10-CM

## 2024-08-26 LAB
BILIRUBIN, URINE, POC: NEGATIVE
BLOOD URINE, POC: NEGATIVE
GLUCOSE URINE, POC: NEGATIVE
GROUP A STREP ANTIGEN, POC: NEGATIVE
KETONES, URINE, POC: NEGATIVE
LEUKOCYTE ESTERASE, URINE, POC: NEGATIVE
NITRITE, URINE, POC: NEGATIVE
PH, URINE, POC: 5.5 (ref 4.6–8)
PROTEIN,URINE, POC: NEGATIVE
SPECIFIC GRAVITY, URINE, POC: 1.03 (ref 1–1.03)
URINALYSIS CLARITY, POC: CLEAR
URINALYSIS COLOR, POC: YELLOW
UROBILINOGEN, POC: NORMAL
VALID INTERNAL CONTROL, POC: YES

## 2024-08-26 PROCEDURE — G8417 CALC BMI ABV UP PARAM F/U: HCPCS

## 2024-08-26 PROCEDURE — 87880 STREP A ASSAY W/OPTIC: CPT

## 2024-08-26 PROCEDURE — 1036F TOBACCO NON-USER: CPT

## 2024-08-26 PROCEDURE — 99214 OFFICE O/P EST MOD 30 MIN: CPT

## 2024-08-26 PROCEDURE — G8428 CUR MEDS NOT DOCUMENT: HCPCS

## 2024-08-26 PROCEDURE — 81003 URINALYSIS AUTO W/O SCOPE: CPT

## 2024-08-26 RX ORDER — LORAZEPAM 2 MG/1
TABLET ORAL
COMMUNITY
Start: 2024-08-09

## 2024-08-26 NOTE — PATIENT INSTRUCTIONS
Today we discussed your sore throat which is likely due to a virus. Please see attached information for symptomatic care of your viral infection including Tylenol, chloraseptic spray, regular fluids 2-3 L daily of water and rest.    If symptoms of chest pain worsen including pain radiating to arm, neck, heaviness on chest, SOB, please go to the ED for further cardiac work up.

## 2024-08-26 NOTE — PROGRESS NOTES
[facility] Medicine Residency    Subjective   Coco Monge is a 43 y.o. female who presents for Pharyngitis (X2 days) and Urinary Frequency      PMHx: CHF s/p heart transplant, ischemic cardiomyopathy, pulmonary HTN, cervical spondylosis without myelopathy, Sjogren's syndrome, fibromyalgia, anemia, OA, HSV, CMV, HLD, GERD, gastroparesis, pre-DM, bipolar 2 disorder, OCD, MDD, panic disorder, and neuropathy       #Sore throat  Right sided sore throat for 2 days a/w/ fatigue. Mild dizziness when moving on occasion. Associated symptoms of right ear fullness. No relief with Tylenol. Postnasal drip primarily on the right side.   + Horse throat for months due to allergies.  + Nausea although patient reports starting Weygovy 2 months ago.   Patient reports history of sinusitis. Was with dad recently and said he had a virus, not COVID. Negative COVID test at home this morning.    #Chest pain  Acute on chronic. Occurring since heart transplant 1 year ago, worsening the past few weeks. Associated with eating food or lying flat. Patient has a hx of reflux. Notable after eating. Worse with palpation of chest, feels tender. Also notes pain around ribs associated large breasts and poor fitting bras.    Taking mycophenolate (CELLCEPT) 500 MG tablet daily. No missed doses. Followed by [facility] for heart transplant maintenance.    #Urinary frequency  Patient reports mild acute on chronic frequency for past few weeks, followed by Urology. Has not started using estrogen cream yet. Denies dysuria.      Review of Systems   Review of Systems   Denies HA, blurry vision, SOB, abdominal pain, changes in BM.      Medical History  Past Medical History:   Diagnosis Date    Abnormal Pap smear of cervix 03/07/2012    LGSIL (no hpv done)    Anastomotic stricture of esophagus     patient reports \"stretching done\" in the past    Arthritis     CHF (congestive heart failure) (HCC)     prior to

## 2024-08-27 NOTE — PROGRESS NOTES
I saw and evaluated the patient, performing the key elements of the service on the date of service.  I discussed the findings, assessment and plan with the resident and agree with the resident's findings and plan as documented in the resident's note.     Joellen Pinzon MD 8/27/2024

## 2024-08-31 DIAGNOSIS — R73.09 ABNORMAL BLOOD SUGAR: ICD-10-CM

## 2024-09-03 RX ORDER — LANCETS
EACH MISCELLANEOUS
Qty: 100 EACH | Refills: 3 | Status: SHIPPED | OUTPATIENT
Start: 2024-09-03

## 2024-09-19 ENCOUNTER — OFFICE VISIT (OUTPATIENT)
Facility: CLINIC | Age: 44
End: 2024-09-19
Payer: MEDICARE

## 2024-09-19 VITALS
HEART RATE: 92 BPM | TEMPERATURE: 97.6 F | SYSTOLIC BLOOD PRESSURE: 112 MMHG | RESPIRATION RATE: 14 BRPM | BODY MASS INDEX: 40.15 KG/M2 | WEIGHT: 235.2 LBS | DIASTOLIC BLOOD PRESSURE: 82 MMHG | OXYGEN SATURATION: 95 % | HEIGHT: 64 IN

## 2024-09-19 DIAGNOSIS — J30.1 NON-SEASONAL ALLERGIC RHINITIS DUE TO POLLEN: ICD-10-CM

## 2024-09-19 DIAGNOSIS — M54.31 BILATERAL SCIATICA: Primary | ICD-10-CM

## 2024-09-19 DIAGNOSIS — M54.32 BILATERAL SCIATICA: Primary | ICD-10-CM

## 2024-09-19 PROCEDURE — G8417 CALC BMI ABV UP PARAM F/U: HCPCS | Performed by: FAMILY MEDICINE

## 2024-09-19 PROCEDURE — 99214 OFFICE O/P EST MOD 30 MIN: CPT | Performed by: FAMILY MEDICINE

## 2024-09-19 PROCEDURE — G8427 DOCREV CUR MEDS BY ELIG CLIN: HCPCS | Performed by: FAMILY MEDICINE

## 2024-09-19 PROCEDURE — 1036F TOBACCO NON-USER: CPT | Performed by: FAMILY MEDICINE

## 2024-09-19 RX ORDER — PREDNISONE 20 MG/1
20 TABLET ORAL DAILY
Qty: 10 TABLET | Refills: 0 | Status: SHIPPED | OUTPATIENT
Start: 2024-09-19 | End: 2024-09-29

## 2024-09-19 RX ORDER — FLUTICASONE PROPIONATE 50 MCG
2 SPRAY, SUSPENSION (ML) NASAL 2 TIMES DAILY
Qty: 16 G | Refills: 3 | Status: SHIPPED | OUTPATIENT
Start: 2024-09-19

## 2024-09-19 RX ORDER — TRAMADOL HYDROCHLORIDE 50 MG/1
50 TABLET ORAL EVERY 8 HOURS PRN
Qty: 15 TABLET | Refills: 0 | Status: SHIPPED | OUTPATIENT
Start: 2024-09-19 | End: 2024-09-24

## 2024-09-19 ASSESSMENT — ENCOUNTER SYMPTOMS
BACK PAIN: 0
CHEST TIGHTNESS: 0
APNEA: 0

## 2024-10-03 DIAGNOSIS — R35.0 FREQUENCY OF MICTURITION: ICD-10-CM

## 2024-10-03 RX ORDER — OXYBUTYNIN CHLORIDE 15 MG/1
TABLET, EXTENDED RELEASE ORAL
Qty: 30 TABLET | Refills: 3 | Status: SHIPPED | OUTPATIENT
Start: 2024-10-03

## 2024-10-10 RX ORDER — MONTELUKAST SODIUM 10 MG/1
10 TABLET ORAL DAILY
Qty: 90 TABLET | Refills: 3 | Status: SHIPPED | OUTPATIENT
Start: 2024-10-10

## 2024-10-18 DIAGNOSIS — R21 SKIN RASH: ICD-10-CM

## 2024-10-18 DIAGNOSIS — R35.0 FREQUENCY OF MICTURITION: ICD-10-CM

## 2024-10-18 RX ORDER — KETOCONAZOLE 20 MG/ML
SHAMPOO TOPICAL
Qty: 240 ML | Refills: 3 | Status: SHIPPED | OUTPATIENT
Start: 2024-10-18

## 2024-10-18 RX ORDER — ALBUTEROL SULFATE 90 UG/1
1 INHALANT RESPIRATORY (INHALATION) 4 TIMES DAILY
Qty: 18 G | Refills: 2 | Status: SHIPPED | OUTPATIENT
Start: 2024-10-18

## 2024-10-20 DIAGNOSIS — K21.9 GASTROESOPHAGEAL REFLUX DISEASE WITHOUT ESOPHAGITIS: ICD-10-CM

## 2024-10-21 RX ORDER — PANTOPRAZOLE SODIUM 40 MG/1
40 TABLET, DELAYED RELEASE ORAL 2 TIMES DAILY
Qty: 180 TABLET | Refills: 3 | Status: SHIPPED | OUTPATIENT
Start: 2024-10-21

## 2024-10-23 ENCOUNTER — PATIENT MESSAGE (OUTPATIENT)
Facility: CLINIC | Age: 44
End: 2024-10-23

## 2024-10-23 DIAGNOSIS — L29.9 ITCHING: ICD-10-CM

## 2024-10-23 RX ORDER — HYDROXYZINE HYDROCHLORIDE 25 MG/1
25 TABLET, FILM COATED ORAL EVERY 8 HOURS PRN
Qty: 270 TABLET | Refills: 1 | Status: SHIPPED | OUTPATIENT
Start: 2024-10-23

## 2024-11-19 ENCOUNTER — OFFICE VISIT (OUTPATIENT)
Facility: CLINIC | Age: 44
End: 2024-11-19
Payer: MEDICARE

## 2024-11-19 VITALS
TEMPERATURE: 98.8 F | HEIGHT: 64 IN | BODY MASS INDEX: 39.09 KG/M2 | WEIGHT: 229 LBS | DIASTOLIC BLOOD PRESSURE: 79 MMHG | SYSTOLIC BLOOD PRESSURE: 115 MMHG | RESPIRATION RATE: 16 BRPM | OXYGEN SATURATION: 95 % | HEART RATE: 95 BPM

## 2024-11-19 DIAGNOSIS — R20.8 BURNING SENSATION: Primary | ICD-10-CM

## 2024-11-19 DIAGNOSIS — L29.9 SCALP ITCH: ICD-10-CM

## 2024-11-19 PROCEDURE — G8427 DOCREV CUR MEDS BY ELIG CLIN: HCPCS | Performed by: FAMILY MEDICINE

## 2024-11-19 PROCEDURE — 99214 OFFICE O/P EST MOD 30 MIN: CPT | Performed by: FAMILY MEDICINE

## 2024-11-19 PROCEDURE — G8484 FLU IMMUNIZE NO ADMIN: HCPCS | Performed by: FAMILY MEDICINE

## 2024-11-19 PROCEDURE — G8417 CALC BMI ABV UP PARAM F/U: HCPCS | Performed by: FAMILY MEDICINE

## 2024-11-19 PROCEDURE — 1036F TOBACCO NON-USER: CPT | Performed by: FAMILY MEDICINE

## 2024-11-19 RX ORDER — CLOBETASOL PROPIONATE 0.5 MG/ML
SOLUTION TOPICAL
Qty: 150 ML | Refills: 1 | Status: SHIPPED | OUTPATIENT
Start: 2024-11-19

## 2024-11-19 ASSESSMENT — ENCOUNTER SYMPTOMS
APNEA: 0
CHEST TIGHTNESS: 0
ABDOMINAL DISTENTION: 0
ABDOMINAL PAIN: 0

## 2024-11-19 NOTE — PROGRESS NOTES
\"Have you been to the ER, urgent care clinic since your last visit?  Hospitalized since your last visit?\"    NO    “Have you seen or consulted any other health care providers outside of [facility] since your last visit?”    NO            Click Here for Release of Records Request     Health Maintenance Due   Topic Date Due    Hepatitis B vaccine (1 of 3 - 19+ 3-dose series) Never done    Shingles vaccine (1 of 2) Never done    Lipids  10/22/2023    DTaP/Tdap/Td vaccine (2 - Td or Tdap) 01/25/2024    A1C test (Diabetic or Prediabetic)  02/09/2024    Annual Wellness Visit (Medicare)  Never done    Flu vaccine (1) 08/01/2024    COVID-19 Vaccine (4 - 2023-24 season) 09/01/2024    GFR test (Diabetes, CKD 3-4, OR last GFR 15-59)  10/09/2024     
Positive DELIA (antinuclear antibody)    Hyperlipidemia    Mood disorder (HCC)    Thrombosed external hemorrhoid    Anemia, unspecified    Depression    Recurrent depression (HCC)    Panic attack    Tremor    Gastroparesis    Class 2 obesity due to excess calories without serious comorbidity in adult    GERD (gastroesophageal reflux disease)    Chronic pain syndrome    Coronary artery disease involving native coronary artery of native heart without angina pectoris    Sjogren's syndrome (HCC)    Chronic systolic heart failure (HCC)    Prediabetes    Ischemic cardiomyopathy    Recurrent UTI    Frequency of micturition    OAB (overactive bladder)    Frequency of urination    Interstitial cystitis    Parkinson's disease (HCC)    Status post heart transplant (HCC)    Chronic pain of right knee    Patellofemoral pain syndrome of right knee    Personal history of immunosuppressive therapy    Primary osteoarthritis of right knee    Hyperparathyroidism, unspecified ([facility])    Abnormal Pap smear of cervix    Acute viral pharyngitis       Social History     Socioeconomic History    Marital status: Single     Spouse name: None    Number of children: None    Years of education: None    Highest education level: None   Tobacco Use    Smoking status: Former     Current packs/day: 0.00     Average packs/day: 0.3 packs/day for 18.6 years (4.6 ttl pk-yrs)     Types: Cigarettes     Start date: 2004     Quit date: 2022     Years since quittin.3     Passive exposure: Past    Smokeless tobacco: Never   Substance and Sexual Activity    Alcohol use: Not Currently    Drug use: Not Currently     Types: Marijuana (Weed)    Sexual activity: Yes     Partners: Male     Birth control/protection: None     Social Determinants of Health     Financial Resource Strain: High Risk (2024)    Overall Financial Resource Strain (CARDIA)     Difficulty of Paying Living Expenses: Hard   Food Insecurity: No Food Insecurity (2024)    Hunger

## 2024-11-20 RX ORDER — OXYBUTYNIN CHLORIDE 15 MG/1
TABLET, EXTENDED RELEASE ORAL
Qty: 30 TABLET | Refills: 3 | Status: SHIPPED | OUTPATIENT
Start: 2024-11-20

## 2024-11-21 ENCOUNTER — PATIENT MESSAGE (OUTPATIENT)
Facility: CLINIC | Age: 44
End: 2024-11-21

## 2024-11-21 DIAGNOSIS — R20.8 BURNING SENSATION: ICD-10-CM

## 2024-11-21 DIAGNOSIS — R20.8 BURNING SENSATION: Primary | ICD-10-CM

## 2024-11-25 LAB
HISPANIC?: NO
LEAD BLD-MCNC: <1 UG/DL (ref 0–3.4)
RACE: NORMAL
SPECIMEN SOURCE: NORMAL
TEST PURPOSE: NORMAL
ZPP RBC-MCNC: 37 UG/DL (ref 0–99)

## 2024-11-29 DIAGNOSIS — G62.9 NEUROPATHY: ICD-10-CM

## 2024-11-29 DIAGNOSIS — G89.4 CHRONIC PAIN SYNDROME: ICD-10-CM

## 2024-11-29 RX ORDER — PREGABALIN 100 MG/1
CAPSULE ORAL
Qty: 60 CAPSULE | Refills: 2 | OUTPATIENT
Start: 2024-11-29 | End: 2025-02-28

## 2024-12-09 DIAGNOSIS — G25.81 RESTLESS LEGS: ICD-10-CM

## 2024-12-09 RX ORDER — PRAMIPEXOLE DIHYDROCHLORIDE 0.5 MG/1
TABLET ORAL
Qty: 135 TABLET | Refills: 1 | Status: SHIPPED | OUTPATIENT
Start: 2024-12-09

## 2024-12-11 ENCOUNTER — TELEMEDICINE (OUTPATIENT)
Facility: CLINIC | Age: 44
End: 2024-12-11

## 2024-12-11 DIAGNOSIS — F41.9 ANXIETY: ICD-10-CM

## 2024-12-11 DIAGNOSIS — K21.9 GASTROESOPHAGEAL REFLUX DISEASE, UNSPECIFIED WHETHER ESOPHAGITIS PRESENT: ICD-10-CM

## 2024-12-11 DIAGNOSIS — N39.0 RECURRENT UTI: ICD-10-CM

## 2024-12-11 DIAGNOSIS — I25.5 ISCHEMIC CARDIOMYOPATHY: Primary | ICD-10-CM

## 2024-12-11 DIAGNOSIS — M17.11 PRIMARY OSTEOARTHRITIS OF RIGHT KNEE: ICD-10-CM

## 2024-12-11 DIAGNOSIS — E78.2 MIXED HYPERLIPIDEMIA: ICD-10-CM

## 2024-12-11 PROBLEM — J02.9 ACUTE VIRAL PHARYNGITIS: Status: RESOLVED | Noted: 2024-08-26 | Resolved: 2024-12-11

## 2024-12-11 RX ORDER — ROSUVASTATIN CALCIUM 20 MG/1
20 TABLET, COATED ORAL DAILY
Qty: 45 TABLET | Refills: 1 | Status: SHIPPED | OUTPATIENT
Start: 2024-12-11

## 2024-12-11 ASSESSMENT — ENCOUNTER SYMPTOMS
CHEST TIGHTNESS: 0
BACK PAIN: 0
ABDOMINAL PAIN: 0
APNEA: 0
ABDOMINAL DISTENTION: 0

## 2024-12-11 NOTE — PROGRESS NOTES
Coco Monge is a 44 y.o. female evaluated via [employer] [facility] on 24.  Patient Identity confirmed by .    Coco Monge, was evaluated through a synchronous (real-time) audio-video encounter. The patient (or guardian if applicable) is aware that this is a billable service, which includes applicable co-pays. This Virtual Visit was conducted with patient's (and/or legal guardian's) consent. Patient identification was verified, and a caregiver was present when appropriate.   The patient was located at Home: [address] [id number]  Provider was located at Facility (Appt Dept): [address] [place] [address]  Confirm you are appropriately licensed, registered, or certified to deliver care in the state where the patient is located as indicated above. If you are not or unsure, please re-schedule the visit: Yes, I confirm.      Total time spent for this encounter: Not billed by time    --Ralph Good MD on 2024 at 3:40 PM    An electronic signature was used to authenticate this note.     Consent:  He and/or health care decision maker is aware that that he may receive a bill for this telephone service, depending on his insurance coverage, and has provided verbal consent to proceed: Yes    Physician Location: Office  Patient Location: Home  Nurse Assisting with Encounter: Telly Yeh LPN    Chief Complaint   Patient presents with    Follow-up      Information gathered from patient and/or health care decision maker.    HPI:   Patient has cut back Tacrolimus now and started on Prevymis and has not noted any change in the Body burning yet.  Notes since starting Prevymis has had increased Fatigue as well.  Patient notes that she will fall asleep at random times.  Knee pain has improved though, and back pain has improved.  Noting improved after starting new medication.  Has been using Tiger Balm patch though and that was also helping.    Recurrent UTI:

## 2025-01-02 ENCOUNTER — PATIENT MESSAGE (OUTPATIENT)
Facility: CLINIC | Age: 45
End: 2025-01-02

## 2025-01-02 DIAGNOSIS — G62.9 NEUROPATHY: Primary | ICD-10-CM

## 2025-01-03 RX ORDER — GABAPENTIN 300 MG/1
300 CAPSULE ORAL 2 TIMES DAILY
Qty: 60 CAPSULE | Refills: 2 | Status: SHIPPED | OUTPATIENT
Start: 2025-01-03 | End: 2025-04-03

## 2025-01-13 ENCOUNTER — OFFICE VISIT (OUTPATIENT)
Facility: CLINIC | Age: 45
End: 2025-01-13
Payer: MEDICARE

## 2025-01-13 ENCOUNTER — TELEPHONE (OUTPATIENT)
Facility: CLINIC | Age: 45
End: 2025-01-13

## 2025-01-13 VITALS
TEMPERATURE: 97.9 F | WEIGHT: 216 LBS | BODY MASS INDEX: 36.88 KG/M2 | HEART RATE: 96 BPM | SYSTOLIC BLOOD PRESSURE: 118 MMHG | RESPIRATION RATE: 18 BRPM | HEIGHT: 64 IN | OXYGEN SATURATION: 98 % | DIASTOLIC BLOOD PRESSURE: 84 MMHG

## 2025-01-13 DIAGNOSIS — Z94.1 HX OF HEART TRANSPLANT (HCC): ICD-10-CM

## 2025-01-13 DIAGNOSIS — G89.4 CHRONIC PAIN SYNDROME: ICD-10-CM

## 2025-01-13 DIAGNOSIS — G20.A1 PARKINSON'S DISEASE, UNSPECIFIED WHETHER DYSKINESIA PRESENT, UNSPECIFIED WHETHER MANIFESTATIONS FLUCTUATE (HCC): Primary | ICD-10-CM

## 2025-01-13 DIAGNOSIS — I25.10 CORONARY ARTERY DISEASE INVOLVING NATIVE CORONARY ARTERY OF NATIVE HEART WITHOUT ANGINA PECTORIS: ICD-10-CM

## 2025-01-13 DIAGNOSIS — G62.9 NEUROPATHY: ICD-10-CM

## 2025-01-13 DIAGNOSIS — F33.1 MODERATE EPISODE OF RECURRENT MAJOR DEPRESSIVE DISORDER (HCC): ICD-10-CM

## 2025-01-13 PROCEDURE — 99214 OFFICE O/P EST MOD 30 MIN: CPT | Performed by: FAMILY MEDICINE

## 2025-01-13 PROCEDURE — G8417 CALC BMI ABV UP PARAM F/U: HCPCS | Performed by: FAMILY MEDICINE

## 2025-01-13 PROCEDURE — G8427 DOCREV CUR MEDS BY ELIG CLIN: HCPCS | Performed by: FAMILY MEDICINE

## 2025-01-13 PROCEDURE — 1036F TOBACCO NON-USER: CPT | Performed by: FAMILY MEDICINE

## 2025-01-13 RX ORDER — PREGABALIN 100 MG/1
CAPSULE ORAL
Qty: 60 CAPSULE | Refills: 2 | Status: SHIPPED | OUTPATIENT
Start: 2025-01-13 | End: 2025-04-14

## 2025-01-13 RX ORDER — NORTRIPTYLINE HYDROCHLORIDE 50 MG/1
50 CAPSULE ORAL NIGHTLY
COMMUNITY
Start: 2024-12-26 | End: 2025-01-13

## 2025-01-13 RX ORDER — NORTRIPTYLINE HYDROCHLORIDE 50 MG/1
50 CAPSULE ORAL NIGHTLY
Qty: 30 CAPSULE | Refills: 0
Start: 2025-01-13

## 2025-01-13 SDOH — ECONOMIC STABILITY: FOOD INSECURITY: WITHIN THE PAST 12 MONTHS, YOU WORRIED THAT YOUR FOOD WOULD RUN OUT BEFORE YOU GOT MONEY TO BUY MORE.: NEVER TRUE

## 2025-01-13 SDOH — ECONOMIC STABILITY: FOOD INSECURITY: WITHIN THE PAST 12 MONTHS, THE FOOD YOU BOUGHT JUST DIDN'T LAST AND YOU DIDN'T HAVE MONEY TO GET MORE.: NEVER TRUE

## 2025-01-13 ASSESSMENT — ENCOUNTER SYMPTOMS
CHEST TIGHTNESS: 0
BACK PAIN: 0
APNEA: 0

## 2025-01-13 NOTE — PROGRESS NOTES
[facility]    History of Present Illness:   Coco Monge is a 44 y.o. female with history of CHF, CAD, GERD, Arthritis, Fibromyalgia, HLD, Anxiety, HSV, OCD, Depression, Obesity    CC: Restless Legs  History provided by patient and Records    HPI:  Restless Legs: Noting worsening since stopping Lyrica to change to Gabapentin.  Noted on the 300 mg had more issues with burning sensations in the legs and arms and developed RLS symptoms in the left lower leg in particular.  Reports that the burning sensations are worse at night in particular      Chronic Kidney Disease:  Patient reports overall doing well, patient denies any current complaints at this time.  - Etiology: Medications   - Symptoms: None  - CKD Stage: III  - Nephrologist: AILYN  - Current Treatments: renal diet  - Dialysis Status: na,     Heart Transplant: Patient has had multiple medication changes at this time. Seeing specialist at this time      Health Maintenance  Health Maintenance Due   Topic Date Due    Pneumococcal 0-64 years Vaccine (1 of 2 - PCV) Never done    Hepatitis B vaccine (1 of 3 - 19+ 3-dose series) Never done    Shingles vaccine (1 of 2) Never done    Lipids  10/22/2023    DTaP/Tdap/Td vaccine (2 - Td or Tdap) 01/25/2024    A1C test (Diabetic or Prediabetic)  02/09/2024    Annual Wellness Visit (Medicare)  Never done    Flu vaccine (1) 08/01/2024    COVID-19 Vaccine (4 - 2023-24 season) 09/01/2024    GFR test (Diabetes, CKD 3-4, OR last GFR 15-59)  10/09/2024       Past Medical, Family, and Social History:     Current Outpatient Medications on File Prior to Visit   Medication Sig Dispense Refill    letermovir (PREVYMIS) 480 MG tablet Take 1 tablet by mouth daily      gabapentin (NEURONTIN) 300 MG capsule Take 1 capsule by mouth 2 times daily for 90 days. Intended supply: 30 days Max Daily Amount: 600 mg (Patient taking differently: Take 2 capsules by mouth 2 times daily. Intended supply: 30 days) 60 capsule 2

## 2025-01-13 NOTE — PROGRESS NOTES
Chief Complaint   Patient presents with    Restless Leg(s)     Burning sensation. Reports worsening since LOV.          \"Have you been to the ER, urgent care clinic since your last visit?  Hospitalized since your last visit?\"    NO    “Have you seen or consulted any other health care providers outside of [facility] since your last visit?”    NO            Click Here for Release of Records Request     Health Maintenance Due   Topic Date Due    Pneumococcal 0-64 years Vaccine (1 of 2 - PCV) Never done    Hepatitis B vaccine (1 of 3 - 19+ 3-dose series) Never done    Shingles vaccine (1 of 2) Never done    Lipids  10/22/2023    DTaP/Tdap/Td vaccine (2 - Td or Tdap) 01/25/2024    A1C test (Diabetic or Prediabetic)  02/09/2024    Annual Wellness Visit (Medicare)  Never done    Flu vaccine (1) 08/01/2024    COVID-19 Vaccine (4 - 2023-24 season) 09/01/2024    GFR test (Diabetes, CKD 3-4, OR last GFR 15-59)  10/09/2024

## 2025-01-13 NOTE — TELEPHONE ENCOUNTER
Pt called stating she thought she had some LYRICA at home but she is unable to find it. Pt would like to know what she should do because she just picked up the medication on 1/3 and the pharmacy is not going to refill it for her.     Pt is requesting a call back, please advise...

## 2025-01-14 NOTE — TELEPHONE ENCOUNTER
Lyrica reordered for patient.  Will see if works better than Gabapentin.    Ralph Good MD  [facility]  01/13/25

## 2025-01-31 ENCOUNTER — PATIENT MESSAGE (OUTPATIENT)
Age: 45
End: 2025-01-31

## 2025-01-31 DIAGNOSIS — R35.0 FREQUENCY OF MICTURITION: ICD-10-CM

## 2025-01-31 DIAGNOSIS — G25.81 RESTLESS LEGS: ICD-10-CM

## 2025-01-31 RX ORDER — PRAMIPEXOLE DIHYDROCHLORIDE 0.5 MG/1
TABLET ORAL
Qty: 135 TABLET | Refills: 1 | Status: SHIPPED | OUTPATIENT
Start: 2025-01-31

## 2025-01-31 RX ORDER — OXYBUTYNIN CHLORIDE 15 MG/1
TABLET, EXTENDED RELEASE ORAL
Qty: 30 TABLET | Refills: 3 | Status: CANCELLED | OUTPATIENT
Start: 2025-01-31

## 2025-01-31 RX ORDER — OXYBUTYNIN CHLORIDE 15 MG/1
TABLET, EXTENDED RELEASE ORAL
Qty: 30 TABLET | Refills: 3 | Status: SHIPPED | OUTPATIENT
Start: 2025-01-31

## 2025-02-14 ENCOUNTER — PATIENT MESSAGE (OUTPATIENT)
Facility: CLINIC | Age: 45
End: 2025-02-14

## 2025-02-14 DIAGNOSIS — G89.4 CHRONIC PAIN SYNDROME: Primary | ICD-10-CM

## 2025-02-18 ENCOUNTER — OFFICE VISIT (OUTPATIENT)
Age: 45
End: 2025-02-18
Payer: MEDICARE

## 2025-02-18 ENCOUNTER — TELEPHONE (OUTPATIENT)
Age: 45
End: 2025-02-18

## 2025-02-18 VITALS — SYSTOLIC BLOOD PRESSURE: 128 MMHG | HEART RATE: 92 BPM | DIASTOLIC BLOOD PRESSURE: 88 MMHG

## 2025-02-18 DIAGNOSIS — Z94.1 STATUS POST HEART TRANSPLANT (HCC): ICD-10-CM

## 2025-02-18 DIAGNOSIS — R35.0 FREQUENCY OF MICTURITION: ICD-10-CM

## 2025-02-18 DIAGNOSIS — N39.0 RECURRENT UTI: Primary | ICD-10-CM

## 2025-02-18 DIAGNOSIS — N32.81 OAB (OVERACTIVE BLADDER): ICD-10-CM

## 2025-02-18 DIAGNOSIS — N95.9 PREMENOPAUSAL PATIENT: ICD-10-CM

## 2025-02-18 DIAGNOSIS — N30.90 CYSTITIS: ICD-10-CM

## 2025-02-18 LAB
BILIRUBIN, URINE, POC: NEGATIVE
BLOOD URINE, POC: ABNORMAL
GLUCOSE URINE, POC: NEGATIVE
KETONES, URINE, POC: NEGATIVE
LEUKOCYTE ESTERASE, URINE, POC: NEGATIVE
NITRITE, URINE, POC: POSITIVE
PH, URINE, POC: 5 (ref 4.6–8)
PROTEIN,URINE, POC: NEGATIVE
SPECIFIC GRAVITY, URINE, POC: 1.03 (ref 1–1.03)
URINALYSIS CLARITY, POC: CLEAR
URINALYSIS COLOR, POC: YELLOW
UROBILINOGEN, POC: ABNORMAL

## 2025-02-18 PROCEDURE — 99214 OFFICE O/P EST MOD 30 MIN: CPT | Performed by: UROLOGY

## 2025-02-18 PROCEDURE — G8417 CALC BMI ABV UP PARAM F/U: HCPCS | Performed by: UROLOGY

## 2025-02-18 PROCEDURE — 1036F TOBACCO NON-USER: CPT | Performed by: UROLOGY

## 2025-02-18 PROCEDURE — G8427 DOCREV CUR MEDS BY ELIG CLIN: HCPCS | Performed by: UROLOGY

## 2025-02-18 PROCEDURE — 81003 URINALYSIS AUTO W/O SCOPE: CPT | Performed by: UROLOGY

## 2025-02-18 RX ORDER — CIPROFLOXACIN 500 MG/1
500 TABLET, FILM COATED ORAL 2 TIMES DAILY
Qty: 20 TABLET | Refills: 0 | Status: SHIPPED | OUTPATIENT
Start: 2025-02-18 | End: 2025-02-28

## 2025-02-18 RX ORDER — DOXYCYCLINE HYCLATE 100 MG
100 TABLET ORAL 2 TIMES DAILY
Qty: 14 TABLET | Refills: 0 | Status: SHIPPED | OUTPATIENT
Start: 2025-02-18 | End: 2025-02-25

## 2025-02-18 RX ORDER — ESTRADIOL 0.1 MG/G
1 CREAM VAGINAL
Qty: 42.5 G | Refills: 3 | Status: SHIPPED | OUTPATIENT
Start: 2025-02-19

## 2025-02-18 NOTE — PROGRESS NOTES
Chief Complaint   Patient presents with    Follow-up Chronic Condition        /88 (Site: Left Upper Arm, Position: Sitting, Cuff Size: Large Adult)   Pulse 92      PHQ-9 score is    Negative        4/29/2024    12:53 PM   Amb Fall Risk Assessment and TUG Test   Do you feel unsteady or are you worried about falling?  no   2 or more falls in past year? no   Fall with injury in past year? yes          1. \"Have you been to the ER, urgent care clinic since your last visit?  Hospitalized since your last visit?\" No    2. \"Have you seen or consulted any other health care providers outside of the [facility] since your last visit?\" No     3. For patients aged 45-75: Has the patient had a colonoscopy / FIT/ Cologuard? NA - based on age      If the patient is female:    4. For patients aged 40-74: Has the patient had a mammogram within the past 2 years? NA - based on age or sex      5. For patients aged 21-65: Has the patient had a pap smear? Yes - no Care Gap present

## 2025-02-18 NOTE — PROGRESS NOTES
HISTORY OF PRESENT ILLNESS  Coco Monge is a 44 y.o. female   Patient started to get recurrent UTIs.  She has not been using Estrace cream.  She is on so she is more set up for UTIs.  Urine today appears to be infected we will send it for culture to see if it comes back we will make a determination about cystoscopy and urodynamics.  Going to put her on Cipro because it is work for her in the past.  It may not make some of her drugs for her transplant quite as effective but has worked for her before.  But there is a risk.  And we talked about that  1. Recurrent UTI  Overview:  Urine culture 10/5/23: positive for Pseudomonas. Treated with Ciprofloxacin.   Orders:  -     AMB POC URINALYSIS DIP STICK AUTO W/O MICRO  -     Culture, Fungus  -     Culture, Urine  -     estradiol (ESTRACE VAGINAL) 0.1 MG/GM vaginal cream; Place 1 g vaginally three times a week Do not use applicator use pea size drop with finger tip directly on urethra 3x a week, Disp-42.5 g, R-3Normal  2. OAB (overactive bladder)  Overview:  Frequency ever 30-40 minutes.   Tx with Oxybutynin and estrace cream. Unable to afford Gemtesa.   Orders:  -     estradiol (ESTRACE VAGINAL) 0.1 MG/GM vaginal cream; Place 1 g vaginally three times a week Do not use applicator use pea size drop with finger tip directly on urethra 3x a week, Disp-42.5 g, R-3Normal  3. Status post heart transplant (HCC)  4. Frequency of micturition  Overview:  H/o urgency and frequency.   Tx with oxybutynin  5. Cystitis  6. Premenopausal patient  -     estradiol (ESTRACE VAGINAL) 0.1 MG/GM vaginal cream; Place 1 g vaginally three times a week Do not use applicator use pea size drop with finger tip directly on urethra 3x a week, Disp-42.5 g, R-3Normal        PAST MEDICAL HISTORY  PMHx (including negatives):  has a past medical history of Abnormal Pap smear of cervix, Anastomotic stricture of esophagus, Arthritis, CHF (congestive heart failure) ([facility]), Chronic pain, CMV (cytomegalovirus

## 2025-02-18 NOTE — TELEPHONE ENCOUNTER
Pt's pharmacy called stating cipro had several interactions with drugs she is taking such as duloxetine, tacrolimus and her sleep meds. They would like to know if we would like to send in an alternative.    Pharmacy: [phone]

## 2025-02-21 LAB — BACTERIA UR CULT: ABNORMAL

## 2025-03-01 DIAGNOSIS — R73.01 IFG (IMPAIRED FASTING GLUCOSE): Primary | ICD-10-CM

## 2025-03-03 RX ORDER — BLOOD SUGAR DIAGNOSTIC
STRIP MISCELLANEOUS
Qty: 300 STRIP | Refills: 3 | Status: SHIPPED | OUTPATIENT
Start: 2025-03-03

## 2025-03-06 ENCOUNTER — OFFICE VISIT (OUTPATIENT)
Facility: CLINIC | Age: 45
End: 2025-03-06

## 2025-03-06 VITALS
OXYGEN SATURATION: 99 % | WEIGHT: 204.6 LBS | DIASTOLIC BLOOD PRESSURE: 76 MMHG | HEIGHT: 64 IN | HEART RATE: 94 BPM | BODY MASS INDEX: 34.93 KG/M2 | RESPIRATION RATE: 18 BRPM | SYSTOLIC BLOOD PRESSURE: 109 MMHG | TEMPERATURE: 98 F

## 2025-03-06 DIAGNOSIS — N63.42 SUBAREOLAR MASS OF LEFT BREAST: Primary | ICD-10-CM

## 2025-03-06 DIAGNOSIS — Z12.31 SCREENING MAMMOGRAM FOR BREAST CANCER: ICD-10-CM

## 2025-03-06 DIAGNOSIS — Z92.89 H/O DIAGNOSTIC MAMMOGRAPHY: ICD-10-CM

## 2025-03-06 RX ORDER — LOSARTAN POTASSIUM 25 MG/1
25 TABLET ORAL DAILY
COMMUNITY
Start: 2025-03-04

## 2025-03-06 ASSESSMENT — PATIENT HEALTH QUESTIONNAIRE - PHQ9
6. FEELING BAD ABOUT YOURSELF - OR THAT YOU ARE A FAILURE OR HAVE LET YOURSELF OR YOUR FAMILY DOWN: NOT AT ALL
10. IF YOU CHECKED OFF ANY PROBLEMS, HOW DIFFICULT HAVE THESE PROBLEMS MADE IT FOR YOU TO DO YOUR WORK, TAKE CARE OF THINGS AT HOME, OR GET ALONG WITH OTHER PEOPLE: NOT DIFFICULT AT ALL
5. POOR APPETITE OR OVEREATING: NOT AT ALL
SUM OF ALL RESPONSES TO PHQ QUESTIONS 1-9: 0
SUM OF ALL RESPONSES TO PHQ QUESTIONS 1-9: 0
7. TROUBLE CONCENTRATING ON THINGS, SUCH AS READING THE NEWSPAPER OR WATCHING TELEVISION: NOT AT ALL
SUM OF ALL RESPONSES TO PHQ QUESTIONS 1-9: 0
SUM OF ALL RESPONSES TO PHQ QUESTIONS 1-9: 0
1. LITTLE INTEREST OR PLEASURE IN DOING THINGS: NOT AT ALL
9. THOUGHTS THAT YOU WOULD BE BETTER OFF DEAD, OR OF HURTING YOURSELF: NOT AT ALL
2. FEELING DOWN, DEPRESSED OR HOPELESS: NOT AT ALL
3. TROUBLE FALLING OR STAYING ASLEEP: NOT AT ALL
4. FEELING TIRED OR HAVING LITTLE ENERGY: NOT AT ALL
8. MOVING OR SPEAKING SO SLOWLY THAT OTHER PEOPLE COULD HAVE NOTICED. OR THE OPPOSITE, BEING SO FIGETY OR RESTLESS THAT YOU HAVE BEEN MOVING AROUND A LOT MORE THAN USUAL: NOT AT ALL

## 2025-03-06 NOTE — PROGRESS NOTES
I reviewed with the resident the medical history and the resident's findings on the physical examination.  I discussed with the resident the patient's diagnosis and concur with the plan.     Joellen Pinzon MD 3/6/2025

## 2025-03-06 NOTE — PROGRESS NOTES
[facility]    Subjective   Coco Monge is a 44 y.o. female who presents for Breast Mass (Lump on L. breast)      Pertinent PMHx: CHF,   Fhx: Breast cancer - mother    #Lump in breast  Patient saw the cardiologist yesterday and noted left breast discomfort with lump felt over left nipple. Cannot feel the lump today but would like evaluation.     Noting intermittent breast discomfort with bra. Feels relief with taking off bra. Left breast larger than right since puberty.    Last mammogram 5/31/2024 unremarkable. Mammo in 2022 notable for Left breast lipoma stable since 2013 seen on U/S.     Denies breast pain, discharge, changes in menstrual cycle. UTD on Pap/HPV (6/4/24).     Review of Systems   Review of Systems   See HPI, otherwise negative.    Medical History  Past Medical History:   Diagnosis Date    Abnormal Pap smear of cervix 03/07/2012    LGSIL (no hpv done)    Anastomotic stricture of esophagus     patient reports \"stretching done\" in the past    Arthritis     CHF (congestive heart failure) ([facility])     prior to transplant    Chronic pain     fybromyalsia    CMV (cytomegalovirus infection) ([facility])     Depression     anxiety, depression, OCD    GERD (gastroesophageal reflux disease)     Hypertension     not currently being treated    Ill-defined condition     Fibromyalia gastricparesis    MI (myocardial infarction) ([facility])     7/24/2022 MI    Musculoskeletal disorder     Routine Papanicolaou smear 06/04/2024    normal HPV neg    Sepsis ([facility])     SOB (shortness of breath)     much better since transplant    Stool color black        Medications  Current Outpatient Medications   Medication Sig    losartan (COZAAR) 25 MG tablet Take 1 tablet by mouth daily    ACCU-CHEK GUIDE strip USE TO CHECK BLOOD SUGAR 3 TIMES DAILY FOR UNCONTROLLED HYPERGLYCEMIA DUE TO MEDICATIONS.    estradiol (ESTRACE VAGINAL) 0.1 MG/GM vaginal cream Place 1 g vaginally three times

## 2025-03-10 ENCOUNTER — TELEMEDICINE (OUTPATIENT)
Age: 45
End: 2025-03-10
Payer: MEDICARE

## 2025-03-10 DIAGNOSIS — N30.10 INTERSTITIAL CYSTITIS: ICD-10-CM

## 2025-03-10 DIAGNOSIS — N39.0 RECURRENT UTI: Primary | ICD-10-CM

## 2025-03-10 DIAGNOSIS — Z94.1 STATUS POST HEART TRANSPLANT (HCC): ICD-10-CM

## 2025-03-10 PROCEDURE — 1036F TOBACCO NON-USER: CPT | Performed by: UROLOGY

## 2025-03-10 PROCEDURE — G8417 CALC BMI ABV UP PARAM F/U: HCPCS | Performed by: UROLOGY

## 2025-03-10 PROCEDURE — 99212 OFFICE O/P EST SF 10 MIN: CPT | Performed by: UROLOGY

## 2025-03-10 PROCEDURE — G8427 DOCREV CUR MEDS BY ELIG CLIN: HCPCS | Performed by: UROLOGY

## 2025-03-10 NOTE — PROGRESS NOTES
Chief Complaint   Patient presents with    Follow-up     2 week     1. Have you been to the ER, urgent care clinic since your last visit?  Hospitalized since your last visit?No    2. Have you seen or consulted any other health care providers outside of the [facility] since your last visit?  Include any pap smears or colon screening. Cardiology [facility] around march 4th

## 2025-03-10 NOTE — PROGRESS NOTES
HISTORY OF PRESENT ILLNESS  Coco Monge is a 44 y.o. female   Patient says she is doing much better.  She was having a lot of frequency with her infection.  No other symptoms no dysuria no discomfort.  But her urine did grow an E. coli.  It is sensitive to doxycycline.  She now has no urgency no frequency and feels better.  I told her we can see her back in 3 months to recheck her urine at that time  1. Recurrent UTI  Overview:  Urine culture   10/5/23: positive for Pseudomonas. Treated with Ciprofloxacin.   2/18/2025  positive for Escherichia coli. Treated with Doxycycline   2. Interstitial cystitis  Overview:  S/p Cystouretheroscopy with hydrodistention 11/9/23 with findings of petechiae and hyperemic response   3. Status post heart transplant (HCC)        PAST MEDICAL HISTORY  PMHx (including negatives):  has a past medical history of Abnormal Pap smear of cervix, Anastomotic stricture of esophagus, Arthritis, CHF (congestive heart failure) ([facility]), Chronic pain, CMV (cytomegalovirus infection) ([facility]), Depression, GERD (gastroesophageal reflux disease), Hypertension, Ill-defined condition, MI (myocardial infarction) ([facility]), Musculoskeletal disorder, Routine Papanicolaou smear, Sepsis ([facility]), SOB (shortness of breath), and Stool color black.   PSurgHx:  has a past surgical history that includes Coronary angioplasty with stent; upper gi endoscopy,biopsy (06/21/2018); upper gi endoscopy,diagnosis (06/07/2018); heent (2002); gyn; Heart transplant (04/2023); and Cystoscopy (N/A, 11/09/2023).  PSocHx:  reports that she quit smoking about 2 years ago. Her smoking use included cigarettes. She started smoking about 21 years ago. She has a 4.6 pack-year smoking history. She has been exposed to tobacco smoke. She has never used smokeless tobacco. She reports that she does not currently use alcohol. She reports that she does not currently use drugs after having used the following drugs: Marijuana (Weed).   FamilyHX:   Family

## 2025-03-13 ENCOUNTER — PATIENT MESSAGE (OUTPATIENT)
Facility: CLINIC | Age: 45
End: 2025-03-13

## 2025-03-13 DIAGNOSIS — J40 BRONCHITIS: Primary | ICD-10-CM

## 2025-03-17 RX ORDER — NORTRIPTYLINE HYDROCHLORIDE 50 MG/1
CAPSULE ORAL
Qty: 90 CAPSULE | Refills: 1 | Status: SHIPPED | OUTPATIENT
Start: 2025-03-17

## 2025-03-19 DIAGNOSIS — N63.42 SUBAREOLAR MASS OF LEFT BREAST: Primary | ICD-10-CM

## 2025-03-19 DIAGNOSIS — Z12.31 ENCOUNTER FOR SCREENING MAMMOGRAM FOR MALIGNANT NEOPLASM OF BREAST: ICD-10-CM

## 2025-03-19 NOTE — PROGRESS NOTES
Imaging ordered.    Ralph Good MD  [facility]  03/19/25

## 2025-03-26 ENCOUNTER — OFFICE VISIT (OUTPATIENT)
Facility: CLINIC | Age: 45
End: 2025-03-26
Payer: MEDICARE

## 2025-03-26 ENCOUNTER — TELEPHONE (OUTPATIENT)
Facility: CLINIC | Age: 45
End: 2025-03-26

## 2025-03-26 VITALS
OXYGEN SATURATION: 97 % | DIASTOLIC BLOOD PRESSURE: 79 MMHG | SYSTOLIC BLOOD PRESSURE: 112 MMHG | BODY MASS INDEX: 33.99 KG/M2 | WEIGHT: 198 LBS | TEMPERATURE: 97.6 F | RESPIRATION RATE: 16 BRPM | HEART RATE: 96 BPM

## 2025-03-26 DIAGNOSIS — J30.1 NON-SEASONAL ALLERGIC RHINITIS DUE TO POLLEN: Primary | ICD-10-CM

## 2025-03-26 DIAGNOSIS — F41.9 ANXIETY: ICD-10-CM

## 2025-03-26 DIAGNOSIS — Z94.1 HX OF HEART TRANSPLANT (HCC): ICD-10-CM

## 2025-03-26 PROCEDURE — 1036F TOBACCO NON-USER: CPT | Performed by: FAMILY MEDICINE

## 2025-03-26 PROCEDURE — 99214 OFFICE O/P EST MOD 30 MIN: CPT | Performed by: FAMILY MEDICINE

## 2025-03-26 PROCEDURE — G8427 DOCREV CUR MEDS BY ELIG CLIN: HCPCS | Performed by: FAMILY MEDICINE

## 2025-03-26 PROCEDURE — G8417 CALC BMI ABV UP PARAM F/U: HCPCS | Performed by: FAMILY MEDICINE

## 2025-03-26 RX ORDER — DULOXETIN HYDROCHLORIDE 20 MG/1
20 CAPSULE, DELAYED RELEASE ORAL DAILY
COMMUNITY
Start: 2025-03-18

## 2025-03-26 RX ORDER — LETERMOVIR 480 MG/1
TABLET, FILM COATED ORAL
COMMUNITY
Start: 2025-03-14

## 2025-03-26 RX ORDER — PREDNISONE 10 MG/1
TABLET ORAL
Qty: 30 TABLET | Refills: 0 | Status: SHIPPED | OUTPATIENT
Start: 2025-03-26

## 2025-03-26 RX ORDER — ALBUTEROL SULFATE 90 UG/1
1 INHALANT RESPIRATORY (INHALATION) 4 TIMES DAILY
Qty: 18 G | Refills: 2 | Status: SHIPPED | OUTPATIENT
Start: 2025-03-26

## 2025-03-26 RX ORDER — DIAZEPAM 10 MG/1
TABLET ORAL
COMMUNITY
Start: 2025-03-18

## 2025-03-26 RX ORDER — SEMAGLUTIDE 2.4 MG/.75ML
INJECTION, SOLUTION SUBCUTANEOUS
COMMUNITY
Start: 2025-03-18

## 2025-03-26 RX ORDER — FLUTICASONE PROPIONATE 50 MCG
2 SPRAY, SUSPENSION (ML) NASAL 2 TIMES DAILY
Qty: 16 G | Refills: 3 | Status: SHIPPED | OUTPATIENT
Start: 2025-03-26

## 2025-03-26 RX ORDER — SODIUM FLUORIDE 6.1 MG/ML
GEL, DENTIFRICE DENTAL
COMMUNITY
Start: 2025-03-07

## 2025-03-26 NOTE — TELEPHONE ENCOUNTER
Called patient. She stated she had picked up her medicine. She was advised to take all 4 tablets at one time. Patient stated she already had.

## 2025-03-26 NOTE — PROGRESS NOTES
Chief Complaint   Patient presents with    Ear Problem     Feeling of fluid in ears, equilibrium issues. Ear fullness. Denies drainage.         \"Have you been to the ER, urgent care clinic since your last visit?  Hospitalized since your last visit?\"    NO    “Have you seen or consulted any other health care providers outside of [facility] since your last visit?”    Yes             Click Here for Release of Records Request     Health Maintenance Due   Topic Date Due    Hepatitis B vaccine (1 of 3 - 19+ 3-dose series) Never done    Shingles vaccine (1 of 2) Never done    Pneumococcal 0-49 years Vaccine (1 of 2 - PCV) Never done    Lipids  10/22/2023    DTaP/Tdap/Td vaccine (2 - Td or Tdap) 01/25/2024    A1C test (Diabetic or Prediabetic)  02/09/2024    Annual Wellness Visit (Medicare)  Never done    Flu vaccine (1) 08/01/2024    COVID-19 Vaccine (4 - 2024-25 season) 09/01/2024    GFR test (Diabetes, CKD 3-4, OR last GFR 15-59)  10/09/2024

## 2025-03-26 NOTE — TELEPHONE ENCOUNTER
Pt called inquiring when taking predniSONE (DELTASONE) 10 MG, should she space taking the  medication out or should she take each days interval at one time.    Please advise...

## 2025-03-29 DIAGNOSIS — R73.09 ABNORMAL BLOOD SUGAR: ICD-10-CM

## 2025-03-31 ENCOUNTER — TELEPHONE (OUTPATIENT)
Facility: CLINIC | Age: 45
End: 2025-03-31

## 2025-03-31 RX ORDER — ISOPROPYL ALCOHOL 70 ML/100ML
SWAB TOPICAL
Qty: 100 EACH | Refills: 3 | Status: SHIPPED | OUTPATIENT
Start: 2025-03-31

## 2025-03-31 NOTE — TELEPHONE ENCOUNTER
[facility] states pt isn't in network with them. States the nebulizer order will have to go to [facility] in [place]. Please advise.

## 2025-03-31 NOTE — PROGRESS NOTES
Progress Note    Patient: Coco Monge MRN: [id number]  SSN: [id number]    YOB: 1980  Age: 44 y.o.  Sex: female        Chief Complaint   Patient presents with    Ear Problem     Feeling of fluid in ears, equilibrium issues. Ear fullness. Denies drainage.      she is a 44 y.o. year old female who presents fwith c/o congestion, ear pressure and balance concerns.     Encounter Diagnoses   Name Primary?    Non-seasonal allergic rhinitis due to pollen Yes    Hx of heart transplant ([facility])     Anxiety        Patient Active Problem List   Diagnosis    Inflammatory arthritis    Fibromyalgia    Severe obesity (BMI 35.0-39.9) with comorbidity    Anxiety    HSV (herpes simplex virus) anogenital infection    Vitamin D deficiency    Neuropathy    IFG (impaired fasting glucose)    OCD (obsessive compulsive disorder)    Positive DELIA (antinuclear antibody)    Hyperlipidemia    Mood disorder    Thrombosed external hemorrhoid    Anemia, unspecified    Depression    Recurrent depression    Panic attack    Tremor    Gastroparesis    Class 2 obesity due to excess calories without serious comorbidity in adult    GERD (gastroesophageal reflux disease)    Chronic pain syndrome    Coronary artery disease involving native coronary artery of native heart without angina pectoris    Sjogren's syndrome    Chronic systolic heart failure (HCC)    Prediabetes    Ischemic cardiomyopathy    Recurrent UTI    Frequency of micturition    OAB (overactive bladder)    Frequency of urination    Interstitial cystitis    Parkinson's disease (HCC)    Status post heart transplant ([facility])    Chronic pain of right knee    Patellofemoral pain syndrome of right knee    Personal history of immunosuppressive therapy    Primary osteoarthritis of right knee    Hyperparathyroidism, unspecified    Abnormal Pap smear of cervix    Moderate episode of recurrent major depressive disorder (HCC)    Cystitis     Past Surgical History:   Procedure Laterality Date

## 2025-04-14 ENCOUNTER — OFFICE VISIT (OUTPATIENT)
Facility: CLINIC | Age: 45
End: 2025-04-14
Payer: MEDICARE

## 2025-04-14 VITALS
BODY MASS INDEX: 33.29 KG/M2 | RESPIRATION RATE: 16 BRPM | HEIGHT: 64 IN | TEMPERATURE: 98.3 F | DIASTOLIC BLOOD PRESSURE: 64 MMHG | WEIGHT: 195 LBS | OXYGEN SATURATION: 98 % | SYSTOLIC BLOOD PRESSURE: 90 MMHG | HEART RATE: 97 BPM

## 2025-04-14 DIAGNOSIS — Z00.00 INITIAL MEDICARE ANNUAL WELLNESS VISIT: Primary | ICD-10-CM

## 2025-04-14 DIAGNOSIS — J40 BRONCHITIS: ICD-10-CM

## 2025-04-14 DIAGNOSIS — B25.9 CYTOMEGALOVIRUS INFECTION, UNSPECIFIED CYTOMEGALOVIRAL INFECTION TYPE (HCC): ICD-10-CM

## 2025-04-14 DIAGNOSIS — S61.012D LACERATION OF LEFT THUMB WITHOUT FOREIGN BODY WITHOUT DAMAGE TO NAIL, SUBSEQUENT ENCOUNTER: ICD-10-CM

## 2025-04-14 DIAGNOSIS — J41.0 SIMPLE CHRONIC BRONCHITIS (HCC): ICD-10-CM

## 2025-04-14 DIAGNOSIS — F41.9 ANXIETY: ICD-10-CM

## 2025-04-14 PROCEDURE — G8417 CALC BMI ABV UP PARAM F/U: HCPCS | Performed by: FAMILY MEDICINE

## 2025-04-14 PROCEDURE — G2211 COMPLEX E/M VISIT ADD ON: HCPCS | Performed by: FAMILY MEDICINE

## 2025-04-14 PROCEDURE — 99214 OFFICE O/P EST MOD 30 MIN: CPT | Performed by: FAMILY MEDICINE

## 2025-04-14 PROCEDURE — G0438 PPPS, INITIAL VISIT: HCPCS | Performed by: FAMILY MEDICINE

## 2025-04-14 PROCEDURE — 3023F SPIROM DOC REV: CPT | Performed by: FAMILY MEDICINE

## 2025-04-14 PROCEDURE — G8427 DOCREV CUR MEDS BY ELIG CLIN: HCPCS | Performed by: FAMILY MEDICINE

## 2025-04-14 PROCEDURE — 1036F TOBACCO NON-USER: CPT | Performed by: FAMILY MEDICINE

## 2025-04-14 RX ORDER — IPRATROPIUM BROMIDE AND ALBUTEROL SULFATE 2.5; .5 MG/3ML; MG/3ML
1 SOLUTION RESPIRATORY (INHALATION) EVERY 4 HOURS
Qty: 360 ML | Refills: 1 | Status: SHIPPED | OUTPATIENT
Start: 2025-04-14

## 2025-04-14 RX ORDER — ERYTHROMYCIN 20 MG/ML
SOLUTION TOPICAL DAILY
COMMUNITY
Start: 2025-03-27 | End: 2026-03-27

## 2025-04-14 RX ORDER — DOXYCYCLINE HYCLATE 100 MG
TABLET ORAL
COMMUNITY
Start: 2025-03-27

## 2025-04-14 RX ORDER — PREGABALIN 150 MG/1
CAPSULE ORAL
COMMUNITY
Start: 2025-04-03

## 2025-04-14 ASSESSMENT — ENCOUNTER SYMPTOMS
CHEST TIGHTNESS: 0
APNEA: 0
COUGH: 1

## 2025-04-14 ASSESSMENT — PATIENT HEALTH QUESTIONNAIRE - PHQ9
8. MOVING OR SPEAKING SO SLOWLY THAT OTHER PEOPLE COULD HAVE NOTICED. OR THE OPPOSITE, BEING SO FIGETY OR RESTLESS THAT YOU HAVE BEEN MOVING AROUND A LOT MORE THAN USUAL: NEARLY EVERY DAY
2. FEELING DOWN, DEPRESSED OR HOPELESS: NEARLY EVERY DAY
SUM OF ALL RESPONSES TO PHQ QUESTIONS 1-9: 13
SUM OF ALL RESPONSES TO PHQ QUESTIONS 1-9: 13
6. FEELING BAD ABOUT YOURSELF - OR THAT YOU ARE A FAILURE OR HAVE LET YOURSELF OR YOUR FAMILY DOWN: SEVERAL DAYS
1. LITTLE INTEREST OR PLEASURE IN DOING THINGS: NOT AT ALL
SUM OF ALL RESPONSES TO PHQ QUESTIONS 1-9: 13
3. TROUBLE FALLING OR STAYING ASLEEP: NOT AT ALL
4. FEELING TIRED OR HAVING LITTLE ENERGY: MORE THAN HALF THE DAYS
9. THOUGHTS THAT YOU WOULD BE BETTER OFF DEAD, OR OF HURTING YOURSELF: NOT AT ALL
7. TROUBLE CONCENTRATING ON THINGS, SUCH AS READING THE NEWSPAPER OR WATCHING TELEVISION: NEARLY EVERY DAY
10. IF YOU CHECKED OFF ANY PROBLEMS, HOW DIFFICULT HAVE THESE PROBLEMS MADE IT FOR YOU TO DO YOUR WORK, TAKE CARE OF THINGS AT HOME, OR GET ALONG WITH OTHER PEOPLE: NOT DIFFICULT AT ALL
SUM OF ALL RESPONSES TO PHQ QUESTIONS 1-9: 13
5. POOR APPETITE OR OVEREATING: SEVERAL DAYS

## 2025-04-14 NOTE — PROGRESS NOTES
Chief Complaint   Patient presents with    3 Month Follow-Up    Medicare AWV        \"Have you been to the ER, urgent care clinic since your last visit?  Hospitalized since your last visit?\"    Yes u [facility]     “Have you seen or consulted any other health care providers outside of [facility] since your last visit?”    YES - When: approximately 10 days ago.  Where and Why: Chronic care.            Click Here for Release of Records Request     Health Maintenance Due   Topic Date Due    Hepatitis B vaccine (1 of 3 - 19+ 3-dose series) Never done    Shingles vaccine (1 of 2) Never done    Pneumococcal 0-49 years Vaccine (1 of 2 - PCV) Never done    Lipids  10/22/2023    A1C test (Diabetic or Prediabetic)  02/09/2024    Annual Wellness Visit (Medicare)  Never done    COVID-19 Vaccine (4 - 2024-25 season) 09/01/2024    GFR test (Diabetes, CKD 3-4, OR last GFR 15-59)  10/09/2024       
[facility]    History of Present Illness:   Coco Monge is a 44 y.o. female with history of CHF, CAD, GERD, Arthritis, Fibromyalgia, HLD, Anxiety, HSV, OCD, Depression, Obesity, Chronic Bronchitis.  CC: Medicare wellness, Chronic Bronchitis.  History provided by patient and Records    HPI:  Left thumb Laceration: Occurred 3 days ago (4/11/25, has stitches and needs removed on 4/21/25.    COPD Follow Up:  Patient is following up for chronic bronchitis.  Patient is not currently in exacerbation.  - Currently COPD symptoms are stable with intermittent symptoms.  Chronic Symptoms:chronic dyspnea: severity = moderate: course of sx: waxing and waning. Chronic Cough: severity: mild: sputum : clear.  - Increased symptoms occur weekly and generally consist of productive cough for Several days.  Wheezing when present is described as mild and and controlled with rescue bronchodilator.  - Current limitations in activity from COPD: Some limitations.  Patient becomes dyspneic after 1 blocks, can climb 1 flights of stairs.  - Oxygen: she currently is not on home oxygen therapy.  - Compliance with medications: Good  - Patient denies smoke cigarettes.  - Flu shot past 12 months? yes  - Patient needs new nebulizer machine, current machine missing parts.    Anxiety/Depression: Patient is taking Valium 50 mg total daily. Also taking Trazodone and Ambien Seeing specialist currently, Is supposed to be stopping Cymbalta    CMV: Recently started on Prevymis    Health Maintenance  Health Maintenance Due   Topic Date Due    Hepatitis B vaccine (1 of 3 - 19+ 3-dose series) Never done    Shingles vaccine (1 of 2) Never done    Pneumococcal 0-49 years Vaccine (1 of 2 - PCV) Never done    Lipids  10/22/2023    A1C test (Diabetic or Prediabetic)  02/09/2024    Annual Wellness Visit (Medicare)  Never done    COVID-19 Vaccine (4 - 2024-25 season) 09/01/2024    GFR test (Diabetes, CKD 3-4, OR last GFR 15-59)  10/09/2024 
Yes Irvin Saldana MD   PREVIDENT 5000 DRY MOUTH 1.1 % GEL USE SMALL AMOUNT TWICE DAILY TO BRUSH TEETH FOR TWO MINUTES THEN SPIT OUT, DO NOT EAT OR DRINK OR 30 MINUTES AFTER USING Yes Irvin Saldana MD   DULoxetine (CYMBALTA) 20 MG extended release capsule Take 1 capsule by mouth daily Yes Irvin Saldana MD WEGOVY 2.4 MG/0.75ML SOAJ SC injection  Yes Irvin Saldana MD   albuterol sulfate HFA (PROVENTIL;VENTOLIN;PROAIR) 108 (90 Base) MCG/ACT inhaler Inhale 1 puff into the lungs 4 times daily Indications: Difficulty Breathing Yes Weston Rock MD   fluticasone (FLONASE) 50 MCG/ACT nasal spray 2 sprays by Each Nostril route in the morning and at bedtime Yes Weston Rock MD   predniSONE (DELTASONE) 10 MG tablet Take 4 tabs for three days, Then take 3 tabs for three days, Then take 2 tabs for three days, Then take 1 tab for three days. Yes Weston Rock MD   nortriptyline (PAMELOR) 50 MG capsule TAKE ONE CAPSULE BY MOUTH NIGHTLY Yes Ralph Good MD   losartan (COZAAR) 25 MG tablet Take 1 tablet by mouth daily Yes Irvin Saldana MD   ACCU-CHEK GUIDE strip USE TO CHECK BLOOD SUGAR 3 TIMES DAILY FOR UNCONTROLLED HYPERGLYCEMIA DUE TO MEDICATIONS. Yes Ralph Good MD   estradiol (ESTRACE VAGINAL) 0.1 MG/GM vaginal cream Place 1 g vaginally three times a week Do not use applicator use pea size drop with finger tip directly on urethra 3x a week Yes Jose Alberto Tan MD   tiZANidine (ZANAFLEX) 4 MG tablet Take 1 tablet by mouth every 8 hours as needed (Pain) Yes Ralph Good MD   pramipexole (MIRAPEX) 0.5 MG tablet TAKE ONE & ONE-HALF TABLETS BY MOUTH NIGHTLY Yes Ralph Good MD   oxyBUTYnin (DITROPAN XL) 15 MG extended release tablet TAKE ONE TABLET BY MOUTH EVERY DAY *STOP OXYBUTYNIN 10 MG* Yes Marisabel Montes Y, APRN - NP   pregabalin (LYRICA) 100 MG capsule TAKE ONE CAPSULE BY MOUTH TWICE DAILY *max daily AMOUNT: 200mg*  Patient taking

## 2025-04-14 NOTE — PATIENT INSTRUCTIONS
your news feed. Avoid or limit time on social media or news that may make you feel stressed.  Do something active. Exercise or activity can help reduce stress. Walking is a great way to get started.  Where can you learn more?  Go to https://www.[employer].net/patientEd and enter N032 to learn more about \"Learning About Stress.\"  Current as of: October 24, 2024  Content Version: 14.4  © 2046-3852 [employer].   Care instructions adapted under license by [employer]. If you have questions about a medical condition or this instruction, always ask your healthcare professional. [employer], disclaims any warranty or liability for your use of this information.         Learning About Managing Anger  What causes anger?  Many things can cause anger. Stress at home or at work can cause anger. Being in stressful social situations can also cause it.  Anger signals your body to prepare for a fight. This reaction is often called \"fight or flight.\" When you get angry, adrenaline and other hormones are released into your blood. Then your blood pressure goes up, your heart beats faster, and you breathe faster.  When you express anger in a healthy way, it can inspire change and make you productive. But if you don't have the skills to express anger in a healthy way, anger can build up. You may hurt others--and yourself--emotionally and even physically. Violent behavior often starts with verbal threats or fairly minor incidents. But over time, it can involve physical harm. It can include physical, verbal, or sexual abuse of an intimate partner (domestic violence), a child (child abuse), or an older adult (elder abuse).  It can also make you sick. Anger and constant hostility keep your blood pressure high. They raise your chances of having other health problems, such as depression, a heart attack, or a stroke. Some people with post-traumatic stress disorder (PTSD) feel angry and on alert all the time.  It may feel

## 2025-04-21 ENCOUNTER — OFFICE VISIT (OUTPATIENT)
Facility: CLINIC | Age: 45
End: 2025-04-21
Payer: MEDICARE

## 2025-04-21 VITALS
DIASTOLIC BLOOD PRESSURE: 78 MMHG | SYSTOLIC BLOOD PRESSURE: 112 MMHG | TEMPERATURE: 97.1 F | HEART RATE: 107 BPM | OXYGEN SATURATION: 99 % | RESPIRATION RATE: 16 BRPM | BODY MASS INDEX: 33.46 KG/M2 | WEIGHT: 196 LBS | HEIGHT: 64 IN

## 2025-04-21 DIAGNOSIS — S61.012D LACERATION OF LEFT THUMB WITHOUT FOREIGN BODY WITHOUT DAMAGE TO NAIL, SUBSEQUENT ENCOUNTER: Primary | ICD-10-CM

## 2025-04-21 PROCEDURE — G8427 DOCREV CUR MEDS BY ELIG CLIN: HCPCS | Performed by: FAMILY MEDICINE

## 2025-04-21 PROCEDURE — 1036F TOBACCO NON-USER: CPT | Performed by: FAMILY MEDICINE

## 2025-04-21 PROCEDURE — 99212 OFFICE O/P EST SF 10 MIN: CPT | Performed by: FAMILY MEDICINE

## 2025-04-21 PROCEDURE — G8417 CALC BMI ABV UP PARAM F/U: HCPCS | Performed by: FAMILY MEDICINE

## 2025-04-21 NOTE — PROGRESS NOTES
Chief Complaint   Patient presents with    Suture / Staple Removal     Suture removal         \"Have you been to the ER, urgent care clinic since your last visit?  Hospitalized since your last visit?\"    NO    “Have you seen or consulted any other health care providers outside of [facility] since your last visit?”    [facility]             Click Here for Release of Records Request     Health Maintenance Due   Topic Date Due    Hepatitis B vaccine (1 of 3 - 19+ 3-dose series) Never done    Shingles vaccine (1 of 2) Never done    Pneumococcal 0-49 years Vaccine (1 of 2 - PCV) Never done    Lipids  10/22/2023    A1C test (Diabetic or Prediabetic)  02/09/2024    COVID-19 Vaccine (4 - 2024-25 season) 09/01/2024    GFR test (Diabetes, CKD 3-4, OR last GFR 15-59)  10/09/2024       
[facility] [place] [facility]ational Health - Occupational Stress Questionnaire     Feeling of Stress : To some extent   Social Connections: Socially Isolated (7/9/2024)    Social Connection and Isolation Panel [NHANES]     Frequency of Communication with Friends and Family: Three times a week     Frequency of Social Gatherings with Friends and Family: Once a week     Attends [employer] Services: Never     Active Member of Clubs or Organizations: No     Attends Club or Organization Meetings: Never     Marital Status: Never    Intimate Partner Violence: Not At Risk (7/9/2024)    Humiliation, Afraid, Rape, and Kick questionnaire     Fear of Current or Ex-Partner: No     Emotionally Abused: No     Physically Abused: No     Sexually Abused: No   Housing Stability: Low Risk  (1/13/2025)    Housing Stability Vital Sign     Unable to Pay for Housing in the Last Year: No     Number of Times Moved in the Last Year: 0     Homeless in the Last Year: No        Review of Systems   Review of Systems   Skin:  Positive for wound.         Objective:     Objective:    /78 (BP Site: Right Upper Arm, Patient Position: Sitting, BP Cuff Size: Large Adult)   Pulse (!) 107   Temp 97.1 °F (36.2 °C) (Oral)   Resp 16   Ht 1.626 m (5' 4\")   Wt 88.9 kg (196 lb)   LMP 04/05/2025 (Approximate)   SpO2 99%   BMI 33.64 kg/m²     Injury exam:  A 1.5 cm laceration noted on the Left thumb is healing well, without evidence of infection.     Assessment:      Laceration is healing well, without evidence of infection.      Plan:      1. 5 suture (s) were removed.  2. Wound care discussed.  3. Follow-up as needed.         I have discussed the diagnosis with the patient and the intended plan as seen in the above orders.  Social history, medical history, and labs were reviewed.  The patient has received an after-visit summary and questions were answered concerning future plans.  I have discussed medication side effects and warnings with the

## 2025-04-28 ENCOUNTER — PATIENT MESSAGE (OUTPATIENT)
Facility: CLINIC | Age: 45
End: 2025-04-28

## 2025-04-28 DIAGNOSIS — R11.2 NAUSEA AND VOMITING, UNSPECIFIED VOMITING TYPE: Primary | ICD-10-CM

## 2025-04-28 RX ORDER — ONDANSETRON 4 MG/1
4 TABLET, ORALLY DISINTEGRATING ORAL 3 TIMES DAILY PRN
Qty: 60 TABLET | Refills: 0 | Status: SHIPPED | OUTPATIENT
Start: 2025-04-28

## 2025-04-29 ENCOUNTER — OFFICE VISIT (OUTPATIENT)
Facility: CLINIC | Age: 45
End: 2025-04-29
Payer: MEDICARE

## 2025-04-29 VITALS
DIASTOLIC BLOOD PRESSURE: 73 MMHG | OXYGEN SATURATION: 96 % | HEART RATE: 99 BPM | SYSTOLIC BLOOD PRESSURE: 99 MMHG | WEIGHT: 193 LBS | HEIGHT: 64 IN | BODY MASS INDEX: 32.95 KG/M2 | RESPIRATION RATE: 20 BRPM | TEMPERATURE: 97.2 F

## 2025-04-29 DIAGNOSIS — G89.4 CHRONIC PAIN SYNDROME: Primary | ICD-10-CM

## 2025-04-29 DIAGNOSIS — M54.10 BACK PAIN WITH RADICULOPATHY: ICD-10-CM

## 2025-04-29 DIAGNOSIS — Z79.899 CONTROLLED SUBSTANCE AGREEMENT SIGNED: ICD-10-CM

## 2025-04-29 PROCEDURE — 1036F TOBACCO NON-USER: CPT | Performed by: FAMILY MEDICINE

## 2025-04-29 PROCEDURE — 99214 OFFICE O/P EST MOD 30 MIN: CPT | Performed by: FAMILY MEDICINE

## 2025-04-29 PROCEDURE — G8427 DOCREV CUR MEDS BY ELIG CLIN: HCPCS | Performed by: FAMILY MEDICINE

## 2025-04-29 PROCEDURE — G8417 CALC BMI ABV UP PARAM F/U: HCPCS | Performed by: FAMILY MEDICINE

## 2025-04-29 RX ORDER — PREGABALIN 150 MG/1
150 CAPSULE ORAL 2 TIMES DAILY
Qty: 60 CAPSULE | Refills: 2 | Status: SHIPPED | OUTPATIENT
Start: 2025-04-29 | End: 2025-07-28

## 2025-04-29 ASSESSMENT — ENCOUNTER SYMPTOMS
CHEST TIGHTNESS: 0
APNEA: 0
BACK PAIN: 0

## 2025-04-29 NOTE — PROGRESS NOTES
[facility]    History of Present Illness:   Coco Monge is a 44 y.o. female with history of CHF, CAD, GERD, Arthritis, Fibromyalgia, HLD, Anxiety, HSV, OCD, Depression, Obesity, Chronic Bronchitis.   CC: Follow up and gi illness  History provided by patient and Records    HPI:  Patient has had some gi illness that is improving at this time and notes some reduced appetite as well.  Patient noted that that she has been taking Zofran as well.  Started last Thursday then took a Wegovy shot.  Noting some dizziness as well, has not been using     Chronic Pain: Patient Needs refill of Lyrica 150 mg BID at this time.    Depression: Patient notes with medications she has become resistant to take medications.  Patient notes has some issues with opening up as well to counselors.  Notes some of this comes from anger in particular     Health Maintenance  Health Maintenance Due   Topic Date Due    Hepatitis B vaccine (1 of 3 - 19+ 3-dose series) Never done    Shingles vaccine (1 of 2) Never done    Pneumococcal 0-49 years Vaccine (1 of 2 - PCV) Never done    Lipids  10/22/2023    A1C test (Diabetic or Prediabetic)  02/09/2024    COVID-19 Vaccine (4 - 2024-25 season) 09/01/2024    GFR test (Diabetes, CKD 3-4, OR last GFR 15-59)  10/09/2024       Past Medical, Family, and Social History:     Current Outpatient Medications on File Prior to Visit   Medication Sig Dispense Refill    ondansetron (ZOFRAN-ODT) 4 MG disintegrating tablet Take 1 tablet by mouth 3 times daily as needed for Nausea or Vomiting 60 tablet 0    doxycycline hyclate (VIBRA-TABS) 100 MG tablet Take one tablet twice daily with food.  Discontinue if pregnant or trying to become pregnant.  Pharmacy, please substitute monohydrate and/or capsules if required by insurance.      erythromycin with ethanol (THERAMYCIN) 2 % external solution Apply topically daily      ipratropium 0.5 mg-albuterol 2.5 mg (DUONEB) 0.5-2.5 (3) MG/3ML SOLN nebulizer

## 2025-04-29 NOTE — PROGRESS NOTES
\"Have you been to the ER, urgent care clinic since your last visit?  Hospitalized since your last visit?\"    no    “Have you seen or consulted any other health care providers outside our system since your last visit?”    no              Goals that were addressed and/or need to be completed during or after this appointment include   Health Maintenance Due   Topic Date Due    Hepatitis B vaccine (1 of 3 - 19+ 3-dose series) Never done    Shingles vaccine (1 of 2) Never done    Pneumococcal 0-49 years Vaccine (1 of 2 - PCV) Never done    Lipids  10/22/2023    A1C test (Diabetic or Prediabetic)  02/09/2024    COVID-19 Vaccine (4 - 2024-25 season) 09/01/2024    GFR test (Diabetes, CKD 3-4, OR last GFR 15-59)  10/09/2024

## 2025-05-01 ENCOUNTER — HOSPITAL ENCOUNTER (OUTPATIENT)
Facility: HOSPITAL | Age: 45
Discharge: HOME | End: 2025-05-01
Payer: MEDICARE

## 2025-05-01 ENCOUNTER — HOSPITAL ENCOUNTER (OUTPATIENT)
Facility: HOSPITAL | Age: 45
End: 2025-05-01
Payer: MEDICARE

## 2025-05-01 DIAGNOSIS — N63.42 SUBAREOLAR MASS OF LEFT BREAST: ICD-10-CM

## 2025-05-01 DIAGNOSIS — Z12.31 ENCOUNTER FOR SCREENING MAMMOGRAM FOR MALIGNANT NEOPLASM OF BREAST: ICD-10-CM

## 2025-05-01 PROCEDURE — G0279 TOMOSYNTHESIS, MAMMO: HCPCS

## 2025-05-01 PROCEDURE — 76642 ULTRASOUND BREAST LIMITED: CPT

## 2025-05-05 ENCOUNTER — OFFICE VISIT (OUTPATIENT)
Facility: CLINIC | Age: 45
End: 2025-05-05
Payer: MEDICARE

## 2025-05-05 VITALS
DIASTOLIC BLOOD PRESSURE: 85 MMHG | TEMPERATURE: 98.1 F | WEIGHT: 193 LBS | HEIGHT: 64 IN | BODY MASS INDEX: 32.95 KG/M2 | HEART RATE: 95 BPM | RESPIRATION RATE: 18 BRPM | SYSTOLIC BLOOD PRESSURE: 125 MMHG | OXYGEN SATURATION: 98 %

## 2025-05-05 DIAGNOSIS — M79.671 RIGHT FOOT PAIN: Primary | ICD-10-CM

## 2025-05-05 PROCEDURE — 1036F TOBACCO NON-USER: CPT | Performed by: FAMILY MEDICINE

## 2025-05-05 PROCEDURE — G8427 DOCREV CUR MEDS BY ELIG CLIN: HCPCS | Performed by: FAMILY MEDICINE

## 2025-05-05 PROCEDURE — 99212 OFFICE O/P EST SF 10 MIN: CPT | Performed by: FAMILY MEDICINE

## 2025-05-05 PROCEDURE — G8417 CALC BMI ABV UP PARAM F/U: HCPCS | Performed by: FAMILY MEDICINE

## 2025-05-05 RX ORDER — HYDROXYZINE PAMOATE 25 MG/1
25 CAPSULE ORAL 3 TIMES DAILY
COMMUNITY
Start: 2025-05-02

## 2025-05-05 ASSESSMENT — ENCOUNTER SYMPTOMS: CHEST TIGHTNESS: 0

## 2025-05-05 NOTE — PROGRESS NOTES
[facility]    History of Present Illness:   Coco Monge is a 44 y.o. female with history of  CHF, CAD, GERD, Arthritis, Fibromyalgia, HLD, Anxiety, HSV, OCD, Depression, Obesity, Chronic Bronchitis.   CC:Right foot Pain  History provided by patient and Records    HPI:  Foot/ankle pain Pain:  Reports pain of the Right Foot.  Pain secondary to none known. Overall symptoms are show no change.  Symptoms include  Pain on the top of the right foot and some swelling .  Location: Right dorsal and midfoot  Duration: Pain/Injury started 3 weeks  ago.  Pain lasts Constant and continues.  Quality: Pressure in characteristic.  Severity: pain is perceived as moderate (4-6 pain scale)  Onset: insidious  Radiation: None  Associated Symptoms: Noting swelling in the last 2 days.  Denies locking and giving way, redness.  Patient Does not endorse weakness/tingling sensation.  Modifying Factors: Pain is worsened by Walking, moving toes.  Pain is improved by heat and rest.  Previous remedies tried include none.  Previous Injuries/Surgeries: None  Previous Imaging: None     Health Maintenance  Health Maintenance Due   Topic Date Due    Hepatitis B vaccine (1 of 3 - 19+ 3-dose series) Never done    Shingles vaccine (1 of 2) Never done    Pneumococcal 0-49 years Vaccine (1 of 2 - PCV) Never done    Lipids  10/22/2023    A1C test (Diabetic or Prediabetic)  02/09/2024    COVID-19 Vaccine (4 - 2024-25 season) 09/01/2024    GFR test (Diabetes, CKD 3-4, OR last GFR 15-59)  10/09/2024       Past Medical, Family, and Social History:     Current Outpatient Medications on File Prior to Visit   Medication Sig Dispense Refill    hydrOXYzine pamoate (VISTARIL) 25 MG capsule Take 1 capsule by mouth 3 times daily      pregabalin (LYRICA) 150 MG capsule Take 1 capsule by mouth 2 times daily for 90 days. Max Daily Amount: 300 mg 60 capsule 2    ondansetron (ZOFRAN-ODT) 4 MG disintegrating tablet Take 1 tablet by mouth 3 times

## 2025-05-07 ENCOUNTER — RESULTS FOLLOW-UP (OUTPATIENT)
Facility: CLINIC | Age: 45
End: 2025-05-07

## 2025-05-16 ENCOUNTER — HOSPITAL ENCOUNTER (OUTPATIENT)
Facility: HOSPITAL | Age: 45
Discharge: HOME | End: 2025-05-19
Payer: MEDICARE

## 2025-05-16 VITALS — BODY MASS INDEX: 33.13 KG/M2 | WEIGHT: 193 LBS

## 2025-05-16 DIAGNOSIS — N63.22 MASS OF UPPER INNER QUADRANT OF LEFT BREAST: ICD-10-CM

## 2025-05-16 PROCEDURE — 77065 DX MAMMO INCL CAD UNI: CPT

## 2025-05-16 PROCEDURE — A4648 IMPLANTABLE TISSUE MARKER: HCPCS

## 2025-05-16 PROCEDURE — 88305 TISSUE EXAM BY PATHOLOGIST: CPT

## 2025-05-16 NOTE — PROGRESS NOTES
Patient received for left breast ultrasound biopsy for mass seen at last appointment.  Procedure explained to patient as well as risks associated with procedure.  Patient on aspirin for history of heart transplant.  Post biopsy discharge instructions reviewed with patient.  Patient expects to be contacted by phone with pathology results.

## 2025-05-16 NOTE — PROGRESS NOTES
Patient tolerated procedure well.  Minimal bleeding noted.  Pressure held to site for 10 minutes since patient is on aspirin.  Post biopsy discharge instructions reviewed with patient.  Patient verbalized understanding.  Patient sent for post biopsy mammogram.  Dressing remained dry and intact.  Patient expects to be contacted by phone with pathology results.  Patient received paper copy of discharge instructions.

## 2025-05-20 NOTE — PROGRESS NOTES
Pathology results in and reviewed by Dr Ronny Holloway who then contacted patient with results.  Patient reports biopsy site healed nicely.  Patient pleased  with results.  Results will be faxed to Dr Good.

## 2025-05-22 ENCOUNTER — PATIENT MESSAGE (OUTPATIENT)
Facility: CLINIC | Age: 45
End: 2025-05-22

## 2025-05-22 ENCOUNTER — TELEPHONE (OUTPATIENT)
Facility: CLINIC | Age: 45
End: 2025-05-22

## 2025-05-22 DIAGNOSIS — M54.9 UPPER BACK PAIN: ICD-10-CM

## 2025-05-22 DIAGNOSIS — M54.2 NECK PAIN: Primary | ICD-10-CM

## 2025-05-22 NOTE — TELEPHONE ENCOUNTER
Pt states she was told to put in a msg to request an x-layo her upper back and neck on both sides. Pcp is aware of this. Please advise when orders are in so pt can come in.

## 2025-05-30 ENCOUNTER — RESULTS FOLLOW-UP (OUTPATIENT)
Facility: CLINIC | Age: 45
End: 2025-05-30

## 2025-06-05 ENCOUNTER — OFFICE VISIT (OUTPATIENT)
Facility: CLINIC | Age: 45
End: 2025-06-05

## 2025-06-05 VITALS
TEMPERATURE: 98.2 F | RESPIRATION RATE: 18 BRPM | SYSTOLIC BLOOD PRESSURE: 104 MMHG | HEIGHT: 64 IN | HEART RATE: 96 BPM | BODY MASS INDEX: 32.61 KG/M2 | DIASTOLIC BLOOD PRESSURE: 69 MMHG | OXYGEN SATURATION: 100 % | WEIGHT: 191 LBS

## 2025-06-05 DIAGNOSIS — M25.471 RIGHT ANKLE SWELLING: ICD-10-CM

## 2025-06-05 DIAGNOSIS — M79.89 SWELLING OF RIGHT FOOT: Primary | ICD-10-CM

## 2025-06-05 DIAGNOSIS — R60.0 EDEMA OF RIGHT LOWER EXTREMITY: ICD-10-CM

## 2025-06-05 DIAGNOSIS — M79.661 RIGHT CALF PAIN: ICD-10-CM

## 2025-06-05 RX ORDER — OLANZAPINE 7.5 MG/1
7.5 TABLET, FILM COATED ORAL NIGHTLY
COMMUNITY

## 2025-06-05 NOTE — PROGRESS NOTES
Chief Complaint   Patient presents with    Edema         \"Have you been to the ER, urgent care clinic since your last visit?  Hospitalized since your last visit?\"    Yes- [facility] chest pain    “Have you seen or consulted any other health care providers outside of [facility] since your last visit?”    Yes- transplant            Click Here for Release of Records Request     Health Maintenance Due   Topic Date Due    Hepatitis B vaccine (1 of 3 - 19+ 3-dose series) Never done    Shingles vaccine (1 of 2) Never done    Pneumococcal 0-49 years Vaccine (1 of 2 - PCV) Never done    Lipids  10/22/2023    A1C test (Diabetic or Prediabetic)  02/09/2024    COVID-19 Vaccine (4 - 2024-25 season) 09/01/2024    GFR test (Diabetes, CKD 3-4, OR last GFR 15-59)  10/09/2024

## 2025-06-05 NOTE — PROGRESS NOTES
History of Present Illness:    Coco Monge is a 44 y.o. female who presents for right lower extremity swelling    Reports 1 month of R foot  swelling  Swelling usually worse by EOD     Does not wear compression socks     Saw transplant physician yesterday, says they ordered a RLE doppler. She cannot schedule it until August due to availability and would like to try scheduling it through [facility].     H/o bilateral knee OA, thinks swelling is related to that?    Denies any recent injuries or trauma      Physical Examination:     /69 (BP Site: Left Upper Arm, Patient Position: Sitting, BP Cuff Size: Medium Adult)   Pulse 96   Temp 98.2 °F (36.8 °C) (Oral)   Resp 18   Ht 1.626 m (5' 4\")   Wt 86.6 kg (191 lb)   SpO2 100%   BMI 32.79 kg/m²     GEN: No apparent distress. Alert and oriented and responds to all questions appropriately.  EYES:  Conjunctiva clear; extraocular movements are intact  EAR: External ears are normal.      LUNGS: Respirations unlabored  NEUROLOGIC:  No focal neurologic deficits. Coordination and gait grossly intact.   EXT: right foot and ankle 1+ pitting edema. Right calf ttp. Bilateral calves are symmetric.   SKIN: No obvious rashes.        Past Medical History:   Diagnosis Date    Abnormal Pap smear of cervix 03/07/2012    LGSIL (no hpv done)    Anastomotic stricture of esophagus     patient reports \"stretching done\" in the past    Arthritis     CHF (congestive heart failure) ([facility])     prior to transplant    Chronic pain     fybromyalsia    CMV (cytomegalovirus infection) ([facility])     Depression     anxiety, depression, OCD    GERD (gastroesophageal reflux disease)     Hypertension     not currently being treated    Ill-defined condition     Fibromyalia gastricparesis    MI (myocardial infarction) ([facility])     7/24/2022 MI    Musculoskeletal disorder     Routine Papanicolaou smear 06/04/2024    normal HPV neg    Sepsis ([facility])     SOB (shortness of breath)     much better since

## 2025-06-09 ENCOUNTER — OFFICE VISIT (OUTPATIENT)
Age: 45
End: 2025-06-09
Payer: MEDICARE

## 2025-06-09 VITALS
HEIGHT: 64 IN | WEIGHT: 193.2 LBS | OXYGEN SATURATION: 98 % | DIASTOLIC BLOOD PRESSURE: 78 MMHG | SYSTOLIC BLOOD PRESSURE: 113 MMHG | BODY MASS INDEX: 32.98 KG/M2 | HEART RATE: 99 BPM

## 2025-06-09 DIAGNOSIS — N63.20 MASS OF LEFT BREAST, UNSPECIFIED QUADRANT: ICD-10-CM

## 2025-06-09 DIAGNOSIS — N92.6 IRREGULAR MENSES: ICD-10-CM

## 2025-06-09 DIAGNOSIS — Z01.419 ENCOUNTER FOR GYNECOLOGICAL EXAMINATION (GENERAL) (ROUTINE) WITHOUT ABNORMAL FINDINGS: Primary | ICD-10-CM

## 2025-06-09 PROCEDURE — 99459 PELVIC EXAMINATION: CPT | Performed by: OBSTETRICS & GYNECOLOGY

## 2025-06-09 PROCEDURE — 99396 PREV VISIT EST AGE 40-64: CPT | Performed by: OBSTETRICS & GYNECOLOGY

## 2025-06-09 RX ORDER — MEDROXYPROGESTERONE ACETATE 10 MG
10 TABLET ORAL DAILY
Qty: 10 TABLET | Refills: 0 | Status: SHIPPED | OUTPATIENT
Start: 2025-06-09

## 2025-06-09 NOTE — PROGRESS NOTES
Coco Monge is a 44 y.o. female returns for an annual exam     Chief Complaint   Patient presents with    Annual Exam       Patient's last menstrual period was 05/31/2025 (exact date).  Her periods are heavy in flow and often irregular with no apparent pattern.  She has dysmenorrhea.  Problems: problems - feels that she is going into perimenopuase   Birth Control: none  Last Pap: normal HPV negative obtained 6/4/2024  She does not have a history of IVANA 2, 3 or cervical cancer.   Last Mammogram: abnormal left breast obtained 5/1/2025  Last Bone Density: does not meet criteria  Last colonoscopy: does not meet criteria      1. Have you been to the ER, urgent care clinic, or hospitalized since your last visit? No    2. Have you seen or consulted any other health care providers outside of the [facility] since your last visit? No    Examination chaperoned by Remington Patel MA.  
Take 6 capsules by mouth every morning 6 tabs in the morning and 1 tab at night      mycophenolate (CELLCEPT) 500 MG tablet 1 tablet 2 times daily      loratadine (CLARITIN) 10 MG tablet Take 1 tablet by mouth daily      loperamide (IMODIUM) 2 MG capsule Take 1 capsule by mouth 4 times daily as needed for Diarrhea      zolpidem (AMBIEN) 10 MG tablet TAKE ONE TABLET BY MOUTH AT BEDTIME FOR SLEEP      aspirin 81 MG EC tablet 1 tablet daily Stopped per instructions from Dr. Tan 7 days prior last dose 11/2/23      magnesium oxide (MAG-OX) 400 MG tablet Take 1 tablet by mouth 2 times daily      doxycycline hyclate (VIBRA-TABS) 100 MG tablet Take one tablet twice daily with food.  Discontinue if pregnant or trying to become pregnant.  Pharmacy, please substitute monohydrate and/or capsules if required by insurance. (Patient not taking: Reported on 6/5/2025)      DULoxetine (CYMBALTA) 20 MG extended release capsule Take 1 capsule by mouth daily (Patient not taking: Reported on 6/5/2025)      nortriptyline (PAMELOR) 50 MG capsule TAKE ONE CAPSULE BY MOUTH NIGHTLY (Patient not taking: Reported on 6/9/2025) 90 capsule 1    Semaglutide-Weight Management (WEGOVY) 0.5 MG/0.5ML SOAJ SC injection Inject 0.5 mg into the skin every 7 days (Patient not taking: Reported on 6/5/2025)      traZODone (DESYREL) 100 MG tablet Take 1.5 tablets by mouth nightly (Patient not taking: Reported on 6/9/2025)       No current facility-administered medications for this visit.     Allergies: Morphine, Sulfa antibiotics, Amoxicillin-pot clavulanate, Aripiprazole, Codeine, and Hydrocodone-acetaminophen   Tobacco History:  reports that she quit smoking about 2 years ago. Her smoking use included cigarettes. She started smoking about 21 years ago. She has a 4.6 pack-year smoking history. She has been exposed to tobacco smoke. She has never used smokeless tobacco.  Alcohol Abuse:  reports that she does not currently use alcohol.  Drug Abuse:  reports

## 2025-06-16 ENCOUNTER — HOSPITAL ENCOUNTER (OUTPATIENT)
Facility: HOSPITAL | Age: 45
Discharge: HOME | End: 2025-06-19
Payer: MEDICARE

## 2025-06-16 DIAGNOSIS — M25.471 RIGHT ANKLE SWELLING: ICD-10-CM

## 2025-06-16 DIAGNOSIS — R60.0 EDEMA OF RIGHT LOWER EXTREMITY: ICD-10-CM

## 2025-06-16 DIAGNOSIS — M79.89 SWELLING OF RIGHT FOOT: ICD-10-CM

## 2025-06-16 PROCEDURE — 93971 EXTREMITY STUDY: CPT

## 2025-06-19 ENCOUNTER — RESULTS FOLLOW-UP (OUTPATIENT)
Age: 45
End: 2025-06-19

## 2025-06-22 DIAGNOSIS — R73.09 ABNORMAL BLOOD SUGAR: ICD-10-CM

## 2025-06-22 DIAGNOSIS — E78.2 MIXED HYPERLIPIDEMIA: ICD-10-CM

## 2025-06-23 ENCOUNTER — TELEPHONE (OUTPATIENT)
Age: 45
End: 2025-06-23

## 2025-06-23 RX ORDER — ROSUVASTATIN CALCIUM 20 MG/1
TABLET, COATED ORAL
Qty: 45 TABLET | Refills: 1 | Status: SHIPPED | OUTPATIENT
Start: 2025-06-23

## 2025-06-23 RX ORDER — LANCETS
EACH MISCELLANEOUS
Qty: 100 EACH | Refills: 3 | Status: SHIPPED | OUTPATIENT
Start: 2025-06-23

## 2025-06-23 NOTE — TELEPHONE ENCOUNTER
Two patient identifiers used      44 year old patient last seen in the office on 6/9/2025 for ae    Patient calling to say that last night she had a small amount of vaginal bleeding and this am nothing.    Patient has taken 6 of 10 days of the provera and has 4 more to go.    This nurse recommended that the patient complete the course of the provera and observe for a withdrawal bleed in up to 2 weeks time frame.    Patient is having questions about whether she is in menopause or not.    Please advise    Thank you

## 2025-06-23 NOTE — TELEPHONE ENCOUNTER
Mica oDminguez MD Physician Signed 10:46 AM     Copy     Yes finish provera  No she is NOT menopausal. Women in menopause have no bleeding at all, zero. One year no bleeding is menopause and usual age 54          Patient advised of MD recommendations and verbalized understanding.

## 2025-06-23 NOTE — TELEPHONE ENCOUNTER
Yes finish provera  No she is NOT menopausal. Women in menopause have no bleeding at all, zero. One year no bleeding is menopause and usual age 54

## 2025-06-24 DIAGNOSIS — R35.0 FREQUENCY OF MICTURITION: ICD-10-CM

## 2025-06-24 NOTE — TELEPHONE ENCOUNTER
PCP: Ralph Good MD    Last appt: 10/23/2023  Future Appointments   Date Time Provider Department Center   7/3/2025  9:00 AM [facility]   7/14/2025 10:45 AM [facility]   7/22/2025  9:40 AM Sukhwinder Batres MD [id number]       Requested Prescriptions     Pending Prescriptions Disp Refills    oxyBUTYnin (DITROPAN XL) 15 MG extended release tablet [Pharmacy Med Name: oxybutynin chloride ER 15 mg tablet,extended release 24 hr] 30 tablet 3     Sig: TAKE ONE TABLET BY MOUTH EVERY DAY *STOP OXYBUTYNIN 10 MG*

## 2025-06-26 ENCOUNTER — OFFICE VISIT (OUTPATIENT)
Facility: CLINIC | Age: 45
End: 2025-06-26

## 2025-06-26 VITALS
HEART RATE: 96 BPM | RESPIRATION RATE: 18 BRPM | DIASTOLIC BLOOD PRESSURE: 84 MMHG | HEIGHT: 64 IN | TEMPERATURE: 98.1 F | SYSTOLIC BLOOD PRESSURE: 126 MMHG | OXYGEN SATURATION: 98 % | BODY MASS INDEX: 33.8 KG/M2 | WEIGHT: 198 LBS

## 2025-06-26 DIAGNOSIS — R60.0 LOWER EXTREMITY EDEMA: ICD-10-CM

## 2025-06-26 DIAGNOSIS — T30.0 SKIN BURN: Primary | ICD-10-CM

## 2025-06-26 DIAGNOSIS — J30.9 ALLERGIC RHINITIS, UNSPECIFIED SEASONALITY, UNSPECIFIED TRIGGER: ICD-10-CM

## 2025-06-26 RX ORDER — FUROSEMIDE 20 MG/1
10 TABLET ORAL DAILY
Qty: 4 TABLET | Refills: 0 | Status: SHIPPED | OUTPATIENT
Start: 2025-06-26 | End: 2025-07-04

## 2025-06-26 RX ORDER — AZELASTINE 1 MG/ML
1 SPRAY, METERED NASAL 2 TIMES DAILY
Qty: 30 ML | Refills: 0 | Status: SHIPPED | OUTPATIENT
Start: 2025-06-26

## 2025-06-27 NOTE — PROGRESS NOTES
History of Present Illness:    Coco Monge is a 44 y.o. female who presents for skin burn and lower extremity swelling    #skin burn   - reports burning skin of left breast on steam from tea kettle   - denies discharge, bleeding, bullae formation, fevers, chills   - has been applying abx ointment     #leg swelling  - bilateral R >L  - worse towards eod  - recent doppler neg for DVT  - evaluated by cards-- not cardiac related   - does not use compression socks      Physical Examination:     /84   Pulse 96   Temp 98.1 °F (36.7 °C) (Oral)   Resp 18   Ht 1.626 m (5' 4\")   Wt 89.8 kg (198 lb)   LMP 05/31/2025 (Exact Date)   SpO2 98%   BMI 33.99 kg/m²     GEN: No apparent distress. Alert and oriented and responds to all questions appropriately.  EYES:  Conjunctiva clear; extraocular movements are intact  EAR: External ears are normal.          LUNGS: Respirations unlabored; clear to auscultation bilaterally  CARDIOVASCULAR: Normal rate, regular rhythm   NEUROLOGIC:  No focal neurologic deficits. Coordination and gait grossly intact.   EXT: Well perfused. Normal pulses. 1+ bilateral LE edema  SKIN: quarter sized area of skin discoloration and blistering, no purulence or surrounding erythema.         Past Medical History:   Diagnosis Date    Abnormal Pap smear of cervix 03/07/2012    LGSIL (no hpv done)    Anastomotic stricture of esophagus     patient reports \"stretching done\" in the past    Arthritis     CHF (congestive heart failure) ([facility])     prior to transplant    Chronic pain     fybromyalsia    CMV (cytomegalovirus infection) ([facility])     Depression     anxiety, depression, OCD    GERD (gastroesophageal reflux disease)     Hypertension     not currently being treated    Ill-defined condition     Fibromyalia gastricparesis    MI (myocardial infarction) ([facility])     7/24/2022 MI    Musculoskeletal disorder     Routine Papanicolaou smear 06/04/2024    normal HPV neg    Sepsis ([facility])     SOB (shortness of

## 2025-07-02 DIAGNOSIS — G89.4 CHRONIC PAIN SYNDROME: ICD-10-CM

## 2025-07-03 ENCOUNTER — PATIENT MESSAGE (OUTPATIENT)
Facility: CLINIC | Age: 45
End: 2025-07-03

## 2025-07-03 DIAGNOSIS — R60.0 LOWER EXTREMITY EDEMA: ICD-10-CM

## 2025-07-03 RX ORDER — FUROSEMIDE 20 MG/1
20 TABLET ORAL DAILY
Qty: 30 TABLET | Refills: 1 | Status: SHIPPED | OUTPATIENT
Start: 2025-07-03

## 2025-07-09 ENCOUNTER — OFFICE VISIT (OUTPATIENT)
Facility: CLINIC | Age: 45
End: 2025-07-09
Payer: MEDICARE

## 2025-07-09 ENCOUNTER — PATIENT MESSAGE (OUTPATIENT)
Facility: CLINIC | Age: 45
End: 2025-07-09

## 2025-07-09 VITALS
HEART RATE: 101 BPM | DIASTOLIC BLOOD PRESSURE: 79 MMHG | TEMPERATURE: 98 F | BODY MASS INDEX: 34.31 KG/M2 | OXYGEN SATURATION: 97 % | HEIGHT: 64 IN | WEIGHT: 201 LBS | SYSTOLIC BLOOD PRESSURE: 109 MMHG | RESPIRATION RATE: 20 BRPM

## 2025-07-09 DIAGNOSIS — F33.1 MODERATE EPISODE OF RECURRENT MAJOR DEPRESSIVE DISORDER (HCC): ICD-10-CM

## 2025-07-09 DIAGNOSIS — I50.22 CHRONIC SYSTOLIC HEART FAILURE (HCC): ICD-10-CM

## 2025-07-09 DIAGNOSIS — F31.9 BIPOLAR 1 DISORDER (HCC): Primary | ICD-10-CM

## 2025-07-09 DIAGNOSIS — I25.10 CORONARY ARTERY DISEASE INVOLVING NATIVE CORONARY ARTERY OF NATIVE HEART WITHOUT ANGINA PECTORIS: ICD-10-CM

## 2025-07-09 DIAGNOSIS — F39 MOOD DISORDER: ICD-10-CM

## 2025-07-09 DIAGNOSIS — F41.9 ANXIETY: ICD-10-CM

## 2025-07-09 DIAGNOSIS — K21.9 GASTROESOPHAGEAL REFLUX DISEASE, UNSPECIFIED WHETHER ESOPHAGITIS PRESENT: ICD-10-CM

## 2025-07-09 DIAGNOSIS — I50.22 CHRONIC SYSTOLIC HEART FAILURE (HCC): Primary | ICD-10-CM

## 2025-07-09 PROCEDURE — 1036F TOBACCO NON-USER: CPT | Performed by: FAMILY MEDICINE

## 2025-07-09 PROCEDURE — G8428 CUR MEDS NOT DOCUMENT: HCPCS | Performed by: FAMILY MEDICINE

## 2025-07-09 PROCEDURE — 99214 OFFICE O/P EST MOD 30 MIN: CPT | Performed by: FAMILY MEDICINE

## 2025-07-09 PROCEDURE — G8417 CALC BMI ABV UP PARAM F/U: HCPCS | Performed by: FAMILY MEDICINE

## 2025-07-09 RX ORDER — TRIAMCINOLONE ACETONIDE 1 MG/G
OINTMENT TOPICAL
COMMUNITY
Start: 2025-06-30

## 2025-07-09 RX ORDER — OLANZAPINE 5 MG/1
5 TABLET, ORALLY DISINTEGRATING ORAL NIGHTLY
Qty: 90 TABLET | Refills: 1 | Status: SHIPPED | OUTPATIENT
Start: 2025-07-09

## 2025-07-09 ASSESSMENT — ENCOUNTER SYMPTOMS
CHEST TIGHTNESS: 0
BACK PAIN: 0
ABDOMINAL PAIN: 0
APNEA: 0
ABDOMINAL DISTENTION: 0

## 2025-07-09 NOTE — PROGRESS NOTES
\"Have you been to the ER, urgent care clinic since your last visit?  Hospitalized since your last visit?\"    no    “Have you seen or consulted any other health care providers outside our system since your last visit?”    no              Goals that were addressed and/or need to be completed during or after this appointment include   Health Maintenance Due   Topic Date Due    Hepatitis B vaccine (1 of 3 - 19+ 3-dose series) Never done    Shingles vaccine (1 of 2) Never done    Pneumococcal 0-49 years Vaccine (1 of 2 - PCV) Never done    Lipids  10/22/2023    A1C test (Diabetic or Prediabetic)  02/09/2024    COVID-19 Vaccine (4 - 2024-25 season) 09/01/2024    GFR test (Diabetes, CKD 3-4, OR last GFR 15-59)  10/09/2024      
disorder  Reducing dosage at this time as was previous well tolerated dose, and worse with increased dose.  Awaiting Psychiatry at [facility]  - OLANZapine zydis (ZYPREXA) 5 MG disintegrating tablet; Take 1 tablet by mouth nightly  Dispense: 90 tablet; Refill: 1    2. Anxiety    3. Moderate episode of recurrent major depressive disorder (HCC)  Seeing Psychiatry in [facility] soon hopefully    4. Bipolar 1 disorder (HCC)  Reducing dosage at this time as was previous well tolerated dose, and worse with increased dose.  [facility]wait[facility]ry [facility]  - OLANZapine zydis (ZYPREXA) 5 MG disintegrating tablet; Take 1 tablet by mouth nightly  Dispense: 90 tablet; Refill: 1    5. Coronary artery disease involving native coronary artery of native heart without angina pectoris    6. Chronic systolic heart failure (HCC)  Seeing [facility]    7. Gastroesophageal reflux disease, unspecified whether esophagitis present       Follow-up and Dispositions    Return in about 3 months (around 10/9/2025).           I have discussed the diagnosis with the patient and the intended plan as seen in the above orders.  Social history, medical history, and labs were reviewed.  The patient has received an after-visit summary and questions were answered concerning future plans.  I have discussed medication side effects and warnings with the patient as well.    Ralph Good MD  [facility]  07/09/25

## 2025-07-11 DIAGNOSIS — I50.22 CHRONIC SYSTOLIC HEART FAILURE (HCC): ICD-10-CM

## 2025-07-13 LAB
ANION GAP SERPL CALC-SCNC: 4 MMOL/L (ref 2–12)
BUN SERPL-MCNC: 22 MG/DL (ref 6–20)
BUN/CREAT SERPL: 14 (ref 12–20)
CALCIUM SERPL-MCNC: 9.1 MG/DL (ref 8.5–10.1)
CHLORIDE SERPL-SCNC: 110 MMOL/L (ref 97–108)
CO2 SERPL-SCNC: 24 MMOL/L (ref 21–32)
CREAT SERPL-MCNC: 1.58 MG/DL (ref 0.55–1.02)
GLUCOSE SERPL-MCNC: 89 MG/DL (ref 65–100)
POTASSIUM SERPL-SCNC: 4.8 MMOL/L (ref 3.5–5.1)
SODIUM SERPL-SCNC: 138 MMOL/L (ref 136–145)

## 2025-07-14 ENCOUNTER — RESULTS FOLLOW-UP (OUTPATIENT)
Facility: CLINIC | Age: 45
End: 2025-07-14

## 2025-07-22 DIAGNOSIS — M54.10 BACK PAIN WITH RADICULOPATHY: ICD-10-CM

## 2025-07-22 DIAGNOSIS — G89.4 CHRONIC PAIN SYNDROME: ICD-10-CM

## 2025-07-22 RX ORDER — PREGABALIN 150 MG/1
CAPSULE ORAL
Qty: 60 CAPSULE | Refills: 2 | Status: SHIPPED | OUTPATIENT
Start: 2025-07-22 | End: 2025-10-20

## 2025-07-23 DIAGNOSIS — J30.1 NON-SEASONAL ALLERGIC RHINITIS DUE TO POLLEN: ICD-10-CM

## 2025-07-23 RX ORDER — FLUTICASONE PROPIONATE 50 MCG
SPRAY, SUSPENSION (ML) NASAL
Qty: 16 G | Refills: 3 | Status: SHIPPED | OUTPATIENT
Start: 2025-07-23

## 2025-07-28 RX ORDER — MONTELUKAST SODIUM 10 MG/1
10 TABLET ORAL DAILY
Qty: 90 TABLET | Refills: 3 | Status: SHIPPED | OUTPATIENT
Start: 2025-07-28

## 2025-07-30 ENCOUNTER — TELEPHONE (OUTPATIENT)
Facility: CLINIC | Age: 45
End: 2025-07-30

## 2025-07-30 NOTE — TELEPHONE ENCOUNTER
Hiral Hencer's wants to talk with Dr Ramsay about a drug interaction. It's not a script he wrote. Please call at his earliest convenience.

## 2025-07-30 NOTE — TELEPHONE ENCOUNTER
Called and dicussed with Pharmacy, concern for ciprofloxacin and Ambien interaction due to metabolism,.  Advised, has used ciprofloxacin in the past without side effects so will recommend continuing with antibiotic for now.    Ralph Good MD  [facility]  07/30/25

## 2025-07-31 RX ORDER — OXYBUTYNIN CHLORIDE 15 MG/1
TABLET, EXTENDED RELEASE ORAL
Qty: 30 TABLET | Refills: 3 | Status: SHIPPED | OUTPATIENT
Start: 2025-07-31

## 2025-08-06 ENCOUNTER — OFFICE VISIT (OUTPATIENT)
Facility: CLINIC | Age: 45
End: 2025-08-06

## 2025-08-06 VITALS
HEART RATE: 88 BPM | WEIGHT: 195 LBS | BODY MASS INDEX: 33.29 KG/M2 | SYSTOLIC BLOOD PRESSURE: 108 MMHG | HEIGHT: 64 IN | RESPIRATION RATE: 16 BRPM | DIASTOLIC BLOOD PRESSURE: 83 MMHG | OXYGEN SATURATION: 99 % | TEMPERATURE: 97.2 F

## 2025-08-06 DIAGNOSIS — M54.2 NECK PAIN: Primary | ICD-10-CM

## 2025-08-06 DIAGNOSIS — R45.89 DEPRESSED MOOD: ICD-10-CM

## 2025-08-06 DIAGNOSIS — R45.89 TEARFULNESS: ICD-10-CM

## 2025-08-06 RX ORDER — METHOCARBAMOL 750 MG/1
750 TABLET, FILM COATED ORAL
COMMUNITY
Start: 2025-07-26

## 2025-08-06 RX ORDER — ESCITALOPRAM OXALATE 10 MG/1
10 TABLET ORAL DAILY
COMMUNITY
Start: 2025-08-04 | End: 2025-11-02

## 2025-08-08 ENCOUNTER — PATIENT MESSAGE (OUTPATIENT)
Facility: CLINIC | Age: 45
End: 2025-08-08

## 2025-08-08 DIAGNOSIS — M25.519 ACUTE SHOULDER PAIN, UNSPECIFIED LATERALITY: Primary | ICD-10-CM

## 2025-08-08 RX ORDER — PREDNISONE 20 MG/1
20 TABLET ORAL DAILY
Qty: 5 TABLET | Refills: 0 | Status: SHIPPED | OUTPATIENT
Start: 2025-08-08 | End: 2025-08-13

## 2025-08-09 ENCOUNTER — TELEPHONE (OUTPATIENT)
Age: 45
End: 2025-08-09

## 2025-08-10 DIAGNOSIS — K21.9 GASTROESOPHAGEAL REFLUX DISEASE WITHOUT ESOPHAGITIS: ICD-10-CM

## 2025-08-10 DIAGNOSIS — R73.09 ABNORMAL BLOOD SUGAR: ICD-10-CM

## 2025-08-11 ENCOUNTER — TELEPHONE (OUTPATIENT)
Facility: CLINIC | Age: 45
End: 2025-08-11

## 2025-08-11 RX ORDER — ISOPROPYL ALCOHOL 70 ML/100ML
SWAB TOPICAL
Qty: 180 EACH | Refills: 3 | Status: SHIPPED | OUTPATIENT
Start: 2025-08-11

## 2025-08-11 RX ORDER — PANTOPRAZOLE SODIUM 40 MG/1
40 TABLET, DELAYED RELEASE ORAL 2 TIMES DAILY
Qty: 180 TABLET | Refills: 3 | Status: SHIPPED | OUTPATIENT
Start: 2025-08-11

## 2025-08-14 ENCOUNTER — TELEPHONE (OUTPATIENT)
Facility: CLINIC | Age: 45
End: 2025-08-14

## 2025-08-22 PROBLEM — F31.9 BIPOLAR 1 DISORDER (HCC): Status: ACTIVE | Noted: 2025-08-22

## 2025-08-22 RX ORDER — HALOPERIDOL 5 MG/ML
5 INJECTION INTRAMUSCULAR EVERY 4 HOURS PRN
Status: DISCONTINUED | OUTPATIENT
Start: 2025-08-22 | End: 2025-08-24

## 2025-08-22 RX ORDER — HYDROXYZINE HYDROCHLORIDE 50 MG/1
50 TABLET, FILM COATED ORAL 3 TIMES DAILY PRN
Status: DISCONTINUED | OUTPATIENT
Start: 2025-08-22 | End: 2025-08-24

## 2025-08-22 RX ORDER — POLYETHYLENE GLYCOL 3350 17 G/17G
17 POWDER, FOR SOLUTION ORAL DAILY PRN
Status: DISCONTINUED | OUTPATIENT
Start: 2025-08-22 | End: 2025-08-28 | Stop reason: HOSPADM

## 2025-08-22 RX ORDER — TRAZODONE HYDROCHLORIDE 50 MG/1
50 TABLET ORAL NIGHTLY PRN
Status: DISCONTINUED | OUTPATIENT
Start: 2025-08-22 | End: 2025-08-24

## 2025-08-22 RX ORDER — HALOPERIDOL 5 MG/1
5 TABLET ORAL EVERY 4 HOURS PRN
Status: DISCONTINUED | OUTPATIENT
Start: 2025-08-22 | End: 2025-08-24

## 2025-08-22 RX ORDER — BENZTROPINE MESYLATE 1 MG/ML
2 INJECTION, SOLUTION INTRAMUSCULAR; INTRAVENOUS 2 TIMES DAILY PRN
Status: DISCONTINUED | OUTPATIENT
Start: 2025-08-22 | End: 2025-08-28 | Stop reason: HOSPADM

## 2025-08-23 ENCOUNTER — HOSPITAL ENCOUNTER (INPATIENT)
Facility: HOSPITAL | Age: 45
LOS: 5 days | Discharge: HOME | DRG: 885 | End: 2025-08-28
Attending: PSYCHIATRY & NEUROLOGY | Admitting: PSYCHIATRY & NEUROLOGY
Payer: MEDICARE

## 2025-08-23 ENCOUNTER — HOSPITAL ENCOUNTER (INPATIENT)
Facility: HOSPITAL | Age: 45
LOS: 1 days | Discharge: OTHER INSTITUTION | DRG: 885 | End: 2025-08-23
Attending: PSYCHIATRY & NEUROLOGY | Admitting: PSYCHIATRY & NEUROLOGY
Payer: MEDICARE

## 2025-08-23 DIAGNOSIS — F41.9 ANXIETY: Primary | ICD-10-CM

## 2025-08-23 PROCEDURE — 6370000000 HC RX 637 (ALT 250 FOR IP): Performed by: STUDENT IN AN ORGANIZED HEALTH CARE EDUCATION/TRAINING PROGRAM

## 2025-08-23 PROCEDURE — 1240000000 HC EMOTIONAL WELLNESS R&B

## 2025-08-23 PROCEDURE — 6360000002 HC RX W HCPCS: Performed by: STUDENT IN AN ORGANIZED HEALTH CARE EDUCATION/TRAINING PROGRAM

## 2025-08-23 RX ORDER — MONTELUKAST SODIUM 10 MG/1
10 TABLET ORAL NIGHTLY
Status: DISCONTINUED | OUTPATIENT
Start: 2025-08-24 | End: 2025-08-28 | Stop reason: HOSPADM

## 2025-08-23 RX ORDER — CETIRIZINE HYDROCHLORIDE 10 MG/1
10 TABLET ORAL DAILY
Status: DISCONTINUED | OUTPATIENT
Start: 2025-08-24 | End: 2025-08-28 | Stop reason: HOSPADM

## 2025-08-23 RX ORDER — OXYBUTYNIN CHLORIDE 15 MG/1
15 TABLET, EXTENDED RELEASE ORAL DAILY
Status: DISCONTINUED | OUTPATIENT
Start: 2025-08-24 | End: 2025-08-28 | Stop reason: HOSPADM

## 2025-08-23 RX ORDER — PANTOPRAZOLE SODIUM 40 MG/1
40 TABLET, DELAYED RELEASE ORAL
Status: DISCONTINUED | OUTPATIENT
Start: 2025-08-24 | End: 2025-08-28 | Stop reason: HOSPADM

## 2025-08-23 RX ORDER — ROSUVASTATIN CALCIUM 20 MG/1
20 TABLET, COATED ORAL
Status: DISCONTINUED | OUTPATIENT
Start: 2025-08-25 | End: 2025-08-28 | Stop reason: HOSPADM

## 2025-08-23 RX ORDER — METHOCARBAMOL 500 MG/1
750 TABLET, FILM COATED ORAL 3 TIMES DAILY PRN
Status: DISCONTINUED | OUTPATIENT
Start: 2025-08-23 | End: 2025-08-28 | Stop reason: HOSPADM

## 2025-08-23 RX ORDER — LOSARTAN POTASSIUM 25 MG/1
25 TABLET ORAL DAILY
Status: DISCONTINUED | OUTPATIENT
Start: 2025-08-24 | End: 2025-08-28 | Stop reason: HOSPADM

## 2025-08-23 RX ORDER — ROPINIROLE 1 MG/1
1 TABLET, FILM COATED ORAL 2 TIMES DAILY
Status: DISCONTINUED | OUTPATIENT
Start: 2025-08-23 | End: 2025-08-28 | Stop reason: HOSPADM

## 2025-08-23 RX ORDER — PREGABALIN 75 MG/1
150 CAPSULE ORAL 2 TIMES DAILY
Status: DISCONTINUED | OUTPATIENT
Start: 2025-08-24 | End: 2025-08-28 | Stop reason: HOSPADM

## 2025-08-23 RX ORDER — TACROLIMUS 1 MG/1
1 CAPSULE ORAL EVERY EVENING
Status: DISCONTINUED | OUTPATIENT
Start: 2025-08-23 | End: 2025-08-28 | Stop reason: HOSPADM

## 2025-08-23 RX ORDER — LANOLIN ALCOHOL/MO/W.PET/CERES
400 CREAM (GRAM) TOPICAL 2 TIMES DAILY
Status: DISCONTINUED | OUTPATIENT
Start: 2025-08-24 | End: 2025-08-28 | Stop reason: HOSPADM

## 2025-08-23 RX ORDER — FLUTICASONE PROPIONATE 50 MCG
2 SPRAY, SUSPENSION (ML) NASAL 2 TIMES DAILY
Status: DISCONTINUED | OUTPATIENT
Start: 2025-08-24 | End: 2025-08-28 | Stop reason: HOSPADM

## 2025-08-23 RX ORDER — ACETAMINOPHEN 325 MG/1
650 TABLET ORAL EVERY 4 HOURS PRN
Status: DISCONTINUED | OUTPATIENT
Start: 2025-08-23 | End: 2025-08-28 | Stop reason: HOSPADM

## 2025-08-23 RX ORDER — TACROLIMUS 1 MG/1
2 CAPSULE ORAL EVERY MORNING
Status: DISCONTINUED | OUTPATIENT
Start: 2025-08-24 | End: 2025-08-28 | Stop reason: HOSPADM

## 2025-08-23 RX ORDER — MYCOPHENOLATE MOFETIL 250 MG/1
500 CAPSULE ORAL 2 TIMES DAILY
Status: DISCONTINUED | OUTPATIENT
Start: 2025-08-23 | End: 2025-08-25

## 2025-08-23 RX ADMIN — MYCOPHENOLATE MOFETIL 500 MG: 250 CAPSULE ORAL at 22:09

## 2025-08-23 RX ADMIN — ROPINIROLE HYDROCHLORIDE 1 MG: 0.25 TABLET, FILM COATED ORAL at 22:09

## 2025-08-23 RX ADMIN — TACROLIMUS 1 MG: 1 CAPSULE ORAL at 22:09

## 2025-08-23 RX ADMIN — PRAMIPEXOLE DIHYDROCHLORIDE 0.75 MG: 0.5 TABLET ORAL at 22:09

## 2025-08-23 ASSESSMENT — SLEEP AND FATIGUE QUESTIONNAIRES
DO YOU USE A SLEEP AID: YES
DO YOU HAVE DIFFICULTY SLEEPING: YES
SLEEP PATTERN: DIFFICULTY FALLING ASLEEP
DO YOU USE A SLEEP AID: YES
SLEEP PATTERN: DIFFICULTY FALLING ASLEEP
AVERAGE NUMBER OF SLEEP HOURS: 5
AVERAGE NUMBER OF SLEEP HOURS: 5
DO YOU HAVE DIFFICULTY SLEEPING: YES

## 2025-08-23 ASSESSMENT — LIFESTYLE VARIABLES
HOW MANY STANDARD DRINKS CONTAINING ALCOHOL DO YOU HAVE ON A TYPICAL DAY: PATIENT DOES NOT DRINK
HOW OFTEN DO YOU HAVE A DRINK CONTAINING ALCOHOL: NEVER
HOW MANY STANDARD DRINKS CONTAINING ALCOHOL DO YOU HAVE ON A TYPICAL DAY: PATIENT DOES NOT DRINK
HOW OFTEN DO YOU HAVE A DRINK CONTAINING ALCOHOL: NEVER

## 2025-08-23 ASSESSMENT — PAIN SCALES - GENERAL
PAINLEVEL_OUTOF10: 0

## 2025-08-24 PROBLEM — F33.3 MDD (MAJOR DEPRESSIVE DISORDER), RECURRENT, SEVERE, WITH PSYCHOSIS (HCC): Status: ACTIVE | Noted: 2025-08-24

## 2025-08-24 PROCEDURE — 99223 1ST HOSP IP/OBS HIGH 75: CPT | Performed by: PSYCHIATRY & NEUROLOGY

## 2025-08-24 PROCEDURE — 6360000002 HC RX W HCPCS: Performed by: STUDENT IN AN ORGANIZED HEALTH CARE EDUCATION/TRAINING PROGRAM

## 2025-08-24 PROCEDURE — 6370000000 HC RX 637 (ALT 250 FOR IP): Performed by: PSYCHIATRY & NEUROLOGY

## 2025-08-24 PROCEDURE — 6370000000 HC RX 637 (ALT 250 FOR IP): Performed by: STUDENT IN AN ORGANIZED HEALTH CARE EDUCATION/TRAINING PROGRAM

## 2025-08-24 PROCEDURE — 1240000000 HC EMOTIONAL WELLNESS R&B

## 2025-08-24 PROCEDURE — 6370000000 HC RX 637 (ALT 250 FOR IP)

## 2025-08-24 RX ORDER — DIAZEPAM 5 MG/1
7.5 TABLET ORAL DAILY
Status: DISCONTINUED | OUTPATIENT
Start: 2025-08-24 | End: 2025-08-25

## 2025-08-24 RX ORDER — DIAZEPAM 5 MG/1
5 TABLET ORAL EVERY EVENING
Status: DISCONTINUED | OUTPATIENT
Start: 2025-08-24 | End: 2025-08-28 | Stop reason: HOSPADM

## 2025-08-24 RX ORDER — MECOBALAMIN 5000 MCG
5 TABLET,DISINTEGRATING ORAL ONCE
Status: COMPLETED | OUTPATIENT
Start: 2025-08-24 | End: 2025-08-24

## 2025-08-24 RX ADMIN — Medication 400 MG: at 08:23

## 2025-08-24 RX ADMIN — PANTOPRAZOLE SODIUM 40 MG: 40 TABLET, DELAYED RELEASE ORAL at 07:00

## 2025-08-24 RX ADMIN — OXYBUTYNIN CHLORIDE 15 MG: 15 TABLET, EXTENDED RELEASE ORAL at 08:23

## 2025-08-24 RX ADMIN — ROPINIROLE HYDROCHLORIDE 1 MG: 0.25 TABLET, FILM COATED ORAL at 08:23

## 2025-08-24 RX ADMIN — PREGABALIN 150 MG: 75 CAPSULE ORAL at 20:17

## 2025-08-24 RX ADMIN — ROPINIROLE HYDROCHLORIDE 1 MG: 0.25 TABLET, FILM COATED ORAL at 20:17

## 2025-08-24 RX ADMIN — PRAMIPEXOLE DIHYDROCHLORIDE 0.75 MG: 0.5 TABLET ORAL at 20:17

## 2025-08-24 RX ADMIN — PANTOPRAZOLE SODIUM 40 MG: 40 TABLET, DELAYED RELEASE ORAL at 17:59

## 2025-08-24 RX ADMIN — CETIRIZINE HYDROCHLORIDE 10 MG: 10 TABLET, FILM COATED ORAL at 08:24

## 2025-08-24 RX ADMIN — Medication 5 MG: at 22:27

## 2025-08-24 RX ADMIN — MYCOPHENOLATE MOFETIL 500 MG: 250 CAPSULE ORAL at 08:23

## 2025-08-24 RX ADMIN — METHOCARBAMOL 750 MG: 500 TABLET ORAL at 12:45

## 2025-08-24 RX ADMIN — DIAZEPAM 5 MG: 5 TABLET ORAL at 17:59

## 2025-08-24 RX ADMIN — MONTELUKAST 10 MG: 10 TABLET, FILM COATED ORAL at 20:17

## 2025-08-24 RX ADMIN — TACROLIMUS 1 MG: 1 CAPSULE ORAL at 17:59

## 2025-08-24 RX ADMIN — MYCOPHENOLATE MOFETIL 500 MG: 250 CAPSULE ORAL at 20:17

## 2025-08-24 RX ADMIN — PREGABALIN 150 MG: 75 CAPSULE ORAL at 08:23

## 2025-08-24 RX ADMIN — METHOCARBAMOL 750 MG: 500 TABLET ORAL at 22:16

## 2025-08-24 RX ADMIN — Medication 400 MG: at 20:17

## 2025-08-24 RX ADMIN — TACROLIMUS 2 MG: 1 CAPSULE ORAL at 08:23

## 2025-08-24 RX ADMIN — DIAZEPAM 7.5 MG: 5 TABLET ORAL at 12:45

## 2025-08-24 ASSESSMENT — PAIN SCALES - GENERAL
PAINLEVEL_OUTOF10: 0

## 2025-08-25 ENCOUNTER — TELEPHONE (OUTPATIENT)
Facility: CLINIC | Age: 45
End: 2025-08-25

## 2025-08-25 PROBLEM — F39 UNSPECIFIED MOOD (AFFECTIVE) DISORDER: Status: ACTIVE | Noted: 2025-08-25

## 2025-08-25 LAB
EKG ATRIAL RATE: 81 BPM
EKG DIAGNOSIS: NORMAL
EKG P AXIS: 52 DEGREES
EKG P-R INTERVAL: 148 MS
EKG Q-T INTERVAL: 350 MS
EKG QRS DURATION: 80 MS
EKG QTC CALCULATION (BAZETT): 406 MS
EKG R AXIS: 52 DEGREES
EKG T AXIS: 4 DEGREES
EKG VENTRICULAR RATE: 81 BPM

## 2025-08-25 PROCEDURE — 6370000000 HC RX 637 (ALT 250 FOR IP): Performed by: STUDENT IN AN ORGANIZED HEALTH CARE EDUCATION/TRAINING PROGRAM

## 2025-08-25 PROCEDURE — 1240000000 HC EMOTIONAL WELLNESS R&B

## 2025-08-25 PROCEDURE — 6360000002 HC RX W HCPCS: Performed by: STUDENT IN AN ORGANIZED HEALTH CARE EDUCATION/TRAINING PROGRAM

## 2025-08-25 PROCEDURE — 6370000000 HC RX 637 (ALT 250 FOR IP): Performed by: PSYCHIATRY & NEUROLOGY

## 2025-08-25 PROCEDURE — GZHZZZZ GROUP PSYCHOTHERAPY: ICD-10-PCS | Performed by: STUDENT IN AN ORGANIZED HEALTH CARE EDUCATION/TRAINING PROGRAM

## 2025-08-25 PROCEDURE — 36415 COLL VENOUS BLD VENIPUNCTURE: CPT

## 2025-08-25 PROCEDURE — 80197 ASSAY OF TACROLIMUS: CPT

## 2025-08-25 PROCEDURE — 6360000002 HC RX W HCPCS

## 2025-08-25 PROCEDURE — 6370000000 HC RX 637 (ALT 250 FOR IP)

## 2025-08-25 PROCEDURE — 99232 SBSQ HOSP IP/OBS MODERATE 35: CPT | Performed by: STUDENT IN AN ORGANIZED HEALTH CARE EDUCATION/TRAINING PROGRAM

## 2025-08-25 RX ORDER — MECOBALAMIN 5000 MCG
5 TABLET,DISINTEGRATING ORAL NIGHTLY
Status: DISCONTINUED | OUTPATIENT
Start: 2025-08-25 | End: 2025-08-28 | Stop reason: HOSPADM

## 2025-08-25 RX ORDER — MYCOPHENOLATE MOFETIL 250 MG/1
250 CAPSULE ORAL 2 TIMES DAILY
Status: DISCONTINUED | OUTPATIENT
Start: 2025-08-25 | End: 2025-08-28 | Stop reason: HOSPADM

## 2025-08-25 RX ORDER — DIAZEPAM 5 MG/1
10 TABLET ORAL DAILY
Status: DISCONTINUED | OUTPATIENT
Start: 2025-08-26 | End: 2025-08-28 | Stop reason: HOSPADM

## 2025-08-25 RX ADMIN — PANTOPRAZOLE SODIUM 40 MG: 40 TABLET, DELAYED RELEASE ORAL at 06:04

## 2025-08-25 RX ADMIN — FLUTICASONE PROPIONATE 2 SPRAY: 50 SPRAY, METERED NASAL at 20:59

## 2025-08-25 RX ADMIN — PREGABALIN 150 MG: 75 CAPSULE ORAL at 20:42

## 2025-08-25 RX ADMIN — METHOCARBAMOL 750 MG: 500 TABLET ORAL at 08:32

## 2025-08-25 RX ADMIN — CETIRIZINE HYDROCHLORIDE 10 MG: 10 TABLET, FILM COATED ORAL at 08:34

## 2025-08-25 RX ADMIN — TACROLIMUS 1 MG: 1 CAPSULE ORAL at 18:09

## 2025-08-25 RX ADMIN — MYCOPHENOLATE MOFETIL 500 MG: 250 CAPSULE ORAL at 08:34

## 2025-08-25 RX ADMIN — POLYETHYLENE GLYCOL 3350 17 G: 17 POWDER, FOR SOLUTION ORAL at 11:16

## 2025-08-25 RX ADMIN — Medication 400 MG: at 08:39

## 2025-08-25 RX ADMIN — PRAMIPEXOLE DIHYDROCHLORIDE 0.75 MG: 0.5 TABLET ORAL at 20:59

## 2025-08-25 RX ADMIN — MONTELUKAST 10 MG: 10 TABLET, FILM COATED ORAL at 20:42

## 2025-08-25 RX ADMIN — TACROLIMUS 2 MG: 1 CAPSULE ORAL at 08:33

## 2025-08-25 RX ADMIN — PANTOPRAZOLE SODIUM 40 MG: 40 TABLET, DELAYED RELEASE ORAL at 16:46

## 2025-08-25 RX ADMIN — Medication 5 MG: at 21:14

## 2025-08-25 RX ADMIN — MYCOPHENOLATE MOFETIL 250 MG: 250 CAPSULE ORAL at 20:45

## 2025-08-25 RX ADMIN — ROPINIROLE HYDROCHLORIDE 1 MG: 0.25 TABLET, FILM COATED ORAL at 20:42

## 2025-08-25 RX ADMIN — ROSUVASTATIN CALCIUM 20 MG: 20 TABLET, FILM COATED ORAL at 16:46

## 2025-08-25 RX ADMIN — ROPINIROLE HYDROCHLORIDE 1 MG: 0.25 TABLET, FILM COATED ORAL at 08:33

## 2025-08-25 RX ADMIN — DIAZEPAM 7.5 MG: 5 TABLET ORAL at 08:31

## 2025-08-25 RX ADMIN — SERTRALINE HYDROCHLORIDE 50 MG: 50 TABLET ORAL at 08:34

## 2025-08-25 RX ADMIN — Medication 400 MG: at 20:42

## 2025-08-25 RX ADMIN — METHOCARBAMOL 750 MG: 500 TABLET ORAL at 21:02

## 2025-08-25 RX ADMIN — DIAZEPAM 5 MG: 5 TABLET ORAL at 18:09

## 2025-08-25 RX ADMIN — Medication 5 MG: at 20:43

## 2025-08-25 RX ADMIN — OXYBUTYNIN CHLORIDE 15 MG: 15 TABLET, EXTENDED RELEASE ORAL at 08:34

## 2025-08-25 RX ADMIN — PREGABALIN 150 MG: 75 CAPSULE ORAL at 08:33

## 2025-08-25 RX ADMIN — LOSARTAN POTASSIUM 25 MG: 25 TABLET, FILM COATED ORAL at 08:34

## 2025-08-25 ASSESSMENT — PAIN SCALES - GENERAL
PAINLEVEL_OUTOF10: 0
PAINLEVEL_OUTOF10: 0

## 2025-08-26 PROBLEM — E43 SEVERE PROTEIN-CALORIE MALNUTRITION: Status: ACTIVE | Noted: 2025-08-26

## 2025-08-26 PROCEDURE — 36415 COLL VENOUS BLD VENIPUNCTURE: CPT

## 2025-08-26 PROCEDURE — 6370000000 HC RX 637 (ALT 250 FOR IP): Performed by: STUDENT IN AN ORGANIZED HEALTH CARE EDUCATION/TRAINING PROGRAM

## 2025-08-26 PROCEDURE — 6360000002 HC RX W HCPCS: Performed by: STUDENT IN AN ORGANIZED HEALTH CARE EDUCATION/TRAINING PROGRAM

## 2025-08-26 PROCEDURE — 1240000000 HC EMOTIONAL WELLNESS R&B

## 2025-08-26 PROCEDURE — 6360000002 HC RX W HCPCS

## 2025-08-26 PROCEDURE — 99231 SBSQ HOSP IP/OBS SF/LOW 25: CPT | Performed by: STUDENT IN AN ORGANIZED HEALTH CARE EDUCATION/TRAINING PROGRAM

## 2025-08-26 PROCEDURE — 6370000000 HC RX 637 (ALT 250 FOR IP): Performed by: PSYCHIATRY & NEUROLOGY

## 2025-08-26 RX ORDER — BUSPIRONE HYDROCHLORIDE 5 MG/1
5 TABLET ORAL 2 TIMES DAILY
Status: DISCONTINUED | OUTPATIENT
Start: 2025-08-26 | End: 2025-08-27

## 2025-08-26 RX ORDER — MULTIVITAMIN WITH IRON
1 TABLET ORAL DAILY
Status: DISCONTINUED | OUTPATIENT
Start: 2025-08-26 | End: 2025-08-28 | Stop reason: HOSPADM

## 2025-08-26 RX ORDER — GAUZE BANDAGE 2" X 2"
100 BANDAGE TOPICAL DAILY
Status: DISCONTINUED | OUTPATIENT
Start: 2025-08-26 | End: 2025-08-28 | Stop reason: HOSPADM

## 2025-08-26 RX ADMIN — DIAZEPAM 5 MG: 5 TABLET ORAL at 18:18

## 2025-08-26 RX ADMIN — Medication 5 MG: at 20:26

## 2025-08-26 RX ADMIN — TACROLIMUS 1 MG: 1 CAPSULE ORAL at 18:19

## 2025-08-26 RX ADMIN — Medication 400 MG: at 20:26

## 2025-08-26 RX ADMIN — Medication 100 MG: at 16:05

## 2025-08-26 RX ADMIN — CETIRIZINE HYDROCHLORIDE 10 MG: 10 TABLET, FILM COATED ORAL at 08:28

## 2025-08-26 RX ADMIN — FLUTICASONE PROPIONATE 2 SPRAY: 50 SPRAY, METERED NASAL at 08:32

## 2025-08-26 RX ADMIN — MYCOPHENOLATE MOFETIL 250 MG: 250 CAPSULE ORAL at 21:25

## 2025-08-26 RX ADMIN — PANTOPRAZOLE SODIUM 40 MG: 40 TABLET, DELAYED RELEASE ORAL at 06:29

## 2025-08-26 RX ADMIN — PANTOPRAZOLE SODIUM 40 MG: 40 TABLET, DELAYED RELEASE ORAL at 16:01

## 2025-08-26 RX ADMIN — ROPINIROLE HYDROCHLORIDE 1 MG: 0.25 TABLET, FILM COATED ORAL at 21:00

## 2025-08-26 RX ADMIN — Medication 400 MG: at 08:24

## 2025-08-26 RX ADMIN — SERTRALINE HYDROCHLORIDE 50 MG: 50 TABLET ORAL at 08:28

## 2025-08-26 RX ADMIN — THERA TABS 1 TABLET: TAB at 16:05

## 2025-08-26 RX ADMIN — TACROLIMUS 2 MG: 1 CAPSULE ORAL at 08:26

## 2025-08-26 RX ADMIN — ROPINIROLE HYDROCHLORIDE 1 MG: 0.25 TABLET, FILM COATED ORAL at 08:25

## 2025-08-26 RX ADMIN — BUSPIRONE HYDROCHLORIDE 5 MG: 5 TABLET ORAL at 10:55

## 2025-08-26 RX ADMIN — LOSARTAN POTASSIUM 25 MG: 25 TABLET, FILM COATED ORAL at 08:28

## 2025-08-26 RX ADMIN — BUSPIRONE HYDROCHLORIDE 5 MG: 5 TABLET ORAL at 20:26

## 2025-08-26 RX ADMIN — PREGABALIN 150 MG: 75 CAPSULE ORAL at 08:23

## 2025-08-26 RX ADMIN — DIAZEPAM 10 MG: 5 TABLET ORAL at 08:25

## 2025-08-26 RX ADMIN — MYCOPHENOLATE MOFETIL 250 MG: 250 CAPSULE ORAL at 10:52

## 2025-08-26 RX ADMIN — MONTELUKAST 10 MG: 10 TABLET, FILM COATED ORAL at 20:26

## 2025-08-26 RX ADMIN — OXYBUTYNIN CHLORIDE 15 MG: 15 TABLET, EXTENDED RELEASE ORAL at 10:53

## 2025-08-26 RX ADMIN — METHOCARBAMOL 750 MG: 500 TABLET ORAL at 21:09

## 2025-08-26 RX ADMIN — PREGABALIN 150 MG: 75 CAPSULE ORAL at 21:00

## 2025-08-26 ASSESSMENT — PAIN DESCRIPTION - LOCATION: LOCATION: LEG

## 2025-08-26 ASSESSMENT — PAIN SCALES - GENERAL
PAINLEVEL_OUTOF10: 0
PAINLEVEL_OUTOF10: 0
PAINLEVEL_OUTOF10: 10
PAINLEVEL_OUTOF10: 0

## 2025-08-26 ASSESSMENT — PAIN DESCRIPTION - ORIENTATION: ORIENTATION: LOWER

## 2025-08-26 ASSESSMENT — PAIN DESCRIPTION - DESCRIPTORS: DESCRIPTORS: NAGGING

## 2025-08-26 ASSESSMENT — PAIN - FUNCTIONAL ASSESSMENT: PAIN_FUNCTIONAL_ASSESSMENT: 0-10

## 2025-08-27 LAB
EKG ATRIAL RATE: 78 BPM
EKG DIAGNOSIS: NORMAL
EKG P AXIS: 133 DEGREES
EKG P-R INTERVAL: 156 MS
EKG Q-T INTERVAL: 374 MS
EKG QRS DURATION: 78 MS
EKG QTC CALCULATION (BAZETT): 426 MS
EKG R AXIS: 150 DEGREES
EKG T AXIS: 179 DEGREES
EKG VENTRICULAR RATE: 78 BPM
TACROLIMUS BLD-MCNC: 5.5 NG/ML (ref 5–20)

## 2025-08-27 PROCEDURE — 6370000000 HC RX 637 (ALT 250 FOR IP): Performed by: STUDENT IN AN ORGANIZED HEALTH CARE EDUCATION/TRAINING PROGRAM

## 2025-08-27 PROCEDURE — 6360000002 HC RX W HCPCS: Performed by: STUDENT IN AN ORGANIZED HEALTH CARE EDUCATION/TRAINING PROGRAM

## 2025-08-27 PROCEDURE — 6360000002 HC RX W HCPCS

## 2025-08-27 PROCEDURE — 1240000000 HC EMOTIONAL WELLNESS R&B

## 2025-08-27 PROCEDURE — 6370000000 HC RX 637 (ALT 250 FOR IP): Performed by: PSYCHIATRY & NEUROLOGY

## 2025-08-27 RX ORDER — BUSPIRONE HYDROCHLORIDE 5 MG/1
5 TABLET ORAL 3 TIMES DAILY
Status: DISCONTINUED | OUTPATIENT
Start: 2025-08-27 | End: 2025-08-28 | Stop reason: HOSPADM

## 2025-08-27 RX ORDER — PRAMIPEXOLE DIHYDROCHLORIDE 0.25 MG/1
0.75 TABLET ORAL NIGHTLY
Status: DISCONTINUED | OUTPATIENT
Start: 2025-08-27 | End: 2025-08-28 | Stop reason: HOSPADM

## 2025-08-27 RX ADMIN — Medication 100 MG: at 09:14

## 2025-08-27 RX ADMIN — OXYBUTYNIN CHLORIDE 15 MG: 15 TABLET, EXTENDED RELEASE ORAL at 09:28

## 2025-08-27 RX ADMIN — PREGABALIN 150 MG: 75 CAPSULE ORAL at 20:27

## 2025-08-27 RX ADMIN — PREGABALIN 150 MG: 75 CAPSULE ORAL at 09:15

## 2025-08-27 RX ADMIN — BUSPIRONE HYDROCHLORIDE 5 MG: 5 TABLET ORAL at 14:29

## 2025-08-27 RX ADMIN — ROPINIROLE HYDROCHLORIDE 1 MG: 0.25 TABLET, FILM COATED ORAL at 20:27

## 2025-08-27 RX ADMIN — MONTELUKAST 10 MG: 10 TABLET, FILM COATED ORAL at 20:27

## 2025-08-27 RX ADMIN — PANTOPRAZOLE SODIUM 40 MG: 40 TABLET, DELAYED RELEASE ORAL at 17:51

## 2025-08-27 RX ADMIN — TACROLIMUS 2 MG: 1 CAPSULE ORAL at 09:15

## 2025-08-27 RX ADMIN — FLUTICASONE PROPIONATE 2 SPRAY: 50 SPRAY, METERED NASAL at 09:19

## 2025-08-27 RX ADMIN — Medication 5 MG: at 20:27

## 2025-08-27 RX ADMIN — FLUTICASONE PROPIONATE 2 SPRAY: 50 SPRAY, METERED NASAL at 20:27

## 2025-08-27 RX ADMIN — BUSPIRONE HYDROCHLORIDE 5 MG: 5 TABLET ORAL at 09:25

## 2025-08-27 RX ADMIN — PRAMIPEXOLE DIHYDROCHLORIDE 0.75 MG: 0.25 TABLET ORAL at 21:34

## 2025-08-27 RX ADMIN — CETIRIZINE HYDROCHLORIDE 10 MG: 10 TABLET, FILM COATED ORAL at 09:17

## 2025-08-27 RX ADMIN — Medication 400 MG: at 09:15

## 2025-08-27 RX ADMIN — ROSUVASTATIN CALCIUM 20 MG: 20 TABLET, FILM COATED ORAL at 17:51

## 2025-08-27 RX ADMIN — LOSARTAN POTASSIUM 25 MG: 25 TABLET, FILM COATED ORAL at 09:16

## 2025-08-27 RX ADMIN — DIAZEPAM 10 MG: 5 TABLET ORAL at 09:15

## 2025-08-27 RX ADMIN — MYCOPHENOLATE MOFETIL 250 MG: 250 CAPSULE ORAL at 20:26

## 2025-08-27 RX ADMIN — Medication 400 MG: at 20:27

## 2025-08-27 RX ADMIN — BUSPIRONE HYDROCHLORIDE 5 MG: 5 TABLET ORAL at 09:28

## 2025-08-27 RX ADMIN — DIAZEPAM 5 MG: 5 TABLET ORAL at 17:50

## 2025-08-27 RX ADMIN — TACROLIMUS 1 MG: 1 CAPSULE ORAL at 17:50

## 2025-08-27 RX ADMIN — THERA TABS 1 TABLET: TAB at 09:17

## 2025-08-27 RX ADMIN — ROPINIROLE HYDROCHLORIDE 1 MG: 0.25 TABLET, FILM COATED ORAL at 09:16

## 2025-08-27 RX ADMIN — BUSPIRONE HYDROCHLORIDE 5 MG: 5 TABLET ORAL at 20:27

## 2025-08-27 RX ADMIN — PANTOPRAZOLE SODIUM 40 MG: 40 TABLET, DELAYED RELEASE ORAL at 07:09

## 2025-08-27 RX ADMIN — MYCOPHENOLATE MOFETIL 250 MG: 250 CAPSULE ORAL at 09:28

## 2025-08-27 RX ADMIN — SERTRALINE HYDROCHLORIDE 50 MG: 50 TABLET ORAL at 09:16

## 2025-08-27 ASSESSMENT — PAIN SCALES - GENERAL
PAINLEVEL_OUTOF10: 0
PAINLEVEL_OUTOF10: 0

## 2025-08-28 ENCOUNTER — TELEPHONE (OUTPATIENT)
Facility: CLINIC | Age: 45
End: 2025-08-28

## 2025-08-28 VITALS
HEIGHT: 64 IN | RESPIRATION RATE: 18 BRPM | TEMPERATURE: 98.4 F | OXYGEN SATURATION: 100 % | SYSTOLIC BLOOD PRESSURE: 117 MMHG | HEART RATE: 93 BPM | BODY MASS INDEX: 32.39 KG/M2 | WEIGHT: 189.7 LBS | DIASTOLIC BLOOD PRESSURE: 83 MMHG

## 2025-08-28 PROCEDURE — 6370000000 HC RX 637 (ALT 250 FOR IP): Performed by: PSYCHIATRY & NEUROLOGY

## 2025-08-28 PROCEDURE — 93005 ELECTROCARDIOGRAM TRACING: CPT | Performed by: STUDENT IN AN ORGANIZED HEALTH CARE EDUCATION/TRAINING PROGRAM

## 2025-08-28 PROCEDURE — 6360000002 HC RX W HCPCS: Performed by: STUDENT IN AN ORGANIZED HEALTH CARE EDUCATION/TRAINING PROGRAM

## 2025-08-28 PROCEDURE — 6370000000 HC RX 637 (ALT 250 FOR IP): Performed by: STUDENT IN AN ORGANIZED HEALTH CARE EDUCATION/TRAINING PROGRAM

## 2025-08-28 PROCEDURE — 6360000002 HC RX W HCPCS

## 2025-08-28 RX ORDER — LANOLIN ALCOHOL/MO/W.PET/CERES
400 CREAM (GRAM) TOPICAL 2 TIMES DAILY
Qty: 30 TABLET | Refills: 0 | Status: SHIPPED | OUTPATIENT
Start: 2025-08-28

## 2025-08-28 RX ORDER — MULTIVITAMIN WITH IRON
1 TABLET ORAL DAILY
Qty: 30 TABLET | Refills: 0 | Status: SHIPPED | OUTPATIENT
Start: 2025-08-28

## 2025-08-28 RX ORDER — DIAZEPAM 10 MG/1
10 TABLET ORAL DAILY
Qty: 10 TABLET | Refills: 0 | Status: SHIPPED | OUTPATIENT
Start: 2025-08-29 | End: 2025-08-28

## 2025-08-28 RX ORDER — TACROLIMUS 1 MG/1
1 CAPSULE ORAL EVERY EVENING
Qty: 60 CAPSULE | Refills: 3 | Status: SHIPPED | OUTPATIENT
Start: 2025-08-28 | End: 2025-08-28

## 2025-08-28 RX ORDER — MYCOPHENOLATE MOFETIL 250 MG/1
250 CAPSULE ORAL 2 TIMES DAILY
Qty: 60 CAPSULE | Refills: 3 | Status: SHIPPED | OUTPATIENT
Start: 2025-08-28

## 2025-08-28 RX ORDER — MULTIVITAMIN WITH IRON
1 TABLET ORAL DAILY
Qty: 30 TABLET | Refills: 0 | Status: SHIPPED | OUTPATIENT
Start: 2025-08-28 | End: 2025-08-28

## 2025-08-28 RX ORDER — DIAZEPAM 5 MG/1
5 TABLET ORAL EVERY EVENING
Qty: 10 TABLET | Refills: 0 | Status: SHIPPED | OUTPATIENT
Start: 2025-08-28 | End: 2025-09-07

## 2025-08-28 RX ORDER — TACROLIMUS 1 MG/1
2 CAPSULE ORAL EVERY MORNING
Qty: 60 CAPSULE | Refills: 3 | Status: SHIPPED | OUTPATIENT
Start: 2025-08-28

## 2025-08-28 RX ORDER — MYCOPHENOLATE MOFETIL 250 MG/1
250 CAPSULE ORAL 2 TIMES DAILY
Qty: 60 CAPSULE | Refills: 3 | Status: SHIPPED | OUTPATIENT
Start: 2025-08-28 | End: 2025-08-28

## 2025-08-28 RX ORDER — DIAZEPAM 10 MG/1
10 TABLET ORAL DAILY
Qty: 10 TABLET | Refills: 0 | Status: SHIPPED | OUTPATIENT
Start: 2025-08-29 | End: 2025-09-08

## 2025-08-28 RX ORDER — LANOLIN ALCOHOL/MO/W.PET/CERES
400 CREAM (GRAM) TOPICAL 2 TIMES DAILY
Qty: 30 TABLET | Refills: 0 | Status: SHIPPED | OUTPATIENT
Start: 2025-08-28 | End: 2025-08-28

## 2025-08-28 RX ORDER — CETIRIZINE HYDROCHLORIDE 10 MG/1
10 TABLET ORAL DAILY
Qty: 30 TABLET | Refills: 1 | Status: SHIPPED | OUTPATIENT
Start: 2025-08-28

## 2025-08-28 RX ORDER — TACROLIMUS 1 MG/1
2 CAPSULE ORAL EVERY MORNING
Qty: 60 CAPSULE | Refills: 3 | Status: SHIPPED | OUTPATIENT
Start: 2025-08-28 | End: 2025-08-28

## 2025-08-28 RX ORDER — BUSPIRONE HYDROCHLORIDE 5 MG/1
5 TABLET ORAL 3 TIMES DAILY
Qty: 90 TABLET | Refills: 1 | Status: SHIPPED | OUTPATIENT
Start: 2025-08-28 | End: 2025-08-28

## 2025-08-28 RX ORDER — ROPINIROLE 1 MG/1
1 TABLET, FILM COATED ORAL 2 TIMES DAILY
Qty: 90 TABLET | Refills: 3 | Status: SHIPPED | OUTPATIENT
Start: 2025-08-28

## 2025-08-28 RX ORDER — CETIRIZINE HYDROCHLORIDE 10 MG/1
10 TABLET ORAL DAILY
Qty: 30 TABLET | Refills: 1 | Status: SHIPPED | OUTPATIENT
Start: 2025-08-28 | End: 2025-08-28

## 2025-08-28 RX ORDER — DIAZEPAM 5 MG/1
5 TABLET ORAL EVERY EVENING
Qty: 10 TABLET | Refills: 0 | Status: SHIPPED | OUTPATIENT
Start: 2025-08-28 | End: 2025-08-28

## 2025-08-28 RX ORDER — TACROLIMUS 1 MG/1
1 CAPSULE ORAL EVERY EVENING
Qty: 60 CAPSULE | Refills: 3 | Status: SHIPPED | OUTPATIENT
Start: 2025-08-28

## 2025-08-28 RX ORDER — BUSPIRONE HYDROCHLORIDE 5 MG/1
5 TABLET ORAL 3 TIMES DAILY
Qty: 90 TABLET | Refills: 1 | Status: SHIPPED | OUTPATIENT
Start: 2025-08-28

## 2025-08-28 RX ORDER — ROPINIROLE 1 MG/1
1 TABLET, FILM COATED ORAL 2 TIMES DAILY
Qty: 90 TABLET | Refills: 3 | Status: SHIPPED | OUTPATIENT
Start: 2025-08-28 | End: 2025-08-28

## 2025-08-28 RX ORDER — THIAMINE MONONITRATE (VIT B1) 100 MG
100 TABLET ORAL DAILY
Qty: 30 TABLET | Refills: 0 | Status: SHIPPED | OUTPATIENT
Start: 2025-08-28 | End: 2025-08-28

## 2025-08-28 RX ORDER — THIAMINE MONONITRATE (VIT B1) 100 MG
100 TABLET ORAL DAILY
Qty: 30 TABLET | Refills: 0 | Status: SHIPPED | OUTPATIENT
Start: 2025-08-28

## 2025-08-28 RX ADMIN — FLUTICASONE PROPIONATE 2 SPRAY: 50 SPRAY, METERED NASAL at 09:17

## 2025-08-28 RX ADMIN — OXYBUTYNIN CHLORIDE 15 MG: 15 TABLET, EXTENDED RELEASE ORAL at 09:12

## 2025-08-28 RX ADMIN — THERA TABS 1 TABLET: TAB at 09:12

## 2025-08-28 RX ADMIN — SERTRALINE HYDROCHLORIDE 50 MG: 50 TABLET ORAL at 09:11

## 2025-08-28 RX ADMIN — TACROLIMUS 2 MG: 1 CAPSULE ORAL at 09:11

## 2025-08-28 RX ADMIN — ROPINIROLE HYDROCHLORIDE 1 MG: 0.25 TABLET, FILM COATED ORAL at 09:12

## 2025-08-28 RX ADMIN — LOSARTAN POTASSIUM 25 MG: 25 TABLET, FILM COATED ORAL at 09:11

## 2025-08-28 RX ADMIN — MYCOPHENOLATE MOFETIL 250 MG: 250 CAPSULE ORAL at 09:11

## 2025-08-28 RX ADMIN — PANTOPRAZOLE SODIUM 40 MG: 40 TABLET, DELAYED RELEASE ORAL at 06:38

## 2025-08-28 RX ADMIN — CETIRIZINE HYDROCHLORIDE 10 MG: 10 TABLET, FILM COATED ORAL at 09:11

## 2025-08-28 RX ADMIN — DIAZEPAM 10 MG: 5 TABLET ORAL at 09:11

## 2025-08-28 RX ADMIN — Medication 100 MG: at 09:11

## 2025-08-28 RX ADMIN — BUSPIRONE HYDROCHLORIDE 5 MG: 5 TABLET ORAL at 09:11

## 2025-08-28 RX ADMIN — PREGABALIN 150 MG: 75 CAPSULE ORAL at 09:12

## 2025-08-28 RX ADMIN — Medication 400 MG: at 09:14

## 2025-08-28 ASSESSMENT — PAIN SCALES - GENERAL: PAINLEVEL_OUTOF10: 0

## 2025-08-29 LAB
CMV DNA SERPL NAA+PROBE-ACNC: NORMAL IU/ML
CMV DNA SERPL NAA+PROBE-LOG IU: NORMAL LOG10 IU/ML
EKG ATRIAL RATE: 83 BPM
EKG DIAGNOSIS: NORMAL
EKG P AXIS: 43 DEGREES
EKG P-R INTERVAL: 154 MS
EKG Q-T INTERVAL: 356 MS
EKG QRS DURATION: 76 MS
EKG QTC CALCULATION (BAZETT): 418 MS
EKG R AXIS: 52 DEGREES
EKG T AXIS: 15 DEGREES
EKG VENTRICULAR RATE: 83 BPM

## 2025-08-29 PROCEDURE — 93010 ELECTROCARDIOGRAM REPORT: CPT | Performed by: INTERNAL MEDICINE

## 2025-08-29 ASSESSMENT — ANXIETY QUESTIONNAIRES
1. FEELING NERVOUS, ANXIOUS, OR ON EDGE: NEARLY EVERY DAY
4. TROUBLE RELAXING: MORE THAN HALF THE DAYS
6. BECOMING EASILY ANNOYED OR IRRITABLE: MORE THAN HALF THE DAYS
GAD7 TOTAL SCORE: 14
2. NOT BEING ABLE TO STOP OR CONTROL WORRYING: NEARLY EVERY DAY
7. FEELING AFRAID AS IF SOMETHING AWFUL MIGHT HAPPEN: MORE THAN HALF THE DAYS
IF YOU CHECKED OFF ANY PROBLEMS ON THIS QUESTIONNAIRE, HOW DIFFICULT HAVE THESE PROBLEMS MADE IT FOR YOU TO DO YOUR WORK, TAKE CARE OF THINGS AT HOME, OR GET ALONG WITH OTHER PEOPLE: VERY DIFFICULT
5. BEING SO RESTLESS THAT IT IS HARD TO SIT STILL: SEVERAL DAYS
3. WORRYING TOO MUCH ABOUT DIFFERENT THINGS: SEVERAL DAYS

## 2025-08-29 ASSESSMENT — PATIENT HEALTH QUESTIONNAIRE - PHQ9
1. LITTLE INTEREST OR PLEASURE IN DOING THINGS: NOT AT ALL
9. THOUGHTS THAT YOU WOULD BE BETTER OFF DEAD, OR OF HURTING YOURSELF: NOT AT ALL
6. FEELING BAD ABOUT YOURSELF - OR THAT YOU ARE A FAILURE OR HAVE LET YOURSELF OR YOUR FAMILY DOWN: SEVERAL DAYS
7. TROUBLE CONCENTRATING ON THINGS, SUCH AS READING THE NEWSPAPER OR WATCHING TELEVISION: NEARLY EVERY DAY
4. FEELING TIRED OR HAVING LITTLE ENERGY: NOT AT ALL
SUM OF ALL RESPONSES TO PHQ QUESTIONS 1-9: 9
SUM OF ALL RESPONSES TO PHQ QUESTIONS 1-9: 9
10. IF YOU CHECKED OFF ANY PROBLEMS, HOW DIFFICULT HAVE THESE PROBLEMS MADE IT FOR YOU TO DO YOUR WORK, TAKE CARE OF THINGS AT HOME, OR GET ALONG WITH OTHER PEOPLE: VERY DIFFICULT
SUM OF ALL RESPONSES TO PHQ QUESTIONS 1-9: 9
SUM OF ALL RESPONSES TO PHQ QUESTIONS 1-9: 9
2. FEELING DOWN, DEPRESSED OR HOPELESS: NEARLY EVERY DAY
8. MOVING OR SPEAKING SO SLOWLY THAT OTHER PEOPLE COULD HAVE NOTICED. OR THE OPPOSITE, BEING SO FIGETY OR RESTLESS THAT YOU HAVE BEEN MOVING AROUND A LOT MORE THAN USUAL: SEVERAL DAYS
3. TROUBLE FALLING OR STAYING ASLEEP: NOT AT ALL
5. POOR APPETITE OR OVEREATING: SEVERAL DAYS

## (undated) DEVICE — 3M™ CUROS™ DISINFECTING CAP FOR NEEDLELESS CONNECTORS 270/CARTON 20 CARTONS/CASE CFF1-270: Brand: CUROS™

## (undated) DEVICE — BAG BELONG PT PERS CLEAR HANDL

## (undated) DEVICE — TUBING, SUCTION, 1/4" X 12', STRAIGHT: Brand: MEDLINE

## (undated) DEVICE — SYR 5ML 1/5 GRAD LL NSAF LF --

## (undated) DEVICE — GARMENT,MEDLINE,DVT,INT,CALF,MED, GEN2: Brand: MEDLINE

## (undated) DEVICE — 1200 GUARD II KIT W/5MM TUBE W/O VAC TUBE: Brand: GUARDIAN

## (undated) DEVICE — CYSTO PACK: Brand: MEDLINE INDUSTRIES, INC.

## (undated) DEVICE — KIT MFLD ISOLATN NACL CNTRST PRT TBNG SPIK W/ PRSS TRNSDUC

## (undated) DEVICE — BASIN EMSIS 16OZ GRAPHITE PLAS KID SHP MOLD GRAD FOR ORAL

## (undated) DEVICE — SOLUTION IRRIG 3000ML 0.9% SOD CHL USP UROMATIC PLAS CONT

## (undated) DEVICE — FORCEPS BX L240CM JAW DIA2.8MM L CAP W/ NDL MIC MESH TOOTH

## (undated) DEVICE — KIT COLON W/ 1.1OZ LUB AND 2 END

## (undated) DEVICE — ANGIOGRAPHY KIT

## (undated) DEVICE — KIT MED IMAG CNTRST AGNT W/ IOPAMIDOL REUSE

## (undated) DEVICE — CONTAINER SPEC 20 ML LID NEUT BUFF FORMALIN 10 % POLYPR STS

## (undated) DEVICE — SENSOR OXMTR AD PED DISP NSL ALAR SPO2 XHALE ASSURANCE

## (undated) DEVICE — GLOVE ORANGE PI 7 1/2   MSG9075

## (undated) DEVICE — BITEBLOCK ENDOSCP 60FR MAXI WHT POLYETH STURDY W/ VELC WVN

## (undated) DEVICE — CANN NASAL O2 CAPNOGRAPHY AD -- FILTERLINE

## (undated) DEVICE — GLIDESHEATH SLENDER ACCESS KIT: Brand: GLIDESHEATH SLENDER

## (undated) DEVICE — BAG SPEC BIOHZRD 10 X 10 IN --

## (undated) DEVICE — Device: Brand: PROWATER

## (undated) DEVICE — CATH IV AUTOGRD BC PNK 20GA 25 -- INSYTE

## (undated) DEVICE — CUFF RMFG BP INF SZ 11 DISP -- LAWSON OEM ITEM 238915

## (undated) DEVICE — SET ADMIN 16ML TBNG L100IN 2 Y INJ SITE IV PIGGY BK DISP

## (undated) DEVICE — (D)SENSOR RMFG 02 PULS OXMTR -- DISC BY MFR USE ITEM 133445

## (undated) DEVICE — CATH IV AUTOGRD BC BLU 22GA 25 -- INSYTE

## (undated) DEVICE — BAG,DRAINAGE,ANTI-REFLUX TOWER,2000ML: Brand: MEDLINE

## (undated) DEVICE — Device

## (undated) DEVICE — SIMPLICITY FLUFF UNDERPAD 23X36, MODERATE: Brand: SIMPLICITY

## (undated) DEVICE — SOLUTION IRRIG 500ML STRL H2O NONPYROGENIC

## (undated) DEVICE — CATHETER DIAG 6FR L110CM INTRO 6FR BLLN DIA9MM 1CC PULM ART

## (undated) DEVICE — KENDALL RADIOLUCENT FOAM MONITORING ELECTRODE -RECTANGULAR SHAPE: Brand: KENDALL

## (undated) DEVICE — SET GRAV CK VLV NEEDLESS ST 3 GANGED 4WAY STPCOCK HI FLO 10

## (undated) DEVICE — SOLUTION SCRB 4OZ 4% CHG H2O AIDED FOR PREOPERATIVE SKIN

## (undated) DEVICE — NDL FLTR TIP 5 MIC 18GX1.5IN --

## (undated) DEVICE — PRESSURE MONITORING SET: Brand: TRUWAVE

## (undated) DEVICE — SOLIDIFIER MEDC 1200ML -- CONVERT TO 356117

## (undated) DEVICE — ELECTRODE,RADIOTRANSLUCENT,FOAM,3PK: Brand: MEDLINE

## (undated) DEVICE — SPECIAL PROCEDURE DRAPE 32" X 34": Brand: SPECIAL PROCEDURE DRAPE

## (undated) DEVICE — NDL PRT INJ NSAF BLNT 18GX1.5 --

## (undated) DEVICE — ADULT SPO2 SENSOR: Brand: NELLCOR

## (undated) DEVICE — KIT HND CTRL 3 W STPCOCK ROT END 54IN PREM HI PRSS TBNG AT

## (undated) DEVICE — PACK PROCEDURE SURG HRT CATH

## (undated) DEVICE — PREP PAD BNS: Brand: CONVERTORS

## (undated) DEVICE — SYR 3ML LL TIP 1/10ML GRAD --